# Patient Record
Sex: FEMALE | Race: WHITE | NOT HISPANIC OR LATINO | Employment: OTHER | URBAN - NONMETROPOLITAN AREA
[De-identification: names, ages, dates, MRNs, and addresses within clinical notes are randomized per-mention and may not be internally consistent; named-entity substitution may affect disease eponyms.]

---

## 2017-01-13 ENCOUNTER — HOSPITAL ENCOUNTER (EMERGENCY)
Facility: HOSPITAL | Age: 67
Discharge: HOME | End: 2017-01-13
Attending: EMERGENCY MEDICINE | Admitting: EMERGENCY MEDICINE
Payer: MEDICARE

## 2017-01-13 ENCOUNTER — APPOINTMENT (EMERGENCY)
Dept: CT IMAGING | Facility: HOSPITAL | Age: 67
End: 2017-01-13
Payer: MEDICARE

## 2017-01-13 VITALS
OXYGEN SATURATION: 93 % | RESPIRATION RATE: 18 BRPM | HEIGHT: 65 IN | DIASTOLIC BLOOD PRESSURE: 69 MMHG | WEIGHT: 268 LBS | TEMPERATURE: 98 F | HEART RATE: 112 BPM | BODY MASS INDEX: 44.65 KG/M2 | SYSTOLIC BLOOD PRESSURE: 154 MMHG

## 2017-01-13 DIAGNOSIS — R51.9 HEADACHE, ACUTE: Primary | ICD-10-CM

## 2017-01-13 DIAGNOSIS — H53.8 BLURRED VISION, BILATERAL: ICD-10-CM

## 2017-01-13 LAB
ACETONE SERPL-MCNC: NEGATIVE MG/DL
ALBUMIN SERPL BCP-MCNC: 3.3 G/DL (ref 3.5–5)
ALP SERPL-CCNC: 72 U/L (ref 46–116)
ALT SERPL W P-5'-P-CCNC: 25 U/L (ref 12–78)
ANION GAP SERPL CALCULATED.3IONS-SCNC: 8 MMOL/L (ref 4–13)
AST SERPL W P-5'-P-CCNC: 12 U/L (ref 5–45)
BILIRUB SERPL-MCNC: 0.3 MG/DL (ref 0.2–1)
BUN SERPL-MCNC: 14 MG/DL (ref 5–25)
CALCIUM SERPL-MCNC: 9.4 MG/DL (ref 8.3–10.1)
CHLORIDE SERPL-SCNC: 99 MMOL/L (ref 100–108)
CO2 SERPL-SCNC: 33 MMOL/L (ref 21–32)
CREAT SERPL-MCNC: 0.72 MG/DL (ref 0.6–1.3)
ERYTHROCYTE [DISTWIDTH] IN BLOOD BY AUTOMATED COUNT: 15.5 % (ref 11.6–15.1)
GFR SERPL CREATININE-BSD FRML MDRD: >60 ML/MIN/1.73SQ M
GLUCOSE SERPL-MCNC: 211 MG/DL (ref 65–140)
HCT VFR BLD AUTO: 42.7 % (ref 34.8–46.1)
HGB BLD-MCNC: 13.3 G/DL (ref 11.5–15.4)
MCH RBC QN AUTO: 29.6 PG (ref 26.8–34.3)
MCHC RBC AUTO-ENTMCNC: 31.1 G/DL (ref 31.4–37.4)
MCV RBC AUTO: 95 FL (ref 82–98)
PLATELET # BLD AUTO: 330 THOUSANDS/UL (ref 149–390)
PMV BLD AUTO: 10.5 FL (ref 8.9–12.7)
POTASSIUM SERPL-SCNC: 3.8 MMOL/L (ref 3.5–5.3)
PROT SERPL-MCNC: 7.3 G/DL (ref 6.4–8.2)
RBC # BLD AUTO: 4.49 MILLION/UL (ref 3.81–5.12)
SODIUM SERPL-SCNC: 140 MMOL/L (ref 136–145)
WBC # BLD AUTO: 8.45 THOUSAND/UL (ref 4.31–10.16)

## 2017-01-13 PROCEDURE — 85027 COMPLETE CBC AUTOMATED: CPT | Performed by: EMERGENCY MEDICINE

## 2017-01-13 PROCEDURE — 80053 COMPREHEN METABOLIC PANEL: CPT | Performed by: EMERGENCY MEDICINE

## 2017-01-13 PROCEDURE — 36415 COLL VENOUS BLD VENIPUNCTURE: CPT | Performed by: EMERGENCY MEDICINE

## 2017-01-13 PROCEDURE — 70450 CT HEAD/BRAIN W/O DYE: CPT

## 2017-01-13 PROCEDURE — 99284 EMERGENCY DEPT VISIT MOD MDM: CPT

## 2017-01-13 PROCEDURE — 82009 KETONE BODYS QUAL: CPT | Performed by: EMERGENCY MEDICINE

## 2017-01-13 RX ORDER — FOLIC ACID 1 MG/1
1 TABLET ORAL DAILY
COMMUNITY
End: 2018-07-05 | Stop reason: SDUPTHER

## 2017-01-14 ENCOUNTER — HOSPITAL ENCOUNTER (EMERGENCY)
Facility: HOSPITAL | Age: 67
Discharge: HOME | End: 2017-01-14
Attending: EMERGENCY MEDICINE | Admitting: EMERGENCY MEDICINE
Payer: MEDICARE

## 2017-01-14 ENCOUNTER — APPOINTMENT (EMERGENCY)
Dept: CT IMAGING | Facility: HOSPITAL | Age: 67
End: 2017-01-14
Payer: MEDICARE

## 2017-01-14 VITALS
TEMPERATURE: 96.8 F | OXYGEN SATURATION: 94 % | DIASTOLIC BLOOD PRESSURE: 74 MMHG | RESPIRATION RATE: 17 BRPM | SYSTOLIC BLOOD PRESSURE: 177 MMHG | BODY MASS INDEX: 44.81 KG/M2 | WEIGHT: 268.96 LBS | HEIGHT: 65 IN | HEART RATE: 74 BPM

## 2017-01-14 DIAGNOSIS — G43.909 MIGRAINE HEADACHE: Primary | ICD-10-CM

## 2017-01-14 PROCEDURE — 70496 CT ANGIOGRAPHY HEAD: CPT

## 2017-01-14 PROCEDURE — 70498 CT ANGIOGRAPHY NECK: CPT

## 2017-01-14 PROCEDURE — 99284 EMERGENCY DEPT VISIT MOD MDM: CPT

## 2017-01-14 PROCEDURE — 96361 HYDRATE IV INFUSION ADD-ON: CPT

## 2017-01-14 PROCEDURE — 96365 THER/PROPH/DIAG IV INF INIT: CPT

## 2017-01-14 PROCEDURE — 96375 TX/PRO/DX INJ NEW DRUG ADDON: CPT

## 2017-01-14 RX ORDER — VALPROATE SODIUM 100 MG/ML
INJECTION, SOLUTION INTRAVENOUS
Status: DISCONTINUED
Start: 2017-01-14 | End: 2017-01-14 | Stop reason: HOSPADM

## 2017-01-14 RX ORDER — TRAMADOL HYDROCHLORIDE 50 MG/1
50 TABLET ORAL EVERY 6 HOURS PRN
Qty: 30 TABLET | Refills: 0 | Status: SHIPPED | OUTPATIENT
Start: 2017-01-14 | End: 2017-01-24

## 2017-01-14 RX ORDER — DIPHENHYDRAMINE HYDROCHLORIDE 50 MG/ML
25 INJECTION INTRAMUSCULAR; INTRAVENOUS ONCE
Status: COMPLETED | OUTPATIENT
Start: 2017-01-14 | End: 2017-01-14

## 2017-01-14 RX ORDER — KETOROLAC TROMETHAMINE 30 MG/ML
15 INJECTION, SOLUTION INTRAMUSCULAR; INTRAVENOUS ONCE
Status: COMPLETED | OUTPATIENT
Start: 2017-01-14 | End: 2017-01-14

## 2017-01-14 RX ORDER — METOCLOPRAMIDE HYDROCHLORIDE 5 MG/ML
10 INJECTION INTRAMUSCULAR; INTRAVENOUS ONCE
Status: COMPLETED | OUTPATIENT
Start: 2017-01-14 | End: 2017-01-14

## 2017-01-14 RX ADMIN — DIPHENHYDRAMINE HYDROCHLORIDE 25 MG: 50 INJECTION, SOLUTION INTRAMUSCULAR; INTRAVENOUS at 01:41

## 2017-01-14 RX ADMIN — METOCLOPRAMIDE 10 MG: 5 INJECTION, SOLUTION INTRAMUSCULAR; INTRAVENOUS at 01:35

## 2017-01-14 RX ADMIN — IOHEXOL 100 ML: 350 INJECTION, SOLUTION INTRAVENOUS at 02:23

## 2017-01-14 RX ADMIN — KETOROLAC TROMETHAMINE 15 MG: 30 INJECTION, SOLUTION INTRAMUSCULAR at 01:39

## 2017-01-14 RX ADMIN — VALPROATE SODIUM 500 MG: 100 INJECTION, SOLUTION INTRAVENOUS at 03:22

## 2017-01-14 RX ADMIN — SODIUM CHLORIDE 500 ML: 0.9 INJECTION, SOLUTION INTRAVENOUS at 01:42

## 2017-03-01 ENCOUNTER — HOSPITAL ENCOUNTER (EMERGENCY)
Facility: HOSPITAL | Age: 67
Discharge: HOME | End: 2017-03-01
Attending: EMERGENCY MEDICINE | Admitting: EMERGENCY MEDICINE
Payer: MEDICARE

## 2017-03-01 VITALS
BODY MASS INDEX: 43.32 KG/M2 | SYSTOLIC BLOOD PRESSURE: 138 MMHG | OXYGEN SATURATION: 91 % | RESPIRATION RATE: 20 BRPM | HEIGHT: 65 IN | TEMPERATURE: 98.7 F | WEIGHT: 260 LBS | DIASTOLIC BLOOD PRESSURE: 69 MMHG | HEART RATE: 96 BPM

## 2017-03-01 DIAGNOSIS — M06.9 RHEUMATOID ARTHRITIS FLARE (HCC): Primary | ICD-10-CM

## 2017-03-01 DIAGNOSIS — B37.2 CANDIDIASIS, INTERTRIGO: ICD-10-CM

## 2017-03-01 DIAGNOSIS — J44.9 COPD (CHRONIC OBSTRUCTIVE PULMONARY DISEASE) (HCC): ICD-10-CM

## 2017-03-01 DIAGNOSIS — E66.01 MORBID (SEVERE) OBESITY DUE TO EXCESS CALORIES (HCC): ICD-10-CM

## 2017-03-01 LAB
ALBUMIN SERPL BCP-MCNC: 3.1 G/DL (ref 3.5–5)
ALP SERPL-CCNC: 75 U/L (ref 46–116)
ALT SERPL W P-5'-P-CCNC: 22 U/L (ref 12–78)
ANION GAP SERPL CALCULATED.3IONS-SCNC: 9 MMOL/L (ref 4–13)
APTT PPP: 26 SECONDS (ref 24–36)
AST SERPL W P-5'-P-CCNC: 13 U/L (ref 5–45)
BASOPHILS # BLD AUTO: 0.03 THOUSANDS/ΜL (ref 0–0.1)
BASOPHILS NFR BLD AUTO: 0 % (ref 0–1)
BILIRUB SERPL-MCNC: 0.4 MG/DL (ref 0.2–1)
BUN SERPL-MCNC: 17 MG/DL (ref 5–25)
CALCIUM SERPL-MCNC: 9.6 MG/DL (ref 8.3–10.1)
CHLORIDE SERPL-SCNC: 101 MMOL/L (ref 100–108)
CO2 SERPL-SCNC: 29 MMOL/L (ref 21–32)
CREAT SERPL-MCNC: 0.58 MG/DL (ref 0.6–1.3)
CRP SERPL HS-MCNC: 68.9 MG/L
EOSINOPHIL # BLD AUTO: 0.28 THOUSAND/ΜL (ref 0–0.61)
EOSINOPHIL NFR BLD AUTO: 3 % (ref 0–6)
ERYTHROCYTE [DISTWIDTH] IN BLOOD BY AUTOMATED COUNT: 15.7 % (ref 11.6–15.1)
ERYTHROCYTE [SEDIMENTATION RATE] IN BLOOD: 52 MM/HOUR (ref 0–20)
GFR SERPL CREATININE-BSD FRML MDRD: >60 ML/MIN/1.73SQ M
GLUCOSE SERPL-MCNC: 187 MG/DL (ref 65–140)
HCT VFR BLD AUTO: 40.1 % (ref 34.8–46.1)
HGB BLD-MCNC: 12.5 G/DL (ref 11.5–15.4)
INR PPP: 1.05 (ref 0.86–1.16)
LYMPHOCYTES # BLD AUTO: 2.16 THOUSANDS/ΜL (ref 0.6–4.47)
LYMPHOCYTES NFR BLD AUTO: 26 % (ref 14–44)
MCH RBC QN AUTO: 28.7 PG (ref 26.8–34.3)
MCHC RBC AUTO-ENTMCNC: 31.2 G/DL (ref 31.4–37.4)
MCV RBC AUTO: 92 FL (ref 82–98)
MONOCYTES # BLD AUTO: 0.89 THOUSAND/ΜL (ref 0.17–1.22)
MONOCYTES NFR BLD AUTO: 11 % (ref 4–12)
NEUTROPHILS # BLD AUTO: 5.02 THOUSANDS/ΜL (ref 1.85–7.62)
NEUTS SEG NFR BLD AUTO: 60 % (ref 43–75)
PLATELET # BLD AUTO: 328 THOUSANDS/UL (ref 149–390)
PMV BLD AUTO: 10.8 FL (ref 8.9–12.7)
POTASSIUM SERPL-SCNC: 3.8 MMOL/L (ref 3.5–5.3)
PROT SERPL-MCNC: 7.4 G/DL (ref 6.4–8.2)
PROTHROMBIN TIME: 13.4 SECONDS (ref 12–14.3)
RBC # BLD AUTO: 4.35 MILLION/UL (ref 3.81–5.12)
SODIUM SERPL-SCNC: 139 MMOL/L (ref 136–145)
WBC # BLD AUTO: 8.38 THOUSAND/UL (ref 4.31–10.16)

## 2017-03-01 PROCEDURE — 96374 THER/PROPH/DIAG INJ IV PUSH: CPT

## 2017-03-01 PROCEDURE — 99284 EMERGENCY DEPT VISIT MOD MDM: CPT

## 2017-03-01 PROCEDURE — 96361 HYDRATE IV INFUSION ADD-ON: CPT

## 2017-03-01 PROCEDURE — 80053 COMPREHEN METABOLIC PANEL: CPT | Performed by: EMERGENCY MEDICINE

## 2017-03-01 PROCEDURE — 85610 PROTHROMBIN TIME: CPT | Performed by: EMERGENCY MEDICINE

## 2017-03-01 PROCEDURE — 36415 COLL VENOUS BLD VENIPUNCTURE: CPT | Performed by: EMERGENCY MEDICINE

## 2017-03-01 PROCEDURE — 85652 RBC SED RATE AUTOMATED: CPT | Performed by: EMERGENCY MEDICINE

## 2017-03-01 PROCEDURE — 85730 THROMBOPLASTIN TIME PARTIAL: CPT | Performed by: EMERGENCY MEDICINE

## 2017-03-01 PROCEDURE — 86141 C-REACTIVE PROTEIN HS: CPT | Performed by: EMERGENCY MEDICINE

## 2017-03-01 PROCEDURE — 87040 BLOOD CULTURE FOR BACTERIA: CPT | Performed by: EMERGENCY MEDICINE

## 2017-03-01 PROCEDURE — 85025 COMPLETE CBC W/AUTO DIFF WBC: CPT | Performed by: EMERGENCY MEDICINE

## 2017-03-01 PROCEDURE — 96375 TX/PRO/DX INJ NEW DRUG ADDON: CPT

## 2017-03-01 RX ORDER — PREDNISONE 10 MG/1
10 TABLET ORAL ONCE
Status: COMPLETED | OUTPATIENT
Start: 2017-03-01 | End: 2017-03-01

## 2017-03-01 RX ORDER — NYSTATIN 100000 [USP'U]/G
POWDER TOPICAL 3 TIMES DAILY
Qty: 60 G | Refills: 1 | Status: SHIPPED | OUTPATIENT
Start: 2017-03-01 | End: 2017-11-24 | Stop reason: HOSPADM

## 2017-03-01 RX ORDER — KETOROLAC TROMETHAMINE 30 MG/ML
30 INJECTION, SOLUTION INTRAMUSCULAR; INTRAVENOUS ONCE
Status: COMPLETED | OUTPATIENT
Start: 2017-03-01 | End: 2017-03-01

## 2017-03-01 RX ORDER — SODIUM CHLORIDE 9 MG/ML
125 INJECTION, SOLUTION INTRAVENOUS CONTINUOUS
Status: DISCONTINUED | OUTPATIENT
Start: 2017-03-01 | End: 2017-03-01 | Stop reason: HOSPADM

## 2017-03-01 RX ADMIN — KETOROLAC TROMETHAMINE 30 MG: 30 INJECTION, SOLUTION INTRAMUSCULAR at 14:49

## 2017-03-01 RX ADMIN — PREDNISONE 10 MG: 10 TABLET ORAL at 17:10

## 2017-03-01 RX ADMIN — DEXAMETHASONE SODIUM PHOSPHATE 10 MG: 10 INJECTION INTRAMUSCULAR; INTRAVENOUS at 14:50

## 2017-03-01 RX ADMIN — SODIUM CHLORIDE 125 ML/HR: 0.9 INJECTION, SOLUTION INTRAVENOUS at 14:46

## 2017-03-06 LAB
BACTERIA BLD CULT: NORMAL
BACTERIA BLD CULT: NORMAL

## 2017-04-23 ENCOUNTER — APPOINTMENT (EMERGENCY)
Dept: RADIOLOGY | Facility: HOSPITAL | Age: 67
End: 2017-04-23
Payer: MEDICARE

## 2017-04-23 ENCOUNTER — HOSPITAL ENCOUNTER (EMERGENCY)
Facility: HOSPITAL | Age: 67
Discharge: HOME | End: 2017-04-23
Admitting: EMERGENCY MEDICINE
Payer: MEDICARE

## 2017-04-23 VITALS
DIASTOLIC BLOOD PRESSURE: 70 MMHG | OXYGEN SATURATION: 100 % | RESPIRATION RATE: 20 BRPM | HEIGHT: 64 IN | HEART RATE: 90 BPM | BODY MASS INDEX: 46.82 KG/M2 | SYSTOLIC BLOOD PRESSURE: 150 MMHG | WEIGHT: 274.25 LBS | TEMPERATURE: 98 F

## 2017-04-23 DIAGNOSIS — M79.641 HAND PAIN, RIGHT: Primary | ICD-10-CM

## 2017-04-23 DIAGNOSIS — M25.571 RIGHT ANKLE PAIN: ICD-10-CM

## 2017-04-23 LAB — GLUCOSE SERPL-MCNC: 217 MG/DL (ref 65–140)

## 2017-04-23 PROCEDURE — 99284 EMERGENCY DEPT VISIT MOD MDM: CPT

## 2017-04-23 PROCEDURE — 82948 REAGENT STRIP/BLOOD GLUCOSE: CPT

## 2017-04-23 PROCEDURE — 96372 THER/PROPH/DIAG INJ SC/IM: CPT

## 2017-04-23 PROCEDURE — 73630 X-RAY EXAM OF FOOT: CPT

## 2017-04-23 PROCEDURE — 73610 X-RAY EXAM OF ANKLE: CPT

## 2017-04-23 RX ORDER — KETOROLAC TROMETHAMINE 30 MG/ML
15 INJECTION, SOLUTION INTRAMUSCULAR; INTRAVENOUS ONCE
Status: COMPLETED | OUTPATIENT
Start: 2017-04-23 | End: 2017-04-23

## 2017-04-23 RX ORDER — NAPROXEN 500 MG/1
500 TABLET ORAL 2 TIMES DAILY WITH MEALS
COMMUNITY
End: 2017-11-20

## 2017-04-23 RX ORDER — TRAMADOL HYDROCHLORIDE 50 MG/1
50 TABLET ORAL EVERY 6 HOURS PRN
Qty: 20 TABLET | Refills: 0 | Status: SHIPPED | OUTPATIENT
Start: 2017-04-23 | End: 2017-05-03

## 2017-04-23 RX ORDER — LEVOTHYROXINE SODIUM 0.12 MG/1
125 TABLET ORAL DAILY
COMMUNITY
End: 2017-11-20

## 2017-04-23 RX ADMIN — KETOROLAC TROMETHAMINE 15 MG: 30 INJECTION, SOLUTION INTRAMUSCULAR at 12:18

## 2017-05-16 ENCOUNTER — GENERIC CONVERSION - ENCOUNTER (OUTPATIENT)
Dept: OTHER | Facility: OTHER | Age: 67
End: 2017-05-16

## 2017-05-23 ENCOUNTER — TRANSCRIBE ORDERS (OUTPATIENT)
Dept: ADMINISTRATIVE | Facility: HOSPITAL | Age: 67
End: 2017-05-23

## 2017-05-23 ENCOUNTER — GENERIC CONVERSION - ENCOUNTER (OUTPATIENT)
Dept: OTHER | Facility: OTHER | Age: 67
End: 2017-05-23

## 2017-05-23 ENCOUNTER — APPOINTMENT (OUTPATIENT)
Dept: LAB | Facility: HOSPITAL | Age: 67
End: 2017-05-23
Payer: MEDICARE

## 2017-05-23 DIAGNOSIS — E55.9 VITAMIN D DEFICIENCY: ICD-10-CM

## 2017-05-23 DIAGNOSIS — M06.9 RHEUMATOID ARTHRITIS (HCC): ICD-10-CM

## 2017-05-23 DIAGNOSIS — I10 ESSENTIAL (PRIMARY) HYPERTENSION: ICD-10-CM

## 2017-05-23 DIAGNOSIS — E78.00 PURE HYPERCHOLESTEROLEMIA: ICD-10-CM

## 2017-05-23 DIAGNOSIS — E11.9 TYPE 2 DIABETES MELLITUS WITHOUT COMPLICATIONS (HCC): ICD-10-CM

## 2017-05-23 DIAGNOSIS — E03.9 HYPOTHYROIDISM: ICD-10-CM

## 2017-05-23 LAB
25(OH)D3 SERPL-MCNC: 17.6 NG/ML (ref 30–100)
ALBUMIN SERPL BCP-MCNC: 3.3 G/DL (ref 3.5–5)
ALP SERPL-CCNC: 86 U/L (ref 46–116)
ALT SERPL W P-5'-P-CCNC: 21 U/L (ref 12–78)
ANION GAP SERPL CALCULATED.3IONS-SCNC: 8 MMOL/L (ref 4–13)
AST SERPL W P-5'-P-CCNC: 11 U/L (ref 5–45)
BASOPHILS # BLD AUTO: 0.03 THOUSANDS/ΜL (ref 0–0.1)
BASOPHILS NFR BLD AUTO: 0 % (ref 0–1)
BILIRUB SERPL-MCNC: 0.2 MG/DL (ref 0.2–1)
BUN SERPL-MCNC: 18 MG/DL (ref 5–25)
CALCIUM SERPL-MCNC: 9.7 MG/DL (ref 8.3–10.1)
CHLORIDE SERPL-SCNC: 101 MMOL/L (ref 100–108)
CHOLEST SERPL-MCNC: 191 MG/DL (ref 50–200)
CO2 SERPL-SCNC: 30 MMOL/L (ref 21–32)
CREAT SERPL-MCNC: 0.62 MG/DL (ref 0.6–1.3)
CREAT UR-MCNC: 84.5 MG/DL
EOSINOPHIL # BLD AUTO: 0.21 THOUSAND/ΜL (ref 0–0.61)
EOSINOPHIL NFR BLD AUTO: 2 % (ref 0–6)
ERYTHROCYTE [DISTWIDTH] IN BLOOD BY AUTOMATED COUNT: 16.7 % (ref 11.6–15.1)
EST. AVERAGE GLUCOSE BLD GHB EST-MCNC: 200 MG/DL
GFR SERPL CREATININE-BSD FRML MDRD: >60 ML/MIN/1.73SQ M
GLUCOSE P FAST SERPL-MCNC: 177 MG/DL (ref 65–99)
HBA1C MFR BLD: 8.6 % (ref 4.2–6.3)
HCT VFR BLD AUTO: 41.1 % (ref 34.8–46.1)
HDLC SERPL-MCNC: 65 MG/DL (ref 40–60)
HGB BLD-MCNC: 12.7 G/DL (ref 11.5–15.4)
LDLC SERPL CALC-MCNC: 106 MG/DL (ref 0–100)
LYMPHOCYTES # BLD AUTO: 3.17 THOUSANDS/ΜL (ref 0.6–4.47)
LYMPHOCYTES NFR BLD AUTO: 36 % (ref 14–44)
MCH RBC QN AUTO: 28.2 PG (ref 26.8–34.3)
MCHC RBC AUTO-ENTMCNC: 30.9 G/DL (ref 31.4–37.4)
MCV RBC AUTO: 91 FL (ref 82–98)
MICROALBUMIN UR-MCNC: 74.3 MG/L (ref 0–20)
MICROALBUMIN/CREAT 24H UR: 88 MG/G CREATININE (ref 0–30)
MONOCYTES # BLD AUTO: 0.87 THOUSAND/ΜL (ref 0.17–1.22)
MONOCYTES NFR BLD AUTO: 10 % (ref 4–12)
NEUTROPHILS # BLD AUTO: 4.65 THOUSANDS/ΜL (ref 1.85–7.62)
NEUTS SEG NFR BLD AUTO: 52 % (ref 43–75)
PLATELET # BLD AUTO: 359 THOUSANDS/UL (ref 149–390)
PMV BLD AUTO: 11.3 FL (ref 8.9–12.7)
POTASSIUM SERPL-SCNC: 4.5 MMOL/L (ref 3.5–5.3)
PROT SERPL-MCNC: 7.2 G/DL (ref 6.4–8.2)
RBC # BLD AUTO: 4.51 MILLION/UL (ref 3.81–5.12)
SODIUM SERPL-SCNC: 139 MMOL/L (ref 136–145)
TRIGL SERPL-MCNC: 99 MG/DL
TSH SERPL DL<=0.05 MIU/L-ACNC: 0.7 UIU/ML (ref 0.36–3.74)
WBC # BLD AUTO: 8.93 THOUSAND/UL (ref 4.31–10.16)

## 2017-05-23 PROCEDURE — 82306 VITAMIN D 25 HYDROXY: CPT

## 2017-05-23 PROCEDURE — 84443 ASSAY THYROID STIM HORMONE: CPT

## 2017-05-23 PROCEDURE — 85025 COMPLETE CBC W/AUTO DIFF WBC: CPT

## 2017-05-23 PROCEDURE — 82570 ASSAY OF URINE CREATININE: CPT

## 2017-05-23 PROCEDURE — 80061 LIPID PANEL: CPT

## 2017-05-23 PROCEDURE — 80053 COMPREHEN METABOLIC PANEL: CPT

## 2017-05-23 PROCEDURE — 82043 UR ALBUMIN QUANTITATIVE: CPT

## 2017-05-23 PROCEDURE — 83036 HEMOGLOBIN GLYCOSYLATED A1C: CPT

## 2017-05-23 PROCEDURE — 36415 COLL VENOUS BLD VENIPUNCTURE: CPT

## 2017-05-26 ENCOUNTER — GENERIC CONVERSION - ENCOUNTER (OUTPATIENT)
Dept: OTHER | Facility: OTHER | Age: 67
End: 2017-05-26

## 2017-06-07 ENCOUNTER — ALLSCRIPTS OFFICE VISIT (OUTPATIENT)
Dept: OTHER | Facility: OTHER | Age: 67
End: 2017-06-07

## 2017-06-07 DIAGNOSIS — E11.9 TYPE 2 DIABETES MELLITUS WITHOUT COMPLICATIONS (HCC): ICD-10-CM

## 2017-06-07 DIAGNOSIS — E53.8 DEFICIENCY OF OTHER SPECIFIED B GROUP VITAMINS: ICD-10-CM

## 2017-06-07 DIAGNOSIS — E03.9 HYPOTHYROIDISM: ICD-10-CM

## 2017-06-07 DIAGNOSIS — E55.9 VITAMIN D DEFICIENCY: ICD-10-CM

## 2017-06-07 DIAGNOSIS — J44.9 CHRONIC OBSTRUCTIVE PULMONARY DISEASE (HCC): ICD-10-CM

## 2017-06-07 DIAGNOSIS — E78.00 PURE HYPERCHOLESTEROLEMIA: ICD-10-CM

## 2017-06-07 DIAGNOSIS — M06.9 RHEUMATOID ARTHRITIS(714.0): ICD-10-CM

## 2017-06-07 DIAGNOSIS — B37.2 CANDIDIASIS OF SKIN AND NAIL: ICD-10-CM

## 2017-06-07 DIAGNOSIS — I10 ESSENTIAL (PRIMARY) HYPERTENSION: ICD-10-CM

## 2017-06-16 ENCOUNTER — GENERIC CONVERSION - ENCOUNTER (OUTPATIENT)
Dept: OTHER | Facility: OTHER | Age: 67
End: 2017-06-16

## 2017-06-21 ENCOUNTER — GENERIC CONVERSION - ENCOUNTER (OUTPATIENT)
Dept: OTHER | Facility: OTHER | Age: 67
End: 2017-06-21

## 2017-06-26 ENCOUNTER — ALLSCRIPTS OFFICE VISIT (OUTPATIENT)
Dept: OTHER | Facility: OTHER | Age: 67
End: 2017-06-26

## 2017-08-21 ENCOUNTER — GENERIC CONVERSION - ENCOUNTER (OUTPATIENT)
Dept: OTHER | Facility: OTHER | Age: 67
End: 2017-08-21

## 2017-09-05 DIAGNOSIS — E53.8 DEFICIENCY OF OTHER SPECIFIED B GROUP VITAMINS: ICD-10-CM

## 2017-09-05 DIAGNOSIS — J44.9 CHRONIC OBSTRUCTIVE PULMONARY DISEASE (HCC): ICD-10-CM

## 2017-09-05 DIAGNOSIS — Z12.31 ENCOUNTER FOR SCREENING MAMMOGRAM FOR MALIGNANT NEOPLASM OF BREAST: ICD-10-CM

## 2017-09-05 DIAGNOSIS — M06.9 RHEUMATOID ARTHRITIS(714.0): ICD-10-CM

## 2017-09-05 DIAGNOSIS — I10 ESSENTIAL (PRIMARY) HYPERTENSION: ICD-10-CM

## 2017-09-05 DIAGNOSIS — E03.9 HYPOTHYROIDISM: ICD-10-CM

## 2017-09-05 DIAGNOSIS — E11.9 TYPE 2 DIABETES MELLITUS WITHOUT COMPLICATIONS (HCC): ICD-10-CM

## 2017-09-05 DIAGNOSIS — R07.81 PLEURODYNIA: ICD-10-CM

## 2017-09-05 DIAGNOSIS — E78.00 PURE HYPERCHOLESTEROLEMIA: ICD-10-CM

## 2017-09-05 DIAGNOSIS — E55.9 VITAMIN D DEFICIENCY: ICD-10-CM

## 2017-09-09 ENCOUNTER — TRANSCRIBE ORDERS (OUTPATIENT)
Dept: ADMINISTRATIVE | Facility: HOSPITAL | Age: 67
End: 2017-09-09

## 2017-09-09 ENCOUNTER — APPOINTMENT (OUTPATIENT)
Dept: LAB | Facility: HOSPITAL | Age: 67
End: 2017-09-09
Payer: MEDICARE

## 2017-09-09 DIAGNOSIS — I10 ESSENTIAL (PRIMARY) HYPERTENSION: ICD-10-CM

## 2017-09-09 DIAGNOSIS — M06.9 RHEUMATOID ARTHRITIS, INVOLVING UNSPECIFIED SITE, UNSPECIFIED RHEUMATOID FACTOR PRESENCE: Primary | ICD-10-CM

## 2017-09-09 DIAGNOSIS — E78.00 PURE HYPERCHOLESTEROLEMIA: ICD-10-CM

## 2017-09-09 DIAGNOSIS — E55.9 VITAMIN D DEFICIENCY: ICD-10-CM

## 2017-09-09 DIAGNOSIS — E11.9 TYPE 2 DIABETES MELLITUS WITHOUT COMPLICATIONS (HCC): ICD-10-CM

## 2017-09-09 DIAGNOSIS — E03.9 HYPOTHYROIDISM: ICD-10-CM

## 2017-09-09 DIAGNOSIS — E53.8 DEFICIENCY OF OTHER SPECIFIED B GROUP VITAMINS: ICD-10-CM

## 2017-09-09 DIAGNOSIS — M06.9 RHEUMATOID ARTHRITIS(714.0): ICD-10-CM

## 2017-09-09 DIAGNOSIS — M06.9 RHEUMATOID ARTHRITIS, INVOLVING UNSPECIFIED SITE, UNSPECIFIED RHEUMATOID FACTOR PRESENCE: ICD-10-CM

## 2017-09-09 DIAGNOSIS — B37.2 CANDIDIASIS OF SKIN AND NAIL: ICD-10-CM

## 2017-09-09 DIAGNOSIS — J44.9 CHRONIC OBSTRUCTIVE PULMONARY DISEASE (HCC): ICD-10-CM

## 2017-09-09 LAB
25(OH)D3 SERPL-MCNC: 31.5 NG/ML (ref 30–100)
ALBUMIN SERPL BCP-MCNC: 3.7 G/DL (ref 3.5–5)
ALP SERPL-CCNC: 85 U/L (ref 46–116)
ALT SERPL W P-5'-P-CCNC: 46 U/L (ref 12–78)
ANION GAP SERPL CALCULATED.3IONS-SCNC: 8 MMOL/L (ref 4–13)
AST SERPL W P-5'-P-CCNC: 18 U/L (ref 5–45)
BASOPHILS # BLD AUTO: 0.03 THOUSANDS/ΜL (ref 0–0.1)
BASOPHILS NFR BLD AUTO: 0 % (ref 0–1)
BILIRUB SERPL-MCNC: 0.5 MG/DL (ref 0.2–1)
BUN SERPL-MCNC: 19 MG/DL (ref 5–25)
CALCIUM SERPL-MCNC: 9.6 MG/DL (ref 8.3–10.1)
CHLORIDE SERPL-SCNC: 102 MMOL/L (ref 100–108)
CHOLEST SERPL-MCNC: 225 MG/DL (ref 50–200)
CO2 SERPL-SCNC: 30 MMOL/L (ref 21–32)
CREAT SERPL-MCNC: 0.66 MG/DL (ref 0.6–1.3)
CRP SERPL QL: 9.7 MG/L
EOSINOPHIL # BLD AUTO: 0.25 THOUSAND/ΜL (ref 0–0.61)
EOSINOPHIL NFR BLD AUTO: 4 % (ref 0–6)
ERYTHROCYTE [DISTWIDTH] IN BLOOD BY AUTOMATED COUNT: 16.7 % (ref 11.6–15.1)
ERYTHROCYTE [SEDIMENTATION RATE] IN BLOOD: 4 MM/HOUR (ref 0–20)
EST. AVERAGE GLUCOSE BLD GHB EST-MCNC: 189 MG/DL
GFR SERPL CREATININE-BSD FRML MDRD: 90 ML/MIN/1.73SQ M
GLUCOSE P FAST SERPL-MCNC: 158 MG/DL (ref 65–99)
HBA1C MFR BLD: 8.2 % (ref 4.2–6.3)
HCT VFR BLD AUTO: 45.4 % (ref 34.8–46.1)
HDLC SERPL-MCNC: 65 MG/DL (ref 40–60)
HGB BLD-MCNC: 14.4 G/DL (ref 11.5–15.4)
LDLC SERPL CALC-MCNC: 114 MG/DL (ref 0–100)
LYMPHOCYTES # BLD AUTO: 2.16 THOUSANDS/ΜL (ref 0.6–4.47)
LYMPHOCYTES NFR BLD AUTO: 31 % (ref 14–44)
MCH RBC QN AUTO: 29.6 PG (ref 26.8–34.3)
MCHC RBC AUTO-ENTMCNC: 31.7 G/DL (ref 31.4–37.4)
MCV RBC AUTO: 93 FL (ref 82–98)
MONOCYTES # BLD AUTO: 0.89 THOUSAND/ΜL (ref 0.17–1.22)
MONOCYTES NFR BLD AUTO: 13 % (ref 4–12)
NEUTROPHILS # BLD AUTO: 3.57 THOUSANDS/ΜL (ref 1.85–7.62)
NEUTS SEG NFR BLD AUTO: 52 % (ref 43–75)
PLATELET # BLD AUTO: 295 THOUSANDS/UL (ref 149–390)
PMV BLD AUTO: 10.5 FL (ref 8.9–12.7)
POTASSIUM SERPL-SCNC: 4.1 MMOL/L (ref 3.5–5.3)
PROT SERPL-MCNC: 7.4 G/DL (ref 6.4–8.2)
RBC # BLD AUTO: 4.86 MILLION/UL (ref 3.81–5.12)
SODIUM SERPL-SCNC: 140 MMOL/L (ref 136–145)
TRIGL SERPL-MCNC: 229 MG/DL
TSH SERPL DL<=0.05 MIU/L-ACNC: 0.03 UIU/ML (ref 0.36–3.74)
VIT B12 SERPL-MCNC: 271 PG/ML (ref 100–900)
WBC # BLD AUTO: 6.9 THOUSAND/UL (ref 4.31–10.16)

## 2017-09-09 PROCEDURE — 82306 VITAMIN D 25 HYDROXY: CPT

## 2017-09-09 PROCEDURE — 85025 COMPLETE CBC W/AUTO DIFF WBC: CPT

## 2017-09-09 PROCEDURE — 80061 LIPID PANEL: CPT

## 2017-09-09 PROCEDURE — 80053 COMPREHEN METABOLIC PANEL: CPT

## 2017-09-09 PROCEDURE — 86140 C-REACTIVE PROTEIN: CPT

## 2017-09-09 PROCEDURE — 84443 ASSAY THYROID STIM HORMONE: CPT

## 2017-09-09 PROCEDURE — 85652 RBC SED RATE AUTOMATED: CPT

## 2017-09-09 PROCEDURE — 82607 VITAMIN B-12: CPT

## 2017-09-09 PROCEDURE — 36415 COLL VENOUS BLD VENIPUNCTURE: CPT

## 2017-09-09 PROCEDURE — 83036 HEMOGLOBIN GLYCOSYLATED A1C: CPT

## 2017-09-10 ENCOUNTER — GENERIC CONVERSION - ENCOUNTER (OUTPATIENT)
Dept: OTHER | Facility: OTHER | Age: 67
End: 2017-09-10

## 2017-09-11 ENCOUNTER — ALLSCRIPTS OFFICE VISIT (OUTPATIENT)
Dept: OTHER | Facility: OTHER | Age: 67
End: 2017-09-11

## 2017-09-11 ENCOUNTER — GENERIC CONVERSION - ENCOUNTER (OUTPATIENT)
Dept: OTHER | Facility: OTHER | Age: 67
End: 2017-09-11

## 2017-09-15 ENCOUNTER — OFFICE VISIT (OUTPATIENT)
Dept: URGENT CARE | Facility: CLINIC | Age: 67
End: 2017-09-15
Payer: MEDICARE

## 2017-09-15 ENCOUNTER — APPOINTMENT (OUTPATIENT)
Dept: RADIOLOGY | Facility: CLINIC | Age: 67
End: 2017-09-15
Payer: MEDICARE

## 2017-09-15 ENCOUNTER — GENERIC CONVERSION - ENCOUNTER (OUTPATIENT)
Dept: OTHER | Facility: OTHER | Age: 67
End: 2017-09-15

## 2017-09-15 DIAGNOSIS — R07.81 PLEURODYNIA: ICD-10-CM

## 2017-09-15 PROCEDURE — G0463 HOSPITAL OUTPT CLINIC VISIT: HCPCS

## 2017-09-15 PROCEDURE — 71101 X-RAY EXAM UNILAT RIBS/CHEST: CPT

## 2017-09-15 PROCEDURE — 99213 OFFICE O/P EST LOW 20 MIN: CPT

## 2017-11-20 ENCOUNTER — APPOINTMENT (EMERGENCY)
Dept: CT IMAGING | Facility: HOSPITAL | Age: 67
DRG: 690 | End: 2017-11-20
Payer: MEDICARE

## 2017-11-20 ENCOUNTER — HOSPITAL ENCOUNTER (INPATIENT)
Facility: HOSPITAL | Age: 67
LOS: 3 days | Discharge: HOME HEALTH | DRG: 690 | End: 2017-11-24
Attending: EMERGENCY MEDICINE | Admitting: HOSPITALIST
Payer: MEDICARE

## 2017-11-20 DIAGNOSIS — N10 PYELONEPHRITIS, ACUTE: Primary | ICD-10-CM

## 2017-11-20 DIAGNOSIS — M54.50 LOW BACK PAIN: ICD-10-CM

## 2017-11-20 LAB
ALBUMIN SERPL BCP-MCNC: 3.8 G/DL (ref 3.5–5)
ALP SERPL-CCNC: 78 U/L (ref 46–116)
ALT SERPL W P-5'-P-CCNC: 46 U/L (ref 12–78)
ANION GAP SERPL CALCULATED.3IONS-SCNC: 5 MMOL/L (ref 4–13)
AST SERPL W P-5'-P-CCNC: 20 U/L (ref 5–45)
BACTERIA UR QL AUTO: ABNORMAL /HPF
BASOPHILS # BLD AUTO: 0.03 THOUSANDS/ΜL (ref 0–0.1)
BASOPHILS NFR BLD AUTO: 0 % (ref 0–1)
BILIRUB SERPL-MCNC: 0.3 MG/DL (ref 0.2–1)
BILIRUB UR QL STRIP: NEGATIVE
BUN SERPL-MCNC: 14 MG/DL (ref 5–25)
CALCIUM SERPL-MCNC: 9.5 MG/DL (ref 8.3–10.1)
CHLORIDE SERPL-SCNC: 102 MMOL/L (ref 100–108)
CLARITY UR: ABNORMAL
CO2 SERPL-SCNC: 30 MMOL/L (ref 21–32)
COLOR UR: YELLOW
CREAT SERPL-MCNC: 0.73 MG/DL (ref 0.6–1.3)
EOSINOPHIL # BLD AUTO: 0.08 THOUSAND/ΜL (ref 0–0.61)
EOSINOPHIL NFR BLD AUTO: 1 % (ref 0–6)
ERYTHROCYTE [DISTWIDTH] IN BLOOD BY AUTOMATED COUNT: 16.1 % (ref 11.6–15.1)
GFR SERPL CREATININE-BSD FRML MDRD: 84 ML/MIN/1.73SQ M
GLUCOSE SERPL-MCNC: 140 MG/DL (ref 65–140)
GLUCOSE UR STRIP-MCNC: ABNORMAL MG/DL
HCT VFR BLD AUTO: 44.7 % (ref 34.8–46.1)
HGB BLD-MCNC: 14.2 G/DL (ref 11.5–15.4)
HGB UR QL STRIP.AUTO: NEGATIVE
KETONES UR STRIP-MCNC: NEGATIVE MG/DL
LEUKOCYTE ESTERASE UR QL STRIP: ABNORMAL
LIPASE SERPL-CCNC: 160 U/L (ref 73–393)
LYMPHOCYTES # BLD AUTO: 3.08 THOUSANDS/ΜL (ref 0.6–4.47)
LYMPHOCYTES NFR BLD AUTO: 37 % (ref 14–44)
MCH RBC QN AUTO: 30.1 PG (ref 26.8–34.3)
MCHC RBC AUTO-ENTMCNC: 31.8 G/DL (ref 31.4–37.4)
MCV RBC AUTO: 95 FL (ref 82–98)
MONOCYTES # BLD AUTO: 1.02 THOUSAND/ΜL (ref 0.17–1.22)
MONOCYTES NFR BLD AUTO: 12 % (ref 4–12)
NEUTROPHILS # BLD AUTO: 4.22 THOUSANDS/ΜL (ref 1.85–7.62)
NEUTS SEG NFR BLD AUTO: 50 % (ref 43–75)
NITRITE UR QL STRIP: POSITIVE
NON-SQ EPI CELLS URNS QL MICRO: ABNORMAL /HPF
PH UR STRIP.AUTO: 5 [PH] (ref 4.5–8)
PLATELET # BLD AUTO: 293 THOUSANDS/UL (ref 149–390)
PMV BLD AUTO: 11.5 FL (ref 8.9–12.7)
POTASSIUM SERPL-SCNC: 4 MMOL/L (ref 3.5–5.3)
PROT SERPL-MCNC: 7.8 G/DL (ref 6.4–8.2)
PROT UR STRIP-MCNC: NEGATIVE MG/DL
RBC # BLD AUTO: 4.72 MILLION/UL (ref 3.81–5.12)
RBC #/AREA URNS AUTO: ABNORMAL /HPF
SODIUM SERPL-SCNC: 137 MMOL/L (ref 136–145)
SP GR UR STRIP.AUTO: 1.01 (ref 1–1.03)
UROBILINOGEN UR QL STRIP.AUTO: 0.2 E.U./DL
WBC # BLD AUTO: 8.43 THOUSAND/UL (ref 4.31–10.16)
WBC #/AREA URNS AUTO: ABNORMAL /HPF

## 2017-11-20 PROCEDURE — 80053 COMPREHEN METABOLIC PANEL: CPT | Performed by: EMERGENCY MEDICINE

## 2017-11-20 PROCEDURE — 96375 TX/PRO/DX INJ NEW DRUG ADDON: CPT

## 2017-11-20 PROCEDURE — 87186 SC STD MICRODIL/AGAR DIL: CPT | Performed by: PHYSICIAN ASSISTANT

## 2017-11-20 PROCEDURE — 85025 COMPLETE CBC W/AUTO DIFF WBC: CPT | Performed by: EMERGENCY MEDICINE

## 2017-11-20 PROCEDURE — 36415 COLL VENOUS BLD VENIPUNCTURE: CPT | Performed by: EMERGENCY MEDICINE

## 2017-11-20 PROCEDURE — 81001 URINALYSIS AUTO W/SCOPE: CPT | Performed by: EMERGENCY MEDICINE

## 2017-11-20 PROCEDURE — 74176 CT ABD & PELVIS W/O CONTRAST: CPT

## 2017-11-20 PROCEDURE — 87077 CULTURE AEROBIC IDENTIFY: CPT | Performed by: PHYSICIAN ASSISTANT

## 2017-11-20 PROCEDURE — 87086 URINE CULTURE/COLONY COUNT: CPT | Performed by: PHYSICIAN ASSISTANT

## 2017-11-20 PROCEDURE — 96374 THER/PROPH/DIAG INJ IV PUSH: CPT

## 2017-11-20 PROCEDURE — 83690 ASSAY OF LIPASE: CPT | Performed by: EMERGENCY MEDICINE

## 2017-11-20 RX ORDER — NYSTATIN 100000 U/G
CREAM TOPICAL AS NEEDED
COMMUNITY
End: 2018-09-24 | Stop reason: ALTCHOICE

## 2017-11-20 RX ORDER — KETOROLAC TROMETHAMINE 30 MG/ML
15 INJECTION, SOLUTION INTRAMUSCULAR; INTRAVENOUS ONCE
Status: COMPLETED | OUTPATIENT
Start: 2017-11-20 | End: 2017-11-20

## 2017-11-20 RX ORDER — LORAZEPAM 2 MG/ML
1 INJECTION INTRAMUSCULAR ONCE
Status: COMPLETED | OUTPATIENT
Start: 2017-11-20 | End: 2017-11-20

## 2017-11-20 RX ADMIN — LORAZEPAM 1 MG: 2 INJECTION INTRAMUSCULAR; INTRAVENOUS at 23:41

## 2017-11-20 RX ADMIN — KETOROLAC TROMETHAMINE 15 MG: 30 INJECTION, SOLUTION INTRAMUSCULAR at 23:39

## 2017-11-21 ENCOUNTER — APPOINTMENT (INPATIENT)
Dept: MRI IMAGING | Facility: HOSPITAL | Age: 67
DRG: 690 | End: 2017-11-21
Payer: MEDICARE

## 2017-11-21 ENCOUNTER — APPOINTMENT (INPATIENT)
Dept: RADIOLOGY | Facility: HOSPITAL | Age: 67
DRG: 690 | End: 2017-11-21
Payer: MEDICARE

## 2017-11-21 PROBLEM — R10.9 FLANK PAIN: Status: ACTIVE | Noted: 2017-11-21

## 2017-11-21 LAB
GLUCOSE SERPL-MCNC: 106 MG/DL (ref 65–140)
GLUCOSE SERPL-MCNC: 153 MG/DL (ref 65–140)
GLUCOSE SERPL-MCNC: 201 MG/DL (ref 65–140)
HOLD SPECIMEN: NORMAL
HOLD SPECIMEN: NORMAL
PH BLDV: 7.37 [PH] (ref 7.3–7.4)

## 2017-11-21 PROCEDURE — 71010 HB CHEST X-RAY 1 VIEW FRONTAL (PORTABLE): CPT

## 2017-11-21 PROCEDURE — 87040 BLOOD CULTURE FOR BACTERIA: CPT | Performed by: EMERGENCY MEDICINE

## 2017-11-21 PROCEDURE — 82800 BLOOD PH: CPT | Performed by: EMERGENCY MEDICINE

## 2017-11-21 PROCEDURE — 36415 COLL VENOUS BLD VENIPUNCTURE: CPT | Performed by: EMERGENCY MEDICINE

## 2017-11-21 PROCEDURE — 93005 ELECTROCARDIOGRAM TRACING: CPT | Performed by: HOSPITALIST

## 2017-11-21 PROCEDURE — 82948 REAGENT STRIP/BLOOD GLUCOSE: CPT

## 2017-11-21 PROCEDURE — 99285 EMERGENCY DEPT VISIT HI MDM: CPT

## 2017-11-21 RX ORDER — ACETAMINOPHEN 325 MG/1
650 TABLET ORAL ONCE
Status: COMPLETED | OUTPATIENT
Start: 2017-11-21 | End: 2017-11-21

## 2017-11-21 RX ORDER — FOLIC ACID 1 MG/1
1 TABLET ORAL DAILY
Status: DISCONTINUED | OUTPATIENT
Start: 2017-11-21 | End: 2017-11-24 | Stop reason: HOSPADM

## 2017-11-21 RX ORDER — FUROSEMIDE 40 MG/1
40 TABLET ORAL DAILY
Status: DISCONTINUED | OUTPATIENT
Start: 2017-11-21 | End: 2017-11-24 | Stop reason: HOSPADM

## 2017-11-21 RX ORDER — ONDANSETRON 2 MG/ML
4 INJECTION INTRAMUSCULAR; INTRAVENOUS EVERY 6 HOURS PRN
Status: DISCONTINUED | OUTPATIENT
Start: 2017-11-21 | End: 2017-11-24 | Stop reason: HOSPADM

## 2017-11-21 RX ORDER — CEFTRIAXONE SODIUM 1 G/50ML
1000 INJECTION, SOLUTION INTRAVENOUS EVERY 24 HOURS
Status: DISCONTINUED | OUTPATIENT
Start: 2017-11-21 | End: 2017-11-22

## 2017-11-21 RX ORDER — DOCUSATE SODIUM 100 MG/1
100 CAPSULE, LIQUID FILLED ORAL 2 TIMES DAILY
Status: DISCONTINUED | OUTPATIENT
Start: 2017-11-21 | End: 2017-11-24 | Stop reason: HOSPADM

## 2017-11-21 RX ORDER — PREDNISONE 1 MG/1
5 TABLET ORAL DAILY
Status: DISCONTINUED | OUTPATIENT
Start: 2017-11-21 | End: 2017-11-22

## 2017-11-21 RX ORDER — LEVOTHYROXINE SODIUM 0.15 MG/1
150 TABLET ORAL
Status: DISCONTINUED | OUTPATIENT
Start: 2017-11-21 | End: 2017-11-24 | Stop reason: HOSPADM

## 2017-11-21 RX ORDER — POLYETHYLENE GLYCOL 3350 17 G/17G
17 POWDER, FOR SOLUTION ORAL DAILY PRN
Status: DISCONTINUED | OUTPATIENT
Start: 2017-11-21 | End: 2017-11-24 | Stop reason: HOSPADM

## 2017-11-21 RX ORDER — ALBUTEROL SULFATE 90 UG/1
2 AEROSOL, METERED RESPIRATORY (INHALATION) EVERY 6 HOURS PRN
Status: DISCONTINUED | OUTPATIENT
Start: 2017-11-21 | End: 2017-11-24 | Stop reason: HOSPADM

## 2017-11-21 RX ORDER — DULOXETIN HYDROCHLORIDE 20 MG/1
20 CAPSULE, DELAYED RELEASE ORAL DAILY
Status: DISCONTINUED | OUTPATIENT
Start: 2017-11-21 | End: 2017-11-24 | Stop reason: HOSPADM

## 2017-11-21 RX ORDER — OXYCODONE HYDROCHLORIDE 5 MG/1
5 TABLET ORAL EVERY 4 HOURS PRN
Status: DISCONTINUED | OUTPATIENT
Start: 2017-11-21 | End: 2017-11-24 | Stop reason: HOSPADM

## 2017-11-21 RX ORDER — LEVOFLOXACIN 5 MG/ML
750 INJECTION, SOLUTION INTRAVENOUS ONCE
Status: COMPLETED | OUTPATIENT
Start: 2017-11-21 | End: 2017-11-21

## 2017-11-21 RX ORDER — CALCIUM CARBONATE 200(500)MG
1000 TABLET,CHEWABLE ORAL DAILY PRN
Status: DISCONTINUED | OUTPATIENT
Start: 2017-11-21 | End: 2017-11-24 | Stop reason: HOSPADM

## 2017-11-21 RX ORDER — ACETAMINOPHEN 325 MG/1
650 TABLET ORAL EVERY 6 HOURS PRN
Status: DISCONTINUED | OUTPATIENT
Start: 2017-11-21 | End: 2017-11-24 | Stop reason: HOSPADM

## 2017-11-21 RX ORDER — LORAZEPAM 2 MG/ML
1 INJECTION INTRAMUSCULAR ONCE
Status: COMPLETED | OUTPATIENT
Start: 2017-11-21 | End: 2017-11-21

## 2017-11-21 RX ORDER — LISINOPRIL 20 MG/1
40 TABLET ORAL DAILY
Status: DISCONTINUED | OUTPATIENT
Start: 2017-11-21 | End: 2017-11-24 | Stop reason: HOSPADM

## 2017-11-21 RX ORDER — NYSTATIN 100000 U/G
1 CREAM TOPICAL AS NEEDED
Status: DISCONTINUED | OUTPATIENT
Start: 2017-11-21 | End: 2017-11-23

## 2017-11-21 RX ORDER — PANTOPRAZOLE SODIUM 40 MG/1
40 TABLET, DELAYED RELEASE ORAL DAILY
Status: DISCONTINUED | OUTPATIENT
Start: 2017-11-21 | End: 2017-11-24 | Stop reason: HOSPADM

## 2017-11-21 RX ORDER — ACETAMINOPHEN 325 MG/1
650 TABLET ORAL EVERY 6 HOURS PRN
Status: DISCONTINUED | OUTPATIENT
Start: 2017-11-21 | End: 2017-11-21

## 2017-11-21 RX ADMIN — PANTOPRAZOLE SODIUM 40 MG: 40 TABLET, DELAYED RELEASE ORAL at 09:31

## 2017-11-21 RX ADMIN — FOLIC ACID 1 MG: 1 TABLET ORAL at 09:30

## 2017-11-21 RX ADMIN — PREDNISONE 5 MG: 5 TABLET ORAL at 09:31

## 2017-11-21 RX ADMIN — LORAZEPAM 1 MG: 2 INJECTION INTRAMUSCULAR at 14:02

## 2017-11-21 RX ADMIN — LEVOFLOXACIN 750 MG: 5 INJECTION, SOLUTION INTRAVENOUS at 02:29

## 2017-11-21 RX ADMIN — DULOXETINE 20 MG: 20 CAPSULE, DELAYED RELEASE ORAL at 09:29

## 2017-11-21 RX ADMIN — ACETAMINOPHEN 650 MG: 325 TABLET, FILM COATED ORAL at 20:29

## 2017-11-21 RX ADMIN — LISINOPRIL 40 MG: 20 TABLET ORAL at 09:31

## 2017-11-21 RX ADMIN — INSULIN LISPRO 4 UNITS: 100 INJECTION, SOLUTION INTRAVENOUS; SUBCUTANEOUS at 16:24

## 2017-11-21 RX ADMIN — FLUTICASONE PROPIONATE AND SALMETEROL 1 PUFF: 50; 250 POWDER RESPIRATORY (INHALATION) at 09:28

## 2017-11-21 RX ADMIN — OXYCODONE HYDROCHLORIDE 5 MG: 5 TABLET ORAL at 23:26

## 2017-11-21 RX ADMIN — ENOXAPARIN SODIUM 40 MG: 40 INJECTION SUBCUTANEOUS at 09:32

## 2017-11-21 RX ADMIN — ACETAMINOPHEN 650 MG: 325 TABLET, FILM COATED ORAL at 09:37

## 2017-11-21 RX ADMIN — SITAGLIPTIN 100 MG: 100 TABLET, FILM COATED ORAL at 09:32

## 2017-11-21 RX ADMIN — LEVOTHYROXINE SODIUM 150 MCG: 150 TABLET ORAL at 09:30

## 2017-11-21 RX ADMIN — CEFTRIAXONE 1000 MG: 1 INJECTION, SOLUTION INTRAVENOUS at 09:04

## 2017-11-21 RX ADMIN — DOCUSATE SODIUM 100 MG: 100 CAPSULE, LIQUID FILLED ORAL at 17:48

## 2017-11-21 RX ADMIN — FUROSEMIDE 40 MG: 40 TABLET ORAL at 09:30

## 2017-11-21 RX ADMIN — ACETAMINOPHEN 650 MG: 325 TABLET, FILM COATED ORAL at 03:15

## 2017-11-21 RX ADMIN — TIOTROPIUM BROMIDE 18 MCG: 18 CAPSULE ORAL; RESPIRATORY (INHALATION) at 09:49

## 2017-11-21 RX ADMIN — INSULIN LISPRO 2 UNITS: 100 INJECTION, SOLUTION INTRAVENOUS; SUBCUTANEOUS at 23:22

## 2017-11-21 RX ADMIN — FLUTICASONE PROPIONATE AND SALMETEROL 1 PUFF: 50; 250 POWDER RESPIRATORY (INHALATION) at 23:22

## 2017-11-21 NOTE — CASE MANAGEMENT
Initial Clinical Review    Admission: Date/Time/Statement: 11/21/17 @ 0159     Orders Placed This Encounter   Procedures    Inpatient Admission (expected length of stay for this patient is greater than two midnights)     Standing Status:   Standing     Number of Occurrences:   1     Order Specific Question:   Admitting Physician     Answer:   Murel Fothergill [[id number]]     Order Specific Question:   Level of Care     Answer:   Med Surg [[id number]]     Order Specific Question:   Estimated length of stay     Answer:   More than 2 Midnights     Order Specific Question:   Certification     Answer:   I certify that inpatient services are medically necessary for this patient for a duration of greater than two midnights  See H&P and MD Progress Notes for additional information about the patient's course of treatment  ED: Date/Time/Mode of Arrival:   ED Arrival Information     Expected Arrival Acuity Means of Arrival Escorted By Service Admission Type    - 11/20/2017 21:12 Urgent Walk-In Self General Medicine Urgent    Arrival Complaint    Right Flank Pain          Chief Complaint:   Chief Complaint   Patient presents with    Flank Pain     Pt reports right sided flank pain  Pt reports increased with movement  Denies any N/V/D       History of Illness: 69year old female who comes to the ED with back pain  She states that it started 2-3 days ago and she thought that her RA was acting up  She says that she couldn't move but the pain increased to the point that it was taking her breath away  She said it is more on her right side than her left side but she can feel it pull across her back  She denies fevers, chills or night sweats  She takes water pills and didn't notice any increase in frequency  She denies dysuria  She has been having increased urgency over a long period of time  She has a little bit of urge incontinence now  She has been having trouble with constipation   She feels like she needs to have a BM and then she would get a small amount out and then couldn't go anymore  These bouts seem to correspond to the pain  She says that her RA never acted up like this  She has had rib pain from the RA before  She says it is under control with her Jenny Dey  The pain that she is getting is in waves and is a grabbing pain  It doesn't radiate out of her back  She says that her appetite is good and food doesn't affect the pain  She did try to eat spinach last night to loosen her bowels and normally it would have worked by now but this time it hasn't done anything to help loosen her bowels  She has been gassy and her hemorrhoids are acting up  Review of Systems   Constitutional: Positive for diaphoresis and fatigue  Sleeping more lately   HENT: Positive for dental problem and tinnitus  Bottom teeth need pulled, upper dentures   Eyes:        Dry eyes, glasses   Respiratory: Positive for shortness of breath and wheezing  Cardiovascular: Positive for leg swelling  Gastrointestinal: Positive for constipation  Endocrine: Positive for polydipsia and polyuria  Musculoskeletal: Positive for arthralgias, back pain and myalgias  Hematological: Bruises/bleeds easily  All other systems reviewed and are negative    ED Vital Signs:   ED Triage Vitals [11/20/17 2139]   Temperature Pulse Respirations Blood Pressure SpO2   98 4 °F (36 9 °C) 87 20 (!) 182/84 95 %      Temp Source Heart Rate Source Patient Position - Orthostatic VS BP Location FiO2 (%)   Temporal Monitor Sitting Left arm --      Pain Score       Worst Possible Pain        Wt Readings from Last 1 Encounters:   11/21/17 120 kg (264 lb 8 8 oz)       Vital Signs (abnormal): 98 9    Abnormal Labs/Diagnostic Test Results: urine nitrite +   Glucose >1000  Chest- small left pleural effusion    Ct of abd pelvis--Partially visualized left pleural effusion  No radiopaque urinary tract calculi or obstructive uropathy      Small probable simple cysts left kidney   This could be followed up with nonemergent renal sonogram     No acute intra-abdominal or intrapelvic process insofar as can be detected on a noncontrast CT  Normal appendix  Cholecystectomy  Hepatic steatosis  Findings suggestive of mild chronic constipation/bowel dysmotility    ED Treatment:   Medication Administration from 11/20/2017 2112 to 11/21/2017 1245       Date/Time Order Dose Route Action Action by Comments     11/20/2017 2341 LORazepam (ATIVAN) 2 mg/mL injection 1 mg 1 mg Intravenous Given [facility] RN      11/20/2017 2339 ketorolac (TORADOL) 30 mg/mL injection 15 mg 15 mg Intravenous Given Princella Pronto, RN      11/21/2017 0427 levofloxacin (LEVAQUIN) IVPB (premix) 750 mg 0 mg Intravenous Stopped [facility] RN      11/21/2017 0229 levofloxacin (LEVAQUIN) IVPB (premix) 750 mg 750 mg Intravenous New Bag Princella Pronto, RN      11/21/2017 0315 acetaminophen (TYLENOL) tablet 650 mg 650 mg Oral Given [facility] RN      11/21/2017 4617 DULoxetine (CYMBALTA) delayed release capsule 20 mg 20 mg Oral Given [facility] RN      11/21/2017 2098 fluticasone-salmeterol (ADVAIR) 250-50 mcg/dose inhaler 1 puff 1 puff Inhalation Given [facility] RN      43/42/5084 0272 folic acid (FOLVITE) tablet 1 mg 1 mg Oral Given Trice Bledsoe, RN      11/21/2017 0930 furosemide (LASIX) tablet 40 mg 40 mg Oral Given [facility] RN      11/21/2017 0930 levothyroxine tablet 150 mcg 150 mcg Oral Given Kell Bledsoe, RN      11/21/2017 0931 lisinopril (ZESTRIL) tablet 40 mg 40 mg Oral Given Kell Bledsoe, RN      11/21/2017 0931 pantoprazole (PROTONIX) EC tablet 40 mg 40 mg Oral Given [facility] RN      11/21/2017 0204 predniSONE tablet 5 mg 5 mg Oral Given [facility] RN      11/21/2017 0932 sitaGLIPtin (JANUVIA) tablet 100 mg 100 mg Oral Given [facility] RN      11/21/2017 0949 tiotropium (SPIRIVA) capsule for inhaler 18 mcg 18 mcg Inhalation Given Lynda Zabala RN      11/21/2017 0932 enoxaparin (LOVENOX) subcutaneous injection 40 mg 40 mg Subcutaneous Given Lynda Zabala RN      11/21/2017 9714 acetaminophen (TYLENOL) tablet 650 mg 650 mg Oral Given Lynda Zabala RN      11/21/2017 0933 cefTRIAXone (ROCEPHIN) IVPB (premix) 1,000 mg 0 mg Intravenous Stopped Lynda Zabala RN      11/21/2017 5116 cefTRIAXone (ROCEPHIN) IVPB (premix) 1,000 mg 1,000 mg Intravenous New Bag Lynda Zabala RN      11/21/2017 1103 insulin lispro (HumaLOG) 100 units/mL subcutaneous injection 2-12 Units 2 Units Subcutaneous Not Given Lynda Zabala RN      11/21/2017 0830 insulin lispro (HumaLOG) 100 units/mL subcutaneous injection 2-12 Units 2 Units Subcutaneous Not Given Lynda Zabala RN Waiting for meal tray from dietary  Past Medical/Surgical History:    Active Ambulatory Problems     Diagnosis Date Noted    Sepsis ([place] [address] ) 11/03/2016    Lactic acidosis 11/03/2016    COPD with acute exacerbation ([place] [address] ) 11/03/2016    Community acquired pneumonia 11/03/2016    Type 2 diabetes mellitus with hyperglycemia ([place] [address] ) 11/03/2016    Hypothyroidism 11/03/2016    Rheumatoid arthritis ([place] [address] ) 11/03/2016    Hypomagnesemia 11/03/2016    Essential hypertension 11/03/2016    Hyperlipidemia 11/03/2016    GERD (gastroesophageal reflux disease) 11/03/2016    Hypokalemia 11/03/2016    Vitamin D insufficiency 11/03/2016    Hypoalbuminemia 11/03/2016    Hepatic steatosis 11/03/2016    Pleural effusion, bilateral 11/03/2016    Elevated d-dimer 11/03/2016    Chest pain 11/03/2016    Atelectasis 11/03/2016    Morbid (severe) obesity due to excess calories ([place] [address] ) 11/04/2016    BMI 40 0-44 9, adult ([place] [address] ) 11/05/2016     Resolved Ambulatory Problems     Diagnosis Date Noted    No Resolved Ambulatory Problems     Past Medical History:   Diagnosis Date    Arthritis     Arthritis     Candidiasis of skin     COPD (chronic obstructive pulmonary disease) (HCC)     Current chronic use of systemic steroids     [facility] [address] [facility]     Disease of thyroid gland     Fibromyalgia     GERD (gastroesophageal reflux disease)     Hyperlipidemia     Hypertension     Migraine     Obesity     Obesity     Osteoarthritis     Osteopenia     Psychiatric disorder     Rheumatoid arthritis (HCC)     Rheumatoid arthritis (HCC)     Vitamin B12 deficiency     Vitamin D deficiency        Admitting Diagnosis: Pyelonephritis, acute [N10]  Flank pain [R10 9]    Age/Sex: 69 y o  female    Assessment/Plan: Assessment:  1  Back pain  2  Immunocompromised host  3  Possible UTI with bacteria and nitrites (only 2-4 leukocytes)  4  Rheumatoid arthritis  5  Fibromyalgia  6  Multiple other medical problems, see PMHx  7  Pleural effusion on CT scan     Plan:  1  Admit to hospital  2  F/U cultures (blood and urine)  3  Continue antibiotics until cultures confirmed negative  4  MRI thoracic and lumbar spine  5  Chest x-ray to further assess pleural effusions  6  Hold Methotrexate and Xeljanz  7  Hold Invokana  Will use SSI      Admission Orders:  Scheduled Meds:   cefTRIAXone 1,000 mg Intravenous Q24H   DULoxetine 20 mg Oral Daily   enoxaparin 40 mg Subcutaneous Daily   fluticasone-salmeterol 1 puff Inhalation A19L [facility]   folic acid 1 mg Oral Daily   furosemide 40 mg Oral Daily   insulin lispro 2-12 Units Subcutaneous TID AC   insulin lispro 2-12 Units Subcutaneous HS   levothyroxine 150 mcg Oral Early Morning   lisinopril 40 mg Oral Daily   pantoprazole 40 mg Oral Daily   predniSONE 5 mg Oral Daily   sitaGLIPtin 100 mg Oral Daily   tiotropium 18 mcg Inhalation Daily     Continuous Infusions:    PRN Meds:   acetaminophenx1    albuterol    calcium carbonate    nystatin    ondansetron    fs 4x daily  telm  venodynes  Mri lumbar spine  Mri thoracic spine  Mars cho  cpap at bedtime

## 2017-11-21 NOTE — PROGRESS NOTES
Sharee victor is a 70 yo female admitted for back pain and was noted to have a possible UTI on UA  MRI of thoracic and lumbar spine ordered however the patient was not able to have MRI due to body habitus  Will consult Orthopedic surgery  Continue IV Rocephin for UTI  Follow urine cultures  Colace ordered for mild chronic constipation seen on CT abdomen and pelvis

## 2017-11-21 NOTE — H&P
H&P Exam - Farzaneh Wilder 69 y o  female MRN: [id number]    Unit/Bed#: TG01 Encounter: [id number]    Assessment:  1  Back pain  2  Immunocompromised host  3  Possible UTI with bacteria and nitrites (only 2-4 leukocytes)  4  Rheumatoid arthritis  5  Fibromyalgia  6  Multiple other medical problems, see PMHx  7  Pleural effusion on CT scan    Plan:  1  Admit to hospital  2  F/U cultures (blood and urine)  3  Continue antibiotics until cultures confirmed negative  4  MRI thoracic and lumbar spine  5  Chest x-ray to further assess pleural effusions  6  Hold Methotrexate and Xeljanz  7  Hold Invokana  Will use SSI  History of Present Illness   Ms Dali Manning is a pleasant 69year old female who comes to the ED with back pain  She states that it started 2-3 days ago and she thought that her RA was acting up  She says that she couldn't move but the pain increased to the point that it was taking her breath away  She said it is more on her right side than her left side but she can feel it pull across her back  She denies fevers, chills or night sweats  She takes water pills and didn't notice any increase in frequency  She denies dysuria  She has been having increased urgency over a long period of time  She has a little bit of urge incontinence now  She has been having trouble with constipation  She feels like she needs to have a BM and then she would get a small amount out and then couldn't go anymore  These bouts seem to correspond to the pain  She says that her RA never acted up like this  She has had rib pain from the RA before  She says it is under control with her Obinna Jaime  The pain that she is getting is in waves and is a grabbing pain  It doesn't radiate out of her back  She says that her appetite is good and food doesn't affect the pain  She did try to eat spinach last night to loosen her bowels and normally it would have worked by now but this time it hasn't done anything to help loosen her bowels   She has been gassy and her hemorrhoids are acting up  Review of Systems   Constitutional: Positive for diaphoresis and fatigue  Sleeping more lately   HENT: Positive for dental problem and tinnitus  Bottom teeth need pulled, upper dentures   Eyes:        Dry eyes, glasses   Respiratory: Positive for shortness of breath and wheezing  Cardiovascular: Positive for leg swelling  Gastrointestinal: Positive for constipation  Endocrine: Positive for polydipsia and polyuria  Musculoskeletal: Positive for arthralgias, back pain and myalgias  Hematological: Bruises/bleeds easily  All other systems reviewed and are negative  Historical Information   Past Medical History:   Diagnosis Date    Arthritis     Arthritis     Candidiasis of skin     [facility]     Current chronic use of systemic steroids     Diabetes mellitus ([facility] )     Disease of thyroid gland     hypo    Fibromyalgia     GERD (gastroesophageal reflux disease)     Hyperlipidemia     Hypertension     Migraine     states she has a hx of HA that she calls [employer] but never was dx by neurologist    Obesity     Obesity     Osteoarthritis     Osteopenia     Psychiatric disorder     anxiety    Rhe[facility]oid arthritis [facility] )     Rheumatoid arthritis ([facility] )     Vitamin B12 deficiency     Vitamin D deficiency      Past Surgical History:   Procedure Laterality Date    CATARACT EXTRACTION Bilateral     CHOLECYSTECTOMY      HYSTERECTOMY      JOINT REPLACEMENT      left knee    KNEE ARTHROSCOPY      NEUROPLASTY / TRANSPOSITION MEDIAN NERVE AT CARPAL TUNNEL      ROTATOR CUFF REPAIR Left     TUBAL LIGATION       Social History   History   Alcohol Use No     History   Drug Use No     History   Smoking Status    Former Smoker    Packs/day: 1 00    Years: 48 00    Quit date: 11/21/2012   Smokeless Tobacco    Never Used     Family History: History reviewed  No pertinent family history      Meds/Allergies PTA meds:   Prior to Admission Medications   Prescriptions Last Dose Informant Patient Reported? Taking? DULoxetine (CYMBALTA) 20 mg capsule 11/20/2017 at Unknown time  Yes Yes   Sig: Take 20 mg by mouth daily  Ergocalciferol (VITAMIN D2 PO) 11/15/2017  Yes Yes   Sig: Take 1 25 mg by mouth once a week Every Wednesday   Tiotropium Bromide Monohydrate (SPIRIVA RESPIMAT) 2 5 MCG/ACT AERS Past Month at Unknown time  Yes Yes   Sig: Inhale daily at bedtime   Tofacitinib Citrate (XELJANZ XR) 11 MG TB24 11/20/2017 at Unknown time  Yes Yes   Sig: Take 1 tablet by mouth daily   albuterol (PROVENTIL HFA,VENTOLIN HFA) 90 mcg/act inhaler Past Week at Unknown time  Yes Yes   Sig: Inhale 2 puffs every 6 (six) hours as needed for wheezing   canagliflozin (INVOKANA) 100 mg 11/20/2017 at Unknown time  Yes Yes   Sig: Take 100 mg by mouth daily before breakfast   clotrimazole (LOTRIMIN) 1 % cream More than a month at Unknown time  Yes No   Sig: Apply topically daily as needed  diclofenac sodium (VOLTAREN) 1 % Past Week at Unknown time  Yes Yes   Sig: Apply 2 g topically as needed   fluticasone-salmeterol (ADVAIR) 250-50 mcg/dose 11/20/2017 at Unknown time  Yes Yes   Sig: Inhale 1 puff every 12 (twelve) hours  folic acid (FOLVITE) 1 mg tablet 11/20/2017 at Unknown time  Yes Yes   Sig: Take 1 mg by mouth daily   furosemide (LASIX) 40 mg tablet 11/20/2017 at Unknown time  Yes Yes   Sig: Take 40 mg by mouth daily  levothyroxine 150 mcg tablet 11/20/2017 at Unknown time  No Yes   Sig: Take 1 tablet by mouth daily in the early morning for 30 days   lisinopril (ZESTRIL) 40 mg tablet 11/20/2017 at Unknown time  Yes Yes   Sig: Take 40 mg by mouth daily     methotrexate 2 5 mg tablet 11/15/2017  Yes Yes   Sig: Take by mouth once a week 8 tablets weekly on Wednesday    nystatin (MYCOSTATIN) cream 11/19/2017 at Unknown time  Yes Yes   Sig: Apply topically as needed   nystatin (MYCOSTATIN) powder   No No   Sig: Apply topically 3 (three) times a day for 30 days   ofloxacin (FLOXIN) 0 3 % otic solution More than a month at Unknown time  Yes No   Sig: Administer 10 drops into both ears as needed     pantoprazole (PROTONIX) 40 mg tablet 11/20/2017 at Unknown time  Yes Yes   Sig: Take 40 mg by mouth daily  predniSONE 5 mg tablet 11/20/2017 at Unknown time  Yes Yes   Sig: Take 5 mg by mouth daily     sitaGLIPtin (JANUVIA) 100 mg tablet 11/20/2017 at Unknown time  Yes Yes   Sig: Take 100 mg by mouth daily      Facility-Administered Medications: None     No Known Allergies    Objective   First Vitals:   Blood Pressure: (!) 182/84 (11/20/17 2139)  Pulse: 87 (11/20/17 2139)  Temperature: 98 4 °F (36 9 °C) (11/20/17 2139)  Temp Source: Temporal (11/20/17 2139)  Respirations: 20 (11/20/17 2139)  Height: 5' 5" (165 1 cm) (11/20/17 2139)  Weight - Scale: 118 kg (260 lb 12 8 oz) (11/20/17 2139)  SpO2: 95 % (11/20/17 2139)    Current Vitals:   Blood Pressure: 152/72 (11/21/17 0300)  Pulse: 74 (11/21/17 0300)  Temperature: 98 6 °F (37 °C) (11/21/17 0300)  Temp Source: Temporal (11/21/17 0300)  Respirations: 22 (11/21/17 0300)  Height: 5' 5" (165 1 cm) (11/20/17 2139)  Weight - Scale: 118 kg (260 lb 12 8 oz) (11/20/17 2139)  SpO2: 93 % (11/21/17 0300)      Intake/Output Summary (Last 24 hours) at 11/21/17 0803  Last data filed at 11/21/17 0431   Gross per 24 hour   Intake              150 ml   Output              800 ml   Net             -650 ml       Invasive Devices     Peripheral Intravenous Line            Peripheral IV 11/20/17 Right Antecubital less than 1 day                Physical Exam   Constitutional: She is oriented to person, place, and time  Obese female lying in bed  Visibly uncomfortable appearing when she moves in bed at all  HENT:   Head: Normocephalic and atraumatic  Right Ear: External ear normal    Left Ear: External ear normal    Nose: Nose normal    Mouth/Throat: Oropharynx is clear and moist  No oropharyngeal exudate     Eyes: Conjunctivae and EOM are normal  Pupils are equal, round, and reactive to light  Right eye exhibits no discharge  Left eye exhibits no discharge  No scleral icterus  Neck: Normal range of motion  Neck supple  No JVD present  No tracheal deviation present  No thyromegaly present  Cardiovascular: Normal rate, regular rhythm, normal heart sounds and intact distal pulses  Exam reveals no gallop and no friction rub  No murmur heard  Pulmonary/Chest: Breath sounds normal  No stridor  No respiratory distress  She has no wheezes  She has no rales  She exhibits no tenderness  Abdominal: Soft  Bowel sounds are normal  She exhibits no distension and no mass  There is no tenderness  There is no rebound and no guarding  No cva tenderness   Musculoskeletal: Normal range of motion  She exhibits no edema, tenderness or deformity  Lymphadenopathy:     She has no cervical adenopathy  Neurological: She is alert and oriented to person, place, and time  She has normal reflexes  She displays normal reflexes  No cranial nerve deficit  She exhibits normal muscle tone  Coordination normal    Skin: Skin is warm and dry  Psychiatric: She has a normal mood and affect  Her behavior is normal  Judgment and thought content normal        Lab Results: Results for Mayra Li (MRN [id number]) as of 11/21/2017 08:09   Ref   Range 11/20/2017 23:14 11/20/2017 23:21 11/21/2017 02:07 11/21/2017 02:07   pH, Dewey Latest Ref Range: 7 300 - 7 400    7 368    eGFR Latest Units: ml/min/1 73sq m 84      Sodium Latest Ref Range: 136 - 145 mmol/L 137      Potassium Latest Ref Range: 3 5 - 5 3 mmol/L 4 0      Chloride Latest Ref Range: 100 - 108 mmol/L 102      CO2 Latest Ref Range: 21 - 32 mmol/L 30      Anion Gap Latest Ref Range: 4 - 13 mmol/L 5      BUN Latest Ref Range: 5 - 25 mg/dL 14      Creatinine Latest Ref Range: 0 60 - 1 30 mg/dL 0 73      Glucose Latest Ref Range: 65 - 140 mg/dL 140      Calcium Latest Ref Range: 8 3 - 10 1 mg/dL 9 5 AST Latest Ref Range: 5 - 45 U/L 20      ALT Latest Ref Range: 12 - 78 U/L 46      Alkaline Phosphatase Latest Ref Range: 46 - 116 U/L 78      Total Protein Latest Ref Range: 6 4 - 8 2 g/dL 7 8      Albumin Latest Ref Range: 3 5 - 5 0 g/dL 3 8      Total Bilirubin Latest Ref Range: 0 20 - 1 00 mg/dL 0 30      Lipase Latest Ref Range: 73 - 393 u/L 160      WBC Latest Ref Range: 4 31 - 10 16 Thousand/uL 8 43      RBC Latest Ref Range: 3 81 - 5 12 Million/uL 4 72      Hemoglobin Latest Ref Range: 11 5 - 15 4 g/dL 14 2      Hematocrit Latest Ref Range: 34 8 - 46 1 % 44 7      MCV Latest Ref Range: 82 - 98 fL 95      MCH Latest Ref Range: 26 8 - 34 3 pg 30 1      MCHC Latest Ref Range: 31 4 - 37 4 g/dL 31 8      RDW Latest Ref Range: 11 6 - 15 1 % 16 1 (H)      Platelets Latest Ref Range: 149 - 390 Thousands/uL 293      MPV Latest Ref Range: 8 9 - 12 7 fL 11 5      Neutrophils Relative Latest Ref Range: 43 - 75 % 50      Lymphocytes Relative Latest Ref Range: 14 - 44 % 37      Monocytes Relative Latest Ref Range: 4 - 12 % 12      Eosinophils Relative Latest Ref Range: 0 - 6 % 1      Basophils Relative Latest Ref Range: 0 - 1 % 0      Neutrophils Absolute Latest Ref Range: 1 85 - 7 62 Thousands/µL 4 22      Lymphocytes Absolute Latest Ref Range: 0 60 - 4 47 Thousands/µL 3 08      Monocytes Absolute Latest Ref Range: 0 17 - 1 22 Thousand/µL 1 02      Eosinophils Absolute Latest Ref Range: 0 00 - 0 61 Thousand/µL 0 08      Basophils Absolute Latest Ref Range: 0 00 - 0 10 Thousands/µL 0 03      Epithelial Cells Latest Ref Range: None Seen, Occasional /hpf  None Seen     Color, UA Unknown  Yellow     Clarity, UA Unknown  Slightly Cloudy     Specific Gravity, UA Latest Ref Range: 1 003 - 1 030   1 015     Glucose, UA Latest Ref Range: Negative mg/dl  >=1000 (1%) (A)     Ketones, UA Latest Ref Range: Negative mg/dl  Negative     Blood, UA Latest Ref Range: Negative, Trace-Intact   Negative     Nitrite, UA Latest Ref Range: Negative   Positive (A)     Leukocytes, UA Latest Ref Range: Negative   Elevated glucose     (A)     pH, UA Latest Ref Range: 4 5 - 8 0   5 0     Protein, UA Latest Ref Range: Negative mg/dl  Negative     Bilirubin, UA Latest Ref Range: Negative   Negative     Urobilinogen, UA Latest Ref Range: 0 2, 1 0 E U /dl E U /dl  0 2     RBC, UA Latest Ref Range: None Seen, 0-5 /hpf  None Seen     WBC, UA Latest Ref Range: None Seen, 0-5, 5-55, 5-65 /hpf  2-4 (A)     Bacteria, UA Latest Ref Range: None Seen, Occasional /hpf  Innumerable (A)     BLOOD CULTURE Unknown   Rpt ((NONE)) Rpt ((NONE))     Imaging: CT scan renal stone protocol: IMPRESSION:     Partially visualized left pleural effusion      No radiopaque urinary tract calculi or obstructive uropathy      Small probable simple cysts left kidney  This could be followed up with nonemergent renal sonogram      No acute intra-abdominal or intrapelvic process insofar as can be detected on a noncontrast CT      Normal appendix      Cholecystectomy      Hepatic steatosis       Findings suggestive of mild chronic constipation/bowel dysmotility      Findings are consistent with the preliminary report from [facility] which was provided shortly after completion of the exam                   EKG, Pathology, and Other Studies:     Code Status: Prior FULL  Advance Directive and Living Will:      Power of :    POLST:      Counseling / Coordination of Care: Total floor / unit time spent today 66 minutes

## 2017-11-21 NOTE — ED NOTES
Attempting to call kitchen for diet order, no answer, voicemail left        Elham Castellanos, DINORAH  11/21/17 6346

## 2017-11-21 NOTE — RESPIRATORY THERAPY NOTE
RT Protocol Note  Rylie Sanchez 69 y o  female MRN: [id number]  Unit/Bed#: 420-01 Encounter: [id number]    Assessment    Principal Problem:    Flank pain  Active Problems:    Type 2 diabetes mellitus with hyperglycemia (HCC)    Rheumatoid arthritis (HCC)    Morbid (severe) obesity due to excess calories (HCC)      Home Pulmonary Medications:  She uses home mdi therapy as needed for sob    Past Medical History:   Diagnosis Date    Arthritis     Arthritis     Candidiasis of skin     [facility]     Current chronic use of systemic steroids     Diabetes mellitus ([place] [address] )     Disease of thyroid gland     hypo    Fibromyalgia     GERD (gastroesophageal reflux disease)     Hyperlipidemia     Hypertension     Migraine     states she has a hx of HA that she calls [facility] [employer] but never was dx by neurologist    Obesity     Obesity     Osteoarthritis     Osteopenia     Psychiatric disorder     anxiety    Rheumatoid arthritis ([place] [address] )     Rheumatoid arthritis ([place] [address] )     Vitamin B12 deficiency     Vitamin D deficiency      Social History     Social History    Marital status: /Civil Union     Spouse name: N/A    Number of children: N/A    Years of education: N/A     Social History Main Topics    Smoking status: Former Smoker     Packs/day: 1 00     Years: 48 00     Quit date: 11/21/2012    Smokeless tobacco: Never Used    Alcohol use No    Drug use: No    Sexual activity: No     Other Topics Concern    None     Social History Narrative    None       Subjective    Subjective Data: "my breathing is okay, its the back pain the gets me"    Objective    Physical Exam:   Assessment Type: Assess only  General Appearance: Alert, Awake, Eyes open/responds to voice  Respiratory Pattern: Normal  Chest Assessment: Chest expansion symmetrical  Bilateral Breath Sounds: Clear  Cough: Dry, Non-productive    Vitals:  Blood pressure 127/64, pulse 73, temperature 98 9 °F (37 2 °C), temperature source Temporal, resp  rate 18, height 5' 5" (1 651 m), weight 120 kg (264 lb 8 8 oz), SpO2 90 %  Imaging and other studies: I have personally reviewed pertinent reports              Plan    Respiratory Plan: Home Bronchodilator Patient pathway, No distress/Pulmonary history        Resp Comments: patient uses mdi at home only when she needs for sob, also she has home cpap unit that she doesnt use all the time, not at all lately because of back pain

## 2017-11-21 NOTE — ED NOTES
I spoke with Mane Aguilera from [facility], she states the test will not be done until this afternoon            Shirley Shelton, DINORAH  11/21/17 [address], RN  11/21/17 [id number]

## 2017-11-21 NOTE — ED PROVIDER NOTES
History  Chief Complaint   Patient presents with    Flank Pain     Pt reports right sided flank pain  Pt reports increased with movement  Denies any N/V/D     Patient: Manish Song  34 y o /female  YOB: 1950  MRN: [id number]  PCP: Rolly Alfaro MD  Date of evaluation: 11/21/17    (N NETO Álvarez may have been used in the preparation of this document )    2 days of increasing right flank pain, worse with mvt, not like pt's chronic pain  Associated with urinary urgency  No frequency, dysuria, fever  History provided by:  Patient      Prior to Admission Medications   Prescriptions Last Dose Informant Patient Reported? Taking? DULoxetine (CYMBALTA) 20 mg capsule 11/20/2017 at Unknown time  Yes Yes   Sig: Take 20 mg by mouth daily  Ergocalciferol (VITAMIN D2 PO) Past Week at Unknown time  Yes Yes   Sig: Take 1 25 mg by mouth once a week Every Wednesday   Tiotropium Bromide Monohydrate (SPIRIVA RESPIMAT) 2 5 MCG/ACT AERS Past Month at Unknown time  Yes Yes   Sig: Inhale daily at bedtime   Tofacitinib Citrate (XELJANZ XR) 11 MG TB24 11/21/2017 at Unknown time  Yes Yes   Sig: Take 1 tablet by mouth daily   albuterol (PROVENTIL HFA,VENTOLIN HFA) 90 mcg/act inhaler Past Week at Unknown time  Yes Yes   Sig: Inhale 2 puffs every 6 (six) hours as needed for wheezing   canagliflozin (INVOKANA) 100 mg 11/21/2017 at Unknown time  Yes Yes   Sig: Take 100 mg by mouth daily before breakfast   clotrimazole (LOTRIMIN) 1 % cream More than a month at Unknown time  Yes No   Sig: Apply topically daily as needed  diclofenac sodium (VOLTAREN) 1 % Past Week at Unknown time  Yes Yes   Sig: Apply 2 g topically as needed   fluticasone-salmeterol (ADVAIR) 250-50 mcg/dose 11/20/2017 at Unknown time  Yes Yes   Sig: Inhale 1 puff every 12 (twelve) hours     folic acid (FOLVITE) 1 mg tablet 11/21/2017 at Unknown time  Yes Yes   Sig: Take 1 mg by mouth daily   furosemide (LASIX) 40 mg tablet 11/21/2017 at Unknown time  Yes Yes   Sig: Take 40 mg by mouth daily  levothyroxine 150 mcg tablet 11/20/2017 at Unknown time  No Yes   Sig: Take 1 tablet by mouth daily in the early morning for 30 days   lisinopril (ZESTRIL) 40 mg tablet 11/21/2017 at Unknown time  Yes Yes   Sig: Take 40 mg by mouth daily  methotrexate 2 5 mg tablet Past Week at Unknown time  Yes Yes   Sig: Take by mouth once a week 8 tablets weekly on Wednesday    nystatin (MYCOSTATIN) cream 11/19/2017 at Unknown time  Yes Yes   Sig: Apply topically as needed   nystatin (MYCOSTATIN) powder   No No   Sig: Apply topically 3 (three) times a day for 30 days   ofloxacin (FLOXIN) 0 3 % otic solution More than a month at Unknown time  Yes No   Sig: Administer 10 drops into both ears as needed     pantoprazole (PROTONIX) 40 mg tablet 11/21/2017 at Unknown time  Yes Yes   Sig: Take 40 mg by mouth daily     predniSONE 5 mg tablet 11/21/2017 at Unknown time  Yes Yes   Sig: Take 5 mg by mouth daily     sitaGLIPtin (JANUVIA) 100 mg tablet 11/21/2017 at Unknown time  Yes Yes   Sig: Take 100 mg by mouth daily      Facility-Administered Medications: None       Past Medical History:   Diagnosis Date    Arthritis     Arthritis     Candidiasis of skin     [facility]     Current chronic use of systemic steroids     [facility] mellitus [facility]     Disease of thyroid gland     hypo    Fibromyalgia     GERD (gastroesophageal reflux disease)     Hyperlipidemia     Hypertension     Migraine     states she has a hx of HA that she calls Therapeutic Proteins but never was dx by neurologist    Obesity     Obesity     Osteoarthritis     Osteopenia     Psychiatric disorder     anxiety    [facility]     Rheumatoid arthritis [facility]     Vitamin B12 deficiency     Vitamin D deficiency        Past Surgical History:   Procedure Laterality Date    CATARACT EXTRACTION Bilateral     CHOLECYSTECTOMY      EYE SURGERY  HYSTERECTOMY      JOINT REPLACEMENT      left knee    KNEE ARTHROSCOPY      NEUROPLASTY / TRANSPOSITION MEDIAN NERVE AT CARPAL TUNNEL      ROTATOR CUFF REPAIR Left     TUBAL LIGATION         History reviewed  No pertinent family history  I have reviewed and agree with the history as documented  Social History   Substance Use Topics    Smoking status: Former Smoker     Packs/day: 1 00     Years: 48 00     Quit date: 11/21/2012    Smokeless tobacco: Never Used    Alcohol use No        Review of Systems   Constitutional: Negative for chills and fever  HENT: Negative for hearing loss, trouble swallowing and voice change  Eyes: Negative for pain, redness and visual disturbance  Respiratory: Negative for cough and shortness of breath  Cardiovascular: Negative for chest pain and palpitations  Gastrointestinal: Negative for abdominal pain, constipation, diarrhea, nausea and vomiting  Genitourinary: Positive for flank pain and urgency  Negative for decreased urine volume, difficulty urinating, dysuria, frequency, hematuria, vaginal bleeding and vaginal discharge  Musculoskeletal: Negative for back pain, gait problem and neck pain  Skin: Negative for color change and rash  Neurological: Negative for weakness and light-headedness  Psychiatric/Behavioral: Negative for confusion and decreased concentration  The patient is not nervous/anxious  All other systems reviewed and are negative        Physical Exam  ED Triage Vitals [11/20/17 2139]   Temperature Pulse Respirations Blood Pressure SpO2   98 4 °F (36 9 °C) 87 20 (!) 182/84 95 %      Temp Source Heart Rate Source Patient Position - Orthostatic VS BP Location FiO2 (%)   Temporal Monitor Sitting Left arm --      Pain Score       Worst Possible Pain           Orthostatic Vital Signs  Vitals:    11/22/17 0729 11/22/17 1611 11/22/17 2320 11/23/17 0730   BP: (!) 183/72 146/64 155/80 157/90   Pulse: 88 (!) 112 97 81   Patient Position - Orthostatic VS: Lying Sitting Sitting Lying       Physical Exam   Constitutional: She is oriented to person, place, and time  She appears well-developed and well-nourished  HENT:   Mouth/Throat: Oropharynx is clear and moist and mucous membranes are normal    Voice normal   Eyes: EOM are normal  Pupils are equal, round, and reactive to light  Cardiovascular: Normal rate and regular rhythm  Pulmonary/Chest: Effort normal    Abdominal: Soft  Bowel sounds are normal  There is CVA tenderness (right)  Neurological: She is alert and oriented to person, place, and time  GCS eye subscore is 4  GCS verbal subscore is 5  GCS motor subscore is 6  Skin: Skin is warm and dry  Psychiatric: She has a normal mood and affect  Her speech is normal and behavior is normal    Nursing note and vitals reviewed        ED Medications  Medications   albuterol (PROVENTIL HFA,VENTOLIN HFA) inhaler 2 puff (not administered)   DULoxetine (CYMBALTA) delayed release capsule 20 mg (20 mg Oral Given 11/23/17 0823)   fluticasone-salmeterol (ADVAIR) 250-50 mcg/dose inhaler 1 puff (1 puff Inhalation Given 04/63/09 5800)   folic acid (FOLVITE) tablet 1 mg (1 mg Oral Given 11/23/17 0811)   furosemide (LASIX) tablet 40 mg (40 mg Oral Given 11/23/17 0811)   levothyroxine tablet 150 mcg (150 mcg Oral Given 11/23/17 0525)   lisinopril (ZESTRIL) tablet 40 mg (40 mg Oral Given 11/23/17 0811)   pantoprazole (PROTONIX) EC tablet 40 mg (40 mg Oral Given 11/23/17 0811)   sitaGLIPtin (JANUVIA) tablet 100 mg (100 mg Oral Given 11/23/17 0811)   tiotropium (SPIRIVA) capsule for inhaler 18 mcg (18 mcg Inhalation Given 11/23/17 0810)   ondansetron (ZOFRAN) injection 4 mg (not administered)   calcium carbonate (TUMS) chewable tablet 1,000 mg (not administered)   enoxaparin (LOVENOX) subcutaneous injection 40 mg (40 mg Subcutaneous Given 11/23/17 0810)   insulin lispro (HumaLOG) 100 units/mL subcutaneous injection 2-12 Units (4 Units Subcutaneous Given 11/23/17 1335)   insulin lispro (HumaLOG) 100 units/mL subcutaneous injection 2-12 Units (6 Units Subcutaneous Given 11/22/17 2213)   docusate sodium (COLACE) capsule 100 mg (100 mg Oral Given 11/23/17 0811)   polyethylene glycol (MIRALAX) packet 17 g (17 g Oral Given 11/22/17 1849)   oxyCODONE (ROXICODONE) IR tablet 5 mg (5 mg Oral Given 11/23/17 1218)   acetaminophen (TYLENOL) tablet 650 mg (not administered)   methocarbamol (ROBAXIN) tablet 500 mg (not administered)   lidocaine (LIDODERM) 5 % patch 2 patch (2 patches Transdermal Medication Applied 11/23/17 0813)   cefTRIAXone (ROCEPHIN) IVPB (premix) 1,000 mg (not administered)   methylPREDNISolone sodium succinate (Solu-MEDROL) injection 40 mg (not administered)   nystatin (MYCOSTATIN) cream 1 application (not administered)   LORazepam (ATIVAN) 2 mg/mL injection 1 mg (1 mg Intravenous Given 11/20/17 2341)   ketorolac (TORADOL) 30 mg/mL injection 15 mg (15 mg Intravenous Given 11/20/17 2339)   levofloxacin (LEVAQUIN) IVPB (premix) 750 mg (0 mg Intravenous Stopped 11/21/17 0427)   acetaminophen (TYLENOL) tablet 650 mg (650 mg Oral Given 11/21/17 0315)   LORazepam (ATIVAN) 2 mg/mL injection 1 mg (1 mg Intravenous Given 11/21/17 1402)   cefTRIAXone (ROCEPHIN) 1,000 mg in dextrose 5 % 50 mL IVPB (0 mg Intravenous Stopped 11/23/17 1207)       Diagnostic Studies  Results Reviewed     Procedure Component Value Units Date/Time    Urine culture [[id number]]  (Abnormal)  (Susceptibility) Collected:  11/20/17 2321    Lab Status:  Final result Specimen:  Urine from Urine, Clean Catch Updated:  11/23/17 0926     Urine Culture >100,000 cfu/ml Escherichia coli (A)    Susceptibility      Escherichia coli     JOSE ANTONIO    Ampicillin ($$) >16 00 ug/ml Resistant    Ampicillin + Sulbactam ($) 16/8 ug/ml Intermediate    Aztreonam ($$$)  <=8 ug/ml Susceptible    Cefazolin ($) <=8 00 ug/ml Susceptible    Ciprofloxacin ($)  <=1 00 ug/ml Susceptible    Gentamicin ($$) <=4 ug/ml Susceptible Levofloxacin ($) <=2 00 ug/ml Susceptible    Nitrofurantoin <=32 ug/ml Susceptible    Piperacillin + Tazobactam ($$$) <=16 ug/ml Susceptible    Tetracycline <=4 ug/ml Susceptible    Tobramycin ($) <=4 ug/ml Susceptible    Trimethoprim + Sulfamethoxazole ($$$) <=2/38 ug/ml Susceptible                   Blood culture #1 [[id number]] Collected:  11/21/17 0207    Lab Status:  Preliminary result Specimen:  Blood from Arm, Left Updated:  11/23/17 0901     Blood Culture No Growth at 48 hrs  Blood culture #2 [[id number]] Collected:  11/21/17 0207    Lab Status:  Preliminary result Specimen:  Blood from Arm, Left Updated:  11/23/17 0901     Blood Culture No Growth at 48 hrs  Comprehensive metabolic panel [[id number]]  (Abnormal) Collected:  11/22/17 0454    Lab Status:  Final result Specimen:  Blood from Arm, Left Updated:  11/22/17 0601     Sodium 139 mmol/L      Potassium 3 8 mmol/L      Chloride 103 mmol/L      CO2 30 mmol/L      Anion Gap 6 mmol/L      BUN 17 mg/dL      Creatinine 0 62 mg/dL      Glucose 124 mg/dL      Calcium 9 2 mg/dL      AST 20 U/L      ALT 42 U/L      Alkaline Phosphatase 70 U/L      Total Protein 6 7 g/dL      Albumin 3 1 (L) g/dL      Total Bilirubin 0 40 mg/dL      eGFR 92 ml/min/1 73sq m     Narrative:         National Kidney Disease Education Program recommendations are as follows:  GFR calculation is accurate only with a steady state creatinine  Chronic Kidney disease less than 60 ml/min/1 73 sq  meters  Kidney failure less than 15 ml/min/1 73 sq  meters      Magnesium [[id number]]  (Normal) Collected:  11/22/17 0454    Lab Status:  Final result Specimen:  Blood from Arm, Left Updated:  11/22/17 0601     Magnesium 2 1 mg/dL     Phosphorus [[id number]]  (Normal) Collected:  11/22/17 0454    Lab Status:  Final result Specimen:  Blood from Arm, Left Updated:  11/22/17 0601     Phosphorus 3 9 mg/dL     CBC (With Platelets) [[id number]]  (Abnormal) Collected:  11/22/17 0454    Lab Status:  Final result Specimen:  Blood from Arm, Left Updated:  11/22/17 0548     WBC 5 26 Thousand/uL      RBC 4 63 Million/uL      Hemoglobin 14 0 g/dL      Hematocrit 43 4 %      MCV 94 fL      MCH 30 2 pg      MCHC 32 3 g/dL      RDW 16 0 (H) %      Platelets 955 Thousands/uL      MPV 11 1 fL     Fingerstick Glucose (POCT) [[id number]]  (Normal) Collected:  11/21/17 1041    Lab Status:  Final result Updated:  11/21/17 1042     POC Glucose 106 mg/dl     pH, venous [[id number]]  (Normal) Collected:  11/21/17 0207    Lab Status:  Final result Specimen:  Blood from Arm, Left Updated:  11/21/17 0219     pH, Dewey 7 368    Comprehensive metabolic panel [[id number]] Collected:  11/20/17 2314    Lab Status:  Final result Specimen:  Blood from Arm, Right Updated:  11/20/17 2346     Sodium 137 mmol/L      Potassium 4 0 mmol/L      Chloride 102 mmol/L      CO2 30 mmol/L      Anion Gap 5 mmol/L      BUN 14 mg/dL      Creatinine 0 73 mg/dL      Glucose 140 mg/dL      Calcium 9 5 mg/dL      AST 20 U/L      ALT 46 U/L      Alkaline Phosphatase 78 U/L      Total Protein 7 8 g/dL      Albumin 3 8 g/dL      Total Bilirubin 0 30 mg/dL      eGFR 84 ml/min/1 73sq m     Narrative:         National Kidney Disease Education Program recommendations are as follows:  GFR calculation is accurate only with a steady state creatinine  Chronic Kidney disease less than 60 ml/min/1 73 sq  meters  Kidney failure less than 15 ml/min/1 73 sq  meters      Lipase [[id number]]  (Normal) Collected:  11/20/17 2314    Lab Status:  Final result Specimen:  Blood from Arm, Right Updated:  11/20/17 2346     Lipase 160 u/L     Urine Microscopic [[id number]]  (Abnormal) Collected:  11/20/17 2321    Lab Status:  Final result Specimen:  Urine from Urine, Clean Catch Updated:  11/20/17 2340     RBC, UA None Seen /hpf      WBC, UA 2-4 (A) /hpf      Epithelial Cells None Seen /hpf      Bacteria, UA Innumerable (A) /hpf     UA w Reflex to Microscopic w Reflex to Culture [[id number]]  (Abnormal) Collected:  17    Lab Status:  Final result Specimen:  Urine from Urine, Clean Catch Updated:  17     Color, UA Yellow     Clarity, UA Slightly Cloudy     Specific Gravity, UA 1 015     pH, UA 5 0     Leukocytes, UA Elevated glucose may cause decreased leukocyte values  See urine microscopic for [facility] result/ (A)     Nitrite, UA Positive (A)     Protein, UA Negative mg/dl      Glucose, UA >=1000 (1%) (A) mg/dl      Ketones, UA Negative mg/dl      Urobilinogen, UA 0 2 E U /dl      Bilirubin, UA Negative     Blood, UA Negative    CBC and differential [[id number]]  (Abnormal) Collected:  17    Lab Status:  Final result Specimen:  Blood from Arm, Right Updated:  17     WBC 8 43 Thousand/uL      RBC 4 72 Million/uL      Hemoglobin 14 2 g/dL      Hematocrit 44 7 %      MCV 95 fL      MCH 30 1 pg      MCHC 31 8 g/dL      RDW 16 1 (H) %      MPV 11 5 fL      Platelets 641 Thousands/uL      Neutrophils Relative 50 %      Lymphocytes Relative 37 %      Monocytes Relative 12 %      Eosinophils Relative 1 %      Basophils Relative 0 %      Neutrophils Absolute 4 22 Thousands/µL      Lymphocytes Absolute 3 08 Thousands/µL      Monocytes Absolute 1 02 Thousand/µL      Eosinophils Absolute 0 08 Thousand/µL      Basophils Absolute 0 03 Thousands/µL              [id number] [address]  Preliminary Radiology Report  Call: [phone]  assistance Online chat: [email or web address]  Patient Name: Jose Mills MRN: [id number]   (Age): 1950 69 Gender: F  Date of Exam: 2017 Accession: [id number]  Referring Physician: Yonis Vargas # of Images: 499  Ordered As: CT ABDOMEN/PELVIS WO  Page 1 of 2  EXAM:  CT Abdomen and Pelvis Without Intravenous Contrast  CLINICAL HISTORY:  69years old, female; Pain;  Other: Flank; Prior surgery; Surgery type: Cholecystectomy and  hysterectomy  TECHNIQUE:  Axial computed tomography images of the abdomen and pelvis without intravenous contrast   Coronal and sagittal reformatted images were created and reviewed  COMPARISON:  CR - XR HIP PELV 2-3 VWS RIGHT IF PERFORMED 2016-05-14 18:05  FINDINGS:  Limitations: Limited without intravenous or oral contrast  Superior aspect of the liver and spleen  excluded from the study, limiting evaluation  Lower thorax: Partially visualized left pleural effusion  Mild dependent atelectasis  ABDOMEN:  Liver: Hepatic steatosis  Gallbladder and bile ducts: Status post cholecystectomy  No ductal dilation  Pancreas: Grossly unremarkable on this unenhanced study  Spleen: Otherwise grossly unremarkable  Adrenals: Grossly unremarkable on this unenhanced study  Kidneys and ureters: Left renal lesions, incompletely evaluated without intravenous contrast   Further evaluation with a contrast enhanced study or renal ultrasound may be obtained on a nonemergent  basis  No hydronephrosis or obstructing stones  Stomach and bowel: Colonic diverticulosis without radiographic evidence of acute diverticulitis  No  obstruction  Appendix: Visualized without findings to suggest acute appendicitis  PELVIS:  Bladder: Grossly unremarkable on this unenhanced study  Reproductive: Status post hysterectomy  ABDOMEN and PELVISAviva Pond Accession: [id number] MRN: [id number]  Preliminary Radiology Report   (QA) DISCREPANCY? If there is a discrepancy between the preliminary and final interpretation, please notify [facility] via [email or web address]  If you do not have access to our QA portal, call our QA team at [phone]  This report is intended only for the use of the referring physician, and only in accordance with law, If you received this in error, call [phone]  Page 2 of 2  Intraperitoneal space: No free air  No significant fluid collection  Bones/joints: Degenerative changes  No acute fracture  No dislocation    Soft tissues: Grossly unremarkable on this unenhanced study  Vasculature: Atherosclerotic calcifications  No abdominal aortic aneurysm  Lymph nodes: Grossly unremarkable on this unenhanced study  IMPRESSION:  1  No hydronephrosis or obstructing stones  2  Hepatic steatosis  3  Colonic diverticulosis without radiographic evidence of acute diverticulitis  4  Partially visualized left pleural effusion  Thank you for allowing us to participate in the care of your patient  Dictated and Authenticated by: Shelby Morales MD  11/21/2017 1:06 AM Eastern Time (Christy Pamela Caciola 1159)    XR spine lumbar minimum 4 views non injury   Final Result by COURTNEY Guillen MD (11/23 3556)      No acute bony abnormality, lumbar spine  Mild degenerative changes, lower lumbar spine  Workstation performed: [id number]         XR chest portable   Final Result by COURTNEY Guillen MD (11/21 1425)      Small left pleural effusion            Workstation performed: [id number]         CT renal stone study abdomen pelvis wo contrast   Final Result by Shasha Young MD (11/21 7752)      Partially visualized left pleural effusion  No radiopaque urinary tract calculi or obstructive uropathy  Small probable simple cysts left kidney  This could be followed up with nonemergent renal sonogram       No acute intra-abdominal or intrapelvic process insofar as can be detected on a noncontrast CT  Normal appendix  Cholecystectomy  Hepatic steatosis  Findings suggestive of mild chronic constipation/bowel dysmotility        Findings are consistent with the preliminary report from [facility] which was provided shortly after completion of the exam                       Workstation performed: [id number]                    Procedures  Procedures       Phone Contacts  ED Phone Contact    ED Course  ED Course                                MDM  Number of Diagnoses or Management Options  Pyelonephritis, acute:      Amount and/or Complexity of Data Reviewed  Tests in the radiology section of CPT®: ordered and reviewed  Tests in the medicine section of CPT®: ordered and reviewed    Patient Progress  Patient progress: improved    CritCare Time    Disposition  Final diagnoses:   Pyelonephritis, acute     Time reflects when diagnosis was documented in both MDM as applicable and the Disposition within this note     Time User Action Codes Description Comment    11/21/2017  3:17 AM Bianca العلي Add [N10] Pyelonephritis, acute     11/21/2017  3:23 PM Angie Sarbjit Add [M54 5] Low back pain       ED Disposition     ED Disposition Condition Comment    Admit  Case was discussed with Dr Kelsea Pretty [DO] for [facility]   and the patient's admission status was agreed to be Admission Status: observation status to the service of Dr Marylou Diop   Follow-up Information    None       Current Discharge Medication List      CONTINUE these medications which have NOT CHANGED    Details   albuterol (PROVENTIL HFA,VENTOLIN HFA) 90 mcg/act inhaler Inhale 2 puffs every 6 (six) hours as needed for wheezing      canagliflozin (INVOKANA) 100 mg Take 100 mg by mouth daily before breakfast      diclofenac sodium (VOLTAREN) 1 % Apply 2 g topically as needed      DULoxetine (CYMBALTA) 20 mg capsule Take 20 mg by mouth daily  Ergocalciferol (VITAMIN D2 PO) Take 1 25 mg by mouth once a week Every Wednesday      fluticasone-salmeterol (ADVAIR) 250-50 mcg/dose Inhale 1 puff every 12 (twelve) hours  folic acid (FOLVITE) 1 mg tablet Take 1 mg by mouth daily      furosemide (LASIX) 40 mg tablet Take 40 mg by mouth daily  levothyroxine 150 mcg tablet Take 1 tablet by mouth daily in the early morning for 30 days  Qty: 30 tablet, Refills: 0      lisinopril (ZESTRIL) 40 mg tablet Take 40 mg by mouth daily        methotrexate 2 5 mg tablet Take by mouth once a week 8 tablets weekly on Wednesday       nystatin (MYCOSTATIN) cream Apply topically as needed      pantoprazole (PROTONIX) 40 mg tablet Take 40 mg by mouth daily  predniSONE 5 mg tablet Take 5 mg by mouth daily        sitaGLIPtin (JANUVIA) 100 mg tablet Take 100 mg by mouth daily      Tiotropium Bromide Monohydrate (SPIRIVA RESPIMAT) 2 5 MCG/ACT AERS Inhale daily at bedtime      Tofacitinib Citrate (XELJANZ XR) 11 MG TB24 Take 1 tablet by mouth daily      clotrimazole (LOTRIMIN) 1 % cream Apply topically daily as needed  nystatin (MYCOSTATIN) powder Apply topically 3 (three) times a day for 30 days  Qty: 60 g, Refills: 1      ofloxacin (FLOXIN) 0 3 % otic solution Administer 10 drops into both ears as needed             No discharge procedures on file      ED Provider  Electronically Signed by           Arcelia Isaac MD  11/23/17 4251

## 2017-11-21 NOTE — ED NOTES
Attempting to call kitchen for diet order again, no answer, voicemail left        Zeina Sanabria RN  11/21/17 4397

## 2017-11-21 NOTE — ED NOTES
Patient provided boxed lunch at this time   No insulin needed per sliding scale     Lora Ferguson RN  11/21/17 6343

## 2017-11-21 NOTE — ED NOTES
SCD's applied to B/L lower extremities and functioning properly     Marylou Montesinos, DINORAH  11/21/17 7928

## 2017-11-21 NOTE — ED NOTES
Assisted OOB to [facility]  - voided lg amt  Hazy yellow urine - specimen sent to lab - Pt c/o severe right flank pain that "takes my breath away"     Janet Lim RN  11/20/17 8321

## 2017-11-21 NOTE — PLAN OF CARE
Problem: Potential for Falls  Goal: Patient will remain free of falls  INTERVENTIONS:  - Assess patient frequently for physical needs  -  Identify cognitive and physical deficits and behaviors that affect risk of falls    -  [place] fall precautions as indicated by assessment   - Educate patient/family on patient safety including physical limitations  - Instruct patient to call for assistance with activity based on assessment  - Modify environment to reduce risk of injury  - Consider OT/PT consult to assist with strengthening/mobility   Outcome: Progressing      Problem: Prexisting or High Potential for Compromised Skin Integrity  Goal: Skin integrity is maintained or improved  INTERVENTIONS:  - Identify patients at risk for skin breakdown  - Assess and monitor skin integrity  - Assess and monitor nutrition and hydration status  - Monitor labs (i e  albumin)  - Assess for incontinence   - Turn and reposition patient  - Assist with mobility/ambulation  - Relieve pressure over bony prominences  - Avoid friction and shearing  - Provide appropriate hygiene as needed including keeping skin clean and dry  - Evaluate need for skin moisturizer/barrier cream  - Collaborate with interdisciplinary team (i e  Nutrition, Rehabilitation, etc )   - Patient/family teaching   Outcome: Progressing      Problem: PAIN - ADULT  Goal: Verbalizes/displays adequate comfort level or baseline comfort level  Interventions:  - Encourage patient to monitor pain and request assistance  - Assess pain using appropriate pain scale  - Administer analgesics based on type and severity of pain and evaluate response  - Implement non-pharmacological measures as appropriate and evaluate response  - Consider cultural and social influences on pain and pain management  - Notify physician/advanced practitioner if interventions unsuccessful or patient reports new pain   Outcome: Progressing      Problem: INFECTION - ADULT  Goal: Absence or prevention of progression during hospitalization  INTERVENTIONS:  - Assess and monitor for signs and symptoms of infection  - Monitor lab/diagnostic results  - Monitor all insertion sites, i e  indwelling lines, tubes, and drains  - Monitor endotracheal (as able) and nasal secretions for changes in amount and color  - [place] appropriate cooling/warming therapies per order  - Administer medications as ordered  - Instruct and encourage patient and family to use good hand hygiene technique  - Identify and instruct in appropriate isolation precautions for identified infection/condition   Outcome: Progressing      Problem: SAFETY ADULT  Goal: Maintain or return to baseline ADL function  INTERVENTIONS:  -  Assess patient's ability to carry out ADLs; assess patient's baseline for ADL function and identify physical deficits which impact ability to perform ADLs (bathing, care of mouth/teeth, toileting, grooming, dressing, etc )  - Assess/evaluate cause of self-care deficits   - Assess range of motion  - Assess patient's mobility; develop plan if impaired  - Assess patient's need for assistive devices and provide as appropriate  - Encourage maximum independence but intervene and supervise when necessary  ¯ Involve family in performance of ADLs  ¯ Assess for home care needs following discharge   ¯ Request OT consult to assist with ADL evaluation and planning for discharge  ¯ Provide patient education as appropriate   Outcome: Progressing    Goal: Maintain or return mobility status to optimal level  INTERVENTIONS:  - Assess patient's baseline mobility status (ambulation, transfers, stairs, etc )    - Identify cognitive and physical deficits and behaviors that affect mobility  - Identify mobility aids required to assist with transfers and/or ambulation (gait belt, sit-to-stand, lift, walker, cane, etc )  - [place] fall precautions as indicated by assessment  - Record patient progress and toleration of activity level on Mobility SBAR; progress patient to next Phase/Stage  - Instruct patient to call for assistance with activity based on assessment  - Request Rehabilitation consult to assist with strengthening/weightbearing, etc    Outcome: Progressing      Problem: DISCHARGE PLANNING  Goal: Discharge to home or other facility with appropriate resources  INTERVENTIONS:  - Identify barriers to discharge w/patient and caregiver  - Arrange for needed discharge resources and transportation as appropriate  - Identify discharge learning needs (meds, wound care, etc )  - Arrange for interpretive services to assist at discharge as needed  - Refer to Case Management Department for coordinating discharge planning if the patient needs post-hospital services based on physician/advanced practitioner order or complex needs related to functional status, cognitive ability, or social support system   Outcome: Progressing      Problem: Knowledge Deficit  Goal: Patient/family/caregiver demonstrates understanding of disease process, treatment plan, medications, and discharge instructions  Complete learning assessment and assess knowledge base    Interventions:  - Provide teaching at level of understanding  - Provide teaching via preferred learning methods   Outcome: Progressing

## 2017-11-22 ENCOUNTER — APPOINTMENT (INPATIENT)
Dept: RADIOLOGY | Facility: HOSPITAL | Age: 67
DRG: 690 | End: 2017-11-22
Payer: MEDICARE

## 2017-11-22 LAB
ALBUMIN SERPL BCP-MCNC: 3.1 G/DL (ref 3.5–5)
ALP SERPL-CCNC: 70 U/L (ref 46–116)
ALT SERPL W P-5'-P-CCNC: 42 U/L (ref 12–78)
ANION GAP SERPL CALCULATED.3IONS-SCNC: 6 MMOL/L (ref 4–13)
AST SERPL W P-5'-P-CCNC: 20 U/L (ref 5–45)
ATRIAL RATE: 69 BPM
BILIRUB SERPL-MCNC: 0.4 MG/DL (ref 0.2–1)
BUN SERPL-MCNC: 17 MG/DL (ref 5–25)
CALCIUM SERPL-MCNC: 9.2 MG/DL (ref 8.3–10.1)
CHLORIDE SERPL-SCNC: 103 MMOL/L (ref 100–108)
CO2 SERPL-SCNC: 30 MMOL/L (ref 21–32)
CREAT SERPL-MCNC: 0.62 MG/DL (ref 0.6–1.3)
ERYTHROCYTE [DISTWIDTH] IN BLOOD BY AUTOMATED COUNT: 16 % (ref 11.6–15.1)
GFR SERPL CREATININE-BSD FRML MDRD: 92 ML/MIN/1.73SQ M
GLUCOSE SERPL-MCNC: 120 MG/DL (ref 65–140)
GLUCOSE SERPL-MCNC: 124 MG/DL (ref 65–140)
GLUCOSE SERPL-MCNC: 153 MG/DL (ref 65–140)
GLUCOSE SERPL-MCNC: 264 MG/DL (ref 65–140)
GLUCOSE SERPL-MCNC: 278 MG/DL (ref 65–140)
HCT VFR BLD AUTO: 43.4 % (ref 34.8–46.1)
HGB BLD-MCNC: 14 G/DL (ref 11.5–15.4)
MAGNESIUM SERPL-MCNC: 2.1 MG/DL (ref 1.6–2.6)
MCH RBC QN AUTO: 30.2 PG (ref 26.8–34.3)
MCHC RBC AUTO-ENTMCNC: 32.3 G/DL (ref 31.4–37.4)
MCV RBC AUTO: 94 FL (ref 82–98)
P AXIS: 67 DEGREES
PHOSPHATE SERPL-MCNC: 3.9 MG/DL (ref 2.3–4.1)
PLATELET # BLD AUTO: 252 THOUSANDS/UL (ref 149–390)
PMV BLD AUTO: 11.1 FL (ref 8.9–12.7)
POTASSIUM SERPL-SCNC: 3.8 MMOL/L (ref 3.5–5.3)
PR INTERVAL: 164 MS
PROT SERPL-MCNC: 6.7 G/DL (ref 6.4–8.2)
QRS AXIS: 47 DEGREES
QRSD INTERVAL: 98 MS
QT INTERVAL: 412 MS
QTC INTERVAL: 441 MS
RBC # BLD AUTO: 4.63 MILLION/UL (ref 3.81–5.12)
SODIUM SERPL-SCNC: 139 MMOL/L (ref 136–145)
T WAVE AXIS: 57 DEGREES
VENTRICULAR RATE: 69 BPM
WBC # BLD AUTO: 5.26 THOUSAND/UL (ref 4.31–10.16)

## 2017-11-22 PROCEDURE — 85027 COMPLETE CBC AUTOMATED: CPT | Performed by: HOSPITALIST

## 2017-11-22 PROCEDURE — G8988 SELF CARE GOAL STATUS: HCPCS

## 2017-11-22 PROCEDURE — 97163 PT EVAL HIGH COMPLEX 45 MIN: CPT | Performed by: PHYSICAL THERAPIST

## 2017-11-22 PROCEDURE — 97167 OT EVAL HIGH COMPLEX 60 MIN: CPT

## 2017-11-22 PROCEDURE — 97116 GAIT TRAINING THERAPY: CPT | Performed by: PHYSICAL THERAPIST

## 2017-11-22 PROCEDURE — 97530 THERAPEUTIC ACTIVITIES: CPT

## 2017-11-22 PROCEDURE — 72110 X-RAY EXAM L-2 SPINE 4/>VWS: CPT

## 2017-11-22 PROCEDURE — G8979 MOBILITY GOAL STATUS: HCPCS | Performed by: PHYSICAL THERAPIST

## 2017-11-22 PROCEDURE — 84100 ASSAY OF PHOSPHORUS: CPT | Performed by: HOSPITALIST

## 2017-11-22 PROCEDURE — 82948 REAGENT STRIP/BLOOD GLUCOSE: CPT

## 2017-11-22 PROCEDURE — G8978 MOBILITY CURRENT STATUS: HCPCS | Performed by: PHYSICAL THERAPIST

## 2017-11-22 PROCEDURE — G8987 SELF CARE CURRENT STATUS: HCPCS

## 2017-11-22 PROCEDURE — 83735 ASSAY OF MAGNESIUM: CPT | Performed by: HOSPITALIST

## 2017-11-22 PROCEDURE — 80053 COMPREHEN METABOLIC PANEL: CPT | Performed by: HOSPITALIST

## 2017-11-22 RX ORDER — LIDOCAINE 50 MG/G
2 PATCH TOPICAL DAILY
Status: DISCONTINUED | OUTPATIENT
Start: 2017-11-22 | End: 2017-11-24 | Stop reason: HOSPADM

## 2017-11-22 RX ORDER — METHOCARBAMOL 500 MG/1
500 TABLET, FILM COATED ORAL EVERY 6 HOURS PRN
Status: DISCONTINUED | OUTPATIENT
Start: 2017-11-22 | End: 2017-11-24 | Stop reason: HOSPADM

## 2017-11-22 RX ORDER — CEFTRIAXONE SODIUM 1 G/50ML
1000 INJECTION, SOLUTION INTRAVENOUS EVERY 24 HOURS
Status: DISCONTINUED | OUTPATIENT
Start: 2017-11-24 | End: 2017-11-24 | Stop reason: HOSPADM

## 2017-11-22 RX ORDER — METHYLPREDNISOLONE SODIUM SUCCINATE 40 MG/ML
40 INJECTION, POWDER, LYOPHILIZED, FOR SOLUTION INTRAMUSCULAR; INTRAVENOUS EVERY 12 HOURS SCHEDULED
Status: DISCONTINUED | OUTPATIENT
Start: 2017-11-22 | End: 2017-11-23

## 2017-11-22 RX ADMIN — OXYCODONE HYDROCHLORIDE 5 MG: 5 TABLET ORAL at 04:02

## 2017-11-22 RX ADMIN — INSULIN LISPRO 6 UNITS: 100 INJECTION, SOLUTION INTRAVENOUS; SUBCUTANEOUS at 16:32

## 2017-11-22 RX ADMIN — INSULIN LISPRO 2 UNITS: 100 INJECTION, SOLUTION INTRAVENOUS; SUBCUTANEOUS at 12:46

## 2017-11-22 RX ADMIN — SITAGLIPTIN 100 MG: 100 TABLET, FILM COATED ORAL at 08:11

## 2017-11-22 RX ADMIN — FLUTICASONE PROPIONATE AND SALMETEROL 1 PUFF: 50; 250 POWDER RESPIRATORY (INHALATION) at 08:12

## 2017-11-22 RX ADMIN — METHYLPREDNISOLONE SODIUM SUCCINATE 40 MG: 40 INJECTION, POWDER, FOR SOLUTION INTRAMUSCULAR; INTRAVENOUS at 10:37

## 2017-11-22 RX ADMIN — ENOXAPARIN SODIUM 40 MG: 40 INJECTION SUBCUTANEOUS at 08:11

## 2017-11-22 RX ADMIN — DOCUSATE SODIUM 100 MG: 100 CAPSULE, LIQUID FILLED ORAL at 08:11

## 2017-11-22 RX ADMIN — INSULIN LISPRO 6 UNITS: 100 INJECTION, SOLUTION INTRAVENOUS; SUBCUTANEOUS at 22:13

## 2017-11-22 RX ADMIN — CEFTRIAXONE 1000 MG: 1 INJECTION, SOLUTION INTRAVENOUS at 08:10

## 2017-11-22 RX ADMIN — LIDOCAINE 2 PATCH: 50 PATCH CUTANEOUS at 10:36

## 2017-11-22 RX ADMIN — FLUTICASONE PROPIONATE AND SALMETEROL 1 PUFF: 50; 250 POWDER RESPIRATORY (INHALATION) at 22:13

## 2017-11-22 RX ADMIN — PANTOPRAZOLE SODIUM 40 MG: 40 TABLET, DELAYED RELEASE ORAL at 08:11

## 2017-11-22 RX ADMIN — LEVOTHYROXINE SODIUM 150 MCG: 150 TABLET ORAL at 05:49

## 2017-11-22 RX ADMIN — TIOTROPIUM BROMIDE 18 MCG: 18 CAPSULE ORAL; RESPIRATORY (INHALATION) at 08:12

## 2017-11-22 RX ADMIN — DOCUSATE SODIUM 100 MG: 100 CAPSULE, LIQUID FILLED ORAL at 18:46

## 2017-11-22 RX ADMIN — METHYLPREDNISOLONE SODIUM SUCCINATE 40 MG: 40 INJECTION, POWDER, FOR SOLUTION INTRAMUSCULAR; INTRAVENOUS at 22:13

## 2017-11-22 RX ADMIN — FOLIC ACID 1 MG: 1 TABLET ORAL at 08:11

## 2017-11-22 RX ADMIN — OXYCODONE HYDROCHLORIDE 5 MG: 5 TABLET ORAL at 09:17

## 2017-11-22 RX ADMIN — OXYCODONE HYDROCHLORIDE 5 MG: 5 TABLET ORAL at 18:52

## 2017-11-22 RX ADMIN — FUROSEMIDE 40 MG: 40 TABLET ORAL at 08:11

## 2017-11-22 RX ADMIN — PREDNISONE 5 MG: 5 TABLET ORAL at 08:11

## 2017-11-22 RX ADMIN — POLYETHYLENE GLYCOL 3350 17 G: 17 POWDER, FOR SOLUTION ORAL at 18:49

## 2017-11-22 RX ADMIN — DULOXETINE 20 MG: 20 CAPSULE, DELAYED RELEASE ORAL at 12:46

## 2017-11-22 RX ADMIN — LISINOPRIL 40 MG: 20 TABLET ORAL at 08:10

## 2017-11-22 NOTE — PHYSICAL THERAPY NOTE
PT Evaluation     11/22/17 3177   Note Type   Note type Eval/Treat   Pain Assessment   Pain Score 7   Pain Location Back   Pain Orientation Bilateral  (R worst than L)   Home Living   Type of Home Apartment  (2nd floor apartment)   Home Layout Stairs to enter with rails; Able to live on main level with bedroom/bathroom; Performs ADLs on one level  (15 steps to 2nd floor apartment with 2 HR's)   Bathroom Shower/Tub Tub/shower unit   Bathroom Toilet Standard   Bathroom Equipment Shower chair;Commode   Bathroom Accessibility Accessible   Home Equipment Walker;Cane   Prior Function   Level of [place] Independent with ADLs and functional mobility  ((I) ambulation no A  D )   Lives With Alone   Receives Help From Family   ADL Assistance Independent   IADLs Independent   Comments (I) with driving   Restrictions/Precautions   Other Precautions Chair Alarm; Fall Risk;Pain   General   Family/Caregiver Present Yes   Cognition   Arousal/Participation Alert   Orientation Level Oriented X4   Following Commands Follows all commands and directions without difficulty   RLE Assessment   RLE Assessment WFL  (hip flex 4-/5, knee and ankle 4+/5 )   LLE Assessment   LLE Assessment WFL  (knee flex 4/5, knee and ankle 4+/5)   Coordination   Sensation WFL   Light Touch   RLE Light Touch Grossly intact   LLE Light Touch Grossly intact   Bed Mobility   Supine to Sit 4  Minimal assistance   Additional items Assist x 1;Bedrails   Sit to Supine (seated in chair at bedside alarm on bell in reach)   Additional Comments pt used rolling technique for bed mobility to minimize pain  Pt with (-) leg length discrepancy and (-) slump test L LE, pulling and pain R low back with R slump test    Transfers   Sit to Stand (CGA no A D )   Stand to Sit (CGA )   Stand pivot (CGA)   Additional Comments pt able to ambulate with upright posture no A  D  however notes increased low back discomfort with ambulation  No LOB during ambulation     Ambulation/Elevation Gait pattern Antalgic   Gait Assistance 5  Supervision   Assistive Device None   Distance 250ft no A D  Supervision no LOB however limited ambulation tolerance due to pain  Balance   Static Sitting Good   Dynamic Sitting Good   Static Standing Fair +   Dynamic Standing Fair   Ambulatory Fair   Endurance Deficit   Endurance Deficit Yes   Endurance Deficit Description activity limited by back pain   Activity Tolerance   Activity Tolerance Patient limited by fatigue;Patient limited by pain   Assessment   Prognosis Good   Problem List Decreased strength;Decreased endurance;Decreased mobility; Impaired balance;Pain   Assessment Pt is a 69year old female presenting with bilateral LE back pain R worse than L increased with bed mobility and prolonged ambulation  Pt only requiring CGA to supervision for transfers and ambulation  Min(A) for bed mobility using rolling technique maintaining spine neutral position  Pt would benefit from continued activity in PT to improve strength balance endurance pain and all functional mobility with return to (I) LOF  Pt would benefit from home health care services on discharge  Goals   Patient Goals To feel better and return home   LTG Expiration Date 12/06/17   Long Term Goal #1 (I) with all bed mobility and transfers no A D  Long Term Goal #2 Pt will ambulate 300ft no A D  (I) no LOB and will ascend/descend 11 steps with HR (S)  Plan   Treatment/Interventions Functional transfer training;LE strengthening/ROM; Therapeutic exercise;Elevations; Endurance training;Patient/family training;Bed mobility;Gait training   PT Frequency (3-5x/wk)   Recommendation   Recommendation Home PT;Outpatient PT   PT - OK to Discharge Yes  (when medically stable with home health services)   no SCD's  Pt seated in chair at bedside with call bell in reach and  Chair alarm on

## 2017-11-22 NOTE — SOCIAL WORK
Cm met with the patient and she is at home and she is in an apt and she is on the 2nd floor with 15 steps to enter the apt and a hand rail  She is then all on one floor  The patient has a cane and a walker and she has a cpap at home and she does use the cpap regularly and she does not know what dme provides the cpap  She does drive  pcp Dr REGINALDO Garcia and eriberto damon  Independent with adls

## 2017-11-22 NOTE — PHYSICIAN ADVISOR
Current patient class: Inpatient  The patient is currently on Hospital Day: 3      The patient was admitted to the hospital at [address] [place] [address] on 11/21/17 for the following diagnosis:  Pyelonephritis, acute [N10]  Flank pain [R10 9]       There is documentation in the medical record of an expected length of stay of at least 2 midnights  The patient is therefore expected to satisfy the 2 midnight benchmark and given the 2 midnight presumption is appropriate for INPATIENT ADMISSION  Given this expectation of a satisfying stay, CMS instructs us that the patient is most often appropriate for inpatient admission under part A provided medical necessity is documented in the chart  After review of the relevant documentation, labs, vital signs and test results, the patient is appropriate for INPATIENT ADMISSION  Admission to the hospital as an inpatient is a complex decision making process which requires the practitioner to consider the patients presenting complaint, history and physical examination and all relevant testing  With this in mind, in this case, the patient was deemed appropriate for INPATIENT ADMISSION  After review of the documentation and testing available at the time of the admission I concur with this clinical determination of medical necessity  Rationale is as follows: The patient is a 69 yrs old Female who presented to the ED at 11/20/2017  9:54 PM with a chief complaint of Flank Pain (Pt reports right sided flank pain  Pt reports increased with movement   Denies any N/V/D)    The patients vitals on arrival were ED Triage Vitals [11/20/17 2139]   Temperature Pulse Respirations Blood Pressure SpO2   98 4 °F (36 9 °C) 87 20 (!) 182/84 95 %      Temp Source Heart Rate Source Patient Position - Orthostatic VS BP Location FiO2 (%)   Temporal Monitor Sitting Left arm --      Pain Score       Worst Possible Pain           Past Medical History:   Diagnosis Date    Arthritis     Arthritis     Candidiasis of skin     COPD (chronic obstructive pulmonary disease) (HCC)     Current chronic use of systemic steroids     Diabetes mellitus [facility]     Disease of thyroid gland     hypo    Fibromyalgia     GERD (gastroesophageal reflux disease)     Hyperlipidemia     Hypertension     Migraine     states she has a hx of HA that she calls [facility] but never was dx by neurologist    Obesity     Obesity     Osteoarthritis     Osteopenia     Psychiatric disorder     anxiety    Rheumatoid arthritis [facility]     Rhe[facility] arthritis [facility]     Vitamin B12 deficiency     Vitamin D deficiency      Past Surgical History:   Procedure Laterality Date    CATARACT EXTRACTION Bilateral     CHOLECYSTECTOMY      EYE SURGERY      HYSTERECTOMY      JOINT REPLACEMENT      left knee    KNEE ARTHROSCOPY      NEUROPLASTY / TRANSPOSITION MEDIAN NERVE AT CARPAL TUNNEL      ROTATOR CUFF REPAIR Left     TUBAL LIGATION             Consults have been placed to:   IP CONSULT TO CASE MANAGEMENT  IP CONSULT TO ORTHOPEDIC SURGERY    Vitals:    11/21/17 1537 11/21/17 2314 11/22/17 0457 11/22/17 0552   BP: 165/70 139/66     Pulse: 71 76     Resp: 18 18     Temp: 98 °F (36 7 °C) 97 9 °F (36 6 °C)     TempSrc: Temporal Temporal     SpO2: 91% 93%     Weight:   116 kg (256 lb 13 4 oz) 116 kg (255 lb 11 7 oz)   Height:           Most recent labs:    Recent Labs      11/20/17   2314 11/22/17   0454   WBC  8 43  5 26   HGB  14 2  14 0   HCT  44 7  43 4   PLT  293  252   K  4 0  3 8   NA  137  139   CALCIUM  9 5  9 2   BUN  14  17   CREATININE  0 73  0 62   LIPASE  160   --    AST  20  20   ALT  46  42   ALKPHOS  78  70   BILITOT  0 30  0 40       Scheduled Meds:  cefTRIAXone 1,000 mg Intravenous Q24H   docusate sodium 100 mg Oral BID   DULoxetine 20 mg Oral Daily   enoxaparin 40 mg Subcutaneous Daily   fluticasone-salmeterol 1 puff Inhalation T42E [address]   folic acid 1 mg Oral Daily   furosemide 40 mg Oral Daily   insulin lispro 2-12 Units Subcutaneous TID AC   insulin lispro 2-12 Units Subcutaneous HS   levothyroxine 150 mcg Oral Early Morning   lisinopril 40 mg Oral Daily   pantoprazole 40 mg Oral Daily   predniSONE 5 mg Oral Daily   sitaGLIPtin 100 mg Oral Daily   tiotropium 18 mcg Inhalation Daily     Continuous Infusions:   PRN Meds:   acetaminophen    albuterol    calcium carbonate    nystatin    ondansetron    oxyCODONE    polyethylene glycol

## 2017-11-22 NOTE — CONSULTS
Consultation- Angeles Candelariaont 69 y o  [unfilled] MRN: [id number]  Unit/Bed#: 420-01      Chief Complaint: Back Pain    HPI:  Low back pain 4 days  Patient woke up with low back pain mainly located in the lumbosacral spine 4 days ago  No radiation of pain  No history of trauma  No sphincter disturbances  Pain is bad enough for her to seek medical help in the emergency room    Patient has been admitted for care and evaluation of back pain    Review Of Systems:      Gen: Normal   Skin: Normal   Musculoskeletal: See HPI   All Other Systems Negative      Past Medical History:   Past Medical History:   Diagnosis Date    Arthritis     Arthritis     Candidiasis of skin     [facility] obstructive [facility] disease) [facility]     Current chronic use of systemic steroids     Diabetes mellitus ([facility]     Disease of thyroid gland     hypo    Fibromyalgia     GERD (gastroesophageal reflux disease)     Hyperlipidemia     Hypertension     Migraine     states she has a hx of HA that she calls [facility] but never was dx by neurologist    Obesity     Obesity     Osteoarthritis     Osteopenia     Psychiatric disorder     anxiety    Rheumatoid arthritis ([facility]     Rheumatoid arthritis ([facility]     Vitamin B12 deficiency     Vitamin D deficiency        Past Surgical History:   Past Surgical History:   Procedure Laterality Date    CATARACT EXTRACTION Bilateral     CHOLECYSTECTOMY      EYE SURGERY      HYSTERECTOMY      JOINT REPLACEMENT      left knee    KNEE ARTHROSCOPY      NEUROPLASTY / TRANSPOSITION MEDIAN NERVE AT CARPAL TUNNEL      ROTATOR CUFF REPAIR Left     TUBAL LIGATION         Family [email or web address]    Social History:  Social History     Social History    Marital status: /Civil Union     Spouse name: N/A    Number of children: N/A    Years of education: N/A     Social History Main Topics    Smoking status: Former Smoker     Packs/day: 1 00     Years: 48 00     Quit date: 11/21/2012  Smokeless tobacco: Never Used    Alcohol use No    Drug use: No    Sexual activity: No     Other Topics Concern    None     Social History Narrative    None       Allergies: No Known Allergies    Medications:   Current Facility-Administered Medications:     acetaminophen (TYLENOL) tablet 650 mg, 650 mg, Oral, Q6H PRN, Acosta Giordano DO    albuterol (PROVENTIL HFA,VENTOLIN HFA) inhaler 2 puff, 2 puff, Inhalation, Q6H PRN, [facility]    calcium carbonate (TUMS) chewable tablet 1,000 mg, 1,000 mg, Oral, Daily PRN, [facility]    [START ON 11/23/2017] cefTRIAXone (ROCEPHIN) 1,000 mg in dextrose 5 % 50 mL IVPB, 1,000 mg, Intravenous, Once, [facility]    [START ON 11/24/2017] cefTRIAXone (ROCEPHIN) IVPB (premix) 1,000 mg, 1,000 mg, Intravenous, Q24H, Lauryn Sanchez DO    docusate sodium (COLACE) capsule 100 mg, 100 mg, Oral, BID, Tennis He, PA-C, 100 mg at 11/22/17 8124    DULoxetine (CYMBALTA) delayed release capsule 20 mg, 20 mg, Oral, Daily, [facility], 20 mg at 11/21/17 4145    enoxaparin (LOVENOX) subcutaneous injection 40 mg, 40 mg, Subcutaneous, Daily, [facility], 40 mg at 11/22/17 1700    fluticasone-salmeterol (ADVAIR) 250-50 mcg/dose inhaler 1 puff, 1 puff, Inhalation, Q12H [address], [facility], 1 puff at 38/02/82 2823    folic acid (FOLVITE) tablet 1 mg, 1 mg, Oral, Daily, [facility], 1 mg at 11/22/17 0204    furosemide (LASIX) tablet 40 mg, 40 mg, Oral, Daily, [facility], 40 mg at 11/22/17 0811    insulin lispro (HumaLOG) 100 units/mL subcutaneous injection 2-12 Units, 2-12 Units, Subcutaneous, TID AC, 4 Units at 11/21/17 1624 **AND** Fingerstick Glucose (POCT), , , TID AC, Spring Robbie,     insulin lispro (HumaLOG) 100 units/mL subcutaneous injection 2-12 Units, 2-12 Units, Subcutaneous, HS, Cole Camp DO Robbie, 2 Units at 11/21/17 0354    levothyroxine tablet 150 mcg, 150 mcg, Oral, Early Morning, Duncan Sanchez, , 150 mcg at 11/22/17 0549    lidocaine (LIDODERM) 5 % patch 2 patch, 2 patch, Transdermal, Daily, Latoya Acevedo PA-C, 2 patch at 11/22/17 1036    lisinopril (ZESTRIL) tablet 40 mg, 40 mg, Oral, Daily, [place] Puff, DO, 40 mg at 11/22/17 0810    methocarbamol (ROBAXIN) tablet 500 mg, 500 mg, Oral, Q6H PRN, Rekha Zimmerman PA-C    methylPREDNISolone sodium succinate (Solu-MEDROL) injection 40 mg, 40 mg, Intravenous, Q12H [facility], Latoya Acevedo PA-C, 40 mg at 11/22/17 1037    nystatin (MYCOSTATIN) cream 1 application, 1 application, Topical, PRN, Denver Puff, DO    ondansetron [facility]) injection 4 mg, 4 mg, Intravenous, Q6H PRN, Denver Puff, DO    oxyCODONE (ROXICODONE) IR tablet 5 mg, 5 mg, Oral, Q4H PRN, Thyra Lowers, DO, 5 mg at 11/22/17 0917    pantoprazole (PROTONIX) EC tablet 40 mg, 40 mg, Oral, Daily, Denver Puff, DO, 40 mg at 11/22/17 6660    polyethylene glycol (MIRALAX) packet 17 g, 17 g, Oral, Daily PRN, Poli Watkins PA-C    sitaGLIPtin (JANUVIA) tablet 100 mg, 100 mg, Oral, Daily, Moses Andradeks, DO, 100 mg at 11/22/17 0811    tiotropium (SPIRIVA) capsule for inhaler 18 mcg, 18 mcg, Inhalation, Daily, Denver Puff, DO, 18 mcg at 11/22/17 5648    Physical Exam:     Vitals: BP (!) 183/72   Pulse 88   Temp 97 9 °F (36 6 °C) (Temporal)   Resp 18   Ht 5' 5" (1 651 m)   Wt 116 kg (255 lb 11 7 oz)   SpO2 96%   BMI 42 56 kg/m²     Psych: n  Eyes: EOM intact, Eyes clear, PERRLA   ENT: Hearing intact, Mucous Membranes moist  Lungs: Clear, No Audible wheeze  Abdomen: Non-tender, Non-distended, Soft  Lymph: No Lymphadenopathy  Cardiovascular: No Discernable Arrhythmia  Musculoskeletal: Lumbar Spine   Skin intact  Paraspinal spasm LS spine  No deficits Both Le extremities    Radiology: I personally reviewed the films    X-rays Lumbar Spine:     Labs:    0  Lab Value Date/Time   HCT 43 4 11/22/2017 0454   HCT 41 8 12/17/2015 0828   HGB 14 0 11/22/2017 0454   HGB 13 6 12/17/2015 0828   INR 1 05 03/01/2017 1445   INR 0 92 05/18/2015 1554   WBC 5 26 11/22/2017 0454   WBC 9 07 12/17/2015 0828   ESR 4 09/09/2017 0925   ESR 15 12/17/2015 0827   CRP 9 7 (H) 09/09/2017 0925   CRP 21 7 (H) 12/17/2015 0827        Assessment: Low back pain with no neurological deficits     Plan:   Low back pain  Pain management  Consult PT and OT  Can f/u with pain and spine as outpatient

## 2017-11-22 NOTE — PROGRESS NOTES
116 (256 84)    11/21/17 1300  120 (264 55)    11/20/17 2139  118 (260 8)              Intake/Output Summary (Last 24 hours) at 11/22/17 1258  Last data filed at 11/22/17 1242   Gross per 24 hour   Intake             1140 ml   Output              975 ml   Net              165 ml       Physical Exam: General:  NAD, awake, alert, oriented x 3  HEENT:  NC/AT, mucous membranes moist  Neck:  Supple, No JVP elevation  CV:  + S1, +S2, RRR  Pulm:  Lung fields are CTA bilaterally  Abd:  Soft, Non-tender, Non-distended  Ext:  No clubbing/cyanosis/edema    Scheduled Meds:  [START ON 11/23/2017] cefTRIAXone 1,000 mg Intravenous Once   [START ON 11/24/2017] cefTRIAXone 1,000 mg Intravenous Q24H   docusate sodium 100 mg Oral BID   DULoxetine 20 mg Oral Daily   enoxaparin 40 mg Subcutaneous Daily   fluticasone-salmeterol 1 puff Inhalation V[address]   folic acid 1 mg Oral Daily   furosemide 40 mg Oral Daily   insulin lispro 2-12 Units Subcutaneous TID AC   insulin lispro 2-12 Units Subcutaneous HS   levothyroxine 150 mcg Oral Early Morning   lidocaine 2 patch Transdermal Daily   lisinopril 40 mg Oral Daily   methylPREDNISolone sodium succinate 40 mg Intravenous Q12H [address]   pantoprazole 40 mg Oral Daily   sitaGLIPtin 100 mg Oral Daily   tiotropium 18 mcg Inhalation Daily     Continuous Infusions:   PRN Meds:   acetaminophen    albuterol    calcium carbonate    methocarbamol    nystatin    ondansetron    oxyCODONE    polyethylene glycol      Invasive Devices     Peripheral Intravenous Line            Peripheral IV 11/22/17 Left Forearm less than 1 day                  Results from last 7 days  Lab Units 11/22/17  0454 11/20/17  2314   WBC Thousand/uL 5 26 8 43   HEMOGLOBIN g/dL 14 0 14 2   HEMATOCRIT % 43 4 44 7   PLATELETS Thousands/uL 252 293   NEUTROS PCT %  --  50   MONOS PCT %  --  12         Results from last 7 days  Lab Units 11/22/17  0454 11/20/17  2314   SODIUM mmol/L 139 137   POTASSIUM mmol/L 3 8 4 0   CHLORIDE mmol/L 103 102   CO2 mmol/L 30 30   BUN mg/dL 17 14   CREATININE mg/dL 0 62 0 73   CALCIUM mg/dL 9 2 9 5   TOTAL PROTEIN g/dL 6 7 7 8   BILIRUBIN TOTAL mg/dL 0 40 0 30   ALK PHOS U/L 70 78   ALT U/L 42 46   AST U/L 20 20   GLUCOSE RANDOM mg/dL 124 140       Lab Results   Component Value Date    CKTOTAL 50 11/04/2016    CKMBINDEX <1 06/05/2014    TROPONINI 0 02 11/04/2016       Lab Results   Component Value Date    INR 1 05 03/01/2017    INR 1 01 11/03/2016    INR 0 92 05/18/2015    PROTIME 13 4 03/01/2017    PROTIME 13 0 11/03/2016    PROTIME 12 1 05/18/2015                 Lab, Imaging and other studies: I have personally reviewed pertinent reports      VTE Pharmacologic Prophylaxis: Enoxaparin (Lovenox)  VTE Mechanical Prophylaxis: sequential compression device

## 2017-11-22 NOTE — OCCUPATIONAL THERAPY NOTE
Occupational Therapy Evaluation     Patient Name: Yonis GOMEZ Date: 11/22/2017  Problem List  Patient Active Problem List   Diagnosis    [facility]epsis [facility] )    Lactic acidosis    COPD with acute exacerbation ([facility] )    Community acquired pneumonia    Type 2 diabetes mellitus with hyperglycemia ([facility] )    Hypothyroidism    Rheumatoid arthritis ([facility] )    Hypomagnesemia    Essential hypertension    Hyperlipidemia    GERD (gastroesophageal reflux disease)    Hypokalemia    Vitamin D insufficiency    Hypoalbuminemia    Hepatic steatosis    Pleural effusion, bilateral    Elevated d-dimer    Chest pain    Atelectasis    Morbid (severe) obesity due to excess calories (HCC)    BMI 40 0-44 9, adult (HCC)    Flank pain     Past Medical History  Past Medical History:   Diagnosis Date    Arthritis     Arthritis     Candidiasis of skin     [facility]     Current chronic use of systemic steroids     Diabetes mellitus ([facility] )     Disease of thyroid gland     hypo    Fibromyalgia     GERD (gastroesophageal reflux disease)     Hyperlipidemia     Hypertension     Migraine     states she has a hx of HA that she calls [place] but never was dx by neurologist    Obesity     Obesity     Osteoarthritis     Osteopenia     Psychiatric disorder     anxiety    Rheumatoid arthritis ([place] [address] )     Rheumatoid arthritis ([facility] )     Vitamin B12 deficiency     Vitamin D deficiency      Past Surgical History  Past Surgical History:   Procedure Laterality Date    CATARACT EXTRACTION Bilateral     CHOLECYSTECTOMY      EYE SURGERY      HYSTERECTOMY      JOINT REPLACEMENT      left knee    KNEE ARTHROSCOPY      NEUROPLASTY / TRANSPOSITION MEDIAN NERVE AT CARPAL TUNNEL      ROTATOR CUFF REPAIR Left     TUBAL LIGATION             11/22/17 1453   Note Type   Note type Eval/Treat   Restrictions/Precautions   Weight Bearing Precautions Per Order No   Other Precautions Chair Alarm; Bed Alarm;Multiple lines;Telemetry; Fall Risk;Pain   Pain Assessment   Pain Assessment 0-10   Pain Score 7   Pain Location Back   Home Living   Type of [address] One level;Performs ADLs on one level; Able to live on main level with bedroom/bathroom;Stairs to enter with rails  (13 steps c HR to 2nd floor apartment)   Bathroom Shower/Tub Tub/shower unit   Bathroom Toilet Standard   Bathroom Equipment Shower chair;Commode   [address] Walker;Cane;Grab bars   Prior Function   Level of [place] Independent with ADLs and functional mobility   Lives With Alone   ADL Assistance Independent   IADLs Independent   Comments pt is (I) c driving    Psychosocial   Psychosocial (WDL) WDL   ADL   Where Assessed Supine, bed   LB Dressing Assistance 1  Total Assistance   LB Dressing Deficit Don/doff R sock; Don/doff L sock   Bed Mobility   Supine to Sit 4  Minimal assistance   Additional items Assist x 1;Bedrails;Verbal cues   Sit to Supine (seated in chair at end of session)   Transfers   Sit to Stand 5  Supervision   Stand to Sit 5  Supervision   Functional Mobility   Functional Mobility 5  Supervision   Additional Comments pt performed FM in hallway with (S) level (A); no device; minimal pain noted   Balance   Static Sitting Good   Dynamic Sitting Good   Static Standing Fair +   Dynamic Standing Fair   Ambulatory Fair   Activity Tolerance   Activity Tolerance Patient limited by fatigue;Patient limited by pain   RUE Assessment   RUE Assessment WFL   LUE Assessment   LUE Assessment WFL   Hand Function   Gross Motor Coordination Functional   Fine Motor Coordination Functional   Sensation   Light Touch No apparent deficits   Sharp/Dull No apparent deficits   Cognition   Overall Cognitive Status WFL   Arousal/Participation Alert   Attention Within functional limits   Orientation Level Oriented X4   Memory Within functional limits   Following Commands Follows all commands and directions without difficulty   Assessment   Limitation Decreased ADL status; Decreased UE strength;Decreased endurance;Decreased self-care trans;Decreased high-level ADLs   Assessment recommend Outpatient therapy and LB dressing education   Goals   Short Term Goal  pt will be educated in LB dressing equipment   Long Term Goal #1 pt will perform UB bathing/grooming tasks at (S) setup level   Long Term Goal #2 pt will perform FM c no device at (S) level    Long Term Goal pt wll perform all functional transfers at (I) level   Plan   Treatment Interventions ADL retraining;Functional transfer training;UE strengthening/ROM; Endurance training;Patient/family training; Activityengagement;Equipment evaluation/education   Goal Expiration Date 12/06/17   OT Frequency 3-5x/wk   Recommendation   OT Discharge Recommendation (outpatient therapy)   OT - OK to Discharge No   Barthel Index   Feeding 10   Bathing 0   Grooming Score 5   Dressing Score 5   Bladder Score 10   Bowels Score 10   Toilet Use Score 5   Transfers (Bed/Chair) Score 10   Mobility (Level Surface) Score 10   Stairs Score 0   Barthel Index Score 65     Pt will benefit from continued OT services in order to maximize (I) c ADL performance, FM c no device, and improve overall endurance/strength required to complete functional tasks in preparation for d/c  Pt left seated in chair at end of session; all needs within reach; all lines intact; scds connected and turned on

## 2017-11-22 NOTE — PLAN OF CARE
Problem: Potential for Falls  Goal: Patient will remain free of falls  INTERVENTIONS:  - Assess patient frequently for physical needs  -  Identify cognitive and physical deficits and behaviors that affect risk of falls    -  [place] fall precautions as indicated by assessment   - Educate patient/family on patient safety including physical limitations  - Instruct patient to call for assistance with activity based on assessment  - Modify environment to reduce risk of injury  - Consider OT/PT consult to assist with strengthening/mobility   Outcome: Progressing      Problem: Prexisting or High Potential for Compromised Skin Integrity  Goal: Skin integrity is maintained or improved  INTERVENTIONS:  - Identify patients at risk for skin breakdown  - Assess and monitor skin integrity  - Assess and monitor nutrition and hydration status  - Monitor labs (i e  albumin)  - Assess for incontinence   - Turn and reposition patient  - Assist with mobility/ambulation  - Relieve pressure over bony prominences  - Avoid friction and shearing  - Provide appropriate hygiene as needed including keeping skin clean and dry  - Evaluate need for skin moisturizer/barrier cream  - Collaborate with interdisciplinary team (i e  Nutrition, Rehabilitation, etc )   - Patient/family teaching   Outcome: Progressing      Problem: PAIN - ADULT  Goal: Verbalizes/displays adequate comfort level or baseline comfort level  Interventions:  - Encourage patient to monitor pain and request assistance  - Assess pain using appropriate pain scale  - Administer analgesics based on type and severity of pain and evaluate response  - Implement non-pharmacological measures as appropriate and evaluate response  - Consider cultural and social influences on pain and pain management  - Notify physician/advanced practitioner if interventions unsuccessful or patient reports new pain   Outcome: Progressing      Problem: INFECTION - ADULT  Goal: Absence or prevention of progression during hospitalization  INTERVENTIONS:  - Assess and monitor for signs and symptoms of infection  - Monitor lab/diagnostic results  - Monitor all insertion sites, i e  indwelling lines, tubes, and drains  - Monitor endotracheal (as able) and nasal secretions for changes in amount and color  - [place] appropriate cooling/warming therapies per order  - Administer medications as ordered  - Instruct and encourage patient and family to use good hand hygiene technique  - Identify and instruct in appropriate isolation precautions for identified infection/condition   Outcome: Progressing      Problem: SAFETY ADULT  Goal: Maintain or return to baseline ADL function  INTERVENTIONS:  -  Assess patient's ability to carry out ADLs; assess patient's baseline for ADL function and identify physical deficits which impact ability to perform ADLs (bathing, care of mouth/teeth, toileting, grooming, dressing, etc )  - Assess/evaluate cause of self-care deficits   - Assess range of motion  - Assess patient's mobility; develop plan if impaired  - Assess patient's need for assistive devices and provide as appropriate  - Encourage maximum independence but intervene and supervise when necessary  ¯ Involve family in performance of ADLs  ¯ Assess for home care needs following discharge   ¯ Request OT consult to assist with ADL evaluation and planning for discharge  ¯ Provide patient education as appropriate   Outcome: Progressing    Goal: Maintain or return mobility status to optimal level  INTERVENTIONS:  - Assess patient's baseline mobility status (ambulation, transfers, stairs, etc )    - Identify cognitive and physical deficits and behaviors that affect mobility  - Identify mobility aids required to assist with transfers and/or ambulation (gait belt, sit-to-stand, lift, walker, cane, etc )  - [place] fall precautions as indicated by assessment  - Record patient progress and toleration of activity level on Mobility SBAR; progress patient to next Phase/Stage  - Instruct patient to call for assistance with activity based on assessment  - Request Rehabilitation consult to assist with strengthening/weightbearing, etc    Outcome: Progressing      Problem: DISCHARGE PLANNING  Goal: Discharge to home or other facility with appropriate resources  INTERVENTIONS:  - Identify barriers to discharge w/patient and caregiver  - Arrange for needed discharge resources and transportation as appropriate  - Identify discharge learning needs (meds, wound care, etc )  - Arrange for interpretive services to assist at discharge as needed  - Refer to Case Management Department for coordinating discharge planning if the patient needs post-hospital services based on physician/advanced practitioner order or complex needs related to functional status, cognitive ability, or social support system   Outcome: Progressing      Problem: Knowledge Deficit  Goal: Patient/family/caregiver demonstrates understanding of disease process, treatment plan, medications, and discharge instructions  Complete learning assessment and assess knowledge base    Interventions:  - Provide teaching at level of understanding  - Provide teaching via preferred learning methods   Outcome: Progressing

## 2017-11-23 PROBLEM — R10.9 FLANK PAIN: Status: RESOLVED | Noted: 2017-11-21 | Resolved: 2017-11-23

## 2017-11-23 LAB
BACTERIA UR CULT: ABNORMAL
GLUCOSE SERPL-MCNC: 212 MG/DL (ref 65–140)
GLUCOSE SERPL-MCNC: 216 MG/DL (ref 65–140)
GLUCOSE SERPL-MCNC: 237 MG/DL (ref 65–140)
GLUCOSE SERPL-MCNC: 292 MG/DL (ref 65–140)

## 2017-11-23 PROCEDURE — 94760 N-INVAS EAR/PLS OXIMETRY 1: CPT

## 2017-11-23 PROCEDURE — 82948 REAGENT STRIP/BLOOD GLUCOSE: CPT

## 2017-11-23 PROCEDURE — 94660 CPAP INITIATION&MGMT: CPT

## 2017-11-23 RX ORDER — METHYLPREDNISOLONE SODIUM SUCCINATE 40 MG/ML
40 INJECTION, POWDER, LYOPHILIZED, FOR SOLUTION INTRAMUSCULAR; INTRAVENOUS DAILY
Status: DISCONTINUED | OUTPATIENT
Start: 2017-11-24 | End: 2017-11-24 | Stop reason: HOSPADM

## 2017-11-23 RX ORDER — NYSTATIN 100000 U/G
1 CREAM TOPICAL 2 TIMES DAILY PRN
Status: DISCONTINUED | OUTPATIENT
Start: 2017-11-23 | End: 2017-11-24 | Stop reason: HOSPADM

## 2017-11-23 RX ADMIN — DOCUSATE SODIUM 100 MG: 100 CAPSULE, LIQUID FILLED ORAL at 17:29

## 2017-11-23 RX ADMIN — OXYCODONE HYDROCHLORIDE 5 MG: 5 TABLET ORAL at 17:29

## 2017-11-23 RX ADMIN — PANTOPRAZOLE SODIUM 40 MG: 40 TABLET, DELAYED RELEASE ORAL at 08:11

## 2017-11-23 RX ADMIN — DULOXETINE 20 MG: 20 CAPSULE, DELAYED RELEASE ORAL at 08:23

## 2017-11-23 RX ADMIN — ENOXAPARIN SODIUM 40 MG: 40 INJECTION SUBCUTANEOUS at 08:10

## 2017-11-23 RX ADMIN — FLUTICASONE PROPIONATE AND SALMETEROL 1 PUFF: 50; 250 POWDER RESPIRATORY (INHALATION) at 08:10

## 2017-11-23 RX ADMIN — LISINOPRIL 40 MG: 20 TABLET ORAL at 08:11

## 2017-11-23 RX ADMIN — METHYLPREDNISOLONE SODIUM SUCCINATE 40 MG: 40 INJECTION, POWDER, FOR SOLUTION INTRAMUSCULAR; INTRAVENOUS at 08:11

## 2017-11-23 RX ADMIN — METHOCARBAMOL 500 MG: 500 TABLET ORAL at 21:47

## 2017-11-23 RX ADMIN — DOCUSATE SODIUM 100 MG: 100 CAPSULE, LIQUID FILLED ORAL at 08:11

## 2017-11-23 RX ADMIN — INSULIN LISPRO 6 UNITS: 100 INJECTION, SOLUTION INTRAVENOUS; SUBCUTANEOUS at 15:27

## 2017-11-23 RX ADMIN — SITAGLIPTIN 100 MG: 100 TABLET, FILM COATED ORAL at 08:11

## 2017-11-23 RX ADMIN — CEFTRIAXONE 1000 MG: 1 INJECTION, POWDER, FOR SOLUTION INTRAMUSCULAR; INTRAVENOUS at 08:23

## 2017-11-23 RX ADMIN — LIDOCAINE 2 PATCH: 50 PATCH CUTANEOUS at 08:13

## 2017-11-23 RX ADMIN — FLUTICASONE PROPIONATE AND SALMETEROL 1 PUFF: 50; 250 POWDER RESPIRATORY (INHALATION) at 21:08

## 2017-11-23 RX ADMIN — TIOTROPIUM BROMIDE 18 MCG: 18 CAPSULE ORAL; RESPIRATORY (INHALATION) at 08:10

## 2017-11-23 RX ADMIN — INSULIN LISPRO 4 UNITS: 100 INJECTION, SOLUTION INTRAVENOUS; SUBCUTANEOUS at 13:35

## 2017-11-23 RX ADMIN — LEVOTHYROXINE SODIUM 150 MCG: 150 TABLET ORAL at 05:25

## 2017-11-23 RX ADMIN — FOLIC ACID 1 MG: 1 TABLET ORAL at 08:11

## 2017-11-23 RX ADMIN — OXYCODONE HYDROCHLORIDE 5 MG: 5 TABLET ORAL at 07:50

## 2017-11-23 RX ADMIN — FUROSEMIDE 40 MG: 40 TABLET ORAL at 08:11

## 2017-11-23 RX ADMIN — METHOCARBAMOL 500 MG: 500 TABLET ORAL at 15:24

## 2017-11-23 RX ADMIN — OXYCODONE HYDROCHLORIDE 5 MG: 5 TABLET ORAL at 12:18

## 2017-11-23 RX ADMIN — INSULIN LISPRO 4 UNITS: 100 INJECTION, SOLUTION INTRAVENOUS; SUBCUTANEOUS at 07:53

## 2017-11-23 RX ADMIN — INSULIN LISPRO 4 UNITS: 100 INJECTION, SOLUTION INTRAVENOUS; SUBCUTANEOUS at 21:08

## 2017-11-23 NOTE — PROGRESS NOTES
Progress Note - Manish Song 69 y o  female MRN: [id number]    Unit/Bed#: 420-01 Encounter: [id number]      Assessment:  Patient Active Problem List   Diagnosis    [facility]epsis ([facility] )    COPD with acute exacerbation ([place] [address] )    Type 2 diabetes mellitus with hyperglycemia ([place] [address] )    Hypothyroidism    Rheumatoid arthritis ([place] [address] )    Essential hypertension    Hyperlipidemia    GERD (gastroesophageal reflux disease)    Hypoalbuminemia    Hepatic steatosis    Morbid (severe) obesity due to excess calories ([facility])    BMI 40 0-44 9, adult ([facility])         Plan:  1) Intractable low back pain - Xray L-spine shows mild degenerative changes  Will continue lidoderm, oxycodone, IV steroids, robaxin  Warm compress as needed  Continue PT/OT  2) COPD - remains stable, continue respiratory protocol  3) Hypertension - not ideal control but improved  Pain and steroids may be the culprit  Needs outpatient follow up  4) Rheumatoid arthritis - stop PO prednisone due to IV steroids for back pain  5) Morbid obesity    Subjective:   Patient seen and examined  She is feeling much better today  Back pain improved with pain medication regimen  She denies chest pain or SOB  Objective:   Vitals: Blood pressure 157/90, pulse 81, temperature 97 7 °F (36 5 °C), temperature source Temporal, resp  rate 18, height 5' 5" (1 651 m), weight 117 kg (257 lb 11 5 oz), SpO2 95 %  ,Body mass index is 42 89 kg/m²    Weight (last 2 days)     Date/Time   Weight    11/23/17 0536  117 (257 72)    11/22/17 0552  116 (255 73)    11/22/17 0457  116 (256 84)    11/21/17 1300  120 (264 55)              Intake/Output Summary (Last 24 hours) at 11/23/17 1143  Last data filed at 11/23/17 1113   Gross per 24 hour   Intake             1080 ml   Output             3050 ml   Net            -1970 ml       Physical Exam:   General:  NAD, awake, alert, oriented x 3  HEENT:  NC/AT, MMM  Neck:  Supple, No JVD  CV:  + S1, +S2, RRR  Pulm:  Lung fields are CTA bilaterally, good effort  Abd: obese  Soft, nontender  Ext:  No edema of the BLE    MSK: tenderness to palpation of paravertebral musculature of the left lumbar spine       Scheduled Meds:  [START ON 11/24/2017] cefTRIAXone 1,000 mg Intravenous Q24H   docusate sodium 100 mg Oral BID   DULoxetine 20 mg Oral Daily   enoxaparin 40 mg Subcutaneous Daily   fluticasone-salmeterol 1 puff Inhalation C55R [facility]   folic acid 1 mg Oral Daily   furosemide 40 mg Oral Daily   insulin lispro 2-12 Units Subcutaneous TID AC   insulin lispro 2-12 Units Subcutaneous HS   levothyroxine 150 mcg Oral Early Morning   lidocaine 2 patch Transdermal Daily   lisinopril 40 mg Oral Daily   methylPREDNISolone sodium succinate 40 mg Intravenous Q12H [facility]   pantoprazole 40 mg Oral Daily   sitaGLIPtin 100 mg Oral Daily   tiotropium 18 mcg Inhalation Daily     Continuous Infusions:   PRN Meds:   acetaminophen    albuterol    calcium carbonate    methocarbamol    nystatin    ondansetron    oxyCODONE    polyethylene glycol      Invasive Devices     Peripheral Intravenous Line            Peripheral IV 11/22/17 Left Forearm 1 day                  Results from last 7 days  Lab Units 11/22/17  0454 11/20/17  2314   WBC Thousand/uL 5 26 8 43   HEMOGLOBIN g/dL 14 0 14 2   HEMATOCRIT % 43 4 44 7   PLATELETS Thousands/uL 252 293   NEUTROS PCT %  --  50   MONOS PCT %  --  12         Results from last 7 days  Lab Units 11/22/17  0454 11/20/17  2314   SODIUM mmol/L 139 137   POTASSIUM mmol/L 3 8 4 0   CHLORIDE mmol/L 103 102   CO2 mmol/L 30 30   BUN mg/dL 17 14   CREATININE mg/dL 0 62 0 73   CALCIUM mg/dL 9 2 9 5   TOTAL PROTEIN g/dL 6 7 7 8   BILIRUBIN TOTAL mg/dL 0 40 0 30   ALK PHOS U/L 70 78   ALT U/L 42 46   AST U/L 20 20   GLUCOSE RANDOM mg/dL 124 140       Lab Results   Component Value Date    CKTOTAL 50 11/04/2016    CKMBINDEX <1 06/05/2014    TROPONINI 0 02 11/04/2016       Lab Results   Component Value Date    INR 1 05 03/01/2017    INR 1 01 11/03/2016    INR 0 92 05/18/2015    PROTIME 13 4 03/01/2017    PROTIME 13 0 11/03/2016    PROTIME 12 1 05/18/2015               Lab, Imaging and other studies: I have personally reviewed pertinent reports      VTE Pharmacologic Prophylaxis: Enoxaparin (Lovenox)  VTE Mechanical Prophylaxis: sequential compression device

## 2017-11-24 VITALS
BODY MASS INDEX: 43.09 KG/M2 | OXYGEN SATURATION: 95 % | RESPIRATION RATE: 18 BRPM | DIASTOLIC BLOOD PRESSURE: 65 MMHG | HEIGHT: 65 IN | HEART RATE: 72 BPM | TEMPERATURE: 97.5 F | WEIGHT: 258.6 LBS | SYSTOLIC BLOOD PRESSURE: 147 MMHG

## 2017-11-24 PROBLEM — N30.00 ACUTE CYSTITIS: Status: ACTIVE | Noted: 2017-11-24

## 2017-11-24 PROBLEM — M54.59 INTRACTABLE LOW BACK PAIN: Status: ACTIVE | Noted: 2017-11-24

## 2017-11-24 LAB
ANION GAP SERPL CALCULATED.3IONS-SCNC: 5 MMOL/L (ref 4–13)
BASOPHILS # BLD AUTO: 0.03 THOUSANDS/ΜL (ref 0–0.1)
BASOPHILS NFR BLD AUTO: 0 % (ref 0–1)
BUN SERPL-MCNC: 26 MG/DL (ref 5–25)
CALCIUM SERPL-MCNC: 9.6 MG/DL (ref 8.3–10.1)
CHLORIDE SERPL-SCNC: 102 MMOL/L (ref 100–108)
CO2 SERPL-SCNC: 31 MMOL/L (ref 21–32)
CREAT SERPL-MCNC: 0.76 MG/DL (ref 0.6–1.3)
EOSINOPHIL # BLD AUTO: 0.09 THOUSAND/ΜL (ref 0–0.61)
EOSINOPHIL NFR BLD AUTO: 1 % (ref 0–6)
ERYTHROCYTE [DISTWIDTH] IN BLOOD BY AUTOMATED COUNT: 16.2 % (ref 11.6–15.1)
GFR SERPL CREATININE-BSD FRML MDRD: 80 ML/MIN/1.73SQ M
GLUCOSE SERPL-MCNC: 141 MG/DL (ref 65–140)
GLUCOSE SERPL-MCNC: 183 MG/DL (ref 65–140)
GLUCOSE SERPL-MCNC: 246 MG/DL (ref 65–140)
HCT VFR BLD AUTO: 41.4 % (ref 34.8–46.1)
HGB BLD-MCNC: 13.2 G/DL (ref 11.5–15.4)
LYMPHOCYTES # BLD AUTO: 2.74 THOUSANDS/ΜL (ref 0.6–4.47)
LYMPHOCYTES NFR BLD AUTO: 29 % (ref 14–44)
MCH RBC QN AUTO: 30.1 PG (ref 26.8–34.3)
MCHC RBC AUTO-ENTMCNC: 31.9 G/DL (ref 31.4–37.4)
MCV RBC AUTO: 94 FL (ref 82–98)
MONOCYTES # BLD AUTO: 1.36 THOUSAND/ΜL (ref 0.17–1.22)
MONOCYTES NFR BLD AUTO: 14 % (ref 4–12)
NEUTROPHILS # BLD AUTO: 5.37 THOUSANDS/ΜL (ref 1.85–7.62)
NEUTS SEG NFR BLD AUTO: 56 % (ref 43–75)
PLATELET # BLD AUTO: 285 THOUSANDS/UL (ref 149–390)
PMV BLD AUTO: 11 FL (ref 8.9–12.7)
POTASSIUM SERPL-SCNC: 3.8 MMOL/L (ref 3.5–5.3)
RBC # BLD AUTO: 4.39 MILLION/UL (ref 3.81–5.12)
SODIUM SERPL-SCNC: 138 MMOL/L (ref 136–145)
WBC # BLD AUTO: 9.59 THOUSAND/UL (ref 4.31–10.16)

## 2017-11-24 PROCEDURE — 82948 REAGENT STRIP/BLOOD GLUCOSE: CPT

## 2017-11-24 PROCEDURE — 94760 N-INVAS EAR/PLS OXIMETRY 1: CPT

## 2017-11-24 PROCEDURE — 85025 COMPLETE CBC W/AUTO DIFF WBC: CPT | Performed by: PHYSICIAN ASSISTANT

## 2017-11-24 PROCEDURE — 80048 BASIC METABOLIC PNL TOTAL CA: CPT | Performed by: PHYSICIAN ASSISTANT

## 2017-11-24 PROCEDURE — 97535 SELF CARE MNGMENT TRAINING: CPT

## 2017-11-24 RX ORDER — CEPHALEXIN 500 MG/1
500 CAPSULE ORAL EVERY 12 HOURS SCHEDULED
Qty: 6 CAPSULE | Refills: 0 | Status: SHIPPED | OUTPATIENT
Start: 2017-11-24 | End: 2017-11-27

## 2017-11-24 RX ORDER — OXYCODONE HYDROCHLORIDE 5 MG/1
5 TABLET ORAL EVERY 4 HOURS PRN
Qty: 20 TABLET | Refills: 0 | Status: SHIPPED | OUTPATIENT
Start: 2017-11-24 | End: 2017-12-04

## 2017-11-24 RX ORDER — DOCUSATE SODIUM 100 MG/1
100 CAPSULE, LIQUID FILLED ORAL 2 TIMES DAILY
Qty: 10 CAPSULE | Refills: 0 | Status: SHIPPED | OUTPATIENT
Start: 2017-11-24 | End: 2018-09-24 | Stop reason: ALTCHOICE

## 2017-11-24 RX ORDER — OXYCODONE HYDROCHLORIDE 5 MG/1
5 TABLET ORAL ONCE
Status: COMPLETED | OUTPATIENT
Start: 2017-11-24 | End: 2017-11-24

## 2017-11-24 RX ORDER — LIDOCAINE 50 MG/G
2 PATCH TOPICAL DAILY
Qty: 30 PATCH | Refills: 0 | Status: SHIPPED | OUTPATIENT
Start: 2017-11-25 | End: 2018-05-21

## 2017-11-24 RX ORDER — METHOCARBAMOL 500 MG/1
500 TABLET, FILM COATED ORAL EVERY 6 HOURS PRN
Qty: 10 TABLET | Refills: 0 | Status: SHIPPED | OUTPATIENT
Start: 2017-11-24 | End: 2018-09-24 | Stop reason: ALTCHOICE

## 2017-11-24 RX ADMIN — OXYCODONE HYDROCHLORIDE 5 MG: 5 TABLET ORAL at 00:24

## 2017-11-24 RX ADMIN — OXYCODONE HYDROCHLORIDE 5 MG: 5 TABLET ORAL at 05:20

## 2017-11-24 RX ADMIN — SITAGLIPTIN 100 MG: 100 TABLET, FILM COATED ORAL at 08:37

## 2017-11-24 RX ADMIN — DULOXETINE 20 MG: 20 CAPSULE, DELAYED RELEASE ORAL at 09:05

## 2017-11-24 RX ADMIN — LEVOTHYROXINE SODIUM 150 MCG: 150 TABLET ORAL at 05:20

## 2017-11-24 RX ADMIN — POLYETHYLENE GLYCOL 3350 17 G: 17 POWDER, FOR SOLUTION ORAL at 08:53

## 2017-11-24 RX ADMIN — FUROSEMIDE 40 MG: 40 TABLET ORAL at 08:37

## 2017-11-24 RX ADMIN — FLUTICASONE PROPIONATE AND SALMETEROL 1 PUFF: 50; 250 POWDER RESPIRATORY (INHALATION) at 08:46

## 2017-11-24 RX ADMIN — METHOCARBAMOL 500 MG: 500 TABLET ORAL at 08:52

## 2017-11-24 RX ADMIN — OXYCODONE HYDROCHLORIDE 5 MG: 5 TABLET ORAL at 11:48

## 2017-11-24 RX ADMIN — TIOTROPIUM BROMIDE 18 MCG: 18 CAPSULE ORAL; RESPIRATORY (INHALATION) at 08:47

## 2017-11-24 RX ADMIN — OXYCODONE HYDROCHLORIDE 5 MG: 5 TABLET ORAL at 14:08

## 2017-11-24 RX ADMIN — PANTOPRAZOLE SODIUM 40 MG: 40 TABLET, DELAYED RELEASE ORAL at 08:37

## 2017-11-24 RX ADMIN — METHYLPREDNISOLONE SODIUM SUCCINATE 40 MG: 40 INJECTION, POWDER, FOR SOLUTION INTRAMUSCULAR; INTRAVENOUS at 08:37

## 2017-11-24 RX ADMIN — INSULIN LISPRO 4 UNITS: 100 INJECTION, SOLUTION INTRAVENOUS; SUBCUTANEOUS at 11:48

## 2017-11-24 RX ADMIN — LIDOCAINE 2 PATCH: 50 PATCH CUTANEOUS at 08:36

## 2017-11-24 RX ADMIN — POLYETHYLENE GLYCOL 3350 17 G: 17 POWDER, FOR SOLUTION ORAL at 00:30

## 2017-11-24 RX ADMIN — OXYCODONE HYDROCHLORIDE 5 MG: 5 TABLET ORAL at 09:52

## 2017-11-24 RX ADMIN — DOCUSATE SODIUM 100 MG: 100 CAPSULE, LIQUID FILLED ORAL at 08:37

## 2017-11-24 RX ADMIN — ENOXAPARIN SODIUM 40 MG: 40 INJECTION SUBCUTANEOUS at 08:37

## 2017-11-24 RX ADMIN — CEFTRIAXONE 1000 MG: 1 INJECTION, SOLUTION INTRAVENOUS at 08:36

## 2017-11-24 RX ADMIN — LISINOPRIL 40 MG: 20 TABLET ORAL at 08:37

## 2017-11-24 RX ADMIN — FOLIC ACID 1 MG: 1 TABLET ORAL at 08:37

## 2017-11-24 NOTE — PROGRESS NOTES
Patient was experiencing pain due to muscle spams from lower right back  Patient was turned on her left side, warm compress was applied  Patient stated that interventions helped relieve some pain  Will continue to monitor

## 2017-11-24 NOTE — PROGRESS NOTES
S:  Pt continues with some LBP but is being discharged  O:  Pain Left para spinal musculature  A: LLBP  P: Pt ready for   Continue meds as outpatient and follow up with spine and pain center ASAP    Outpatient PT

## 2017-11-24 NOTE — DISCHARGE SUMMARY
Discharge Summary - [address] [facility]    Patient Information: Yonis Daniel 69 y o  female MRN: [id number]  Unit/Bed#: 420-01 Encounter: [id number]    Discharging Physician / Practitioner: Lotus uQintana PA-C  PCP: Regine Sotomayor MD  Admission Date: 11/20/2017  Discharge Date: 11/24/17    Reason for Admission: intractable low back pain    Discharge Diagnoses:     Principal Problem:    Intractable low back pain  Active Problems:    Type 2 diabetes mellitus with hyperglycemia (HCC)    Rheumatoid arthritis ([facility] )    Essential hypertension    Morbid (severe) obesity due to excess calories ([facility] )    Acute cystitis  Resolved Problems:    Flank pain      Consultations During Hospital Stay:  · Orthopedic surgery    Procedures Performed:     · CT abdomen pelvis: Partially visualized left pleural effusion  No radiopaque urinary tract calculi or obstructive uropathy  Small probable simple cysts left kidney   This could be followed up with nonemergent renal sonogram   No acute intra-abdominal or intrapelvic process insofar as can be detected on a noncontrast CT  Normal appendix  Cholecystectomy  Hepatic steatosis  Findings suggestive of mild chronic constipation/bowel dysmotility  · Chest x-ray: Small left pleural effusion  · X-ray lumbar spine: No acute bony abnormality, lumbar spine  Mild degenerative changes, lower lumbar spine  Significant Findings / Test Results:     · UA: positive for nitrites, leukocytes, and bacteria    Incidental Findings:   · none     Test Results Pending at Discharge (will require follow up):   · none     Outpatient Tests Requested:  · MRI thoracic and lumbar spine: the patient will need to have this ordered and followed up by her PCP    Complications:  none    Hospital Course:     Yonis Daniel is a 69 y o  female patient who originally presented to the hospital on 11/20/2017 due to back pain  She states that it started 2-3 days ago and she thought that her RA was acting up  She says that she couldn't move but the pain increased to the point that it was taking her breath away  She said it is more on her right side than her left side but she can feel it pull across her back  She denies fevers, chills or night sweats  She takes water pills and didn't notice any increase in frequency  She denies dysuria  She has been having increased urgency over a long period of time  She has a little bit of urge incontinence now  She has been having trouble with constipation  She feels like she needs to have a BM and then she would get a small amount out and then couldn't go anymore  These bouts seem to correspond to the pain  She says that her RA never acted up like this  She has had rib pain from the RA before  She says it is under control with her [place] Roof  The pain that she is getting is in waves and is a grabbing pain  It doesn't radiate out of her back  She says that her appetite is good and food doesn't affect the pain  She did try to eat spinach last night to loosen her bowels and normally it would have worked by now but this time it hasn't done anything to help loosen her bowels  She has been gassy and her hemorrhoids are acting up  On admission an MRI of the thoracic and lumbar spine was ordered however this was unable to be completed due to the patient's body habitus  Orthopedic surgery was consulted felt no surgical intervention was needed and recommended pain control, PT/OT, and follow up with pain management  X-ray lumbar spine showed no acute bony abnormality, lumbar spine  Mild degenerative changes, lower lumbar spine  The patient's pain slowly improved and physical therapy felt she was safe to be discharged home with home PT  The patient was instructed to follow up with her PCP regarding having outpatient open MRI ordered and management regarding findings  She will also need to follow up with Pain management      She was noted to have a urinary tract infection on admission and started on IV rocephin  Based on urine culture she was discharged home on Keflex for an addition 3 days to complete a 7 day course of antibiotics  Condition at Discharge: good     Discharge Day Visit / Exam:     * Please refer to separate progress note for these details *        Discharge instructions/Information to patient and family:   See after visit summary for information provided to patient and family  Provisions for Follow-Up Care:  See after visit summary for information related to follow-up care and any pertinent home health orders  Disposition:     Home with [facility] Services (Reminder: Complete face to face encounter)      Planned Readmission: no    Discharge Statement:  I spent 35 minutes discharging the patient  This time was spent on the day of discharge  I had direct contact with the patient on the day of discharge  Greater than 50% of the total time was spent examining patient, answering all patient questions, arranging and discussing plan of care with patient as well as directly providing post-discharge instructions  Additional time then spent on discharge activities  Discharge Medications:  See after visit summary for reconciled discharge medications provided to patient and family  ** Please Note: This note has been constructed using a voice recognition system   **

## 2017-11-24 NOTE — PLAN OF CARE
Problem: Potential for Falls  Goal: Patient will remain free of falls  INTERVENTIONS:  - Assess patient frequently for physical needs  -  Identify cognitive and physical deficits and behaviors that affect risk of falls    -  G[place]ard fall precautions as indicated by assessment   - Educate patient/family on patient safety including physical limitations  - Instruct patient to call for assistance with activity based on assessment  - Modify environment to reduce risk of injury  - Consider OT/PT consult to assist with strengthening/mobility   Outcome: Adequate for Discharge      Problem: Prexisting or High Potential for Compromised Skin Integrity  Goal: Skin integrity is maintained or improved  INTERVENTIONS:  - Identify patients at risk for skin breakdown  - Assess and monitor skin integrity  - Assess and monitor nutrition and hydration status  - Monitor labs (i e  albumin)  - Assess for incontinence   - Turn and reposition patient  - Assist with mobility/ambulation  - Relieve pressure over bony prominences  - Avoid friction and shearing  - Provide appropriate hygiene as needed including keeping skin clean and dry  - Evaluate need for skin moisturizer/barrier cream  - Collaborate with interdisciplinary team (i e  Nutrition, Rehabilitation, etc )   - Patient/family teaching   Outcome: Adequate for Discharge      Problem: PAIN - ADULT  Goal: Verbalizes/displays adequate comfort level or baseline comfort level  Interventions:  - Encourage patient to monitor pain and request assistance  - Assess pain using appropriate pain scale  - Administer analgesics based on type and severity of pain and evaluate response  - Implement non-pharmacological measures as appropriate and evaluate response  - Consider cultural and social influences on pain and pain management  - Notify physician/advanced practitioner if interventions unsuccessful or patient reports new pain   Outcome: Adequate for Discharge      Problem: INFECTION - ADULT  Goal: Absence or prevention of progression during hospitalization  INTERVENTIONS:  - Assess and monitor for signs and symptoms of infection  - Monitor lab/diagnostic results  - Monitor all insertion sites, i e  indwelling lines, tubes, and drains  - Monitor endotracheal (as able) and nasal secretions for changes in amount and color  - [place] appropriate cooling/warming therapies per order  - Administer medications as ordered  - Instruct and encourage patient and family to use good hand hygiene technique  - Identify and instruct in appropriate isolation precautions for identified infection/condition   Outcome: Adequate for Discharge      Problem: SAFETY ADULT  Goal: Maintain or return to baseline ADL function  INTERVENTIONS:  -  Assess patient's ability to carry out ADLs; assess patient's baseline for ADL function and identify physical deficits which impact ability to perform ADLs (bathing, care of mouth/teeth, toileting, grooming, dressing, etc )  - Assess/evaluate cause of self-care deficits   - Assess range of motion  - Assess patient's mobility; develop plan if impaired  - Assess patient's need for assistive devices and provide as appropriate  - Encourage maximum independence but intervene and supervise when necessary  ¯ Involve family in performance of ADLs  ¯ Assess for home care needs following discharge   ¯ Request OT consult to assist with ADL evaluation and planning for discharge  ¯ Provide patient education as appropriate   Outcome: Adequate for Discharge    Goal: Maintain or return mobility status to optimal level  INTERVENTIONS:  - Assess patient's baseline mobility status (ambulation, transfers, stairs, etc )    - Identify cognitive and physical deficits and behaviors that affect mobility  - Identify mobility aids required to assist with transfers and/or ambulation (gait belt, sit-to-stand, lift, walker, cane, etc )  - [place] fall precautions as indicated by assessment  - Record patient progress and toleration of activity level on Mobility SBAR; progress patient to next Phase/Stage  - Instruct patient to call for assistance with activity based on assessment  - Request Rehabilitation consult to assist with strengthening/weightbearing, etc    Outcome: Adequate for Discharge      Problem: DISCHARGE PLANNING  Goal: Discharge to home or other facility with appropriate resources  INTERVENTIONS:  - Identify barriers to discharge w/patient and caregiver  - Arrange for needed discharge resources and transportation as appropriate  - Identify discharge learning needs (meds, wound care, etc )  - Arrange for interpretive services to assist at discharge as needed  - Refer to Case Management Department for coordinating discharge planning if the patient needs post-hospital services based on physician/advanced practitioner order or complex needs related to functional status, cognitive ability, or social support system   Outcome: Adequate for Discharge      Problem: Knowledge Deficit  Goal: Patient/family/caregiver demonstrates understanding of disease process, treatment plan, medications, and discharge instructions  Complete learning assessment and assess knowledge base    Interventions:  - Provide teaching at level of understanding  - Provide teaching via preferred learning methods   Outcome: Adequate for Discharge      Problem: DISCHARGE PLANNING - CARE MANAGEMENT  Goal: Discharge to post-acute care or home with appropriate resources  INTERVENTIONS:  - Conduct assessment to determine patient/family and health care team treatment goals, and need for post-acute services based on payer coverage, community resources, and patient preferences, and barriers to discharge  - Address psychosocial, clinical, and financial barriers to discharge as identified in assessment in conjunction with the patient/family and health care team  - Arrange appropriate level of post-acute services according to patients   needs and preference and payer coverage in collaboration with the physician and health care team  - Communicate with and update the patient/family, physician, and health care team regarding progress on the discharge plan  - Arrange appropriate transportation to post-acute venues   Outcome: Adequate for Discharge      Problem: Nutrition/Hydration-ADULT  Goal: Nutrient/Hydration intake appropriate for improving, restoring or maintaining nutritional needs  Monitor and assess patient's nutrition/hydration status for malnutrition (ex- brittle hair, bruises, dry skin, pale skin and conjunctiva, muscle wasting, smooth red tongue, and disorientation)  Collaborate with interdisciplinary team and initiate plan and interventions as ordered  Monitor patient's weight and dietary intake as ordered or per policy  Utilize nutrition screening tool and intervene per policy  Determine patient's food preferences and provide high-protein, high-caloric foods as appropriate       INTERVENTIONS:  - Monitor oral intake, urinary output, labs, and treatment plans  - Assess nutrition and hydration status and recommend course of action  - Evaluate amount of meals eaten  - Assist patient with eating if necessary   - Allow adequate time for meals  - Recommend/ encourage appropriate diets, oral nutritional supplements, and vitamin/mineral supplements  - Order, calculate, and assess calorie counts as needed  - Recommend, monitor, and adjust tube feedings and TPN/PPN based on assessed needs  - Assess need for intravenous fluids  - Provide specific nutrition/hydration education as appropriate  - Include patient/family/caregiver in decisions related to nutrition   Outcome: Adequate for Discharge

## 2017-11-24 NOTE — OCCUPATIONAL THERAPY NOTE
OT Note     11/24/17 1006   Restrictions/Precautions   Other Precautions Fall Risk;Pain; Chair Alarm   ADL   Where Assessed Edge of bed   Grooming Assistance (Reports completion prior to OT)   UB Bathing Assistance 5  Supervision/Setup   UB Bathing Comments A with back   LB Bathing Assistance 5  Supervision/Setup   LB Bathing Deficit Right lower leg including foot; Left lower leg including foot   LB Bathing Comments S santhosh in stance, A with buttocks   UB Dressing Assistance 5  Supervision/Setup   UB Dressing Comments A with fasteners   LB Dressing Assistance (Declines non skids)   Bed Mobility   Supine to Sit 4  Minimal assistance   Additional items Increased time required;LE management;Verbal cues; Assist x 1   Transfers   Sit to Stand 4  Minimal assistance   Additional items Verbal cues; Increased time required   Stand to Sit 4  Minimal assistance   Additional items Armrests; Verbal cues   Functional Mobility   Functional Mobility 5  Supervision   Additional Comments Completes approx  180' func mobility, no LOB; moves slowly secondary pain   Additional items Rolling walker   Activity Tolerance   Activity Tolerance Patient limited by pain   Assessment   Assessment Presents supine  Agreeable to participate  Completes func mobility and a m  self care as above  OOB to recliner at bedside following tx session  SCDs applied and turned on, chair alarm activated  Call bell and phone in reach     Recommendation   Recommendation (Continue OT services )

## 2017-11-26 LAB
BACTERIA BLD CULT: NORMAL
BACTERIA BLD CULT: NORMAL

## 2017-11-29 ENCOUNTER — ALLSCRIPTS OFFICE VISIT (OUTPATIENT)
Dept: OTHER | Facility: OTHER | Age: 67
End: 2017-11-29

## 2017-11-29 ENCOUNTER — GENERIC CONVERSION - ENCOUNTER (OUTPATIENT)
Dept: OTHER | Facility: OTHER | Age: 67
End: 2017-11-29

## 2017-11-29 DIAGNOSIS — E78.00 PURE HYPERCHOLESTEROLEMIA: ICD-10-CM

## 2017-11-29 DIAGNOSIS — I10 ESSENTIAL (PRIMARY) HYPERTENSION: ICD-10-CM

## 2017-11-29 DIAGNOSIS — M06.9 RHEUMATOID ARTHRITIS (HCC): ICD-10-CM

## 2017-11-29 DIAGNOSIS — E53.8 DEFICIENCY OF OTHER SPECIFIED B GROUP VITAMINS (CODE): ICD-10-CM

## 2017-11-29 DIAGNOSIS — E55.9 VITAMIN D DEFICIENCY: ICD-10-CM

## 2017-11-29 DIAGNOSIS — E11.9 TYPE 2 DIABETES MELLITUS WITHOUT COMPLICATIONS (HCC): ICD-10-CM

## 2017-11-29 DIAGNOSIS — R22.31 LOCALIZED SWELLING, MASS AND LUMP, RIGHT UPPER LIMB: ICD-10-CM

## 2017-11-29 DIAGNOSIS — E03.9 HYPOTHYROIDISM: ICD-10-CM

## 2017-11-29 DIAGNOSIS — N28.1 ACQUIRED CYST OF KIDNEY: ICD-10-CM

## 2017-12-04 ENCOUNTER — HOSPITAL ENCOUNTER (OUTPATIENT)
Dept: ULTRASOUND IMAGING | Facility: HOSPITAL | Age: 67
Discharge: HOME | End: 2017-12-04
Payer: MEDICARE

## 2017-12-04 DIAGNOSIS — N28.1 ACQUIRED CYST OF KIDNEY: ICD-10-CM

## 2017-12-04 PROCEDURE — 76770 US EXAM ABDO BACK WALL COMP: CPT

## 2017-12-06 ENCOUNTER — GENERIC CONVERSION - ENCOUNTER (OUTPATIENT)
Dept: OTHER | Facility: OTHER | Age: 67
End: 2017-12-06

## 2017-12-08 ENCOUNTER — LAB REQUISITION (OUTPATIENT)
Dept: LAB | Facility: HOSPITAL | Age: 67
End: 2017-12-08
Payer: MEDICARE

## 2017-12-08 DIAGNOSIS — N10 ACUTE TUBULO-INTERSTITIAL NEPHRITIS: ICD-10-CM

## 2017-12-08 LAB
BACTERIA UR QL AUTO: ABNORMAL /HPF
BILIRUB UR QL STRIP: NEGATIVE
CLARITY UR: CLEAR
COLOR UR: YELLOW
GLUCOSE UR STRIP-MCNC: ABNORMAL MG/DL
HGB UR QL STRIP.AUTO: NEGATIVE
KETONES UR STRIP-MCNC: NEGATIVE MG/DL
LEUKOCYTE ESTERASE UR QL STRIP: ABNORMAL
NITRITE UR QL STRIP: NEGATIVE
NON-SQ EPI CELLS URNS QL MICRO: ABNORMAL /HPF
PH UR STRIP.AUTO: 5.5 [PH] (ref 4.5–8)
PROT UR STRIP-MCNC: NEGATIVE MG/DL
RBC #/AREA URNS AUTO: ABNORMAL /HPF
SP GR UR STRIP.AUTO: 1.01 (ref 1–1.03)
UROBILINOGEN UR QL STRIP.AUTO: 0.2 E.U./DL
WBC #/AREA URNS AUTO: ABNORMAL /HPF

## 2017-12-08 PROCEDURE — 87086 URINE CULTURE/COLONY COUNT: CPT | Performed by: FAMILY MEDICINE

## 2017-12-08 PROCEDURE — 81001 URINALYSIS AUTO W/SCOPE: CPT | Performed by: FAMILY MEDICINE

## 2017-12-09 LAB — BACTERIA UR CULT: NORMAL

## 2017-12-10 ENCOUNTER — GENERIC CONVERSION - ENCOUNTER (OUTPATIENT)
Dept: OTHER | Facility: OTHER | Age: 67
End: 2017-12-10

## 2017-12-13 ENCOUNTER — GENERIC CONVERSION - ENCOUNTER (OUTPATIENT)
Dept: OTHER | Facility: OTHER | Age: 67
End: 2017-12-13

## 2017-12-13 ENCOUNTER — HOSPITAL ENCOUNTER (OUTPATIENT)
Dept: ULTRASOUND IMAGING | Facility: HOSPITAL | Age: 67
Discharge: HOME | End: 2017-12-13
Payer: MEDICARE

## 2017-12-13 DIAGNOSIS — R22.31 LOCALIZED SWELLING, MASS AND LUMP, RIGHT UPPER LIMB: ICD-10-CM

## 2017-12-13 PROCEDURE — 93971 EXTREMITY STUDY: CPT

## 2017-12-18 ENCOUNTER — GENERIC CONVERSION - ENCOUNTER (OUTPATIENT)
Dept: OTHER | Facility: OTHER | Age: 67
End: 2017-12-18

## 2017-12-27 ENCOUNTER — LAB REQUISITION (OUTPATIENT)
Dept: LAB | Facility: HOSPITAL | Age: 67
End: 2017-12-27
Payer: MEDICARE

## 2017-12-27 DIAGNOSIS — J44.1 CHRONIC OBSTRUCTIVE PULMONARY DISEASE WITH ACUTE EXACERBATION (HCC): ICD-10-CM

## 2017-12-27 LAB
ALBUMIN SERPL BCP-MCNC: 3.3 G/DL (ref 3.5–5)
ALP SERPL-CCNC: 90 U/L (ref 46–116)
ALT SERPL W P-5'-P-CCNC: 44 U/L (ref 12–78)
ANION GAP SERPL CALCULATED.3IONS-SCNC: 8 MMOL/L (ref 4–13)
AST SERPL W P-5'-P-CCNC: 22 U/L (ref 5–45)
BILIRUB SERPL-MCNC: 0.4 MG/DL (ref 0.2–1)
BUN SERPL-MCNC: 15 MG/DL (ref 5–25)
CALCIUM SERPL-MCNC: 9.1 MG/DL (ref 8.3–10.1)
CHLORIDE SERPL-SCNC: 103 MMOL/L (ref 100–108)
CHOLEST SERPL-MCNC: 194 MG/DL (ref 50–200)
CO2 SERPL-SCNC: 29 MMOL/L (ref 21–32)
CREAT SERPL-MCNC: 0.62 MG/DL (ref 0.6–1.3)
GFR SERPL CREATININE-BSD FRML MDRD: 92 ML/MIN/1.73SQ M
GLUCOSE P FAST SERPL-MCNC: 103 MG/DL (ref 65–99)
HDLC SERPL-MCNC: 65 MG/DL (ref 40–60)
LDLC SERPL CALC-MCNC: 105 MG/DL (ref 0–100)
POTASSIUM SERPL-SCNC: 4.2 MMOL/L (ref 3.5–5.3)
PROT SERPL-MCNC: 6.6 G/DL (ref 6.4–8.2)
SODIUM SERPL-SCNC: 140 MMOL/L (ref 136–145)
TRIGL SERPL-MCNC: 121 MG/DL
TSH SERPL DL<=0.05 MIU/L-ACNC: 0.02 UIU/ML (ref 0.36–3.74)

## 2017-12-27 PROCEDURE — 84443 ASSAY THYROID STIM HORMONE: CPT | Performed by: FAMILY MEDICINE

## 2017-12-27 PROCEDURE — 80053 COMPREHEN METABOLIC PANEL: CPT | Performed by: FAMILY MEDICINE

## 2017-12-27 PROCEDURE — 80061 LIPID PANEL: CPT | Performed by: FAMILY MEDICINE

## 2018-01-02 ENCOUNTER — LAB REQUISITION (OUTPATIENT)
Dept: LAB | Facility: HOSPITAL | Age: 68
End: 2018-01-02
Payer: MEDICARE

## 2018-01-02 DIAGNOSIS — E11.9 TYPE 2 DIABETES MELLITUS WITHOUT COMPLICATIONS (HCC): ICD-10-CM

## 2018-01-02 LAB
BASOPHILS # BLD AUTO: 0.04 THOUSANDS/ΜL (ref 0–0.1)
BASOPHILS NFR BLD AUTO: 1 % (ref 0–1)
EOSINOPHIL # BLD AUTO: 0.16 THOUSAND/ΜL (ref 0–0.61)
EOSINOPHIL NFR BLD AUTO: 3 % (ref 0–6)
ERYTHROCYTE [DISTWIDTH] IN BLOOD BY AUTOMATED COUNT: 15.9 % (ref 11.6–15.1)
HCT VFR BLD AUTO: 43.6 % (ref 34.8–46.1)
HGB BLD-MCNC: 14.2 G/DL (ref 11.5–15.4)
LYMPHOCYTES # BLD AUTO: 2.91 THOUSANDS/ΜL (ref 0.6–4.47)
LYMPHOCYTES NFR BLD AUTO: 46 % (ref 14–44)
MCH RBC QN AUTO: 30.5 PG (ref 26.8–34.3)
MCHC RBC AUTO-ENTMCNC: 32.6 G/DL (ref 31.4–37.4)
MCV RBC AUTO: 94 FL (ref 82–98)
MONOCYTES # BLD AUTO: 0.81 THOUSAND/ΜL (ref 0.17–1.22)
MONOCYTES NFR BLD AUTO: 13 % (ref 4–12)
NEUTROPHILS # BLD AUTO: 2.26 THOUSANDS/ΜL (ref 1.85–7.62)
NEUTS SEG NFR BLD AUTO: 37 % (ref 43–75)
PLATELET # BLD AUTO: 284 THOUSANDS/UL (ref 149–390)
PMV BLD AUTO: 11.6 FL (ref 8.9–12.7)
RBC # BLD AUTO: 4.65 MILLION/UL (ref 3.81–5.12)
WBC # BLD AUTO: 6.18 THOUSAND/UL (ref 4.31–10.16)

## 2018-01-02 PROCEDURE — 85025 COMPLETE CBC W/AUTO DIFF WBC: CPT | Performed by: FAMILY MEDICINE

## 2018-01-11 NOTE — RESULT NOTES
Verified Results  * DXA BONE DENSITY SPINE HIP AND PELVIS 10Aug2016 09:59AM Aida Kerns Order Number: [id number]    - Patient Instructions: To schedule this appointment, please contact Central Scheduling at [phone]   Order Number: [id number]    - Patient Instructions: To schedule this appointment, please contact Central Scheduling at [phone]  Test Name Result Flag Reference   DXA BONE DENSITY SPINE HIP AND PELVIS (Report)     CENTRAL DXA SCAN     CLINICAL HISTORY:  74year old post-menopausal  female risk factors include family history of hip fracture  Smoking  Steroid use  COPD and rheumatoid arthritis  Hyperthyroidism  Cervical carcinoma  TECHNIQUE: Bone densitometry was performed using a Holo[employer] Discovery A bone densitometer  Regions of interest appear properly placed  There are no obvious fractures or other confounding variables which could limit the study  Degenerative changes of    the lumbar spine and hip  This will falsely elevate the bone mineral densities in these regions  COMPARISON: April 16, 2014     RESULTS:    LUMBAR SPINE: L1-L4:   BMD 0 684 gm/cm2   T-score -3 3   Z-score -1 3     LEFT TOTAL HIP:   BMD 0 645 gm/cm2   T-score -2 4   Z-score -1 0     LEFT FEMORAL NECK:   BMD 0 504 gm/cm2   T-score -3 1   Z-score -1 4             IMPRESSION:   1  Based on the [facility] classification, the T-score of -3 3 in the lumbar spine is consistent with osteoporosis  2  When compared to the prior examination, there has been a 2 % DECREASE in the total bone mineral density of the lumbar spine and a 9 % DECREASE in the total bone mineral density of the left hip  3  According to the [address] [place] [address], prescription therapy is recommended with a T-score of -2 5 or less in the spine or hip after appropriate evaluation to exclude secondary causes     4  A daily intake of at least 1200 mg Calcium and 800 to 1000 IU of Vitamin D, as well as weight bearing and muscle strengthening exercise, fall prevention and avoidance of tobacco and excessive alcohol intake as basic preventive measures are    suggested  5  Repeat DXA scan in 18-24 months as clinically indicated  WHO CLASSIFICATION:   Normal (a T-score of -1 0 or higher)   Low bone mineral density (a T-score of less than -1 0 but higher than -2 5)   Osteoporosis (a T-score of -2 5 or less)   Severe osteoporosis (a T-score of -2 5 or less with a fragility fracture)             Workstation performed: [id number]     Signed by:   Archana Thornton DO   8/10/16     * MAMMO SCREENING BILATERAL W CAD [id number] 09:58AM David Gaviria Order Number: [id number]   TW Order Number: [id number]   TW Order Number: [id number]    - Patient Instructions: To schedule this appointment, please contact Central Scheduling at [phone]  Do not wear any perfume, powder, lotion or deodorant on breast or underarm area  Please bring your doctors order, referral (if needed) and insurance information with you on the day of the test  Failure to bring this information may result in this test being rescheduled  Arrive 15 minutes prior to your appointment time to register  On the day of your test, please bring any prior mammogram or breast studies with you that were not performed at a [facility]  Failure to bring prior exams may result in your test needing to be rescheduled  Test Name Result Flag Reference   MAMMO SCREENING BILATERAL W CAD (Report)     Patient History:   Patient has history of cervical cancer at age 26  Family history of cancer in daughter  Reason for exam: screening (asymptomatic)  Screening     Mammo Screening Bilateral W CAD: August 10, 2016 - Check In #:    [de-identified]   Bilateral MLO and CC view(s) were taken       Technologist: FELICITAS Martinez (FELICITAS)(M)   Prior study comparison: April 16, 2014, bilateral digital    screening mammogram performed at [place] [address] [facility]  October 19, 2012, bilateral digital screening mammogram    performed at [address] [place] [address] [address]  December 29, 2004, bilateral mammogram, performed at [facility]      The breast tissue is almost entirely fat  There is a    circumscribed faint 1 cm nodular density in the outer inferior    right breast at approximately the 8 o'clock position, 4 cm from    the nipple  Further evaluation with ultrasound is recommended to   ensure this is nothing more then a simple cyst      The parenchymal pattern is otherwise stable  No dominant soft    tissue mass, architectural distortion or suspicious    calcifications are noted in either breast  The skin and nipple    contours are within normal limits  1  Outer inferior right breast 1 cm nodule  Further evaluation    with ultrasound is recommended  2  Stable left breast mammogram      ASSESSMENT: BiRad:0 - Incomplete: needs additional imaging    evaluation - Right     Recommendation:   Further imaging of the right breast    A breast health care nurse from our facility will be contacting    the patient regarding the need for additional imaging  Analyzed by CAD     8-10% of cancers will be missed on mammography  Management of a    palpable abnormality must be based on clinical grounds  Patients   will be notified of their results via letter from our facility  Accredited by [employer] of [employer] and FDA       Transcription Location: [facility] 98: [id number]     Risk Value(s):   Tyrer-Cuzick 10 Year: 2 143%, Tyrer-Cuzick Lifetime: 3 897%,    Myriad Table: 1 5%, SALVATORE 5 Year: 1 4%, NCI Lifetime: 4 6%   Signed by:   Masha Resendiz MD   8/10/16

## 2018-01-11 NOTE — PROGRESS NOTES
History of Present Illness  Care Coordination Encounter Information:   Type of Encounter: Telephonic   Contact: Follow-Up        Care Coordination  Nurse Riddhi Shirley:   The reason for call is to discuss outreach for follow up/needed services  Patient called to assess progress and address any questions or concerns  Message left on voice mail  Active Problems    1  Anxiety (300 00) (F41 9)   2  Candidal skin infection (112 3) (B37 2)   3  Chronic obstructive pulmonary disease (496) (J44 9)   4  Community acquired pneumonia (5) (J18 9)   5  COPD exacerbation (491 21) (J44 1)   6  Diabetes mellitus, type 2 (250 00) (E11 9)   7  Esophageal reflux (530 81) (K21 9)   8  Fibromyalgia (729 1) (M79 7)   9  Hypercholesterolemia (272 0) (E78 00)   10  Hypertension (401 9) (I10)   11  Hypokalemia (276 8) (E87 6)   12  Hypothyroidism (244 9) (E03 9)   13  Initial Medicare annual wellness visit (V70 0) (Z00 00)   14  Obesity (278 00) (E66 9)   15  Osteoarthritis (715 90) (M19 90)   16  Osteopenia (733 90) (M85 80)   17  Pneumonia (486) (J18 9)   18  Rheumatoid arthritis (714 0) (M06 9)   19  Screening for colon cancer (V76 51) (Z12 11)   20  Sepsis (038 9,995 91) (A41 9)   21  Steroid long-term use (V58 65)   22  Vitamin B12 deficiency (266 2) (E53 8)   23  Vitamin D deficiency (268 9) (E55 9)    Past Medical History    1  History of Acute medial meniscus tear of left knee (836 0) (S83 242A)   2  Acute pharyngitis (462) (J02 9)   3  History of Cellulitis of left leg (682 6) (L03 116)   4  History of Degenerative arthritis of left knee (715 96) (M17 9)   5  History of Fistula of knee (719 86) (M25 169)   6  History of constipation (V12 79) (Z87 19)   7  History of senile atrophic vaginitis (V13 29) (Z87 42)   8  History of tinea cruris (V12 09) (Z86 19)   9  History of vaginitis (V13 29) (Z87 42)   10  History of Neck Strain (847 0)   11  History of Post-op bleeding (998 11)   12   History of Tick bite (919 4,E906 4) (W57 XXXA)   13  History of Vulvitis (616 10) (N76 2)    Surgical History    1  History of Cataract Surgery   2  History of Cholecystectomy Laparoscopic   3  History of Hysterectomy   4  History of Knee Arthroplasty   5  History of Neuroplasty Median Nerve At Carpal Tunnel   6  History of Tubal Ligation    Family History  Mother    1  Family history of Stroke (434 91) (I63 9)  Family History    2  Family history of Asthma (V17 5)   3  Family history of Cancer   4  Family history of Diabetes Mellitus (V18 0)   5  Family history of Hypertension (V17 49)    Social History    · History of Current Every Day Smoker (305 1)   · Former smoker (V15 82) (M07 598)   · No living will   · Recently Stopping Smoking (V15 82)   · Stopped Drinking Alcohol    Current Meds    1  DULoxetine HCl - 20 MG Oral Capsule Delayed Release Particles (Cymbalta); TAKE   ONE CAPSULE BY MOUTH ONCE DAILY; Therapy: 74Qcn6162 to (Evaluate:99Vzx2203)  Requested for: 21Nov2016; Last   Rx:21Nov2016 Ordered    2  Clotrimazole-Betamethasone 1-0 05 % External Cream; APPLY TO AFFECTED AREA(s)   TWICE DAILY  **FOR EXTERNAL USE ONLY**  Requested for:   21Nov2016; Last Rx:21Nov2016 Ordered   3  Nystatin 504183 UNIT/GM External Cream; APPLY  AND RUB  IN A THIN FILM TO   AFFECTED AREAS TWICE DAILY  (AM AND PM) for 2 weeks; Therapy: 93HMV7822 to (Last Rx:18Nov2016)  Requested for: 72VTN1564 Ordered    4  Advair Diskus 250-50 MCG/DOSE Inhalation Aerosol Powder Breath Activated; INHALE 1   PUFF TWICE DAILY; Therapy: 00IIY6722 to (Evaluate:16Nov2017)  Requested for: 21Nov2016; Last   Rx:21Nov2016 Ordered   5  Albuterol Sulfate (2 5 MG/3ML) 0 083% Inhalation Nebulization Solution; USE 1 UNIT   DOSE VIA NEBULIZER  4 TIMES A DAY AS NEEDED  Requested for:   21Nov2016; Last Rx:21Nov2016 Ordered   6  Ipratropium-Albuterol 0 5-2 5 (3) MG/3ML Inhalation Solution; nebulize one now; To Be   Done: 76TKZ0695; Status: HOLD FOR - Administration Ordered   7   Levalbuterol HCl - 1 25 MG/3ML Inhalation Nebulization Solution (Xopenex); INHALE 1    VIAL Inhalation; To Be Done: 13AEC1563; Status: HOLD FOR - Administration Ordered   8  ProAir  (90 Base) MCG/ACT Inhalation Aerosol Solution; INHALE 2 PUFFS   EVERY 4-6 HOURS, SPACED 60 SECONDS APART; Therapy: 94RXQ2534 to (Evaluate:13Nov2017)  Requested for: 74WCQ1202; Last   Rx:18Nov2016 Ordered   9  Spiriva HandiHaler 18 MCG Inhalation Capsule; INHALE CONTENTS OF 1 CAPSULE   ONCE DAILY; Therapy: 13FVT1330 to (Evaluate:17Jan2017)  Requested for: 37AOE5168; Last   Rx:18Nov2016 Ordered    10  Mucinex 600 MG Oral Tablet Extended Release 12 Hour; TAKE 1 OR 2 TABLETS TWICE    DAILY AS NEEDED FOR CONGESTION; Therapy: 26EQZ3639 to (Evaluate:67Vdn9016); Last Rx:21Nov2016 Ordered    11  Benzonatate 200 MG Oral Capsule; TAKE 1 CAPSULE 3 TIMES DAILY AS NEEDED; Therapy: 89ELM3144 to (Evaluate:27Zil8472)  Requested for: 47Kyh9857; Last    Rx:41Suw8093 Ordered    12  PredniSONE 10 MG Oral Tablet; 5 a day for 1 day, 4 a day for 1 day, 3 a day    for 1 day, 2 a day for 1 day, 1 a day for 1 day; Therapy: 87IGO6213 to (Last Rx:21Nov2016) Ordered    13  Januvia 100 MG Oral Tablet; 1 TAB PO QD  Requested for: 21Nov2016; Last    Rx:21Nov2016 Ordered   14  OneTouch Delica Lancets Fine Miscellaneous; TEST TWICE A DAY; Therapy: 45RNF4154 to (Last Maye Chambers)  Requested for: 26Oct2016 Ordered   15  OneTouch Verio In [employer]; TEST TWICE DAILY; Therapy: 66WUS5337 to (Evaluate:88Enf5572)  Requested for: 24Oct2016; Last    Rx:24Oct2016 Ordered    16  Pantoprazole Sodium 40 MG Oral Tablet Delayed Release; TAKE ONE TABLET BY    MOUTH ONCE DAILY; Therapy: 88NAA6147 to (Evaluate:13Nov2017)  Requested for: 14RUJ3895; Last    Rx:18Nov2016 Ordered    17  Furosemide 40 MG Oral Tablet; Take 1 tablet daily; Therapy: 38VTC3949 to (Evaluate:16Nov2017)  Requested for: 21Nov2016; Last    Rx:21Nov2016 Ordered   18  Lisinopril 40 MG Oral Tablet;  Take 1 tablet daily; Therapy: 63OPZ7089 to (Renew:13Nov2017)  Requested for: 59HZN1711; Last    Rx:18Nov2016 Ordered    19  Levothyroxine Sodium 150 MCG Oral Tablet; TAKE 1 TABLET DAILY AS DIRECTED     Requested for: 13XNH7620; Last Rx:18Nov2016 Ordered    20  Premarin 0 625 MG/GM Vaginal Cream; INSERT 1 GRAM APPLICATORFUL 2X WEEKLY; Therapy: 46EWD1738 to (Last Rx:21Nov2016)  Requested for: 21Nov2016 Ordered    21  Clobetasol Propionate E 0 05 % External Cream; APPLY TO AFFECTED AREA 3 TO 4    TIMES DAILY; Therapy: 60NRT7502 to (Last Rx:21Nov2016)  Requested for: 21Nov2016 Ordered    22  Guaifenesin-Codeine 100-10 MG/5ML Oral Solution; TAKE 1-2 TEASPOONFUL DAILY AT    BEDTIME; Therapy: 16HPG1779 to (Last Rx:16Nov2016) Ordered   23  Moxifloxacin HCl - 400 MG Oral Tablet; TAKE 1 TABLET DAILY UNTIL FINISHED WITH    FOOD; Therapy: 68BCE7901 to (Evaluate:26Nov2016)  Requested for: 67QKV0382; Last    Rx:16Nov2016 Ordered    24  Diclofenac Sodium 1 % Transdermal Gel (Voltaren); APPLY SMALL AMOUNT ONCE    DAILY AS DIRECTED; Therapy: 08BVP4197 to (Last Rx:18Nov2016)  Requested for: 52KPA5002 Ordered   25  Folic Acid 1 MG Oral Tablet; TAKE ONE TABLET BY MOUTH ONCE DAILY; Therapy: 92POK6177 to (Evaluate:13Nov2017)  Requested for: 72SBF6840; Last    Rx:18Nov2016 Ordered   26  Hydrocodone-Acetaminophen 5-325 MG Oral Tablet; TAKE 1 TABLET EVERY 6 HOURS    AS NEEDED FOR PAIN;    Therapy: 36TBP0751 to (Evaluate:30Nov2016)  Requested for: 54KDQ5336; Last    Rx:10Nov2016 Ordered   27  Methotrexate 2 5 MG Oral Tablet; TAKE 8 TABLET Weekly  Requested for: 21Nov2016;    Last Rx:21Nov2016 Ordered   28  PredniSONE 5 MG Oral Tablet; TAKE 1 TABLET DAILY AS DIRECTED  Requested for:    36DGV9992; Last Rx:18Nov2016 Ordered    29  Vitamin B-12 1000 MCG Oral Tablet; TAKE 1 TABLET DAILY AS DIRECTED; Therapy: 86SCW6152 to (Evaluate:58Oud3626)  Requested for: 74OVB3819; Last    Rx:37Ube6767 Ordered    30   Vitamin D3 2000 UNIT Oral Tablet; Take 1 tablet daily; Therapy: 61QXX9953 to (Last Rx:18Nov2016)  Requested for: 83URR8891 Ordered    31  Cimzia 2 X 200 MG Subcutaneous Kit; One injection every two weeks, then monthly after    2 doses; Therapy: (Recorded:39Whs0594) to Recorded   32  Lidoderm 5 % External Patch (Lidocaine); Therapy: (Recorded:08Jun2015) to Recorded    Allergies    1  No Known Drug Allergies    End of Encounter Meds    1  DULoxetine HCl - 20 MG Oral Capsule Delayed Release Particles (Cymbalta); TAKE   ONE CAPSULE BY MOUTH ONCE DAILY; Therapy: 90Nhb0766 to (Evaluate:10Akn3548)  Requested for: 21Nov2016; Last   Rx:21Nov2016 Ordered    2  Clotrimazole-Betamethasone 1-0 05 % External Cream; APPLY TO AFFECTED AREA(s)   TWICE DAILY  **FOR EXTERNAL USE ONLY**  Requested for:   21Nov2016; Last Rx:21Nov2016 Ordered   3  Nystatin 434490 UNIT/GM External Cream; APPLY  AND RUB  IN A THIN FILM TO   AFFECTED AREAS TWICE DAILY  (AM AND PM) for 2 weeks; Therapy: 64NIR7413 to (Last Rx:18Nov2016)  Requested for: 88BXJ7187 Ordered    4  Advair Diskus 250-50 MCG/DOSE Inhalation Aerosol Powder Breath Activated; INHALE 1   PUFF TWICE DAILY; Therapy: 89TNK4522 to (Evaluate:16Nov2017)  Requested for: 21Nov2016; Last   Rx:21Nov2016 Ordered   5  Albuterol Sulfate (2 5 MG/3ML) 0 083% Inhalation Nebulization Solution; USE 1 UNIT   DOSE VIA NEBULIZER  4 TIMES A DAY AS NEEDED  Requested for:   21Nov2016; Last Rx:21Nov2016 Ordered   6  Ipratropium-Albuterol 0 5-2 5 (3) MG/3ML Inhalation Solution; nebulize one now; To Be   Done: 91BMG1615; Status: HOLD FOR - Administration Ordered   7  Levalbuterol HCl - 1 25 MG/3ML Inhalation Nebulization Solution (Xopenex); INHALE 1    VIAL Inhalation; To Be Done: 57LXH7572; Status: HOLD FOR - Administration Ordered   8  ProAir  (90 Base) MCG/ACT Inhalation Aerosol Solution; INHALE 2 PUFFS   EVERY 4-6 HOURS, SPACED 60 SECONDS APART;    Therapy: 68DEW1828 to (Evaluate:81Jch2268)  Requested for: 97USR1224; Last   Rx:18Nov2016 Ordered   9  Spiriva HandiHaler 18 MCG Inhalation Capsule; INHALE CONTENTS OF 1 CAPSULE   ONCE DAILY; Therapy: 70WOE8712 to (Evaluate:17Jan2017)  Requested for: 12LXY6154; Last   Rx:18Nov2016 Ordered    10  Mucinex 600 MG Oral Tablet Extended Release 12 Hour; TAKE 1 OR 2 TABLETS TWICE    DAILY AS NEEDED FOR CONGESTION; Therapy: 59DAQ5510 to (Evaluate:16Xhx4677); Last Rx:21Nov2016 Ordered    11  Benzonatate 200 MG Oral Capsule; TAKE 1 CAPSULE 3 TIMES DAILY AS NEEDED; Therapy: 66BQZ4174 to (Evaluate:39Yyv7341)  Requested for: 72Jge5645; Last    Rx:55Sbr0741 Ordered    12  PredniSONE 10 MG Oral Tablet; 5 a day for 1 day, 4 a day for 1 day, 3 a day    for 1 day, 2 a day for 1 day, 1 a day for 1 day; Therapy: 67QMB0905 to (Last Rx:21Nov2016) Ordered    13  Januvia 100 MG Oral Tablet; 1 TAB PO QD  Requested for: 21Nov2016; Last    Rx:21Nov2016 Ordered   14  OneTouch Delica Lancets Fine Miscellaneous; TEST TWICE A DAY; Therapy: 32BVB0236 to (Last Nonda Moons)  Requested for: 26Oct2016 Ordered   15  OneTouch Verio In [employer]; TEST TWICE DAILY; Therapy: 98QPQ5126 to (Evaluate:89Mxu0799)  Requested for: 24Oct2016; Last    Rx:24Oct2016 Ordered    16  Pantoprazole Sodium 40 MG Oral Tablet Delayed Release; TAKE ONE TABLET BY    MOUTH ONCE DAILY; Therapy: 43QVR1152 to (Evaluate:13Nov2017)  Requested for: 82RJO7802; Last    Rx:18Nov2016 Ordered    17  Furosemide 40 MG Oral Tablet; Take 1 tablet daily; Therapy: 60SIT6595 to (Evaluate:16Nov2017)  Requested for: 21Nov2016; Last    Rx:21Nov2016 Ordered   18  Lisinopril 40 MG Oral Tablet; Take 1 tablet daily; Therapy: 05TTA4728 to (Renew:13Nov2017)  Requested for: 29XIP8973; Last    Rx:18Nov2016 Ordered    19  Levothyroxine Sodium 150 MCG Oral Tablet; TAKE 1 TABLET DAILY AS DIRECTED     Requested for: 18NCU5736; Last Rx:18Nov2016 Ordered    20   Premarin 0 625 MG/GM Vaginal Cream; INSERT 1 GRAM APPLICATORFUL 2X WEEKLY; Therapy: 77JSI8259 to (Last Rx:21Nov2016)  Requested for: 21Nov2016 Ordered    21  Clobetasol Propionate E 0 05 % External Cream; APPLY TO AFFECTED AREA 3 TO 4    TIMES DAILY; Therapy: 56SHW0365 to (Last Rx:21Nov2016)  Requested for: 21Nov2016 Ordered    22  Guaifenesin-Codeine 100-10 MG/5ML Oral Solution; TAKE 1-2 TEASPOONFUL DAILY AT    BEDTIME; Therapy: 77GSR6079 to (Last Rx:16Nov2016) Ordered   23  Moxifloxacin HCl - 400 MG Oral Tablet; TAKE 1 TABLET DAILY UNTIL FINISHED WITH    FOOD; Therapy: 91VER5727 to (Evaluate:26Nov2016)  Requested for: 52JHP3548; Last    Rx:16Nov2016 Ordered    24  Diclofenac Sodium 1 % Transdermal Gel (Voltaren); APPLY SMALL AMOUNT ONCE    DAILY AS DIRECTED; Therapy: 14ZRI2949 to (Last Rx:18Nov2016)  Requested for: 51FGF1896 Ordered   25  Folic Acid 1 MG Oral Tablet; TAKE ONE TABLET BY MOUTH ONCE DAILY; Therapy: 82UPW0129 to (Evaluate:13Nov2017)  Requested for: 27CUR1210; Last    Rx:18Nov2016 Ordered   26  Hydrocodone-Acetaminophen 5-325 MG Oral Tablet; TAKE 1 TABLET EVERY 6 HOURS    AS NEEDED FOR PAIN;    Therapy: 84NJF4038 to (Evaluate:30Nov2016)  Requested for: 92GGF3852; Last    Rx:10Nov2016 Ordered   27  Methotrexate 2 5 MG Oral Tablet; TAKE 8 TABLET Weekly  Requested for: 21Nov2016;    Last Rx:21Nov2016 Ordered   28  PredniSONE 5 MG Oral Tablet; TAKE 1 TABLET DAILY AS DIRECTED  Requested for:    41EBW2761; Last Rx:18Nov2016 Ordered    29  Vitamin B-12 1000 MCG Oral Tablet; TAKE 1 TABLET DAILY AS DIRECTED; Therapy: 49GWA8291 to (Evaluate:71Fnj8434)  Requested for: 04JZZ4677; Last    Rx:18Nov2016 Ordered    30  Vitamin D3 2000 UNIT Oral Tablet; Take 1 tablet daily; Therapy: 92NPN0747 to (Last Rx:18Nov2016)  Requested for: 55HKI3042 Ordered    31  Cimzia 2 X 200 MG Subcutaneous Kit; One injection every two weeks, then monthly after    2 doses; Therapy: (Recorded:86Myr9336) to Recorded   32  Lidoderm 5 % External Patch (Lidocaine);     Therapy: (Recorded:18Nml6513) to Recorded    Future Appointments    Date/Time Provider Specialty Site   01/03/2017 11:00 AM GRAHAM Mckinnon  [address] [facility]     Patient Care Team    Care Team Member Role Specialty Office Number   Marcelo Tam JONES   Specialist Orthopedic Surgery [phone]   [facility] Keri VALDEZ Specialist Rheumatology [phone]   Sarahy Knox MD Specialist Ophthalmology [phone]   Radha Garcia MD Specialist Obstetrics/Gynecology [phone]     Signatures   Electronically signed by : Patricia Nuno, ; Nov 28 2016  2:13PM EST                       (Author)

## 2018-01-11 NOTE — RESULT NOTES
Verified Results  *US BREAST RIGHT LIMITED (DIAGNOSTIC) 32Edn2724 10:44AM Don Au Order Number: [id number]    - Patient Instructions: To schedule this appointment, please contact Central Scheduling at [phone]   Order Number: [id number]    - Patient Instructions: To schedule this appointment, please contact Central Scheduling at [phone]  Test Name Result Flag Reference   US BREAST RIGHT LIMITED (Report)     Patient History:   Patient has history of other cancer at age 26  Family history of unknown cancer in daughter  Patient's BMI is 44 8  Reason for exam: follow-up at short interval from prior study  US Breast Right Limited: August 15, 2016 - Check In #: [de-identified]   Technologist: Reyna Erazo RDMS   Finding: There is an anechoic mass in the right breast with a    discrete backwall and through transmission consistent with a    simple cyst      ASSESSMENT: BiRad:2 - Benign     Recommendation:   Return to routine screening mammogram schedule       Transcription Location: [facility] 98: [id number]     Risk Value(s):   Tyrer-Cuzick 10 Year: 2 527%, Tyrer-Cuzick Lifetime: 4 588%,    Myriad Table: 1 5%, SALVATORE 5 Year: 1 4%, NCI Lifetime: 4 6%

## 2018-01-12 NOTE — PROGRESS NOTES
History of Present Illness  Care Coordination Encounter Information:   Type of Encounter: Telephonic   Contact: Initial Contact   Last Office Visit: 11/10/2016   Spoke to Patient  Care Coordination SL Nurse [facility]:   The reason for call is to discuss outreach for follow up/needed services  Called patient to assess progress post hospitalization and to address any questions or concerns  Patient was hospitalized 11/3 to 11/6 for COPD exacerbation  Patient stated she started with a dry cough last evening and today has a more productive cough present  She is expectorating thick white sputum  She does not complain of shortness of breath but does state she gets tired very easily with exertion  Yuly Perera is using her nebulizer three times a day  She is taking fluids without difficulty but states her appetite has been decreased the last few days  Her blood sugar this morning was 139, she states she had coffee with creamer prior to checking her blood sugar  She is taking her Januvia and states she feels so much better since she was changed from metformin  Yuly Perera did state concern about her symptoms and after hearing her cough on the phone,I connected her to Dr Gail Duggan office to see if she could get an appointment  I instructed her if she was unable to get an appointment soon to go to a urgent care center for evaluation  I left her my number to call with any questions or concerns  She is agreeable to a follow up phone call  Active Problems    1  Abnormal mammogram of right breast (793 80) (R92 8)   2  Anxiety (300 00) (F41 9)   3  Burning with urination (788 1) (R30 0)   4  Candidal skin infection (112 3) (B37 2)   5  Cellulitis of left leg (682 6) (L03 116)   6  Chronic obstructive pulmonary disease (496) (J44 9)   7  Community acquired pneumonia (5) (J18 9)   8  COPD exacerbation (491 21) (J44 1)   9  Costochondritis (733 6) (M94 0)   10  Depression screening (V79 0) (Z13 89)   11   Diabetes mellitus, type 2 (250 00) (E11 9)   12  Encounter for screening for other nervous system disorders (V80 09) (Z13 858)   13  Encounter for screening mammogram for breast cancer (V76 12) (Z12 31)   14  Encounter for special screening examination for genitourinary disorder (V81 6) (Z13 89)   15  Esophageal reflux (530 81) (K21 9)   16  Fibromyalgia (729 1) (M79 7)   17  Hypercholesterolemia (272 0) (E78 00)   18  Hypertension (401 9) (I10)   19  Hypokalemia (276 8) (E87 6)   20  Hypothyroidism (244 9) (E03 9)   21  Initial Medicare annual wellness visit (V70 0) (Z00 00)   22  Obesity (278 00) (E66 9)   23  Osteoarthritis (715 90) (M19 90)   24  Osteopenia (733 90) (M85 80)   25  Rheumatoid arthritis (714 0) (M06 9)   26  Screening for colon cancer (V76 51) (Z12 11)   27  Sepsis (038 9,995 91) (A41 9)   28  Steroid long-term use (V58 65)   29  Vitamin B12 deficiency (266 2) (E53 8)   30  Vitamin D deficiency (268 9) (E55 9)    Past Medical History    1  History of Acute medial meniscus tear of left knee (836 0) (S83 242A)   2  Acute pharyngitis (462) (J02 9)   3  History of Degenerative arthritis of left knee (715 96) (M17 9)   4  History of Fistula of knee (719 86) (M25 169)   5  History of constipation (V12 79) (Z87 19)   6  History of senile atrophic vaginitis (V13 29) (Z87 42)   7  History of tinea cruris (V12 09) (Z86 19)   8  History of vaginitis (V13 29) (Z87 42)   9  History of Neck Strain (847 0)   10  History of Post-op bleeding (998 11)   11  History of Tick bite (919 4,E906 4) (W57 XXXA)   12  History of Vulvitis (616 10) (N76 2)    Surgical History    1  History of Cataract Surgery   2  History of Cholecystectomy Laparoscopic   3  History of Hysterectomy   4  History of Knee Arthroplasty   5  History of Neuroplasty Median Nerve At Carpal Tunnel   6  History of Tubal Ligation    Family History  Mother    1  Family history of Stroke (434 91) (I63 9)  Family History    2  Family history of Asthma (V17 5)   3   Family history of Cancer   4  Family history of Diabetes Mellitus (V18 0)   5  Family history of Hypertension (V17 49)    Social History    · History of Current Every Day Smoker (305 1)   · Recently Stopping Smoking (V15 82)   · Stopped Drinking Alcohol    Current Meds    1  DULoxetine HCl - 20 MG Oral Capsule Delayed Release Particles (Cymbalta); TAKE   ONE CAPSULE BY MOUTH ONCE DAILY; Therapy: 37Jwn9675 to (Evaluate:27Nov2016)  Requested for: 83RYQ8500; Last   Rx:31May2016 Ordered    2  Nystatin 927229 UNIT/GM External Cream; APPLY  AND RUB  IN A THIN FILM TO   AFFECTED AREAS TWICE DAILY  (AM AND PM) for 2 weeks; Therapy: 08Wmu3346 to (Last Rx:19Sep2016)  Requested for: 67Krh9074 Ordered    3  Advair Diskus 250-50 MCG/DOSE Inhalation Aerosol Powder Breath Activated; INHALE 1   PUFF TWICE DAILY; Therapy: 45YWA6027 to (Evaluate:13Jun2015)  Requested for: 69ALT0767; Last   Rx:18Jun2014 Ordered   4  Albuterol Sulfate (2 5 MG/3ML) 0 083% Inhalation Nebulization Solution; USE 1 UNIT   DOSE VIA NEBULIZER  4 TIMES A DAY AS NEEDED  Requested for:   46XNX6510; Last Rx:27May2014 Ordered   5  ProAir  (90 Base) MCG/ACT Inhalation Aerosol Solution; INHALE 2 PUFFS   EVERY 4-6 HOURS, SPACED 60 SECONDS APART; Therapy: 14RYZ9592 to (Joe Delgado)  Requested for: 03Apr2014; Last   Rx:03Apr2014 Ordered   6  Spiriva HandiHaler 18 MCG Inhalation Capsule; INHALE CONTENTS OF 1 CAPSULE   ONCE DAILY; Therapy: 11EDW5532 to (Evaluate:99Uqq9727)  Requested for: 58Gze2748; Last   Rx:08Mco7548 Ordered    7  Benzonatate 200 MG Oral Capsule; TAKE 1 CAPSULE 3 TIMES DAILY AS NEEDED; Therapy: 27TOF5842 to (Evaluate:64Wnc7734)  Requested for: 17Imb9710; Last   Rx:20Gft3275 Ordered    8  Guaifenesin-Codeine 100-10 MG/5ML SYRP; Take 1 teaspoonful every 6 hours as   needed; Therapy: 41IDB1457 to (Last IY:21JGA1786) Ordered    9  MetFORMIN HCl - 500 MG Oral Tablet; take 2 tablet twice daily;    Therapy: 81Vck6167 to (Evaluate:21Dhg2868)  Requested for: 20Sez3826; Last   Rx:78Gyt0324 Ordered   10  OneTouch Delica Lancets Fine Miscellaneous; TEST TWICE A DAY; Therapy: 83VVU7701 to (Last Rx:26Oct2016)  Requested for: 26Oct2016 Ordered   11  OneTouch Verio In [employer]; TEST TWICE DAILY; Therapy: 68DHI0974 to (Evaluate:09Kyu1820)  Requested for: 24Oct2016; Last    Rx:24Oct2016 Ordered    12  Pantoprazole Sodium 40 MG Oral Tablet Delayed Release; TAKE ONE TABLET BY    MOUTH ONCE DAILY; Therapy: 82XUT3526 to (Evaluate:52Tde5982)  Requested for: 21Jul2016; Last    Rx:32Uph3043 Ordered    13  Furosemide 40 MG Oral Tablet; Take 1 tablet daily; Therapy: 51VAX1010 to (Evaluate:67Khx7413)  Requested for: 10Aug2016; Last    Rx:10Aug2016 Ordered   14  Lisinopril 40 MG Oral Tablet; Take 1 tablet daily; Therapy: 41TGW4797 to (Renew:16Jul2017)  Requested for: 21Jul2016; Last    Rx:70Ktz3169 Ordered    15  Premarin 0 625 MG/GM Vaginal Cream; INSERT 1 GRAM APPLICATORFUL 2X WEEKLY; Therapy: 51UDF8851 to (Last Rx:22Jan2015) Ordered    16  Clobetasol Propionate E 0 05 % External Cream; APPLY TO AFFECTED AREA 3 TO 4    TIMES DAILY; Therapy: 46RTE9416 to (Last Rx:21Vmm3433)  Requested for: 16Fsb4291 Ordered    17  Diclofenac Sodium 1 % Transdermal Gel (Voltaren); APPLY SMALL AMOUNT ONCE    DAILY AS DIRECTED; Therapy: 15FTF3546 to (Last Rx:50Prx9147)  Requested for: 61Gnw5048 Ordered   18  Folic Acid 1 MG Oral Tablet; TAKE ONE TABLET BY MOUTH ONCE DAILY; Therapy: 55MBQ1418 to (Evaluate:16Jul2017)  Requested for: 21Jul2016; Last    Rx:89Zhw4497 Ordered   19  Hydrocodone-Acetaminophen 5-325 MG Oral Tablet; TAKE 1 TABLET EVERY 6 HOURS    AS NEEDED FOR PAIN;    Therapy: 86DUT1671 to (Evaluate:30Nov2016)  Requested for: 43LRO5503; Last    Rx:10Nov2016 Ordered    20  Vitamin B-12 1000 MCG Oral Tablet; TAKE 1 TABLET DAILY AS DIRECTED; Therapy: 64UVI9091 to (Evaluate:55Unf3669); Last Rx:49Nhp1816 Ordered    21  Vitamin D3 2000 UNIT Oral Tablet;  Take 1 tablet daily; Therapy: 30ORK8284 to (Last Rx:51Oby2310) Ordered    22  Cefuroxime Axetil 500 MG Oral Tablet; Therapy: (Recorded:10Nov2016) to Recorded   23  Cimzia 2 X 200 MG Subcutaneous Kit; One injection every two weeks, then monthly after    2 doses; Therapy: (Recorded:93Fqb7408) to Recorded   24  Clotrimazole-Betamethasone 1-0 05 % External Cream Recorded   25  Januvia 100 MG Oral Tablet; Therapy: (Recorded:10Nov2016) to Recorded   26  Levothyroxine Sodium 150 MCG Oral Tablet; TAKE 1 TABLET DAILY AS DIRECTED; Therapy: (Recorded:10Nov2016) to Recorded   27  Lidoderm 5 % External Patch (Lidocaine); Therapy: (Recorded:08Jun2015) to Recorded   28  Methotrexate Sodium 2 5 MG TABS; TAKE 8 TABLET Weekly; Therapy: (Recorded:42Dcb3866) to Recorded   29  PredniSONE 20 MG Oral Tablet; Therapy: (Recorded:10Nov2016) to Recorded   30  PredniSONE 5 MG Oral Tablet; TAKE 1 TABLET DAILY AS DIRECTED; Therapy: (Recorded:04May2016) to Recorded   31  Zithromax Z-Lev 250 MG Oral Tablet (Azithromycin); Therapy: (Recorded:10Nov2016) to Recorded    Allergies    1  No Known Drug Allergies    End of Encounter Meds    1  DULoxetine HCl - 20 MG Oral Capsule Delayed Release Particles (Cymbalta); TAKE   ONE CAPSULE BY MOUTH ONCE DAILY; Therapy: 28Snz9562 to (Evaluate:27Nov2016)  Requested for: 12YCZ6953; Last   Rx:46Wxw6300 Ordered    2  Nystatin 456183 UNIT/GM External Cream; APPLY  AND RUB  IN A THIN FILM TO   AFFECTED AREAS TWICE DAILY  (AM AND PM) for 2 weeks; Therapy: 49Zas7440 to (Last Rx:59Mwh2308)  Requested for: 11Won5998 Ordered    3  Advair Diskus 250-50 MCG/DOSE Inhalation Aerosol Powder Breath Activated; INHALE 1   PUFF TWICE DAILY; Therapy: 59ENU3933 to (Evaluate:13Jun2015)  Requested for: 32DHS3021; Last   Rx:18Jun2014 Ordered   4  Albuterol Sulfate (2 5 MG/3ML) 0 083% Inhalation Nebulization Solution; USE 1 UNIT   DOSE VIA NEBULIZER  4 TIMES A DAY AS NEEDED  Requested for:   72AOG4840;  Last OT:75MCD3194 Ordered   5  ProAir  (90 Base) MCG/ACT Inhalation Aerosol Solution; INHALE 2 PUFFS   EVERY 4-6 HOURS, SPACED 60 SECONDS APART; Therapy: 66NDT3135 to (Sury Samples)  Requested for: 03Apr2014; Last   Rx:03Apr2014 Ordered   6  Spiriva HandiHaler 18 MCG Inhalation Capsule; INHALE CONTENTS OF 1 CAPSULE   ONCE DAILY; Therapy: 88NLP8838 to (Evaluate:84Emr8927)  Requested for: 10Gem9237; Last   Rx:82Ati7585 Ordered    7  Benzonatate 200 MG Oral Capsule; TAKE 1 CAPSULE 3 TIMES DAILY AS NEEDED; Therapy: 29QJZ6558 to (Evaluate:07Cfw8273)  Requested for: 21Jul2016; Last   Rx:77Ayy8455 Ordered    8  Guaifenesin-Codeine 100-10 MG/5ML SYRP; Take 1 teaspoonful every 6 hours as   needed; Therapy: 51EHD4985 to (Last NO:90HNV2211) Ordered    9  MetFORMIN HCl - 500 MG Oral Tablet; take 2 tablet twice daily; Therapy: 52Qga3338 to (Evaluate:17Jan2017)  Requested for: 14Sff2374; Last   Rx:05Pwr7863 Ordered   10  OneTouch Delica Lancets Fine Miscellaneous; TEST TWICE A DAY; Therapy: 83GFJ0826 to (Last Rx:26Oct2016)  Requested for: 26Oct2016 Ordered   11  OneTouch Verio In [employer]; TEST TWICE DAILY; Therapy: 37ZHL0240 to (Evaluate:09Kic7874)  Requested for: 24Oct2016; Last    Rx:24Oct2016 Ordered    12  Pantoprazole Sodium 40 MG Oral Tablet Delayed Release; TAKE ONE TABLET BY    MOUTH ONCE DAILY; Therapy: 40IDN3013 to (Evaluate:58Krz8334)  Requested for: 27Irr5358; Last    Rx:26Cpu4709 Ordered    13  Furosemide 40 MG Oral Tablet; Take 1 tablet daily; Therapy: 36MKF6026 to (Evaluate:35Rsb4364)  Requested for: 10Aug2016; Last    Rx:10Aug2016 Ordered   14  Lisinopril 40 MG Oral Tablet; Take 1 tablet daily; Therapy: 21AUZ8742 to (Renew:25Alu7524)  Requested for: 80Gph4074; Last    Rx:21Jul2016 Ordered    15  Premarin 0 625 MG/GM Vaginal Cream; INSERT 1 GRAM APPLICATORFUL 2X WEEKLY; Therapy: 76CJW2492 to (Last Rx:22Jan2015) Ordered    16   Clobetasol Propionate E 0 05 % External Cream; APPLY TO AFFECTED AREA 3 TO 4    TIMES DAILY; Therapy: 68ZBL4281 to (Last Rx:25Lou4584)  Requested for: 43Bpb5678 Ordered    17  Diclofenac Sodium 1 % Transdermal Gel (Voltaren); APPLY SMALL AMOUNT ONCE    DAILY AS DIRECTED; Therapy: 82PDZ6177 to (Last Rx:25Ddz0121)  Requested for: 21Jul2016 Ordered   18  Folic Acid 1 MG Oral Tablet; TAKE ONE TABLET BY MOUTH ONCE DAILY; Therapy: 45HQL9387 to (Evaluate:58Mqm1073)  Requested for: 83Uyc8561; Last    Rx:21Jul2016 Ordered   19  Hydrocodone-Acetaminophen 5-325 MG Oral Tablet; TAKE 1 TABLET EVERY 6 HOURS    AS NEEDED FOR PAIN;    Therapy: 60FJT8192 to (Evaluate:30Nov2016)  Requested for: 97XVK7262; Last    Rx:10Nov2016 Ordered    20  Vitamin B-12 1000 MCG Oral Tablet; TAKE 1 TABLET DAILY AS DIRECTED; Therapy: 00QRH6212 to (Evaluate:09May2017); Last Rx:10Nov2016 Ordered    21  Vitamin D3 2000 UNIT Oral Tablet; Take 1 tablet daily; Therapy: 12IJE3172 to (Last Rx:10Feb2016) Ordered    22  Cefuroxime Axetil 500 MG Oral Tablet; Therapy: (Recorded:10Nov2016) to Recorded   23  Cimzia 2 X 200 MG Subcutaneous Kit; One injection every two weeks, then monthly after    2 doses; Therapy: (Recorded:10Feb2016) to Recorded   24  Clotrimazole-Betamethasone 1-0 05 % External Cream Recorded   25  Januvia 100 MG Oral Tablet; Therapy: (Recorded:10Nov2016) to Recorded   26  Levothyroxine Sodium 150 MCG Oral Tablet; TAKE 1 TABLET DAILY AS DIRECTED; Therapy: (Recorded:10Nov2016) to Recorded   27  Lidoderm 5 % External Patch (Lidocaine); Therapy: (Recorded:08Jun2015) to Recorded   28  Methotrexate Sodium 2 5 MG TABS; TAKE 8 TABLET Weekly; Therapy: (Recorded:21Jul2016) to Recorded   29  PredniSONE 20 MG Oral Tablet; Therapy: (Recorded:10Nov2016) to Recorded   30  PredniSONE 5 MG Oral Tablet; TAKE 1 TABLET DAILY AS DIRECTED; Therapy: (Recorded:04May2016) to Recorded   31  Zithromax Z-Lev 250 MG Oral Tablet (Azithromycin);     Therapy: (Recorded:10Nov2016) to Recorded    Future Appointments    Date/Time Provider Specialty Site   01/03/2017 11:00 AM GRAHAM Shea  [address] [facility]     Patient Care Team    Care Team Member Role Specialty Office Number   Laith JONES   Specialist Orthopedic Surgery [phone]   Sandra Parra MD Specialist Rheumatology [phone]   Lois Cooper MD Specialist Ophthalmology [phone]   Yoon Meeks MD Specialist Obstetrics/Gynecology [phone]     Signatures   Electronically signed by : Ya Eid, ; Nov 15 2016  2:35PM EST                       (Author)

## 2018-01-12 NOTE — RESULT NOTES
Verified Results  (1) CBC/PLT/DIFF [id number] 08:15AM [facility] Order Number: [id number]     Test Name Result Flag Reference   WBC COUNT 8 93 Thousand/uL  4 31-10 16   RBC COUNT 4 51 Million/uL  3 81-5 12   HEMOGLOBIN 12 7 g/dL  11 5-15 4   HEMATOCRIT 41 1 %  34 8-46  1   MCV 91 fL  82-98   MCH 28 2 pg  26 8-34 3   MCHC 30 9 g/dL L 31 4-37 4   RDW 16 7 % H 11 6-15 1   MPV 11 3 fL  8 9-12 7   PLATELET COUNT 849 Thousands/uL  149-390   NEUTROPHILS RELATIVE PERCENT 52 %  43-75   LYMPHOCYTES RELATIVE PERCENT 36 %  14-44   MONOCYTES RELATIVE PERCENT 10 %  4-12   EOSINOPHILS RELATIVE PERCENT 2 %  0-6   BASOPHILS RELATIVE PERCENT 0 %  0-1   NEUTROPHILS ABSOLUTE COUNT 4 65 Thousands/? ??L  1 85-7 62   LYMPHOCYTES ABSOLUTE COUNT 3 17 Thousands/? ??L  0 60-4 47   MONOCYTES ABSOLUTE COUNT 0 87 Thousand/? ??L  0 17-1 22   EOSINOPHILS ABSOLUTE COUNT 0 21 Thousand/? ??L  0 00-0 61   BASOPHILS ABSOLUTE COUNT 0 03 Thousands/? ??L  0 00-0 10   - Patient Instructions: This bloodwork is non-fasting  Please drink two glasses of water morning of bloodwork       (1) COMPREHENSIVE METABOLIC PANEL 88OKU6278 58:68PA [facility] Order Number: [id number]     Test Name Result Flag Reference   SODIUM 139 mmol/L  136-145   POTASSIUM 4 5 mmol/L  3 5-5 3   CHLORIDE 101 mmol/L  100-108   CARBON DIOXIDE 30 mmol/L  21-32   ANION GAP (CALC) 8 mmol/L  4-13   BLOOD UREA NITROGEN 18 mg/dL  5-25   CREATININE 0 62 mg/dL  0 60-1 30   Standardized to IDMS reference method   CALCIUM 9 7 mg/dL  8 3-10 1   BILI, TOTAL 0 20 mg/dL  0 20-1 00   ALK PHOSPHATAS 86 U/L     ALT (SGPT) 21 U/L  12-78   AST(SGOT) 11 U/L  5-45   ALBUMIN 3 3 g/dL L 3 5-5 0   TOTAL PROTEIN 7 2 g/dL  6 4-8 2   eGFR Non-African American      >60 0 ml/min/1 73sq [facility] Disease Education Program recommendations are as follows:  GFR calculation is accurate only with a steady state creatinine  Chronic Kidney disease less than 60 ml/min/1 73 sq  meters  Kidney failure less than 15 ml/min/1 73 sq  meters  GLUCOSE FASTING 177 mg/dL H 65-99     (1) HEMOGLOBIN A1C 27GAH7786 08:15AM [facility] Order Number: [id number]     Test Name Result Flag Reference   HEMOGLOBIN A1C 8 6 % H 4 2-6 3   EST  AVG  GLUCOSE 200 mg/dl       (1) LIPID PANEL, FASTING 14FEP9044 08:15AM [facility] Order Number: [id number]     Test Name Result Flag Reference   CHOLESTEROL 191 mg/dL     HDL,DIRECT 65 mg/dL H 40-60   Specimen collection should occur prior to Metamizole administration due to the potential for falsely depressed results  LDL CHOLESTEROL CALCULATED 106 mg/dL H 0-100   - Patient Instructions: This is a fasting blood test  Water,black tea or black  coffee only after 9:00pm the night before test   Drink 2 glasses of water the morning of test       Triglyceride:         Normal              <150 mg/dl       Borderline High    150-199 mg/dl       High               200-499 mg/dl       Very High          >499 mg/dl  Cholesterol:         Desirable        <200 mg/dl      Borderline High  200-239 mg/dl      High             >239 mg/dl  HDL Cholesterol:        High    >59 mg/dL      Low     <41 mg/dL  LDL CALCULATED:    This screening LDL is a calculated result  It does not have the accuracy of the Direct Measured LDL in the monitoring of patients with hyperlipidemia and/or statin therapy  Direct Measure LDL (AOZ855) must be ordered separately in these patients  TRIGLYCERIDES 99 mg/dL  <=150   Specimen collection should occur prior to N-Acetylcysteine or Metamizole administration due to the potential for falsely depressed results       (1) MICROALBUMIN CREATININE RATIO, RANDOM URINE [id number] 08:15AM [facility] Order Number: [id number]     Test Name Result Flag Reference   MICROALBUMIN/ CREAT R 88 mg/g creatinine H 0-30   MICROALBUMIN,URINE 74 3 mg/L H 0 0-20 0   CREATININE URINE 84 5 mg/dL       (1) TSH [id number] 08:15AM [facility] Order Number: [id number]     Test Name Result Flag Reference   TSH 0 698 uIU/mL  0 358-3 740   - Patient Instructions: This bloodwork is non-fasting  Please drink two glasses of water morning of bloodwork  Patients undergoing fluorescein dye angiography may retain small amounts of fluorescein in the body for 48-72 hours post procedure  Samples containing fluorescein can produce falsely depressed TSH values  If the patient had this procedure,a specimen should be resubmitted post fluorescein clearance  The recommended reference ranges for TSH during pregnancy are as follows:  First trimester 0 1 to 2 5 uIU/mL  Second trimester  0 2 to 3 0 uIU/mL  Third trimester 0 3 to 3 0 uIU/m     (1) VITAMIN D 25-HYDROXY 02LUF7037 08:15AM [facility]irstin Otto Order Number: [id number]     Test Name Result Flag Reference   VIT D 25-HYDROX 17 6 ng/mL L 30 0-100 0   This assay is a certified procedure of the CDC Vitamin D Standardization Certification Program (VDSCP)     Deficiency <20ng/ml   Insufficiency 20-30ng/ml   Sufficient  ng/ml     *Patients undergoing fluorescein dye angiography may retain small amounts of fluorescein in the body for 48-72 hours post procedure  Samples containing fluorescein can produce falsely elevated Vitamin D values  If the patient had this procedure, a specimen should be resubmitted post fluorescein clearance

## 2018-01-13 VITALS
SYSTOLIC BLOOD PRESSURE: 144 MMHG | WEIGHT: 272 LBS | OXYGEN SATURATION: 95 % | HEIGHT: 64 IN | DIASTOLIC BLOOD PRESSURE: 60 MMHG | HEART RATE: 101 BPM | BODY MASS INDEX: 46.44 KG/M2 | RESPIRATION RATE: 18 BRPM | TEMPERATURE: 98 F

## 2018-01-13 VITALS
HEART RATE: 76 BPM | BODY MASS INDEX: 45.07 KG/M2 | SYSTOLIC BLOOD PRESSURE: 142 MMHG | OXYGEN SATURATION: 92 % | TEMPERATURE: 98.6 F | WEIGHT: 264 LBS | RESPIRATION RATE: 18 BRPM | DIASTOLIC BLOOD PRESSURE: 78 MMHG | HEIGHT: 64 IN

## 2018-01-14 NOTE — RESULT NOTES
Verified Results  (1) CBC/PLT/DIFF 01Fxc2499 09:19AM Starla Drummond Order Number: [id number]     Test Name Result Flag Reference   WBC COUNT 6 90 Thousand/uL  4 31-10 16   RBC COUNT 4 86 Million/uL  3 81-5 12   HEMOGLOBIN 14 4 g/dL  11 5-15 4   HEMATOCRIT 45 4 %  34 8-46  1   MCV 93 fL  82-98   MCH 29 6 pg  26 8-34 3   MCHC 31 7 g/dL  31 4-37 4   RDW 16 7 % H 11 6-15 1   MPV 10 5 fL  8 9-12 7   PLATELET COUNT 378 Thousands/uL  149-390   NEUTROPHILS RELATIVE PERCENT 52 %  43-75   LYMPHOCYTES RELATIVE PERCENT 31 %  14-44   MONOCYTES RELATIVE PERCENT 13 % H 4-12   EOSINOPHILS RELATIVE PERCENT 4 %  0-6   BASOPHILS RELATIVE PERCENT 0 %  0-1   NEUTROPHILS ABSOLUTE COUNT 3 57 Thousands/? ??L  1 85-7 62   LYMPHOCYTES ABSOLUTE COUNT 2 16 Thousands/? ??L  0 60-4 47   MONOCYTES ABSOLUTE COUNT 0 89 Thousand/? ??L  0 17-1 22   EOSINOPHILS ABSOLUTE COUNT 0 25 Thousand/? ??L  0 00-0 61   BASOPHILS ABSOLUTE COUNT 0 03 Thousands/? ??L  0 00-0 10     (1) COMPREHENSIVE METABOLIC PANEL 15WZA6803 22:96FE Starla Drummond Order Number: [id number]     Test Name Result Flag Reference   SODIUM 140 mmol/L  136-145   POTASSIUM 4 1 mmol/L  3 5-5 3   CHLORIDE 102 mmol/L  100-108   CARBON DIOXIDE 30 mmol/L  21-32   ANION GAP (CALC) 8 mmol/L  4-13   BLOOD UREA NITROGEN 19 mg/dL  5-25   CREATININE 0 66 mg/dL  0 60-1 30   Standardized to IDMS reference method   CALCIUM 9 6 mg/dL  8 3-10 1   BILI, TOTAL 0 50 mg/dL  0 20-1 00   ALK PHOSPHATAS 85 U/L     ALT (SGPT) 46 U/L  12-78   Specimen collection should occur prior to Sulfasalazine administration due to the potential for falsely depressed results  AST(SGOT) 18 U/L  5-45   Specimen collection should occur prior to Sulfasalazine administration due to the potential for falsely depressed results     ALBUMIN 3 7 g/dL  3 5-5 0   TOTAL PROTEIN 7 4 g/dL  6 4-8 2   eGFR 90 ml/min/1 73sq m     [employer] Disease Education Program recommendations are as follows:  GFR calculation is accurate only with a steady state creatinine  Chronic Kidney disease less than 60 ml/min/1 73 sq  meters  Kidney failure less than 15 ml/min/1 73 sq  meters  GLUCOSE FASTING 158 mg/dL H 65-99   Specimen collection should occur prior to Sulfasalazine administration due to the potential for falsely depressed results  Specimen collection should occur prior to Sulfapyridine administration due to the potential for falsely elevated results  (1) HEMOGLOBIN A1C 09Sep2017 09:19AM [facility] Order Number: [id number]     Test Name Result Flag Reference   HEMOGLOBIN A1C 8 2 % H 4 2-6 3   EST  AVG  GLUCOSE 189 mg/dl       (1) LIPID PANEL, FASTING 49Chd1671 09:19AM [facility] Order Number: [id number]     Test Name Result Flag Reference   CHOLESTEROL 225 mg/dL H    HDL,DIRECT 65 mg/dL H 40-60   Specimen collection should occur prior to Metamizole administration due to the potential for falsley depressed results  LDL CHOLESTEROL CALCULATED 114 mg/dL H 0-100   Triglyceride:        Normal ??? ??? ??? ??? ??? ??? ??? <150 mg/dl   ??? ??? ???Borderline High ??? ??? 150-199 mg/dl   ??? ??? ? ?? High ??? ??? ??? ??? ??? ??? ??? 200-499 mg/dl   ??? ??? ? ??Very High ??? ??? ??? ??? ??? >499 mg/dl      Cholesterol:       Desirable ??? ??? ??? ??? <200 mg/dl   ??? ??? Borderline High ??? 200-239 mg/dl   ??? ??? High ??? ??? ??? ??? ??? ??? >239 mg/dl      HDL Cholesterol:       High ??? ???>59 mg/dL   ??? ??? Low ??? ??? <41 mg/dL      This screening LDL is a calculated result  It does not have the accuracy of the Direct Measured LDL in the monitoring of patients with hyperlipidemia and/or statin therapy  Direct Measure LDL (DXF851) must be ordered separately in these patients  TRIGLYCERIDES 229 mg/dL H <=150   Specimen collection should occur prior to N-Acetylcysteine or Metamizole administration due to the potential for falsely depressed results  (1) TSH 83Aic3267 09:19AM [facility] Order Number: [id number]     Test Name Result Flag Reference   TSH 0 033 uIU/mL L 0 358-3 740   Patients undergoing fluorescein dye angiography may retain small amounts of fluorescein in the body for 48-72 hours post procedure  Samples containing fluorescein can produce falsely depressed TSH values  If the patient had this procedure,a specimen should be resubmitted post fluorescein clearance  The recommended reference ranges for TSH during pregnancy are as follows:  First trimester 0 1 to 2 5 uIU/mL  Second trimester  0 2 to 3 0 uIU/mL  Third trimester 0 3 to 3 0 uIU/m     (1) VITAMIN B12 09Qxd7191 09:19AM [facility] Order Number: [id number]     Test Name Result Flag Reference   VITAMIN B12 271 pg/mL  100-900     (1) VITAMIN D 25-HYDROXY 67Zig0741 09:19AM [facility] Order Number: [id number]     Test Name Result Flag Reference   VIT D 25-HYDROX 31 5 ng/mL  30 0-100 0   This assay is a certified procedure of the CDC Vitamin D Standardization Certification Program (VDSCP)     Deficiency <20ng/ml   Insufficiency 20-30ng/ml   Sufficient  ng/ml     *Patients undergoing fluorescein dye angiography may retain small amounts of fluorescein in the body for 48-72 hours post procedure  Samples containing fluorescein can produce falsely elevated Vitamin D values  If the patient had this procedure, a specimen should be resubmitted post fluorescein clearance

## 2018-01-15 NOTE — MISCELLANEOUS
Assessment    1  COPD exacerbation (491 21) (J44 1)   2  Sepsis (038 9,995 91) (A41 9)   3  Community acquired pneumonia (5) (J18 9)   4  Vitamin B12 deficiency (266 2) (E53 8)   5  Hypokalemia (276 8) (E87 6)    Plan  Community acquired pneumonia, Hypokalemia    · (1) COMPREHENSIVE METABOLIC PANEL; Status:Active; Requested for:10Nov2016;    Perform:[facility]; EUE:[id number];SYUOKOO; For:Community acquired pneumonia, Hypokalemia; Ordered By:Adia Heck;  [employer] acquired pneumonia, Sepsis    · * XR CHEST PA & LATERAL; Status:Active; Requested for:10Nov2016;    Perform:[facility]; BQR:21NIL5282;PLLGPVW; For:Community acquired pneumonia, Sepsis; Ordered By:Adia Heck; Rheumatoid arthritis    · Hydrocodone-Acetaminophen 5-325 MG Oral Tablet; TAKE 1 TABLET EVERY 6  HOURS AS NEEDED FOR PAIN   Rx By: Ginette Gomez; Dispense: 10 Days ; #:40 Tablet; Refill: 0; For: Rheumatoid arthritis; NIKKI = N; Print Rx  Vitamin B12 deficiency    · Vitamin B-12 1000 MCG Oral Tablet; TAKE 1 TABLET DAILY AS DIRECTED   Rx By: Ginette Gomez; Dispense: 30 Days ; #:30 Tablet; Refill: 5; For: Vitamin B12 deficiency; NIKKI = N; Record   · (1) VITAMIN B12; Status:Active; Requested for:76Zxv0740;    Perform:[facility]; [facility]; For:Vitamin B12 deficiency; Ordered By:Adia Heck;    Discussion/Summary  Discussion Summary:   Reviewed hospital records with her  Advised her to contact rheumatology regarding when she should restart the methotrexate and the Cimzia  Continue nebulizer treatments at least twice a day and can increase if needed  Continue with Januvia and place of the metformin  Reviewed laboratory testing from the hospital  Her potassium was somewhat low in the hospital  formerly [facility] recheck a CMP over the next few weeks  Discussed with her that her B-12 level was slightly low  Advised her to take over-the-counter B-12 1000 mg on a daily basis  Fluids   Rest  Complete continued antibiotics  Will recheck a chest x-ray in about 4-6 weeks  Advised her to call or follow-up if her symptoms worsen or persist  Otherwise we'll have her follow-up as previously scheduled  Chief Complaint  Chief Complaint Free Text Note Form: PT here for [facility] from [facility]  Pt states she is feeling well today, no complaints  History of Present Illness  TCM Communication [facility]: The patient is being contacted for follow-up after hospitalization  Hospital records were reviewed  She was hospitalized at [facility]  The date of admission: 11/03/2016, date of discharge: 11/06/2016  Diagnosis: COPD EXAC  She was discharged to home  Medications reviewed and updated today  She scheduled a follow up appointment  Symptoms: weakness and cough  Counseling was provided to the patient  Communication performed and completed by Lisa Man   HPI: She presents for follow-up after recent hospitalization for community-acquired pneumonia, COPD exacerbation, and sepsis  She states that she has generally been feeling well until about 3-4 days prior to admission  She had basically recovered from her previous illness without difficulty  About 3-4 days prior to admission she began to have increased shortness of breath and cough  She did have some chest pain at that point that she felt it was because of the cough  Continue to use her nebulizer at that point that her symptoms progressively worsened  Began to feel overall fatigue and weakness  She presented to the emergency room and was found to have pneumonia as well as an elevated lactic acid level  She had the criteria for sepsis  She was hydrated  Started on IV antibiotics and steroids  Was given frequent nebulizer treatments as well  She was able to be weaned off of the oxygen and her symptoms improved and she was able to be discharged home   Because of the elevated lactic acid level which could've been from sepsis or possibly they felt could be because of the metformin, they discontinued her metformin and started her Januvia  She he continues to take the cefuroxime and the Zithromax to complete 5 additional days after discharge  Has generally been feeling significantly better  Still somewhat more tired  Otherwise feels that her breathing is almost back to her baseline  Appetite is improving  Tolerating the Januvia without difficulty  Denies any nausea or vomiting  Denies any changes in bowel movements  Denies any chest pain or palpitations  Still with the overall joint complaints but even these are generally stable at present      Review of Systems  Complete-Female:   Constitutional: as noted in HPI    ENT: as noted in HPI  Cardiovascular: as noted in HPI  Respiratory: as noted in HPI  Gastrointestinal: as noted in HPI  Musculoskeletal: as noted in HPI  Neurological: as noted in HPI  Endocrine: as noted in HPI  ROS Reviewed:   ROS reviewed  Active Problems    1  Abnormal mammogram of right breast (793 80) (R92 8)   2  Anxiety (300 00) (F41 9)   3  Burning with urination (788 1) (R30 0)   4  Candidal skin infection (112 3) (B37 2)   5  Cellulitis of left leg (682 6) (L03 116)   6  Chronic obstructive pulmonary disease (496) (J44 9)   7  COPD exacerbation (491 21) (J44 1)   8  Costochondritis (733 6) (M94 0)   9  Depression screening (V79 0) (Z13 89)   10  Diabetes mellitus, type 2 (250 00) (E11 9)   11  Encounter for screening for other nervous system disorders (V80 09) (Z13 858)   12  Encounter for screening mammogram for breast cancer (V76 12) (Z12 31)   13  Encounter for special screening examination for genitourinary disorder (V81 6) (Z13 89)   14  Esophageal reflux (530 81) (K21 9)   15  Fibromyalgia (729 1) (M79 7)   16  Hypercholesterolemia (272 0) (E78 00)   17  Hypertension (401 9) (I10)   18  Hypothyroidism (244 9) (E03 9)   19  Initial Medicare annual wellness visit (V70 0) (Z00 00)   20  Obesity (278 00) (E66 9)   21   Osteoarthritis (715 90) (M19 90)   22  Osteopenia (733 90) (M85 80)   23  Rheumatoid arthritis (714 0) (M06 9)   24  Screening for colon cancer (V76 51) (Z12 11)   25  Steroid long-term use (V58 65)   26  Vitamin D deficiency (268 9) (E55 9)    Past Medical History    1  History of Acute medial meniscus tear of left knee (836 0) (S83 242A)   2  History of Degenerative arthritis of left knee (715 96) (M17 9)   3  History of Fistula of knee (719 86) (M25 169)   4  History of constipation (V12 79) (Z87 19)   5  History of senile atrophic vaginitis (V13 29) (Z87 42)   6  History of tinea cruris (V12 09) (Z86 19)   7  History of vaginitis (V13 29) (Z87 42)   8  History of Neck Strain (847 0)   9  History of Post-op bleeding (998 11)   10  History of Tick bite (919 4,E906 4) (W57 XXXA)   11  History of Vulvitis (616 10) (N76 2)    Surgical History    1  History of Cataract Surgery   2  History of Cholecystectomy Laparoscopic   3  History of Hysterectomy   4  History of Knee Arthroplasty   5  History of Neuroplasty Median Nerve At Carpal Tunnel   6  History of Tubal Ligation  Surgical History Reviewed: The surgical history was reviewed and updated today  Family History  Mother    1  Family history of Stroke (434 91) (I63 9)  Family History    2  Family history of Asthma (V17 5)   3  Family history of Cancer   4  Family history of Diabetes Mellitus (V18 0)   5  Family history of Hypertension (V17 49)  Family History Reviewed: The family history was reviewed and updated today  Social History    · History of Current Every Day Smoker (305 1)   · Recently Stopping Smoking (V15 82)   · Stopped Drinking Alcohol  Social History Reviewed: The social history was reviewed and updated today  Current Meds   1  Advair Diskus 250-50 MCG/DOSE Inhalation Aerosol Powder Breath Activated; INHALE 1   PUFF TWICE DAILY; Therapy: 75CXY2441 to (Evaluate:13Jun2015)  Requested for: 87BQH8436; Last   Rx:18Jun2014 Ordered   2   Albuterol Sulfate (2 5 MG/3ML) 0 083% Inhalation Nebulization Solution; USE 1 UNIT   DOSE VIA NEBULIZER  4 TIMES A DAY AS NEEDED  Requested for:   06VCT2827; Last Rx:99Eeh9353 Ordered   3  Benzonatate 200 MG Oral Capsule; TAKE 1 CAPSULE 3 TIMES DAILY AS NEEDED; Therapy: 17AKC4662 to (Evaluate:73Hqd5347)  Requested for: 84Kbc4276; Last   Rx:13Lkp4759 Ordered   4  Cefuroxime Axetil 500 MG Oral Tablet; Therapy: (Recorded:10Nov2016) to Recorded   5  Cimzia 2 X 200 MG Subcutaneous Kit; One injection every two weeks, then monthly after   2 doses; Therapy: (Recorded:28Ngj4205) to Recorded   6  Clobetasol Propionate E 0 05 % External Cream; APPLY TO AFFECTED AREA 3 TO 4   TIMES DAILY; Therapy: 38BWY4320 to (Last Rx:99Afm0569)  Requested for: 11Yfd3026 Ordered   7  Clotrimazole-Betamethasone 1-0 05 % External Cream Recorded   8  Diclofenac Sodium 1 % Transdermal Gel; APPLY SMALL AMOUNT ONCE DAILY AS   DIRECTED; Therapy: 23CZB4200 to (Last Rx:26Pws4725)  Requested for: 84Qsf7409 Ordered   9  DULoxetine HCl - 20 MG Oral Capsule Delayed Release Particles; TAKE ONE CAPSULE   BY MOUTH ONCE DAILY; Therapy: 31Pgm9520 to (Evaluate:27Nov2016)  Requested for: 42JHM2238; Last   Rx:88Vzm6551 Ordered   10  Folic Acid 1 MG Oral Tablet; TAKE ONE TABLET BY MOUTH ONCE DAILY; Therapy: 88RNQ2768 to (Evaluate:97Jpk8372)  Requested for: 29Iwl7095; Last    Rx:84Ppq8868 Ordered   11  Furosemide 40 MG Oral Tablet; Take 1 tablet daily; Therapy: 83EWY4047 to (Evaluate:26Rum0381)  Requested for: 10Aug2016; Last    Rx:10Aug2016 Ordered   12  Guaifenesin-Codeine 100-10 MG/5ML SYRP; Take 1 teaspoonful every 6 hours as    needed; Therapy: 24BGG4212 to (Last QO:53LGS8673) Ordered   13  Hydrocodone-Acetaminophen 5-325 MG Oral Tablet; TAKE 1 TABLET EVERY 6 HOURS    AS NEEDED FOR PAIN;    Therapy: 36OCO2032 to (Evaluate:28Jun2015)  Requested for: 28HKB8382; Last    Rx:08Jun2015 Ordered   14  Januvia 100 MG Oral Tablet;     Therapy: (Recorded:10Nov2016) to Recorded   15  Levothyroxine Sodium 150 MCG Oral Tablet; TAKE 1 TABLET DAILY AS DIRECTED; Therapy: (Recorded:10Nov2016) to Recorded   16  Lidoderm 5 % External Patch; Therapy: (Recorded:08Jun2015) to Recorded   17  Lisinopril 40 MG Oral Tablet; Take 1 tablet daily; Therapy: 52DQC4880 to (Renew:78Gpg9343)  Requested for: 90Htk5310; Last    Rx:60Ujh6270 Ordered   18  MetFORMIN HCl - 500 MG Oral Tablet; take 2 tablet twice daily; Therapy: 26Cmb5561 to (Evaluate:17Jan2017)  Requested for: 37Bfx7610; Last    Rx:83Nbj1235 Ordered   19  Methotrexate Sodium 2 5 MG TABS; TAKE 8 TABLET Weekly; Therapy: (Recorded:21Jul2016) to Recorded   20  Nystatin 589957 UNIT/GM External Cream; APPLY  AND RUB  IN A THIN FILM TO    AFFECTED AREAS TWICE DAILY  (AM AND PM) for 2 weeks; Therapy: 47Pej7821 to (Last Rx:40Ykw6102)  Requested for: 06Hkb6652 Ordered   21  OneTouch Delica Lancets Fine Miscellaneous; TEST TWICE A DAY; Therapy: 34KCR2330 to (Last Cristian Hoover)  Requested for: 26Oct2016 Ordered   22  OneTouch Verio In [employer]; TEST TWICE DAILY; Therapy: 81AIU2923 to (Evaluate:35Cjb4907)  Requested for: 24Oct2016; Last    Rx:24Oct2016 Ordered   23  Pantoprazole Sodium 40 MG Oral Tablet Delayed Release; TAKE ONE TABLET BY    MOUTH ONCE DAILY; Therapy: 21HYR5651 to (Evaluate:11Lfe9216)  Requested for: 63Wju2013; Last    Rx:51Oqu9074 Ordered   24  PredniSONE 20 MG Oral Tablet; Therapy: (Recorded:10Nov2016) to Recorded   25  PredniSONE 5 MG Oral Tablet; TAKE 1 TABLET DAILY AS DIRECTED; Therapy: (Recorded:39Ahg6508) to Recorded   26  Premarin 0 625 MG/GM Vaginal Cream; INSERT 1 GRAM APPLICATORFUL 2X WEEKLY; Therapy: 20VMA3345 to (Last Rx:22Jan2015) Ordered   27  ProAir  (90 Base) MCG/ACT Inhalation Aerosol Solution; INHALE 2 PUFFS    EVERY 4-6 HOURS, SPACED 60 SECONDS APART; Therapy: 78GIV2516 to (Eleanor Gallardo)  Requested for: 03Apr2014; Last    Rx:03Apr2014 Ordered   28   Spiriva HandiHaler 18 MCG Inhalation Capsule; INHALE CONTENTS OF 1 CAPSULE    ONCE DAILY; Therapy: 07IDZ5000 to (Evaluate:94Dsk1931)  Requested for: 36Nbj3034; Last    Rx:28Swg8148 Ordered   29  Vitamin D3 2000 UNIT Oral Tablet; Take 1 tablet daily; Therapy: 63QRI9280 to (Last Rx:29Vce4349) Ordered   30  Zithromax Z-Lev 250 MG Oral Tablet; Therapy: (Recorded:10Nov2016) to Recorded  Medication List Reviewed: The medication list was reviewed and updated today  Allergies    1  No Known Drug Allergies    Vitals  Signs   Recorded: 61KKC6756 70:81DH   Systolic: 918  Diastolic: 76  Heart Rate: 87  Respiration: 18  Pain Scale: 0  O2 Saturation: 94  Height: 5 ft 4 in  Weight: 262 lb 5 oz  BMI Calculated: 45 03  BSA Calculated: 2 19    Physical Exam    Constitutional   General appearance: No acute distress, well appearing and well nourished  Eyes   Conjunctiva and lids: No swelling, erythema or discharge  Ears, Nose, Mouth, and Throat   External inspection of ears and nose: Normal     Otoscopic examination: Tympanic membranes translucent with normal light reflex  Canals patent without erythema  Oropharynx: Normal with no erythema, edema, exudate or lesions  Pulmonary   Auscultation of lungs: Abnormal   Auscultation of the lungs revealed decreased breath sounds diffusely  Cardiovascular   Auscultation of heart: Normal rate and rhythm, normal S1 and S2, without murmurs  Lymphatic   Palpation of lymph nodes in neck: No lymphadenopathy  Musculoskeletal   Gait and station: Normal          Future Appointments    Date/Time Provider Specialty Site   01/03/2017 11:00 AM GRAHAM Kang   [address] [place] [address]     Signatures   Electronically signed by : GRAHAM Bernal ; Nov 18 2016  4:50AM EST                       (Author)

## 2018-01-15 NOTE — MISCELLANEOUS
To Whom It May Concern:       Due to her chronic obstructive pulmonary disease and her chronic shortness of breath, she requires the head of her bed to be elevated to about 30 degrees  She also needs frequent  repositioning due to her rheumatoid arthritis and chronic pain  Thank you  Please contact our office if further questions  Sincerely,                 GRAHAM Arenas  Electronically signed Isabel JONES    Aug 22 2017 12:02AM EST Author

## 2018-01-16 NOTE — PROGRESS NOTES
Assessment    1  Diabetes mellitus, type 2 (250 00) (E11 9)   2  Chronic obstructive pulmonary disease (496) (J44 9)   3  Hypothyroidism (244 9) (E03 9)   4  Hypertension (401 9) (I10)   5  Hypercholesterolemia (272 0) (E78 00)   6  Rheumatoid arthritis (714 0) (M06 9)   7  Vitamin B12 deficiency (266 2) (E53 8)   8  Vitamin D deficiency (268 9) (E55 9)   9   Medicare annual wellness visit, subsequent (V70 0) (Z00 00)    Plan   Chronic obstructive pulmonary disease    · Azithromycin 250 MG Oral Tablet; TAKE 2 TABLETS ON DAY 1 THEN TAKE 1  TABLET A DAY FOR 4 DAYS   · 3 times a day practice pursed lip and abdominal breathing ; Status:Complete;   Done:  65TRL7254   · Avoid  exposure to cigarette smoke ; Status:Complete;   Done: 12SDG4691   · Avoid exposure to household dust, animal dander, and molds ; Status:Complete;   Done:  15LFY1663   · Avoid exposure to infections ; Status:Complete;   Done: 99LJR5838   · Avoid exposure to things that make your problem worse ; Status:Complete;   Done:  87XAF0111   · Decreasing the stress in your life may help your condition improve ; Status:Complete;    Done: 49WXT1588   · Eat small frequent meals ; Status:Complete;   Done: 44EIY8886   · Call [phone] if: You have difficulty breathing while lying down and you are  comfortable only when sitting up ; Status:Complete;   Done: 27ZQG3199   · Call [phone] if: Your temperature is higher than 102F ; Status:Complete;   Done:  26QRZ4238   · Call 911 if: You are too short of breath to talk, you can speak only one or two words  between breaths, or your lips or nails look blue ; Status:Complete;   Done: 06IKB9850   · Seek Immediate Medical Attention if: You are having trouble staying awake ;  Status:Complete;   Done: 26NMW7023   · Seek Immediate Medical Attention if: You feel short of breath even while resting ;  Status:Complete;   Done: 47ZZK1225   · Seek Immediate Medical Attention if: You get a headache that does not go away with  your usual treatment ; Status:Complete;   Done: 12RQU0128   · Seek Immediate Medical Attention if: You have pain in the chest that gets worse with  deep breathing or coughing ; Status:Complete;   Done: 22IKZ8285   · Seek Immediate Medical Attention if: You or your family members notice any confusion  or difficulty with memory ; Status:Complete;   Done: 69RVO0876   · Seek Immediate Medical Attention if: Your shortness of breath is getting worse ;  Status:Complete;   Done: 70FXZ1003  Chronic obstructive pulmonary disease, Diabetes mellitus, type 2,  Hypercholesterolemia, Hypertension, Hypothyroidism, Rheumatoid arthritis, Vitamin B12  deficiency, Vitamin D deficiency    · (1) CBC/PLT/DIFF; Status:Active; Requested for:15Fmo1539;    · (1) COMPREHENSIVE METABOLIC PANEL; Status:Active; Requested for:51Eld9179;    · (1) HEMOGLOBIN A1C; Status:Active; Requested for:32Ksj8051;    · (1) LIPID PANEL, FASTING; Status:Active; Requested for:17Gpo2375;    · (1) TSH; Status:Active; Requested for:32Fhk2557;    · (1) VITAMIN B12; Status:Active; Requested for:76Jtq5163;    · (1) VITAMIN D 25-HYDROXY; Status:Active; Requested for:89Ali7407;   Diabetes mellitus, type 2    · Januvia 100 MG Oral Tablet; TAKE 1 TABLET DAILY   · Call [phone] if: You are having trouble following our instructions or treatment for  any reason ; Status:Complete;   Done: 25VRR9151   · Call [phone] if: You start vomiting ; Status:Complete;   Done: 12XZF8560   · Call [phone] if: Your blood sugar is higher than 250 ; Status:Complete;   Done:  24TBS7402   · Call [phone] if: Your blood sugar is steadily becoming higher ; Status:Complete;    Done: 79UVC2306   · Call 911 if: There are symptoms of ketoacidosis  ; Status:Complete;   Done: 50KEM8272   · Call 911 if: You have a seizure ; Status:Complete;   Done: 74JWU6637   · Call 911 if: You have any symptoms of a stroke ; Status:Complete;   Done: 25DPL9820   · Call 911 if: You have fainted or passed out ; Status:Complete;   Done: 12OXY9127   · Seek Immediate Medical Attention if: There are signs that the blood sugar is too high  (hyperglycemia) ; Status:Complete;   Done: 85HMC0275   · Seek Immediate Medical Attention if: There are signs that the blood sugar is too low  (hypoglycemia) ; Status:Complete;   Done: 15NII1252   · Seek Immediate Medical Attention if: You become dehydrated ; Status:Complete;   Done:  10NTG9722   · Seek Immediate Medical Attention if: You experience a new kind of chest pain (angina)  or pressure ; Status:Complete;   Done: 51QGY1428   · Seek Immediate Medical Attention if: You notice that breathing is rapid, more than 40  times a minute ; Status:Complete;   Done: 36XQP6181   · Seek Immediate Medical Attention if: Your blood sugar is higher than 400 ;  Status:Complete;   Done: 11XMY4613   · Seek Immediate Medical Attention if: Your eyesight becomes blurry or you have difficulty  seeing ; Status:Complete;   Done: 54FUA8325   · Begin or continue regular aerobic exercise   Gradually work up to at least 3 sessions of 30  minutes of exercise a week ; Status:Complete;   Done: 09WQV8412   · Cut your nails straight across ; Status:Complete;   Done: 96UAA4435   · Have your eyes examined by an eye doctor every year ; Status:Complete;   Done:  78KPV3888   · If you have symptoms of being hypoglycemic or your blood sugar is less than 60, you  need to eat or drink a source of sugar ; Status:Complete;   Done: 53UXN2420   · Inspect your feet and legs daily if you have vascular disease ; Status:Complete;   Done:  73UXJ2631   · Inspect your feet daily ; Status:Complete;   Done: 92YZT6497   · It is important to take good care of your feet if you have diabetes ; Status:Complete;    Done: 99RYZ6603   · It is important to take good care of your feet ; Status:Complete;   Done: 92CDD3862   · Restrict the salt in your diet by avoiding highly salted foods ; Status:Complete;   Done:  05GBB8664   · Start eating more fiber ; Status:Complete;   Done: 15QGP8464   · Wear a medical alert bracelet or tag ; Status:Complete;   Done: 52HPZ1539   · Wear shoes that give your toes plenty of room ; Status:Complete;   Done: 72RVH9611  Hypercholesterolemia    · Eat no more than 30 grams of fat per day ; Status:Complete;   Done: 14IAH0468   · There are many exercise options for seniors ; Status:Complete;   Done: 59LDH9729   · Call [phone] if: You have muscle cramps ; Status:Complete;   Done: 35LMJ3430   · Call [phone] if: You have pain in the stomach area ; Status:Complete;   Done:  34BGZ0871  Hypertension    · A diet low in sodium and high in potassium, magnesium, and calcium can help your  blood pressure ; Status:Complete;   Done: 41ONO2064   · Begin a limited exercise program ; Status:Complete;   Done: 28RWI0481   · Eat a low fat and low cholesterol diet ; Status:Complete;   Done: 51LFW0020   · We encourage you to begin to make lifestyle changes to help control your blood  pressure    These may include losing weight, increasing your activity level, limiting salt in  your diet, decreasing alcohol intake, and eating a diet low in fat and rich in fruits  and vegetables ; Status:Complete;   Done: 01TCO7191   · Call [phone] if: You become dizzy or lightheaded, especially when you stand up  after sitting for a while ; Status:Complete;   Done: 49ASR7015   · Call [phone] if: You develop double vision (see two of everything) ;  Status:Complete;   Done: 33NLA4750   · Seek Immediate Medical Attention if: You have a severe headache that will not go away ;  Status:Complete;   Done: 88LQB3728   · Seek Immediate Medical Attention if: Your blood pressure is greater than 250/120 for 2  consecutive readings ; Status:Complete;   Done: 59XWI0583  Hypothyroidism    · Levothyroxine Sodium 150 MCG Oral Tablet   · Levothyroxine Sodium 137 MCG Oral Tablet; TAKE 1 TABLET DAILY IN THE  MORNING    Cyanocobalamin 1000 MCG/ML Injection Solution; inject 1  ml intramuscular; Done: 50TDZ1304 01:12PM; Status: COMPLETE - Retrospective By Protocol Authorization Ordered  Rx By: Bhumi Guzman; For: Vitamin B12 deficiency; Dose of 1 ML; Intramuscular; NIKKI = N; Administered by: Mahsa Vazquez: 9/11/2017 1:12:00 PM; Last Updated By: Evan Casanova; 9/11/2017 3:47:15 PM  Fluzone High-Dose 0 5 ML Intramuscular Suspension Prefilled Syringe; INJECT 0 5  ML Intramuscular; Dose: 0 5; Route: Intramuscular; Site: Left Deltoid; Done: 61LLR0476 01:10PM; Status: Complete - Retrospective Authorization;  Ordered  For: Influenza vaccine needed; Ordered By:Ioana Heck; Effective Date:11Sep2017; Administered by: Evan Casanova: 9/11/2017 1:10:00 PM; Last Updated By: Evan Casanova; 9/11/2017 3:47:15 PM     Discussion/Summary  Impression: Subsequent Annual Wellness Visit, with preventive exam as well as age and risk appropriate counseling completed  Cardiovascular screening and counseling: screening is current, counseling was given on maintaining a healthy diet, counseling was given on maintaining a healthy weight, counseling was given on ways to improve cholesterol and counseling was given on ways to improve exercise tolerance  Diabetes screening and counseling: screening is current, counseling was given on maintaining a healthy diet, counseling was given on maintaining a healthy weight and counseling was given on ways to improve physical activity  Breast cancer screening and counseling: due for a screening mammogram    Cervical cancer screening and counseling: the patient declines screening  Osteoporosis screening and counseling: screening is current  Abdominal aortic aneurysm screening and counseling: screening not indicated  Glaucoma screening and counseling: screening is current  HIV screening and counseling: screening not indicated   Immunizations: influenza vaccine is due today, influenza vaccination is recommended annually and the lifetime pneumococcal vaccine has been completed  Advance Directive Planning: paperwork and instructions were given to the patient, 5 wishes paperwork given  Possible side effects of new medications were reviewed with the patient/guardian today  The treatment plan was reviewed with the patient/guardian  The patient/guardian understands and agrees with the treatment plan      History of Present Illness  Welcome to Medicare and Wellness Visits: The patient is being seen for the subsequent annual wellness visit  Medicare Screening and Risk Factors   Hospitalizations: she has been previously hospitalizied and she has been hospitalized 3 times  Once per lifetime medicare screening tests: ECG (Prior to her surgery about one year ago)  Medicare Screening Tests Risk Questions   Abdominal aortic aneurysm risk assessment: none indicated  Osteoporosis risk assessment: , female gender and over 48years of age  HIV risk assessment: none indicated  Drug and Alcohol Use: The patient is a former cigarette smoker  The patient reports never drinking alcohol  She has never used illicit drugs  Diet and Physical Activity: Current diet includes well balanced meals  She exercises infrequently  Mood Disorder and Cognitive Impairment Screening: PHQ-9 Depression Scale   Depression screening  negative for symptoms  She denies feeling down, depressed, or hopeless over the past two weeks  She denies feeling little interest or pleasure in doing things over the past two weeks  Cognitive impairment screening: denies difficulty learning/retaining new information, denies difficulty handling complex tasks, denies difficulty with reasoning, denies difficulty with spatial ability and orientation, denies difficulty with language and denies difficulty with behavior  Functional Ability/Level of Safety: Hearing is normal bilaterally  She does not use a hearing aid   The patient is currently able to do activities of daily living without limitations, able to do instrumental activities of daily living without limitations, able to participate in social activities without limitations and able to drive without limitations  Activities of daily living details: does not need help using the phone, no transportation help needed, does not need help shopping, no meal preparation help needed, does not need help doing housework, does not need help doing laundry, does not need help managing medications and does not need help managing money  Injury History: polypharmacy, no alcohol use, mobility impairment, antidepressant use, deconditioning, no postural hypotension, no sedative use, no visual impairment, no urinary incontinence, no antihypertensive use, no cognitive impairment, up and go test was unsteady or greater than thirty seconds and no previous fall  Home safety risk factors:  no unfamiliar surroundings, no loose rugs, no poor household lighting, no uneven floors, no household clutter, grab bars in the bathroom and handrails on the stairs  Advance Directives: Advance directives: no living will  Co-Managers and Medical Equipment/Suppliers: See Patient Care Team      Patient Care Team    Care Team Member Role Specialty Office Number   Yoandy JONES  Specialist Orthopedic Surgery [phone]   Esmer Smith MD Specialist Rheumatology [phone]   Radhika Byers MD Specialist Ophthalmology [phone]   Preeti Cortez MD Specialist Obstetrics/Gynecology [phone]     Active Problems    1  Anxiety (300 00) (F41 9)   2  Candidiasis, cutaneous (112 3) (B37 2)   3  Chronic obstructive pulmonary disease (496) (J44 9)   4  Diabetes mellitus, type 2 (250 00) (E11 9)   5  Encounter for screening mammogram for breast cancer (V76 12) (Z12 31)   6  Esophageal reflux (530 81) (K21 9)   7  Fibromyalgia (729 1) (M79 7)   8  Hypercholesterolemia (272 0) (E78 00)   9  Hypertension (401 9) (I10)   10   Hypokalemia (276  8) (E87 6)   11  Hypothyroidism (244 9) (E03 9)   12  Obesity (278 00) (E66 9)   13  Osteoarthritis (715 90) (M19 90)   14  Osteopenia (733 90) (M85 80)   15  Pneumonia (486) (J18 9)   16  Rheumatoid arthritis (714 0) (M06 9)   17  Steroid long-term use (V58 65)   18  Visit for suture removal (V58 32) (Z48 02)   19  Vitamin B12 deficiency (266 2) (E53 8)   20  Vitamin D deficiency (268 9) (E55 9)    Past Medical History    · History of Acute medial meniscus tear of left knee (836 0) (X63 030L)   · Acute pharyngitis (462) (J02 9)   · History of Cellulitis of left leg (682 6) (L03 116)   · History of COPD exacerbation (491 21) (J44 1)   · History of Degenerative arthritis of left knee (715 96) (M17 12)   · History of Fistula of knee (719 86) (M25 169)   · History of constipation (V12 79) (Z87 19)   · History of senile atrophic vaginitis (V13 29) (Z87 42)   · History of sepsis (V12 09) (Z86 19)   · History of tinea cruris (V12 09) (Z86 19)   · History of vaginitis (V13 29) (Z87 42)   · History of Neck Strain (847 0)   · History of Post-op bleeding (998 11)   · History of Screening for colon cancer (V76 51) (Z12 11)   · History of Tick bite (919 4,E906 4) (W57 XXXA)   · History of Vulvitis (616 10) (N76 2)    Surgical History    · History of Cataract Surgery   · History of Cholecystectomy Laparoscopic   · History of Hysterectomy   · History of Knee Arthroplasty   · History of Neuroplasty Median Nerve At Carpal Tunnel   · History of Tubal Ligation    Family History  Mother    · Family history of Stroke (434 91) (I63 9)  Family History    · Family history of Asthma (V17 5)   · Family history of Cancer   · Family history of Diabetes Mellitus (V18 0)   · Family history of Hypertension (V17 49)    Social History    · History of Current Every Day Smoker (305 1)   · Former smoker (V15 82) (R94 136)   · No living will   · Recently Stopping Smoking (V15 82)   · Stopped Drinking Alcohol    Current Meds   1   Advair Diskus 250-50 MCG/DOSE Inhalation Aerosol Powder Breath Activated; INHALE   1 PUFF TWICE DAILY; Therapy: 17EBC5391 to (Evaluate:16Nov2017)  Requested for: 21Nov2016; Last   Rx:21Nov2016 Ordered   2  Albuterol Sulfate (2 5 MG/3ML) 0 083% Inhalation Nebulization Solution; USE 1 UNIT   DOSE VIA NEBULIZER  4 TIMES A DAY AS NEEDED  Requested for:   21Nov2016; Last Rx:21Nov2016 Ordered   3  Benzonatate 200 MG Oral Capsule; TAKE 1 CAPSULE 3 TIMES DAILY AS NEEDED; Therapy: 98ABU9708 to (Evaluate:20Aug2016)  Requested for: 21Jul2016; Last   Rx:21Jul2016 Ordered   4  Clobetasol Propionate E 0 05 % External Cream; APPLY TO AFFECTED AREA 3 TO 4   TIMES DAILY; Therapy: 08PTN4066 to (Last Rx:21Nov2016)  Requested for: 21Nov2016 Ordered   5  Clotrimazole-Betamethasone 1-0 05 % External Cream; APPLY TO AFFECTED AREA(s)   TWICE DAILY  **FOR EXTERNAL USE ONLY**  Requested for:   21Nov2016; Last Rx:21Nov2016 Ordered   6  DULoxetine HCl - 20 MG Oral Capsule Delayed Release Particles; TAKE ONE CAPSULE   BY MOUTH ONCE DAILY; Therapy: 86Cmx9425 to (Last Rx:57Vbm3816)  Requested for: 01Aug2017 Ordered   7  Folic Acid 1 MG Oral Tablet; TAKE ONE TABLET BY MOUTH ONCE DAILY; Therapy: 16QWQ0151 to (Evaluate:13Nov2017)  Requested for: 31GEK4181; Last   Rx:18Nov2016 Ordered   8  Furosemide 40 MG Oral Tablet; Take 1 tablet daily; Therapy: 11TDR8542 to (Evaluate:16Nov2017)  Requested for: 21Nov2016; Last   Rx:21Nov2016 Ordered   9  Invokana 100 MG Oral Tablet; take one tablet by mouth every day; Therapy: 27BAS6644 to (Last Rx:51Tgm5521)  Requested for: 07Aug2017 Ordered   10  Ipratropium-Albuterol 0 5-2 5 (3) MG/3ML Inhalation Solution; nebulize one now; To Be    Done: 73EMB7759; Status: HOLD FOR - Administration Ordered   11  Levalbuterol HCl - 1 25 MG/3ML Inhalation Nebulization Solution; INHALE 1  VIAL    Inhalation; To Be Done: 76NNA9064; Status: HOLD FOR - Administration Ordered   12   Levothyroxine Sodium 150 MCG Oral Tablet; TAKE 1 TABLET BY MOUTH DAILY AS    DIRECTED; Therapy: 13Gqp2988 to (Last Rx:34Vgo4205)  Requested for: 15Jkr3443 Ordered   13  Lisinopril 40 MG Oral Tablet; Take 1 tablet daily; Therapy: 27PUK4555 to (Renew:13Nov2017)  Requested for: 37ZXA8574; Last    Rx:18Nov2016 Ordered   14  Methotrexate 2 5 MG Oral Tablet; TAKE 8 TABLETS BY MOUTH WEEKLY; Therapy: 46VWL7693 to (Last Rx:23Ppr1579)  Requested for: 47WMG4690 Ordered   15  Mucinex 600 MG Oral Tablet Extended Release 12 Hour; TAKE 1 OR 2 TABLETS TWICE    DAILY AS NEEDED FOR CONGESTION; Therapy: 39OZP1092 to (Evaluate:38Hba6479); Last Rx:21Nov2016 Ordered   16  Naproxen 500 MG Oral Tablet; Therapy: 90Ndc3996 to Recorded   17  Nystatin 616658 UNIT/GM External Cream; APPLY  AND RUB  IN A THIN FILM TO    AFFECTED AREAS TWICE DAILY  (AM AND PM) for 2 weeks; Therapy: 74VOP5998 to (Last Rx:07Jun2017)  Requested for: 07Jun2017 Ordered   18  Nystatin 744438 UNIT/GM External Cream; APPLY AND RUB IN A THIN FILM TO    AFFECTED AREAS TWICE DAILY  (AM AND AFTER M) FOR 2 WEEKS; Therapy: 39UHT6194 to (Last Maeve Navarrete)  Requested for: 30Jun2017 Ordered   19  Nystatin 201654 UNIT/GM External Powder; APPLY 2-3 TIMES DAILY TO AFFECTED    AREA(S) as needed; Therapy: 19ZBE8715 to (Last Rx:07Jun2017)  Requested for: 07Jun2017 Ordered   20  OneTouch Delica Lancets Fine Miscellaneous; TEST TWICE A DAY; Therapy: 00CAD7392 to (Last Cassi Batres)  Requested for: 26Oct2016 Ordered   21  OneTouch Verio In [employer]; TEST TWICE DAILY; Therapy: 43ZGR1193 to (Evaluate:36Snq8432)  Requested for: 24Oct2016; Last    Rx:24Oct2016 Ordered   22  Pantoprazole Sodium 40 MG Oral Tablet Delayed Release; TAKE ONE TABLET BY    MOUTH ONCE DAILY; Therapy: 64DBZ4948 to (Evaluate:13Nov2017)  Requested for: 93GBD8304; Last    Rx:18Nov2016 Ordered   23  PredniSONE 5 MG Oral Tablet; TAKE 1 TABLET DAILY AS DIRECTED  Requested for:    72DFC8150; Last Rx:18Nov2016 Ordered   24   [facility] 108 (90 Base) MCG/ACT Inhalation Aerosol Solution; INHALE 2 PUFFS    EVERY 4-6 HOURS, SPACED 60 SECONDS APART; Therapy: 90LVQ2273 to (Evaluate:13Nov2017)  Requested for: 60YHA3429; Last    Rx:18Nov2016 Ordered   25  Spiriva HandiHaler 18 MCG Inhalation Capsule; INHALE CONTENTS OF 1 CAPSULE    ONCE DAILY; Therapy: 90IDU5118 to (Evaluate:17Jan2017)  Requested for: 71JPI2398; Last    Rx:18Nov2016 Ordered   26  Vitamin B-12 1000 MCG Oral Tablet; TAKE 1 TABLET DAILY AS DIRECTED; Therapy: 19JEE8512 to (Evaluate:14Mty0873)  Requested for: 36ASX7307; Last    Rx:18Nov2016 Ordered   27  Vitamin D (Ergocalciferol) 86928 UNIT Oral Capsule; TAKE ONE CAPSULE BY MOUTH    WEEKLY; Therapy: 54RGW7305 to (Last Rx:57Fwk4887)  Requested for: 02Lcr6983 Ordered   28  Xeljanz XR 11 MG Oral Tablet Extended Release 24 Hour; Therapy: (Yana Castro) to Recorded    The medication list was reviewed and updated today  Allergies    1   No Known Drug Allergies    Immunizations   1 2 3 4    Influenza  01-Oct-2013 15-Sep-2014 13-Oct-2015 27-Oct-2016    PCV  10-Feb-2016       PPSV  18-Nov-2008 02-Dec-2013      Tdap  30-Oct-2015        Vitals  Vitals    Systolic: 218, LUE, Sitting  Diastolic: 72, LUE, Sitting  Temperature: 98 F, Temporal  Heart Rate: 86  Respiration: 18  Height: 5 ft 4 in  Weight: 259 lb   BMI Calculated: 44 46  BSA Calculated: 2 18  O2 Saturation: 96   Systolic: 407, LUE, Sitting  Diastolic: 78, LUE, Sitting  Temperature: 98 6 F, Temporal  Heart Rate: 76  Respiration: 18  Height: 5 ft 4 in  Weight: 264 lb   BMI Calculated: 45 32  BSA Calculated: 2 2  O2 Saturation: 92   Systolic: 960, RUE, Sitting  Diastolic: 60, RUE, Sitting  Temperature: 98 F, Temporal  Heart Rate: 101  Respiration: 18  Height: 5 ft 4 in  Weight: 272 lb   BMI Calculated: 46 69  BSA Calculated: 2 23  O2 Saturation: 95   Systolic: 643, LUE, Sitting  Diastolic: 64, LUE, Sitting  Temperature: 98 F  Heart Rate: 111  Height: 5 ft 4 in  Weight: 256 lb BMI Calculated: 43 94  BSA Calculated: 2 18  O2 Saturation: 94   Systolic: 526, LUE, Sitting  Diastolic: 60, LUE, Sitting  Temperature: 98 4 F  Heart Rate: 97  O2 Saturation: 95   Systolic: 973  Diastolic: 76  Heart Rate: 87  Respiration: 18  Height: 5 ft 4 in  Weight: 262 lb 5 oz  BMI Calculated: 45 03  BSA Calculated: 2 19  Pain Scale: 0  O2 Saturation: 94   Systolic: 262  Diastolic: 84  Temperature: 98 1 F  Heart Rate: 112  Respiration: 20  Height: 5 ft 4 in  Weight: 263 lb   BMI Calculated: 45 14  BSA Calculated: 2 2  Pain Scale: 0   Temperature: 97 6 F  Heart Rate: 94  Respiration: 20  Height: 5 ft 4 in  Weight: 256 lb   BMI Calculated: 43 94  BSA Calculated: 2 17  O2 Saturation: 95   Systolic: 470  Diastolic: 72  Heart Rate: 84  Respiration: 18  Height: 5 ft 5 in  Weight: 269 lb   BMI Calculated: 44 76  BSA Calculated: 2 24  Pain Scale: 0   Systolic: 295  Diastolic: 72  Temperature: 100 6 F  Heart Rate: 103  Respiration: 16  Height: 5 ft 5 in  Weight: 267 lb   BMI Calculated: 44 43  BSA Calculated: 2 24  Pain Scale: 0   Systolic: 365  Diastolic: 72  Heart Rate: 86  Respiration: 18  Height: 5 ft 5 in  Weight: 273 lb 5 oz  BMI Calculated: 45 48  BSA Calculated: 2 26  Pain Scale: 0   Systolic: 926  Diastolic: 70  Temperature: 96 8 F  Heart Rate: 88  Respiration: 18  Height: 5 ft 5 in  Weight: 275 lb   BMI Calculated: 45 76  BSA Calculated: 2 27  Pain Scale: 0  O2 Saturation: 95   Systolic: 326  Diastolic: 82  Heart Rate: 115  Respiration: 18  Height: 5 ft 5 in  Weight: 275 lb   BMI Calculated: 45 76  BSA Calculated: 2 26  Pain Scale: 0   Systolic: 952  Diastolic: 64  Heart Rate: 90  Respiration: 20  Height: 5 ft 5 in  Weight: 267 lb 5 oz  BMI Calculated: 44 48  BSA Calculated: 2 24  Pain Scale: 0   Systolic: 077  Diastolic: 70  Heart Rate: 107  Respiration: 22  Height: 5 ft 5 in  Weight: 276 lb 5 oz  BMI Calculated: 45 98  BSA Calculated: 2 27  Pain Scale: 0  O2 Saturation: 97   Systolic: 650  Diastolic: 84  Height: 5 ft 5 in  Weight: 268 lb 4 96 oz  BMI Calculated: 44 65  BSA Calculated: 0 45   Systolic: 689  Diastolic: 86  Heart Rate: 108  Respiration: 20  Height: 5 ft 5 in  Weight: 268 lb 5 oz  BMI Calculated: 44 65  BSA Calculated: 2 24  Pain Scale: 0  O2 Saturation: 95   Systolic: 303, RUE, Sitting  Diastolic: 83, RUE, Sitting  Heart Rate: 106  Height: 5 ft 5 in  Weight: 263 lb 14 22 oz  BMI Calculated: 43 91  BSA Calculated: 9 85   Systolic: 881  Diastolic: 68  Heart Rate: 86  Respiration: 20  Height: 5 ft 5 in  Weight: 265 lb   BMI Calculated: 44 1  BSA Calculated: 2 23  Pain Scale: 7   Systolic: 562  Diastolic: 82  Heart Rate: 98  Respiration: 20  Height: 5 ft 5 in  Weight: 259 lb   BMI Calculated: 43 1  BSA Calculated: 2 21  Pain Scale: 3   Systolic: 700  Diastolic: 70  Heart Rate: 101  Height: 5 ft 5 in  Weight: 260 lb   BMI Calculated: 43 27  BSA Calculated: 9 94   Systolic: 954  Diastolic: 70  Heart Rate: 74  Respiration: 20  Height: 5 ft 5 in  Weight: 264 lb   BMI Calculated: 43 93  BSA Calculated: 2 23  Pain Scale: 0   Systolic: 647  Diastolic: 73  Heart Rate: 106  Height: 5 ft 5 in  Weight: 268 lb   BMI Calculated: 44 6  BSA Calculated: 9 74   Systolic: 573, LUE, Sitting  Diastolic: 72, LUE, Sitting  Heart Rate: 88  Height: 5 ft 5 in  Weight: 263 lb 0 11 oz  BMI Calculated: 43 77  BSA Calculated: 9 74   Systolic: 250  Diastolic: 74  Heart Rate: 97  Height: 5 ft 5 in  Weight: 262 lb 0 96 oz  BMI Calculated: 43 61  BSA Calculated: 0 69   Systolic: 453  Diastolic: 76  Temperature: 99 6 F  Heart Rate: 94  Respiration: 22  Height: 5 ft 5 in  Weight: 265 lb 0 96 oz  BMI Calculated: 44 11  BSA Calculated: 1 05   Systolic: 515  Diastolic: 81  Heart Rate: 85  Height: 5 ft 5 in  Weight: 265 lb 0 96 oz  BMI Calculated: 44 11  BSA Calculated: 2 23   Height: 5 ft 5 in  Systolic: 422, LUE, Sitting  Diastolic: 76, LUE, Sitting  Heart Rate: 85  Weight: 255 lb 9 06 oz   Systolic: 240  Diastolic: 86   Temperature: 97 4 F  Heart Rate: 91  Respiration: 23  Height: 5 ft 5 in  Weight: 259 lb   BMI Calculated: 43 1  BSA Calculated: 2 21  Pain Scale: 10   Systolic: 319, LUE, Sitting  Diastolic: 78, LUE, Sitting  Heart Rate: 89  Height: 5 ft 5 in  Weight: 261 lb 0 37 oz  BMI Calculated: 43 44  BSA Calculated: 3 45   Systolic: 850  Diastolic: 79  Heart Rate: 88  Height: 5 ft 5 in  Weight: 265 lb 0 96 oz  BMI Calculated: 44 11  BSA Calculated: 5 13   Systolic: 854, LUE, Sitting  Diastolic: 78, LUE, Sitting  Heart Rate: 112  Height: 5 ft 5 in  Weight: 266 lb 1 49 oz  BMI Calculated: 44 28  BSA Calculated: 8 92   Systolic: 612  Diastolic: 86  Temperature: 98 7 F  Heart Rate: 103  Respiration: 24  Height: 5 ft 5 in  Weight: 266 lb   BMI Calculated: 44 26  BSA Calculated: 2 24  Pain Scale: 10  O2 Saturation: 96   Systolic: 956  Diastolic: 76  Height: 5 ft 5 in  Weight: 266 lb   BMI Calculated: 44 26  BSA Calculated: 2 63   Systolic: 966  Diastolic: 68  Temperature: 98 7 F  Heart Rate: 82  Respiration: 24  Height: 5 ft 5 in  Weight: 269 lb   BMI Calculated: 44 76  BSA Calculated: 2 24  Pain Scale: 0  O2 Saturation: 94   Heart Rate: 106  O2 Saturation: 89  FiO2: 0L/min   Systolic: 995  Diastolic: 80  Temperature: 99 3 F  Heart Rate: 120  Respiration: 25  Height: 5 ft 5 in  Weight: 264 lb   BMI Calculated: 43 93  BSA Calculated: 2 23  Pain Scale: 0  O2 Saturation: 92   Systolic: 243  Diastolic: 70  Temperature: 99 1 F  Heart Rate: 105  Respiration: 16  Height: 5 ft 5 in  Weight: 258 lb   BMI Calculated: 42 93  BSA Calculated: 2 2  Pain Scale: 0  O2 Saturation: 96   Systolic: 141  Diastolic: 72  Temperature: 98 2 F  Heart Rate: 94  Respiration: 20  Height: 5 ft 5 in  Weight: 266 lb   BMI Calculated: 44 26  BSA Calculated: 2 24  Pain Scale: 0   Temperature: 98 1 F  Heart Rate: 88  Respiration: 20  Height: 5 ft 5 in  Weight: 262 lb   BMI Calculated: 43 59  BSA Calculated: 9 76   Systolic: 872, LUE, Sitting  Diastolic: 74, LUE, Sitting   Temperature: 98 3 F  Heart Rate: 91  Respiration: 24  Height: 5 ft 5 in  Weight: 263 lb   BMI Calculated: 43 76  BSA Calculated: 1 39   Systolic: 442, LUE, Sitting  Diastolic: 72, LUE, Sitting   Temperature: 98 3 F  Heart Rate: 98  Respiration: 24  Height: 5 ft 5 in  Weight: 260 lb   BMI Calculated: 43 26  BSA Calculated: 2 22   Systolic: 209, LUE, Sitting  Diastolic: 78, LUE, Sitting   Temperature: 98 5 F  Heart Rate: 97  Respiration: 21  Height: 5 ft 5 in  Weight: 261 lb   BMI Calculated: 43 49  BSA Calculated: 7 39   Systolic: 669, LUE, Sitting  Diastolic: 78, LUE, Sitting   Temperature: 98 7 F  Heart Rate: 84  Respiration: 18  Height: 5 ft 5 in  Weight: 264 lb   BMI Calculated: 43 99  BSA Calculated: 3 17   Systolic: 408, LUE, Sitting  Diastolic: 80, LUE, Sitting   Temperature: 98 3 F  Heart Rate: 96  Respiration: 20  Height: 5 ft 5 in  Weight: 259 lb   BMI Calculated: 43 1  O2 Saturation: 95   Temperature: 98 1 F  Heart Rate: 89  Respiration: 19  Height: 5 ft 5 in  Weight: 259 lb   BMI Calculated: 43 15  BSA Calculated: 6 71   Systolic: 083, LUE, Sitting  Diastolic: 76, LUE, Sitting   Temperature: 98 7 F  Heart Rate: 88  Respiration: 20  Height: 5 ft 5 in  Weight: 263 lb   BMI Calculated: 43 77  O2 Saturation: 92   Systolic: 539, LUE, Sitting  Diastolic: 82, LUE, Sitting   Temperature: 99 4 F  Heart Rate: 105  Respiration: 23  Height: 5 ft 5 in  Weight: 257 lb   BMI Calculated: 42 82  BSA Calculated: 2 2   Systolic: 458, LUE, Sitting  Diastolic: 78, LUE, Sitting   Temperature: 98 4 F  Heart Rate: 88  Respiration: 18  Height: 5 ft 5 in  Weight: 255 lb   BMI Calculated: 27 12   Systolic: 888, LUE  Diastolic: 78, LUE   Temperature: 98 7 F  Heart Rate: 96  Respiration: 16  Height: 5 ft 5 in  Weight: 253 lb   BMI Calculated: 75 8   Systolic: 266  Diastolic: 76   Temperature: 99 3 F  Heart Rate: 84  Respiration: 16  Height: 5 ft 5 in  Weight: 261 lb   BMI Calculated: 43 43    Results/Data  (1) SED RATE [id number] 09:25AM EPIC, Provider   Test ordered by: [facility]     Test Name Result Flag Reference   SED RATE 4 mm/hour  0-20     (1) C-REACTIVE PROTEIN [id number] 09:25AM [facility], Provider   Test ordered by: [facility]     Test Name Result Flag Reference   C-REACT PROTEIN 9 7 mg/L H <3 0     (1) CBC/PLT/DIFF [id number] 09:19AM Jenny "[employer]daniella Order Number: [id number]     Test Name Result Flag Reference   WBC COUNT 6 90 Thousand/uL  4 31-10 16   RBC COUNT 4 86 Million/uL  3 81-5 12   HEMOGLOBIN 14 4 g/dL  11 5-15 4   HEMATOCRIT 45 4 %  34 8-46  1   MCV 93 fL  82-98   MCH 29 6 pg  26 8-34 3   MCHC 31 7 g/dL  31 4-37 4   RDW 16 7 % H 11 6-15 1   MPV 10 5 fL  8 9-12 7   PLATELET COUNT 008 Thousands/uL  149-390   NEUTROPHILS RELATIVE PERCENT 52 %  43-75   LYMPHOCYTES RELATIVE PERCENT 31 %  14-44   MONOCYTES RELATIVE PERCENT 13 % H 4-12   EOSINOPHILS RELATIVE PERCENT 4 %  0-6   BASOPHILS RELATIVE PERCENT 0 %  0-1   NEUTROPHILS ABSOLUTE COUNT 3 57 Thousands/? ??L  1 85-7 62   LYMPHOCYTES ABSOLUTE COUNT 2 16 Thousands/? ??L  0 60-4 47   MONOCYTES ABSOLUTE COUNT 0 89 Thousand/? ??L  0 17-1 22   EOSINOPHILS ABSOLUTE COUNT 0 25 Thousand/? ??L  0 00-0 61   BASOPHILS ABSOLUTE COUNT 0 03 Thousands/? ??L  0 00-0 10     (1) COMPREHENSIVE METABOLIC PANEL [id number] 23:41JN Jenny "[employer] Order Number: [id number]     Test Name Result Flag Reference   SODIUM 140 mmol/L  136-145   POTASSIUM 4 1 mmol/L  3 5-5 3   CHLORIDE 102 mmol/L  100-108   CARBON DIOXIDE 30 mmol/L  21-32   ANION GAP (CALC) 8 mmol/L  4-13   BLOOD UREA NITROGEN 19 mg/dL  5-25   CREATININE 0 66 mg/dL  0 60-1 30   Standardized to IDMS reference method   CALCIUM 9 6 mg/dL  8 3-10 1   BILI, TOTAL 0 50 mg/dL  0 20-1 00   ALK PHOSPHATAS 85 U/L     ALT (SGPT) 46 U/L  12-78   Specimen collection should occur prior to Sulfasalazine administration due to the potential for falsely depressed results     AST(SGOT) 18 U/L  5-45   Specimen collection should occur prior to Sulfasalazine administration due to the potential for falsely depressed results  ALBUMIN 3 7 g/dL  3 5-5 0   TOTAL PROTEIN 7 4 g/dL  6 4-8 2   eGFR 90 ml/min/1 73sq m     National Kidney Disease Education Program recommendations are as follows:  GFR calculation is accurate only with a steady state creatinine  Chronic Kidney disease less than 60 ml/min/1 73 sq  meters  Kidney failure less than 15 ml/min/1 73 sq  meters  GLUCOSE FASTING 158 mg/dL H 65-99   Specimen collection should occur prior to Sulfasalazine administration due to the potential for falsely depressed results  Specimen collection should occur prior to Sulfapyridine administration due to the potential for falsely elevated results  (1) HEMOGLOBIN A1C 09Sep2017 09:19AM Zhane Bunch Order Number: [id number]     Test Name Result Flag Reference   HEMOGLOBIN A1C 8 2 % H 4 2-6 3   EST  AVG  GLUCOSE 189 mg/dl       (1) LIPID PANEL, FASTING 09Sep2017 09:19AM Zhane Bunch Order Number: [id number]     Test Name Result Flag Reference   CHOLESTEROL 225 mg/dL H    HDL,DIRECT 65 mg/dL H 40-60   Specimen collection should occur prior to Metamizole administration due to the potential for falsley depressed results  LDL CHOLESTEROL CALCULATED 114 mg/dL H 0-100   Triglyceride:        Normal ??? ??? ??? ??? ??? ??? ??? <150 mg/dl   ??? ??? ???Borderline High ??? ??? 150-199 mg/dl   ??? ??? ? ?? High ??? ??? ??? ??? ??? ??? ??? 200-499 mg/dl   ??? ??? ? ??Very High ??? ??? ??? ??? ??? >499 mg/dl      Cholesterol:       Desirable ??? ??? ??? ??? <200 mg/dl   ??? ??? Borderline High ??? 200-239 mg/dl   ??? ??? High ??? ??? ??? ??? ??? ??? >239 mg/dl      HDL Cholesterol:       High ??? ???>59 mg/dL   ??? ??? Low ??? ??? <41 mg/dL      This screening LDL is a calculated result  It does not have the accuracy of the Direct Measured LDL in the monitoring of patients with hyperlipidemia and/or statin therapy  Direct Measure LDL (AUQ738) must be ordered separately in these patients  TRIGLYCERIDES 229 mg/dL H <=150   Specimen collection should occur prior to N-Acetylcysteine or Metamizole administration due to the potential for falsely depressed results  (1) TSH 09Sep2017 09:19AM Samaria Denisesabino Order Number: [id number]     Test Name Result Flag Reference   TSH 0 033 uIU/mL L 0 358-3 740   Patients undergoing fluorescein dye angiography may retain small amounts of fluorescein in the body for 48-72 hours post procedure  Samples containing fluorescein can produce falsely depressed TSH values  If the patient had this procedure,a specimen should be resubmitted post fluorescein clearance  The recommended reference ranges for TSH during pregnancy are as follows:  First trimester 0 1 to 2 5 uIU/mL  Second trimester  0 2 to 3 0 uIU/mL  Third trimester 0 3 to 3 0 uIU/m     (1) VITAMIN B12 09Sep2017 09:19AM Samaria Sherry Order Number: [id number]     Test Name Result Flag Reference   VITAMIN B12 271 pg/mL  100-900     (1) VITAMIN D 25-HYDROXY 09Sep2017 09:19AM Samaria Denisesabino Order Number: [id number]     Test Name Result Flag Reference   VIT D 25-HYDROX 31 5 ng/mL  30 0-100 0   This assay is a certified procedure of the CDC Vitamin D Standardization Certification Program (VDSCP)     Deficiency <20ng/ml   Insufficiency 20-30ng/ml   Sufficient  ng/ml     *Patients undergoing fluorescein dye angiography may retain small amounts of fluorescein in the body for 48-72 hours post procedure  Samples containing fluorescein can produce falsely elevated Vitamin D values  If the patient had this procedure, a specimen should be resubmitted post fluorescein clearance  * BREAST RIGHT LIMITED (DIAGNOSTIC) [id number] 10:44AM aSmaria Powell Order Number: [id number]    - Patient Instructions:  To schedule this appointment, please contact Central Scheduling at [phone]   Order Number: [id number]    - Patient Instructions: To schedule this appointment, please contact Central Scheduling at [phone]  Test Name Result Flag Reference   US BREAST RIGHT LIMITED (Report)     Patient History:   Patient has history of other cancer at age 26  Family history of unknown cancer in daughter  Patient's BMI is 44 8  Reason for exam: follow-up at short interval from prior study  US Breast Right Limited: August 15, 2016 - Check In #: [de-identified]   Technologist: Yolanda Ornelas RDMS   Finding: There is an anechoic mass in the right breast with a    discrete backwall and through transmission consistent with a    simple cyst      ASSESSMENT: BiRad:2 - Benign     Recommendation:   Return to routine screening mammogram schedule  Transcription Location: [facility] 98: [id number]     Risk Value(s):   Tyrer-Cuzick 10 Year: 2 527%, Tyrer-Cuzick Lifetime: 4 588%,    Myriad Table: 1 5%, SALVATORE 5 Year: 1 4%, NCI Lifetime: 4 6%     * DXA BONE DENSITY SPINE HIP AND PELVIS 10Aug2016 09:59AM Gabriella Morning Order Number: [id number]    - Patient Instructions: To schedule this appointment, please contact Central Scheduling at [phone]   Order Number: [id number]    - Patient Instructions: To schedule this appointment, please contact Central Scheduling at [phone]  Test Name Result Flag Reference   DXA BONE DENSITY SPINE HIP AND PELVIS (Report)     CENTRAL DXA SCAN     CLINICAL HISTORY:  74year old post-menopausal  female risk factors include family history of hip fracture  Smoking  Steroid use  COPD and rheumatoid arthritis  Hyperthyroidism  Cervical carcinoma  TECHNIQUE: Bone densitometry was performed using a HoloRev[employer] Discovery A bone densitometer  Regions of interest appear properly placed  There are no obvious fractures or other confounding variables which could limit the study   Degenerative changes of    the lumbar spine and hip  This will falsely elevate the bone mineral densities in these regions  COMPARISON: April 16, 2014     RESULTS:    LUMBAR SPINE: L1-L4:   BMD 0 684 gm/cm2   T-score -3 3   Z-score -1 3     LEFT TOTAL HIP:   BMD 0 645 gm/cm2   T-score -2 4   Z-score -1 0     LEFT FEMORAL NECK:   BMD 0 504 gm/cm2   T-score -3 1   Z-score -1 4             IMPRESSION:   1  Based on the [facility] classification, the T-score of -3 3 in the lumbar spine is consistent with osteoporosis  2  When compared to the prior examination, there has been a 2 % DECREASE in the total bone mineral density of the lumbar spine and a 9 % DECREASE in the total bone mineral density of the left hip  3  According to the [address] [place] [address], prescription therapy is recommended with a T-score of -2 5 or less in the spine or hip after appropriate evaluation to exclude secondary causes  4  A daily intake of at least 1200 mg Calcium and 800 to 1000 IU of Vitamin D, as well as weight bearing and muscle strengthening exercise, fall prevention and avoidance of tobacco and excessive alcohol intake as basic preventive measures are    suggested  5  Repeat DXA scan in 18-24 months as clinically indicated  WHO CLASSIFICATION:   Normal (a T-score of -1 0 or higher)   Low bone mineral density (a T-score of less than -1 0 but higher than -2 5)   Osteoporosis (a T-score of -2 5 or less)   Severe osteoporosis (a T-score of -2 5 or less with a fragility fracture)             Workstation performed: [id number]     Signed by:   Russell Soares DO   8/10/16     * MAMMO SCREENING BILATERAL W CAD [id number] 09:58AM Adrian Million Order Number: [id number]   TW Order Number: [id number]   TW Order Number: [id number]    - Patient Instructions: To schedule this appointment, please contact Central Scheduling at [phone]  Do not wear any perfume, powder, lotion or deodorant on breast or underarm area         Please bring your doctors order, referral (if needed) and insurance information with you on the day of the test  Failure to bring this information may result in this test being rescheduled  Arrive 15 minutes prior to your appointment time to register  On the day of your test, please bring any prior mammogram or breast studies with you that were not performed at a [facility]  Failure to bring prior exams may result in your test needing to be rescheduled  Test Name Result Flag Reference   MAMMO SCREENING BILATERAL W CAD (Report)     Patient History:   Patient has history of cervical cancer at age 26  Family history of cancer in daughter  Reason for exam: screening (asymptomatic)  Screening     Mammo Screening Bilateral W CAD: August 10, 2016 - Check In #:    [de-identified]   Bilateral MLO and CC view(s) were taken  Technologist: FELICITAS Stern (FELICITAS)(M)   Prior study comparison: April 16, 2014, bilateral digital    screening mammogram performed at [address] [place] [address]  October 19, 2012, bilateral digital screening mammogram    performed at [address] [place] [address]  December 29, 2004, bilateral mammogram, performed at [facility]      The breast tissue is almost entirely fat  There is a    circumscribed faint 1 cm nodular density in the outer inferior    right breast at approximately the 8 o'clock position, 4 cm from    the nipple  Further evaluation with ultrasound is recommended to   ensure this is nothing more then a simple cyst      The parenchymal pattern is otherwise stable  No dominant soft    tissue mass, architectural distortion or suspicious    calcifications are noted in either breast  The skin and nipple    contours are within normal limits  1  Outer inferior right breast 1 cm nodule  Further evaluation    with ultrasound is recommended     2  Stable left breast mammogram      ASSESSMENT: BiRad:0 - Incomplete: needs additional imaging    evaluation - Right Recommendation:   Further imaging of the right breast    A breast health care nurse from our facility will be contacting    the patient regarding the need for additional imaging  Analyzed by CAD     8-10% of cancers will be missed on mammography  Management of a    palpable abnormality must be based on clinical grounds  Patients   will be notified of their results via letter from our facility  Accredited by [employer] of Radiology and FDA  Transcription Location: FELICITAS Jamil 98: [id number]     Risk Value(s):   Tyrer-Cuzick 10 Year: 2 143%, Tyrer-Cuzick Lifetime: 3 897%,    Myriad Table: 1 5%, SALVATORE 5 Year: 1 4%, NCI Lifetime: 4 6%   Signed by:   Stacey Cooper MD   8/10/16     Future Appointments    Date/Time Provider Specialty Site   12/18/2017 02:00 PM GRAHAM Shabazz   [address] [place] [address]     Signatures   Electronically signed by : GRAHAM Goodrich ; Sep 11 2017 11:01PM EST                       (Author)

## 2018-01-18 NOTE — MISCELLANEOUS
Provider Comments  Provider Comments: You missed your appointment with Dr Bradly Tucker on 10/26/2016  Please contact our office to reschedule      Thank [facility]      Signatures   Electronically signed by : Lulu Sheppard DO; Oct 31 2016 11:44AM EST                       (Author)

## 2018-01-18 NOTE — RESULT NOTES
Verified Results  * XR RIBS RIGHT W PA CHEST MIN 3 VIEWS [id number] 03:43PM Joel Dumont Order Number: [id number]     Test Name Result Flag Reference   XR RIBS RIGHT W PA CHEST MIN 3 VIEWS (Report)     RIGHT RIBS AND CHEST     INDICATION: Right rib pain  COMPARISON: November 16, 2016     VIEWS: Frontal chest and 5 views right hemithorax     VIEWS: 5     FINDINGS:     The cardiomediastinal silhouette is unremarkable  Atherosclerotic aorta  Small left pleural effusion similar prior exam      There is no pneumothorax  No displaced right-sided rib fractures are seen  IMPRESSION:   1  Stable small left effusion  2  No displaced right-sided rib fractures are seen  No pneumothorax  Workstation performed: [id number]     Signed by:    Alida Giles DO   9/15/17

## 2018-01-22 VITALS
WEIGHT: 254 LBS | SYSTOLIC BLOOD PRESSURE: 124 MMHG | OXYGEN SATURATION: 95 % | DIASTOLIC BLOOD PRESSURE: 60 MMHG | TEMPERATURE: 98.4 F | HEIGHT: 64 IN | RESPIRATION RATE: 18 BRPM | HEART RATE: 87 BPM | BODY MASS INDEX: 43.36 KG/M2

## 2018-01-22 VITALS
OXYGEN SATURATION: 96 % | RESPIRATION RATE: 18 BRPM | DIASTOLIC BLOOD PRESSURE: 72 MMHG | BODY MASS INDEX: 44.22 KG/M2 | HEIGHT: 64 IN | WEIGHT: 259 LBS | HEART RATE: 86 BPM | SYSTOLIC BLOOD PRESSURE: 130 MMHG | TEMPERATURE: 98 F

## 2018-01-23 NOTE — MISCELLANEOUS
Assessment    1  Intractable low back pain (724 2) (M54 5)   2  Constipation (564 00) (K59 00)   3  Rheumatoid arthritis (714 0) (M06 9)   4  Diabetes mellitus, type 2 (250 00) (E11 9)   5  Chronic obstructive pulmonary disease (496) (J44 9)    Plan  Chronic obstructive pulmonary disease    · Call [phone] if: You have difficulty breathing while lying down and you are  comfortable only when sitting up ; Status:Complete;   Done: 50QUL4331   Ordered; For:Chronic obstructive pulmonary disease; Ordered By:Ioana Heck;   · Call [phone] if: Your temperature is higher than 102F ; Status:Complete;   Done:  74XXD8079   Ordered; For:Chronic obstructive pulmonary disease; Ordered By:Ioana Heck;   · Call 911 if: You are too short of breath to talk, you can speak only one or two words  between breaths, or your lips or nails look blue ; Status:Complete;   Done: 91HBT8345   Ordered; For:Chronic obstructive pulmonary disease; Ordered By:Lisa Heck Mediate;   · Seek Immediate Medical Attention if: You are having trouble staying awake ;  Status:Complete;   Done: 03LRI6827   Ordered; For:Chronic obstructive pulmonary disease; Ordered By:Ioana Heck;   · Seek Immediate Medical Attention if: You feel short of breath even while resting ;  Status:Complete;   Done: 61AWB6872   Ordered; For:Chronic obstructive pulmonary disease; Ordered By:Lisa Heck Mediate;   · Seek Immediate Medical Attention if: You get a headache that does not go away with your  usual treatment ; Status:Complete;   Done: 37ZQH1577   Ordered; For:Chronic obstructive pulmonary disease; Ordered By:Ioana Heck;   · Seek Immediate Medical Attention if: You have pain in the chest that gets worse with  deep breathing or coughing ; Status:Complete;   Done: 40MSG0591   Ordered;   For:Chronic obstructive pulmonary disease; Ordered By:Lisa Heck Mediate;   · Seek Immediate Medical Attention if: You or your family members notice any confusion or  difficulty with memory ; Status:Complete;   Done: 79UCQ9202   Ordered; For:Chronic obstructive pulmonary disease; Ordered By:Ainsley Heck;   · Seek Immediate Medical Attention if: Your shortness of breath is getting worse ;  Status:Complete;   Done: 57KPD8049   Ordered; For:Chronic obstructive pulmonary disease; Ordered By:Ioana Heck;   · 3 times a day practice pursed lip and abdominal breathing ; Status:Complete;   Done:  15XOG2322   Ordered; For:Chronic obstructive pulmonary disease; Ordered By:Ioana Heck;   · Avoid  exposure to cigarette smoke ; Status:Complete;   Done: 27NQX1903   Ordered; For:Chronic obstructive pulmonary disease; Ordered By:Ioana Heck;   · Avoid exposure to household dust, animal dander, and molds ; Status:Complete;   Done:  81SQM4859   Ordered; For:Chronic obstructive pulmonary disease; Ordered By:Ioana Heck;   · Avoid exposure to infections ; Status:Complete;   Done: 74BLY0334   Ordered; For:Chronic obstructive pulmonary disease; Ordered By:Ioana Heck;   · Avoid exposure to things that make your problem worse ; Status:Complete;   Done:  57XCO6411   Ordered; For:Chronic obstructive pulmonary disease; Ordered By:Ioana Heck;   · Decreasing the stress in your life may help your condition improve ; Status:Complete;    Done: 37EIP6172   Ordered; For:Chronic obstructive pulmonary disease; Ordered By:Ioana Heck;   · Eat small frequent meals ; Status:Complete;   Done: 70SXW5369   Ordered; For:Chronic obstructive pulmonary disease; Ordered By:Ainsley Heck; Constipation    · Polyethylene Glycol 3350 Oral Powder; MIX 17 GRAMS (ONE CAPFUL) IN 8  OUNCES OF WATER OR JUICE UNTIL DISSOLVED AND TAKE BY MOUTH DAILY as  needed for constipation(CONSTIPATION)   Rx By: Bridget Escamilla; Dispense: 0 Days ; #:3 X 527 GM Bottle;  Refill: 3; For: Constipation; NIKKI = N; Verified Transmission to [facility]; Last Updated By: System, [employer]; 11/29/2017 11:09:01 AM   · Call [phone] if: You have bowel movement accidents ; Status:Complete;   Done:  18WNP4397   Ordered; For:Constipation; Ordered By:Alicja Heck;   · Call [phone] if: You have not had a normal bowel movement in 2 days ;  Status:Complete;   Done: 84HYJ1554   Ordered; For:Constipation; Ordered By:Alicja Heck;   · Call [phone] if: You have pain around the rectum ; Status:Complete;   Done:  79PAJ0092   Ordered; For:Constipation; Ordered By:Alicja Heck;   · Call [phone] if: You see any blood in the stool ; Status:Complete;   Done:  46YJS0233   Ordered; For:Constipation; Ordered By:Alicja Heck;   · Call [phone] if: Your constipation is getting worse ; Status:Complete;   Done:  91CSF3895   Ordered; For:Constipation; Ordered By:Alicja Heck;   · Seek Immediate Medical Attention if: You have pain in your abdomen ; Status:Complete;    Done: 45GKS3075   Ordered; For:Constipation; Ordered By:Alicja Heck;   · Seek Immediate Medical Attention if: You start vomiting ; Status:Complete;   Done:  80PYY4044   Ordered; For:Constipation; Ordered By:Alicja Heck;   · Seek Immediate Medical Attention if: Your abdomen is getting large and round without an  increase in the amount of food you are eating ; Status:Complete;   Done: 03HGO6780   Ordered; For:Constipation; Ordered By:Ioana Heck;   · Drink at least 6 glasses of water or juice a day ; Status:Complete;   Done: 86VQF6946   Ordered; For:Constipation; Ordered By:Alicja Heck;   · Start eating more fiber ; Status:Complete;   Done: 80DIV0917   Ordered; For:Constipation; Ordered By:Ioana Heck;  Diabetes mellitus, type 2    · Begin or continue regular aerobic exercise  Gradually work up to at least 3 sessions of  30 minutes of exercise a week ; Status:Complete;   Done: 22FAJ4009   Ordered;   For:Diabetes mellitus, type 2; Ordered By:Yane Heck;   · Cut your nails straight across ; Status:Complete;   Done: 26HMJ5880   Ordered; For:Diabetes mellitus, type 2; Ordered By:Yane Heck;   · Have your eyes examined by an eye doctor every year ; Status:Complete;   Done:  54CBY9960   Ordered; For:Diabetes mellitus, type 2; Ordered By:Ioana Heck;   · If you have symptoms of being hypoglycemic or your blood sugar is less than 60, you  need to eat or drink a source of sugar ; Status:Complete;   Done: 89EGZ5829   Ordered; For:Diabetes mellitus, type 2; Ordered By:Ioana Heck;   · Inspect your feet and legs daily if you have vascular disease ; Status:Complete;   Done:  27HBJ9210   Ordered; For:Diabetes mellitus, type 2; Ordered By:Yane Heck;   · Inspect your feet daily ; Status:Complete;   Done: 20SDE5041   Ordered; For:Diabetes mellitus, type 2; Ordered By:Ioana Heck;   · It is important to take good care of your feet if you have diabetes ; Status:Complete;    Done: 76NCE6321   Ordered; For:Diabetes mellitus, type 2; Ordered By:Ioana Heck;   · It is important to take good care of your feet ; Status:Complete;   Done: 73WXM3479   Ordered; For:Diabetes mellitus, type 2; Ordered By:Ioana Heck;   · Restrict the salt in your diet by avoiding highly salted foods ; Status:Complete;   Done:  86BQA5932   Ordered; For:Diabetes mellitus, type 2; Ordered By:Yane Heck;   · Wear a medical alert bracelet or tag ; Status:Complete;   Done: 83CVW5685   Ordered; For:Diabetes mellitus, type 2; Ordered By:Yane Heck;   · Wear shoes that give your toes plenty of room ; Status:Complete;   Done: 83DHY8259   Ordered; For:Diabetes mellitus, type 2; Ordered By:Ioana Heck;   · Call [phone] if: You are having trouble following our instructions or treatment for  any reason ; Status:Complete;   Done: 91BFG0891   Ordered;   For:Diabetes mellitus, type 2; Ordered By:Maria R Marquis Barnes;   · Call [phone] if: Your blood sugar is higher than 250 ; Status:Complete;   Done:  28TAC9653   Ordered; For:Diabetes mellitus, type 2; Ordered By:Ioana Heck;   · Call [phone] if: Your blood sugar is steadily becoming higher ; Status:Complete;    Done: 75ECD2429   Ordered; For:Diabetes mellitus, type 2; Ordered By:Ioana Heck;   · Call 911 if: There are symptoms of ketoacidosis  ; Status:Complete;   Done: 84LAC0008   Ordered; For:Diabetes mellitus, type 2; Ordered By:Ioana Heck;   · Call 911 if: You have a seizure ; Status:Complete;   Done: 28ARW1959   Ordered; For:Diabetes mellitus, type 2; Ordered By:Ioana Heck;   · Call 911 if: You have any symptoms of a stroke ; Status:Complete;   Done: 10VKV2253   Ordered; For:Diabetes mellitus, type 2; Ordered By:Ioana Heck;   · Call 911 if: You have fainted or passed out ; Status:Complete;   Done: 55WVK2820   Ordered; For:Diabetes mellitus, type 2; Ordered By:Marquis Cameron Heck;   · Seek Immediate Medical Attention if: There are signs that the blood sugar is too high  (hyperglycemia) ; Status:Complete;   Done: 01QIJ8301   Ordered; For:Diabetes mellitus, type 2; Ordered By:Marquis Cameron Heck;   · Seek Immediate Medical Attention if: There are signs that the blood sugar is too low  (hypoglycemia) ; Status:Complete;   Done: 21VLW3578   Ordered; For:Diabetes mellitus, type 2; Ordered By:Marquis Cameron Heck;   · Seek Immediate Medical Attention if: You become dehydrated ; Status:Complete;   Done:  61WUM1548   Ordered; For:Diabetes mellitus, type 2; Ordered By:Marquis Cameron Heck;   · Seek Immediate Medical Attention if: You experience a new kind of chest pain (angina) or  pressure ; Status:Complete;   Done: 03KKP9634   Ordered; For:Diabetes mellitus, type 2; Ordered By:Marquis Cameron Heck;   · Seek Immediate Medical Attention if: You notice that breathing is rapid, more than 40  times a minute  ; Status:Complete;   Done: 55KVV1523   Ordered; For:Diabetes mellitus, type 2; Ordered By:Karol Heck;   · Seek Immediate Medical Attention if: Your blood sugar is higher than 400 ;  Status:Complete;   Done: 13DKI7658   Ordered; For:Diabetes mellitus, type 2; Ordered By:Karol Heck;   · Seek Immediate Medical Attention if: Your eyesight becomes blurry or you have difficulty  seeing ; Status:Complete;   Done: 33ODB5210   Ordered; For:Diabetes mellitus, type 2; Ordered By:Ioana Heck;  Hypothyroidism    · Levothyroxine Sodium 137 MCG Oral Tablet   Rx By: Adam Leos; Dispense: 90 Days ; #:90 Tablet; Refill: 1; For: Hypothyroidism; NIKKI = N; Verified Transmission to [address] [place] [address]; Last Updated By: Rasheeda Heredia; 11/29/2017 11:16:18 AM  Intractable low back pain    · * MRI LUMBAR SPINE WO CONTRAST; Status:Need Information - Financial  Authorization; Requested for:29Nov2017;    Perform:[facility]; Order Comments:OPEN; ZMB:67QNP3380; Ordered; For:Intractable low back pain; Ordered By:Karol Heck;    Discussion/Summary  Discussion Summary:   Reviewed hospital records with her  Some of her symptoms are likely related to degenerative changes and possibly some disc abnormalities  Some of her symptoms definitely seem to be related to muscle spasms and tightness especially on exam  Methocarbamol as noted above  Heat to the area  Percocet as needed for breakthrough pain  Advised her to watch taking this especially with the significant constipation in the past  Especially also because of the risk of addiction  Discussed using MiraLax on a daily basis for the constipation  Will order an open MRI for further investigation of the back  Depending on the results and her symptoms, she will likely need a referral to see pain management  Discussed with her watching for any worsening of her COPD especially since she has not been taking very deep breaths because of the pain   Watch for increased shortness of breath  Her COPD has been relatively stable recently  Watch blood sugars at home  Pain may be causing some variability  Continue the Invokana and Januvia as previously  Stay well hydrated  She will need a repeat urine for test of cure 1-2 weeks after completing the antibiotic  Slip given for an ultrasound of the kidneys especially since the cyst was seen on the CT scan of the abdomen and pelvis  She will get the MRI of her back but at this point will likely be unable to do so because of her pain  Will have her follow up in a few weeks as previously scheduled for her routine visit or sooner if needed  She is up-to-date on her pneumonia vaccines  She is up-to-date on her flu shot  Medication SE Review and Pt Understands Tx: Possible side effects of new medications were reviewed with the patient/guardian today  The treatment plan was reviewed with the patient/guardian  The patient/guardian understands and agrees with the treatment plan      History of Present Illness  TCM Communication [facility]: The patient is being contacted for follow-up after hospitalization  She was hospitalized at [facility]  The dates of hospitalization: 11/20/17-11/24/17, date of admission: 11/20/17, date of discharge: 11/24/17  Diagnosis: intractable low back pain  She was discharged to home, with home health services, [facility]  Medications reviewed and updated today  She scheduled a follow up appointment  Symptoms: constipation  The patient is currently experiencing symptoms  low back pain on right side that sometimes take her breath away  Counseling was provided to the patient  Communication performed and completed by Jersey De Luna   HPI: She presents for follow-up after recent hospitalization for intractable low back pain  Symptoms started about 2-3 days prior to admission  Initially she felt that her rheumatoid arthritis was acting up   Symptoms continued to worsen to the point that she could not move without severe pain  The pain especially on the right side began to take her breath away  Did not have any fevers or chills  No significant cough  Had noticed some urinary urgency but did not have any dysuria  Urinates frequently but she felt that was because of her Lasix  That was unchanged  Was having some issues with constipation around the same time  Had been having some lower abdominal pressure and gas  MRI could not be completed because of her body habitus  X-ray showed degenerative changes but no acute abnormalities  Physical therapy and Orthopedics was consulted  They felt she was stable for discharge with pain control  Blood sugars have been variable during her hospitalization  She also was found to have a urinary tract infection  They had treated her initially on IV Rocephin and when culture came back she was transition to oral Keflex to complete a 7 day course  She was found to have cyst on her kidney and they recommended outpatient evaluation for this  Pain persists since discharge  Has improved somewhat  Describes it as a constant pulling sensation in the low back right greater than left  Pain will worsen with any movement  Worsens with deep breaths or cough  At times severe enough to take her breath away  Difficulty getting around because of the pain  Denies any dysuria  Denies any abdominal pain  Rheumatoid arthritis overall is well controlled with her now being on the [place]et  Blood sugars have been up in down since returning home  When she checked yesterday it was around 177  Denies any hypoglycemic episodes  Review of Systems  Complete-Female:   Constitutional: feeling tired, but no fever and no chills  Eyes: dryness of the eyes  ENT: no sore throat and no hoarseness  Cardiovascular: no chest pain and no palpitations  Gastrointestinal: constipation  Genitourinary: no dysuria  Musculoskeletal: as noted in HPI  Integumentary: no rashes  Neurological: difficulty walking  Hematologic/Lymphatic: no tendency for easy bleeding and no tendency for easy bruising  ROS Reviewed:   ROS reviewed  Active Problems    1  Anxiety (300 00) (F41 9)   2  Candidiasis, cutaneous (112 3) (B37 2)   3  Chest wall pain (786 52) (R07 89)   4  Chronic obstructive pulmonary disease (496) (J44 9)   5  Encounter for screening mammogram for breast cancer (V76 12) (Z12 31)   6  Esophageal reflux (530 81) (K21 9)   7  Fibromyalgia (729 1) (M79 7)   8  Hypokalemia (276 8) (E87 6)   9  Influenza vaccine needed (V04 81) (Z23)   10  Obesity (278 00) (E66 9)   11  Osteoarthritis (715 90) (M19 90)   12  Osteopenia (733 90) (M85 80)   13  Rib pain on right side (786 50) (R07 81)   14  Screening for depression (V79 0) (Z13 89)   15  Screening for genitourinary condition (V81 6) (Z13 89)   16  Screening for neurological condition (V80 09) (Z13 89)   17  Steroid long-term use (V58 65)   18  Visit for suture removal (V58 32) (Z48 02)   19  Vitamin B12 deficiency (266 2) (E53 8)   20  Vitamin D deficiency (268 9) (E55 9)    Past Medical History    1  History of Acute medial meniscus tear of left knee (836 0) (S83 242A)   2  Acute pharyngitis (462) (J02 9)   3  History of Cellulitis of left leg (682 6) (L03 116)   4  History of COPD exacerbation (491 21) (J44 1)   5  History of Degenerative arthritis of left knee (715 96) (M17 12)   6  History of Fistula of knee (719 86) (M25 169)   7  History of constipation (V12 79) (Z87 19)   8  History of pneumonia (V12 61) (Z87 01)   9  History of senile atrophic vaginitis (V13 29) (Z87 42)   10  History of sepsis (V12 09) (Z86 19)   11  History of tinea cruris (V12 09) (Z86 19)   12  History of vaginitis (V13 29) (Z87 42)   13  History of Neck Strain (847 0)   14  History of Post-op bleeding (998 11)   15  History of Screening for colon cancer (V76 51) (Z12 11)   16  History of Tick bite (919 4,E906 4) (W57 XXXA)   17   History of Vulvitis (616 10) (N76 2)    Surgical History 1  History of Cataract Surgery   2  History of Cholecystectomy Laparoscopic   3  History of Hysterectomy   4  History of Knee Arthroplasty   5  History of Neuroplasty Median Nerve At Carpal Tunnel   6  History of Tubal Ligation  Surgical History Reviewed: The surgical history was reviewed and updated today  Family History  Mother    1  Family history of Stroke (434 91) (I63 9)  Family History    2  Family history of Asthma (V17 5)   3  Family history of Cancer   4  Family history of Diabetes Mellitus (V18 0)   5  Family history of Hypertension (V17 49)  Family History Reviewed: The family history was reviewed and updated today  Social History    · History of Current Every Day Smoker (305 1)   · Former smoker (V15 82) (B36 191)   · No living will   · Recently Stopping Smoking (V15 82)   · Stopped Drinking Alcohol  Social History Reviewed: The social history was reviewed and updated today  Current Meds   1  Advair Diskus 250-50 MCG/DOSE Inhalation Aerosol Powder Breath Activated; INHALE 1   PUFF TWICE DAILY; Therapy: 57FRJ6016 to (Evaluate:16Nov2017)  Requested for: 21Nov2016; Last   Rx:21Nov2016 Ordered   2  Albuterol Sulfate (2 5 MG/3ML) 0 083% Inhalation Nebulization Solution; USE 1 UNIT   DOSE VIA NEBULIZER  4 TIMES A DAY AS NEEDED  Requested for:   21Nov2016; Last Rx:21Nov2016 Ordered   3  Benzonatate 200 MG Oral Capsule; TAKE 1 CAPSULE 3 TIMES DAILY AS NEEDED; Therapy: 90MIX7732 to (Evaluate:30Nhw7419)  Requested for: 21Jul2016; Last   Rx:21Jul2016 Ordered   4  Clobetasol Propionate E 0 05 % External Cream; APPLY TO AFFECTED AREA 3 TO 4   TIMES DAILY; Therapy: 14HOP3555 to (Last Rx:21Nov2016)  Requested for: 21Nov2016 Ordered   5  Clotrimazole-Betamethasone 1-0 05 % External Cream; APPLY TO AFFECTED AREA(s)   TWICE DAILY  **FOR EXTERNAL USE ONLY**  Requested for:   21Nov2016; Last Rx:21Nov2016 Ordered   6   DULoxetine HCl - 20 MG Oral Capsule Delayed Release Particles; TAKE ONE CAPSULE   BY MOUTH ONCE DAILY; Therapy: 09Whm7567 to (Last Rx:56Zey9783)  Requested for: 32THN6347 Ordered   7  Folic Acid 1 MG Oral Tablet; TAKE ONE TABLET BY MOUTH ONCE DAILY; Therapy: 50IYO7937 to (Last Georgiakatherine Lori)  Requested for: 76KKP2384 Ordered   8  Furosemide 40 MG Oral Tablet; take one tablet by mouth every day; Therapy: 97OJS2729 to (Last Rx:09Nov2017)  Requested for: 41YXI2946 Ordered   9  Invokana 100 MG Oral Tablet; take one tablet by mouth every day; Therapy: 09CYN3871 to (Last Rx:69Usz5270)  Requested for: 48Kcu9619 Ordered   10  Ipratropium-Albuterol 0 5-2 5 (3) MG/3ML Inhalation Solution; nebulize one now; To Be    Done: 39DUM3889; Status: HOLD FOR - Administration Ordered   11  Januvia 100 MG Oral Tablet; TAKE 1 TABLET DAILY; Therapy: 61Zsh4826 to (Evaluate:06Sep2018)  Requested for: 81Pzk4774; Last    Rx:58Xlr6457 Ordered   12  Levalbuterol HCl - 1 25 MG/3ML Inhalation Nebulization Solution; INHALE 1  VIAL    Inhalation; To Be Done: 67CFQ1727; Status: HOLD FOR - Administration Ordered   13  Levothyroxine Sodium 137 MCG Oral Tablet; TAKE 1 TABLET DAILY IN THE MORNING; Therapy: 87Can0075 to (Evaluate:10Mar2018)  Requested for: 76Hmh0979; Last    Rx:85Inz8836 Ordered   14  Levothyroxine Sodium 150 MCG CAPS; TAKE 1 CAPSULE BY MOUTH EVERY MORNING    BEFORE BREAKFAST ON EMPTY STOMACH; Therapy: (Recorded:29Nov2017) to Recorded   15  Lisinopril 40 MG Oral Tablet; Take 1 tablet daily; Therapy: 78ZMZ6357 to (Renew:13Nov2017)  Requested for: 17LYV6581; Last    Rx:18Nov2016 Ordered   16  Methocarbamol 750 MG Oral Tablet; TAKE 1 TABLET 3 TIMES DAILY; Therapy: 26Eec2751 to (Evaluate:74Pdf0308)  Requested for: 81Aim4577; Last    Rx:26Wsx1670 Ordered   17  Methotrexate 2 5 MG Oral Tablet; TAKE 8 TABLETS BY MOUTH WEEKLY; Therapy: 58VPO1589 to (Last Rx:09Nov2017)  Requested for: 96CTK6121 Ordered   18   Mucinex 600 MG Oral Tablet Extended Release 12 Hour; TAKE 1 OR 2 TABLETS TWICE DAILY AS NEEDED FOR CONGESTION; Therapy: 27AWM0516 to (Evaluate:68Vpw7077); Last Rx:21Nov2016 Ordered   19  Naproxen 500 MG Oral Tablet; Therapy: 60Qjt8836 to Recorded   20  Nystatin 460976 UNIT/GM External Cream; APPLY  AND RUB  IN A THIN FILM TO    AFFECTED AREAS TWICE DAILY  (AM AND PM) for 2 weeks; Therapy: 71NDM7637 to (Last Rx:07Jun2017)  Requested for: 07Jun2017 Ordered   21  Nystatin 641697 UNIT/GM External Cream; APPLY AND RUB IN A THIN FILM TO    AFFECTED AREAS TWICE DAILY  (AM AND AFTER M) FOR 2 WEEKS; Therapy: 42OXG4492 to (Last Rose Ivans)  Requested for: 30Jun2017 Ordered   22  Nystatin 260164 UNIT/GM External Powder; APPLY 2-3 TIMES DAILY TO AFFECTED    AREA(S) as needed; Therapy: 16TCE1314 to (Last Rx:07Jun2017)  Requested for: 07Jun2017 Ordered   23  OneTouch Delica Lancets Fine Miscellaneous; TEST TWICE A DAY; Therapy: 02YGU2717 to (Last Alex Bake)  Requested for: 26Oct2016 Ordered   24  OneTouch Verio In [employer]; TEST TWICE DAILY; Therapy: 01ZWX3911 to (Evaluate:41Ztf8527)  Requested for: 24Oct2016; Last    Rx:24Oct2016 Ordered   25  Pantoprazole Sodium 40 MG Oral Tablet Delayed Release; TAKE ONE TABLET BY    MOUTH ONCE DAILY; Therapy: 01BFA6035 to (Last Rx:44Pfl5502)  Requested for: 92CYA4004 Ordered   26  PredniSONE 5 MG Oral Tablet; take one tablet by mouth daily; Therapy: 61SZS5189 to (Last Rx:30Oct2017)  Requested for: 39ROO1312 Ordered   27  ProAir  (90 Base) MCG/ACT Inhalation Aerosol Solution; INHALE 2 PUFFS    EVERY 4-6 HOURS, SPACED 60 SECONDS APART; Therapy: 07UEE9895 to (Evaluate:13Nov2017)  Requested for: 11YHM2690; Last    Rx:18Nov2016 Ordered   28  Spiriva HandiHaler 18 MCG Inhalation Capsule; INHALE CONTENTS OF 1 CAPSULE    ONCE DAILY; Therapy: 54FSA3513 to (Evaluate:17Jan2017)  Requested for: 51XHD0927; Last    Rx:18Nov2016 Ordered   29  Vitamin B-12 1000 MCG Oral Tablet; TAKE 1 TABLET DAILY AS DIRECTED;     Therapy: 49IKT1806 to (Evaluate:67Nbn6095)  Requested for: 80YIQ3348; Last    Rx:18Nov2016 Ordered   30  Vitamin D (Ergocalciferol) 27806 UNIT Oral Capsule; TAKE ONE CAPSULE BY MOUTH    WEEKLY; Therapy: 41FFR1424 to (Last Rx:74Ugr3112)  Requested for: 05Sep2017 Ordered   31  Xeljanz XR 11 MG Oral Tablet Extended Release 24 Hour; Therapy: (Jen Dixon) to Recorded  Medication List Reviewed: The medication list was reviewed and updated today  Allergies    1  No Known Drug Allergies    Vitals  Signs   Recorded: 68ZNQ7998 10:25AM   Temperature: 98 4 F, Temporal  Heart Rate: 87  Respiration: 18  Systolic: 001, LUE, Sitting  Diastolic: 60, LUE, Sitting  Height: 5 ft 4 in  Weight: 254 lb   BMI Calculated: 43 6  BSA Calculated: 2 17  O2 Saturation: 95    Physical Exam    Constitutional   General appearance: Abnormal   uncomfortable and appears tired  Eyes   Conjunctiva and lids: No swelling, erythema or discharge  Ears, Nose, Mouth, and Throat   External inspection of ears and nose: Normal     Otoscopic examination: Tympanic membranes translucent with normal light reflex  Canals patent without erythema  Oropharynx: Normal with no erythema, edema, exudate or lesions  Pulmonary   Auscultation of lungs: Abnormal   Auscultation of the lungs revealed decreased breath sounds diffusely  Cardiovascular   Auscultation of heart: Normal rate and rhythm, normal S1 and S2, without murmurs  Examination of extremities for edema and/or varicosities: Normal     Abdomen   Abdomen: Non-tender, no masses  No suprapubic tenderness; no CVA tenderness  Lymphatic   Palpation of lymph nodes in neck: No lymphadenopathy  Musculoskeletal   Gait and station: Abnormal   Slow antalgic gait  Inspection/palpation of joints, bones, and muscles: Abnormal   Diffuse tenderness and muscle tightness in the low back area right greater than left  Psychiatric   Mood and affect: Abnormal   Mood and Affect: concerned and frustrated  Message   Recorded as Task   Date: 11/24/2017 03:59 PM, Created By: System   Task Name: Marbin Raymundo   Assigned To: Elena Lombardo   Regarding Patient: Elia Walton, Status: Active   Comment:    System - 24 Nov 2017 3:59 PM     Patient discharged from hospital   Patient Name: Marlene Nguyen  Patient YOB: 1950  Discharge Date: 11/24/2017  Facility: [facility] Appointments    Date/Time Provider Specialty Site   04/02/2018 01:20 PM GRAHAM Henderson   [address] [facility] PRIMARY CARE [facility] 22     Signatures   Electronically signed by : GRAHAM Connors ; Jan 6 2018 12:40PM EST                       (Author)

## 2018-01-23 NOTE — RESULT NOTES
Verified Results  [address] [facility] [place] [id number] 09:55AM Royce Chauhan Order Number: [id number]    - Patient Instructions: To schedule this appointment, please contact Central Scheduling at [phone]  Test Name Result Flag Reference   US KIDNEY AND BLADDER (Report)     RENAL ULTRASOUND     INDICATION: 61-year-old female with unexplained back pain for about 2 weeks  COMPARISON: [facility] dating to November 21, 2012  TECHNIQUE:  Ultrasound of the retroperitoneum was performed with a curvilinear transducer utilizing volumetric sweeps and still imaging techniques  FINDINGS: Examination is limited by the patient's body habitus  KIDNEYS:   Symmetric and normal size  Right kidney: 12 05 x 5 96 cm with renal cortex of 1 46 cm  Normal echogenicity and contour  No suspicious masses detected  No hydronephrosis  No shadowing calculi  No perinephric fluid collections  Left kidney: 11 66 x 6 32 cm with renal cortex of 1 75 cm  Normal echogenicity and contour  Within the interpolar region is a 1 19 x 1 36 x 1 32 cm hypoechoic exophytic structure potentially representative of a cyst but incompletely characterized  No hydronephrosis  No shadowing calculi  No perinephric fluid collections  URETERS:   Nonvisualized  BLADDER:    Poorly distended  Further evaluation is impossible  Liver demonstrates increased echogenicity compared to the right kidney, indicating hepatic steatosis       IMPRESSION:     Examination is limited due to patient body habitus and the renal lesions identified on CT are not convincingly characterized on this exam  Patient may require cross-sectional multiphase imaging such as CT scan or MRI  Incidental note is made of hepatic steatosis          Workstation performed: [id number]     Signed by:   Patricia Calix MD   12/4/17

## 2018-01-24 VITALS
HEART RATE: 94 BPM | RESPIRATION RATE: 18 BRPM | DIASTOLIC BLOOD PRESSURE: 70 MMHG | OXYGEN SATURATION: 95 % | HEIGHT: 64 IN | TEMPERATURE: 97.8 F | WEIGHT: 253 LBS | SYSTOLIC BLOOD PRESSURE: 138 MMHG | BODY MASS INDEX: 43.19 KG/M2

## 2018-02-27 DIAGNOSIS — M06.9 RHEUMATOID ARTHRITIS, INVOLVING UNSPECIFIED SITE, UNSPECIFIED RHEUMATOID FACTOR PRESENCE: Primary | ICD-10-CM

## 2018-03-24 DIAGNOSIS — M06.9 RHEUMATOID ARTHRITIS, INVOLVING UNSPECIFIED SITE, UNSPECIFIED RHEUMATOID FACTOR PRESENCE: ICD-10-CM

## 2018-03-26 RX ORDER — METHOCARBAMOL 500 MG/1
500 TABLET, FILM COATED ORAL EVERY 6 HOURS PRN
Qty: 10 TABLET | Refills: 0 | Status: CANCELLED | OUTPATIENT
Start: 2018-03-26

## 2018-03-26 RX ORDER — FUROSEMIDE 40 MG/1
1 TABLET ORAL DAILY
COMMUNITY
Start: 2013-06-26 | End: 2018-09-24 | Stop reason: ALTCHOICE

## 2018-03-28 ENCOUNTER — TELEPHONE (OUTPATIENT)
Dept: INTERNAL MEDICINE CLINIC | Facility: CLINIC | Age: 68
End: 2018-03-28

## 2018-03-28 DIAGNOSIS — M06.9 RHEUMATOID ARTHRITIS INVOLVING MULTIPLE SITES, UNSPECIFIED RHEUMATOID FACTOR PRESENCE: Primary | ICD-10-CM

## 2018-03-28 DIAGNOSIS — M06.9 RHEUMATOID ARTHRITIS, INVOLVING UNSPECIFIED SITE, UNSPECIFIED RHEUMATOID FACTOR PRESENCE: ICD-10-CM

## 2018-05-21 ENCOUNTER — OFFICE VISIT (OUTPATIENT)
Dept: INTERNAL MEDICINE CLINIC | Facility: CLINIC | Age: 68
End: 2018-05-21
Payer: MEDICARE

## 2018-05-21 VITALS
BODY MASS INDEX: 40.98 KG/M2 | TEMPERATURE: 97.7 F | WEIGHT: 246 LBS | SYSTOLIC BLOOD PRESSURE: 118 MMHG | DIASTOLIC BLOOD PRESSURE: 62 MMHG | OXYGEN SATURATION: 96 % | HEIGHT: 65 IN | HEART RATE: 113 BPM

## 2018-05-21 DIAGNOSIS — I10 ESSENTIAL HYPERTENSION: ICD-10-CM

## 2018-05-21 DIAGNOSIS — M06.9 RHEUMATOID ARTHRITIS, INVOLVING UNSPECIFIED SITE, UNSPECIFIED RHEUMATOID FACTOR PRESENCE: ICD-10-CM

## 2018-05-21 DIAGNOSIS — E53.8 VITAMIN B12 DEFICIENCY: ICD-10-CM

## 2018-05-21 DIAGNOSIS — Z12.31 VISIT FOR SCREENING MAMMOGRAM: ICD-10-CM

## 2018-05-21 DIAGNOSIS — E55.9 VITAMIN D DEFICIENCY: ICD-10-CM

## 2018-05-21 DIAGNOSIS — J44.9 CHRONIC OBSTRUCTIVE PULMONARY DISEASE, UNSPECIFIED COPD TYPE (HCC): ICD-10-CM

## 2018-05-21 DIAGNOSIS — E03.9 HYPOTHYROIDISM, UNSPECIFIED TYPE: ICD-10-CM

## 2018-05-21 DIAGNOSIS — E11.65 TYPE 2 DIABETES MELLITUS WITH HYPERGLYCEMIA, WITHOUT LONG-TERM CURRENT USE OF INSULIN (HCC): Primary | ICD-10-CM

## 2018-05-21 PROBLEM — R22.31: Status: ACTIVE | Noted: 2017-12-11

## 2018-05-21 PROBLEM — N28.1 RENAL CYST: Status: ACTIVE | Noted: 2017-11-29

## 2018-05-21 PROBLEM — B37.2 CANDIDIASIS, CUTANEOUS: Status: ACTIVE | Noted: 2017-06-30

## 2018-05-21 PROBLEM — K59.00 CONSTIPATION: Status: ACTIVE | Noted: 2017-11-29

## 2018-05-21 PROCEDURE — 99214 OFFICE O/P EST MOD 30 MIN: CPT | Performed by: FAMILY MEDICINE

## 2018-05-21 RX ORDER — NAPROXEN 500 MG/1
TABLET ORAL
COMMUNITY
Start: 2016-12-26 | End: 2018-09-24 | Stop reason: ALTCHOICE

## 2018-05-21 RX ORDER — CLOBETASOL PROPIONATE 0.5 MG/G
CREAM TOPICAL
COMMUNITY
Start: 2015-01-22 | End: 2018-09-24 | Stop reason: ALTCHOICE

## 2018-05-21 RX ORDER — LANOLIN ALCOHOL/MO/W.PET/CERES
1 CREAM (GRAM) TOPICAL DAILY
Status: ON HOLD | COMMUNITY
Start: 2016-11-10 | End: 2020-01-16 | Stop reason: SDUPTHER

## 2018-05-21 RX ORDER — NYSTATIN 100000 [USP'U]/G
POWDER TOPICAL
COMMUNITY
Start: 2017-06-07 | End: 2019-02-21

## 2018-05-21 RX ORDER — GUAIFENESIN 600 MG
2 TABLET, EXTENDED RELEASE 12 HR ORAL 2 TIMES DAILY PRN
COMMUNITY
Start: 2016-11-21 | End: 2018-09-24 | Stop reason: ALTCHOICE

## 2018-05-21 RX ORDER — ALBUTEROL SULFATE 2.5 MG/3ML
1 SOLUTION RESPIRATORY (INHALATION) 4 TIMES DAILY PRN
COMMUNITY
End: 2018-09-24 | Stop reason: ALTCHOICE

## 2018-05-21 RX ORDER — ALENDRONATE SODIUM 70 MG/1
70 TABLET ORAL
COMMUNITY

## 2018-05-21 RX ORDER — CLOTRIMAZOLE AND BETAMETHASONE DIPROPIONATE 10; .64 MG/G; MG/G
CREAM TOPICAL
COMMUNITY
End: 2018-09-24 | Stop reason: ALTCHOICE

## 2018-05-21 NOTE — PROGRESS NOTES
Assessment/Plan:    No problem-specific Assessment & Plan notes found for this encounter  Diagnoses and all orders for this visit:    Type 2 diabetes mellitus with hyperglycemia, without long-term current use of insulin (HCC)  -     Comprehensive metabolic panel; Future    Rheumatoid arthritis, involving unspecified site, unspecified rheumatoid factor presence (HCC)  -     methotrexate 2 5 mg tablet; Take 1 tablet (2 5 mg total) by mouth once a week  -     CBC and differential; Future    Hypothyroidism, unspecified type  -     Lipid panel; Future  -     TSH, 3rd generation; Future    Chronic obstructive pulmonary disease, unspecified COPD type ([facility] )    Essential hypertension  -     Comprehensive metabolic panel; Future    Vitamin D deficiency  -     Vitamin D 25 hydroxy; Future    Vitamin B12 deficiency  -     Vitamin B12; Future    Visit for screening mammogram  -     Mammo screening bilateral w cad; Future    Other orders  -     nystatin (MYCOSTATIN) powder; Apply topically  -     albuterol (2 5 mg/3 mL) 0 083 % nebulizer solution; Inhale 1 each 4 (four) times a day as needed  -     cyanocobalamin (VITAMIN B-12) 1,000 mcg tablet; Take 1 tablet by mouth daily  -     Polyethylene Glycol 3350-GRX POWD; Take by mouth  -     glucose blood test strip; by In Vitro route 2 (two) times a day  -     ONETOUCH DELICA LANCETS FINE MISC; by Does not apply route 2 (two) times a day  -     naproxen (NAPROSYN) 500 mg tablet; Take by mouth  -     guaiFENesin (MUCINEX) 600 mg 12 hr tablet; Take 2 tablets by mouth 2 (two) times a day as needed  -     clotrimazole-betamethasone (LOTRISONE) 1-0 05 % cream; Apply topically  -     Clobetasol Prop Emollient Base (CLOBETASOL PROPIONATE E) 0 05 % emollient cream; Apply topically  -     alendronate (FOSAMAX) 70 mg tablet; Take 70 mg by mouth every 7 days        Discussed with her the importance of getting laboratory testing regularly    She plans on getting this done over the next few days   Discussed with her also the importance of getting her mammogram done  She will be due for her bone density in August   Continue with the Fosamax  Continue with the Januvia and the Invokana for the diabetes  Follow blood sugars at home  Watch carbohydrate intake  She has lost some significant weight over the past year so  Her last TSH was significantly low and her levothyroxine was adjusted  She, however, did not get the follow-up TSH done  Will recheck a TSH and adjust dosage of the levothyroxine if needed  Continue to follow up with Rheumatology  Continue with the methotrexate and the Naoma Donovan as per their direction  Continue with the Spiriva daily and the nebulizer if needed  Watch for any exacerbations  Will have her follow up in about 3-4 months or sooner if needed  Subjective:      Patient ID: Laith Nair is a 77 y o  female  She presents for routine follow-up  She did not get her laboratory testing done prior to this visit  Has generally been doing relatively well  She has noticed significant improvement in her joint symptoms since being on the Naoma Donovan  Dosage of the methotrexate is gradually being decreased by Rheumatology  She is now off of the prednisone  On 6 of the methotrexate once a week  Has not gotten recent laboratory testing done for her rheumatologist either  Has been tolerating her Januvia and Invokana without difficulty  Denies any hypoglycemic episodes  Does not always follow her blood sugars regularly  They have been generally good per her report  Denies any significant polyuria, polydipsia, or polyphagia  Breathing has been relatively stable  Denies any recent exacerbations  Denies any recent wheezing  Only uses the nebulizer if needed  Continues to use the Spiriva daily  Back pain has improved significantly  She has minimal pain at present  Knees and hands have improved significantly on the Naoma Donovan  Tolerating the lisinopril without difficulty  Denies any headaches or localized weakness  Denies any chest pain or palpitations  Appetite has been generally stable  Denies any nausea, vomiting, or diarrhea  Denies any changes in bowel movements  She missed her mammograms but does plan to go for in the near future  She had her granddaughter and new great grandson living with her for a period of time but they have now moved out  She had enjoyed this significantly  The following portions of the patient's history were reviewed and updated as appropriate:   She  has a past medical history of Arthritis; Arthritis; Candidiasis of skin; COPD (chronic obstructive pulmonary disease) ([facility] [address] ); Current chronic use of systemic steroids; Degenerative arthritis of left knee; Diabetes mellitus ([facility] [address] ); Disease of thyroid gland; Fibromyalgia; Fistula of knee; GERD (gastroesophageal reflux disease); Hyperlipidemia; Hypertension; Medial meniscus tear; Migraine; Obesity; Obesity; Osteoarthritis; Osteopenia; Pneumonia; Psychiatric disorder; Rheumatoid arthritis ([facility] [address] ); Rheumatoid arthritis ([facility] [address] ); Sepsis ([facility] ); Tick bite; Vitamin B12 deficiency; and Vitamin D deficiency    She   Patient Active Problem List    Diagnosis Date Noted    Localized swelling, mass and lump, upper limb, right 12/11/2017    Constipation 11/29/2017    Renal cyst 11/29/2017    Acute cystitis 11/24/2017    Intractable low back pain 11/24/2017    Candidiasis, cutaneous 06/30/2017    Vitamin B12 deficiency 11/10/2016    BMI 40 0-44 9, adult ([facility] ) 11/05/2016    Morbid (severe) obesity due to excess calories [facility] ) 11/04/2016    Sepsis ([facility] ) 11/03/2016    COPD with acute exacerbation ([facility] ) 11/03/2016    Type 2 diabetes mellitus with hyperglycemia ([facility] ) 11/03/2016    Hypothyroidism 11/03/2016    Rheumatoid arthritis ([facility] ) 11/03/2016    Essential hypertension 11/03/2016    Hyperlipidemia 11/03/2016    GERD (gastroesophageal reflux disease) 11/03/2016    Hypoalbuminemia 11/03/2016    Hepatic steatosis 11/03/2016    Diabetes mellitus, type 2 ([facility] ) 10/13/2015    Anxiety 06/02/2014    Fibromyalgia 01/28/2014    Osteopenia 09/17/2012    Chronic obstructive pulmonary disease ([facility] ) 05/29/2012    Osteoarthritis 05/29/2012    Vitamin D deficiency 05/29/2012     She  has a past surgical history that includes Hysterectomy; Rotator cuff repair (Left); Knee arthroscopy; Cataract extraction (Bilateral); Cholecystectomy; Joint replacement; Tubal ligation; Neuroplasty / transposition median nerve at carpal tunnel; and Eye surgery  Her family history includes Asthma in her family; Cancer in her family; Diabetes in her family; Hypertension in her family; Stroke in her mother  She  reports that she quit smoking about 5 years ago  She has a 48 00 pack-year smoking history  She has never used smokeless tobacco  She reports that she does not drink alcohol or use drugs  Current Outpatient Prescriptions   Medication Sig Dispense Refill    albuterol (2 5 mg/3 mL) 0 083 % nebulizer solution Inhale 1 each 4 (four) times a day as needed      albuterol (PROVENTIL HFA,VENTOLIN HFA) 90 mcg/act inhaler Inhale 2 puffs every 6 (six) hours as needed for wheezing      alendronate (FOSAMAX) 70 mg tablet Take 70 mg by mouth every 7 days      canagliflozin (INVOKANA) 100 mg Take 100 mg by mouth daily before breakfast      Clobetasol Prop Emollient Base (CLOBETASOL PROPIONATE E) 0 05 % emollient cream Apply topically      clotrimazole (LOTRIMIN) 1 % cream Apply topically daily as needed   clotrimazole-betamethasone (LOTRISONE) 1-0 05 % cream Apply topically      cyanocobalamin (VITAMIN B-12) 1,000 mcg tablet Take 1 tablet by mouth daily      diclofenac sodium (VOLTAREN) 1 % Apply 2 g topically as needed      docusate sodium (COLACE) 100 mg capsule Take 1 capsule by mouth 2 (two) times a day 10 capsule 0    DULoxetine (CYMBALTA) 20 mg capsule Take 20 mg by mouth daily        Ergocalciferol (VITAMIN D2 PO) Take 1 25 mg by mouth once a week Every Wednesday      fluticasone-salmeterol (ADVAIR) 250-50 mcg/dose Inhale 1 puff every 12 (twelve) hours   folic acid (FOLVITE) 1 mg tablet Take 1 mg by mouth daily      furosemide (LASIX) 40 mg tablet Take 40 mg by mouth daily   furosemide (LASIX) 40 mg tablet Take 1 tablet by mouth daily      glucose blood test strip by In Vitro route 2 (two) times a day      guaiFENesin (MUCINEX) 600 mg 12 hr tablet Take 2 tablets by mouth 2 (two) times a day as needed      lisinopril (ZESTRIL) 40 mg tablet Take 40 mg by mouth daily   methocarbamol (ROBAXIN) 500 mg tablet Take 1 tablet by mouth every 6 (six) hours as needed for muscle spasms 10 tablet 0    methotrexate 2 5 mg tablet Take 1 tablet (2 5 mg total) by mouth once a week 24 tablet 3    naproxen (NAPROSYN) 500 mg tablet Take by mouth      nystatin (MYCOSTATIN) cream Apply topically as needed      nystatin (MYCOSTATIN) powder Apply topically      ofloxacin (FLOXIN) 0 3 % otic solution Administer 10 drops into both ears as needed        ONETOUCH DELICA LANCETS FINE MISC by Does not apply route 2 (two) times a day      pantoprazole (PROTONIX) 40 mg tablet Take 40 mg by mouth daily   Polyethylene Glycol 3350-GRX POWD Take by mouth      sitaGLIPtin (JANUVIA) 100 mg tablet Take 100 mg by mouth daily      Tiotropium Bromide Monohydrate (SPIRIVA RESPIMAT) 2 5 MCG/ACT AERS Inhale daily at bedtime      Tofacitinib Citrate (XELJANZ XR) 11 MG TB24 Take 1 tablet by mouth daily      levothyroxine 150 mcg tablet Take 1 tablet by mouth daily in the early morning for 30 days 30 tablet 0     No current facility-administered medications for this visit        Current Outpatient Prescriptions on File Prior to Visit   Medication Sig    albuterol (PROVENTIL HFA,VENTOLIN HFA) 90 mcg/act inhaler Inhale 2 puffs every 6 (six) hours as needed for wheezing    canagliflozin (INVOKANA) 100 mg Take 100 mg by mouth daily before breakfast    clotrimazole (LOTRIMIN) 1 % cream Apply topically daily as needed   diclofenac sodium (VOLTAREN) 1 % Apply 2 g topically as needed    docusate sodium (COLACE) 100 mg capsule Take 1 capsule by mouth 2 (two) times a day    DULoxetine (CYMBALTA) 20 mg capsule Take 20 mg by mouth daily   Ergocalciferol (VITAMIN D2 PO) Take 1 25 mg by mouth once a week Every Wednesday    fluticasone-salmeterol (ADVAIR) 250-50 mcg/dose Inhale 1 puff every 12 (twelve) hours   folic acid (FOLVITE) 1 mg tablet Take 1 mg by mouth daily    furosemide (LASIX) 40 mg tablet Take 40 mg by mouth daily   furosemide (LASIX) 40 mg tablet Take 1 tablet by mouth daily    lisinopril (ZESTRIL) 40 mg tablet Take 40 mg by mouth daily   methocarbamol (ROBAXIN) 500 mg tablet Take 1 tablet by mouth every 6 (six) hours as needed for muscle spasms    nystatin (MYCOSTATIN) cream Apply topically as needed    ofloxacin (FLOXIN) 0 3 % otic solution Administer 10 drops into both ears as needed      pantoprazole (PROTONIX) 40 mg tablet Take 40 mg by mouth daily   sitaGLIPtin (JANUVIA) 100 mg tablet Take 100 mg by mouth daily    Tiotropium Bromide Monohydrate (SPIRIVA RESPIMAT) 2 5 MCG/ACT AERS Inhale daily at bedtime    Tofacitinib Citrate (XELJANZ XR) 11 MG TB24 Take 1 tablet by mouth daily    levothyroxine 150 mcg tablet Take 1 tablet by mouth daily in the early morning for 30 days     No current facility-administered medications on file prior to visit  She has No Known Allergies       Review of Systems   Constitutional: Negative for activity change, appetite change, chills, fatigue and fever  HENT: Negative for congestion, postnasal drip and rhinorrhea  Eyes: Negative for visual disturbance  Respiratory: Negative for cough, chest tightness, shortness of breath and wheezing  Cardiovascular: Negative for chest pain and palpitations     Gastrointestinal: Negative for abdominal pain, blood in stool, diarrhea, nausea and vomiting  Endocrine: Negative for polydipsia, polyphagia and polyuria  Genitourinary: Negative for dysuria, frequency and urgency  Musculoskeletal: Positive for arthralgias  Negative for gait problem  Skin: Negative for color change  Neurological: Negative for dizziness, light-headedness and headaches  Hematological: Does not bruise/bleed easily  Psychiatric/Behavioral: Negative for confusion and sleep disturbance  The patient is not nervous/anxious  Objective:      /62 (BP Location: Left arm, Patient Position: Sitting, Cuff Size: Large)   Pulse (!) 113   Temp 97 7 °F (36 5 °C) (Temporal)   Ht 5' 5" (1 651 m)   Wt 112 kg (246 lb)   SpO2 96%   BMI 40 94 kg/m²          Physical Exam   Constitutional: She is oriented to person, place, and time  She is cooperative  No distress  HENT:   Head: Normocephalic and atraumatic  Mouth/Throat: Oropharynx is clear and moist    Slight cerumen   Eyes: Conjunctivae are normal  Pupils are equal, round, and reactive to light  Right eye exhibits no discharge  Left eye exhibits no discharge  Neck: No spinous process tenderness present  Cardiovascular: Normal rate, regular rhythm and normal heart sounds  Exam reveals no gallop and no friction rub  No murmur heard  Pulmonary/Chest: No respiratory distress  She has decreased breath sounds  She has no wheezes  She has no rales  Abdominal: Bowel sounds are normal  She exhibits no distension  There is no tenderness  Musculoskeletal: She exhibits no edema  Degenerative changes bilateral hands   Lymphadenopathy:     She has no cervical adenopathy  Neurological: She is alert and oriented to person, place, and time  Skin: Skin is warm and dry  Psychiatric: She has a normal mood and affect  Her behavior is normal    Nursing note and vitals reviewed

## 2018-05-22 ENCOUNTER — TRANSCRIBE ORDERS (OUTPATIENT)
Dept: ADMISSIONS | Facility: HOSPITAL | Age: 68
End: 2018-05-22

## 2018-05-22 ENCOUNTER — APPOINTMENT (OUTPATIENT)
Dept: LAB | Facility: HOSPITAL | Age: 68
End: 2018-05-22
Payer: MEDICARE

## 2018-05-22 DIAGNOSIS — M06.9 RHEUMATOID ARTHRITIS, INVOLVING UNSPECIFIED SITE, UNSPECIFIED RHEUMATOID FACTOR PRESENCE: ICD-10-CM

## 2018-05-22 DIAGNOSIS — E11.65 TYPE 2 DIABETES MELLITUS WITH HYPERGLYCEMIA, WITHOUT LONG-TERM CURRENT USE OF INSULIN (HCC): ICD-10-CM

## 2018-05-22 DIAGNOSIS — E55.9 VITAMIN D DEFICIENCY: ICD-10-CM

## 2018-05-22 DIAGNOSIS — E03.9 HYPOTHYROIDISM, UNSPECIFIED TYPE: ICD-10-CM

## 2018-05-22 DIAGNOSIS — E53.8 VITAMIN B12 DEFICIENCY: ICD-10-CM

## 2018-05-22 DIAGNOSIS — I10 ESSENTIAL HYPERTENSION: ICD-10-CM

## 2018-05-22 DIAGNOSIS — M06.9 RHEUMATOID ARTHRITIS, INVOLVING UNSPECIFIED SITE, UNSPECIFIED RHEUMATOID FACTOR PRESENCE: Primary | ICD-10-CM

## 2018-05-22 LAB
25(OH)D3 SERPL-MCNC: 32.8 NG/ML (ref 30–100)
ALBUMIN SERPL BCP-MCNC: 3.7 G/DL (ref 3.5–5)
ALP SERPL-CCNC: 79 U/L (ref 46–116)
ALT SERPL W P-5'-P-CCNC: 50 U/L (ref 12–78)
ANION GAP SERPL CALCULATED.3IONS-SCNC: 8 MMOL/L (ref 4–13)
AST SERPL W P-5'-P-CCNC: 20 U/L (ref 5–45)
BACTERIA UR QL AUTO: ABNORMAL /HPF
BASOPHILS # BLD AUTO: 0.02 THOUSANDS/ΜL (ref 0–0.1)
BASOPHILS NFR BLD AUTO: 0 % (ref 0–1)
BILIRUB SERPL-MCNC: 0.6 MG/DL (ref 0.2–1)
BILIRUB UR QL STRIP: NEGATIVE
BUN SERPL-MCNC: 24 MG/DL (ref 5–25)
CALCIUM SERPL-MCNC: 9.4 MG/DL (ref 8.3–10.1)
CHLORIDE SERPL-SCNC: 98 MMOL/L (ref 100–108)
CHOLEST SERPL-MCNC: 209 MG/DL (ref 50–200)
CLARITY UR: ABNORMAL
CO2 SERPL-SCNC: 31 MMOL/L (ref 21–32)
COLOR UR: YELLOW
CREAT SERPL-MCNC: 0.72 MG/DL (ref 0.6–1.3)
CRP SERPL QL: 6.6 MG/L
EOSINOPHIL # BLD AUTO: 0.09 THOUSAND/ΜL (ref 0–0.61)
EOSINOPHIL NFR BLD AUTO: 2 % (ref 0–6)
ERYTHROCYTE [DISTWIDTH] IN BLOOD BY AUTOMATED COUNT: 15.6 % (ref 11.6–15.1)
ERYTHROCYTE [SEDIMENTATION RATE] IN BLOOD: 1 MM/HOUR (ref 0–20)
GFR SERPL CREATININE-BSD FRML MDRD: 85 ML/MIN/1.73SQ M
GLUCOSE P FAST SERPL-MCNC: 141 MG/DL (ref 65–99)
GLUCOSE UR STRIP-MCNC: ABNORMAL MG/DL
HCT VFR BLD AUTO: 44 % (ref 34.8–46.1)
HDLC SERPL-MCNC: 61 MG/DL (ref 40–60)
HGB BLD-MCNC: 14.5 G/DL (ref 11.5–15.4)
HGB UR QL STRIP.AUTO: NEGATIVE
KETONES UR STRIP-MCNC: ABNORMAL MG/DL
LDLC SERPL CALC-MCNC: 122 MG/DL (ref 0–100)
LEUKOCYTE ESTERASE UR QL STRIP: ABNORMAL
LYMPHOCYTES # BLD AUTO: 2.43 THOUSANDS/ΜL (ref 0.6–4.47)
LYMPHOCYTES NFR BLD AUTO: 41 % (ref 14–44)
MCH RBC QN AUTO: 31.3 PG (ref 26.8–34.3)
MCHC RBC AUTO-ENTMCNC: 33 G/DL (ref 31.4–37.4)
MCV RBC AUTO: 95 FL (ref 82–98)
MONOCYTES # BLD AUTO: 0.7 THOUSAND/ΜL (ref 0.17–1.22)
MONOCYTES NFR BLD AUTO: 12 % (ref 4–12)
NEUTROPHILS # BLD AUTO: 2.74 THOUSANDS/ΜL (ref 1.85–7.62)
NEUTS SEG NFR BLD AUTO: 46 % (ref 43–75)
NITRITE UR QL STRIP: NEGATIVE
NON-SQ EPI CELLS URNS QL MICRO: ABNORMAL /HPF
NONHDLC SERPL-MCNC: 148 MG/DL
PH UR STRIP.AUTO: 5.5 [PH] (ref 4.5–8)
PLATELET # BLD AUTO: 288 THOUSANDS/UL (ref 149–390)
PMV BLD AUTO: 10.7 FL (ref 8.9–12.7)
POTASSIUM SERPL-SCNC: 3.8 MMOL/L (ref 3.5–5.3)
PROT SERPL-MCNC: 6.9 G/DL (ref 6.4–8.2)
PROT UR STRIP-MCNC: NEGATIVE MG/DL
RBC # BLD AUTO: 4.63 MILLION/UL (ref 3.81–5.12)
RBC #/AREA URNS AUTO: ABNORMAL /HPF
SODIUM SERPL-SCNC: 137 MMOL/L (ref 136–145)
SP GR UR STRIP.AUTO: 1.01 (ref 1–1.03)
TRIGL SERPL-MCNC: 128 MG/DL
TSH SERPL DL<=0.05 MIU/L-ACNC: 0.07 UIU/ML (ref 0.36–3.74)
UROBILINOGEN UR QL STRIP.AUTO: 0.2 E.U./DL
VIT B12 SERPL-MCNC: 271 PG/ML (ref 100–900)
WBC # BLD AUTO: 5.98 THOUSAND/UL (ref 4.31–10.16)
WBC #/AREA URNS AUTO: ABNORMAL /HPF

## 2018-05-22 PROCEDURE — 85652 RBC SED RATE AUTOMATED: CPT

## 2018-05-22 PROCEDURE — 82607 VITAMIN B-12: CPT

## 2018-05-22 PROCEDURE — 85025 COMPLETE CBC W/AUTO DIFF WBC: CPT

## 2018-05-22 PROCEDURE — 81001 URINALYSIS AUTO W/SCOPE: CPT | Performed by: PHYSICIAN ASSISTANT

## 2018-05-22 PROCEDURE — 80061 LIPID PANEL: CPT

## 2018-05-22 PROCEDURE — 84443 ASSAY THYROID STIM HORMONE: CPT

## 2018-05-22 PROCEDURE — 80053 COMPREHEN METABOLIC PANEL: CPT

## 2018-05-22 PROCEDURE — 82306 VITAMIN D 25 HYDROXY: CPT

## 2018-05-22 PROCEDURE — 36415 COLL VENOUS BLD VENIPUNCTURE: CPT

## 2018-05-22 PROCEDURE — 86140 C-REACTIVE PROTEIN: CPT

## 2018-05-24 ENCOUNTER — TELEPHONE (OUTPATIENT)
Dept: INTERNAL MEDICINE CLINIC | Facility: CLINIC | Age: 68
End: 2018-05-24

## 2018-05-24 DIAGNOSIS — E11.9 TYPE 2 DIABETES MELLITUS WITHOUT COMPLICATION, WITHOUT LONG-TERM CURRENT USE OF INSULIN (HCC): Primary | ICD-10-CM

## 2018-05-24 RX ORDER — BLOOD-GLUCOSE METER
EACH MISCELLANEOUS 2 TIMES DAILY
Qty: 100 EACH | Refills: 3 | Status: SHIPPED | OUTPATIENT
Start: 2018-05-24 | End: 2018-06-06 | Stop reason: SDUPTHER

## 2018-05-24 RX ORDER — BLOOD-GLUCOSE METER
EACH MISCELLANEOUS 2 TIMES DAILY
Qty: 100 EACH | Refills: 3 | Status: SHIPPED | OUTPATIENT
Start: 2018-05-24 | End: 2018-06-06

## 2018-05-24 NOTE — TELEPHONE ENCOUNTER
Received a call from [facility] [place] from the Lab  They stated they need a new script for an A1c  Please advise

## 2018-06-01 DIAGNOSIS — E78.2 MIXED HYPERLIPIDEMIA: ICD-10-CM

## 2018-06-01 DIAGNOSIS — E03.9 HYPOTHYROIDISM, UNSPECIFIED TYPE: Primary | ICD-10-CM

## 2018-06-01 RX ORDER — ATORVASTATIN CALCIUM 20 MG/1
20 TABLET, FILM COATED ORAL DAILY
Qty: 90 TABLET | Refills: 1 | Status: SHIPPED | OUTPATIENT
Start: 2018-06-01 | End: 2018-08-30 | Stop reason: SDUPTHER

## 2018-06-01 RX ORDER — LEVOTHYROXINE SODIUM 0.12 MG/1
125 TABLET ORAL
Qty: 90 TABLET | Refills: 1 | Status: SHIPPED | OUTPATIENT
Start: 2018-06-01 | End: 2018-06-01 | Stop reason: SDUPTHER

## 2018-06-01 RX ORDER — LEVOTHYROXINE SODIUM 0.12 MG/1
125 TABLET ORAL
Qty: 90 TABLET | Refills: 0 | Status: SHIPPED | OUTPATIENT
Start: 2018-06-01 | End: 2018-08-30 | Stop reason: SDUPTHER

## 2018-06-06 DIAGNOSIS — E11.9 TYPE 2 DIABETES MELLITUS WITHOUT COMPLICATION, WITHOUT LONG-TERM CURRENT USE OF INSULIN (HCC): ICD-10-CM

## 2018-06-06 RX ORDER — BLOOD-GLUCOSE METER
EACH MISCELLANEOUS 2 TIMES DAILY
Qty: 1 EACH | Refills: 0 | Status: SHIPPED | OUTPATIENT
Start: 2018-06-06

## 2018-06-06 NOTE — TELEPHONE ENCOUNTER
Received a call from [facility] Pickens stating that the blood monitoring kit, test strips, and lancets need to be sent to [address]  States that the [address] doesn't fill those  Also she did have a question about the vitamin b12  She started taking 1000 units, but wanted to know if she can take 3000 units  Verified kit to be sent to [facility] in [facility]  Please advise

## 2018-06-07 DIAGNOSIS — E11.9 TYPE 2 DIABETES MELLITUS WITHOUT COMPLICATION, WITHOUT LONG-TERM CURRENT USE OF INSULIN (HCC): ICD-10-CM

## 2018-06-07 NOTE — TELEPHONE ENCOUNTER
Tee from [address] called, the test strip script must have how many times a day she tests  I corrected the script

## 2018-06-18 DIAGNOSIS — E11.9 TYPE 2 DIABETES MELLITUS WITHOUT COMPLICATION, WITHOUT LONG-TERM CURRENT USE OF INSULIN (HCC): ICD-10-CM

## 2018-07-02 ENCOUNTER — APPOINTMENT (OUTPATIENT)
Dept: LAB | Facility: HOSPITAL | Age: 68
End: 2018-07-02
Payer: MEDICARE

## 2018-07-02 DIAGNOSIS — E03.9 HYPOTHYROIDISM, UNSPECIFIED TYPE: ICD-10-CM

## 2018-07-02 DIAGNOSIS — E11.9 TYPE 2 DIABETES MELLITUS WITHOUT COMPLICATION, WITHOUT LONG-TERM CURRENT USE OF INSULIN (HCC): ICD-10-CM

## 2018-07-02 DIAGNOSIS — E78.2 MIXED HYPERLIPIDEMIA: ICD-10-CM

## 2018-07-02 LAB
ALBUMIN SERPL BCP-MCNC: 3.5 G/DL (ref 3.5–5)
ALP SERPL-CCNC: 92 U/L (ref 46–116)
ALT SERPL W P-5'-P-CCNC: 44 U/L (ref 12–78)
ANION GAP SERPL CALCULATED.3IONS-SCNC: 8 MMOL/L (ref 4–13)
AST SERPL W P-5'-P-CCNC: 24 U/L (ref 5–45)
BILIRUB SERPL-MCNC: 0.4 MG/DL (ref 0.2–1)
BUN SERPL-MCNC: 14 MG/DL (ref 5–25)
CALCIUM SERPL-MCNC: 9.8 MG/DL (ref 8.3–10.1)
CHLORIDE SERPL-SCNC: 104 MMOL/L (ref 100–108)
CO2 SERPL-SCNC: 30 MMOL/L (ref 21–32)
CREAT SERPL-MCNC: 0.57 MG/DL (ref 0.6–1.3)
EST. AVERAGE GLUCOSE BLD GHB EST-MCNC: 163 MG/DL
GFR SERPL CREATININE-BSD FRML MDRD: 94 ML/MIN/1.73SQ M
GLUCOSE SERPL-MCNC: 155 MG/DL (ref 65–140)
HBA1C MFR BLD: 7.3 % (ref 4.2–6.3)
POTASSIUM SERPL-SCNC: 4.3 MMOL/L (ref 3.5–5.3)
PROT SERPL-MCNC: 7.1 G/DL (ref 6.4–8.2)
SODIUM SERPL-SCNC: 142 MMOL/L (ref 136–145)
TSH SERPL DL<=0.05 MIU/L-ACNC: 0.09 UIU/ML (ref 0.36–3.74)

## 2018-07-02 PROCEDURE — 36415 COLL VENOUS BLD VENIPUNCTURE: CPT

## 2018-07-02 PROCEDURE — 84443 ASSAY THYROID STIM HORMONE: CPT

## 2018-07-02 PROCEDURE — 83036 HEMOGLOBIN GLYCOSYLATED A1C: CPT

## 2018-07-02 PROCEDURE — 80053 COMPREHEN METABOLIC PANEL: CPT

## 2018-07-05 DIAGNOSIS — M06.9 RHEUMATOID ARTHRITIS INVOLVING MULTIPLE SITES, UNSPECIFIED RHEUMATOID FACTOR PRESENCE: Primary | ICD-10-CM

## 2018-07-06 ENCOUNTER — APPOINTMENT (EMERGENCY)
Dept: RADIOLOGY | Facility: HOSPITAL | Age: 68
End: 2018-07-06
Payer: MEDICARE

## 2018-07-06 ENCOUNTER — HOSPITAL ENCOUNTER (EMERGENCY)
Facility: HOSPITAL | Age: 68
Discharge: HOME | End: 2018-07-06
Attending: EMERGENCY MEDICINE | Admitting: EMERGENCY MEDICINE
Payer: MEDICARE

## 2018-07-06 VITALS
RESPIRATION RATE: 20 BRPM | HEART RATE: 94 BPM | OXYGEN SATURATION: 94 % | SYSTOLIC BLOOD PRESSURE: 115 MMHG | TEMPERATURE: 98.5 F | DIASTOLIC BLOOD PRESSURE: 71 MMHG

## 2018-07-06 DIAGNOSIS — M25.512 LEFT SHOULDER PAIN: Primary | ICD-10-CM

## 2018-07-06 PROCEDURE — 99283 EMERGENCY DEPT VISIT LOW MDM: CPT

## 2018-07-06 PROCEDURE — 73030 X-RAY EXAM OF SHOULDER: CPT

## 2018-07-06 RX ORDER — ACETAMINOPHEN 325 MG/1
650 TABLET ORAL ONCE
Status: COMPLETED | OUTPATIENT
Start: 2018-07-06 | End: 2018-07-06

## 2018-07-06 RX ORDER — FOLIC ACID 1 MG/1
TABLET ORAL
Qty: 90 TABLET | Refills: 1 | Status: SHIPPED | OUTPATIENT
Start: 2018-07-06 | End: 2018-11-21 | Stop reason: SDUPTHER

## 2018-07-06 RX ORDER — IBUPROFEN 600 MG/1
600 TABLET ORAL ONCE
Status: COMPLETED | OUTPATIENT
Start: 2018-07-06 | End: 2018-07-06

## 2018-07-06 RX ORDER — TRAMADOL HYDROCHLORIDE 50 MG/1
50 TABLET ORAL EVERY 6 HOURS PRN
Qty: 30 TABLET | Refills: 0 | Status: SHIPPED | OUTPATIENT
Start: 2018-07-06 | End: 2018-07-16

## 2018-07-06 RX ORDER — LIDOCAINE 50 MG/G
1 PATCH TOPICAL ONCE
Status: DISCONTINUED | OUTPATIENT
Start: 2018-07-06 | End: 2018-07-06 | Stop reason: HOSPADM

## 2018-07-06 RX ADMIN — LIDOCAINE 1 PATCH: 50 PATCH CUTANEOUS at 04:51

## 2018-07-06 RX ADMIN — ACETAMINOPHEN 650 MG: 325 TABLET, FILM COATED ORAL at 04:49

## 2018-07-06 RX ADMIN — IBUPROFEN 600 MG: 600 TABLET ORAL at 04:51

## 2018-07-06 NOTE — DISCHARGE INSTRUCTIONS
Arthralgia   WHAT YOU NEED TO KNOW:   Arthralgia is pain in one or more joints, with no inflammation  It may be short-term and get better within 6 to 8 weeks  Arthralgia can be an early sign of arthritis  Arthralgia may be caused by a medical condition, such as a hormone disorder or a tumor  It may also be caused by an infection or injury  DISCHARGE INSTRUCTIONS:   Medicines: The following medicines may  be ordered for you:  · Acetaminophen  decreases pain  Ask how much to take and how often to take it  Follow directions  Acetaminophen can cause liver damage if not taken correctly  · NSAIDs  decrease pain and prevent swelling  Ask your healthcare provider which medicine is right for you  Ask how much to take and when to take it  Take as directed  NSAIDs can cause stomach bleeding and kidney problems if not taken correctly  · Pain relief cream  decreases pain  Use this cream as directed  · Take your medicine as directed  Contact your healthcare provider if you think your medicine is not helping or if you have side effects  Tell him of her if you are allergic to any medicine  Keep a list of the medicines, vitamins, and herbs you take  Include the amounts, and when and why you take them  Bring the list or the pill bottles to follow-up visits  Carry your medicine list with you in case of an emergency  Follow up with your healthcare provider or specialist as directed:  Write down your questions so you remember to ask them during your visits  Self-care:   · Apply heat  to help decrease pain  Use a heating pad or heat wrap  Apply heat for 20 to 30 minutes every 2 hours for as many days as directed  · Rest  as much as possible  Avoid activities that cause joint pain  · Apply ice  to help decrease swelling and pain  Ice may also help prevent tissue damage  Use an ice pack, or put crushed ice in a plastic bag   Cover it with a towel and place it on your painful joint for 15 to 20 minutes every hour or as directed  · Support  the joint with a brace or elastic wrap as directed  · Elevate  your joint above the level of your heart as often as you can to help decrease swelling and pain  Prop your painful joint on pillows or blankets to keep it elevated comfortably  · Lose weight  if you are overweight  Extra weight can put pressure on your joints and cause more pain  Ask your healthcare provider how much you should weigh  Ask him to help you create a weight loss plan  · Exercise  regularly to help improve joint movement and to decrease pain  Ask about the best exercise plan for you  Low-impact exercises can help take the pressure off your joints  Examples are walking, swimming, and water aerobics  Physical therapy:  A physical therapist teaches you exercises to help improve movement and strength, and to decrease pain  Ask your healthcare provider if physical therapy is right for you  Contact your healthcare provider or specialist if:   · You have a fever  · You continue to have joint pain that cannot be relieved with heat, ice, or medicine  · You have pain and inflammation around your joint  · You have questions or concerns about your condition or care  Return to the emergency department if:   · You have sudden, severe pain when you move your joint  · You have a fever and shaking chills  · You cannot move your joint  · You lose feeling on the side of your body where you have the painful joint  © 2017 [address]  Information is for End User's use only and may not be sold, redistributed or otherwise used for commercial purposes  All illustrations and images included in CareNotes® are the copyrighted property of A D [employer] M [employer]  or [employer]  The above information is an  only  It is not intended as medical advice for individual conditions or treatments   Talk to your doctor, nurse or pharmacist before following any medical regimen to see if it is safe and effective for you  Shoulder Pain, Ambulatory Care   GENERAL INFORMATION:   Shoulder pain  is a common problem and can affect your daily activities  Pain can be caused by a problem within your shoulder  Shoulder pain may also be caused by pain that spreads to your shoulder from another part of your body  Seek immediate care for the following symptoms:   · Severe pain    · Inability to move your arm or shoulder    · Numbness or tingling in your shoulder or arm  Treatment for shoulder pain  may include any of the following:  · Acetaminophen  decreases pain and fever  It is available without a doctor's order  Ask how much to take and how often to take it  Follow directions  Acetaminophen can cause liver damage if not taken correctly  · NSAIDs  help decrease swelling and pain or fever  This medicine is available with or without a doctor's order  NSAIDs can cause stomach bleeding or kidney problems in certain people  If you take blood thinner medicine, always ask your healthcare provider if NSAIDs are safe for you  Always read the medicine label and follow directions  · A steroid injection  may help decrease pain and swelling  · Surgery  may be needed for long-term pain and loss of function  Manage your symptoms:   · Apply ice  on your shoulder for 20 to 30 minutes every 2 hours or as directed  Use an ice pack, or put crushed ice in a plastic bag  Cover it with a towel  Ice helps prevent tissue damage and decreases swelling and pain  · Apply heat if ice does not help your symptoms  Apply heat on your shoulder for 20 to 30 minutes every 2 hours for as many days as directed  Heat helps decrease pain and muscle spasms  · Go to physical or occupational therapy as directed  A physical therapist teaches you exercises to help improve movement and strength, and to decrease pain  An occupational therapist teaches you skills to help with your daily activities    Prevent shoulder pain: · Stretch and strengthen your shoulder  Use proper technique during exercises and sports  · Limit activities as directed  Try to avoid repeated overhead movements  Follow up with your healthcare provider or orthopedist as directed:  Write down your questions so you remember to ask them during your visits  CARE AGREEMENT:   You have the right to help plan your care  Learn about your health condition and how it may be treated  Discuss treatment options with your caregivers to decide what care you want to receive  You always have the right to refuse treatment  The above information is an  only  It is not intended as medical advice for individual conditions or treatments  Talk to your doctor, nurse or pharmacist before following any medical regimen to see if it is safe and effective for you  © 2014 [address] is for End User's use only and may not be sold, redistributed or otherwise used for commercial purposes  All illustrations and images included in CareNotes® are the copyrighted property of [employer]  or Braden Lee

## 2018-07-06 NOTE — ED PROVIDER NOTES
Pt Name: Drew Scheuermann  MRN: [id number]  [place] 1950  Age/Sex: 77 y o  female  Date of evaluation: 7/6/2018  PCP: Oscar Moreno MD    [address] [place] [address]    Chief Complaint   Patient presents with    Arm Pain     Patient has left arm pain for the past several days  Patient stated pain is worst in her shoulder and she cant lift her arm with out it hurting too bad  Patient denies any injury or trauma  Patient denies any chest pain or sob  HPI    Amadeo Rojas presents to the Emergency Department complaining of left shoulder pain  She has known OA and RA and has recently come off her steroids  She now has pain in shoulder with only minimal ROM  She has had no known trauma          HPI      Past Medical and Surgical History    Past Medical History:   Diagnosis Date    Arthritis     Arthritis     Candidiasis of skin     [facility] obstructive [facility] disease) [facility]     last assessed 11/21/2016    Current chronic use of systemic steroids     Degenerative arthritis of left knee     last assessed 1/20/2015    Diabetes mellitus ([facility]     Disease of thyroid gland     hypo    Fibromyalgia     Fistula of knee     last assessed 9/12/2014    GERD (gastroesophageal reflux disease)     Hyperlipidemia     Hypertension     Medial meniscus tear     of left knee, last assessed 9/12/2014    Migraine     states she has a hx of HA that she calls [employer]concert but never was dx by neurologist    Obesity     Obesity     Osteoarthritis     Osteopenia     Pneumonia     last assessed 11/16/2016    Psychiatric disorder     anxiety    Rheumatoid arthritis ([facility]     Rheumatoid arthritis ([facility]     Sepsis ([facility]     last assessed 11/10/2016    Tick bite     last assessed 10/30/2015    Vitamin B12 deficiency     Vitamin D deficiency        Past Surgical History:   Procedure Laterality Date    CATARACT EXTRACTION Bilateral     CHOLECYSTECTOMY      EYE SURGERY      HYSTERECTOMY      JOINT REPLACEMENT      left knee    KNEE ARTHROSCOPY      NEUROPLASTY / TRANSPOSITION MEDIAN NERVE AT CARPAL TUNNEL      ROTATOR CUFF REPAIR Left     TUBAL LIGATION         Family History   Problem Relation Age of Onset    Stroke Mother     Asthma Family     Cancer Family     Diabetes Family     Hypertension Family        Social History   Substance Use Topics    Smoking status: Former Smoker     Packs/day: 1 00     Years: 48 00     Quit date: 11/21/2012    Smokeless tobacco: Never Used      Comment: per allscripts - "current every day smoker, former smoker"    Alcohol use No      Comment: stopped drinking alcohol           Allergies    No Known Allergies    Home Medications    Prior to Admission medications    Medication Sig Start Date End Date Taking? Authorizing Provider   albuterol (PROVENTIL HFA,VENTOLIN HFA) 90 mcg/act inhaler Inhale 2 puffs every 6 (six) hours as needed for wheezing   Yes Historical Provider, MD   atorvastatin (LIPITOR) 20 mg tablet Take 1 tablet (20 mg total) by mouth daily for 90 days 6/1/18 8/30/18 Yes Rama Spurling, MD   canagliflozin [facility]) 100 mg Take 100 mg by mouth daily before breakfast   Yes Historical Provider, MD   DULoxetine (CYMBALTA) 20 mg capsule Take 20 mg by mouth daily  Yes Historical Provider, MD   albuterol (2 5 mg/3 mL) 0 083 % nebulizer solution Inhale 1 each 4 (four) times a day as needed    Historical Provider, MD   alendronate (FOSAMAX) 70 mg tablet Take 70 mg by mouth every 7 days    Historical Provider, MD   Blood Glucose Monitoring Suppl (ONE TOUCH ULTRA 2) w/Device KIT by Does not apply route 2 (two) times a day 6/6/18   Rama Spurling, MD   Clobetasol Prop Emollient Base (CLOBETASOL PROPIONATE E) 0 05 % emollient cream Apply topically 1/22/15   Historical Provider, MD   clotrimazole (LOTRIMIN) 1 % cream Apply topically daily as needed      Historical Provider, MD acevedomazole-betamethasone (LOTRISONE) 1-0 05 % cream Apply topically    Historical Provider, MD cyanocobalamin (VITAMIN B-12) 1,000 mcg tablet Take 1 tablet by mouth daily 11/10/16   Historical Provider, MD   diclofenac sodium (VOLTAREN) 1 % Apply 2 g topically as needed    Historical Provider, MD   docusate sodium (COLACE) 100 mg capsule Take 1 capsule by mouth 2 (two) times a day 11/24/17   Eugene Salgado,    Ergocalciferol (VITAMIN D2 PO) Take 1 25 mg by mouth once a week Every Wednesday    Historical Provider, MD   fluticasone-salmeterol (ADVAIR) 250-50 mcg/dose Inhale 1 puff every 12 (twelve) hours  Historical Provider, MD   folic acid (FOLVITE) 1 mg tablet Take 1 mg by mouth daily    Historical Provider, MD   furosemide (LASIX) 40 mg tablet Take 40 mg by mouth daily  Historical Provider, MD   furosemide (LASIX) 40 mg tablet Take 1 tablet by mouth daily 6/26/13   Historical Provider, MD   guaiFENesin (MUCINEX) 600 mg 12 hr tablet Take 2 tablets by mouth 2 (two) times a day as needed 11/21/16   Historical Provider, MD   levothyroxine 125 mcg tablet Take 1 tablet (125 mcg total) by mouth daily in the early morning for 90 days 6/1/18 8/30/18  Alana Haider MD   lisinopril (ZESTRIL) 40 mg tablet Take 40 mg by mouth daily      Historical Provider, MD   methocarbamol (ROBAXIN) 500 mg tablet Take 1 tablet by mouth every 6 (six) hours as needed for muscle spasms 11/24/17   Eugene Salgado DO   methotrexate 2 5 mg tablet Take 1 tablet (2 5 mg total) by mouth once a week 5/21/18   Alana Haider MD   naproxen (NAPROSYN) 500 mg tablet Take by mouth 12/26/16   Historical Provider, MD   nystatin (MYCOSTATIN) cream Apply topically as needed    Historical Provider, MD   nystatin (MYCOSTATIN) powder Apply topically 6/7/17   Historical Provider, MD   ofloxacin (FLOXIN) 0 3 % otic solution Administer 10 drops into both ears as needed      Historical Provider, MD   ONE TOUCH ULTRA TEST test strip Tests twice a day 6/7/18   MD Kishor Fields LANCETS FINE MISC by Does not apply route 2 (two) times a day 6/18/18   Checo Vincent MD   pantoprazole (PROTONIX) 40 mg tablet Take 40 mg by mouth daily  Historical Provider, MD   Polyethylene Glycol 3350-GRX POWD Take by mouth 11/29/17   Historical Provider, MD   sitaGLIPtin (JANUVIA) 100 mg tablet Take 100 mg by mouth daily    Historical Provider, MD   Tiotropium Bromide Monohydrate (SPIRIVA RESPIMAT) 2 5 MCG/ACT AERS Inhale daily at bedtime    Historical Provider, MD   Tofacitinib Citrate (XELJANZ XR) 11 MG TB24 Take 1 tablet by mouth daily    Historical Provider, MD           Review of Systems    Review of Systems   Constitutional: Negative for activity change, appetite change, chills, diaphoresis, fatigue and fever  HENT: Negative for congestion, postnasal drip, rhinorrhea, sinus pressure, sneezing and sore throat  Eyes: Negative for pain and visual disturbance  Respiratory: Negative for cough, chest tightness and shortness of breath  Cardiovascular: Negative for chest pain, palpitations and leg swelling  Gastrointestinal: Negative for abdominal distention, abdominal pain, constipation, diarrhea, nausea and vomiting  Endocrine: Negative for polydipsia, polyphagia and polyuria  Genitourinary: Negative for decreased urine volume, difficulty urinating, dysuria, flank pain, frequency and hematuria  Musculoskeletal: Positive for arthralgias  Negative for gait problem, joint swelling and neck pain  Skin: Negative for pallor and rash  Allergic/Immunologic: Negative for immunocompromised state  Neurological: Negative for syncope, speech difficulty, weakness, light-headedness, numbness and headaches  All other systems reviewed and are negative        Physical Exam      ED Triage Vitals [07/06/18 0418]   Temperature Pulse Respirations Blood Pressure SpO2   98 5 °F (36 9 °C) 94 20 115/71 94 %      Temp Source Heart Rate Source Patient Position - Orthostatic VS BP Location FiO2 (%)   Temporal Monitor Sitting Right arm --      Pain Score       Worst Possible Pain               Physical Exam   Constitutional: She is oriented to person, place, and time  She appears well-developed and well-nourished  No distress  HENT:   Head: Normocephalic and atraumatic  Nose: Nose normal    Mouth/Throat: Oropharynx is clear and moist    Eyes: Conjunctivae, EOM and lids are normal  Pupils are equal, round, and reactive to light  Neck: Normal range of motion  Neck supple  Cardiovascular: Normal rate, regular rhythm and normal heart sounds  Exam reveals no gallop and no friction rub  No murmur heard  Pulmonary/Chest: Effort normal and breath sounds normal  No accessory muscle usage  No respiratory distress  She has no wheezes  She has no rales  Abdominal: Soft  She exhibits no distension  There is no tenderness  There is no rebound and no guarding  Musculoskeletal:        Left shoulder: She exhibits decreased range of motion, tenderness, pain and decreased strength  She exhibits no swelling, no effusion and no deformity  Neurological: She is alert and oriented to person, place, and time  No cranial nerve deficit or sensory deficit  Skin: Skin is warm and dry  No rash noted  She is not diaphoretic  No erythema  Psychiatric: She has a normal mood and affect  Her speech is normal and behavior is normal  Judgment and thought content normal    Nursing note and vitals reviewed  Assessment and Plan    Kraig Alvarado is a 77 y o  female who presents with left shoulder pain  Physical examination remarkable for limited ROM and tenderness to palpation  Differential diagnosis (not completely inclusive) includes tendonitis/ arthritis/ bursitis  Plan will be to perform diagnostic testing and treat symptomatically  MDM    Diagnostic Results      Labs:   All labs reviewed and utilized in the medical decision making process    Radiology:    XR shoulder 2+ views LEFT    (Results Pending)     NAD    All radiology studies independently viewed by me and interpreted by the radiologist     Procedure    Procedures    CritCare Time      ED Course of Care and Re-Assessments        Medications   lidocaine (LIDODERM) 5 % patch 1 patch (1 patch Transdermal Medication Applied 7/6/18 0451)   acetaminophen (TYLENOL) tablet 650 mg (650 mg Oral Given 7/6/18 0449)   ibuprofen (MOTRIN) tablet 600 mg (600 mg Oral Given 7/6/18 0451)           FINAL IMPRESSION    Final diagnoses:   Left shoulder pain         DISPOSITION/PLAN    Time reflects when diagnosis was documented in both MDM as applicable and the Disposition within this note     Time User Action Codes Description Comment    7/6/2018  4:59 AM Aidenliliana Emma Add [M25 [id number]] Left shoulder pain       ED Disposition     ED Disposition Condition Comment    Discharge  Farzaneh Wilder discharge to home/self care      Condition at discharge: Good        Follow-up Information     Follow up With Specialties Details Why Contact Info Additional 482 Yoandy Kirkland MD [facility] Medicine Schedule an appointment as soon as possible for a visit  [address]       Singh Burger MD Orthopedic Surgery Schedule an appointment as soon as possible for a visit  2018 [address]        [facility] Emergency Department Emergency Medicine Go to As needed, If symptoms worsen [place] 1949  [phone] [place] ED, [address], [address] [facility] TO:    Laura Hurley, [address]  [phone]    Schedule an appointment as soon as possible for a visit      Singh Burger MD  [address],  [address] [phone]    Schedule an appointment as soon as possible for a visit      [facility] Emergency Department  [place] 1949  [phone]  Go to  As needed, If symptoms worsen      DISCHARGE MEDICATIONS:    Patient's Medications   Discharge Prescriptions    TRAMADOL (ULTRAM) 50 MG TABLET    Take 1 tablet (50 mg total) by mouth every 6 (six) hours as needed for moderate pain for up to 10 days       Start Date: 7/6/2018  End Date: 7/16/2018       Order Dose: 50 mg       Quantity: 30 tablet    Refills: 0       No discharge procedures on file           Crescencio Talley, [address] [place] [address],   07/06/18 4026

## 2018-07-08 ENCOUNTER — HOSPITAL ENCOUNTER (EMERGENCY)
Facility: HOSPITAL | Age: 68
Discharge: HOME | End: 2018-07-08
Attending: EMERGENCY MEDICINE
Payer: MEDICARE

## 2018-07-08 VITALS
BODY MASS INDEX: 41.14 KG/M2 | HEIGHT: 65 IN | HEART RATE: 98 BPM | TEMPERATURE: 99.3 F | RESPIRATION RATE: 18 BRPM | OXYGEN SATURATION: 93 % | WEIGHT: 246.91 LBS | DIASTOLIC BLOOD PRESSURE: 68 MMHG | SYSTOLIC BLOOD PRESSURE: 130 MMHG

## 2018-07-08 DIAGNOSIS — M06.9 RHEUMATOID ARTHRITIS FLARE (HCC): Primary | ICD-10-CM

## 2018-07-08 PROCEDURE — 93005 ELECTROCARDIOGRAM TRACING: CPT

## 2018-07-08 PROCEDURE — 99283 EMERGENCY DEPT VISIT LOW MDM: CPT

## 2018-07-08 RX ORDER — PREDNISONE 20 MG/1
60 TABLET ORAL ONCE
Status: DISCONTINUED | OUTPATIENT
Start: 2018-07-08 | End: 2018-07-09 | Stop reason: HOSPADM

## 2018-07-08 RX ORDER — ACETAMINOPHEN 325 MG/1
975 TABLET ORAL ONCE
Status: COMPLETED | OUTPATIENT
Start: 2018-07-08 | End: 2018-07-08

## 2018-07-08 RX ORDER — PREDNISONE 20 MG/1
60 TABLET ORAL DAILY
Status: DISCONTINUED | OUTPATIENT
Start: 2018-07-09 | End: 2018-07-08

## 2018-07-08 RX ORDER — PREDNISONE 20 MG/1
40 TABLET ORAL DAILY
Qty: 6 TABLET | Refills: 0 | Status: SHIPPED | OUTPATIENT
Start: 2018-07-08 | End: 2018-07-15

## 2018-07-08 RX ORDER — PREDNISONE 20 MG/1
60 TABLET ORAL ONCE
Status: COMPLETED | OUTPATIENT
Start: 2018-07-08 | End: 2018-07-08

## 2018-07-08 RX ADMIN — PREDNISONE 60 MG: 20 TABLET ORAL at 22:05

## 2018-07-08 RX ADMIN — ACETAMINOPHEN 975 MG: 325 TABLET ORAL at 22:06

## 2018-07-09 LAB
ATRIAL RATE: 91 BPM
P AXIS: 68 DEGREES
PR INTERVAL: 158 MS
QRS AXIS: 49 DEGREES
QRSD INTERVAL: 96 MS
QT INTERVAL: 362 MS
QTC INTERVAL: 445 MS
T WAVE AXIS: 49 DEGREES
VENTRICULAR RATE: 91 BPM

## 2018-07-09 PROCEDURE — 93010 ELECTROCARDIOGRAM REPORT: CPT | Performed by: INTERNAL MEDICINE

## 2018-07-09 NOTE — ED PROVIDER NOTES
History  Chief Complaint   Patient presents with    Generalized Body Aches     patient states that she has had generalized body aches for the past 4 days  patient states she has been taking tramadol with no relief     HPI     Patient is a pleasant 77year old femael with a history of RA and current achiness throughout her body for the past 4 days  Seen previsouly with shoulder pain  No chest pain or sob  She notes the pain in the shoulder is also presents in the knees and hands  No f/c/s/n/v/d  No cp or sob  No abdominal pain  No dysuria, hematuria  MDM well appearing 77 yof, joint pain, she suspect this is her RA flare  Will check ekg given shoulder pain but no cardiac symptoms  No vomiting, sweats, chills, chest pain, sob or nausea  The patient (and any family present) verbalized understanding of the discharge instructions and warnings that would necessitate return to the Emergency Department  Gave verbal in addition to written discharge instructions  Specifically highlighted areas of special concern regarding the written and verbal discharge instructions and return precautions  All questions were answered prior to discharge       ekg rate 91, sinus, no stemi, narrow qrs    Prior to Admission Medications   Prescriptions Last Dose Informant Patient Reported? Taking? Blood Glucose Monitoring Suppl (ONE TOUCH ULTRA 2) w/Device KIT   No Yes   Sig: by Does not apply route 2 (two) times a day   Clobetasol Prop Emollient Base (CLOBETASOL PROPIONATE E) 0 05 % emollient cream   Yes Yes   Sig: Apply topically   DULoxetine (CYMBALTA) 20 mg capsule   Yes Yes   Sig: Take 20 mg by mouth daily     Ergocalciferol (VITAMIN D2 PO)   Yes Yes   Sig: Take 1 25 mg by mouth once a week Every Wednesday   ONE TOUCH ULTRA TEST test strip   No Yes   Sig: Tests twice a day   ONETOUCH DELICA LANCETS FINE MISC   No Yes   Sig: by Does not apply route 2 (two) times a day   Polyethylene Glycol 3350-GRX POWD   Yes Yes   Sig: Take by mouth   Tiotropium Bromide Monohydrate (SPIRIVA RESPIMAT) 2 5 MCG/ACT AERS   Yes Yes   Sig: Inhale daily at bedtime   Tofacitinib Citrate (XELJANZ XR) 11 MG TB24   Yes Yes   Sig: Take 1 tablet by mouth daily   albuterol (2 5 mg/3 mL) 0 083 % nebulizer solution   Yes Yes   Sig: Inhale 1 each 4 (four) times a day as needed   albuterol (PROVENTIL HFA,VENTOLIN HFA) 90 mcg/act inhaler   Yes Yes   Sig: Inhale 2 puffs every 6 (six) hours as needed for wheezing   alendronate (FOSAMAX) 70 mg tablet  Self Yes Yes   Sig: Take 70 mg by mouth every 7 days   atorvastatin (LIPITOR) 20 mg tablet   No Yes   Sig: Take 1 tablet (20 mg total) by mouth daily for 90 days   canagliflozin (INVOKANA) 100 mg   Yes Yes   Sig: Take 100 mg by mouth daily before breakfast   clotrimazole (LOTRIMIN) 1 % cream   Yes Yes   Sig: Apply topically daily as needed  clotrimazole-betamethasone (LOTRISONE) 1-0 05 % cream   Yes Yes   Sig: Apply topically   cyanocobalamin (VITAMIN B-12) 1,000 mcg tablet   Yes Yes   Sig: Take 1 tablet by mouth daily   diclofenac sodium (VOLTAREN) 1 %   Yes Yes   Sig: Apply 2 g topically as needed   docusate sodium (COLACE) 100 mg capsule   No Yes   Sig: Take 1 capsule by mouth 2 (two) times a day   fluticasone-salmeterol (ADVAIR) 250-50 mcg/dose   Yes Yes   Sig: Inhale 1 puff every 12 (twelve) hours  folic acid (FOLVITE) 1 mg tablet   No Yes   Sig: TAKE ONE TABLET BY MOUTH ONCE DAILY   furosemide (LASIX) 40 mg tablet   Yes Yes   Sig: Take 40 mg by mouth daily  furosemide (LASIX) 40 mg tablet   Yes Yes   Sig: Take 1 tablet by mouth daily   guaiFENesin (MUCINEX) 600 mg 12 hr tablet   Yes Yes   Sig: Take 2 tablets by mouth 2 (two) times a day as needed   levothyroxine 125 mcg tablet   No Yes   Sig: Take 1 tablet (125 mcg total) by mouth daily in the early morning for 90 days   lisinopril (ZESTRIL) 40 mg tablet   Yes Yes   Sig: Take 40 mg by mouth daily     methocarbamol (ROBAXIN) 500 mg tablet   No Yes   Sig: Take 1 tablet by mouth every 6 (six) hours as needed for muscle spasms   methotrexate 2 5 mg tablet   No Yes   Sig: Take 1 tablet (2 5 mg total) by mouth once a week   naproxen (NAPROSYN) 500 mg tablet   Yes Yes   Sig: Take by mouth   nystatin (MYCOSTATIN) cream   Yes Yes   Sig: Apply topically as needed   nystatin (MYCOSTATIN) powder   Yes Yes   Sig: Apply topically   ofloxacin (FLOXIN) 0 3 % otic solution   Yes Yes   Sig: Administer 10 drops into both ears as needed     pantoprazole (PROTONIX) 40 mg tablet   Yes Yes   Sig: Take 40 mg by mouth daily     sitaGLIPtin (JANUVIA) 100 mg tablet   Yes Yes   Sig: Take 100 mg by mouth daily   traMADol (ULTRAM) 50 mg tablet   No Yes   Sig: Take 1 tablet (50 mg total) by mouth every 6 (six) hours as needed for moderate pain for up to 10 days      Facility-Administered Medications: None       Past Medical History:   Diagnosis Date    Arthritis     Arthritis     Candidiasis of skin     COPD [facility]chronic obstructive pulmonary disease) ([facility] )     last assessed 11/21/2016    Current chronic use of systemic steroids     Degenerative arthritis of left knee     last assessed 1/20/2015    Diabetes mellitus ([facility] )     Disease of thyroid gland     hypo    Fibromyalgia     Fistula of knee     last assessed 9/12/2014    GERD (gastroesophageal reflux disease)     Hyperlipidemia     Hypertension     Medial meniscus tear     of left knee, last assessed 9/12/2014    Migraine     states she has a hx of HA that she calls [facility] but never was dx by neurologist    Obesity     Obesity     Osteoarthritis     Osteopenia     Pneumonia     last assessed 11/16/2016    Psychiatric disorder     anxiety    Rheumatoid arthritis ([facility] )     Rheumatoid arthritis ([facility] )     Sepsis ([facility] )     last assessed 11/10/2016    Tick bite     last assessed 10/30/2015    Vitamin B12 deficiency     Vitamin D deficiency        Past Surgical History:   Procedure Laterality Date    CATARACT EXTRACTION Bilateral     CHOLECYSTECTOMY      EYE SURGERY      HYSTERECTOMY      JOINT REPLACEMENT      left knee    KNEE ARTHROSCOPY      NEUROPLASTY / TRANSPOSITION MEDIAN NERVE AT CARPAL TUNNEL      ROTATOR CUFF REPAIR Left     TUBAL LIGATION         Family History   Problem Relation Age of Onset    Stroke Mother     Asthma Family     Cancer Family     Diabetes Family     Hypertension Family      I have reviewed and agree with the history as documented  Social History   Substance Use Topics    Smoking status: Former Smoker     Packs/day: 1 00     Years: 48 00     Quit date: 11/21/2012    Smokeless tobacco: Never Used      Comment: per allscripts - "current every day smoker, former smoker"    Alcohol use No      Comment: stopped drinking alcohol        Review of Systems   Constitutional: Negative for chills and fever  HENT: Negative for ear discharge and facial swelling  Respiratory: Negative for chest tightness, shortness of breath and wheezing  Cardiovascular: Negative for chest pain and palpitations  Gastrointestinal: Negative for abdominal pain, diarrhea and vomiting  Genitourinary: Negative for dysuria and hematuria  Musculoskeletal: Positive for arthralgias and back pain  Negative for neck stiffness  Skin: Negative for color change and rash  Allergic/Immunologic: Negative  Neurological: Negative for tremors and syncope  Psychiatric/Behavioral: Negative for hallucinations and suicidal ideas  All other systems reviewed and are negative  Physical Exam  Physical Exam   Constitutional: She is oriented to person, place, and time  She appears well-developed and well-nourished  HENT:   Head: Normocephalic and atraumatic  Right Ear: External ear normal    Left Ear: External ear normal    Eyes: Conjunctivae and EOM are normal    Neck: Normal range of motion  Neck supple  No JVD present  No tracheal deviation present     Cardiovascular: Normal rate, regular rhythm and normal heart sounds  Pulmonary/Chest: Effort normal  No respiratory distress  She has no wheezes  She has no rales  Abdominal: Soft  Bowel sounds are normal  There is no tenderness  There is no rebound and no guarding  Musculoskeletal: She exhibits edema and tenderness  Neurological: She is alert and oriented to person, place, and time  Skin: Skin is warm and dry  No rash noted  No erythema  Psychiatric: She has a normal mood and affect  Thought content normal    Nursing note and vitals reviewed  Vital Signs  ED Triage Vitals [07/08/18 2156]   Temperature Pulse Respirations Blood Pressure SpO2   99 3 °F (37 4 °C) 98 18 130/68 93 %      Temp src Heart Rate Source Patient Position - Orthostatic VS BP Location FiO2 (%)   -- Monitor Lying Left arm --      Pain Score       Worst Possible Pain           Vitals:    07/08/18 2156   BP: 130/68   Pulse: 98   Patient Position - Orthostatic VS: Lying       Visual Acuity      ED Medications  Medications   predniSONE tablet 60 mg (not administered)   acetaminophen (TYLENOL) tablet 975 mg (975 mg Oral Given 7/8/18 2206)   predniSONE tablet 60 mg (60 mg Oral Given 7/8/18 2205)       Diagnostic Studies  Results Reviewed     None                 No orders to display              Procedures  Procedures       Phone Contacts  ED Phone Contact    ED Course                               MDM  CritCare Time    Disposition  Final diagnoses:   Rheumatoid arthritis flare ([address] )     Time reflects when diagnosis was documented in both MDM as applicable and the Disposition within this note     Time User Action Codes Description Comment    7/8/2018 10:18 PM Nayeli Sewell, [address] [M06 9] Rheumatoid arthritis flare [facility])       ED Disposition     ED Disposition Condition Comment    Discharge  Jaden Noah discharge to home/self care      Condition at discharge: Good        Follow-up Information     Follow up With Specialties Details Why Roxy Chaidez MD Family Medicine In 1 day  [facility]  [phone]            Patient's Medications   Discharge Prescriptions    No medications on file     No discharge procedures on file      ED Provider  Electronically Signed by           Destiny Arias MD  07/08/18 2215       Destiny Arias MD  07/08/18 8844

## 2018-08-27 DIAGNOSIS — M06.9 RHEUMATOID ARTHRITIS INVOLVING MULTIPLE SITES, UNSPECIFIED RHEUMATOID FACTOR PRESENCE: ICD-10-CM

## 2018-08-27 DIAGNOSIS — K21.9 GASTROESOPHAGEAL REFLUX DISEASE, ESOPHAGITIS PRESENCE NOT SPECIFIED: ICD-10-CM

## 2018-08-27 DIAGNOSIS — I10 ESSENTIAL HYPERTENSION: Primary | ICD-10-CM

## 2018-08-27 DIAGNOSIS — E11.9 TYPE 2 DIABETES MELLITUS WITHOUT COMPLICATION, WITHOUT LONG-TERM CURRENT USE OF INSULIN (HCC): ICD-10-CM

## 2018-08-27 DIAGNOSIS — E03.9 HYPOTHYROIDISM, UNSPECIFIED TYPE: ICD-10-CM

## 2018-08-28 RX ORDER — LEVOTHYROXINE SODIUM 0.15 MG/1
TABLET ORAL
Qty: 90 TABLET | Refills: 0 | Status: SHIPPED | OUTPATIENT
Start: 2018-08-28 | End: 2018-08-30

## 2018-08-28 RX ORDER — PANTOPRAZOLE SODIUM 40 MG/1
TABLET, DELAYED RELEASE ORAL
Qty: 90 TABLET | Refills: 0 | Status: SHIPPED | OUTPATIENT
Start: 2018-08-28 | End: 2018-08-30 | Stop reason: SDUPTHER

## 2018-08-28 RX ORDER — CANAGLIFLOZIN 100 MG/1
TABLET, FILM COATED ORAL
Qty: 90 TABLET | Refills: 0 | Status: SHIPPED | OUTPATIENT
Start: 2018-08-28 | End: 2018-08-30 | Stop reason: SDUPTHER

## 2018-08-28 RX ORDER — LISINOPRIL 40 MG/1
TABLET ORAL
Qty: 90 TABLET | Refills: 0 | Status: SHIPPED | OUTPATIENT
Start: 2018-08-28 | End: 2018-08-30 | Stop reason: SDUPTHER

## 2018-08-30 DIAGNOSIS — M06.9 RHEUMATOID ARTHRITIS, INVOLVING UNSPECIFIED SITE, UNSPECIFIED RHEUMATOID FACTOR PRESENCE: Primary | ICD-10-CM

## 2018-08-30 DIAGNOSIS — K21.9 GASTROESOPHAGEAL REFLUX DISEASE, ESOPHAGITIS PRESENCE NOT SPECIFIED: ICD-10-CM

## 2018-08-30 DIAGNOSIS — E55.9 VITAMIN D DEFICIENCY: ICD-10-CM

## 2018-08-30 DIAGNOSIS — I10 ESSENTIAL HYPERTENSION: ICD-10-CM

## 2018-08-30 DIAGNOSIS — E03.9 HYPOTHYROIDISM, UNSPECIFIED TYPE: ICD-10-CM

## 2018-08-30 DIAGNOSIS — E78.2 MIXED HYPERLIPIDEMIA: ICD-10-CM

## 2018-08-30 DIAGNOSIS — E11.9 TYPE 2 DIABETES MELLITUS WITHOUT COMPLICATION, WITHOUT LONG-TERM CURRENT USE OF INSULIN (HCC): ICD-10-CM

## 2018-08-30 RX ORDER — DULOXETIN HYDROCHLORIDE 20 MG/1
CAPSULE, DELAYED RELEASE ORAL
Qty: 90 CAPSULE | Refills: 0 | Status: SHIPPED | OUTPATIENT
Start: 2018-08-30 | End: 2018-11-21 | Stop reason: SDUPTHER

## 2018-08-30 RX ORDER — CANAGLIFLOZIN 100 MG/1
TABLET, FILM COATED ORAL
Qty: 90 TABLET | Refills: 0 | Status: SHIPPED | OUTPATIENT
Start: 2018-08-30 | End: 2018-11-21 | Stop reason: SDUPTHER

## 2018-08-30 RX ORDER — LISINOPRIL 40 MG/1
TABLET ORAL
Qty: 90 TABLET | Refills: 0 | Status: SHIPPED | OUTPATIENT
Start: 2018-08-30 | End: 2018-11-21 | Stop reason: SDUPTHER

## 2018-08-30 RX ORDER — PANTOPRAZOLE SODIUM 40 MG/1
TABLET, DELAYED RELEASE ORAL
Qty: 90 TABLET | Refills: 0 | Status: SHIPPED | OUTPATIENT
Start: 2018-08-30 | End: 2018-11-21 | Stop reason: SDUPTHER

## 2018-08-30 RX ORDER — LEVOTHYROXINE SODIUM 0.12 MG/1
125 TABLET ORAL
Qty: 90 TABLET | Refills: 0 | Status: SHIPPED | OUTPATIENT
Start: 2018-08-30 | End: 2018-08-31 | Stop reason: SDUPTHER

## 2018-08-30 RX ORDER — ATORVASTATIN CALCIUM 20 MG/1
TABLET, FILM COATED ORAL
Qty: 90 TABLET | Refills: 0 | Status: SHIPPED | OUTPATIENT
Start: 2018-08-30 | End: 2018-11-21 | Stop reason: SDUPTHER

## 2018-08-30 RX ORDER — ERGOCALCIFEROL 1.25 MG/1
CAPSULE ORAL
Qty: 12 CAPSULE | Refills: 0 | Status: SHIPPED | OUTPATIENT
Start: 2018-08-30 | End: 2018-11-21 | Stop reason: SDUPTHER

## 2018-08-30 RX ORDER — SITAGLIPTIN 100 MG/1
TABLET, FILM COATED ORAL
Qty: 90 TABLET | Refills: 0 | Status: SHIPPED | OUTPATIENT
Start: 2018-08-30 | End: 2018-11-21 | Stop reason: SDUPTHER

## 2018-08-30 RX ORDER — LEVOTHYROXINE SODIUM 0.15 MG/1
TABLET ORAL
Qty: 90 TABLET | Refills: 0 | OUTPATIENT
Start: 2018-08-30

## 2018-08-30 NOTE — TELEPHONE ENCOUNTER
I called and left message for the patient to call the office back regarding what dose of levothyroxine she is taking  We had gotten a refill from pharmacy for 150mcg  Dr Michelle Dykes will be sending in 125mcg

## 2018-08-31 DIAGNOSIS — E03.9 HYPOTHYROIDISM, UNSPECIFIED TYPE: ICD-10-CM

## 2018-08-31 RX ORDER — LEVOTHYROXINE SODIUM 0.12 MG/1
125 TABLET ORAL
Qty: 90 TABLET | Refills: 0 | OUTPATIENT
Start: 2018-08-31 | End: 2018-09-19 | Stop reason: SDUPTHER

## 2018-09-04 DIAGNOSIS — J44.1 COPD EXACERBATION (HCC): Primary | ICD-10-CM

## 2018-09-04 RX ORDER — AZITHROMYCIN 250 MG/1
TABLET, FILM COATED ORAL
Qty: 5 TABLET | Refills: 0 | OUTPATIENT
Start: 2018-09-04 | End: 2018-09-08

## 2018-09-05 ENCOUNTER — TELEPHONE (OUTPATIENT)
Dept: INTERNAL MEDICINE CLINIC | Facility: CLINIC | Age: 68
End: 2018-09-05

## 2018-09-05 NOTE — TELEPHONE ENCOUNTER
Patient had called the office and stated that she has started with a cold this morning  She has headache, nose is full but it is running, top of mouth is sore, ache all over but feels is rheumatoid arthritis  Patient is looking for an antibiotic of zpak  Informed her that if she doesn't feel any better need to call the office or you need to go to the ER  I called in zpak 250mg take 2 tabs the first day and then 1 remainder of the pack  #5 zero refills

## 2018-09-11 ENCOUNTER — APPOINTMENT (EMERGENCY)
Dept: RADIOLOGY | Facility: HOSPITAL | Age: 68
End: 2018-09-11
Payer: MEDICARE

## 2018-09-11 ENCOUNTER — HOSPITAL ENCOUNTER (EMERGENCY)
Facility: HOSPITAL | Age: 68
Discharge: HOME | End: 2018-09-11
Attending: EMERGENCY MEDICINE
Payer: MEDICARE

## 2018-09-11 VITALS
TEMPERATURE: 98.8 F | HEART RATE: 98 BPM | WEIGHT: 252.21 LBS | DIASTOLIC BLOOD PRESSURE: 68 MMHG | OXYGEN SATURATION: 91 % | SYSTOLIC BLOOD PRESSURE: 125 MMHG | BODY MASS INDEX: 42.02 KG/M2 | HEIGHT: 65 IN | RESPIRATION RATE: 21 BRPM

## 2018-09-11 DIAGNOSIS — J44.9 CHRONIC OBSTRUCTIVE PULMONARY DISEASE, UNSPECIFIED COPD TYPE (HCC): ICD-10-CM

## 2018-09-11 DIAGNOSIS — J18.9 CAP (COMMUNITY ACQUIRED PNEUMONIA): Primary | ICD-10-CM

## 2018-09-11 DIAGNOSIS — R60.9 PERIPHERAL EDEMA: ICD-10-CM

## 2018-09-11 LAB
ALBUMIN SERPL BCP-MCNC: 3.5 G/DL (ref 3.5–5)
ALP SERPL-CCNC: 79 U/L (ref 46–116)
ALT SERPL W P-5'-P-CCNC: 54 U/L (ref 12–78)
ANION GAP SERPL CALCULATED.3IONS-SCNC: 5 MMOL/L (ref 4–13)
APTT PPP: 24 SECONDS (ref 24–36)
AST SERPL W P-5'-P-CCNC: 27 U/L (ref 5–45)
BASE EX.OXY STD BLDV CALC-SCNC: 72.4 % (ref 60–80)
BASE EXCESS BLDV CALC-SCNC: 2.5 MMOL/L
BASOPHILS # BLD AUTO: 0.02 THOUSANDS/ΜL (ref 0–0.1)
BASOPHILS NFR BLD AUTO: 0 % (ref 0–1)
BILIRUB SERPL-MCNC: 0.4 MG/DL (ref 0.2–1)
BUN SERPL-MCNC: 12 MG/DL (ref 5–25)
CALCIUM SERPL-MCNC: 9.6 MG/DL (ref 8.3–10.1)
CHLORIDE SERPL-SCNC: 103 MMOL/L (ref 100–108)
CO2 SERPL-SCNC: 30 MMOL/L (ref 21–32)
CREAT SERPL-MCNC: 0.69 MG/DL (ref 0.6–1.3)
EOSINOPHIL # BLD AUTO: 0.18 THOUSAND/ΜL (ref 0–0.61)
EOSINOPHIL NFR BLD AUTO: 3 % (ref 0–6)
ERYTHROCYTE [DISTWIDTH] IN BLOOD BY AUTOMATED COUNT: 15.1 % (ref 11.6–15.1)
GFR SERPL CREATININE-BSD FRML MDRD: 88 ML/MIN/1.73SQ M
GLUCOSE SERPL-MCNC: 160 MG/DL (ref 65–140)
HCO3 BLDV-SCNC: 29.5 MMOL/L (ref 24–30)
HCT VFR BLD AUTO: 46.2 % (ref 34.8–46.1)
HGB BLD-MCNC: 14.9 G/DL (ref 11.5–15.4)
IMM GRANULOCYTES # BLD AUTO: 0.03 THOUSAND/UL (ref 0–0.2)
IMM GRANULOCYTES NFR BLD AUTO: 0 % (ref 0–2)
INR PPP: 0.97 (ref 0.86–1.17)
LACTATE SERPL-SCNC: 1.2 MMOL/L (ref 0.5–2)
LYMPHOCYTES # BLD AUTO: 2.11 THOUSANDS/ΜL (ref 0.6–4.47)
LYMPHOCYTES NFR BLD AUTO: 31 % (ref 14–44)
MAGNESIUM SERPL-MCNC: 1.8 MG/DL (ref 1.6–2.6)
MCH RBC QN AUTO: 30.1 PG (ref 26.8–34.3)
MCHC RBC AUTO-ENTMCNC: 32.3 G/DL (ref 31.4–37.4)
MCV RBC AUTO: 93 FL (ref 82–98)
MONOCYTES # BLD AUTO: 0.59 THOUSAND/ΜL (ref 0.17–1.22)
MONOCYTES NFR BLD AUTO: 9 % (ref 4–12)
NEUTROPHILS # BLD AUTO: 3.88 THOUSANDS/ΜL (ref 1.85–7.62)
NEUTS SEG NFR BLD AUTO: 57 % (ref 43–75)
NRBC BLD AUTO-RTO: 0 /100 WBCS
O2 CT BLDV-SCNC: 15.6 ML/DL
PCO2 BLDV: 54.5 MM HG (ref 42–50)
PH BLDV: 7.35 [PH] (ref 7.3–7.4)
PLATELET # BLD AUTO: 284 THOUSANDS/UL (ref 149–390)
PMV BLD AUTO: 10.5 FL (ref 8.9–12.7)
PO2 BLDV: 40.9 MM HG (ref 35–45)
POTASSIUM SERPL-SCNC: 4 MMOL/L (ref 3.5–5.3)
PROCALCITONIN SERPL-MCNC: <0.05 NG/ML
PROT SERPL-MCNC: 7.6 G/DL (ref 6.4–8.2)
PROTHROMBIN TIME: 12.4 SECONDS (ref 11.8–14.2)
RBC # BLD AUTO: 4.95 MILLION/UL (ref 3.81–5.12)
SODIUM SERPL-SCNC: 138 MMOL/L (ref 136–145)
TROPONIN I SERPL-MCNC: <0.02 NG/ML
WBC # BLD AUTO: 6.81 THOUSAND/UL (ref 4.31–10.16)

## 2018-09-11 PROCEDURE — 82805 BLOOD GASES W/O2 SATURATION: CPT | Performed by: EMERGENCY MEDICINE

## 2018-09-11 PROCEDURE — 94640 AIRWAY INHALATION TREATMENT: CPT

## 2018-09-11 PROCEDURE — 83605 ASSAY OF LACTIC ACID: CPT | Performed by: EMERGENCY MEDICINE

## 2018-09-11 PROCEDURE — 87040 BLOOD CULTURE FOR BACTERIA: CPT | Performed by: EMERGENCY MEDICINE

## 2018-09-11 PROCEDURE — 99283 EMERGENCY DEPT VISIT LOW MDM: CPT

## 2018-09-11 PROCEDURE — 85610 PROTHROMBIN TIME: CPT | Performed by: EMERGENCY MEDICINE

## 2018-09-11 PROCEDURE — 96365 THER/PROPH/DIAG IV INF INIT: CPT

## 2018-09-11 PROCEDURE — 84484 ASSAY OF TROPONIN QUANT: CPT | Performed by: EMERGENCY MEDICINE

## 2018-09-11 PROCEDURE — 84145 PROCALCITONIN (PCT): CPT | Performed by: EMERGENCY MEDICINE

## 2018-09-11 PROCEDURE — 85025 COMPLETE CBC W/AUTO DIFF WBC: CPT | Performed by: EMERGENCY MEDICINE

## 2018-09-11 PROCEDURE — 80053 COMPREHEN METABOLIC PANEL: CPT | Performed by: EMERGENCY MEDICINE

## 2018-09-11 PROCEDURE — 83735 ASSAY OF MAGNESIUM: CPT | Performed by: EMERGENCY MEDICINE

## 2018-09-11 PROCEDURE — 36415 COLL VENOUS BLD VENIPUNCTURE: CPT | Performed by: EMERGENCY MEDICINE

## 2018-09-11 PROCEDURE — 71046 X-RAY EXAM CHEST 2 VIEWS: CPT

## 2018-09-11 PROCEDURE — 85730 THROMBOPLASTIN TIME PARTIAL: CPT | Performed by: EMERGENCY MEDICINE

## 2018-09-11 RX ORDER — ALBUTEROL SULFATE 90 UG/1
2 AEROSOL, METERED RESPIRATORY (INHALATION) EVERY 6 HOURS PRN
Qty: 1 INHALER | Refills: 0 | Status: ON HOLD | OUTPATIENT
Start: 2018-09-11 | End: 2021-12-13 | Stop reason: SDUPTHER

## 2018-09-11 RX ORDER — IPRATROPIUM BROMIDE AND ALBUTEROL SULFATE 2.5; .5 MG/3ML; MG/3ML
3 SOLUTION RESPIRATORY (INHALATION) EVERY 4 HOURS PRN
Qty: 144 ML | Refills: 0 | Status: SHIPPED | OUTPATIENT
Start: 2018-09-11 | End: 2018-09-24 | Stop reason: ALTCHOICE

## 2018-09-11 RX ORDER — LEVOFLOXACIN 750 MG/1
750 TABLET ORAL DAILY
Qty: 5 TABLET | Refills: 0 | Status: SHIPPED | OUTPATIENT
Start: 2018-09-11 | End: 2018-09-16

## 2018-09-11 RX ORDER — AZITHROMYCIN 250 MG/1
500 TABLET, FILM COATED ORAL ONCE
Status: COMPLETED | OUTPATIENT
Start: 2018-09-11 | End: 2018-09-11

## 2018-09-11 RX ORDER — IPRATROPIUM BROMIDE AND ALBUTEROL SULFATE 2.5; .5 MG/3ML; MG/3ML
3 SOLUTION RESPIRATORY (INHALATION) ONCE
Status: COMPLETED | OUTPATIENT
Start: 2018-09-11 | End: 2018-09-11

## 2018-09-11 RX ORDER — GUAIFENESIN 1200 MG/1
1 TABLET, EXTENDED RELEASE ORAL 2 TIMES DAILY
Qty: 28 EACH | Refills: 0 | Status: SHIPPED | OUTPATIENT
Start: 2018-09-11 | End: 2018-09-24 | Stop reason: ALTCHOICE

## 2018-09-11 RX ORDER — FUROSEMIDE 40 MG/1
40 TABLET ORAL DAILY
Qty: 14 TABLET | Refills: 0 | Status: SHIPPED | OUTPATIENT
Start: 2018-09-11 | End: 2019-01-25 | Stop reason: SDUPTHER

## 2018-09-11 RX ADMIN — CEFTRIAXONE 1000 MG: 1 INJECTION, SOLUTION INTRAVENOUS at 16:01

## 2018-09-11 RX ADMIN — AZITHROMYCIN 500 MG: 250 TABLET, FILM COATED ORAL at 16:01

## 2018-09-11 RX ADMIN — IPRATROPIUM BROMIDE AND ALBUTEROL SULFATE 3 ML: .5; 3 SOLUTION RESPIRATORY (INHALATION) at 16:01

## 2018-09-11 NOTE — ED PROVIDER NOTES
History  Chief Complaint   Patient presents with    URI     Patient states being recently sick and was prescribe Z-Pack and finished course on Sunday 09/09/2018  Coughing a thin yellow sputum currently  Patient: Naa Lira  19 y o /female  YOB: 1950  MRN: [id number]  PCP: David Taylor MD  Date of evaluation: 9/11/2018    (N B   Voice-recognition software may have been used in the preparation of this document  Occasional wrong word or "sound-alike" substitutions may have occurred due to the inherent limitations of voice recognition software  Interpretation should be guided by context )    Chief complaint:  Michel Verdugo had a cold in my head and now it went my chest     Last week she developed nasal congestion  Her PCP prescribed her a Z-Lev  She finished this on Sunday  Yesterday she noted cough and chest congestion today the cough and congestion are worse and she is feeling fatigued  She wanted to treat this early because in the past she had to be admitted for sepsis  History provided by:  Patient and relative  Cough   Cough characteristics:  Productive  Sputum characteristics:  Nondescript  Severity:  Moderate  Onset quality:  Gradual  Timing:  Constant  Progression:  Worsening  Chronicity:  Recurrent  Smoker: yes    Context: upper respiratory infection    Worsened by:  Lying down  Ineffective treatments: Ran out of neb meds  Associated symptoms: rhinorrhea    Associated symptoms: no chest pain, no chills, no fever, no rash and no shortness of breath        Prior to Admission Medications   Prescriptions Last Dose Informant Patient Reported? Taking?    Blood Glucose Monitoring Suppl (ONE TOUCH ULTRA 2) w/Device KIT   No No   Sig: by Does not apply route 2 (two) times a day   Clobetasol Prop Emollient Base (CLOBETASOL PROPIONATE E) 0 05 % emollient cream   Yes No   Sig: Apply topically   DULoxetine (CYMBALTA) 20 mg capsule   No No   Sig: TAKE ONE CAPSULE BY MOUTH ONCE DAILY Ergocalciferol (VITAMIN D2 PO)   Yes No   Sig: Take 1 25 mg by mouth once a week Every Wednesday   INVOKANA 100 MG   No No   Sig: TAKE ONE TABLET BY MOUTH EVERY DAY   JANUVIA 100 MG tablet   No No   Sig: TAKE 1 TABLET BY MOUTH DAILY  ONE TOUCH ULTRA TEST test strip   No No   Sig: Tests twice a day   ONETOUCH DELICA LANCETS FINE MISC   No No   Sig: by Does not apply route 2 (two) times a day   Polyethylene Glycol 3350-GRX POWD   Yes No   Sig: Take by mouth   Tiotropium Bromide Monohydrate (SPIRIVA RESPIMAT) 2 5 MCG/ACT AERS   Yes No   Sig: Inhale daily at bedtime   Tofacitinib Citrate (XELJANZ XR) 11 MG TB24   Yes No   Sig: Take 1 tablet by mouth daily   albuterol (2 5 mg/3 mL) 0 083 % nebulizer solution   Yes No   Sig: Inhale 1 each 4 (four) times a day as needed   albuterol (PROVENTIL HFA,VENTOLIN HFA) 90 mcg/act inhaler   Yes No   Sig: Inhale 2 puffs every 6 (six) hours as needed for wheezing   alendronate (FOSAMAX) 70 mg tablet  Self Yes No   Sig: Take 70 mg by mouth every 7 days   atorvastatin (LIPITOR) 20 mg tablet   No No   Sig: TAKE ONE TABLET BY MOUTH EVERY DAY   clotrimazole (LOTRIMIN) 1 % cream   Yes No   Sig: Apply topically daily as needed  clotrimazole-betamethasone (LOTRISONE) 1-0 05 % cream   Yes No   Sig: Apply topically   cyanocobalamin (VITAMIN B-12) 1,000 mcg tablet   Yes No   Sig: Take 1 tablet by mouth daily   diclofenac sodium (VOLTAREN) 1 %   Yes No   Sig: Apply 2 g topically as needed   docusate sodium (COLACE) 100 mg capsule   No No   Sig: Take 1 capsule by mouth 2 (two) times a day   ergocalciferol (VITAMIN D2) 50,000 units   No No   Sig: TAKE ONE CAPSULE BY MOUTH WEEKLY   fluticasone-salmeterol (ADVAIR) 250-50 mcg/dose   Yes No   Sig: Inhale 1 puff every 12 (twelve) hours  folic acid (FOLVITE) 1 mg tablet   No No   Sig: TAKE ONE TABLET BY MOUTH ONCE DAILY   furosemide (LASIX) 40 mg tablet   Yes No   Sig: Take 40 mg by mouth daily     furosemide (LASIX) 40 mg tablet   Yes No   Sig: Take 1 tablet by mouth daily   guaiFENesin (MUCINEX) 600 mg 12 hr tablet   Yes No   Sig: Take 2 tablets by mouth 2 (two) times a day as needed   levothyroxine 125 mcg tablet   No No   Sig: Take 1 tablet (125 mcg total) by mouth daily in the early morning for 90 days   lisinopril (ZESTRIL) 40 mg tablet   No No   Sig: TAKE ONE TABLET BY MOUTH EVERY DAY   methocarbamol (ROBAXIN) 500 mg tablet   No No   Sig: Take 1 tablet by mouth every 6 (six) hours as needed for muscle spasms   methotrexate 2 5 mg tablet   No No   Sig: Take 1 tablet (2 5 mg total) by mouth once a week   methotrexate 2 5 mg tablet   No No   Sig: TAKE 8 TABLETS BY MOUTH WEEKLY   methotrexate 2 5 mg tablet   No No   Sig: TAKE 8 TABLETS BY MOUTH WEEKLY   naproxen (NAPROSYN) 500 mg tablet   Yes No   Sig: Take by mouth   nystatin (MYCOSTATIN) cream   Yes No   Sig: Apply topically as needed   nystatin (MYCOSTATIN) powder   Yes No   Sig: Apply topically   ofloxacin (FLOXIN) 0 3 % otic solution   Yes No   Sig: Administer 10 drops into both ears as needed     pantoprazole (PROTONIX) 40 mg tablet   No No   Sig: TAKE ONE TABLET BY MOUTH ONCE DAILY      Facility-Administered Medications: None       Past Medical History:   Diagnosis Date    Arthritis     Arthritis     Candidiasis of skin     [facility]ch[facility] obstructive pulmonary disease[facility])     last assessed 11/21/2016    Current chronic use of systemic steroids     Degenerative arthritis of left knee     last assessed 1/20/2015    Diabetes mellitus ([facility]     Disease of thyroid gland     hypo pill given to decrease thyroid       Fibromyalgia     Fistula of knee     last assessed 9/12/2014    GERD (gastroesophageal reflux disease)     Hyperlipidemia     Hypertension     Medial meniscus tear     of left knee, last assessed 9/12/2014    Migraine     states she has a hx of HA that she calls [employer] but never was dx by neurologist    Obesity     Obesity     Osteoarthritis     Osteopenia     Pneumonia     last assessed 11/16/2016    Psychiatric disorder     anxiety    Rheumat[facility] arthritis [facility]     [facility] ([facility]     Sepsis ([facility] )     last assessed 11/10/2016    Tick bite     last assessed 10/30/2015    Vitamin B12 deficiency     Vitamin D deficiency        Past Surgical History:   Procedure Laterality Date    CATARACT EXTRACTION Bilateral     CHOLECYSTECTOMY      EYE SURGERY      Bilateral Cataracts    HYSTERECTOMY      JOINT REPLACEMENT      left knee    KNEE ARTHROSCOPY      NEUROPLASTY / TRANSPOSITION MEDIAN NERVE AT CARPAL TUNNEL      TUBAL LIGATION         Family History   Problem Relation Age of Onset    Stroke Mother     Asthma Family     Cancer Family     Diabetes Family     Hypertension Family      I have reviewed and agree with the history as documented  Social History   Substance Use Topics    Smoking status: Former Smoker     Packs/day: 1 00     Years: 48 00     Types: E-Cigarettes     Quit date: 11/21/2012    Smokeless tobacco: Never Used      Comment: per allscripts - "current every day smoker, former smoker"    Alcohol use No      Comment: stopped drinking alcohol        Review of Systems   Constitutional: Negative for chills and fever  HENT: Positive for rhinorrhea and sinus pressure  Negative for hearing loss, trouble swallowing and voice change  Eyes: Negative for pain, redness and visual disturbance  Respiratory: Positive for cough  Negative for chest tightness and shortness of breath  Cardiovascular: Negative for chest pain, palpitations and leg swelling  Gastrointestinal: Negative for abdominal pain, constipation, diarrhea, nausea and vomiting  Genitourinary: Negative for dysuria, hematuria, vaginal bleeding and vaginal discharge  Musculoskeletal: Negative for back pain, gait problem and neck pain  Skin: Negative for color change and rash  Neurological: Negative for weakness and light-headedness     Psychiatric/Behavioral: Negative for confusion and decreased concentration  The patient is not nervous/anxious  All other systems reviewed and are negative  Physical Exam  Physical Exam    Vital Signs  ED Triage Vitals [09/11/18 1423]   Temperature Pulse Respirations Blood Pressure SpO2   98 8 °F (37 1 °C) 102 20 123/83 91 %      Temp Source Heart Rate Source Patient Position - Orthostatic VS BP Location FiO2 (%)   Temporal Monitor Lying Right arm --      Pain Score       6           Vitals:    09/11/18 1423 09/11/18 1647   BP: 123/83 125/68   Pulse: 102 98   Patient Position - Orthostatic VS: Lying Sitting       Visual Acuity      ED Medications  Medications   cefTRIAXone (ROCEPHIN) IVPB (premix) 1,000 mg (0 mg Intravenous Stopped 9/11/18 1647)   azithromycin (ZITHROMAX) tablet 500 mg (500 mg Oral Given 9/11/18 1601)   ipratropium-albuterol (DUO-NEB) 0 5-2 5 mg/3 mL inhalation solution 3 mL (3 mL Nebulization Given 9/11/18 1601)       Diagnostic Studies  Results Reviewed     Procedure Component Value Units Date/Time    Blood culture #1 [[id number]] Collected:  09/11/18 1550    Lab Status:  Preliminary result Specimen:  Blood from Arm, Left Updated:  09/13/18 0001     Blood Culture No Growth at 24 hrs  Blood culture #2 [[id number]] Collected:  09/11/18 1448    Lab Status:  Preliminary result Specimen:  Blood from Arm, Right Updated:  09/12/18 2201     Blood Culture No Growth at 24 hrs      Procalcitonin [[id number]]  (Normal) Collected:  09/11/18 1448    Lab Status:  Final result Specimen:  Blood from Arm, Right Updated:  09/11/18 2026     Procalcitonin <0 05 ng/ml     Comprehensive metabolic panel [[id number]]  (Abnormal) Collected:  09/11/18 1448    Lab Status:  Final result Specimen:  Blood from Arm, Right Updated:  09/11/18 1526     Sodium 138 mmol/L      Potassium 4 0 mmol/L      Chloride 103 mmol/L      CO2 30 mmol/L      ANION GAP 5 mmol/L      BUN 12 mg/dL      Creatinine 0 69 mg/dL      Glucose 160 (H) mg/dL      Calcium 9 6 mg/dL      AST 27 U/L      ALT 54 U/L      Alkaline Phosphatase 79 U/L      Total Protein 7 6 g/dL      Albumin 3 5 g/dL      Total Bilirubin 0 40 mg/dL      eGFR 88 ml/min/1 73sq m     Narrative:         National Kidney Disease Education Program recommendations are as follows:  GFR calculation is accurate only with a steady state creatinine  Chronic Kidney disease less than 60 ml/min/1 73 sq  meters  Kidney failure less than 15 ml/min/1 73 sq  meters  Magnesium [[id number]]  (Normal) Collected:  09/11/18 1448    Lab Status:  Final result Specimen:  Blood from Arm, Right Updated:  09/11/18 1526     Magnesium 1 8 mg/dL     Troponin I [[id number]]  (Normal) Collected:  09/11/18 1448    Lab Status:  Final result Specimen:  Blood from Arm, Right Updated:  09/11/18 1526     Troponin I <0 02 ng/mL     Lactic acid x2 [[id number]]  (Normal) Collected:  09/11/18 1448    Lab Status:  Final result Specimen:  Blood from Arm, Right Updated:  09/11/18 1520     LACTIC ACID 1 2 mmol/L     Narrative:         Result may be elevated if tourniquet was used during collection  Protime-INR [[id number]]  (Normal) Collected:  09/11/18 1448    Lab Status:  Final result Specimen:  Blood from Arm, Right Updated:  09/11/18 1505     Protime 12 4 seconds      INR 0 97    APTT [[id number]]  (Normal) Collected:  09/11/18 1448    Lab Status:  Final result Specimen:  Blood from Arm, Right Updated:  09/11/18 1505     PTT 24 seconds     Blood gas, Venous [[id number]]  (Abnormal) Collected:  09/11/18 1448    Lab Status:  Final result Specimen:  Blood from Arm, Right Updated:  09/11/18 1457     pH, [place] 7 351     pCO2, [place] 54 5 (H) mm Hg      pO2, [place] 40 9 mm Hg      HCO3, [place] 29 5 mmol/L      Base Excess, [place] 2 5 mmol/L      O2 Content, [place] 15 6 ml/dL      O2 HGB, VENOUS 72 4 %     Narrative:        Therapeutic levels (1 mg/mL and 2 mg/mL) of hydroxocobalamin may interfere with the fCOHb and fMetHb where it may cause lower than expected values    CBC and differential [[id number]]  (Abnormal) Collected:  09/11/18 1448    Lab Status:  Final result Specimen:  Blood from Arm, Right Updated:  09/11/18 1456     WBC 6 81 Thousand/uL      RBC 4 95 Million/uL      Hemoglobin 14 9 g/dL      Hematocrit 46 2 (H) %      MCV 93 fL      MCH 30 1 pg      MCHC 32 3 g/dL      RDW 15 1 %      MPV 10 5 fL      Platelets 932 Thousands/uL      nRBC 0 /100 WBCs      Neutrophils Relative 57 %      Immat GRANS % 0 %      Lymphocytes Relative 31 %      Monocytes Relative 9 %      Eosinophils Relative 3 %      Basophils Relative 0 %      Neutrophils Absolute 3 88 Thousands/µL      Immature Grans Absolute 0 03 Thousand/uL      Lymphocytes Absolute 2 11 Thousands/µL      Monocytes Absolute 0 59 Thousand/µL      Eosinophils Absolute 0 18 Thousand/µL      Basophils Absolute 0 02 Thousands/µL                  XR chest 2 views   Final Result by COURTNEY Langston MD (09/11 1520)      Left lower lobe consolidation, suspicious for pneumonia  Workstation performed: [id number]                    Procedures  Procedures       Phone Contacts  ED Phone Contact    ED Course                               MDM  CritCare Time    Disposition  Final diagnoses:   CAP (community acquired pneumonia)   Peripheral edema   Chronic obstructive pulmonary disease, unspecified COPD type ([facility] )     Time reflects when diagnosis was documented in both MDM as applicable and the Disposition within this note     Time User Action Codes Description Comment    9/11/2018  4:20 PM Lashawn STEVENSON Add [J18 9] CAP (community acquired pneumonia)     9/11/2018  4:26 PM Azra العلي Add [R60 9] Peripheral edema     9/12/2018  5:26 PM Lidia JONES Add [J44 9] Chronic obstructive pulmonary disease, unspecified COPD type [facility])       ED Disposition     ED Disposition Condition Comment    Discharge  [place] discharge to home/self care      Condition at discharge: Good        Follow-up Information     Follow up With Specialties Details Why Contact Info    Diana Sweeney MD Family Medicine Call in 1 day Tell about this ER visit  [address] [place] [address]  [phone]            Discharge Medication List as of 9/11/2018  4:42 PM      START taking these medications    Details   !! Guaifenesin 1200 MG TB12 Take 1 tablet (1,200 mg total) by mouth 2 (two) times a day to make mucus thinner, Starting Tue 9/11/2018, Print      ipratropium-albuterol (DUO-NEB) 0 5-2 5 mg/3 mL nebulizer solution Take 1 vial (3 mL total) by nebulization every 4 (four) hours as needed for wheezing (sob, cough), Starting Tue 9/11/2018, Print      levofloxacin (LEVAQUIN) 750 mg tablet Take 1 tablet (750 mg total) by mouth daily for 5 days (antibiotic), Starting Tue 9/11/2018, Until Sun 9/16/2018, Print       !! - Potential duplicate medications found  Please discuss with provider  CONTINUE these medications which have CHANGED    Details   albuterol (PROVENTIL HFA,VENTOLIN HFA) 90 mcg/act inhaler Inhale 2 puffs every 6 (six) hours as needed for wheezing, Starting Tue 9/11/2018, Print      !! furosemide (LASIX) 40 mg tablet Take 1 tablet (40 mg total) by mouth daily, Starting Tue 9/11/2018, Print       !! - Potential duplicate medications found  Please discuss with provider        CONTINUE these medications which have NOT CHANGED    Details   albuterol (2 5 mg/3 mL) 0 083 % nebulizer solution Inhale 1 each 4 (four) times a day as needed, Historical Med      alendronate (FOSAMAX) 70 mg tablet Take 70 mg by mouth every 7 days, Historical Med      atorvastatin (LIPITOR) 20 mg tablet TAKE ONE TABLET BY MOUTH EVERY DAY, Normal      Blood Glucose Monitoring Suppl (ONE TOUCH ULTRA 2) w/Device KIT by Does not apply route 2 (two) times a day, Starting Wed 6/6/2018, Normal      Clobetasol Prop Emollient Base (CLOBETASOL PROPIONATE E) 0 05 % emollient cream Apply topically, Starting Thu 1/22/2015, Historical Med      clotrimazole (LOTRIMIN) 1 % cream Apply topically daily as needed  , Until Discontinued, Historical Med      clotrimazole-betamethasone (LOTRISONE) 1-0 05 % cream Apply topically, Historical Med      cyanocobalamin (VITAMIN B-12) 1,000 mcg tablet Take 1 tablet by mouth daily, Starting Thu 11/10/2016, Historical Med      diclofenac sodium (VOLTAREN) 1 % Apply 2 g topically as needed, Until Discontinued, Historical Med      docusate sodium (COLACE) 100 mg capsule Take 1 capsule by mouth 2 (two) times a day, Starting Fri 11/24/2017, Normal      DULoxetine (CYMBALTA) 20 mg capsule TAKE ONE CAPSULE BY MOUTH ONCE DAILY, Normal      Ergocalciferol (VITAMIN D2 PO) Take 1 25 mg by mouth once a week Every Wednesday, Historical Med      ergocalciferol (VITAMIN D2) 50,000 units TAKE ONE CAPSULE BY MOUTH WEEKLY, Normal      fluticasone-salmeterol (ADVAIR) 250-50 mcg/dose Inhale 1 puff every 12 (twelve) hours  , Until Discontinued, Historical Med      folic acid (FOLVITE) 1 mg tablet TAKE ONE TABLET BY MOUTH ONCE DAILY, Normal      !! furosemide (LASIX) 40 mg tablet Take 1 tablet by mouth daily, Starting Wed 6/26/2013, Historical Med      !! guaiFENesin (MUCINEX) 600 mg 12 hr tablet Take 2 tablets by mouth 2 (two) times a day as needed, Starting Mon 11/21/2016, Historical Med      INVOKANA 100 MG TAKE ONE TABLET BY MOUTH EVERY DAY, Normal      JANUVIA 100 MG tablet TAKE 1 TABLET BY MOUTH DAILY , Normal      levothyroxine 125 mcg tablet Take 1 tablet (125 mcg total) by mouth daily in the early morning for 90 days, Starting Fri 8/31/2018, Until Thu 11/29/2018, Phone In      lisinopril (ZESTRIL) 40 mg tablet TAKE ONE TABLET BY MOUTH EVERY DAY, Normal      methocarbamol (ROBAXIN) 500 mg tablet Take 1 tablet by mouth every 6 (six) hours as needed for muscle spasms, Starting Fri 11/24/2017, Normal      !! methotrexate 2 5 mg tablet Take 1 tablet (2 5 mg total) by mouth once a week, Starting Mon 5/21/2018, Normal      !! methotrexate 2 5 mg tablet TAKE 8 TABLETS BY MOUTH WEEKLY, Normal      !! methotrexate 2 5 mg tablet TAKE 8 TABLETS BY MOUTH WEEKLY, Normal      naproxen (NAPROSYN) 500 mg tablet Take by mouth, Starting Mon 12/26/2016, Historical Med      nystatin (MYCOSTATIN) cream Apply topically as needed, Historical Med      nystatin (MYCOSTATIN) powder Apply topically, Starting Wed 6/7/2017, Historical Med      ofloxacin (FLOXIN) 0 3 % otic solution Administer 10 drops into both ears as needed  , Historical Med      ONE TOUCH ULTRA TEST test strip Tests twice a day, Normal      ONETOUCH DELICA LANCETS FINE MISC by Does not apply route 2 (two) times a day, Starting Mon 6/18/2018, Normal      pantoprazole (PROTONIX) 40 mg tablet TAKE ONE TABLET BY MOUTH ONCE DAILY, Normal      Polyethylene Glycol 3350-GRX POWD Take by mouth, Starting Wed 11/29/2017, Historical Med      Tiotropium Bromide Monohydrate (SPIRIVA RESPIMAT) 2 5 MCG/ACT AERS Inhale daily at bedtime, Historical Med      Tofacitinib Citrate (XELJANZ XR) 11 MG TB24 Take 1 tablet by mouth daily, Historical Med       !! - Potential duplicate medications found  Please discuss with provider  No discharge procedures on file      ED Provider  Electronically Signed by           Les Lesch, MD  09/13/18 0278

## 2018-09-11 NOTE — DISCHARGE INSTRUCTIONS
Community Acquired Pneumonia   WHAT YOU NEED TO KNOW:   Community-acquired pneumonia (CAP) is a lung infection that you get outside of a hospital or nursing home setting  Your lungs become inflamed and cannot work well  CAP may be caused by bacteria, viruses, or fungi  DISCHARGE INSTRUCTIONS:   Return to the emergency department if:   · You are confused and cannot think clearly  · You have increased trouble breathing  · Your lips or fingernails turn gray or blue  Contact your healthcare provider if:   · Your symptoms do not get better, or they get worse  · You are urinating less, or not at all  · You have questions or concerns about your condition or care  Medicines:   · Medicines  may be given to treat a bacterial, viral, or fungal infection  You may also be given medicines to dilate your bronchial tubes to help you breathe more easily  · Take your medicine as directed  Contact your healthcare provider if you think your medicine is not helping or if you have side effects  Tell him or her if you are allergic to any medicine  Keep a list of the medicines, vitamins, and herbs you take  Include the amounts, and when and why you take them  Bring the list or the pill bottles to follow-up visits  Carry your medicine list with you in case of an emergency  Follow up with your healthcare provider within 3 days or as directed: You may need another x-ray  Write down your questions so you remember to ask them during your visits  Deep breathing and coughing:  Deep breathing helps open the air passages in your lungs  Coughing helps bring up mucus from your lungs  Take a deep breath and hold the breath as long as you can  Then push the air out of your lungs with a deep, strong cough  Spit out any mucus you have coughed up  Take 10 deep breaths in a row every hour that you are awake  Remember to follow each deep breath with a cough     Do not smoke or allow others to smoke around you:  Nicotine and other chemicals in cigarettes and cigars can cause lung damage  Ask your healthcare provider for information if you currently smoke and need help to quit  E-cigarettes or smokeless tobacco still contain nicotine  Talk to your healthcare provider before you use these products  Manage CAP at home:   · Breathe warm, moist air  This helps loosen mucus  Loosely place a warm, wet washcloth over your nose and mouth  A room humidifier may also help make the air moist     · Drink liquids as directed  Ask your healthcare provider how much liquid to drink each day and which liquids to drink  Liquids help make mucus thin and easier to get out of your body  · Gently tap your chest   This helps loosen mucus so it is easier to cough  Lie with your head lower than your chest several times a day and tap your chest      · Get plenty of rest   Rest helps your body heal   Prevent CAP:   · Wash your hands often with soap and water  Carry germ-killing hand gel with you  You can use the gel to clean your hands when soap and water are not available  Do not touch your eyes, nose, or mouth unless you have washed your hands first      · Clean surfaces often  Clean doorknobs, countertops, cell phones, and other surfaces that are touched often  · Always cover your mouth when you cough  Cough into a tissue or your shirtsleeve so you do not spread germs from your hands  · Try to avoid people who have a cold or the flu  If you are sick, stay away from others as much as possible  · Ask about vaccines  You may need a vaccine to help prevent pneumonia  Get an influenza (flu) vaccine every year as soon as it becomes available  © 2017 [employer] Information is for End User's use only and may not be sold, redistributed or otherwise used for commercial purposes  All illustrations and images included in CareNotes® are the copyrighted property of [employer]  or [employer]    The above information is an  only  It is not intended as medical advice for individual conditions or treatments  Talk to your doctor, nurse or pharmacist before following any medical regimen to see if it is safe and effective for you

## 2018-09-12 RX ORDER — IPRATROPIUM BROMIDE AND ALBUTEROL SULFATE 2.5; .5 MG/3ML; MG/3ML
3 SOLUTION RESPIRATORY (INHALATION) EVERY 6 HOURS PRN
Qty: 144 ML | Refills: 0 | Status: SHIPPED | OUTPATIENT
Start: 2018-09-12 | End: 2019-02-05 | Stop reason: SDUPTHER

## 2018-09-16 LAB — BACTERIA BLD CULT: NORMAL

## 2018-09-17 LAB — BACTERIA BLD CULT: NORMAL

## 2018-09-19 DIAGNOSIS — M06.9 RHEUMATOID ARTHRITIS INVOLVING MULTIPLE SITES, UNSPECIFIED RHEUMATOID FACTOR PRESENCE: Primary | ICD-10-CM

## 2018-09-19 DIAGNOSIS — E03.9 HYPOTHYROIDISM, UNSPECIFIED TYPE: ICD-10-CM

## 2018-09-19 RX ORDER — LEVOTHYROXINE SODIUM 0.12 MG/1
TABLET ORAL
Qty: 90 TABLET | Refills: 0 | Status: SHIPPED | OUTPATIENT
Start: 2018-09-19 | End: 2018-11-21 | Stop reason: SDUPTHER

## 2018-09-21 ENCOUNTER — TRANSCRIBE ORDERS (OUTPATIENT)
Dept: ADMINISTRATIVE | Facility: HOSPITAL | Age: 68
End: 2018-09-21

## 2018-09-21 ENCOUNTER — APPOINTMENT (OUTPATIENT)
Dept: LAB | Facility: HOSPITAL | Age: 68
End: 2018-09-21
Payer: MEDICARE

## 2018-09-21 DIAGNOSIS — E11.9 TYPE 2 DIABETES MELLITUS WITHOUT COMPLICATIONS (HCC): ICD-10-CM

## 2018-09-21 DIAGNOSIS — M06.9 RHEUMATOID ARTHRITIS, INVOLVING UNSPECIFIED SITE, UNSPECIFIED RHEUMATOID FACTOR PRESENCE: Primary | ICD-10-CM

## 2018-09-21 DIAGNOSIS — I10 ESSENTIAL (PRIMARY) HYPERTENSION: ICD-10-CM

## 2018-09-21 DIAGNOSIS — E53.8 DEFICIENCY OF OTHER SPECIFIED B GROUP VITAMINS (CODE): ICD-10-CM

## 2018-09-21 DIAGNOSIS — M06.9 RHEUMATOID ARTHRITIS (HCC): ICD-10-CM

## 2018-09-21 DIAGNOSIS — E03.9 HYPOTHYROIDISM: ICD-10-CM

## 2018-09-21 DIAGNOSIS — E78.00 PURE HYPERCHOLESTEROLEMIA: ICD-10-CM

## 2018-09-21 DIAGNOSIS — E55.9 VITAMIN D DEFICIENCY: ICD-10-CM

## 2018-09-21 DIAGNOSIS — M06.9 RHEUMATOID ARTHRITIS, INVOLVING UNSPECIFIED SITE, UNSPECIFIED RHEUMATOID FACTOR PRESENCE: ICD-10-CM

## 2018-09-21 LAB
ALBUMIN SERPL BCP-MCNC: 3.5 G/DL (ref 3.5–5)
ALP SERPL-CCNC: 79 U/L (ref 46–116)
ALT SERPL W P-5'-P-CCNC: 39 U/L (ref 12–78)
ANION GAP SERPL CALCULATED.3IONS-SCNC: 5 MMOL/L (ref 4–13)
AST SERPL W P-5'-P-CCNC: 21 U/L (ref 5–45)
BASOPHILS # BLD AUTO: 0.04 THOUSANDS/ΜL (ref 0–0.1)
BASOPHILS NFR BLD AUTO: 1 % (ref 0–1)
BILIRUB SERPL-MCNC: 0.5 MG/DL (ref 0.2–1)
BUN SERPL-MCNC: 17 MG/DL (ref 5–25)
CALCIUM SERPL-MCNC: 9.6 MG/DL (ref 8.3–10.1)
CHLORIDE SERPL-SCNC: 104 MMOL/L (ref 100–108)
CHOLEST SERPL-MCNC: 143 MG/DL (ref 50–200)
CO2 SERPL-SCNC: 31 MMOL/L (ref 21–32)
CREAT SERPL-MCNC: 0.63 MG/DL (ref 0.6–1.3)
CRP SERPL QL: 4.2 MG/L
EOSINOPHIL # BLD AUTO: 0.37 THOUSAND/ΜL (ref 0–0.61)
EOSINOPHIL NFR BLD AUTO: 5 % (ref 0–6)
ERYTHROCYTE [DISTWIDTH] IN BLOOD BY AUTOMATED COUNT: 15.6 % (ref 11.6–15.1)
ERYTHROCYTE [SEDIMENTATION RATE] IN BLOOD: 4 MM/HOUR (ref 0–20)
GFR SERPL CREATININE-BSD FRML MDRD: 91 ML/MIN/1.73SQ M
GLUCOSE P FAST SERPL-MCNC: 115 MG/DL (ref 65–99)
HCT VFR BLD AUTO: 43.9 % (ref 34.8–46.1)
HDLC SERPL-MCNC: 63 MG/DL (ref 40–60)
HGB BLD-MCNC: 14 G/DL (ref 11.5–15.4)
IMM GRANULOCYTES # BLD AUTO: 0.05 THOUSAND/UL (ref 0–0.2)
IMM GRANULOCYTES NFR BLD AUTO: 1 % (ref 0–2)
LDLC SERPL CALC-MCNC: 63 MG/DL (ref 0–100)
LYMPHOCYTES # BLD AUTO: 3.13 THOUSANDS/ΜL (ref 0.6–4.47)
LYMPHOCYTES NFR BLD AUTO: 42 % (ref 14–44)
MCH RBC QN AUTO: 30 PG (ref 26.8–34.3)
MCHC RBC AUTO-ENTMCNC: 31.9 G/DL (ref 31.4–37.4)
MCV RBC AUTO: 94 FL (ref 82–98)
MONOCYTES # BLD AUTO: 0.64 THOUSAND/ΜL (ref 0.17–1.22)
MONOCYTES NFR BLD AUTO: 9 % (ref 4–12)
NEUTROPHILS # BLD AUTO: 3.08 THOUSANDS/ΜL (ref 1.85–7.62)
NEUTS SEG NFR BLD AUTO: 42 % (ref 43–75)
NONHDLC SERPL-MCNC: 80 MG/DL
NRBC BLD AUTO-RTO: 0 /100 WBCS
PLATELET # BLD AUTO: 303 THOUSANDS/UL (ref 149–390)
PMV BLD AUTO: 10.9 FL (ref 8.9–12.7)
POTASSIUM SERPL-SCNC: 4.2 MMOL/L (ref 3.5–5.3)
PROT SERPL-MCNC: 7.1 G/DL (ref 6.4–8.2)
RBC # BLD AUTO: 4.66 MILLION/UL (ref 3.81–5.12)
SODIUM SERPL-SCNC: 140 MMOL/L (ref 136–145)
TRIGL SERPL-MCNC: 87 MG/DL
TSH SERPL DL<=0.05 MIU/L-ACNC: 0.45 UIU/ML (ref 0.36–3.74)
WBC # BLD AUTO: 7.31 THOUSAND/UL (ref 4.31–10.16)

## 2018-09-21 PROCEDURE — 85025 COMPLETE CBC W/AUTO DIFF WBC: CPT

## 2018-09-21 PROCEDURE — 85652 RBC SED RATE AUTOMATED: CPT

## 2018-09-21 PROCEDURE — 80061 LIPID PANEL: CPT

## 2018-09-21 PROCEDURE — 83036 HEMOGLOBIN GLYCOSYLATED A1C: CPT

## 2018-09-21 PROCEDURE — 80053 COMPREHEN METABOLIC PANEL: CPT

## 2018-09-21 PROCEDURE — 36415 COLL VENOUS BLD VENIPUNCTURE: CPT

## 2018-09-21 PROCEDURE — 84443 ASSAY THYROID STIM HORMONE: CPT

## 2018-09-21 PROCEDURE — 86140 C-REACTIVE PROTEIN: CPT

## 2018-09-22 LAB
EST. AVERAGE GLUCOSE BLD GHB EST-MCNC: 174 MG/DL
HBA1C MFR BLD: 7.7 % (ref 4.2–6.3)

## 2018-09-24 ENCOUNTER — OFFICE VISIT (OUTPATIENT)
Dept: INTERNAL MEDICINE CLINIC | Facility: CLINIC | Age: 68
End: 2018-09-24
Payer: MEDICARE

## 2018-09-24 ENCOUNTER — TELEPHONE (OUTPATIENT)
Dept: INTERNAL MEDICINE CLINIC | Facility: CLINIC | Age: 68
End: 2018-09-24

## 2018-09-24 VITALS
WEIGHT: 248.9 LBS | HEIGHT: 65 IN | DIASTOLIC BLOOD PRESSURE: 50 MMHG | BODY MASS INDEX: 41.47 KG/M2 | SYSTOLIC BLOOD PRESSURE: 124 MMHG | OXYGEN SATURATION: 92 % | TEMPERATURE: 97.5 F | HEART RATE: 101 BPM

## 2018-09-24 DIAGNOSIS — G47.00 INSOMNIA, UNSPECIFIED TYPE: ICD-10-CM

## 2018-09-24 DIAGNOSIS — E78.2 MIXED HYPERLIPIDEMIA: ICD-10-CM

## 2018-09-24 DIAGNOSIS — E03.9 HYPOTHYROIDISM, UNSPECIFIED TYPE: ICD-10-CM

## 2018-09-24 DIAGNOSIS — E55.9 VITAMIN D DEFICIENCY: ICD-10-CM

## 2018-09-24 DIAGNOSIS — E11.65 TYPE 2 DIABETES MELLITUS WITH HYPERGLYCEMIA, WITHOUT LONG-TERM CURRENT USE OF INSULIN (HCC): ICD-10-CM

## 2018-09-24 DIAGNOSIS — Z23 NEED FOR INFLUENZA VACCINATION: Primary | ICD-10-CM

## 2018-09-24 DIAGNOSIS — J44.9 CHRONIC OBSTRUCTIVE PULMONARY DISEASE, UNSPECIFIED COPD TYPE (HCC): ICD-10-CM

## 2018-09-24 DIAGNOSIS — L30.9 ECZEMA, UNSPECIFIED TYPE: ICD-10-CM

## 2018-09-24 DIAGNOSIS — I10 ESSENTIAL HYPERTENSION: ICD-10-CM

## 2018-09-24 DIAGNOSIS — E53.8 VITAMIN B12 DEFICIENCY: ICD-10-CM

## 2018-09-24 DIAGNOSIS — M06.9 RHEUMATOID ARTHRITIS INVOLVING MULTIPLE SITES, UNSPECIFIED RHEUMATOID FACTOR PRESENCE: ICD-10-CM

## 2018-09-24 PROCEDURE — 99214 OFFICE O/P EST MOD 30 MIN: CPT | Performed by: FAMILY MEDICINE

## 2018-09-24 PROCEDURE — G0008 ADMIN INFLUENZA VIRUS VAC: HCPCS | Performed by: FAMILY MEDICINE

## 2018-09-24 PROCEDURE — 90662 IIV NO PRSV INCREASED AG IM: CPT | Performed by: FAMILY MEDICINE

## 2018-09-24 RX ORDER — MOMETASONE FUROATE 1 MG/G
OINTMENT TOPICAL 2 TIMES DAILY PRN
Qty: 45 G | Refills: 1 | Status: SHIPPED | OUTPATIENT
Start: 2018-09-24 | End: 2019-04-25 | Stop reason: SDUPTHER

## 2018-09-24 RX ORDER — MIRTAZAPINE 15 MG/1
15 TABLET, FILM COATED ORAL
Qty: 90 TABLET | Refills: 1 | Status: SHIPPED | OUTPATIENT
Start: 2018-09-24 | End: 2018-11-29 | Stop reason: SDUPTHER

## 2018-09-24 RX ORDER — BENZONATATE 200 MG/1
CAPSULE ORAL
COMMUNITY
End: 2018-09-24 | Stop reason: SDUPTHER

## 2018-09-24 RX ORDER — BENZONATATE 200 MG/1
200 CAPSULE ORAL 3 TIMES DAILY PRN
Qty: 60 CAPSULE | Refills: 3 | Status: SHIPPED | OUTPATIENT
Start: 2018-09-24 | End: 2019-06-19 | Stop reason: ALTCHOICE

## 2018-09-24 RX ORDER — PREDNISONE 1 MG/1
TABLET ORAL
COMMUNITY
End: 2018-12-26 | Stop reason: HOSPADM

## 2018-09-24 NOTE — PATIENT INSTRUCTIONS
Chronic Bronchitis   AMBULATORY CARE:   Chronic bronchitis  is a long-term swelling and irritation in the air passages in your lungs  The irritation may damage your lungs  The lung damage often gets worse over time, and it is usually permanent  Chronic bronchitis is part of a group of lung diseases called chronic obstructive pulmonary disease (COPD)  Signs and symptoms include the following:   · Early signs and symptoms  may include shortness of breath, a runny or stuffy nose, or a headache  You may have a morning cough that brings up mucus from the lungs  As time passes, the amount of mucus coughed up begins to increase  The cough also starts to last longer during the day  You may have a bad taste in your mouth or bad breath  · Later signs and symptoms  may include your skin, nail beds, or lips turning dusky or blue  You may become more short of breath and get tired more easily  You may not be able to walk as far as you used to  You may wheeze  You may notice your breathing is faster than it used to be  Call 911 for any of the following:   · You have new chest pain or tightness  · You become confused, dizzy, or feel like you may faint  · You have a new or increased gray or blue tint to your nail beds, fingers or lips  Seek care immediately if:   · You become tired easily from trying to get enough breath  · The amount or color of your sputum changes, or your sputum becomes too hard to cough up  Contact your healthcare provider if:   · You have a fever  · You use your inhalers more often than usual      · You have new or increased swelling in your legs, ankles, or abdomen  · You run out of breath easily when you talk or do your usual exercise or activities  · You have questions or concerns about your condition or care  Treatment for chronic bronchitis  may include medicine to treat infection or help you breathe better  You may need to use oxygen in your home   Ask your healthcare provider if you have any questions on how to take medications or use oxygen  Self-care:   · Sleep with your upper body raised  This will help you breathe easier  You can use foam wedges or elevate the head of your bed  Many devices are available to help raise your upper body while in bed  Use a device that will tilt your whole body, or bend your body at the waist  The device should not bend your body at the upper back or neck  · Prevent the spread of germs:      [facility] your hands often with soap and water  Carry germ-killing gel with you  You can use the gel to clean your hands when soap and water are not available  ¨ Do not touch your eyes, nose, or mouth unless you have washed your hands first      ¨ Always cover your mouth when you cough  Cough into a tissue or your shirtsleeve so you do not spread germs from your hands  ¨ Try to avoid people who have a cold or the flu  If you are sick, stay away from others as much as possible  · Do not smoke  Nicotine and other substances can cause lung damage  Do not use e-cigarettes or smokeless tobacco without first talking to your healthcare provider  They still contain nicotine  Ask your healthcare provider for information if you currently smoke and need help to quit  · Avoid lung irritants  Stay out of high altitudes and places with high humidity  Stay inside, or cover your mouth and nose with a scarf when you are outside during cold weather  Stay inside on days when air pollution or pollen counts are high  Do not use aerosol sprays such as deodorant, bug spray, and hair spray  · Drink more liquids  This will help to keep your air passages moist and help you cough up mucus  Ask how much liquid to drink each day and which liquids are best for you  · Ask your healthcare provider about the flu and pneumonia vaccines  All adults should get the flu (influenza) vaccine every year as soon as it becomes available   The pneumonia vaccine is given to adults aged 72 or older to prevent pneumococcal disease, such as pneumonia  Adults aged 23 to 59 years who are at high risk for pneumococcal disease also should get the pneumococcal vaccine  It may need to be repeated 1 or 5 years later  Ways to help you breathe better:   · Take deep breaths and cough  10 times each hour  This will decrease your risk for a lung infection  Take a deep breath and hold it for as long as you can  Let the air out and then cough strongly  Deep breaths help open your airway  You may be given an incentive spirometer to help you take deep breaths  Put the plastic piece in your mouth and take a slow, deep breath  Then let the air out and cough  Repeat these steps 10 times every hour  · Pursed-lip breathing  can be used any time you feel short of breath  Pursed-lip breathing can be especially helpful before you start an activity:     ¨ Breathe in through your nose  Use the muscles in your abdomen to help fill your lungs with air  ¨ Slowly breathe out through your mouth with your lips slightly puckered  You should make a quiet hissing sound as you breathe out  ¨ Try to take twice as long to breathe out as it did to breathe in  This helps you get rid of as much air from your lungs as possible  ¨ Repeat this exercise several times  Once you are used to doing pursed-lip breathing, you can do it any time you need more air  · Diaphragmatic breathing  can help strengthen some of the muscles you use to breathe:     ¨ Place one hand on your stomach just below your ribs  Place your other hand in the middle of your chest over your breastbone  ¨ Breathe in slowly through your nose, as deeply as you can  ¨ Breathe out slowly through pursed lips  As you breathe out, tighten the muscles in your stomach  Use your hand to gently push in and up while tightening the muscles  ¨ Diaphragmatic breathing takes practice  You may need to practice this many times a day   Slowly increase the amount of time you spend during each practice session  Follow up with your healthcare provider as directed:  Write down your questions so you remember to ask them during your visits  © 2017 [address] [employer] Information is for End User's use only and may not be sold, redistributed or otherwise used for commercial purposes  All illustrations and images included in CareNotes® are the copyrighted property of [employer]  or [employer]  The above information is an  only  It is not intended as medical advice for individual conditions or treatments  Talk to your doctor, nurse or pharmacist before following any medical regimen to see if it is safe and effective for you

## 2018-09-24 NOTE — PROGRESS NOTES
Assessment/Plan:    Type 2 diabetes mellitus with hyperglycemia (HCC)  Lab Results   Component Value Date    HGBA1C 7 7 (H) 09/21/2018       No results for input(s): POCGLU in the last 72 hours  Blood Sugar Average: Last 72 hrs:     Hemoglobin A1c still above goal and has increased slightly to 7 7  She is hesitant to change medications since she has been tolerating them well  Continue the Invokana and the Duaneuvia  Need to watch diet more closely  Hopefully the rheumatologist tapering off prednisone will help with blood sugars  Discussed with her goal of hemoglobin A1c less than 7  Try to increase activity level  Follow-up with Ophthalmology regularly  Right eye is somewhat reddened today which may be related to some recent irritation  Advised her to watch this  Check feet daily  Recommend regular follow-up with Podiatry  Hypothyroidism  TSH has improved on the levothyroxine 125 mcg daily  Continue his current dose  Will continue to follow this with routine laboratory testing prior to her visits or sooner if needed based on testing results  Signs and symptoms of hypo and hyperthyroidism discussed  Chronic obstructive pulmonary disease ([facility] )  Still continues with slight cough  No definite signs of infection or exacerbation at present  Advised her to start Mucinex on a regular basis to loosen the mucus  Continue with the Tessalon as needed for cough  Continue to use nebulizer if needed  Continue inhalers  Call or follow-up if she notices any exacerbations  Essential hypertension  Blood pressure well controlled  Continue lisinopril  Watch salt intake  Rheumatoid arthritis ([facility] )  Continue with the [place]  Continue to follow up with Rheumatology  Continue to taper off of the prednisone as per their recommendation  Continue with their recommendations regarding methotrexate  Continue with the folic acid with the methotrexate  Watch for worsening      Hyperlipidemia  Cholesterol relatively well controlled  Continue with the atorvastatin  Watch diet  Increase fiber in diet  Increase activity level  Vitamin B12 deficiency  Continue with over-the-counter supplementation  Will continue to follow this about 2 to 3 times a year  Vitamin D deficiency  Continue with vitamin D as prescribed  Will continue to follow this about 2 to 3 times a year  Diagnoses and all orders for this visit:    Need for influenza vaccination  -     influenza vaccine, 1113-3929, high-dose, PF 0 5 mL, for patients 65 yr+ (FLUZONE HIGH-DOSE)    Type 2 diabetes mellitus with hyperglycemia, without long-term current use of insulin ([facility] )  -     Comprehensive metabolic panel; Future  -     HEMOGLOBIN A1C W/ EAG ESTIMATION; Future    Hypothyroidism, unspecified type  -     Comprehensive metabolic panel; Future    Chronic obstructive pulmonary disease, unspecified COPD type ([facility] [address] )  -     Comprehensive metabolic panel; Future  -     benzonatate (TESSALON) 200 MG capsule; Take 1 capsule (200 mg total) by mouth 3 (three) times a day as needed for cough    Essential hypertension  -     CBC and differential; Future  -     Comprehensive metabolic panel; Future    Rheumatoid arthritis involving multiple sites, unspecified rheumatoid factor presence (HCC)  -     methotrexate 2 5 mg tablet; Take 1 tablet (2 5 mg total) by mouth once a week Takes 6 tablets per week  -     CBC and differential; Future  -     Comprehensive metabolic panel; Future    Mixed hyperlipidemia  -     Comprehensive metabolic panel; Future  -     Lipid panel; Future  -     TSH, 3rd generation; Future    Vitamin B12 deficiency  -     CBC and differential; Future  -     Comprehensive metabolic panel; Future  -     Vitamin B12; Future    Vitamin D deficiency  -     Comprehensive metabolic panel; Future  -     Vitamin D 25 hydroxy; Future    Insomnia, unspecified type  -     Comprehensive metabolic panel;  Future  -     mirtazapine (REMERON) 15 mg tablet; Take 1 tablet (15 mg total) by mouth daily at bedtime    Eczema, unspecified type  -     mometasone (ELOCON) 0 1 % ointment; Apply topically 2 (two) times a day as needed (itching)    Other orders  -     conjugated estrogens (PREMARIN) vaginal cream; Insert 0 625 Tubes into the vagina as needed  -     Discontinue: benzonatate (TESSALON) 200 MG capsule; benzonatate 200 mg capsule  -     predniSONE 5 mg tablet; prednisone 5 mg tablet        Orders recommendations as noted above  Flu shot given today  She is up-to-date on her pneumonia vaccines  Slip given for her mammogram since she is well overdue  She declines the bone density at present  Discussed with her the importance of this especially at her relatively young age and since she is on the Fosamax  This is especially important since she has been on long-term prednisone  She will consider this in the future  Be careful to avoid falls  Will have her follow up in about 3-4 months or sooner if needed  Subjective:      Patient ID: Bonny Hartley is a 77 y o  female  She presents for routine follow-up  Has generally been doing relatively well  Has had some issues with some dry and itchy skin on her back  Has been trying to keep the area moisturized but this does not seem to help significantly  Has the slight cough and postnasal drip  No significant shortness of breath or wheezing  She knows the warning signs and symptoms to watch for since she has had frequent COPD exacerbations  Would like a refill on her Tessalon for the cough since this works very well  Continues with her Advair and Spiriva  Uses her nebulizer if needed  She has noticed that her right eye is somewhat reddened over the last day or so  She is unsure whether she might have irritated it  Denies any vision changes  Blood sugars have been relatively stable  Had been up in down at times  Usually no higher than 150-160  Denies any hypoglycemic episodes    Has been tolerating the Invokana and Januvia without difficulty  Follows up with Dr Ramsey Smith for her eyes and Dr Ariella Soto for her feet  Denies any open areas on her feet  Has some chronic pain related to the rheumatoid arthritis  Continues to follow-up with Rheumatology  Continues on the Tae mawr and the methotrexate and folic acid  They are gradually tapering her off of the prednisone  She is hoping to be off of this over the next month or so  Hands are persistently painful and stiff but the other joints have been generally well controlled  Tolerating her atorvastatin without difficulty  Denies any significant muscle aches or weakness  Denies any chest pain or palpitations  Tolerating her lisinopril well  Denies any headaches or localized weakness  Appetite has been generally good  Denies any nausea, vomiting, or diarrhea  Has been trying to watch her diet and maintain the weight loss  Tolerating the lower dose of lisinopril without difficulty  Has been having increasing issues with sleep  Has difficulty relaxing at night  Had been feeling somewhat more down a few months ago after her granddaughter moved out with her great grandson  The following portions of the patient's history were reviewed and updated as appropriate:   She  has a past medical history of Arthritis; Arthritis; Candidiasis of skin; COPD (chronic obstructive pulmonary disease) ([address] ); Current chronic use of systemic steroids; Degenerative arthritis of left knee; Diabetes mellitus ([address] ); Disease of thyroid gland; Fibromyalgia; Fistula of knee; GERD (gastroesophageal reflux disease); Hyperlipidemia; Hypertension; Medial meniscus tear; Migraine; Obesity; Obesity; Osteoarthritis; Osteopenia; Pneumonia; Psychiatric disorder; Rheumatoid arthritis ([address] ); Rheumatoid arthritis ([address] ); Sepsis ([address] ); Tick bite; Vitamin B12 deficiency; and Vitamin D deficiency    She   Patient Active Problem List    Diagnosis Date Noted    Localized swelling, mass and lump, upper limb, right 12/11/2017    Constipation 11/29/2017    Renal cyst 11/29/2017    Acute cystitis 11/24/2017    Candidiasis, cutaneous 06/30/2017    Vitamin B12 deficiency 11/10/2016    BMI 40 0-44 9, adult ([place] [address] ) 11/05/2016    Morbid (severe) obesity due to excess calories ([place] [address] ) 11/04/2016    COPD with acute exacerbation ([place] [address] ) 11/03/2016    Type 2 diabetes mellitus with hyperglycemia ([place] [address] ) 11/03/2016    Hypothyroidism 11/03/2016    Rheumatoid arthritis ([place] [address] ) 11/03/2016    Essential hypertension 11/03/2016    Hyperlipidemia 11/03/2016    GERD (gastroesophageal reflux disease) 11/03/2016    Hypoalbuminemia 11/03/2016    Hepatic steatosis 11/03/2016    Diabetes mellitus, type 2 ([place] [address] ) 10/13/2015    Anxiety 06/02/2014    Fibromyalgia 01/28/2014    Osteopenia 09/17/2012    Chronic obstructive pulmonary disease ([place] [address] ) 05/29/2012    Osteoarthritis 05/29/2012    Vitamin D deficiency 05/29/2012     She  has a past surgical history that includes Hysterectomy; Knee arthroscopy; Cataract extraction (Bilateral); Cholecystectomy; Joint replacement; Tubal ligation; Neuroplasty / transposition median nerve at carpal tunnel; and Eye surgery  Her family history includes Asthma in her family; Cancer in her family; Diabetes in her family; Hypertension in her family; Stroke in her mother  She  reports that she quit smoking about 5 years ago  Her smoking use included E-Cigarettes  She has a 48 00 pack-year smoking history  She has never used smokeless tobacco  She reports that she does not drink alcohol or use drugs    Current Outpatient Prescriptions   Medication Sig Dispense Refill    albuterol (PROVENTIL HFA,VENTOLIN HFA) 90 mcg/act inhaler Inhale 2 puffs every 6 (six) hours as needed for wheezing 1 Inhaler 0    alendronate (FOSAMAX) 70 mg tablet Take 70 mg by mouth every 7 days      atorvastatin (LIPITOR) 20 mg tablet TAKE ONE TABLET BY MOUTH EVERY DAY 90 tablet 0    benzonatate (TESSALON) 200 MG capsule Take 1 capsule (200 mg total) by mouth 3 (three) times a day as needed for cough 60 capsule 3    Blood Glucose Monitoring Suppl (ONE TOUCH ULTRA 2) w/Device KIT by Does not apply route 2 (two) times a day 1 each 0    clotrimazole (LOTRIMIN) 1 % cream Apply topically daily as needed   conjugated estrogens (PREMARIN) vaginal cream Insert 0 625 Tubes into the vagina as needed      cyanocobalamin (VITAMIN B-12) 1,000 mcg tablet Take 1 tablet by mouth daily      diclofenac sodium (VOLTAREN) 1 % Apply 2 g topically as needed      DULoxetine (CYMBALTA) 20 mg capsule TAKE ONE CAPSULE BY MOUTH ONCE DAILY 90 capsule 0    ergocalciferol (VITAMIN D2) 50,000 units TAKE ONE CAPSULE BY MOUTH WEEKLY 12 capsule 0    fluticasone-salmeterol (ADVAIR) 250-50 mcg/dose Inhale 1 puff every 12 (twelve) hours   folic acid (FOLVITE) 1 mg tablet TAKE ONE TABLET BY MOUTH ONCE DAILY 90 tablet 1    furosemide (LASIX) 40 mg tablet Take 1 tablet (40 mg total) by mouth daily 14 tablet 0    INVOKANA 100 MG TAKE ONE TABLET BY MOUTH EVERY DAY 90 tablet 0    ipratropium-albuterol (DUO-NEB) 0 5-2 5 mg/3 mL nebulizer solution Take 1 vial (3 mL total) by nebulization every 6 (six) hours as needed for wheezing or shortness of breath 144 mL 0    JANUVIA 100 MG tablet TAKE 1 TABLET BY MOUTH DAILY   90 tablet 0    levothyroxine 125 mcg tablet TAKE ONE TABLET BY MOUTH EVERY DAY IN THE AM 90 tablet 0    lisinopril (ZESTRIL) 40 mg tablet TAKE ONE TABLET BY MOUTH EVERY DAY 90 tablet 0    methotrexate 2 5 mg tablet Take 1 tablet (2 5 mg total) by mouth once a week Takes 6 tablets per week 12 tablet 0    mirtazapine (REMERON) 15 mg tablet Take 1 tablet (15 mg total) by mouth daily at bedtime 90 tablet 1    mometasone (ELOCON) 0 1 % ointment Apply topically 2 (two) times a day as needed (itching) 45 g 1    nystatin (MYCOSTATIN) powder Apply topically      ofloxacin (FLOXIN) 0 3 % otic solution Administer 10 drops into both ears as needed  ONE TOUCH ULTRA TEST test strip Tests twice a day 100 each 1    ONETOUCH DELICA LANCETS FINE MISC by Does not apply route 2 (two) times a day 100 each 5    pantoprazole (PROTONIX) 40 mg tablet TAKE ONE TABLET BY MOUTH ONCE DAILY 90 tablet 0    Polyethylene Glycol 3350-GRX POWD Take by mouth      predniSONE 5 mg tablet prednisone 5 mg tablet      Tiotropium Bromide Monohydrate (SPIRIVA RESPIMAT) 2 5 MCG/ACT AERS Inhale daily at bedtime      Tofacitinib Citrate (XELJANZ XR) 11 MG TB24 Take 1 tablet by mouth daily       No current facility-administered medications for this visit  Current Outpatient Prescriptions on File Prior to Visit   Medication Sig    albuterol (PROVENTIL HFA,VENTOLIN HFA) 90 mcg/act inhaler Inhale 2 puffs every 6 (six) hours as needed for wheezing    alendronate (FOSAMAX) 70 mg tablet Take 70 mg by mouth every 7 days    atorvastatin (LIPITOR) 20 mg tablet TAKE ONE TABLET BY MOUTH EVERY DAY    Blood Glucose Monitoring Suppl (ONE TOUCH ULTRA 2) w/Device KIT by Does not apply route 2 (two) times a day    clotrimazole (LOTRIMIN) 1 % cream Apply topically daily as needed   cyanocobalamin (VITAMIN B-12) 1,000 mcg tablet Take 1 tablet by mouth daily    diclofenac sodium (VOLTAREN) 1 % Apply 2 g topically as needed    DULoxetine (CYMBALTA) 20 mg capsule TAKE ONE CAPSULE BY MOUTH ONCE DAILY    ergocalciferol (VITAMIN D2) 50,000 units TAKE ONE CAPSULE BY MOUTH WEEKLY    fluticasone-salmeterol (ADVAIR) 250-50 mcg/dose Inhale 1 puff every 12 (twelve) hours      folic acid (FOLVITE) 1 mg tablet TAKE ONE TABLET BY MOUTH ONCE DAILY    furosemide (LASIX) 40 mg tablet Take 1 tablet (40 mg total) by mouth daily    INVOKANA 100 MG TAKE ONE TABLET BY MOUTH EVERY DAY    ipratropium-albuterol (DUO-NEB) 0 5-2 5 mg/3 mL nebulizer solution Take 1 vial (3 mL total) by nebulization every 6 (six) hours as needed for wheezing or shortness of breath    JANUVIA 100 MG tablet TAKE 1 TABLET BY MOUTH DAILY     levothyroxine 125 mcg tablet TAKE ONE TABLET BY MOUTH EVERY DAY IN THE AM    lisinopril (ZESTRIL) 40 mg tablet TAKE ONE TABLET BY MOUTH EVERY DAY    nystatin (MYCOSTATIN) powder Apply topically    ofloxacin (FLOXIN) 0 3 % otic solution Administer 10 drops into both ears as needed      ONE TOUCH ULTRA TEST test strip Tests twice a day    ONETOUCH DELICA LANCETS FINE MISC by Does not apply route 2 (two) times a day    pantoprazole (PROTONIX) 40 mg tablet TAKE ONE TABLET BY MOUTH ONCE DAILY    Polyethylene Glycol 3350-GRX POWD Take by mouth    Tiotropium Bromide Monohydrate (SPIRIVA RESPIMAT) 2 5 MCG/ACT AERS Inhale daily at bedtime    Tofacitinib Citrate (XELJANZ XR) 11 MG TB24 Take 1 tablet by mouth daily     No current facility-administered medications on file prior to visit  She is allergic to no active allergies       Review of Systems   Constitutional: Negative for activity change, appetite change, chills, fatigue and fever  HENT: Positive for postnasal drip  Negative for congestion and rhinorrhea  Eyes: Negative for visual disturbance  Respiratory: Positive for cough  Negative for chest tightness, shortness of breath and wheezing  Cardiovascular: Negative for chest pain, palpitations and leg swelling  Gastrointestinal: Negative for abdominal pain, blood in stool, diarrhea, nausea and vomiting  Endocrine: Negative for polydipsia, polyphagia and polyuria  Genitourinary: Negative for dysuria, frequency and urgency  Musculoskeletal: Positive for arthralgias and joint swelling  Negative for gait problem  Skin: Negative for color change  As per HPI   Neurological: Negative for dizziness, light-headedness and headaches  Hematological: Does not bruise/bleed easily  Psychiatric/Behavioral: Negative for confusion and sleep disturbance  The patient is not nervous/anxious            Objective:      /50 (BP Location: Right arm, Patient Position: Sitting, Cuff Size: Large)   Pulse 101   Temp 97 5 °F (36 4 °C) (Temporal)   Ht 5' 5" (1 651 m)   Wt 113 kg (248 lb 14 4 oz)   SpO2 92%   BMI 41 42 kg/m²          Physical Exam   Constitutional: She is oriented to person, place, and time  She is cooperative  No distress  HENT:   Head: Normocephalic and atraumatic  Mouth/Throat: Oropharynx is clear and moist    Slight cerumen; boggy nasal mucosa with clear   Eyes: Conjunctivae are normal  Pupils are equal, round, and reactive to light  Right eye exhibits no discharge  Left eye exhibits no discharge  Neck: No JVD present  No spinous process tenderness present  Carotid bruit is not present  Cardiovascular: Normal rate, regular rhythm and normal heart sounds  Exam reveals no gallop and no friction rub  No murmur heard  Pulmonary/Chest: No respiratory distress  She has decreased breath sounds  She has no wheezes  She has no rhonchi  She has no rales  Abdominal: Bowel sounds are normal  She exhibits no distension  There is no tenderness  Musculoskeletal: She exhibits no edema  Degenerative changes bilateral hands   Lymphadenopathy:     She has no cervical adenopathy  Neurological: She is alert and oriented to person, place, and time  Skin: Skin is warm and dry  Some areas of seborrheic keratoses on back; very dry and excoriated skin   Psychiatric: She has a normal mood and affect  Her behavior is normal    Nursing note and vitals reviewed  Below is the patient's most recent value for Albumin, ALT, AST, BUN, Calcium, Chloride, Cholesterol, CO2, Creatinine, GFR, Glucose, HDL, Hematocrit, Hemoglobin, Hemoglobin A1C, LDL, Magnesium, Phosphorus, Platelets, Potassium, PSA, Sodium, Triglycerides, and WBC     Lab Results   Component Value Date    ALT 39 09/21/2018    AST 21 09/21/2018    BUN 17 09/21/2018    CALCIUM 9 6 09/21/2018     09/21/2018    CHOL 194 12/17/2015    CO2 31 09/21/2018    CREATININE 0 63 09/21/2018    HDL 63 (H) 09/21/2018    HCT 43 9 09/21/2018    HGB 14 0 09/21/2018    HGBA1C 7 7 (H) 09/21/2018    MG 1 8 09/11/2018    PHOS 3 9 11/22/2017     09/21/2018    K 4 2 09/21/2018     09/21/2018    TRIG 87 09/21/2018    WBC 7 31 09/21/2018     Note: for a comprehensive list of the patient's lab results, access the Results Review activity

## 2018-09-24 NOTE — TELEPHONE ENCOUNTER
Received a call from [facility] in [place] [id number] to verify the methotrexate prescription  The script stated take 1 tablet per week take 6 tablets per week, was sent in with quantity of 12, and 0 refills  Spoke with Dr Reza Sood and she stated Jose Wyatt is supposed to take 6 per week, #24 refill 4  They verbalized understanding and placed it in the computer to save

## 2018-10-19 PROBLEM — M54.59 INTRACTABLE LOW BACK PAIN: Status: RESOLVED | Noted: 2017-11-24 | Resolved: 2018-10-19

## 2018-10-19 NOTE — ASSESSMENT & PLAN NOTE
Continue with over-the-counter supplementation  Will continue to follow this about 2 to 3 times a year

## 2018-10-19 NOTE — ASSESSMENT & PLAN NOTE
Continue with the Gennaro Tavera  Continue to follow up with Rheumatology  Continue to taper off of the prednisone as per their recommendation  Continue with their recommendations regarding methotrexate  Continue with the folic acid with the methotrexate  Watch for worsening

## 2018-10-19 NOTE — ASSESSMENT & PLAN NOTE
Lab Results   Component Value Date    HGBA1C 7 7 (H) 09/21/2018       No results for input(s): POCGLU in the last 72 hours  Blood Sugar Average: Last 72 hrs:     Hemoglobin A1c still above goal and has increased slightly to 7 7  She is hesitant to change medications since she has been tolerating them well  Continue the Invokana and the Januvia  Need to watch diet more closely  Hopefully the rheumatologist tapering off prednisone will help with blood sugars  Discussed with her goal of hemoglobin A1c less than 7  Try to increase activity level  Follow-up with Ophthalmology regularly  Right eye is somewhat reddened today which may be related to some recent irritation  Advised her to watch this  Check feet daily  Recommend regular follow-up with Podiatry

## 2018-10-19 NOTE — ASSESSMENT & PLAN NOTE
Cholesterol relatively well controlled  Continue with the atorvastatin  Watch diet  Increase fiber in diet  Increase activity level

## 2018-10-19 NOTE — ASSESSMENT & PLAN NOTE
Still continues with slight cough  No definite signs of infection or exacerbation at present  Advised her to start Mucinex on a regular basis to loosen the mucus  Continue with the Tessalon as needed for cough  Continue to use nebulizer if needed  Continue inhalers  Call or follow-up if she notices any exacerbations

## 2018-10-19 NOTE — ASSESSMENT & PLAN NOTE
TSH has improved on the levothyroxine 125 mcg daily  Continue his current dose  Will continue to follow this with routine laboratory testing prior to her visits or sooner if needed based on testing results  Signs and symptoms of hypo and hyperthyroidism discussed

## 2018-10-20 ENCOUNTER — APPOINTMENT (EMERGENCY)
Dept: RADIOLOGY | Facility: HOSPITAL | Age: 68
End: 2018-10-20
Payer: MEDICARE

## 2018-10-20 ENCOUNTER — HOSPITAL ENCOUNTER (EMERGENCY)
Facility: HOSPITAL | Age: 68
Discharge: HOME | End: 2018-10-20
Attending: EMERGENCY MEDICINE | Admitting: EMERGENCY MEDICINE
Payer: MEDICARE

## 2018-10-20 VITALS
DIASTOLIC BLOOD PRESSURE: 75 MMHG | OXYGEN SATURATION: 94 % | WEIGHT: 255.29 LBS | HEART RATE: 86 BPM | RESPIRATION RATE: 20 BRPM | HEIGHT: 65 IN | BODY MASS INDEX: 42.53 KG/M2 | TEMPERATURE: 98.6 F | SYSTOLIC BLOOD PRESSURE: 166 MMHG

## 2018-10-20 DIAGNOSIS — S40.012A CONTUSION OF LEFT SHOULDER, INITIAL ENCOUNTER: ICD-10-CM
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
## 2018-10-20 DIAGNOSIS — S39.012A STRAIN OF FASCIA OF LOWER BACK: ICD-10-CM

## 2018-10-20 DIAGNOSIS — S43.401A SPRAIN OF RIGHT SHOULDER, INITIAL ENCOUNTER: ICD-10-CM

## 2018-10-20 DIAGNOSIS — S80.12XA CONTUSION OF LEFT LEG, INITIAL ENCOUNTER: ICD-10-CM

## 2018-10-20 DIAGNOSIS — S63.501A SPRAIN OF RIGHT WRIST, INITIAL ENCOUNTER: ICD-10-CM

## 2018-10-20 PROCEDURE — 72100 X-RAY EXAM L-S SPINE 2/3 VWS: CPT

## 2018-10-20 PROCEDURE — 73030 X-RAY EXAM OF SHOULDER: CPT

## 2018-10-20 PROCEDURE — 73564 X-RAY EXAM KNEE 4 OR MORE: CPT

## 2018-10-20 PROCEDURE — 99283 EMERGENCY DEPT VISIT LOW MDM: CPT

## 2018-10-20 PROCEDURE — 96372 THER/PROPH/DIAG INJ SC/IM: CPT

## 2018-10-20 PROCEDURE — 73590 X-RAY EXAM OF LOWER LEG: CPT

## 2018-10-20 PROCEDURE — 73110 X-RAY EXAM OF WRIST: CPT

## 2018-10-20 RX ORDER — KETOROLAC TROMETHAMINE 30 MG/ML
30 INJECTION, SOLUTION INTRAMUSCULAR; INTRAVENOUS ONCE
Status: COMPLETED | OUTPATIENT
Start: 2018-10-20 | End: 2018-10-20

## 2018-10-20 RX ORDER — PREDNISONE 10 MG/1
40 TABLET ORAL DAILY
Qty: 20 TABLET | Refills: 0 | Status: SHIPPED | OUTPATIENT
Start: 2018-10-20 | End: 2018-10-25

## 2018-10-20 RX ADMIN — KETOROLAC TROMETHAMINE 30 MG: 30 INJECTION, SOLUTION INTRAMUSCULAR at 12:07

## 2018-10-20 NOTE — ED PROVIDER NOTES
History  Chief Complaint   Patient presents with   Karol Stephenson in shower on thursday  Landed on left knee and arm  Used right arm to get up  C/o pain right shoulder  rleft shoulder and left knee, lower back  Patient complains of diffuse body aches and pains after falling 2 days ago while in the tub  She says her right foot slipped and she landed on her back, left shoulder and left knee  She denies head injury, LOC or preceding syncope  She was home alone, has RA and is morbidly obese  Therefore, it was quite a struggle for her to get herself out of the tub  She was pulling with her right arm to do so and now feels strain in her right wrist and shoulder  That night she felt okay  The next day she was "achy"and took naproxen  Today she states she could not move  She did take anything today for the pain  She arrives via POV with family, ambulatory  GCS 15  Denies visual change, LH/dizziness, HA, neck pain, CP, SOB, abdominal pain, n/v  12 system ROS o/w negative  History provided by:  Patient and medical records  Fall   Mechanism of injury: fall    Injury location:  Shoulder/arm, torso and leg  Shoulder/arm injury location:  L shoulder, R shoulder and R wrist  Torso injury location:  Back  Leg injury location:  L knee and R knee  Incident location:  Home  Time since incident:  2 days  Arrived directly from scene: no    Fall:     Fall occurred: In the bathroom    Impact surface: bathtub      Point of impact:  Back, buttocks and outstretched arms    Entrapped after fall: yes (briefly)    Protective equipment: none    Suspicion of alcohol use: no    Suspicion of drug use: no    Prior to arrival data:     Bystander interventions:  None    Blood loss:  None    Responsiveness at scene:  Alert    Orientation at scene:  Person, place, situation and time    Loss of consciousness: no      Amnesic to event: no      Immobilization:  None    Airway condition since incident:  Stable Breathing condition since incident:  Stable    Circulation condition since incident:  Stable    Mental status condition since incident:  Stable    Disability condition since incident:  Stable  Associated symptoms: back pain (lumbar)    Associated symptoms: no abdominal pain, no chest pain, no difficulty breathing, no headaches, no loss of consciousness, no nausea, no neck pain and no vomiting    Risk factors: no anticoagulation therapy and no hemophilia        Prior to Admission Medications   Prescriptions Last Dose Informant Patient Reported? Taking? Blood Glucose Monitoring Suppl (ONE TOUCH ULTRA 2) w/Device KIT   No No   Sig: by Does not apply route 2 (two) times a day   DULoxetine (CYMBALTA) 20 mg capsule   No No   Sig: TAKE ONE CAPSULE BY MOUTH ONCE DAILY   INVOKANA 100 MG   No No   Sig: TAKE ONE TABLET BY MOUTH EVERY DAY   JANUVIA 100 MG tablet   No No   Sig: TAKE 1 TABLET BY MOUTH DAILY  ONE TOUCH ULTRA TEST test strip   No No   Sig: Tests twice a day   ONETOUCH DELICA LANCETS FINE MISC   No No   Sig: by Does not apply route 2 (two) times a day   Polyethylene Glycol 3350-GRX POWD   Yes No   Sig: Take by mouth   Tiotropium Bromide Monohydrate (SPIRIVA RESPIMAT) 2 5 MCG/ACT AERS   Yes No   Sig: Inhale daily at bedtime   Tofacitinib Citrate (XELJANZ XR) 11 MG TB24   Yes No   Sig: Take 1 tablet by mouth daily   albuterol (PROVENTIL HFA,VENTOLIN HFA) 90 mcg/act inhaler   No No   Sig: Inhale 2 puffs every 6 (six) hours as needed for wheezing   alendronate (FOSAMAX) 70 mg tablet  Self Yes No   Sig: Take 70 mg by mouth every 7 days   atorvastatin (LIPITOR) 20 mg tablet   No No   Sig: TAKE ONE TABLET BY MOUTH EVERY DAY   benzonatate (TESSALON) 200 MG capsule   No No   Sig: Take 1 capsule (200 mg total) by mouth 3 (three) times a day as needed for cough   clotrimazole (LOTRIMIN) 1 % cream   Yes No   Sig: Apply topically daily as needed     conjugated estrogens (PREMARIN) vaginal cream   Yes No   Sig: Insert 0 625 Tubes into the vagina as needed   cyanocobalamin (VITAMIN B-12) 1,000 mcg tablet   Yes No   Sig: Take 1 tablet by mouth daily   diclofenac sodium (VOLTAREN) 1 %   Yes No   Sig: Apply 2 g topically as needed   ergocalciferol (VITAMIN D2) 50,000 units   No No   Sig: TAKE ONE CAPSULE BY MOUTH WEEKLY   fluticasone-salmeterol (ADVAIR) 250-50 mcg/dose   Yes No   Sig: Inhale 1 puff every 12 (twelve) hours     folic acid (FOLVITE) 1 mg tablet   No No   Sig: TAKE ONE TABLET BY MOUTH ONCE DAILY   furosemide (LASIX) 40 mg tablet   No No   Sig: Take 1 tablet (40 mg total) by mouth daily   ipratropium-albuterol (DUO-NEB) 0 5-2 5 mg/3 mL nebulizer solution   No No   Sig: Take 1 vial (3 mL total) by nebulization every 6 (six) hours as needed for wheezing or shortness of breath   levothyroxine 125 mcg tablet   No No   Sig: TAKE ONE TABLET BY MOUTH EVERY DAY IN THE AM   lisinopril (ZESTRIL) 40 mg tablet   No No   Sig: TAKE ONE TABLET BY MOUTH EVERY DAY   methotrexate 2 5 mg tablet   No No   Sig: Take 1 tablet (2 5 mg total) by mouth once a week Takes 6 tablets per week   mirtazapine (REMERON) 15 mg tablet   No No   Sig: Take 1 tablet (15 mg total) by mouth daily at bedtime   mometasone (ELOCON) 0 1 % ointment   No No   Sig: Apply topically 2 (two) times a day as needed (itching)   nystatin (MYCOSTATIN) powder   Yes No   Sig: Apply topically   ofloxacin (FLOXIN) 0 3 % otic solution   Yes No   Sig: Administer 10 drops into both ears as needed     pantoprazole (PROTONIX) 40 mg tablet   No No   Sig: TAKE ONE TABLET BY MOUTH ONCE DAILY   predniSONE 5 mg tablet   Yes No   Sig: prednisone 5 mg tablet      Facility-Administered Medications: None       Past Medical History:   Diagnosis Date    Arthritis     Arthritis     Candidiasis of skin     COPD (chronic obstructive pulmonary disease) (HCC)     last assessed 11/21/2016    Current chronic use of systemic steroids     Degenerative arthritis of left knee     last assessed 1/20/2015    Diabetes mellitus ([facility] )     Disease of thyroid gland     hypo pill given to decrease thyroid   Fibromyalgia     Fistula of knee     last assessed 9/12/2014    GERD (gastroesophageal reflux disease)     Hyperlipidemia     Hypertension     Medial meniscus tear     of left knee, last assessed 9/12/2014    Migraine     states she has a hx of HA that she calls Bazinga but never was dx by neurologist    Obesity     Obesity     Osteoarthritis     Osteopenia     Pneumonia     last assessed 11/16/2016    Psychiatric disorder     anxiety    Rheumatoid arthritis ([facility] )     Rheumatoid arthritis ([facility] )     Sepsis ([facility] )     last assessed 11/10/2016    Tick bite     last assessed 10/30/2015    Vitamin B12 deficiency     Vitamin D deficiency        Past Surgical History:   Procedure Laterality Date    CATARACT EXTRACTION Bilateral     CHOLECYSTECTOMY      EYE SURGERY      Bilateral Cataracts    HYSTERECTOMY      JOINT REPLACEMENT      left knee    KNEE ARTHROSCOPY      NEUROPLASTY / TRANSPOSITION MEDIAN NERVE AT CARPAL TUNNEL      TUBAL LIGATION         Family History   Problem Relation Age of Onset    Stroke Mother     Asthma Family     Cancer Family     Diabetes Family     Hypertension Family      I have reviewed and agree with the history as documented  Social History   Substance Use Topics    Smoking status: Former Smoker     Packs/day: 1 00     Years: 48 00     Types: E-Cigarettes     Quit date: 11/21/2012    Smokeless tobacco: Never Used      Comment: per allscripts - "current every day smoker, former smoker"    Alcohol use No      Comment: stopped drinking alcohol        Review of Systems   Constitutional: Negative for chills, diaphoresis and fever  HENT: Negative for rhinorrhea, sore throat and trouble swallowing  Respiratory: Negative for cough, chest tightness, shortness of breath and wheezing  Cardiovascular: Negative for chest pain, palpitations and leg swelling  Gastrointestinal: Negative for abdominal distention, abdominal pain, constipation, diarrhea, nausea and vomiting  Genitourinary: Negative for difficulty urinating, dysuria, flank pain, frequency, hematuria and urgency  Musculoskeletal: Positive for arthralgias (Bilateral shoulders, upper extremities and knees), back pain (lumbar) and myalgias (Bilateral shoulders, upper extremities and knees)  Negative for joint swelling, neck pain and neck stiffness  Skin: Negative for color change, pallor, rash and wound  Neurological: Negative for dizziness, loss of consciousness, weakness, light-headedness and headaches  Hematological: Negative for adenopathy  Psychiatric/Behavioral: Negative for confusion  The patient is not nervous/anxious  All other systems reviewed and are negative  Physical Exam  Physical Exam   Constitutional: She is oriented to person, place, and time  She appears well-developed and well-nourished  No distress  HENT:   Head: Normocephalic and atraumatic  Right Ear: External ear normal    Left Ear: External ear normal    Mouth/Throat: Oropharynx is clear and moist    No dental trauma, no hemotympanum, no epistaxis or septal hematoma  Eyes: Pupils are equal, round, and reactive to light  Conjunctivae and EOM are normal    Neck: Normal range of motion  Neck supple  No midline cervical TTP, stepoff or deformity   Cardiovascular: Normal rate and regular rhythm  No murmur heard  Pulmonary/Chest: Effort normal and breath sounds normal  No respiratory distress  She exhibits no tenderness  Abdominal: Soft  Bowel sounds are normal  There is no tenderness  Morbid obesity   Musculoskeletal: Normal range of motion  She exhibits tenderness (Bilateral shoulders laterally, right wrist diffusely, right knee, left anterior proximal shin, midline low lumbar back)  She exhibits no edema  No midline vert TTP, no crepitus or step-offs     Neurological: She is alert and oriented to person, place, and time  She has normal reflexes  No cranial nerve deficit or sensory deficit  She exhibits normal muscle tone  Skin: Skin is warm and dry  She is not diaphoretic  No erythema  Bluish ecchymosis over the left anterior shin   Psychiatric: She has a normal mood and affect  Her behavior is normal  Thought content normal    Vitals reviewed  Vital Signs  ED Triage Vitals [10/20/18 1200]   Temperature Pulse Respirations Blood Pressure SpO2   98 6 °F (37 °C) 86 20 166/75 94 %      Temp Source Heart Rate Source Patient Position - Orthostatic VS BP Location FiO2 (%)   Temporal Monitor Lying Left arm --      Pain Score       Worst Possible Pain           Vitals:    10/20/18 1200   BP: 166/75   Pulse: 86   Patient Position - Orthostatic VS: Lying       Visual Acuity      ED Medications  Medications   ketorolac (TORADOL) injection 30 mg (30 mg Intramuscular Given 10/20/18 1207)       Diagnostic Studies  Results Reviewed     None                 XR knee 4+ views Right injury   ED Interpretation by Brett Galeana DO (10/20 1332)   No acute osseous injury      XR knee 4+ views left injury   ED Interpretation by Brett Galeana DO (10/20 1332)   No acute osseous injury      XR lumbar spine 2 or 3 views   ED Interpretation by Brett Galeana DO (10/20 1332)   No acute osseous injury      XR wrist 3+ views RIGHT   ED Interpretation by Brett Galeana DO (10/20 1332)      XR shoulder 2+ views RIGHT   ED Interpretation by Brett Galeana DO (10/20 1332)   No acute osseous injury      XR shoulder 2+ views LEFT   ED Interpretation by Brett Galeana DO (10/20 1332)   No acute osseous injury      XR tibia fibula 2 views LEFT   ED Interpretation by Brett Galeana DO (10/20 1332)   No acute osseous injury                 Procedures  Procedures       Phone Contacts  ED Phone Contact    ED Course  ED Course as of Oct 20 1341   Sat Oct 20, 2018   1332   Results reviewed with patient  Feels better   All questions answered to the patient's satisfaction  Recommend continued supportive treatment at home and follow up with PCP  MDM  Number of Diagnoses or Management Options  Diagnosis management comments: DDx: Fall with bilateral shoulder, bilateral knee, right wrist and low back pain - musculoskeletal sprain/strain, RA flare, doubt fractures or dislocations  A/P: Will check x-rays, treat symptoms, reevaluate for further work up and disposition  Amount and/or Complexity of Data Reviewed  Tests in the radiology section of CPT®: ordered and reviewed  Review and summarize past medical records: yes      CritCare Time    Disposition  Final diagnoses:   Fall in bathtub, initial encounter   Sprain of right shoulder, initial encounter   Sprain of right wrist, initial encounter   Contusion of left shoulder, initial encounter   Contusion of left leg, initial encounter   Strain of fascia of lower back     Time reflects when diagnosis was documented in both MDM as applicable and the Disposition within this note     Time User Action Codes Description Comment    10/20/2018  1:37 PM [address],[place]  [address], [address] [W18  2XXA] Fall in bathtub, initial encounter     10/20/2018  1:38 PM [address],[place]  [address], [address] Sprain of right shoulder, initial encounter     10/20/2018  1:38 PM [address],[place]  [address], [address] Sprain of right wrist, initial encounter     10/20/2018  1:38 PM Lesli Arevalo Add [[id number]] Contusion of left shoulder, initial encounter     10/20/2018  1:39 PM Lesli Arevalo Add [[id number]] Contusion of left leg, initial encounter     10/20/2018  1:39 PM [address],[place]  [address], [address]  Strain of fascia of lower back       ED Disposition     ED Disposition Condition Comment    Discharge  Toni Castellano discharge to home/self care      Condition at discharge: Stable        Follow-up Information     Follow up With Specialties Details Why Annabel Garcia MD [facility] Medicine Schedule an appointment as soon as possible for a visit If symptoms worsen [facility]  [phone]            Patient's Medications   Discharge Prescriptions    PREDNISONE 10 MG TABLET    Take 4 tablets (40 mg total) by mouth daily for 5 days Resume baseline maintenance dose after these 5 days  Start Date: 10/20/2018End Date: 10/25/2018       Order Dose: 40 mg       Quantity: 20 tablet    Refills: 0     No discharge procedures on file      ED Provider  Electronically Signed by           Shelly Colón DO  10/20/18 4771

## 2018-10-20 NOTE — DISCHARGE INSTRUCTIONS
Contusion in Adults   WHAT YOU NEED TO KNOW:   A contusion is a bruise that appears on your skin after an injury  A bruise happens when small blood vessels tear but skin does not  When blood vessels tear, blood leaks into nearby tissue, such as soft tissue or muscle  DISCHARGE INSTRUCTIONS:   Return to the emergency department if:   · You have new trouble moving the injured area  · You have tingling or numbness in or near the injured area  · Your hand or foot below the bruise gets cold or turns pale  Contact your healthcare provider if:   · You find a new lump in the injured area  · Your symptoms do not improve with treatment after 4 to 5 days  · You have questions or concerns about your condition or care  Medicines: You may need any of the following:  · NSAIDs  help decrease swelling and pain or fever  This medicine is available with or without a doctor's order  NSAIDs can cause stomach bleeding or kidney problems in certain people  If you take blood thinner medicine, always ask your healthcare provider if NSAIDs are safe for you  Always read the medicine label and follow directions  · Prescription pain medicine  may be given  Do not wait until the pain is severe before you take your medicine  · Take your medicine as directed  Contact your healthcare provider if you think your medicine is not helping or if you have side effects  Tell him of her if you are allergic to any medicine  Keep a list of the medicines, vitamins, and herbs you take  Include the amounts, and when and why you take them  Bring the list or the pill bottles to follow-up visits  Carry your medicine list with you in case of an emergency  Follow up with your healthcare provider as directed: You may need to return within a week to check your injury again  Write down your questions so you remember to ask them during your visits    Help a contusion heal:   · Rest the injured area  or use it less than usual  If you bruised your leg or foot, you may need crutches or a cane to help you walk  This will help you keep weight off your injured body part  · Apply ice  to decrease swelling and pain  Ice may also help prevent tissue damage  Use an ice pack, or put crushed ice in a plastic bag  Cover it with a towel and place it on your bruise for 15 to 20 minutes every hour or as directed  · Use compression  to support the area and decrease swelling  Wrap an elastic bandage around the area over the bruised muscle  Make sure the bandage is not too tight  You should be able to fit 1 finger between the bandage and your skin  · Elevate (raise) your injured body part  above the level of your heart to help decrease pain and swelling  Use pillows, blankets, or rolled towels to elevate the area as often as you can  · Do not drink alcohol  as directed  Alcohol may slow healing  · Do not stretch injured muscles  right after your injury  Ask your healthcare provider when and how you may safely stretch after your injury  Gentle stretches can help increase your flexibility  · Do not massage the area or put heating pads  on the bruise right after your injury  Heat and massage may slow healing  Your healthcare provider may tell you to apply heat after several days  At that time, heat will start to help the injury heal   Prevent another contusion:   · Stretch and warm up before you play sports or exercise  · Wear protective gear when you play sports  Examples are shin guards and padding  · If you begin a new physical activity, start slowly to give your body a chance to adjust   © 2017 [address] Information is for End User's use only and may not be sold, redistributed or otherwise used for commercial purposes  All illustrations and images included in CareNotes® are the copyrighted property of [employer]  or Braden Lee  The above information is an  only   It is not intended as medical advice for individual conditions or treatments  Talk to your doctor, nurse or pharmacist before following any medical regimen to see if it is safe and effective for you  Low Back Strain, Ambulatory Care   GENERAL INFORMATION:   Low back strain  is an injury to your lower back muscles or tendons  Tendons are strong tissues that connect muscles to bones  The lower back supports most of your body weight and helps you move, twist, and bend  Low back strain is usually caused by activities that increase stress on the lower back, such as exercise or injury  Common symptoms include the following:   · Low back pain or muscle spasms    · Stiffness or limited movement    · Pain that goes down to the buttocks, groin, or legs    · Pain that is worse with activity  Seek immediate care for the following symptoms:   · A pop in your lower back    · Increased swelling or pain in your lower back    · Trouble moving your legs    · Numbness in your legs  Treatment for low back strain:   · NSAIDs  help decrease swelling and pain or fever  This medicine is available with or without a doctor's order  NSAIDs can cause stomach bleeding or kidney problems in certain people  If you take blood thinner medicine, always ask your healthcare provider if NSAIDs are safe for you  Always read the medicine label and follow directions  · Muscle relaxers  help decrease muscle spasms pain  · Prescription pain medicine  may be given  Ask how to take this medicine safely  Manage your symptoms:   · Rest  in bed after your injury  Slowly start to increase your activity as the pain decreases, or as directed  · Apply ice  on your lower back for 15 to 20 minutes every hour or as directed  Use an ice pack, or put crushed ice in a plastic bag  Cover it with a towel  Ice helps prevent tissue damage and decreases swelling and pain  You can alternate ice and heat      · Apply heat  on your lower back for 20 to 30 minutes every 2 hours for as many days as directed  Heat helps decrease pain and muscle spasms  Prevent another low back strain:   · Use proper body mechanics  ¨ Bend at the hips and knees when you  objects  Do not bend from the waist  Use your leg muscles as you lift the load  Do not use your back  Keep the object close to your chest as you lift it  Try not to twist or lift anything above your waist     ¨ Change your position often when you stand for long periods of time  Rest one foot on a small box or footrest, and then switch to the other foot often  ¨ Try not to sit for long periods of time  When you do, sit in a straight-backed chair with your feet flat on the floor  Never reach, pull, or push while you are sitting  · Exercise regularly  Warm up before you exercise  Do exercises that strengthen your back muscles  Ask about the best exercise plan for you  · Maintain a healthy weight  Ask your healthcare provider how much you should weigh  Ask him to help you create a weight loss plan if you are overweight  Follow up with your healthcare provider as directed:  Write down your questions so you remember to ask them during your visits  CARE AGREEMENT:   You have the right to help plan your care  Learn about your health condition and how it may be treated  Discuss treatment options with your caregivers to decide what care you want to receive  You always have the right to refuse treatment  The above information is an  only  It is not intended as medical advice for individual conditions or treatments  Talk to your doctor, nurse or pharmacist before following any medical regimen to see if it is safe and effective for you  © 2014 [address] is for End User's use only and may not be sold, redistributed or otherwise used for commercial purposes  All illustrations and images included in CareNotes® are the copyrighted property of A D A M , Inc  or Braden Rosa    Musculoskeletal Pain WHAT YOU NEED TO KNOW:   Muscle pain can be dull, achy, or sharp  You may have pain and tenderness to the touch as well  It can occur anywhere on your body and is often brought on by exercise  Muscle pain may occur from an injury, such as a sprain, tendonitis, or bone fracture  Muscle pain can also be the result of medical conditions, such as polymyositis, fibromyalgia, and connective tissue disorders  DISCHARGE INSTRUCTIONS:   Self care:   · Rest  as directed and avoid activity that causes pain  You may be able to return to normal activity when you can move without pain  Follow directions for rest and activity  You are at risk for injury for 3 weeks after your symptoms go away  · Ice  your painful muscle area to decrease pain and swelling  Use an ice pack, or put ice in a plastic bag and cover it with a towel  Always  put a cloth between the ice and your skin  Apply the ice as often as directed for the first 24 to 48 hours  · Compression  with a splint, brace, or elastic bandage helps decrease pain and swelling  This may be needed for muscle pain in arms or legs  A splint, brace, or bandage will also help protect the painful area when you move around  · Elevate  a painful arm or leg to reduce swelling and pain  Elevate your limb while you are sitting or lying down  Prop a painful leg on pillows to keep it above the level of your heart  Medicines:   · NSAIDs  help decrease swelling and pain or fever  This medicine is available with or without a doctor's order  NSAIDs can cause stomach bleeding or kidney problems in certain people  If you take blood thinner medicine, always ask your healthcare provider if NSAIDs are safe for you  Always read the medicine label and follow directions  · Acetaminophen  is used to decrease pain  It is available without a doctor's order  Ask your healthcare provider how much to take and when to take it  Follow directions   Acetaminophen can cause liver damage if not taken correctly  Do not take more than one medicine that contains acetaminophen unless directed  · Muscle relaxers  help relax your muscles to decrease pain and muscle spasms  · Steroids  may be given to decrease redness, pain, and swelling  · Take your medicine as directed  Contact your healthcare provider if you think your medicine is not helping or if you have side effects  Tell him if you are allergic to any medicine  Keep a list of the medicines, vitamins, and herbs you take  Include the amounts, and when and why you take them  Bring the list or the pill bottles to follow-up visits  Carry your medicine list with you in case of an emergency  Follow up with your healthcare provider as directed: You may need more tests to help healthcare providers find the cause of your muscle pain  You may need physical therapy to learn muscle strengthening exercises  Write down your questions so you remember to ask them during your visits  Contact your healthcare provider if:   · You have a fever  · You have trouble sleeping because of your pain  · Your painful area becomes more tender, red, and warm to the touch  · You have decreased movement of the painful area  · You have questions or concerns about your condition or care  Return to the emergency department if:   · You have increased severe pain when you move the painful muscle area  · You lose feeling in your painful muscle area  · You have new or worse swelling in the painful area  Your skin may feel tight  · You have increased muscle pain or pain that does not improve with treatment  © 2017 [address] Information is for End User's use only and may not be sold, redistributed or otherwise used for commercial purposes  All illustrations and images included in CareNotes® are the copyrighted property of [employer]  or Braden Lee  The above information is an  only   It is not intended as medical advice for individual conditions or treatments  Talk to your doctor, nurse or pharmacist before following any medical regimen to see if it is safe and effective for you

## 2018-11-21 DIAGNOSIS — I10 ESSENTIAL HYPERTENSION: ICD-10-CM

## 2018-11-21 DIAGNOSIS — K21.9 GASTROESOPHAGEAL REFLUX DISEASE, ESOPHAGITIS PRESENCE NOT SPECIFIED: ICD-10-CM

## 2018-11-21 DIAGNOSIS — E55.9 VITAMIN D DEFICIENCY: ICD-10-CM

## 2018-11-21 DIAGNOSIS — M06.9 RHEUMATOID ARTHRITIS, INVOLVING UNSPECIFIED SITE, UNSPECIFIED RHEUMATOID FACTOR PRESENCE: ICD-10-CM

## 2018-11-21 DIAGNOSIS — E03.9 HYPOTHYROIDISM, UNSPECIFIED TYPE: ICD-10-CM

## 2018-11-21 DIAGNOSIS — E78.2 MIXED HYPERLIPIDEMIA: ICD-10-CM

## 2018-11-21 DIAGNOSIS — E11.9 TYPE 2 DIABETES MELLITUS WITHOUT COMPLICATION, WITHOUT LONG-TERM CURRENT USE OF INSULIN (HCC): ICD-10-CM

## 2018-11-21 DIAGNOSIS — M06.9 RHEUMATOID ARTHRITIS INVOLVING MULTIPLE SITES, UNSPECIFIED RHEUMATOID FACTOR PRESENCE: ICD-10-CM

## 2018-11-21 RX ORDER — ERGOCALCIFEROL 1.25 MG/1
CAPSULE ORAL
Qty: 12 CAPSULE | Refills: 0 | Status: SHIPPED | OUTPATIENT
Start: 2018-11-21 | End: 2019-02-18

## 2018-11-21 RX ORDER — SITAGLIPTIN 100 MG/1
TABLET, FILM COATED ORAL
Qty: 90 TABLET | Refills: 0 | Status: SHIPPED | OUTPATIENT
Start: 2018-11-21 | End: 2019-02-18 | Stop reason: SDUPTHER

## 2018-11-21 RX ORDER — CANAGLIFLOZIN 100 MG/1
TABLET, FILM COATED ORAL
Qty: 90 TABLET | Refills: 0 | Status: SHIPPED | OUTPATIENT
Start: 2018-11-21 | End: 2018-12-28 | Stop reason: DRUGHIGH

## 2018-11-21 RX ORDER — ATORVASTATIN CALCIUM 20 MG/1
TABLET, FILM COATED ORAL
Qty: 90 TABLET | Refills: 0 | Status: SHIPPED | OUTPATIENT
Start: 2018-11-21 | End: 2019-02-18 | Stop reason: SDUPTHER

## 2018-11-21 RX ORDER — LISINOPRIL 40 MG/1
TABLET ORAL
Qty: 90 TABLET | Refills: 0 | Status: SHIPPED | OUTPATIENT
Start: 2018-11-21 | End: 2019-02-18

## 2018-11-21 RX ORDER — LEVOTHYROXINE SODIUM 0.12 MG/1
TABLET ORAL
Qty: 90 TABLET | Refills: 0 | Status: SHIPPED | OUTPATIENT
Start: 2018-11-21 | End: 2019-02-18 | Stop reason: SDUPTHER

## 2018-11-21 RX ORDER — FOLIC ACID 1 MG/1
TABLET ORAL
Qty: 90 TABLET | Refills: 0 | Status: SHIPPED | OUTPATIENT
Start: 2018-11-21 | End: 2019-02-18 | Stop reason: SDUPTHER

## 2018-11-21 RX ORDER — DULOXETIN HYDROCHLORIDE 20 MG/1
CAPSULE, DELAYED RELEASE ORAL
Qty: 90 CAPSULE | Refills: 0 | Status: SHIPPED | OUTPATIENT
Start: 2018-11-21 | End: 2019-02-18 | Stop reason: SDUPTHER

## 2018-11-21 RX ORDER — PANTOPRAZOLE SODIUM 40 MG/1
TABLET, DELAYED RELEASE ORAL
Qty: 90 TABLET | Refills: 0 | Status: SHIPPED | OUTPATIENT
Start: 2018-11-21 | End: 2019-02-18 | Stop reason: SDUPTHER

## 2018-11-29 DIAGNOSIS — G47.00 INSOMNIA, UNSPECIFIED TYPE: ICD-10-CM

## 2018-11-29 RX ORDER — MIRTAZAPINE 15 MG/1
15 TABLET, FILM COATED ORAL
Qty: 90 TABLET | Refills: 0 | Status: SHIPPED | OUTPATIENT
Start: 2018-11-29 | End: 2019-10-01

## 2018-12-21 ENCOUNTER — APPOINTMENT (EMERGENCY)
Dept: CT IMAGING | Facility: HOSPITAL | Age: 68
DRG: 193 | End: 2018-12-21
Payer: MEDICARE

## 2018-12-21 ENCOUNTER — APPOINTMENT (EMERGENCY)
Dept: RADIOLOGY | Facility: HOSPITAL | Age: 68
DRG: 193 | End: 2018-12-21
Payer: MEDICARE

## 2018-12-21 ENCOUNTER — HOSPITAL ENCOUNTER (INPATIENT)
Facility: HOSPITAL | Age: 68
LOS: 5 days | Discharge: HOME HEALTH | DRG: 193 | End: 2018-12-26
Attending: EMERGENCY MEDICINE | Admitting: FAMILY MEDICINE
Payer: MEDICARE

## 2018-12-21 DIAGNOSIS — J90 PLEURAL EFFUSION, LEFT: ICD-10-CM

## 2018-12-21 DIAGNOSIS — J20.9 ACUTE EXACERBATION OF CHRONIC BRONCHITIS (HCC): Primary | ICD-10-CM

## 2018-12-21 DIAGNOSIS — J91.8 PARAPNEUMONIC EFFUSION: ICD-10-CM

## 2018-12-21 DIAGNOSIS — J42 ACUTE EXACERBATION OF CHRONIC BRONCHITIS (HCC): Primary | ICD-10-CM

## 2018-12-21 DIAGNOSIS — R09.02 HYPOXIA: ICD-10-CM

## 2018-12-21 DIAGNOSIS — J44.1 COPD WITH ACUTE EXACERBATION (HCC): ICD-10-CM

## 2018-12-21 DIAGNOSIS — J18.9 PARAPNEUMONIC EFFUSION: ICD-10-CM

## 2018-12-21 PROBLEM — J40 BRONCHITIS: Status: ACTIVE | Noted: 2018-12-21

## 2018-12-21 LAB
ALBUMIN SERPL BCP-MCNC: 3.3 G/DL (ref 3.5–5)
ALP SERPL-CCNC: 80 U/L (ref 46–116)
ALT SERPL W P-5'-P-CCNC: 33 U/L (ref 12–78)
ANION GAP SERPL CALCULATED.3IONS-SCNC: 5 MMOL/L (ref 4–13)
APTT PPP: 24 SECONDS (ref 26–38)
AST SERPL W P-5'-P-CCNC: 16 U/L (ref 5–45)
BASOPHILS # BLD AUTO: 0.03 THOUSANDS/ΜL (ref 0–0.1)
BASOPHILS NFR BLD AUTO: 0 % (ref 0–1)
BILIRUB SERPL-MCNC: 0.5 MG/DL (ref 0.2–1)
BUN SERPL-MCNC: 11 MG/DL (ref 5–25)
CALCIUM SERPL-MCNC: 8.7 MG/DL (ref 8.3–10.1)
CHLORIDE SERPL-SCNC: 102 MMOL/L (ref 100–108)
CO2 SERPL-SCNC: 31 MMOL/L (ref 21–32)
CREAT SERPL-MCNC: 0.7 MG/DL (ref 0.6–1.3)
EOSINOPHIL # BLD AUTO: 0.19 THOUSAND/ΜL (ref 0–0.61)
EOSINOPHIL NFR BLD AUTO: 3 % (ref 0–6)
ERYTHROCYTE [DISTWIDTH] IN BLOOD BY AUTOMATED COUNT: 15.8 % (ref 11.6–15.1)
GFR SERPL CREATININE-BSD FRML MDRD: 88 ML/MIN/1.73SQ M
GLUCOSE SERPL-MCNC: 115 MG/DL (ref 65–140)
HCT VFR BLD AUTO: 41.2 % (ref 34.8–46.1)
HGB BLD-MCNC: 13 G/DL (ref 11.5–15.4)
IMM GRANULOCYTES # BLD AUTO: 0.02 THOUSAND/UL (ref 0–0.2)
IMM GRANULOCYTES NFR BLD AUTO: 0 % (ref 0–2)
INR PPP: 1.05 (ref 0.86–1.17)
LYMPHOCYTES # BLD AUTO: 1.68 THOUSANDS/ΜL (ref 0.6–4.47)
LYMPHOCYTES NFR BLD AUTO: 22 % (ref 14–44)
MCH RBC QN AUTO: 31.3 PG (ref 26.8–34.3)
MCHC RBC AUTO-ENTMCNC: 31.6 G/DL (ref 31.4–37.4)
MCV RBC AUTO: 99 FL (ref 82–98)
MONOCYTES # BLD AUTO: 1.38 THOUSAND/ΜL (ref 0.17–1.22)
MONOCYTES NFR BLD AUTO: 18 % (ref 4–12)
NEUTROPHILS # BLD AUTO: 4.39 THOUSANDS/ΜL (ref 1.85–7.62)
NEUTS SEG NFR BLD AUTO: 57 % (ref 43–75)
NRBC BLD AUTO-RTO: 0 /100 WBCS
PLATELET # BLD AUTO: 261 THOUSANDS/UL (ref 149–390)
PMV BLD AUTO: 10.4 FL (ref 8.9–12.7)
POTASSIUM SERPL-SCNC: 4 MMOL/L (ref 3.5–5.3)
PROT SERPL-MCNC: 7.2 G/DL (ref 6.4–8.2)
PROTHROMBIN TIME: 13.2 SECONDS (ref 11.8–14.2)
RBC # BLD AUTO: 4.15 MILLION/UL (ref 3.81–5.12)
SODIUM SERPL-SCNC: 138 MMOL/L (ref 136–145)
TROPONIN I SERPL-MCNC: <0.02 NG/ML
WBC # BLD AUTO: 7.69 THOUSAND/UL (ref 4.31–10.16)

## 2018-12-21 PROCEDURE — 87040 BLOOD CULTURE FOR BACTERIA: CPT | Performed by: PHYSICIAN ASSISTANT

## 2018-12-21 PROCEDURE — 84145 PROCALCITONIN (PCT): CPT | Performed by: PHYSICIAN ASSISTANT

## 2018-12-21 PROCEDURE — 85025 COMPLETE CBC W/AUTO DIFF WBC: CPT | Performed by: PHYSICIAN ASSISTANT

## 2018-12-21 PROCEDURE — 96374 THER/PROPH/DIAG INJ IV PUSH: CPT

## 2018-12-21 PROCEDURE — 87631 RESP VIRUS 3-5 TARGETS: CPT | Performed by: PHYSICIAN ASSISTANT

## 2018-12-21 PROCEDURE — 85610 PROTHROMBIN TIME: CPT | Performed by: PHYSICIAN ASSISTANT

## 2018-12-21 PROCEDURE — 94640 AIRWAY INHALATION TREATMENT: CPT

## 2018-12-21 PROCEDURE — 99222 1ST HOSP IP/OBS MODERATE 55: CPT | Performed by: PHYSICIAN ASSISTANT

## 2018-12-21 PROCEDURE — 99285 EMERGENCY DEPT VISIT HI MDM: CPT

## 2018-12-21 PROCEDURE — 36415 COLL VENOUS BLD VENIPUNCTURE: CPT | Performed by: PHYSICIAN ASSISTANT

## 2018-12-21 PROCEDURE — 71275 CT ANGIOGRAPHY CHEST: CPT

## 2018-12-21 PROCEDURE — 85730 THROMBOPLASTIN TIME PARTIAL: CPT | Performed by: PHYSICIAN ASSISTANT

## 2018-12-21 PROCEDURE — 80053 COMPREHEN METABOLIC PANEL: CPT | Performed by: PHYSICIAN ASSISTANT

## 2018-12-21 PROCEDURE — 93005 ELECTROCARDIOGRAM TRACING: CPT

## 2018-12-21 PROCEDURE — 84484 ASSAY OF TROPONIN QUANT: CPT | Performed by: PHYSICIAN ASSISTANT

## 2018-12-21 PROCEDURE — 71046 X-RAY EXAM CHEST 2 VIEWS: CPT

## 2018-12-21 RX ORDER — METHYLPREDNISOLONE SODIUM SUCCINATE 125 MG/2ML
60 INJECTION, POWDER, LYOPHILIZED, FOR SOLUTION INTRAMUSCULAR; INTRAVENOUS EVERY 8 HOURS SCHEDULED
Status: DISCONTINUED | OUTPATIENT
Start: 2018-12-22 | End: 2018-12-22

## 2018-12-21 RX ORDER — LEVOTHYROXINE SODIUM 0.12 MG/1
125 TABLET ORAL
Status: DISCONTINUED | OUTPATIENT
Start: 2018-12-22 | End: 2018-12-26 | Stop reason: HOSPADM

## 2018-12-21 RX ORDER — FUROSEMIDE 40 MG/1
40 TABLET ORAL DAILY
Status: DISCONTINUED | OUTPATIENT
Start: 2018-12-22 | End: 2018-12-26 | Stop reason: HOSPADM

## 2018-12-21 RX ORDER — LEVALBUTEROL 1.25 MG/.5ML
1.25 SOLUTION, CONCENTRATE RESPIRATORY (INHALATION)
Status: DISCONTINUED | OUTPATIENT
Start: 2018-12-22 | End: 2018-12-24

## 2018-12-21 RX ORDER — MIRTAZAPINE 15 MG/1
15 TABLET, FILM COATED ORAL
Status: DISCONTINUED | OUTPATIENT
Start: 2018-12-21 | End: 2018-12-26 | Stop reason: HOSPADM

## 2018-12-21 RX ORDER — LISINOPRIL 20 MG/1
40 TABLET ORAL DAILY
Status: DISCONTINUED | OUTPATIENT
Start: 2018-12-22 | End: 2018-12-26 | Stop reason: HOSPADM

## 2018-12-21 RX ORDER — FOLIC ACID 1 MG/1
1000 TABLET ORAL DAILY
Status: DISCONTINUED | OUTPATIENT
Start: 2018-12-22 | End: 2018-12-26 | Stop reason: HOSPADM

## 2018-12-21 RX ORDER — METHYLPREDNISOLONE SODIUM SUCCINATE 125 MG/2ML
125 INJECTION, POWDER, LYOPHILIZED, FOR SOLUTION INTRAMUSCULAR; INTRAVENOUS ONCE
Status: COMPLETED | OUTPATIENT
Start: 2018-12-21 | End: 2018-12-21

## 2018-12-21 RX ORDER — BUDESONIDE 0.5 MG/2ML
0.5 INHALANT ORAL
Status: DISCONTINUED | OUTPATIENT
Start: 2018-12-21 | End: 2018-12-26 | Stop reason: HOSPADM

## 2018-12-21 RX ORDER — LEVOFLOXACIN 5 MG/ML
750 INJECTION, SOLUTION INTRAVENOUS EVERY 24 HOURS
Status: DISCONTINUED | OUTPATIENT
Start: 2018-12-22 | End: 2018-12-24

## 2018-12-21 RX ORDER — LEVOFLOXACIN 5 MG/ML
750 INJECTION, SOLUTION INTRAVENOUS ONCE
Status: COMPLETED | OUTPATIENT
Start: 2018-12-21 | End: 2018-12-22

## 2018-12-21 RX ORDER — LEVALBUTEROL 1.25 MG/.5ML
1.25 SOLUTION, CONCENTRATE RESPIRATORY (INHALATION) EVERY 4 HOURS PRN
Status: DISCONTINUED | OUTPATIENT
Start: 2018-12-21 | End: 2018-12-21

## 2018-12-21 RX ORDER — ACETAMINOPHEN 325 MG/1
650 TABLET ORAL EVERY 6 HOURS PRN
Status: DISCONTINUED | OUTPATIENT
Start: 2018-12-21 | End: 2018-12-26 | Stop reason: HOSPADM

## 2018-12-21 RX ORDER — PANTOPRAZOLE SODIUM 40 MG/1
40 TABLET, DELAYED RELEASE ORAL DAILY
Status: DISCONTINUED | OUTPATIENT
Start: 2018-12-22 | End: 2018-12-26 | Stop reason: HOSPADM

## 2018-12-21 RX ORDER — DULOXETIN HYDROCHLORIDE 20 MG/1
20 CAPSULE, DELAYED RELEASE ORAL DAILY
Status: DISCONTINUED | OUTPATIENT
Start: 2018-12-22 | End: 2018-12-26 | Stop reason: HOSPADM

## 2018-12-21 RX ORDER — IPRATROPIUM BROMIDE AND ALBUTEROL SULFATE 2.5; .5 MG/3ML; MG/3ML
3 SOLUTION RESPIRATORY (INHALATION) ONCE
Status: COMPLETED | OUTPATIENT
Start: 2018-12-21 | End: 2018-12-21

## 2018-12-21 RX ORDER — IPRATROPIUM BROMIDE AND ALBUTEROL SULFATE 2.5; .5 MG/3ML; MG/3ML
3 SOLUTION RESPIRATORY (INHALATION)
Status: DISCONTINUED | OUTPATIENT
Start: 2018-12-21 | End: 2018-12-21

## 2018-12-21 RX ORDER — ALBUTEROL SULFATE 2.5 MG/3ML
2.5 SOLUTION RESPIRATORY (INHALATION) EVERY 4 HOURS PRN
Status: DISCONTINUED | OUTPATIENT
Start: 2018-12-21 | End: 2018-12-26 | Stop reason: HOSPADM

## 2018-12-21 RX ORDER — HEPARIN SODIUM 5000 [USP'U]/ML
5000 INJECTION, SOLUTION INTRAVENOUS; SUBCUTANEOUS EVERY 8 HOURS SCHEDULED
Status: DISCONTINUED | OUTPATIENT
Start: 2018-12-21 | End: 2018-12-22

## 2018-12-21 RX ADMIN — HEPARIN SODIUM 5000 UNITS: 5000 INJECTION, SOLUTION INTRAVENOUS; SUBCUTANEOUS at 22:48

## 2018-12-21 RX ADMIN — IPRATROPIUM BROMIDE AND ALBUTEROL SULFATE 3 ML: 2.5; .5 SOLUTION RESPIRATORY (INHALATION) at 18:34

## 2018-12-21 RX ADMIN — LEVOFLOXACIN 750 MG: 5 INJECTION, SOLUTION INTRAVENOUS at 23:16

## 2018-12-21 RX ADMIN — IOHEXOL 85 ML: 350 INJECTION, SOLUTION INTRAVENOUS at 19:25

## 2018-12-21 RX ADMIN — MIRTAZAPINE 15 MG: 15 TABLET, FILM COATED ORAL at 22:47

## 2018-12-21 RX ADMIN — METHYLPREDNISOLONE SODIUM SUCCINATE 125 MG: 125 INJECTION, POWDER, FOR SOLUTION INTRAMUSCULAR; INTRAVENOUS at 18:34

## 2018-12-22 PROBLEM — J18.9 COMMUNITY ACQUIRED PNEUMONIA OF LEFT LOWER LOBE OF LUNG: Status: ACTIVE | Noted: 2018-12-21

## 2018-12-22 PROBLEM — J96.01 ACUTE RESPIRATORY FAILURE WITH HYPOXIA (HCC): Status: ACTIVE | Noted: 2018-12-22

## 2018-12-22 LAB
ALBUMIN SERPL BCP-MCNC: 3 G/DL (ref 3.5–5)
ALP SERPL-CCNC: 79 U/L (ref 46–116)
ALT SERPL W P-5'-P-CCNC: 32 U/L (ref 12–78)
ANION GAP SERPL CALCULATED.3IONS-SCNC: 9 MMOL/L (ref 4–13)
APTT PPP: 25 SECONDS (ref 26–38)
AST SERPL W P-5'-P-CCNC: 12 U/L (ref 5–45)
BASOPHILS # BLD AUTO: 0.02 THOUSANDS/ΜL (ref 0–0.1)
BASOPHILS NFR BLD AUTO: 0 % (ref 0–1)
BILIRUB SERPL-MCNC: 0.4 MG/DL (ref 0.2–1)
BUN SERPL-MCNC: 11 MG/DL (ref 5–25)
CALCIUM SERPL-MCNC: 9.2 MG/DL (ref 8.3–10.1)
CHLORIDE SERPL-SCNC: 101 MMOL/L (ref 100–108)
CO2 SERPL-SCNC: 28 MMOL/L (ref 21–32)
CREAT SERPL-MCNC: 0.66 MG/DL (ref 0.6–1.3)
EOSINOPHIL # BLD AUTO: 0.01 THOUSAND/ΜL (ref 0–0.61)
EOSINOPHIL NFR BLD AUTO: 0 % (ref 0–6)
ERYTHROCYTE [DISTWIDTH] IN BLOOD BY AUTOMATED COUNT: 15.8 % (ref 11.6–15.1)
FLUAV AG SPEC QL: NORMAL
FLUBV AG SPEC QL: NORMAL
GFR SERPL CREATININE-BSD FRML MDRD: 90 ML/MIN/1.73SQ M
GLUCOSE SERPL-MCNC: 197 MG/DL (ref 65–140)
GLUCOSE SERPL-MCNC: 213 MG/DL (ref 65–140)
GLUCOSE SERPL-MCNC: 342 MG/DL (ref 65–140)
GLUCOSE SERPL-MCNC: 490 MG/DL (ref 65–140)
HCT VFR BLD AUTO: 42.1 % (ref 34.8–46.1)
HGB BLD-MCNC: 13.4 G/DL (ref 11.5–15.4)
IMM GRANULOCYTES # BLD AUTO: 0.05 THOUSAND/UL (ref 0–0.2)
IMM GRANULOCYTES NFR BLD AUTO: 1 % (ref 0–2)
INR PPP: 1.08 (ref 0.86–1.17)
LYMPHOCYTES # BLD AUTO: 0.98 THOUSANDS/ΜL (ref 0.6–4.47)
LYMPHOCYTES NFR BLD AUTO: 13 % (ref 14–44)
MAGNESIUM SERPL-MCNC: 2.1 MG/DL (ref 1.6–2.6)
MCH RBC QN AUTO: 31.7 PG (ref 26.8–34.3)
MCHC RBC AUTO-ENTMCNC: 31.8 G/DL (ref 31.4–37.4)
MCV RBC AUTO: 100 FL (ref 82–98)
MONOCYTES # BLD AUTO: 0.24 THOUSAND/ΜL (ref 0.17–1.22)
MONOCYTES NFR BLD AUTO: 3 % (ref 4–12)
NEUTROPHILS # BLD AUTO: 6.12 THOUSANDS/ΜL (ref 1.85–7.62)
NEUTS SEG NFR BLD AUTO: 83 % (ref 43–75)
NRBC BLD AUTO-RTO: 0 /100 WBCS
PHOSPHATE SERPL-MCNC: 3.8 MG/DL (ref 2.3–4.1)
PLATELET # BLD AUTO: 257 THOUSANDS/UL (ref 149–390)
PMV BLD AUTO: 10.9 FL (ref 8.9–12.7)
POTASSIUM SERPL-SCNC: 4.3 MMOL/L (ref 3.5–5.3)
PROCALCITONIN SERPL-MCNC: <0.05 NG/ML
PROT SERPL-MCNC: 7.2 G/DL (ref 6.4–8.2)
PROTHROMBIN TIME: 13.5 SECONDS (ref 11.8–14.2)
RBC # BLD AUTO: 4.23 MILLION/UL (ref 3.81–5.12)
RSV B RNA SPEC QL NAA+PROBE: NORMAL
SODIUM SERPL-SCNC: 138 MMOL/L (ref 136–145)
WBC # BLD AUTO: 7.42 THOUSAND/UL (ref 4.31–10.16)

## 2018-12-22 PROCEDURE — 85025 COMPLETE CBC W/AUTO DIFF WBC: CPT | Performed by: PHYSICIAN ASSISTANT

## 2018-12-22 PROCEDURE — 84100 ASSAY OF PHOSPHORUS: CPT | Performed by: PHYSICIAN ASSISTANT

## 2018-12-22 PROCEDURE — 85610 PROTHROMBIN TIME: CPT | Performed by: PHYSICIAN ASSISTANT

## 2018-12-22 PROCEDURE — 94640 AIRWAY INHALATION TREATMENT: CPT

## 2018-12-22 PROCEDURE — 82948 REAGENT STRIP/BLOOD GLUCOSE: CPT

## 2018-12-22 PROCEDURE — 83735 ASSAY OF MAGNESIUM: CPT | Performed by: PHYSICIAN ASSISTANT

## 2018-12-22 PROCEDURE — 85730 THROMBOPLASTIN TIME PARTIAL: CPT | Performed by: PHYSICIAN ASSISTANT

## 2018-12-22 PROCEDURE — 87449 NOS EACH ORGANISM AG IA: CPT | Performed by: FAMILY MEDICINE

## 2018-12-22 PROCEDURE — 80053 COMPREHEN METABOLIC PANEL: CPT | Performed by: PHYSICIAN ASSISTANT

## 2018-12-22 PROCEDURE — 94668 MNPJ CHEST WALL SBSQ: CPT

## 2018-12-22 PROCEDURE — 94664 DEMO&/EVAL PT USE INHALER: CPT

## 2018-12-22 PROCEDURE — 94760 N-INVAS EAR/PLS OXIMETRY 1: CPT

## 2018-12-22 PROCEDURE — 99232 SBSQ HOSP IP/OBS MODERATE 35: CPT | Performed by: FAMILY MEDICINE

## 2018-12-22 RX ORDER — METHYLPREDNISOLONE SODIUM SUCCINATE 40 MG/ML
40 INJECTION, POWDER, LYOPHILIZED, FOR SOLUTION INTRAMUSCULAR; INTRAVENOUS EVERY 12 HOURS SCHEDULED
Status: DISCONTINUED | OUTPATIENT
Start: 2018-12-22 | End: 2018-12-23

## 2018-12-22 RX ADMIN — BUDESONIDE 0.5 MG: 0.5 INHALANT RESPIRATORY (INHALATION) at 19:47

## 2018-12-22 RX ADMIN — LEVALBUTEROL 1.25 MG: 1.25 SOLUTION, CONCENTRATE RESPIRATORY (INHALATION) at 13:17

## 2018-12-22 RX ADMIN — IPRATROPIUM BROMIDE 0.5 MG: 0.5 SOLUTION RESPIRATORY (INHALATION) at 09:01

## 2018-12-22 RX ADMIN — PANTOPRAZOLE SODIUM 40 MG: 40 TABLET, DELAYED RELEASE ORAL at 09:10

## 2018-12-22 RX ADMIN — BUDESONIDE 0.5 MG: 0.5 INHALANT RESPIRATORY (INHALATION) at 09:01

## 2018-12-22 RX ADMIN — FUROSEMIDE 40 MG: 40 TABLET ORAL at 09:10

## 2018-12-22 RX ADMIN — INSULIN LISPRO 12 UNITS: 100 INJECTION, SOLUTION INTRAVENOUS; SUBCUTANEOUS at 15:18

## 2018-12-22 RX ADMIN — LEVALBUTEROL 1.25 MG: 1.25 SOLUTION, CONCENTRATE RESPIRATORY (INHALATION) at 09:01

## 2018-12-22 RX ADMIN — MIRTAZAPINE 15 MG: 15 TABLET, FILM COATED ORAL at 21:04

## 2018-12-22 RX ADMIN — INSULIN LISPRO 8 UNITS: 100 INJECTION, SOLUTION INTRAVENOUS; SUBCUTANEOUS at 11:40

## 2018-12-22 RX ADMIN — LISINOPRIL 40 MG: 20 TABLET ORAL at 09:13

## 2018-12-22 RX ADMIN — METHYLPREDNISOLONE SODIUM SUCCINATE 60 MG: 125 INJECTION, POWDER, FOR SOLUTION INTRAMUSCULAR; INTRAVENOUS at 05:01

## 2018-12-22 RX ADMIN — ENOXAPARIN SODIUM 40 MG: 40 INJECTION SUBCUTANEOUS at 14:14

## 2018-12-22 RX ADMIN — METHYLPREDNISOLONE SODIUM SUCCINATE 40 MG: 40 INJECTION, POWDER, FOR SOLUTION INTRAMUSCULAR; INTRAVENOUS at 21:05

## 2018-12-22 RX ADMIN — LEVOFLOXACIN 750 MG: 5 INJECTION, SOLUTION INTRAVENOUS at 21:07

## 2018-12-22 RX ADMIN — LEVOTHYROXINE SODIUM 125 MCG: 125 TABLET ORAL at 05:01

## 2018-12-22 RX ADMIN — DULOXETINE HYDROCHLORIDE 20 MG: 20 CAPSULE, DELAYED RELEASE ORAL at 09:10

## 2018-12-22 RX ADMIN — IPRATROPIUM BROMIDE 0.5 MG: 0.5 SOLUTION RESPIRATORY (INHALATION) at 13:17

## 2018-12-22 RX ADMIN — SITAGLIPTIN 100 MG: 100 TABLET, FILM COATED ORAL at 09:10

## 2018-12-22 RX ADMIN — HEPARIN SODIUM 5000 UNITS: 5000 INJECTION, SOLUTION INTRAVENOUS; SUBCUTANEOUS at 05:01

## 2018-12-22 RX ADMIN — INSULIN LISPRO 4 UNITS: 100 INJECTION, SOLUTION INTRAVENOUS; SUBCUTANEOUS at 09:13

## 2018-12-22 RX ADMIN — IPRATROPIUM BROMIDE 0.5 MG: 0.5 SOLUTION RESPIRATORY (INHALATION) at 19:47

## 2018-12-22 RX ADMIN — FOLIC ACID 1000 MCG: 1 TABLET ORAL at 09:10

## 2018-12-22 RX ADMIN — LEVALBUTEROL 1.25 MG: 1.25 SOLUTION, CONCENTRATE RESPIRATORY (INHALATION) at 19:47

## 2018-12-22 NOTE — ED NOTES
Pt completed ambulation   Noted at rest pulse ox is 89-92 percent       Fiordaliza Valverde, RN  12/21/18 2100

## 2018-12-22 NOTE — PROGRESS NOTES
Progress Note - Andie Pretty 1950, 77 y o  female MRN: [id number]    Unit/Bed#: 409-01 Encounter: [id number]    Primary Care Provider: Wyvonnia Dancer, MD   Date and time admitted to hospital: 12/21/2018  4:51 PM        Acute respiratory failure with hypoxia [facility])   Assessment & Plan    secondary to community-acquired pneumonia and COPD with mild exacerbation  Ambulate Q shift  Maintain oxygen saturation 88-92%     * Community acquired pneumonia of left lower lobe of lung ([facility] )   Assessment & Plan    With parapneumonic effusion  Continue Levaquin 750 mg IV daily  Check urine Legionella and strep pneumo antigen  Check sputum culture  Trend daily procalcitonin  Leukocytosis improving     Morbid (severe) obesity due to excess calories (HCC)   Assessment & Plan    TLC       Essential hypertension   Assessment & Plan    Blood pressure controlled on current regimen     Rheumatoid arthritis ([facility] )   Assessment & Plan    Natalee Median in the setting of acute infection  May resume once patient is afebrile for 48 hours and improved     Type 2 diabetes mellitus with hyperglycemia [facility])   Assessment & Plan    Lab Results   Component Value Date    HGBA1C 7 7 (H) 09/21/2018       Recent Labs      12/22/18   0743   POCGLU  213*       Blood Sugar Average: Last 72 hrs:    - resume home Hypoglycemic medications  - Insulin subcutaneous order set placed  - maintain -180 mg/dl  (P) 213     COPD with acute exacerbation (HCC)   Assessment & Plan    Decrease Solu-Medrol to b i d   Dosing  Continue frequent nebulizer treatments via respiratory protocol  Initiate airway clearance protocol  Incentive spirometry         Progress Note - Andie Pretty 77 y o  female MRN: [id number]    Unit/Bed#: 409-01 Encounter: [id number]        Subjective:   Patient seen and examined  Feels better    Objective:     Vitals:   Vitals:    12/21/18 2354   BP: 126/60   Pulse: 92   Resp: 18   Temp: (!) 97 3 °F (36 3 °C)   SpO2: 92%     Body mass index is 41 75 kg/m²      Intake/Output Summary (Last 24 hours) at 12/22/18 0808  Last data filed at 12/22/18 0528   Gross per 24 hour   Intake              480 ml   Output              750 ml   Net             -270 ml       Physical Exam:   /60 (BP Location: Left arm)   Pulse 92   Temp (!) 97 3 °F (36 3 °C) (Temporal)   Resp 18   Ht 5' 5" (1 651 m)   Wt 114 kg (250 lb 14 1 oz)   SpO2 92%   BMI 41 75 kg/m²   General appearance: alert and oriented, in no acute distress  Head: Normocephalic, without obvious abnormality, atraumatic  Lungs: clear to auscultation bilaterally  Heart: regular rate and rhythm, S1, S2 normal, no murmur, click, rub or gallop  Abdomen: soft, non-tender; bowel sounds normal; no masses,  no organomegaly  Extremities: extremities normal, warm and well-perfused; no cyanosis, clubbing, or edema  Pulses: 2+ and symmetric  Neurologic: Grossly normal     Invasive Devices     Peripheral Intravenous Line            Peripheral IV 12/21/18 Right Antecubital less than 1 day                  Results from last 7 days  Lab Units 12/22/18  0453 12/21/18  1839   WBC Thousand/uL 7 42 7 69   HEMOGLOBIN g/dL 13 4 13 0   HEMATOCRIT % 42 1 41 2   PLATELETS Thousands/uL 257 261         Results from last 7 days  Lab Units 12/22/18  0453 12/21/18  1839   POTASSIUM mmol/L 4 3 4 0   CHLORIDE mmol/L 101 102   CO2 mmol/L 28 31   BUN mg/dL 11 11   CREATININE mg/dL 0 66 0 70   CALCIUM mg/dL 9 2 8 7   ALK PHOS U/L 79 80   ALT U/L 32 33   AST U/L 12 16       Medication Administration - last 24 hours from 12/21/2018 0808 to 12/22/2018 0207       Date/Time Order Dose Route Action Action by     12/21/2018 1834 ipratropium-albuterol (DUO-NEB) 0 5-2 5 mg/3 mL inhalation solution 3 mL 3 mL Nebulization Given Bruce Mir RN     12/21/2018 1834 methylPREDNISolone sodium succinate (Solu-MEDROL) injection 125 mg 125 mg Intravenous Given Bruce Mir RN     12/21/2018 1925 iohexol (OMNIPAQUE) 350 MG/ML injection (MULTI-DOSE) 85 mL 85 mL Intravenous Given Lizett Trujillo Ignaciofagilbert     12/21/2018 2316 levofloxacin (LEVAQUIN) IVPB (premix) 750 mg 750 mg Intravenous Lo[place] 37 Maurice Meneses RN     12/22/2018 0501 methylPREDNISolone sodium succinate (Solu-MEDROL) injection 60 mg 60 mg Intravenous Given Shena Bui RN     12/21/2018 2343 ipratropium-albuterol (DUO-NEB) 0 5-2 5 mg/3 mL inhalation solution 3 mL 3 mL Nebulization Not Given RT Alexis     12/21/2018 2343 budesonide (PULMICORT) inhalation solution 0 5 mg 0 5 mg Nebulization Not Given RT Alexis     12/22/2018 0501 levothyroxine tablet 125 mcg 125 mcg Oral Given Shena Bui RN     12/21/2018 2247 mirtazapine (REMERON) tablet 15 mg 15 mg Oral Given Maurice Meneses RN     12/22/2018 0501 heparin (porcine) subcutaneous injection 5,000 Units 5,000 Units Subcutaneous Given Shena Bui RN     12/21/2018 2248 heparin (porcine) subcutaneous injection 5,000 Units 5,000 Units Subcutaneous Given Maurice Meneses RN            Lab, Imaging and other studies: I have personally reviewed pertinent reports      VTE Pharmacologic Prophylaxis: Enoxaparin (Lovenox)  VTE Mechanical Prophylaxis: sequential compression device     Anoop Varghese MD  12/22/2018,8:08 AM

## 2018-12-22 NOTE — ASSESSMENT & PLAN NOTE
With parapneumonic effusion  Continue Levaquin 750 mg IV daily  Check urine Legionella and strep pneumo antigen  Check sputum culture  Trend daily procalcitonin  Leukocytosis improving

## 2018-12-22 NOTE — H&P
H&P- Braxton Lund 1950, 77 y o  female MRN: [id number]    Unit/Bed#: DEANGELO Encounter: [id number]    Primary Care Provider: Alan De La O MD   Date and time admitted to hospital: 12/21/2018  4:51 PM        * Bronchitis   Assessment & Plan    - 2-3 day history of dry turning into productive cough  - started on Levaquin 750 mg IV daily     Essential hypertension   Assessment & Plan    - restart home antihypertensive  - maintain systolic blood pressure <171 mmHg     COPD with acute exacerbation ([facility] )   Assessment & Plan    - received DuoNeb treatment in the emergency department  - received Solu-Medrol 125 mg IV in the emergency department  - ordered Solu-Medrol 60 mg IV q 8 hours  - scheduled and p r n  bronchodilators ordered  - Pulmicort ordered q 12 hours     Rheumatoid arthritis ([facility] [address] )   Assessment & Plan    - patient does take methotrexate Q weekly, will need to establish when next dose is due     Hypothyroidism   Assessment & Plan    - continue home dose levothyroxine     Type 2 diabetes mellitus with hyperglycemia ([facility] )   Assessment & Plan    Lab Results   Component Value Date    HGBA1C 7 7 (H) 09/21/2018       No results for input(s): POCGLU in the last 72 hours      Blood Sugar Average: Last 72 hrs:    - resume home Hypoglycemic medications  - Insulin subcutaneous order set placed  - maintain -180 mg/dl         History and Physical - [facility] Internal Medicine    Patient Information: Braxton Lund 77 y o  female MRN: [id number]  Unit/Bed#: DEANGELO Encounter: [id number]  Admitting Physician: Antony Guardado PA-C  PCP: Alan De La O MD  Date of Admission:  12/21/18    Assessment/Plan:    Hospital Problem List:     Principal Problem:    Bronchitis  Active Problems:    COPD with acute exacerbation ([facility] )    Essential hypertension    Type 2 diabetes mellitus with hyperglycemia [facility]    Hypothyroidism    Rheumatoid arthritis ([facility] )          VTE Prophylaxis: Heparin  / sequential compression device   Code Status:  Full  POLST: There is no POLST form on file for this patient (pre-hospital)    Anticipated Length of Stay:  Patient will be admitted on an Inpatient basis with an anticipated length of stay of  > 2 midnights  Justification for Hospital Stay:  Bronchitis/acute exacerbation of chronic obstructive pulmonary disease    Total Time for Visit, including Counseling / Coordination of Care: 30 minutes  Greater than 50% of this total time spent on direct patient counseling and coordination of care  Chief Complaint:   Bronchitis/acute exacerbation of chronic obstructive pulmonary disease    History of Present Illness:    Stan Boyer is a 77 y o  female with a past medical history of chronic obstructive pulmonary disease, hypothyroid, type 2 diabetes, hypertension, and others as listed below who presented to the emergency department with 2-3 day history of dry cough which progressed into productive cough, patient coughing green purulent sputum  Upon arrival in emergency department laboratory and radiologic studies were obtained with results listed below  Also while in the emergency department, she received DuoNeb treatment, and Solu-Medrol 125 mg IV  She is admitted as an inpatient by emergency department provider  Will continue bronchodilators as scheduled and p r n , Solu-Medrol 60 mg IV t i d , Levaquin 750 mg daily  Review of Systems:    Review of Systems   Respiratory: Positive for cough, shortness of breath and wheezing  All other systems reviewed and are negative  Past Medical and Surgical History:     Past Medical History:   Diagnosis Date    Arthritis     Arthritis     Candidiasis of skin     COPD (chronic obstructive pulmonary disease) ([facility] )     last assessed 11/21/2016    Current chronic use of systemic steroids     Degenerative arthritis of left knee     last assessed 1/20/2015    Diabetes mellitus ([facility] )     Disease of thyroid gland     hypo pill given to decrease thyroid       Fibromyalgia     Fistula of knee     last assessed 9/12/2014    GERD (gastroesophageal reflux disease)     Hyperlipidemia     Hypertension     Medial meniscus tear     of left knee, last assessed 9/12/2014    Migraine     states she has a hx of HA that she calls Ksplice but never was dx by neurologist    Obesity     Obesity     Osteoarthritis     Osteopenia     Pneumonia     last assessed 11/16/2016    Psychiatric disorder     anxiety    Rheumatoid arthritis ([facility]     Rheumatoid arthritis ([facility] )     Sepsis ([facility] )     last assessed 11/10/2016    Tick bite     last assessed 10/30/2015    Vitamin B12 deficiency     Vitamin D deficiency        Past Surgical History:   Procedure Laterality Date    CATARACT EXTRACTION Bilateral     CHOLECYSTECTOMY      EYE SURGERY      Bilateral Cataracts    HYSTERECTOMY      JOINT REPLACEMENT      left knee    KNEE ARTHROSCOPY      NEUROPLASTY / TRANSPOSITION MEDIAN NERVE AT CARPAL TUNNEL      TUBAL LIGATION         Meds/Allergies:    Prior to Admission medications    Medication Sig Start Date End Date Taking? Authorizing Provider   albuterol (PROVENTIL HFA,VENTOLIN HFA) 90 mcg/act inhaler Inhale 2 puffs every 6 (six) hours as needed for wheezing 9/11/18   Farideh Blue MD   alendronate (FOSAMAX) 70 mg tablet Take 70 mg by mouth every 7 days    Historical Provider, MD   atorvastatin (LIPITOR) 20 mg tablet TAKE ONE TABLET BY MOUTH EVERY DAY 11/21/18   Wicho Cole MD   benzonatate (TESSALON) 200 MG capsule Take 1 capsule (200 mg total) by mouth 3 (three) times a day as needed for cough 9/24/18   Wicho Cole MD   Blood Glucose Monitoring Suppl (ONE TOUCH ULTRA 2) w/Device KIT by Does not apply route 2 (two) times a day 6/6/18   Wicho Cole MD   clotrimazole (LOTRIMIN) 1 % cream Apply topically daily as needed      Historical Provider, MD   conjugated estrogens (PREMARIN) vaginal cream Insert 0 625 Tubes into the vagina as needed    Historical Provider, MD   cyanocobalamin (VITAMIN B-12) 1,000 mcg tablet Take 1 tablet by mouth daily 11/10/16   Historical Provider, MD   diclofenac sodium (VOLTAREN) 1 % Apply 2 g topically as needed    Historical Provider, MD   DULoxetine (CYMBALTA) 20 mg capsule TAKE ONE CAPSULE BY MOUTH ONCE DAILY 11/21/18   Ministerio Underwood MD   ergocalciferol (VITAMIN D2) 50,000 units TAKE ONE CAPSULE BY MOUTH WEEKLY 11/21/18   Ministerio Underwood MD   fluticasone-salmeterol (ADVAIR) 250-50 mcg/dose Inhale 1 puff every 12 (twelve) hours  Historical Provider, MD   folic acid (FOLVITE) 1 mg tablet TAKE ONE TABLET BY MOUTH ONCE DAILY 11/21/18   Ministerio Underwood MD   furosemide (LASIX) 40 mg tablet Take 1 tablet (40 mg total) by mouth daily 9/11/18   Bhavesh Castro MD   [facility] 100 MG TAKE ONE TABLET BY MOUTH EVERY DAY 11/21/18   Ministerio Underwood MD   ipratropium-albuterol (DUO-NEB) 0 5-2 5 mg/3 mL nebulizer solution Take 1 vial (3 mL total) by nebulization every 6 (six) hours as needed for wheezing or shortness of breath 9/12/18   Yoli Jones PA-C   JANUVIA 100 MG tablet TAKE 1 TABLET BY MOUTH DAILY   11/21/18   Ministerio Underwood MD   levothyroxine 125 mcg tablet TAKE ONE TABLET BY MOUTH EVERY DAY IN THE AM 11/21/18   Ministerio Underwood MD   lisinopril (ZESTRIL) 40 mg tablet TAKE ONE TABLET BY MOUTH EVERY DAY 11/21/18   Ministerio Underwood MD   methotrexate 2 5 mg tablet Take 1 tablet (2 5 mg total) by mouth once a week Takes 6 tablets per week 9/24/18   Ministerio Underwood MD   mirtazapine (REMERON) 15 mg tablet Take 1 tablet (15 mg total) by mouth daily at bedtime 11/29/18   Ministerio Underwood MD   mometasone (ELOCON) 0 1 % ointment Apply topically 2 (two) times a day as needed (itching) 9/24/18   Ministerio Underwood MD   nystatin (MYCOSTATIN) powder Apply topically 6/7/17   Historical Provider, MD   ofloxacin (FLOXIN) 0 3 % otic solution Administer 10 drops into both ears as needed      Historical Provider, MD   [facility] ULTRA TEST test strip Tests twice a day 6/7/18   Alan De La O MD   [facility] by Does not apply route 2 (two) times a day 6/18/18   Alan De La O MD   pantoprazole (PROTONIX) 40 mg tablet TAKE ONE TABLET BY MOUTH ONCE DAILY 11/21/18   Alan De La O MD   Polyethylene Glycol 3350-GRX POWD Take by mouth 11/29/17   Historical Provider, MD   predniSONE 5 mg tablet prednisone 5 mg tablet    Historical Provider, MD   Tiotropium Bromide Monohydrate (SPIRIVA RESPIMAT) 2 5 MCG/ACT AERS Inhale daily at bedtime    Historical Provider, MD   Tofacitinib Citrate (XELJANZ XR) 11 MG TB24 Take 1 tablet by mouth daily    Historical Provider, MD     I have reviewed home medications with patient personally  Allergies: Allergies   Allergen Reactions    No Active Allergies        Social History:     Marital Status: /Civil Union   Occupation:  Retired  Patient Pre-hospital Living Situation:  Home  Patient Pre-hospital Level of Mobility:  Full  Patient Pre-hospital Diet Restrictions:  Diabetic  Substance Use History:   History   Alcohol Use No     Comment: stopped drinking alcohol     History   Smoking Status    Former Smoker    Packs/day: 1 00    Years: 48 00    Types: E-Cigarettes    Quit date: 11/21/2012   Smokeless Tobacco    Never Used     Comment: per allscripts - "current every day smoker, former smoker"     History   Drug Use No       Family History:    non-contributory    Physical Exam:     Vitals:   Blood Pressure: 151/59 (12/21/18 2133)  Pulse: 92 (12/21/18 2133)  Temperature: 99 °F (37 2 °C) (12/21/18 1617)  Temp Source: Temporal (12/21/18 1617)  Respirations: 16 (12/21/18 2133)  Height: 5' 5" (165 1 cm) (12/21/18 1617)  Weight - Scale: 114 kg (251 lb 12 3 oz) (12/21/18 1617)  SpO2: 93 % (12/21/18 2133)    Physical Exam   Constitutional: She is oriented to person, place, and time  She appears well-developed and well-nourished  HENT:   Head: Normocephalic and atraumatic  Mouth/Throat: Oropharyngeal exudate present  Eyes: Pupils are equal, round, and reactive to light  Conjunctivae and EOM are normal  No scleral icterus  Neck: Normal range of motion  Neck supple  No JVD present  No tracheal deviation present  No thyromegaly present  Cardiovascular: Normal rate, regular rhythm and normal heart sounds  Pulmonary/Chest: No stridor  She has wheezes  Abdominal: Soft  Bowel sounds are normal  She exhibits no distension  There is no tenderness  Musculoskeletal: Normal range of motion  She exhibits no edema, tenderness or deformity  Neurological: She is alert and oriented to person, place, and time  No cranial nerve deficit  Skin: Skin is warm and dry  Psychiatric: She has a normal mood and affect  Her behavior is normal  Judgment and thought content normal            Additional Data:     Lab Results: I have personally reviewed pertinent reports  Results from last 7 days  Lab Units 12/21/18  1839   WBC Thousand/uL 7 69   HEMOGLOBIN g/dL 13 0   HEMATOCRIT % 41 2   PLATELETS Thousands/uL 261   NEUTROS PCT % 57   LYMPHS PCT % 22   MONOS PCT % 18*   EOS PCT % 3       Results from last 7 days  Lab Units 12/21/18  1839   POTASSIUM mmol/L 4 0   CHLORIDE mmol/L 102   CO2 mmol/L 31   BUN mg/dL 11   CREATININE mg/dL 0 70   CALCIUM mg/dL 8 7   ALK PHOS U/L 80   ALT U/L 33   AST U/L 16       Results from last 7 days  Lab Units 12/21/18  1839   INR  1 05       Imaging: I have personally reviewed pertinent reports  Xr Chest 2 Views    Result Date: 12/21/2018  Narrative: CHEST INDICATION:   cough  COMPARISON:  9/11/2018 EXAM PERFORMED/VIEWS:  XR CHEST PA & LATERAL FINDINGS: Cardiomediastinal silhouette appears unremarkable  Left lower lobe consolidation and effusion suspicious for pneumonia  Findings similar to previous exam   No pneumothorax or pleural effusion  Osseous structures appear within normal limits for patient age       Impression: Left lower lobe consolidation and effusion suspicious for pneumonia  Findings similar to previous exam  Workstation performed: [id number]     Cta Ed Chest Pe Study    Result Date: 12/21/2018  Narrative: CTA - CHEST WITH IV CONTRAST - PULMONARY ANGIOGRAM INDICATION:   LLL consolidation unchanged vs recurrent from 9/11/18 to today  cough  hypoxia  Luis Alberto Manifold "LLL consolidation unchanged vs recurrent from 9/11/18 to today  cough  hypoxia  ""pt states dry cough past few days, now productive cough" COMPARISON: CTA chest 12/4/2017  Two-view chest 12/21/2018  TECHNIQUE: CTA examination of the chest was performed using angiographic technique according to a protocol specifically tailored to evaluate for pulmonary embolism  Axial, sagittal, and coronal 2D reformatted images were created from the source data and  submitted for interpretation  In addition, coronal 3D MIP postprocessing was performed on the acquisition scanner  Radiation dose length product (DLP) for this visit:  1200 06 mGy-cm   This examination, like all CT scans performed in the [place], was performed utilizing techniques to minimize radiation dose exposure, including the use of iterative reconstruction and automated exposure control  IV Contrast:  85 mL of iohexol (OMNIPAQUE)  FINDINGS: PULMONARY ARTERIAL TREE:  No pulmonary embolus is seen  LUNGS:  No tracheal or endobronchial lesion  Left pleural effusion causing compressive atelectasis of a portion of the left lower lobe  Stable right upper lobe anterior bulla  Stable mild stable bibasilar scarring  PLEURA:  Increased, now moderate size left basilar pleural effusion elevating the may be loculated  HEART/GREAT VESSELS:  Unremarkable for patient's age  MEDIASTINUM AND MATTHEW:  Unremarkable  CHEST WALL AND LOWER NECK:   Unremarkable  VISUALIZED STRUCTURES IN THE UPPER ABDOMEN:  Unremarkable  OSSEOUS STRUCTURES:  No acute fracture or destructive osseous lesion       Impression: Moderate left basilar pleural effusion causing compressive atelectasis of a portion of the left lower lobe  No pulmonary arterial embolism  The study was marked in EPIC for significant notification  Workstation performed: [id number]       EKG, Pathology, and Other Studies Reviewed on Admission:   · EKG: SR    Allscripts / Epic Records Reviewed: Yes     ** Please Note: This note has been constructed using a voice recognition system   **

## 2018-12-22 NOTE — ASSESSMENT & PLAN NOTE
- received DuoNeb treatment in the emergency department  - received Solu-Medrol 125 mg IV in the emergency department  - ordered Solu-Medrol 60 mg IV q 8 hours  - scheduled and p r n  bronchodilators ordered  - Pulmicort ordered q 12 hours

## 2018-12-22 NOTE — ASSESSMENT & PLAN NOTE
secondary to community-acquired pneumonia and COPD with mild exacerbation  Ambulate Q shift  Maintain oxygen saturation 88-92%

## 2018-12-22 NOTE — PLAN OF CARE
Problem: DISCHARGE PLANNING - CARE MANAGEMENT  Goal: Discharge to post-acute care or home with appropriate resources  INTERVENTIONS:  - Conduct assessment to determine patient/family and health care team treatment goals, and need for post-acute services based on payer coverage, community resources, and patient preferences, and barriers to discharge  - Address psychosocial, clinical, and financial barriers to discharge as identified in assessment in conjunction with the patient/family and health care team  - Arrange appropriate level of post-acute services according to patient's   needs and preference and payer coverage in collaboration with the physician and health care team  - Communicate with and update the patient/family, physician, and health care team regarding progress on the discharge plan  - Arrange appropriate transportation to post-acute venues  Pt's lace score is 79  Pt is a HRR    Outcome: Progressing

## 2018-12-22 NOTE — PLAN OF CARE
Problem: PAIN - ADULT  Goal: Verbalizes/displays adequate comfort level or baseline comfort level  Interventions:  - Encourage patient to monitor pain and request assistance  - Assess pain using appropriate pain scale (0-10 pain scale)  - Administer analgesics based on type and severity of pain and evaluate response  - Implement non-pharmacological measures as appropriate and evaluate response  - Notify physician/advanced practitioner if interventions unsuccessful or patient reports new pain  Outcome: Progressing      Problem: INFECTION - ADULT  Goal: Absence or prevention of progression during hospitalization  INTERVENTIONS:  - Assess and monitor for signs and symptoms of infection  - Monitor lab/diagnostic results  - Monitor all insertion sites, i e  indwelling lines  - [place] appropriate cooling/warming therapies per order  - Administer medications as ordered  - Instruct and encourage patient and family to use good hand hygiene technique  - Identify and instruct in appropriate isolation precautions for identified infection/condition   Droplet r/o flu   Outcome: Progressing      Problem: SAFETY ADULT  Goal: Maintain or return to baseline ADL function  INTERVENTIONS:  -  Assess patient's ability to carry out ADLs; assess patient's baseline (independent)  - Assess/evaluate cause of self-care deficits   - Assess range of motion  - Assess patient's mobility; develop plan if impaired (uses walker with arthritis exacerbations)  - Assess patient's need for assistive devices and provide as appropriate (uses walker with arthritis exacerbations)  - Encourage maximum independence but intervene and supervise when necessary  ¯ Involve family in performance of ADLs  ¯ Assess for home care needs following discharge   ¯ Request OT consult to assist with ADL evaluation and planning for discharge  ¯ Provide patient education as appropriate  Outcome: Progressing    Goal: Maintain or return mobility status to optimal level  INTERVENTIONS:  - Assess patient's baseline mobility status (ambulation, transfers, stairs, etc )    - Identify cognitive and physical deficits and behaviors that affect mobility  - Identify mobility aids required to assist with transfers and/or ambulation (walker)  - [place] fall precautions as indicated by assessment  High fall risk    - Record patient progress and toleration of activity level on Mobility SBAR; progress patient to next Phase/Stage  - Instruct patient to call for assistance with activity based on assessment  - Request Rehabilitation consult to assist with strengthening/weightbearing, etc   Outcome: Progressing      Problem: DISCHARGE PLANNING  Goal: Discharge to home or other facility with appropriate resources  INTERVENTIONS:  - Identify barriers to discharge w/patient and caregiver  - Arrange for needed discharge resources and transportation as appropriate  - Identify discharge learning needs (meds, wound care, etc   - Refer to Case Management Department for coordinating discharge planning if the patient needs post-hospital services based on physician/advanced practitioner order or complex needs related to functional status, cognitive ability, or social support system  Outcome: Progressing      Problem: Knowledge Deficit  Goal: Patient/family/caregiver demonstrates understanding of disease process, treatment plan, medications, and discharge instructions  Complete learning assessment and assess knowledge base    Interventions:  - Provide teaching at level of understanding  - Provide teaching via preferred learning methods (verbal, written, demonstration)  Outcome: Progressing      Problem: RESPIRATORY - ADULT  Goal: Achieves optimal ventilation and oxygenation  INTERVENTIONS:  - Assess for changes in respiratory status  - Assess for changes in mentation and behavior  - Position to facilitate oxygenation and minimize respiratory effort  - Oxygen administration by appropriate delivery method based on oxygen saturation (per order) or ABGs  - Initiate smoking cessation education as indicated  - Encourage broncho-pulmonary hygiene including cough, deep breathe, Incentive Spirometry  - Assess the need for suctioning and aspirate as needed  - Assess and instruct to report SOB or any respiratory difficulty  - Respiratory Therapy support as indicated  Outcome: Progressing

## 2018-12-22 NOTE — ASSESSMENT & PLAN NOTE
Jocelyn Gonzalez in the setting of acute infection  May resume once patient is afebrile for 48 hours and improved

## 2018-12-22 NOTE — ASSESSMENT & PLAN NOTE
Lab Results   Component Value Date    HGBA1C 7 7 (H) 09/21/2018       No results for input(s): POCGLU in the last 72 hours      Blood Sugar Average: Last 72 hrs:    - resume home Hypoglycemic medications  - Insulin subcutaneous order set placed  - maintain -180 mg/dl

## 2018-12-22 NOTE — SOCIAL WORK
Met with pt to discuss role as  in helping pt to develop discharge plan and to help pt carry out their plan  Pt lives in an apartment alone  Pt has 15 ANNIKA with a railing  Everything in her apartment is on one level  Pt has a shower chair, cane , walker and CPap at home  Pt uses no assistive device to ambulate  Pt is independent with ADL's  Pt does her own cooking  Pt does drive  Pt has never had home care or any services in the home  Pt's PCP is Dr Paula Ward  Pt uses the [facility] in [place]   Pt's LACE score is 79  Pt is a HRR  I will continue to follow for any case management needs

## 2018-12-22 NOTE — ASSESSMENT & PLAN NOTE
Decrease Solu-Medrol to b i d   Dosing  Continue frequent nebulizer treatments via respiratory protocol  Initiate airway clearance protocol  Incentive spirometry

## 2018-12-22 NOTE — ED PROVIDER NOTES
History  Chief Complaint   Patient presents with    Cough     pt c/o cough for past 2-3 days  started as dry cough and now productive with thick green mucous and sob  pain in chest only when she coughs  denies n/v/d       History provided by:  Patient  Cough   Cough characteristics:  Productive  Sputum characteristics:  Nondescript  Severity:  Severe  Onset quality:  Gradual  Duration:  3 days  Timing:  Constant  Progression:  Worsening  Chronicity:  Recurrent  Smoker: yes    Context: sick contacts and upper respiratory infection    Relieved by:  Nothing  Worsened by:  Nothing  Ineffective treatments:  Beta-agonist inhaler, home nebulizer and rest  Associated symptoms: shortness of breath, sore throat and wheezing    Associated symptoms: no chest pain, no chills, no diaphoresis, no ear fullness, no ear pain, no eye discharge, no fever, no headaches, no myalgias, no rash, no rhinorrhea, no sinus congestion and no weight loss    Shortness of breath:     Severity:  Moderate    Onset quality:  Gradual    Duration:  1 day    Timing:  Intermittent    Progression:  Unchanged  Sore throat:     Severity:  Mild    Duration:  2 days    Timing:  Intermittent (with cough)    Progression:  Unchanged  Wheezing:     Severity:  Moderate    Timing:  Constant    Progression:  Worsening    Chronicity:  Chronic  Risk factors comment:  Chronic bronchitis, copd  last tx for pneumonia 3 months ago  last hospitalization last winter  got flu shot last month  [facility] was ill with URI 2 weeks ago pt got same sx but got better after 3 days 2 good weeks before this illness started      Prior to Admission Medications   Prescriptions Last Dose Informant Patient Reported? Taking?    Blood Glucose Monitoring Suppl (ONE TOUCH ULTRA 2) w/Device KIT   No No   Sig: by Does not apply route 2 (two) times a day   DULoxetine (CYMBALTA) 20 mg capsule   No No   Sig: TAKE ONE CAPSULE BY MOUTH ONCE DAILY   INVOKANA 100 MG   No No   Sig: TAKE ONE TABLET BY MOUTH EVERY DAY   JANUVIA 100 MG tablet   No No   Sig: TAKE 1 TABLET BY MOUTH DAILY  ONE TOUCH ULTRA TEST test strip   No No   Sig: Tests twice a day   ONETOUCH DELICA LANCETS FINE MISC   No No   Sig: by Does not apply route 2 (two) times a day   Polyethylene Glycol 3350-GRX POWD   Yes No   Sig: Take by mouth   Tiotropium Bromide Monohydrate (SPIRIVA RESPIMAT) 2 5 MCG/ACT AERS   Yes No   Sig: Inhale daily at bedtime   Tofacitinib Citrate (XELJANZ XR) 11 MG TB24   Yes No   Sig: Take 1 tablet by mouth daily   albuterol (PROVENTIL HFA,VENTOLIN HFA) 90 mcg/act inhaler   No No   Sig: Inhale 2 puffs every 6 (six) hours as needed for wheezing   alendronate (FOSAMAX) 70 mg tablet  Self Yes No   Sig: Take 70 mg by mouth every 7 days   atorvastatin (LIPITOR) 20 mg tablet   No No   Sig: TAKE ONE TABLET BY MOUTH EVERY DAY   benzonatate (TESSALON) 200 MG capsule   No No   Sig: Take 1 capsule (200 mg total) by mouth 3 (three) times a day as needed for cough   clotrimazole (LOTRIMIN) 1 % cream   Yes No   Sig: Apply topically daily as needed  conjugated estrogens (PREMARIN) vaginal cream   Yes No   Sig: Insert 0 625 Tubes into the vagina as needed   cyanocobalamin (VITAMIN B-12) 1,000 mcg tablet   Yes No   Sig: Take 1 tablet by mouth daily   diclofenac sodium (VOLTAREN) 1 %   Yes No   Sig: Apply 2 g topically as needed   ergocalciferol (VITAMIN D2) 50,000 units   No No   Sig: TAKE ONE CAPSULE BY MOUTH WEEKLY   fluticasone-salmeterol (ADVAIR) 250-50 mcg/dose   Yes No   Sig: Inhale 1 puff every 12 (twelve) hours     folic acid (FOLVITE) 1 mg tablet   No No   Sig: TAKE ONE TABLET BY MOUTH ONCE DAILY   furosemide (LASIX) 40 mg tablet   No No   Sig: Take 1 tablet (40 mg total) by mouth daily   ipratropium-albuterol (DUO-NEB) 0 5-2 5 mg/3 mL nebulizer solution   No No   Sig: Take 1 vial (3 mL total) by nebulization every 6 (six) hours as needed for wheezing or shortness of breath   levothyroxine 125 mcg tablet   No No   Sig: TAKE ONE TABLET BY MOUTH EVERY DAY IN THE AM   lisinopril (ZESTRIL) 40 mg tablet   No No   Sig: TAKE ONE TABLET BY MOUTH EVERY DAY   methotrexate 2 5 mg tablet   No No   Sig: Take 1 tablet (2 5 mg total) by mouth once a week Takes 6 tablets per week   mirtazapine (REMERON) 15 mg tablet   No No   Sig: Take 1 tablet (15 mg total) by mouth daily at bedtime   mometasone (ELOCON) 0 1 % ointment   No No   Sig: Apply topically 2 (two) times a day as needed (itching)   nystatin (MYCOSTATIN) powder   Yes No   Sig: Apply topically   ofloxacin (FLOXIN) 0 3 % otic solution   Yes No   Sig: Administer 10 drops into both ears as needed     pantoprazole (PROTONIX) 40 mg tablet   No No   Sig: TAKE ONE TABLET BY MOUTH ONCE DAILY   predniSONE 5 mg tablet   Yes No   Sig: prednisone 5 mg tablet      Facility-Administered Medications: None       Past Medical History:   Diagnosis Date    Arthritis     Arthritis     Candidiasis of skin     [facility]ch[facility] pulmonary [facility])     last assessed 11/21/2016    Current chronic use of systemic steroids     Degenerative arthritis of left knee     last assessed 1/20/2015    Diabetes mellitus ([facility] [address] [facility]     Disease of thyroid gland     hypo pill given to decrease thyroid       Fibromyalgia     Fistula of knee     last assessed 9/12/2014    GERD (gastroesophageal reflux disease)     Hyperlipidemia     Hypertension     Medial meniscus tear     of left knee, last assessed 9/12/2014    Migraine     states she has a hx of HA that she calls [employer] but never was dx by neurologist    Obesity     Obesity     Osteoarthritis     Osteopenia     Pneumonia     last assessed 11/16/2016    Psychiatric disorder     anxiety    Rheumatoid arthritis ([facility] [address] [facility]     Rheumatoid arthritis ([facility] [address]     Sepsis ([facility] [address] [facility]     last assessed 11/10/2016    Tick bite     last assessed 10/30/2015    Vitamin B12 deficiency     Vitamin D deficiency        Past Surgical History:   Procedure Laterality Date    CATARACT EXTRACTION Bilateral     CHOLECYSTECTOMY      EYE SURGERY      Bilateral Cataracts    HYSTERECTOMY      JOINT REPLACEMENT      left knee    KNEE ARTHROSCOPY      NEUROPLASTY / TRANSPOSITION MEDIAN NERVE AT CARPAL TUNNEL      TUBAL LIGATION         Family History   Problem Relation Age of Onset    Stroke Mother     Asthma Family     Cancer Family     Diabetes Family     Hypertension Family      I have reviewed and agree with the history as documented  Social History   Substance Use Topics    Smoking status: Former Smoker     Packs/day: 1 00     Years: 48 00     Types: E-Cigarettes     Quit date: 11/21/2012    Smokeless tobacco: Never Used      Comment: per allscripts - "current every day smoker, former smoker"    Alcohol use No      Comment: stopped drinking alcohol        Review of Systems   Constitutional: Negative for activity change, appetite change, chills, diaphoresis, fatigue, fever, unexpected weight change and weight loss  HENT: Positive for sore throat  Negative for congestion, ear pain and rhinorrhea  Eyes: Negative for pain, discharge and visual disturbance  Respiratory: Positive for cough, chest tightness, shortness of breath and wheezing  Cardiovascular: Negative for chest pain and leg swelling  Gastrointestinal: Negative for abdominal pain, constipation, diarrhea and vomiting  Genitourinary: Negative for decreased urine volume, difficulty urinating, flank pain and frequency  Musculoskeletal: Negative for back pain, gait problem and myalgias  Skin: Negative for rash and wound  Allergic/Immunologic: Negative for immunocompromised state  Neurological: Negative for dizziness and headaches  Hematological: Does not bruise/bleed easily  Physical Exam  Physical Exam   Constitutional: She is oriented to person, place, and time  She appears well-developed and well-nourished  No distress  obese   HENT:   Head: Normocephalic and atraumatic     Right Ear: Hearing, tympanic membrane and external ear normal    Left Ear: Hearing, tympanic membrane and external ear normal    Nose: Mucosal edema and rhinorrhea present  Mouth/Throat: Uvula is midline, oropharynx is clear and moist and mucous membranes are normal  No oral lesions  No trismus in the jaw  No uvula swelling  No posterior oropharyngeal erythema  Tonsils are 1+ on the right  Tonsils are 1+ on the left  No tonsillar exudate  Eyes: Pupils are equal, round, and reactive to light  Conjunctivae and EOM are normal  Right eye exhibits no discharge  Left eye exhibits no discharge  Neck: Normal range of motion  Neck supple  Cardiovascular: Normal heart sounds and intact distal pulses  No murmur heard  Mild tachycardia, regular   Pulmonary/Chest: Effort normal  No respiratory distress  She has wheezes (wheezes, rhonchi throughout)  She has no rales  She exhibits no tenderness  Bronchospasm, cough with each deep inhalation   Abdominal: Soft  Bowel sounds are normal    Musculoskeletal: She exhibits no edema or tenderness  Lymphadenopathy:     She has no cervical adenopathy  Neurological: She is alert and oriented to person, place, and time  Skin: Skin is warm and dry  Capillary refill takes less than 2 seconds  No rash noted  She is not diaphoretic  No erythema  No pallor  Psychiatric: She has a normal mood and affect  Nursing note and vitals reviewed        Vital Signs  ED Triage Vitals [12/21/18 1617]   Temperature Pulse Respirations Blood Pressure SpO2   99 °F (37 2 °C) 105 20 139/65 97 %      Temp Source Heart Rate Source Patient Position - Orthostatic VS BP Location FiO2 (%)   Temporal Monitor Sitting Right arm --      Pain Score       No Pain           Vitals:    12/21/18 2030 12/21/18 2045 12/21/18 2057 12/21/18 2133   BP:    151/59   Pulse: 88 88 101 92   Patient Position - Orthostatic VS:           Visual Acuity      ED Medications  Medications   levofloxacin (LEVAQUIN) IVPB (premix) 750 mg (not administered)   levofloxacin (LEVAQUIN) IVPB (premix) 750 mg (not administered)   methylPREDNISolone sodium succinate (Solu-MEDROL) injection 60 mg (not administered)   ipratropium-albuterol (DUO-NEB) 0 5-2 5 mg/3 mL inhalation solution 3 mL (not administered)   levalbuterol (XOPENEX) inhalation solution 1 25 mg (not administered)   budesonide (PULMICORT) inhalation solution 0 5 mg (not administered)   DULoxetine (CYMBALTA) delayed release capsule 20 mg (not administered)   folic acid (FOLVITE) tablet 1,000 mcg (not administered)   furosemide (LASIX) tablet 40 mg (not administered)   canagliflozin (INVOKANA) tablet 100 mg (not administered)   sitaGLIPtin (JANUVIA) tablet 100 mg (not administered)   levothyroxine tablet 125 mcg (not administered)   lisinopril (ZESTRIL) tablet 40 mg (not administered)   mirtazapine (REMERON) tablet 15 mg (not administered)   pantoprazole (PROTONIX) EC tablet 40 mg (not administered)   Tofacitinib Citrate TB24 11 mg (not administered)   heparin (porcine) subcutaneous injection 5,000 Units (not administered)   acetaminophen (TYLENOL) tablet 650 mg (not administered)   insulin lispro (HumaLOG) 100 units/mL subcutaneous injection 2-12 Units (not administered)   ipratropium-albuterol (DUO-NEB) 0 5-2 5 mg/3 mL inhalation solution 3 mL (3 mL Nebulization Given 12/21/18 1834)   methylPREDNISolone sodium succinate (Solu-MEDROL) injection 125 mg (125 mg Intravenous Given 12/21/18 1834)   iohexol (OMNIPAQUE) 350 MG/ML injection (MULTI-DOSE) 85 mL (85 mL Intravenous Given 12/21/18 1925)       Diagnostic Studies  Results Reviewed     Procedure Component Value Units Date/Time    Blood culture [[id number]] Collected:  12/21/18 2118    Lab Status: In process Specimen:  Blood from Arm, Left Updated:  12/21/18 2127    Blood culture [[id number]] Collected:  12/21/18 2116    Lab Status:   In process Specimen:  Blood from Arm, Right Updated:  12/21/18 2127    Procalcitonin [[id number]] Collected: 12/21/18 2116    Lab Status: In process Specimen:  Blood from Arm, Right Updated:  12/21/18 2127    Influenza A/B and RSV by PCR [[id number]] Collected:  12/21/18 2114    Lab Status: In process Specimen:  Nasopharyngeal from Nasopharyngeal Swab Updated:  12/21/18 2127    Platelet count [[id number]]     Lab Status:  No result Specimen:  Blood     Troponin I [[id number]]  (Normal) Collected:  12/21/18 1839    Lab Status:  Final result Specimen:  Blood from Arm, Right Updated:  12/21/18 1911     Troponin I <0 02 ng/mL     Comprehensive metabolic panel [[id number]]  (Abnormal) Collected:  12/21/18 1839    Lab Status:  Final result Specimen:  Blood from Arm, Right Updated:  12/21/18 1905     Sodium 138 mmol/L      Potassium 4 0 mmol/L      Chloride 102 mmol/L      CO2 31 mmol/L      ANION GAP 5 mmol/L      BUN 11 mg/dL      Creatinine 0 70 mg/dL      Glucose 115 mg/dL      Calcium 8 7 mg/dL      AST 16 U/L      ALT 33 U/L      Alkaline Phosphatase 80 U/L      Total Protein 7 2 g/dL      Albumin 3 3 (L) g/dL      Total Bilirubin 0 50 mg/dL      eGFR 88 ml/min/1 73sq m     Narrative:         National Kidney Disease Education Program recommendations are as follows:  GFR calculation is accurate only with a steady state creatinine  Chronic Kidney disease less than 60 ml/min/1 73 sq  meters  Kidney failure less than 15 ml/min/1 73 sq  meters      Protime-INR [[id number]]  (Normal) Collected:  12/21/18 1839    Lab Status:  Final result Specimen:  Blood from Arm, Right Updated:  12/21/18 1901     Protime 13 2 seconds      INR 1 05    APTT [[id number]]  (Abnormal) Collected:  12/21/18 1839    Lab Status:  Final result Specimen:  Blood from Arm, Right Updated:  12/21/18 1901     PTT 24 (L) seconds     CBC and differential [[id number]]  (Abnormal) Collected:  12/21/18 1839    Lab Status:  Final result Specimen:  Blood from Arm, Right Updated:  12/21/18 1851     WBC 7 69 Thousand/uL      RBC 4 15 Million/uL      Hemoglobin 13 0 g/dL Hematocrit 41 2 %      MCV 99 (H) fL      MCH 31 3 pg      MCHC 31 6 g/dL      RDW 15 8 (H) %      MPV 10 4 fL      Platelets 151 Thousands/uL      nRBC 0 /100 WBCs      Neutrophils Relative 57 %      Immat GRANS % 0 %      Lymphocytes Relative 22 %      Monocytes Relative 18 (H) %      Eosinophils Relative 3 %      Basophils Relative 0 %      Neutrophils Absolute 4 39 Thousands/µL      Immature Grans Absolute 0 02 Thousand/uL      Lymphocytes Absolute 1 68 Thousands/µL      Monocytes Absolute 1 38 (H) Thousand/µL      Eosinophils Absolute 0 19 Thousand/µL      Basophils Absolute 0 03 Thousands/µL                  CTA ED chest PE study   Final Result by Rachel Ventura MD (12/21 1945)      Moderate left basilar pleural effusion causing compressive atelectasis of a portion of the left lower lobe  No pulmonary arterial embolism  The study was marked in EPIC for significant notification  Workstation performed: [id number]         XR chest 2 views   Final Result by James Verdugo MD (12/21 1814)      Left lower lobe consolidation and effusion suspicious for pneumonia    Findings similar to previous exam              Workstation performed: [id number]                    Procedures  ECG 12 Lead Documentation  Date/Time: 12/21/2018 6:25 PM  Performed by: Devika Morrison  Authorized by: Devika Morrison     Indications / Diagnosis:  Sob  ECG reviewed by me, the ED Provider: yes    Patient location:  ED  Previous ECG:     Comparison to cardiac monitor: Yes    Interpretation:     Interpretation: normal    Rate:     ECG rate:  89  Rhythm:     Rhythm: sinus rhythm    Ectopy:     Ectopy: none    QRS:     QRS axis:  Normal  Conduction:     Conduction: normal    ST segments:     ST segments:  Normal  T waves:     T waves: normal             Phone Contacts  ED Phone Contact    ED Course  ED Course as of Dec 21 2139   Fri Dec 21, 2018   1900 WBC: 7 69   1900 Hemoglobin: 13 0   1900 HCT: 41 2   1900 Platelet Count: 242   1908 Protime: 13 2   1908 INR: 1 05   1908 PTT: (!) 24   1908 Sodium: 138   1908 Potassium: 4 0   1908 Chloride: 102   1908 CO2: 31   1908 Anion Gap: 5   1908 BUN: 11   1908 Creatinine: 0 70   1908 Glucose, Random: 115   1908 eGFR: 88   1935 Troponin I: <0 02   2105 Pt persistently hypoxic, not previously o2 dependent  Will admit to medicine  D/w SLIM estela wise accepts to full tele  Requests cultures/procal/flu and levaquin  2113 Reviewed images including prior and current CXR and CT scan showing effusion, blood test results including ambulatory hypoxia and necessary inpatient treatment pt agrees to same  Pt does not previously follow with pulmonary medicine she will need to establish upon discharge  May/may not treat chronic pleural effusion during this hospitalization vs outpatient followup pt understands same  Acute illness is bronchitis/LRTI with copd flare                                  MDM  Number of Diagnoses or Management Options  Acute exacerbation of chronic bronchitis ([facility] [address] ): new and requires workup  Hypoxia: new and requires workup  Pleural effusion, left: new and requires workup     Amount and/or Complexity of Data Reviewed  Clinical lab tests: ordered and reviewed  Tests in the radiology section of CPT®: ordered and reviewed  Review and summarize past medical records: yes  Discuss the patient with other providers: yes  Independent visualization of images, tracings, or specimens: yes    Risk of Complications, Morbidity, and/or Mortality  Presenting problems: high  Diagnostic procedures: high  Management options: high    Patient Progress  Patient progress: stable    CritCare Time    Disposition  Final diagnoses:   Acute exacerbation of chronic bronchitis ([facility] [address] )   Hypoxia   Pleural effusion, left     Time reflects when diagnosis was documented in both MDM as applicable and the Disposition within this note     Time User Action Codes Description Comment    12/21/2018  9:15 PM Ron Fletcher [J20 9,  J42] Acute exacerbation of chronic bronchitis ([facility] [address] )     12/21/2018  9:15 PM Ron Fletcher [R09 02] Hypoxia     12/21/2018  9:15 PM Ron Fletcher [J90] Pleural effusion, left       ED Disposition     ED Disposition Condition Comment    Admit  Case was discussed with SIOBHAN and the patient's admission status was agreed to be Admission Status: inpatient status to the service of Dr Bonny Spivey   Follow-up Information    None         Patient's Medications   Discharge Prescriptions    No medications on file     No discharge procedures on file      ED Provider  Electronically Signed by           Jackquelyn Siemens, PA-C  12/21/18 2090

## 2018-12-22 NOTE — UTILIZATION REVIEW
Initial Clinical Review    Admission: Date/Time/Statement: 12/21/18 @ 2105     Orders Placed This Encounter   Procedures    Inpatient Admission (expected length of stay for this patient is greater than two midnights)     Standing Status:   Standing     Number of Occurrences:   1     Order Specific Question:   Admitting Physician     Answer:   Karime Smith     Order Specific Question:   Level of Care     Answer:   Med Surg [16]     Order Specific Question:   Bed request comments     Answer:   tele     Order Specific Question:   Estimated length of stay     Answer:   More than 2 Midnights     Order Specific Question:   Certification     Answer:   I certify that inpatient services are medically necessary for this patient for a duration of greater than two midnights  See H&P and MD Progress Notes for additional information about the patient's course of treatment  ED: Date/Time/Mode of Arrival:   ED Arrival Information     Expected Arrival Acuity Means of Arrival Escorted By Service Admission Type    - 12/21/2018 16:10 Urgent Walk-In Family Member General Medicine Urgent    Arrival Complaint    cough        Chief Complaint:   Chief Complaint   Patient presents with    Cough     pt c/o cough for past 2-3 days  started as dry cough and now productive with thick green mucous and sob  pain in chest only when she coughs  denies n/v/d     History of Illness: Andie Pretty is a 77 y o  female with a past medical history of chronic obstructive pulmonary disease, hypothyroid, type 2 diabetes, hypertension, and others as listed below who presented to the emergency department with 2-3 day history of dry cough which progressed into productive cough, patient coughing green purulent sputum  Upon arrival in emergency department laboratory and radiologic studies were obtained with results listed below  Also while in the emergency department, she received DuoNeb treatment, and Solu-Medrol 125 mg IV    She is admitted as an inpatient by emergency department provider  Will continue bronchodilators as scheduled and p r n , Solu-Medrol 60 mg IV t i d , Levaquin 750 mg daily  ED Vital Signs:   ED Triage Vitals [12/21/18 1617]   Temperature Pulse Respirations Blood Pressure SpO2   99 °F (37 2 °C) 105 20 139/65 97 %      Temp Source Heart Rate Source Patient Position - Orthostatic VS BP Location FiO2 (%)   Temporal Monitor Sitting Right arm --      Pain Score       No Pain        Wt Readings from Last 1 Encounters:   12/22/18 114 kg (250 lb 14 1 oz)     Vital Signs (abnormal):     12/21/18 2354   97 3 °F (36 3 °C)  92  18  126/60  87  92 %  Nasal cannula    12/21/18 2057  --  101  18  --  --   88 %  --    12/21/18 2045  --  88  18  --  --   86 %  --    12/21/18 2030  --  88  18  --  --   85 %  --      Abnormal Labs:    Glucose 197  POC glucose 213  Alb 3 3  Trop WNL     Diagnostic Test Results:     12/21 CXR - Left lower lobe consolidation and effusion suspicious for pneumonia  Findings similar to previous exam       12/21 CT ED chest PE study - Moderate left basilar pleural effusion causing compressive atelectasis of a portion of the left lower lobe  No pulmonary arterial embolism       ED Treatment:   Medication Administration from 12/21/2018 1610 to 12/21/2018 2148    Date/Time Order Dose Route Action   12/21/2018 1834 ipratropium-albuterol (DUO-NEB) 0 5-2 5 mg/3 mL inhalation solution 3 mL 3 mL Nebulization Given   12/21/2018 1834 methylPREDNISolone sodium succinate (Solu-MEDROL) injection 125 mg 125 mg Intravenous Given   12/21/2018 1925 iohexol (OMNIPAQUE) 350 MG/ML injection (MULTI-DOSE) 85 mL 85 mL Intravenous Given        Past Medical/Surgical History:    Active Ambulatory Problems     Diagnosis Date Noted    COPD with acute exacerbation ([place] [address] ) 11/03/2016    Type 2 diabetes mellitus with hyperglycemia ([place] [address] ) 11/03/2016    Hypothyroidism 11/03/2016    Rheumatoid arthritis ([place] [address] ) 11/03/2016    Essential hypertension 11/03/2016  Hyperlipidemia 11/03/2016    GERD (gastroesophageal reflux disease) 11/03/2016    Hypoalbuminemia 11/03/2016    Hepatic steatosis 11/03/2016    Morbid (severe) obesity due to excess calories ([place] [address] ) 11/04/2016    BMI 40 0-44 9, adult ([place] [address] ) 11/05/2016    Acute cystitis 11/24/2017    Anxiety 06/02/2014    Candidiasis, cutaneous 06/30/2017    Chronic obstructive pulmonary disease ([place] [address] ) 05/29/2012    Constipation 11/29/2017    Diabetes mellitus, type 2 ([place] [address] ) 10/13/2015    Fibromyalgia 01/28/2014    Localized swelling, mass and lump, upper limb, right 12/11/2017    Osteoarthritis 05/29/2012    Osteopenia 09/17/2012    Renal cyst 11/29/2017    Vitamin B12 deficiency 11/10/2016    Vitamin D deficiency 05/29/2012     Resolved Ambulatory Problems     Diagnosis Date Noted    Sepsis ([place] [address] ) 11/03/2016    Lactic acidosis 11/03/2016    Community acquired pneumonia 11/03/2016    Hypomagnesemia 11/03/2016    Hypokalemia 11/03/2016    Vitamin D insufficiency 11/03/2016    Pleural effusion, bilateral 11/03/2016    Elevated d-dimer 11/03/2016    Chest pain 11/03/2016    Atelectasis 11/03/2016    Flank pain 11/21/2017    Intractable low back pain 11/24/2017     Past Medical History:   Diagnosis Date    Arthritis     Arthritis     Candidiasis of skin     COPD (chronic obstructive pulmonary disease) (HCC)     Current chronic use of systemic steroids     Degenerative arthritis of left knee     Diabetes mellitus ([place] [address] )     Disease of thyroid gland     Fibromyalgia     Fistula of knee     GERD (gastroesophageal reflux disease)     Hyperlipidemia     Hypertension     Medial meniscus tear     Migraine     Obesity     Obesity     Osteoarthritis     Osteopenia     Pneumonia     Psychiatric disorder     Rheumatoid arthritis ([place] [address] )     Rheumatoid arthritis ([place] [address] )     Sepsis ([place] [address] )     Tick bite     Vitamin B12 deficiency     Vitamin D deficiency      Admitting Diagnosis: Cough [R05]  Hypoxia [R09 02]  Acute exacerbation of chronic bronchitis (HCC) [J20 9, J42]  Pleural effusion, left [J90]    Age/Sex: 77 y o  female    Assessment/Plan:   * Bronchitis   Assessment & Plan     - 2-3 day history of dry turning into productive cough  - started on Levaquin 750 mg IV daily    Essential hypertension   Assessment & Plan     - restart home antihypertensive  - maintain systolic blood pressure <032 mmHg    COPD with acute exacerbation (HCC)   Assessment & Plan     - received DuoNeb treatment in the emergency department  - received Solu-Medrol 125 mg IV in the emergency department  - ordered Solu-Medrol 60 mg IV q 8 hours  - scheduled and p r n  bronchodilators ordered  - Pulmicort ordered q 12 hours    Rheumatoid arthritis ([facility] [address] )   Assessment & Plan     - patient does take methotrexate Q weekly, will need to establish when next dose is due    Hypothyroidism   Assessment & Plan     - continue home dose levothyroxine    Type 2 diabetes mellitus with hyperglycemia (HCC)   Assessment & Plan             Lab Results   Component Value Date     HGBA1C 7 7 (H) 09/21/2018       No results for input(s): POCGLU in the last 72 hours      Blood Sugar Average: Last 72 hrs:   - resume home Hypoglycemic medications  - Insulin subcutaneous order set placed  - maintain -180 mg/dl     Admission Orders:  Scheduled Meds:   Current Facility-Administered Medications:  acetaminophen 650 mg Oral Q6H PRN   albuterol 2 5 mg Nebulization Q4H PRN   budesonide 0 5 mg Nebulization Q12H   canagliflozin 100 mg Oral Daily   DULoxetine 20 mg Oral Daily   enoxaparin 40 mg Subcutaneous G32D [facility]   folic acid 9,734 mcg Oral Daily   furosemide 40 mg Oral Daily   insulin lispro 2-12 Units Subcutaneous TID AC   ipratropium 0 5 mg Nebulization TID   levalbuterol 1 25 mg Nebulization TID   levofloxacin 750 mg Intravenous Q24H   levothyroxine 125 mcg Oral Early Morning   lisinopril 40 mg Oral Daily   methylPREDNISolone sodium succinate 40 mg Intravenous Q12H [address]   mirtazapine 15 mg Oral HS   pantoprazole 40 mg Oral Daily   sitaGLIPtin 100 mg Oral Daily     PRN Meds:   acetaminophen    albuterol    SCDs  OOB as radha   Incentive spirometry hourly WA   POC glucose ac/hs   Daily wt   Ambulatory pulse ox   Sputum C&S  Blood cultures x 2  Influenza swab   ADA diet   Oscillatory Positive Expiratory Pressure

## 2018-12-22 NOTE — ASSESSMENT & PLAN NOTE
Lab Results   Component Value Date    HGBA1C 7 7 (H) 09/21/2018       Recent Labs      12/22/18   0743   POCGLU  213*       Blood Sugar Average: Last 72 hrs:    - resume home Hypoglycemic medications  - Insulin subcutaneous order set placed  - maintain -180 mg/dl  (P) 213

## 2018-12-22 NOTE — RESPIRATORY THERAPY NOTE
RT Protocol Note  Elvia Albertor 77 y o  female MRN: [id number]  Unit/Bed#: 937-21 Encounter: [id number]    Assessment    Principal Problem:    Bronchitis  Active Problems:    COPD with acute exacerbation ([place] [address] )    Type 2 diabetes mellitus with hyperglycemia ([place] [address] )    Hypothyroidism    Rheumatoid arthritis ([place] [address] )    Essential hypertension      Home Pulmonary Medications:  Patient uses a Spriva  She has a Ventolin inhaler and a nebulizer that she will use with Duoneb as needed  She denies home oxygen, but does use PAP therapy nightly  Correction, now patient is stating she doesn't use her PAP therapy at all, she was supposed to go back to visit Dr Medina Alexander but didn't  She does not want anything to do with PAP therapy now, she is doing well without it, according to her  Past Medical History:   Diagnosis Date    Arthritis     Arthritis     Candidiasis of skin     COPD (chronic obstructive pulmonary disease) ([place] [address] )     last assessed 11/21/2016    Current chronic use of systemic steroids     Degenerative arthritis of left knee     last assessed 1/20/2015    Diabetes mellitus ([place] [address] )     Disease of thyroid gland     hypo pill given to decrease thyroid       Fibromyalgia     Fistula of knee     last assessed 9/12/2014    GERD (gastroesophageal reflux disease)     Hyperlipidemia     Hypertension     Medial meniscus tear     of left knee, last assessed 9/12/2014    Migraine     states she has a hx of HA that she calls [employer] but never was dx by neurologist    Obesity     Obesity     Osteoarthritis     Osteopenia     Pneumonia     last assessed 11/16/2016    Psychiatric disorder     anxiety    Rheumatoid arthritis ([place] [address] )     Rheumatoid arthritis ([place] [address] )     Sepsis ([place] [address] )     last assessed 11/10/2016    Tick bite     last assessed 10/30/2015    Vitamin B12 deficiency     Vitamin D deficiency      Social History     Social History    Marital status: /Civil Union     Spouse name: N/A    Number of children: N/A    Years of education: N/A     Social History Main Topics    Smoking status: Former Smoker     Packs/day: 1 00     Years: 48 00     Types: E-Cigarettes     Quit date: 11/21/2012    Smokeless tobacco: Never Used      Comment: per allscripts - "current every day smoker, former smoker"    Alcohol use No      Comment: stopped drinking alcohol    Drug use: No    Sexual activity: No     Other Topics Concern    None     Social History Narrative    No living will           Subjective    Subjective Data: She stated her breathing is better than when she first came in, but is coughing a lot    Objective  PAP therapy HS  Bronchodialator therapy TID and prn, flutter valve to help improve cough and bronchial hygiene  Correction, patient does not want to use PAP therapy  Physical Exam:   Assessment Type: Assess only  General Appearance: Alert, Awake  Respiratory Pattern: Dyspnea with exertion, Dyspnea at rest  Chest Assessment: Chest expansion symmetrical  Bilateral Breath Sounds: Coarse, Rhonchi, Expiratory wheezes  Cough: Strong, Non-productive, Congested  O2 Device: She is presently on 2 lpm oxygen via nasal cannula    Vitals:  Blood pressure 134/67, pulse 70, temperature 98 1 °F (36 7 °C), temperature source Temporal, resp  rate 20, height 5' 5" (1 651 m), weight 114 kg (251 lb 5 2 oz), SpO2 91 %  Imaging and other studies: I have personally reviewed pertinent reports  O2 Device: She is presently on 2 lpm oxygen via nasal cannula     Plan    Respiratory Plan: Moderate/Severe Distress pathway, Home Bronchodilator Patient pathway, Vent/NIV/HFNC  Airway Clearance Plan: Flutter     Resp Comments: Received patient on oxygen, she just got placed in her room, she is sitting on side bed  BS: bilateral coarse rhonci/wheeze, npc coarse cough  She does have home pulmonary medicines she uses only as needed   She wears a PAP unit nightly

## 2018-12-23 LAB
ANION GAP SERPL CALCULATED.3IONS-SCNC: 7 MMOL/L (ref 4–13)
ATRIAL RATE: 89 BPM
BASOPHILS # BLD AUTO: 0.01 THOUSANDS/ΜL (ref 0–0.1)
BASOPHILS NFR BLD AUTO: 0 % (ref 0–1)
BUN SERPL-MCNC: 19 MG/DL (ref 5–25)
CALCIUM SERPL-MCNC: 9.4 MG/DL (ref 8.3–10.1)
CHLORIDE SERPL-SCNC: 102 MMOL/L (ref 100–108)
CO2 SERPL-SCNC: 29 MMOL/L (ref 21–32)
CREAT SERPL-MCNC: 0.76 MG/DL (ref 0.6–1.3)
EOSINOPHIL # BLD AUTO: 0 THOUSAND/ΜL (ref 0–0.61)
EOSINOPHIL NFR BLD AUTO: 0 % (ref 0–6)
ERYTHROCYTE [DISTWIDTH] IN BLOOD BY AUTOMATED COUNT: 16.1 % (ref 11.6–15.1)
EST. AVERAGE GLUCOSE BLD GHB EST-MCNC: 194 MG/DL
GFR SERPL CREATININE-BSD FRML MDRD: 80 ML/MIN/1.73SQ M
GLUCOSE SERPL-MCNC: 243 MG/DL (ref 65–140)
GLUCOSE SERPL-MCNC: 276 MG/DL (ref 65–140)
GLUCOSE SERPL-MCNC: 321 MG/DL (ref 65–140)
GLUCOSE SERPL-MCNC: 370 MG/DL (ref 65–140)
GLUCOSE SERPL-MCNC: 387 MG/DL (ref 65–140)
HBA1C MFR BLD: 8.4 % (ref 4.2–6.3)
HCT VFR BLD AUTO: 39.1 % (ref 34.8–46.1)
HGB BLD-MCNC: 12.4 G/DL (ref 11.5–15.4)
IMM GRANULOCYTES # BLD AUTO: 0.06 THOUSAND/UL (ref 0–0.2)
IMM GRANULOCYTES NFR BLD AUTO: 1 % (ref 0–2)
INR PPP: 1.07 (ref 0.86–1.17)
L PNEUMO1 AG UR QL IA.RAPID: NEGATIVE
LYMPHOCYTES # BLD AUTO: 1.1 THOUSANDS/ΜL (ref 0.6–4.47)
LYMPHOCYTES NFR BLD AUTO: 12 % (ref 14–44)
MAGNESIUM SERPL-MCNC: 2.1 MG/DL (ref 1.6–2.6)
MCH RBC QN AUTO: 31.5 PG (ref 26.8–34.3)
MCHC RBC AUTO-ENTMCNC: 31.7 G/DL (ref 31.4–37.4)
MCV RBC AUTO: 99 FL (ref 82–98)
MONOCYTES # BLD AUTO: 0.89 THOUSAND/ΜL (ref 0.17–1.22)
MONOCYTES NFR BLD AUTO: 10 % (ref 4–12)
NEUTROPHILS # BLD AUTO: 6.99 THOUSANDS/ΜL (ref 1.85–7.62)
NEUTS SEG NFR BLD AUTO: 77 % (ref 43–75)
NRBC BLD AUTO-RTO: 0 /100 WBCS
P AXIS: 74 DEGREES
PLATELET # BLD AUTO: 287 THOUSANDS/UL (ref 149–390)
PMV BLD AUTO: 10.9 FL (ref 8.9–12.7)
POTASSIUM SERPL-SCNC: 5 MMOL/L (ref 3.5–5.3)
PR INTERVAL: 164 MS
PROCALCITONIN SERPL-MCNC: 0.05 NG/ML
PROTHROMBIN TIME: 13.3 SECONDS (ref 11.8–14.2)
QRS AXIS: 59 DEGREES
QRSD INTERVAL: 96 MS
QT INTERVAL: 356 MS
QTC INTERVAL: 433 MS
RBC # BLD AUTO: 3.94 MILLION/UL (ref 3.81–5.12)
S PNEUM AG UR QL: NEGATIVE
SODIUM SERPL-SCNC: 138 MMOL/L (ref 136–145)
T WAVE AXIS: 63 DEGREES
VENTRICULAR RATE: 89 BPM
WBC # BLD AUTO: 9.05 THOUSAND/UL (ref 4.31–10.16)

## 2018-12-23 PROCEDURE — 85610 PROTHROMBIN TIME: CPT | Performed by: PHYSICIAN ASSISTANT

## 2018-12-23 PROCEDURE — 80048 BASIC METABOLIC PNL TOTAL CA: CPT | Performed by: FAMILY MEDICINE

## 2018-12-23 PROCEDURE — 99232 SBSQ HOSP IP/OBS MODERATE 35: CPT | Performed by: FAMILY MEDICINE

## 2018-12-23 PROCEDURE — 85025 COMPLETE CBC W/AUTO DIFF WBC: CPT | Performed by: FAMILY MEDICINE

## 2018-12-23 PROCEDURE — 87205 SMEAR GRAM STAIN: CPT | Performed by: FAMILY MEDICINE

## 2018-12-23 PROCEDURE — 94760 N-INVAS EAR/PLS OXIMETRY 1: CPT

## 2018-12-23 PROCEDURE — 87070 CULTURE OTHR SPECIMN AEROBIC: CPT | Performed by: FAMILY MEDICINE

## 2018-12-23 PROCEDURE — 83735 ASSAY OF MAGNESIUM: CPT | Performed by: PHYSICIAN ASSISTANT

## 2018-12-23 PROCEDURE — 82948 REAGENT STRIP/BLOOD GLUCOSE: CPT

## 2018-12-23 PROCEDURE — 83036 HEMOGLOBIN GLYCOSYLATED A1C: CPT | Performed by: FAMILY MEDICINE

## 2018-12-23 PROCEDURE — 93010 ELECTROCARDIOGRAM REPORT: CPT | Performed by: INTERNAL MEDICINE

## 2018-12-23 PROCEDURE — 84145 PROCALCITONIN (PCT): CPT | Performed by: FAMILY MEDICINE

## 2018-12-23 PROCEDURE — 94640 AIRWAY INHALATION TREATMENT: CPT

## 2018-12-23 RX ORDER — PREDNISONE 20 MG/1
40 TABLET ORAL DAILY
Status: DISCONTINUED | OUTPATIENT
Start: 2018-12-23 | End: 2018-12-26 | Stop reason: HOSPADM

## 2018-12-23 RX ADMIN — LEVOTHYROXINE SODIUM 125 MCG: 125 TABLET ORAL at 06:07

## 2018-12-23 RX ADMIN — FUROSEMIDE 40 MG: 40 TABLET ORAL at 08:47

## 2018-12-23 RX ADMIN — LEVALBUTEROL 1.25 MG: 1.25 SOLUTION, CONCENTRATE RESPIRATORY (INHALATION) at 14:05

## 2018-12-23 RX ADMIN — LEVALBUTEROL 1.25 MG: 1.25 SOLUTION, CONCENTRATE RESPIRATORY (INHALATION) at 20:02

## 2018-12-23 RX ADMIN — ENOXAPARIN SODIUM 40 MG: 40 INJECTION SUBCUTANEOUS at 08:51

## 2018-12-23 RX ADMIN — IPRATROPIUM BROMIDE 0.5 MG: 0.5 SOLUTION RESPIRATORY (INHALATION) at 14:05

## 2018-12-23 RX ADMIN — DULOXETINE HYDROCHLORIDE 20 MG: 20 CAPSULE, DELAYED RELEASE ORAL at 08:47

## 2018-12-23 RX ADMIN — IPRATROPIUM BROMIDE 0.5 MG: 0.5 SOLUTION RESPIRATORY (INHALATION) at 09:30

## 2018-12-23 RX ADMIN — INSULIN LISPRO 4 UNITS: 100 INJECTION, SOLUTION INTRAVENOUS; SUBCUTANEOUS at 08:47

## 2018-12-23 RX ADMIN — INSULIN LISPRO 8 UNITS: 100 INJECTION, SOLUTION INTRAVENOUS; SUBCUTANEOUS at 11:33

## 2018-12-23 RX ADMIN — MIRTAZAPINE 15 MG: 15 TABLET, FILM COATED ORAL at 21:14

## 2018-12-23 RX ADMIN — IPRATROPIUM BROMIDE 0.5 MG: 0.5 SOLUTION RESPIRATORY (INHALATION) at 20:03

## 2018-12-23 RX ADMIN — LEVALBUTEROL 1.25 MG: 1.25 SOLUTION, CONCENTRATE RESPIRATORY (INHALATION) at 09:30

## 2018-12-23 RX ADMIN — LISINOPRIL 40 MG: 20 TABLET ORAL at 08:46

## 2018-12-23 RX ADMIN — PREDNISONE 40 MG: 20 TABLET ORAL at 08:47

## 2018-12-23 RX ADMIN — FOLIC ACID 1000 MCG: 1 TABLET ORAL at 08:47

## 2018-12-23 RX ADMIN — BUDESONIDE 0.5 MG: 0.5 INHALANT RESPIRATORY (INHALATION) at 20:03

## 2018-12-23 RX ADMIN — PANTOPRAZOLE SODIUM 40 MG: 40 TABLET, DELAYED RELEASE ORAL at 08:47

## 2018-12-23 RX ADMIN — SITAGLIPTIN 100 MG: 100 TABLET, FILM COATED ORAL at 08:47

## 2018-12-23 RX ADMIN — INSULIN LISPRO 10 UNITS: 100 INJECTION, SOLUTION INTRAVENOUS; SUBCUTANEOUS at 15:27

## 2018-12-23 RX ADMIN — LEVOFLOXACIN 750 MG: 5 INJECTION, SOLUTION INTRAVENOUS at 21:15

## 2018-12-23 RX ADMIN — BUDESONIDE 0.5 MG: 0.5 INHALANT RESPIRATORY (INHALATION) at 09:30

## 2018-12-23 NOTE — ASSESSMENT & PLAN NOTE
With parapneumonic effusion  Continue Levaquin 750 mg IV daily  Check urine Legionella and strep pneumo antigen  Check sputum culture  Negative procalcitonin  Leukocytosis resolved

## 2018-12-23 NOTE — PROGRESS NOTES
Progress Note - Vaishnavi Rick 1950, 77 y o  female MRN: [id number]    Unit/Bed#: 409-01 Encounter: [id number]    Primary Care Provider: Carlyle Allan MD   Date and time admitted to hospital: 12/21/2018  4:51 PM        Acute respiratory failure with hypoxia [facility])   Assessment & Plan    secondary to community-acquired pneumonia and COPD with mild exacerbation  Ambulate Q shift  Maintain oxygen saturation 88-92%     * Community acquired pneumonia of left lower lobe of lung ([address] )   Assessment & Plan    With parapneumonic effusion  Continue Levaquin 750 mg IV daily  Check urine Legionella and strep pneumo antigen  Check sputum culture  Negative procalcitonin  Leukocytosis resolved     Type 2 diabetes mellitus with hyperglycemia [facility])   Assessment & Plan    Lab Results   Component Value Date    HGBA1C 7 7 (H) 09/21/2018       Recent Labs      12/22/18   0743  12/22/18   1054  12/22/18   1515  12/23/18   0741   POCGLU  213*  342*  490*  243*       Blood Sugar Average: Last 72 hrs:    - resume home Hypoglycemic medications  - Insulin subcutaneous order set placed  - maintain -180 mg/dl  (P) 322     COPD with acute exacerbation (HCC)   Assessment & Plan    Discontinue IV Solu-Medrol and transitioned to p o  Prednisone 12/23/2018  Continue frequent nebulizer treatments via respiratory protocol  Initiate airway clearance protocol  Incentive spirometry         Progress Note - Vaishnavi Rick 77 y o  female MRN: [id number]    Unit/Bed#: 409-01 Encounter: [id number]        Subjective:   Patient seen and examined at bedside   she feels better today with less SOB    Objective:     Vitals:   Vitals:    12/22/18 2300   BP: 132/59   Pulse: 84   Resp: 20   Temp: 98 4 °F (36 9 °C)   SpO2: 96%     Body mass index is 42 41 kg/m²      Intake/Output Summary (Last 24 hours) at 12/23/18 0807  Last data filed at 12/23/18 0600   Gross per 24 hour   Intake             1440 ml   Output             2100 ml   Net             -660 ml Physical Exam:   /59 (BP Location: Left arm)   Pulse 84   Temp 98 4 °F (36 9 °C) (Temporal)   Resp 20   Ht 5' 5" (1 651 m)   Wt 116 kg (254 lb 13 6 oz)   SpO2 96%   BMI 42 41 kg/m²   General appearance: alert and oriented, in no acute distress  Head: Normocephalic, without obvious abnormality, atraumatic  Lungs:   Inspiratory and expiratory wheezing without rales or rhonchi  Heart: regular rate and rhythm, S1, S2 normal, no murmur, click, rub or gallop  Abdomen: soft, non-tender; bowel sounds normal; no masses,  no organomegaly  Extremities: extremities normal, warm and well-perfused; no cyanosis, clubbing, or edema  Pulses: 2+ and symmetric  Neurologic: Grossly normal     Invasive Devices     Peripheral Intravenous Line            Peripheral IV 12/21/18 Right Antecubital 1 day                  Results from last 7 days  Lab Units 12/23/18  0609 12/22/18  0453 12/21/18  1839   WBC Thousand/uL 9 05 7 42 7 69   HEMOGLOBIN g/dL 12 4 13 4 13 0   HEMATOCRIT % 39 1 42 1 41 2   PLATELETS Thousands/uL 287 257 261         Results from last 7 days  Lab Units 12/23/18  0609 12/22/18  0453 12/21/18  1839   POTASSIUM mmol/L 5 0 4 3 4 0   CHLORIDE mmol/L 102 101 102   CO2 mmol/L 29 28 31   BUN mg/dL 19 11 11   CREATININE mg/dL 0 76 0 66 0 70   CALCIUM mg/dL 9 4 9 2 8 7   ALK PHOS U/L  --  79 80   ALT U/L  --  32 33   AST U/L  --  12 16       Medication Administration - last 24 hours from 12/22/2018 0807 to 12/23/2018 0251       Date/Time Order Dose Route Action Action by     12/22/2018 2107 levofloxacin (LEVAQUIN) IVPB (premix) 750 mg 750 mg Intravenous [address] Ival Kelton, RN     12/22/2018 1947 budesonide (PULMICORT) inhalation solution 0 5 mg 0 5 mg Nebulization Given Meka Nolasco, RT     12/22/2018 0901 budesonide (PULMICORT) inhalation solution 0 5 mg 0 5 mg Nebulization Given Enrico Monreal, RT     12/22/2018 0910 DULoxetine (CYMBALTA) delayed release capsule 20 mg 20 mg Oral Given Tristen Renee, RN 74/38/9546 7988 folic acid (FOLVITE) tablet 1,000 mcg 1,000 mcg Oral Given Vandana Martel, RN     12/22/2018 2897 furosemide (LASIX) tablet 40 mg 40 mg Oral Given Vandana Yumi, RN     12/22/2018 1149 canagliflozin (INVOKANA) tablet 100 mg 100 mg Oral Not Given Vandana Martel, RN     12/22/2018 0910 sitaGLIPtin (JANUVIA) tablet 100 mg 100 mg Oral Given Vandana Martel, RN     12/23/2018 4968 levothyroxine tablet 125 mcg 125 mcg Oral Given Jacinto Nj, RN     12/22/2018 0913 lisinopril (ZESTRIL) tablet 40 mg 40 mg Oral Given Vandana Martel, RN     12/22/2018 2104 mirtazapine (REMERON) tablet 15 mg 15 mg Oral Given Edinson Vasquez, RN     12/22/2018 0910 pantoprazole (PROTONIX) EC tablet 40 mg 40 mg Oral Given Vandana Delonte, RN     12/22/2018 1518 insulin lispro (HumaLOG) 100 units/mL subcutaneous injection 2-12 Units 12 Units Subcutaneous Given Edinson Vasquez, RN     12/22/2018 1140 insulin lispro (HumaLOG) 100 units/mL subcutaneous injection 2-12 Units 8 Units Subcutaneous Given Vandana Martel, RN     12/22/2018 0913 insulin lispro (HumaLOG) 100 units/mL subcutaneous injection 2-12 Units 4 Units Subcutaneous Given Vandana Martel, RN     12/22/2018 1947 levalbuterol (XOPENEX) inhalation solution 1 25 mg 1 25 mg Nebulization Given Meka Nolasco, RT     12/22/2018 1317 levalbuterol (Cephus Searing) inhalation solution 1 25 mg 1 25 mg Nebulization Given Enrico Monreal, RT     12/22/2018 0901 levalbuterol (XOPENEX) inhalation solution 1 25 mg 1 25 mg Nebulization Given Enrico Monreal, RT     12/22/2018 1947 ipratropium (ATROVENT) 0 02 % inhalation solution 0 5 mg 0 5 mg Nebulization Given Meka Nolasco, RT     12/22/2018 1317 ipratropium (ATROVENT) 0 02 % inhalation solution 0 5 mg 0 5 mg Nebulization Given Enrico Monreal, RT     12/22/2018 0901 ipratropium (ATROVENT) 0 02 % inhalation solution 0 5 mg 0 5 mg Nebulization Given Enrico Monreal, RT     12/22/2018 2105 methylPREDNISolone sodium succinate (Solu-MEDROL) injection 40 mg 40 mg Intravenous Given Kerwin Castillo RN     12/22/2018 1414 enoxaparin (LOVENOX) subcutaneous injection 40 mg 40 mg Subcutaneous Given Treasure West RN     12/23/2018 8121 canagliflozin (INVOKANA) tablet 100 mg 100 mg Oral Given Brandie Cruz RN     12/22/2018 1415 Tofacitinib Citrate TB24 11 mg 11 mg Oral Given Treasure West RN            Lab, Imaging and other studies: I have personally reviewed pertinent reports      VTE Pharmacologic Prophylaxis: Enoxaparin (Lovenox)  VTE Mechanical Prophylaxis: sequential compression device     Elyn Goodpasture, MD  12/23/2018,8:07 AM

## 2018-12-23 NOTE — ASSESSMENT & PLAN NOTE
Lab Results   Component Value Date    HGBA1C 7 7 (H) 09/21/2018       Recent Labs      12/22/18   0743  12/22/18   1054  12/22/18   1515  12/23/18   0741   POCGLU  213*  342*  490*  243*       Blood Sugar Average: Last 72 hrs:    - resume home Hypoglycemic medications  - Insulin subcutaneous order set placed  - maintain -180 mg/dl  (P) 322

## 2018-12-23 NOTE — ASSESSMENT & PLAN NOTE
Discontinue IV Solu-Medrol and transitioned to p o   Prednisone 12/23/2018  Continue frequent nebulizer treatments via respiratory protocol  Initiate airway clearance protocol  Incentive spirometry

## 2018-12-24 LAB
ANION GAP SERPL CALCULATED.3IONS-SCNC: 6 MMOL/L (ref 4–13)
BASOPHILS # BLD AUTO: 0.01 THOUSANDS/ΜL (ref 0–0.1)
BASOPHILS NFR BLD AUTO: 0 % (ref 0–1)
BUN SERPL-MCNC: 25 MG/DL (ref 5–25)
CALCIUM SERPL-MCNC: 9.6 MG/DL (ref 8.3–10.1)
CHLORIDE SERPL-SCNC: 104 MMOL/L (ref 100–108)
CO2 SERPL-SCNC: 30 MMOL/L (ref 21–32)
CREAT SERPL-MCNC: 0.78 MG/DL (ref 0.6–1.3)
EOSINOPHIL # BLD AUTO: 0.03 THOUSAND/ΜL (ref 0–0.61)
EOSINOPHIL NFR BLD AUTO: 0 % (ref 0–6)
ERYTHROCYTE [DISTWIDTH] IN BLOOD BY AUTOMATED COUNT: 16.2 % (ref 11.6–15.1)
GFR SERPL CREATININE-BSD FRML MDRD: 77 ML/MIN/1.73SQ M
GLUCOSE SERPL-MCNC: 145 MG/DL (ref 65–140)
GLUCOSE SERPL-MCNC: 184 MG/DL (ref 65–140)
GLUCOSE SERPL-MCNC: 227 MG/DL (ref 65–140)
GLUCOSE SERPL-MCNC: 324 MG/DL (ref 65–140)
GLUCOSE SERPL-MCNC: 351 MG/DL (ref 65–140)
HCT VFR BLD AUTO: 41.5 % (ref 34.8–46.1)
HGB BLD-MCNC: 12.9 G/DL (ref 11.5–15.4)
IMM GRANULOCYTES # BLD AUTO: 0.05 THOUSAND/UL (ref 0–0.2)
IMM GRANULOCYTES NFR BLD AUTO: 1 % (ref 0–2)
LYMPHOCYTES # BLD AUTO: 1.86 THOUSANDS/ΜL (ref 0.6–4.47)
LYMPHOCYTES NFR BLD AUTO: 20 % (ref 14–44)
MAGNESIUM SERPL-MCNC: 2.3 MG/DL (ref 1.6–2.6)
MCH RBC QN AUTO: 31 PG (ref 26.8–34.3)
MCHC RBC AUTO-ENTMCNC: 31.1 G/DL (ref 31.4–37.4)
MCV RBC AUTO: 100 FL (ref 82–98)
MONOCYTES # BLD AUTO: 1.06 THOUSAND/ΜL (ref 0.17–1.22)
MONOCYTES NFR BLD AUTO: 12 % (ref 4–12)
NEUTROPHILS # BLD AUTO: 6.1 THOUSANDS/ΜL (ref 1.85–7.62)
NEUTS SEG NFR BLD AUTO: 67 % (ref 43–75)
NRBC BLD AUTO-RTO: 0 /100 WBCS
PLATELET # BLD AUTO: 303 THOUSANDS/UL (ref 149–390)
PMV BLD AUTO: 10.6 FL (ref 8.9–12.7)
POTASSIUM SERPL-SCNC: 3.9 MMOL/L (ref 3.5–5.3)
RBC # BLD AUTO: 4.16 MILLION/UL (ref 3.81–5.12)
SODIUM SERPL-SCNC: 140 MMOL/L (ref 136–145)
WBC # BLD AUTO: 9.11 THOUSAND/UL (ref 4.31–10.16)

## 2018-12-24 PROCEDURE — 94760 N-INVAS EAR/PLS OXIMETRY 1: CPT

## 2018-12-24 PROCEDURE — 94640 AIRWAY INHALATION TREATMENT: CPT

## 2018-12-24 PROCEDURE — 83735 ASSAY OF MAGNESIUM: CPT | Performed by: PHYSICIAN ASSISTANT

## 2018-12-24 PROCEDURE — 80048 BASIC METABOLIC PNL TOTAL CA: CPT | Performed by: FAMILY MEDICINE

## 2018-12-24 PROCEDURE — 94664 DEMO&/EVAL PT USE INHALER: CPT

## 2018-12-24 PROCEDURE — 85025 COMPLETE CBC W/AUTO DIFF WBC: CPT | Performed by: FAMILY MEDICINE

## 2018-12-24 PROCEDURE — 82948 REAGENT STRIP/BLOOD GLUCOSE: CPT

## 2018-12-24 PROCEDURE — 99232 SBSQ HOSP IP/OBS MODERATE 35: CPT | Performed by: FAMILY MEDICINE

## 2018-12-24 RX ORDER — GUAIFENESIN/DEXTROMETHORPHAN 100-10MG/5
10 SYRUP ORAL ONCE
Status: COMPLETED | OUTPATIENT
Start: 2018-12-24 | End: 2018-12-24

## 2018-12-24 RX ORDER — GUAIFENESIN 100 MG/5ML
200 SOLUTION ORAL EVERY 4 HOURS PRN
Status: DISCONTINUED | OUTPATIENT
Start: 2018-12-24 | End: 2018-12-26 | Stop reason: HOSPADM

## 2018-12-24 RX ORDER — LEVALBUTEROL 1.25 MG/.5ML
1.25 SOLUTION, CONCENTRATE RESPIRATORY (INHALATION)
Status: DISCONTINUED | OUTPATIENT
Start: 2018-12-25 | End: 2018-12-26 | Stop reason: HOSPADM

## 2018-12-24 RX ORDER — LEVOFLOXACIN 750 MG/1
750 TABLET ORAL EVERY 24 HOURS
Status: DISCONTINUED | OUTPATIENT
Start: 2018-12-24 | End: 2018-12-26

## 2018-12-24 RX ADMIN — LEVALBUTEROL 1.25 MG: 1.25 SOLUTION, CONCENTRATE RESPIRATORY (INHALATION) at 19:25

## 2018-12-24 RX ADMIN — FOLIC ACID 1000 MCG: 1 TABLET ORAL at 10:44

## 2018-12-24 RX ADMIN — MIRTAZAPINE 15 MG: 15 TABLET, FILM COATED ORAL at 21:03

## 2018-12-24 RX ADMIN — ENOXAPARIN SODIUM 40 MG: 40 INJECTION SUBCUTANEOUS at 10:45

## 2018-12-24 RX ADMIN — LISINOPRIL 40 MG: 20 TABLET ORAL at 10:47

## 2018-12-24 RX ADMIN — SITAGLIPTIN 100 MG: 100 TABLET, FILM COATED ORAL at 10:43

## 2018-12-24 RX ADMIN — IPRATROPIUM BROMIDE 0.5 MG: 0.5 SOLUTION RESPIRATORY (INHALATION) at 19:25

## 2018-12-24 RX ADMIN — PANTOPRAZOLE SODIUM 40 MG: 40 TABLET, DELAYED RELEASE ORAL at 10:44

## 2018-12-24 RX ADMIN — BUDESONIDE 0.5 MG: 0.5 INHALANT RESPIRATORY (INHALATION) at 07:27

## 2018-12-24 RX ADMIN — DULOXETINE HYDROCHLORIDE 20 MG: 20 CAPSULE, DELAYED RELEASE ORAL at 10:43

## 2018-12-24 RX ADMIN — LEVALBUTEROL 1.25 MG: 1.25 SOLUTION, CONCENTRATE RESPIRATORY (INHALATION) at 13:02

## 2018-12-24 RX ADMIN — LEVOTHYROXINE SODIUM 125 MCG: 125 TABLET ORAL at 06:09

## 2018-12-24 RX ADMIN — IPRATROPIUM BROMIDE 0.5 MG: 0.5 SOLUTION RESPIRATORY (INHALATION) at 07:27

## 2018-12-24 RX ADMIN — IPRATROPIUM BROMIDE 0.5 MG: 0.5 SOLUTION RESPIRATORY (INHALATION) at 13:02

## 2018-12-24 RX ADMIN — LEVALBUTEROL 1.25 MG: 1.25 SOLUTION, CONCENTRATE RESPIRATORY (INHALATION) at 07:27

## 2018-12-24 RX ADMIN — GUAIFENESIN 200 MG: 100 SOLUTION ORAL at 21:02

## 2018-12-24 RX ADMIN — BUDESONIDE 0.5 MG: 0.5 INHALANT RESPIRATORY (INHALATION) at 19:25

## 2018-12-24 RX ADMIN — LEVOFLOXACIN 750 MG: 750 TABLET, FILM COATED ORAL at 21:02

## 2018-12-24 RX ADMIN — FUROSEMIDE 40 MG: 40 TABLET ORAL at 10:44

## 2018-12-24 RX ADMIN — INSULIN LISPRO 10 UNITS: 100 INJECTION, SOLUTION INTRAVENOUS; SUBCUTANEOUS at 16:38

## 2018-12-24 RX ADMIN — PREDNISONE 40 MG: 20 TABLET ORAL at 10:45

## 2018-12-24 RX ADMIN — INSULIN LISPRO 4 UNITS: 100 INJECTION, SOLUTION INTRAVENOUS; SUBCUTANEOUS at 11:43

## 2018-12-24 RX ADMIN — GUAIFENESIN AND DEXTROMETHORPHAN 10 ML: 100; 10 SYRUP ORAL at 00:16

## 2018-12-24 NOTE — ASSESSMENT & PLAN NOTE
Lab Results   Component Value Date    HGBA1C 8 4 (H) 12/23/2018       Recent Labs      12/23/18   1118  12/23/18   1525  12/23/18   1606  12/24/18   0721   POCGLU  321*  387*  370*  145*       Blood Sugar Average: Last 72 hrs:    - resume home Hypoglycemic medications  - Insulin subcutaneous order set placed

## 2018-12-24 NOTE — ASSESSMENT & PLAN NOTE
secondary to community-acquired pneumonia and COPD with mild exacerbation  Ambulate Q shift  Maintain oxygen saturation 88-92%  Resolved

## 2018-12-24 NOTE — PLAN OF CARE
Problem: Potential for Falls  Goal: Patient will remain free of falls  INTERVENTIONS:  - Assess patient frequently for physical needs  -  Identify cognitive and physical deficits and behaviors that affect risk of falls  -  [place] fall precautions as indicated by assessment   - Educate patient/family on patient safety including physical limitations  - Instruct patient to call for assistance with activity based on assessment  - Modify environment to reduce risk of injury  - Consider OT/PT consult to assist with strengthening/mobility   Outcome: Progressing      Problem: PAIN - ADULT  Goal: Verbalizes/displays adequate comfort level or baseline comfort level  Interventions:  - Encourage patient to monitor pain and request assistance  - Assess pain using appropriate pain scale (0-10 pain scale)  - Administer analgesics based on type and severity of pain and evaluate response  - Implement non-pharmacological measures as appropriate and evaluate response  - Notify physician/advanced practitioner if interventions unsuccessful or patient reports new pain   Outcome: Progressing      Problem: INFECTION - ADULT  Goal: Absence or prevention of progression during hospitalization  INTERVENTIONS:  - Assess and monitor for signs and symptoms of infection  - Monitor lab/diagnostic results  - Monitor all insertion sites, i e  indwelling lines  - [place] appropriate cooling/warming therapies per order  - Administer medications as ordered  - Instruct and encourage patient and family to use good hand hygiene technique  - Identify and instruct in appropriate isolation precautions for identified infection/condition   Droplet r/o flu    Outcome: Progressing      Problem: SAFETY ADULT  Goal: Maintain or return to baseline ADL function  INTERVENTIONS:  -  Assess patient's ability to carry out ADLs; assess patient's baseline (independent)  - Assess/evaluate cause of self-care deficits   - Assess range of motion  - Assess patient's mobility; develop plan if impaired (uses walker with arthritis exacerbations)  - Assess patient's need for assistive devices and provide as appropriate (uses walker with arthritis exacerbations)  - Encourage maximum independence but intervene and supervise when necessary  ¯ Involve family in performance of ADLs  ¯ Assess for home care needs following discharge   ¯ Request OT consult to assist with ADL evaluation and planning for discharge  ¯ Provide patient education as appropriate   Outcome: Progressing    Goal: Maintain or return mobility status to optimal level  INTERVENTIONS:  - Assess patient's baseline mobility status (ambulation, transfers, stairs, etc )    - Identify cognitive and physical deficits and behaviors that affect mobility  - Identify mobility aids required to assist with transfers and/or ambulation (walker)  - [place] fall precautions as indicated by assessment   High fall risk    - Record patient progress and toleration of activity level on Mobility SBAR; progress patient to next Phase/Stage  - Instruct patient to call for assistance with activity based on assessment  - Request Rehabilitation consult to assist with strengthening/weightbearing, etc    Outcome: Progressing      Problem: DISCHARGE PLANNING  Goal: Discharge to home or other facility with appropriate resources  INTERVENTIONS:  - Identify barriers to discharge w/patient and caregiver  - Arrange for needed discharge resources and transportation as appropriate  - Identify discharge learning needs (meds, wound care, etc   - Refer to Case Management Department for coordinating discharge planning if the patient needs post-hospital services based on physician/advanced practitioner order or complex needs related to functional status, cognitive ability, or social support system   Outcome: Progressing      Problem: Knowledge Deficit  Goal: Patient/family/caregiver demonstrates understanding of disease process, treatment plan, medications, and discharge instructions  Complete learning assessment and assess knowledge base    Interventions:  - Provide teaching at level of understanding  - Provide teaching via preferred learning methods (verbal, written, demonstration)   Outcome: Progressing      Problem: RESPIRATORY - ADULT  Goal: Achieves optimal ventilation and oxygenation  INTERVENTIONS:  - Assess for changes in respiratory status  - Assess for changes in mentation and behavior  - Position to facilitate oxygenation and minimize respiratory effort  - Oxygen administration by appropriate delivery method based on oxygen saturation (per order) or ABGs  - Initiate smoking cessation education as indicated  - Encourage broncho-pulmonary hygiene including cough, deep breathe, Incentive Spirometry  - Assess the need for suctioning and aspirate as needed  - Assess and instruct to report SOB or any respiratory difficulty  - Respiratory Therapy support as indicated   Outcome: Progressing      Problem: Prexisting or High Potential for Compromised Skin Integrity  Goal: Skin integrity is maintained or improved  INTERVENTIONS:  - Identify patients at risk for skin breakdown  - Assess and monitor skin integrity  - Assess and monitor nutrition and hydration status  - Monitor labs (i e  albumin)  - Assess for incontinence   - Turn and reposition patient  - Assist with mobility/ambulation  - Relieve pressure over bony prominences  - Avoid friction and shearing  - Provide appropriate hygiene as needed including keeping skin clean and dry  - Evaluate need for skin moisturizer/barrier cream  - Collaborate with interdisciplinary team (i e  Nutrition, Rehabilitation, etc )   - Patient/family teaching   Outcome: Progressing      Problem: DISCHARGE PLANNING - CARE MANAGEMENT  Goal: Discharge to post-acute care or home with appropriate resources  INTERVENTIONS:  - Conduct assessment to determine patient/family and health care team treatment goals, and need for post-acute services based on payer coverage, community resources, and patient preferences, and barriers to discharge  - Address psychosocial, clinical, and financial barriers to discharge as identified in assessment in conjunction with the patient/family and health care team  - Arrange appropriate level of post-acute services according to patient's   needs and preference and payer coverage in collaboration with the physician and health care team  - Communicate with and update the patient/family, physician, and health care team regarding progress on the discharge plan  - Arrange appropriate transportation to post-acute venues  Pt's lace score is 79  Pt is a HRR     Outcome: Progressing

## 2018-12-24 NOTE — PROGRESS NOTES
Progress Note - Vaishnavi Rick 1950, 77 y o  female MRN: [id number]    Unit/Bed#: 409-01 Encounter: [id number]    Primary Care Provider: Carlyle Allan MD   Date and time admitted to hospital: 12/21/2018  4:51 PM        Acute respiratory failure with hypoxia [facility])   Assessment & Plan    secondary to community-acquired pneumonia and COPD with mild exacerbation  Ambulate Q shift  Maintain oxygen saturation 88-92%  Resolved      * Community acquired pneumonia of left lower lobe of lung ([facility] [address] )   Assessment & Plan    With parapneumonic effusion  Continue Levaquin 750 mg IV daily  Check sputum culture  Negative procalcitonin x 2   Leukocytosis resolved     Rheumatoid arthritis ([facility] [address] )   Assessment & Plan    Kam Gayer in the setting of acute infection  May resume once patient is afebrile for 48 hours and improved  Resume xeljanz on discharge      Type 2 diabetes mellitus with hyperglycemia [facility])   Assessment & Plan    Lab Results   Component Value Date    HGBA1C 8 4 (H) 12/23/2018       Recent Labs      12/23/18   1118  12/23/18   1525  12/23/18   1606  12/24/18   0721   POCGLU  321*  387*  370*  145*       Blood Sugar Average: Last 72 hrs:    - resume home Hypoglycemic medications  - Insulin subcutaneous order set placed       COPD with acute exacerbation (HCC)   Assessment & Plan    Discontinue IV Solu-Medrol and transitioned to p o  Prednisone 12/23/2018  Continue frequent nebulizer treatments via respiratory protocol  Initiate airway clearance protocol  Incentive spirometry         Progress Note - Vaishnavi Rick 77 y o  female MRN: [id number]    Unit/Bed#: 409-01 Encounter: [id number]        Subjective:   Patient seen and examined at bedside  Feels "washed out"    Objective:     Vitals:   Vitals:    12/24/18 0746   BP: 132/62   Pulse: 84   Resp: 20   Temp: 97 7 °F (36 5 °C)   SpO2: 97%     Body mass index is 41 31 kg/m²      Intake/Output Summary (Last 24 hours) at 12/24/18 0801  Last data filed at 12/24/18 Jose Humbles   Gross per 24 hour   Intake             1200 ml   Output             1600 ml   Net             -400 ml       Physical Exam:   /62 (BP Location: Right arm)   Pulse 84   Temp 97 7 °F (36 5 °C) (Temporal)   Resp 20   Ht 5' 5" (1 651 m)   Wt 113 kg (248 lb 3 8 oz)   SpO2 97%   BMI 41 31 kg/m²   General appearance: alert and oriented, in no acute distress  Head: Normocephalic, without obvious abnormality, atraumatic  Lungs: clear to auscultation bilaterally  Heart: regular rate and rhythm, S1, S2 normal, no murmur, click, rub or gallop  Abdomen: soft, non-tender; bowel sounds normal; no masses,  no organomegaly  Extremities: extremities normal, warm and well-perfused; no cyanosis, clubbing, or edema  Pulses: 2+ and symmetric  Neurologic: Grossly normal     Invasive Devices     Peripheral Intravenous Line            Peripheral IV 12/23/18 Left Hand less than 1 day                  Results from last 7 days  Lab Units 12/24/18  0454 12/23/18  0609 12/22/18  0453   WBC Thousand/uL 9 11 9 05 7 42   HEMOGLOBIN g/dL 12 9 12 4 13 4   HEMATOCRIT % 41 5 39 1 42 1   PLATELETS Thousands/uL 303 287 257         Results from last 7 days  Lab Units 12/24/18  0454 12/23/18  0609 12/22/18  0453 12/21/18  1839   POTASSIUM mmol/L 3 9 5 0 4 3 4 0   CHLORIDE mmol/L 104 102 101 102   CO2 mmol/L 30 29 28 31   BUN mg/dL 25 19 11 11   CREATININE mg/dL 0 78 0 76 0 66 0 70   CALCIUM mg/dL 9 6 9 4 9 2 8 7   ALK PHOS U/L  --   --  79 80   ALT U/L  --   --  32 33   AST U/L  --   --  12 16       Medication Administration - last 24 hours from 12/23/2018 0801 to 12/24/2018 0801       Date/Time Order Dose Route Action Action by     12/23/2018 2115 levofloxacin (LEVAQUIN) IVPB (premix) 750 mg 750 mg Intravenous Edenilson 37 Sherri Rodarte RN     12/24/2018 0727 budesonide (PULMICORT) inhalation solution 0 5 mg 0 5 mg Nebulization Given RT Julio Cesar     12/23/2018 2003 budesonide (PULMICORT) inhalation solution 0 5 mg 0 5 mg Nebulization Given Brendan Nolasco, RT     12/23/2018 0930 budesonide (PULMICORT) inhalation solution 0 5 mg 0 5 mg Nebulization Given Enrico Monreal, RT     12/23/2018 0847 DULoxetine (CYMBALTA) delayed release capsule 20 mg 20 mg Oral Given Vergia Milder, RN     22/30/6121 2363 folic acid (FOLVITE) tablet 1,000 mcg 1,000 mcg Oral Given Vergia Milder, RN     12/23/2018 0847 furosemide (LASIX) tablet 40 mg 40 mg Oral Given Vergia Milder, RN     12/23/2018 0847 sitaGLIPtin (JANUVIA) tablet 100 mg 100 mg Oral Given Vergia Milder, RN     12/24/2018 6592 levothyroxine tablet 125 mcg 125 mcg Oral Given Velvet Gaucher, RN     12/23/2018 0846 lisinopril (ZESTRIL) tablet 40 mg 40 mg Oral Given Vergia Milder, RN     12/23/2018 8744 mirtazapine (REMERON) tablet 15 mg 15 mg Oral Given Minal Turciso, RN     12/23/2018 0847 pantoprazole (PROTONIX) EC tablet 40 mg 40 mg Oral Given Vergia Milder, RN     12/24/2018 0723 insulin lispro (HumaLOG) 100 units/mL subcutaneous injection 2-12 Units 2 Units Subcutaneous Not Given Duane Santiago, RN     12/23/2018 1527 insulin lispro (HumaLOG) 100 units/mL subcutaneous injection 2-12 Units 10 Units Subcutaneous Given Minal Turcios, RN     12/23/2018 1133 insulin lispro (HumaLOG) 100 units/mL subcutaneous injection 2-12 Units 8 Units Subcutaneous Given Vergia Milder, RN     12/23/2018 0847 insulin lispro (HumaLOG) 100 units/mL subcutaneous injection 2-12 Units 4 Units Subcutaneous Given Vergia Milder, RN     12/24/2018 0689 levalbuterol (XOPENEX) inhalation solution 1 25 mg 1 25 mg Nebulization Given Partha Carrasco, RT     12/23/2018 2002 levalbuterol ([place]) inhalation solution 1 25 mg 1 25 mg Nebulization Given Meka Nolasco, RT     12/23/2018 1405 levalbuterol (XOPENEX) inhalation solution 1 25 mg 1 25 mg Nebulization Given Meka Nolasco, RT     12/23/2018 0930 levalbuterol (XOPENEX) inhalation solution 1 25 mg 1 25 mg Nebulization Given Adam 19, RT     12/24/2018 2570 ipratropium (ATROVENT) 0 02 % inhalation solution 0 5 mg 0 5 mg Nebulization Given Ron Blackmon, RT     12/23/2018 2003 ipratropium (ATROVENT) 0 02 % inhalation solution 0 5 mg 0 5 mg Nebulization Given Meka Nolasco, RT     12/23/2018 1405 ipratropium (ATROVENT) 0 02 % inhalation solution 0 5 mg 0 5 mg Nebulization Given Meka Nolasco, RT     12/23/2018 0930 ipratropium (ATROVENT) 0 02 % inhalation solution 0 5 mg 0 5 mg Nebulization Given Enrico Monreal, RT     12/23/2018 0851 enoxaparin (LOVENOX) subcutaneous injection 40 mg 40 mg Subcutaneous Given Wilner Ontiveros RN     12/24/2018 0610 canagliflozin (INVOKANA) tablet 100 mg 100 mg Oral Given Tiago Xiao RN     12/23/2018 0851 Tofacitinib Citrate TB24 11 mg 11 mg Oral Given Wilner Ontiveros RN     12/23/2018 0847 predniSONE tablet 40 mg 40 mg Oral Given Wilner Ontiveros RN     12/24/2018 0016 dextromethorphan-guaiFENesin (ROBITUSSIN DM)  mg/5 mL oral syrup 10 mL 10 mL Oral Given Tiago Xiao RN            Lab, Imaging and other studies: I have personally reviewed pertinent reports      VTE Pharmacologic Prophylaxis: Enoxaparin (Lovenox)  VTE Mechanical Prophylaxis: sequential compression device     Roseline Wise MD  12/24/2018,8:01 AM

## 2018-12-24 NOTE — ASSESSMENT & PLAN NOTE
Hilda Nickerson in the setting of acute infection  May resume once patient is afebrile for 48 hours and improved  Resume xeljanz on discharge

## 2018-12-24 NOTE — MALNUTRITION/BMI
This medical record reflects one or more clinical indicators suggestive of malnutrition and/or morbid obesity  Malnutrition Findings:              BMI Findings:  BMI Classifications: Morbid Obesity 40-44 9     Body mass index is 41 31 kg/m²  See Nutrition note dated 12/24/2018 for additional details  Completed nutrition assessment is viewable in the nutrition documentation

## 2018-12-24 NOTE — ASSESSMENT & PLAN NOTE
With parapneumonic effusion  Continue Levaquin 750 mg IV daily  Check sputum culture  Negative procalcitonin x 2   Leukocytosis resolved

## 2018-12-25 PROBLEM — J91.8 PARAPNEUMONIC EFFUSION: Status: ACTIVE | Noted: 2018-12-25

## 2018-12-25 PROBLEM — J18.9 PARAPNEUMONIC EFFUSION: Status: ACTIVE | Noted: 2018-12-25

## 2018-12-25 LAB
GLUCOSE SERPL-MCNC: 159 MG/DL (ref 65–140)
GLUCOSE SERPL-MCNC: 176 MG/DL (ref 65–140)
GLUCOSE SERPL-MCNC: 316 MG/DL (ref 65–140)

## 2018-12-25 PROCEDURE — 99232 SBSQ HOSP IP/OBS MODERATE 35: CPT | Performed by: FAMILY MEDICINE

## 2018-12-25 PROCEDURE — 94640 AIRWAY INHALATION TREATMENT: CPT

## 2018-12-25 PROCEDURE — 94760 N-INVAS EAR/PLS OXIMETRY 1: CPT

## 2018-12-25 PROCEDURE — 82948 REAGENT STRIP/BLOOD GLUCOSE: CPT

## 2018-12-25 RX ADMIN — PREDNISONE 40 MG: 20 TABLET ORAL at 09:41

## 2018-12-25 RX ADMIN — LEVOFLOXACIN 750 MG: 750 TABLET, FILM COATED ORAL at 21:21

## 2018-12-25 RX ADMIN — BUDESONIDE 0.5 MG: 0.5 INHALANT RESPIRATORY (INHALATION) at 08:32

## 2018-12-25 RX ADMIN — BUDESONIDE 0.5 MG: 0.5 INHALANT RESPIRATORY (INHALATION) at 20:05

## 2018-12-25 RX ADMIN — MIRTAZAPINE 15 MG: 15 TABLET, FILM COATED ORAL at 21:21

## 2018-12-25 RX ADMIN — LEVALBUTEROL 1.25 MG: 1.25 SOLUTION, CONCENTRATE RESPIRATORY (INHALATION) at 08:32

## 2018-12-25 RX ADMIN — SITAGLIPTIN 100 MG: 100 TABLET, FILM COATED ORAL at 09:41

## 2018-12-25 RX ADMIN — FOLIC ACID 1000 MCG: 1 TABLET ORAL at 09:41

## 2018-12-25 RX ADMIN — IPRATROPIUM BROMIDE 0.5 MG: 0.5 SOLUTION RESPIRATORY (INHALATION) at 20:05

## 2018-12-25 RX ADMIN — PANTOPRAZOLE SODIUM 40 MG: 40 TABLET, DELAYED RELEASE ORAL at 09:41

## 2018-12-25 RX ADMIN — LEVALBUTEROL 1.25 MG: 1.25 SOLUTION, CONCENTRATE RESPIRATORY (INHALATION) at 20:05

## 2018-12-25 RX ADMIN — LISINOPRIL 40 MG: 20 TABLET ORAL at 09:41

## 2018-12-25 RX ADMIN — INSULIN LISPRO 8 UNITS: 100 INJECTION, SOLUTION INTRAVENOUS; SUBCUTANEOUS at 16:45

## 2018-12-25 RX ADMIN — IPRATROPIUM BROMIDE 0.5 MG: 0.5 SOLUTION RESPIRATORY (INHALATION) at 08:32

## 2018-12-25 RX ADMIN — LEVOTHYROXINE SODIUM 125 MCG: 125 TABLET ORAL at 05:36

## 2018-12-25 RX ADMIN — GUAIFENESIN 200 MG: 100 SOLUTION ORAL at 09:40

## 2018-12-25 RX ADMIN — FUROSEMIDE 40 MG: 40 TABLET ORAL at 09:41

## 2018-12-25 RX ADMIN — GUAIFENESIN 200 MG: 100 SOLUTION ORAL at 05:38

## 2018-12-25 RX ADMIN — INSULIN LISPRO 2 UNITS: 100 INJECTION, SOLUTION INTRAVENOUS; SUBCUTANEOUS at 11:36

## 2018-12-25 RX ADMIN — INSULIN LISPRO 8 UNITS: 100 INJECTION, SOLUTION INTRAVENOUS; SUBCUTANEOUS at 09:43

## 2018-12-25 RX ADMIN — DULOXETINE HYDROCHLORIDE 20 MG: 20 CAPSULE, DELAYED RELEASE ORAL at 09:41

## 2018-12-25 RX ADMIN — ENOXAPARIN SODIUM 40 MG: 40 INJECTION SUBCUTANEOUS at 09:42

## 2018-12-25 NOTE — ASSESSMENT & PLAN NOTE
With parapneumonic effusion  Discontinue Levaquin  Check sputum culture  Discontinue antibiotics the setting of negative procalcitonin x2  Leukocytosis resolved

## 2018-12-25 NOTE — PROGRESS NOTES
Progress Note - Bonny Diss 1950, 77 y o  female MRN: [id number]    Unit/Bed#: 409-01 Encounter: [id number]    Primary Care Provider: Yifan Leos MD   Date and time admitted to hospital: 12/21/2018  4:51 PM        Parapneumonic effusion   Assessment & Plan    This is been present since 2016  Case discussed with Dr Tucker Soulier from Pulmonary Medicine  Patient will require close outpatient follow-up with Pulmonary Medicine to undergo diagnostic thoracentesis     Acute respiratory failure with hypoxia [facility])   Assessment & Plan    secondary to community-acquired pneumonia and COPD with mild exacerbation  Ambulate Q shift  Maintain oxygen saturation 88-92%  Patient requiring 2 L nasal cannula  Obtain ambulatory pulse oximetry tomorrow prior discharge     * Community acquired pneumonia of left lower lobe of lung ([address] )   Assessment & Plan    With parapneumonic effusion  Discontinue Levaquin  Check sputum culture  Discontinue antibiotics the setting of negative procalcitonin x2  Leukocytosis resolved     COPD with acute exacerbation (HCC)   Assessment & Plan    Discontinue IV Solu-Medrol and transitioned to p o  Prednisone 12/23/2018  Continue frequent nebulizer treatments via respiratory protocol  Initiate airway clearance protocol  Incentive spirometry         Progress Note - Meredith Diss 77 y o  female MRN: [id number]    Unit/Bed#: 409-01 Encounter: [id number]        Subjective:   Patient seen and examined at bedside  Still SOB and cough    Objective:     Vitals:   Vitals:    12/25/18 0802   BP: 118/54   Pulse: 80   Resp: 20   Temp: (!) 97 °F (36 1 °C)   SpO2: 94%     Body mass index is 41 46 kg/m²      Intake/Output Summary (Last 24 hours) at 12/25/18 0808  Last data filed at 12/25/18 0536   Gross per 24 hour   Intake              720 ml   Output              700 ml   Net               20 ml       Physical Exam:   /54 (BP Location: Left arm)   Pulse 80   Temp (!) 97 °F (36 1 °C) (Temporal)   Resp 20 Ht 5' 5" (1 651 m)   Wt 113 kg (249 lb 1 9 oz)   SpO2 94%   BMI 41 46 kg/m²   General appearance: alert and oriented, in no acute distress  Head: Normocephalic, without obvious abnormality, atraumatic  Lungs:  Scattered bilateral wheezing, no rales or rhonchi  Heart: regular rate and rhythm, S1, S2 normal, no murmur, click, rub or gallop  Abdomen: soft, non-tender; bowel sounds normal; no masses,  no organomegaly  Extremities: extremities normal, warm and well-perfused; no cyanosis, clubbing, or edema  Pulses: 2+ and symmetric  Neurologic: Grossly normal     Invasive Devices     Peripheral Intravenous Line            Peripheral IV 12/23/18 Left Hand 1 day                  Results from last 7 days  Lab Units 12/24/18  0454 12/23/18  0609 12/22/18  0453   WBC Thousand/uL 9 11 9 05 7 42   HEMOGLOBIN g/dL 12 9 12 4 13 4   HEMATOCRIT % 41 5 39 1 42 1   PLATELETS Thousands/uL 303 287 257         Results from last 7 days  Lab Units 12/24/18  0454 12/23/18  0609 12/22/18  0453 12/21/18  1839   POTASSIUM mmol/L 3 9 5 0 4 3 4 0   CHLORIDE mmol/L 104 102 101 102   CO2 mmol/L 30 29 28 31   BUN mg/dL 25 19 11 11   CREATININE mg/dL 0 78 0 76 0 66 0 70   CALCIUM mg/dL 9 6 9 4 9 2 8 7   ALK PHOS U/L  --   --  79 80   ALT U/L  --   --  32 33   AST U/L  --   --  12 16       Medication Administration - last 24 hours from 12/24/2018 0808 to 12/25/2018 9229       Date/Time Order Dose Route Action Action by     12/24/2018 1925 budesonide (PULMICORT) inhalation solution 0 5 mg 0 5 mg Nebulization Given Arthur Service, RT     12/24/2018 1043 DULoxetine (CYMBALTA) delayed release capsule 20 mg 20 mg Oral Given Analia Milligan RN     28/97/2932 7772 folic acid (FOLVITE) tablet 1,000 mcg 1,000 mcg Oral Given Analia Milligan RN     12/24/2018 1044 furosemide (LASIX) tablet 40 mg 40 mg Oral Given Analia Milligan RN     12/24/2018 1043 sitaGLIPtin (JANUVIA) tablet 100 mg 100 mg Oral Given [facility], RN     12/25/2018 0561 levothyroxine tablet 125 mcg 125 mcg Oral Given Deanne Dumont, RN     12/24/2018 1047 lisinopril (ZESTRIL) tablet 40 mg 40 mg Oral Given Luh Gauri, RN     12/24/2018 2103 mirtazapine (REMERON) tablet 15 mg 15 mg Oral Given Escobar Certain, RN     12/24/2018 1044 pantoprazole (PROTONIX) EC tablet 40 mg 40 mg Oral Given Luh Gauri, RN     12/24/2018 1638 insulin lispro (HumaLOG) 100 units/mL subcutaneous injection 2-12 Units 10 Units Subcutaneous Given Luh Gauri, RN     12/24/2018 1143 insulin lispro (HumaLOG) 100 units/mL subcutaneous injection 2-12 Units 4 Units Subcutaneous Given Luh Gauri, RN     12/24/2018 1925 levalbuterol (Naida Brass) inhalation solution 1 25 mg 1 25 mg Nebulization Given Carter Benavides, RT     12/24/2018 1302 levalbuterol (XOPENEX) inhalation solution 1 25 mg 1 25 mg Nebulization Given Oneida Otero, RT     12/24/2018 1925 ipratropium (ATROVENT) 0 02 % inhalation solution 0 5 mg 0 5 mg Nebulization Given Carter Benavides, RT     12/24/2018 1302 ipratropium (ATROVENT) 0 02 % inhalation solution 0 5 mg 0 5 mg Nebulization Given Neha Durant, RT     12/24/2018 1045 enoxaparin (LOVENOX) subcutaneous injection 40 mg 40 mg Subcutaneous Given Luh Gauri, RN     12/25/2018 0741 canagliflozin (INVOKANA) tablet 100 mg 100 mg Oral Given Nelia Vargas, RN     12/24/2018 1045 Tofacitinib Citrate TB24 11 mg 11 mg Oral Given Luh Gauri, RN     12/24/2018 1045 predniSONE tablet 40 mg 40 mg Oral Given Luh Gauri, RN     12/24/2018 2102 levofloxacin (LEVAQUIN) tablet 750 mg 750 mg Oral Given Shawn Augustin, RN     12/25/2018 0538 guaiFENesin (ROBITUSSIN) oral solution 200 mg 200 mg Oral Given Deanne Dumont, RN     12/24/2018 2102 guaiFENesin (ROBITUSSIN) oral solution 200 mg 200 mg Oral Given Shawn Augustin, RN            Lab, Imaging and other studies: I have personally reviewed pertinent reports      VTE Pharmacologic Prophylaxis: Enoxaparin (Lovenox)  VTE Mechanical Prophylaxis: sequential compression device     Akira Pichardo MD  12/25/2018,8:08 AM

## 2018-12-25 NOTE — ASSESSMENT & PLAN NOTE
This is been present since 2016  Case discussed with Dr Violetta Cadet from Pulmonary Medicine  Patient will require close outpatient follow-up with Pulmonary Medicine to undergo diagnostic thoracentesis

## 2018-12-25 NOTE — RESPIRATORY THERAPY NOTE
RT Protocol Note  Jas Barrera 77 y o  female MRN: [id number]  Unit/Bed#: 683-43 Encounter: [id number]    Assessment    Principal Problem:    Community acquired pneumonia of left lower lobe of lung ([place] [address] )  Active Problems:    COPD with acute exacerbation ([place] [address] )    Type 2 diabetes mellitus with hyperglycemia ([place] [address] )    Hypothyroidism    Rheumatoid arthritis ([place] [address] )    Essential hypertension    Morbid (severe) obesity due to excess calories ([place] [address] )    Acute respiratory failure with hypoxia (HCC)      Home Pulmonary Medications:  NO HOME O2  Prn nebs    Past Medical History:   Diagnosis Date    Arthritis     Arthritis     Candidiasis of skin     COPD (chronic obstructive pulmonary disease) ([place] [address] )     last assessed 11/21/2016    Current chronic use of systemic steroids     Degenerative arthritis of left knee     last assessed 1/20/2015    Diabetes mellitus ([place] [address] )     Disease of thyroid gland     hypo pill given to decrease thyroid       Fibromyalgia     Fistula of knee     last assessed 9/12/2014    GERD (gastroesophageal reflux disease)     Hyperlipidemia     Hypertension     Medial meniscus tear     of left knee, last assessed 9/12/2014    Migraine     states she has a hx of HA that she calls SpazioDati but never was dx by neurologist    Obesity     Obesity     Osteoarthritis     Osteopenia     Pneumonia     last assessed 11/16/2016    Psychiatric disorder     anxiety    Rheumatoid arthritis ([place] [address] )     Rheumatoid arthritis ([place] [address] )     Sepsis ([place] [address] )     last assessed 11/10/2016    Tick bite     last assessed 10/30/2015    Vitamin B12 deficiency     Vitamin D deficiency      Social History     Social History    Marital status: /Civil Union     Spouse name: N/A    Number of children: N/A    Years of education: N/A     Social History Main Topics    Smoking status: Former Smoker     Packs/day: 1 00     Years: 48 00     Types: E-Cigarettes     Quit date: 11/21/2012    Smokeless tobacco: Never Used Comment: per allscripts - "current every day smoker, former smoker"    Alcohol use No      Comment: stopped drinking alcohol    Drug use: No    Sexual activity: No     Other Topics Concern    None     Social History Narrative    No living will           Subjective    Subjective Data: She stated her breathing is better than when she first came in, but is coughing a lot    Objective    Physical Exam:        Vitals:  Blood pressure 120/53, pulse 99, temperature 98 2 °F (36 8 °C), temperature source Temporal, resp  rate 18, height 5' 5" (1 651 m), weight 113 kg (248 lb 3 8 oz), SpO2 93 %  Imaging and other studies:    12/21/2018  CHEST      INDICATION:   cough      COMPARISON:  9/11/2018     EXAM PERFORMED/VIEWS:  XR CHEST PA & LATERAL        FINDINGS:     Cardiomediastinal silhouette appears unremarkable      Left lower lobe consolidation and effusion suspicious for pneumonia  Findings similar to previous exam   No pneumothorax or pleural effusion      Osseous structures appear within normal limits for patient age      IMPRESSION:     Left lower lobe consolidation and effusion suspicious for pneumonia  Findings similar to previous exam        Plan  NO resp distress/ @ baseline=[id number] to BID & weaned O2 to 1 lpm n/c  Pt RN notified to modifications

## 2018-12-25 NOTE — ASSESSMENT & PLAN NOTE
secondary to community-acquired pneumonia and COPD with mild exacerbation  Ambulate Q shift  Maintain oxygen saturation 88-92%  Patient requiring 2 L nasal cannula  Obtain ambulatory pulse oximetry tomorrow prior discharge

## 2018-12-26 ENCOUNTER — TRANSITIONAL CARE MANAGEMENT (OUTPATIENT)
Dept: INTERNAL MEDICINE CLINIC | Facility: CLINIC | Age: 68
End: 2018-12-26

## 2018-12-26 VITALS
SYSTOLIC BLOOD PRESSURE: 128 MMHG | BODY MASS INDEX: 41.87 KG/M2 | RESPIRATION RATE: 19 BRPM | HEIGHT: 65 IN | TEMPERATURE: 97.8 F | OXYGEN SATURATION: 92 % | DIASTOLIC BLOOD PRESSURE: 62 MMHG | HEART RATE: 73 BPM | WEIGHT: 251.32 LBS

## 2018-12-26 DIAGNOSIS — Z71.89 COMPLEX CARE COORDINATION: Primary | ICD-10-CM

## 2018-12-26 PROBLEM — J18.9 COMMUNITY ACQUIRED PNEUMONIA OF LEFT LOWER LOBE OF LUNG: Status: RESOLVED | Noted: 2018-12-21 | Resolved: 2018-12-26

## 2018-12-26 LAB
BACTERIA SPT RESP CULT: NORMAL
GLUCOSE SERPL-MCNC: 148 MG/DL (ref 65–140)
GLUCOSE SERPL-MCNC: 242 MG/DL (ref 65–140)
GRAM STN SPEC: NORMAL
GRAM STN SPEC: NORMAL

## 2018-12-26 PROCEDURE — 94640 AIRWAY INHALATION TREATMENT: CPT

## 2018-12-26 PROCEDURE — 94761 N-INVAS EAR/PLS OXIMETRY MLT: CPT

## 2018-12-26 PROCEDURE — 94760 N-INVAS EAR/PLS OXIMETRY 1: CPT

## 2018-12-26 PROCEDURE — 82948 REAGENT STRIP/BLOOD GLUCOSE: CPT

## 2018-12-26 PROCEDURE — 99239 HOSP IP/OBS DSCHRG MGMT >30: CPT | Performed by: INTERNAL MEDICINE

## 2018-12-26 RX ORDER — GUAIFENESIN 100 MG/5ML
200 SOLUTION ORAL EVERY 4 HOURS PRN
Qty: 236 ML | Refills: 0 | Status: SHIPPED | OUTPATIENT
Start: 2018-12-26 | End: 2019-02-05 | Stop reason: ALTCHOICE

## 2018-12-26 RX ORDER — PREDNISONE 10 MG/1
40 TABLET ORAL DAILY
Qty: 30 TABLET | Refills: 0 | Status: SHIPPED | OUTPATIENT
Start: 2018-12-27 | End: 2019-02-05 | Stop reason: ALTCHOICE

## 2018-12-26 RX ADMIN — FUROSEMIDE 40 MG: 40 TABLET ORAL at 08:20

## 2018-12-26 RX ADMIN — SITAGLIPTIN 100 MG: 100 TABLET, FILM COATED ORAL at 08:20

## 2018-12-26 RX ADMIN — PREDNISONE 40 MG: 20 TABLET ORAL at 08:19

## 2018-12-26 RX ADMIN — DULOXETINE HYDROCHLORIDE 20 MG: 20 CAPSULE, DELAYED RELEASE ORAL at 08:19

## 2018-12-26 RX ADMIN — LISINOPRIL 40 MG: 20 TABLET ORAL at 08:19

## 2018-12-26 RX ADMIN — ENOXAPARIN SODIUM 40 MG: 40 INJECTION SUBCUTANEOUS at 08:23

## 2018-12-26 RX ADMIN — FOLIC ACID 1000 MCG: 1 TABLET ORAL at 08:20

## 2018-12-26 RX ADMIN — PANTOPRAZOLE SODIUM 40 MG: 40 TABLET, DELAYED RELEASE ORAL at 08:19

## 2018-12-26 RX ADMIN — IPRATROPIUM BROMIDE 0.5 MG: 0.5 SOLUTION RESPIRATORY (INHALATION) at 07:20

## 2018-12-26 RX ADMIN — INSULIN LISPRO 4 UNITS: 100 INJECTION, SOLUTION INTRAVENOUS; SUBCUTANEOUS at 11:55

## 2018-12-26 RX ADMIN — BUDESONIDE 0.5 MG: 0.5 INHALANT RESPIRATORY (INHALATION) at 07:20

## 2018-12-26 RX ADMIN — LEVOTHYROXINE SODIUM 125 MCG: 125 TABLET ORAL at 05:22

## 2018-12-26 RX ADMIN — LEVALBUTEROL 1.25 MG: 1.25 SOLUTION, CONCENTRATE RESPIRATORY (INHALATION) at 07:20

## 2018-12-26 NOTE — RESPIRATORY THERAPY NOTE
Home Oxygen Qualifying Test       Patient name: Jessica Baugh        : 1950   Date of Test:  2018  Diagnosis: Acute Respiratory Failure     Home Oxygen Test:    **Medicare Guidelines require item(s) 1-5 on all ambulatory patients or 1 and 2 on non-ambulatory patients  1   Baseline SPO2 on Room Air at rest 91 %  2   SPO2 during exercise on Room Air 86 %  During exercise monitor SpO2  If SPO2 increases >=89% with ambulation do not add supplemental             oxygen  If <= 88% on room air add O2 via NC and titrate patient  Patient must be ambulated with O2 and titrated to > 88% with exertion  3   SPO2 on Oxygen at rest 97 % 2 lpm     4   SPO2 during exercise on Oxygen  92% a liter flow of 2 lpm     5   Exercise performed:          walking, duration 10 (min), distance 300 (feet)          [x]  Supplemental Home Oxygen is indicated  []  Client does not qualify for home oxygen  Respiratory Additional Notes-  Pt   Dropped to 92% after 100 feet of walk on 2 l/m    Oneida Otero RT

## 2018-12-26 NOTE — ASSESSMENT & PLAN NOTE
Secondary to COPD with acute exacerbation, possible viral pneumonia, procalcitonin level negative    Ambulate Q shift  Maintain oxygen saturation 88-92%  Patient requiring 2 L nasal cannula

## 2018-12-26 NOTE — SOCIAL WORK
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

## 2018-12-26 NOTE — SOCIAL WORK
[facility] unable to accept pt  Referral to [facility]  Waiting to see if [facility] can accept pt

## 2018-12-26 NOTE — DISCHARGE SUMMARY
Discharge- Tanya Hodge 1950, 77 y o  female MRN: [id number]    Unit/Bed#: 409-01 Encounter: [id number]    Primary Care Provider: Ministerio Underwood MD   Date and time admitted to hospital: 12/21/2018  4:51 PM        * Acute respiratory failure with hypoxia [facility])   Assessment & Plan    Secondary to COPD with acute exacerbation, possible viral pneumonia, procalcitonin level negative  Ambulate Q shift  Maintain oxygen saturation 88-92%  Patient requiring 2 L nasal cannula       COPD with acute exacerbation (HCC)   Assessment & Plan    Discontinue IV Solu-Medrol and transitioned to p o   Prednisone 12/23/2018  Continue frequent nebulizer treatments via respiratory protocol  Initiate airway clearance protocol  Incentive spirometry     Parapneumonic effusion   Assessment & Plan    This is been present since 2016  Case discussed with Dr Diony Morin from Pulmonary Medicine  Patient will require close outpatient follow-up with Pulmonary Medicine to undergo diagnostic thoracentesis     Morbid (severe) obesity due to excess calories ([address] )   Assessment & Plan    Weight loss advised       Essential hypertension   Assessment & Plan    Blood pressure controlled on current regimen     Rheumatoid arthritis ([address] )   Assessment & Plan      Resume [place] on discharge      Hypothyroidism   Assessment & Plan    - continue home dose levothyroxine     Type 2 diabetes mellitus with hyperglycemia [facility])   Assessment & Plan    Lab Results   Component Value Date    HGBA1C 8 4 (H) 12/23/2018       Recent Labs      12/25/18   0743  12/25/18   1123  12/25/18   1502  12/26/18   0749   POCGLU  159*  176*  316*  148*       Blood Sugar Average: Last 72 hrs:    - resume home Hypoglycemic medications  Likely component of steroid induced hyperglycemia, resume previous home regimen and follow up with PCP         Discharging Physician / Practitioner: Lakeshia Montesinos DO  PCP: Ministerio Underwood MD  Admission Date:   Admission Orders     Ordered 12/21/18 2105  Inpatient Admission (expected length of stay for this patient is greater than two midnights)  Once             Discharge Date: 12/26/18    Resolved Problems  Date Reviewed: 12/26/2018          Resolved    Community acquired pneumonia of left lower lobe of lung ([address] ) 12/26/2018     Resolved by  Debria Hatchet, DO        Reason for Admission:     Chief Complaint:   Bronchitis/acute exacerbation of chronic obstructive pulmonary disease     History of Present Illness:     Stan Boyer is a 77 y o  female with a past medical history of chronic obstructive pulmonary disease, hypothyroid, type 2 diabetes, hypertension, and others as listed below who presented to the emergency department with 2-3 day history of dry cough which progressed into productive cough, patient coughing green purulent sputum  Upon arrival in emergency department laboratory and radiologic studies were obtained with results listed below  Also while in the emergency department, she received DuoNeb treatment, and Solu-Medrol 125 mg IV  She is admitted as an inpatient by emergency department provider  Will continue bronchodilators as scheduled and p r n , Solu-Medrol 60 mg IV t i d , Levaquin 750 mg daily  Hospital Course:     Stan Boyer is a 77 y o  female patient who originally presented to the hospital on 12/21/2018 due to COPD exacerbation, likely viral pneumonia  Please see above list of diagnoses and related plan for additional information  Condition at Discharge: good     Discharge Day Visit / Exam:     Subjective:  Patient still with ongoing cough, ongoing fatigue    Vitals: Blood Pressure: 128/62 (12/26/18 0730)  Pulse: 73 (12/26/18 0730)  Temperature: 97 8 °F (36 6 °C) (12/26/18 0730)  Temp Source: Temporal (12/26/18 0730)  Respirations: 19 (12/26/18 0730)  Height: 5' 5" (165 1 cm) (12/21/18 2157)  Weight - Scale: 114 kg (251 lb 5 2 oz) (12/26/18 0600)  SpO2: 93 % (12/26/18 0730)  Exam:   Physical Exam Constitutional: She is oriented to person, place, and time  She appears well-developed and well-nourished  No distress  Pulmonary/Chest: Effort normal  She has wheezes  Patient diffuse rhonchi, overall good air exchange   Abdominal: Soft  Bowel sounds are normal  She exhibits no distension  There is no tenderness  Neurological: She is alert and oriented to person, place, and time  No cranial nerve deficit  Coordination normal    Skin: Skin is warm and dry  No rash noted  She is not diaphoretic  No erythema  Psychiatric: She has a normal mood and affect  Her behavior is normal  Judgment and thought content normal    Nursing note and vitals reviewed  Discharge instructions/Information to patient and family:   See after visit summary for information provided to patient and family  Provisions for Follow-Up Care:  See after visit summary for information related to follow-up care and any pertinent home health orders  Disposition:     Home with [facility] (Reminder: Complete face to face encounter)    Planned Readmission: no     Discharge Statement:  I spent 35 minutes discharging the patient  This time was spent on the day of discharge  I had direct contact with the patient on the day of discharge  Greater than 50% of the total time was spent examining patient, answering all patient questions, arranging and discussing plan of care with patient as well as directly providing post-discharge instructions  Additional time then spent on discharge activities  Discharge Medications:  See after visit summary for reconciled discharge medications provided to patient and family        ** Please Note: This note has been constructed using a voice recognition system **

## 2018-12-26 NOTE — ASSESSMENT & PLAN NOTE
Lab Results   Component Value Date    HGBA1C 8 4 (H) 12/23/2018       Recent Labs      12/25/18   0743  12/25/18   1123  12/25/18   1502  12/26/18   0749   POCGLU  159*  176*  316*  148*       Blood Sugar Average: Last 72 hrs:    - resume home Hypoglycemic medications  Likely component of steroid induced hyperglycemia, resume previous home regimen and follow up with PCP

## 2018-12-27 LAB
BACTERIA BLD CULT: NORMAL
BACTERIA BLD CULT: NORMAL

## 2018-12-28 ENCOUNTER — OFFICE VISIT (OUTPATIENT)
Dept: INTERNAL MEDICINE CLINIC | Facility: CLINIC | Age: 68
End: 2018-12-28
Payer: MEDICARE

## 2018-12-28 ENCOUNTER — PATIENT OUTREACH (OUTPATIENT)
Dept: CASE MANAGEMENT | Facility: OTHER | Age: 68
End: 2018-12-28

## 2018-12-28 VITALS
OXYGEN SATURATION: 97 % | SYSTOLIC BLOOD PRESSURE: 134 MMHG | HEART RATE: 86 BPM | DIASTOLIC BLOOD PRESSURE: 80 MMHG | TEMPERATURE: 97.2 F | WEIGHT: 246.7 LBS | HEIGHT: 65 IN | BODY MASS INDEX: 41.1 KG/M2

## 2018-12-28 DIAGNOSIS — I10 ESSENTIAL HYPERTENSION: ICD-10-CM

## 2018-12-28 DIAGNOSIS — J96.01 ACUTE RESPIRATORY FAILURE WITH HYPOXIA (HCC): Primary | ICD-10-CM

## 2018-12-28 DIAGNOSIS — B37.2 CANDIDAL DERMATITIS: ICD-10-CM

## 2018-12-28 DIAGNOSIS — J18.9 PARAPNEUMONIC EFFUSION: ICD-10-CM

## 2018-12-28 DIAGNOSIS — J44.9 CHRONIC OBSTRUCTIVE PULMONARY DISEASE, UNSPECIFIED COPD TYPE (HCC): Primary | ICD-10-CM

## 2018-12-28 DIAGNOSIS — E11.65 TYPE 2 DIABETES MELLITUS WITH HYPERGLYCEMIA, WITHOUT LONG-TERM CURRENT USE OF INSULIN (HCC): ICD-10-CM

## 2018-12-28 DIAGNOSIS — J91.8 PARAPNEUMONIC EFFUSION: ICD-10-CM

## 2018-12-28 PROCEDURE — 99496 TRANSJ CARE MGMT HIGH F2F 7D: CPT | Performed by: NURSE PRACTITIONER

## 2018-12-28 RX ORDER — CLOBETASOL PROPIONATE 0.5 MG/G
CREAM TOPICAL
COMMUNITY
End: 2020-01-16 | Stop reason: HOSPADM

## 2018-12-28 RX ORDER — NYSTATIN 100000 [USP'U]/G
POWDER TOPICAL 3 TIMES DAILY
Qty: 60 G | Refills: 3 | Status: ON HOLD | OUTPATIENT
Start: 2018-12-28 | End: 2020-01-16 | Stop reason: SDUPTHER

## 2018-12-28 NOTE — TELEPHONE ENCOUNTER
Received a call from Chery Carbone from [address] [place] [address] health  Wanted to let Dr Aleta Rivera know that she has been admitted to their services as of 12/27/18, they did notice she does need a Spiriva refill and other than that her d/c med list was good      They did note that she is not really using vaginal premarin cream and hasn't used it in years, and would like it D/c from the list  (did this already )

## 2018-12-28 NOTE — PROGRESS NOTES
TCM Call (since 11/27/2018)     Date and time call was made  12/26/2018  1:40 PM    Hospital care reviewed  Records not available    Patient was hospitialized at  [address]    Date of Admission  12/21/18    Date of discharge  12/26/18    Diagnosis  Acute respiratory failure    Disposition  Home    Were the patients medications reviewed and updated  Yes    Current Symptoms  Fatigue      TCM Call (since 11/27/2018)     Post hospital issues  Reduced activity    Should patient be enrolled in anticoag monitoring? No    Scheduled for follow up? Yes    Did you obtain your prescribed medications  Yes    Do you need help managing your prescriptions or medications  No    Is transportation to your appointment needed  No    I have advised the patient to call PCP with any new or worsening symptoms  [address]  Family    The type of support provided  Emotional; Financial    Do you have social support  Yes, some    Are you recieving any outpatient services  No    Are you recieving home care services  Yes    Types of home care services  Nurse visit; Other (comment)    [facility]    Are you using any community resources  No    Current waiver services  No    Have you fallen in the last 12 months  No    Interperter language line needed  No    Counseling  Patient    Counseling topics  Activities of daily living    Comments  CAQll from Mary Kate Baxter @ [facility]  Questions could not be answered at time of call

## 2018-12-28 NOTE — PROGRESS NOTES
Assessment/Plan: Will make an appointment for Pulmonary for patient regarding her COPD  She is wearing her oxygen at 2 L and is tolerating this well  Pulse ox 97%  A1C was up at 8 4 will increase her Invokana to 300 mg daily and she is taking Januvia  She was advised to check her sugars daily and if any high reading to notify the office  She is taking a Prednisone taper at this time  She is following back up with Dr Amanda Patiño on 1/30  She was advised if any issues or concerns please call the office  No problem-specific Assessment & Plan notes found for this encounter  Problem List Items Addressed This Visit     Type 2 diabetes mellitus with hyperglycemia ([facility] )    Relevant Medications    Canagliflozin 300 MG TABS    Essential hypertension    Acute respiratory failure with hypoxia ([facility] ) - Primary    Relevant Orders    Ambulatory referral to Pulmonology    Parapneumonic effusion    Relevant Orders    Ambulatory referral to Pulmonology    Candidal dermatitis    Relevant Medications    nystatin (MYCOSTATIN) powder           Subjective:     Patient ID: Oanh Chicas is a 77 y o  female  Gavin Reasons is here today for a CELESTE visit  She was admitted to [address] [place] [address] from 12/21 to 12/26  She has a past medical history of chronic obstructive pulmonary disease, hypothyroid, type 2 diabetes, hypertension, and others as listed below who presented to the emergency department with 2-3 day history of dry cough which progressed into productive cough, patient coughing green purulent sputum   Upon arrival in emergency department laboratory and radiologic studies were obtained with results listed below   Also while in the emergency department, she received DuoNeb treatment, and Solu-Medrol 125 mg IV   She is admitted as an inpatient by emergency department provider  Carri Cortes states she is tolerating her oxygen well and denies any chest pain, palpitations, or SOB  She states she is very tired and at times feel weak    She is having [facility] come out every other day for 2 weeks  She states she has not yet followed up with Pulmonary and did not make an appointment yet  She is checking her sugars and states they are running good in the 130s  She offers no other issues             Review of Systems   All other systems reviewed and are negative  Below is the patient's most recent value for Albumin, ALT, AST, BUN, Calcium, Chloride, Cholesterol, CO2, Creatinine, GFR, Glucose, HDL, Hematocrit, Hemoglobin, Hemoglobin A1C, LDL, Magnesium, Phosphorus, Platelets, Potassium, PSA, Sodium, Triglycerides, and WBC  Lab Results   Component Value Date    ALT 32 12/22/2018    AST 12 12/22/2018    BUN 25 12/24/2018    CALCIUM 9 6 12/24/2018     12/24/2018    CHOL 194 12/17/2015    CO2 30 12/24/2018    CREATININE 0 78 12/24/2018    HDL 63 (H) 09/21/2018    HCT 41 5 12/24/2018    HGB 12 9 12/24/2018    HGBA1C 8 4 (H) 12/23/2018    MG 2 3 12/24/2018    PHOS 3 8 12/22/2018     12/24/2018    K 3 9 12/24/2018     12/17/2015    TRIG 87 09/21/2018    WBC 9 11 12/24/2018     Note: for a comprehensive list of the patient's lab results, access the Results Review activity      Current Outpatient Prescriptions:     albuterol (PROVENTIL HFA,VENTOLIN HFA) 90 mcg/act inhaler, Inhale 2 puffs every 6 (six) hours as needed for wheezing, Disp: 1 Inhaler, Rfl: 0    alendronate (FOSAMAX) 70 mg tablet, Take 70 mg by mouth every 7 days, Disp: , Rfl:     atorvastatin (LIPITOR) 20 mg tablet, TAKE ONE TABLET BY MOUTH EVERY DAY, Disp: 90 tablet, Rfl: 0    benzonatate (TESSALON) 200 MG capsule, Take 1 capsule (200 mg total) by mouth 3 (three) times a day as needed for cough, Disp: 60 capsule, Rfl: 3    Blood Glucose Monitoring Suppl (ONE TOUCH ULTRA 2) w/Device KIT, by Does not apply route 2 (two) times a day, Disp: 1 each, Rfl: 0    Clobetasol Prop Emollient Base 0 05 % emollient cream, clobetasol-emollient 0 05 % topical cream, Disp: , Rfl:     clotrimazole (LOTRIMIN) 1 % cream, Apply topically daily as needed  , Disp: , Rfl:     conjugated estrogens (PREMARIN) vaginal cream, Insert 0 625 Tubes into the vagina as needed, Disp: , Rfl:     cyanocobalamin (VITAMIN B-12) 1,000 mcg tablet, Take 1 tablet by mouth daily, Disp: , Rfl:     diclofenac sodium (VOLTAREN) 1 %, Apply 2 g topically as needed, Disp: , Rfl:     DULoxetine (CYMBALTA) 20 mg capsule, TAKE ONE CAPSULE BY MOUTH ONCE DAILY, Disp: 90 capsule, Rfl: 0    ergocalciferol (VITAMIN D2) 50,000 units, TAKE ONE CAPSULE BY MOUTH WEEKLY, Disp: 12 capsule, Rfl: 0    fluticasone-salmeterol (ADVAIR) 250-50 mcg/dose, Inhale 1 puff every 12 (twelve) hours  , Disp: , Rfl:     folic acid (FOLVITE) 1 mg tablet, TAKE ONE TABLET BY MOUTH ONCE DAILY, Disp: 90 tablet, Rfl: 0    furosemide (LASIX) 40 mg tablet, Take 1 tablet (40 mg total) by mouth daily, Disp: 14 tablet, Rfl: 0    guaiFENesin (ROBITUSSIN) 100 mg/5 mL oral solution, Take 10 mL (200 mg total) by mouth every 4 (four) hours as needed (cough), Disp: 236 mL, Rfl: 0    ipratropium-albuterol (DUO-NEB) 0 5-2 5 mg/3 mL nebulizer solution, Take 1 vial (3 mL total) by nebulization every 6 (six) hours as needed for wheezing or shortness of breath, Disp: 144 mL, Rfl: 0    JANUVIA 100 MG tablet, TAKE 1 TABLET BY MOUTH DAILY  , Disp: 90 tablet, Rfl: 0    levothyroxine 125 mcg tablet, TAKE ONE TABLET BY MOUTH EVERY DAY IN THE AM, Disp: 90 tablet, Rfl: 0    lisinopril (ZESTRIL) 40 mg tablet, TAKE ONE TABLET BY MOUTH EVERY DAY, Disp: 90 tablet, Rfl: 0    methotrexate 2 5 mg tablet, Take 1 tablet (2 5 mg total) by mouth once a week Takes 6 tablets per week, Disp: 12 tablet, Rfl: 0    mirtazapine (REMERON) 15 mg tablet, Take 1 tablet (15 mg total) by mouth daily at bedtime, Disp: 90 tablet, Rfl: 0    mometasone (ELOCON) 0 1 % ointment, Apply topically 2 (two) times a day as needed (itching), Disp: 45 g, Rfl: 1    nystatin (MYCOSTATIN) powder, Apply topically, Disp: , Rfl:    ofloxacin (FLOXIN) 0 3 % otic solution, Administer 10 drops into both ears as needed  , Disp: , Rfl:     ONE TOUCH ULTRA TEST test strip, Tests twice a day, Disp: 100 each, Rfl: 1    ONETOUCH DELICA LANCETS FINE MISC, by Does not apply route 2 (two) times a day, Disp: 100 each, Rfl: 5    pantoprazole (PROTONIX) 40 mg tablet, TAKE ONE TABLET BY MOUTH ONCE DAILY, Disp: 90 tablet, Rfl: 0    Polyethylene Glycol 3350-GRX POWD, Take by mouth, Disp: , Rfl:     predniSONE 10 mg tablet, Take 4 tablets (40 mg total) by mouth daily For 3 days, 30 mg for 3 days, 20 mg 3 days, 10 mg 3 days, Disp: 30 tablet, Rfl: 0    Tiotropium Bromide Monohydrate (SPIRIVA RESPIMAT) 2 5 MCG/ACT AERS, Inhale daily at bedtime, Disp: , Rfl:     Tofacitinib Citrate (XELJANZ XR) 11 MG TB24, Take 1 tablet by mouth daily, Disp: , Rfl:     Canagliflozin 300 MG TABS, Take 1 tablet (300 mg total) by mouth daily, Disp: 30 tablet, Rfl: 3    nystatin (MYCOSTATIN) powder, Apply topically 3 (three) times a day, Disp: 60 g, Rfl: 3    Objective:     Physical Exam   Constitutional: She is oriented to person, place, and time  She appears well-developed and well-nourished  HENT:   Head: Normocephalic and atraumatic  Right Ear: External ear normal    Left Ear: External ear normal    Nose: Nose normal    Mouth/Throat: Oropharynx is clear and moist    Eyes: Pupils are equal, round, and reactive to light  Conjunctivae and EOM are normal    Neck: Normal range of motion  Neck supple  Cardiovascular: Normal rate, regular rhythm, normal heart sounds and intact distal pulses  Pulmonary/Chest: Effort normal and breath sounds normal    Abdominal: Soft  Bowel sounds are normal    Musculoskeletal: Normal range of motion  Neurological: She is alert and oriented to person, place, and time  She has normal reflexes  Skin: Skin is warm and dry  No erythema  Psychiatric: She has a normal mood and affect   Her behavior is normal  Judgment and thought content normal    Vitals reviewed  Vitals:    12/28/18 1140   BP: 134/80   BP Location: Left arm   Patient Position: Sitting   Cuff Size: Large   Pulse: 86   Temp: (!) 97 2 °F (36 2 °C)   TempSrc: Temporal   SpO2: 97%   Weight: 112 kg (246 lb 11 2 oz)   Height: 5' 5" (1 651 m)       Transitional Care Management Review:  Margarita aWng is a 77 y o  female here for TCM follow up  During the TCM phone call patient stated:    TCM Call (since 11/27/2018)     Date and time call was made  12/26/2018  1:40 PM    Hospital care reviewed  Records not available    Patient was hospitialized at  [address]    Date of Admission  12/21/18    Date of discharge  12/26/18    Diagnosis  Acute respiratory failure    Disposition  Home    Were the patients medications reviewed and updated  Yes    Current Symptoms  Fatigue      TCM Call (since 11/27/2018)     Post hospital issues  Reduced activity    Should patient be enrolled in anticoag monitoring? No    Scheduled for follow up? Yes    Did you obtain your prescribed medications  Yes    Do you need help managing your prescriptions or medications  No    Is transportation to your appointment needed  No    I have advised the patient to call PCP with any new or worsening symptoms  [address]  Family    The type of support provided  Emotional; Financial    Do you have social support  Yes, some    Are you recieving any outpatient services  No    Are you recieving home care services  Yes    Types of home care services  Nurse visit; Other (comment)    [facility]    Are you using any community resources  No    Current waiver services  No    Have you fallen in the last 12 months  No    Interperter language line needed  No    Counseling  Patient    Counseling topics  Activities of daily living    Comments  CAQll from Muna Walden @ [facility]  Questions could not be answered at time of call            Michelle Taylor

## 2018-12-31 DIAGNOSIS — E11.9 TYPE 2 DIABETES MELLITUS WITHOUT COMPLICATION, WITHOUT LONG-TERM CURRENT USE OF INSULIN (HCC): ICD-10-CM

## 2018-12-31 NOTE — TELEPHONE ENCOUNTER
Received a call from Betzaida from [facility] stating that Mackenzie Bruce needs Test strips, and lancets for the One touch ultra 2  She would like it sent to [facility]  Please advise

## 2019-01-07 ENCOUNTER — TELEPHONE (OUTPATIENT)
Dept: INTERNAL MEDICINE CLINIC | Facility: CLINIC | Age: 69
End: 2019-01-07

## 2019-01-07 DIAGNOSIS — M06.9 RHEUMATOID ARTHRITIS INVOLVING MULTIPLE SITES, UNSPECIFIED RHEUMATOID FACTOR PRESENCE: ICD-10-CM

## 2019-01-07 NOTE — TELEPHONE ENCOUNTER
Received a call from [address] [place] [address] to verify a sig for a medication  Verified with Dr Xiomara Erickson that Methotrexate is supposed to be 2 5tabs take 8 tabs every Sunday  Contacted pharmacy and reviewed

## 2019-01-08 ENCOUNTER — PATIENT OUTREACH (OUTPATIENT)
Dept: FAMILY MEDICINE CLINIC | Facility: CLINIC | Age: 69
End: 2019-01-08

## 2019-01-08 NOTE — PROGRESS NOTES
CM called  to introduce myself and the  Care Management Program   Spoke with patient who reports she is doing well  adult comprehensive assessment completed 
DISPLAY PLAN FREE TEXT

## 2019-01-10 ENCOUNTER — LAB REQUISITION (OUTPATIENT)
Dept: LAB | Facility: HOSPITAL | Age: 69
End: 2019-01-10
Payer: MEDICARE

## 2019-01-10 DIAGNOSIS — N39.0 URINARY TRACT INFECTION: ICD-10-CM

## 2019-01-10 LAB
BACTERIA UR QL AUTO: ABNORMAL /HPF
BILIRUB UR QL STRIP: NEGATIVE
CLARITY UR: CLEAR
COLOR UR: YELLOW
GLUCOSE UR STRIP-MCNC: ABNORMAL MG/DL
HGB UR QL STRIP.AUTO: NEGATIVE
KETONES UR STRIP-MCNC: NEGATIVE MG/DL
LEUKOCYTE ESTERASE UR QL STRIP: ABNORMAL
NITRITE UR QL STRIP: NEGATIVE
NON-SQ EPI CELLS URNS QL MICRO: ABNORMAL /HPF
OTHER STN SPEC: ABNORMAL
PH UR STRIP.AUTO: 5 [PH] (ref 4.5–8)
PROT UR STRIP-MCNC: NEGATIVE MG/DL
RBC #/AREA URNS AUTO: ABNORMAL /HPF
SP GR UR STRIP.AUTO: 1.02 (ref 1–1.03)
UROBILINOGEN UR QL STRIP.AUTO: 0.2 E.U./DL
WBC #/AREA URNS AUTO: ABNORMAL /HPF

## 2019-01-10 PROCEDURE — 87106 FUNGI IDENTIFICATION YEAST: CPT | Performed by: FAMILY MEDICINE

## 2019-01-10 PROCEDURE — 81001 URINALYSIS AUTO W/SCOPE: CPT | Performed by: FAMILY MEDICINE

## 2019-01-10 PROCEDURE — 87086 URINE CULTURE/COLONY COUNT: CPT | Performed by: FAMILY MEDICINE

## 2019-01-11 DIAGNOSIS — B37.49 CANDIDAL URINARY TRACT INFECTION: Primary | ICD-10-CM

## 2019-01-11 LAB — BACTERIA UR CULT: ABNORMAL

## 2019-01-11 RX ORDER — FLUCONAZOLE 150 MG/1
150 TABLET ORAL DAILY
Qty: 3 TABLET | Refills: 0 | Status: SHIPPED | OUTPATIENT
Start: 2019-01-11 | End: 2019-01-14

## 2019-01-14 ENCOUNTER — TELEPHONE (OUTPATIENT)
Dept: INTERNAL MEDICINE CLINIC | Facility: CLINIC | Age: 69
End: 2019-01-14

## 2019-01-25 DIAGNOSIS — R60.9 PERIPHERAL EDEMA: ICD-10-CM

## 2019-01-25 RX ORDER — FUROSEMIDE 40 MG/1
40 TABLET ORAL DAILY
Qty: 30 TABLET | Refills: 1 | Status: SHIPPED | OUTPATIENT
Start: 2019-01-25 | End: 2019-05-10 | Stop reason: SDUPTHER

## 2019-02-05 ENCOUNTER — OFFICE VISIT (OUTPATIENT)
Dept: INTERNAL MEDICINE CLINIC | Facility: CLINIC | Age: 69
End: 2019-02-05
Payer: MEDICARE

## 2019-02-05 VITALS
BODY MASS INDEX: 41.65 KG/M2 | HEIGHT: 65 IN | TEMPERATURE: 97.3 F | DIASTOLIC BLOOD PRESSURE: 60 MMHG | HEART RATE: 93 BPM | WEIGHT: 250 LBS | SYSTOLIC BLOOD PRESSURE: 118 MMHG | OXYGEN SATURATION: 94 %

## 2019-02-05 DIAGNOSIS — E03.9 HYPOTHYROIDISM, UNSPECIFIED TYPE: ICD-10-CM

## 2019-02-05 DIAGNOSIS — Z78.0 POSTMENOPAUSAL: ICD-10-CM

## 2019-02-05 DIAGNOSIS — J44.9 CHRONIC OBSTRUCTIVE PULMONARY DISEASE, UNSPECIFIED COPD TYPE (HCC): ICD-10-CM

## 2019-02-05 DIAGNOSIS — Z12.31 VISIT FOR SCREENING MAMMOGRAM: ICD-10-CM

## 2019-02-05 DIAGNOSIS — M06.9 RHEUMATOID ARTHRITIS INVOLVING MULTIPLE SITES, UNSPECIFIED RHEUMATOID FACTOR PRESENCE: ICD-10-CM

## 2019-02-05 DIAGNOSIS — I10 ESSENTIAL HYPERTENSION: ICD-10-CM

## 2019-02-05 DIAGNOSIS — J44.1 COPD WITH ACUTE EXACERBATION (HCC): Primary | ICD-10-CM

## 2019-02-05 DIAGNOSIS — E11.65 TYPE 2 DIABETES MELLITUS WITH HYPERGLYCEMIA, WITHOUT LONG-TERM CURRENT USE OF INSULIN (HCC): ICD-10-CM

## 2019-02-05 PROCEDURE — 99214 OFFICE O/P EST MOD 30 MIN: CPT | Performed by: FAMILY MEDICINE

## 2019-02-05 RX ORDER — IPRATROPIUM BROMIDE AND ALBUTEROL SULFATE 2.5; .5 MG/3ML; MG/3ML
3 SOLUTION RESPIRATORY (INHALATION) EVERY 6 HOURS PRN
Qty: 120 VIAL | Refills: 3 | Status: ON HOLD | OUTPATIENT
Start: 2019-02-05 | End: 2020-01-16 | Stop reason: SDUPTHER

## 2019-02-05 RX ORDER — MOXIFLOXACIN HYDROCHLORIDE 400 MG/1
400 TABLET ORAL DAILY
Qty: 10 TABLET | Refills: 0 | Status: SHIPPED | OUTPATIENT
Start: 2019-02-05 | End: 2019-02-15

## 2019-02-05 NOTE — PROGRESS NOTES
Assessment/Plan:    Hypothyroidism  Has not had recent TSH done  Continue current dose of levothyroxine  Slip given for laboratory testing  Advised her to wait about a month prior to getting this done so she recovers from the recent illnesses prior to the laboratory testing  Type 2 diabetes mellitus with hyperglycemia (HCC)  Lab Results   Component Value Date    HGBA1C 8 4 (H) 12/23/2018       No results for input(s): POCGLU in the last 72 hours  Blood Sugar Average: Last 72 hrs:     Last hemoglobin A1c while hospitalized was significantly elevated  Watch diet  Continue with the Januvia  Discussed with her watching her diet more closely  Recent stressors may be affecting her blood sugar  Try to gradually increase activity level once respiratory symptoms have improved  Continue follow-up with Ophthalmology regularly  Continue follow-up with Podiatry regularly  Chronic obstructive pulmonary disease (HCC)  Currently with exacerbation  Continue her maintenance medications  Continue with the Advair  Continue with the Spiriva  COPD with acute exacerbation ([facility] )  Use nebulizer treatment 3 to 4 times a day  Avelox as prescribed  Slip given for chest x-ray  Call or follow-up if symptoms worsen or persist   Will have our office staff call her in about 2-3 days to check on her current status  Continue with the oxygen  Follow-up with Pulmonary recommended  Essential hypertension  Blood pressure well controlled  Continue lisinopril  Watch salt intake  Rheumatoid arthritis ([facility] )  Continue with the [place]  Continue follow-up with Rheumatology on a regular basis  Continue with the methotrexate and folic acid as per Rheumatology is direction  Continue be as active as possible  Diagnoses and all orders for this visit:    COPD with acute exacerbation ([facility] )  -     moxifloxacin (AVELOX) 400 MG tablet;  Take 1 tablet (400 mg total) by mouth daily for 10 days  -     XR chest pa & lateral; Future    Essential hypertension    Type 2 diabetes mellitus with hyperglycemia, without long-term current use of insulin (HCC)    Rheumatoid arthritis involving multiple sites, unspecified rheumatoid factor presence (HCC)    Chronic obstructive pulmonary disease, unspecified COPD type ([facility] )  -     ipratropium-albuterol (DUO-NEB) 0 5-2 5 mg/3 mL nebulizer solution; Take 1 vial (3 mL total) by nebulization every 6 (six) hours as needed for wheezing or shortness of breath    Visit for screening mammogram  -     Mammo screening bilateral w 3d & cad; Future    Postmenopausal  -     DXA bone density spine hip and pelvis; Future    Hypothyroidism, unspecified type        Orders recommendations as noted above  Up-to-date on immunizations  She wishes to hold off on colorectal cancer screening at this point  She is due for her mammogram and bone density  Prescriptions given  Will have her follow up in about 3-4 weeks for routine visit and Medicare wellness or sooner if needed  Subjective:      Patient ID: Evan Packer is a 68 y o  female  She presents for routine follow-up but has acute issues  She did not have her routine laboratory testing done prior to this visit since she had some laboratory testing done during her hospitalization at the end of the year  Has had increasing cough and chest congestion over the last few days  Cough has been productive of yellowish sputum  Has noticed that she has been increasingly short of breath over the last 24 hours  Wheezing more  Has not used her nebulizer since yesterday afternoon  Has been feeling more tired  Denies any fevers or chills  Denies any nausea or vomiting  Blood sugar this morning was 145  Has been trying to watch her diet but has had some additional stressors with her grandchildren staying with her intermittently  Has been spending a lot time with her  This has been good but also stressful  Has not been eating well    Tolerating blood pressure medications without difficulty  Denies any headaches or localized weakness  Sleeping relatively well except for the last few days when she has been coughing more  Denies any chest pain or palpitations  Joint symptoms have worsened recently  Cindy Lulu has helped significantly up until the recent illness  Appetite has been generally good  Denies any nausea, vomiting, or diarrhea  Denies any changes in bowel movements  Usually sleeps relatively well with the Remeron  The following portions of the patient's history were reviewed and updated as appropriate:   She  has a past medical history of Arthritis; Arthritis; Candidiasis of skin; COPD (chronic obstructive pulmonary disease) ([facility] ); Current chronic use of systemic steroids; Degenerative arthritis of left knee; Diabetes mellitus ([facility] ); Disease of thyroid gland; Fibromyalgia; Fistula of knee; GERD (gastroesophageal reflux disease); Hyperlipidemia; Hypertension; Medial meniscus tear; Migraine; Obesity; Obesity; Osteoarthritis; Osteopenia; Pneumonia; Psychiatric disorder; Rheumatoid arthritis ([facility] ); Rheumatoid arthritis ([facility] ); Sepsis ([place] [address] ); Tick bite; Vitamin B12 deficiency; and Vitamin D deficiency    She   Patient Active Problem List    Diagnosis Date Noted    Candidal dermatitis 12/28/2018    Parapneumonic effusion 12/25/2018    Localized swelling, mass and lump, upper limb, right 12/11/2017    Constipation 11/29/2017    Renal cyst 11/29/2017    Candidiasis, cutaneous 06/30/2017    Vitamin B12 deficiency 11/10/2016    BMI 40 0-44 9, adult ([facility] ) 11/05/2016    Morbid obesity with BMI of 40 0-44 9, adult ([place] [address] ) 11/04/2016    COPD with acute exacerbation ([facility] ) 11/03/2016    Type 2 diabetes mellitus with hyperglycemia ([facility] ) 11/03/2016    Hypothyroidism 11/03/2016    Rheumatoid arthritis ([facility] ) 11/03/2016    Essential hypertension 11/03/2016    Hyperlipidemia 11/03/2016    GERD (gastroesophageal reflux disease) 11/03/2016    Hypoalbuminemia 11/03/2016    Hepatic steatosis 11/03/2016    Diabetes mellitus, type 2 ([place] [address] ) 10/13/2015    Anxiety 06/02/2014    Fibromyalgia 01/28/2014    Osteopenia 09/17/2012    Chronic obstructive pulmonary disease ([place] [address] ) 05/29/2012    Osteoarthritis 05/29/2012    Vitamin D deficiency 05/29/2012     She  has a past surgical history that includes Hysterectomy; Knee arthroscopy; Cataract extraction (Bilateral); Cholecystectomy; Joint replacement; Tubal ligation; Neuroplasty / transposition median nerve at carpal tunnel; and Eye surgery  Her family history includes Asthma in her family; Cancer in her family; Diabetes in her family; Hypertension in her family; Stroke in her mother  She  reports that she quit smoking about 6 years ago  Her smoking use included E-Cigarettes  She has a 48 00 pack-year smoking history  She has never used smokeless tobacco  She reports that she does not drink alcohol or use drugs  Current Outpatient Prescriptions   Medication Sig Dispense Refill    albuterol (PROVENTIL HFA,VENTOLIN HFA) 90 mcg/act inhaler Inhale 2 puffs every 6 (six) hours as needed for wheezing 1 Inhaler 0    alendronate (FOSAMAX) 70 mg tablet Take 70 mg by mouth every 7 days      atorvastatin (LIPITOR) 20 mg tablet TAKE ONE TABLET BY MOUTH EVERY DAY 90 tablet 0    benzonatate (TESSALON) 200 MG capsule Take 1 capsule (200 mg total) by mouth 3 (three) times a day as needed for cough 60 capsule 3    Blood Glucose Monitoring Suppl (ONE TOUCH ULTRA 2) w/Device KIT by Does not apply route 2 (two) times a day 1 each 0    Canagliflozin 300 MG TABS Take 1 tablet (300 mg total) by mouth daily 30 tablet 3    Clobetasol Prop Emollient Base 0 05 % emollient cream clobetasol-emollient 0 05 % topical cream      clotrimazole (LOTRIMIN) 1 % cream Apply topically daily as needed        cyanocobalamin (VITAMIN B-12) 1,000 mcg tablet Take 1 tablet by mouth daily      diclofenac sodium (VOLTAREN) 1 % Apply 2 g topically as needed      DULoxetine (CYMBALTA) 20 mg capsule TAKE ONE CAPSULE BY MOUTH ONCE DAILY 90 capsule 0    ergocalciferol (VITAMIN D2) 50,000 units TAKE ONE CAPSULE BY MOUTH WEEKLY 12 capsule 0    fluticasone-salmeterol (ADVAIR) 250-50 mcg/dose Inhale 1 puff every 12 (twelve) hours   folic acid (FOLVITE) 1 mg tablet TAKE ONE TABLET BY MOUTH ONCE DAILY 90 tablet 0    furosemide (LASIX) 40 mg tablet Take 1 tablet (40 mg total) by mouth daily 30 tablet 1    ipratropium-albuterol (DUO-NEB) 0 5-2 5 mg/3 mL nebulizer solution Take 1 vial (3 mL total) by nebulization every 6 (six) hours as needed for wheezing or shortness of breath 120 vial 3    JANUVIA 100 MG tablet TAKE 1 TABLET BY MOUTH DAILY   90 tablet 0    levothyroxine 125 mcg tablet TAKE ONE TABLET BY MOUTH EVERY DAY IN THE AM 90 tablet 0    lisinopril (ZESTRIL) 40 mg tablet TAKE ONE TABLET BY MOUTH EVERY DAY 90 tablet 0    methotrexate 2 5 mg tablet Take 1 tablet (2 5 mg total) by mouth once a week Takes 8 tablets every sunday 96 tablet 0    mirtazapine (REMERON) 15 mg tablet Take 1 tablet (15 mg total) by mouth daily at bedtime 90 tablet 0    mometasone (ELOCON) 0 1 % ointment Apply topically 2 (two) times a day as needed (itching) 45 g 1    nystatin (MYCOSTATIN) powder Apply topically      nystatin (MYCOSTATIN) powder Apply topically 3 (three) times a day 60 g 3    ofloxacin (FLOXIN) 0 3 % otic solution Administer 10 drops into both ears as needed        ONE TOUCH ULTRA TEST test strip Tests twice a day 100 each 5    ONETOUCH DELICA LANCETS FINE MISC by Does not apply route 2 (two) times a day 100 each 5    pantoprazole (PROTONIX) 40 mg tablet TAKE ONE TABLET BY MOUTH ONCE DAILY 90 tablet 0    Polyethylene Glycol 3350-GRX POWD Take by mouth      tiotropium (SPIRIVA RESPIMAT) 2 5 MCG/ACT AERS inhaler Inhale 1 puff daily at bedtime 3 Inhaler 3    Tofacitinib Citrate (XELJANZ XR) 11 MG TB24 Take 1 tablet by mouth daily      moxifloxacin (AVELOX) 400 MG tablet Take 1 tablet (400 mg total) by mouth daily for 10 days 10 tablet 0     No current facility-administered medications for this visit  Current Outpatient Prescriptions on File Prior to Visit   Medication Sig    albuterol (PROVENTIL HFA,VENTOLIN HFA) 90 mcg/act inhaler Inhale 2 puffs every 6 (six) hours as needed for wheezing    alendronate (FOSAMAX) 70 mg tablet Take 70 mg by mouth every 7 days    atorvastatin (LIPITOR) 20 mg tablet TAKE ONE TABLET BY MOUTH EVERY DAY    benzonatate (TESSALON) 200 MG capsule Take 1 capsule (200 mg total) by mouth 3 (three) times a day as needed for cough    Blood Glucose Monitoring Suppl (ONE TOUCH ULTRA 2) w/Device KIT by Does not apply route 2 (two) times a day    Canagliflozin 300 MG TABS Take 1 tablet (300 mg total) by mouth daily    Clobetasol Prop Emollient Base 0 05 % emollient cream clobetasol-emollient 0 05 % topical cream    clotrimazole (LOTRIMIN) 1 % cream Apply topically daily as needed   cyanocobalamin (VITAMIN B-12) 1,000 mcg tablet Take 1 tablet by mouth daily    diclofenac sodium (VOLTAREN) 1 % Apply 2 g topically as needed    DULoxetine (CYMBALTA) 20 mg capsule TAKE ONE CAPSULE BY MOUTH ONCE DAILY    ergocalciferol (VITAMIN D2) 50,000 units TAKE ONE CAPSULE BY MOUTH WEEKLY    fluticasone-salmeterol (ADVAIR) 250-50 mcg/dose Inhale 1 puff every 12 (twelve) hours   folic acid (FOLVITE) 1 mg tablet TAKE ONE TABLET BY MOUTH ONCE DAILY    furosemide (LASIX) 40 mg tablet Take 1 tablet (40 mg total) by mouth daily    JANUVIA 100 MG tablet TAKE 1 TABLET BY MOUTH DAILY      levothyroxine 125 mcg tablet TAKE ONE TABLET BY MOUTH EVERY DAY IN THE AM    lisinopril (ZESTRIL) 40 mg tablet TAKE ONE TABLET BY MOUTH EVERY DAY    methotrexate 2 5 mg tablet Take 1 tablet (2 5 mg total) by mouth once a week Takes 8 tablets every sunday    mirtazapine (REMERON) 15 mg tablet Take 1 tablet (15 mg total) by mouth daily at bedtime    mometasone (ELOCON) 0 1 % ointment Apply topically 2 (two) times a day as needed (itching)    nystatin (MYCOSTATIN) powder Apply topically    nystatin (MYCOSTATIN) powder Apply topically 3 (three) times a day    ofloxacin (FLOXIN) 0 3 % otic solution Administer 10 drops into both ears as needed      ONE TOUCH ULTRA TEST test strip Tests twice a day    ONETOUCH DELICA LANCETS FINE MISC by Does not apply route 2 (two) times a day    pantoprazole (PROTONIX) 40 mg tablet TAKE ONE TABLET BY MOUTH ONCE DAILY    Polyethylene Glycol 3350-GRX POWD Take by mouth    tiotropium (SPIRIVA RESPIMAT) 2 5 MCG/ACT AERS inhaler Inhale 1 puff daily at bedtime    Tofacitinib Citrate (XELJANZ XR) 11 MG TB24 Take 1 tablet by mouth daily     No current facility-administered medications on file prior to visit  She is allergic to no active allergies       Review of Systems   Constitutional: Negative for activity change, appetite change, chills, fatigue and fever  HENT: Positive for congestion and postnasal drip  Negative for rhinorrhea  Eyes: Negative for visual disturbance  Respiratory: Positive for cough and wheezing  Negative for chest tightness and shortness of breath  Cardiovascular: Negative for chest pain, palpitations and leg swelling  Gastrointestinal: Negative for abdominal pain, blood in stool, diarrhea, nausea and vomiting  Endocrine: Negative for polydipsia, polyphagia and polyuria  Genitourinary: Negative for dysuria, frequency and urgency  Musculoskeletal: Positive for arthralgias and joint swelling  Negative for gait problem  Skin: Negative for color change  Neurological: Negative for dizziness, light-headedness and headaches  Hematological: Does not bruise/bleed easily  Psychiatric/Behavioral: Negative for confusion and sleep disturbance  The patient is not nervous/anxious            Objective:      /60 (BP Location: Left arm, Patient Position: Sitting, Cuff Size: Large)   Pulse 93   Temp (!) 97 3 °F (36 3 °C) (Temporal)   Ht 5' 5" (1 651 m)   Wt 113 kg (250 lb)   SpO2 94%   BMI 41 60 kg/m²          Physical Exam   Constitutional: She is oriented to person, place, and time  She is cooperative  No distress  HENT:   Head: Normocephalic and atraumatic  Mouth/Throat: Oropharynx is clear and moist    Slight cerumen   Eyes: Pupils are equal, round, and reactive to light  Conjunctivae are normal  Right eye exhibits no discharge  Left eye exhibits no discharge  Neck: No JVD present  No spinous process tenderness present  Carotid bruit is not present  Cardiovascular: Normal rate, regular rhythm and normal heart sounds  Exam reveals no gallop and no friction rub  No murmur heard  Pulmonary/Chest: No respiratory distress  She has decreased breath sounds  She has wheezes  She has rhonchi  She has no rales  Scattered rhonchi with slight expiratory wheeze   Abdominal: Bowel sounds are normal  She exhibits no distension  There is no tenderness  Musculoskeletal: She exhibits no edema  Degenerative changes bilateral hands   Lymphadenopathy:     She has no cervical adenopathy  Neurological: She is alert and oriented to person, place, and time  Skin: Skin is warm and dry  Psychiatric: She has a normal mood and affect  Her behavior is normal  Judgment normal  Cognition and memory are normal    Nursing note and vitals reviewed  Below is the patient's most recent value for Albumin, ALT, AST, BUN, Calcium, Chloride, Cholesterol, CO2, Creatinine, GFR, Glucose, HDL, Hematocrit, Hemoglobin, Hemoglobin A1C, LDL, Magnesium, Phosphorus, Platelets, Potassium, PSA, Sodium, Triglycerides, and WBC     Lab Results   Component Value Date    ALT 32 12/22/2018    AST 12 12/22/2018    BUN 25 12/24/2018    CALCIUM 9 6 12/24/2018     12/24/2018    CHOL 194 12/17/2015    CO2 30 12/24/2018    CREATININE 0 78 12/24/2018    HDL 63 (H) 09/21/2018    HCT 41 5 12/24/2018    HGB 12 9 12/24/2018    HGBA1C 8 4 (H) 12/23/2018    MG 2 3 12/24/2018    PHOS 3 8 12/22/2018     12/24/2018    K 3 9 12/24/2018     12/17/2015    TRIG 87 09/21/2018    WBC 9 11 12/24/2018     Note: for a comprehensive list of the patient's lab results, access the Results Review activity

## 2019-02-07 ENCOUNTER — TELEPHONE (OUTPATIENT)
Dept: INTERNAL MEDICINE CLINIC | Facility: CLINIC | Age: 69
End: 2019-02-07

## 2019-02-07 PROBLEM — J96.01 ACUTE RESPIRATORY FAILURE WITH HYPOXIA (HCC): Status: RESOLVED | Noted: 2018-12-22 | Resolved: 2019-02-07

## 2019-02-07 NOTE — TELEPHONE ENCOUNTER
I called and spoke with the patient and asked her how she was feeling  She stated that she was feeling better  Her voice seems very deep but not coughing like she was when she was in the office

## 2019-02-08 ENCOUNTER — HOSPITAL ENCOUNTER (OUTPATIENT)
Dept: RADIOLOGY | Facility: HOSPITAL | Age: 69
Discharge: HOME | End: 2019-02-08
Payer: MEDICARE

## 2019-02-08 DIAGNOSIS — J44.1 COPD WITH ACUTE EXACERBATION (HCC): ICD-10-CM

## 2019-02-08 PROCEDURE — 71046 X-RAY EXAM CHEST 2 VIEWS: CPT

## 2019-02-08 NOTE — ASSESSMENT & PLAN NOTE
Lab Results   Component Value Date    HGBA1C 8 4 (H) 12/23/2018       No results for input(s): POCGLU in the last 72 hours  Blood Sugar Average: Last 72 hrs:     Last hemoglobin A1c while hospitalized was significantly elevated  Watch diet  Continue with the Januvia and [address] [place] [address]  Discussed with her watching her diet more closely  Recent stressors may be affecting her blood sugar  Try to gradually increase activity level once respiratory symptoms have improved  Continue follow-up with Ophthalmology regularly  Continue follow-up with Podiatry regularly

## 2019-02-08 NOTE — ASSESSMENT & PLAN NOTE
Continue with the [place]  Continue follow-up with Rheumatology on a regular basis  Continue with the methotrexate and folic acid as per Rheumatology is direction  Continue be as active as possible

## 2019-02-08 NOTE — ASSESSMENT & PLAN NOTE
Has not had recent TSH done  Continue current dose of levothyroxine  Slip given for laboratory testing  Advised her to wait about a month prior to getting this done so she recovers from the recent illnesses prior to the laboratory testing

## 2019-02-08 NOTE — ASSESSMENT & PLAN NOTE
Use nebulizer treatment 3 to 4 times a day  Avelox as prescribed  Slip given for chest x-ray  Call or follow-up if symptoms worsen or persist   Will have our office staff call her in about 2-3 days to check on her current status  Continue with the oxygen  Follow-up with Pulmonary recommended

## 2019-02-08 NOTE — ASSESSMENT & PLAN NOTE
Currently with exacerbation  Continue her maintenance medications  Continue with the Advair  Continue with the Spiriva

## 2019-02-12 ENCOUNTER — PATIENT OUTREACH (OUTPATIENT)
Dept: INTERNAL MEDICINE CLINIC | Facility: CLINIC | Age: 69
End: 2019-02-12

## 2019-02-12 NOTE — PROGRESS NOTES
Handoff of care from MARK ANTHONY Good RN  Chart review done  Attempt #1 by this CM to contact patient to introduce self, follow up on health, and outreach on possible needed services  No answer, voicemail left with contact information  Awaiting return phone call  Will attempt to call patient again in a few days if no return call  Pt is a Complex Care Management patient  Last CM was calling every few weeks and pt was doing well  Will continue same unless patient presents to ER or is admitted

## 2019-02-18 DIAGNOSIS — E03.9 HYPOTHYROIDISM, UNSPECIFIED TYPE: ICD-10-CM

## 2019-02-18 DIAGNOSIS — E11.9 TYPE 2 DIABETES MELLITUS WITHOUT COMPLICATION, WITHOUT LONG-TERM CURRENT USE OF INSULIN (HCC): ICD-10-CM

## 2019-02-18 DIAGNOSIS — E78.2 MIXED HYPERLIPIDEMIA: ICD-10-CM

## 2019-02-18 DIAGNOSIS — E55.9 VITAMIN D DEFICIENCY: ICD-10-CM

## 2019-02-18 DIAGNOSIS — M06.9 RHEUMATOID ARTHRITIS INVOLVING MULTIPLE SITES, UNSPECIFIED RHEUMATOID FACTOR PRESENCE: ICD-10-CM

## 2019-02-18 DIAGNOSIS — K21.9 GASTROESOPHAGEAL REFLUX DISEASE, ESOPHAGITIS PRESENCE NOT SPECIFIED: ICD-10-CM

## 2019-02-18 DIAGNOSIS — M06.9 RHEUMATOID ARTHRITIS, INVOLVING UNSPECIFIED SITE, UNSPECIFIED RHEUMATOID FACTOR PRESENCE: ICD-10-CM

## 2019-02-18 DIAGNOSIS — I10 ESSENTIAL HYPERTENSION: Primary | ICD-10-CM

## 2019-02-18 RX ORDER — SITAGLIPTIN 100 MG/1
TABLET, FILM COATED ORAL
Qty: 90 TABLET | Refills: 0 | Status: SHIPPED | OUTPATIENT
Start: 2019-02-18 | End: 2019-05-31 | Stop reason: SDUPTHER

## 2019-02-18 RX ORDER — LISINOPRIL 40 MG/1
TABLET ORAL
Qty: 90 TABLET | Refills: 0 | Status: SHIPPED | OUTPATIENT
Start: 2019-02-18 | End: 2019-05-31

## 2019-02-18 RX ORDER — DULOXETIN HYDROCHLORIDE 20 MG/1
CAPSULE, DELAYED RELEASE ORAL
Qty: 90 CAPSULE | Refills: 0 | Status: SHIPPED | OUTPATIENT
Start: 2019-02-18 | End: 2019-05-31 | Stop reason: SDUPTHER

## 2019-02-18 RX ORDER — ATORVASTATIN CALCIUM 20 MG/1
TABLET, FILM COATED ORAL
Qty: 90 TABLET | Refills: 0 | Status: SHIPPED | OUTPATIENT
Start: 2019-02-18 | End: 2019-05-31 | Stop reason: SDUPTHER

## 2019-02-18 RX ORDER — LEVOTHYROXINE SODIUM 0.12 MG/1
TABLET ORAL
Qty: 90 TABLET | Refills: 0 | Status: SHIPPED | OUTPATIENT
Start: 2019-02-18 | End: 2019-05-31 | Stop reason: SDUPTHER

## 2019-02-18 RX ORDER — CANAGLIFLOZIN 100 MG/1
TABLET, FILM COATED ORAL
Qty: 90 TABLET | Refills: 0 | Status: SHIPPED | OUTPATIENT
Start: 2019-02-18 | End: 2019-02-21

## 2019-02-18 RX ORDER — ERGOCALCIFEROL 1.25 MG/1
CAPSULE ORAL
Qty: 12 CAPSULE | Refills: 0 | Status: SHIPPED | OUTPATIENT
Start: 2019-02-18 | End: 2019-05-31

## 2019-02-18 RX ORDER — FOLIC ACID 1 MG/1
TABLET ORAL
Qty: 90 TABLET | Refills: 0 | Status: SHIPPED | OUTPATIENT
Start: 2019-02-18 | End: 2019-05-16 | Stop reason: SDUPTHER

## 2019-02-18 RX ORDER — PANTOPRAZOLE SODIUM 40 MG/1
TABLET, DELAYED RELEASE ORAL
Qty: 90 TABLET | Refills: 0 | Status: SHIPPED | OUTPATIENT
Start: 2019-02-18 | End: 2019-05-31 | Stop reason: SDUPTHER

## 2019-02-19 ENCOUNTER — PATIENT OUTREACH (OUTPATIENT)
Dept: CASE MANAGEMENT | Facility: OTHER | Age: 69
End: 2019-02-19

## 2019-02-19 NOTE — PROGRESS NOTES
Handoff of care from MARK ANTHONY Draper Officer, RN  Attempt #2 to contact patient to introduce self, follow up on health, and outreach on possible needed services  No answer, voicemail left with contact information  Awaiting return phone call  Will attempt to call patient again in a few days if no return call

## 2019-02-21 ENCOUNTER — OFFICE VISIT (OUTPATIENT)
Dept: INTERNAL MEDICINE CLINIC | Facility: CLINIC | Age: 69
End: 2019-02-21
Payer: MEDICARE

## 2019-02-21 VITALS
DIASTOLIC BLOOD PRESSURE: 60 MMHG | SYSTOLIC BLOOD PRESSURE: 100 MMHG | TEMPERATURE: 98.2 F | HEART RATE: 93 BPM | BODY MASS INDEX: 41.48 KG/M2 | WEIGHT: 249 LBS | HEIGHT: 65 IN | OXYGEN SATURATION: 96 %

## 2019-02-21 DIAGNOSIS — J91.8 PARAPNEUMONIC EFFUSION: ICD-10-CM

## 2019-02-21 DIAGNOSIS — J18.9 PARAPNEUMONIC EFFUSION: ICD-10-CM

## 2019-02-21 DIAGNOSIS — E11.9 TYPE 2 DIABETES MELLITUS WITHOUT COMPLICATION, WITHOUT LONG-TERM CURRENT USE OF INSULIN (HCC): ICD-10-CM

## 2019-02-21 DIAGNOSIS — J44.9 CHRONIC OBSTRUCTIVE PULMONARY DISEASE, UNSPECIFIED COPD TYPE (HCC): Primary | ICD-10-CM

## 2019-02-21 PROCEDURE — 99213 OFFICE O/P EST LOW 20 MIN: CPT | Performed by: FAMILY MEDICINE

## 2019-02-21 RX ORDER — CANAGLIFLOZIN 300 MG/1
300 TABLET, FILM COATED ORAL DAILY
COMMUNITY
End: 2019-05-31

## 2019-02-21 NOTE — PROGRESS NOTES
Assessment/Plan:    No problem-specific Assessment & Plan notes found for this encounter  Diagnoses and all orders for this visit:    Chronic obstructive pulmonary disease, unspecified COPD type ([facility] [address] )  -     XR chest pa & lateral; Future    Parapneumonic effusion  -     XR chest pa & lateral; Future    Type 2 diabetes mellitus without complication, without long-term current use of insulin (HCC)    Other orders  -     Canagliflozin (INVOKANA) 300 MG TABS; Take 300 mg by mouth daily        Orders recommendations as noted above  She wishes to try to avoid extensive evaluation at present  Recommended that she get a follow-up chest x-ray in about 10-14 days  Advised her to watch for any worsening of her respiratory symptoms  Discussed with her rescheduling her appointment with Pulmonary since this was rescheduled because of weather previously  Watch sugars  Continue with the oxygen for now  May attempt to wean this in the future but for now and needs to continue it  Gradually increase activity level  Will have her follow up in about 2 months with laboratory testing prior to that visit for her routine labs  Follow up sooner if needed  Subjective:      Patient ID: Elvia French is a 68 y o  female  She presents for follow-up after recent illness  She states that it took her a long time to heal from this respiratory illness  The additional antibiotics helped significantly  She has begun to cough up large amounts of sputum and her breathing seems to have improved  Taking better deep breaths  Does not feel as tired  Not nearly as short of breath  Continues with home health and they feel that she is improving as well  Denies any chest pain or palpitations  Denies any fevers or chills  Continues to use the nebulizer 3 to 4 times a day  She did have an appointment with Pulmonary but this needed to be rescheduled because of the weather  Appetite is slowly improving    Does have increasing issues with her joints especially the right knee  Will be following up with the specialists for this in the near future  She plans on rescheduling her appointment with Pulmonary wants to hold off for a few weeks if possible  Denies any nausea, vomiting, or diarrhea  Blood sugars have been variable  Blood sugar this morning was somewhat elevated  Denies any hypoglycemic episodes  The following portions of the patient's history were reviewed and updated as appropriate:   She  has a past medical history of Arthritis, Arthritis, Candidiasis of skin, COPD (chronic obstructive pulmonary disease) ([place] [address] ), Current chronic use of systemic steroids, Degenerative arthritis of left knee, Diabetes mellitus ([place] [address] ), Disease of thyroid gland, Fibromyalgia, Fistula of knee, GERD (gastroesophageal reflux disease), Hyperlipidemia, Hypertension, Medial meniscus tear, Migraine, Obesity, Obesity, Osteoarthritis, Osteopenia, Pneumonia, Psychiatric disorder, Rheumatoid arthritis ([place] [address] ), Rheumatoid arthritis ([place] [address] ), Sepsis ([place] [address] ), Tick bite, Vitamin B12 deficiency, and Vitamin D deficiency    She   Patient Active Problem List    Diagnosis Date Noted    Candidal dermatitis 12/28/2018    Parapneumonic effusion 12/25/2018    Localized swelling, mass and lump, upper limb, right 12/11/2017    Constipation 11/29/2017    Renal cyst 11/29/2017    Candidiasis, cutaneous 06/30/2017    Vitamin B12 deficiency 11/10/2016    BMI 40 0-44 9, adult ([place] [address] ) 11/05/2016    Morbid obesity with BMI of 40 0-44 9, adult ([place] [address] ) 11/04/2016    COPD with acute exacerbation ([place] [address] ) 11/03/2016    Type 2 diabetes mellitus with hyperglycemia ([place] [address] ) 11/03/2016    Hypothyroidism 11/03/2016    Rheumatoid arthritis ([place] [address] ) 11/03/2016    Essential hypertension 11/03/2016    Hyperlipidemia 11/03/2016    GERD (gastroesophageal reflux disease) 11/03/2016    Hypoalbuminemia 11/03/2016    Hepatic steatosis 11/03/2016    Diabetes mellitus, type 2 ([place] [address] ) 10/13/2015    Anxiety 06/02/2014    Fibromyalgia 01/28/2014    Osteopenia 09/17/2012    Chronic obstructive pulmonary disease ([facility] [address] ) 05/29/2012    Osteoarthritis 05/29/2012    Vitamin D deficiency 05/29/2012     She  has a past surgical history that includes Hysterectomy; Knee arthroscopy; Cataract extraction (Bilateral); Cholecystectomy; Joint replacement; Tubal ligation; Neuroplasty / transposition median nerve at carpal tunnel; and Eye surgery  Her family history includes Asthma in her family; Cancer in her family; Diabetes in her family; Hypertension in her family; Stroke in her mother  She  reports that she quit smoking about 6 years ago  Her smoking use included e-cigarettes  She has a 48 00 pack-year smoking history  She has never used smokeless tobacco  She reports that she does not drink alcohol or use drugs  Current Outpatient Medications   Medication Sig Dispense Refill    albuterol (PROVENTIL HFA,VENTOLIN HFA) 90 mcg/act inhaler Inhale 2 puffs every 6 (six) hours as needed for wheezing 1 Inhaler 0    alendronate (FOSAMAX) 70 mg tablet Take 70 mg by mouth every 7 days      atorvastatin (LIPITOR) 20 mg tablet TAKE ONE TABLET BY MOUTH EVERY DAY 90 tablet 0    benzonatate (TESSALON) 200 MG capsule Take 1 capsule (200 mg total) by mouth 3 (three) times a day as needed for cough 60 capsule 3    Blood Glucose Monitoring Suppl (ONE TOUCH ULTRA 2) w/Device KIT by Does not apply route 2 (two) times a day 1 each 0    Canagliflozin (INVOKANA) 300 MG TABS Take 300 mg by mouth daily      Clobetasol Prop Emollient Base 0 05 % emollient cream clobetasol-emollient 0 05 % topical cream      clotrimazole (LOTRIMIN) 1 % cream Apply topically daily as needed        cyanocobalamin (VITAMIN B-12) 1,000 mcg tablet Take 1 tablet by mouth daily      diclofenac sodium (VOLTAREN) 1 % Apply 2 g topically as needed      DULoxetine (CYMBALTA) 20 mg capsule TAKE ONE CAPSULE BY MOUTH ONCE DAILY 90 capsule 0    ergocalciferol (VITAMIN D2) 50,000 units TAKE ONE CAPSULE BY MOUTH WEEKLY 12 capsule 0    fluticasone-salmeterol (ADVAIR) 250-50 mcg/dose Inhale 1 puff every 12 (twelve) hours   folic acid (FOLVITE) 1 mg tablet TAKE ONE TABLET BY MOUTH ONCE DAILY 90 tablet 0    furosemide (LASIX) 40 mg tablet Take 1 tablet (40 mg total) by mouth daily 30 tablet 1    ipratropium-albuterol (DUO-NEB) 0 5-2 5 mg/3 mL nebulizer solution Take 1 vial (3 mL total) by nebulization every 6 (six) hours as needed for wheezing or shortness of breath 120 vial 3    JANUVIA 100 MG tablet TAKE 1 TABLET BY MOUTH DAILY  90 tablet 0    levothyroxine 125 mcg tablet TAKE ONE TABLET BY MOUTH EVERY DAY IN THE AM 90 tablet 0    lisinopril (ZESTRIL) 40 mg tablet TAKE ONE TABLET BY MOUTH EVERY DAY 90 tablet 0    methotrexate 2 5 mg tablet Take 1 tablet (2 5 mg total) by mouth once a week Takes 8 tablets every sunday 96 tablet 0    mirtazapine (REMERON) 15 mg tablet Take 1 tablet (15 mg total) by mouth daily at bedtime 90 tablet 0    mometasone (ELOCON) 0 1 % ointment Apply topically 2 (two) times a day as needed (itching) 45 g 1    nystatin (MYCOSTATIN) powder Apply topically 3 (three) times a day 60 g 3    ofloxacin (FLOXIN) 0 3 % otic solution Administer 10 drops into both ears as needed        ONE TOUCH ULTRA TEST test strip Tests twice a day 100 each 5    ONETOUCH DELICA LANCETS FINE MISC by Does not apply route 2 (two) times a day 100 each 5    pantoprazole (PROTONIX) 40 mg tablet TAKE ONE TABLET BY MOUTH ONCE DAILY 90 tablet 0    Polyethylene Glycol 3350-GRX POWD Take by mouth      tiotropium (SPIRIVA RESPIMAT) 2 5 MCG/ACT AERS inhaler Inhale 1 puff daily at bedtime 3 Inhaler 3    Tofacitinib Citrate (XELJANZ XR) 11 MG TB24 Take 1 tablet by mouth daily       No current facility-administered medications for this visit        Current Outpatient Medications on File Prior to Visit   Medication Sig    albuterol (PROVENTIL HFA,VENTOLIN HFA) 90 mcg/act inhaler Inhale 2 puffs every 6 (six) hours as needed for wheezing    alendronate (FOSAMAX) 70 mg tablet Take 70 mg by mouth every 7 days    atorvastatin (LIPITOR) 20 mg tablet TAKE ONE TABLET BY MOUTH EVERY DAY    benzonatate (TESSALON) 200 MG capsule Take 1 capsule (200 mg total) by mouth 3 (three) times a day as needed for cough    Blood Glucose Monitoring Suppl (ONE TOUCH ULTRA 2) w/Device KIT by Does not apply route 2 (two) times a day    Canagliflozin (INVOKANA) 300 MG TABS Take 300 mg by mouth daily    Clobetasol Prop Emollient Base 0 05 % emollient cream clobetasol-emollient 0 05 % topical cream    clotrimazole (LOTRIMIN) 1 % cream Apply topically daily as needed   cyanocobalamin (VITAMIN B-12) 1,000 mcg tablet Take 1 tablet by mouth daily    diclofenac sodium (VOLTAREN) 1 % Apply 2 g topically as needed    DULoxetine (CYMBALTA) 20 mg capsule TAKE ONE CAPSULE BY MOUTH ONCE DAILY    ergocalciferol (VITAMIN D2) 50,000 units TAKE ONE CAPSULE BY MOUTH WEEKLY    fluticasone-salmeterol (ADVAIR) 250-50 mcg/dose Inhale 1 puff every 12 (twelve) hours   folic acid (FOLVITE) 1 mg tablet TAKE ONE TABLET BY MOUTH ONCE DAILY    furosemide (LASIX) 40 mg tablet Take 1 tablet (40 mg total) by mouth daily    ipratropium-albuterol (DUO-NEB) 0 5-2 5 mg/3 mL nebulizer solution Take 1 vial (3 mL total) by nebulization every 6 (six) hours as needed for wheezing or shortness of breath    JANUVIA 100 MG tablet TAKE 1 TABLET BY MOUTH DAILY      levothyroxine 125 mcg tablet TAKE ONE TABLET BY MOUTH EVERY DAY IN THE AM    lisinopril (ZESTRIL) 40 mg tablet TAKE ONE TABLET BY MOUTH EVERY DAY    methotrexate 2 5 mg tablet Take 1 tablet (2 5 mg total) by mouth once a week Takes 8 tablets every sunday    mirtazapine (REMERON) 15 mg tablet Take 1 tablet (15 mg total) by mouth daily at bedtime    mometasone (ELOCON) 0 1 % ointment Apply topically 2 (two) times a day as needed (itching)    nystatin (MYCOSTATIN) powder Apply topically 3 (three) times a day    ofloxacin (FLOXIN) 0 3 % otic solution Administer 10 drops into both ears as needed      ONE TOUCH ULTRA TEST test strip Tests twice a day    ONETOUCH DELICA LANCETS FINE MISC by Does not apply route 2 (two) times a day    pantoprazole (PROTONIX) 40 mg tablet TAKE ONE TABLET BY MOUTH ONCE DAILY    Polyethylene Glycol 3350-GRX POWD Take by mouth    tiotropium (SPIRIVA RESPIMAT) 2 5 MCG/ACT AERS inhaler Inhale 1 puff daily at bedtime    Tofacitinib Citrate (XELJANZ XR) 11 MG TB24 Take 1 tablet by mouth daily    [DISCONTINUED] INVOKANA 100 MG TAKE ONE TABLET BY MOUTH EVERY DAY    [DISCONTINUED] nystatin (MYCOSTATIN) powder Apply topically     No current facility-administered medications on file prior to visit  She is allergic to no active allergies       Review of Systems   Constitutional: Positive for activity change  Negative for appetite change, chills and fever  HENT: Negative for congestion and rhinorrhea  Eyes: Negative for visual disturbance  Respiratory: Positive for cough  Negative for chest tightness, shortness of breath and wheezing  Cardiovascular: Negative for chest pain, palpitations and leg swelling  Gastrointestinal: Negative for abdominal pain, blood in stool, diarrhea, nausea and vomiting  Endocrine: Negative for polydipsia, polyphagia and polyuria  Genitourinary: Negative for dysuria, frequency and urgency  Musculoskeletal: Positive for arthralgias and gait problem  Skin: Negative for color change  Neurological: Negative for dizziness and headaches  Hematological: Does not bruise/bleed easily  Psychiatric/Behavioral: Negative for confusion, dysphoric mood and sleep disturbance  The patient is not nervous/anxious            Objective:      /60 (BP Location: Left arm, Patient Position: Sitting, Cuff Size: Large)   Pulse 93   Temp 98 2 °F (36 8 °C) (Temporal)   Ht 5' 5" (1 651 m)   Wt 113 kg (249 lb)   SpO2 96%   BMI 41 44 kg/m²          Physical Exam   Constitutional: She is oriented to person, place, and time  No distress  HENT:   Head: Normocephalic and atraumatic  Mouth/Throat: Oropharynx is clear and moist    Eyes: Pupils are equal, round, and reactive to light  Conjunctivae are normal  Right eye exhibits no discharge  Left eye exhibits no discharge  Cardiovascular: Normal rate, regular rhythm and normal heart sounds  Exam reveals no gallop and no friction rub  No murmur heard  Pulmonary/Chest: No respiratory distress  She has decreased breath sounds  She has no wheezes  She has no rales  Decreased breath sounds but no rhonchi   Abdominal: Bowel sounds are normal  She exhibits no distension  There is no tenderness  Musculoskeletal: She exhibits no edema  Degenerative changes bilateral hands; tenderness along the joint line right knee   Lymphadenopathy:     She has no cervical adenopathy  Neurological: She is oriented to person, place, and time  Skin: Skin is warm and dry  Psychiatric: She has a normal mood and affect  Her behavior is normal    Nursing note and vitals reviewed

## 2019-02-21 NOTE — PATIENT INSTRUCTIONS
COPD: Breathing Exercises   AMBULATORY CARE:   Breathing exercises  may help manage your symptoms of COPD, such as shortness of breath and difficulty breathing  The exercises may strengthen the muscles you use to breathe  They may also help you get air easier when you are having trouble breathing  Your healthcare provider will tell you how often to do these exercises  Deep breathing and coughing:  Take a deep breath and hold it for as long as you can  Let the air out and then cough strongly  Deep breaths help open your airway  You may be given an incentive spirometer to help you take deep breaths  Put the plastic piece in your mouth and take a slow, deep breath  Then let the air out and cough  Repeat these steps 10 times every hour  This will decrease your risk for a lung infection  Pursed-lip breathing:  Pursed-lip breathing can be especially helpful before you start an activity  It can also be used any time you feel short of breath  You can do this exercise by following these steps:  · Breathe in through your nose  Use the muscles in your abdomen to help fill your lungs with air  · Slowly breathe out through your mouth with your lips slightly puckered  You should make a quiet hissing sound as you breathe out  · Try to take twice as long to breathe out as it did to breathe in  This helps you get rid of as much air from your lungs as possible  · Repeat this exercise several times  Once you are used to doing pursed-lip breathing, you can do it any time you need more air  Diaphragmatic breathing: This exercise will help strengthen the muscles that you use to breathe  You can do this exercise by following these steps:  · Place one hand just below your ribs  Place your other hand in the middle of your chest over your breastbone  · Breathe in slowly through your nose, as deeply as you can  · Breathe out slowly through pursed lips   As you breathe out, tighten the muscles just below your ribs  Use your hand to gently push in and up while you tighten the muscles  · Diaphragmatic breathing takes practice  You may need to practice this many times a day  Slowly increase the amount of time you spend during each practice session  Follow up with your healthcare provider as directed:  Write down your questions so you remember to ask them during your visits  © 2017 [address] [employer] Information is for End User's use only and may not be sold, redistributed or otherwise used for commercial purposes  All illustrations and images included in CareNotes® are the copyrighted property of [employer] , [employer]  or [employer] Lee  The above information is an  only  It is not intended as medical advice for individual conditions or treatments  Talk to your doctor, nurse or pharmacist before following any medical regimen to see if it is safe and effective for you

## 2019-02-26 ENCOUNTER — PATIENT OUTREACH (OUTPATIENT)
Dept: INTERNAL MEDICINE CLINIC | Facility: CLINIC | Age: 69
End: 2019-02-26

## 2019-02-26 NOTE — PROGRESS NOTES
Handoff of care from MARK ANTHONY Hicks RN  Attempt #3 to contact patient to introduce self, follow up on health, and outreach for any possible needed services  No answer, voicemail left with contact information  Will close episode per Care Management protocol and re-open if pt returns my call

## 2019-03-13 ENCOUNTER — HOSPITAL ENCOUNTER (OUTPATIENT)
Dept: RADIOLOGY | Facility: HOSPITAL | Age: 69
Discharge: HOME | End: 2019-03-13
Payer: MEDICARE

## 2019-03-13 DIAGNOSIS — J91.8 PARAPNEUMONIC EFFUSION: ICD-10-CM

## 2019-03-13 DIAGNOSIS — J18.9 PARAPNEUMONIC EFFUSION: ICD-10-CM

## 2019-03-13 DIAGNOSIS — J44.9 CHRONIC OBSTRUCTIVE PULMONARY DISEASE, UNSPECIFIED COPD TYPE (HCC): ICD-10-CM

## 2019-03-13 PROCEDURE — 71046 X-RAY EXAM CHEST 2 VIEWS: CPT

## 2019-03-21 ENCOUNTER — APPOINTMENT (EMERGENCY)
Dept: CT IMAGING | Facility: HOSPITAL | Age: 69
End: 2019-03-21
Payer: MEDICARE

## 2019-03-21 ENCOUNTER — APPOINTMENT (EMERGENCY)
Dept: ULTRASOUND IMAGING | Facility: HOSPITAL | Age: 69
End: 2019-03-21
Payer: MEDICARE

## 2019-03-21 ENCOUNTER — HOSPITAL ENCOUNTER (EMERGENCY)
Facility: HOSPITAL | Age: 69
Discharge: HOME | End: 2019-03-21
Attending: EMERGENCY MEDICINE
Payer: MEDICARE

## 2019-03-21 DIAGNOSIS — J40 BRONCHITIS: Primary | ICD-10-CM

## 2019-03-21 LAB
ALBUMIN SERPL BCP-MCNC: 3.7 G/DL (ref 3.5–5)
ALP SERPL-CCNC: 65 U/L (ref 46–116)
ALT SERPL W P-5'-P-CCNC: 49 U/L (ref 12–78)
ANION GAP SERPL CALCULATED.3IONS-SCNC: 7 MMOL/L (ref 4–13)
AST SERPL W P-5'-P-CCNC: 27 U/L (ref 5–45)
BASOPHILS # BLD AUTO: 0.05 THOUSANDS/ΜL (ref 0–0.1)
BASOPHILS NFR BLD AUTO: 1 % (ref 0–1)
BILIRUB SERPL-MCNC: 0.4 MG/DL (ref 0.2–1)
BUN SERPL-MCNC: 23 MG/DL (ref 5–25)
CALCIUM SERPL-MCNC: 9.9 MG/DL (ref 8.3–10.1)
CHLORIDE SERPL-SCNC: 101 MMOL/L (ref 100–108)
CO2 SERPL-SCNC: 32 MMOL/L (ref 21–32)
CREAT SERPL-MCNC: 0.84 MG/DL (ref 0.6–1.3)
DEPRECATED D DIMER PPP: 1088 NG/ML (FEU)
EOSINOPHIL # BLD AUTO: 0.34 THOUSAND/ΜL (ref 0–0.61)
EOSINOPHIL NFR BLD AUTO: 5 % (ref 0–6)
ERYTHROCYTE [DISTWIDTH] IN BLOOD BY AUTOMATED COUNT: 16 % (ref 11.6–15.1)
GFR SERPL CREATININE-BSD FRML MDRD: 70 ML/MIN/1.73SQ M
GLUCOSE SERPL-MCNC: 184 MG/DL (ref 65–140)
HCT VFR BLD AUTO: 39.6 % (ref 34.8–46.1)
HGB BLD-MCNC: 12.5 G/DL (ref 11.5–15.4)
HOLD SPECIMEN: NORMAL
HOLD SPECIMEN: NORMAL
IMM GRANULOCYTES # BLD AUTO: 0.02 THOUSAND/UL (ref 0–0.2)
IMM GRANULOCYTES NFR BLD AUTO: 0 % (ref 0–2)
LYMPHOCYTES # BLD AUTO: 2.59 THOUSANDS/ΜL (ref 0.6–4.47)
LYMPHOCYTES NFR BLD AUTO: 40 % (ref 14–44)
MCH RBC QN AUTO: 31.8 PG (ref 26.8–34.3)
MCHC RBC AUTO-ENTMCNC: 31.6 G/DL (ref 31.4–37.4)
MCV RBC AUTO: 101 FL (ref 82–98)
MONOCYTES # BLD AUTO: 0.7 THOUSAND/ΜL (ref 0.17–1.22)
MONOCYTES NFR BLD AUTO: 11 % (ref 4–12)
NEUTROPHILS # BLD AUTO: 2.84 THOUSANDS/ΜL (ref 1.85–7.62)
NEUTS SEG NFR BLD AUTO: 43 % (ref 43–75)
NRBC BLD AUTO-RTO: 0 /100 WBCS
NT-PROBNP SERPL-MCNC: 9 PG/ML
PLATELET # BLD AUTO: 286 THOUSANDS/UL (ref 149–390)
PMV BLD AUTO: 10.8 FL (ref 8.9–12.7)
POTASSIUM SERPL-SCNC: 4 MMOL/L (ref 3.5–5.3)
PROT SERPL-MCNC: 7.5 G/DL (ref 6.4–8.2)
RBC # BLD AUTO: 3.93 MILLION/UL (ref 3.81–5.12)
SODIUM SERPL-SCNC: 140 MMOL/L (ref 136–145)
TROPONIN I SERPL-MCNC: <0.02 NG/ML
WBC # BLD AUTO: 6.54 THOUSAND/UL (ref 4.31–10.16)

## 2019-03-21 PROCEDURE — 87631 RESP VIRUS 3-5 TARGETS: CPT | Performed by: PHYSICIAN ASSISTANT

## 2019-03-21 PROCEDURE — 85379 FIBRIN DEGRADATION QUANT: CPT | Performed by: PHYSICIAN ASSISTANT

## 2019-03-21 PROCEDURE — 71275 CT ANGIOGRAPHY CHEST: CPT

## 2019-03-21 PROCEDURE — 85025 COMPLETE CBC W/AUTO DIFF WBC: CPT | Performed by: EMERGENCY MEDICINE

## 2019-03-21 PROCEDURE — 80053 COMPREHEN METABOLIC PANEL: CPT | Performed by: EMERGENCY MEDICINE

## 2019-03-21 PROCEDURE — 84484 ASSAY OF TROPONIN QUANT: CPT | Performed by: EMERGENCY MEDICINE

## 2019-03-21 PROCEDURE — 93005 ELECTROCARDIOGRAM TRACING: CPT

## 2019-03-21 PROCEDURE — 93970 EXTREMITY STUDY: CPT

## 2019-03-21 PROCEDURE — 83880 ASSAY OF NATRIURETIC PEPTIDE: CPT | Performed by: PHYSICIAN ASSISTANT

## 2019-03-21 PROCEDURE — 99285 EMERGENCY DEPT VISIT HI MDM: CPT

## 2019-03-21 PROCEDURE — 36415 COLL VENOUS BLD VENIPUNCTURE: CPT | Performed by: PHYSICIAN ASSISTANT

## 2019-03-21 RX ADMIN — IOHEXOL 85 ML: 350 INJECTION, SOLUTION INTRAVENOUS at 19:38

## 2019-03-21 NOTE — ED PROCEDURE NOTE
Procedure  ECG 12 Lead Documentation  Date/Time: 3/21/2019 4:50 PM  Performed by: Mariana Walden PA-C  Authorized by: Mariana Walden PA-C     Indications / Diagnosis:  Shortness of breath  ECG reviewed by me, the ED Provider: yes    Patient location:  ED  Previous ECG:     Comparison to cardiac monitor: Yes    Interpretation:     Interpretation: normal    Rate:     ECG rate:  91    ECG rate assessment: normal    Rhythm:     Rhythm: sinus rhythm    Ectopy:     Ectopy: none    QRS:     QRS axis:  Normal    QRS intervals:  Normal  Conduction:     Conduction: normal    ST segments:     ST segments:  Normal  T waves:     T waves: normal                       Mariana Walden PA-C  03/21/19 [id number]

## 2019-03-21 NOTE — ED PROVIDER NOTES
History  Chief Complaint   Patient presents with    Shortness of Breath     Pt reports SOB with exertion that started yesterday  Pt reports she has a cold for several months and even was hospitalized for it  Patient presents to the emergency department today offering a chief complaint of shortness of breath  She states she has had some nasal congestion over the last 24 hours  She had noted some shortness of breath with exertion over the last 24 hours as well  Denies chest pain  No fevers chills  She admits to some right-sided leg swelling which she states is stable improving  States she does utilize 2 L of oxygen via nasal cannula is a shin at home since her last discharge which was approximately 3-4 months ago  She states she has a history fluid in the right lung  Prior to Admission Medications   Prescriptions Last Dose Informant Patient Reported? Taking? Blood Glucose Monitoring Suppl (ONE TOUCH ULTRA 2) w/Device KIT   No Yes   Sig: by Does not apply route 2 (two) times a day   Canagliflozin (INVOKANA) 300 MG TABS   Yes Yes   Sig: Take 300 mg by mouth daily   Clobetasol Prop Emollient Base 0 05 % emollient cream   Yes Yes   Sig: clobetasol-emollient 0 05 % topical cream   DULoxetine (CYMBALTA) 20 mg capsule   No Yes   Sig: TAKE ONE CAPSULE BY MOUTH ONCE DAILY   JANUVIA 100 MG tablet   No Yes   Sig: TAKE 1 TABLET BY MOUTH DAILY     ONE TOUCH ULTRA TEST test strip   No Yes   Sig: Tests twice a day   ONETOUCH DELICA LANCETS FINE MISC   No Yes   Sig: by Does not apply route 2 (two) times a day   Polyethylene Glycol 3350-GRX POWD   Yes Yes   Sig: Take by mouth   Tofacitinib Citrate (XELJANZ XR) 11 MG TB24   Yes Yes   Sig: Take 1 tablet by mouth daily   albuterol (PROVENTIL HFA,VENTOLIN HFA) 90 mcg/act inhaler   No Yes   Sig: Inhale 2 puffs every 6 (six) hours as needed for wheezing   alendronate (FOSAMAX) 70 mg tablet  Self Yes Yes   Sig: Take 70 mg by mouth every 7 days   atorvastatin (LIPITOR) 20 mg tablet   No Yes   Sig: TAKE ONE TABLET BY MOUTH EVERY DAY   benzonatate (TESSALON) 200 MG capsule   No Yes   Sig: Take 1 capsule (200 mg total) by mouth 3 (three) times a day as needed for cough   clotrimazole (LOTRIMIN) 1 % cream   Yes Yes   Sig: Apply topically daily as needed  cyanocobalamin (VITAMIN B-12) 1,000 mcg tablet   Yes Yes   Sig: Take 1 tablet by mouth daily   diclofenac sodium (VOLTAREN) 1 %   Yes Yes   Sig: Apply 2 g topically as needed   ergocalciferol (VITAMIN D2) 50,000 units   No Yes   Sig: TAKE ONE CAPSULE BY MOUTH WEEKLY   fluticasone-salmeterol (ADVAIR) 250-50 mcg/dose   Yes Yes   Sig: Inhale 1 puff every 12 (twelve) hours     folic acid (FOLVITE) 1 mg tablet   No Yes   Sig: TAKE ONE TABLET BY MOUTH ONCE DAILY   furosemide (LASIX) 40 mg tablet   No Yes   Sig: Take 1 tablet (40 mg total) by mouth daily   ipratropium-albuterol (DUO-NEB) 0 5-2 5 mg/3 mL nebulizer solution   No Yes   Sig: Take 1 vial (3 mL total) by nebulization every 6 (six) hours as needed for wheezing or shortness of breath   levothyroxine 125 mcg tablet   No Yes   Sig: TAKE ONE TABLET BY MOUTH EVERY DAY IN THE AM   lisinopril (ZESTRIL) 40 mg tablet   No Yes   Sig: TAKE ONE TABLET BY MOUTH EVERY DAY   methotrexate 2 5 mg tablet   No Yes   Sig: Take 1 tablet (2 5 mg total) by mouth once a week Takes 8 tablets every sunday   mirtazapine (REMERON) 15 mg tablet   No Yes   Sig: Take 1 tablet (15 mg total) by mouth daily at bedtime   mometasone (ELOCON) 0 1 % ointment   No Yes   Sig: Apply topically 2 (two) times a day as needed (itching)   nystatin (MYCOSTATIN) powder   No Yes   Sig: Apply topically 3 (three) times a day   ofloxacin (FLOXIN) 0 3 % otic solution   Yes Yes   Sig: Administer 10 drops into both ears as needed     pantoprazole (PROTONIX) 40 mg tablet   No Yes   Sig: TAKE ONE TABLET BY MOUTH ONCE DAILY   tiotropium (SPIRIVA RESPIMAT) 2 5 MCG/ACT AERS inhaler   No Yes   Sig: Inhale 1 puff daily at bedtime Facility-Administered Medications: None       Past Medical History:   Diagnosis Date    Arthritis     Arthritis     Candidiasis of skin     [facility] disease) ([facility] )     last assessed 2016    Current chronic use of systemic steroids     Degenerative arthritis of left knee     last assessed 2015    Diabetes mellitus ([facility] )     Disease of thyroid gland     hypo pill given to decrease thyroid   Fibromyalgia     Fistula of knee     last assessed 2014    GERD (gastroesophageal reflux disease)     Hyperlipidemia     Hypertension     Medial meniscus tear     of left knee, last assessed 2014    Migraine     states she has a hx of HA that she calls [employer] but never was dx by neurologist    Obesity     Obesity     Osteoarthritis     Osteopenia     Pneumonia     last assessed 2016    Psychiatric disorder     anxiety    Rheumatoid arthritis ([facility] )     Rheumatoid arthritis ([facility]     Sepsis ([facility] )     last assessed 11/10/2016    Tick bite     last assessed 10/30/2015    Vitamin B12 deficiency     Vitamin D deficiency        Past Surgical History:   Procedure Laterality Date    CATARACT EXTRACTION Bilateral     CHOLECYSTECTOMY      EYE SURGERY      Bilateral Cataracts    HYSTERECTOMY      JOINT REPLACEMENT      left knee    KNEE ARTHROSCOPY      NEUROPLASTY / TRANSPOSITION MEDIAN NERVE AT CARPAL TUNNEL      TUBAL LIGATION         Family History   Problem Relation Age of Onset    Stroke Mother     Asthma Family     Cancer Family     Diabetes Family     Hypertension Family      I have reviewed and agree with the history as documented      Social History     Tobacco Use    Smoking status: Former Smoker     Packs/day: 1 00     Years: 48 00     Pack years: 48 00     Types: E-Cigarettes     Last attempt to quit: 2012     Years since quittin 3    Smokeless tobacco: Never Used    Tobacco comment: per ivanna - "current every day smoker, former smoker"   Substance Use Topics    Alcohol use: No     Comment: stopped drinking alcohol    Drug use: No        Review of Systems   Constitutional: Negative  HENT: Positive for congestion  Negative for dental problem, drooling, ear discharge, ear pain, facial swelling, hearing loss, mouth sores, nosebleeds, postnasal drip, rhinorrhea, sinus pressure, sinus pain, sneezing, sore throat, tinnitus, trouble swallowing and voice change  Eyes: Negative  Respiratory: Positive for cough and shortness of breath  Negative for choking, chest tightness, wheezing and stridor  Cardiovascular: Positive for leg swelling  Negative for chest pain and palpitations  Gastrointestinal: Negative  Endocrine: Negative  Genitourinary: Negative  Musculoskeletal: Negative  Skin: Negative  Allergic/Immunologic: Negative  Neurological: Negative  Hematological: Negative  Psychiatric/Behavioral: Negative  All other systems reviewed and are negative  Physical Exam  Physical Exam   Constitutional: She is oriented to person, place, and time  She appears well-developed  Non-toxic appearance  She does not appear ill  HENT:   Head: Atraumatic  Mouth/Throat: Oropharynx is clear and moist    Eyes: Pupils are equal, round, and reactive to light  EOM are normal    Neck: Normal range of motion  Cardiovascular: Normal rate  Pulmonary/Chest: Effort normal and breath sounds normal    Abdominal: Soft  There is no tenderness  Musculoskeletal: Normal range of motion  Right lower leg: Normal         Left lower leg: Normal    Neurological: She is alert and oriented to person, place, and time  Skin: Skin is warm  Capillary refill takes less than 2 seconds  Psychiatric: She has a normal mood and affect  Vitals reviewed        Vital Signs  ED Triage Vitals [03/21/19 1619]   Temperature Pulse Respirations Blood Pressure SpO2   98 1 °F (36 7 °C) 102 20 (!) 135/111 94 %      Temp Source Heart Rate Source Patient Position - Orthostatic VS BP Location FiO2 (%)   Temporal Monitor Sitting Left arm --      Pain Score       No Pain           Vitals:    03/21/19 1619 03/21/19 1800 03/21/19 1830 03/21/19 2000   BP: (!) 135/111 139/62 158/67 157/67   Pulse: 102 88 85 81   Patient Position - Orthostatic VS: Sitting Lying Lying          Visual Acuity      ED Medications  Medications   iohexol (OMNIPAQUE) 350 MG/ML injection (SINGLE-DOSE) 85 mL (85 mL Intravenous Given 3/21/19 1938)       Diagnostic Studies  Results Reviewed     Procedure Component Value Units Date/Time    Influenza A/B and RSV by PCR [[id number]] Collected:  03/21/19 1820    Lab Status:   In process Specimen:  Nasopharyngeal Swab Updated:  03/21/19 1827    D-Dimer [[id number]]  (Abnormal) Collected:  03/21/19 1708    Lab Status:  Final result Specimen:  Blood Updated:  03/21/19 1739     D-Dimer, Quant 1,088 ng/ml (FEU)     B-type natriuretic peptide [[id number]]  (Normal) Collected:  03/21/19 1708    Lab Status:  Final result Specimen:  Blood Updated:  03/21/19 1727     NT-proBNP 9 pg/mL     Troponin I [[id number]]  (Normal) Collected:  03/21/19 1636    Lab Status:  Final result Specimen:  Blood from Arm, Right Updated:  03/21/19 1701     Troponin I <0 02 ng/mL     Comprehensive metabolic panel [[id number]]  (Abnormal) Collected:  03/21/19 1636    Lab Status:  Final result Specimen:  Blood from Arm, Right Updated:  03/21/19 1700     Sodium 140 mmol/L      Potassium 4 0 mmol/L      Chloride 101 mmol/L      CO2 32 mmol/L      ANION GAP 7 mmol/L      BUN 23 mg/dL      Creatinine 0 84 mg/dL      Glucose 184 mg/dL      Calcium 9 9 mg/dL      AST 27 U/L      ALT 49 U/L      Alkaline Phosphatase 65 U/L      Total Protein 7 5 g/dL      Albumin 3 7 g/dL      Total Bilirubin 0 40 mg/dL      eGFR 70 ml/min/1 73sq m     Narrative:       National Kidney Disease Education Program recommendations are as follows:  GFR calculation is accurate only with a steady state creatinine  Chronic Kidney disease less than 60 ml/min/1 73 sq  meters  Kidney failure less than 15 ml/min/1 73 sq  meters  CBC and differential [[id number]]  (Abnormal) Collected:  03/21/19 1636    Lab Status:  Final result Specimen:  Blood from Arm, Right Updated:  03/21/19 1645     WBC 6 54 Thousand/uL      RBC 3 93 Million/uL      Hemoglobin 12 5 g/dL      Hematocrit 39 6 %       fL      MCH 31 8 pg      MCHC 31 6 g/dL      RDW 16 0 %      MPV 10 8 fL      Platelets 255 Thousands/uL      nRBC 0 /100 WBCs      Neutrophils Relative 43 %      Immat GRANS % 0 %      Lymphocytes Relative 40 %      Monocytes Relative 11 %      Eosinophils Relative 5 %      Basophils Relative 1 %      Neutrophils Absolute 2 84 Thousands/µL      Immature Grans Absolute 0 02 Thousand/uL      Lymphocytes Absolute 2 59 Thousands/µL      Monocytes Absolute 0 70 Thousand/µL      Eosinophils Absolute 0 34 Thousand/µL      Basophils Absolute 0 05 Thousands/µL                  CTA ED chest PE study   Final Result by Lary Chen MD (03/21 1954)      No acute pathology  No pulmonary embolism  Persistent moderate loculated left basilar pleural effusion with associated compressive atelectasis  COPD        Workstation performed: [id number]         VAS lower limb venous duplex study, complete bilateral    (Results Pending)              Procedures  Procedures       Phone Contacts  ED Phone Contact    ED Course  ED Course as of Mar 21 2030   Thu Mar 21, 2019   1638 Blood Pressure(!): 135/111   1638 Temperature: 98 1 °F (36 7 °C)   1638 Pulse: 102   1638 Respirations: 20   1638 SpO2: 94 %   1721 Troponin I: <0 02   1721 WBC: 6 54   1721 Hemoglobin: 12 5   1721 Platelet Count: 226   1721 Sodium: 140   1721 Chloride: 101   1721 CO2: 32   1721 AST: 27   1721 Alkaline Phosphatase: 65   1721 ALT: 49   1744 Per ultrasound technologist no evidence of DVT bilaterally      1840 Awaiting cta      2009 No acute pathology   No pulmonary embolism  Persistent moderate loculated left basilar pleural effusion with associated compressive atelectasis  COPD       2029 Patient was re-evaluated again at 2020 hours  She states she feels improved  Stable CT scan  Negative troponin and EKG  Will recommend Mucinex at home discharge follow up with primary care            HEART Risk Score      Most Recent Value   History  0 Filed at: 03/21/2019 1651   ECG  0 Filed at: 03/21/2019 1651   Age  2 Filed at: 03/21/2019 1651   Risk Factors  1 Filed at: 03/21/2019 1651   Troponin  0 Filed at: 03/21/2019 1651   Heart Score Risk Calculator   History  0 Filed at: 03/21/2019 1651   ECG  0 Filed at: 03/21/2019 1651   Age  2 Filed at: 03/21/2019 1651   Risk Factors  1 Filed at: 03/21/2019 1651   Troponin  0 Filed at: 03/21/2019 1651   HEART Score  3 Filed at: 03/21/2019 1651   HEART Score  3 Filed at: 03/21/2019 1651                            MDM    Disposition  Final diagnoses:   Bronchitis     Time reflects when diagnosis was documented in both MDM as applicable and the Disposition within this note     Time User Action Codes Description Comment    3/21/2019  8:30 PM Kalyn Rodríguez Add [J40] Bronchitis       ED Disposition     ED Disposition Condition Date/Time Comment    Discharge Stable Thu Mar 21, 2019  8:29 PM Evan Packer discharge to home/self care  Follow-up Information     Follow up With Specialties Details Why Contact Info    Kathie Fletcher MD [facility] Medicine Schedule an appointment as soon as possible for a visit   [facility] [address]  [phone]            Patient's Medications   Discharge Prescriptions    No medications on file     No discharge procedures on file      ED Provider  Electronically Signed by           Rosa Ayala PA-C  03/21/19 2030

## 2019-03-22 LAB
ATRIAL RATE: 91 BPM
FLUAV AG SPEC QL: NOT DETECTED
FLUBV AG SPEC QL: NOT DETECTED
P AXIS: 58 DEGREES
PR INTERVAL: 172 MS
QRS AXIS: 55 DEGREES
QRSD INTERVAL: 92 MS
QT INTERVAL: 360 MS
QTC INTERVAL: 442 MS
RSV B RNA SPEC QL NAA+PROBE: NOT DETECTED
T WAVE AXIS: 52 DEGREES
VENTRICULAR RATE: 91 BPM

## 2019-03-22 PROCEDURE — 93010 ELECTROCARDIOGRAM REPORT: CPT | Performed by: INTERNAL MEDICINE

## 2019-03-22 PROCEDURE — 93970 EXTREMITY STUDY: CPT | Performed by: SURGERY

## 2019-03-25 VITALS
WEIGHT: 246.91 LBS | TEMPERATURE: 98.1 F | BODY MASS INDEX: 42.15 KG/M2 | HEART RATE: 81 BPM | RESPIRATION RATE: 18 BRPM | HEIGHT: 64 IN | OXYGEN SATURATION: 98 % | SYSTOLIC BLOOD PRESSURE: 157 MMHG | DIASTOLIC BLOOD PRESSURE: 67 MMHG

## 2019-04-05 DIAGNOSIS — R35.0 FREQUENT URINATION: Primary | ICD-10-CM

## 2019-04-07 RX ORDER — CIPROFLOXACIN 500 MG/1
500 TABLET, FILM COATED ORAL EVERY 12 HOURS SCHEDULED
Qty: 10 TABLET | Refills: 0 | OUTPATIENT
Start: 2019-04-07 | End: 2019-04-12

## 2019-04-08 ENCOUNTER — APPOINTMENT (OUTPATIENT)
Dept: LAB | Facility: HOSPITAL | Age: 69
End: 2019-04-08
Payer: MEDICARE

## 2019-04-08 LAB
BACTERIA UR QL AUTO: ABNORMAL /HPF
BILIRUB UR QL STRIP: NEGATIVE
CLARITY UR: ABNORMAL
COLOR UR: YELLOW
GLUCOSE UR STRIP-MCNC: ABNORMAL MG/DL
HGB UR QL STRIP.AUTO: ABNORMAL
KETONES UR STRIP-MCNC: NEGATIVE MG/DL
LEUKOCYTE ESTERASE UR QL STRIP: ABNORMAL
NITRITE UR QL STRIP: NEGATIVE
NON-SQ EPI CELLS URNS QL MICRO: ABNORMAL /HPF
PH UR STRIP.AUTO: 6 [PH]
PROT UR STRIP-MCNC: ABNORMAL MG/DL
RBC #/AREA URNS AUTO: ABNORMAL /HPF
SP GR UR STRIP.AUTO: 1.02 (ref 1–1.03)
UROBILINOGEN UR QL STRIP.AUTO: 0.2 E.U./DL
WBC #/AREA URNS AUTO: ABNORMAL /HPF

## 2019-04-08 PROCEDURE — 81001 URINALYSIS AUTO W/SCOPE: CPT

## 2019-04-19 ENCOUNTER — HOSPITAL ENCOUNTER (INPATIENT)
Facility: HOSPITAL | Age: 69
LOS: 2 days | Discharge: HOME HEALTH | DRG: 191 | End: 2019-04-22
Attending: EMERGENCY MEDICINE | Admitting: INTERNAL MEDICINE
Payer: MEDICARE

## 2019-04-19 ENCOUNTER — APPOINTMENT (EMERGENCY)
Dept: RADIOLOGY | Facility: HOSPITAL | Age: 69
DRG: 191 | End: 2019-04-19
Payer: MEDICARE

## 2019-04-19 DIAGNOSIS — E66.01 MORBID OBESITY WITH BMI OF 40.0-44.9, ADULT (HCC): ICD-10-CM

## 2019-04-19 DIAGNOSIS — E11.9 DIABETES (HCC): ICD-10-CM

## 2019-04-19 DIAGNOSIS — J40 BRONCHITIS: ICD-10-CM

## 2019-04-19 DIAGNOSIS — R06.00 DYSPNEA: Primary | ICD-10-CM

## 2019-04-19 DIAGNOSIS — R35.0 FREQUENCY OF URINATION: Primary | ICD-10-CM

## 2019-04-19 DIAGNOSIS — N39.0 UTI (URINARY TRACT INFECTION): ICD-10-CM

## 2019-04-19 DIAGNOSIS — J44.1 COPD WITH ACUTE EXACERBATION (HCC): ICD-10-CM

## 2019-04-19 DIAGNOSIS — E11.65 TYPE 2 DIABETES MELLITUS WITH HYPERGLYCEMIA, WITHOUT LONG-TERM CURRENT USE OF INSULIN (HCC): ICD-10-CM

## 2019-04-19 DIAGNOSIS — J44.1 CHRONIC OBSTRUCTIVE PULMONARY DISEASE WITH ACUTE EXACERBATION (HCC): ICD-10-CM

## 2019-04-19 PROBLEM — N30.00 ACUTE CYSTITIS WITHOUT HEMATURIA: Status: ACTIVE | Noted: 2019-04-19

## 2019-04-19 LAB
ALBUMIN SERPL BCP-MCNC: 3.7 G/DL (ref 3.5–5)
ALP SERPL-CCNC: 87 U/L (ref 46–116)
ALT SERPL W P-5'-P-CCNC: 34 U/L (ref 12–78)
ANION GAP SERPL CALCULATED.3IONS-SCNC: 10 MMOL/L (ref 4–13)
APTT PPP: 23 SECONDS (ref 26–38)
AST SERPL W P-5'-P-CCNC: 33 U/L (ref 5–45)
BACTERIA UR QL AUTO: ABNORMAL /HPF
BASOPHILS # BLD AUTO: 0.02 THOUSANDS/ΜL (ref 0–0.1)
BASOPHILS NFR BLD AUTO: 0 % (ref 0–1)
BILIRUB SERPL-MCNC: 0.6 MG/DL (ref 0.2–1)
BILIRUB UR QL STRIP: NEGATIVE
BUN SERPL-MCNC: 8 MG/DL (ref 5–25)
CALCIUM SERPL-MCNC: 9.6 MG/DL (ref 8.3–10.1)
CHLORIDE SERPL-SCNC: 97 MMOL/L (ref 100–108)
CLARITY UR: ABNORMAL
CO2 SERPL-SCNC: 28 MMOL/L (ref 21–32)
COLOR UR: YELLOW
CREAT SERPL-MCNC: 0.83 MG/DL (ref 0.6–1.3)
EOSINOPHIL # BLD AUTO: 0.08 THOUSAND/ΜL (ref 0–0.61)
EOSINOPHIL NFR BLD AUTO: 1 % (ref 0–6)
ERYTHROCYTE [DISTWIDTH] IN BLOOD BY AUTOMATED COUNT: 16.5 % (ref 11.6–15.1)
GFR SERPL CREATININE-BSD FRML MDRD: 71 ML/MIN/1.73SQ M
GLUCOSE SERPL-MCNC: 251 MG/DL (ref 65–140)
GLUCOSE SERPL-MCNC: 272 MG/DL (ref 65–140)
GLUCOSE UR STRIP-MCNC: ABNORMAL MG/DL
HCT VFR BLD AUTO: 38.3 % (ref 34.8–46.1)
HGB BLD-MCNC: 12 G/DL (ref 11.5–15.4)
HGB UR QL STRIP.AUTO: ABNORMAL
HOLD SPECIMEN: NORMAL
IMM GRANULOCYTES # BLD AUTO: 0.04 THOUSAND/UL (ref 0–0.2)
IMM GRANULOCYTES NFR BLD AUTO: 1 % (ref 0–2)
INR PPP: 0.98 (ref 0.86–1.17)
KETONES UR STRIP-MCNC: ABNORMAL MG/DL
LACTATE SERPL-SCNC: 1.7 MMOL/L (ref 0.5–2)
LEUKOCYTE ESTERASE UR QL STRIP: ABNORMAL
LYMPHOCYTES # BLD AUTO: 1.32 THOUSANDS/ΜL (ref 0.6–4.47)
LYMPHOCYTES NFR BLD AUTO: 22 % (ref 14–44)
MAGNESIUM SERPL-MCNC: 1.8 MG/DL (ref 1.6–2.6)
MCH RBC QN AUTO: 32.3 PG (ref 26.8–34.3)
MCHC RBC AUTO-ENTMCNC: 31.3 G/DL (ref 31.4–37.4)
MCV RBC AUTO: 103 FL (ref 82–98)
MONOCYTES # BLD AUTO: 0.71 THOUSAND/ΜL (ref 0.17–1.22)
MONOCYTES NFR BLD AUTO: 12 % (ref 4–12)
NEUTROPHILS # BLD AUTO: 3.76 THOUSANDS/ΜL (ref 1.85–7.62)
NEUTS SEG NFR BLD AUTO: 64 % (ref 43–75)
NITRITE UR QL STRIP: NEGATIVE
NON-SQ EPI CELLS URNS QL MICRO: ABNORMAL /HPF
NRBC BLD AUTO-RTO: 0 /100 WBCS
NT-PROBNP SERPL-MCNC: 13 PG/ML
PH UR STRIP.AUTO: 6 [PH]
PLATELET # BLD AUTO: 260 THOUSANDS/UL (ref 149–390)
PLATELET # BLD AUTO: 278 THOUSANDS/UL (ref 149–390)
PMV BLD AUTO: 10.2 FL (ref 8.9–12.7)
PMV BLD AUTO: 9.8 FL (ref 8.9–12.7)
POTASSIUM SERPL-SCNC: 4.1 MMOL/L (ref 3.5–5.3)
PROT SERPL-MCNC: 8 G/DL (ref 6.4–8.2)
PROT UR STRIP-MCNC: ABNORMAL MG/DL
PROTHROMBIN TIME: 12.5 SECONDS (ref 11.8–14.2)
RBC # BLD AUTO: 3.71 MILLION/UL (ref 3.81–5.12)
RBC #/AREA URNS AUTO: ABNORMAL /HPF
SODIUM SERPL-SCNC: 135 MMOL/L (ref 136–145)
SP GR UR STRIP.AUTO: 1.01 (ref 1–1.03)
UROBILINOGEN UR QL STRIP.AUTO: 0.2 E.U./DL
WBC # BLD AUTO: 5.93 THOUSAND/UL (ref 4.31–10.16)
WBC #/AREA URNS AUTO: ABNORMAL /HPF

## 2019-04-19 PROCEDURE — 83880 ASSAY OF NATRIURETIC PEPTIDE: CPT | Performed by: EMERGENCY MEDICINE

## 2019-04-19 PROCEDURE — 93005 ELECTROCARDIOGRAM TRACING: CPT

## 2019-04-19 PROCEDURE — 94664 DEMO&/EVAL PT USE INHALER: CPT

## 2019-04-19 PROCEDURE — 85025 COMPLETE CBC W/AUTO DIFF WBC: CPT | Performed by: EMERGENCY MEDICINE

## 2019-04-19 PROCEDURE — 83605 ASSAY OF LACTIC ACID: CPT | Performed by: EMERGENCY MEDICINE

## 2019-04-19 PROCEDURE — 36415 COLL VENOUS BLD VENIPUNCTURE: CPT | Performed by: EMERGENCY MEDICINE

## 2019-04-19 PROCEDURE — 99285 EMERGENCY DEPT VISIT HI MDM: CPT

## 2019-04-19 PROCEDURE — 83735 ASSAY OF MAGNESIUM: CPT | Performed by: EMERGENCY MEDICINE

## 2019-04-19 PROCEDURE — 85610 PROTHROMBIN TIME: CPT | Performed by: EMERGENCY MEDICINE

## 2019-04-19 PROCEDURE — 96365 THER/PROPH/DIAG IV INF INIT: CPT

## 2019-04-19 PROCEDURE — 99285 EMERGENCY DEPT VISIT HI MDM: CPT | Performed by: EMERGENCY MEDICINE

## 2019-04-19 PROCEDURE — 71045 X-RAY EXAM CHEST 1 VIEW: CPT

## 2019-04-19 PROCEDURE — 81001 URINALYSIS AUTO W/SCOPE: CPT | Performed by: EMERGENCY MEDICINE

## 2019-04-19 PROCEDURE — 87086 URINE CULTURE/COLONY COUNT: CPT | Performed by: EMERGENCY MEDICINE

## 2019-04-19 PROCEDURE — 84145 PROCALCITONIN (PCT): CPT | Performed by: PHYSICIAN ASSISTANT

## 2019-04-19 PROCEDURE — 87040 BLOOD CULTURE FOR BACTERIA: CPT | Performed by: EMERGENCY MEDICINE

## 2019-04-19 PROCEDURE — 99219 PR INITIAL OBSERVATION CARE/DAY 50 MINUTES: CPT | Performed by: PHYSICIAN ASSISTANT

## 2019-04-19 PROCEDURE — 85049 AUTOMATED PLATELET COUNT: CPT | Performed by: PHYSICIAN ASSISTANT

## 2019-04-19 PROCEDURE — 96361 HYDRATE IV INFUSION ADD-ON: CPT

## 2019-04-19 PROCEDURE — 80053 COMPREHEN METABOLIC PANEL: CPT | Performed by: EMERGENCY MEDICINE

## 2019-04-19 PROCEDURE — 82948 REAGENT STRIP/BLOOD GLUCOSE: CPT

## 2019-04-19 PROCEDURE — 85730 THROMBOPLASTIN TIME PARTIAL: CPT | Performed by: EMERGENCY MEDICINE

## 2019-04-19 PROCEDURE — 96375 TX/PRO/DX INJ NEW DRUG ADDON: CPT

## 2019-04-19 PROCEDURE — 94760 N-INVAS EAR/PLS OXIMETRY 1: CPT

## 2019-04-19 PROCEDURE — 94640 AIRWAY INHALATION TREATMENT: CPT

## 2019-04-19 RX ORDER — LANOLIN ALCOHOL/MO/W.PET/CERES
1000 CREAM (GRAM) TOPICAL DAILY
Status: DISCONTINUED | OUTPATIENT
Start: 2019-04-20 | End: 2019-04-22 | Stop reason: HOSPADM

## 2019-04-19 RX ORDER — DULOXETIN HYDROCHLORIDE 20 MG/1
20 CAPSULE, DELAYED RELEASE ORAL DAILY
Status: DISCONTINUED | OUTPATIENT
Start: 2019-04-20 | End: 2019-04-22 | Stop reason: HOSPADM

## 2019-04-19 RX ORDER — IPRATROPIUM BROMIDE AND ALBUTEROL SULFATE 2.5; .5 MG/3ML; MG/3ML
3 SOLUTION RESPIRATORY (INHALATION) EVERY 6 HOURS PRN
Status: DISCONTINUED | OUTPATIENT
Start: 2019-04-19 | End: 2019-04-22 | Stop reason: HOSPADM

## 2019-04-19 RX ORDER — SODIUM CHLORIDE 9 MG/ML
75 INJECTION, SOLUTION INTRAVENOUS CONTINUOUS
Status: DISCONTINUED | OUTPATIENT
Start: 2019-04-19 | End: 2019-04-21

## 2019-04-19 RX ORDER — FUROSEMIDE 40 MG/1
40 TABLET ORAL DAILY
Status: DISCONTINUED | OUTPATIENT
Start: 2019-04-20 | End: 2019-04-22 | Stop reason: HOSPADM

## 2019-04-19 RX ORDER — FOLIC ACID 1 MG/1
1000 TABLET ORAL DAILY
Status: DISCONTINUED | OUTPATIENT
Start: 2019-04-20 | End: 2019-04-22 | Stop reason: HOSPADM

## 2019-04-19 RX ORDER — FLUTICASONE FUROATE AND VILANTEROL 100; 25 UG/1; UG/1
1 POWDER RESPIRATORY (INHALATION)
Status: DISCONTINUED | OUTPATIENT
Start: 2019-04-20 | End: 2019-04-22 | Stop reason: HOSPADM

## 2019-04-19 RX ORDER — NYSTATIN 100000 [USP'U]/G
POWDER TOPICAL 3 TIMES DAILY
Status: DISCONTINUED | OUTPATIENT
Start: 2019-04-19 | End: 2019-04-22 | Stop reason: HOSPADM

## 2019-04-19 RX ORDER — METHYLPREDNISOLONE SODIUM SUCCINATE 125 MG/2ML
60 INJECTION, POWDER, LYOPHILIZED, FOR SOLUTION INTRAMUSCULAR; INTRAVENOUS ONCE
Status: COMPLETED | OUTPATIENT
Start: 2019-04-19 | End: 2019-04-19

## 2019-04-19 RX ORDER — BENZONATATE 100 MG/1
200 CAPSULE ORAL 3 TIMES DAILY PRN
Status: DISCONTINUED | OUTPATIENT
Start: 2019-04-19 | End: 2019-04-22 | Stop reason: HOSPADM

## 2019-04-19 RX ORDER — PANTOPRAZOLE SODIUM 40 MG/1
40 TABLET, DELAYED RELEASE ORAL DAILY
Status: DISCONTINUED | OUTPATIENT
Start: 2019-04-20 | End: 2019-04-22 | Stop reason: HOSPADM

## 2019-04-19 RX ORDER — MIRTAZAPINE 15 MG/1
15 TABLET, FILM COATED ORAL
Status: DISCONTINUED | OUTPATIENT
Start: 2019-04-19 | End: 2019-04-22 | Stop reason: HOSPADM

## 2019-04-19 RX ORDER — LISINOPRIL 20 MG/1
40 TABLET ORAL DAILY
Status: DISCONTINUED | OUTPATIENT
Start: 2019-04-20 | End: 2019-04-22 | Stop reason: HOSPADM

## 2019-04-19 RX ORDER — LEVOTHYROXINE SODIUM 0.12 MG/1
125 TABLET ORAL
Status: DISCONTINUED | OUTPATIENT
Start: 2019-04-20 | End: 2019-04-22 | Stop reason: HOSPADM

## 2019-04-19 RX ORDER — CEFTRIAXONE 1 G/50ML
1000 INJECTION, SOLUTION INTRAVENOUS ONCE
Status: COMPLETED | OUTPATIENT
Start: 2019-04-19 | End: 2019-04-19

## 2019-04-19 RX ORDER — ONDANSETRON 2 MG/ML
4 INJECTION INTRAMUSCULAR; INTRAVENOUS EVERY 6 HOURS PRN
Status: DISCONTINUED | OUTPATIENT
Start: 2019-04-19 | End: 2019-04-22 | Stop reason: HOSPADM

## 2019-04-19 RX ORDER — ACETAMINOPHEN 325 MG/1
650 TABLET ORAL EVERY 6 HOURS PRN
Status: DISCONTINUED | OUTPATIENT
Start: 2019-04-19 | End: 2019-04-22 | Stop reason: HOSPADM

## 2019-04-19 RX ORDER — ATORVASTATIN CALCIUM 10 MG/1
20 TABLET, FILM COATED ORAL DAILY
Status: DISCONTINUED | OUTPATIENT
Start: 2019-04-20 | End: 2019-04-22 | Stop reason: HOSPADM

## 2019-04-19 RX ORDER — ALBUTEROL SULFATE 90 UG/1
2 AEROSOL, METERED RESPIRATORY (INHALATION) EVERY 6 HOURS PRN
Status: DISCONTINUED | OUTPATIENT
Start: 2019-04-19 | End: 2019-04-22 | Stop reason: HOSPADM

## 2019-04-19 RX ORDER — IPRATROPIUM BROMIDE AND ALBUTEROL SULFATE 2.5; .5 MG/3ML; MG/3ML
3 SOLUTION RESPIRATORY (INHALATION) ONCE
Status: COMPLETED | OUTPATIENT
Start: 2019-04-19 | End: 2019-04-19

## 2019-04-19 RX ADMIN — INSULIN LISPRO 4 UNITS: 100 INJECTION, SOLUTION INTRAVENOUS; SUBCUTANEOUS at 22:32

## 2019-04-19 RX ADMIN — IPRATROPIUM BROMIDE AND ALBUTEROL SULFATE 3 ML: 2.5; .5 SOLUTION RESPIRATORY (INHALATION) at 22:42

## 2019-04-19 RX ADMIN — IPRATROPIUM BROMIDE AND ALBUTEROL SULFATE 3 ML: 2.5; .5 SOLUTION RESPIRATORY (INHALATION) at 17:43

## 2019-04-19 RX ADMIN — SODIUM CHLORIDE 125 ML/HR: 0.9 INJECTION, SOLUTION INTRAVENOUS at 17:47

## 2019-04-19 RX ADMIN — CEFTRIAXONE 1000 MG: 1 INJECTION, SOLUTION INTRAVENOUS at 18:23

## 2019-04-19 RX ADMIN — METHYLPREDNISOLONE SODIUM SUCCINATE 60 MG: 125 INJECTION, POWDER, FOR SOLUTION INTRAMUSCULAR; INTRAVENOUS at 17:42

## 2019-04-19 RX ADMIN — BENZONATATE 200 MG: 100 CAPSULE ORAL at 22:31

## 2019-04-19 RX ADMIN — MIRTAZAPINE 15 MG: 15 TABLET, FILM COATED ORAL at 22:31

## 2019-04-20 PROBLEM — J90 PLEURAL EFFUSION, LEFT: Status: ACTIVE | Noted: 2018-12-25

## 2019-04-20 PROBLEM — N30.01 ACUTE CYSTITIS WITH HEMATURIA: Status: ACTIVE | Noted: 2019-04-19

## 2019-04-20 PROBLEM — J96.12 CHRONIC RESPIRATORY FAILURE WITH HYPOXIA AND HYPERCAPNIA (HCC): Chronic | Status: ACTIVE | Noted: 2019-04-20

## 2019-04-20 PROBLEM — E87.1 DEHYDRATION WITH HYPONATREMIA: Status: ACTIVE | Noted: 2019-04-20

## 2019-04-20 PROBLEM — E86.0 DEHYDRATION WITH HYPONATREMIA: Status: ACTIVE | Noted: 2019-04-20

## 2019-04-20 PROBLEM — J96.11 CHRONIC RESPIRATORY FAILURE WITH HYPOXIA AND HYPERCAPNIA (HCC): Chronic | Status: ACTIVE | Noted: 2019-04-20

## 2019-04-20 LAB
ALBUMIN SERPL BCP-MCNC: 3.3 G/DL (ref 3.5–5)
ALP SERPL-CCNC: 80 U/L (ref 46–116)
ALT SERPL W P-5'-P-CCNC: 34 U/L (ref 12–78)
ANION GAP SERPL CALCULATED.3IONS-SCNC: 7 MMOL/L (ref 4–13)
AST SERPL W P-5'-P-CCNC: 22 U/L (ref 5–45)
BACTERIA UR CULT: NORMAL
BASOPHILS # BLD AUTO: 0.02 THOUSANDS/ΜL (ref 0–0.1)
BASOPHILS NFR BLD AUTO: 0 % (ref 0–1)
BILIRUB SERPL-MCNC: 0.4 MG/DL (ref 0.2–1)
BUN SERPL-MCNC: 11 MG/DL (ref 5–25)
CALCIUM SERPL-MCNC: 9 MG/DL (ref 8.3–10.1)
CHLORIDE SERPL-SCNC: 104 MMOL/L (ref 100–108)
CO2 SERPL-SCNC: 31 MMOL/L (ref 21–32)
CREAT SERPL-MCNC: 0.71 MG/DL (ref 0.6–1.3)
EOSINOPHIL # BLD AUTO: 0 THOUSAND/ΜL (ref 0–0.61)
EOSINOPHIL NFR BLD AUTO: 0 % (ref 0–6)
ERYTHROCYTE [DISTWIDTH] IN BLOOD BY AUTOMATED COUNT: 16.5 % (ref 11.6–15.1)
EST. AVERAGE GLUCOSE BLD GHB EST-MCNC: 174 MG/DL
GFR SERPL CREATININE-BSD FRML MDRD: 86 ML/MIN/1.73SQ M
GLUCOSE P FAST SERPL-MCNC: 172 MG/DL (ref 65–99)
GLUCOSE SERPL-MCNC: 172 MG/DL (ref 65–140)
GLUCOSE SERPL-MCNC: 176 MG/DL (ref 65–140)
GLUCOSE SERPL-MCNC: 217 MG/DL (ref 65–140)
GLUCOSE SERPL-MCNC: 258 MG/DL (ref 65–140)
GLUCOSE SERPL-MCNC: 288 MG/DL (ref 65–140)
HBA1C MFR BLD: 7.7 % (ref 4.2–6.3)
HCT VFR BLD AUTO: 40.2 % (ref 34.8–46.1)
HGB BLD-MCNC: 12.4 G/DL (ref 11.5–15.4)
IMM GRANULOCYTES # BLD AUTO: 0.05 THOUSAND/UL (ref 0–0.2)
IMM GRANULOCYTES NFR BLD AUTO: 1 % (ref 0–2)
LYMPHOCYTES # BLD AUTO: 1.1 THOUSANDS/ΜL (ref 0.6–4.47)
LYMPHOCYTES NFR BLD AUTO: 20 % (ref 14–44)
MAGNESIUM SERPL-MCNC: 2.1 MG/DL (ref 1.6–2.6)
MCH RBC QN AUTO: 32 PG (ref 26.8–34.3)
MCHC RBC AUTO-ENTMCNC: 30.8 G/DL (ref 31.4–37.4)
MCV RBC AUTO: 104 FL (ref 82–98)
MONOCYTES # BLD AUTO: 0.3 THOUSAND/ΜL (ref 0.17–1.22)
MONOCYTES NFR BLD AUTO: 5 % (ref 4–12)
NEUTROPHILS # BLD AUTO: 4.04 THOUSANDS/ΜL (ref 1.85–7.62)
NEUTS SEG NFR BLD AUTO: 74 % (ref 43–75)
NRBC BLD AUTO-RTO: 0 /100 WBCS
PHOSPHATE SERPL-MCNC: 3.6 MG/DL (ref 2.3–4.1)
PLATELET # BLD AUTO: 288 THOUSANDS/UL (ref 149–390)
PMV BLD AUTO: 10.2 FL (ref 8.9–12.7)
POTASSIUM SERPL-SCNC: 4 MMOL/L (ref 3.5–5.3)
PROCALCITONIN SERPL-MCNC: <0.05 NG/ML
PROT SERPL-MCNC: 7.4 G/DL (ref 6.4–8.2)
RBC # BLD AUTO: 3.87 MILLION/UL (ref 3.81–5.12)
SODIUM SERPL-SCNC: 142 MMOL/L (ref 136–145)
WBC # BLD AUTO: 5.51 THOUSAND/UL (ref 4.31–10.16)

## 2019-04-20 PROCEDURE — 87631 RESP VIRUS 3-5 TARGETS: CPT | Performed by: INTERNAL MEDICINE

## 2019-04-20 PROCEDURE — 99233 SBSQ HOSP IP/OBS HIGH 50: CPT | Performed by: INTERNAL MEDICINE

## 2019-04-20 PROCEDURE — 80053 COMPREHEN METABOLIC PANEL: CPT | Performed by: PHYSICIAN ASSISTANT

## 2019-04-20 PROCEDURE — 83036 HEMOGLOBIN GLYCOSYLATED A1C: CPT | Performed by: PHYSICIAN ASSISTANT

## 2019-04-20 PROCEDURE — 83735 ASSAY OF MAGNESIUM: CPT | Performed by: PHYSICIAN ASSISTANT

## 2019-04-20 PROCEDURE — 82948 REAGENT STRIP/BLOOD GLUCOSE: CPT

## 2019-04-20 PROCEDURE — 84100 ASSAY OF PHOSPHORUS: CPT | Performed by: PHYSICIAN ASSISTANT

## 2019-04-20 PROCEDURE — 85025 COMPLETE CBC W/AUTO DIFF WBC: CPT | Performed by: PHYSICIAN ASSISTANT

## 2019-04-20 RX ORDER — CEFTRIAXONE 1 G/50ML
1000 INJECTION, SOLUTION INTRAVENOUS EVERY 24 HOURS
Status: DISCONTINUED | OUTPATIENT
Start: 2019-04-20 | End: 2019-04-22 | Stop reason: HOSPADM

## 2019-04-20 RX ORDER — METHYLPREDNISOLONE SODIUM SUCCINATE 40 MG/ML
40 INJECTION, POWDER, LYOPHILIZED, FOR SOLUTION INTRAMUSCULAR; INTRAVENOUS EVERY 12 HOURS SCHEDULED
Status: DISCONTINUED | OUTPATIENT
Start: 2019-04-20 | End: 2019-04-20

## 2019-04-20 RX ORDER — METHYLPREDNISOLONE SODIUM SUCCINATE 40 MG/ML
40 INJECTION, POWDER, LYOPHILIZED, FOR SOLUTION INTRAMUSCULAR; INTRAVENOUS EVERY 12 HOURS SCHEDULED
Status: DISCONTINUED | OUTPATIENT
Start: 2019-04-20 | End: 2019-04-21

## 2019-04-20 RX ORDER — GUAIFENESIN 600 MG
1200 TABLET, EXTENDED RELEASE 12 HR ORAL EVERY 12 HOURS SCHEDULED
Status: DISCONTINUED | OUTPATIENT
Start: 2019-04-20 | End: 2019-04-22 | Stop reason: HOSPADM

## 2019-04-20 RX ORDER — INSULIN GLARGINE 100 [IU]/ML
10 INJECTION, SOLUTION SUBCUTANEOUS EVERY 12 HOURS SCHEDULED
Status: DISCONTINUED | OUTPATIENT
Start: 2019-04-20 | End: 2019-04-20

## 2019-04-20 RX ORDER — METHYLPREDNISOLONE SODIUM SUCCINATE 40 MG/ML
40 INJECTION, POWDER, LYOPHILIZED, FOR SOLUTION INTRAMUSCULAR; INTRAVENOUS EVERY 8 HOURS SCHEDULED
Status: DISCONTINUED | OUTPATIENT
Start: 2019-04-20 | End: 2019-04-20

## 2019-04-20 RX ORDER — INSULIN GLARGINE 100 [IU]/ML
20 INJECTION, SOLUTION SUBCUTANEOUS EVERY 12 HOURS SCHEDULED
Status: DISCONTINUED | OUTPATIENT
Start: 2019-04-20 | End: 2019-04-21

## 2019-04-20 RX ORDER — METHYLPREDNISOLONE SODIUM SUCCINATE 40 MG/ML
40 INJECTION, POWDER, LYOPHILIZED, FOR SOLUTION INTRAMUSCULAR; INTRAVENOUS DAILY
Status: DISCONTINUED | OUTPATIENT
Start: 2019-04-20 | End: 2019-04-20

## 2019-04-20 RX ADMIN — CYANOCOBALAMIN TAB 1000 MCG 1000 MCG: 1000 TAB at 08:13

## 2019-04-20 RX ADMIN — GUAIFENESIN 1200 MG: 600 TABLET, EXTENDED RELEASE ORAL at 11:56

## 2019-04-20 RX ADMIN — LISINOPRIL 40 MG: 20 TABLET ORAL at 08:13

## 2019-04-20 RX ADMIN — INSULIN LISPRO 2 UNITS: 100 INJECTION, SOLUTION INTRAVENOUS; SUBCUTANEOUS at 16:56

## 2019-04-20 RX ADMIN — LEVOTHYROXINE SODIUM 125 MCG: 125 TABLET ORAL at 05:32

## 2019-04-20 RX ADMIN — INSULIN LISPRO 3 UNITS: 100 INJECTION, SOLUTION INTRAVENOUS; SUBCUTANEOUS at 11:59

## 2019-04-20 RX ADMIN — INSULIN GLARGINE 20 UNITS: 100 INJECTION, SOLUTION SUBCUTANEOUS at 21:22

## 2019-04-20 RX ADMIN — AZITHROMYCIN MONOHYDRATE 500 MG: 500 INJECTION, POWDER, LYOPHILIZED, FOR SOLUTION INTRAVENOUS at 12:58

## 2019-04-20 RX ADMIN — MIRTAZAPINE 15 MG: 15 TABLET, FILM COATED ORAL at 21:22

## 2019-04-20 RX ADMIN — TIOTROPIUM BROMIDE 18 MCG: 18 CAPSULE ORAL; RESPIRATORY (INHALATION) at 08:14

## 2019-04-20 RX ADMIN — NYSTATIN: 100000 POWDER TOPICAL at 16:57

## 2019-04-20 RX ADMIN — INSULIN LISPRO 4 UNITS: 100 INJECTION, SOLUTION INTRAVENOUS; SUBCUTANEOUS at 21:22

## 2019-04-20 RX ADMIN — CEFTRIAXONE 1000 MG: 1 INJECTION, SOLUTION INTRAVENOUS at 17:42

## 2019-04-20 RX ADMIN — METHYLPREDNISOLONE SODIUM SUCCINATE 40 MG: 40 INJECTION, POWDER, FOR SOLUTION INTRAMUSCULAR; INTRAVENOUS at 21:22

## 2019-04-20 RX ADMIN — DULOXETINE HYDROCHLORIDE 20 MG: 20 CAPSULE, DELAYED RELEASE ORAL at 08:15

## 2019-04-20 RX ADMIN — NYSTATIN: 100000 POWDER TOPICAL at 21:21

## 2019-04-20 RX ADMIN — FLUTICASONE FUROATE AND VILANTEROL TRIFENATATE 1 PUFF: 100; 25 POWDER RESPIRATORY (INHALATION) at 08:15

## 2019-04-20 RX ADMIN — METHYLPREDNISOLONE SODIUM SUCCINATE 40 MG: 40 INJECTION, POWDER, FOR SOLUTION INTRAMUSCULAR; INTRAVENOUS at 08:14

## 2019-04-20 RX ADMIN — NYSTATIN: 100000 POWDER TOPICAL at 08:15

## 2019-04-20 RX ADMIN — ACETAMINOPHEN 650 MG: 325 TABLET, FILM COATED ORAL at 08:26

## 2019-04-20 RX ADMIN — ENOXAPARIN SODIUM 40 MG: 40 INJECTION SUBCUTANEOUS at 08:14

## 2019-04-20 RX ADMIN — METHYLPREDNISOLONE SODIUM SUCCINATE 40 MG: 40 INJECTION, POWDER, FOR SOLUTION INTRAMUSCULAR; INTRAVENOUS at 15:16

## 2019-04-20 RX ADMIN — GUAIFENESIN 1200 MG: 600 TABLET, EXTENDED RELEASE ORAL at 21:21

## 2019-04-20 RX ADMIN — INSULIN GLARGINE 10 UNITS: 100 INJECTION, SOLUTION SUBCUTANEOUS at 11:58

## 2019-04-20 RX ADMIN — FUROSEMIDE 40 MG: 40 TABLET ORAL at 08:13

## 2019-04-20 RX ADMIN — SODIUM CHLORIDE 125 ML/HR: 0.9 INJECTION, SOLUTION INTRAVENOUS at 01:54

## 2019-04-20 RX ADMIN — ATORVASTATIN CALCIUM 20 MG: 10 TABLET, FILM COATED ORAL at 08:13

## 2019-04-20 RX ADMIN — PANTOPRAZOLE SODIUM 40 MG: 40 TABLET, DELAYED RELEASE ORAL at 06:01

## 2019-04-20 RX ADMIN — INSULIN LISPRO 1 UNITS: 100 INJECTION, SOLUTION INTRAVENOUS; SUBCUTANEOUS at 07:58

## 2019-04-20 RX ADMIN — SODIUM CHLORIDE 75 ML/HR: 0.9 INJECTION, SOLUTION INTRAVENOUS at 10:02

## 2019-04-20 RX ADMIN — FOLIC ACID 1000 MCG: 1 TABLET ORAL at 08:13

## 2019-04-21 LAB
ANION GAP SERPL CALCULATED.3IONS-SCNC: 6 MMOL/L (ref 4–13)
ARTERIAL PATENCY WRIST A: YES
BASE EXCESS BLDA CALC-SCNC: 1 MMOL/L
BUN SERPL-MCNC: 16 MG/DL (ref 5–25)
CALCIUM SERPL-MCNC: 8.6 MG/DL (ref 8.3–10.1)
CHLORIDE SERPL-SCNC: 106 MMOL/L (ref 100–108)
CO2 SERPL-SCNC: 31 MMOL/L (ref 21–32)
CREAT SERPL-MCNC: 0.68 MG/DL (ref 0.6–1.3)
ERYTHROCYTE [DISTWIDTH] IN BLOOD BY AUTOMATED COUNT: 16.4 % (ref 11.6–15.1)
FLUAV AG SPEC QL: NOT DETECTED
FLUBV AG SPEC QL: NOT DETECTED
GFR SERPL CREATININE-BSD FRML MDRD: 88 ML/MIN/1.73SQ M
GLUCOSE SERPL-MCNC: 183 MG/DL (ref 65–140)
GLUCOSE SERPL-MCNC: 196 MG/DL (ref 65–140)
GLUCOSE SERPL-MCNC: 223 MG/DL (ref 65–140)
GLUCOSE SERPL-MCNC: 228 MG/DL (ref 65–140)
GLUCOSE SERPL-MCNC: 299 MG/DL (ref 65–140)
HCO3 BLDA-SCNC: 27.9 MMOL/L (ref 22–28)
HCT VFR BLD AUTO: 36 % (ref 34.8–46.1)
HGB BLD-MCNC: 11.2 G/DL (ref 11.5–15.4)
MAGNESIUM SERPL-MCNC: 2.1 MG/DL (ref 1.6–2.6)
MCH RBC QN AUTO: 32.3 PG (ref 26.8–34.3)
MCHC RBC AUTO-ENTMCNC: 31.1 G/DL (ref 31.4–37.4)
MCV RBC AUTO: 104 FL (ref 82–98)
NASAL CANNULA: ABNORMAL
O2 CT BLDA-SCNC: 16.5 ML/DL (ref 16–23)
OXYHGB MFR BLDA: 94.1 % (ref 94–97)
PCO2 BLDA: 54.9 MM HG (ref 36–44)
PH BLDA: 7.32 [PH] (ref 7.35–7.45)
PHOSPHATE SERPL-MCNC: 3 MG/DL (ref 2.3–4.1)
PLATELET # BLD AUTO: 268 THOUSANDS/UL (ref 149–390)
PMV BLD AUTO: 10.4 FL (ref 8.9–12.7)
PO2 BLDA: 84.1 MM HG (ref 75–129)
POTASSIUM SERPL-SCNC: 4 MMOL/L (ref 3.5–5.3)
PROCALCITONIN SERPL-MCNC: <0.05 NG/ML
RBC # BLD AUTO: 3.47 MILLION/UL (ref 3.81–5.12)
RSV B RNA SPEC QL NAA+PROBE: NOT DETECTED
SODIUM SERPL-SCNC: 143 MMOL/L (ref 136–145)
SPECIMEN SOURCE: ABNORMAL
WBC # BLD AUTO: 6.63 THOUSAND/UL (ref 4.31–10.16)

## 2019-04-21 PROCEDURE — 85027 COMPLETE CBC AUTOMATED: CPT | Performed by: INTERNAL MEDICINE

## 2019-04-21 PROCEDURE — 36600 WITHDRAWAL OF ARTERIAL BLOOD: CPT

## 2019-04-21 PROCEDURE — 94762 N-INVAS EAR/PLS OXIMTRY CONT: CPT

## 2019-04-21 PROCEDURE — 84100 ASSAY OF PHOSPHORUS: CPT | Performed by: INTERNAL MEDICINE

## 2019-04-21 PROCEDURE — 99232 SBSQ HOSP IP/OBS MODERATE 35: CPT | Performed by: INTERNAL MEDICINE

## 2019-04-21 PROCEDURE — 84145 PROCALCITONIN (PCT): CPT | Performed by: INTERNAL MEDICINE

## 2019-04-21 PROCEDURE — 82805 BLOOD GASES W/O2 SATURATION: CPT | Performed by: INTERNAL MEDICINE

## 2019-04-21 PROCEDURE — 94660 CPAP INITIATION&MGMT: CPT

## 2019-04-21 PROCEDURE — 83735 ASSAY OF MAGNESIUM: CPT | Performed by: INTERNAL MEDICINE

## 2019-04-21 PROCEDURE — 94760 N-INVAS EAR/PLS OXIMETRY 1: CPT

## 2019-04-21 PROCEDURE — 80048 BASIC METABOLIC PNL TOTAL CA: CPT | Performed by: INTERNAL MEDICINE

## 2019-04-21 PROCEDURE — 82948 REAGENT STRIP/BLOOD GLUCOSE: CPT

## 2019-04-21 RX ORDER — METHYLPREDNISOLONE SODIUM SUCCINATE 40 MG/ML
20 INJECTION, POWDER, LYOPHILIZED, FOR SOLUTION INTRAMUSCULAR; INTRAVENOUS EVERY 12 HOURS SCHEDULED
Status: DISCONTINUED | OUTPATIENT
Start: 2019-04-21 | End: 2019-04-21

## 2019-04-21 RX ORDER — INSULIN GLARGINE 100 [IU]/ML
30 INJECTION, SOLUTION SUBCUTANEOUS EVERY 12 HOURS SCHEDULED
Status: DISCONTINUED | OUTPATIENT
Start: 2019-04-21 | End: 2019-04-22 | Stop reason: HOSPADM

## 2019-04-21 RX ORDER — METHYLPREDNISOLONE SODIUM SUCCINATE 40 MG/ML
40 INJECTION, POWDER, LYOPHILIZED, FOR SOLUTION INTRAMUSCULAR; INTRAVENOUS EVERY 12 HOURS SCHEDULED
Status: DISCONTINUED | OUTPATIENT
Start: 2019-04-21 | End: 2019-04-22

## 2019-04-21 RX ORDER — IPRATROPIUM BROMIDE AND ALBUTEROL SULFATE 2.5; .5 MG/3ML; MG/3ML
3 SOLUTION RESPIRATORY (INHALATION) ONCE
Status: DISCONTINUED | OUTPATIENT
Start: 2019-04-21 | End: 2019-04-21

## 2019-04-21 RX ADMIN — NYSTATIN 1 APPLICATION: 100000 POWDER TOPICAL at 08:19

## 2019-04-21 RX ADMIN — INSULIN GLARGINE 20 UNITS: 100 INJECTION, SOLUTION SUBCUTANEOUS at 08:09

## 2019-04-21 RX ADMIN — NYSTATIN 1 APPLICATION: 100000 POWDER TOPICAL at 16:26

## 2019-04-21 RX ADMIN — METHYLPREDNISOLONE SODIUM SUCCINATE 40 MG: 40 INJECTION, POWDER, FOR SOLUTION INTRAMUSCULAR; INTRAVENOUS at 21:37

## 2019-04-21 RX ADMIN — INSULIN GLARGINE 30 UNITS: 100 INJECTION, SOLUTION SUBCUTANEOUS at 21:38

## 2019-04-21 RX ADMIN — FOLIC ACID 1000 MCG: 1 TABLET ORAL at 08:09

## 2019-04-21 RX ADMIN — INSULIN LISPRO 2 UNITS: 100 INJECTION, SOLUTION INTRAVENOUS; SUBCUTANEOUS at 07:56

## 2019-04-21 RX ADMIN — INSULIN LISPRO 2 UNITS: 100 INJECTION, SOLUTION INTRAVENOUS; SUBCUTANEOUS at 21:39

## 2019-04-21 RX ADMIN — BENZONATATE 200 MG: 100 CAPSULE ORAL at 21:34

## 2019-04-21 RX ADMIN — TIOTROPIUM BROMIDE 18 MCG: 18 CAPSULE ORAL; RESPIRATORY (INHALATION) at 08:18

## 2019-04-21 RX ADMIN — CEFTRIAXONE 1000 MG: 1 INJECTION, SOLUTION INTRAVENOUS at 18:17

## 2019-04-21 RX ADMIN — NYSTATIN: 100000 POWDER TOPICAL at 21:41

## 2019-04-21 RX ADMIN — LEVOTHYROXINE SODIUM 125 MCG: 125 TABLET ORAL at 06:16

## 2019-04-21 RX ADMIN — INSULIN LISPRO 4 UNITS: 100 INJECTION, SOLUTION INTRAVENOUS; SUBCUTANEOUS at 16:58

## 2019-04-21 RX ADMIN — PANTOPRAZOLE SODIUM 40 MG: 40 TABLET, DELAYED RELEASE ORAL at 06:16

## 2019-04-21 RX ADMIN — FUROSEMIDE 40 MG: 40 TABLET ORAL at 08:09

## 2019-04-21 RX ADMIN — METHYLPREDNISOLONE SODIUM SUCCINATE 40 MG: 40 INJECTION, POWDER, FOR SOLUTION INTRAMUSCULAR; INTRAVENOUS at 08:10

## 2019-04-21 RX ADMIN — ENOXAPARIN SODIUM 40 MG: 40 INJECTION SUBCUTANEOUS at 08:09

## 2019-04-21 RX ADMIN — CYANOCOBALAMIN TAB 1000 MCG 1000 MCG: 1000 TAB at 08:09

## 2019-04-21 RX ADMIN — INSULIN LISPRO 2 UNITS: 100 INJECTION, SOLUTION INTRAVENOUS; SUBCUTANEOUS at 12:12

## 2019-04-21 RX ADMIN — LISINOPRIL 40 MG: 20 TABLET ORAL at 08:10

## 2019-04-21 RX ADMIN — SODIUM CHLORIDE 75 ML/HR: 0.9 INJECTION, SOLUTION INTRAVENOUS at 00:43

## 2019-04-21 RX ADMIN — GUAIFENESIN 1200 MG: 600 TABLET, EXTENDED RELEASE ORAL at 08:09

## 2019-04-21 RX ADMIN — SODIUM CHLORIDE 75 ML/HR: 0.9 INJECTION, SOLUTION INTRAVENOUS at 12:19

## 2019-04-21 RX ADMIN — MIRTAZAPINE 15 MG: 15 TABLET, FILM COATED ORAL at 21:34

## 2019-04-21 RX ADMIN — DULOXETINE HYDROCHLORIDE 20 MG: 20 CAPSULE, DELAYED RELEASE ORAL at 08:09

## 2019-04-21 RX ADMIN — AZITHROMYCIN MONOHYDRATE 500 MG: 500 INJECTION, POWDER, LYOPHILIZED, FOR SOLUTION INTRAVENOUS at 12:15

## 2019-04-21 RX ADMIN — FLUTICASONE FUROATE AND VILANTEROL TRIFENATATE 1 PUFF: 100; 25 POWDER RESPIRATORY (INHALATION) at 08:19

## 2019-04-21 RX ADMIN — GUAIFENESIN 1200 MG: 600 TABLET, EXTENDED RELEASE ORAL at 21:34

## 2019-04-21 RX ADMIN — ATORVASTATIN CALCIUM 20 MG: 10 TABLET, FILM COATED ORAL at 08:08

## 2019-04-22 ENCOUNTER — TRANSITIONAL CARE MANAGEMENT (OUTPATIENT)
Dept: INTERNAL MEDICINE CLINIC | Facility: CLINIC | Age: 69
End: 2019-04-22

## 2019-04-22 ENCOUNTER — TELEPHONE (OUTPATIENT)
Dept: INTERNAL MEDICINE CLINIC | Facility: CLINIC | Age: 69
End: 2019-04-22

## 2019-04-22 VITALS
WEIGHT: 253.75 LBS | BODY MASS INDEX: 42.28 KG/M2 | TEMPERATURE: 97 F | SYSTOLIC BLOOD PRESSURE: 147 MMHG | HEART RATE: 66 BPM | DIASTOLIC BLOOD PRESSURE: 107 MMHG | HEIGHT: 65 IN | OXYGEN SATURATION: 98 % | RESPIRATION RATE: 18 BRPM

## 2019-04-22 PROBLEM — E86.0 DEHYDRATION WITH HYPONATREMIA: Status: RESOLVED | Noted: 2019-04-20 | Resolved: 2019-04-22

## 2019-04-22 PROBLEM — E87.1 DEHYDRATION WITH HYPONATREMIA: Status: RESOLVED | Noted: 2019-04-20 | Resolved: 2019-04-22

## 2019-04-22 LAB
ATRIAL RATE: 110 BPM
GLUCOSE SERPL-MCNC: 198 MG/DL (ref 65–140)
GLUCOSE SERPL-MCNC: 271 MG/DL (ref 65–140)
P AXIS: 61 DEGREES
PR INTERVAL: 178 MS
QRS AXIS: 74 DEGREES
QRSD INTERVAL: 100 MS
QT INTERVAL: 328 MS
QTC INTERVAL: 443 MS
T WAVE AXIS: 38 DEGREES
VENTRICULAR RATE: 110 BPM

## 2019-04-22 PROCEDURE — 94660 CPAP INITIATION&MGMT: CPT

## 2019-04-22 PROCEDURE — 94762 N-INVAS EAR/PLS OXIMTRY CONT: CPT

## 2019-04-22 PROCEDURE — 82948 REAGENT STRIP/BLOOD GLUCOSE: CPT

## 2019-04-22 PROCEDURE — G8979 MOBILITY GOAL STATUS: HCPCS | Performed by: PHYSICAL THERAPIST

## 2019-04-22 PROCEDURE — 99222 1ST HOSP IP/OBS MODERATE 55: CPT | Performed by: INTERNAL MEDICINE

## 2019-04-22 PROCEDURE — 94760 N-INVAS EAR/PLS OXIMETRY 1: CPT

## 2019-04-22 PROCEDURE — 93010 ELECTROCARDIOGRAM REPORT: CPT | Performed by: INTERNAL MEDICINE

## 2019-04-22 PROCEDURE — 97116 GAIT TRAINING THERAPY: CPT | Performed by: PHYSICAL THERAPIST

## 2019-04-22 PROCEDURE — 99239 HOSP IP/OBS DSCHRG MGMT >30: CPT | Performed by: INTERNAL MEDICINE

## 2019-04-22 PROCEDURE — G8978 MOBILITY CURRENT STATUS: HCPCS | Performed by: PHYSICAL THERAPIST

## 2019-04-22 PROCEDURE — 97163 PT EVAL HIGH COMPLEX 45 MIN: CPT | Performed by: PHYSICAL THERAPIST

## 2019-04-22 RX ORDER — GUAIFENESIN 1200 MG/1
1200 TABLET, EXTENDED RELEASE ORAL EVERY 12 HOURS SCHEDULED
Qty: 60 TABLET | Refills: 0 | Status: SHIPPED | OUTPATIENT
Start: 2019-04-22 | End: 2019-04-25

## 2019-04-22 RX ORDER — METHYLPREDNISOLONE 4 MG/1
TABLET ORAL
Qty: 1 EACH | Refills: 0 | Status: SHIPPED | OUTPATIENT
Start: 2019-04-22 | End: 2019-05-20 | Stop reason: ALTCHOICE

## 2019-04-22 RX ORDER — CEFDINIR 300 MG/1
300 CAPSULE ORAL EVERY 12 HOURS SCHEDULED
Qty: 10 CAPSULE | Refills: 0 | Status: SHIPPED | OUTPATIENT
Start: 2019-04-22 | End: 2019-04-27

## 2019-04-22 RX ORDER — METHYLPREDNISOLONE SODIUM SUCCINATE 40 MG/ML
20 INJECTION, POWDER, LYOPHILIZED, FOR SOLUTION INTRAMUSCULAR; INTRAVENOUS EVERY 12 HOURS SCHEDULED
Status: DISCONTINUED | OUTPATIENT
Start: 2019-04-22 | End: 2019-04-22 | Stop reason: HOSPADM

## 2019-04-22 RX ADMIN — FLUTICASONE FUROATE AND VILANTEROL TRIFENATATE 1 PUFF: 100; 25 POWDER RESPIRATORY (INHALATION) at 08:58

## 2019-04-22 RX ADMIN — LISINOPRIL 40 MG: 20 TABLET ORAL at 08:57

## 2019-04-22 RX ADMIN — NYSTATIN: 100000 POWDER TOPICAL at 09:00

## 2019-04-22 RX ADMIN — INSULIN LISPRO 2 UNITS: 100 INJECTION, SOLUTION INTRAVENOUS; SUBCUTANEOUS at 11:46

## 2019-04-22 RX ADMIN — FOLIC ACID 1000 MCG: 1 TABLET ORAL at 08:57

## 2019-04-22 RX ADMIN — INSULIN LISPRO 12 UNITS: 100 INJECTION, SOLUTION INTRAVENOUS; SUBCUTANEOUS at 09:16

## 2019-04-22 RX ADMIN — LEVOTHYROXINE SODIUM 125 MCG: 125 TABLET ORAL at 06:12

## 2019-04-22 RX ADMIN — CYANOCOBALAMIN TAB 1000 MCG 1000 MCG: 1000 TAB at 08:57

## 2019-04-22 RX ADMIN — PANTOPRAZOLE SODIUM 40 MG: 40 TABLET, DELAYED RELEASE ORAL at 06:12

## 2019-04-22 RX ADMIN — TIOTROPIUM BROMIDE 18 MCG: 18 CAPSULE ORAL; RESPIRATORY (INHALATION) at 08:58

## 2019-04-22 RX ADMIN — GUAIFENESIN 1200 MG: 600 TABLET, EXTENDED RELEASE ORAL at 08:56

## 2019-04-22 RX ADMIN — ENOXAPARIN SODIUM 40 MG: 40 INJECTION SUBCUTANEOUS at 09:07

## 2019-04-22 RX ADMIN — ATORVASTATIN CALCIUM 20 MG: 10 TABLET, FILM COATED ORAL at 08:56

## 2019-04-22 RX ADMIN — AZITHROMYCIN MONOHYDRATE 500 MG: 500 INJECTION, POWDER, LYOPHILIZED, FOR SOLUTION INTRAVENOUS at 11:50

## 2019-04-22 RX ADMIN — DULOXETINE HYDROCHLORIDE 20 MG: 20 CAPSULE, DELAYED RELEASE ORAL at 08:57

## 2019-04-22 RX ADMIN — INSULIN LISPRO 12 UNITS: 100 INJECTION, SOLUTION INTRAVENOUS; SUBCUTANEOUS at 11:47

## 2019-04-22 RX ADMIN — FUROSEMIDE 40 MG: 40 TABLET ORAL at 08:57

## 2019-04-22 RX ADMIN — INSULIN LISPRO 4 UNITS: 100 INJECTION, SOLUTION INTRAVENOUS; SUBCUTANEOUS at 07:34

## 2019-04-22 RX ADMIN — METHYLPREDNISOLONE SODIUM SUCCINATE 20 MG: 40 INJECTION, POWDER, FOR SOLUTION INTRAMUSCULAR; INTRAVENOUS at 09:13

## 2019-04-22 RX ADMIN — INSULIN GLARGINE 30 UNITS: 100 INJECTION, SOLUTION SUBCUTANEOUS at 08:57

## 2019-04-24 LAB
BACTERIA BLD CULT: NORMAL
BACTERIA BLD CULT: NORMAL

## 2019-04-25 ENCOUNTER — OFFICE VISIT (OUTPATIENT)
Dept: INTERNAL MEDICINE CLINIC | Facility: CLINIC | Age: 69
End: 2019-04-25
Payer: MEDICARE

## 2019-04-25 VITALS
TEMPERATURE: 97.7 F | DIASTOLIC BLOOD PRESSURE: 70 MMHG | HEART RATE: 65 BPM | BODY MASS INDEX: 41.82 KG/M2 | SYSTOLIC BLOOD PRESSURE: 130 MMHG | HEIGHT: 65 IN | OXYGEN SATURATION: 96 % | WEIGHT: 251 LBS

## 2019-04-25 DIAGNOSIS — E55.9 VITAMIN D DEFICIENCY: ICD-10-CM

## 2019-04-25 DIAGNOSIS — I10 ESSENTIAL HYPERTENSION: ICD-10-CM

## 2019-04-25 DIAGNOSIS — J44.1 CHRONIC OBSTRUCTIVE PULMONARY DISEASE WITH ACUTE EXACERBATION (HCC): Primary | ICD-10-CM

## 2019-04-25 DIAGNOSIS — L30.9 ECZEMA, UNSPECIFIED TYPE: ICD-10-CM

## 2019-04-25 DIAGNOSIS — E53.8 VITAMIN B12 DEFICIENCY: ICD-10-CM

## 2019-04-25 DIAGNOSIS — E11.65 TYPE 2 DIABETES MELLITUS WITH HYPERGLYCEMIA, WITH LONG-TERM CURRENT USE OF INSULIN (HCC): ICD-10-CM

## 2019-04-25 DIAGNOSIS — E03.9 HYPOTHYROIDISM, UNSPECIFIED TYPE: ICD-10-CM

## 2019-04-25 DIAGNOSIS — Z79.4 TYPE 2 DIABETES MELLITUS WITH HYPERGLYCEMIA, WITH LONG-TERM CURRENT USE OF INSULIN (HCC): ICD-10-CM

## 2019-04-25 DIAGNOSIS — N30.01 ACUTE CYSTITIS WITH HEMATURIA: ICD-10-CM

## 2019-04-25 DIAGNOSIS — B37.2 CANDIDIASIS, CUTANEOUS: ICD-10-CM

## 2019-04-25 PROCEDURE — 99495 TRANSJ CARE MGMT MOD F2F 14D: CPT | Performed by: FAMILY MEDICINE

## 2019-04-25 RX ORDER — MOMETASONE FUROATE 1 MG/G
OINTMENT TOPICAL 2 TIMES DAILY PRN
Qty: 45 G | Refills: 1 | Status: SHIPPED | OUTPATIENT
Start: 2019-04-25

## 2019-04-25 RX ORDER — CLOTRIMAZOLE 1 %
CREAM (GRAM) TOPICAL DAILY
Qty: 30 G | Refills: 5 | Status: SHIPPED | OUTPATIENT
Start: 2019-04-25 | End: 2020-01-16 | Stop reason: HOSPADM

## 2019-04-29 DIAGNOSIS — E11.65 TYPE 2 DIABETES MELLITUS WITH HYPERGLYCEMIA, WITHOUT LONG-TERM CURRENT USE OF INSULIN (HCC): Primary | ICD-10-CM

## 2019-05-01 ENCOUNTER — PATIENT OUTREACH (OUTPATIENT)
Dept: INTERNAL MEDICINE CLINIC | Facility: CLINIC | Age: 69
End: 2019-05-01

## 2019-05-01 DIAGNOSIS — Z71.89 COMPLEX CARE COORDINATION: Primary | ICD-10-CM

## 2019-05-03 ENCOUNTER — TELEPHONE (OUTPATIENT)
Dept: INTERNAL MEDICINE CLINIC | Facility: CLINIC | Age: 69
End: 2019-05-03

## 2019-05-03 ENCOUNTER — LAB REQUISITION (OUTPATIENT)
Dept: LAB | Facility: HOSPITAL | Age: 69
End: 2019-05-03
Payer: MEDICARE

## 2019-05-03 ENCOUNTER — PATIENT OUTREACH (OUTPATIENT)
Dept: CASE MANAGEMENT | Facility: OTHER | Age: 69
End: 2019-05-03

## 2019-05-03 DIAGNOSIS — N39.0 URINARY TRACT INFECTION: ICD-10-CM

## 2019-05-03 LAB
BACTERIA UR QL AUTO: ABNORMAL /HPF
BILIRUB UR QL STRIP: NEGATIVE
CLARITY UR: ABNORMAL
COLOR UR: YELLOW
GLUCOSE UR STRIP-MCNC: ABNORMAL MG/DL
HGB UR QL STRIP.AUTO: ABNORMAL
KETONES UR STRIP-MCNC: NEGATIVE MG/DL
LEUKOCYTE ESTERASE UR QL STRIP: ABNORMAL
NITRITE UR QL STRIP: NEGATIVE
NON-SQ EPI CELLS URNS QL MICRO: ABNORMAL /HPF
OTHER STN SPEC: ABNORMAL
PH UR STRIP.AUTO: 5.5 [PH]
PROT UR STRIP-MCNC: ABNORMAL MG/DL
RBC #/AREA URNS AUTO: ABNORMAL /HPF
SP GR UR STRIP.AUTO: 1.02 (ref 1–1.03)
UROBILINOGEN UR QL STRIP.AUTO: 0.2 E.U./DL
WBC #/AREA URNS AUTO: ABNORMAL /HPF

## 2019-05-03 PROCEDURE — 81001 URINALYSIS AUTO W/SCOPE: CPT | Performed by: FAMILY MEDICINE

## 2019-05-06 ENCOUNTER — TELEPHONE (OUTPATIENT)
Dept: INTERNAL MEDICINE CLINIC | Facility: CLINIC | Age: 69
End: 2019-05-06

## 2019-05-07 ENCOUNTER — LAB REQUISITION (OUTPATIENT)
Dept: LAB | Facility: HOSPITAL | Age: 69
End: 2019-05-07
Payer: MEDICARE

## 2019-05-07 DIAGNOSIS — N39.0 URINARY TRACT INFECTION: ICD-10-CM

## 2019-05-07 PROCEDURE — 87106 FUNGI IDENTIFICATION YEAST: CPT | Performed by: FAMILY MEDICINE

## 2019-05-07 PROCEDURE — 81001 URINALYSIS AUTO W/SCOPE: CPT | Performed by: FAMILY MEDICINE

## 2019-05-07 PROCEDURE — 87086 URINE CULTURE/COLONY COUNT: CPT | Performed by: FAMILY MEDICINE

## 2019-05-08 LAB — BACTERIA UR CULT: ABNORMAL

## 2019-05-09 ENCOUNTER — TELEPHONE (OUTPATIENT)
Dept: INTERNAL MEDICINE CLINIC | Facility: CLINIC | Age: 69
End: 2019-05-09

## 2019-05-10 DIAGNOSIS — B37.49 CANDIDAL UTI (URINARY TRACT INFECTION): Primary | ICD-10-CM

## 2019-05-10 DIAGNOSIS — R60.9 PERIPHERAL EDEMA: ICD-10-CM

## 2019-05-10 RX ORDER — FLUCONAZOLE 150 MG/1
150 TABLET ORAL DAILY
Qty: 7 TABLET | Refills: 0 | OUTPATIENT
Start: 2019-05-10 | End: 2019-05-17

## 2019-05-10 RX ORDER — FUROSEMIDE 40 MG/1
40 TABLET ORAL DAILY
Qty: 30 TABLET | Refills: 1 | OUTPATIENT
Start: 2019-05-10 | End: 2019-08-19 | Stop reason: SDUPTHER

## 2019-05-15 ENCOUNTER — TELEPHONE (OUTPATIENT)
Dept: INTERNAL MEDICINE CLINIC | Facility: CLINIC | Age: 69
End: 2019-05-15

## 2019-05-16 DIAGNOSIS — M06.9 RHEUMATOID ARTHRITIS INVOLVING MULTIPLE SITES, UNSPECIFIED RHEUMATOID FACTOR PRESENCE: ICD-10-CM

## 2019-05-16 RX ORDER — FOLIC ACID 1 MG/1
TABLET ORAL
Qty: 90 TABLET | Refills: 0 | Status: SHIPPED | OUTPATIENT
Start: 2019-05-16 | End: 2019-05-31 | Stop reason: SDUPTHER

## 2019-05-16 RX ORDER — OXYCODONE HYDROCHLORIDE 5 MG/1
TABLET ORAL
COMMUNITY
End: 2019-05-20 | Stop reason: ALTCHOICE

## 2019-05-17 ENCOUNTER — PATIENT OUTREACH (OUTPATIENT)
Dept: INTERNAL MEDICINE CLINIC | Facility: CLINIC | Age: 69
End: 2019-05-17

## 2019-05-17 DIAGNOSIS — Z79.4 TYPE 2 DIABETES MELLITUS WITH HYPERGLYCEMIA, WITH LONG-TERM CURRENT USE OF INSULIN (HCC): ICD-10-CM

## 2019-05-17 DIAGNOSIS — E11.65 TYPE 2 DIABETES MELLITUS WITH HYPERGLYCEMIA, WITH LONG-TERM CURRENT USE OF INSULIN (HCC): ICD-10-CM

## 2019-05-17 DIAGNOSIS — E11.65 TYPE 2 DIABETES MELLITUS WITH HYPERGLYCEMIA, WITHOUT LONG-TERM CURRENT USE OF INSULIN (HCC): ICD-10-CM

## 2019-05-20 ENCOUNTER — OFFICE VISIT (OUTPATIENT)
Dept: PULMONOLOGY | Facility: HOSPITAL | Age: 69
End: 2019-05-20
Payer: MEDICARE

## 2019-05-20 VITALS
SYSTOLIC BLOOD PRESSURE: 124 MMHG | WEIGHT: 246 LBS | HEART RATE: 113 BPM | DIASTOLIC BLOOD PRESSURE: 72 MMHG | OXYGEN SATURATION: 93 % | HEIGHT: 65 IN | BODY MASS INDEX: 40.98 KG/M2

## 2019-05-20 DIAGNOSIS — J90 PLEURAL EFFUSION, LEFT: Primary | ICD-10-CM

## 2019-05-20 DIAGNOSIS — J86.9: ICD-10-CM

## 2019-05-20 DIAGNOSIS — Z77.090 HISTORY OF EXPOSURE TO ASBESTOS: ICD-10-CM

## 2019-05-20 DIAGNOSIS — J43.2 CENTRILOBULAR EMPHYSEMA (HCC): ICD-10-CM

## 2019-05-20 DIAGNOSIS — J96.01 ACUTE RESPIRATORY FAILURE WITH HYPOXIA (HCC): ICD-10-CM

## 2019-05-20 DIAGNOSIS — J18.9 PARAPNEUMONIC EFFUSION: ICD-10-CM

## 2019-05-20 DIAGNOSIS — J91.8 PARAPNEUMONIC EFFUSION: ICD-10-CM

## 2019-05-20 PROBLEM — J96.12 CHRONIC RESPIRATORY FAILURE WITH HYPOXIA AND HYPERCAPNIA (HCC): Chronic | Status: RESOLVED | Noted: 2019-04-20 | Resolved: 2019-05-20

## 2019-05-20 PROBLEM — J96.11 CHRONIC RESPIRATORY FAILURE WITH HYPOXIA AND HYPERCAPNIA (HCC): Chronic | Status: RESOLVED | Noted: 2019-04-20 | Resolved: 2019-05-20

## 2019-05-20 PROCEDURE — 99215 OFFICE O/P EST HI 40 MIN: CPT | Performed by: INTERNAL MEDICINE

## 2019-05-21 DIAGNOSIS — E11.65 TYPE 2 DIABETES MELLITUS WITH HYPERGLYCEMIA, WITH LONG-TERM CURRENT USE OF INSULIN (HCC): ICD-10-CM

## 2019-05-21 DIAGNOSIS — Z79.4 TYPE 2 DIABETES MELLITUS WITH HYPERGLYCEMIA, WITH LONG-TERM CURRENT USE OF INSULIN (HCC): ICD-10-CM

## 2019-05-21 DIAGNOSIS — E11.65 TYPE 2 DIABETES MELLITUS WITH HYPERGLYCEMIA, WITHOUT LONG-TERM CURRENT USE OF INSULIN (HCC): ICD-10-CM

## 2019-05-22 ENCOUNTER — TELEPHONE (OUTPATIENT)
Dept: INTERVENTIONAL RADIOLOGY/VASCULAR | Facility: HOSPITAL | Age: 69
End: 2019-05-22

## 2019-05-28 ENCOUNTER — TELEPHONE (OUTPATIENT)
Dept: INTERNAL MEDICINE CLINIC | Facility: CLINIC | Age: 69
End: 2019-05-28

## 2019-05-29 ENCOUNTER — PATIENT OUTREACH (OUTPATIENT)
Dept: INTERNAL MEDICINE CLINIC | Facility: CLINIC | Age: 69
End: 2019-05-29

## 2019-05-29 ENCOUNTER — TELEPHONE (OUTPATIENT)
Dept: INTERVENTIONAL RADIOLOGY/VASCULAR | Facility: HOSPITAL | Age: 69
End: 2019-05-29

## 2019-05-29 ENCOUNTER — TELEPHONE (OUTPATIENT)
Dept: INTERNAL MEDICINE CLINIC | Facility: CLINIC | Age: 69
End: 2019-05-29

## 2019-05-29 DIAGNOSIS — E11.65 TYPE 2 DIABETES MELLITUS WITH HYPERGLYCEMIA, WITH LONG-TERM CURRENT USE OF INSULIN (HCC): Primary | ICD-10-CM

## 2019-05-29 DIAGNOSIS — Z79.4 TYPE 2 DIABETES MELLITUS WITH HYPERGLYCEMIA, WITH LONG-TERM CURRENT USE OF INSULIN (HCC): Primary | ICD-10-CM

## 2019-05-30 DIAGNOSIS — Z79.4 TYPE 2 DIABETES MELLITUS WITH HYPERGLYCEMIA, WITH LONG-TERM CURRENT USE OF INSULIN (HCC): Primary | ICD-10-CM

## 2019-05-30 DIAGNOSIS — E11.65 TYPE 2 DIABETES MELLITUS WITH HYPERGLYCEMIA, WITH LONG-TERM CURRENT USE OF INSULIN (HCC): Primary | ICD-10-CM

## 2019-05-31 ENCOUNTER — HOSPITAL ENCOUNTER (OUTPATIENT)
Dept: INTERVENTIONAL RADIOLOGY/VASCULAR | Facility: HOSPITAL | Age: 69
Discharge: HOME | End: 2019-05-31
Attending: INTERNAL MEDICINE | Admitting: RADIOLOGY
Payer: MEDICARE

## 2019-05-31 ENCOUNTER — TELEPHONE (OUTPATIENT)
Dept: INTERVENTIONAL RADIOLOGY/VASCULAR | Facility: HOSPITAL | Age: 69
End: 2019-05-31

## 2019-05-31 VITALS
RESPIRATION RATE: 20 BRPM | DIASTOLIC BLOOD PRESSURE: 72 MMHG | HEART RATE: 98 BPM | SYSTOLIC BLOOD PRESSURE: 130 MMHG | OXYGEN SATURATION: 98 %

## 2019-05-31 DIAGNOSIS — E55.9 VITAMIN D DEFICIENCY: ICD-10-CM

## 2019-05-31 DIAGNOSIS — J90 PLEURAL EFFUSION, LEFT: ICD-10-CM

## 2019-05-31 DIAGNOSIS — K21.9 GASTROESOPHAGEAL REFLUX DISEASE, ESOPHAGITIS PRESENCE NOT SPECIFIED: ICD-10-CM

## 2019-05-31 DIAGNOSIS — M06.9 RHEUMATOID ARTHRITIS, INVOLVING UNSPECIFIED SITE, UNSPECIFIED RHEUMATOID FACTOR PRESENCE: ICD-10-CM

## 2019-05-31 DIAGNOSIS — I10 ESSENTIAL HYPERTENSION: Primary | ICD-10-CM

## 2019-05-31 DIAGNOSIS — Z77.090 HISTORY OF EXPOSURE TO ASBESTOS: ICD-10-CM

## 2019-05-31 DIAGNOSIS — M06.9 RHEUMATOID ARTHRITIS INVOLVING MULTIPLE SITES, UNSPECIFIED RHEUMATOID FACTOR PRESENCE: ICD-10-CM

## 2019-05-31 DIAGNOSIS — E03.9 HYPOTHYROIDISM, UNSPECIFIED TYPE: ICD-10-CM

## 2019-05-31 DIAGNOSIS — E11.9 TYPE 2 DIABETES MELLITUS WITHOUT COMPLICATION, WITHOUT LONG-TERM CURRENT USE OF INSULIN (HCC): ICD-10-CM

## 2019-05-31 DIAGNOSIS — E78.2 MIXED HYPERLIPIDEMIA: ICD-10-CM

## 2019-05-31 LAB
APPEARANCE FLD: NORMAL
COLOR FLD: NORMAL
GLUCOSE FLD-MCNC: 45 MG/DL
LYMPHOCYTES NFR BLD AUTO: 2 %
MONOCYTES NFR BLD AUTO: 16 %
NEUTS SEG NFR BLD AUTO: 82 %
SITE: NORMAL
TOTAL CELLS COUNTED SPEC: 100
WBC # FLD MANUAL: 610 /UL

## 2019-05-31 PROCEDURE — 32555 ASPIRATE PLEURA W/ IMAGING: CPT

## 2019-05-31 PROCEDURE — 32555 ASPIRATE PLEURA W/ IMAGING: CPT | Performed by: RADIOLOGY

## 2019-05-31 PROCEDURE — 89051 BODY FLUID CELL COUNT: CPT

## 2019-05-31 PROCEDURE — 82945 GLUCOSE OTHER FLUID: CPT

## 2019-05-31 RX ORDER — CANAGLIFLOZIN 100 MG/1
TABLET, FILM COATED ORAL
Qty: 90 TABLET | Refills: 0 | Status: SHIPPED | OUTPATIENT
Start: 2019-05-31 | End: 2019-06-27 | Stop reason: DRUGHIGH

## 2019-05-31 RX ORDER — ATORVASTATIN CALCIUM 20 MG/1
TABLET, FILM COATED ORAL
Qty: 90 TABLET | Refills: 0 | Status: SHIPPED | OUTPATIENT
Start: 2019-05-31 | End: 2019-08-15 | Stop reason: SDUPTHER

## 2019-05-31 RX ORDER — DULOXETIN HYDROCHLORIDE 20 MG/1
CAPSULE, DELAYED RELEASE ORAL
Qty: 90 CAPSULE | Refills: 0 | Status: SHIPPED | OUTPATIENT
Start: 2019-05-31 | End: 2019-08-15 | Stop reason: SDUPTHER

## 2019-05-31 RX ORDER — PANTOPRAZOLE SODIUM 40 MG/1
TABLET, DELAYED RELEASE ORAL
Qty: 90 TABLET | Refills: 0 | Status: SHIPPED | OUTPATIENT
Start: 2019-05-31 | End: 2019-08-15 | Stop reason: SDUPTHER

## 2019-05-31 RX ORDER — LEVOTHYROXINE SODIUM 0.12 MG/1
TABLET ORAL
Qty: 90 TABLET | Refills: 0 | Status: SHIPPED | OUTPATIENT
Start: 2019-05-31 | End: 2019-08-15 | Stop reason: SDUPTHER

## 2019-05-31 RX ORDER — LIDOCAINE HYDROCHLORIDE 10 MG/ML
INJECTION, SOLUTION INFILTRATION; PERINEURAL CODE/TRAUMA/SEDATION MEDICATION
Status: COMPLETED | OUTPATIENT
Start: 2019-05-31 | End: 2019-05-31

## 2019-05-31 RX ORDER — SITAGLIPTIN 100 MG/1
TABLET, FILM COATED ORAL
Qty: 90 TABLET | Refills: 0 | Status: SHIPPED | OUTPATIENT
Start: 2019-05-31 | End: 2019-08-15 | Stop reason: SDUPTHER

## 2019-05-31 RX ORDER — LISINOPRIL 40 MG/1
TABLET ORAL
Qty: 90 TABLET | Refills: 0 | Status: SHIPPED | OUTPATIENT
Start: 2019-05-31 | End: 2020-09-25

## 2019-05-31 RX ORDER — FOLIC ACID 1 MG/1
TABLET ORAL
Qty: 90 TABLET | Refills: 0 | Status: SHIPPED | OUTPATIENT
Start: 2019-05-31 | End: 2019-08-15 | Stop reason: SDUPTHER

## 2019-05-31 RX ORDER — ERGOCALCIFEROL 1.25 MG/1
CAPSULE ORAL
Qty: 12 CAPSULE | Refills: 0 | Status: SHIPPED | OUTPATIENT
Start: 2019-05-31 | End: 2020-01-16 | Stop reason: HOSPADM

## 2019-05-31 RX ADMIN — LIDOCAINE HYDROCHLORIDE 10 ML: 10 INJECTION, SOLUTION INFILTRATION; PERINEURAL at 15:42

## 2019-06-13 DIAGNOSIS — R35.0 URINE FREQUENCY: Primary | ICD-10-CM

## 2019-06-13 DIAGNOSIS — R30.0 BURNING WITH URINATION: ICD-10-CM

## 2019-06-14 ENCOUNTER — APPOINTMENT (OUTPATIENT)
Dept: LAB | Facility: HOSPITAL | Age: 69
End: 2019-06-14
Payer: MEDICARE

## 2019-06-14 DIAGNOSIS — R30.0 BURNING WITH URINATION: ICD-10-CM

## 2019-06-14 DIAGNOSIS — R35.0 URINE FREQUENCY: ICD-10-CM

## 2019-06-14 PROCEDURE — 87077 CULTURE AEROBIC IDENTIFY: CPT

## 2019-06-14 PROCEDURE — 81001 URINALYSIS AUTO W/SCOPE: CPT

## 2019-06-14 PROCEDURE — 87086 URINE CULTURE/COLONY COUNT: CPT

## 2019-06-16 LAB — BACTERIA UR CULT: ABNORMAL

## 2019-06-19 ENCOUNTER — OFFICE VISIT (OUTPATIENT)
Dept: PULMONOLOGY | Facility: HOSPITAL | Age: 69
End: 2019-06-19
Payer: MEDICARE

## 2019-06-19 VITALS
BODY MASS INDEX: 41.15 KG/M2 | HEART RATE: 92 BPM | HEIGHT: 65 IN | DIASTOLIC BLOOD PRESSURE: 64 MMHG | SYSTOLIC BLOOD PRESSURE: 122 MMHG | OXYGEN SATURATION: 96 % | WEIGHT: 247 LBS

## 2019-06-19 DIAGNOSIS — Z77.090 HISTORY OF EXPOSURE TO ASBESTOS: ICD-10-CM

## 2019-06-19 DIAGNOSIS — Z99.81 HYPOXEMIA REQUIRING SUPPLEMENTAL OXYGEN: ICD-10-CM

## 2019-06-19 DIAGNOSIS — R09.02 HYPOXEMIA REQUIRING SUPPLEMENTAL OXYGEN: ICD-10-CM

## 2019-06-19 DIAGNOSIS — B37.49 CANDIDAL UTI (URINARY TRACT INFECTION): Primary | ICD-10-CM

## 2019-06-19 DIAGNOSIS — K59.00 CONSTIPATION, UNSPECIFIED CONSTIPATION TYPE: Primary | ICD-10-CM

## 2019-06-19 DIAGNOSIS — J90 PLEURAL EFFUSION, LEFT: Primary | ICD-10-CM

## 2019-06-19 PROCEDURE — 94618 PULMONARY STRESS TESTING: CPT | Performed by: INTERNAL MEDICINE

## 2019-06-19 PROCEDURE — 99214 OFFICE O/P EST MOD 30 MIN: CPT | Performed by: INTERNAL MEDICINE

## 2019-06-19 RX ORDER — FLUCONAZOLE 150 MG/1
150 TABLET ORAL DAILY
Qty: 7 TABLET | Refills: 0 | Status: SHIPPED | OUTPATIENT
Start: 2019-06-19 | End: 2019-06-26

## 2019-06-27 ENCOUNTER — OFFICE VISIT (OUTPATIENT)
Dept: INTERNAL MEDICINE CLINIC | Facility: CLINIC | Age: 69
End: 2019-06-27
Payer: MEDICARE

## 2019-06-27 VITALS
OXYGEN SATURATION: 97 % | TEMPERATURE: 96.5 F | BODY MASS INDEX: 40.82 KG/M2 | SYSTOLIC BLOOD PRESSURE: 102 MMHG | DIASTOLIC BLOOD PRESSURE: 60 MMHG | HEART RATE: 70 BPM | WEIGHT: 245 LBS | HEIGHT: 65 IN

## 2019-06-27 DIAGNOSIS — E03.9 HYPOTHYROIDISM, UNSPECIFIED TYPE: ICD-10-CM

## 2019-06-27 DIAGNOSIS — E55.9 VITAMIN D DEFICIENCY: ICD-10-CM

## 2019-06-27 DIAGNOSIS — E11.65 TYPE 2 DIABETES MELLITUS WITH HYPERGLYCEMIA, WITH LONG-TERM CURRENT USE OF INSULIN (HCC): Primary | ICD-10-CM

## 2019-06-27 DIAGNOSIS — I10 ESSENTIAL HYPERTENSION: ICD-10-CM

## 2019-06-27 DIAGNOSIS — Z00.00 MEDICARE ANNUAL WELLNESS VISIT, SUBSEQUENT: ICD-10-CM

## 2019-06-27 DIAGNOSIS — E66.01 MORBID OBESITY WITH BMI OF 40.0-44.9, ADULT (HCC): ICD-10-CM

## 2019-06-27 DIAGNOSIS — E78.2 MIXED HYPERLIPIDEMIA: ICD-10-CM

## 2019-06-27 DIAGNOSIS — M06.9 RHEUMATOID ARTHRITIS INVOLVING MULTIPLE SITES, UNSPECIFIED RHEUMATOID FACTOR PRESENCE: ICD-10-CM

## 2019-06-27 DIAGNOSIS — B37.2 CANDIDIASIS, CUTANEOUS: ICD-10-CM

## 2019-06-27 DIAGNOSIS — E53.8 VITAMIN B12 DEFICIENCY: ICD-10-CM

## 2019-06-27 DIAGNOSIS — J90 PLEURAL EFFUSION, LEFT: ICD-10-CM

## 2019-06-27 DIAGNOSIS — Z79.4 TYPE 2 DIABETES MELLITUS WITH HYPERGLYCEMIA, WITH LONG-TERM CURRENT USE OF INSULIN (HCC): Primary | ICD-10-CM

## 2019-06-27 PROCEDURE — 1124F ACP DISCUSS-NO DSCNMKR DOCD: CPT | Performed by: FAMILY MEDICINE

## 2019-06-27 PROCEDURE — G0439 PPPS, SUBSEQ VISIT: HCPCS | Performed by: FAMILY MEDICINE

## 2019-06-27 PROCEDURE — 99214 OFFICE O/P EST MOD 30 MIN: CPT | Performed by: FAMILY MEDICINE

## 2019-06-28 ENCOUNTER — APPOINTMENT (OUTPATIENT)
Dept: LAB | Facility: HOSPITAL | Age: 69
End: 2019-06-28
Payer: MEDICARE

## 2019-06-28 DIAGNOSIS — E55.9 VITAMIN D DEFICIENCY: ICD-10-CM

## 2019-06-28 DIAGNOSIS — I10 ESSENTIAL HYPERTENSION: ICD-10-CM

## 2019-06-28 DIAGNOSIS — Z79.4 TYPE 2 DIABETES MELLITUS WITH HYPERGLYCEMIA, WITH LONG-TERM CURRENT USE OF INSULIN (HCC): ICD-10-CM

## 2019-06-28 DIAGNOSIS — J44.1 CHRONIC OBSTRUCTIVE PULMONARY DISEASE WITH ACUTE EXACERBATION (HCC): ICD-10-CM

## 2019-06-28 DIAGNOSIS — E03.9 HYPOTHYROIDISM, UNSPECIFIED TYPE: ICD-10-CM

## 2019-06-28 DIAGNOSIS — J90 PLEURAL EFFUSION, LEFT: Primary | ICD-10-CM

## 2019-06-28 DIAGNOSIS — E11.65 TYPE 2 DIABETES MELLITUS WITH HYPERGLYCEMIA, WITH LONG-TERM CURRENT USE OF INSULIN (HCC): ICD-10-CM

## 2019-06-28 DIAGNOSIS — E53.8 VITAMIN B12 DEFICIENCY: ICD-10-CM

## 2019-06-28 LAB
25(OH)D3 SERPL-MCNC: 45.6 NG/ML (ref 30–100)
ALBUMIN SERPL BCP-MCNC: 3.6 G/DL (ref 3.5–5)
ALP SERPL-CCNC: 69 U/L (ref 46–116)
ALT SERPL W P-5'-P-CCNC: 33 U/L (ref 12–78)
ANION GAP SERPL CALCULATED.3IONS-SCNC: 5 MMOL/L (ref 4–13)
AST SERPL W P-5'-P-CCNC: 18 U/L (ref 5–45)
BASOPHILS # BLD AUTO: 0.04 THOUSANDS/ΜL (ref 0–0.1)
BASOPHILS NFR BLD AUTO: 1 % (ref 0–1)
BILIRUB SERPL-MCNC: 0.4 MG/DL (ref 0.2–1)
BUN SERPL-MCNC: 15 MG/DL (ref 5–25)
CALCIUM SERPL-MCNC: 9.5 MG/DL (ref 8.3–10.1)
CHLORIDE SERPL-SCNC: 104 MMOL/L (ref 100–108)
CHOLEST SERPL-MCNC: 160 MG/DL (ref 50–200)
CO2 SERPL-SCNC: 31 MMOL/L (ref 21–32)
CREAT SERPL-MCNC: 0.72 MG/DL (ref 0.6–1.3)
EOSINOPHIL # BLD AUTO: 0.2 THOUSAND/ΜL (ref 0–0.61)
EOSINOPHIL NFR BLD AUTO: 3 % (ref 0–6)
ERYTHROCYTE [DISTWIDTH] IN BLOOD BY AUTOMATED COUNT: 14.7 % (ref 11.6–15.1)
EST. AVERAGE GLUCOSE BLD GHB EST-MCNC: 163 MG/DL
GFR SERPL CREATININE-BSD FRML MDRD: 85 ML/MIN/1.73SQ M
GLUCOSE P FAST SERPL-MCNC: 128 MG/DL (ref 65–99)
HBA1C MFR BLD: 7.3 % (ref 4.2–6.3)
HCT VFR BLD AUTO: 41.4 % (ref 34.8–46.1)
HDLC SERPL-MCNC: 54 MG/DL (ref 40–60)
HGB BLD-MCNC: 12.7 G/DL (ref 11.5–15.4)
IMM GRANULOCYTES # BLD AUTO: 0.02 THOUSAND/UL (ref 0–0.2)
IMM GRANULOCYTES NFR BLD AUTO: 0 % (ref 0–2)
LDLC SERPL CALC-MCNC: 82 MG/DL (ref 0–100)
LYMPHOCYTES # BLD AUTO: 1.76 THOUSANDS/ΜL (ref 0.6–4.47)
LYMPHOCYTES NFR BLD AUTO: 27 % (ref 14–44)
MCH RBC QN AUTO: 31.8 PG (ref 26.8–34.3)
MCHC RBC AUTO-ENTMCNC: 30.7 G/DL (ref 31.4–37.4)
MCV RBC AUTO: 104 FL (ref 82–98)
MONOCYTES # BLD AUTO: 0.87 THOUSAND/ΜL (ref 0.17–1.22)
MONOCYTES NFR BLD AUTO: 13 % (ref 4–12)
NEUTROPHILS # BLD AUTO: 3.63 THOUSANDS/ΜL (ref 1.85–7.62)
NEUTS SEG NFR BLD AUTO: 56 % (ref 43–75)
NONHDLC SERPL-MCNC: 106 MG/DL
NRBC BLD AUTO-RTO: 0 /100 WBCS
PLATELET # BLD AUTO: 287 THOUSANDS/UL (ref 149–390)
PMV BLD AUTO: 10.7 FL (ref 8.9–12.7)
POTASSIUM SERPL-SCNC: 3.7 MMOL/L (ref 3.5–5.3)
PROT SERPL-MCNC: 7.3 G/DL (ref 6.4–8.2)
RBC # BLD AUTO: 4 MILLION/UL (ref 3.81–5.12)
SODIUM SERPL-SCNC: 140 MMOL/L (ref 136–145)
TRIGL SERPL-MCNC: 120 MG/DL
TSH SERPL DL<=0.05 MIU/L-ACNC: 1.67 UIU/ML (ref 0.36–3.74)
VIT B12 SERPL-MCNC: 392 PG/ML (ref 100–900)
WBC # BLD AUTO: 6.52 THOUSAND/UL (ref 4.31–10.16)

## 2019-06-28 PROCEDURE — 84443 ASSAY THYROID STIM HORMONE: CPT

## 2019-06-28 PROCEDURE — 80061 LIPID PANEL: CPT

## 2019-06-28 PROCEDURE — 36415 COLL VENOUS BLD VENIPUNCTURE: CPT

## 2019-06-28 PROCEDURE — 85025 COMPLETE CBC W/AUTO DIFF WBC: CPT

## 2019-06-28 PROCEDURE — 82607 VITAMIN B-12: CPT

## 2019-06-28 PROCEDURE — 83036 HEMOGLOBIN GLYCOSYLATED A1C: CPT

## 2019-06-28 PROCEDURE — 82306 VITAMIN D 25 HYDROXY: CPT

## 2019-06-28 PROCEDURE — 80053 COMPREHEN METABOLIC PANEL: CPT

## 2019-06-28 RX ORDER — NYSTATIN 100000 U/G
CREAM TOPICAL 2 TIMES DAILY
Qty: 60 G | Refills: 3 | Status: SHIPPED | OUTPATIENT
Start: 2019-06-28 | End: 2020-01-16 | Stop reason: HOSPADM

## 2019-07-30 ENCOUNTER — HOSPITAL ENCOUNTER (OUTPATIENT)
Dept: CT IMAGING | Facility: HOSPITAL | Age: 69
Discharge: HOME | End: 2019-07-30
Attending: THORACIC SURGERY (CARDIOTHORACIC VASCULAR SURGERY)
Payer: MEDICARE

## 2019-07-30 DIAGNOSIS — J90 PLEURAL EFFUSION, LEFT: ICD-10-CM

## 2019-07-30 PROCEDURE — 71250 CT THORAX DX C-: CPT

## 2019-08-08 ENCOUNTER — APPOINTMENT (EMERGENCY)
Dept: RADIOLOGY | Facility: HOSPITAL | Age: 69
End: 2019-08-08
Payer: MEDICARE

## 2019-08-08 ENCOUNTER — HOSPITAL ENCOUNTER (EMERGENCY)
Facility: HOSPITAL | Age: 69
Discharge: HOME | End: 2019-08-08
Attending: EMERGENCY MEDICINE | Admitting: EMERGENCY MEDICINE
Payer: MEDICARE

## 2019-08-08 ENCOUNTER — TELEPHONE (OUTPATIENT)
Dept: CARDIAC SURGERY | Facility: CLINIC | Age: 69
End: 2019-08-08

## 2019-08-08 VITALS
RESPIRATION RATE: 19 BRPM | HEIGHT: 65 IN | TEMPERATURE: 97.6 F | OXYGEN SATURATION: 97 % | BODY MASS INDEX: 43.89 KG/M2 | HEART RATE: 96 BPM | DIASTOLIC BLOOD PRESSURE: 53 MMHG | SYSTOLIC BLOOD PRESSURE: 114 MMHG | WEIGHT: 263.45 LBS

## 2019-08-08 DIAGNOSIS — N39.0 UTI (URINARY TRACT INFECTION): ICD-10-CM

## 2019-08-08 DIAGNOSIS — J20.9 COPD WITH ACUTE BRONCHITIS (HCC): Primary | ICD-10-CM

## 2019-08-08 DIAGNOSIS — J44.0 COPD WITH ACUTE BRONCHITIS (HCC): Primary | ICD-10-CM

## 2019-08-08 LAB
ALBUMIN SERPL BCP-MCNC: 3.6 G/DL (ref 3.5–5)
ALP SERPL-CCNC: 92 U/L (ref 46–116)
ALT SERPL W P-5'-P-CCNC: 27 U/L (ref 12–78)
ANION GAP SERPL CALCULATED.3IONS-SCNC: 5 MMOL/L (ref 4–13)
AST SERPL W P-5'-P-CCNC: 13 U/L (ref 5–45)
BACTERIA UR QL AUTO: ABNORMAL /HPF
BASOPHILS # BLD AUTO: 0.04 THOUSANDS/ΜL (ref 0–0.1)
BASOPHILS NFR BLD AUTO: 1 % (ref 0–1)
BILIRUB SERPL-MCNC: 0.4 MG/DL (ref 0.2–1)
BILIRUB UR QL STRIP: NEGATIVE
BUN SERPL-MCNC: 19 MG/DL (ref 5–25)
CALCIUM SERPL-MCNC: 9.7 MG/DL (ref 8.3–10.1)
CHLORIDE SERPL-SCNC: 104 MMOL/L (ref 100–108)
CLARITY UR: ABNORMAL
CO2 SERPL-SCNC: 33 MMOL/L (ref 21–32)
COLOR UR: YELLOW
CREAT SERPL-MCNC: 0.88 MG/DL (ref 0.6–1.3)
EOSINOPHIL # BLD AUTO: 0.34 THOUSAND/ΜL (ref 0–0.61)
EOSINOPHIL NFR BLD AUTO: 4 % (ref 0–6)
ERYTHROCYTE [DISTWIDTH] IN BLOOD BY AUTOMATED COUNT: 14.1 % (ref 11.6–15.1)
GFR SERPL CREATININE-BSD FRML MDRD: 66 ML/MIN/1.73SQ M
GLUCOSE SERPL-MCNC: 188 MG/DL (ref 65–140)
GLUCOSE UR STRIP-MCNC: ABNORMAL MG/DL
HCT VFR BLD AUTO: 40.7 % (ref 34.8–46.1)
HGB BLD-MCNC: 12.5 G/DL (ref 11.5–15.4)
HGB UR QL STRIP.AUTO: ABNORMAL
IMM GRANULOCYTES # BLD AUTO: 0.02 THOUSAND/UL (ref 0–0.2)
IMM GRANULOCYTES NFR BLD AUTO: 0 % (ref 0–2)
KETONES UR STRIP-MCNC: NEGATIVE MG/DL
LEUKOCYTE ESTERASE UR QL STRIP: ABNORMAL
LYMPHOCYTES # BLD AUTO: 2.77 THOUSANDS/ΜL (ref 0.6–4.47)
LYMPHOCYTES NFR BLD AUTO: 32 % (ref 14–44)
MCH RBC QN AUTO: 31.3 PG (ref 26.8–34.3)
MCHC RBC AUTO-ENTMCNC: 30.7 G/DL (ref 31.4–37.4)
MCV RBC AUTO: 102 FL (ref 82–98)
MONOCYTES # BLD AUTO: 1.1 THOUSAND/ΜL (ref 0.17–1.22)
MONOCYTES NFR BLD AUTO: 13 % (ref 4–12)
NEUTROPHILS # BLD AUTO: 4.48 THOUSANDS/ΜL (ref 1.85–7.62)
NEUTS SEG NFR BLD AUTO: 50 % (ref 43–75)
NITRITE UR QL STRIP: NEGATIVE
NON-SQ EPI CELLS URNS QL MICRO: ABNORMAL /HPF
NRBC BLD AUTO-RTO: 0 /100 WBCS
NT-PROBNP SERPL-MCNC: 17 PG/ML
OTHER STN SPEC: ABNORMAL
PH UR STRIP.AUTO: 5.5 [PH]
PLATELET # BLD AUTO: 253 THOUSANDS/UL (ref 149–390)
PMV BLD AUTO: 11 FL (ref 8.9–12.7)
POTASSIUM SERPL-SCNC: 4 MMOL/L (ref 3.5–5.3)
PROT SERPL-MCNC: 7.4 G/DL (ref 6.4–8.2)
PROT UR STRIP-MCNC: ABNORMAL MG/DL
RBC # BLD AUTO: 3.99 MILLION/UL (ref 3.81–5.12)
RBC #/AREA URNS AUTO: ABNORMAL /HPF
SODIUM SERPL-SCNC: 142 MMOL/L (ref 136–145)
SP GR UR STRIP.AUTO: 1.02 (ref 1–1.03)
TROPONIN I SERPL-MCNC: <0.02 NG/ML
UROBILINOGEN UR QL STRIP.AUTO: 0.2 E.U./DL
WBC # BLD AUTO: 8.75 THOUSAND/UL (ref 4.31–10.16)
WBC #/AREA URNS AUTO: ABNORMAL /HPF

## 2019-08-08 PROCEDURE — 94640 AIRWAY INHALATION TREATMENT: CPT

## 2019-08-08 PROCEDURE — 87106 FUNGI IDENTIFICATION YEAST: CPT | Performed by: EMERGENCY MEDICINE

## 2019-08-08 PROCEDURE — 87086 URINE CULTURE/COLONY COUNT: CPT | Performed by: EMERGENCY MEDICINE

## 2019-08-08 PROCEDURE — 73030 X-RAY EXAM OF SHOULDER: CPT

## 2019-08-08 PROCEDURE — 99285 EMERGENCY DEPT VISIT HI MDM: CPT

## 2019-08-08 PROCEDURE — 84484 ASSAY OF TROPONIN QUANT: CPT | Performed by: EMERGENCY MEDICINE

## 2019-08-08 PROCEDURE — 85025 COMPLETE CBC W/AUTO DIFF WBC: CPT | Performed by: EMERGENCY MEDICINE

## 2019-08-08 PROCEDURE — 36415 COLL VENOUS BLD VENIPUNCTURE: CPT | Performed by: EMERGENCY MEDICINE

## 2019-08-08 PROCEDURE — 96365 THER/PROPH/DIAG IV INF INIT: CPT

## 2019-08-08 PROCEDURE — 80053 COMPREHEN METABOLIC PANEL: CPT | Performed by: EMERGENCY MEDICINE

## 2019-08-08 PROCEDURE — 71046 X-RAY EXAM CHEST 2 VIEWS: CPT

## 2019-08-08 PROCEDURE — 99284 EMERGENCY DEPT VISIT MOD MDM: CPT | Performed by: EMERGENCY MEDICINE

## 2019-08-08 PROCEDURE — 96375 TX/PRO/DX INJ NEW DRUG ADDON: CPT

## 2019-08-08 PROCEDURE — 81001 URINALYSIS AUTO W/SCOPE: CPT | Performed by: EMERGENCY MEDICINE

## 2019-08-08 PROCEDURE — 93005 ELECTROCARDIOGRAM TRACING: CPT

## 2019-08-08 PROCEDURE — 83880 ASSAY OF NATRIURETIC PEPTIDE: CPT | Performed by: EMERGENCY MEDICINE

## 2019-08-08 PROCEDURE — 87040 BLOOD CULTURE FOR BACTERIA: CPT | Performed by: EMERGENCY MEDICINE

## 2019-08-08 RX ORDER — IPRATROPIUM BROMIDE AND ALBUTEROL SULFATE 2.5; .5 MG/3ML; MG/3ML
3 SOLUTION RESPIRATORY (INHALATION)
Status: DISCONTINUED | OUTPATIENT
Start: 2019-08-08 | End: 2019-08-09 | Stop reason: HOSPADM

## 2019-08-08 RX ORDER — CEFPODOXIME PROXETIL 200 MG/1
200 TABLET, FILM COATED ORAL 2 TIMES DAILY
Qty: 10 TABLET | Refills: 0 | Status: SHIPPED | OUTPATIENT
Start: 2019-08-08 | End: 2019-08-13

## 2019-08-08 RX ORDER — CEFTRIAXONE 1 G/50ML
1000 INJECTION, SOLUTION INTRAVENOUS ONCE
Status: COMPLETED | OUTPATIENT
Start: 2019-08-08 | End: 2019-08-08

## 2019-08-08 RX ORDER — AZITHROMYCIN 250 MG/1
250 TABLET, FILM COATED ORAL DAILY
Qty: 4 TABLET | Refills: 0 | Status: SHIPPED | OUTPATIENT
Start: 2019-08-08 | End: 2019-08-12

## 2019-08-08 RX ORDER — AZITHROMYCIN 250 MG/1
500 TABLET, FILM COATED ORAL ONCE
Status: COMPLETED | OUTPATIENT
Start: 2019-08-08 | End: 2019-08-08

## 2019-08-08 RX ORDER — PREDNISONE 20 MG/1
TABLET ORAL
Qty: 10 TABLET | Refills: 0 | Status: SHIPPED | OUTPATIENT
Start: 2019-08-08 | End: 2020-01-16 | Stop reason: HOSPADM

## 2019-08-08 RX ORDER — FLUCONAZOLE 100 MG/1
200 TABLET ORAL ONCE
Status: COMPLETED | OUTPATIENT
Start: 2019-08-08 | End: 2019-08-08

## 2019-08-08 RX ORDER — IPRATROPIUM BROMIDE AND ALBUTEROL SULFATE 2.5; .5 MG/3ML; MG/3ML
3 SOLUTION RESPIRATORY (INHALATION) EVERY 6 HOURS PRN
Qty: 360 ML | Refills: 0 | Status: SHIPPED | OUTPATIENT
Start: 2019-08-08 | End: 2020-01-16 | Stop reason: HOSPADM

## 2019-08-08 RX ORDER — METHYLPREDNISOLONE SODIUM SUCCINATE 125 MG/2ML
80 INJECTION, POWDER, LYOPHILIZED, FOR SOLUTION INTRAMUSCULAR; INTRAVENOUS ONCE
Status: COMPLETED | OUTPATIENT
Start: 2019-08-08 | End: 2019-08-08

## 2019-08-08 RX ADMIN — FLUCONAZOLE 200 MG: 100 TABLET ORAL at 22:29

## 2019-08-08 RX ADMIN — IPRATROPIUM BROMIDE AND ALBUTEROL SULFATE 3 ML: 2.5; .5 SOLUTION RESPIRATORY (INHALATION) at 20:48

## 2019-08-08 RX ADMIN — CEFTRIAXONE 1000 MG: 1 INJECTION, SOLUTION INTRAVENOUS at 22:38

## 2019-08-08 RX ADMIN — METHYLPREDNISOLONE SODIUM SUCCINATE 80 MG: 125 INJECTION, POWDER, FOR SOLUTION INTRAMUSCULAR; INTRAVENOUS at 22:29

## 2019-08-08 RX ADMIN — AZITHROMYCIN 500 MG: 250 TABLET, FILM COATED ORAL at 22:29

## 2019-08-08 NOTE — ED PROVIDER NOTES
History  Chief Complaint   Patient presents with    Shortness of Breath     c/o SOB x2 days  denies chest pain  states SOB increases with activity  productive cough of thick yellow sputum      77-year-old female presents with 2 day history of increasing shortness of breath, dyspnea on exertion, and cough productive of yellowish phlegm  Does have a history of COPD is maintained on 2-3 L of oxygen (3L at the house secondary to hose) she denies any lower extremity edema  She has had no fever chills  No chest pain  No pleuritic pain  She had a recent exposure of a grand child with upper respiratory symptoms and she feels she caught it from them there has been no history of travel  Inhalers/nebs  help but only briefly  Last time she required prednisone was this spring over Easter  She denies any nausea vomiting diarrhea no abdominal pain no dysuria increased urinary frequency no prior history of PE or DVT  She is followed by Pulmonary doctor Ayo Prince she has a specialist appoint with Dr Rebeca Jaramillo for a chronic left pleural effusion  This is been loculated and was not amenable to a trial of IR drainage  She most recently underwent a CT of the chest without contrast on 7/30/2019 which demonstrated stable pleural effusion unchanged from PE study in March  Patient did sustain a fall 5 days ago she tripped over an object landing on her knees and forearms  She did not hit her head she is not otherwise anticoagulated she complains of left shoulder pain  She has no neck pain upper back pain low back pain or hip pain  Prior to Admission Medications   Prescriptions Last Dose Informant Patient Reported? Taking?    Blood Glucose Monitoring Suppl (ONE TOUCH ULTRA 2) w/Device KIT  Self No No   Sig: by Does not apply route 2 (two) times a day   Canagliflozin (INVOKANA) 300 MG TABS   No Yes   Sig: Take 1 tablet (300 mg total) by mouth daily   Clobetasol Prop Emollient Base 0 05 % emollient cream  Self Yes Yes Sig: clobetasol-emollient 0 05 % topical cream   DULoxetine (CYMBALTA) 20 mg capsule  Self No Yes   Sig: TAKE ONE CAPSULE BY MOUTH ONCE DAILY   Insulin Pen Needle 33G X 4 MM MISC  Self No No   Sig: by Does not apply route daily   JANUVIA 100 MG tablet  Self No Yes   Sig: TAKE 1 TABLET BY MOUTH DAILY     ONE TOUCH ULTRA TEST test strip  Self No No   Sig: Tests twice a day   ONETOUCH DELICA LANCETS FINE MISC  Self No No   Sig: by Does not apply route 2 (two) times a day   Polyethylene Glycol 3350-GRX POWD  Self No Yes   Sig: Take by mouth daily at bedtime   Tofacitinib Citrate (XELJANZ XR) 11 MG TB24  Self Yes Yes   Sig: Take 1 tablet by mouth daily   albuterol (PROVENTIL HFA,VENTOLIN HFA) 90 mcg/act inhaler  Self No Yes   Sig: Inhale 2 puffs every 6 (six) hours as needed for wheezing   alendronate (FOSAMAX) 70 mg tablet  Self Yes Yes   Sig: Take 70 mg by mouth every 7 days   atorvastatin (LIPITOR) 20 mg tablet  Self No Yes   Sig: TAKE ONE TABLET BY MOUTH EVERY DAY   clotrimazole (LOTRIMIN) 1 % cream  Self No Yes   Sig: Apply topically daily   cyanocobalamin (VITAMIN B-12) 1,000 mcg tablet  Self Yes Yes   Sig: Take 1 tablet by mouth daily   ergocalciferol (VITAMIN D2) 50,000 units  Self No Yes   Sig: TAKE ONE CAPSULE BY MOUTH WEEKLY   Patient taking differently: Every sunday   fluticasone-salmeterol (ADVAIR) 250-50 mcg/dose  Self Yes Yes   Sig: Inhale 1 puff every 12 (twelve) hours    folic acid (FOLVITE) 1 mg tablet  Self No Yes   Sig: TAKE ONE TABLET BY MOUTH ONCE DAILY   furosemide (LASIX) 40 mg tablet  Self No Yes   Sig: Take 1 tablet (40 mg total) by mouth daily   insulin glargine (LANTUS SOLOSTAR) 100 units/mL injection pen   No Yes   Sig: Inject 20 Units under the skin daily   ipratropium-albuterol (DUO-NEB) 0 5-2 5 mg/3 mL nebulizer solution  Self No Yes   Sig: Take 1 vial (3 mL total) by nebulization every 6 (six) hours as needed for wheezing or shortness of breath   Patient taking differently: Take 3 mL by nebulization every 6 (six) hours as needed for wheezing or shortness of breath Pt is out of medication   levothyroxine 125 mcg tablet  Self No Yes   Sig: TAKE ONE TABLET BY MOUTH EVERY DAY IN THE AM   lisinopril (ZESTRIL) 40 mg tablet  Self No Yes   Sig: TAKE ONE TABLET BY MOUTH EVERY DAY   methotrexate 2 5 mg tablet  Self No Yes   Sig: Take 1 tablet (2 5 mg total) by mouth once a week Takes 8 tablets every sunday   mirtazapine (REMERON) 15 mg tablet  Self No Yes   Sig: Take 1 tablet (15 mg total) by mouth daily at bedtime   mometasone (ELOCON) 0 1 % ointment  Self No No   Sig: Apply topically 2 (two) times a day as needed (itching)   nystatin (MYCOSTATIN) cream   No Yes   Sig: Apply topically 2 (two) times a day   nystatin (MYCOSTATIN) powder  Self No Yes   Sig: Apply topically 3 (three) times a day   Patient taking differently: Apply topically 3 (three) times a day as needed    ofloxacin (FLOXIN) 0 3 % otic solution  Self Yes Yes   Sig: Administer 10 drops into both ears as needed     pantoprazole (PROTONIX) 40 mg tablet  Self No Yes   Sig: TAKE ONE TABLET BY MOUTH ONCE DAILY   tiotropium (SPIRIVA RESPIMAT) 2 5 MCG/ACT AERS inhaler  Self No Yes   Sig: Inhale 1 puff daily at bedtime      Facility-Administered Medications: None       Past Medical History:   Diagnosis Date    Arthritis     Arthritis     Cancer (HCC)     Candidiasis of skin     Cervical cancer (HCC)     Chronic respiratory failure with hypoxia and hypercapnia ([facility] 4/20/2019    COPD (chronic obstructive pulmonary disease) (HCC)     last assessed 11/21/2016    Current chronic use of systemic steroids     Degenerative arthritis of left knee     last assessed 1/20/2015    Diabetes mellitus ([facility] [address] )     Disease of thyroid gland     hypo pill given to decrease thyroid       Fibromyalgia     Fistula of knee     last assessed 9/12/2014    GERD (gastroesophageal reflux disease)     Hyperlipidemia     Hypertension     Medial meniscus tear     of left knee, last assessed 2014    Migraine     states she has a hx of HA that she calls [employer] but never was dx by neurologist    Obesity     Obesity     Osteoarthritis     Osteopenia     Pneumonia     last assessed 2016    Psychiatric disorder     anxiety    Rheumatoid arthritis ([facility] [address] [facility]     [facility] ([facility]     Sepsis ([facility] )     last assessed 11/10/2016    Tick bite     last assessed 10/30/2015    Vitamin B12 deficiency     Vitamin D deficiency        Past Surgical History:   Procedure Laterality Date    CATARACT EXTRACTION Bilateral     CHOLECYSTECTOMY      EYE SURGERY      Bilateral Cataracts    HYSTERECTOMY      IR THORACENTESIS  2019    JOINT REPLACEMENT      left knee    KNEE ARTHROSCOPY      NEUROPLASTY / TRANSPOSITION MEDIAN NERVE AT CARPAL TUNNEL      TUBAL LIGATION         Family History   Problem Relation Age of Onset    Stroke Mother     Asthma Family     Cancer Family     Diabetes Family     Hypertension Family      I have reviewed and agree with the history as documented  Social History     Tobacco Use    Smoking status: Former Smoker     Packs/day: 1 00     Years: 48 00     Pack years: 48 00     Types: E-Cigarettes     Last attempt to quit: 2012     Years since quittin 7    Smokeless tobacco: Never Used    Tobacco comment: per allscripts - "current every day smoker, former smoker"   Substance Use Topics    Alcohol use: Never     Alcohol/week: 0 0 standard drinks     Frequency: Never     Binge frequency: Never     Comment: stopped drinking alcohol    Drug use: Never        Review of Systems   Constitutional: Positive for activity change  Negative for appetite change, chills, diaphoresis and fever  HENT: Negative for congestion, ear pain, facial swelling, hearing loss, rhinorrhea, sinus pain, sore throat, trouble swallowing and voice change  Eyes: Negative for discharge     Respiratory: Positive for cough, shortness of breath and wheezing  Negative for choking and chest tightness  Cardiovascular: Negative for chest pain and leg swelling  Gastrointestinal: Negative for abdominal distention, abdominal pain, anal bleeding, diarrhea, nausea and vomiting  Genitourinary: Negative for difficulty urinating and dysuria  Musculoskeletal: Positive for arthralgias (left shoulder pain)  Negative for back pain, gait problem, neck pain and neck stiffness  Skin: Negative for rash and wound  Neurological: Negative for dizziness, weakness, light-headedness and headaches  Psychiatric/Behavioral: Negative for confusion  All other systems reviewed and are negative  Physical Exam  Physical Exam   Constitutional: She is oriented to person, place, and time  She appears well-developed  No distress  HENT:   Head: Normocephalic and atraumatic  Right Ear: External ear normal    Left Ear: External ear normal    Nose: Nose normal    Mouth/Throat: Oropharynx is clear and moist  No oropharyngeal exudate  Eyes: Pupils are equal, round, and reactive to light  Conjunctivae and EOM are normal  Right eye exhibits no discharge  Left eye exhibits no discharge  No scleral icterus  Neck: Normal range of motion  Neck supple  No midline or paraspinous tenderness   Cardiovascular: Regular rhythm  Tachy 110   Pulmonary/Chest: Effort normal  No respiratory distress (at rest in cot; per triage RN GEORGE was noted)  Distant Exp wheezes on right diminshed BS left   Abdominal: Soft  Bowel sounds are normal  She exhibits no distension and no mass  There is no tenderness  There is no rebound and no guarding  Back: no midline or paraspinous tenderess;    Musculoskeletal: Normal range of motion  She exhibits tenderness  She exhibits no edema or deformity  Left shoulder: She exhibits bony tenderness   She exhibits normal range of motion, no tenderness, no swelling, no effusion, no crepitus, no deformity, no laceration, no pain, no spasm, normal pulse and normal strength  Left elbow: Normal         Left wrist: Normal         Left upper arm: She exhibits no tenderness, no bony tenderness, no swelling, no edema and no deformity  Left forearm: Normal         Arms:  2+ radial pulse; 2+ AT pulse; no lower extremity edema; ROM shoulder at baseline per patient;  tender to humeral head no tenderness to scapula, clavicle a/c jt or humerus;    Lymphadenopathy:     She has no cervical adenopathy  Neurological: She is alert and oriented to person, place, and time  No cranial nerve deficit or sensory deficit  She exhibits normal muscle tone  Coordination normal    Radial unlar and median nerves intact to myotome and dermatome bilaterally; gait steady   Skin: Skin is warm and dry  Capillary refill takes less than 2 seconds  No rash noted  Psychiatric: She has a normal mood and affect  Vitals reviewed        Vital Signs  ED Triage Vitals [08/08/19 2001]   Temperature Pulse Respirations Blood Pressure SpO2   97 6 °F (36 4 °C) (!) 111 22 147/60 95 %      Temp Source Heart Rate Source Patient Position - Orthostatic VS BP Location FiO2 (%)   Temporal Monitor Sitting Left arm --      Pain Score       No Pain           Vitals:    08/08/19 2130 08/08/19 2200 08/08/19 2230 08/08/19 2300   BP: 127/59 122/69 98/52 114/53   Pulse: (!) 110 104 (!) 109 96   Patient Position - Orthostatic VS: Lying Lying Sitting          Visual Acuity      ED Medications  Medications   methylPREDNISolone sodium succinate (Solu-MEDROL) injection 80 mg (80 mg Intravenous Given 8/8/19 2229)   cefTRIAXone (ROCEPHIN) IVPB (premix) 1,000 mg (0 mg Intravenous Stopped 8/8/19 2310)   fluconazole (DIFLUCAN) tablet 200 mg (200 mg Oral Given 8/8/19 2229)   azithromycin (ZITHROMAX) tablet 500 mg (500 mg Oral Given 8/8/19 2229)       Diagnostic Studies  Results Reviewed     Procedure Component Value Units Date/Time    [place] draw [[id number]] Collected:  08/08/19 2010    Lab Status:  Final result Specimen: Blood from Arm, Right Updated:  08/08/19 2201    Narrative: The following orders were created for panel order [place] draw  Procedure                               Abnormality         Status                     ---------                               -----------         ------                     Susie Cardona Top on BW[[id number]]                           Final result                 Please view results for these tests on the individual orders  Urine Microscopic [[id number]]  (Abnormal) Collected:  08/08/19 2059    Lab Status:  Final result Specimen:  Urine, Clean Catch Updated:  08/08/19 2112     RBC, UA 4-10 /hpf      WBC, UA Innumerable /hpf      Epithelial Cells Occasional /hpf      Bacteria, UA Innumerable /hpf      OTHER OBSERVATIONS Yeast Cells Present    Urine culture [[id number]] Collected:  08/08/19 2059    Lab Status:   In process Specimen:  Urine, Clean Catch Updated:  08/08/19 2112    UA w Reflex to Microscopic w Reflex to Culture [[id number]]  (Abnormal) Collected:  08/08/19 2059    Lab Status:  Final result Specimen:  Urine, Clean Catch Updated:  08/08/19 2105     Color, UA Yellow     Clarity, UA Slightly Cloudy     Specific [place], UA 1 020     pH, UA 5 5     Leukocytes, UA Small     Nitrite, UA Negative     Protein, UA 30 (1+) mg/dl      Glucose, UA >=1000 (1%) mg/dl      Ketones, UA Negative mg/dl      Urobilinogen, UA 0 2 E U /dl      Bilirubin, UA Negative     Blood, UA Moderate    B-type natriuretic peptide [[id number]]  (Normal) Collected:  08/08/19 2010    Lab Status:  Final result Specimen:  Blood from Arm, Right Updated:  08/08/19 2054     NT-proBNP 17 pg/mL     Comprehensive metabolic panel [[id number]]  (Abnormal) Collected:  08/08/19 2010    Lab Status:  Final result Specimen:  Blood from Arm, Right Updated:  08/08/19 2050     Sodium 142 mmol/L      Potassium 4 0 mmol/L      Chloride 104 mmol/L      CO2 33 mmol/L      ANION GAP 5 mmol/L      BUN 19 mg/dL      Creatinine 0 88 mg/dL Glucose 188 mg/dL      Calcium 9 7 mg/dL      AST 13 U/L      ALT 27 U/L      Alkaline Phosphatase 92 U/L      Total Protein 7 4 g/dL      Albumin 3 6 g/dL      Total Bilirubin 0 40 mg/dL      eGFR 66 ml/min/1 73sq m     Narrative:       [place] guidelines for Chronic Kidney Disease (CKD):     Stage 1 with normal or high GFR (GFR > 90 mL/min/1 73 square meters)    Stage 2 Mild CKD (GFR = 60-89 mL/min/1 73 square meters)    Stage 3A Moderate CKD (GFR = 45-59 mL/min/1 73 square meters)    Stage 3B Moderate CKD (GFR = 30-44 mL/min/1 73 square meters)    Stage 4 Severe CKD (GFR = 15-29 mL/min/1 73 square meters)    Stage 5 End Stage CKD (GFR <15 mL/min/1 73 square meters)  Note: GFR calculation is accurate only with a steady state creatinine    Troponin I [[id number]]  (Normal) Collected:  08/08/19 2010    Lab Status:  Final result Specimen:  Blood from Arm, Right Updated:  08/08/19 2049     Troponin I <0 02 ng/mL     Blood culture #2 [[id number]] Collected:  08/08/19 2034    Lab Status:   In process Specimen:  Blood from Hand, Right Updated:  08/08/19 2037    CBC and differential [[id number]]  (Abnormal) Collected:  08/08/19 2010    Lab Status:  Final result Specimen:  Blood from Arm, Right Updated:  08/08/19 2033     WBC 8 75 Thousand/uL      RBC 3 99 Million/uL      Hemoglobin 12 5 g/dL      Hematocrit 40 7 %       fL      MCH 31 3 pg      MCHC 30 7 g/dL      RDW 14 1 %      MPV 11 0 fL      Platelets 021 Thousands/uL      nRBC 0 /100 WBCs      Neutrophils Relative 50 %      Immat GRANS % 0 %      Lymphocytes Relative 32 %      Monocytes Relative 13 %      Eosinophils Relative 4 %      Basophils Relative 1 %      Neutrophils Absolute 4 48 Thousands/µL      Immature Grans Absolute 0 02 Thousand/uL      Lymphocytes Absolute 2 77 Thousands/µL      Monocytes Absolute 1 10 Thousand/µL      Eosinophils Absolute 0 34 Thousand/µL      Basophils Absolute 0 04 Thousands/µL     Blood culture #1 [[id number]] Collected:  08/08/19 2010    Lab Status: In process Specimen:  Blood from Arm, Right Updated:  08/08/19 2030                 XR shoulder 2+ views LEFT   ED Interpretation by Mayda Degroot MD (08/08 2128)   Read by me; Radiologist to provide formal interpretation  No acute fracture or d/l      XR chest 2 views   ED Interpretation by Mayda Degroot MD (08/08 2128)   Read by me; Radiologist to provide formal interpretation  Large left pleural effusion similar to previous 3/13/19                 Procedures  ECG 12 Lead Documentation Only  Date/Time: 8/8/2019 8:08 PM  Performed by: Mayda Degroot MD  Authorized by: Mayda Degroot MD     Indications / Diagnosis:  Sob  ECG reviewed by me, the ED Provider: yes    Patient location:  ED  Previous ECG:     Previous ECG:  Compared to current    Comparison ECG info:  4/19/19    Similarity:  No change  Rate:     ECG rate:  108    ECG rate assessment: tachycardic    Rhythm:     Rhythm: sinus tachycardia    QRS:     QRS axis:  Normal  Comments:      No acute ischemic changes           ED Course  ED Course as of Aug 09 0148   u Aug 08, 2019   2311 Discussed inpatient vs  Outpt management patient preferred to go home   Will rx zithromax/cefpodoxime amb sat 93 percent on baseline O2; coughing improved                                  MDM  Number of Diagnoses or Management Options  COPD with acute bronchitis ([facility] [address] ):   UTI (urinary tract infection):   Diagnosis management comments: Mdm: copd exacerbation,pneuonia,  CHF, acs, PE less likely (previous CTA study 3/2019 negative)     UTI will be covered by meds for COPD/bronchitis - rocephin/cefpodoxime      Disposition  Final diagnoses:   COPD with acute bronchitis ([facility] [address] )   UTI (urinary tract infection)     Time reflects when diagnosis was documented in both MDM as applicable and the Disposition within this note     Time User Action Codes Description Comment    8/8/2019 10:21 PM Kimani Monroe Add [J44 1] COPD with acute exacerbation ([address] )     8/8/2019 10:27 PM Romanenko, Merlin Console Add [J44 0,  J20 9] COPD with acute bronchitis ([address] )     8/8/2019 10:27 PM Melody Rosenberg Modify [Y01 2,  J20 9] COPD with acute bronchitis ([address] )     8/8/2019 10:27 PM Melody Rosenberg Remove [J44 1] COPD with acute exacerbation ([address] )     8/8/2019 10:27 PM Romanenko, Merlin Console Remove [J44 0,  J20 9] COPD with acute bronchitis ([address] )     8/8/2019 10:28 PM Romanenko, Merlin Console Add [J44 0,  J20 9] COPD with acute bronchitis ([address] )     8/8/2019 10:28 PM Melody Jose Add [N39 0] UTI (urinary tract infection)       ED Disposition     ED Disposition Condition Date/Time Comment    Discharge Stable Thu Aug 8, 2019 10:21 PM Ally Garibay discharge to home/self care  Follow-up Information     Follow up With Specialties Details Why Contact Info    Davia Saint, MD Family Medicine In 3 days if not improved [address] [place] [address]  [phone]            Discharge Medication List as of 8/8/2019 10:31 PM      START taking these medications    Details   azithromycin (ZITHROMAX Z-ISAIAH) 250 mg tablet Take 1 tablet (250 mg total) by mouth daily for 4 days, Starting Thu 8/8/2019, Until Mon 8/12/2019, Print      cefpodoxime (VANTIN) 200 mg tablet Take 1 tablet (200 mg total) by mouth 2 (two) times a day for 5 days, Starting Thu 8/8/2019, Until Tue 8/13/2019, Print      !! ipratropium-albuterol (DUO-NEB) 0 5-2 5 mg/3 mL nebulizer solution Take 1 vial (3 mL total) by nebulization every 6 (six) hours as needed for wheezing or shortness of breath, Starting Thu 8/8/2019, Print       !! - Potential duplicate medications found  Please discuss with provider        CONTINUE these medications which have NOT CHANGED    Details   albuterol (PROVENTIL HFA,VENTOLIN HFA) 90 mcg/act inhaler Inhale 2 puffs every 6 (six) hours as needed for wheezing, Starting Tue 9/11/2018, Print      alendronate (FOSAMAX) 70 mg tablet Take 70 mg by mouth every 7 days, Historical Med      atorvastatin (LIPITOR) 20 mg tablet TAKE ONE TABLET BY MOUTH EVERY DAY, Normal      Canagliflozin (INVOKANA) 300 MG TABS Take 1 tablet (300 mg total) by mouth daily, Starting Thu 6/27/2019, No Print      Clobetasol Prop Emollient Base 0 05 % emollient cream clobetasol-emollient 0 05 % topical cream, Historical Med      clotrimazole (LOTRIMIN) 1 % cream Apply topically daily, Starting Thu 4/25/2019, Normal      cyanocobalamin (VITAMIN B-12) 1,000 mcg tablet Take 1 tablet by mouth daily, Starting Thu 11/10/2016, Historical Med      DULoxetine (CYMBALTA) 20 mg capsule TAKE ONE CAPSULE BY MOUTH ONCE DAILY, Normal      ergocalciferol (VITAMIN D2) 50,000 units TAKE ONE CAPSULE BY MOUTH WEEKLY, Normal      fluticasone-salmeterol (ADVAIR) 250-50 mcg/dose Inhale 1 puff every 12 (twelve) hours , Historical Med      folic acid (FOLVITE) 1 mg tablet TAKE ONE TABLET BY MOUTH ONCE DAILY, Normal      furosemide (LASIX) 40 mg tablet Take 1 tablet (40 mg total) by mouth daily, Starting Fri 5/10/2019, Phone In      insulin glargine (LANTUS SOLOSTAR) 100 units/mL injection pen Inject 20 Units under the skin daily, Starting Thu 6/27/2019, No Print      !! ipratropium-albuterol (DUO-NEB) 0 5-2 5 mg/3 mL nebulizer solution Take 1 vial (3 mL total) by nebulization every 6 (six) hours as needed for wheezing or shortness of breath, Starting Tue 2/5/2019, Normal      JANUVIA 100 MG tablet TAKE 1 TABLET BY MOUTH DAILY , Normal      levothyroxine 125 mcg tablet TAKE ONE TABLET BY MOUTH EVERY DAY IN THE AM, Normal      lisinopril (ZESTRIL) 40 mg tablet TAKE ONE TABLET BY MOUTH EVERY DAY, Normal      methotrexate 2 5 mg tablet Take 1 tablet (2 5 mg total) by mouth once a week Takes 8 tablets every sunday, Starting Fri 5/31/2019, Normal      mirtazapine (REMERON) 15 mg tablet Take 1 tablet (15 mg total) by mouth daily at bedtime, Starting Thu 11/29/2018, Normal      nystatin (MYCOSTATIN) cream Apply topically 2 (two) times a day, Starting Fri 6/28/2019, Normal      nystatin (MYCOSTATIN) powder Apply topically 3 (three) times a day, Starting Fri 12/28/2018, Normal      ofloxacin (FLOXIN) 0 3 % otic solution Administer 10 drops into both ears as needed  , Historical Med      pantoprazole (PROTONIX) 40 mg tablet TAKE ONE TABLET BY MOUTH ONCE DAILY, Normal      Polyethylene Glycol 3350-GRX POWD Take by mouth daily at bedtime, Starting Wed 6/19/2019, Normal      tiotropium (SPIRIVA RESPIMAT) 2 5 MCG/ACT AERS inhaler Inhale 1 puff daily at bedtime, Starting Fri 12/28/2018, Normal      Tofacitinib Citrate (XELJANZ XR) 11 MG TB24 Take 1 tablet by mouth daily, Historical Med      Blood Glucose Monitoring Suppl (ONE TOUCH ULTRA 2) w/Device KIT by Does not apply route 2 (two) times a day, Starting Wed 6/6/2018, Normal      Insulin Pen Needle 33G X 4 MM MISC by Does not apply route daily, Starting Tue 5/21/2019, Normal      mometasone (ELOCON) 0 1 % ointment Apply topically 2 (two) times a day as needed (itching), Starting Thu 4/25/2019, Normal      ONE TOUCH ULTRA TEST test strip Tests twice a day, Normal      ONETOUCH DELICA LANCETS FINE MISC by Does not apply route 2 (two) times a day, Starting Mon 12/31/2018, Normal       !! - Potential duplicate medications found  Please discuss with provider  No discharge procedures on file      ED Provider  Electronically Signed by           Raj Bethea MD  08/09/19 8146

## 2019-08-09 LAB
ATRIAL RATE: 108 BPM
P AXIS: 67 DEGREES
PR INTERVAL: 158 MS
QRS AXIS: 54 DEGREES
QRSD INTERVAL: 92 MS
QT INTERVAL: 316 MS
QTC INTERVAL: 423 MS
T WAVE AXIS: 45 DEGREES
VENTRICULAR RATE: 108 BPM

## 2019-08-09 PROCEDURE — 93010 ELECTROCARDIOGRAM REPORT: CPT | Performed by: INTERNAL MEDICINE

## 2019-08-09 NOTE — DISCHARGE INSTRUCTIONS
Start prednisone 40mg daily with food for 5 days    Zithromax for additional 4 day  Cefpodoxime 200mg twice daily for 5 dyas  Duoneb 4 times a day for wheezing

## 2019-08-09 NOTE — ED NOTES
Pt out to the bedside commode to void  Pt has call bell within reach        Ty Garcia RN  08/08/19 8523

## 2019-08-11 LAB
BACTERIA UR CULT: ABNORMAL
BACTERIA UR CULT: ABNORMAL

## 2019-08-14 LAB
BACTERIA BLD CULT: NORMAL
BACTERIA BLD CULT: NORMAL

## 2019-08-15 DIAGNOSIS — E55.9 VITAMIN D DEFICIENCY: Primary | ICD-10-CM

## 2019-08-15 DIAGNOSIS — M06.9 RHEUMATOID ARTHRITIS, INVOLVING UNSPECIFIED SITE, UNSPECIFIED RHEUMATOID FACTOR PRESENCE: ICD-10-CM

## 2019-08-15 DIAGNOSIS — I10 ESSENTIAL HYPERTENSION: ICD-10-CM

## 2019-08-15 DIAGNOSIS — M06.9 RHEUMATOID ARTHRITIS INVOLVING MULTIPLE SITES, UNSPECIFIED RHEUMATOID FACTOR PRESENCE: ICD-10-CM

## 2019-08-15 DIAGNOSIS — K21.9 GASTROESOPHAGEAL REFLUX DISEASE, ESOPHAGITIS PRESENCE NOT SPECIFIED: ICD-10-CM

## 2019-08-15 DIAGNOSIS — E78.2 MIXED HYPERLIPIDEMIA: ICD-10-CM

## 2019-08-15 DIAGNOSIS — E11.9 TYPE 2 DIABETES MELLITUS WITHOUT COMPLICATION, WITHOUT LONG-TERM CURRENT USE OF INSULIN (HCC): ICD-10-CM

## 2019-08-15 DIAGNOSIS — E03.9 HYPOTHYROIDISM, UNSPECIFIED TYPE: ICD-10-CM

## 2019-08-15 RX ORDER — CANAGLIFLOZIN 100 MG/1
TABLET, FILM COATED ORAL
Qty: 90 TABLET | Refills: 0 | Status: SHIPPED | OUTPATIENT
Start: 2019-08-15 | End: 2019-08-20

## 2019-08-15 RX ORDER — SITAGLIPTIN 100 MG/1
TABLET, FILM COATED ORAL
Qty: 90 TABLET | Refills: 0 | Status: SHIPPED | OUTPATIENT
Start: 2019-08-15 | End: 2019-11-25 | Stop reason: SDUPTHER

## 2019-08-15 RX ORDER — DULOXETIN HYDROCHLORIDE 20 MG/1
CAPSULE, DELAYED RELEASE ORAL
Qty: 90 CAPSULE | Refills: 0 | Status: SHIPPED | OUTPATIENT
Start: 2019-08-15 | End: 2019-11-25 | Stop reason: SDUPTHER

## 2019-08-15 RX ORDER — FOLIC ACID 1 MG/1
TABLET ORAL
Qty: 90 TABLET | Refills: 0 | Status: SHIPPED | OUTPATIENT
Start: 2019-08-15 | End: 2020-06-02

## 2019-08-15 RX ORDER — ATORVASTATIN CALCIUM 20 MG/1
TABLET, FILM COATED ORAL
Qty: 90 TABLET | Refills: 0 | Status: SHIPPED | OUTPATIENT
Start: 2019-08-15 | End: 2019-11-25 | Stop reason: SDUPTHER

## 2019-08-15 RX ORDER — LISINOPRIL 40 MG/1
TABLET ORAL
Qty: 90 TABLET | Refills: 0 | Status: SHIPPED | OUTPATIENT
Start: 2019-08-15 | End: 2020-01-16 | Stop reason: HOSPADM

## 2019-08-15 RX ORDER — ERGOCALCIFEROL 1.25 MG/1
CAPSULE ORAL
Qty: 12 CAPSULE | Refills: 0 | Status: SHIPPED | OUTPATIENT
Start: 2019-08-15 | End: 2020-01-16 | Stop reason: HOSPADM

## 2019-08-15 RX ORDER — LEVOTHYROXINE SODIUM 0.12 MG/1
TABLET ORAL
Qty: 90 TABLET | Refills: 0 | Status: SHIPPED | OUTPATIENT
Start: 2019-08-15 | End: 2019-11-25 | Stop reason: SDUPTHER

## 2019-08-15 RX ORDER — PANTOPRAZOLE SODIUM 40 MG/1
TABLET, DELAYED RELEASE ORAL
Qty: 90 TABLET | Refills: 0 | Status: SHIPPED | OUTPATIENT
Start: 2019-08-15 | End: 2019-11-25 | Stop reason: SDUPTHER

## 2019-08-19 DIAGNOSIS — E11.65 TYPE 2 DIABETES MELLITUS WITH HYPERGLYCEMIA, WITH LONG-TERM CURRENT USE OF INSULIN (HCC): ICD-10-CM

## 2019-08-19 DIAGNOSIS — R60.9 PERIPHERAL EDEMA: ICD-10-CM

## 2019-08-19 DIAGNOSIS — Z79.4 TYPE 2 DIABETES MELLITUS WITH HYPERGLYCEMIA, WITH LONG-TERM CURRENT USE OF INSULIN (HCC): ICD-10-CM

## 2019-08-19 DIAGNOSIS — E11.9 TYPE 2 DIABETES MELLITUS WITHOUT COMPLICATION, WITHOUT LONG-TERM CURRENT USE OF INSULIN (HCC): ICD-10-CM

## 2019-08-19 NOTE — TELEPHONE ENCOUNTER
Received a call from Lupis Shrestha  for [place] requesting a refill on invokana 300mg qd, and furosemide 40mg  Verified sig and pharmacy  Aware of 48 hr fill policy  Please advise

## 2019-08-20 RX ORDER — FUROSEMIDE 40 MG/1
TABLET ORAL
Qty: 90 TABLET | Refills: 0 | Status: SHIPPED | OUTPATIENT
Start: 2019-08-20 | End: 2020-01-16 | Stop reason: HOSPADM

## 2019-08-28 ENCOUNTER — TELEPHONE (OUTPATIENT)
Dept: CARDIAC SURGERY | Facility: CLINIC | Age: 69
End: 2019-08-28

## 2019-09-30 ENCOUNTER — OFFICE VISIT (OUTPATIENT)
Dept: INTERNAL MEDICINE CLINIC | Facility: CLINIC | Age: 69
End: 2019-09-30
Payer: MEDICARE

## 2019-09-30 VITALS
BODY MASS INDEX: 40.32 KG/M2 | OXYGEN SATURATION: 92 % | TEMPERATURE: 97.4 F | DIASTOLIC BLOOD PRESSURE: 60 MMHG | HEIGHT: 65 IN | HEART RATE: 77 BPM | WEIGHT: 242 LBS | SYSTOLIC BLOOD PRESSURE: 100 MMHG

## 2019-09-30 DIAGNOSIS — F51.01 PRIMARY INSOMNIA: ICD-10-CM

## 2019-09-30 DIAGNOSIS — Z79.4 TYPE 2 DIABETES MELLITUS WITH HYPERGLYCEMIA, WITH LONG-TERM CURRENT USE OF INSULIN (HCC): Primary | ICD-10-CM

## 2019-09-30 DIAGNOSIS — M06.9 RHEUMATOID ARTHRITIS INVOLVING MULTIPLE SITES, UNSPECIFIED RHEUMATOID FACTOR PRESENCE: ICD-10-CM

## 2019-09-30 DIAGNOSIS — E03.9 HYPOTHYROIDISM, UNSPECIFIED TYPE: ICD-10-CM

## 2019-09-30 DIAGNOSIS — Z23 NEED FOR INFLUENZA VACCINATION: ICD-10-CM

## 2019-09-30 DIAGNOSIS — E78.2 MIXED HYPERLIPIDEMIA: ICD-10-CM

## 2019-09-30 DIAGNOSIS — Z12.11 SCREENING FOR COLON CANCER: ICD-10-CM

## 2019-09-30 DIAGNOSIS — I10 ESSENTIAL HYPERTENSION: ICD-10-CM

## 2019-09-30 DIAGNOSIS — B37.3 RECURRENT CANDIDIASIS OF VAGINA: ICD-10-CM

## 2019-09-30 DIAGNOSIS — J43.2 CENTRILOBULAR EMPHYSEMA (HCC): ICD-10-CM

## 2019-09-30 DIAGNOSIS — E55.9 VITAMIN D DEFICIENCY: ICD-10-CM

## 2019-09-30 DIAGNOSIS — B37.49 CANDIDAL UTI (URINARY TRACT INFECTION): ICD-10-CM

## 2019-09-30 DIAGNOSIS — E53.8 VITAMIN B12 DEFICIENCY: ICD-10-CM

## 2019-09-30 DIAGNOSIS — E11.65 TYPE 2 DIABETES MELLITUS WITH HYPERGLYCEMIA, WITH LONG-TERM CURRENT USE OF INSULIN (HCC): Primary | ICD-10-CM

## 2019-09-30 PROBLEM — B37.31 RECURRENT CANDIDIASIS OF VAGINA: Status: ACTIVE | Noted: 2019-09-30

## 2019-09-30 PROCEDURE — G0008 ADMIN INFLUENZA VIRUS VAC: HCPCS | Performed by: FAMILY MEDICINE

## 2019-09-30 PROCEDURE — 99214 OFFICE O/P EST MOD 30 MIN: CPT | Performed by: FAMILY MEDICINE

## 2019-09-30 PROCEDURE — 90662 IIV NO PRSV INCREASED AG IM: CPT | Performed by: FAMILY MEDICINE

## 2019-09-30 NOTE — PATIENT INSTRUCTIONS
Vulvovaginal Candidiasis   WHAT YOU NEED TO KNOW:   What is vulvovaginal candidiasis? Vulvovaginal candidiasis, or yeast infection, is a common vaginal infection  Vulvovaginal candidiasis is caused by a fungus, or yeast-like germ  Fungi are normally found in your vagina  When there are too many fungi, it can cause an infection  What increases my risk for vulvovaginal candidiasis? · Pregnancy    · Medical conditions that suppress your immune system, such as diabetes or HIV and AIDS    · Medicines, such as antibiotics, birth control pills, or steroid medicine    · Contraceptive devices, such as diaphragms, sponges, and intrauterine devices  What are the signs and symptoms of vulvovaginal candidiasis? · Thick, white, cheese-like discharge from your vagina    · Itching, swelling, or redness in your vagina    · Burning when you urinate  How is vulvovaginal candidiasis diagnosed? Your healthcare provider will ask about your medical history and examine you  A sample of your vaginal discharge may show what germ is causing your infection  How is vulvovaginal candidiasis treated? Medicines help treat the fungal infection and decrease inflammation  The medicine may be a pill, topical cream, or vaginal suppository  With treatment, the infection is usually gone within a week: How can I manage my symptoms? · Wear cotton underwear  · Keep the vaginal area clean and dry  · Wipe from front to back after you urinate or have a bowel movement  · Do not have sex until your symptoms are gone  · Do not douche  · Do not use strong perfumes or soaps  · Do not use feminine hygiene sprays, powders, or bubble bath  How can I prevent another infection? · Take showers instead of baths  · Eat yogurt  · Limit the amount of alcohol you drink  · Limit your time in hot tubs  · Control your blood sugar if you are diabetic  When should I seek immediate care? · You have fever and chills      · You are bleeding from your vagina and it is not your monthly period  · You develop abdominal or pelvic pain  When should I contact my healthcare provider? · Your signs and symptoms get worse, even after treatment  · You have questions or concerns about your condition or care  CARE AGREEMENT:   You have the right to help plan your care  Learn about your health condition and how it may be treated  Discuss treatment options with your caregivers to decide what care you want to receive  You always have the right to refuse treatment  The above information is an  only  It is not intended as medical advice for individual conditions or treatments  Talk to your doctor, nurse or pharmacist before following any medical regimen to see if it is safe and effective for you  © 2017 [address] [employer] Information is for End User's use only and may not be sold, redistributed or otherwise used for commercial purposes  All illustrations and images included in CareNotes® are the copyrighted property of [employer]  or [employer]

## 2019-10-01 ENCOUNTER — TELEPHONE (OUTPATIENT)
Dept: INTERNAL MEDICINE CLINIC | Facility: CLINIC | Age: 69
End: 2019-10-01

## 2019-10-01 RX ORDER — TRAZODONE HYDROCHLORIDE 50 MG/1
50 TABLET ORAL
Qty: 90 TABLET | Refills: 3 | Status: SHIPPED | OUTPATIENT
Start: 2019-10-01 | End: 2020-01-16 | Stop reason: HOSPADM

## 2019-10-01 NOTE — ASSESSMENT & PLAN NOTE
Blood pressure well controlled  Continue with the lisinopril  Watch salt intake  Remain as active as possible

## 2019-10-01 NOTE — ASSESSMENT & PLAN NOTE
Continue the atorvastatin  Slip given for repeat laboratory testing  Watch diet  Increase fiber in diet

## 2019-10-01 NOTE — ASSESSMENT & PLAN NOTE
Lab Results   Component Value Date    HGBA1C 7 3 (H) 06/28/2019     Hemoglobin A1c has been relatively well controlled with her current regimen  She has, however, had recurrent candidal UTIs and vaginal symptoms likely related to Candida as well  Discussed with her that this is a common side effect of the Invokana  This has been working well for her but the side effect now has become chronic  Will discontinue the Invokana  Will gradually increase the dose of her Lantus  Discussed with her and her granddaughter increasing the dose of Lantus to 25 units for 1 week and then 30 units thereafter  We will likely need to go higher on this in the future but will base this on her blood sugars to avoid any hypoglycemia  Continue follow-up with Ophthalmology on a regular basis  Check feet daily  Recommend regular follow-up with Podiatry  Try to watch diet  Increase activity level of tolerates

## 2019-10-01 NOTE — ASSESSMENT & PLAN NOTE
Continue vitamin-D supplementation  Will continue follow vitamin-D levels about 2 to 3 times a year

## 2019-10-01 NOTE — ASSESSMENT & PLAN NOTE
She has had recurrent issues with urinary in vaginal symptoms  Has had recurrent urinary tract infections showing Candida  This is most likely related to the [address]  Although this is controlling her blood sugars relatively well, the recurrent vaginal in urinary symptoms cannot continue  Changes as noted above  Continue to follow up with gyn  Watch for any worsening  May consider referral to Urology if the symptoms persist or worsen

## 2019-10-01 NOTE — PROGRESS NOTES
Assessment/Plan:    Hypothyroidism  Slip given for repeat laboratory testing  Continue with current dose of the levothyroxine  Will adjust if needed based on the upcoming laboratory testing  Type 2 diabetes mellitus with hyperglycemia (HCC)    Lab Results   Component Value Date    HGBA1C 7 3 (H) 06/28/2019     Hemoglobin A1c has been relatively well controlled with her current regimen  She has, however, had recurrent candidal UTIs and vaginal symptoms likely related to Candida as well  Discussed with her that this is a common side effect of the Invokana  This has been working well for her but the side effect now has become chronic  Will discontinue the Invokana  Will gradually increase the dose of her Lantus  Discussed with her and her granddaughter increasing the dose of Lantus to 25 units for 1 week and then 30 units thereafter  We will likely need to go higher on this in the future but will base this on her blood sugars to avoid any hypoglycemia  Continue follow-up with Ophthalmology on a regular basis  Check feet daily  Recommend regular follow-up with Podiatry  Try to watch diet  Increase activity level of tolerates  Centrilobular emphysema ([address] )  Continue with inhalers and nebulizer treatments as per Pulmonary  Follow-up with Pulmonary regarding the persistent pleural effusion  She is hopeful in the future that she can get off of the oxygen but understands that Pulmonary will need to direct this  Watch for any worsening of respiratory symptoms especially with winter weather coming  Essential hypertension  Blood pressure well controlled  Continue with the lisinopril  Watch salt intake  Remain as active as possible  Rheumatoid arthritis ([address] )  Continue follow-up with Rheumatology  Continue with the methotrexate in the folic acid  Try to remain as active as possible  Candidal UTI (urinary tract infection)  She has had recurrent issues with urinary in vaginal symptoms    Has had recurrent urinary tract infections showing Candida  This is most likely related to the [address]  Although this is controlling her blood sugars relatively well, the recurrent vaginal in urinary symptoms cannot continue  Changes as noted above  Continue to follow up with gyn  Watch for any worsening  May consider referral to Urology if the symptoms persist or worsen  Hyperlipidemia  Continue the atorvastatin  Slip given for repeat laboratory testing  Watch diet  Increase fiber in diet  Vitamin B12 deficiency  Continue with vitamin B12 supplementation  Will continue follow vitamin B12 levels about 2 to 3 times a year  Vitamin D deficiency  Continue vitamin-D supplementation  Will continue follow vitamin-D levels about 2 to 3 times a year  Diagnoses and all orders for this visit:    Type 2 diabetes mellitus with hyperglycemia, with long-term current use of insulin ([address] )  -     Comprehensive metabolic panel; Future  -     Hemoglobin A1C; Future  -     Microalbumin / creatinine urine ratio  -     insulin glargine (LANTUS SOLOSTAR) 100 units/mL injection pen; Inject 30 Units under the skin daily    Hypothyroidism, unspecified type  -     TSH, 3rd generation; Future    Rheumatoid arthritis involving multiple sites, unspecified rheumatoid factor presence (HCC)  -     CBC and differential; Future    Candidal UTI (urinary tract infection)    Recurrent candidiasis of vagina  -     CBC and differential; Future    Mixed hyperlipidemia  -     Comprehensive metabolic panel; Future  -     Lipid panel; Future  -     TSH, 3rd generation; Future    Vitamin B12 deficiency  -     Vitamin B12; Future    Vitamin D deficiency  -     Vitamin D 25 hydroxy; Future    Screening for colon cancer  -     Cologuard;  Future    Need for influenza vaccination  -     influenza vaccine, [id number], high-dose, PF 0 5 mL (FLUZONE HIGH-DOSE)    Centrilobular emphysema ([address] )    Essential hypertension    Primary insomnia  - traZODone (DESYREL) 50 mg tablet; Take 1 tablet (50 mg total) by mouth daily at bedtime    Other orders  -     Cancel: Mammo screening bilateral w 3d & cad; Future  -     Cancel: Ambulatory referral to Gastroenterology; Future  -     Cancel: Ambulatory referral to Ophthalmology; Future        Orders recommendations as noted above  Continue follow-up with gyn  Changes to medication regimen as noted above  Mickey is no longer working  Will have her stop this medication and have her start on trazodone  Call or follow-up if this is ineffective  Flu shot given today  She is up-to-date on pneumonia vaccines  She is willing to do the colo guard  She has the scripts for the mammogram and the bone density and will try to do these in the future once her other appointments have calm down a bit  Will have her follow up in about 3-4 months or sooner if needed  Subjective:      Patient ID: Margret Pepper is a 68 y o  female  She presents for routine follow-up  She did not have laboratory testing done prior to this visit  She continues with irritation into the vaginal and perineal area  Continues with some chronic urinary symptoms  She did follow-up with gyn and they had given her a cream to apply and had recommended some dietary changes to help with the irritation  She will be following up with them in a month  She continues with burning and picking sensation into the vaginal area  Some vaginal discharge at times  Some burning with urination  Continues with the Lantus, Januvia, and Invokana  Symptoms of the urinary and vaginal area seem to start to occur her after starting the [address]  Not immediately but within about 3-5 months  Continues to have chronic issues with sleep  The Remeron is not improving despite taking 2 of the 15 mg tablets  Has difficulty both falling asleep and staying asleep  Does have some anxiety symptoms at times  Blood sugars have been relatively stable  Rarely above 150-180  Usually much less than that  Denies any hypoglycemic episodes  Continues to follow-up with Ophthalmology on a regular basis  Checks her feet daily  She has followed up with Podiatry but needs to set up a follow-up appointment  Tolerating blood pressure medications without difficulty  Denies any headaches or localized weakness  Tolerating statin medication without difficulty  Denies muscle aches or weakness  Denies abdominal pain  Denies chest pain or palpitations  Continues to follow-up with Rheumatology for the rheumatoid arthritis  Continues with the methotrexate in the folic acid  Symptoms have been relatively stable but seem to worsen with weather changes  Appetite has been stable  Denies any nausea, vomiting, or diarrhea  Denies any changes in bowel movements  Energy level has been stable  Continues on the same dose of levothyroxine without difficulty  Breathing has been an intermittent issue  Continues on the continuous oxygen  Will be following up with Pulmonary  The following portions of the patient's history were reviewed and updated as appropriate:   She  has a past medical history of Arthritis, Arthritis, Cancer ([address] ), Candidiasis of skin, Cervical cancer ([address] ), Chronic respiratory failure with hypoxia and hypercapnia ([address] ) (4/20/2019), COPD (chronic obstructive pulmonary disease) ([address] ), Current chronic use of systemic steroids, Degenerative arthritis of left knee, Diabetes mellitus ([address] ), Disease of thyroid gland, Fibromyalgia, Fistula of knee, GERD (gastroesophageal reflux disease), Hyperlipidemia, Hypertension, Medial meniscus tear, Migraine, Obesity, Obesity, Osteoarthritis, Osteopenia, Pneumonia, Psychiatric disorder, Rheumatoid arthritis ([address] ), Rheumatoid arthritis ([address] ), Sepsis ([address] ), Tick bite, Vitamin B12 deficiency, and Vitamin D deficiency    She   Patient Active Problem List    Diagnosis Date Noted    Candidal UTI (urinary tract infection) 09/30/2019    Recurrent candidiasis of vagina 09/30/2019    Hypoxemia requiring supplemental oxygen 06/19/2019    History of exposure to asbestos 05/20/2019    Acute cystitis with hematuria 04/19/2019    Candidal dermatitis 12/28/2018    Pleural effusion, left 12/25/2018    Localized swelling, mass and lump, upper limb, right 12/11/2017    Constipation 11/29/2017    Renal cyst 11/29/2017    Candidiasis, cutaneous 06/30/2017    Vitamin B12 deficiency 11/10/2016    BMI 40 0-44 9, adult ([place] [id number] ) 11/05/2016    Morbid obesity with BMI of 40 0-44 9, adult ([place] [id number] ) 11/04/2016    Centrilobular emphysema ([place] [id number] ) 11/03/2016    Type 2 diabetes mellitus with hyperglycemia ([place] [address] ) 11/03/2016    Hypothyroidism 11/03/2016    Rheumatoid arthritis ([place] [address] ) 11/03/2016    Essential hypertension 11/03/2016    Hyperlipidemia 11/03/2016    GERD (gastroesophageal reflux disease) 11/03/2016    Hypoalbuminemia 11/03/2016    Hepatic steatosis 11/03/2016    Diabetes mellitus, type 2 ([place] [address] ) 10/13/2015    Anxiety 06/02/2014    Fibromyalgia 01/28/2014    Osteopenia 09/17/2012    Osteoarthritis 05/29/2012    Vitamin D deficiency 05/29/2012     She  has a past surgical history that includes Hysterectomy; Knee arthroscopy; Cataract extraction (Bilateral); Cholecystectomy; Joint replacement; Tubal ligation; Neuroplasty / transposition median nerve at carpal tunnel; Eye surgery; and IR thoracentesis (5/31/2019)  Her family history includes Asthma in her family; Cancer in her family; Diabetes in her family; Hypertension in her family; Stroke in her mother  She  reports that she quit smoking about 6 years ago  Her smoking use included e-cigarettes  She has a 48 00 pack-year smoking history  She has never used smokeless tobacco  She reports that she does not drink alcohol or use drugs    Current Outpatient Medications   Medication Sig Dispense Refill    albuterol (PROVENTIL HFA,VENTOLIN HFA) 90 mcg/act inhaler Inhale 2 puffs every 6 (six) hours as needed for wheezing 1 Inhaler 0    alendronate (FOSAMAX) 70 mg tablet Take 70 mg by mouth every 7 days      atorvastatin (LIPITOR) 20 mg tablet TAKE ONE TABLET BY MOUTH EVERY DAY 90 tablet 0    Blood Glucose Monitoring Suppl (ONE TOUCH ULTRA 2) w/Device KIT by Does not apply route 2 (two) times a day 1 each 0    Clobetasol Prop Emollient Base 0 05 % emollient cream clobetasol-emollient 0 05 % topical cream      clotrimazole (LOTRIMIN) 1 % cream Apply topically daily 30 g 5    cyanocobalamin (VITAMIN B-12) 1,000 mcg tablet Take 1 tablet by mouth daily      DULoxetine (CYMBALTA) 20 mg capsule TAKE ONE CAPSULE BY MOUTH ONCE DAILY 90 capsule 0    ergocalciferol (VITAMIN D2) 50,000 units TAKE ONE CAPSULE BY MOUTH WEEKLY (Patient taking differently: Every sunday) 12 capsule 0    ergocalciferol (VITAMIN D2) 50,000 units TAKE ONE CAPSULE BY MOUTH WEEKLY 12 capsule 0    fluticasone-salmeterol (ADVAIR) 250-50 mcg/dose Inhale 1 puff every 12 (twelve) hours       folic acid (FOLVITE) 1 mg tablet TAKE ONE TABLET BY MOUTH ONCE DAILY 90 tablet 0    furosemide (LASIX) 40 mg tablet TAKE ONE TABLET BY MOUTH EVERY DAY 90 tablet 0    insulin glargine (LANTUS SOLOSTAR) 100 units/mL injection pen Inject 30 Units under the skin daily 10 pen 1    Insulin Pen Needle 33G X 4 MM MISC by Does not apply route daily 100 each 5    ipratropium-albuterol (DUO-NEB) 0 5-2 5 mg/3 mL nebulizer solution Take 1 vial (3 mL total) by nebulization every 6 (six) hours as needed for wheezing or shortness of breath (Patient taking differently: Take 3 mL by nebulization every 6 (six) hours as needed for wheezing or shortness of breath Pt is out of medication) 120 vial 3    ipratropium-albuterol (DUO-NEB) 0 5-2 5 mg/3 mL nebulizer solution Take 1 vial (3 mL total) by nebulization every 6 (six) hours as needed for wheezing or shortness of breath 360 mL 0    JANUVIA 100 MG tablet TAKE 1 TABLET BY MOUTH DAILY   90 tablet 0    levothyroxine 125 mcg tablet TAKE ONE TABLET BY MOUTH EVERY DAY IN THE AM 90 tablet 0    lisinopril (ZESTRIL) 40 mg tablet TAKE ONE TABLET BY MOUTH EVERY DAY 90 tablet 0    lisinopril (ZESTRIL) 40 mg tablet TAKE ONE TABLET BY MOUTH EVERY DAY 90 tablet 0    methotrexate 2 5 mg tablet Take 1 tablet (2 5 mg total) by mouth once a week Takes 8 tablets every sunday 96 tablet 0    mometasone (ELOCON) 0 1 % ointment Apply topically 2 (two) times a day as needed (itching) 45 g 1    nystatin (MYCOSTATIN) cream Apply topically 2 (two) times a day 60 g 3    nystatin (MYCOSTATIN) powder Apply topically 3 (three) times a day (Patient taking differently: Apply topically 3 (three) times a day as needed ) 60 g 3    ofloxacin (FLOXIN) 0 3 % otic solution Administer 10 drops into both ears as needed        ONE TOUCH ULTRA TEST test strip Tests twice a day 100 each 5    ONETOUCH DELICA LANCETS FINE MISC by Does not apply route 2 (two) times a day 100 each 5    pantoprazole (PROTONIX) 40 mg tablet TAKE ONE TABLET BY MOUTH ONCE DAILY 90 tablet 0    Polyethylene Glycol 3350-GRX POWD Take by mouth daily at bedtime 850 g 5    predniSONE 20 mg tablet 2 tabs (40mg)  by mouth daily with food for 5 days 10 tablet 0    tiotropium (SPIRIVA RESPIMAT) 2 5 MCG/ACT AERS inhaler Inhale 1 puff daily at bedtime 3 Inhaler 3    Tofacitinib Citrate (XELJANZ XR) 11 MG TB24 Take 1 tablet by mouth daily      traZODone (DESYREL) 50 mg tablet Take 1 tablet (50 mg total) by mouth daily at bedtime 90 tablet 3     No current facility-administered medications for this visit        Current Outpatient Medications on File Prior to Visit   Medication Sig    albuterol (PROVENTIL HFA,VENTOLIN HFA) 90 mcg/act inhaler Inhale 2 puffs every 6 (six) hours as needed for wheezing    alendronate (FOSAMAX) 70 mg tablet Take 70 mg by mouth every 7 days    atorvastatin (LIPITOR) 20 mg tablet TAKE ONE TABLET BY MOUTH EVERY DAY    Blood Glucose Monitoring Suppl (ONE TOUCH ULTRA 2) w/Device KIT by Does not apply route 2 (two) times a day    Clobetasol Prop Emollient Base 0 05 % emollient cream clobetasol-emollient 0 05 % topical cream    clotrimazole (LOTRIMIN) 1 % cream Apply topically daily    cyanocobalamin (VITAMIN B-12) 1,000 mcg tablet Take 1 tablet by mouth daily    DULoxetine (CYMBALTA) 20 mg capsule TAKE ONE CAPSULE BY MOUTH ONCE DAILY    ergocalciferol (VITAMIN D2) 50,000 units TAKE ONE CAPSULE BY MOUTH WEEKLY (Patient taking differently: Every sunday)    ergocalciferol (VITAMIN D2) 50,000 units TAKE ONE CAPSULE BY MOUTH WEEKLY    fluticasone-salmeterol (ADVAIR) 250-50 mcg/dose Inhale 1 puff every 12 (twelve) hours     folic acid (FOLVITE) 1 mg tablet TAKE ONE TABLET BY MOUTH ONCE DAILY    furosemide (LASIX) 40 mg tablet TAKE ONE TABLET BY MOUTH EVERY DAY    Insulin Pen Needle 33G X 4 MM MISC by Does not apply route daily    ipratropium-albuterol (DUO-NEB) 0 5-2 5 mg/3 mL nebulizer solution Take 1 vial (3 mL total) by nebulization every 6 (six) hours as needed for wheezing or shortness of breath (Patient taking differently: Take 3 mL by nebulization every 6 (six) hours as needed for wheezing or shortness of breath Pt is out of medication)    ipratropium-albuterol (DUO-NEB) 0 5-2 5 mg/3 mL nebulizer solution Take 1 vial (3 mL total) by nebulization every 6 (six) hours as needed for wheezing or shortness of breath    JANUVIA 100 MG tablet TAKE 1 TABLET BY MOUTH DAILY      levothyroxine 125 mcg tablet TAKE ONE TABLET BY MOUTH EVERY DAY IN THE AM    lisinopril (ZESTRIL) 40 mg tablet TAKE ONE TABLET BY MOUTH EVERY DAY    lisinopril (ZESTRIL) 40 mg tablet TAKE ONE TABLET BY MOUTH EVERY DAY    methotrexate 2 5 mg tablet Take 1 tablet (2 5 mg total) by mouth once a week Takes 8 tablets every sunday    mometasone (ELOCON) 0 1 % ointment Apply topically 2 (two) times a day as needed (itching)    nystatin (MYCOSTATIN) cream Apply topically 2 (two) times a day    nystatin (MYCOSTATIN) powder Apply topically 3 (three) times a day (Patient taking differently: Apply topically 3 (three) times a day as needed )    ofloxacin (FLOXIN) 0 3 % otic solution Administer 10 drops into both ears as needed      ONE TOUCH ULTRA TEST test strip Tests twice a day    ONETOUCH DELICA LANCETS FINE MISC by Does not apply route 2 (two) times a day    pantoprazole (PROTONIX) 40 mg tablet TAKE ONE TABLET BY MOUTH ONCE DAILY    Polyethylene Glycol 3350-GRX POWD Take by mouth daily at bedtime    predniSONE 20 mg tablet 2 tabs (40mg)  by mouth daily with food for 5 days    tiotropium (SPIRIVA RESPIMAT) 2 5 MCG/ACT AERS inhaler Inhale 1 puff daily at bedtime    Tofacitinib Citrate (XELJANZ XR) 11 MG TB24 Take 1 tablet by mouth daily    [DISCONTINUED] mirtazapine (REMERON) 15 mg tablet Take 1 tablet (15 mg total) by mouth daily at bedtime     No current facility-administered medications on file prior to visit  She is allergic to no active allergies       Review of Systems   Constitutional: Positive for fatigue  Negative for activity change, appetite change, chills and fever  HENT: Negative for congestion and rhinorrhea  Eyes: Negative for visual disturbance  Respiratory: Positive for shortness of breath  Negative for cough and chest tightness  Cardiovascular: Negative for chest pain and palpitations  Gastrointestinal: Negative for abdominal pain, blood in stool, diarrhea, nausea and vomiting  Endocrine: Negative for polydipsia, polyphagia and polyuria  As per HPI   Genitourinary: Positive for dysuria, vaginal discharge and vaginal pain  Negative for frequency, hematuria and urgency  Musculoskeletal: Positive for arthralgias, back pain and neck stiffness  Negative for gait problem  Skin: Negative for color change  Neurological: Negative for dizziness and headaches  Hematological: Does not bruise/bleed easily  Psychiatric/Behavioral: Positive for sleep disturbance  Negative for confusion  The patient is not nervous/anxious  Objective:      /60 (BP Location: Left arm, Patient Position: Sitting, Cuff Size: Standard)   Pulse 77   Temp (!) 97 4 °F (36 3 °C) (Temporal)   Ht 5' 5" (1 651 m)   Wt 110 kg (242 lb)   SpO2 92%   BMI 40 27 kg/m²          Physical Exam   Constitutional: She is oriented to person, place, and time  She is cooperative  No distress  HENT:   Head: Normocephalic and atraumatic  Mouth/Throat: Oropharynx is clear and moist    Moist mucous membranes; slight cerumen   Eyes: Pupils are equal, round, and reactive to light  Conjunctivae are normal  Right eye exhibits no discharge  Left eye exhibits no discharge  Neck: No JVD present  Carotid bruit is not present  Cardiovascular: Normal rate, regular rhythm and normal heart sounds  Exam reveals no gallop and no friction rub  No murmur heard  Pulmonary/Chest: No respiratory distress  She has decreased breath sounds  She has no wheezes  She has no rales  She had kept her oxygen off for the entire visit and pulse ox at the end of the visit was 91%   Abdominal: Bowel sounds are normal  She exhibits no distension  There is no tenderness  Musculoskeletal: She exhibits no edema  Degenerative changes and postsurgical changes bilateral knees; degenerative changes bilateral hands   Lymphadenopathy:     She has no cervical adenopathy  Neurological: She is alert and oriented to person, place, and time  Skin: Skin is warm and dry  Psychiatric: She has a normal mood and affect  Her speech is normal and behavior is normal  Cognition and memory are normal    Nursing note and vitals reviewed

## 2019-10-01 NOTE — ASSESSMENT & PLAN NOTE
Continue follow-up with Rheumatology  Continue with the methotrexate in the folic acid  Try to remain as active as possible

## 2019-10-01 NOTE — ASSESSMENT & PLAN NOTE
Continue with inhalers and nebulizer treatments as per Pulmonary  Follow-up with Pulmonary regarding the persistent pleural effusion  She is hopeful in the future that she can get off of the oxygen but understands that Pulmonary will need to direct this  Watch for any worsening of respiratory symptoms especially with winter weather coming

## 2019-10-01 NOTE — ASSESSMENT & PLAN NOTE
Continue with vitamin B12 supplementation  Will continue follow vitamin B12 levels about 2 to 3 times a year

## 2019-10-01 NOTE — ASSESSMENT & PLAN NOTE
Slip given for repeat laboratory testing  Continue with current dose of the levothyroxine  Will adjust if needed based on the upcoming laboratory testing

## 2019-10-09 ENCOUNTER — TELEPHONE (OUTPATIENT)
Dept: OBGYN CLINIC | Facility: MEDICAL CENTER | Age: 69
End: 2019-10-09

## 2019-10-28 DIAGNOSIS — Z79.4 TYPE 2 DIABETES MELLITUS WITH HYPERGLYCEMIA, WITH LONG-TERM CURRENT USE OF INSULIN (HCC): ICD-10-CM

## 2019-10-28 DIAGNOSIS — E11.65 TYPE 2 DIABETES MELLITUS WITH HYPERGLYCEMIA, WITH LONG-TERM CURRENT USE OF INSULIN (HCC): ICD-10-CM

## 2019-11-04 DIAGNOSIS — E11.65 TYPE 2 DIABETES MELLITUS WITH HYPERGLYCEMIA, WITH LONG-TERM CURRENT USE OF INSULIN (HCC): ICD-10-CM

## 2019-11-04 DIAGNOSIS — Z79.4 TYPE 2 DIABETES MELLITUS WITH HYPERGLYCEMIA, WITH LONG-TERM CURRENT USE OF INSULIN (HCC): ICD-10-CM

## 2019-11-04 RX ORDER — INSULIN GLARGINE 100 [IU]/ML
INJECTION, SOLUTION SUBCUTANEOUS
Qty: 15 ML | Refills: 3 | Status: ON HOLD | OUTPATIENT
Start: 2019-11-04 | End: 2020-01-16 | Stop reason: SDUPTHER

## 2019-11-25 DIAGNOSIS — E03.9 HYPOTHYROIDISM, UNSPECIFIED TYPE: ICD-10-CM

## 2019-11-25 DIAGNOSIS — E11.9 TYPE 2 DIABETES MELLITUS WITHOUT COMPLICATION, WITHOUT LONG-TERM CURRENT USE OF INSULIN (HCC): ICD-10-CM

## 2019-11-25 DIAGNOSIS — I10 ESSENTIAL HYPERTENSION: Primary | ICD-10-CM

## 2019-11-25 DIAGNOSIS — E55.9 VITAMIN D DEFICIENCY: ICD-10-CM

## 2019-11-25 DIAGNOSIS — K21.9 GASTROESOPHAGEAL REFLUX DISEASE, ESOPHAGITIS PRESENCE NOT SPECIFIED: ICD-10-CM

## 2019-11-25 DIAGNOSIS — M06.9 RHEUMATOID ARTHRITIS, INVOLVING UNSPECIFIED SITE, UNSPECIFIED RHEUMATOID FACTOR PRESENCE: ICD-10-CM

## 2019-11-25 DIAGNOSIS — E78.2 MIXED HYPERLIPIDEMIA: ICD-10-CM

## 2019-11-25 RX ORDER — ERGOCALCIFEROL 1.25 MG/1
CAPSULE ORAL
Qty: 12 CAPSULE | Refills: 0 | Status: SHIPPED | OUTPATIENT
Start: 2019-11-25 | End: 2020-01-16 | Stop reason: HOSPADM

## 2019-11-25 RX ORDER — LISINOPRIL 40 MG/1
TABLET ORAL
Qty: 90 TABLET | Refills: 0 | Status: SHIPPED | OUTPATIENT
Start: 2019-11-25 | End: 2020-01-16 | Stop reason: HOSPADM

## 2019-11-25 RX ORDER — LEVOTHYROXINE SODIUM 0.12 MG/1
TABLET ORAL
Qty: 90 TABLET | Refills: 0 | Status: SHIPPED | OUTPATIENT
Start: 2019-11-25 | End: 2020-09-25

## 2019-11-25 RX ORDER — ATORVASTATIN CALCIUM 20 MG/1
TABLET, FILM COATED ORAL
Qty: 90 TABLET | Refills: 0 | Status: SHIPPED | OUTPATIENT
Start: 2019-11-25 | End: 2020-12-22

## 2019-11-25 RX ORDER — DULOXETIN HYDROCHLORIDE 20 MG/1
CAPSULE, DELAYED RELEASE ORAL
Qty: 90 CAPSULE | Refills: 0 | Status: SHIPPED | OUTPATIENT
Start: 2019-11-25 | End: 2020-09-25

## 2019-11-25 RX ORDER — PANTOPRAZOLE SODIUM 40 MG/1
TABLET, DELAYED RELEASE ORAL
Qty: 90 TABLET | Refills: 0 | Status: SHIPPED | OUTPATIENT
Start: 2019-11-25 | End: 2020-09-25

## 2019-11-25 RX ORDER — SITAGLIPTIN 100 MG/1
TABLET, FILM COATED ORAL
Qty: 90 TABLET | Refills: 0 | Status: SHIPPED | OUTPATIENT
Start: 2019-11-25 | End: 2022-02-01 | Stop reason: SDUPTHER

## 2019-11-25 RX ORDER — CANAGLIFLOZIN 100 MG/1
TABLET, FILM COATED ORAL
Qty: 90 TABLET | Refills: 0 | Status: SHIPPED | OUTPATIENT
Start: 2019-11-25 | End: 2020-01-16 | Stop reason: HOSPADM

## 2020-01-12 ENCOUNTER — HOSPITAL ENCOUNTER (INPATIENT)
Facility: HOSPITAL | Age: 70
LOS: 4 days | Discharge: HOME HEALTH | DRG: 871 | End: 2020-01-16
Attending: EMERGENCY MEDICINE | Admitting: FAMILY MEDICINE
Payer: COMMERCIAL

## 2020-01-12 ENCOUNTER — APPOINTMENT (EMERGENCY)
Dept: RADIOLOGY | Facility: HOSPITAL | Age: 70
DRG: 871 | End: 2020-01-12
Payer: COMMERCIAL

## 2020-01-12 DIAGNOSIS — B37.2 CANDIDAL DERMATITIS: ICD-10-CM

## 2020-01-12 DIAGNOSIS — E55.9 VITAMIN D DEFICIENCY: ICD-10-CM

## 2020-01-12 DIAGNOSIS — D53.9 MACROCYTIC ANEMIA: ICD-10-CM

## 2020-01-12 DIAGNOSIS — K59.00 CONSTIPATION, UNSPECIFIED CONSTIPATION TYPE: ICD-10-CM

## 2020-01-12 DIAGNOSIS — J90 PLEURAL EFFUSION ON LEFT: ICD-10-CM

## 2020-01-12 DIAGNOSIS — J90 LOCULATED PLEURAL EFFUSION: ICD-10-CM

## 2020-01-12 DIAGNOSIS — J96.21 ACUTE ON CHRONIC RESPIRATORY FAILURE WITH HYPOXIA (HCC): ICD-10-CM

## 2020-01-12 DIAGNOSIS — J44.9 CHRONIC OBSTRUCTIVE PULMONARY DISEASE, UNSPECIFIED COPD TYPE (HCC): ICD-10-CM

## 2020-01-12 DIAGNOSIS — R68.89 FLU-LIKE SYMPTOMS: Primary | ICD-10-CM

## 2020-01-12 DIAGNOSIS — Z79.4 TYPE 2 DIABETES MELLITUS WITH HYPERGLYCEMIA, WITH LONG-TERM CURRENT USE OF INSULIN (HCC): ICD-10-CM

## 2020-01-12 DIAGNOSIS — E11.65 TYPE 2 DIABETES MELLITUS WITH HYPERGLYCEMIA, WITH LONG-TERM CURRENT USE OF INSULIN (HCC): ICD-10-CM

## 2020-01-12 DIAGNOSIS — J44.1 CHRONIC OBSTRUCTIVE PULMONARY DISEASE WITH ACUTE EXACERBATION (HCC): ICD-10-CM

## 2020-01-12 LAB
ANION GAP SERPL CALCULATED.3IONS-SCNC: 3 MMOL/L (ref 4–13)
BASOPHILS # BLD AUTO: 0.03 THOUSANDS/ΜL (ref 0–0.1)
BASOPHILS NFR BLD AUTO: 0 % (ref 0–1)
BUN SERPL-MCNC: 18 MG/DL (ref 5–25)
CALCIUM SERPL-MCNC: 9.3 MG/DL (ref 8.3–10.1)
CHLORIDE SERPL-SCNC: 100 MMOL/L (ref 100–108)
CO2 SERPL-SCNC: 34 MMOL/L (ref 21–32)
CREAT SERPL-MCNC: 0.74 MG/DL (ref 0.6–1.3)
EOSINOPHIL # BLD AUTO: 0.1 THOUSAND/ΜL (ref 0–0.61)
EOSINOPHIL NFR BLD AUTO: 1 % (ref 0–6)
ERYTHROCYTE [DISTWIDTH] IN BLOOD BY AUTOMATED COUNT: 14.9 % (ref 11.6–15.1)
FLUAV RNA NPH QL NAA+PROBE: NORMAL
FLUBV RNA NPH QL NAA+PROBE: NORMAL
GFR SERPL CREATININE-BSD FRML MDRD: 81 ML/MIN/1.73SQ M
GLUCOSE SERPL-MCNC: 151 MG/DL (ref 65–140)
GLUCOSE SERPL-MCNC: 158 MG/DL (ref 65–140)
HCT VFR BLD AUTO: 37.3 % (ref 34.8–46.1)
HGB BLD-MCNC: 11.9 G/DL (ref 11.5–15.4)
IMM GRANULOCYTES # BLD AUTO: 0.03 THOUSAND/UL (ref 0–0.2)
IMM GRANULOCYTES NFR BLD AUTO: 0 % (ref 0–2)
LYMPHOCYTES # BLD AUTO: 1.04 THOUSANDS/ΜL (ref 0.6–4.47)
LYMPHOCYTES NFR BLD AUTO: 13 % (ref 14–44)
MAGNESIUM SERPL-MCNC: 1.8 MG/DL (ref 1.6–2.6)
MCH RBC QN AUTO: 31.9 PG (ref 26.8–34.3)
MCHC RBC AUTO-ENTMCNC: 31.9 G/DL (ref 31.4–37.4)
MCV RBC AUTO: 100 FL (ref 82–98)
MONOCYTES # BLD AUTO: 1 THOUSAND/ΜL (ref 0.17–1.22)
MONOCYTES NFR BLD AUTO: 13 % (ref 4–12)
NEUTROPHILS # BLD AUTO: 5.76 THOUSANDS/ΜL (ref 1.85–7.62)
NEUTS SEG NFR BLD AUTO: 73 % (ref 43–75)
NRBC BLD AUTO-RTO: 0 /100 WBCS
NT-PROBNP SERPL-MCNC: 26 PG/ML
PLATELET # BLD AUTO: 227 THOUSANDS/UL (ref 149–390)
PLATELET # BLD AUTO: 276 THOUSANDS/UL (ref 149–390)
PMV BLD AUTO: 10.4 FL (ref 8.9–12.7)
PMV BLD AUTO: 10.6 FL (ref 8.9–12.7)
POTASSIUM SERPL-SCNC: 4.1 MMOL/L (ref 3.5–5.3)
RBC # BLD AUTO: 3.73 MILLION/UL (ref 3.81–5.12)
RSV RNA NPH QL NAA+PROBE: NORMAL
SODIUM SERPL-SCNC: 137 MMOL/L (ref 136–145)
TROPONIN I SERPL-MCNC: <0.02 NG/ML
WBC # BLD AUTO: 7.96 THOUSAND/UL (ref 4.31–10.16)

## 2020-01-12 PROCEDURE — 96374 THER/PROPH/DIAG INJ IV PUSH: CPT

## 2020-01-12 PROCEDURE — 83735 ASSAY OF MAGNESIUM: CPT | Performed by: PHYSICIAN ASSISTANT

## 2020-01-12 PROCEDURE — 85025 COMPLETE CBC W/AUTO DIFF WBC: CPT | Performed by: PHYSICIAN ASSISTANT

## 2020-01-12 PROCEDURE — 93005 ELECTROCARDIOGRAM TRACING: CPT

## 2020-01-12 PROCEDURE — 82948 REAGENT STRIP/BLOOD GLUCOSE: CPT

## 2020-01-12 PROCEDURE — 80048 BASIC METABOLIC PNL TOTAL CA: CPT | Performed by: PHYSICIAN ASSISTANT

## 2020-01-12 PROCEDURE — 99285 EMERGENCY DEPT VISIT HI MDM: CPT

## 2020-01-12 PROCEDURE — 84484 ASSAY OF TROPONIN QUANT: CPT | Performed by: PHYSICIAN ASSISTANT

## 2020-01-12 PROCEDURE — 85049 AUTOMATED PLATELET COUNT: CPT | Performed by: FAMILY MEDICINE

## 2020-01-12 PROCEDURE — 83036 HEMOGLOBIN GLYCOSYLATED A1C: CPT | Performed by: FAMILY MEDICINE

## 2020-01-12 PROCEDURE — 94640 AIRWAY INHALATION TREATMENT: CPT

## 2020-01-12 PROCEDURE — 36415 COLL VENOUS BLD VENIPUNCTURE: CPT | Performed by: PHYSICIAN ASSISTANT

## 2020-01-12 PROCEDURE — 99285 EMERGENCY DEPT VISIT HI MDM: CPT | Performed by: PHYSICIAN ASSISTANT

## 2020-01-12 PROCEDURE — 87040 BLOOD CULTURE FOR BACTERIA: CPT | Performed by: FAMILY MEDICINE

## 2020-01-12 PROCEDURE — 84145 PROCALCITONIN (PCT): CPT | Performed by: FAMILY MEDICINE

## 2020-01-12 PROCEDURE — 87631 RESP VIRUS 3-5 TARGETS: CPT | Performed by: PHYSICIAN ASSISTANT

## 2020-01-12 PROCEDURE — 99222 1ST HOSP IP/OBS MODERATE 55: CPT | Performed by: FAMILY MEDICINE

## 2020-01-12 PROCEDURE — 94760 N-INVAS EAR/PLS OXIMETRY 1: CPT

## 2020-01-12 PROCEDURE — 94664 DEMO&/EVAL PT USE INHALER: CPT

## 2020-01-12 PROCEDURE — 1124F ACP DISCUSS-NO DSCNMKR DOCD: CPT | Performed by: PHYSICIAN ASSISTANT

## 2020-01-12 PROCEDURE — 83880 ASSAY OF NATRIURETIC PEPTIDE: CPT | Performed by: PHYSICIAN ASSISTANT

## 2020-01-12 PROCEDURE — 71046 X-RAY EXAM CHEST 2 VIEWS: CPT

## 2020-01-12 RX ORDER — TRAZODONE HYDROCHLORIDE 50 MG/1
50 TABLET ORAL
Status: DISCONTINUED | OUTPATIENT
Start: 2020-01-12 | End: 2020-01-16 | Stop reason: HOSPADM

## 2020-01-12 RX ORDER — LISINOPRIL 20 MG/1
40 TABLET ORAL DAILY
Status: DISCONTINUED | OUTPATIENT
Start: 2020-01-13 | End: 2020-01-16 | Stop reason: HOSPADM

## 2020-01-12 RX ORDER — INSULIN GLARGINE 100 [IU]/ML
30 INJECTION, SOLUTION SUBCUTANEOUS
Status: DISCONTINUED | OUTPATIENT
Start: 2020-01-12 | End: 2020-01-14

## 2020-01-12 RX ORDER — LEVOTHYROXINE SODIUM 0.12 MG/1
125 TABLET ORAL
Status: DISCONTINUED | OUTPATIENT
Start: 2020-01-13 | End: 2020-01-16 | Stop reason: HOSPADM

## 2020-01-12 RX ORDER — DOCUSATE SODIUM 100 MG/1
100 CAPSULE, LIQUID FILLED ORAL 2 TIMES DAILY PRN
Status: DISCONTINUED | OUTPATIENT
Start: 2020-01-12 | End: 2020-01-16 | Stop reason: HOSPADM

## 2020-01-12 RX ORDER — METHYLPREDNISOLONE SODIUM SUCCINATE 40 MG/ML
40 INJECTION, POWDER, LYOPHILIZED, FOR SOLUTION INTRAMUSCULAR; INTRAVENOUS EVERY 8 HOURS
Status: DISCONTINUED | OUTPATIENT
Start: 2020-01-13 | End: 2020-01-13

## 2020-01-12 RX ORDER — PANTOPRAZOLE SODIUM 40 MG/1
40 TABLET, DELAYED RELEASE ORAL
Status: DISCONTINUED | OUTPATIENT
Start: 2020-01-13 | End: 2020-01-16 | Stop reason: HOSPADM

## 2020-01-12 RX ORDER — FUROSEMIDE 40 MG/1
40 TABLET ORAL DAILY
Status: DISCONTINUED | OUTPATIENT
Start: 2020-01-13 | End: 2020-01-16 | Stop reason: HOSPADM

## 2020-01-12 RX ORDER — IPRATROPIUM BROMIDE AND ALBUTEROL SULFATE 2.5; .5 MG/3ML; MG/3ML
3 SOLUTION RESPIRATORY (INHALATION)
Status: DISCONTINUED | OUTPATIENT
Start: 2020-01-12 | End: 2020-01-12

## 2020-01-12 RX ORDER — ERGOCALCIFEROL 1.25 MG/1
50000 CAPSULE ORAL WEEKLY
Status: DISCONTINUED | OUTPATIENT
Start: 2020-01-13 | End: 2020-01-15

## 2020-01-12 RX ORDER — SODIUM CHLORIDE 9 MG/ML
3 INJECTION INTRAVENOUS AS NEEDED
Status: DISCONTINUED | OUTPATIENT
Start: 2020-01-12 | End: 2020-01-16 | Stop reason: HOSPADM

## 2020-01-12 RX ORDER — MAGNESIUM HYDROXIDE/ALUMINUM HYDROXICE/SIMETHICONE 120; 1200; 1200 MG/30ML; MG/30ML; MG/30ML
30 SUSPENSION ORAL EVERY 4 HOURS PRN
Status: DISCONTINUED | OUTPATIENT
Start: 2020-01-12 | End: 2020-01-16 | Stop reason: HOSPADM

## 2020-01-12 RX ORDER — FOLIC ACID 1 MG/1
1000 TABLET ORAL DAILY
Status: DISCONTINUED | OUTPATIENT
Start: 2020-01-13 | End: 2020-01-16 | Stop reason: HOSPADM

## 2020-01-12 RX ORDER — ALBUTEROL SULFATE 2.5 MG/3ML
2.5 SOLUTION RESPIRATORY (INHALATION) EVERY 4 HOURS PRN
Status: DISCONTINUED | OUTPATIENT
Start: 2020-01-13 | End: 2020-01-16 | Stop reason: HOSPADM

## 2020-01-12 RX ORDER — ONDANSETRON 2 MG/ML
4 INJECTION INTRAMUSCULAR; INTRAVENOUS EVERY 4 HOURS PRN
Status: DISCONTINUED | OUTPATIENT
Start: 2020-01-12 | End: 2020-01-16 | Stop reason: HOSPADM

## 2020-01-12 RX ORDER — FLUTICASONE FUROATE AND VILANTEROL 200; 25 UG/1; UG/1
1 POWDER RESPIRATORY (INHALATION)
Status: DISCONTINUED | OUTPATIENT
Start: 2020-01-13 | End: 2020-01-16 | Stop reason: HOSPADM

## 2020-01-12 RX ORDER — LEVALBUTEROL 1.25 MG/.5ML
1.25 SOLUTION, CONCENTRATE RESPIRATORY (INHALATION)
Status: DISCONTINUED | OUTPATIENT
Start: 2020-01-13 | End: 2020-01-16 | Stop reason: HOSPADM

## 2020-01-12 RX ORDER — DULOXETIN HYDROCHLORIDE 20 MG/1
20 CAPSULE, DELAYED RELEASE ORAL DAILY
Status: DISCONTINUED | OUTPATIENT
Start: 2020-01-13 | End: 2020-01-16 | Stop reason: HOSPADM

## 2020-01-12 RX ORDER — METHYLPREDNISOLONE SODIUM SUCCINATE 125 MG/2ML
60 INJECTION, POWDER, LYOPHILIZED, FOR SOLUTION INTRAMUSCULAR; INTRAVENOUS ONCE
Status: COMPLETED | OUTPATIENT
Start: 2020-01-12 | End: 2020-01-12

## 2020-01-12 RX ORDER — POLYETHYLENE GLYCOL 3350 17 G/17G
17 POWDER, FOR SOLUTION ORAL
Status: DISCONTINUED | OUTPATIENT
Start: 2020-01-12 | End: 2020-01-16 | Stop reason: HOSPADM

## 2020-01-12 RX ORDER — AZITHROMYCIN 250 MG/1
500 TABLET, FILM COATED ORAL EVERY 24 HOURS
Status: DISCONTINUED | OUTPATIENT
Start: 2020-01-12 | End: 2020-01-15

## 2020-01-12 RX ORDER — ATORVASTATIN CALCIUM 10 MG/1
20 TABLET, FILM COATED ORAL DAILY
Status: DISCONTINUED | OUTPATIENT
Start: 2020-01-12 | End: 2020-01-16 | Stop reason: HOSPADM

## 2020-01-12 RX ORDER — TRIAMCINOLONE ACETONIDE 1 MG/G
CREAM TOPICAL 2 TIMES DAILY PRN
Status: DISCONTINUED | OUTPATIENT
Start: 2020-01-12 | End: 2020-01-16 | Stop reason: HOSPADM

## 2020-01-12 RX ADMIN — METHYLPREDNISOLONE SODIUM SUCCINATE 60 MG: 125 INJECTION, POWDER, FOR SOLUTION INTRAMUSCULAR; INTRAVENOUS at 18:20

## 2020-01-12 RX ADMIN — TRAZODONE HYDROCHLORIDE 50 MG: 50 TABLET ORAL at 23:44

## 2020-01-12 RX ADMIN — IPRATROPIUM BROMIDE AND ALBUTEROL SULFATE 3 ML: 2.5; .5 SOLUTION RESPIRATORY (INHALATION) at 18:20

## 2020-01-12 RX ADMIN — ATORVASTATIN CALCIUM 20 MG: 10 TABLET, FILM COATED ORAL at 23:44

## 2020-01-12 RX ADMIN — INSULIN LISPRO 1 UNITS: 100 INJECTION, SOLUTION INTRAVENOUS; SUBCUTANEOUS at 23:45

## 2020-01-12 RX ADMIN — POLYETHYLENE GLYCOL 3350 17 G: 17 POWDER, FOR SOLUTION ORAL at 23:38

## 2020-01-12 RX ADMIN — AZITHROMYCIN 500 MG: 250 TABLET, FILM COATED ORAL at 23:44

## 2020-01-12 NOTE — ED PROVIDER NOTES
History  Chief Complaint   Patient presents with    Flu Symptoms     Pt presents to ER from home for reports of weakness, body aches and cough/congestion x24 hours  Patient reports exposure to ill family member diagnosed with flu yesterday  64-year-old female history of COPD and chronic left-sided pleural effusion presents complaining shortness of breath since yesterday  She states that yesterday she had a relatively abrupt onset of subjective fever and chills, body aches and feeling like she can't catch her breath  She also admits nonradiating chest pain that she states is mostly associated with her coughing  She states that the cough is productive with yellow phlegm  She also states that when she coughs she feels a sharp pain in her left side near her abdomen  She denies being diaphoretic, headache, hemoptysis, lower leg pain or swelling, nausea vomiting, hematochezia, melena  She reports that she routinely follows up pulmonology in the hip told her multiple times that she needs to schedule as an outpatient to see cardiothoracic surgery 2 scheduled to have her left-sided pleural effusion drained  She reports that in the past they have attempted thoracocentesis during previous hospital stays without success  Prior to Admission Medications   Prescriptions Last Dose Informant Patient Reported? Taking? Blood Glucose Monitoring Suppl (ONE TOUCH ULTRA 2) w/Device KIT  Self No No   Sig: by Does not apply route 2 (two) times a day   Clobetasol Prop Emollient Base 0 05 % emollient cream  Self Yes No   Sig: clobetasol-emollient 0 05 % topical cream   DULoxetine (CYMBALTA) 20 mg capsule   No No   Sig: TAKE ONE CAPSULE BY MOUTH ONCE DAILY   INVOKANA 100 MG   No No   Sig: TAKE ONE TABLET BY MOUTH EVERY DAY   Insulin Pen Needle 33G X 4 MM MISC  Self No No   Sig: by Does not apply route daily   JANUVIA 100 MG tablet   No No   Sig: TAKE 1 TABLET BY MOUTH DAILY     LANTUS SOLOSTAR 100 units/mL injection pen No No   Sig: INJECT 30 UNITS UNDER THE SKIN DAILY     ONE TOUCH ULTRA TEST test strip  Self No No   Sig: Tests twice a day   ONETOUCH DELICA LANCETS FINE MISC  Self No No   Sig: by Does not apply route 2 (two) times a day   Polyethylene Glycol 3350-GRX POWD  Self No No   Sig: Take by mouth daily at bedtime   Tofacitinib Citrate (XELJANZ XR) 11 MG TB24  Self Yes No   Sig: Take 1 tablet by mouth daily   albuterol (PROVENTIL HFA,VENTOLIN HFA) 90 mcg/act inhaler  Self No No   Sig: Inhale 2 puffs every 6 (six) hours as needed for wheezing   alendronate (FOSAMAX) 70 mg tablet  Self Yes No   Sig: Take 70 mg by mouth every 7 days   atorvastatin (LIPITOR) 20 mg tablet   No No   Sig: TAKE ONE TABLET BY MOUTH EVERY DAY   clotrimazole (LOTRIMIN) 1 % cream  Self No No   Sig: Apply topically daily   cyanocobalamin (VITAMIN B-12) 1,000 mcg tablet  Self Yes No   Sig: Take 1 tablet by mouth daily   ergocalciferol (VITAMIN D2) 50,000 units  Self No No   Sig: TAKE ONE CAPSULE BY MOUTH WEEKLY   Patient taking differently: Every sunday   ergocalciferol (VITAMIN D2) 50,000 units  Self No No   Sig: TAKE ONE CAPSULE BY MOUTH WEEKLY   ergocalciferol (VITAMIN D2) 50,000 units   No No   Sig: TAKE ONE CAPSULE BY MOUTH WEEKLY   fluticasone-salmeterol (ADVAIR) 250-50 mcg/dose  Self Yes No   Sig: Inhale 1 puff every 12 (twelve) hours    folic acid (FOLVITE) 1 mg tablet  Self No No   Sig: TAKE ONE TABLET BY MOUTH ONCE DAILY   furosemide (LASIX) 40 mg tablet  Self No No   Sig: TAKE ONE TABLET BY MOUTH EVERY DAY   ipratropium-albuterol (DUO-NEB) 0 5-2 5 mg/3 mL nebulizer solution  Self No No   Sig: Take 1 vial (3 mL total) by nebulization every 6 (six) hours as needed for wheezing or shortness of breath   Patient taking differently: Take 3 mL by nebulization every 6 (six) hours as needed for wheezing or shortness of breath Pt is out of medication   ipratropium-albuterol (DUO-NEB) 0 5-2 5 mg/3 mL nebulizer solution  Self No No   Sig: Take 1 vial (3 mL total) by nebulization every 6 (six) hours as needed for wheezing or shortness of breath   levothyroxine 125 mcg tablet   No No   Sig: TAKE ONE TABLET BY MOUTH EVERY DAY IN THE AM   lisinopril (ZESTRIL) 40 mg tablet  Self No No   Sig: TAKE ONE TABLET BY MOUTH EVERY DAY   lisinopril (ZESTRIL) 40 mg tablet  Self No No   Sig: TAKE ONE TABLET BY MOUTH EVERY DAY   lisinopril (ZESTRIL) 40 mg tablet   No No   Sig: TAKE ONE TABLET BY MOUTH EVERY DAY   methotrexate 2 5 mg tablet  Self No No   Sig: Take 1 tablet (2 5 mg total) by mouth once a week Takes 8 tablets every    mometasone (ELOCON) 0 1 % ointment  Self No No   Sig: Apply topically 2 (two) times a day as needed (itching)   nystatin (MYCOSTATIN) cream  Self No No   Sig: Apply topically 2 (two) times a day   nystatin (MYCOSTATIN) powder  Self No No   Sig: Apply topically 3 (three) times a day   Patient taking differently: Apply topically 3 (three) times a day as needed    ofloxacin (FLOXIN) 0 3 % otic solution  Self Yes No   Sig: Administer 10 drops into both ears as needed     pantoprazole (PROTONIX) 40 mg tablet   No No   Sig: TAKE ONE TABLET BY MOUTH ONCE DAILY   predniSONE 20 mg tablet  Self No No   Si tabs (40mg)  by mouth daily with food for 5 days   tiotropium (SPIRIVA RESPIMAT) 2 5 MCG/ACT AERS inhaler  Self No No   Sig: Inhale 1 puff daily at bedtime   traZODone (DESYREL) 50 mg tablet   No No   Sig: Take 1 tablet (50 mg total) by mouth daily at bedtime      Facility-Administered Medications: None       Past Medical History:   Diagnosis Date    Arthritis     Arthritis     Cancer (HCC)     Candidiasis of skin     Cervical cancer (HCC)     Chronic respiratory failure with hypoxia and hypercapnia ([facility]) 2019    COPD (chronic obstructive pulmonary disease) (HCC)     last assessed 2016    Current chronic use of systemic steroids     Degenerative arthritis of left knee     last assessed 2015    Diabetes mellitus [facility] [address] [facility]     Disease of thyroid gland     hypo pill given to decrease thyroid   Fibromyalgia     Fistula of knee     last assessed 2014    GERD (gastroesophageal reflux disease)     Hyperlipidemia     Hypertension     Medial meniscus tear     of left knee, last assessed 2014    Migraine     states she has a hx of HA that she calls [facility] but never was dx by neurologist    Obesity     Obesity     Osteoarthritis     Osteopenia     Pneumonia     last assessed 2016    Psychiatric disorder     anxiety    [facility] arthritis [facility] [address] [facility]     Rheumatoid arthritis [facility] [address] [facility]     Sepsis ([facility] [address] [facility]     last assessed 11/10/2016    Tick bite     last assessed 10/30/2015    Vitamin B12 deficiency     Vitamin D deficiency        Past Surgical History:   Procedure Laterality Date    CATARACT EXTRACTION Bilateral     CHOLECYSTECTOMY      EYE SURGERY      Bilateral Cataracts    HYSTERECTOMY      IR THORACENTESIS  2019    JOINT REPLACEMENT      left knee    KNEE ARTHROSCOPY      NEUROPLASTY / TRANSPOSITION MEDIAN NERVE AT CARPAL TUNNEL      TUBAL LIGATION         Family History   Problem Relation Age of Onset    Stroke Mother     Asthma Family     Cancer Family     Diabetes Family     Hypertension Family      I have reviewed and agree with the history as documented  Social History     Tobacco Use    Smoking status: Former Smoker     Packs/day: 1 00     Years: 48 00     Pack years: 48 00     Types: E-Cigarettes     Last attempt to quit: 2012     Years since quittin 1    Smokeless tobacco: Never Used    Tobacco comment: per allscripts - "current every day smoker, former smoker"   Substance Use Topics    Alcohol use: Never     Alcohol/week: 0 0 standard drinks     Frequency: Never     Binge frequency: Never     Comment: stopped drinking alcohol    Drug use: Never        Review of Systems   Constitutional: Positive for chills, fatigue and fever  Negative for diaphoresis     HENT: Negative for congestion, postnasal drip, sinus pressure and sore throat  Eyes: Negative for pain  Respiratory: Positive for cough, shortness of breath and wheezing  Negative for stridor  Productive cough  Yellow phlegm  Cardiovascular: Negative for chest pain, palpitations and leg swelling  Gastrointestinal: Negative for abdominal pain, diarrhea, nausea and vomiting  Genitourinary: Negative for dysuria  Musculoskeletal: Positive for back pain and myalgias  Negative for neck pain and neck stiffness  Neurological: Positive for headaches  Negative for dizziness, syncope and light-headedness  All other systems reviewed and are negative  Physical Exam  Physical Exam   Constitutional: She is oriented to person, place, and time  She appears well-developed and well-nourished  HENT:   Head: Normocephalic and atraumatic  Mouth/Throat: No oropharyngeal exudate  Eyes: EOM are normal    Neck: Normal range of motion  Cardiovascular: Normal rate, regular rhythm and normal heart sounds  Pulmonary/Chest: Effort normal  No stridor  She has wheezes in the right upper field, the right middle field, the right lower field, the left upper field, the left middle field and the left lower field  She has no rales  Abdominal: Soft  Bowel sounds are normal  There is no tenderness  Musculoskeletal: Normal range of motion  Neurological: She is alert and oriented to person, place, and time  Skin: Skin is warm  Capillary refill takes less than 2 seconds  Psychiatric: She has a normal mood and affect         Vital Signs  ED Triage Vitals   Temperature Pulse Respirations Blood Pressure SpO2   01/12/20 1748 01/12/20 1746 01/12/20 1746 01/12/20 1746 01/12/20 1746   100 5 °F (38 1 °C) 104 20 146/65 100 %      Temp Source Heart Rate Source Patient Position - Orthostatic VS BP Location FiO2 (%)   01/12/20 1748 01/12/20 1746 01/12/20 1746 01/12/20 1746 --   Temporal Monitor Lying Left arm       Pain Score 01/12/20 1746       9           Vitals:    01/12/20 1800 01/12/20 1830 01/12/20 1900 01/12/20 1930   BP: 156/83 124/61 110/53 121/69   Pulse: (!) 107 (!) 111 (!) 119 (!) 118   Patient Position - Orthostatic VS:             Visual Acuity      ED Medications  Medications   sodium chloride (PF) 0 9 % injection 3 mL (has no administration in time range)   ipratropium-albuterol (DUO-NEB) 0 5-2 5 mg/3 mL inhalation solution 3 mL (3 mL Nebulization Given 1/12/20 1820)   methylPREDNISolone sodium succinate (Solu-MEDROL) injection 60 mg (60 mg Intravenous Given 1/12/20 1820)       Diagnostic Studies  Results Reviewed     Procedure Component Value Units Date/Time    Basic metabolic panel [[id number]]  (Abnormal) Collected:  01/12/20 1820    Lab Status:  Final result Specimen:  Blood from Arm, Left Updated:  01/12/20 1856     Sodium 137 mmol/L      Potassium 4 1 mmol/L      Chloride 100 mmol/L      CO2 34 mmol/L      ANION GAP 3 mmol/L      BUN 18 mg/dL      Creatinine 0 74 mg/dL      Glucose 151 mg/dL      Calcium 9 3 mg/dL      eGFR 81 ml/min/1 73sq m     Narrative:       [place] guidelines for Chronic Kidney Disease (CKD):     Stage 1 with normal or high GFR (GFR > 90 mL/min/1 73 square meters)    Stage 2 Mild CKD (GFR = 60-89 mL/min/1 73 square meters)    Stage 3A Moderate CKD (GFR = 45-59 mL/min/1 73 square meters)    Stage 3B Moderate CKD (GFR = 30-44 mL/min/1 73 square meters)    Stage 4 Severe CKD (GFR = 15-29 mL/min/1 73 square meters)    Stage 5 End Stage CKD (GFR <15 mL/min/1 73 square meters)  Note: GFR calculation is accurate only with a steady state creatinine    Magnesium [[id number]]  (Normal) Collected:  01/12/20 1820    Lab Status:  Final result Specimen:  Blood from Arm, Left Updated:  01/12/20 1856     Magnesium 1 8 mg/dL     NT-BNP PRO [[id number]]  (Normal) Collected:  01/12/20 1820    Lab Status:  Final result Specimen:  Blood from Arm, Left Updated:  01/12/20 1851 NT-proBNP 26 pg/mL     Troponin I [[id number]]  (Normal) Collected:  01/12/20 1820    Lab Status:  Final result Specimen:  Blood from Arm, Left Updated:  01/12/20 1851     Troponin I <0 02 ng/mL     Influenza A/B and RSV PCR [[id number]]  (Normal) Collected:  01/12/20 1749    Lab Status:  Final result Specimen:  Nasopharyngeal Swab Updated:  01/12/20 1851     INFLUENZA A PCR None Detected     INFLUENZA B PCR None Detected     RSV PCR None Detected    CBC and differential [[id number]]  (Abnormal) Collected:  01/12/20 1820    Lab Status:  Final result Specimen:  Blood from Arm, Left Updated:  01/12/20 1832     WBC 7 96 Thousand/uL      RBC 3 73 Million/uL      Hemoglobin 11 9 g/dL      Hematocrit 37 3 %       fL      MCH 31 9 pg      MCHC 31 9 g/dL      RDW 14 9 %      MPV 10 4 fL      Platelets 050 Thousands/uL      nRBC 0 /100 WBCs      Neutrophils Relative 73 %      Immat GRANS % 0 %      Lymphocytes Relative 13 %      Monocytes Relative 13 %      Eosinophils Relative 1 %      Basophils Relative 0 %      Neutrophils Absolute 5 76 Thousands/µL      Immature Grans Absolute 0 03 Thousand/uL      Lymphocytes Absolute 1 04 Thousands/µL      Monocytes Absolute 1 00 Thousand/µL      Eosinophils Absolute 0 10 Thousand/µL      Basophils Absolute 0 03 Thousands/µL                  X-ray chest 2 views    (Results Pending)              Procedures  ECG 12 Lead Documentation Only  Date/Time: 1/12/2020 6:43 PM  Performed by: Fernando Gilbert PA-C  Authorized by:  Fernando Gilbert PA-C     ECG reviewed by me, the ED Provider: yes    Patient location:  ED  Previous ECG:     Previous ECG:  Compared to current    Similarity:  No change    Comparison to cardiac monitor: Yes    Interpretation:     Interpretation: normal    Rate:     ECG rate:  108    ECG rate assessment: tachycardic    Rhythm:     Rhythm: sinus tachycardia    Ectopy:     Ectopy: none    QRS:     QRS axis:  Normal  Conduction:     Conduction: normal    ST segments:     ST segments:  Normal  T waves:     T waves: normal               ED Course  ED Course as of Jan 12 2054   Sun Jan 12, 2020   1836 WBC: 7 96   1836 Hemoglobin: 11 9   1836 HCT: 37 3   1836 Platelet Count: 470   1857 NT-proBNP: 26   1857 Magnesium: 1 8   1857 Troponin I: <0 02   1858 Sodium: 137   1858 Potassium: 4 1   1858 Chloride: 100   1858 CO2(!): 34   1858 Anion Gap(!): 3   1941 INFLU A PCR: None Detected   1941 INFLU B PCR: None Detected   1941 RSV PCR: None Detected   2041 Upon my re-examination patient's stoma healing well  Scattered wheezes and rhonchi on exam   Ms  Breath sounds in left lower lobe  MDM  Number of Diagnoses or Management Options  Flu-like symptoms: minor  Pleural effusion on left:   Diagnosis management comments: Patient presented with flu-like symptoms over 1 day  She had fever, chills and body aches  She has a history of COPD and felt more short of breath than usual   Her chest x-ray revealed a significant left-sided pleural effusion that has remained relatively unchanged since her last chest x-ray  Patient is still tachycardic, febrile and not breathing well on exam and therefore will be admitted to [address] [place] [address] inpatient general medical service  Follow up with cardiothoracic surgery is to be determined  Patient is agreeable with plan          Disposition  Final diagnoses:   Flu-like symptoms   Pleural effusion on left     Time reflects when diagnosis was documented in both MDM as applicable and the Disposition within this note     Time User Action Codes Description Comment    1/12/2020  8:42 PM Sadaf Fletcher [[id number]] Flu-like symptoms     1/12/2020  8:43 PM Sadaf Fletcher [[id number]] Pleural effusion on left       ED Disposition     ED Disposition Condition Date/Time Comment    Admit [place] Jan 12, 2020  8:44 PM Case was discussed with Dr Mitchel Mosher and the patient's admission status was agreed to be Admission Status: inpatient status to the service of Dr Eduardo Chase  Follow-up Information    None         Patient's Medications   Discharge Prescriptions    No medications on file     No discharge procedures on file      ED Provider  Electronically Signed by           Kati Gates PA-C  01/12/20 2054

## 2020-01-13 PROBLEM — J96.22 ACUTE ON CHRONIC RESPIRATORY FAILURE WITH HYPOXIA AND HYPERCAPNIA (HCC): Status: ACTIVE | Noted: 2019-04-20

## 2020-01-13 PROBLEM — J96.21 ACUTE ON CHRONIC RESPIRATORY FAILURE WITH HYPOXIA AND HYPERCAPNIA (HCC): Status: ACTIVE | Noted: 2019-04-20

## 2020-01-13 LAB
ALBUMIN SERPL BCP-MCNC: 3.2 G/DL (ref 3.5–5)
ALP SERPL-CCNC: 69 U/L (ref 46–116)
ALT SERPL W P-5'-P-CCNC: 23 U/L (ref 12–78)
ANION GAP SERPL CALCULATED.3IONS-SCNC: 6 MMOL/L (ref 4–13)
AST SERPL W P-5'-P-CCNC: 12 U/L (ref 5–45)
ATRIAL RATE: 108 BPM
BASOPHILS # BLD AUTO: 0.04 THOUSANDS/ΜL (ref 0–0.1)
BASOPHILS NFR BLD AUTO: 1 % (ref 0–1)
BILIRUB SERPL-MCNC: 0.4 MG/DL (ref 0.2–1)
BUN SERPL-MCNC: 15 MG/DL (ref 5–25)
CALCIUM SERPL-MCNC: 9 MG/DL (ref 8.3–10.1)
CHLORIDE SERPL-SCNC: 99 MMOL/L (ref 100–108)
CO2 SERPL-SCNC: 30 MMOL/L (ref 21–32)
CREAT SERPL-MCNC: 0.68 MG/DL (ref 0.6–1.3)
EOSINOPHIL # BLD AUTO: 0.05 THOUSAND/ΜL (ref 0–0.61)
EOSINOPHIL NFR BLD AUTO: 1 % (ref 0–6)
ERYTHROCYTE [DISTWIDTH] IN BLOOD BY AUTOMATED COUNT: 15.1 % (ref 11.6–15.1)
EST. AVERAGE GLUCOSE BLD GHB EST-MCNC: 146 MG/DL
GFR SERPL CREATININE-BSD FRML MDRD: 88 ML/MIN/1.73SQ M
GLUCOSE SERPL-MCNC: 151 MG/DL (ref 65–140)
GLUCOSE SERPL-MCNC: 153 MG/DL (ref 65–140)
GLUCOSE SERPL-MCNC: 313 MG/DL (ref 65–140)
GLUCOSE SERPL-MCNC: 331 MG/DL (ref 65–140)
GLUCOSE SERPL-MCNC: 395 MG/DL (ref 65–140)
GLUCOSE SERPL-MCNC: 411 MG/DL (ref 65–140)
HBA1C MFR BLD: 6.7 % (ref 4.2–6.3)
HCT VFR BLD AUTO: 35 % (ref 34.8–46.1)
HGB BLD-MCNC: 11.2 G/DL (ref 11.5–15.4)
IMM GRANULOCYTES # BLD AUTO: 0.03 THOUSAND/UL (ref 0–0.2)
IMM GRANULOCYTES NFR BLD AUTO: 0 % (ref 0–2)
INR PPP: 1.02 (ref 0.84–1.19)
LYMPHOCYTES # BLD AUTO: 1.12 THOUSANDS/ΜL (ref 0.6–4.47)
LYMPHOCYTES NFR BLD AUTO: 16 % (ref 14–44)
MAGNESIUM SERPL-MCNC: 1.9 MG/DL (ref 1.6–2.6)
MCH RBC QN AUTO: 32 PG (ref 26.8–34.3)
MCHC RBC AUTO-ENTMCNC: 32 G/DL (ref 31.4–37.4)
MCV RBC AUTO: 100 FL (ref 82–98)
MONOCYTES # BLD AUTO: 1.08 THOUSAND/ΜL (ref 0.17–1.22)
MONOCYTES NFR BLD AUTO: 16 % (ref 4–12)
NEUTROPHILS # BLD AUTO: 4.63 THOUSANDS/ΜL (ref 1.85–7.62)
NEUTS SEG NFR BLD AUTO: 66 % (ref 43–75)
NRBC BLD AUTO-RTO: 0 /100 WBCS
P AXIS: 50 DEGREES
PHOSPHATE SERPL-MCNC: 2.6 MG/DL (ref 2.3–4.1)
PLATELET # BLD AUTO: 259 THOUSANDS/UL (ref 149–390)
PMV BLD AUTO: 10.8 FL (ref 8.9–12.7)
POTASSIUM SERPL-SCNC: 3.6 MMOL/L (ref 3.5–5.3)
PR INTERVAL: 160 MS
PROCALCITONIN SERPL-MCNC: <0.05 NG/ML
PROCALCITONIN SERPL-MCNC: <0.05 NG/ML
PROT SERPL-MCNC: 7.2 G/DL (ref 6.4–8.2)
PROTHROMBIN TIME: 13.4 SECONDS (ref 11.6–14.5)
QRS AXIS: 53 DEGREES
QRSD INTERVAL: 94 MS
QT INTERVAL: 318 MS
QTC INTERVAL: 426 MS
RBC # BLD AUTO: 3.5 MILLION/UL (ref 3.81–5.12)
SODIUM SERPL-SCNC: 135 MMOL/L (ref 136–145)
T WAVE AXIS: 41 DEGREES
VENTRICULAR RATE: 108 BPM
WBC # BLD AUTO: 6.95 THOUSAND/UL (ref 4.31–10.16)

## 2020-01-13 PROCEDURE — 94760 N-INVAS EAR/PLS OXIMETRY 1: CPT

## 2020-01-13 PROCEDURE — 97167 OT EVAL HIGH COMPLEX 60 MIN: CPT

## 2020-01-13 PROCEDURE — 82948 REAGENT STRIP/BLOOD GLUCOSE: CPT

## 2020-01-13 PROCEDURE — 94640 AIRWAY INHALATION TREATMENT: CPT

## 2020-01-13 PROCEDURE — 87205 SMEAR GRAM STAIN: CPT | Performed by: FAMILY MEDICINE

## 2020-01-13 PROCEDURE — 93010 ELECTROCARDIOGRAM REPORT: CPT | Performed by: INTERNAL MEDICINE

## 2020-01-13 PROCEDURE — 85610 PROTHROMBIN TIME: CPT | Performed by: FAMILY MEDICINE

## 2020-01-13 PROCEDURE — 85025 COMPLETE CBC W/AUTO DIFF WBC: CPT | Performed by: FAMILY MEDICINE

## 2020-01-13 PROCEDURE — 97163 PT EVAL HIGH COMPLEX 45 MIN: CPT

## 2020-01-13 PROCEDURE — 99233 SBSQ HOSP IP/OBS HIGH 50: CPT | Performed by: INTERNAL MEDICINE

## 2020-01-13 PROCEDURE — 83735 ASSAY OF MAGNESIUM: CPT | Performed by: FAMILY MEDICINE

## 2020-01-13 PROCEDURE — 84100 ASSAY OF PHOSPHORUS: CPT | Performed by: FAMILY MEDICINE

## 2020-01-13 PROCEDURE — 80053 COMPREHEN METABOLIC PANEL: CPT | Performed by: FAMILY MEDICINE

## 2020-01-13 PROCEDURE — 84145 PROCALCITONIN (PCT): CPT | Performed by: FAMILY MEDICINE

## 2020-01-13 PROCEDURE — 87070 CULTURE OTHR SPECIMN AEROBIC: CPT | Performed by: FAMILY MEDICINE

## 2020-01-13 RX ORDER — LIDOCAINE 50 MG/G
1 PATCH TOPICAL DAILY
Status: DISCONTINUED | OUTPATIENT
Start: 2020-01-13 | End: 2020-01-16 | Stop reason: HOSPADM

## 2020-01-13 RX ORDER — METHYLPREDNISOLONE SODIUM SUCCINATE 40 MG/ML
40 INJECTION, POWDER, LYOPHILIZED, FOR SOLUTION INTRAMUSCULAR; INTRAVENOUS EVERY 6 HOURS SCHEDULED
Status: DISCONTINUED | OUTPATIENT
Start: 2020-01-13 | End: 2020-01-14

## 2020-01-13 RX ORDER — POTASSIUM CHLORIDE 20 MEQ/1
40 TABLET, EXTENDED RELEASE ORAL ONCE
Status: COMPLETED | OUTPATIENT
Start: 2020-01-13 | End: 2020-01-13

## 2020-01-13 RX ORDER — SODIUM CHLORIDE 9 MG/ML
100 INJECTION, SOLUTION INTRAVENOUS CONTINUOUS
Status: DISCONTINUED | OUTPATIENT
Start: 2020-01-13 | End: 2020-01-13

## 2020-01-13 RX ORDER — ACETAMINOPHEN 325 MG/1
650 TABLET ORAL EVERY 6 HOURS PRN
Status: DISCONTINUED | OUTPATIENT
Start: 2020-01-13 | End: 2020-01-14

## 2020-01-13 RX ADMIN — METHYLPREDNISOLONE SODIUM SUCCINATE 40 MG: 40 INJECTION, POWDER, FOR SOLUTION INTRAMUSCULAR; INTRAVENOUS at 06:28

## 2020-01-13 RX ADMIN — IPRATROPIUM BROMIDE 0.5 MG: 0.5 SOLUTION RESPIRATORY (INHALATION) at 08:27

## 2020-01-13 RX ADMIN — Medication 400 MG: at 10:46

## 2020-01-13 RX ADMIN — LISINOPRIL 40 MG: 20 TABLET ORAL at 08:08

## 2020-01-13 RX ADMIN — METHOTREXATE SODIUM 20 MG: 2.5 TABLET ORAL at 09:48

## 2020-01-13 RX ADMIN — INSULIN LISPRO 5 UNITS: 100 INJECTION, SOLUTION INTRAVENOUS; SUBCUTANEOUS at 21:30

## 2020-01-13 RX ADMIN — SODIUM CHLORIDE 100 ML/HR: 0.9 INJECTION, SOLUTION INTRAVENOUS at 10:46

## 2020-01-13 RX ADMIN — CYANOCOBALAMIN TAB 1000 MCG 1000 MCG: 1000 TAB at 08:08

## 2020-01-13 RX ADMIN — POTASSIUM CHLORIDE 40 MEQ: 1500 TABLET, EXTENDED RELEASE ORAL at 10:45

## 2020-01-13 RX ADMIN — METHYLPREDNISOLONE SODIUM SUCCINATE 40 MG: 40 INJECTION, POWDER, FOR SOLUTION INTRAMUSCULAR; INTRAVENOUS at 11:40

## 2020-01-13 RX ADMIN — INSULIN LISPRO 1 UNITS: 100 INJECTION, SOLUTION INTRAVENOUS; SUBCUTANEOUS at 08:08

## 2020-01-13 RX ADMIN — FUROSEMIDE 40 MG: 40 TABLET ORAL at 08:08

## 2020-01-13 RX ADMIN — POLYETHYLENE GLYCOL 3350 17 G: 17 POWDER, FOR SOLUTION ORAL at 21:30

## 2020-01-13 RX ADMIN — ATORVASTATIN CALCIUM 20 MG: 10 TABLET, FILM COATED ORAL at 18:03

## 2020-01-13 RX ADMIN — FOLIC ACID 1000 MCG: 1 TABLET ORAL at 08:08

## 2020-01-13 RX ADMIN — LEVALBUTEROL HYDROCHLORIDE 1.25 MG: 1.25 SOLUTION, CONCENTRATE RESPIRATORY (INHALATION) at 08:27

## 2020-01-13 RX ADMIN — SITAGLIPTIN 100 MG: 100 TABLET, FILM COATED ORAL at 08:08

## 2020-01-13 RX ADMIN — LEVALBUTEROL HYDROCHLORIDE 1.25 MG: 1.25 SOLUTION, CONCENTRATE RESPIRATORY (INHALATION) at 19:43

## 2020-01-13 RX ADMIN — METHYLPREDNISOLONE SODIUM SUCCINATE 40 MG: 40 INJECTION, POWDER, FOR SOLUTION INTRAMUSCULAR; INTRAVENOUS at 18:03

## 2020-01-13 RX ADMIN — ERGOCALCIFEROL 50000 UNITS: 1.25 CAPSULE ORAL at 08:08

## 2020-01-13 RX ADMIN — INSULIN LISPRO 5 UNITS: 100 INJECTION, SOLUTION INTRAVENOUS; SUBCUTANEOUS at 18:04

## 2020-01-13 RX ADMIN — LEVALBUTEROL HYDROCHLORIDE 1.25 MG: 1.25 SOLUTION, CONCENTRATE RESPIRATORY (INHALATION) at 14:24

## 2020-01-13 RX ADMIN — AZITHROMYCIN 500 MG: 250 TABLET, FILM COATED ORAL at 21:45

## 2020-01-13 RX ADMIN — INSULIN GLARGINE 30 UNITS: 100 INJECTION, SOLUTION SUBCUTANEOUS at 08:14

## 2020-01-13 RX ADMIN — ENOXAPARIN SODIUM 40 MG: 40 INJECTION SUBCUTANEOUS at 18:03

## 2020-01-13 RX ADMIN — LIDOCAINE 1 PATCH: 50 PATCH TOPICAL at 10:47

## 2020-01-13 RX ADMIN — ENOXAPARIN SODIUM 40 MG: 40 INJECTION SUBCUTANEOUS at 08:07

## 2020-01-13 RX ADMIN — DULOXETINE HYDROCHLORIDE 20 MG: 20 CAPSULE, DELAYED RELEASE ORAL at 08:08

## 2020-01-13 RX ADMIN — PANTOPRAZOLE SODIUM 40 MG: 40 TABLET, DELAYED RELEASE ORAL at 06:28

## 2020-01-13 RX ADMIN — IPRATROPIUM BROMIDE 0.5 MG: 0.5 SOLUTION RESPIRATORY (INHALATION) at 19:43

## 2020-01-13 RX ADMIN — FLUTICASONE FUROATE AND VILANTEROL TRIFENATATE 1 PUFF: 200; 25 POWDER RESPIRATORY (INHALATION) at 10:50

## 2020-01-13 RX ADMIN — LEVOTHYROXINE SODIUM 125 MCG: 125 TABLET ORAL at 06:28

## 2020-01-13 RX ADMIN — TIOTROPIUM BROMIDE 18 MCG: 18 CAPSULE ORAL; RESPIRATORY (INHALATION) at 21:33

## 2020-01-13 RX ADMIN — METHYLPREDNISOLONE SODIUM SUCCINATE 40 MG: 40 INJECTION, POWDER, FOR SOLUTION INTRAMUSCULAR; INTRAVENOUS at 23:30

## 2020-01-13 RX ADMIN — TRAZODONE HYDROCHLORIDE 50 MG: 50 TABLET ORAL at 22:37

## 2020-01-13 RX ADMIN — IPRATROPIUM BROMIDE 0.5 MG: 0.5 SOLUTION RESPIRATORY (INHALATION) at 14:24

## 2020-01-13 RX ADMIN — INSULIN LISPRO 5 UNITS: 100 INJECTION, SOLUTION INTRAVENOUS; SUBCUTANEOUS at 11:40

## 2020-01-13 NOTE — H&P
H&P Exam - Nenita Sierra 65 y o  female MRN: [id number]    Unit/Bed#: 591-15 Encounter: [id number]      Assessment/Plan       * Acute on chronic respiratory failure with hypoxia and hypercapnia [facility])  Assessment & Plan  Patient initially presenting to ER for acute on chronic respiratory failure manifesting as shortness of breath, minimally productive cough, malaise, and body aches since yesterday  No recent leg swelling or weight gain  Patient normally uses supplemental oxygen 3 L via nasal cannula continuously  3 of 4 SIRS criteria positive (fever, tachycardia, and tachypnea)  Patient treated in ER with nebulizers and able to maintain oxygen saturation on patient's baseline supplemental oxygen, but still wheezing and short of breath  Admit for acute on chronic respiratory failure with hypoxia and hypercapnia secondary to acute exacerbation of severe COPD  Respiratory protocol initiated with orders for scheduled pulmonary treatments and IV steroids  Procalcitonin, sputum culture, and influenza PCR ordered  Empiric azithromycin ordered pending lab results  Chronic obstructive pulmonary disease with acute exacerbation [facility])  Assessment & Plan  Patient initially presenting to ER for acute on chronic respiratory failure manifesting as shortness of breath, minimally productive cough, malaise, and body aches since yesterday  No recent leg swelling or weight gain  Patient normally uses supplemental oxygen 3 L via nasal cannula continuously  3 of 4 SIRS criteria positive (fever, tachycardia, and tachypnea)  Patient treated in ER with nebulizers and able to maintain oxygen saturation on patient's baseline supplemental oxygen, but still wheezing and short of breath  Admit for acute on chronic respiratory failure with hypoxia and hypercapnia secondary to acute exacerbation of severe COPD  Respiratory protocol initiated with orders for scheduled pulmonary treatments and IV steroids    Procalcitonin, sputum culture, and influenza PCR ordered  Empiric azithromycin ordered pending lab results  Sepsis, unspecified organism [facility])  Assessment & Plan  Patient initially presenting to ER for acute on chronic respiratory failure manifesting as shortness of breath, minimally productive cough, malaise, and body aches since yesterday  No recent leg swelling or weight gain  Patient normally uses supplemental oxygen 3 L via nasal cannula continuously  3 of 4 SIRS criteria positive (fever, tachycardia, and tachypnea)  Patient treated in ER with nebulizers and able to maintain oxygen saturation on patient's baseline supplemental oxygen, but still wheezing and short of breath  Admit for acute on chronic respiratory failure with hypoxia and hypercapnia secondary to acute exacerbation of severe COPD  Respiratory protocol initiated with orders for scheduled pulmonary treatments and IV steroids  Procalcitonin, sputum culture, and influenza PCR ordered  Empiric azithromycin ordered pending lab results  Pleural effusion, left  Assessment & Plan  Chronic stable condition  No evidence of acute change in effusion on imaging  Continue plan for outpatient appointment with cardiothoracic surgery  Morbid obesity with BMI of 40 0-44 9, adult [facility])  Assessment & Plan  BMI 40 5  Consult dietitian  Encourage lifestyle modifications  Rheumatoid arthritis ([facility] )  Assessment & Plan  Chronic stable condition  No evidence of acute exacerbation  Continue outpatient medication regimen  Type 2 diabetes mellitus with hyperglycemia [facility])  Assessment & Plan  Lab Results   Component Value Date    HGBA1C 7 3 (H) 06/28/2019       Recent Labs     01/12/20  2343   POCGLU 158*       Blood Sugar Average: Last 72 hrs:  (P) 158     A1c slightly above goal (less than 7); see above  Continue outpatient oral medication regimen  Initiate insulin protocol with blood glucose AC and HS during hospitalization    Resume outpatient medication regimen and routine follow up with PCM following discharge  History of Present Illness   HPI:  Coni Heck is a 65 y o  female who presents for acute on chronic respiratory failure manifesting as shortness of breath, minimally productive cough, malaise, and body aches since yesterday  No recent leg swelling or weight gain  Patient normally uses supplemental oxygen 3 L via nasal cannula continuously  PCP: Jose Batres MD    Review of Systems   Constitutional: Positive for chills, diaphoresis, fatigue and fever  HENT: Positive for congestion and postnasal drip  Respiratory: Positive for cough, shortness of breath and wheezing  Cardiovascular: Negative for chest pain, palpitations and leg swelling  Musculoskeletal: Positive for myalgias  Neurological: Positive for weakness  All other systems reviewed and are negative  Historical Information   Past Medical History:   Diagnosis Date    Arthritis     Arthritis     Cancer ([facility] )     Candidiasis of skin     Cervical cancer ([facility] )     Chronic respiratory failure with hypoxia and hypercapnia ([facility] ) 4/20/2019    COPD (chronic obstructive pulmonary disease) ([facility] )     last assessed 11/21/2016    Current chronic use of systemic steroids     Degenerative arthritis of left knee     last assessed 1/20/2015    [facility]     Disease of thyroid gland     hypo pill given to decrease thyroid       Fibromyalgia     Fistula of knee     last assessed 9/12/2014    GERD (gastroesophageal reflux disease)     Hyperlipidemia     Hypertension     Medial meniscus tear     of left knee, last assessed 9/12/2014    Migraine     states she has a hx of HA that she calls Second Decimal but never was dx by neurologist    Obesity     Obesity     Osteoarthritis     Osteopenia     Pneumonia     last assessed 11/16/2016    Psychiatric disorder     anxiety    Rhe[facility] ([facility]     Rheumatoid arthritis ([facility]     Sepsis ([facility] )     last assessed 11/10/2016    Tick bite     last assessed 10/30/2015    Vitamin B12 deficiency     Vitamin D deficiency      Past Surgical History:   Procedure Laterality Date    CATARACT EXTRACTION Bilateral     CHOLECYSTECTOMY      EYE SURGERY      Bilateral Cataracts    HYSTERECTOMY      IR THORACENTESIS  2019    JOINT REPLACEMENT      left knee    KNEE ARTHROSCOPY      NEUROPLASTY / TRANSPOSITION MEDIAN NERVE AT CARPAL TUNNEL      TUBAL LIGATION       Social History   Social History     Substance and Sexual Activity   Alcohol Use Never    Alcohol/week: 0 0 standard drinks    Frequency: Never    Binge frequency: Never    Comment: stopped drinking alcohol     Social History     Substance and Sexual Activity   Drug Use Never     Social History     Tobacco Use   Smoking Status Former Smoker    Packs/day: 1 00    Years: 48 00    Pack years: 48 00    Types: E-Cigarettes    Last attempt to quit: 2012    Years since quittin 1   Smokeless Tobacco Never Used   Tobacco Comment    per allscripts - "current every day smoker, former smoker"     Family History: non-contributory    Meds/Allergies   all medications and allergies reviewed  Allergies   Allergen Reactions    No Active Allergies        Objective   Vitals: Blood pressure 116/79, pulse (!) 122, temperature 99 2 °F (37 3 °C), resp  rate 18, height 5' 5" (1 651 m), weight 111 kg (245 lb 9 5 oz), SpO2 92 %  No intake or output data in the 24 hours ending 20 2215    Invasive Devices     Peripheral Intravenous Line            Peripheral IV 20 Left Antecubital less than 1 day                Physical Exam   Constitutional: She is oriented to person, place, and time  No distress  Morbidly obese elderly female lying in hospital bed  No acute distress  HENT:   Head: Normocephalic and atraumatic     Right Ear: External ear normal    Left Ear: External ear normal    Nose: Nose normal    Eyes: Pupils are equal, round, and reactive to light  Conjunctivae and EOM are normal  Right eye exhibits no discharge  Left eye exhibits no discharge  No scleral icterus  Neck: No JVD present  No tracheal deviation present  No thyromegaly present  Cardiovascular: Normal rate, regular rhythm, normal heart sounds and intact distal pulses  Exam reveals no gallop and no friction rub  No murmur heard  Pulmonary/Chest: Effort normal  No stridor  No respiratory distress  She has decreased breath sounds in the left lower field  She has wheezes  She has no rhonchi  She has no rales  She exhibits no tenderness  Patient conversing while wearing nasal cannula at 3 LPM   Diffuse mild wheezing  Decreased breath sounds at left base  Abdominal: Soft  Bowel sounds are normal  She exhibits no distension and no mass  There is no tenderness  There is no rebound and no guarding  Musculoskeletal: She exhibits no edema, tenderness or deformity  Lymphadenopathy:     She has no cervical adenopathy  Neurological: She is alert and oriented to person, place, and time  Skin: Skin is warm and dry  Capillary refill takes less than 2 seconds  No rash noted  She is not diaphoretic  No erythema  No pallor  Psychiatric: She has a normal mood and affect  Her behavior is normal  Judgment and thought content normal    Nursing note and vitals reviewed  Lab Results: I have personally reviewed pertinent reports  Imaging: I have personally reviewed pertinent reports  and I have personally reviewed pertinent films in PACS  EKG, Pathology, and Other Studies: I have personally reviewed pertinent reports  Code Status: Level 1 - Full Code  Advance Directive and Living Will:      Power of :    POLST:      Counseling / Coordination of Care  Total floor / unit time spent today 40 minutes  Greater than 50% of total time was spent with the patient and / or family counseling and / or coordination of care    A description of the counseling / coordination of care: Greater than 20 minutes spent with this patient discussing diagnosis, prognosis, and plan of care

## 2020-01-13 NOTE — ASSESSMENT & PLAN NOTE
Likely on basis of current illness  Appears improved  Continue steroids, nebs, and antibiotics with Azithromycin day #2

## 2020-01-13 NOTE — PLAN OF CARE
Problem: Potential for Falls  Goal: Patient will remain free of falls  Description  INTERVENTIONS:  - Assess patient frequently for physical needs  -  Identify cognitive and physical deficits and behaviors that affect risk of falls    -  [place] fall precautions as indicated by assessment   - Educate patient/family on patient safety including physical limitations  - Instruct patient to call for assistance with activity based on assessment  - Modify environment to reduce risk of injury  - Consider OT/PT consult to assist with strengthening/mobility  Outcome: Progressing     Problem: PAIN - ADULT  Goal: Verbalizes/displays adequate comfort level or baseline comfort level  Description  Interventions:  - Encourage patient to monitor pain and request assistance  - Assess pain using appropriate pain scale  - Administer analgesics based on type and severity of pain and evaluate response  - Implement non-pharmacological measures as appropriate and evaluate response  - Consider cultural and social influences on pain and pain management  - Notify physician/advanced practitioner if interventions unsuccessful or patient reports new pain  Outcome: Progressing     Problem: INFECTION - ADULT  Goal: Absence or prevention of progression during hospitalization  Description  INTERVENTIONS:  - Assess and monitor for signs and symptoms of infection  - Monitor lab/diagnostic results  - Monitor all insertion sites, i e  indwelling lines, tubes, and drains  - Monitor endotracheal if appropriate and nasal secretions for changes in amount and color  - [place] appropriate cooling/warming therapies per order  - Administer medications as ordered  - Instruct and encourage patient and family to use good hand hygiene technique  - Identify and instruct in appropriate isolation precautions for identified infection/condition  Outcome: Progressing     Problem: SAFETY ADULT  Goal: Maintain or return to baseline ADL function  Description  INTERVENTIONS:  -  Assess patient's ability to carry out ADLs; assess patient's baseline for ADL function and identify physical deficits which impact ability to perform ADLs (bathing, care of mouth/teeth, toileting, grooming, dressing, etc )  - Assess/evaluate cause of self-care deficits   - Assess range of motion  - Assess patient's mobility; develop plan if impaired  - Assess patient's need for assistive devices and provide as appropriate  - Encourage maximum independence but intervene and supervise when necessary  - Involve family in performance of ADLs  - Assess for home care needs following discharge   - Consider OT consult to assist with ADL evaluation and planning for discharge  - Provide patient education as appropriate  Outcome: Progressing  Goal: Maintain or return mobility status to optimal level  Description  INTERVENTIONS:  - Assess patient's baseline mobility status (ambulation, transfers, stairs, etc )    - Identify cognitive and physical deficits and behaviors that affect mobility  - Identify mobility aids required to assist with transfers and/or ambulation (gait belt, sit-to-stand, lift, walker, cane, etc )  - [place] fall precautions as indicated by assessment  - Record patient progress and toleration of activity level on Mobility SBAR; progress patient to next Phase/Stage  - Instruct patient to call for assistance with activity based on assessment  - Consider rehabilitation consult to assist with strengthening/weightbearing, etc   Outcome: Progressing     Problem: DISCHARGE PLANNING  Goal: Discharge to home or other facility with appropriate resources  Description  INTERVENTIONS:  - Identify barriers to discharge w/patient and caregiver  - Arrange for needed discharge resources and transportation as appropriate  - Identify discharge learning needs (meds, wound care, etc )  - Arrange for interpretive services to assist at discharge as needed  - Refer to Case Management Department for coordinating discharge planning if the patient needs post-hospital services based on physician/advanced practitioner order or complex needs related to functional status, cognitive ability, or social support system  Outcome: Progressing     Problem: Knowledge Deficit  Goal: Patient/family/caregiver demonstrates understanding of disease process, treatment plan, medications, and discharge instructions  Description  Complete learning assessment and assess knowledge base    Interventions:  - Provide teaching at level of understanding  - Provide teaching via preferred learning methods  Outcome: Progressing     Problem: RESPIRATORY - ADULT  Goal: Achieves optimal ventilation and oxygenation  Description  INTERVENTIONS:  - Assess for changes in respiratory status  - Assess for changes in mentation and behavior  - Position to facilitate oxygenation and minimize respiratory effort  - Oxygen administered by appropriate delivery if ordered  - Initiate smoking cessation education as indicated  - Encourage broncho-pulmonary hygiene including cough, deep breathe, Incentive Spirometry  - Assess the need for suctioning and aspirate as needed  - Assess and instruct to report SOB or any respiratory difficulty  - Respiratory Therapy support as indicated  Outcome: Progressing

## 2020-01-13 NOTE — UTILIZATION REVIEW
Initial Clinical Review    Admission: Date/Time/Statement: Inpatient Admission Orders (From admission, onward)     Ordered        01/12/20 2046  Inpatient Admission  Once                   Orders Placed This Encounter   Procedures    Inpatient Admission     Standing Status:   Standing     Number of Occurrences:   1     Order Specific Question:   Admitting Physician     Answer:   Vicky Hoff [[id number]]     Order Specific Question:   Level of Care     Answer:   Med Surg [[id number]]     Order Specific Question:   Estimated length of stay     Answer:   More than 2 Midnights     Order Specific Question:   Certification     Answer:   I certify that inpatient services are medically necessary for this patient for a duration of greater than two midnights  See H&P and MD Progress Notes for additional information about the patient's course of treatment  ED Arrival Information     Expected Arrival Acuity Means of Arrival Escorted By Service Admission Type    - 1/12/2020 17:13 Urgent Wheelchair Family Member General Medicine Urgent    Arrival Complaint    flu like symptoms        Chief Complaint   Patient presents with    Flu Symptoms     Pt presents to ER from home for reports of weakness, body aches and cough/congestion x24 hours  Patient reports exposure to ill family member diagnosed with flu yesterday  Assessment/Plan:   66 yo female to ER from home c/o sob with sudden onset fever, chills & body aches with non-radiating chest pain associated with productive cough  Hx O2 dependent COPD and chronic left-sided pleural effusion & thoracentesis, dm, hypothyroid, gerd, hld, htn, migraine, oa, ra, vit b12 & d deficiencies  Presents febrile & tachycardic with chills, sob & wheezing, L sided abd pain, increased with coughing  Admission work-up showing L basilar effusion  Admitted to inpatient status for acute on chronic respiratory failure with hypoxia & hypercapnia, started on iv steroids, nebs atc & O2 @ 3ltr nc    ED Triage Vitals   Temperature Pulse Respirations Blood Pressure SpO2   01/12/20 1748 01/12/20 1746 01/12/20 1746 01/12/20 1746 01/12/20 1746   100 5 °F (38 1 °C) 104 20 146/65 100 %      Temp Source Heart Rate Source Patient Position - Orthostatic VS BP Location FiO2 (%)   01/12/20 1748 01/12/20 1746 01/12/20 1746 01/12/20 1746 --   Temporal Monitor Lying Left arm       Pain Score       01/12/20 1746       9        Wt Readings from Last 1 Encounters:   01/13/20 110 kg (243 lb 6 2 oz)     Additional Vital Signs:   01/13/20 06:32:30  99 6 °F (37 6 °C)  97  17  127/59  82  94 %  Nasal cannula  Lying   01/13/20 0054  --  --  --  --  --  --  Nasal cannula  --   01/12/20 2214  --  --  --  --  --  --  Nasal cannula  --   01/12/20 21:14:38  99 2 °F (37 3 °C)  122Abnormal   18  116/79  91  92 %  None (Room air)  --   01/12/20 1930  --  118Abnormal   21  121/69  --  97 %  --  --   01/12/20 1900  --  119Abnormal   22  110/53  --  94 %  --  --   Pertinent Labs/Diagnostic Test Results:   Results from last 7 days   Lab Units 01/13/20  0425 01/12/20  2338 01/12/20  1820   WBC Thousand/uL 6 95  --  7 96   HEMOGLOBIN g/dL 11 2*  --  11 9   HEMATOCRIT % 35 0  --  37 3   PLATELETS Thousands/uL 259 227 276   NEUTROS ABS Thousands/µL 4 63  --  5 76     Results from last 7 days   Lab Units 01/13/20  0425 01/12/20  1820   SODIUM mmol/L 135* 137   POTASSIUM mmol/L 3 6 4 1   CHLORIDE mmol/L 99* 100   CO2 mmol/L 30 34*   ANION GAP mmol/L 6 3*   BUN mg/dL 15 18   CREATININE mg/dL 0 68 0 74   EGFR ml/min/1 73sq m 88 81   CALCIUM mg/dL 9 0 9 3   MAGNESIUM mg/dL 1 9 1 8   PHOSPHORUS mg/dL 2 6  --      Results from last 7 days   Lab Units 01/13/20  0425   AST U/L 12   ALT U/L 23   ALK PHOS U/L 69   TOTAL PROTEIN g/dL 7 2   ALBUMIN g/dL 3 2*   TOTAL BILIRUBIN mg/dL 0 40     Results from last 7 days   Lab Units 01/13/20  0703 01/12/20  2343   POC GLUCOSE mg/dl 151* 158*     Results from last 7 days   Lab Units 01/13/20  0425 01/12/20  1820   GLUCOSE RANDOM mg/dL 153* 151*     Results from last 7 days   Lab Units 01/12/20  1820   HEMOGLOBIN A1C % 6 7*   EAG mg/dl 146     Results from last 7 days   Lab Units 01/12/20  1820   TROPONIN I ng/mL <0 02     Results from last 7 days   Lab Units 01/13/20  0425   PROTIME seconds 13 4   INR  1 02     Results from last 7 days   Lab Units 01/12/20  2323   PROCALCITONIN ng/ml <0 05     Results from last 7 days   Lab Units 01/12/20  1820   NT-PRO BNP pg/mL 26     Results from last 7 days   Lab Units 01/12/20  1749   INFLUENZA A PCR  None Detected   RSV PCR  None Detected     Results from last 7 days   Lab Units 01/12/20  2335 01/12/20  2325   BLOOD CULTURE  Received in Microbiology Lab  Culture in Progress  Received in Microbiology Lab  Culture in Progress  1/12  cxr=No acute cardiopulmonary disease  Unchanged moderate-sized loculated left basilar effusion  ekg =Rhythm: sinus tachycardia    Ectopy:     Ectopy: none    QRS:     QRS axis:  Normal  Conduction:     Conduction: normal    ST segments:     ST segments:  Normal  T waves:     T waves: normal      ED Treatment:   Medication Administration from 01/12/2020 1713 to 01/12/2020 2110       Date/Time Order Dose Route     01/12/2020 1820 ipratropium-albuterol (DUO-NEB) 0 5-2 5 mg/3 mL inhalation solution 3 mL 3 mL Nebulization     01/12/2020 1820 methylPREDNISolone sodium succinate (Solu-MEDROL) injection 60 mg 60 mg Intravenous        Past Medical History:   Diagnosis Date    Arthritis     Arthritis     [facility]     Candidiasis of skin     Cervical cancer [facility]     Chronic respiratory failure with hypoxia and hypercapnia ([facility] ) 4/20/2019    COPD (chronic obstructive pulmonary disease) ([facility] )     last assessed 11/21/2016    Current chronic use of systemic steroids     Degenerative arthritis of left knee     last assessed 1/20/2015    Diabetes mellitus ([facility]     Disease of thyroid gland     hypo pill given to decrease thyroid       Fibromyalgia     Fistula of knee     last assessed 9/12/2014    GERD (gastroesophageal reflux disease)     Hyperlipidemia     Hypertension     Medial meniscus tear     of left knee, last assessed 9/12/2014    Migraine     states she has a hx of HA that she calls [employer] but never was dx by neurologist    Obesity     Obesity     Osteoarthritis     Osteopenia     Pneumonia     last assessed 11/16/2016    Psychiatric disorder     anxiety    Rheumatoid arthritis [facility]     Rheumatoid arthritis ([facility]     Sepsis ([facility] )     last assessed 11/10/2016    Tick bite     last assessed 10/30/2015    Vitamin B12 deficiency     Vitamin D deficiency      Present on Admission:   Acute on chronic respiratory failure with hypoxia and hypercapnia (HCC)   Type 2 diabetes mellitus with hyperglycemia (HCC)   Rheumatoid arthritis (HCC)   Pleural effusion, left   Chronic obstructive pulmonary disease with acute exacerbation (HCC)   Sepsis, unspecified organism ([facility] )  Admitting Diagnosis: Pleural effusion on left [J90]  Flu-like symptoms [R68 89]  Age/Sex: 65 y o  female  Admission Orders:  Med surg  accuchecks with coverage scale  Pt/ot eval & tx  o2 to keep sats>92%  venodynes  Scheduled Medications:  atorvastatin 20 mg Oral Daily   azithromycin 500 mg Oral Q24H   vitamin B-12 1,000 mcg Oral Daily   DULoxetine 20 mg Oral Daily   enoxaparin 40 mg Subcutaneous BID   ergocalciferol 50,000 Units Oral Weekly   fluticasone-vilanterol 1 puff Inhalation Daily   folic acid 8,515 mcg Oral Daily   furosemide 40 mg Oral Daily   insulin glargine 30 Units Subcutaneous HS   insulin lispro 1-6 Units Subcutaneous TID AC   insulin lispro 1-6 Units Subcutaneous HS   ipratropium 0 5 mg Nebulization TID   levalbuterol 1 25 mg Nebulization TID   levothyroxine 125 mcg Oral Early Morning   lidocaine 1 patch Topical Daily   lisinopril 40 mg Oral Daily   magnesium oxide 400 mg Oral Once   methotrexate 20 mg Oral Weekly   methylPREDNISolone sodium succinate 40 mg Intravenous Q6H [address]   pantoprazole 40 mg Oral Early Morning   polyethylene glycol 17 g Oral HS   potassium chloride 40 mEq Oral Once   sitaGLIPtin 100 mg Oral Daily   tiotropium 18 mcg Inhalation HS   Tofacitinib Citrate 1 tablet Oral Daily   traZODone 50 mg Oral HS     Continuous IV Infusions:  sodium chloride 100 mL/hr Intravenous Continuous     PRN Meds:  acetaminophen 650 mg Oral Q6H PRN   albuterol 2 5 mg Nebulization Q4H PRN   aluminum-magnesium hydroxide-simethicone 30 mL Oral Q4H PRN   docusate sodium 100 mg Oral BID PRN   ondansetron 4 mg Intravenous Q4H PRN   sodium chloride (PF) 3 mL Intravenous PRN   triamcinolone  Topical BID PRN     Network Utilization Review Department  [email or web address]  ATTENTION: Please call with any questions or concerns to [phone] and carefully listen to the prompts so that you are directed to the right person  All voicemails are confidential   Amy Tidwell all requests for admission clinical reviews, approved or denied determinations and any other requests to dedicated fax number below belonging to the campus where the patient is receiving treatment   List of dedicated fax numbers for the Facilities:  [address] DENIALS (Administrative/Medical Necessity) [phone]   [address] (Maternity/NICU/Pediatrics) [phone] 5400 [facility] [phone]   [facility] [phone]   Harman Grit [phone]   [address]  [phone]   1205 [facility] 1525 [facility] [phone]   [facility]  [phone]   2205 [facility]  [address] [facility]ly named [facility] 1000 [address] [facility] [phone]

## 2020-01-13 NOTE — OCCUPATIONAL THERAPY NOTE
[address]  Evaluation     Patient Name: Margret BACA Date: 1/13/2020  Problem List  Principal Problem:    Acute on chronic respiratory failure with hypoxia and hypercapnia ([facility] )  Active Problems:    Sepsis, unspecified organism ([facility] )    Type 2 diabetes mellitus with hyperglycemia (HCC)    Rheumatoid arthritis (HCC)    Chronic obstructive pulmonary disease with acute exacerbation (HCC)    Pleural effusion, left    Past Medical History  Past Medical History:   Diagnosis Date    Arthritis     Arthritis     Cancer ([facility] )     Candidiasis of skin     Cervical cancer ([facility] )     Chronic respiratory failure with hypoxia and hypercapnia ([facility] ) 4/20/2019    COPD (chronic obstructive pulmonary disease) ([facility] )     last assessed 11/21/2016    Current chronic use of systemic steroids     Degenerative arthritis of left knee     last assessed 1/20/2015    Diabetes mellitus ([facility] )     Disease of thyroid gland     hypo pill given to decrease thyroid       Fibromyalgia     Fistula of knee     last assessed 9/12/2014    GERD (gastroesophageal reflux disease)     Hyperlipidemia     Hypertension     Medial meniscus tear     of left knee, last assessed 9/12/2014    Migraine     states she has a hx of HA that she calls [employer] but never was dx by neurologist    Obesity     Obesity     Osteoarthritis     Osteopenia     Pneumonia     last assessed 11/16/2016    Psychiatric disorder     anxiety    Rheumatoid arthritis ([facility]     Rheumatoid arthritis ([facility] )     [facility]eps[facility] )     last assessed 11/10/2016    Tick bite     last assessed 10/30/2015    Vitamin B12 deficiency     Vitamin D deficiency      Past Surgical History  Past Surgical History:   Procedure Laterality Date    CATARACT EXTRACTION Bilateral     CHOLECYSTECTOMY      EYE SURGERY      Bilateral Cataracts    HYSTERECTOMY      IR THORACENTESIS  5/31/2019    JOINT REPLACEMENT      left knee    KNEE ARTHROSCOPY      NEUROPLASTY / TRANSPOSITION MEDIAN NERVE AT CARPAL TUNNEL      TUBAL LIGATION             01/13/20 0850   Note Type   Note type Eval/Treat   Restrictions/Precautions   Weight Bearing Precautions Per Order No   Other Precautions O2;Fall Risk;Pain; Chair Alarm   Pain Assessment   Pain Assessment 0-10   Pain Score Worst Possible Pain   Pain Type Chronic pain   Pain Location Back   Pain Orientation Lower   Home Living   Type of [address] One level;Stairs to enter with rails  (2nd floor apt; [address] )   Bathroom Shower/Tub Tub/shower unit   Bathroom Toilet Standard   Bathroom Equipment Shower chair   [facility]; Hospital bed  (Home O2, CPAP)   Prior Function   Level of [place] Independent with ADLs and functional mobility   Lives With Alone   Receives Help From Family   ADL Assistance Independent   IADLs Needs assistance  (Pt states "I'm not getting to that right now")   Falls in the last 6 months 0   Vocational Retired   Comments PTA pt states I with ADLs, shares that she has not been completing IADLs and that family may rarely stop in and bring meals, can drive, but hasn't driven in 2 mo  , -falls    Lifestyle   Autonomy PTA pt states I with ADLs, shares that she has not been completing IADLs and that family may rarely stop in and bring meals, can drive, but hasn't driven in 2 mo  , -falls    Reciprocal Relationships family   Intrinsic Gratification word games   Psychosocial   Psychosocial (WDL) WDL   Subjective   Subjective "I need my hairbrush"   ADL   Where Assessed Edge of bed   Eating Assistance 5  Supervision/Setup   Grooming Assistance 5  Supervision/Setup   Grooming Deficit Setup;Brushing hair   UB Bathing Assistance 5  Supervision/Setup   LB Bathing Assistance 4  Minimal Assistance   UB Dressing Assistance 5  Supervision/Setup   LB Dressing Assistance 4  Minimal Assistance   LB Dressing Deficit Don/doff R sock; Don/doff L sock; Increased time to complete;Supervision/safety;Steadying;Setup;Verbal cueing    during functional tasks pt's O2 levels ranging 89-92% on 3L O2   Toileting Assistance  4  Minimal Assistance   Bed Mobility   Additional Comments sitting EOB upon arrival, seated in bedside chair at the end of the session, all needs met, call bell within reach   Transfers   Sit to Stand 4  Minimal assistance   Additional items Assist x 1; Increased time required;Verbal cues   Stand to Sit 4  Minimal assistance   Additional items Assist x 1; Increased time required;Verbal cues   Functional Mobility   Functional Mobility 5  Supervision   Additional Comments ~10ft    Additional items Rolling walker   Balance   Static Sitting Good   Dynamic Sitting Fair +   Static Standing Fair +   Dynamic Standing Fair   Ambulatory Fair   Activity Tolerance   Activity Tolerance Patient limited by fatigue   RUE Assessment   RUE Assessment WFL  (grossly 4/5 strength )   LUE Assessment   LUE Assessment WFL  (grossly 4/5 strength )   Hand Function   Gross Motor Coordination Functional   Fine Motor Coordination Functional  (arthritis in fingers, limits ability to knit)   Cognition   Overall Cognitive Status [facility]   Arousal/Participation Alert; Cooperative   Attention Within functional limits   Orientation Level Oriented X4   Memory Within functional limits   Following Commands Follows all commands and directions without difficulty   Assessment   Limitation Decreased ADL status; Decreased UE strength;Decreased Safe judgement during ADL;Decreased endurance;Decreased self-care trans;Decreased high-level ADLs   Prognosis Good   Assessment Patient is a 65 y o  y/o female admitted to [facility] on 1/12/2020 due to Acute on chronic respiratory failure with hypoxia and hypercapnia ([address] )  Comorbidities affecting pt's physical performance at time of assessment include COPD, DM, arthritis, obesity and sepsis  Patient has active OT orders and activity orders for Up with assistance   Prior to admission pt was living alone in a 2nd floor apartment with [address]  PTA pt states I with ADLs, shares that she has not been completing IADLs and that family may rarely stop in and bring meals, can drive, but hasn't driven in 2 mo  , -falls, uses RW for mobility  The following deficits affected patient's occupational performance weakness, decreased functional strength, decreased functional balance, decreased activity tolerance, decreased safety awareness and increased pain  Patient would benefit from skilled OT services while in the hospital to address above deficits  Occupational performance areas to be addressed include ADL retraining, bed mobility, functional transfer training, endurance training, patient/family training, equipment evaluation/education, compensatory technique education, energy conservation, activity engagement and activity tolerance in order to maximize patient's level of function  From OT standpoint recommend Short term rehab vs Home OT/PT upon D/C pending progess in therapy  OT continue to follow pt 3-5x/week to address the following goals  Goals   Short Term Goal #1 Patient will tolerate therapeutic activities for greater than 15 min, in order to increase tolerance for functional activities  Short Term Goal #2 Patient will increase BUE strength by 1MM grade via AROM/AAROM/PROM exercises to increase independence in ADLs and transfers   Long Term Goal #1 Patient will complete LB ADLs w/ mod I using AE and AD as needed   Long Term Goal #2 Patient will complete toileting w/ mod I w/ G hygiene/thoroughness   Long Term Goal Patient will complete functional mobility during ADL/IADL/leisure tasks with Mod I   Plan   Treatment Interventions ADL retraining;Functional transfer training;UE strengthening/ROM; Patient/family training;Equipment evaluation/education; Compensatory technique education; Energy conservation; Activityengagement   Goal Expiration Date 01/23/20   OT Treatment Day 0   OT Frequency 3-5x/wk Recommendation   OT Discharge Recommendation Short Term Rehab  (vs Home Health pending progress)   OT - OK to Discharge   (when medically cleared)   Barthel Index   Feeding 10   Bathing 0   Grooming Score 5   Dressing Score 5   Bladder Score 10   Bowels Score 10   Toilet Use Score 5   Transfers (Bed/Chair) Score 10   Mobility (Level Surface) Score 0   Stairs Score 0   Barthel Index Score 55      Merle Mo, OTR/L

## 2020-01-13 NOTE — RESPIRATORY THERAPY NOTE
RT Protocol Note  Jaymie Quintero 65 y o  female MRN: [id number]  Unit/Bed#: 969-94 Encounter: [id number]    Assessment    Active Problems:    * No active hospital problems  *      Home Pulmonary Medications:    Home Devices/Therapy: Other (Comment)  Duoneb Q6 PRN    Past Medical History:   Diagnosis Date    Arthritis     Arthritis     [facility]     Candidiasis of skin     Cervical cancer ([place] [address] )     Chronic respiratory failure with hypoxia and hypercapnia (HCC) 4/20/2019    COPD (chronic obstructive pulmonary disease) ([facility] )     last assessed 11/21/2016    Current chronic use of systemic steroids     Degenerative arthritis of left knee     last assessed 1/20/2015    Diabetes mellitus ([place] [address] )     Disease of thyroid gland     hypo pill given to decrease thyroid   Fibromyalgia     Fistula of knee     last assessed 9/12/2014    GERD (gastroesophageal reflux disease)     Hyperlipidemia     Hypertension     Medial meniscus tear     of left knee, last assessed 9/12/2014    Migraine     states she has a hx of HA that she calls [employer] but never was dx by neurologist    Obesity     Obesity     Osteoarthritis     Osteopenia     Pneumonia     last assessed 11/16/2016    Psychiatric disorder     anxiety    Rheumatoid arthritis ([place] [address] )     Rheumatoid arthritis ([place] [address] )     Sepsis ([place] [address] )     last assessed 11/10/2016    Tick bite     last assessed 10/30/2015    Vitamin B12 deficiency     Vitamin D deficiency      Social History     Socioeconomic History    Marital status:       Spouse name: None    Number of children: None    Years of education: None    Highest education level: None   Occupational History    None   Social Needs    Financial resource strain: None    Food insecurity:     Worry: None     Inability: None    Transportation needs:     Medical: None     Non-medical: None   Tobacco Use    Smoking status: Former Smoker     Packs/day: 1 00     Years: 48 00     Pack years: 48 00 Types: E-Cigarettes     Last attempt to quit: 2012     Years since quittin 1    Smokeless tobacco: Never Used    Tobacco comment: per allscripts - "current every day smoker, former smoker"   Substance and Sexual Activity    Alcohol use: Never     Alcohol/week: 0 0 standard drinks     Frequency: Never     Binge frequency: Never     Comment: stopped drinking alcohol    Drug use: Never    Sexual activity: Never   Lifestyle    Physical activity:     Days per week: None     Minutes per session: None    Stress: None   Relationships    Social connections:     Talks on phone: None     Gets together: None     Attends [employer] service: None     Active member of club or organization: None     Attends meetings of clubs or organizations: None     Relationship status: None    Intimate partner violence:     Fear of current or ex partner: None     Emotionally abused: None     Physically abused: None     Forced sexual activity: None   Other Topics Concern    None   Social History Narrative    No living will       Subjective         Objective    Physical Exam:   Assessment Type: Assess only  General Appearance: Alert, Awake  Respiratory Pattern: Normal  Chest Assessment: Chest expansion symmetrical  Bilateral Breath Sounds: Diminished, Expiratory wheezes  Cough: Non-productive    Vitals:  Blood pressure 116/79, pulse (!) 122, temperature 99 2 °F (37 3 °C), resp  rate 18, height 5' 5" (1 651 m), weight 111 kg (245 lb 9 5 oz), SpO2 92 %  Imaging and other studies: I have personally reviewed pertinent reports  Plan    Respiratory Plan: Home Bronchodilator Patient pathway, Mild Distress pathway      Will make pts   Treatments 1 25 mg Xopenex/0 5mg Atrovent TID and Albuterol 2 5mg Q4 PRN

## 2020-01-13 NOTE — ED NOTES
ER provider waiting to hear back from [facility] to make admission/transfer decision r/t chronic pleural effusion       Prisca Gotti RN  01/12/20 2022

## 2020-01-13 NOTE — PROGRESS NOTES
Patient's daughter brought her Patito Deepak XR 11 mg due to it being non-formulary  Medication identified and placed in bag to be sent to pharmacy

## 2020-01-13 NOTE — PLAN OF CARE
Problem: OCCUPATIONAL THERAPY ADULT  Goal: Performs self-care activities at highest level of function for planned discharge setting  See evaluation for individualized goals  Description  Treatment Interventions: ADL retraining, Functional transfer training, UE strengthening/ROM, Patient/family training, Equipment evaluation/education, Compensatory technique education, Energy conservation, Activityengagement          See flowsheet documentation for full assessment, interventions and recommendations  Note:   Limitation: Decreased ADL status, Decreased UE strength, Decreased Safe judgement during ADL, Decreased endurance, Decreased self-care trans, Decreased high-level ADLs  Prognosis: Good  Assessment: Patient is a 65 y o  y/o female admitted to [facility] on 1/12/2020 due to Acute on chronic respiratory failure with hypoxia and hypercapnia ([address] )  Comorbidities affecting pt's physical performance at time of assessment include COPD, DM, arthritis, obesity and sepsis  Patient has active OT orders and activity orders for Up with assistance  Prior to admission pt was living alone in a 2nd floor apartment with 15 ANNIKA  PTA pt states I with ADLs, shares that she has not been completing IADLs and that family may rarely stop in and bring meals, can drive, but hasn't driven in 2 mo  , -falls, uses RW for mobility  The following deficits affected patient's occupational performance weakness, decreased functional strength, decreased functional balance, decreased activity tolerance, decreased safety awareness and increased pain  Patient would benefit from skilled OT services while in the hospital to address above deficits   Occupational performance areas to be addressed include ADL retraining, bed mobility, functional transfer training, endurance training, patient/family training, equipment evaluation/education, compensatory technique education, energy conservation, activity engagement and activity tolerance in order to maximize patient's level of function  From OT standpoint recommend Short term rehab vs Home OT/PT upon D/C pending progess in therapy  OT continue to follow pt 3-5x/week to address the following goals       OT Discharge Recommendation: [facility] pending progress)  OT - OK to Discharge: (when medically cleared)

## 2020-01-13 NOTE — ASSESSMENT & PLAN NOTE
Initial presentation with Fever, Tachycardia, and Tachypnea, with suspected pulmonary source  - CXR unchanged  - Influenza, RSV checked and negative  - Blood and Sputum cultures ordered and pending   - Procalcitonin also checked and negative  Lactic Acid not initially obtained  Blood pressure, HR, and RR all improved  Patient reports symptomatic improvement as well  Potential for non influenza viral infection - plan on continuation of empiric Abx therapy initiated by admitting provider for COPD exacerbation, with close monitoring of blood and sputum cultures, vitals, and symptoms over the next 24 hours  Mrs Charlotte Arciniega does have a history of RA and is on therapy with Methotrexate and Tofacitinib - consider broadening coverage if clinically worsens

## 2020-01-13 NOTE — PHYSICAL THERAPY NOTE
Physical Therapy Evaluation    Patient Name: Nena Winslow    OVVPC'B Date: 1/13/2020     Problem List  Principal Problem:    Acute on chronic respiratory failure with hypoxia and hypercapnia ([facility] )  Active Problems:    Sepsis, unspecified organism ([facility] )    Type 2 diabetes mellitus with hyperglycemia (HCC)    Rheumatoid arthritis (HCC)    Chronic obstructive pulmonary disease with acute exacerbation (HCC)    Pleural effusion, left       Past Medical History  Past Medical History:   Diagnosis Date    Arthritis     Arthritis     Cancer ([facility] )     Candidiasis of skin     Cervical cancer ([facility] )     Chronic respiratory failure with hypoxia and hypercapnia ([facility] ) 4/20/2019    COPD (chronic obstructive pulmonary disease) ([facility] )     last assessed 11/21/2016    Current chronic use of systemic steroids     Degenerative arthritis of left knee     last assessed 1/20/2015    Diabetes mellitus ([facility] )     Disease of thyroid gland     hypo pill given to decrease thyroid       Fibromyalgia     Fistula of knee     last assessed 9/12/2014    GERD (gastroesophageal reflux disease)     Hyperlipidemia     Hypertension     Medial meniscus tear     of left knee, last assessed 9/12/2014    Migraine     states she has a hx of HA that she calls [employer] but never was dx by neurologist    Obesity     Obesity     Osteoarthritis     Osteopenia     Pneumonia     last assessed 11/16/2016    Psychiatric disorder     anxiety    Rheumatoid arthritis ([facility] )     Rheumatoid arthritis ([facility] )     Sepsis ([facility] )     last assessed 11/10/2016    Tick bite     last assessed 10/30/2015    Vitamin B12 deficiency     Vitamin D deficiency         Past Surgical History  Past Surgical History:   Procedure Laterality Date    CATARACT EXTRACTION Bilateral     CHOLECYSTECTOMY      EYE SURGERY      Bilateral Cataracts    HYSTERECTOMY      IR THORACENTESIS  5/31/2019    JOINT REPLACEMENT left knee    KNEE ARTHROSCOPY      NEUROPLASTY / TRANSPOSITION MEDIAN NERVE AT CARPAL TUNNEL      TUBAL LIGATION             01/13/20 0851   Note Type   Note type Eval/Treat   Pain Assessment   Pain Assessment 0-10   Pain Score Worst Possible Pain   Pain Type Chronic pain   Pain Location Back   Pain Orientation Lower   Home Living   Type of Home Apartment   Home Layout One level;Stairs to enter with rails; Able to live on main level with bedroom/bathroom; Performs ADLs on one level   Bathroom Shower/Tub Tub/shower unit   Bathroom Toilet Standard   Bathroom Equipment Shower chair   [facility]; Hospital bed; Other (Comment)  (home O2, CPAP, nebulizer)   Prior Function   Level of [place] Independent with ADLs and functional mobility; Needs assistance with IADLs   Lives With Alone   Receives Help From Family   ADL Assistance Independent   IADLs Needs assistance   Falls in the last 6 months 0   Vocational Retired   Comments pt states she can drive, but hasn't driven in the past two months; family assists with IADLs but pt states they do not check on her often   Restrictions/Precautions   Weight Bearing Precautions Per Order No   Other Precautions O2;Fall Risk;Pain; Chair Alarm   General   Family/Caregiver Present No   Cognition   Overall Cognitive Status WFL   Attention Within functional limits   Orientation Level Oriented X4   Following Commands Follows all commands and directions without difficulty   RLE Assessment   RLE Assessment X  (unable to tolerate MMT; at least 4-/5 functionally)   LLE Assessment   LLE Assessment X  (unable to tolerate MMT; at least 4-/5 functionally)   Bed Mobility   Additional Comments pt seated EOB at start of session   Transfers   Sit to Stand 4  Minimal assistance   Additional items Assist x 1; Increased time required;Verbal cues   Stand to Sit 4  Minimal assistance   Additional items Assist x 1; Increased time required;Verbal cues Ambulation/Elevation   Gait pattern Short stride; Excessively slow; Foward flexed;Decreased foot clearance; Antalgic   Gait Assistance 5  Supervision   Additional items Verbal cues   Assistive Device Rolling walker   Distance 10'   Balance   Static Sitting Good   Dynamic Sitting Fair +   Static Standing Fair +   Dynamic Standing Fair   Ambulatory Fair  (with RW)   Endurance Deficit   Endurance Deficit Yes   Endurance Deficit Description pt unable to ambulate more than 10' d/t deconditioning and pain   Activity Tolerance   Activity Tolerance Patient limited by fatigue;Patient limited by pain   Assessment   Prognosis Good   Problem List Decreased strength;Decreased endurance; Impaired balance;Decreased mobility;Pain   Assessment Patient is a 65 y o  female evaluated by Physical Therapy s/p admit to [address] [place] [address],[address] on 1/12/2020 with admitting diagnosis of: Pleural effusion on left, Flu-like symptoms, and principal problem of: Acute on chronic respiratory failure with hypoxia and hypercapnia  PT was consulted to assess patient's functional mobility and discharge needs  Ordered are PT Evaluation and treatment with activity level of: up with assistance  Comorbidities affecting patient's physical performance at time of assessment include: respiratory failure, sepsis, DM, RA, COPD, L PE, hypothyroidism, HTN, GERD, obesity  Personal factors affecting the patient at time of IE include: ambulating with assistive device, step(s) to enter home, inability/difficulty performing IADLs and inability/difficulty performing ADLs  Please locate objective findings from PT assessment regarding body systems outlined above  Upon evaluation, pt able to perform all functional mobility at SUP-Min(A) level with RW and verbal cuing  Pt reporting 10/10 chronic low back pain during session and fatigue, but was in agreement with participation and sitting OOB in a recliner   Pt reports being unable to ambulate more than 10' this session d/t fatigue but states she would like to walk longer distances in the future  SpO2 decreasing to 89% with exertion and HR maintained in  range  Pt will benefit from continued PT intervention during LOS to address current deficits, increase LOF, and facilitate safe d/c to next level of care when medically appropriate  D/c recommendation at this time is short term skilled PT vs home PT pending stair management  Goals   Patient Goals to go home   Short Term Goal #1 Pt will participate in B LE strengthening exercises to facilitate improved functional mobility   Short Term Goal #2 Pt will perform all bed mobility and functional transfers mod(I) with good safety awareness  LTG Expiration Date 01/27/20   Long Term Goal #1 Pt will ambulate 100' with RW and SUP while maintaining WFL SpO2 and HR  Long Term Goal #2 Pt will ascend/descend 15 stairs with B HR and SUP while maintaining WFL dynamic balance  Plan   Treatment/Interventions Functional transfer training;LE strengthening/ROM; Elevations; Therapeutic exercise; Endurance training;Gait training;Bed mobility   PT Frequency Other (Comment)  (3-5x/week)   Recommendation   Recommendation Other (Comment)  (short term skilled PT vs home PT)     Pt seated in recliner at end of session with all needs in reach

## 2020-01-13 NOTE — ASSESSMENT & PLAN NOTE
Appears to be a chronic finding - CXR from 2017 with small left pleural effusion, then with a LLL Consolidation suspicious for PNA by imaging in 2018  CT in March of 2019 with moderate to large loculated left basilar pleural effusion, unchanged by repeat CT in July  Current admission CXR appears unchanged  - Needs outpatient follow-up evaluation for further evaluation and treatment options - has apparently been advised to see CT Surgery, but has yet to make a follow-up appointment which she will reportedly do following this hospital stay    - If continued fevers or worsening clinical picture consider inpatient work-up to rule out any associated infection/empyema etc

## 2020-01-13 NOTE — UTILIZATION REVIEW
Notification of Inpatient Admission/Inpatient Authorization Request   This is a Notification of Inpatient Admission for [address]  Be advised that this patient was admitted to our facility under Inpatient Status  Contact Janie Gonzalez at [phone] for additional admission information  [facility] DEPT  DEDICATED [phone]  Patient Name:   Colletta Neer   YOB: 1950       [address]   [address]:   [address]  Tax ID: [id number]  NPI: [id number] Attending Provider/NPI: Jessica Ardon [[id number]]   Place of Service Code: 24     Place of Service Name:  [address] [place] [address]   Start Date: 1/12/20 2046     Discharge Date & Time: No discharge date for patient encounter  Type of Admission: Inpatient Status Discharge Disposition   (if discharged): Home/Self Care   Patient Diagnoses: Pleural effusion on left [J90]  Flu-like symptoms [R68 89]     Orders: Admission Orders (From admission, onward)     Ordered        01/12/20 2046  Inpatient Admission  Once                    Assigned Utilization Review Contact: Janie Gonzalez  Utilization   Network Utilization Review Department  Phone: [phone]; Fax [phone]  Email: [email or web address]   ATTENTION PAYERS: Please call the assigned Utilization  directly with any questions or concerns ALL voicemails in the department are confidential  Send all requests for admission clinical reviews, approved or denied determinations and any other requests to dedicated fax number belonging to the campus where the patient is receiving treatment

## 2020-01-13 NOTE — ASSESSMENT & PLAN NOTE
Lab Results   Component Value Date    HGBA1C 7 3 (H) 06/28/2019       Recent Labs     01/12/20  2343   POCGLU 158*       Blood Sugar Average: Last 72 hrs:  (P) 158     HgA1C checked - appears controlled with a value of 6 7%  Continue basal insulin and sliding scale coverage, ADA diet

## 2020-01-13 NOTE — SOCIAL WORK
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, pt has home oxygen 3 liters at [facility] , pt has an inogen and it is here in her hospital room,  Cpap, nebulizer, walker, cane, shower chair, commode, bp cuff, glucometer, pt has declined rehab, she has pets and she wants to go home, pt has had slvna in the past, A post acute care recommendation was made by your care team for [facility]  Discussed [facility] of Choice with patient  Choice is to make a new referral  Pt requested slvna she had them in the past, referral was made and the pt was accepted, pt's family will transport the pt home when stable for d/c no d/c for today as discussed at care coordination rounds cultures are pending, cm esmer continue to follow and assess for narinder ddtional d/c needs, Patient/caregiver received discharge checklist   Content reviewed  Patient/caregiver encouraged to participate in discharge plan of care prior to discharge home  CM reviewed d/c planning process including the following: identifying help at home, patient preference for d/c planning needs, availability of treatment team to discuss questions or concerns patient and/or family may have regarding understanding medications and recognizing signs and symptoms once discharged  CM also encouraged patient to follow up with all recommended appointments after discharge  Patient advised of importance for patient and family to participate in managing patients medical well being

## 2020-01-13 NOTE — PROGRESS NOTES
Progress Note - Lima Mckenna 1950, 65 y o  female MRN: [id number]    Unit/Bed#: 043-46 Encounter: [id number]    Primary Care Provider: Roderick Saravia MD   Date and time admitted to hospital: 1/12/2020  5:37 PM        Sepsis, unspecified organism [facility])  Assessment & Plan  Initial presentation with Fever, Tachycardia, and Tachypnea, with suspected pulmonary source  - CXR unchanged  - Influenza, RSV checked and negative  - Blood and Sputum cultures ordered and pending   - Procalcitonin also checked and negative  Lactic Acid not initially obtained  Blood pressure, HR, and RR all improved  Patient reports symptomatic improvement as well  Potential for non influenza viral infection - plan on continuation of empiric Abx therapy initiated by admitting provider for COPD exacerbation, with close monitoring of blood and sputum cultures, vitals, and symptoms over the next 24 hours  Mrs Sunitha Simms does have a history of RA and is on therapy with Methotrexate and Tofacitinib - consider broadening coverage if clinically worsens  * Acute on chronic respiratory failure with hypoxia and hypercapnia (HCC)  Assessment & Plan  Likely on basis of current illness  Appears improved  Continue steroids, nebs, and antibiotics with Azithromycin day #2  Chronic obstructive pulmonary disease with acute exacerbation [facility])  Assessment & Plan  Treatment as above  Appears oxygen dependent and on home O2  Pleural effusion, left  Assessment & Plan  Appears to be a chronic finding - CXR from 2017 with small left pleural effusion, then with a LLL Consolidation suspicious for PNA by imaging in 2018  CT in March of 2019 with moderate to large loculated left basilar pleural effusion, unchanged by repeat CT in July  Current admission CXR appears unchanged      - Needs outpatient follow-up evaluation for further evaluation and treatment options - has apparently been advised to see CT Surgery, but has yet to make a follow-up appointment which she will reportedly do following this hospital stay  - If continued fevers or worsening clinical picture consider inpatient work-up to rule out any associated infection/empyema etc       Rheumatoid arthritis ([address] )  Assessment & Plan  Currently on Methotrexate, Xeljans  Type 2 diabetes mellitus with hyperglycemia [facility])  Assessment & Plan  Lab Results   Component Value Date    HGBA1C 7 3 (H) 2019       Recent Labs     20  2343   POCGLU 158*       Blood Sugar Average: Last 72 hrs:  (P) 158     HgA1C checked - appears controlled with a value of 6 7%  Continue basal insulin and sliding scale coverage, ADA diet  Morbid obesity with BMI of 40 0-44 9, adult [facility])  Assessment & Plan  BMI 40 5  Consult dietitian  Encourage lifestyle modifications  VTE Pharmacologic Prophylaxis:   Pharmacologic: Enoxaparin (Lovenox)  Mechanical VTE Prophylaxis in Place: Yes    Patient Centered Rounds: I have performed bedside rounds with nursing staff today  Discussions with Specialists or Other Care Team Provider: None    Education and Discussions with Family / Patient: Yes    Time Spent for Care: 30 minutes  More than 50% of total time spent on counseling and coordination of care as described above  Current Length of Stay: 1 day(s)    Current Patient Status: Inpatient   Certification Statement: The patient will continue to require additional inpatient hospital stay due to Need for IV Steroid therapy  Discharge Plan: TBD    Code Status: Level 1 - Full Code      Subjective:   Mrs Zoya Bernal reports improvement in her symptoms overall, specifically with no further subjective fevers and improved respiratory status  No overnight events reported  Remains afebrile      Objective:     Vitals:   Temp (24hrs), Av 8 °F (37 7 °C), Min:99 2 °F (37 3 °C), Max:100 5 °F (38 1 °C)    Temp:  [99 2 °F (37 3 °C)-100 5 °F (38 1 °C)] 99 6 °F (37 6 °C)  HR:  [] 97  Resp:  [17-27] 17  BP: (110-156)/(53-83) 127/59  SpO2:  [92 %-100 %] 94 %  Body mass index is 40 5 kg/m²  Input and Output Summary (last 24 hours): Intake/Output Summary (Last 24 hours) at 1/13/2020 1027  Last data filed at 1/13/2020 0337  Gross per 24 hour   Intake 240 ml   Output 500 ml   Net -260 ml       Physical Exam:     Physical Exam   Constitutional: She appears well-developed and well-nourished  No distress  HENT:   Head: Normocephalic  Neck: Neck supple  Cardiovascular: Normal rate and regular rhythm  Pulmonary/Chest: No respiratory distress  She has wheezes  Diffusely rhonchorous breath sounds  Decreased sounds in the left base  Abdominal: Soft  Bowel sounds are normal  She exhibits no distension  There is no tenderness  Obese in contour  Musculoskeletal: She exhibits edema  B/l nonpitting LE edema   Skin: Skin is warm and dry  She is not diaphoretic  Psychiatric: She has a normal mood and affect  Her behavior is normal          Additional Data:     Labs:    Results from last 7 days   Lab Units 01/13/20  0425   WBC Thousand/uL 6 95   HEMOGLOBIN g/dL 11 2*   HEMATOCRIT % 35 0   PLATELETS Thousands/uL 259   NEUTROS PCT % 66   LYMPHS PCT % 16   MONOS PCT % 16*   EOS PCT % 1     Results from last 7 days   Lab Units 01/13/20  0425   SODIUM mmol/L 135*   POTASSIUM mmol/L 3 6   CHLORIDE mmol/L 99*   CO2 mmol/L 30   BUN mg/dL 15   CREATININE mg/dL 0 68   ANION GAP mmol/L 6   CALCIUM mg/dL 9 0   ALBUMIN g/dL 3 2*   TOTAL BILIRUBIN mg/dL 0 40   ALK PHOS U/L 69   ALT U/L 23   AST U/L 12   GLUCOSE RANDOM mg/dL 153*     Results from last 7 days   Lab Units 01/13/20  0425   INR  1 02     Results from last 7 days   Lab Units 01/13/20  0703 01/12/20  2343   POC GLUCOSE mg/dl 151* 158*     Results from last 7 days   Lab Units 01/12/20  1820   HEMOGLOBIN A1C % 6 7*     Results from last 7 days   Lab Units 01/12/20  2323   PROCALCITONIN ng/ml <0 05           * I Have Reviewed All Lab Data Listed Above    * Additional Pertinent Lab Tests Reviewed: New 66 Admission Reviewed    Imaging:    Imaging Reports Reviewed Today Include: CXR, CT Chest from 3/2019, 7/2019  Xrays from 2017 and 2018  Imaging Personally Reviewed by Myself Includes: Admission CXR  Recent Cultures (last 7 days):     Results from last 7 days   Lab Units 01/12/20  2335 01/12/20  2325   BLOOD CULTURE  Received in Microbiology Lab  Culture in Progress  Received in Microbiology Lab  Culture in Progress         Last 24 Hours Medication List:     Current Facility-Administered Medications:  acetaminophen 650 mg Oral Q6H PRN Ingrid Isaacs MD   albuterol 2 5 mg Nebulization Q4H PRN Angle Lee MD   aluminum-magnesium hydroxide-simethicone 30 mL Oral Q4H PRN Angle Lee MD   atorvastatin 20 mg Oral Daily Angle Lee MD   azithromycin 500 mg Oral Q24H Angle Lee MD   vitamin B-12 1,000 mcg Oral Daily Angle Lee MD   docusate sodium 100 mg Oral BID PRN Angle Lee MD   DULoxetine 20 mg Oral Daily Angle Lee MD   enoxaparin 40 mg Subcutaneous BID Angle Lee MD   ergocalciferol 50,000 Units Oral Weekly Angle Lee MD   fluticasone-vilanterol 1 puff Inhalation Daily Angle Lee MD   folic acid 4,910 mcg Oral Daily Angle Lee MD   furosemide 40 mg Oral Daily Angle Lee MD   insulin glargine 30 Units Subcutaneous HS Angle Lee MD   insulin lispro 1-6 Units Subcutaneous TID AC Angle Lee MD   insulin lispro 1-6 Units Subcutaneous HS Angle Lee MD   ipratropium 0 5 mg Nebulization TID Angle Lee MD   levalbuterol 1 25 mg Nebulization TID Angle Lee MD   levothyroxine 125 mcg Oral Early Morning Angle Lee MD   lidocaine 1 patch Topical Daily Ingrid Isaacs MD   lisinopril 40 mg Oral Daily Angle Lee MD   magnesium oxide 400 mg Oral Once Ingrid Isaacs MD   methotrexate 20 mg Oral Weekly Macario Mac MD   methylPREDNISolone sodium succinate 40 mg Intravenous Q6H Ruben Victor MD   ondansetron 4 mg Intravenous Q4H PRN Macario Mac MD   pantoprazole 40 mg Oral Early Morning Macario Mac MD   polyethylene glycol 17 g Oral HS Macario Mac MD   potassium chloride 40 mEq Oral Once Irene Rosas MD   sitaGLIPtin 100 mg Oral Daily Macario Mac MD   sodium chloride (PF) 3 mL Intravenous PRN Macario Mac MD   tiotropium 18 mcg Inhalation HS Macario Mac MD   Tofacitinib Citrate 1 tablet Oral Daily Macario Mac MD   traZODone 50 mg Oral HS Macario Mac MD   triamcinolone  Topical BID PRN Macario Mac MD        Today, Patient Was Seen By: Irene Rosas MD    ** Please Note: Dictation voice to text software may have been used in the creation of this document   **

## 2020-01-13 NOTE — PLAN OF CARE
Problem: DISCHARGE PLANNING - CARE MANAGEMENT  Goal: Discharge to post-acute care or home with appropriate resources  Description  INTERVENTIONS:  - Conduct assessment to determine patient/family and health care team treatment goals, and need for post-acute services based on payer coverage, community resources, and patient preferences, and barriers to discharge  - Address psychosocial, clinical, and financial barriers to discharge as identified in assessment in conjunction with the patient/family and health care team  - Arrange appropriate level of post-acute services according to patient's   needs and preference and payer coverage in collaboration with the physician and health care team  - Communicate with and update the patient/family, physician, and health care team regarding progress on the discharge plan  - Arrange appropriate transportation to post-acute venues    Pt's goal is to return home with [facility],    Outcome: Progressing

## 2020-01-14 ENCOUNTER — APPOINTMENT (INPATIENT)
Dept: CT IMAGING | Facility: HOSPITAL | Age: 70
DRG: 871 | End: 2020-01-14
Payer: COMMERCIAL

## 2020-01-14 ENCOUNTER — APPOINTMENT (INPATIENT)
Dept: NON INVASIVE DIAGNOSTICS | Facility: HOSPITAL | Age: 70
DRG: 871 | End: 2020-01-14
Payer: COMMERCIAL

## 2020-01-14 PROBLEM — J20.9 ACUTE TRACHEOBRONCHITIS: Status: ACTIVE | Noted: 2020-01-14

## 2020-01-14 PROBLEM — D53.9 MACROCYTIC ANEMIA: Status: ACTIVE | Noted: 2020-01-14

## 2020-01-14 LAB
ANION GAP SERPL CALCULATED.3IONS-SCNC: 5 MMOL/L (ref 4–13)
BACTERIA UR QL AUTO: ABNORMAL /HPF
BASOPHILS # BLD AUTO: 0.01 THOUSANDS/ΜL (ref 0–0.1)
BASOPHILS NFR BLD AUTO: 0 % (ref 0–1)
BILIRUB UR QL STRIP: NEGATIVE
BUN SERPL-MCNC: 15 MG/DL (ref 5–25)
CALCIUM SERPL-MCNC: 9 MG/DL (ref 8.3–10.1)
CHLORIDE SERPL-SCNC: 102 MMOL/L (ref 100–108)
CLARITY UR: ABNORMAL
CO2 SERPL-SCNC: 27 MMOL/L (ref 21–32)
COLOR UR: YELLOW
CREAT SERPL-MCNC: 0.78 MG/DL (ref 0.6–1.3)
EOSINOPHIL # BLD AUTO: 0 THOUSAND/ΜL (ref 0–0.61)
EOSINOPHIL NFR BLD AUTO: 0 % (ref 0–6)
ERYTHROCYTE [DISTWIDTH] IN BLOOD BY AUTOMATED COUNT: 14.7 % (ref 11.6–15.1)
GFR SERPL CREATININE-BSD FRML MDRD: 76 ML/MIN/1.73SQ M
GLUCOSE SERPL-MCNC: 247 MG/DL (ref 65–140)
GLUCOSE SERPL-MCNC: 265 MG/DL (ref 65–140)
GLUCOSE SERPL-MCNC: 322 MG/DL (ref 65–140)
GLUCOSE SERPL-MCNC: 334 MG/DL (ref 65–140)
GLUCOSE SERPL-MCNC: 431 MG/DL (ref 65–140)
GLUCOSE UR STRIP-MCNC: ABNORMAL MG/DL
HCT VFR BLD AUTO: 34.5 % (ref 34.8–46.1)
HGB BLD-MCNC: 11 G/DL (ref 11.5–15.4)
HGB UR QL STRIP.AUTO: ABNORMAL
IMM GRANULOCYTES # BLD AUTO: 0.06 THOUSAND/UL (ref 0–0.2)
IMM GRANULOCYTES NFR BLD AUTO: 1 % (ref 0–2)
KETONES UR STRIP-MCNC: NEGATIVE MG/DL
LEUKOCYTE ESTERASE UR QL STRIP: ABNORMAL
LYMPHOCYTES # BLD AUTO: 0.75 THOUSANDS/ΜL (ref 0.6–4.47)
LYMPHOCYTES NFR BLD AUTO: 13 % (ref 14–44)
MAGNESIUM SERPL-MCNC: 1.8 MG/DL (ref 1.6–2.6)
MCH RBC QN AUTO: 31.6 PG (ref 26.8–34.3)
MCHC RBC AUTO-ENTMCNC: 31.9 G/DL (ref 31.4–37.4)
MCV RBC AUTO: 99 FL (ref 82–98)
MONOCYTES # BLD AUTO: 0.32 THOUSAND/ΜL (ref 0.17–1.22)
MONOCYTES NFR BLD AUTO: 6 % (ref 4–12)
NEUTROPHILS # BLD AUTO: 4.56 THOUSANDS/ΜL (ref 1.85–7.62)
NEUTS SEG NFR BLD AUTO: 80 % (ref 43–75)
NITRITE UR QL STRIP: NEGATIVE
NON-SQ EPI CELLS URNS QL MICRO: ABNORMAL /HPF
NRBC BLD AUTO-RTO: 0 /100 WBCS
OTHER STN SPEC: ABNORMAL
PH UR STRIP.AUTO: 6 [PH]
PLATELET # BLD AUTO: 255 THOUSANDS/UL (ref 149–390)
PMV BLD AUTO: 10.1 FL (ref 8.9–12.7)
POTASSIUM SERPL-SCNC: 4.1 MMOL/L (ref 3.5–5.3)
PROT UR STRIP-MCNC: NEGATIVE MG/DL
RBC # BLD AUTO: 3.48 MILLION/UL (ref 3.81–5.12)
RBC #/AREA URNS AUTO: ABNORMAL /HPF
SODIUM SERPL-SCNC: 134 MMOL/L (ref 136–145)
SP GR UR STRIP.AUTO: 1.01 (ref 1–1.03)
UROBILINOGEN UR QL STRIP.AUTO: 0.2 E.U./DL
WBC # BLD AUTO: 5.7 THOUSAND/UL (ref 4.31–10.16)
WBC #/AREA URNS AUTO: ABNORMAL /HPF

## 2020-01-14 PROCEDURE — 82948 REAGENT STRIP/BLOOD GLUCOSE: CPT

## 2020-01-14 PROCEDURE — 87106 FUNGI IDENTIFICATION YEAST: CPT | Performed by: INTERNAL MEDICINE

## 2020-01-14 PROCEDURE — 83735 ASSAY OF MAGNESIUM: CPT | Performed by: INTERNAL MEDICINE

## 2020-01-14 PROCEDURE — 93306 TTE W/DOPPLER COMPLETE: CPT | Performed by: INTERNAL MEDICINE

## 2020-01-14 PROCEDURE — 80048 BASIC METABOLIC PNL TOTAL CA: CPT | Performed by: INTERNAL MEDICINE

## 2020-01-14 PROCEDURE — 99223 1ST HOSP IP/OBS HIGH 75: CPT | Performed by: PHYSICIAN ASSISTANT

## 2020-01-14 PROCEDURE — 71275 CT ANGIOGRAPHY CHEST: CPT

## 2020-01-14 PROCEDURE — 94640 AIRWAY INHALATION TREATMENT: CPT

## 2020-01-14 PROCEDURE — 97116 GAIT TRAINING THERAPY: CPT

## 2020-01-14 PROCEDURE — 94760 N-INVAS EAR/PLS OXIMETRY 1: CPT

## 2020-01-14 PROCEDURE — 87449 NOS EACH ORGANISM AG IA: CPT | Performed by: INTERNAL MEDICINE

## 2020-01-14 PROCEDURE — 97110 THERAPEUTIC EXERCISES: CPT

## 2020-01-14 PROCEDURE — 97535 SELF CARE MNGMENT TRAINING: CPT

## 2020-01-14 PROCEDURE — 93306 TTE W/DOPPLER COMPLETE: CPT

## 2020-01-14 PROCEDURE — 87086 URINE CULTURE/COLONY COUNT: CPT | Performed by: INTERNAL MEDICINE

## 2020-01-14 PROCEDURE — 81001 URINALYSIS AUTO W/SCOPE: CPT | Performed by: INTERNAL MEDICINE

## 2020-01-14 PROCEDURE — 85025 COMPLETE CBC W/AUTO DIFF WBC: CPT | Performed by: INTERNAL MEDICINE

## 2020-01-14 PROCEDURE — 99232 SBSQ HOSP IP/OBS MODERATE 35: CPT | Performed by: INTERNAL MEDICINE

## 2020-01-14 RX ORDER — ACETAMINOPHEN 325 MG/1
650 TABLET ORAL EVERY 6 HOURS PRN
Status: DISCONTINUED | OUTPATIENT
Start: 2020-01-14 | End: 2020-01-16 | Stop reason: HOSPADM

## 2020-01-14 RX ORDER — CEFTRIAXONE 1 G/50ML
1000 INJECTION, SOLUTION INTRAVENOUS EVERY 24 HOURS
Status: DISCONTINUED | OUTPATIENT
Start: 2020-01-14 | End: 2020-01-15

## 2020-01-14 RX ORDER — DEXTROSE MONOHYDRATE 25 G/50ML
25 INJECTION, SOLUTION INTRAVENOUS AS NEEDED
Status: DISCONTINUED | OUTPATIENT
Start: 2020-01-14 | End: 2020-01-16 | Stop reason: HOSPADM

## 2020-01-14 RX ORDER — INSULIN GLARGINE 100 [IU]/ML
35 INJECTION, SOLUTION SUBCUTANEOUS
Status: DISCONTINUED | OUTPATIENT
Start: 2020-01-14 | End: 2020-01-15

## 2020-01-14 RX ORDER — METHYLPREDNISOLONE SODIUM SUCCINATE 40 MG/ML
40 INJECTION, POWDER, LYOPHILIZED, FOR SOLUTION INTRAMUSCULAR; INTRAVENOUS EVERY 12 HOURS SCHEDULED
Status: DISCONTINUED | OUTPATIENT
Start: 2020-01-14 | End: 2020-01-15

## 2020-01-14 RX ORDER — METHYLPREDNISOLONE SODIUM SUCCINATE 40 MG/ML
40 INJECTION, POWDER, LYOPHILIZED, FOR SOLUTION INTRAMUSCULAR; INTRAVENOUS EVERY 8 HOURS SCHEDULED
Status: DISCONTINUED | OUTPATIENT
Start: 2020-01-14 | End: 2020-01-14

## 2020-01-14 RX ADMIN — ENOXAPARIN SODIUM 40 MG: 40 INJECTION SUBCUTANEOUS at 21:30

## 2020-01-14 RX ADMIN — IOHEXOL 100 ML: 350 INJECTION, SOLUTION INTRAVENOUS at 09:38

## 2020-01-14 RX ADMIN — FOLIC ACID 1000 MCG: 1 TABLET ORAL at 08:51

## 2020-01-14 RX ADMIN — LEVOTHYROXINE SODIUM 125 MCG: 125 TABLET ORAL at 05:14

## 2020-01-14 RX ADMIN — ENOXAPARIN SODIUM 40 MG: 40 INJECTION SUBCUTANEOUS at 08:48

## 2020-01-14 RX ADMIN — INSULIN LISPRO 5 UNITS: 100 INJECTION, SOLUTION INTRAVENOUS; SUBCUTANEOUS at 16:31

## 2020-01-14 RX ADMIN — METHYLPREDNISOLONE SODIUM SUCCINATE 40 MG: 40 INJECTION, POWDER, FOR SOLUTION INTRAMUSCULAR; INTRAVENOUS at 21:30

## 2020-01-14 RX ADMIN — INSULIN GLARGINE 35 UNITS: 100 INJECTION, SOLUTION SUBCUTANEOUS at 21:30

## 2020-01-14 RX ADMIN — CEFTRIAXONE 1000 MG: 1 INJECTION, SOLUTION INTRAVENOUS at 10:29

## 2020-01-14 RX ADMIN — INSULIN LISPRO 6 UNITS: 100 INJECTION, SOLUTION INTRAVENOUS; SUBCUTANEOUS at 11:15

## 2020-01-14 RX ADMIN — LISINOPRIL 40 MG: 20 TABLET ORAL at 08:51

## 2020-01-14 RX ADMIN — DULOXETINE HYDROCHLORIDE 20 MG: 20 CAPSULE, DELAYED RELEASE ORAL at 08:51

## 2020-01-14 RX ADMIN — INSULIN GLARGINE 30 UNITS: 100 INJECTION, SOLUTION SUBCUTANEOUS at 08:49

## 2020-01-14 RX ADMIN — SITAGLIPTIN 100 MG: 100 TABLET, FILM COATED ORAL at 08:52

## 2020-01-14 RX ADMIN — INSULIN LISPRO 5 UNITS: 100 INJECTION, SOLUTION INTRAVENOUS; SUBCUTANEOUS at 21:33

## 2020-01-14 RX ADMIN — LEVALBUTEROL HYDROCHLORIDE 1.25 MG: 1.25 SOLUTION, CONCENTRATE RESPIRATORY (INHALATION) at 14:02

## 2020-01-14 RX ADMIN — IPRATROPIUM BROMIDE 0.5 MG: 0.5 SOLUTION RESPIRATORY (INHALATION) at 20:26

## 2020-01-14 RX ADMIN — PANTOPRAZOLE SODIUM 40 MG: 40 TABLET, DELAYED RELEASE ORAL at 05:14

## 2020-01-14 RX ADMIN — TRAZODONE HYDROCHLORIDE 50 MG: 50 TABLET ORAL at 21:30

## 2020-01-14 RX ADMIN — POLYETHYLENE GLYCOL 3350 17 G: 17 POWDER, FOR SOLUTION ORAL at 21:30

## 2020-01-14 RX ADMIN — INSULIN LISPRO 3 UNITS: 100 INJECTION, SOLUTION INTRAVENOUS; SUBCUTANEOUS at 16:31

## 2020-01-14 RX ADMIN — INSULIN LISPRO 3 UNITS: 100 INJECTION, SOLUTION INTRAVENOUS; SUBCUTANEOUS at 11:16

## 2020-01-14 RX ADMIN — IPRATROPIUM BROMIDE 0.5 MG: 0.5 SOLUTION RESPIRATORY (INHALATION) at 14:02

## 2020-01-14 RX ADMIN — LEVALBUTEROL HYDROCHLORIDE 1.25 MG: 1.25 SOLUTION, CONCENTRATE RESPIRATORY (INHALATION) at 20:26

## 2020-01-14 RX ADMIN — AZITHROMYCIN 500 MG: 250 TABLET, FILM COATED ORAL at 22:52

## 2020-01-14 RX ADMIN — LIDOCAINE 1 PATCH: 50 PATCH TOPICAL at 08:52

## 2020-01-14 RX ADMIN — FLUTICASONE FUROATE AND VILANTEROL TRIFENATATE 1 PUFF: 200; 25 POWDER RESPIRATORY (INHALATION) at 08:53

## 2020-01-14 RX ADMIN — METHYLPREDNISOLONE SODIUM SUCCINATE 40 MG: 40 INJECTION, POWDER, FOR SOLUTION INTRAMUSCULAR; INTRAVENOUS at 05:14

## 2020-01-14 RX ADMIN — INSULIN LISPRO 3 UNITS: 100 INJECTION, SOLUTION INTRAVENOUS; SUBCUTANEOUS at 08:53

## 2020-01-14 RX ADMIN — METHYLPREDNISOLONE SODIUM SUCCINATE 40 MG: 40 INJECTION, POWDER, FOR SOLUTION INTRAMUSCULAR; INTRAVENOUS at 13:45

## 2020-01-14 RX ADMIN — CYANOCOBALAMIN TAB 1000 MCG 1000 MCG: 1000 TAB at 08:51

## 2020-01-14 RX ADMIN — ATORVASTATIN CALCIUM 20 MG: 10 TABLET, FILM COATED ORAL at 16:31

## 2020-01-14 RX ADMIN — LEVALBUTEROL HYDROCHLORIDE 1.25 MG: 1.25 SOLUTION, CONCENTRATE RESPIRATORY (INHALATION) at 08:15

## 2020-01-14 RX ADMIN — FUROSEMIDE 40 MG: 40 TABLET ORAL at 08:52

## 2020-01-14 RX ADMIN — IPRATROPIUM BROMIDE 0.5 MG: 0.5 SOLUTION RESPIRATORY (INHALATION) at 08:15

## 2020-01-14 NOTE — PLAN OF CARE
Problem: OCCUPATIONAL THERAPY ADULT  Goal: Performs self-care activities at highest level of function for planned discharge setting  See evaluation for individualized goals  Description  Treatment Interventions: ADL retraining, Functional transfer training, UE strengthening/ROM, Patient/family training, Equipment evaluation/education, Compensatory technique education, Energy conservation, Activityengagement          See flowsheet documentation for full assessment, interventions and recommendations  Outcome: Progressing  Note:   Limitation: Decreased ADL status, Decreased UE strength, Decreased Safe judgement during ADL, Decreased endurance, Decreased self-care trans, Decreased high-level ADLs  Prognosis: Good  Assessment: Pt with deficits affecting overall functional performance as per initial eval   Ageeable to participate a m  self care  Receptive to education AE to facilitate increased I thru LB dress  Requires cues to rest thru activity as needed  Spoke with OTR who is in agreement pt  will benefit from continued active OT services in attempt to insure further progress and allow safe return to prior living status       OT Discharge Recommendation: Short Term Rehab  OT - OK to Discharge: (when medically cleared)

## 2020-01-14 NOTE — RESPIRATORY THERAPY NOTE
Patient is declining to use cpap at this time  I did educate her and encuraged her   She said she was fine, she will call if she changes her mind

## 2020-01-14 NOTE — CONSULTS
Consultation - Pulmonary Medicine   Shasha Nguyen 65 y o  female MRN: [id number]  Unit/Bed#: 414-01 Encounter: [id number]      Assessment/Plan:    1  Chronic hypoxic respiratory failure   1  Currently requiring 2 lpm   2  Baseline oxygen requirements 2-3 lpm NC   3  Titrate oxygen to maintain SpO2 greater than or equal to 88%  4  Pulmonary toilet: increase activity as tolerated, out of bed as tolerated, cough and deep breathing exercises encouraged, incentive spirometry q1hrs  2  COPD of unknown severity with acute exacerbation  1  No PFTs on file but CT evidence of emphysema with bleb in anterior RUL   2  Decrease Solu-Medrol to 40 mg IV q 12 hours  3  Continue Atrovent/Xopenex nebs t i d   4  Continue Breo 200/25 1 puff daily  5  At discharge, pulmonary regimen should include Advair 250/50 1 puff twice daily, Spiriva 2 5 1 puff daily, the albuterol HFA q 6 hours p r n , DuoNeb q 6 hours p r n  6  Will need pulmonary follow-up with PFTs as outpatient   3  Abnormal chest CT secondary to large loculated left pleural effusion, compressive atelectasis, and emphysema  1  No acute infiltrates, no leukocytosis, procalcitonin negative x2, blood cultures negative x2, afebrile- recommend discontinuing ceftriaxone once strep pneumonia, Legionella antigen, and sputum culture result  2  Large loculated effusion present since at least November 2016  3  IR thoracentesis 5/3119 yielded loculated effusion with multiple septations, able to aspirate 50 cc dark red bloody fluid  4  Patient will need thoracic surgery consult as outpatient- patient verbalized understanding the necessity of the consult and reports she will remain compliant  4  MORENO   1  Noncompliance on home CPAP  2   Start CPAP 10 cmH2O while inpatient     History of Present Illness   Physician Requesting Consult: Sukhwinder French DO  Reason for Consult / Principal Problem: left pleural effusion  Hx and PE limited by: n/a  Chief Complaint: shortness of breath   HPI: Ranjana Cardozo is a 65 y o   female who presented to [address] [facility] [facility] with complaints of increasing shortness of breath, wheeze, cough productive of yellow phlegm, fevers, body aches for several days  Patient has past medical history positive for COPD, MORENO, chronic hypoxic respiratory failure, hypertension, hyperlipidemia, GERD, diabetes, rheumatoid arthritis, obesity, former tobacco abuse, and hypothyroidism  Patient states that she was in close contact with her 3yo grandson who recently was diagnosed with the flu  She also notes other grandchildren with coughs over the weekend  She developed worsening shortness of breath not relieved by her resue inhaler  Other pertinent symptoms include:  Wheeze, dyspnea on exertion, cough productive of yellow sputum, subjective fevers and chills, malaise, body aches, fatigue  Denies:  Headache, dizziness nausea, vomiting, abdominal pain diarrhea, leg pain, leg swelling  In the ED she was noted to be tachycardic and tachypneic with a max temperature of a 100 5°  Chest x-ray obtained showed moderate to large loculated effusion without evidence of other acute infiltrate  Normal WBC, influenza A/B and RSV PCR negative, procalcitonin negative x2, and blood cultures negative x2  Patient was admitted to med surg for COPD exacerbation secondary to CAP and started on IV steroids, nebulizers, and antibiotics  Pulmonary was consulted to help manage this and assess loculated pleural effusion  From a pulmonary perspective, patient follows Dr Darshana John  She has been told in the past that she had bronchial COPD    She states that she has recurrent several times a year her entire life  She also admits to being a former smoker with quit date 6 years ago  Prior to that she smoked 1 pack per day for 40 years    Denies known occupational exposures to asbestos, silica, or coal however she has worked in [employer] 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 [employer]  She recalls exposures to her 's clothes who was frequently exposed to asbestos and has lived in home with asbestos in the walls  She denies bird exposures  She denies recent travel  Loculated pleural effusion in left lower lobe with associated compressive atelectasis has been present since at least November 2016 however has been slowly increasing in size  Dr Gene Almodovar ordered IR thoracentesis that was attempt 5/31/19  Per chest review, effusion on CT does not appear to be free fluid and under ultrasound was found to have multiple septations  Dr Roxy Rawls was apply to aspirate 50 cc dark red bloody fluid that was predominantly neutrophilic  She was then referred to thoracic surgery with plans to meet Dr Pat Figueredo in August of 2019  She unfortunately cancelled this appointment stating that she could not find transportation  Patient denies abnormal weight loss, night sweats, or decreased appetite  She believes she was hospitalized in 2016 for pneumonia but nobody mentioned an effusion to her until April of 2019 when she was seen in consultation by Dr Tobin Raza  She has never had PFTs however there is emphysema noted on CT scans of the chest  She is maintained on Advair 250/50, Spiriva 2 5, as needed albuterol HFA and nebs q 6 hours p r n  [facility] Patient tells that she has not been seen for last several because she believes it not clean  She also tells she MORENO Pap but again has not been wearing it for the last several months  She reports that it gives her a headache and is too loud  She will use needs to be serviced  Inpatient consult to Pulmonology  Consult performed by: Kyle Singleton PA-C  Consult ordered by: Aleyda Boothe DO          Review of Systems   Constitutional: Positive for chills, fatigue and fever  HENT: Negative  Eyes: Negative  Respiratory: Positive for cough, shortness of breath and wheezing  Cardiovascular: Negative  Gastrointestinal: Negative  Endocrine: Negative  Genitourinary: Negative  Musculoskeletal: Positive for myalgias  Skin: Negative  Allergic/Immunologic: Negative  Neurological: Negative  Hematological: Negative  Psychiatric/Behavioral: Negative  All other systems reviewed and are negative  Historical Information   Past Medical History:   Diagnosis Date    Arthritis     Arthritis     Cancer ([facility] )     Candidiasis of skin     Cervical cancer ([facility] )     Chronic respiratory failure with hypoxia and hypercapnia ([facility] ) 4/20/2019    COPD (chronic obstructive pulmonary disease) ([facility] )     last assessed 11/21/2016    Current chronic use of systemic steroids     Degenerative arthritis of left knee     last assessed 1/20/2015    Diabetes mellitus ([facility] )     Disease of thyroid gland     hypo pill given to decrease thyroid       Fibromyalgia     Fistula of knee     last assessed 9/12/2014    GERD (gastroesophageal reflux disease)     Hyperlipidemia     Hypertension     Medial meniscus tear     of left knee, last assessed 9/12/2014    Migraine     states she has a hx of HA that she calls Sproxil but never was dx by neurologist    Obesity     Obesity     Osteoarthritis     Osteopenia     Pneumonia     last assessed 11/16/2016    Psychiatric disorder     anxiety    Rheumatoid arthritis ([facility] )     Rheumatoid arthritis ([facility] )     Sepsis ([facility] )     last assessed 11/10/2016    Tick bite     last assessed 10/30/2015    Vitamin B12 deficiency     Vitamin D deficiency      Past Surgical History:   Procedure Laterality Date    CATARACT EXTRACTION Bilateral     CHOLECYSTECTOMY      EYE SURGERY      Bilateral Cataracts    HYSTERECTOMY      IR THORACENTESIS  5/31/2019    JOINT REPLACEMENT      left knee    KNEE ARTHROSCOPY      NEUROPLASTY / TRANSPOSITION MEDIAN NERVE AT CARPAL TUNNEL      TUBAL LIGATION       Social History   Social History     Substance and Sexual Activity   Alcohol Use Never    Alcohol/week: 0 0 standard drinks    Frequency: Never    Binge frequency: Never    Comment: stopped drinking alcohol     Social History     Substance and Sexual Activity   Drug Use Never     Social History     Tobacco Use   Smoking Status Former Smoker    Packs/day: 1 00    Years: 48 00    Pack years: 48 00    Types: E-Cigarettes    Last attempt to quit: 2012    Years since quittin 1   Smokeless Tobacco Never Used   Tobacco Comment    per allscripts - "current every day smoker, former smoker"     Occupational History: retired  Former nutrition team member at [employer]        Family History:   Family History   Problem Relation Age of Onset    Stroke Mother     Asthma Family     Cancer Family     Diabetes Family     Hypertension Family        Meds/Allergies   all current active meds have been reviewed, pertinent pulmonary meds have been reviewed and current meds:   Current Facility-Administered Medications   Medication Dose Route Frequency    acetaminophen (TYLENOL) tablet 650 mg  650 mg Oral Q6H PRN    albuterol inhalation solution 2 5 mg  2 5 mg Nebulization Q4H PRN    aluminum-magnesium hydroxide-simethicone (MYLANTA) 200-200-20 mg/5 mL oral suspension 30 mL  30 mL Oral Q4H PRN    atorvastatin (LIPITOR) tablet 20 mg  20 mg Oral Daily    azithromycin (ZITHROMAX) tablet 500 mg  500 mg Oral Q24H    cefTRIAXone (ROCEPHIN) IVPB (premix) 1,000 mg  1,000 mg Intravenous Q24H    cyanocobalamin (VITAMIN B-12) tablet 1,000 mcg  1,000 mcg Oral Daily    dextrose 50 % IV solution 25 mL  25 mL Intravenous PRN    docusate sodium (COLACE) capsule 100 mg  100 mg Oral BID PRN    DULoxetine (CYMBALTA) delayed release capsule 20 mg  20 mg Oral Daily    enoxaparin (LOVENOX) subcutaneous injection 40 mg  40 mg Subcutaneous Q12H [address]    ergocalciferol (VITAMIN D2) capsule 50,000 Units  50,000 Units Oral Weekly    fluticasone-vilanterol (BREO ELLIPTA) 200-25 MCG/INH inhaler 1 puff  1 puff Inhalation Daily    folic acid (FOLVITE) tablet 1,000 mcg  1,000 mcg Oral Daily    furosemide (LASIX) tablet 40 mg  40 mg Oral Daily    insulin glargine (LANTUS) subcutaneous injection 35 Units 0 35 mL  35 Units Subcutaneous HS    insulin lispro (HumaLOG) 100 units/mL subcutaneous injection 1-6 Units  1-6 Units Subcutaneous TID AC    insulin lispro (HumaLOG) 100 units/mL subcutaneous injection 1-6 Units  1-6 Units Subcutaneous HS    insulin lispro (HumaLOG) 100 units/mL subcutaneous injection 3 Units  3 Units Subcutaneous TID With Meals    ipratropium (ATROVENT) 0 02 % inhalation solution 0 5 mg  0 5 mg Nebulization TID    levalbuterol (XOPENEX) inhalation solution 1 25 mg  1 25 mg Nebulization TID    levothyroxine tablet 125 mcg  125 mcg Oral Early Morning    lidocaine (LIDODERM) 5 % patch 1 patch  1 patch Topical Daily    lisinopril (ZESTRIL) tablet 40 mg  40 mg Oral Daily    methotrexate tablet 20 mg  20 mg Oral Weekly    methylPREDNISolone sodium succinate (Solu-MEDROL) injection 40 mg  40 mg Intravenous Q8H [facility]    ondansetron (ZOFRAN) injection 4 mg  4 mg Intravenous Q4H PRN    pantoprazole (PROTONIX) EC tablet 40 mg  40 mg Oral Early Morning    polyethylene glycol (MIRALAX) packet 17 g  17 g Oral HS    sitaGLIPtin (JANUVIA) tablet 100 mg  100 mg Oral Daily    sodium chloride (PF) 0 9 % injection 3 mL  3 mL Intravenous PRN    Tofacitinib Citrate TB24 11 mg  1 tablet Oral Daily    traZODone (DESYREL) tablet 50 mg  50 mg Oral HS    triamcinolone (KENALOG) 0 1 % cream   Topical BID PRN       Allergies   Allergen Reactions    No Active Allergies        Objective   Vitals: Blood pressure 133/84, pulse 55, temperature 97 9 °F (36 6 °C), temperature source Oral, resp  rate 20, height 5' 5" (1 651 m), weight 112 kg (247 lb 5 7 oz), SpO2 96 %  2L NC,Body mass index is 41 16 kg/m²      Intake/Output Summary (Last 24 hours) at 1/14/2020 1035  Last data filed at 1/14/2020 0832  Gross per 24 hour Intake 480 ml   Output 400 ml   Net 80 ml     Invasive Devices     Peripheral Intravenous Line            Peripheral IV 01/12/20 Left Antecubital 1 day                Physical Exam   Constitutional: She is oriented to person, place, and time  She appears well-developed and well-nourished  No distress  HENT:   Head: Normocephalic and atraumatic  Right Ear: External ear normal    Left Ear: External ear normal    Nose: Nose normal    Mouth/Throat: Oropharynx is clear and moist    Poor dentition   Eyes: Pupils are equal, round, and reactive to light  EOM are normal  Right eye exhibits no discharge  Left eye exhibits no discharge  Neck: Normal range of motion  Neck supple  No tracheal deviation present  Cardiovascular: Normal rate, regular rhythm, normal heart sounds and intact distal pulses  No murmur heard  Pulmonary/Chest: Effort normal  No respiratory distress  She has wheezes (scattered wheeze predominantly in upper lung fields)  Abdominal: Soft  There is no tenderness  There is no guarding  Musculoskeletal: Normal range of motion  She exhibits no edema, tenderness or deformity  Neurological: She is alert and oriented to person, place, and time  No cranial nerve deficit  Skin: Skin is warm and dry  She is not diaphoretic  No erythema  No pallor  Psychiatric: She has a normal mood and affect  Her behavior is normal  Judgment and thought content normal    Vitals reviewed  Lab Results:   I have personally reviewed pertinent lab results  , ABG: No results found for: PHART, SIX3ZPC, PO2ART, KPV7OQK, Q4JAPMYU, BEART, SOURCE, BNP: No results found for: BNP, CBC:   Lab Results   Component Value Date    WBC 5 70 01/14/2020    HGB 11 0 (L) 01/14/2020    HCT 34 5 (L) 01/14/2020    MCV 99 (H) 01/14/2020     01/14/2020    MCH 31 6 01/14/2020    MCHC 31 9 01/14/2020    RDW 14 7 01/14/2020    MPV 10 1 01/14/2020    NRBC 0 01/14/2020   , CMP:   Lab Results   Component Value Date    SODIUM 134 (L) 01/14/2020    K 4 1 01/14/2020     01/14/2020    CO2 27 01/14/2020    BUN 15 01/14/2020    CREATININE 0 78 01/14/2020    CALCIUM 9 0 01/14/2020    EGFR 76 01/14/2020   , PT/INR: No results found for: PT, INR, Troponin: No results found for: TROPONINI    Imaging Studies: I have personally reviewed pertinent reports  and I have personally reviewed pertinent films in PACS     CTA chest PE study 01/14/2020  No acute pulmonary embolism identified  Emphysematous changes noted with bleb in anterior right upper lobe  Large loculated left subpleural pleural effusion with associated compressive atelectasis of right lower lobe  Stable small pleural based 7 x 6 mm nodule in superior segment of right lower lobe  EKG, Pathology, and Other Studies: I have personally reviewed pertinent reports  Echo pending    EKG 1/14/20- sinus tachycardia    Pulmonary Results (PFTs, PSG): none to review     VTE Prophylaxis: Enoxaparin (Lovenox)    Code Status: Level 1 - Full Code      Portions of the record may have been created with voice recognition software  Occasional wrong word or "sound a like" substitutions may have occurred due to the inherent limitations of voice recognition software  Read the chart carefully and recognize, using context, where substitutions have occurred

## 2020-01-14 NOTE — ASSESSMENT & PLAN NOTE
· Continue methylprednisolone 40 mg IV every 8 hours  · Nebulizer treatments via the respiratory protocol

## 2020-01-14 NOTE — ASSESSMENT & PLAN NOTE
· Currently requiring 2 5 lpm of oxygen via the nasal cannula  · At home, she is on 2-3 lpm of oxygen via the nasal cannula  · Check a CTA of the chest/PE protocol  · Nebulizer treatments via the respiratory protocol

## 2020-01-14 NOTE — ASSESSMENT & PLAN NOTE
· Sepsis was present on admission and secondary to acute tracheobronchitis  · Check blood cultures x 2 sets  · Follow the procalcitonin level  · Check a Streptococcus pneumoniae urinary antigen  · Check a Legionella urinary antigen  · Follow the sputum culture results  · Initiate ceftriaxone 1000 mg IV every 24 hours  · Continue azithromycin 500 mg PO every 24 hours    Influenza A/B and RSV PCR [[id number]] (Normal) Collected: 01/12/20 6300   Lab Status: Final result Specimen: Nasopharyngeal Swab Updated: 01/12/20 8835    INFLUENZA A PCR None Detected    INFLUENZA B PCR None Detected    RSV PCR None Detected

## 2020-01-14 NOTE — OCCUPATIONAL THERAPY NOTE
OT Note     01/14/20 0931   Restrictions/Precautions   Other Precautions Fall Risk;O2   ADL   Where Assessed Edge of bed   Grooming Assistance   (Declines until later)   UB Bathing Assistance 5  Supervision/Setup   UB Bathing Comments A with back   LB Bathing Assistance 5  Supervision/Setup   LB Bathing Comments LEs in seated, santhosh/buttocks in stance   UB Dressing Assistance 5  Supervision/Setup   UB Dressing Comments A with fasteners   LB Dressing Assistance 5  Supervision/Setup   LB Dressing Comments Educated use [place] [address] and sock aide to facilitate increased I thru LB dress, receptive to all info and completes non skids utilizing sock aide with 1 vc  Transfers   Sit to Stand 5  Supervision   Additional items Bedrails   Stand to Sit 5  Supervision   Additional items Bedrails   Activity Tolerance   Activity Tolerance   (Tolerates tx session)   Assessment   Assessment Pt with deficits affecting overall functional performance as per initial eval   Ageeable to participate a m  self care  Receptive to education AE to facilitate increased I thru LB dress  Requires cues to rest thru activity as needed  Spoke with OTR who is in agreement pt  will benefit from continued active OT services in attempt to insure further progress and allow safe return to prior living status  Plan   Goal Expiration Date 01/23/20   OT Treatment Day 1   Recommendation   OT Discharge Recommendation Short Term Rehab  (vs [facility] depending on progress)   Remains seated EOB awaiting transport to diagnostic testing  O2 @ 2 5 liters

## 2020-01-14 NOTE — PLAN OF CARE
Problem: Potential for Falls  Goal: Patient will remain free of falls  Description  INTERVENTIONS:  - Assess patient frequently for physical needs  -  Identify cognitive and physical deficits and behaviors that affect risk of falls    -  [place] fall precautions as indicated by assessment   - Educate patient/family on patient safety including physical limitations  - Instruct patient to call for assistance with activity based on assessment  - Modify environment to reduce risk of injury  - Consider OT/PT consult to assist with strengthening/mobility  Outcome: Progressing     Problem: PAIN - ADULT  Goal: Verbalizes/displays adequate comfort level or baseline comfort level  Description  Interventions:  - Encourage patient to monitor pain and request assistance  - Assess pain using appropriate pain scale  - Administer analgesics based on type and severity of pain and evaluate response  - Implement non-pharmacological measures as appropriate and evaluate response  - Consider cultural and social influences on pain and pain management  - Notify physician/advanced practitioner if interventions unsuccessful or patient reports new pain  Outcome: Progressing     Problem: INFECTION - ADULT  Goal: Absence or prevention of progression during hospitalization  Description  INTERVENTIONS:  - Assess and monitor for signs and symptoms of infection  - Monitor lab/diagnostic results  - Monitor all insertion sites, i e  indwelling lines, tubes, and drains  - Administer medications as ordered  - Instruct and encourage patient and family to use good hand hygiene technique  - Identify and instruct in appropriate isolation precautions for identified infection/condition  Outcome: Progressing     Problem: SAFETY ADULT  Goal: Maintain or return to baseline ADL function  Description  INTERVENTIONS:  -  Assess patient's ability to carry out ADLs; assess patient's baseline for ADL function and identify physical deficits which impact ability to perform ADLs (bathing, care of mouth/teeth, toileting, grooming, dressing, etc )  - Assess/evaluate cause of self-care deficits   - Assess range of motion  - Assess patient's mobility; develop plan if impaired  - Assess patient's need for assistive devices and provide as appropriate  - Encourage maximum independence but intervene and supervise when necessary  - Involve family in performance of ADLs  - Assess for home care needs following discharge   - Consider OT consult to assist with ADL evaluation and planning for discharge  - Provide patient education as appropriate  Outcome: Progressing  Goal: Maintain or return mobility status to optimal level  Description  INTERVENTIONS:  - Assess patient's baseline mobility status (ambulation, transfers, stairs, etc )    - Identify cognitive and physical deficits and behaviors that affect mobility  - Identify mobility aids required to assist with transfers and/or ambulation (gait belt, sit-to-stand, lift, walker, cane, etc )  - [place] fall precautions as indicated by assessment  - Record patient progress and toleration of activity level on Mobility SBAR; progress patient to next Phase/Stage  - Instruct patient to call for assistance with activity based on assessment  - Consider rehabilitation consult to assist with strengthening/weightbearing, etc   Outcome: Progressing     Problem: DISCHARGE PLANNING  Goal: Discharge to home or other facility with appropriate resources  Description  INTERVENTIONS:  - Identify barriers to discharge w/patient and caregiver  - Arrange for needed discharge resources and transportation as appropriate  - Identify discharge learning needs (meds, wound care, etc )  - Arrange for interpretive services to assist at discharge as needed  - Refer to Case Management Department for coordinating discharge planning if the patient needs post-hospital services based on physician/advanced practitioner order or complex needs related to functional status, cognitive ability, or social support system  Outcome: Progressing     Problem: Knowledge Deficit  Goal: Patient/family/caregiver demonstrates understanding of disease process, treatment plan, medications, and discharge instructions  Description  Complete learning assessment and assess knowledge base    Interventions:  - Provide teaching at level of understanding  - Provide teaching via preferred learning methods  Outcome: Progressing     Problem: RESPIRATORY - ADULT  Goal: Achieves optimal ventilation and oxygenation  Description  INTERVENTIONS:  - Assess for changes in respiratory status  - Assess for changes in mentation and behavior  - Position to facilitate oxygenation and minimize respiratory effort  - Oxygen administered by appropriate delivery if ordered  - Initiate smoking cessation education as indicated  - Encourage broncho-pulmonary hygiene including cough, deep breathe, Incentive Spirometry  - Assess the need for suctioning and aspirate as needed  - Assess and instruct to report SOB or any respiratory difficulty  - Respiratory Therapy support as indicated  Outcome: Progressing     Problem: Prexisting or High Potential for Compromised Skin Integrity  Goal: Skin integrity is maintained or improved  Description  INTERVENTIONS:  - Identify patients at risk for skin breakdown  - Assess and monitor skin integrity  - Assess and monitor nutrition and hydration status  - Monitor labs   - Assess for incontinence   - Turn and reposition patient  - Assist with mobility/ambulation  - Relieve pressure over bony prominences  - Avoid friction and shearing  - Provide appropriate hygiene as needed including keeping skin clean and dry  - Evaluate need for skin moisturizer/barrier cream  - Collaborate with interdisciplinary team   - Patient/family teaching  - Consider wound care consult   Outcome: Progressing     Problem: DISCHARGE PLANNING - CARE MANAGEMENT  Goal: Discharge to post-acute care or home with appropriate resources  Description  INTERVENTIONS:  - Conduct assessment to determine patient/family and health care team treatment goals, and need for post-acute services based on payer coverage, community resources, and patient preferences, and barriers to discharge  - Address psychosocial, clinical, and financial barriers to discharge as identified in assessment in conjunction with the patient/family and health care team  - Arrange appropriate level of post-acute services according to patient's   needs and preference and payer coverage in collaboration with the physician and health care team  - Communicate with and update the patient/family, physician, and health care team regarding progress on the discharge plan  - Arrange appropriate transportation to post-acute venues    Pt's goal is to return home with [facility],    Outcome: Progressing     Problem: Nutrition/Hydration-ADULT  Goal: Nutrient/Hydration intake appropriate for improving, restoring or maintaining nutritional needs  Description  Monitor and assess patient's nutrition/hydration status for malnutrition  Collaborate with interdisciplinary team and initiate plan and interventions as ordered  Monitor patient's weight and dietary intake as ordered or per policy  Utilize nutrition screening tool and intervene as necessary  Determine patient's food preferences and provide high-protein, high-caloric foods as appropriate       INTERVENTIONS:  - Monitor oral intake, urinary output, labs, and treatment plans  - Assess nutrition and hydration status and recommend course of action  - Evaluate amount of meals eaten  - Assist patient with eating if necessary   - Allow adequate time for meals  - Recommend/ encourage appropriate diets, oral nutritional supplements, and vitamin/mineral supplements  - Assess need for intravenous fluids  - Provide specific nutrition/hydration education as appropriate  - Include patient/family/caregiver in decisions related to nutrition  Outcome: Progressing

## 2020-01-14 NOTE — PROGRESS NOTES
Progress Note - Edna Xiao 1950, 65 y o  female MRN: [id number]    Unit/Bed#: 414-01 Encounter: [id number]    Primary Care Provider: Ravinder Ferrer MD   Date and time admitted to hospital: 1/12/2020  5:37 PM        * Acute on chronic respiratory failure with hypoxia ([facility] [address] )  Assessment & Plan  · Currently requiring 2 5 lpm of oxygen via the nasal cannula  · At home, she is on 2-3 lpm of oxygen via the nasal cannula  · Check a CTA of the chest/PE protocol  · Nebulizer treatments via the respiratory protocol    Sepsis, unspecified organism [facility])  Assessment & Plan  · Sepsis was present on admission and secondary to acute tracheobronchitis  · Check blood cultures x 2 sets  · Follow the procalcitonin level  · Check a Streptococcus pneumoniae urinary antigen  · Check a Legionella urinary antigen  · Follow the sputum culture results  · Initiate ceftriaxone 1000 mg IV every 24 hours  · Continue azithromycin 500 mg PO every 24 hours    Influenza A/B and RSV PCR [[id number]] (Normal) Collected: 01/12/20 1749   Lab Status: Final result Specimen: Nasopharyngeal Swab Updated: 01/12/20 1851    INFLUENZA A PCR None Detected    INFLUENZA B PCR None Detected    RSV PCR None Detected           Acute tracheobronchitis  Assessment & Plan  · Sepsis was present on admission and secondary to acute tracheobronchitis  · Check blood cultures x 2 sets  · Follow the procalcitonin level  · Check a Streptococcus pneumoniae urinary antigen  · Check a Legionella urinary antigen  · Follow the sputum culture results  · Initiate ceftriaxone 1000 mg IV every 24 hours  · Continue azithromycin 500 mg PO every 24 hours    Influenza A/B and RSV PCR [[id number]] (Normal) Collected: 01/12/20 1749   Lab Status: Final result Specimen: Nasopharyngeal Swab Updated: 01/12/20 1851    INFLUENZA A PCR None Detected    INFLUENZA B PCR None Detected    RSV PCR None Detected       Loculated pleural effusion  Assessment & Plan  · Chronic in nature  · Check a CTA of the chest/PE protocol  · Consult Pulmonology      Macrocytic anemia  Assessment & Plan  · Check a vitamin B12 level and folate level  · Continue PO cyanocobalamin supplementation    Hyponatremia  Assessment & Plan  · Likely pseudohyponatremia in the setting of hyperglycemia  · Follow the sodium level    Chronic obstructive pulmonary disease with acute exacerbation (HCC)  Assessment & Plan  · Continue methylprednisolone 40 mg IV every 8 hours  · Nebulizer treatments via the respiratory protocol        Morbid obesity with BMI of 40 0-44 9, adult (HCC)  Assessment & Plan  · Lifestyle modifications are recommended including weight loss, improving her diet, and increasing her amount of activity  Rheumatoid arthritis ([facility] [address] )  Assessment & Plan  · On methotrexate and xeljanz  · Outpatient follow-up with Rheumatology      Type 2 diabetes mellitus with hyperglycemia, with long-term current use of insulin [facility])  Assessment & Plan  Lab Results   Component Value Date    HGBA1C 6 7 (H) 01/12/2020       Recent Labs     01/13/20  1613 01/13/20  1802 01/13/20  2125 01/14/20  0726   POCGLU 411* 395* 313* 247*       Blood Sugar Average: Last 72 hrs:  (P) 286 [id number]     · Increase lantus to 35 Units SQ QHS  · Add humalog 3 Units TID with meals  · Continue lisinopril for renal protection in the setting of type 2 diabetes mellitus  · Hypoglycemia protocol  · Follow the blood glucose trend        VTE Pharmacologic Prophylaxis:   Pharmacologic: Enoxaparin (Lovenox) 40 mg SQ every 12 hours (Dose based her on BMI)  Mechanical VTE Prophylaxis in Place: Yes    Patient Centered Rounds: I have performed bedside rounds with nursing staff today  Time Spent for Care: 30 minutes  More than 50% of total time spent on counseling and coordination of care as described above      Current Length of Stay: 2 day(s)    Current Patient Status: Inpatient   Certification Statement: The patient will continue to require additional inpatient hospital stay due to the need for IV antibiotic treatment and IV methylprednisolone treatment  Code Status: Level 1 - Full Code      Subjective: The patient was seen and examined  The patient continues to experience shortness of breath, worsened with any type physical activity  The patient also complains of chest pain with deep inspiration  Objective:     Vitals:   Temp (24hrs), Av 8 °F (36 6 °C), Min:97 6 °F (36 4 °C), Max:98 °F (36 7 °C)    Temp:  [97 6 °F (36 4 °C)-98 °F (36 7 °C)] 97 9 °F (36 6 °C)  HR:  [55-87] 55  Resp:  [16-21] 20  BP: (121-133)/(54-84) 133/84  SpO2:  [94 %-97 %] 96 %  Body mass index is 41 16 kg/m²  Input and Output Summary (last 24 hours):        Intake/Output Summary (Last 24 hours) at 2020 0909  Last data filed at 2020 3013  Gross per 24 hour   Intake 480 ml   Output 400 ml   Net 80 ml       Physical Exam:     Physical Exam  General:  NAD, awake, alert, follows commands  HEENT:  NC/AT, mucous membranes moist  Neck:  Supple, No JVP elevation  CV:  + S1, + S2, Bradycardia, Regular rhythm  Pulm:  Crackles at the left base, Expiratory wheezing bilaterally  Abd:  Soft, Non-tender, Non-distended  Ext:  No clubbing/cyanosis/edema  Skin:  No rashes      Additional Data:    Labs:    Results from last 7 days   Lab Units 20  0034   WBC Thousand/uL 5 70   HEMOGLOBIN g/dL 11 0*   HEMATOCRIT % 34 5*   PLATELETS Thousands/uL 255   NEUTROS PCT % 80*   LYMPHS PCT % 13*   MONOS PCT % 6   EOS PCT % 0     Results from last 7 days   Lab Units 20  0034 20  0425   SODIUM mmol/L 134* 135*   POTASSIUM mmol/L 4 1 3 6   CHLORIDE mmol/L 102 99*   CO2 mmol/L 27 30   BUN mg/dL 15 15   CREATININE mg/dL 0 78 0 68   ANION GAP mmol/L 5 6   CALCIUM mg/dL 9 0 9 0   ALBUMIN g/dL  --  3 2*   TOTAL BILIRUBIN mg/dL  --  0 40   ALK PHOS U/L  --  69   ALT U/L  --  23   AST U/L  --  12   GLUCOSE RANDOM mg/dL 265* 153*     Results from last 7 days   Lab Units 20  6998 INR  1 02     Results from last 7 days   Lab Units 01/14/20  0726 01/13/20  2125 01/13/20  1802 01/13/20  1613 01/13/20  1126 01/13/20  0703 01/12/20  2343   POC GLUCOSE mg/dl 247* 313* 395* 411* 331* 151* 158*     Results from last 7 days   Lab Units 01/12/20  1820   HEMOGLOBIN A1C % 6 7*     Results from last 7 days   Lab Units 01/13/20  0425 01/12/20  2323   PROCALCITONIN ng/ml <0 05 <0 05           * I Have Reviewed All Lab Data Listed Above  * Additional Pertinent Lab Tests Reviewed: [facility]      Recent Cultures (last 7 days):     Results from last 7 days   Lab Units 01/13/20  0309 01/12/20  2335 01/12/20  2325   BLOOD CULTURE   --  No Growth at 24 hrs  No Growth at 24 hrs     GRAM STAIN RESULT  No polys seen*  1+ Gram positive cocci in pairs and chains*  Rare Gram negative rods*  --   --        Last 24 Hours Medication List:     Current Facility-Administered Medications:  acetaminophen 650 mg Oral Q6H PRN [employer], DO   albuterol 2 5 mg Nebulization Q4H PRN Sina Tellez MD   aluminum-magnesium hydroxide-simethicone 30 mL Oral Q4H PRN Sina Tellez MD   atorvastatin 20 mg Oral Daily Sina Tellez MD   azithromycin 500 mg Oral Q24H Sina Tellez MD   cefTRIAXone 1,000 mg Intravenous Q24H [employer], DO   vitamin B-12 1,000 mcg Oral Daily Sina Tellez MD   dextrose 25 mL Intravenous PRN [employer], DO   docusate sodium 100 mg Oral BID PRN Sina Tellez MD   DULoxetine 20 mg Oral Daily Sina Tellez MD   enoxaparin 40 mg Subcutaneous Q12H 6225 Oliver Anguiano DO   ergocalciferol 50,000 Units Oral Weekly Sina Tellez MD   fluticasone-vilanterol 1 puff Inhalation Daily Sina Tellez MD   folic acid 8,807 mcg Oral Daily Sina Tellez MD   furosemide 40 mg Oral Daily Sina Tellez MD   insulin glargine 35 Units Subcutaneous HS Carlos Reddy DO   insulin lispro 1-6 Units Subcutaneous TID AC Angle Lee MD   insulin lispro 1-6 Units Subcutaneous HS Angle Lee MD   insulin lispro 3 Units Subcutaneous TID With Meals Sukhwinder French DO   ipratropium 0 5 mg Nebulization TID Angle Lee MD   levalbuterol 1 25 mg Nebulization TID Angle Lee MD   levothyroxine 125 mcg Oral Early Morning Angle Lee MD   lidocaine 1 patch Topical Daily Ingrid Isaacs MD   lisinopril 40 mg Oral Daily Angle Lee MD   methotrexate 20 mg Oral Weekly Angle Lee MD   methylPREDNISolone sodium succinate 40 mg Intravenous [facility] Sukhwinder French DO   ondansetron 4 mg Intravenous Q4H PRN Angle Lee MD   pantoprazole 40 mg Oral Early Morning Angle Lee MD   polyethylene glycol 17 g Oral HS Sukhwinder French DO   sitaGLIPtin 100 mg Oral Daily Angle Lee MD   sodium chloride (PF) 3 mL Intravenous PRN Angle Lee MD   tiotropium 18 mcg Inhalation HS Angle Lee MD   Tofacitinib Citrate 1 tablet Oral Daily Angle Lee MD   traZODone 50 mg Oral HS Angle Lee MD   triamcinolone  Topical BID PRN Angle Lee MD        Today, Patient Was Seen By: Sukhwinder French DO    ** Please Note: Dictation voice to text software may have been used in the creation of this document   **

## 2020-01-14 NOTE — ASSESSMENT & PLAN NOTE
· Sepsis was present on admission and secondary to acute tracheobronchitis  · Check blood cultures x 2 sets  · Follow the procalcitonin level  · Check a Streptococcus pneumoniae urinary antigen  · Check a Legionella urinary antigen  · Follow the sputum culture results  · Initiate ceftriaxone 1000 mg IV every 24 hours  · Continue azithromycin 500 mg PO every 24 hours    Influenza A/B and RSV PCR [[id number]] (Normal) Collected: 01/12/20 7045   Lab Status: Final result Specimen: Nasopharyngeal Swab Updated: 01/12/20 3466    INFLUENZA A PCR None Detected    INFLUENZA B PCR None Detected    RSV PCR None Detected

## 2020-01-14 NOTE — PROGRESS NOTES
Pt resting comfortably in bed, denies pain at this time, worrying about her pets at home though  2L NC 97%  Offered pt to get oob to eat but refusing at this time  Uses walker at times when she feels the need  Food/snacks sitting at bedside from home that family brought in  Pt educated on the importance of proper snacks and meals for diabetic patients  Hands out to be given to patient and family

## 2020-01-15 LAB
25(OH)D3 SERPL-MCNC: 50.4 NG/ML (ref 30–100)
ALBUMIN SERPL BCP-MCNC: 3 G/DL (ref 3.5–5)
ALP SERPL-CCNC: 64 U/L (ref 46–116)
ALT SERPL W P-5'-P-CCNC: 19 U/L (ref 12–78)
ANION GAP SERPL CALCULATED.3IONS-SCNC: 3 MMOL/L (ref 4–13)
AST SERPL W P-5'-P-CCNC: 13 U/L (ref 5–45)
BACTERIA SPT RESP CULT: ABNORMAL
BACTERIA SPT RESP CULT: ABNORMAL
BACTERIA UR CULT: ABNORMAL
BACTERIA UR CULT: ABNORMAL
BASOPHILS # BLD AUTO: 0.01 THOUSANDS/ΜL (ref 0–0.1)
BASOPHILS NFR BLD AUTO: 0 % (ref 0–1)
BILIRUB SERPL-MCNC: 0.2 MG/DL (ref 0.2–1)
BUN SERPL-MCNC: 25 MG/DL (ref 5–25)
CALCIUM SERPL-MCNC: 9.8 MG/DL (ref 8.3–10.1)
CHLORIDE SERPL-SCNC: 101 MMOL/L (ref 100–108)
CK SERPL-CCNC: 77 U/L (ref 26–192)
CO2 SERPL-SCNC: 31 MMOL/L (ref 21–32)
CREAT SERPL-MCNC: 0.71 MG/DL (ref 0.6–1.3)
EOSINOPHIL # BLD AUTO: 0 THOUSAND/ΜL (ref 0–0.61)
EOSINOPHIL NFR BLD AUTO: 0 % (ref 0–6)
ERYTHROCYTE [DISTWIDTH] IN BLOOD BY AUTOMATED COUNT: 14.6 % (ref 11.6–15.1)
EST. AVERAGE GLUCOSE BLD GHB EST-MCNC: 143 MG/DL
FOLATE SERPL-MCNC: 18.3 NG/ML (ref 3.1–17.5)
GFR SERPL CREATININE-BSD FRML MDRD: 85 ML/MIN/1.73SQ M
GLUCOSE SERPL-MCNC: 203 MG/DL (ref 65–140)
GLUCOSE SERPL-MCNC: 209 MG/DL (ref 65–140)
GLUCOSE SERPL-MCNC: 255 MG/DL (ref 65–140)
GLUCOSE SERPL-MCNC: 321 MG/DL (ref 65–140)
GRAM STN SPEC: ABNORMAL
HBA1C MFR BLD: 6.6 % (ref 4.2–6.3)
HCT VFR BLD AUTO: 36.4 % (ref 34.8–46.1)
HGB BLD-MCNC: 11.5 G/DL (ref 11.5–15.4)
IMM GRANULOCYTES # BLD AUTO: 0.04 THOUSAND/UL (ref 0–0.2)
IMM GRANULOCYTES NFR BLD AUTO: 0 % (ref 0–2)
L PNEUMO1 AG UR QL IA.RAPID: NEGATIVE
LACTATE SERPL-SCNC: 2.1 MMOL/L (ref 0.5–2)
LYMPHOCYTES # BLD AUTO: 1.09 THOUSANDS/ΜL (ref 0.6–4.47)
LYMPHOCYTES NFR BLD AUTO: 11 % (ref 14–44)
MAGNESIUM SERPL-MCNC: 1.9 MG/DL (ref 1.6–2.6)
MCH RBC QN AUTO: 31.3 PG (ref 26.8–34.3)
MCHC RBC AUTO-ENTMCNC: 31.6 G/DL (ref 31.4–37.4)
MCV RBC AUTO: 99 FL (ref 82–98)
MONOCYTES # BLD AUTO: 0.52 THOUSAND/ΜL (ref 0.17–1.22)
MONOCYTES NFR BLD AUTO: 5 % (ref 4–12)
NEUTROPHILS # BLD AUTO: 7.92 THOUSANDS/ΜL (ref 1.85–7.62)
NEUTS SEG NFR BLD AUTO: 84 % (ref 43–75)
NRBC BLD AUTO-RTO: 0 /100 WBCS
PHOSPHATE SERPL-MCNC: 2.9 MG/DL (ref 2.3–4.1)
PLATELET # BLD AUTO: 285 THOUSANDS/UL (ref 149–390)
PMV BLD AUTO: 10.8 FL (ref 8.9–12.7)
POTASSIUM SERPL-SCNC: 4.8 MMOL/L (ref 3.5–5.3)
PROCALCITONIN SERPL-MCNC: <0.05 NG/ML
PROT SERPL-MCNC: 7.1 G/DL (ref 6.4–8.2)
RBC # BLD AUTO: 3.67 MILLION/UL (ref 3.81–5.12)
S PNEUM AG UR QL: NEGATIVE
SODIUM SERPL-SCNC: 135 MMOL/L (ref 136–145)
TROPONIN I SERPL-MCNC: <0.02 NG/ML
TSH SERPL DL<=0.05 MIU/L-ACNC: 0.94 UIU/ML (ref 0.36–3.74)
VIT B12 SERPL-MCNC: 621 PG/ML (ref 100–900)
WBC # BLD AUTO: 9.58 THOUSAND/UL (ref 4.31–10.16)

## 2020-01-15 PROCEDURE — 99232 SBSQ HOSP IP/OBS MODERATE 35: CPT | Performed by: INTERNAL MEDICINE

## 2020-01-15 PROCEDURE — 82746 ASSAY OF FOLIC ACID SERUM: CPT | Performed by: INTERNAL MEDICINE

## 2020-01-15 PROCEDURE — 82948 REAGENT STRIP/BLOOD GLUCOSE: CPT

## 2020-01-15 PROCEDURE — 84100 ASSAY OF PHOSPHORUS: CPT | Performed by: INTERNAL MEDICINE

## 2020-01-15 PROCEDURE — 83036 HEMOGLOBIN GLYCOSYLATED A1C: CPT | Performed by: INTERNAL MEDICINE

## 2020-01-15 PROCEDURE — 83735 ASSAY OF MAGNESIUM: CPT | Performed by: INTERNAL MEDICINE

## 2020-01-15 PROCEDURE — 84443 ASSAY THYROID STIM HORMONE: CPT | Performed by: INTERNAL MEDICINE

## 2020-01-15 PROCEDURE — 94760 N-INVAS EAR/PLS OXIMETRY 1: CPT

## 2020-01-15 PROCEDURE — 84484 ASSAY OF TROPONIN QUANT: CPT | Performed by: INTERNAL MEDICINE

## 2020-01-15 PROCEDURE — 82306 VITAMIN D 25 HYDROXY: CPT | Performed by: INTERNAL MEDICINE

## 2020-01-15 PROCEDURE — 97110 THERAPEUTIC EXERCISES: CPT

## 2020-01-15 PROCEDURE — 82550 ASSAY OF CK (CPK): CPT | Performed by: INTERNAL MEDICINE

## 2020-01-15 PROCEDURE — 82607 VITAMIN B-12: CPT | Performed by: INTERNAL MEDICINE

## 2020-01-15 PROCEDURE — 97535 SELF CARE MNGMENT TRAINING: CPT

## 2020-01-15 PROCEDURE — 80053 COMPREHEN METABOLIC PANEL: CPT | Performed by: INTERNAL MEDICINE

## 2020-01-15 PROCEDURE — 94640 AIRWAY INHALATION TREATMENT: CPT

## 2020-01-15 PROCEDURE — 99233 SBSQ HOSP IP/OBS HIGH 50: CPT | Performed by: INTERNAL MEDICINE

## 2020-01-15 PROCEDURE — 84145 PROCALCITONIN (PCT): CPT | Performed by: INTERNAL MEDICINE

## 2020-01-15 PROCEDURE — 97116 GAIT TRAINING THERAPY: CPT

## 2020-01-15 PROCEDURE — 83605 ASSAY OF LACTIC ACID: CPT | Performed by: INTERNAL MEDICINE

## 2020-01-15 PROCEDURE — 85025 COMPLETE CBC W/AUTO DIFF WBC: CPT | Performed by: INTERNAL MEDICINE

## 2020-01-15 PROCEDURE — 3044F HG A1C LEVEL LT 7.0%: CPT | Performed by: FAMILY MEDICINE

## 2020-01-15 RX ORDER — PREDNISONE 20 MG/1
40 TABLET ORAL DAILY
Status: DISCONTINUED | OUTPATIENT
Start: 2020-01-15 | End: 2020-01-16 | Stop reason: HOSPADM

## 2020-01-15 RX ORDER — INSULIN GLARGINE 100 [IU]/ML
40 INJECTION, SOLUTION SUBCUTANEOUS
Status: DISCONTINUED | OUTPATIENT
Start: 2020-01-15 | End: 2020-01-16 | Stop reason: HOSPADM

## 2020-01-15 RX ADMIN — ATORVASTATIN CALCIUM 20 MG: 10 TABLET, FILM COATED ORAL at 16:42

## 2020-01-15 RX ADMIN — LIDOCAINE 1 PATCH: 50 PATCH TOPICAL at 10:09

## 2020-01-15 RX ADMIN — POLYETHYLENE GLYCOL 3350 17 G: 17 POWDER, FOR SOLUTION ORAL at 22:15

## 2020-01-15 RX ADMIN — FLUTICASONE FUROATE AND VILANTEROL TRIFENATATE 1 PUFF: 200; 25 POWDER RESPIRATORY (INHALATION) at 10:11

## 2020-01-15 RX ADMIN — INSULIN LISPRO 4 UNITS: 100 INJECTION, SOLUTION INTRAVENOUS; SUBCUTANEOUS at 16:38

## 2020-01-15 RX ADMIN — SITAGLIPTIN 100 MG: 100 TABLET, FILM COATED ORAL at 10:10

## 2020-01-15 RX ADMIN — LEVOTHYROXINE SODIUM 125 MCG: 125 TABLET ORAL at 05:24

## 2020-01-15 RX ADMIN — INSULIN LISPRO 3 UNITS: 100 INJECTION, SOLUTION INTRAVENOUS; SUBCUTANEOUS at 11:48

## 2020-01-15 RX ADMIN — INSULIN GLARGINE 40 UNITS: 100 INJECTION, SOLUTION SUBCUTANEOUS at 22:15

## 2020-01-15 RX ADMIN — LEVALBUTEROL HYDROCHLORIDE 1.25 MG: 1.25 SOLUTION, CONCENTRATE RESPIRATORY (INHALATION) at 20:26

## 2020-01-15 RX ADMIN — IPRATROPIUM BROMIDE 0.5 MG: 0.5 SOLUTION RESPIRATORY (INHALATION) at 20:26

## 2020-01-15 RX ADMIN — DULOXETINE HYDROCHLORIDE 20 MG: 20 CAPSULE, DELAYED RELEASE ORAL at 10:10

## 2020-01-15 RX ADMIN — METHYLPREDNISOLONE SODIUM SUCCINATE 40 MG: 40 INJECTION, POWDER, FOR SOLUTION INTRAMUSCULAR; INTRAVENOUS at 10:10

## 2020-01-15 RX ADMIN — PANTOPRAZOLE SODIUM 40 MG: 40 TABLET, DELAYED RELEASE ORAL at 05:24

## 2020-01-15 RX ADMIN — PREDNISONE 40 MG: 20 TABLET ORAL at 16:42

## 2020-01-15 RX ADMIN — CYANOCOBALAMIN TAB 1000 MCG 1000 MCG: 1000 TAB at 10:10

## 2020-01-15 RX ADMIN — IPRATROPIUM BROMIDE 0.5 MG: 0.5 SOLUTION RESPIRATORY (INHALATION) at 13:44

## 2020-01-15 RX ADMIN — ENOXAPARIN SODIUM 40 MG: 40 INJECTION SUBCUTANEOUS at 10:08

## 2020-01-15 RX ADMIN — INSULIN LISPRO 2 UNITS: 100 INJECTION, SOLUTION INTRAVENOUS; SUBCUTANEOUS at 07:52

## 2020-01-15 RX ADMIN — IPRATROPIUM BROMIDE 0.5 MG: 0.5 SOLUTION RESPIRATORY (INHALATION) at 07:56

## 2020-01-15 RX ADMIN — FUROSEMIDE 40 MG: 40 TABLET ORAL at 10:10

## 2020-01-15 RX ADMIN — LISINOPRIL 40 MG: 20 TABLET ORAL at 10:09

## 2020-01-15 RX ADMIN — INSULIN LISPRO 3 UNITS: 100 INJECTION, SOLUTION INTRAVENOUS; SUBCUTANEOUS at 07:52

## 2020-01-15 RX ADMIN — INSULIN LISPRO 2 UNITS: 100 INJECTION, SOLUTION INTRAVENOUS; SUBCUTANEOUS at 11:48

## 2020-01-15 RX ADMIN — CEFTRIAXONE 1000 MG: 1 INJECTION, SOLUTION INTRAVENOUS at 10:08

## 2020-01-15 RX ADMIN — LEVALBUTEROL HYDROCHLORIDE 1.25 MG: 1.25 SOLUTION, CONCENTRATE RESPIRATORY (INHALATION) at 13:44

## 2020-01-15 RX ADMIN — TRAZODONE HYDROCHLORIDE 50 MG: 50 TABLET ORAL at 22:15

## 2020-01-15 RX ADMIN — ENOXAPARIN SODIUM 40 MG: 40 INJECTION SUBCUTANEOUS at 22:16

## 2020-01-15 RX ADMIN — LEVALBUTEROL HYDROCHLORIDE 1.25 MG: 1.25 SOLUTION, CONCENTRATE RESPIRATORY (INHALATION) at 07:56

## 2020-01-15 RX ADMIN — THIAMINE HYDROCHLORIDE 200 MG: 100 INJECTION, SOLUTION INTRAMUSCULAR; INTRAVENOUS at 13:42

## 2020-01-15 RX ADMIN — FOLIC ACID 1000 MCG: 1 TABLET ORAL at 10:10

## 2020-01-15 RX ADMIN — INSULIN LISPRO 5 UNITS: 100 INJECTION, SOLUTION INTRAVENOUS; SUBCUTANEOUS at 22:14

## 2020-01-15 NOTE — ASSESSMENT & PLAN NOTE
· Change IV methylprednisolone to PO prednisone per Pulmonology  · Continue the breo inhaler daily  · Nebulizer treatments via the respiratory protocol

## 2020-01-15 NOTE — ASSESSMENT & PLAN NOTE
· Initially with acute respiratory failure represented by tachypnea with a respiratory rate of 27 bpm during the first 24 hours of the hospitalization  · Currently requiring 2 5 lpm of oxygen via the nasal cannula  · At home, she is on 2-3 lpm of oxygen via the nasal cannula  · Nebulizer treatments via the respiratory protocol

## 2020-01-15 NOTE — PLAN OF CARE
Problem: OCCUPATIONAL THERAPY ADULT  Goal: Performs self-care activities at highest level of function for planned discharge setting  See evaluation for individualized goals  Description  Treatment Interventions: ADL retraining, Functional transfer training, UE strengthening/ROM, Patient/family training, Equipment evaluation/education, Compensatory technique education, Energy conservation, Activityengagement          See flowsheet documentation for full assessment, interventions and recommendations  Outcome: Progressing  Note:   Limitation: Decreased ADL status, Decreased UE strength, Decreased Safe judgement during ADL, Decreased endurance, Decreased self-care trans, Decreased high-level ADLs  Prognosis: Good  Assessment: Pt with deficits affecting overall functional performance as per initial eval   Demonstrates progress toward OT goals  Spoke with OTR who is in agreement pt will benefit from continued active OT services in attempt to insure return to prior living status       OT Discharge Recommendation: Short Term Rehab  OT - OK to Discharge: (when medically cleared)

## 2020-01-15 NOTE — ASSESSMENT & PLAN NOTE
· Give thiamine 200 mg IV x 1 dose  · Follow the lactic acid level until the lactic acidosis resolves

## 2020-01-15 NOTE — ASSESSMENT & PLAN NOTE
· Sepsis was present on admission and secondary to acute tracheobronchitis  · Follow culture results  · Follow the procalcitonin level  · Continue ceftriaxone 1000 mg IV every 24 hours  · Continue azithromycin 500 mg PO every 24 hours    Influenza A/B and RSV PCR [[id number]] (Normal) Collected: 01/12/20 1749   Lab Status: Final result Specimen: Nasopharyngeal Swab Updated: 01/12/20 1851    INFLUENZA A PCR None Detected    INFLUENZA B PCR None Detected    RSV PCR None Detected     Strep Pneumoniae, Urine [[id number]] (Normal) Collected: 01/14/20 1115   Lab Status: Final result Specimen: Urine, Clean Catch Updated: 01/15/20 0039    Strep pneumoniae antigen, urine Negative   Legionella antigen, urine [[id number]] (Normal) Collected: 01/14/20 1115   Lab Status: Final result Specimen: Urine, Clean Catch Updated: 01/15/20 0039    Legionella Urinary Antigen Negative

## 2020-01-15 NOTE — PROGRESS NOTES
Progress Note - Nena Winslow 1950, 65 y o  female MRN: [id number]    Unit/Bed#: 414-01 Encounter: [id number]    Primary Care Provider: Therese Carrera MD   Date and time admitted to hospital: 1/12/2020  5:37 PM        * Acute on chronic respiratory failure with hypoxia [facility])  Assessment & Plan  · Initially with acute respiratory failure represented by tachypnea with a respiratory rate of 27 bpm during the first 24 hours of the hospitalization  · Currently requiring 2 5 lpm of oxygen via the nasal cannula  · At home, she is on 2-3 lpm of oxygen via the nasal cannula  · Nebulizer treatments via the respiratory protocol    Sepsis, unspecified organism [facility])  Assessment & Plan  · Sepsis was present on admission and secondary to acute tracheobronchitis  · Follow culture results  · Follow the procalcitonin level  · Continue ceftriaxone 1000 mg IV every 24 hours  · Continue azithromycin 500 mg PO every 24 hours    Influenza A/B and RSV PCR [[id number]] (Normal) Collected: 01/12/20 1749   Lab Status: Final result Specimen: Nasopharyngeal Swab Updated: 01/12/20 1851    INFLUENZA A PCR None Detected    INFLUENZA B PCR None Detected    RSV PCR None Detected     Strep Pneumoniae, Urine [[id number]] (Normal) Collected: 01/14/20 1115   Lab Status: Final result Specimen: Urine, Clean Catch Updated: 01/15/20 0039    Strep pneumoniae antigen, urine Negative   Legionella antigen, urine [[id number]] (Normal) Collected: 01/14/20 1115   Lab Status: Final result Specimen: Urine, Clean Catch Updated: 01/15/20 0039    Legionella Urinary Antigen Negative           Acute tracheobronchitis  Assessment & Plan  · Sepsis was present on admission and secondary to acute tracheobronchitis  · Follow culture results  · Follow the procalcitonin level  · Continue ceftriaxone 1000 mg IV every 24 hours  · Continue azithromycin 500 mg PO every 24 hours    Influenza A/B and RSV PCR [[id number]] (Normal) Collected: 01/12/20 1749   Lab Status: Final result Specimen: Nasopharyngeal Swab Updated: 01/12/20 1851    INFLUENZA A PCR None Detected    INFLUENZA B PCR None Detected    RSV PCR None Detected     Strep Pneumoniae, Urine [[id number]] (Normal) Collected: 01/14/20 1115   Lab Status: Final result Specimen: Urine, Clean Catch Updated: 01/15/20 0039    Strep pneumoniae antigen, urine Negative   Legionella antigen, urine [[id number]] (Normal) Collected: 01/14/20 1115   Lab Status: Final result Specimen: Urine, Clean Catch Updated: 01/15/20 0039    Legionella Urinary Antigen Negative           Loculated pleural effusion  Assessment & Plan  · Chronic in nature  · The patient was seen in consultation by Pulmonology  · She will need an outpatient evaluation by Cardiothoracic Surgery      Macrocytic anemia  Assessment & Plan  · Check a vitamin B12 level and folate level  · Continue PO cyanocobalamin supplementation    Hyponatremia  Assessment & Plan  · Likely pseudohyponatremia in the setting of hyperglycemia  · Follow the sodium level    Chronic obstructive pulmonary disease with acute exacerbation (HCC)  Assessment & Plan  · Change IV methylprednisolone to PO prednisone per Pulmonology  · Continue the breo inhaler daily  · Nebulizer treatments via the respiratory protocol        Morbid obesity with BMI of 40 0-44 9, adult (HCC)  Assessment & Plan  · Lifestyle modifications are recommended including weight loss, improving her diet, and increasing her amount of activity          Atelectasis  Assessment & Plan  · Incentive spirometry 10 times per hour while awake    Rheumatoid arthritis ([facility] [address] )  Assessment & Plan  · On methotrexate and xeljanz  · Outpatient follow-up with Rheumatology      Type 2 diabetes mellitus with hyperglycemia, with long-term current use of insulin [facility])  Assessment & Plan  Lab Results   Component Value Date    HGBA1C 6 6 (H) 01/15/2020       Recent Labs     01/14/20  1603 01/14/20  2053 01/15/20  0751 01/15/20  1142   POCGLU 322* 334* 203* 209* Blood Sugar Average: Last 72 hrs:  (P) 500 [id number]     · Increase lantus to 40 Units SQ QHS  · Increase humalog to 5 Units TID with meals  · Continue lisinopril for renal protection in the setting of type 2 diabetes mellitus  · Hypoglycemia protocol  · Follow the blood glucose trend      Lactic acidosis  Assessment & Plan  · Give thiamine 200 mg IV x 1 dose  · Follow the lactic acid level until the lactic acidosis resolves      VTE Pharmacologic Prophylaxis:   Pharmacologic: Enoxaparin (Lovenox) 40 mg SQ every 12 hours (dose based on her BMI)  Mechanical VTE Prophylaxis in Place: Yes    Patient Centered Rounds: I have performed bedside rounds with nursing staff today  Time Spent for Care: 30 minutes  More than 50% of total time spent on counseling and coordination of care as described above  Current Length of Stay: 3 day(s)    Current Patient Status: Inpatient   Certification Statement: The patient will continue to require additional inpatient hospital stay due to the need for IV antibiotic treatment  Code Status: Level 1 - Full Code      Subjective: The patient was seen and examined  The patient is doing better  The shortness of breath is improving  Objective:     Vitals:   Temp (24hrs), Av 1 °F (36 7 °C), Min:97 5 °F (36 4 °C), Max:98 6 °F (37 °C)    Temp:  [97 5 °F (36 4 °C)-98 6 °F (37 °C)] 98 6 °F (37 °C)  HR:  [67-78] 78  Resp:  [18] 18  BP: (117-130)/(53-85) 129/57  SpO2:  [94 %-96 %] 96 %  Body mass index is 41 16 kg/m²  Input and Output Summary (last 24 hours):        Intake/Output Summary (Last 24 hours) at 1/15/2020 1334  Last data filed at 1/15/2020 1300  Gross per 24 hour   Intake 720 ml   Output 2700 ml   Net -1980 ml       Physical Exam:     Physical Exam  General:  NAD, awake, alert, follows commands  HEENT:  NC/AT, mucous membranes moist  Neck:  Supple, No JVP elevation  CV:  + S1, + S2, RRR  Pulm:  Scattered rhonchi bilaterally  Abd:  Soft, Non-tender, Non-distended  Ext:  No clubbing/cyanosis/edema  Skin:  No rashes      Additional Data:    Labs:    Results from last 7 days   Lab Units 01/15/20  0518   WBC Thousand/uL 9 58   HEMOGLOBIN g/dL 11 5   HEMATOCRIT % 36 4   PLATELETS Thousands/uL 285   NEUTROS PCT % 84*   LYMPHS PCT % 11*   MONOS PCT % 5   EOS PCT % 0     Results from last 7 days   Lab Units 01/15/20  0518   SODIUM mmol/L 135*   POTASSIUM mmol/L 4 8   CHLORIDE mmol/L 101   CO2 mmol/L 31   BUN mg/dL 25   CREATININE mg/dL 0 71   ANION GAP mmol/L 3*   CALCIUM mg/dL 9 8   ALBUMIN g/dL 3 0*   TOTAL BILIRUBIN mg/dL 0 20   ALK PHOS U/L 64   ALT U/L 19   AST U/L 13   GLUCOSE RANDOM mg/dL 255*     Results from last 7 days   Lab Units 01/13/20  0425   INR  1 02     Results from last 7 days   Lab Units 01/15/20  1142 01/15/20  0751 01/14/20  2053 01/14/20  1603 01/14/20  1102 01/14/20  0726 01/13/20  2125 01/13/20  1802 01/13/20  1613 01/13/20  1126 01/13/20  0703 01/12/20  2343   POC GLUCOSE mg/dl 209* 203* 334* 322* 431* 247* 313* 395* 411* 331* 151* 158*     Results from last 7 days   Lab Units 01/15/20  0518 01/12/20  1820   HEMOGLOBIN A1C % 6 6* 6 7*     Results from last 7 days   Lab Units 01/15/20  0518 01/13/20  0425 01/12/20  2323   LACTIC ACID mmol/L 2 1*  --   --    PROCALCITONIN ng/ml  --  <0 05 <0 05           * I Have Reviewed All Lab Data Listed Above  * Additional Pertinent Lab Tests Reviewed: [facility]      Recent Cultures (last 7 days):     Results from last 7 days   Lab Units 01/14/20  1115 01/13/20  0309 01/12/20  2335 01/12/20  2325   BLOOD CULTURE   --   --  No Growth at 48 hrs  No Growth at 48 hrs  SPUTUM CULTURE   --  4+ Growth of   Commensal respiratory gucci only; No significant growth of Staph aureus/MRSA or Pseudomonas aeruginosa    --   --    GRAM STAIN RESULT   --  No polys seen*  1+ Gram positive cocci in pairs and chains*  Rare Gram negative rods*  --   --    LEGIONELLA URINARY ANTIGEN Negative  --   --   --        Last 24 Hours Medication List:     Current Facility-Administered Medications:  acetaminophen 650 mg Oral Q6H PRN Luca Garza DO   albuterol 2 5 mg Nebulization Q4H PRN Xin Troncoso MD   aluminum-magnesium hydroxide-simethicone 30 mL Oral Q4H PRN Xin Troncoso MD   atorvastatin 20 mg Oral Daily Xin Troncoso MD   vitamin B-12 1,000 mcg Oral Daily Xin Troncoso MD   dextrose 25 mL Intravenous PRN Luca Garza DO   docusate sodium 100 mg Oral BID PRN Xin Troncoso MD   DULoxetine 20 mg Oral Daily Xin Troncoso MD   enoxaparin 40 mg Subcutaneous Q12H 6225 Oliver Anguiano DO   fluticasone-vilanterol 1 puff Inhalation Daily Xin Troncoso MD   folic acid 4,722 mcg Oral Daily Xin Troncoso MD   furosemide 40 mg Oral Daily Xin Troncoso MD   insulin glargine 40 Units Subcutaneous HS Carlos Reddy DO   insulin lispro 1-6 Units Subcutaneous TID AC Xin Troncoso MD   insulin lispro 1-6 Units Subcutaneous HS Xin Troncoso MD   insulin lispro 5 Units Subcutaneous TID With Meals Luca Garza DO   ipratropium 0 5 mg Nebulization TID Xin Troncoso MD   levalbuterol 1 25 mg Nebulization TID Xin Troncoso MD   levothyroxine 125 mcg Oral Early Morning Xin Troncoso MD   lidocaine 1 patch Topical Daily Shahrzad Florence MD   lisinopril 40 mg Oral Daily Xin Troncoso MD   methotrexate 20 mg Oral Weekly Xin Troncoso MD   ondansetron 4 mg Intravenous Q4H PRN Xin Troncoso MD   pantoprazole 40 mg Oral Early Morning Xin Troncoso MD   polyethylene glycol 17 g Oral HS Luca Garza DO   predniSONE 40 mg Oral Daily Bryan Stringer PA-C   sitaGLIPtin 100 mg Oral Daily Xin Troncoso MD   sodium chloride (PF) 3 mL Intravenous PRN Xin Troncoso MD   thiamine 200 mg Intravenous Once Luca Garza DO   Tofacitinib Citrate 1 tablet Oral Daily Ana Khan MD   traZODone 50 mg Oral HS Ana Khan MD   triamcinolone  Topical BID PRN Ana Khan MD        Today, Patient Was Seen By: Bonny Marie DO    ** Please Note: Dictation voice to text software may have been used in the creation of this document   **

## 2020-01-15 NOTE — ASSESSMENT & PLAN NOTE
· Chronic in nature  · The patient was seen in consultation by Pulmonology  · She will need an outpatient evaluation by Cardiothoracic Surgery

## 2020-01-15 NOTE — OCCUPATIONAL THERAPY NOTE
OT Note     01/15/20 0920   Restrictions/Precautions   Other Precautions Fall Risk;O2;Chair Alarm   ADL   Where Assessed Edge of bed   Grooming Comments Completed prior to OT   UB Bathing Assistance 5  Supervision/Setup   UB Bathing Comments A with back   LB Bathing Assistance 5  Supervision/Setup   LB Bathing Comments LEs in seated, except feet, declines same, santhosh/buttocks in stance   UB Dressing Assistance 5  Supervision/Setup   UB Dressing Comments A with back   LB Dressing Comments Not assessed this date   Transfers   Sit to Stand 5  Supervision   Stand to Sit 5  Supervision   Additional items Armrests   Functional Mobility   Functional Mobility 6  Modified independent   Additional Comments Presents in stance upon OT arrival completing txfr to opposite side of bed, Completes txfr EOB to recliner following self care   Additional items   (No A device)   Activity Tolerance   Activity Tolerance Patient tolerated treatment well   Assessment   Assessment Pt with deficits affecting overall functional performance as per initial eval   Demonstrates progress toward OT goals  Spoke with OTR who is in agreement pt will benefit from continued active OT services in attempt to insure return to prior living status  Plan   Goal Expiration Date 01/23/20   OT Treatment Day 2   Recommendation   OT Discharge Recommendation Short Term Rehab   OOB in recliner following self care  All needs in reach  Chair alarm activated

## 2020-01-15 NOTE — PROGRESS NOTES
Progress Note - Pulmonary   Emilie Asp 65 y o  female MRN: [id number]  Unit/Bed#: 414-01 Encounter: [id number]    Assessment/Plan:    1  Chronic hypoxic respiratory failure  1  Currently requiring 2 L nasal cannula  2  Baseline oxygen requirements 2-3 L nasal cannula  3  Titrate oxygen maintain SpO2 greater than or equal to 88%  4  Pulmonary toilet:  Increase activity as tolerated, out of bed as tolerated, cough and deep breathing exercise encouraged, incentive spirometry q 1 hour  2  COPD of unknown severity with acute exacerbation  1  No PFTs on file was CT evidence of emphysema with blebs in anterior right upper lobe  2  Discontinue Solu-Medrol today  Start prednisone 40 mg p o  This evening  Taper by 10 mg every 3 days until completion  3  Continue Atrovent/Xopenex nebs t i d   4  Continue Breo 200/25 1 puff daily  5  At discharge, pulmonary regimen should include Advair 250/50 1 puff twice daily, Spiriva 2 5 1 puff daily, albuterol HFA q 6 hours p r n , DuoNeb q 6 hours p r n   6  Will need pulmonary follow-up with PFTs as outpatient  3  Abnormal CT chest secondary to large loculated left pleural effusion, compressive atelectasis, emphysema  1  No acute infiltrate, no leukocytosis, procalcitonin negative x2, blood cultures negative x2, strep pneumonia a negative, Legionella antigen negative, sputum culture mixed respiratory gucci, afebrile- discontinue antibiotics   2  Large loculated effusion present since at least November 2016  3  Centesis 05/31/2019 yielded loculated effusion with multiple septations, able to aspirate 50 cc dark red bloody fluid-no cytology on record  4  Patient will need a thoracic surgery consult as outpatient-patient verbalized understanding the necessity consult reports she remain compliant  5  Ambulatory referral to thoracic surgery placed  4  MORENO  1  Noncompliant on home CPAP  2  Continue CPAP acute patient  3  Discussed the importance of Pap therapy for obstructive sleep apnea  Patient is aware of the adverse health effects associated with untreated MORENO    Chief Complaint:    "I feel much better "    Subjective:    Patient was seen and examined today  I complaints  States that her breathing is much improved almost back to baseline  Denies chest pain, headache, dizziness, nausea, vomiting, abdominal pain, leg pain leg swelling  Objective:    Vitals: Blood pressure 129/57, pulse 78, temperature 98 6 °F (37 °C), temperature source Oral, resp  rate 18, height 5' 5" (1 651 m), weight 112 kg (247 lb 5 7 oz), SpO2 96 %  2L NC,Body mass index is 41 16 kg/m²  Intake/Output Summary (Last 24 hours) at 1/15/2020 1041  Last data filed at 1/15/2020 0747  Gross per 24 hour   Intake 360 ml   Output 2700 ml   Net -2340 ml       Invasive Devices     Peripheral Intravenous Line            Peripheral IV 01/12/20 Left Antecubital 2 days                Physical Exam:     Physical Exam   Constitutional: She is oriented to person, place, and time  She appears well-developed and well-nourished  No distress  Poor dentition   HENT:   Head: Normocephalic and atraumatic  Right Ear: External ear normal    Left Ear: External ear normal    Mouth/Throat: Oropharynx is clear and moist  No oropharyngeal exudate  Eyes: Pupils are equal, round, and reactive to light  EOM are normal  Right eye exhibits no discharge  Left eye exhibits no discharge  Neck: Normal range of motion  Neck supple  No tracheal deviation present  Cardiovascular: Normal rate, regular rhythm, normal heart sounds and intact distal pulses  No murmur heard  Pulmonary/Chest: Effort normal and breath sounds normal  No respiratory distress  She has no wheezes  She has no rales  Abdominal: Soft  She exhibits no distension  There is no tenderness  Musculoskeletal: Normal range of motion  She exhibits no edema, tenderness or deformity  Neurological: She is alert and oriented to person, place, and time  No cranial nerve deficit  Skin: Skin is warm and dry  She is not diaphoretic  No erythema  No pallor  Psychiatric: She has a normal mood and affect  Her behavior is normal  Judgment and thought content normal    Vitals reviewed  Labs: I have personally reviewed pertinent lab results  , ABG: No results found for: PHART, KGN3CHR, PO2ART, FIK7MKZ, D9WBVCSA, BEART, SOURCE, BNP: No results found for: BNP, CBC:   Lab Results   Component Value Date    WBC 9 58 01/15/2020    HGB 11 5 01/15/2020    HCT 36 4 01/15/2020    MCV 99 (H) 01/15/2020     01/15/2020    MCH 31 3 01/15/2020    MCHC 31 6 01/15/2020    RDW 14 6 01/15/2020    MPV 10 8 01/15/2020    NRBC 0 01/15/2020   , CMP:   Lab Results   Component Value Date    SODIUM 135 (L) 01/15/2020    K 4 8 01/15/2020     01/15/2020    CO2 31 01/15/2020    BUN 25 01/15/2020    CREATININE 0 71 01/15/2020    CALCIUM 9 8 01/15/2020    AST 13 01/15/2020    ALT 19 01/15/2020    ALKPHOS 64 01/15/2020    EGFR 85 01/15/2020   , PT/INR: No results found for: PT, INR, Troponin:   Lab Results   Component Value Date    TROPONINI <0 02 01/15/2020         Imaging and other studies: none to review today

## 2020-01-15 NOTE — PHYSICAL THERAPY NOTE
PHYSICAL THERAPY NOTE          Patient Name: Tenisha Abrams  NVVEU'T Date: 1/15/2020     01/15/20 1057   Restrictions/Precautions   Other Precautions   (O2;Fall Risk;Pain; Chair Alarm)   General   Family/Caregiver Present No   Subjective   Subjective I'll try the steps  Reports it takes her a long time to go up her stairs to enter her home  Transfers   Sit to Stand 5  Supervision   Additional items Assist x 1; Armrests; Verbal cues   Stand to Sit 5  Supervision   Additional items Assist x 1; Armrests; Verbal cues   Stand pivot 5  Supervision   Additional items Assist x 1   Ambulation/Elevation   Gait pattern   (Short stride; Excessively slow; Foward flexed;Decreased foot c)   Gait Assistance 5  Supervision   Additional items Assist x 1   Assistive Device Rolling walker   Distance 120'  (assist for IV and O2)   Stair Management Assistance   (CGA)   Additional items Assist x 1;Verbal cues   Stair Management Technique Two rails; Step to pattern; Foreward;Nonreciprocal   Number of Stairs 2   Balance   Ambulatory Fair   Endurance Deficit   Endurance Deficit Yes   Activity Tolerance   Activity Tolerance Patient limited by fatigue   Exercises   Hip Flexion Standing;20 reps   Hip Abduction Sitting;20 reps   Hip Adduction Sitting;20 reps   Knee AROM Long Arc Quad Sitting;20 reps   Ankle Pumps Sitting;20 reps   Assessment   Prognosis Good   Problem List Decreased strength;Decreased endurance; Impaired balance;Decreased mobility   Assessment Pt  seen for PT treatment session this date with interventions consisting of gait training w/ emphasis on improving pt's ability to ambulate  Pt  Currently performing  tx and ambulation at (SUP-CGA ) x 1 level of function  Utilizing RW with fair balance and stability  Transfer training to increase functional task performance and  to promote safe sit/stand and stand/sit mobility   Pt  education  for hand postion and safety and technique with transfer training  In comparison to previous session, Pt  With improvements in activity tolerance  Trialed stairs  Pt  Was only able to complete 2 steps  Limited by SOB  Pt is in need of continued activity in PT to improve strength balance endurance mobility transfers and ambulation with return to maximize LOF  From PT/mobility standpoint, recommendation at time of d/c would be STR if pt  is unable to complete 15 steps in order to enter her home  Goals   LTG Expiration Date 01/27/20   Plan   Treatment/Interventions Functional transfer training;LE strengthening/ROM; Elevations; Therapeutic exercise;Cognitive reorientation; Bed mobility;Gait training   Progress Progressing toward goals   Recommendation   Recommendation Short-term skilled PT   Pt  OOB in chair  with call bell within reach and alarm on at end of PT session  Discussed with Dasia Nguyen, PT today's treatment and patient's current level of function for care coordination

## 2020-01-15 NOTE — ASSESSMENT & PLAN NOTE
Lab Results   Component Value Date    HGBA1C 6 6 (H) 01/15/2020       Recent Labs     01/14/20  1603 01/14/20  2053 01/15/20  0751 01/15/20  1142   POCGLU 322* 334* 203* 209*       Blood Sugar Average: Last 72 hrs:  (P) 150 [id number]     · Increase lantus to 40 Units SQ QHS  · Increase humalog to 5 Units TID with meals  · Continue lisinopril for renal protection in the setting of type 2 diabetes mellitus  · Hypoglycemia protocol  · Follow the blood glucose trend

## 2020-01-15 NOTE — PLAN OF CARE
Problem: Potential for Falls  Goal: Patient will remain free of falls  Description  INTERVENTIONS:  - Assess patient frequently for physical needs  -  Identify cognitive and physical deficits and behaviors that affect risk of falls    -  [place] fall precautions as indicated by assessment   - Educate patient/family on patient safety including physical limitations  - Instruct patient to call for assistance with activity based on assessment  - Modify environment to reduce risk of injury  - Consider OT/PT consult to assist with strengthening/mobility  Outcome: Progressing     Problem: PAIN - ADULT  Goal: Verbalizes/displays adequate comfort level or baseline comfort level  Description  Interventions:  - Encourage patient to monitor pain and request assistance  - Assess pain using appropriate pain scale  - Administer analgesics based on type and severity of pain and evaluate response  - Implement non-pharmacological measures as appropriate and evaluate response  - Consider cultural and social influences on pain and pain management  - Notify physician/advanced practitioner if interventions unsuccessful or patient reports new pain  Outcome: Progressing     Problem: INFECTION - ADULT  Goal: Absence or prevention of progression during hospitalization  Description  INTERVENTIONS:  - Assess and monitor for signs and symptoms of infection  - Monitor lab/diagnostic results  - Monitor all insertion sites, i e  indwelling lines, tubes, and drains  - Monitor endotracheal if appropriate and nasal secretions for changes in amount and color  - [place] appropriate cooling/warming therapies per order  - Administer medications as ordered  - Instruct and encourage patient and family to use good hand hygiene technique  - Identify and instruct in appropriate isolation precautions for identified infection/condition  Outcome: Progressing     Problem: SAFETY ADULT  Goal: Maintain or return to baseline ADL function  Description  INTERVENTIONS:  -  Assess patient's ability to carry out ADLs; assess patient's baseline for ADL function and identify physical deficits which impact ability to perform ADLs (bathing, care of mouth/teeth, toileting, grooming, dressing, etc )  - Assess/evaluate cause of self-care deficits   - Assess range of motion  - Assess patient's mobility; develop plan if impaired  - Assess patient's need for assistive devices and provide as appropriate  - Encourage maximum independence but intervene and supervise when necessary  - Involve family in performance of ADLs  - Assess for home care needs following discharge   - Consider OT consult to assist with ADL evaluation and planning for discharge  - Provide patient education as appropriate  Outcome: Progressing  Goal: Maintain or return mobility status to optimal level  Description  INTERVENTIONS:  - Assess patient's baseline mobility status (ambulation, transfers, stairs, etc )    - Identify cognitive and physical deficits and behaviors that affect mobility  - Identify mobility aids required to assist with transfers and/or ambulation (gait belt, sit-to-stand, lift, walker, cane, etc )  - [place] fall precautions as indicated by assessment  - Record patient progress and toleration of activity level on Mobility SBAR; progress patient to next Phase/Stage  - Instruct patient to call for assistance with activity based on assessment  - Consider rehabilitation consult to assist with strengthening/weightbearing, etc   Outcome: Progressing     Problem: DISCHARGE PLANNING  Goal: Discharge to home or other facility with appropriate resources  Description  INTERVENTIONS:  - Identify barriers to discharge w/patient and caregiver  - Arrange for needed discharge resources and transportation as appropriate  - Identify discharge learning needs (meds, wound care, etc )  - Arrange for interpretive services to assist at discharge as needed  - Refer to Case Management Department for coordinating discharge planning if the patient needs post-hospital services based on physician/advanced practitioner order or complex needs related to functional status, cognitive ability, or social support system  Outcome: Progressing     Problem: Knowledge Deficit  Goal: Patient/family/caregiver demonstrates understanding of disease process, treatment plan, medications, and discharge instructions  Description  Complete learning assessment and assess knowledge base    Interventions:  - Provide teaching at level of understanding  - Provide teaching via preferred learning methods  Outcome: Progressing     Problem: RESPIRATORY - ADULT  Goal: Achieves optimal ventilation and oxygenation  Description  INTERVENTIONS:  - Assess for changes in respiratory status  - Assess for changes in mentation and behavior  - Position to facilitate oxygenation and minimize respiratory effort  - Oxygen administered by appropriate delivery if ordered  - Initiate smoking cessation education as indicated  - Encourage broncho-pulmonary hygiene including cough, deep breathe, Incentive Spirometry  - Assess the need for suctioning and aspirate as needed  - Assess and instruct to report SOB or any respiratory difficulty  - Respiratory Therapy support as indicated  Outcome: Progressing     Problem: Prexisting or High Potential for Compromised Skin Integrity  Goal: Skin integrity is maintained or improved  Description  INTERVENTIONS:  - Identify patients at risk for skin breakdown  - Assess and monitor skin integrity  - Assess and monitor nutrition and hydration status  - Monitor labs   - Assess for incontinence   - Turn and reposition patient  - Assist with mobility/ambulation  - Relieve pressure over bony prominences  - Avoid friction and shearing  - Provide appropriate hygiene as needed including keeping skin clean and dry  - Evaluate need for skin moisturizer/barrier cream  - Collaborate with interdisciplinary team   - Patient/family teaching  - Consider wound care consult   Outcome: Progressing     Problem: DISCHARGE PLANNING - CARE MANAGEMENT  Goal: Discharge to post-acute care or home with appropriate resources  Description  INTERVENTIONS:  - Conduct assessment to determine patient/family and health care team treatment goals, and need for post-acute services based on payer coverage, community resources, and patient preferences, and barriers to discharge  - Address psychosocial, clinical, and financial barriers to discharge as identified in assessment in conjunction with the patient/family and health care team  - Arrange appropriate level of post-acute services according to patient's   needs and preference and payer coverage in collaboration with the physician and health care team  - Communicate with and update the patient/family, physician, and health care team regarding progress on the discharge plan  - Arrange appropriate transportation to post-acute venues    Pt's goal is to return home with [facility],    Outcome: Progressing     Problem: Nutrition/Hydration-ADULT  Goal: Nutrient/Hydration intake appropriate for improving, restoring or maintaining nutritional needs  Description  Monitor and assess patient's nutrition/hydration status for malnutrition  Collaborate with interdisciplinary team and initiate plan and interventions as ordered  Monitor patient's weight and dietary intake as ordered or per policy  Utilize nutrition screening tool and intervene as necessary  Determine patient's food preferences and provide high-protein, high-caloric foods as appropriate       INTERVENTIONS:  - Monitor oral intake, urinary output, labs, and treatment plans  - Assess nutrition and hydration status and recommend course of action  - Evaluate amount of meals eaten  - Assist patient with eating if necessary   - Allow adequate time for meals  - Recommend/ encourage appropriate diets, oral nutritional supplements, and vitamin/mineral supplements  - Order, calculate, and assess calorie counts as needed  - Recommend, monitor, and adjust tube feedings and TPN/PPN based on assessed needs  - Assess need for intravenous fluids  - Provide specific nutrition/hydration education as appropriate  - Include patient/family/caregiver in decisions related to nutrition  Outcome: Progressing

## 2020-01-16 VITALS
WEIGHT: 248.68 LBS | RESPIRATION RATE: 18 BRPM | TEMPERATURE: 98.2 F | BODY MASS INDEX: 41.43 KG/M2 | DIASTOLIC BLOOD PRESSURE: 82 MMHG | SYSTOLIC BLOOD PRESSURE: 151 MMHG | HEART RATE: 75 BPM | HEIGHT: 65 IN | OXYGEN SATURATION: 95 %

## 2020-01-16 PROBLEM — I51.89 DIASTOLIC DYSFUNCTION: Status: ACTIVE | Noted: 2020-01-16

## 2020-01-16 PROBLEM — E78.00 HYPERCHOLESTEROLEMIA: Status: ACTIVE | Noted: 2020-01-16

## 2020-01-16 LAB
ALBUMIN SERPL BCP-MCNC: 2.9 G/DL (ref 3.5–5)
ALP SERPL-CCNC: 61 U/L (ref 46–116)
ALT SERPL W P-5'-P-CCNC: 22 U/L (ref 12–78)
ANION GAP SERPL CALCULATED.3IONS-SCNC: 7 MMOL/L (ref 4–13)
AST SERPL W P-5'-P-CCNC: 10 U/L (ref 5–45)
BASOPHILS # BLD AUTO: 0.01 THOUSANDS/ΜL (ref 0–0.1)
BASOPHILS NFR BLD AUTO: 0 % (ref 0–1)
BILIRUB SERPL-MCNC: 0.2 MG/DL (ref 0.2–1)
BUN SERPL-MCNC: 23 MG/DL (ref 5–25)
CALCIUM SERPL-MCNC: 9.7 MG/DL (ref 8.3–10.1)
CHLORIDE SERPL-SCNC: 97 MMOL/L (ref 100–108)
CO2 SERPL-SCNC: 31 MMOL/L (ref 21–32)
CREAT SERPL-MCNC: 0.61 MG/DL (ref 0.6–1.3)
EOSINOPHIL # BLD AUTO: 0 THOUSAND/ΜL (ref 0–0.61)
EOSINOPHIL NFR BLD AUTO: 0 % (ref 0–6)
ERYTHROCYTE [DISTWIDTH] IN BLOOD BY AUTOMATED COUNT: 14.6 % (ref 11.6–15.1)
GFR SERPL CREATININE-BSD FRML MDRD: 91 ML/MIN/1.73SQ M
GLUCOSE SERPL-MCNC: 218 MG/DL (ref 65–140)
GLUCOSE SERPL-MCNC: 237 MG/DL (ref 65–140)
GLUCOSE SERPL-MCNC: 286 MG/DL (ref 65–140)
HCT VFR BLD AUTO: 35.7 % (ref 34.8–46.1)
HGB BLD-MCNC: 11.5 G/DL (ref 11.5–15.4)
IMM GRANULOCYTES # BLD AUTO: 0.05 THOUSAND/UL (ref 0–0.2)
IMM GRANULOCYTES NFR BLD AUTO: 1 % (ref 0–2)
LACTATE SERPL-SCNC: 1.4 MMOL/L (ref 0.5–2)
LYMPHOCYTES # BLD AUTO: 1.03 THOUSANDS/ΜL (ref 0.6–4.47)
LYMPHOCYTES NFR BLD AUTO: 11 % (ref 14–44)
MAGNESIUM SERPL-MCNC: 2.1 MG/DL (ref 1.6–2.6)
MCH RBC QN AUTO: 31.7 PG (ref 26.8–34.3)
MCHC RBC AUTO-ENTMCNC: 32.2 G/DL (ref 31.4–37.4)
MCV RBC AUTO: 98 FL (ref 82–98)
MONOCYTES # BLD AUTO: 0.58 THOUSAND/ΜL (ref 0.17–1.22)
MONOCYTES NFR BLD AUTO: 6 % (ref 4–12)
NEUTROPHILS # BLD AUTO: 7.89 THOUSANDS/ΜL (ref 1.85–7.62)
NEUTS SEG NFR BLD AUTO: 82 % (ref 43–75)
NRBC BLD AUTO-RTO: 0 /100 WBCS
PHOSPHATE SERPL-MCNC: 3.3 MG/DL (ref 2.3–4.1)
PLATELET # BLD AUTO: 293 THOUSANDS/UL (ref 149–390)
PMV BLD AUTO: 11 FL (ref 8.9–12.7)
POTASSIUM SERPL-SCNC: 4.4 MMOL/L (ref 3.5–5.3)
PROCALCITONIN SERPL-MCNC: <0.05 NG/ML
PROT SERPL-MCNC: 7.1 G/DL (ref 6.4–8.2)
RBC # BLD AUTO: 3.63 MILLION/UL (ref 3.81–5.12)
SODIUM SERPL-SCNC: 135 MMOL/L (ref 136–145)
WBC # BLD AUTO: 9.56 THOUSAND/UL (ref 4.31–10.16)

## 2020-01-16 PROCEDURE — 83735 ASSAY OF MAGNESIUM: CPT | Performed by: INTERNAL MEDICINE

## 2020-01-16 PROCEDURE — 99232 SBSQ HOSP IP/OBS MODERATE 35: CPT | Performed by: PHYSICIAN ASSISTANT

## 2020-01-16 PROCEDURE — 84100 ASSAY OF PHOSPHORUS: CPT | Performed by: INTERNAL MEDICINE

## 2020-01-16 PROCEDURE — 84145 PROCALCITONIN (PCT): CPT | Performed by: INTERNAL MEDICINE

## 2020-01-16 PROCEDURE — 94760 N-INVAS EAR/PLS OXIMETRY 1: CPT

## 2020-01-16 PROCEDURE — 94640 AIRWAY INHALATION TREATMENT: CPT

## 2020-01-16 PROCEDURE — 82948 REAGENT STRIP/BLOOD GLUCOSE: CPT

## 2020-01-16 PROCEDURE — 80053 COMPREHEN METABOLIC PANEL: CPT | Performed by: INTERNAL MEDICINE

## 2020-01-16 PROCEDURE — 97116 GAIT TRAINING THERAPY: CPT

## 2020-01-16 PROCEDURE — 85025 COMPLETE CBC W/AUTO DIFF WBC: CPT | Performed by: INTERNAL MEDICINE

## 2020-01-16 PROCEDURE — 99239 HOSP IP/OBS DSCHRG MGMT >30: CPT | Performed by: INTERNAL MEDICINE

## 2020-01-16 PROCEDURE — 83605 ASSAY OF LACTIC ACID: CPT | Performed by: INTERNAL MEDICINE

## 2020-01-16 RX ORDER — ACETAMINOPHEN 325 MG/1
650 TABLET ORAL EVERY 6 HOURS PRN
Refills: 0 | Status: ON HOLD
Start: 2020-01-16 | End: 2021-12-13 | Stop reason: SDUPTHER

## 2020-01-16 RX ORDER — IPRATROPIUM BROMIDE AND ALBUTEROL SULFATE 2.5; .5 MG/3ML; MG/3ML
3 SOLUTION RESPIRATORY (INHALATION) EVERY 6 HOURS PRN
Qty: 120 VIAL | Refills: 0 | Status: SHIPPED | OUTPATIENT
Start: 2020-01-16 | End: 2021-12-13 | Stop reason: HOSPADM

## 2020-01-16 RX ORDER — NYSTATIN 100000 [USP'U]/G
POWDER TOPICAL 3 TIMES DAILY PRN
Start: 2020-01-16 | End: 2021-12-13 | Stop reason: HOSPADM

## 2020-01-16 RX ORDER — PREDNISONE 10 MG/1
TABLET ORAL
Qty: 19 TABLET | Refills: 0 | Status: SHIPPED | OUTPATIENT
Start: 2020-01-16 | End: 2020-02-19

## 2020-01-16 RX ORDER — MELATONIN
1000 DAILY
Qty: 30 TABLET | Refills: 0 | Status: SHIPPED | OUTPATIENT
Start: 2020-01-16 | End: 2021-01-12

## 2020-01-16 RX ADMIN — FOLIC ACID 1000 MCG: 1 TABLET ORAL at 08:46

## 2020-01-16 RX ADMIN — IPRATROPIUM BROMIDE 0.5 MG: 0.5 SOLUTION RESPIRATORY (INHALATION) at 07:49

## 2020-01-16 RX ADMIN — LEVOTHYROXINE SODIUM 125 MCG: 125 TABLET ORAL at 06:03

## 2020-01-16 RX ADMIN — CYANOCOBALAMIN TAB 1000 MCG 1000 MCG: 1000 TAB at 08:46

## 2020-01-16 RX ADMIN — LEVALBUTEROL HYDROCHLORIDE 1.25 MG: 1.25 SOLUTION, CONCENTRATE RESPIRATORY (INHALATION) at 07:49

## 2020-01-16 RX ADMIN — INSULIN LISPRO 2 UNITS: 100 INJECTION, SOLUTION INTRAVENOUS; SUBCUTANEOUS at 08:44

## 2020-01-16 RX ADMIN — FUROSEMIDE 40 MG: 40 TABLET ORAL at 08:46

## 2020-01-16 RX ADMIN — LIDOCAINE 1 PATCH: 50 PATCH TOPICAL at 08:45

## 2020-01-16 RX ADMIN — SITAGLIPTIN 100 MG: 100 TABLET, FILM COATED ORAL at 08:46

## 2020-01-16 RX ADMIN — ENOXAPARIN SODIUM 40 MG: 40 INJECTION SUBCUTANEOUS at 08:45

## 2020-01-16 RX ADMIN — PANTOPRAZOLE SODIUM 40 MG: 40 TABLET, DELAYED RELEASE ORAL at 06:03

## 2020-01-16 RX ADMIN — DULOXETINE HYDROCHLORIDE 20 MG: 20 CAPSULE, DELAYED RELEASE ORAL at 08:46

## 2020-01-16 RX ADMIN — PREDNISONE 40 MG: 20 TABLET ORAL at 08:45

## 2020-01-16 RX ADMIN — LISINOPRIL 40 MG: 20 TABLET ORAL at 08:46

## 2020-01-16 RX ADMIN — FLUTICASONE FUROATE AND VILANTEROL TRIFENATATE 1 PUFF: 200; 25 POWDER RESPIRATORY (INHALATION) at 08:44

## 2020-01-16 NOTE — PROGRESS NOTES
Progress Note - Pulmonary   Zacarias Acevedo 65 y o  female MRN: [id number]  Unit/Bed#: 414-01 Encounter: [id number]    Assessment/Plan:    1  Chronic hypoxic respiratory failure  1  At around 2 L nasal cannula  2  Baseline oxygen requirements 2-3 L nasal cannula  3  Titrate oxygen maintain SpO2 greater than or equal to 88%  4  Pulmonary toilet:  Increase activity as tolerated, out of bed as tolerated, cough and deep breathing exercise encouraged, incentive spirometry q 1 hour  2  COPD of unknown severity with acute exacerbation  1  No PFTs on file was CT evidence of emphysema with blebs in anterior right upper lobe  2  Continue prednisone 40 mg p o  Daily  Taper by 10 mg every 3 days until completion  Day #2/3 of 40 mg dose  3  Continue Atrovent/Xopenex nebs t i d   4  Continue Breo 200/25 1 puff daily  5  At discharge, pulmonary regimen should include Advair 250/50 1 puff twice daily, Spiriva 2 5 1 puff daily, albuterol HFA q 6 hours p r n , DuoNeb q 6 hours p r n   6  Will need pulmonary follow-up with PFTs as outpatient  3  Abnormal CT chest secondary to large loculated left pleural effusion, compressive atelectasis, emphysema  1  No acute infiltrate, no leukocytosis, procalcitonin negative x2, blood cultures negative x2, strep pneumonia a negative, Legionella antigen negative, sputum culture mixed respiratory gucci, afebrile- continue to monitor off antibiotics  2  Large loculated effusion present since at least November 2016  3  Thoracentesis 05/31/2019 yielded loculated effusion with multiple septations, able to aspirate 50 cc dark red bloody fluid-no cytology on record  4  Patient will need a thoracic surgery consult as outpatient-patient verbalized understanding the necessity consult reports she remain compliant  5  Ambulatory referral to Dr Jean Carlos Philippe in [facility] thoracic surgery office  4  MORENO  1  Noncompliant on home CPAP  2  Continue CPAP qhs with inpatient  3   Discussed the importance of Pap therapy for obstructive sleep apnea  Patient is aware of the adverse health effects associated with untreated MORENO    Outpatient Pulmonary follow-up per discharge instructions  Patient is stable from a pulmonary standpoint pending follow-up as above  Will sign off  Call with questions  Chief Complaint:    "I'm going home "    Subjective:    Patient was seen and examined today  Offers no new complaints  States that she is almost 100% back to baseline but does admit to GEORGE still  Cough and wheeze are almost nonexistent  Denies fevers, chills, headache, dizziness, chest pain, palpitations, nausea, vomiting, abdominal pain, leg pain, leg swelling  Objective:    Vitals: Blood pressure 151/82, pulse 75, temperature 98 2 °F (36 8 °C), temperature source Oral, resp  rate 18, height 5' 5" (1 651 m), weight 113 kg (248 lb 10 9 oz), SpO2 95 %  2L NC,Body mass index is 41 38 kg/m²  Intake/Output Summary (Last 24 hours) at 1/16/2020 1054  Last data filed at 1/16/2020 0908  Gross per 24 hour   Intake 840 ml   Output --   Net 840 ml       Invasive Devices     Peripheral Intravenous Line            Peripheral IV 01/14/20 Right Hand 1 day                Physical Exam:     Physical Exam   Constitutional: She is oriented to person, place, and time  She appears well-developed and well-nourished  No distress  HENT:   Head: Normocephalic and atraumatic  Right Ear: External ear normal    Left Ear: External ear normal    Nose: Nose normal    Mouth/Throat: Oropharynx is clear and moist  No oropharyngeal exudate  Eyes: Pupils are equal, round, and reactive to light  EOM are normal  Right eye exhibits no discharge  Left eye exhibits no discharge  Neck: Normal range of motion  Neck supple  No tracheal deviation present  Cardiovascular: Normal rate, regular rhythm, normal heart sounds and intact distal pulses  No murmur heard  Pulmonary/Chest: Effort normal and breath sounds normal  No respiratory distress  She has no wheezes  She has no rales  CTA bilaterally without rhonchi, rales, or wheeze   Abdominal: Soft  Bowel sounds are normal  There is no tenderness  Musculoskeletal: Normal range of motion  She exhibits no edema or deformity  Neurological: She is alert and oriented to person, place, and time  No cranial nerve deficit  Skin: Skin is warm and dry  She is not diaphoretic  No erythema  No pallor  Psychiatric: She has a normal mood and affect  Her behavior is normal  Judgment and thought content normal    Vitals reviewed  Labs: I have personally reviewed pertinent lab results  , ABG: No results found for: PHART, HAL0MWH, PO2ART, ZEJ5LVB, E9GIGOEQ, BEART, SOURCE, BNP: No results found for: BNP, CBC:   Lab Results   Component Value Date    WBC 9 56 01/16/2020    HGB 11 5 01/16/2020    HCT 35 7 01/16/2020    MCV 98 01/16/2020     01/16/2020    MCH 31 7 01/16/2020    MCHC 32 2 01/16/2020    RDW 14 6 01/16/2020    MPV 11 0 01/16/2020    NRBC 0 01/16/2020   , CMP:   Lab Results   Component Value Date    SODIUM 135 (L) 01/16/2020    K 4 4 01/16/2020    CL 97 (L) 01/16/2020    CO2 31 01/16/2020    BUN 23 01/16/2020    CREATININE 0 61 01/16/2020    CALCIUM 9 7 01/16/2020    AST 10 01/16/2020    ALT 22 01/16/2020    ALKPHOS 61 01/16/2020    EGFR 91 01/16/2020   , PT/INR: No results found for: PT, INR, Troponin: No results found for: TROPONINI      Imaging and other studies: none to review today

## 2020-01-16 NOTE — DISCHARGE INSTRUCTIONS
COPD (Chronic Obstructive Pulmonary Disease)   WHAT YOU NEED TO KNOW:   Chronic obstructive pulmonary disease (COPD) is a lung disease that makes it hard for you to breathe  It is usually a result of lung damage caused by years of irritation and inflammation in your lungs  DISCHARGE INSTRUCTIONS:   Call 911 if:   · You feel lightheaded, short of breath, and have chest pain  Seek care immediately if:   · You are confused, dizzy, or feel faint  · Your arm or leg feels warm, tender, and painful  It may look swollen and red  · You cough up blood  Contact your healthcare provider if:   · You have more shortness of breath than usual      · You need more medicine than usual to control your symptoms  · You are coughing or wheezing more than usual      · You are coughing up more mucus, or it is a different color or has a different odor  · You gain more than 3 pounds in a week  · You have a fever, a runny or stuffy nose, and a sore throat, or other cold or flu symptoms  · Your skin, lips, or nails start to turn blue  · You have swelling in your legs or ankles  · You are very tired or weak for more than a day  · You notice changes in your mood, or changes in your ability to think or concentrate  · You have questions or concerns about your condition or care  Medicines:   · Medicines  may be used to open your airways, decrease swelling and inflammation in your lungs, or treat an infection  You may need 2 or more medicines  A short-acting medicine relieves symptoms quickly  Long-acting medicines will control or prevent symptoms  Ask for more information about the medicines you are given and how to use them safely  · Take your medicine as directed  Contact your healthcare provider if you think your medicine is not helping or if you have side effects  Tell him or her if you are allergic to any medicine  Keep a list of the medicines, vitamins, and herbs you take  Include the amounts, and when and why you take them  Bring the list or the pill bottles to follow-up visits  Carry your medicine list with you in case of an emergency  Help make breathing easier:   · Use pursed-lip breathing any time you feel short of breath  Take a deep breath in through your nose  Slowly breathe out through your mouth with your lips pursed for twice as long as you inhaled  You can also practice this breathing pattern while you bend, lift, climb stairs, or exercise  It slows down your breathing and helps move more air in and out of your lungs  · Do not smoke, and avoid others who smoke  Nicotine and other substances can cause lung irritation or damage and make it harder for you to breathe  Do not use e-cigarettes or smokeless tobacco  They still contain nicotine  Ask your healthcare provider for information if you currently smoke and need help to quit  For support and more information:  ¨ [employer]  Phone: 8- 818 - 032-1877  Web Address: [employer]      · Be aware of and avoid anything that makes your symptoms worse  Stay out of high altitudes and places with high humidity  Stay inside, or cover your mouth and nose with a scarf when you are outside during cold weather  Stay inside on days when air pollution or pollen counts are high  Do not use aerosol sprays such as deodorant, bug spray, and hair spray  Manage COPD and help prevent exacerbations:  COPD is a serious condition that gets worse over time  A COPD exacerbation means your symptoms suddenly get worse  It is important to prevent exacerbations  An exacerbation can cause more lung damage  COPD cannot be cured, but you can take action to feel better and prevent COPD exacerbations:  · Protect yourself from germs  Germs can get into your lungs and cause an infection  An infection in your lungs can create more mucus and make it harder to breathe   An infection can also create swelling in your airways and prevent air from getting in  You can decrease your risk for infection by doing the following:     [facility] your hands often with soap and water  Carry germ-killing gel with you  You can use the gel to clean your hands when soap and water are not available  ¨ Do not touch your eyes, nose, or mouth unless you have washed your hands first      ¨ Always cover your mouth when you cough  Cough into a tissue or your shirtsleeve so you do not spread germs from your hands  ¨ Try to avoid people who have a cold or the flu  If you are sick, stay away from others as much as possible  · Drink more liquids  This will help to keep your air passages moist and help you cough up mucus  Ask how much liquid to drink each day and which liquids are best for you  · Exercise daily  Exercise for at least 20 minutes each day to help increase your energy and decrease shortness of breath  Walking or riding a bike are good ways to exercise  Talk to your healthcare provider about the best exercise plan for you  · Ask about vaccines  Your healthcare provider may recommend that you get regular flu and pneumonia vaccines  Pneumonia can become life-threatening for a person who has COPD  Ask about other vaccines you may need  Ask your healthcare provider about the flu and pneumonia vaccines  All adults should get the flu (influenza) vaccine every year as soon as it becomes available  The pneumonia vaccine is given to adults aged 72 or older to prevent pneumococcal disease, such as pneumonia  Adults aged 23 to 59 years who are at high risk for pneumococcal disease also should get the pneumococcal vaccine  It may need to be repeated 1 or 5 years later  Pulmonary rehabilitation:  Your healthcare provider may recommend a program to help you manage your symptoms and improve your quality of life  It may include nutritional counseling and exercise to strengthen your lungs     Make decisions about your choices for future treatment:  Ask for information about advanced medical directives and living reyes  These documents help you decide and write down your choices for treatment and end-of-life care  It is best to complete them when you feel well and can think clearly about your wishes  The information can then be kept for future use if you are in the hospital or become very ill  Follow up with your healthcare provider as directed: You may need more tests  Your healthcare provider may refer you to a pulmonary (lung) specialist  Write down your questions so you remember to ask them during your visits  © 2017 [facility] Information is for End User's use only and may not be sold, redistributed or otherwise used for commercial purposes  All illustrations and images included in CareNotes® are the copyrighted property of [employer]  or Braden Lee  The above information is an  only  It is not intended as medical advice for individual conditions or treatments  Talk to your doctor, nurse or pharmacist before following any medical regimen to see if it is safe and effective for you  COPD (Chronic Obstructive Pulmonary Disease)   WHAT YOU NEED TO KNOW:   What is chronic obstructive pulmonary disease (COPD)? COPD is a lung disease that makes it hard for you to breathe  It is usually a result of lung damage caused by years of irritation and inflammation in your lungs  This limits air flow in your lungs  Smoking, pollution, genetics, or a history of lung infections can increase your risk for COPD  What are the signs and symptoms of COPD? · Shortness of breath     · A dry cough     · Coughing fits that bring up mucus from your lungs     · Wheezing and chest tightness  How is COPD diagnosed? Your healthcare provider will ask about your symptoms and examine you  He or she will ask if a family member has COPD or breathing problems  He or she will ask if you are a current or former smoker   Tell your provider if you have other medical conditions, such as heart disease or asthma  Tell him or her how long you have had symptoms, what makes them worse, and how they affect your life  You may need the following:  · Lung function tests  measure the airflow in your lungs and show how well you can breathe  · Blood tests  check for infection and measure oxygen levels in your blood  · A chest x-ray  is done to check for other lung problems  · CT scan pictures  may be taken of your lungs  You may be given contrast liquid to help your lungs show up better in the pictures  Tell the healthcare provider if you have ever had an allergic reaction to contrast liquid  How is COPD treated? · Medicines  may be used to open your airways, decrease swelling and inflammation in your lungs, or treat an infection  You may need 2 or more medicines  A short-acting medicine relieves symptoms quickly  Long-acting medicines will control or prevent symptoms  Ask your healthcare provider for more information about the medicines you are given and how to use them safely  · Pulmonary rehabilitation  is a program to help you manage your symptoms and improve your quality of life  It may include nutritional counseling and exercise to strengthen your lungs  · Oxygen  may help you breathe easier and feel more alert if you have severe COPD  · Surgery  is sometimes done if all other treatments have failed  A lung reduction is surgery to remove part of your damaged lung  A lung transplant is the replacement of your lung with a donor lung  Ask your healthcare provider for more information about surgery for COPD  What are the risks of COPD? COPD raises your risk for diabetes, high blood pressure, and heart disease  Without treatment, COPD can become life-threatening  What can I do to help make breathing easier? · Use pursed-lip breathing any time you feel short of breath  Take a deep breath in through your nose   Slowly breathe out through your mouth with your lips pursed for twice as long as you inhaled  You can also practice this breathing pattern while you bend, lift, climb stairs, or exercise  It slows down your breathing and helps move more air in and out of your lungs  · Do not smoke, and avoid others who smoke  Nicotine and other substances can cause lung irritation or damage and make it harder for you to breathe  Do not use e-cigarettes or smokeless tobacco  They still contain nicotine  Ask your healthcare provider for information if you currently smoke and need help to quit  For support and more information:  ¨ [employer]  Phone: 4- 567 - 240-1139  Web Address: [employer]      · Be aware of and avoid anything that makes your symptoms worse  Stay out of high altitudes and places with high humidity  Stay inside, or cover your mouth and nose with a scarf when you are outside during cold weather  Stay inside on days when air pollution or pollen counts are high  Do not use aerosol sprays such as deodorant, bug spray, and hair spray  How can I manage COPD and help prevent exacerbations? COPD is a serious condition that gets worse over time  A COPD exacerbation means your symptoms suddenly get worse  It is important to prevent exacerbations  An exacerbation can cause more lung damage  COPD cannot be cured, but you can take action to feel better and prevent COPD exacerbations:  · Protect yourself from germs  Germs can get into your lungs and cause an infection  An infection in your lungs can create more mucus and make it harder to breathe  An infection can also create swelling in your airways and prevent air from getting in  You can decrease your risk for infection by doing the following:     [facility] your hands often with soap and water  Carry germ-killing gel with you  You can use the gel to clean your hands when soap and water are not available             ¨ Do not touch your eyes, nose, or mouth unless you have washed your hands first      ¨ Always cover your mouth when you cough  Cough into a tissue or your shirtsleeve so you do not spread germs from your hands  ¨ Try to avoid people who have a cold or the flu  If you are sick, stay away from others as much as possible  · Drink more liquids  This will help to keep your air passages moist and help you cough up mucus  Ask how much liquid to drink each day and which liquids are best for you  · Exercise daily  Exercise for at least 20 minutes each day to help increase your energy and decrease shortness of breath  Talk to your healthcare provider about the best exercise plan for you  · Ask about vaccines  Your healthcare provider may recommend that you get regular flu and pneumonia vaccines  Pneumonia can become life-threatening for a person who has COPD  Ask about other vaccines you may need  Ask your healthcare provider about the flu and pneumonia vaccines  All adults should get the flu (influenza) vaccine every year as soon as it becomes available  The pneumonia vaccine is given to adults aged 72 or older to prevent pneumococcal disease, such as pneumonia  Adults aged 23 to 59 years who are at high risk for pneumococcal disease also should get the pneumococcal vaccine  It may need to be repeated 1 or 5 years later  Call 911 if:   · You feel lightheaded, short of breath, and have chest pain  When should I seek immediate care? · You are confused, dizzy, or feel faint  · Your arm or leg feels warm, tender, and painful  It may look swollen and red  · You cough up blood  When should I contact my healthcare provider? · You have more shortness of breath than usual      · You need more medicine than usual to control your symptoms  · You are coughing or wheezing more than usual      · You are coughing up more mucus, or it is a different color or has a different odor  · You gain more than 3 pounds in a week       · You have a fever, a runny or stuffy nose, and a sore throat, or other cold or flu symptoms  · Your skin, lips, or nails start to turn blue  · You have swelling in your legs or ankles  · You are very tired or weak for more than a day  · You notice changes in your mood, or changes in your ability to think or concentrate  · You have questions or concerns about your condition or care  CARE AGREEMENT:   You have the right to help plan your care  Learn about your health condition and how it may be treated  Discuss treatment options with your caregivers to decide what care you want to receive  You always have the right to refuse treatment  The above information is an  only  It is not intended as medical advice for individual conditions or treatments  Talk to your doctor, nurse or pharmacist before following any medical regimen to see if it is safe and effective for you  © 2017 [address] [employer] Information is for End User's use only and may not be sold, redistributed or otherwise used for commercial purposes  All illustrations and images included in CareNotes® are the copyrighted property of A [employer] A M [employer]  or Braden Lee

## 2020-01-16 NOTE — ASSESSMENT & PLAN NOTE
· Incentive spirometry 10 times per hour while awake was utilized during the hospitalization and can be continued upon discharge

## 2020-01-16 NOTE — ASSESSMENT & PLAN NOTE
Lab Results   Component Value Date    HGBA1C 6 6 (H) 01/15/2020       Recent Labs     01/15/20  1142 01/15/20  1606 01/15/20  2128 01/16/20  0724   POCGLU 209* 286* 321* 218*       Blood Sugar Average: Last 72 hrs:  (P) 298     · Upon discharge, increase lantus to 35 Units SQ QHS  · Continue januvia 100 mg PO Qdaily  · Continue lisinopril for renal protection in the setting of type 2 diabetes mellitus  · Outpatient Endocrinology evaluation  Outpatient follow-up with PCP in regards to this matter

## 2020-01-16 NOTE — ASSESSMENT & PLAN NOTE
· The patient was evaluated by Pulmonology during the hospitalization  · Treated with IV methylprednisolone during the hospitalization  · The patient will be discharged home on a PO prednisone tapered course  · Continue advair and spiriva upon discharge  · PRN brandy  · Outpatient follow-up with Pulmonology

## 2020-01-16 NOTE — ASSESSMENT & PLAN NOTE
Results for Bharath Lange (MRN [id number]) as of 1/16/2020 09:56   Ref  Range 1/15/2020 05:18   Folate Latest Ref Range: 3 1 - 17 5 ng/mL 18 3 (H)      Results for Bharath Lange (MRN [id number]) as of 1/16/2020 09:56   Ref   Range 1/15/2020 05:18   Vitamin B-12 Latest Ref Range: 100 - 900 pg/mL 621     · Continue PO cyanocobalamin supplementation at 500 micrograms PO Qdaily  Outpatient follow-up with PCP in regards to this matter

## 2020-01-16 NOTE — ASSESSMENT & PLAN NOTE
· Sepsis was present on admission and secondary to acute tracheobronchitis  · The patient completed a course of ceftriaxone and azithromycin during the hospitalization    Influenza A/B and RSV PCR [[id number]] (Normal) Collected: 01/12/20 1749   Lab Status: Final result Specimen: Nasopharyngeal Swab Updated: 01/12/20 1851    INFLUENZA A PCR None Detected    INFLUENZA B PCR None Detected    RSV PCR None Detected     Strep Pneumoniae, Urine [[id number]] (Normal) Collected: 01/14/20 1115   Lab Status: Final result Specimen: Urine, Clean Catch Updated: 01/15/20 0039    Strep pneumoniae antigen, urine Negative   Legionella antigen, urine [[id number]] (Normal) Collected: 01/14/20 1115   Lab Status: Final result Specimen: Urine, Clean Catch Updated: 01/15/20 0039    Legionella Urinary Antigen Negative

## 2020-01-16 NOTE — ASSESSMENT & PLAN NOTE
· Sepsis was present on admission and secondary to acute tracheobronchitis  · Treated with ceftriaxone and azithromycin during hospitalization  · No additional antibiotic therapy is indicated per Pulmonology    Influenza A/B and RSV PCR [[id number]] (Normal) Collected: 01/12/20 1749   Lab Status: Final result Specimen: Nasopharyngeal Swab Updated: 01/12/20 1851    INFLUENZA A PCR None Detected    INFLUENZA B PCR None Detected    RSV PCR None Detected     Strep Pneumoniae, Urine [[id number]] (Normal) Collected: 01/14/20 1115   Lab Status: Final result Specimen: Urine, Clean Catch Updated: 01/15/20 0039    Strep pneumoniae antigen, urine Negative   Legionella antigen, urine [[id number]] (Normal) Collected: 01/14/20 1115   Lab Status: Final result Specimen: Urine, Clean Catch Updated: 01/15/20 0039    Legionella Urinary Antigen Negative

## 2020-01-16 NOTE — ASSESSMENT & PLAN NOTE
· Initially with acute respiratory failure represented by tachypnea with a respiratory rate of 27 bpm during the first 24 hours of the hospitalization  · Currently requiring 2  lpm of oxygen via the nasal cannula  · At home, she is on 2-3 lpm of oxygen via the nasal cannula  · Nebulizer treatments were utilized during the hospitalization via the respiratory protocol  · Outpatient follow-up with Pulmonology

## 2020-01-16 NOTE — ASSESSMENT & PLAN NOTE
· Likely pseudohyponatremia in the setting of hyperglycemia  · Follow the sodium level after discharge with her PCP

## 2020-01-16 NOTE — SOCIAL WORK
Spoke with physical therapist today and she said patient did much better today  She said patient should be safe to go home  Pt is being discharged today  Pt's daughter will give the patient a ride home  I notified [facility] that patient is being discharged  Follow up appointments reviewed with a good understanding  Case Management reviewed discharge planning process including the following: identifying help that is needed at home, pt's preference for discharge needs and Meds at [facility]  Reviewed with Pt that any member of the healthcare team can answer questions regarding : medications, jmportance of recognizing  Signs and symptoms of any  medical problems  Case Management also encouraged pt to follow up with all recommended appointments after discharge

## 2020-01-16 NOTE — DISCHARGE SUMMARY
Discharge- Josseline Bell 1950, 65 y o  female MRN: [id number]    Unit/Bed#: 414-01 Encounter: [id number]    Primary Care Provider: Pelon Kwon MD   Date and time admitted to hospital: 1/12/2020  5:37 PM        * Acute on chronic respiratory failure with hypoxia [facility])  Assessment & Plan  · Initially with acute respiratory failure represented by tachypnea with a respiratory rate of 27 bpm during the first 24 hours of the hospitalization  · Currently requiring 2  lpm of oxygen via the nasal cannula  · At home, she is on 2-3 lpm of oxygen via the nasal cannula  · Nebulizer treatments were utilized during the hospitalization via the respiratory protocol  · Outpatient follow-up with Pulmonology    Chronic obstructive pulmonary disease with acute exacerbation ([facility] )  Assessment & Plan  · The patient was evaluated by Pulmonology during the hospitalization  · Treated with IV methylprednisolone during the hospitalization  · The patient will be discharged home on a PO prednisone tapered course  · Continue advair and spiriva upon discharge  · PRN duonebs  · Outpatient follow-up with Pulmonology      Sepsis, unspecified organism [facility])  Assessment & Plan  · Sepsis was present on admission and secondary to acute tracheobronchitis  · The patient completed a course of ceftriaxone and azithromycin during the hospitalization    Influenza A/B and RSV PCR [[id number]] (Normal) Collected: 01/12/20 1749   Lab Status: Final result Specimen: Nasopharyngeal Swab Updated: 01/12/20 1851    INFLUENZA A PCR None Detected    INFLUENZA B PCR None Detected    RSV PCR None Detected     Strep Pneumoniae, Urine [[id number]] (Normal) Collected: 01/14/20 1115   Lab Status: Final result Specimen: Urine, Clean Catch Updated: 01/15/20 0039    Strep pneumoniae antigen, urine Negative   Legionella antigen, urine [[id number]] (Normal) Collected: 01/14/20 1115   Lab Status: Final result Specimen: Urine, Clean Catch Updated: 01/15/20 0039    Legionella Urinary Antigen Negative           Acute tracheobronchitis  Assessment & Plan  · Sepsis was present on admission and secondary to acute tracheobronchitis  · Treated with ceftriaxone and azithromycin during hospitalization  · No additional antibiotic therapy is indicated per Pulmonology    Influenza A/B and RSV PCR [[id number]] (Normal) Collected: 01/12/20 1749   Lab Status: Final result Specimen: Nasopharyngeal Swab Updated: 01/12/20 1851    INFLUENZA A PCR None Detected    INFLUENZA B PCR None Detected    RSV PCR None Detected     Strep Pneumoniae, Urine [[id number]] (Normal) Collected: 01/14/20 1115   Lab Status: Final result Specimen: Urine, Clean Catch Updated: 01/15/20 0039    Strep pneumoniae antigen, urine Negative   Legionella antigen, urine [[id number]] (Normal) Collected: 01/14/20 1115   Lab Status: Final result Specimen: Urine, Clean Catch Updated: 01/15/20 0039    Legionella Urinary Antigen Negative           Loculated pleural effusion  Assessment & Plan  · Chronic in nature  · The patient was seen in consultation by Pulmonology  · She will need an outpatient evaluation by Cardiothoracic Surgery      Diastolic dysfunction  Assessment & Plan  · Monitor the patient's volume status after discharge with her PCP    Hypercholesterolemia  Assessment & Plan  · Continue statin therapy    Macrocytic anemia  Assessment & Plan  Results for Suni Bermudez (MRN [id number]) as of 1/16/2020 09:56   Ref  Range 1/15/2020 05:18   Folate Latest Ref Range: 3 1 - 17 5 ng/mL 18 3 (H)      Results for Suni Bermudez (MRN [id number]) as of 1/16/2020 09:56   Ref   Range 1/15/2020 05:18   Vitamin B-12 Latest Ref Range: 100 - 900 pg/mL 621     · Continue PO cyanocobalamin supplementation at 500 micrograms PO Qdaily  Outpatient follow-up with PCP in regards to this matter      Hyponatremia  Assessment & Plan  · Likely pseudohyponatremia in the setting of hyperglycemia  · Follow the sodium level after discharge with her PCP    Morbid obesity with BMI of 40 0-44 9, adult [facility])  Assessment & Plan  · Lifestyle modifications are recommended including weight loss, improving her diet, and increasing her amount of activity  Atelectasis  Assessment & Plan  · Incentive spirometry 10 times per hour while awake was utilized during the hospitalization and can be continued upon discharge    Rheumatoid arthritis ([address] )  Assessment & Plan  · On methotrexate and xeljanz  · Outpatient follow-up with Rheumatology      Type 2 diabetes mellitus with hyperglycemia, with long-term current use of insulin [facility])  Assessment & Plan  Lab Results   Component Value Date    HGBA1C 6 6 (H) 01/15/2020       Recent Labs     01/15/20  1142 01/15/20  1606 01/15/20  2128 01/16/20  0724   POCGLU 209* 286* 321* 218*       Blood Sugar Average: Last 72 hrs:  (P) 298     · Upon discharge, increase lantus to 35 Units SQ QHS  · Continue januvia 100 mg PO Qdaily  · Continue lisinopril for renal protection in the setting of type 2 diabetes mellitus  · Outpatient Endocrinology evaluation  Outpatient follow-up with PCP in regards to this matter      Lactic acidosis  Assessment & Plan  · Resolved with thiamine 200 mg IV x 1 dose on 01/15/2020    Discharging Physician / Practitioner: Maya Burks DO  PCP: Adebayo Gramajo MD  Admission Date:   Admission Orders (From admission, onward)     Ordered        01/12/20 2046  Inpatient Admission  Once                   Discharge Date: 01/16/20    Consultations During Hospital Stay:  · Pulmonology    Procedures Performed:   · None    Significant Findings / Test Results:   X-ray Chest 2 Views    Result Date: 1/13/2020  Impression: No acute cardiopulmonary disease  Unchanged moderate-sized loculated left basilar effusion  Workstation performed: [id number]     Cta Chest Pe Study    Result Date: 1/14/2020  Impression: No evidence for acute pulmonary embolus   Stable large loculated left subpulmonic pleural fluid collection without enhancement or solid component  Associated compressive atelectasis of the left lower lobe appearing stable  Small loculated air collection, pleural in location right upper anterior thorax, gradually decreasing in size   Workstation performed: [id number]     ECG 12 lead   Order: [id number]   Status:  Final result   Visible to patient:  No (Inaccessible in MyChart)   Next appt:  2020 at 12:00 PM in Internal Medicine Adebayo Gramajo MD)    Ref Range & Units 20 1824   Ventricular Rate     Atrial Rate     VA Interval ms 160    QRSD Interval ms 94    QT Interval ms 318    QTC Interval ms 426    P Axis degrees 50    QRS Axis degrees 53    T Wave Axis degrees 41       Narrative & Impression     Sinus tachycardia  Otherwise normal ECG  When compared with ECG of 08-AUG-2019 20:02,  No significant change was found  Confirmed by Hugo Wood ([id number]) on 2020 1:30:46 PM      Specimen Collected: 20 18:24   Last Resulted: 20 13:30           Echo complete with contrast if indicated   Status: Final result   PACS Images      Show images for Echo complete with contrast if indicated   Study Result     [address] [facility] [address] [address]  [phone]     Transthoracic Echocardiogram  2D, M-mode, Doppler, and Color Doppler     Study date:  2020     Patient: Elia Naidu  MR number: [id number]  Account number: [de-identified]  : 1950  Age: 65 years  Gender: Female  Status: Inpatient  Location: Bedside  Height: 65 in  Weight: 247 1 lb  BP: 133/ 84 mmHg     Indications: Dyspnea Shortness of breath      Diagnoses: 786 09 - RESPIRATORY ABNORM NEC     Sonographer:  LETTY Sotelo  Primary Physician:  Adebayo Gramajo MD  Referring Physician:  Marva Greenfield MD  Group:  [facility]  Interpreting Physician:  Isa Alexandra DO     SUMMARY     LEFT VENTRICLE:  Systolic function was normal  Ejection fraction was estimated to be 60 %  There were no regional wall motion abnormalities  Wall thickness was mildly increased  Doppler parameters were consistent with abnormal left ventricular relaxation (grade 1 diastolic dysfunction)      TRICUSPID VALVE:  There was trace regurgitation      HISTORY: PRIOR HISTORY: obesity, diabetes     PROCEDURE: The procedure was performed at the bedside  This was a routine study  The transthoracic approach was used  The study included complete 2D imaging, M-mode, complete spectral Doppler, and color Doppler  The heart rate was 80 bpm,  at the start of the study  This was a technically difficult study      LEFT VENTRICLE: Size was normal  Systolic function was normal  Ejection fraction was estimated to be 60 %  There were no regional wall motion abnormalities  Wall thickness was mildly increased  DOPPLER: Doppler parameters were consistent  with abnormal left ventricular relaxation (grade 1 diastolic dysfunction)      RIGHT VENTRICLE: The size was normal  Systolic function was normal  Wall thickness was normal      LEFT ATRIUM: Size was normal      RIGHT ATRIUM: Size was normal      MITRAL VALVE: Valve structure was normal  There was normal leaflet separation  DOPPLER: The transmitral velocity was within the normal range  There was no evidence for stenosis  There was no regurgitation      AORTIC VALVE: The valve was probably trileaflet  Leaflets exhibited mildly increased thickness, normal cuspal separation, and sclerosis  DOPPLER: Transaortic velocity was within the normal range  There was no evidence for stenosis  There  was no regurgitation      TRICUSPID VALVE: The valve structure was normal  There was normal leaflet separation  DOPPLER: The transtricuspid velocity was within the normal range  There was no evidence for stenosis  There was trace regurgitation      PULMONIC VALVE: Not well visualized      PERICARDIUM: There was no pericardial effusion   The pericardium was normal in appearance      AORTA: The root exhibited normal size      SYSTEMIC VEINS: IVC: The inferior vena cava was normal in size and course   Respirophasic changes were normal      SYSTEM MEASUREMENT TABLES     2D  %FS: 32 09 %  Ao Diam: 3 04 cm  EDV(Teich): 92 89 ml  EF(Teich): 60 38 %  ESV(Teich): 36 8 ml  IVSd: 1 25 cm  LA Area: 13 1 cm2  LA Diam: 3 84 cm  LVEDV MOD A4C: 69 12 ml  LVEF MOD A4C: 64 52 %  LVESV MOD A4C: 24 52 ml  LVIDd: 4 51 cm  LVIDs: 3 06 cm  LVLd A4C: 7 03 cm  LVLs A4C: 5 97 cm  LVPWd: 0 99 cm  RA Area: 14 45 cm2  RVIDd: 2 73 cm  RWT: 0 44  SV MOD A4C: 44 6 ml  SV(Teich): 56 09 ml     CW  TR Vmax: 2 49 m/s  TR maxP 76 mmHg     MM  TAPSE: 3 33 cm     PW  E' Sept: 0 08 m/s  E/E' Sept: 4 93  MV A Nikolas: 0 99 m/s  MV Dec [place]: 6 53 m/s2  MV DecT: 159 93 ms  MV E Nikolas: 0 41 m/s  MV E/A Ratio: 0 43     [facility] Accredited Echocardiography Laboratory     Prepared and electronically signed by  Oskar Wade DO  Signed 2020 16:36:14        Microbiology Results (last 21 days)     Procedure Component Value - Date/Time   Urine culture [[id number]] (Abnormal)  Collected: 20 1123   Lab Status: Final result Specimen: Urine, Clean Catch Updated: 01/15/20 2208    Urine Culture 60,000-69,000 cfu/ml Candida kruseiAbnormal      50,000-59,000 cfu/ml     Comment: Mixed Contaminants X2      Susceptibility     Candida krusei (1)     Antibiotic Interpretation Microscan Method Status   ZID Performed  Yes JOSE ANTONIO Final         Strep Pneumoniae, Urine [[id number]] (Normal) Collected: 20   Lab Status: Final result Specimen: Urine, Clean Catch Updated: 01/15/20 0039    Strep pneumoniae antigen, urine Negative   Legionella antigen, urine [[id number]] (Normal) Collected: 20   Lab Status: Final result Specimen: Urine, Clean Catch Updated: 01/15/20 0039    Legionella Urinary Antigen Negative   Sputum culture and Gram stain [[id number]] (Abnormal) Collected: 20 0307   Lab Status: Final result Specimen: Expectorated Sputum Updated: 01/15/20 0901    Sputum Culture 4+ Growth of     Comment: Mixed Respiratory gucci        Commensal respiratory gucci only; No significant growth of Staph aureus/MRSA or Pseudomonas aeruginosa  Gram Stain Result No polys seenAbnormal      1+ Gram positive cocci in pairs and chainsAbnormal      Rare Gram negative rodsAbnormal    Blood culture [[id number]] Collected: 01/12/20 2335   Lab Status: Preliminary result Specimen: Blood from Arm, Right Updated: 01/16/20 0902    Blood Culture No Growth at 72 hrs  Blood culture [[id number]] Collected: 01/12/20 2325   Lab Status: Preliminary result Specimen: Blood from Arm, Left Updated: 01/16/20 0902    Blood Culture No Growth at 72 hrs  Influenza A/B and RSV PCR [[id number]] (Normal) Collected: 01/12/20 4492   Lab Status: Final result Specimen: Nasopharyngeal Swab Updated: 01/12/20 1851    INFLUENZA A PCR None Detected    INFLUENZA B PCR None Detected    RSV PCR None Detected       Incidental Findings:   · None     Test Results Pending at Discharge (will require follow up): · None     Outpatient Tests Requested:  · CBC with diff , CMP, Mag, Phos, Procalcitonin level in 1 week with her PCP    Complications:  None    Reason for Admission:  Sepsis    Hospital Course:     Lima Mckenna is a 65 y o  female patient who originally presented to the hospital on 1/12/2020 due to shortness of breath, malaise, and a productive cough  Please see above list of diagnoses and related plan for additional information  Condition at Discharge: stable     Discharge Day Visit / Exam:     Subjective: The patient was seen and examined  The patient is doing better  No chest pain  No shortness of breath  No abdominal pain  No nausea or vomiting      Vitals: Blood Pressure: 151/82 (01/16/20 0629)  Pulse: 75 (01/16/20 0629)  Temperature: 98 2 °F (36 8 °C) (01/16/20 0629)  Temp Source: Oral (01/16/20 0629)  Respirations: 18 (01/16/20 0629)  Height: 5' 5" (165 1 cm) (01/13/20 1516)  Weight - Scale: 113 kg (248 lb 10 9 oz) (01/16/20 0600)  SpO2: 95 % (01/16/20 0752)  Exam:   Physical Exam  General:  NAD, follows commands  HEENT:  NC/AT, mucous membranes moist  Neck:  Supple, No JVP elevation  CV:  + S1, + S2, RRR  Pulm:  Lung fields are CTA bilaterally  Abd:  Soft, Non-tender, Non-distended  Ext:  No clubbing/cyanosis/edema  Skin:  No rashes  Neuro:  Awake, alert, oriented    Discharge instructions/Information to patient and family:   See after visit summary for information provided to patient and family  Provisions for Follow-Up Care:  See after visit summary for information related to follow-up care and any pertinent home health orders  Disposition:     Home with VNA Services (Reminder: Complete face to face encounter)    Planned Readmission:  No     Discharge Statement:  I spent 40 minutes discharging the patient  This time was spent on the day of discharge  I had direct contact with the patient on the day of discharge  Greater than 50% of the total time was spent examining patient, answering all patient questions, arranging and discussing plan of care with patient as well as directly providing post-discharge instructions  Additional time then spent on discharge activities  Discharge Medications:  See after visit summary for reconciled discharge medications provided to patient and family        ** Please Note: This note has been constructed using a voice recognition system **

## 2020-01-16 NOTE — PLAN OF CARE
Problem: Potential for Falls  Goal: Patient will remain free of falls  Description  INTERVENTIONS:  - Assess patient frequently for physical needs  -  Identify cognitive and physical deficits and behaviors that affect risk of falls    -  [place] fall precautions as indicated by assessment   - Educate patient/family on patient safety including physical limitations  - Instruct patient to call for assistance with activity based on assessment  - Modify environment to reduce risk of injury  - Consider OT/PT consult to assist with strengthening/mobility  Outcome: Progressing     Problem: PAIN - ADULT  Goal: Verbalizes/displays adequate comfort level or baseline comfort level  Description  Interventions:  - Encourage patient to monitor pain and request assistance  - Assess pain using appropriate pain scale  - Administer analgesics based on type and severity of pain and evaluate response  - Implement non-pharmacological measures as appropriate and evaluate response  - Consider cultural and social influences on pain and pain management  - Notify physician/advanced practitioner if interventions unsuccessful or patient reports new pain  Outcome: Progressing     Problem: INFECTION - ADULT  Goal: Absence or prevention of progression during hospitalization  Description  INTERVENTIONS:  - Assess and monitor for signs and symptoms of infection  - Monitor lab/diagnostic results  - Monitor all insertion sites, i e  indwelling lines, tubes, and drains  - Monitor endotracheal if appropriate and nasal secretions for changes in amount and color  - [place] appropriate cooling/warming therapies per order  - Administer medications as ordered  - Instruct and encourage patient and family to use good hand hygiene technique  - Identify and instruct in appropriate isolation precautions for identified infection/condition  Outcome: Progressing     Problem: SAFETY ADULT  Goal: Maintain or return to baseline ADL function  Description  INTERVENTIONS:  -  Assess patient's ability to carry out ADLs; assess patient's baseline for ADL function and identify physical deficits which impact ability to perform ADLs (bathing, care of mouth/teeth, toileting, grooming, dressing, etc )  - Assess/evaluate cause of self-care deficits   - Assess range of motion  - Assess patient's mobility; develop plan if impaired  - Assess patient's need for assistive devices and provide as appropriate  - Encourage maximum independence but intervene and supervise when necessary  - Involve family in performance of ADLs  - Assess for home care needs following discharge   - Consider OT consult to assist with ADL evaluation and planning for discharge  - Provide patient education as appropriate  Outcome: Progressing  Goal: Maintain or return mobility status to optimal level  Description  INTERVENTIONS:  - Assess patient's baseline mobility status (ambulation, transfers, stairs, etc )    - Identify cognitive and physical deficits and behaviors that affect mobility  - Identify mobility aids required to assist with transfers and/or ambulation (gait belt, sit-to-stand, lift, walker, cane, etc )  - [place] fall precautions as indicated by assessment  - Record patient progress and toleration of activity level on Mobility SBAR; progress patient to next Phase/Stage  - Instruct patient to call for assistance with activity based on assessment  - Consider rehabilitation consult to assist with strengthening/weightbearing, etc   Outcome: Progressing     Problem: DISCHARGE PLANNING  Goal: Discharge to home or other facility with appropriate resources  Description  INTERVENTIONS:  - Identify barriers to discharge w/patient and caregiver  - Arrange for needed discharge resources and transportation as appropriate  - Identify discharge learning needs (meds, wound care, etc )  - Arrange for interpretive services to assist at discharge as needed  - Refer to Case Management Department for coordinating discharge planning if the patient needs post-hospital services based on physician/advanced practitioner order or complex needs related to functional status, cognitive ability, or social support system  Outcome: Progressing     Problem: Knowledge Deficit  Goal: Patient/family/caregiver demonstrates understanding of disease process, treatment plan, medications, and discharge instructions  Description  Complete learning assessment and assess knowledge base    Interventions:  - Provide teaching at level of understanding  - Provide teaching via preferred learning methods  Outcome: Progressing     Problem: RESPIRATORY - ADULT  Goal: Achieves optimal ventilation and oxygenation  Description  INTERVENTIONS:  - Assess for changes in respiratory status  - Assess for changes in mentation and behavior  - Position to facilitate oxygenation and minimize respiratory effort  - Oxygen administered by appropriate delivery if ordered  - Initiate smoking cessation education as indicated  - Encourage broncho-pulmonary hygiene including cough, deep breathe, Incentive Spirometry  - Assess the need for suctioning and aspirate as needed  - Assess and instruct to report SOB or any respiratory difficulty  - Respiratory Therapy support as indicated  Outcome: Progressing     Problem: Prexisting or High Potential for Compromised Skin Integrity  Goal: Skin integrity is maintained or improved  Description  INTERVENTIONS:  - Identify patients at risk for skin breakdown  - Assess and monitor skin integrity  - Assess and monitor nutrition and hydration status  - Monitor labs   - Assess for incontinence   - Turn and reposition patient  - Assist with mobility/ambulation  - Relieve pressure over bony prominences  - Avoid friction and shearing  - Provide appropriate hygiene as needed including keeping skin clean and dry  - Evaluate need for skin moisturizer/barrier cream  - Collaborate with interdisciplinary team   - Patient/family teaching  - Consider wound care consult   Outcome: Progressing     Problem: DISCHARGE PLANNING - CARE MANAGEMENT  Goal: Discharge to post-acute care or home with appropriate resources  Description  INTERVENTIONS:  - Conduct assessment to determine patient/family and health care team treatment goals, and need for post-acute services based on payer coverage, community resources, and patient preferences, and barriers to discharge  - Address psychosocial, clinical, and financial barriers to discharge as identified in assessment in conjunction with the patient/family and health care team  - Arrange appropriate level of post-acute services according to patient's   needs and preference and payer coverage in collaboration with the physician and health care team  - Communicate with and update the patient/family, physician, and health care team regarding progress on the discharge plan  - Arrange appropriate transportation to post-acute venues    Pt's goal is to return [facility] with [facility],    Outcome: Progressing     Problem: Nutrition/Hydration-ADULT  Goal: Nutrient/Hydration intake appropriate for improving, restoring or maintaining nutritional needs  Description  Monitor and assess patient's nutrition/hydration status for malnutrition  Collaborate with interdisciplinary team and initiate plan and interventions as ordered  Monitor patient's weight and dietary intake as ordered or per policy  Utilize nutrition screening tool and intervene as necessary  Determine patient's food preferences and provide high-protein, high-caloric foods as appropriate       INTERVENTIONS:  - Monitor oral intake, urinary output, labs, and treatment plans  - Assess nutrition and hydration status and recommend course of action  - Evaluate amount of meals eaten  - Assist patient with eating if necessary   - Allow adequate time for meals  - Recommend/ encourage appropriate diets, oral nutritional supplements, and vitamin/mineral supplements  - Order, calculate, and assess calorie counts as needed  - Recommend, monitor, and adjust tube feedings and TPN/PPN based on assessed needs  - Assess need for intravenous fluids  - Provide specific nutrition/hydration education as appropriate  - Include patient/family/caregiver in decisions related to nutrition  Outcome: Progressing

## 2020-01-17 DIAGNOSIS — Z71.89 COMPLEX CARE COORDINATION: Primary | ICD-10-CM

## 2020-01-17 NOTE — UTILIZATION REVIEW
Notification of Discharge  This is a Notification of Discharge from our facility [address]  Please be advised that this patient has been discharge from our facility  Below you will find the admission and discharge date and time including the patients disposition  PRESENTATION DATE: 1/12/2020  5:37 PM  OBS ADMISSION DATE:   IP ADMISSION DATE: 1/12/20 2046   DISCHARGE DATE: 1/16/2020 11:25 AM  DISPOSITION: Home with [facility] with [address] [facility]   Admission Orders listed below:  Admission Orders (From admission, onward)     Ordered        01/12/20 2046  Inpatient Admission  Once                   Please contact the UR Department if additional information is required to close this patient's authorization/case  [address] [facility] Utilization Review Department  Main: [phone] x carefully listen to the prompts  All voicemails are confidential   [email or web address]  Send all requests for admission clinical reviews, approved or denied determinations and any other requests to dedicated fax number below belonging to the campus where the patient is receiving treatment   List of dedicated fax numbers:  [address] DENIALS (Administrative/Medical Necessity) [phone]   [address] (Maternity/NICU/Pediatrics) [phone]   Deneise Notice [phone]   Johnathon Dozier [phone]   Oj Noble [phone]   Catrachita Santa [address] [phone]   [facility]  [phone]   [address] [facility]  [address] [place] [address] [address] [facility] [phone]

## 2020-01-18 LAB
BACTERIA BLD CULT: NORMAL
BACTERIA BLD CULT: NORMAL

## 2020-01-20 ENCOUNTER — TRANSITIONAL CARE MANAGEMENT (OUTPATIENT)
Dept: INTERNAL MEDICINE CLINIC | Facility: CLINIC | Age: 70
End: 2020-01-20

## 2020-01-21 ENCOUNTER — PATIENT OUTREACH (OUTPATIENT)
Dept: INTERNAL MEDICINE CLINIC | Facility: CLINIC | Age: 70
End: 2020-01-21

## 2020-01-21 NOTE — PROGRESS NOTES
Outpatient Care Management Note:    Complex care case, HRR  Chart review done  In the past, pt had stopped answering and returning calls so case was closed  Pt was recently hospitalized and was referred to [facility] again due to HRR  Contacted pt, re-introduced self and OPCM  Tiara Cevallos reported that she was doing well at this time  Still has occasional cough but it is not worsening  She denied fevers, edema, CP, and SOB above baseline  [facility] was to the home on Saturday to do initial assessment but hasn't contacted her yet again  She is planning on contacting them today  They told her they have a  that will likely come to the home to assess her social needs as well  She reported that she is going to ask PT through [facility] if they think she needs a w/c for days that she has a lot of pain and can not get around well  She reported that she is independent with all ADLs, has all meds and understands how to take them, and drives  She tells me she feels "well-educated" on her chronic medical conditions and that she doesn't need any further education on them or her meds  She politely declined future calls from [facility] because she feels that she does not need them  Did provide my contact info for her future reference if any questions or needs arise  Encouraged her to contact me at any time  She was appreciative and agreeable

## 2020-01-23 ENCOUNTER — OFFICE VISIT (OUTPATIENT)
Dept: INTERNAL MEDICINE CLINIC | Facility: CLINIC | Age: 70
End: 2020-01-23
Payer: COMMERCIAL

## 2020-01-23 VITALS
SYSTOLIC BLOOD PRESSURE: 114 MMHG | HEART RATE: 94 BPM | BODY MASS INDEX: 40.73 KG/M2 | OXYGEN SATURATION: 96 % | HEIGHT: 65 IN | TEMPERATURE: 97.5 F | DIASTOLIC BLOOD PRESSURE: 62 MMHG | WEIGHT: 244.5 LBS

## 2020-01-23 DIAGNOSIS — J44.1 CHRONIC OBSTRUCTIVE PULMONARY DISEASE WITH ACUTE EXACERBATION (HCC): ICD-10-CM

## 2020-01-23 DIAGNOSIS — A41.9 SEPSIS, DUE TO UNSPECIFIED ORGANISM, UNSPECIFIED WHETHER ACUTE ORGAN DYSFUNCTION PRESENT (HCC): ICD-10-CM

## 2020-01-23 DIAGNOSIS — J96.21 ACUTE ON CHRONIC RESPIRATORY FAILURE WITH HYPOXIA (HCC): Primary | ICD-10-CM

## 2020-01-23 DIAGNOSIS — B37.0 THRUSH, ORAL: ICD-10-CM

## 2020-01-23 PROCEDURE — 99496 TRANSJ CARE MGMT HIGH F2F 7D: CPT | Performed by: FAMILY MEDICINE

## 2020-01-23 PROCEDURE — 1111F DSCHRG MED/CURRENT MED MERGE: CPT | Performed by: FAMILY MEDICINE

## 2020-01-23 PROCEDURE — 1160F RVW MEDS BY RX/DR IN RCRD: CPT | Performed by: FAMILY MEDICINE

## 2020-01-23 NOTE — PROGRESS NOTES
Assessment/Plan:     No problem-specific Assessment & Plan notes found for this encounter  Diagnoses and all orders for this visit:    Acute on chronic respiratory failure with hypoxia (HCC)    Chronic obstructive pulmonary disease with acute exacerbation (HCC)    Sepsis, due to unspecified organism, unspecified whether acute organ dysfunction present ([facility] )    Thrush, oral  -     nystatin (MYCOSTATIN) 500,000 units/5 mL suspension; Apply 5 mL (500,000 Units total) to the mouth or throat 4 (four) times a day for 7 days        Orders recommendations as noted above  Recent hospital records reviewed with her  Continue with the oxygen  Watch for any worsening  She does have thrush at present  Treatment as noted above  Watch for any worsening  Watch blood sugars especially while she is on the steroids  Continue with Lantus  Continue with 30 units daily since her blood sugars have been relatively well controlled  Continue with nebulizer treatments on an as-needed basis  Continue follow-up with Pulmonary  Discussed with her using the Lasix only if she has an increased weight or increasing peripheral edema  This had been stopped during the hospitalization  Continue other medications as previously  Can restart the trazodone for sleep since this has worked relatively well for her  Advised her to hold off getting her regular blood work for a few weeks until she is off of steroids  Continue follow-up with Rheumatology on a regular basis  Will have her follow up as previously scheduled or sooner if needed  Subjective:     Patient ID: Sahil Ron is a 65 y o  female  She presents for follow-up after recent hospitalization  She had relatively sudden onset of shortness of breath  Symptoms worsen significantly and she presented to the emergency room  She was diagnosed with COPD exacerbation likely related to an acute tracheobronchitis  She did have qualifications for sepsis    Flu testing was negative  She improved with antibiotics, steroids, and aggressive respiratory treatments  Blood sugars were more elevated during the hospitalization because of the steroids  Since returning home she has felt relatively well  Has had a slight sore throat and burning with swallowing at times  Denies any fevers or chills since returning home  Breathing has returned to baseline  Has been under more stress with some family issues  They had stopped her trazodone and Lasix during the hospitalization  She is not sleeping well without the trazodone  This has worked well for her in the past   Denies any difficulty off of Lasix  Denies any chest pain or palpitations  Has a chronic cough but she feels this is close to her baseline  Appetite has been good  Blood sugars were initially significantly elevated when she returned home but have been improving and have been running around the mid 100s  She is still on steroids in a tapering dose after the hospitalization  They had discharged her on an increased dose of the Lantus up to 35 units  She had not gone up, however, when she returned home  Denies any significant polyuria, polydipsia, or polyphagia  Denies any nausea, vomiting, or diarrhea  Review of Systems   Constitutional: Positive for fatigue  Negative for activity change, appetite change, chills and fever  HENT: Negative for congestion and rhinorrhea  Eyes: Negative for visual disturbance  Respiratory: Positive for cough and shortness of breath  Negative for chest tightness  Cardiovascular: Negative for chest pain and palpitations  Gastrointestinal: Negative for abdominal pain, blood in stool, diarrhea, nausea and vomiting  Endocrine: Negative for polydipsia, polyphagia and polyuria  Genitourinary: Negative for dysuria, frequency and urgency  Musculoskeletal: Negative for gait problem  Skin: Negative for color change  Neurological: Negative for dizziness and headaches  Hematological: Does not bruise/bleed easily  Psychiatric/Behavioral: Positive for sleep disturbance  Negative for confusion  The patient is not nervous/anxious  The following portions of the patient's history were reviewed and updated as appropriate:   She  has a past medical history of Arthritis, Arthritis, Cancer ([facility] ), Candidiasis of skin, Cervical cancer ([facility] ), Chronic respiratory failure with hypoxia and hypercapnia ([facility] ) (4/20/2019), COPD (chronic obstructive pulmonary disease) ([facility] ), Current chronic use of systemic steroids, Degenerative arthritis of left knee, Diabetes mellitus ([facility] ), Disease of thyroid gland, Fibromyalgia, Fistula of knee, GERD (gastroesophageal reflux disease), Hyperlipidemia, Hypertension, Medial meniscus tear, Migraine, Obesity, Obesity, Osteoarthritis, Osteopenia, Pneumonia, Psychiatric disorder, Rheumatoid arthritis ([facility] ), Rheumatoid arthritis ([facility] ), Sepsis ([facility] ), Tick bite, Vitamin B12 deficiency, and Vitamin D deficiency    She   Patient Active Problem List    Diagnosis Date Noted   Clorinda Any, oral 01/23/2020    Hypercholesterolemia 34/05/0031    Diastolic dysfunction 45/46/5653    Macrocytic anemia 01/14/2020    Acute tracheobronchitis 01/14/2020    Candidal UTI (urinary tract infection) 09/30/2019    Recurrent candidiasis of vagina 09/30/2019    Hypoxemia requiring supplemental oxygen 06/19/2019    History of exposure to asbestos 05/20/2019    Acute on chronic respiratory failure with hypoxia (HCC) 04/20/2019    Hyponatremia 04/20/2019    Acute cystitis with hematuria 04/19/2019    Candidal dermatitis 12/28/2018    Loculated pleural effusion 12/25/2018    Localized swelling, mass and lump, upper limb, right 12/11/2017    Constipation 11/29/2017    Renal cyst 11/29/2017    Candidiasis, cutaneous 06/30/2017    Vitamin B12 deficiency 11/10/2016    BMI 40 0-44 9, adult ([facility] ) 11/05/2016    Morbid obesity with BMI of 40 0-44 9, adult ([facility] ) 11/04/2016    Sepsis, unspecified organism ([place] [address] ) 11/03/2016    Lactic acidosis 11/03/2016    Centrilobular emphysema ([place] [address] ) 11/03/2016    Type 2 diabetes mellitus with hyperglycemia, with long-term current use of insulin ([place] [address] ) 11/03/2016    Hypothyroidism 11/03/2016    Rheumatoid arthritis ([place] [address] ) 11/03/2016    Essential hypertension 11/03/2016    Hyperlipidemia 11/03/2016    GERD (gastroesophageal reflux disease) 11/03/2016    Hypoalbuminemia 11/03/2016    Hepatic steatosis 11/03/2016    Atelectasis 11/03/2016    Diabetes mellitus, type 2 ([place] [address] ) 10/13/2015    Anxiety 06/02/2014    Fibromyalgia 01/28/2014    Osteopenia 09/17/2012    Chronic obstructive pulmonary disease with acute exacerbation ([place] [address] ) 05/29/2012    Osteoarthritis 05/29/2012    Vitamin D deficiency 05/29/2012     She  has a past surgical history that includes Hysterectomy; Knee arthroscopy; Cataract extraction (Bilateral); Cholecystectomy; Joint replacement; Tubal ligation; Neuroplasty / transposition median nerve at carpal tunnel; Eye surgery; and IR thoracentesis (5/31/2019)  Her family history includes Asthma in her family; Cancer in her family; Diabetes in her family; Hypertension in her family; Stroke in her mother  She  reports that she quit smoking about 7 years ago  Her smoking use included e-cigarettes  She has a 48 00 pack-year smoking history  She has never used smokeless tobacco  She reports that she does not drink alcohol or use drugs    Current Outpatient Medications   Medication Sig Dispense Refill    acetaminophen (TYLENOL) 325 mg tablet Take 2 tablets (650 mg total) by mouth every 6 (six) hours as needed for mild pain, headaches or fever  0    albuterol (PROVENTIL HFA,VENTOLIN HFA) 90 mcg/act inhaler Inhale 2 puffs every 6 (six) hours as needed for wheezing 1 Inhaler 0    alendronate (FOSAMAX) 70 mg tablet Take 70 mg by mouth every 7 days      atorvastatin (LIPITOR) 20 mg tablet TAKE ONE TABLET BY MOUTH EVERY DAY 90 tablet 0    Blood Glucose Monitoring Suppl (ONE TOUCH ULTRA 2) w/Device KIT by Does not apply route 2 (two) times a day 1 each 0    cholecalciferol (VITAMIN D3) 1,000 units tablet Take 1 tablet (1,000 Units total) by mouth daily 30 tablet 0    DULoxetine (CYMBALTA) 20 mg capsule TAKE ONE CAPSULE BY MOUTH ONCE DAILY 90 capsule 0    fluticasone-salmeterol (ADVAIR) 250-50 mcg/dose Inhale 1 puff every 12 (twelve) hours       folic acid (FOLVITE) 1 mg tablet TAKE ONE TABLET BY MOUTH ONCE DAILY 90 tablet 0    insulin glargine (LANTUS SOLOSTAR) 100 units/mL injection pen Inject 35 Units under the skin daily at bedtime 15 mL 0    Insulin Pen Needle 33G X 4 MM MISC by Does not apply route daily 100 each 5    ipratropium-albuterol (DUO-NEB) 0 5-2 5 mg/3 mL nebulizer solution Take 1 vial (3 mL total) by nebulization every 6 (six) hours as needed for wheezing or shortness of breath 120 vial 0    JANUVIA 100 MG tablet TAKE 1 TABLET BY MOUTH DAILY   90 tablet 0    levothyroxine 125 mcg tablet TAKE ONE TABLET BY MOUTH EVERY DAY IN THE AM 90 tablet 0    lisinopril (ZESTRIL) 40 mg tablet TAKE ONE TABLET BY MOUTH EVERY DAY 90 tablet 0    methotrexate 2 5 mg tablet Take 1 tablet (2 5 mg total) by mouth once a week Takes 8 tablets every sunday 96 tablet 0    mometasone (ELOCON) 0 1 % ointment Apply topically 2 (two) times a day as needed (itching) 45 g 1    nystatin (MYCOSTATIN) powder Apply topically 3 (three) times a day as needed (fungal skin rash)      ONE TOUCH ULTRA TEST test strip Tests twice a day 100 each 5    ONETOUCH DELICA LANCETS FINE MISC by Does not apply route 2 (two) times a day 100 each 5    pantoprazole (PROTONIX) 40 mg tablet TAKE ONE TABLET BY MOUTH ONCE DAILY 90 tablet 0    Polyethylene Glycol 3350-GRX POWD Take by mouth daily at bedtime as needed (constipation) 850 g 0    predniSONE 10 mg tablet 30 mg daily for 3 days then 20 mg daily for 3 days then 10 mg daily for 3 days then 5 mg daily for 2 days then STOP 19 tablet 0    tiotropium (SPIRIVA RESPIMAT) 2 5 MCG/ACT AERS inhaler Inhale 1 puff daily at bedtime 3 Inhaler 3    Tofacitinib Citrate (XELJANZ XR) 11 MG TB24 Take 1 tablet by mouth daily      vitamin B-12 (VITAMIN B-12) 500 MCG tablet Take 1 tablet (500 mcg total) by mouth daily      nystatin (MYCOSTATIN) 500,000 units/5 mL suspension Apply 5 mL (500,000 Units total) to the mouth or throat 4 (four) times a day for 7 days 140 mL 1     No current facility-administered medications for this visit  Current Outpatient Medications on File Prior to Visit   Medication Sig    acetaminophen (TYLENOL) 325 mg tablet Take 2 tablets (650 mg total) by mouth every 6 (six) hours as needed for mild pain, headaches or fever    albuterol (PROVENTIL HFA,VENTOLIN HFA) 90 mcg/act inhaler Inhale 2 puffs every 6 (six) hours as needed for wheezing    alendronate (FOSAMAX) 70 mg tablet Take 70 mg by mouth every 7 days    atorvastatin (LIPITOR) 20 mg tablet TAKE ONE TABLET BY MOUTH EVERY DAY    Blood Glucose Monitoring Suppl (ONE TOUCH ULTRA 2) w/Device KIT by Does not apply route 2 (two) times a day    cholecalciferol (VITAMIN D3) 1,000 units tablet Take 1 tablet (1,000 Units total) by mouth daily    DULoxetine (CYMBALTA) 20 mg capsule TAKE ONE CAPSULE BY MOUTH ONCE DAILY    fluticasone-salmeterol (ADVAIR) 250-50 mcg/dose Inhale 1 puff every 12 (twelve) hours     folic acid (FOLVITE) 1 mg tablet TAKE ONE TABLET BY MOUTH ONCE DAILY    insulin glargine (LANTUS SOLOSTAR) 100 units/mL injection pen Inject 35 Units under the skin daily at bedtime    Insulin Pen Needle 33G X 4 MM MISC by Does not apply route daily    ipratropium-albuterol (DUO-NEB) 0 5-2 5 mg/3 mL nebulizer solution Take 1 vial (3 mL total) by nebulization every 6 (six) hours as needed for wheezing or shortness of breath    JANUVIA 100 MG tablet TAKE 1 TABLET BY MOUTH DAILY      levothyroxine 125 mcg tablet TAKE ONE TABLET BY MOUTH EVERY DAY IN THE AM  lisinopril (ZESTRIL) 40 mg tablet TAKE ONE TABLET BY MOUTH EVERY DAY    methotrexate 2 5 mg tablet Take 1 tablet (2 5 mg total) by mouth once a week Takes 8 tablets every sunday    mometasone (ELOCON) 0 1 % ointment Apply topically 2 (two) times a day as needed (itching)    nystatin (MYCOSTATIN) powder Apply topically 3 (three) times a day as needed (fungal skin rash)    ONE TOUCH ULTRA TEST test strip Tests twice a day    ONETOUCH DELICA LANCETS FINE MISC by Does not apply route 2 (two) times a day    pantoprazole (PROTONIX) 40 mg tablet TAKE ONE TABLET BY MOUTH ONCE DAILY    Polyethylene Glycol 3350-GRX POWD Take by mouth daily at bedtime as needed (constipation)    predniSONE 10 mg tablet 30 mg daily for 3 days then 20 mg daily for 3 days then 10 mg daily for 3 days then 5 mg daily for 2 days then STOP    tiotropium (SPIRIVA RESPIMAT) 2 5 MCG/ACT AERS inhaler Inhale 1 puff daily at bedtime    Tofacitinib Citrate (XELJANZ XR) 11 MG TB24 Take 1 tablet by mouth daily    vitamin B-12 (VITAMIN B-12) 500 MCG tablet Take 1 tablet (500 mcg total) by mouth daily     No current facility-administered medications on file prior to visit  She is allergic to no active allergies       Objective:     Physical Exam   Constitutional: She is oriented to person, place, and time  She is cooperative  No distress  HENT:   Head: Normocephalic and atraumatic  Mouth/Throat: Oropharynx is clear and moist    Eyes: Pupils are equal, round, and reactive to light  Conjunctivae are normal  Right eye exhibits no discharge  Left eye exhibits no discharge  Cardiovascular: Normal rate, regular rhythm and normal heart sounds  Exam reveals no gallop and no friction rub  No murmur heard  Pulmonary/Chest: No respiratory distress  She has decreased breath sounds  She has no wheezes  She has no rhonchi  She has no rales  Abdominal: Bowel sounds are normal  She exhibits no distension  There is no tenderness     Musculoskeletal: She exhibits no edema  Lymphadenopathy:     She has no cervical adenopathy  Neurological: She is alert and oriented to person, place, and time  Skin: Skin is warm and dry  Psychiatric: She has a normal mood and affect  Her behavior is normal    Nursing note and vitals reviewed  Vitals:    01/23/20 1146   BP: 114/62   BP Location: Left arm   Patient Position: Sitting   Cuff Size: Large   Pulse: 94   Temp: 97 5 °F (36 4 °C)   TempSrc: Temporal   SpO2: 96%   Weight: 111 kg (244 lb 8 oz)   Height: 5' 5" (1 651 m)       X-ray Chest 2 Views    Result Date: 1/13/2020  Narrative: CHEST INDICATION:   chest pain  COMPARISON:  8/8/2019  EXAM PERFORMED/VIEWS:  XR CHEST PA & LATERAL FINDINGS: Cardiomediastinal silhouette appears unremarkable  Unchanged moderate-sized loculated left basilar effusion  The lungs are otherwise clear  No pneumothorax or pleural effusion  Osseous structures appear within normal limits for patient age  Impression: No acute cardiopulmonary disease  Unchanged moderate-sized loculated left basilar effusion  Workstation performed: [id number]     Cta Chest Pe Study    Result Date: 1/14/2020  Narrative: CTA - CHEST WITH IV CONTRAST - PULMONARY ANGIOGRAM INDICATION:   Shortness of breath please also evaluate pleural effusion  COMPARISON: July 30, 2019, March 21, 2019 TECHNIQUE: CTA examination of the chest was performed using angiographic technique according to a protocol specifically tailored to evaluate for pulmonary embolism  Axial, sagittal, and coronal 2D reformatted images were created from the source data and  submitted for interpretation  In addition, coronal 3D MIP postprocessing was performed on the acquisition scanner  Radiation dose length product (DLP) for this visit:  528 83 mGy-cm     This examination, like all CT scans performed in the [facility], was performed utilizing techniques to minimize radiation dose exposure, including the use of iterative reconstruction and automated exposure control  IV Contrast:  100 mL of iohexol (OMNIPAQUE)  FINDINGS: PULMONARY ARTERIAL TREE:  No pulmonary embolus is seen  LUNGS:  Persistent compressive atelectasis of the left lower lobe without significant change, due to large pleural collection  Remainder of the lungs otherwise shows no suspicious pulmonary infiltrates  PLEURA:  Persistent stable loculated septated pleural air collection in the upper anterior thorax measuring 5 4 x 1 6 cm, other large bleb or more likely loculated pneumothorax  This appears slightly smaller when compared to the 2 prior studies  Stable small pleural-based 7 x 6 mm nodule image 3/16 at the margin of the superior segment of the right lower lobe  There remains a large loculated left subpulmonic pleural fluid collection without wall thickening or enhancement  No solid component evident  Its maximum dimensions are currently 13 2 x 16 3 x 10 7 cm  No significant interval change compared to the prior  HEART/GREAT VESSELS:  Unremarkable for patient's age  MEDIASTINUM AND MATTHEW:  Stable borderline and subcentimeter mediastinal lymph nodes appearing stable  CHEST WALL AND LOWER NECK:   Unremarkable  VISUALIZED STRUCTURES IN THE UPPER ABDOMEN:  Mild hepatomegaly, partially imaged, grossly stable  Prior cholecystectomy  OSSEOUS STRUCTURES:  No acute fracture or destructive osseous lesion  Stable mild superior endplate compression deformity at T12  Impression: No evidence for acute pulmonary embolus  Stable large loculated left subpulmonic pleural fluid collection without enhancement or solid component  Associated compressive atelectasis of the left lower lobe appearing stable  Small loculated air collection, pleural in location right upper anterior thorax, gradually decreasing in size  Workstation performed: [id number]       No results displayed because visit has over 200 results            Transitional Care Management Review:  Zacarias Acevedo is a 65 y o  female here for TCM follow up  During the TCM phone call patient stated:    TCM Call (since 12/24/2019)     Date and time call was made  1/20/2020  2:39 PM    Hospital care reviewed  Records reviewed    Patient was hospitialized at  [address] [place] [address]    Date of Admission  01/12/20    Date of discharge  01/16/20    Diagnosis  Acute on chronic respiratory failure with hypoxia    Disposition  Home    Were the patients medications reviewed and updated  Yes    Current Symptoms  Cough    Cough Severity  Mild      TCM Call (since 12/24/2019)     Post hospital issues  Reduced activity    Should patient be enrolled in anticoag monitoring? No    Scheduled for follow up? Yes    Patients specialists  Pulmonlolgist    Pulmonologist name  Dr Filiberto Rojas    Did you obtain your prescribed medications  Yes    Do you need help managing your prescriptions or medications  No    Is transportation to your appointment needed  No    I have advised the patient to call PCP with any new or worsening symptoms  [address]  Family    The type of support provided  Physical; Emotional    Do you have social support  Yes, some    Are you recieving any outpatient services  No    Are you recieving home care services  Yes    Types of home care services  Nurse visit; Other (comment)    Comment  [facility]    Are you using any community resources  No    Current waiver services  No    Have you fallen in the last 12 months  No    Interperter language line needed  No    Counseling  Patient    Counseling topics  Activities of daily living    Comments  Pt stated she feels good                Bhavik Zamora MD

## 2020-01-27 ENCOUNTER — TELEPHONE (OUTPATIENT)
Dept: PULMONOLOGY | Facility: CLINIC | Age: 70
End: 2020-01-27

## 2020-01-30 ENCOUNTER — TELEPHONE (OUTPATIENT)
Dept: INTERNAL MEDICINE CLINIC | Facility: CLINIC | Age: 70
End: 2020-01-30

## 2020-01-30 DIAGNOSIS — B37.49 CANDIDAL UTI (URINARY TRACT INFECTION): Primary | ICD-10-CM

## 2020-01-30 DIAGNOSIS — N30.01 ACUTE CYSTITIS WITH HEMATURIA: ICD-10-CM

## 2020-01-30 RX ORDER — NITROFURANTOIN 25; 75 MG/1; MG/1
100 CAPSULE ORAL 2 TIMES DAILY
Qty: 14 CAPSULE | Refills: 0 | Status: SHIPPED | OUTPATIENT
Start: 2020-01-30 | End: 2020-02-06

## 2020-01-30 RX ORDER — FLUCONAZOLE 150 MG/1
150 TABLET ORAL ONCE
Qty: 1 TABLET | Refills: 0 | Status: SHIPPED | OUTPATIENT
Start: 2020-01-30 | End: 2020-01-30

## 2020-01-30 NOTE — TELEPHONE ENCOUNTER
[facility] from [facility] called [phone]  She was out to see [place] today  She does think [place] may be starting with an UTI as she is c/o Right Flank Pain and frequency of urination  She also stated it looks like the only BW she hasn't done recently are lipids  Can this wait until her next draw?

## 2020-01-31 NOTE — TELEPHONE ENCOUNTER
Patient notified  Will do UC today, prior to starting antibiotic  Home health is in on Saturday and will take urine with them

## 2020-02-01 ENCOUNTER — HOSPITAL ENCOUNTER (EMERGENCY)
Facility: HOSPITAL | Age: 70
Discharge: HOME | End: 2020-02-01
Attending: EMERGENCY MEDICINE | Admitting: EMERGENCY MEDICINE
Payer: COMMERCIAL

## 2020-02-01 ENCOUNTER — APPOINTMENT (EMERGENCY)
Dept: CT IMAGING | Facility: HOSPITAL | Age: 70
End: 2020-02-01
Payer: COMMERCIAL

## 2020-02-01 VITALS
SYSTOLIC BLOOD PRESSURE: 146 MMHG | OXYGEN SATURATION: 97 % | RESPIRATION RATE: 16 BRPM | HEART RATE: 97 BPM | BODY MASS INDEX: 41.42 KG/M2 | WEIGHT: 248.9 LBS | TEMPERATURE: 98.9 F | DIASTOLIC BLOOD PRESSURE: 70 MMHG

## 2020-02-01 DIAGNOSIS — M54.16 ACUTE RIGHT LUMBAR RADICULOPATHY: ICD-10-CM

## 2020-02-01 DIAGNOSIS — R10.9 RIGHT FLANK PAIN: Primary | ICD-10-CM

## 2020-02-01 DIAGNOSIS — B37.3 VAGINAL YEAST INFECTION: ICD-10-CM

## 2020-02-01 LAB
ALBUMIN SERPL BCP-MCNC: 3.3 G/DL (ref 3.5–5)
ALP SERPL-CCNC: 67 U/L (ref 46–116)
ALT SERPL W P-5'-P-CCNC: 36 U/L (ref 12–78)
ANION GAP SERPL CALCULATED.3IONS-SCNC: 3 MMOL/L (ref 4–13)
AST SERPL W P-5'-P-CCNC: 19 U/L (ref 5–45)
BACTERIA UR QL AUTO: ABNORMAL /HPF
BASOPHILS # BLD AUTO: 0.04 THOUSANDS/ΜL (ref 0–0.1)
BASOPHILS NFR BLD AUTO: 1 % (ref 0–1)
BILIRUB SERPL-MCNC: 0.4 MG/DL (ref 0.2–1)
BILIRUB UR QL STRIP: NEGATIVE
BUN SERPL-MCNC: 17 MG/DL (ref 5–25)
CALCIUM SERPL-MCNC: 9.3 MG/DL (ref 8.3–10.1)
CHLORIDE SERPL-SCNC: 104 MMOL/L (ref 100–108)
CLARITY UR: ABNORMAL
CO2 SERPL-SCNC: 33 MMOL/L (ref 21–32)
COLOR UR: YELLOW
CREAT SERPL-MCNC: 0.58 MG/DL (ref 0.6–1.3)
EOSINOPHIL # BLD AUTO: 0.13 THOUSAND/ΜL (ref 0–0.61)
EOSINOPHIL NFR BLD AUTO: 2 % (ref 0–6)
ERYTHROCYTE [DISTWIDTH] IN BLOOD BY AUTOMATED COUNT: 15.2 % (ref 11.6–15.1)
GFR SERPL CREATININE-BSD FRML MDRD: 92 ML/MIN/1.73SQ M
GLUCOSE SERPL-MCNC: 133 MG/DL (ref 65–140)
GLUCOSE UR STRIP-MCNC: NEGATIVE MG/DL
HCT VFR BLD AUTO: 38.9 % (ref 34.8–46.1)
HGB BLD-MCNC: 12.3 G/DL (ref 11.5–15.4)
HGB UR QL STRIP.AUTO: ABNORMAL
IMM GRANULOCYTES # BLD AUTO: 0.03 THOUSAND/UL (ref 0–0.2)
IMM GRANULOCYTES NFR BLD AUTO: 1 % (ref 0–2)
KETONES UR STRIP-MCNC: NEGATIVE MG/DL
LEUKOCYTE ESTERASE UR QL STRIP: ABNORMAL
LYMPHOCYTES # BLD AUTO: 1.42 THOUSANDS/ΜL (ref 0.6–4.47)
LYMPHOCYTES NFR BLD AUTO: 23 % (ref 14–44)
MCH RBC QN AUTO: 31.5 PG (ref 26.8–34.3)
MCHC RBC AUTO-ENTMCNC: 31.6 G/DL (ref 31.4–37.4)
MCV RBC AUTO: 100 FL (ref 82–98)
MONOCYTES # BLD AUTO: 0.67 THOUSAND/ΜL (ref 0.17–1.22)
MONOCYTES NFR BLD AUTO: 11 % (ref 4–12)
NEUTROPHILS # BLD AUTO: 3.8 THOUSANDS/ΜL (ref 1.85–7.62)
NEUTS SEG NFR BLD AUTO: 62 % (ref 43–75)
NITRITE UR QL STRIP: NEGATIVE
NON-SQ EPI CELLS URNS QL MICRO: ABNORMAL /HPF
NRBC BLD AUTO-RTO: 0 /100 WBCS
PH UR STRIP.AUTO: 6 [PH]
PLATELET # BLD AUTO: 252 THOUSANDS/UL (ref 149–390)
PMV BLD AUTO: 9.9 FL (ref 8.9–12.7)
POTASSIUM SERPL-SCNC: 4.2 MMOL/L (ref 3.5–5.3)
PROT SERPL-MCNC: 7.2 G/DL (ref 6.4–8.2)
PROT UR STRIP-MCNC: NEGATIVE MG/DL
RBC # BLD AUTO: 3.91 MILLION/UL (ref 3.81–5.12)
RBC #/AREA URNS AUTO: ABNORMAL /HPF
SODIUM SERPL-SCNC: 140 MMOL/L (ref 136–145)
SP GR UR STRIP.AUTO: 1.01 (ref 1–1.03)
URINE COMMENT: ABNORMAL
UROBILINOGEN UR QL STRIP.AUTO: 0.2 E.U./DL
WBC # BLD AUTO: 6.09 THOUSAND/UL (ref 4.31–10.16)
WBC #/AREA URNS AUTO: ABNORMAL /HPF

## 2020-02-01 PROCEDURE — 81001 URINALYSIS AUTO W/SCOPE: CPT | Performed by: EMERGENCY MEDICINE

## 2020-02-01 PROCEDURE — 99284 EMERGENCY DEPT VISIT MOD MDM: CPT | Performed by: EMERGENCY MEDICINE

## 2020-02-01 PROCEDURE — 87086 URINE CULTURE/COLONY COUNT: CPT | Performed by: EMERGENCY MEDICINE

## 2020-02-01 PROCEDURE — 96374 THER/PROPH/DIAG INJ IV PUSH: CPT

## 2020-02-01 PROCEDURE — 36415 COLL VENOUS BLD VENIPUNCTURE: CPT | Performed by: EMERGENCY MEDICINE

## 2020-02-01 PROCEDURE — 74176 CT ABD & PELVIS W/O CONTRAST: CPT

## 2020-02-01 PROCEDURE — 85025 COMPLETE CBC W/AUTO DIFF WBC: CPT | Performed by: EMERGENCY MEDICINE

## 2020-02-01 PROCEDURE — 80053 COMPREHEN METABOLIC PANEL: CPT | Performed by: EMERGENCY MEDICINE

## 2020-02-01 PROCEDURE — 99284 EMERGENCY DEPT VISIT MOD MDM: CPT

## 2020-02-01 RX ORDER — KETOROLAC TROMETHAMINE 30 MG/ML
15 INJECTION, SOLUTION INTRAMUSCULAR; INTRAVENOUS ONCE
Status: COMPLETED | OUTPATIENT
Start: 2020-02-01 | End: 2020-02-01

## 2020-02-01 RX ORDER — LIDOCAINE 50 MG/G
1 PATCH TOPICAL DAILY
Qty: 10 PATCH | Refills: 0 | Status: SHIPPED | OUTPATIENT
Start: 2020-02-01 | End: 2020-02-19

## 2020-02-01 RX ORDER — FLUCONAZOLE 100 MG/1
200 TABLET ORAL ONCE
Status: COMPLETED | OUTPATIENT
Start: 2020-02-01 | End: 2020-02-01

## 2020-02-01 RX ADMIN — FLUCONAZOLE 200 MG: 100 TABLET ORAL at 14:23

## 2020-02-01 RX ADMIN — KETOROLAC TROMETHAMINE 15 MG: 30 INJECTION, SOLUTION INTRAMUSCULAR at 12:45

## 2020-02-01 NOTE — ED PROVIDER NOTES
History  Chief Complaint   Patient presents with    Flank Pain     pain in right flank started wednesday  home health told to come to ED for possible kidney stone or infection or UTI  Patient complains of sudden onset of right flank pain for the past 3 days  She was making something to eat when symptoms began  Started as a dull ache in the posterior right flank and is associated with intermittent episodes of pain radiating to her right lateral flank or right lower quadrant abdomen  Today, she felt dysuria and frequency with a sense of incomplete emptying  She states she has chronic constipation  She feels bloated and uncomfortable but no discrete abdominal pain, nausea or vomiting  She thought she felt a lump on her labia majora when she was showering but is unable to see the area to determine what that might be  She has visiting nurses who saw her 2 days ago and today and recommended she come here for further evaluation  A sterile urine specimen was obtained at home and was brought with her  The visiting nurse also spoke with her PCP who prescribed her Diflucan and some form of an antibiotic which the patient has not started yet  She reports frequent yeast infections but infrequent UTI  She thinks she had a ureteral stone approximately 15 years ago for which she was seen at [facility]   She did not follow-up with urology after passing that stone  She has not had any fever or prodromal illness but states that she never does; she always goes "from well to gravely ill"  No change in symptoms w/PO intake or BM  Denies f/c, CP, SOB, abdominal pain, v/d, vaginal discharge or hematuria  12 system ROS o/w negative                    History provided by:  Patient and medical records  Flank Pain   Pain location:  R flank  Pain quality: aching (Constantly), cramping, sharp (Intermittently) and shooting (Intermittently)    Pain severity:  Moderate  Onset quality:  Sudden  Duration:  3 days  Timing: Constant  Progression:  Waxing and waning  Chronicity:  New  Context: not awakening from sleep, not eating, not recent illness, not recent travel, not retching, not sick contacts, not suspicious food intake and not trauma    Relieved by:  None tried  Worsened by:  Urination  Ineffective treatments:  None tried  Associated symptoms: constipation (Chronic, unchanged) and dysuria    Associated symptoms: no anorexia, no chest pain, no chills, no cough, no diarrhea, no fever, no hematochezia, no hematuria, no melena, no nausea, no shortness of breath, no vaginal bleeding, no vaginal discharge and no vomiting    Risk factors: being elderly and obesity    Risk factors: no alcohol abuse, no NSAID use, not pregnant and no recent hospitalization        Prior to Admission Medications   Prescriptions Last Dose Informant Patient Reported? Taking? Blood Glucose Monitoring Suppl (ONE TOUCH ULTRA 2) w/Device KIT  Self No No   Sig: by Does not apply route 2 (two) times a day   DULoxetine (CYMBALTA) 20 mg capsule   No No   Sig: TAKE ONE CAPSULE BY MOUTH ONCE DAILY   Insulin Pen Needle 33G X 4 MM MISC  Self No No   Sig: by Does not apply route daily   JANUVIA 100 MG tablet   No No   Sig: TAKE 1 TABLET BY MOUTH DAILY     ONE TOUCH ULTRA TEST test strip  Self No No   Sig: Tests twice a day   ONETOUCH DELICA LANCETS FINE MISC  Self No No   Sig: by Does not apply route 2 (two) times a day   Polyethylene Glycol 3350-GRX POWD   No No   Sig: Take by mouth daily at bedtime as needed (constipation)   Tofacitinib Citrate (XELJANZ XR) 11 MG TB24  Self Yes No   Sig: Take 1 tablet by mouth daily   acetaminophen (TYLENOL) 325 mg tablet   No No   Sig: Take 2 tablets (650 mg total) by mouth every 6 (six) hours as needed for mild pain, headaches or fever   albuterol (PROVENTIL HFA,VENTOLIN HFA) 90 mcg/act inhaler  Self No No   Sig: Inhale 2 puffs every 6 (six) hours as needed for wheezing   alendronate (FOSAMAX) 70 mg tablet  Self Yes No   Sig: Take 70 mg by mouth every 7 days   atorvastatin (LIPITOR) 20 mg tablet   No No   Sig: TAKE ONE TABLET BY MOUTH EVERY DAY   cholecalciferol (VITAMIN D3) 1,000 units tablet   No No   Sig: Take 1 tablet (1,000 Units total) by mouth daily   fluticasone-salmeterol (ADVAIR) 250-50 mcg/dose  Self Yes No   Sig: Inhale 1 puff every 12 (twelve) hours    folic acid (FOLVITE) 1 mg tablet  Self No No   Sig: TAKE ONE TABLET BY MOUTH ONCE DAILY   insulin glargine (LANTUS SOLOSTAR) 100 units/mL injection pen   No No   Sig: Inject 35 Units under the skin daily at bedtime   ipratropium-albuterol (DUO-NEB) 0 5-2 5 mg/3 mL nebulizer solution   No No   Sig: Take 1 vial (3 mL total) by nebulization every 6 (six) hours as needed for wheezing or shortness of breath   levothyroxine 125 mcg tablet   No No   Sig: TAKE ONE TABLET BY MOUTH EVERY DAY IN THE AM   lisinopril (ZESTRIL) 40 mg tablet  Self No No   Sig: TAKE ONE TABLET BY MOUTH EVERY DAY   methotrexate 2 5 mg tablet  Self No No   Sig: Take 1 tablet (2 5 mg total) by mouth once a week Takes 8 tablets every    mometasone (ELOCON) 0 1 % ointment  Self No No   Sig: Apply topically 2 (two) times a day as needed (itching)   nitrofurantoin (MACROBID) 100 mg capsule   No No   Sig: Take 1 capsule (100 mg total) by mouth 2 (two) times a day for 7 days   nystatin (MYCOSTATIN) powder   No No   Sig: Apply topically 3 (three) times a day as needed (fungal skin rash)   pantoprazole (PROTONIX) 40 mg tablet   No No   Sig: TAKE ONE TABLET BY MOUTH ONCE DAILY   predniSONE 10 mg tablet   No No   Si mg daily for 3 days then 20 mg daily for 3 days then 10 mg daily for 3 days then 5 mg daily for 2 days then STOP   tiotropium (SPIRIVA RESPIMAT) 2 5 MCG/ACT AERS inhaler  Self No No   Sig: Inhale 1 puff daily at bedtime   vitamin B-12 (VITAMIN B-12) 500 MCG tablet   No No   Sig: Take 1 tablet (500 mcg total) by mouth daily      Facility-Administered Medications: None       Past Medical History: Diagnosis Date    Arthritis     Arthritis     Cancer ([facility] )     Candidiasis of skin     Cervical cancer ([facility] )     Chronic respiratory failure with hypoxia and hypercapnia ([facility] ) 2019    COPD (chronic obstructive pulmonary disease) ([facility] )     last assessed 2016    Current chronic use of systemic steroids     Degenerative arthritis of left knee     last assessed 2015    Diabetes mellitus ([facility] )     Disease of thyroid gland     hypo pill given to decrease thyroid   Fibromyalgia     Fistula of knee     last assessed 2014    GERD (gastroesophageal reflux disease)     Hyperlipidemia     Hypertension     Medial meniscus tear     of left knee, last assessed 2014    Migraine     states she has a hx of HA that she calls [employer] but never was dx by neurologist    Obesity     Obesity     Osteoarthritis     Osteopenia     Pneumonia     last assessed 2016    Psychiatric disorder     anxiety    Rheumatoid arthritis ([facility] )     Rheumatoid arthritis ([facility] )     Sepsis ([facility] )     last assessed 11/10/2016    Tick bite     last assessed 10/30/2015    Vitamin B12 deficiency     Vitamin D deficiency        Past Surgical History:   Procedure Laterality Date    CATARACT EXTRACTION Bilateral     CHOLECYSTECTOMY      EYE SURGERY      Bilateral Cataracts    HYSTERECTOMY      IR THORACENTESIS  2019    JOINT REPLACEMENT      left knee    KNEE ARTHROSCOPY      NEUROPLASTY / TRANSPOSITION MEDIAN NERVE AT CARPAL TUNNEL      TUBAL LIGATION         Family History   Problem Relation Age of Onset    Stroke Mother     Asthma Family     Cancer Family     Diabetes Family     Hypertension Family      I have reviewed and agree with the history as documented      Social History     Tobacco Use    Smoking status: Former Smoker     Packs/day: 1 00     Years: 48 00     Pack years: 48 00     Types: E-Cigarettes     Last attempt to quit: 2012     Years since quittin 2    Smokeless tobacco: Never Used    Tobacco comment: per allscripts - "current every day smoker, former smoker"   Substance Use Topics    Alcohol use: Never     Alcohol/week: 0 0 standard drinks     Frequency: Never     Binge frequency: Never     Comment: stopped drinking alcohol    Drug use: Never        Review of Systems   Constitutional: Negative for chills and fever  HENT: Negative  Eyes: Negative  Respiratory: Negative for cough, chest tightness and shortness of breath  Cardiovascular: Negative for chest pain and palpitations  Gastrointestinal: Positive for constipation (Chronic, unchanged)  Negative for abdominal pain, anorexia, diarrhea, hematochezia, melena, nausea and vomiting  Genitourinary: Positive for dysuria and flank pain  Negative for hematuria, vaginal bleeding and vaginal discharge  Musculoskeletal: Negative for back pain  Skin: Negative for pallor and rash  Neurological: Negative for weakness  Psychiatric/Behavioral: Negative  All other systems reviewed and are negative  Physical Exam  Physical Exam   Constitutional: She is oriented to person, place, and time  She appears well-developed and well-nourished  No distress  HENT:   Head: Normocephalic and atraumatic  Mouth/Throat: Oropharynx is clear and moist  No oropharyngeal exudate  Eyes: Pupils are equal, round, and reactive to light  Conjunctivae and EOM are normal  No scleral icterus  Neck: Normal range of motion  Neck supple  Cardiovascular: Normal rate and regular rhythm  No murmur heard  Pulmonary/Chest: Effort normal and breath sounds normal    Abdominal: Soft  Bowel sounds are normal  There is no tenderness  Minimal right CVA TTP  Obese  Musculoskeletal: Normal range of motion  She exhibits no edema  Lumbar back: She exhibits tenderness, bony tenderness and spasm  She exhibits no deformity  Neurological: She is alert and oriented to person, place, and time  She has normal reflexes  She exhibits normal muscle tone  Skin: Skin is warm and dry  No rash noted  She is not diaphoretic  No erythema  Skin overlying the area is nontender to palpation   Psychiatric: She has a normal mood and affect  Her behavior is normal  Thought content normal    Vitals reviewed        Vital Signs  ED Triage Vitals [02/01/20 1214]   Temperature Pulse Respirations Blood Pressure SpO2   98 9 °F (37 2 °C) 98 17 135/73 95 %      Temp Source Heart Rate Source Patient Position - Orthostatic VS BP Location FiO2 (%)   Temporal Monitor Sitting Right arm --      Pain Score       Worst Possible Pain           Vitals:    02/01/20 1214   BP: 135/73   Pulse: 98   Patient Position - Orthostatic VS: Sitting         Visual Acuity      ED Medications  Medications   fluconazole (DIFLUCAN) tablet 200 mg (has no administration in time range)   ketorolac (TORADOL) injection 15 mg (15 mg Intravenous Given 2/1/20 1245)       Diagnostic Studies  Results Reviewed     Procedure Component Value Units Date/Time    Urine Microscopic [[id number]]  (Abnormal) Collected:  02/01/20 1243    Lab Status:  Final result Specimen:  Urine, Clean Catch Updated:  02/01/20 1311     RBC, UA 2-4 /hpf      WBC, UA 30-50 /hpf      Epithelial Cells Moderate /hpf      Bacteria, UA Occasional /hpf      URINE COMMENT 4+ Yeast Seen    CMP [[id number]]  (Abnormal) Collected:  02/01/20 1242    Lab Status:  Final result Specimen:  Blood from Arm, Left Updated:  02/01/20 1307     Sodium 140 mmol/L      Potassium 4 2 mmol/L      Chloride 104 mmol/L      CO2 33 mmol/L      ANION GAP 3 mmol/L      BUN 17 mg/dL      Creatinine 0 58 mg/dL      Glucose 133 mg/dL      Calcium 9 3 mg/dL      AST 19 U/L      ALT 36 U/L      Alkaline Phosphatase 67 U/L      Total Protein 7 2 g/dL      Albumin 3 3 g/dL      Total Bilirubin 0 40 mg/dL      eGFR 92 ml/min/1 73sq m     Narrative:       [place] guidelines for Chronic Kidney Disease (CKD):     Stage 1 with normal or high GFR (GFR > 90 mL/min/1 73 square meters)    Stage 2 Mild CKD (GFR = 60-89 mL/min/1 73 square meters)    Stage 3A Moderate CKD (GFR = 45-59 mL/min/1 73 square meters)    Stage 3B Moderate CKD (GFR = 30-44 mL/min/1 73 square meters)    Stage 4 Severe CKD (GFR = 15-29 mL/min/1 73 square meters)    Stage 5 End Stage CKD (GFR <15 mL/min/1 73 square meters)  Note: GFR calculation is accurate only with a steady state creatinine    Urine culture [[id number]] Collected:  02/01/20 1243    Lab Status:   In process Specimen:  Urine, Clean Catch Updated:  02/01/20 1306    UA w Reflex to Microscopic w Reflex to Culture [[id number]]  (Abnormal) Collected:  02/01/20 1243    Lab Status:  Final result Specimen:  Urine, Clean Catch Updated:  02/01/20 1257     Color, UA Yellow     Clarity, UA Cloudy     Specific [place], UA 1 010     pH, UA 6 0     Leukocytes, UA Large     Nitrite, UA Negative     Protein, UA Negative mg/dl      Glucose, UA Negative mg/dl      Ketones, UA Negative mg/dl      Urobilinogen, UA 0 2 E U /dl      Bilirubin, UA Negative     Blood, UA Small    CBC and differential [[id number]]  (Abnormal) Collected:  02/01/20 1242    Lab Status:  Final result Specimen:  Blood from Arm, Left Updated:  02/01/20 1251     WBC 6 09 Thousand/uL      RBC 3 91 Million/uL      Hemoglobin 12 3 g/dL      Hematocrit 38 9 %       fL      MCH 31 5 pg      MCHC 31 6 g/dL      RDW 15 2 %      MPV 9 9 fL      Platelets 221 Thousands/uL      nRBC 0 /100 WBCs      Neutrophils Relative 62 %      Immat GRANS % 1 %      Lymphocytes Relative 23 %      Monocytes Relative 11 %      Eosinophils Relative 2 %      Basophils Relative 1 %      Neutrophils Absolute 3 80 Thousands/µL      Immature Grans Absolute 0 03 Thousand/uL      Lymphocytes Absolute 1 42 Thousands/µL      Monocytes Absolute 0 67 Thousand/µL      Eosinophils Absolute 0 13 Thousand/µL      Basophils Absolute 0 04 Thousands/µL                  CT renal stone study abdomen pelvis without contrast   Final Result by Dany Scherer MD (02/01 9390)         1  No definite evidence for urolithiasis or obstructive uropathy on either side  2   1 9 cm exophytic lesion arising from the posterior interpolar left kidney incompletely characterized on this unenhanced exam   This may represent a complex cyst but further investigation is warranted with contrast-enhanced CT and/or ultrasound  3   Additional findings as noted  The study was marked in EPIC for significant notification  Workstation performed: [id number]                    Procedures  Procedures         ED Course                               MDM  Number of Diagnoses or Management Options  Diagnosis management comments: DDx: Flank pain - ureteral stone, UTI/pyelo, no clinical evidence of GI cause, doubt atypical cardiac cause, VZV  A/P: Will check CT stone study, renal function, urine, treat symptoms, reevaluate for further w/u or disposition  Amount and/or Complexity of Data Reviewed  Clinical lab tests: ordered and reviewed  Tests in the radiology section of CPT®: ordered and reviewed  Review and summarize past medical records: yes          Disposition  Final diagnoses:   Right flank pain   Vaginal yeast infection   Acute right lumbar radiculopathy     Time reflects when diagnosis was documented in both MDM as applicable and the Disposition within this note     Time User Action Codes Description Comment    2/1/2020  2:16 PM [address] [place] [address],[facility]  [address] [R10 9] Right flank pain     2/1/2020  2:16  [facility] [B37 3] Vaginal yeast infection     2/1/2020  2:17 PM [address] [place] [address],[facility]  [address] [M54 16] Acute right lumbar radiculopathy       ED Disposition     ED Disposition Condition Date/Time Comment    Discharge Stable Sat Feb 1, 2020  2:18 PM Shahnaz Choi discharge to home/self care              Follow-up Information     Follow up With Specialties Details Why Contact Info Additional Information    [facility] Physical Therapy Schedule an appointment as soon as possible for a visit in 1 day for further evaluation and treatment           Patient's Medications   Discharge Prescriptions    LIDOCAINE (LIDODERM) 5 %    Apply 1 patch topically daily Remove & Discard patch within 12 hours or as directed by MD       Start Date: 2/1/2020  End Date: --       Order Dose: 1 patch       Quantity: 10 patch    Refills: 0         ED Provider  Electronically Signed by           Tim Perry DO  02/01/20 6817

## 2020-02-02 ENCOUNTER — HOSPITAL ENCOUNTER (EMERGENCY)
Facility: HOSPITAL | Age: 70
Discharge: HOME | End: 2020-02-02
Attending: EMERGENCY MEDICINE
Payer: COMMERCIAL

## 2020-02-02 VITALS
BODY MASS INDEX: 42.37 KG/M2 | DIASTOLIC BLOOD PRESSURE: 70 MMHG | HEART RATE: 71 BPM | RESPIRATION RATE: 20 BRPM | TEMPERATURE: 97.3 F | OXYGEN SATURATION: 95 % | WEIGHT: 254.63 LBS | SYSTOLIC BLOOD PRESSURE: 154 MMHG

## 2020-02-02 DIAGNOSIS — B02.9 SHINGLES: Primary | ICD-10-CM

## 2020-02-02 LAB — BACTERIA UR CULT: NORMAL

## 2020-02-02 PROCEDURE — 99283 EMERGENCY DEPT VISIT LOW MDM: CPT

## 2020-02-02 PROCEDURE — 99284 EMERGENCY DEPT VISIT MOD MDM: CPT | Performed by: EMERGENCY MEDICINE

## 2020-02-02 RX ORDER — TRAMADOL HYDROCHLORIDE 50 MG/1
50 TABLET ORAL EVERY 6 HOURS PRN
Qty: 20 TABLET | Refills: 0 | Status: SHIPPED | OUTPATIENT
Start: 2020-02-02 | End: 2021-01-12

## 2020-02-02 RX ORDER — VALACYCLOVIR HYDROCHLORIDE 500 MG/1
1000 TABLET, FILM COATED ORAL EVERY 8 HOURS SCHEDULED
Status: DISCONTINUED | OUTPATIENT
Start: 2020-02-02 | End: 2020-02-03 | Stop reason: HOSPADM

## 2020-02-02 RX ORDER — VALACYCLOVIR HYDROCHLORIDE 500 MG/1
1000 TABLET, FILM COATED ORAL EVERY 8 HOURS SCHEDULED
Status: DISCONTINUED | OUTPATIENT
Start: 2020-02-02 | End: 2020-02-02

## 2020-02-02 RX ORDER — VALACYCLOVIR HYDROCHLORIDE 1 G/1
1000 TABLET, FILM COATED ORAL 3 TIMES DAILY
Qty: 21 TABLET | Refills: 0 | Status: SHIPPED | OUTPATIENT
Start: 2020-02-02 | End: 2020-02-19

## 2020-02-02 RX ORDER — TRAMADOL HYDROCHLORIDE 50 MG/1
50 TABLET ORAL ONCE
Status: COMPLETED | OUTPATIENT
Start: 2020-02-02 | End: 2020-02-02

## 2020-02-02 RX ORDER — ACETAMINOPHEN 325 MG/1
650 TABLET ORAL ONCE
Status: COMPLETED | OUTPATIENT
Start: 2020-02-02 | End: 2020-02-02

## 2020-02-02 RX ADMIN — VALACYCLOVIR HYDROCHLORIDE 1000 MG: 500 TABLET, FILM COATED ORAL at 22:09

## 2020-02-02 RX ADMIN — TRAMADOL HYDROCHLORIDE 50 MG: 50 TABLET, FILM COATED ORAL at 21:07

## 2020-02-02 RX ADMIN — ACETAMINOPHEN 650 MG: 325 TABLET, FILM COATED ORAL at 21:07

## 2020-02-03 ENCOUNTER — NURSE TRIAGE (OUTPATIENT)
Dept: PHYSICAL THERAPY | Facility: OTHER | Age: 70
End: 2020-02-03

## 2020-02-03 DIAGNOSIS — B02.9 HERPES ZOSTER WITHOUT COMPLICATION: Primary | ICD-10-CM

## 2020-02-03 RX ORDER — ACYCLOVIR 50 MG/G
OINTMENT TOPICAL
Qty: 30 G | Refills: 0 | Status: SHIPPED | OUTPATIENT
Start: 2020-02-03 | End: 2020-02-19

## 2020-02-03 NOTE — DISCHARGE INSTRUCTIONS
Valcyclovir 1 gram 3 times a day for 7 days  Tylenol 650mg every 6 hours as needed for pain, fever (max 3000mg in 24 hours)   Tramdol for break thru pain  OK to use lidoderm patches  Avoid anyone with a suppressed immune system such as transplant patient eg newborns anyone on chemotherapy and pregnant women until blisters scab

## 2020-02-03 NOTE — TELEPHONE ENCOUNTER
Voice message left for patient to call back  Phone number and hours of business provided  This is the 1st attempt to reach the patient  Will defer per protocol  ED note states patients has visiting nurses

## 2020-02-03 NOTE — ED PROVIDER NOTES
History  Chief Complaint   Patient presents with    Back Pain     Patient states lower back pain and pain in legs  Was seen in ED yesterday for same  States numbness is new for her  66-year-old female presents for continued right low back pain she was evaluated yesterday with concerns of kidney stone she had flank pain she was diagnosed with the yeast infection and felt to have a radiculopathy she is denying any fevers she has had no trauma or falls she feels like there is a numb sensation from the right low back above the posterior iliac crest radiating to the buttock there is no history of bowel or bladder incontinence no abdominal pain no weakness  No nausea vomiting no chest pain or shortness of breath no lightheadedness  Prior to Admission Medications   Prescriptions Last Dose Informant Patient Reported? Taking? Blood Glucose Monitoring Suppl (ONE TOUCH ULTRA 2) w/Device KIT  Self No No   Sig: by Does not apply route 2 (two) times a day   DULoxetine (CYMBALTA) 20 mg capsule   No No   Sig: TAKE ONE CAPSULE BY MOUTH ONCE DAILY   Insulin Pen Needle 33G X 4 MM MISC  Self No No   Sig: by Does not apply route daily   JANUVIA 100 MG tablet   No No   Sig: TAKE 1 TABLET BY MOUTH DAILY     ONE TOUCH ULTRA TEST test strip  Self No No   Sig: Tests twice a day   ONETOUCH DELICA LANCETS FINE MISC  Self No No   Sig: by Does not apply route 2 (two) times a day   Polyethylene Glycol 3350-GRX POWD   No No   Sig: Take by mouth daily at bedtime as needed (constipation)   Tofacitinib Citrate (XELJANZ XR) 11 MG TB24  Self Yes No   Sig: Take 1 tablet by mouth daily   acetaminophen (TYLENOL) 325 mg tablet   No No   Sig: Take 2 tablets (650 mg total) by mouth every 6 (six) hours as needed for mild pain, headaches or fever   albuterol (PROVENTIL HFA,VENTOLIN HFA) 90 mcg/act inhaler  Self No No   Sig: Inhale 2 puffs every 6 (six) hours as needed for wheezing   alendronate (FOSAMAX) 70 mg tablet  Self Yes No   Sig: Take 70 mg by mouth every 7 days   atorvastatin (LIPITOR) 20 mg tablet   No No   Sig: TAKE ONE TABLET BY MOUTH EVERY DAY   cholecalciferol (VITAMIN D3) 1,000 units tablet   No No   Sig: Take 1 tablet (1,000 Units total) by mouth daily   fluticasone-salmeterol (ADVAIR) 250-50 mcg/dose  Self Yes No   Sig: Inhale 1 puff every 12 (twelve) hours    folic acid (FOLVITE) 1 mg tablet  Self No No   Sig: TAKE ONE TABLET BY MOUTH ONCE DAILY   insulin glargine (LANTUS SOLOSTAR) 100 units/mL injection pen   No No   Sig: Inject 35 Units under the skin daily at bedtime   ipratropium-albuterol (DUO-NEB) 0 5-2 5 mg/3 mL nebulizer solution   No No   Sig: Take 1 vial (3 mL total) by nebulization every 6 (six) hours as needed for wheezing or shortness of breath   levothyroxine 125 mcg tablet   No No   Sig: TAKE ONE TABLET BY MOUTH EVERY DAY IN THE AM   lidocaine (LIDODERM) 5 %   No No   Sig: Apply 1 patch topically daily Remove & Discard patch within 12 hours or as directed by MD   lisinopril (ZESTRIL) 40 mg tablet  Self No No   Sig: TAKE ONE TABLET BY MOUTH EVERY DAY   methotrexate 2 5 mg tablet  Self No No   Sig: Take 1 tablet (2 5 mg total) by mouth once a week Takes 8 tablets every    mometasone (ELOCON) 0 1 % ointment  Self No No   Sig: Apply topically 2 (two) times a day as needed (itching)   nitrofurantoin (MACROBID) 100 mg capsule   No No   Sig: Take 1 capsule (100 mg total) by mouth 2 (two) times a day for 7 days   nystatin (MYCOSTATIN) powder   No No   Sig: Apply topically 3 (three) times a day as needed (fungal skin rash)   pantoprazole (PROTONIX) 40 mg tablet   No No   Sig: TAKE ONE TABLET BY MOUTH ONCE DAILY   predniSONE 10 mg tablet   No No   Si mg daily for 3 days then 20 mg daily for 3 days then 10 mg daily for 3 days then 5 mg daily for 2 days then STOP   tiotropium (SPIRIVA RESPIMAT) 2 5 MCG/ACT AERS inhaler  Self No No   Sig: Inhale 1 puff daily at bedtime   vitamin B-12 (VITAMIN B-12) 500 MCG tablet   No No Sig: Take 1 tablet (500 mcg total) by mouth daily      Facility-Administered Medications: None       Past Medical History:   Diagnosis Date    Arthritis     Arthritis     Cancer [facility]     Candidiasis of skin     Cervical cancer ([place] [address] )     Chronic respiratory failure with hypoxia and hypercapnia ([facility] ) 4/20/2019    COPD (chronic obstructive pulmonary disease) ([place] [address] )     last assessed 11/21/2016    Current chronic use of systemic steroids     Degenerative arthritis of left knee     last assessed 1/20/2015    Diabetes mellitus ([place] [address] )     Disease of thyroid gland     hypo pill given to decrease thyroid   Fibromyalgia     Fistula of knee     last assessed 9/12/2014    GERD (gastroesophageal reflux disease)     Hyperlipidemia     Hypertension     Medial meniscus tear     of left knee, last assessed 9/12/2014    Migraine     states she has a hx of HA that she calls Helpr but never was dx by neurologist    Obesity     Obesity     Osteoarthritis     Osteopenia     Pneumonia     last assessed 11/16/2016    Psychiatric disorder     anxiety    Rheumatoid arthritis ([place] [address] )     Rheumatoid arthritis ([place] [address] )     Sepsis ([place] [address] )     last assessed 11/10/2016    Tick bite     last assessed 10/30/2015    Vitamin B12 deficiency     Vitamin D deficiency        Past Surgical History:   Procedure Laterality Date    CATARACT EXTRACTION Bilateral     CHOLECYSTECTOMY      EYE SURGERY      Bilateral Cataracts    HYSTERECTOMY      IR THORACENTESIS  5/31/2019    JOINT REPLACEMENT      left knee    KNEE ARTHROSCOPY      NEUROPLASTY / TRANSPOSITION MEDIAN NERVE AT CARPAL TUNNEL      TUBAL LIGATION         Family History   Problem Relation Age of Onset    Stroke Mother     Asthma Family     Cancer Family     Diabetes Family     Hypertension Family      I have reviewed and agree with the history as documented      Social History     Tobacco Use    Smoking status: Former Smoker     Packs/day: 1 00     Years: 48 00     Pack years: 48 00     Types: E-Cigarettes     Last attempt to quit: 2012     Years since quittin 2    Smokeless tobacco: Never Used    Tobacco comment: per allscripts - "current every day smoker, former smoker"   Substance Use Topics    Alcohol use: Never     Alcohol/week: 0 0 standard drinks     Frequency: Never     Binge frequency: Never     Comment: stopped drinking alcohol    Drug use: Never        Review of Systems   Constitutional: Negative for activity change, appetite change, chills and fever  HENT: Negative for congestion, ear pain, sinus pressure, sinus pain, sneezing, sore throat and voice change  Eyes: Negative for discharge  Respiratory: Negative for cough and shortness of breath  Cardiovascular: Negative for chest pain and leg swelling  Gastrointestinal: Negative for abdominal pain, diarrhea, nausea and vomiting  Genitourinary: Negative for difficulty urinating  Musculoskeletal: Positive for back pain  Negative for gait problem, joint swelling, myalgias, neck pain and neck stiffness  Neurological: Negative for dizziness, weakness, light-headedness and headaches  Psychiatric/Behavioral: Negative for behavioral problems  All other systems reviewed and are negative  Physical Exam  Physical Exam   Constitutional: She is oriented to person, place, and time  She appears well-developed and well-nourished  She appears distressed  Mild pain   HENT:   Head: Normocephalic and atraumatic  Right Ear: External ear normal    Left Ear: External ear normal    Nose: Nose normal    Mouth/Throat: Oropharynx is clear and moist  No oropharyngeal exudate  TMS pale   Eyes: Pupils are equal, round, and reactive to light  Conjunctivae and EOM are normal  Right eye exhibits no discharge  Left eye exhibits no discharge  Neck: Normal range of motion  Neck supple  Cardiovascular: Normal rate, regular rhythm and normal heart sounds     Pulmonary/Chest: Effort normal  No stridor  No respiratory distress  She has no wheezes  She has no rales  Distant BS    Abdominal: Soft  Bowel sounds are normal  She exhibits no distension and no mass  There is no tenderness  There is no guarding  Musculoskeletal: Normal range of motion  She exhibits no edema, tenderness or deformity  Lymphadenopathy:     She has no cervical adenopathy  Neurological: She is alert and oriented to person, place, and time  She displays normal reflexes  No cranial nerve deficit or sensory deficit  She exhibits normal muscle tone  Coordination normal    Light touch is intact throughout bilateral lower extremity she has 5/5 proximal distal muscular strength to the lower extremity great toe dorsiflexion and plantar and dorsiflexion of the feet are intact  Skin: Skin is warm and dry  Capillary refill takes less than 2 seconds  Rash noted  She is not diaphoretic  Vesicular lesions cysts from the midline spreading from of above the iliac crest around to the greater trochanters region consistent with shingles  None her secondarily infected  Psychiatric: She has a normal mood and affect         Vital Signs  ED Triage Vitals [02/02/20 2007]   Temperature Pulse Respirations Blood Pressure SpO2   (!) 97 3 °F (36 3 °C) 72 18 154/70 96 %      Temp Source Heart Rate Source Patient Position - Orthostatic VS BP Location FiO2 (%)   Temporal Monitor -- Left arm --      Pain Score       9           Vitals:    02/02/20 2007 02/02/20 2015   BP: 154/70 154/70   Pulse: 72 71         Visual Acuity      ED Medications  Medications   acetaminophen (TYLENOL) tablet 650 mg (650 mg Oral Given 2/2/20 2107)   traMADol (ULTRAM) tablet 50 mg (50 mg Oral Given 2/2/20 2107)       Diagnostic Studies  Results Reviewed     None                 No orders to display              Procedures  Procedures         ED Course  ED Course as of Feb 03 0059   Sun Feb 02, 2020 2130 PA  aware: 7/6/2018 tramadol 50mg #28 7d      2206 Ambulates with steady gait                                  [facility]  Number of Diagnoses or Management Options  Diagnosis management comments: Mdm:  Patient is not demonstrating any evidence of myelopathy no evidence of cauda equina syndrome  Skin exam does reveal a vesicular rash in the dermal distribution of L2 extending on from the above the posterior iliac crest in the midline radiating to the right greater trochanter patient will be given Tylenol with tramadol for breakthrough pain and initiated on Valtrex  Yesterday's note was extensively reviewed reviewed with patient that she should follow up with her family doctor for a cyst to the right to the left kidney      Reviewed with patient not to be around pregnant women anyone with a suppressed immune such in system such as chemotherapy or transplant patient or anyone that has not yet had chickenpox or is immunized for the chickenpox virus  This will continue until lesions are scabbed over  She can still use lidocaine patch was which was prescribed yesterday  Disposition  Final diagnoses:   Shingles - rt l2 distribution     Time reflects when diagnosis was documented in both MDM as applicable and the Disposition within this note     Time User Action Codes Description Comment    2/2/2020  8:59 PM [address] [place] [B02 9] Shingles     2/2/2020  8:59 PM Jose Garcia Modify [B02 9] Shingles rt l2 distribution      ED Disposition     ED Disposition Condition Date/Time Comment    Discharge Stable Sun Feb 2, 2020  9:03 PM Aly Sanchez discharge to home/self care              Follow-up Information     Follow up With Specialties Details Why Nancie Armstrong MD Family Medicine Go in 1 week if not improving 6 [address]  [phone]            Discharge Medication List as of 2/2/2020  9:07 PM      START taking these medications    Details   traMADol (ULTRAM) 50 mg tablet Take 1 tablet (50 mg total) by mouth every 6 (six) hours as needed for moderate pain for up to 20 doses, Starting Sun 2/2/2020, Normal      valACYclovir (VALTREX) 1,000 mg tablet Take 1 tablet (1,000 mg total) by mouth 3 (three) times a day for 7 days, Starting Sun 2/2/2020, Until Sun 2/9/2020, Normal         CONTINUE these medications which have NOT CHANGED    Details   acetaminophen (TYLENOL) 325 mg tablet Take 2 tablets (650 mg total) by mouth every 6 (six) hours as needed for mild pain, headaches or fever, Starting Thu 1/16/2020, No Print      albuterol (PROVENTIL HFA,VENTOLIN HFA) 90 mcg/act inhaler Inhale 2 puffs every 6 (six) hours as needed for wheezing, Starting Tue 9/11/2018, Print      alendronate (FOSAMAX) 70 mg tablet Take 70 mg by mouth every 7 days, Historical Med      atorvastatin (LIPITOR) 20 mg tablet TAKE ONE TABLET BY MOUTH EVERY DAY, Normal      Blood Glucose Monitoring Suppl (ONE TOUCH ULTRA 2) w/Device KIT by Does not apply route 2 (two) times a day, Starting Wed 6/6/2018, Normal      cholecalciferol (VITAMIN D3) 1,000 units tablet Take 1 tablet (1,000 Units total) by mouth daily, Starting Thu 1/16/2020, Normal      DULoxetine (CYMBALTA) 20 mg capsule TAKE ONE CAPSULE BY MOUTH ONCE DAILY, Normal      fluticasone-salmeterol (ADVAIR) 250-50 mcg/dose Inhale 1 puff every 12 (twelve) hours , Historical Med      folic acid (FOLVITE) 1 mg tablet TAKE ONE TABLET BY MOUTH ONCE DAILY, Normal      insulin glargine (LANTUS SOLOSTAR) 100 units/mL injection pen Inject 35 Units under the skin daily at bedtime, Starting Thu 1/16/2020, No Print      Insulin Pen Needle 33G X 4 MM MISC by Does not apply route daily, Starting Tue 5/21/2019, Normal      ipratropium-albuterol (DUO-NEB) 0 5-2 5 mg/3 mL nebulizer solution Take 1 vial (3 mL total) by nebulization every 6 (six) hours as needed for wheezing or shortness of breath, Starting Thu 1/16/2020, Normal      JANUVIA 100 MG tablet TAKE 1 TABLET BY MOUTH DAILY , Normal      levothyroxine 125 mcg tablet TAKE ONE TABLET BY MOUTH EVERY DAY IN THE AM, Normal      lidocaine (LIDODERM) 5 % Apply 1 patch topically daily Remove & Discard patch within 12 hours or as directed by MD, Starting Sat 2/1/2020, Normal      lisinopril (ZESTRIL) 40 mg tablet TAKE ONE TABLET BY MOUTH EVERY DAY, Normal      methotrexate 2 5 mg tablet Take 1 tablet (2 5 mg total) by mouth once a week Takes 8 tablets every sunday, Starting Fri 5/31/2019, Normal      mometasone (ELOCON) 0 1 % ointment Apply topically 2 (two) times a day as needed (itching), Starting Thu 4/25/2019, Normal      nitrofurantoin (MACROBID) 100 mg capsule Take 1 capsule (100 mg total) by mouth 2 (two) times a day for 7 days, Starting Thu 1/30/2020, Until Thu 2/6/2020, Normal      nystatin (MYCOSTATIN) powder Apply topically 3 (three) times a day as needed (fungal skin rash), Starting Thu 1/16/2020, No Print      ONE TOUCH ULTRA TEST test strip Tests twice a day, Normal      ONETOUCH DELICA LANCETS FINE MISC by Does not apply route 2 (two) times a day, Starting Mon 12/31/2018, Normal      pantoprazole (PROTONIX) 40 mg tablet TAKE ONE TABLET BY MOUTH ONCE DAILY, Normal      Polyethylene Glycol 3350-GRX POWD Take by mouth daily at bedtime as needed (constipation), Starting Thu 1/16/2020, No Print      predniSONE 10 mg tablet 30 mg daily for 3 days then 20 mg daily for 3 days then 10 mg daily for 3 days then 5 mg daily for 2 days then STOP, Normal      tiotropium (SPIRIVA RESPIMAT) 2 5 MCG/ACT AERS inhaler Inhale 1 puff daily at bedtime, Starting Fri 12/28/2018, Normal      Tofacitinib Citrate (XELJANZ XR) 11 MG TB24 Take 1 tablet by mouth daily, Historical Med      vitamin B-12 (VITAMIN B-12) 500 MCG tablet Take 1 tablet (500 mcg total) by mouth daily, Starting Thu 1/16/2020, No Print           No discharge procedures on file      ED Provider  Electronically Signed by           Laurence Kelly MD  02/03/20 5804

## 2020-02-05 ENCOUNTER — TELEPHONE (OUTPATIENT)
Dept: INTERNAL MEDICINE CLINIC | Facility: CLINIC | Age: 70
End: 2020-02-05

## 2020-02-05 ENCOUNTER — TELEPHONE (OUTPATIENT)
Dept: PHYSICAL THERAPY | Facility: OTHER | Age: 70
End: 2020-02-05

## 2020-02-05 NOTE — TELEPHONE ENCOUNTER
This nurse did leave a message for Pt to call us back, our ph # and hours of business  Message was left with Pt's daughter  Pt is diagnosed with Shingles, and does have [facility]       Referral closed

## 2020-02-05 NOTE — TELEPHONE ENCOUNTER
Juju Bales from [facility] called to let us know that Oscar Castillo did not receive her ointment due to [address]  Per Dr Robi Fish she has never seen the ointment approved by insurance, usually requires self pay  At this point since patient is several days out with shingles it likely will not be beneficial      Juju Bales also noted that patients Tramadol was not approved  I did call [facility]- but kept getting rerouted to leave a message  Left a message looking for an update and status on this medication  Per Dr Harris Sandhu should be approved for a 5 day supply

## 2020-02-06 NOTE — TELEPHONE ENCOUNTER
This nurse did return Pt's phone call from yesterday  Pt did confirm that she is receiving services through [facility]  Pt was directed to speak with her [facility] about her current back pain and discuss some possible physical therapy

## 2020-02-07 PROBLEM — R22.31: Status: RESOLVED | Noted: 2017-12-11 | Resolved: 2020-02-07

## 2020-02-10 DIAGNOSIS — K59.00 CONSTIPATION, UNSPECIFIED CONSTIPATION TYPE: Primary | ICD-10-CM

## 2020-02-10 NOTE — TELEPHONE ENCOUNTER
Received a call from Apple Cornelius, from home health  She was out seeing Soheila Martinez today, and she states that Soheila Martinez has not really been moving her bowels since coming out of the hospital  Per Apple Cornelius, she is hearing belly sounds, it is soft, and Soheila Martinez is having gas  Soheila Martinez states that she did take latctolos in the past when she felt like this, and that helped clear it up

## 2020-02-10 NOTE — TELEPHONE ENCOUNTER
Did make Dr Carissa Renteria aware, and she did ask that I call in the medication  Called into [facility]

## 2020-02-11 RX ORDER — LACTULOSE 10 G/15ML
SOLUTION ORAL
Qty: 240 ML | Refills: 1 | OUTPATIENT
Start: 2020-02-11 | End: 2021-04-13

## 2020-02-19 ENCOUNTER — OFFICE VISIT (OUTPATIENT)
Dept: INTERNAL MEDICINE CLINIC | Facility: CLINIC | Age: 70
End: 2020-02-19
Payer: COMMERCIAL

## 2020-02-19 VITALS
DIASTOLIC BLOOD PRESSURE: 74 MMHG | OXYGEN SATURATION: 96 % | SYSTOLIC BLOOD PRESSURE: 126 MMHG | BODY MASS INDEX: 40.25 KG/M2 | HEART RATE: 90 BPM | HEIGHT: 65 IN | WEIGHT: 241.6 LBS | TEMPERATURE: 98 F

## 2020-02-19 DIAGNOSIS — Z79.4 TYPE 2 DIABETES MELLITUS WITH HYPERGLYCEMIA, WITH LONG-TERM CURRENT USE OF INSULIN (HCC): Primary | ICD-10-CM

## 2020-02-19 DIAGNOSIS — Z12.11 SCREENING FOR COLORECTAL CANCER: ICD-10-CM

## 2020-02-19 DIAGNOSIS — E78.2 MIXED HYPERLIPIDEMIA: ICD-10-CM

## 2020-02-19 DIAGNOSIS — E11.65 TYPE 2 DIABETES MELLITUS WITH HYPERGLYCEMIA, WITH LONG-TERM CURRENT USE OF INSULIN (HCC): Primary | ICD-10-CM

## 2020-02-19 DIAGNOSIS — J44.1 CHRONIC OBSTRUCTIVE PULMONARY DISEASE WITH ACUTE EXACERBATION (HCC): ICD-10-CM

## 2020-02-19 DIAGNOSIS — E55.9 VITAMIN D DEFICIENCY: ICD-10-CM

## 2020-02-19 DIAGNOSIS — E53.8 VITAMIN B12 DEFICIENCY: ICD-10-CM

## 2020-02-19 DIAGNOSIS — I10 ESSENTIAL HYPERTENSION: ICD-10-CM

## 2020-02-19 DIAGNOSIS — R19.5 POSITIVE COLORECTAL CANCER SCREENING USING DNA-BASED STOOL TEST: ICD-10-CM

## 2020-02-19 DIAGNOSIS — Z12.12 SCREENING FOR COLORECTAL CANCER: ICD-10-CM

## 2020-02-19 DIAGNOSIS — B02.29 POSTHERPETIC NEURALGIA: ICD-10-CM

## 2020-02-19 DIAGNOSIS — M06.9 RHEUMATOID ARTHRITIS INVOLVING MULTIPLE SITES, UNSPECIFIED RHEUMATOID FACTOR PRESENCE: ICD-10-CM

## 2020-02-19 DIAGNOSIS — Z12.31 VISIT FOR SCREENING MAMMOGRAM: ICD-10-CM

## 2020-02-19 DIAGNOSIS — R09.02 HYPOXEMIA REQUIRING SUPPLEMENTAL OXYGEN: ICD-10-CM

## 2020-02-19 DIAGNOSIS — E03.9 HYPOTHYROIDISM, UNSPECIFIED TYPE: ICD-10-CM

## 2020-02-19 DIAGNOSIS — K59.00 CONSTIPATION, UNSPECIFIED CONSTIPATION TYPE: ICD-10-CM

## 2020-02-19 DIAGNOSIS — Z99.81 HYPOXEMIA REQUIRING SUPPLEMENTAL OXYGEN: ICD-10-CM

## 2020-02-19 PROBLEM — B37.0 THRUSH, ORAL: Status: RESOLVED | Noted: 2020-01-23 | Resolved: 2020-02-19

## 2020-02-19 PROCEDURE — 1111F DSCHRG MED/CURRENT MED MERGE: CPT | Performed by: FAMILY MEDICINE

## 2020-02-19 PROCEDURE — 3078F DIAST BP <80 MM HG: CPT | Performed by: FAMILY MEDICINE

## 2020-02-19 PROCEDURE — 3008F BODY MASS INDEX DOCD: CPT | Performed by: FAMILY MEDICINE

## 2020-02-19 PROCEDURE — 99215 OFFICE O/P EST HI 40 MIN: CPT | Performed by: FAMILY MEDICINE

## 2020-02-19 PROCEDURE — 3044F HG A1C LEVEL LT 7.0%: CPT | Performed by: FAMILY MEDICINE

## 2020-02-19 PROCEDURE — 1160F RVW MEDS BY RX/DR IN RCRD: CPT | Performed by: FAMILY MEDICINE

## 2020-02-19 PROCEDURE — 4040F PNEUMOC VAC/ADMIN/RCVD: CPT | Performed by: FAMILY MEDICINE

## 2020-02-19 PROCEDURE — 3074F SYST BP LT 130 MM HG: CPT | Performed by: FAMILY MEDICINE

## 2020-02-19 PROCEDURE — 1036F TOBACCO NON-USER: CPT | Performed by: FAMILY MEDICINE

## 2020-02-19 RX ORDER — INSULIN DEGLUDEC INJECTION 100 U/ML
INJECTION, SOLUTION SUBCUTANEOUS
COMMUNITY
Start: 2020-02-07 | End: 2020-03-25

## 2020-02-19 NOTE — PROGRESS NOTES
Diabetic Foot Exam    Patient's shoes and socks removed  Right Foot/Ankle   Right Foot Inspection  Skin Exam: skin normal, skin intact and dry skin no warmth, no callus, no erythema, no maceration, no abnormal color, no pre-ulcer, no ulcer and no callus                          Toe Exam: ROM and strength within normal limits  Sensory       Monofilament testing: intact  Vascular  Capillary refills: < 3 seconds  The right DP pulse is 1+  The right PT pulse is 1+  Left Foot/Ankle  Left Foot Inspection  Skin Exam: skin normal, skin intact and dry skinno warmth, no erythema, no maceration, normal color, no pre-ulcer, no ulcer and no callus                         Toe Exam: ROM and strength within normal limits                   Sensory       Monofilament: intact  Vascular  Capillary refills: < 3 seconds  The left DP pulse is 1+  The left PT pulse is 1+  Assign Risk Category:  No deformity present; No loss of protective sensation;  No weak pulses       Risk: 0

## 2020-02-19 NOTE — PROGRESS NOTES
Assessment/Plan:    No problem-specific Assessment & Plan notes found for this encounter  {Assess/PlanSmartLinks:10574}      Subjective:      Patient ID: Margret Pepper is a 65 y o  female      HPI    {Common ambulatory SmartLinks:11562}    Review of Systems      Objective:      /74 (BP Location: Left arm, Patient Position: Sitting, Cuff Size: Large)   Pulse 90   Temp 98 °F (36 7 °C) (Temporal)   Ht 5' 5" (1 651 m)   Wt 110 kg (241 lb 9 6 oz)   SpO2 96%   BMI 40 20 kg/m²          Physical Exam

## 2020-02-20 NOTE — ASSESSMENT & PLAN NOTE
Discussed with her using the lactulose to help with regulation in bowel movements  This has worked relatively well for her  Watch for any diarrhea associated with this  Discussed adjustment of dosing on this if needed    Advised her to contact the office if symptoms worsen or persist

## 2020-02-20 NOTE — ASSESSMENT & PLAN NOTE
Slip given for repeat laboratory testing  Discussed with her continuing with the atorvastatin  Watch diet  Increase fiber in diet  Try to gradually increase activity level especially once the weather improves

## 2020-02-20 NOTE — ASSESSMENT & PLAN NOTE
Reviewed CT scans from the emergency room with her  Discussed with her that her lungs do appear relatively stable  Recommended following up with Pulmonary  Continue with the Spiriva  Continue with the Advair  Continue with the oxygen

## 2020-02-20 NOTE — ASSESSMENT & PLAN NOTE
She has some significant post herpetic neuralgia  Hopefully this will improve over time  Advised her to watch for any worsening of the symptoms  Would like to hold off on gabapentin at this point especially since she is on so many other medications  Discussed with her the Shingrix vaccine and she is adamant that she wants to get this as soon as possible  Discussed with her getting this over the next 1-2 months when she is further out from the shingles

## 2020-02-20 NOTE — ASSESSMENT & PLAN NOTE
Lab Results   Component Value Date    HGBA1C 6 6 (H) 01/15/2020     Recent hemoglobin A1c was relatively well controlled at 6 6  Continue with the [place]  Continue with the Januvia  Continue follow blood sugars at home  Watch for any hypoglycemic episodes  Watch carbohydrate intake  Continue follow-up with Ophthalmology on a regular basis  Foot exam was completed today  She does plan on following up with Podiatry once some of her other health issues have improved  Slip given for repeat laboratory testing

## 2020-02-20 NOTE — ASSESSMENT & PLAN NOTE
She has significant degenerative changes related to her rheumatoid arthritis as well as some likely underlying osteoarthritis as well  Continue with the Jorge Rye as well as the methotrexate and folic acid  Continue follow-up with Rheumatology  Her ambulation has worsened significantly over the last few years despite these medications  She would benefit significantly from a scooter or other motorized device  This would benefit her not only from an orthopedic/rheumatologic standpoint but also because of her significant respiratory issues  Discussed with her being very careful to avoid falls  Advised her to use her cane regularly and instructed her on use of this  Adjusted the height of her cane for her  Continue with tramadol and the Aleve  Potential side effects discussed

## 2020-02-20 NOTE — ASSESSMENT & PLAN NOTE
Blood pressure controlled  Continue the lisinopril  Watch salt intake  Gradually increase activity level

## 2020-02-20 NOTE — PROGRESS NOTES
Assessment/Plan:    Type 2 diabetes mellitus with hyperglycemia, with long-term current use of insulin (HCC)    Lab Results   Component Value Date    HGBA1C 6 6 (H) 01/15/2020     Recent hemoglobin A1c was relatively well controlled at 6 6  Continue with the Victorina Flores  Continue with the Januvia  Continue follow blood sugars at home  Watch for any hypoglycemic episodes  Watch carbohydrate intake  Continue follow-up with Ophthalmology on a regular basis  Foot exam was completed today  She does plan on following up with Podiatry once some of her other health issues have improved  Slip given for repeat laboratory testing  Hypothyroidism  TSH stable  Continue same dose of the levothyroxine  Will continue to follow routine TSH with her laboratory testing prior to next visit  Chronic obstructive pulmonary disease with acute exacerbation ([facility])  Reviewed CT scans from the emergency room with her  Discussed with her that her lungs do appear relatively stable  Recommended following up with Pulmonary  Continue with the Spiriva  Continue with the Advair  Continue with the oxygen  Essential hypertension  Blood pressure controlled  Continue the lisinopril  Watch salt intake  Gradually increase activity level  Rheumatoid arthritis ([address] )  She has significant degenerative changes related to her rheumatoid arthritis as well as some likely underlying osteoarthritis as well  Continue with the [employer] as well as the methotrexate and folic acid  Continue follow-up with Rheumatology  Her ambulation has worsened significantly over the last few years despite these medications  She would benefit significantly from a scooter or other motorized device  This would benefit her not only from an orthopedic/rheumatologic standpoint but also because of her significant respiratory issues  Discussed with her being very careful to avoid falls  Advised her to use her cane regularly and instructed her on use of this  Adjusted the height of her cane for her  Continue with tramadol and the Aleve  Potential side effects discussed  Postherpetic neuralgia  She has some significant post herpetic neuralgia  Hopefully this will improve over time  Advised her to watch for any worsening of the symptoms  Would like to hold off on gabapentin at this point especially since she is on so many other medications  Discussed with her the Shingrix vaccine and she is adamant that she wants to get this as soon as possible  Discussed with her getting this over the next 1-2 months when she is further out from the shingles  Hyperlipidemia  Slip given for repeat laboratory testing  Discussed with her continuing with the atorvastatin  Watch diet  Increase fiber in diet  Try to gradually increase activity level especially once the weather improves  Constipation  Discussed with her using the lactulose to help with regulation in bowel movements  This has worked relatively well for her  Watch for any diarrhea associated with this  Discussed adjustment of dosing on this if needed  Advised her to contact the office if symptoms worsen or persist        Diagnoses and all orders for this visit:    Type 2 diabetes mellitus with hyperglycemia, with long-term current use of insulin ([facility] [address] )  -     Ambulatory referral to Ophthalmology; Future  -     Microalbumin / creatinine urine ratio  -     Hemoglobin A1C; Future    Hypothyroidism, unspecified type    Chronic obstructive pulmonary disease with acute exacerbation (HCC)  -     CBC and differential; Future    Essential hypertension  -     CBC and differential; Future  -     Comprehensive metabolic panel; Future    Rheumatoid arthritis involving multiple sites, unspecified rheumatoid factor presence (HCC)  -     CBC and differential; Future    Hypoxemia requiring supplemental oxygen    Mixed hyperlipidemia  -     Comprehensive metabolic panel; Future  -     Lipid panel;  Future  -     TSH, 3rd generation; Future    Constipation, unspecified constipation type  -     TSH, 3rd generation; Future    Visit for screening mammogram  -     Mammo screening bilateral w 3d & cad; Future    Positive colorectal cancer screening using DNA-based stool test    Screening for colorectal cancer  -     Ambulatory referral to Colorectal Surgery; Future    Vitamin D deficiency    Vitamin B12 deficiency    Postherpetic neuralgia    Other orders  -     Cancel: Ambulatory referral to Podiatry; Future        Orders recommendations as noted above  Discussed with her possible GI evaluation if the increased gassiness and appetite issues persist   Continue follow-up with numerous specialists  Over 45 minutes was spent with the patient discussing her numerous issues as well as some health maintenance issues  Over half of this time was spent in counseling  Subjective:      Patient ID: Zina Rodas is a 65 y o  female  She presents for routine follow-up  Has had a lot of issues going on  She did have the hospitalization for the respiratory issues and then subsequently she had shingles in the lumbar area  Still with some sensitivity and pain especially over the right low back and into the groin where she had the shingles rash  Very sensitive when her close touch that area  She would like to get the shingles vaccine as soon as possible  Has been having issues with constipation as well  Over-the-counter medications including MiraLax and Senokot were ineffective  She has been taking the lactulose and this has been somewhat helpful although her bowel movements are still not regular  She notices that she feels increasingly gassy at times and fills up more quickly  Continues with the insulin daily  Continues with the Januvia  Denies any hypoglycemic episodes  Blood sugars have generally been running in the low 100s  Denies any significant polyuria, polydipsia, polyphagia    She does feel that the slow but steady weight loss has helped with her blood sugars  Tolerating lisinopril without difficulty  Denies any headaches or localized weakness  Denies any chest pain or palpitations  Continues with the atorvastatin without difficulty  Denies any significant muscle aches or weakness with this  She continues to have significant amount of pain into her joints  Has difficulty walking  Has pain into her back and radiating into her legs right greater than left  Still with significant pain into her knees bilaterally  Difficulty walking any distance  Has been using the cane but sometimes feels unsteady with this  She is looking into scooter or other motorized device which would significantly help her not only with her ambulation from an orthopedic standpoint but also with her significant respiratory issues and easy fatigue  Mood has been relatively stable  Does have some stressors with some family issues  She does feel that the Cymbalta helps somewhat  Continues to follow-up with Rheumatology on a regular basis  Continues on the methotrexate and the folic acid as well as the [place]  Tolerating levothyroxine without difficulty  Energy level is slowly improving over the last week or so  She knows that she is overdue for her eye exam in does plan to schedule it once the weather improves  She does have her colo guard at home and will complete this over the next few weeks  She is requesting a slip for mammogram   She does follow-up with gyn but they want her to go down to the [facility] area to have this done    She would prefer to stay local       The following portions of the patient's history were reviewed and updated as appropriate:   She  has a past medical history of Arthritis, Arthritis, Cancer ([address] ), Candidiasis of skin, Cervical cancer ([address] ), Chronic respiratory failure with hypoxia and hypercapnia ([address] ) (4/20/2019), COPD (chronic obstructive pulmonary disease) ([address] ), Current chronic use of systemic steroids, Degenerative arthritis of left knee, Diabetes mellitus ([facility] ), Disease of thyroid gland, Fibromyalgia, Fistula of knee, GERD (gastroesophageal reflux disease), Hyperlipidemia, Hypertension, Medial meniscus tear, Migraine, Obesity, Obesity, Osteoarthritis, Osteopenia, Pneumonia, Psychiatric disorder, Rheumatoid arthritis ([facility] ), Rheumatoid arthritis ([facility] ), Sepsis ([place] [address] ), Tick bite, Vitamin B12 deficiency, and Vitamin D deficiency    She   Patient Active Problem List    Diagnosis Date Noted    Postherpetic neuralgia 58/37/2360    Diastolic dysfunction 57/52/9332    Macrocytic anemia 01/14/2020    Candidal UTI (urinary tract infection) 09/30/2019    Recurrent candidiasis of vagina 09/30/2019    Hypoxemia requiring supplemental oxygen 06/19/2019    History of exposure to asbestos 05/20/2019    Acute on chronic respiratory failure with hypoxia (HCC) 04/20/2019    Hyponatremia 04/20/2019    Acute cystitis with hematuria 04/19/2019    Candidal dermatitis 12/28/2018    Loculated pleural effusion 12/25/2018    Constipation 11/29/2017    Renal cyst 11/29/2017    Candidiasis, cutaneous 06/30/2017    Vitamin B12 deficiency 11/10/2016    BMI 40 0-44 9, adult ([place] [address] ) 11/05/2016    Morbid obesity with BMI of 40 0-44 9, adult ([place] [address] ) 11/04/2016    Centrilobular emphysema ([place] [address] ) 11/03/2016    Type 2 diabetes mellitus with hyperglycemia, with long-term current use of insulin ([place] [address] ) 11/03/2016    Hypothyroidism 11/03/2016    Rheumatoid arthritis ([place] [address] ) 11/03/2016    Essential hypertension 11/03/2016    Hyperlipidemia 11/03/2016    GERD (gastroesophageal reflux disease) 11/03/2016    Hypoalbuminemia 11/03/2016    Hepatic steatosis 11/03/2016    Atelectasis 11/03/2016    Anxiety 06/02/2014    Fibromyalgia 01/28/2014    Osteopenia 09/17/2012    Chronic obstructive pulmonary disease with acute exacerbation ([place] [address] ) 05/29/2012    Osteoarthritis 05/29/2012    Vitamin D deficiency 05/29/2012     She  has a past surgical history that includes Hysterectomy; Knee arthroscopy; Cataract extraction (Bilateral); Cholecystectomy; Joint replacement; Tubal ligation; Neuroplasty / transposition median nerve at carpal tunnel; Eye surgery; and IR thoracentesis (5/31/2019)  Her family history includes Asthma in her family; Cancer in her family; Diabetes in her family; Hypertension in her family; Stroke in her mother  She  reports that she quit smoking about 7 years ago  Her smoking use included e-cigarettes  She has a 48 00 pack-year smoking history  She has never used smokeless tobacco  She reports that she does not drink alcohol or use drugs    Current Outpatient Medications   Medication Sig Dispense Refill    acetaminophen (TYLENOL) 325 mg tablet Take 2 tablets (650 mg total) by mouth every 6 (six) hours as needed for mild pain, headaches or fever  0    albuterol (PROVENTIL HFA,VENTOLIN HFA) 90 mcg/act inhaler Inhale 2 puffs every 6 (six) hours as needed for wheezing 1 Inhaler 0    alendronate (FOSAMAX) 70 mg tablet Take 70 mg by mouth every 7 days      atorvastatin (LIPITOR) 20 mg tablet TAKE ONE TABLET BY MOUTH EVERY DAY 90 tablet 0    Blood Glucose Monitoring Suppl (ONE TOUCH ULTRA 2) w/Device KIT by Does not apply route 2 (two) times a day 1 each 0    cholecalciferol (VITAMIN D3) 1,000 units tablet Take 1 tablet (1,000 Units total) by mouth daily 30 tablet 0    DULoxetine (CYMBALTA) 20 mg capsule TAKE ONE CAPSULE BY MOUTH ONCE DAILY 90 capsule 0    fluticasone-salmeterol (ADVAIR) 250-50 mcg/dose Inhale 1 puff every 12 (twelve) hours       folic acid (FOLVITE) 1 mg tablet TAKE ONE TABLET BY MOUTH ONCE DAILY 90 tablet 0    insulin glargine (LANTUS SOLOSTAR) 100 units/mL injection pen Inject 35 Units under the skin daily at bedtime 15 mL 0    Insulin Pen Needle 33G X 4 MM MISC by Does not apply route daily 100 each 5    ipratropium-albuterol (DUO-NEB) 0 5-2 5 mg/3 mL nebulizer solution Take 1 vial (3 mL total) by nebulization every 6 (six) hours as needed for wheezing or shortness of breath 120 vial 0    JANUVIA 100 MG tablet TAKE 1 TABLET BY MOUTH DAILY  90 tablet 0    lactulose (CHRONULAC) 10 g/15 mL solution 1 to 2 tablespoons (15mL) daily if needed for constipation  240 mL 1    levothyroxine 125 mcg tablet TAKE ONE TABLET BY MOUTH EVERY DAY IN THE AM 90 tablet 0    lisinopril (ZESTRIL) 40 mg tablet TAKE ONE TABLET BY MOUTH EVERY DAY 90 tablet 0    methotrexate 2 5 mg tablet Take 1 tablet (2 5 mg total) by mouth once a week Takes 8 tablets every sunday 96 tablet 0    mometasone (ELOCON) 0 1 % ointment Apply topically 2 (two) times a day as needed (itching) 45 g 1    nystatin (MYCOSTATIN) powder Apply topically 3 (three) times a day as needed (fungal skin rash)      ONE TOUCH ULTRA TEST test strip Tests twice a day 100 each 5    ONETOUCH DELICA LANCETS FINE MISC by Does not apply route 2 (two) times a day 100 each 5    pantoprazole (PROTONIX) 40 mg tablet TAKE ONE TABLET BY MOUTH ONCE DAILY 90 tablet 0    Polyethylene Glycol 3350-GRX POWD Take by mouth daily at bedtime as needed (constipation) 850 g 0    tiotropium (SPIRIVA RESPIMAT) 2 5 MCG/ACT AERS inhaler Inhale 1 puff daily at bedtime 3 Inhaler 3    Tofacitinib Citrate (XELJANZ XR) 11 MG TB24 Take 1 tablet by mouth daily      traMADol (ULTRAM) 50 mg tablet Take 1 tablet (50 mg total) by mouth every 6 (six) hours as needed for moderate pain for up to 20 doses 20 tablet 0    TRESIBA FLEXTOUCH 100 units/mL injection pen       vitamin B-12 (VITAMIN B-12) 500 MCG tablet Take 1 tablet (500 mcg total) by mouth daily       No current facility-administered medications for this visit        Current Outpatient Medications on File Prior to Visit   Medication Sig    acetaminophen (TYLENOL) 325 mg tablet Take 2 tablets (650 mg total) by mouth every 6 (six) hours as needed for mild pain, headaches or fever    albuterol (PROVENTIL HFA,VENTOLIN HFA) 90 mcg/act inhaler Inhale 2 puffs every 6 (six) hours as needed for wheezing    alendronate (FOSAMAX) 70 mg tablet Take 70 mg by mouth every 7 days    atorvastatin (LIPITOR) 20 mg tablet TAKE ONE TABLET BY MOUTH EVERY DAY    Blood Glucose Monitoring Suppl (ONE TOUCH ULTRA 2) w/Device KIT by Does not apply route 2 (two) times a day    cholecalciferol (VITAMIN D3) 1,000 units tablet Take 1 tablet (1,000 Units total) by mouth daily    DULoxetine (CYMBALTA) 20 mg capsule TAKE ONE CAPSULE BY MOUTH ONCE DAILY    fluticasone-salmeterol (ADVAIR) 250-50 mcg/dose Inhale 1 puff every 12 (twelve) hours     folic acid (FOLVITE) 1 mg tablet TAKE ONE TABLET BY MOUTH ONCE DAILY    insulin glargine (LANTUS SOLOSTAR) 100 units/mL injection pen Inject 35 Units under the skin daily at bedtime    Insulin Pen Needle 33G X 4 MM MISC by Does not apply route daily    ipratropium-albuterol (DUO-NEB) 0 5-2 5 mg/3 mL nebulizer solution Take 1 vial (3 mL total) by nebulization every 6 (six) hours as needed for wheezing or shortness of breath    JANUVIA 100 MG tablet TAKE 1 TABLET BY MOUTH DAILY   lactulose (CHRONULAC) 10 g/15 mL solution 1 to 2 tablespoons (15mL) daily if needed for constipation      levothyroxine 125 mcg tablet TAKE ONE TABLET BY MOUTH EVERY DAY IN THE AM    lisinopril (ZESTRIL) 40 mg tablet TAKE ONE TABLET BY MOUTH EVERY DAY    methotrexate 2 5 mg tablet Take 1 tablet (2 5 mg total) by mouth once a week Takes 8 tablets every sunday    mometasone (ELOCON) 0 1 % ointment Apply topically 2 (two) times a day as needed (itching)    nystatin (MYCOSTATIN) powder Apply topically 3 (three) times a day as needed (fungal skin rash)    ONE TOUCH ULTRA TEST test strip Tests twice a day    ONETOUCH DELICA LANCETS FINE MISC by Does not apply route 2 (two) times a day    pantoprazole (PROTONIX) 40 mg tablet TAKE ONE TABLET BY MOUTH ONCE DAILY    Polyethylene Glycol 3350-GRX POWD Take by mouth daily at bedtime as needed (constipation)    tiotropium (SPIRIVA RESPIMAT) 2 5 MCG/ACT AERS inhaler Inhale 1 puff daily at bedtime    Tofacitinib Citrate (XELJANZ XR) 11 MG TB24 Take 1 tablet by mouth daily    traMADol (ULTRAM) 50 mg tablet Take 1 tablet (50 mg total) by mouth every 6 (six) hours as needed for moderate pain for up to 20 doses    vitamin B-12 (VITAMIN B-12) 500 MCG tablet Take 1 tablet (500 mcg total) by mouth daily    [DISCONTINUED] acyclovir (ZOVIRAX) 5 % ointment Apply to area 6 times a day x 5 days    [DISCONTINUED] lidocaine (LIDODERM) 5 % Apply 1 patch topically daily Remove & Discard patch within 12 hours or as directed by MD    [DISCONTINUED] predniSONE 10 mg tablet 30 mg daily for 3 days then 20 mg daily for 3 days then 10 mg daily for 3 days then 5 mg daily for 2 days then STOP    [DISCONTINUED] valACYclovir (VALTREX) 1,000 mg tablet Take 1 tablet (1,000 mg total) by mouth 3 (three) times a day for 7 days     No current facility-administered medications on file prior to visit  She is allergic to no active allergies       Review of Systems   Constitutional: Negative for activity change, appetite change, chills and fever  HENT: Negative for congestion and rhinorrhea  Eyes: Negative for visual disturbance  Respiratory: Negative for chest tightness and shortness of breath  Cardiovascular: Negative for chest pain and palpitations  Gastrointestinal: Negative for abdominal pain, blood in stool, diarrhea, nausea and vomiting  Endocrine: Negative for polydipsia, polyphagia and polyuria  Genitourinary: Negative for dysuria, frequency and urgency  Musculoskeletal: Negative for gait problem  Skin: Negative for color change  Neurological: Negative for dizziness and headaches  Hematological: Does not bruise/bleed easily  Psychiatric/Behavioral: Negative for confusion and sleep disturbance  The patient is not nervous/anxious            Objective:      /74 (BP Location: Left arm, Patient Position: Sitting, Cuff Size: Large) Pulse 90   Temp 98 °F (36 7 °C) (Temporal)   Ht 5' 5" (1 651 m)   Wt 110 kg (241 lb 9 6 oz)   SpO2 96%   BMI 40 20 kg/m²          Physical Exam   Constitutional: She is oriented to person, place, and time  She is cooperative  No distress  HENT:   Head: Normocephalic and atraumatic  Mouth/Throat: Oropharynx is clear and moist    Moist mucous membranes; slight cerumen   Eyes: Pupils are equal, round, and reactive to light  Conjunctivae are normal  Right eye exhibits no discharge  Left eye exhibits no discharge  Neck: No JVD present  Carotid bruit is not present  No thyromegaly present  Cardiovascular: Normal rate, regular rhythm and normal heart sounds  Exam reveals no gallop and no friction rub  No murmur heard  Pulmonary/Chest: No respiratory distress  She has decreased breath sounds  She has no wheezes  She has no rhonchi  She has no rales  Abdominal: Bowel sounds are normal  She exhibits no distension  Tenderness over the abdominal wall on the right side and radiating into the right back over the area that she previously had the shingles   Musculoskeletal: She exhibits no edema  Diffuse degenerative changes bilateral hands; slight degenerative changes bilateral feet; tenderness to palpation over the right paraspinal area in the lower thoracic area; slight muscle tightness in this area; this is above the area of her previous shingles; postsurgical changes knees   Lymphadenopathy:     She has no cervical adenopathy  Neurological: She is alert and oriented to person, place, and time  Gait abnormal    Slow, antalgic gait with cane   Skin: Skin is warm and dry  Psychiatric: She has a normal mood and affect  Her speech is normal and behavior is normal  Cognition and memory are normal    Nursing note and vitals reviewed

## 2020-02-20 NOTE — ASSESSMENT & PLAN NOTE
TSH stable  Continue same dose of the levothyroxine  Will continue to follow routine TSH with her laboratory testing prior to next visit

## 2020-03-25 DIAGNOSIS — E55.9 VITAMIN D DEFICIENCY: ICD-10-CM

## 2020-03-25 DIAGNOSIS — Z79.4 TYPE 2 DIABETES MELLITUS WITH HYPERGLYCEMIA, WITH LONG-TERM CURRENT USE OF INSULIN (HCC): Primary | ICD-10-CM

## 2020-03-25 DIAGNOSIS — E11.65 TYPE 2 DIABETES MELLITUS WITH HYPERGLYCEMIA, WITH LONG-TERM CURRENT USE OF INSULIN (HCC): Primary | ICD-10-CM

## 2020-03-25 DIAGNOSIS — M06.9 RHEUMATOID ARTHRITIS INVOLVING MULTIPLE SITES, UNSPECIFIED RHEUMATOID FACTOR PRESENCE: ICD-10-CM

## 2020-03-25 RX ORDER — INSULIN DEGLUDEC INJECTION 100 U/ML
INJECTION, SOLUTION SUBCUTANEOUS
Qty: 15 ML | Refills: 0 | Status: SHIPPED | OUTPATIENT
Start: 2020-03-25 | End: 2020-06-02 | Stop reason: SDUPTHER

## 2020-03-25 RX ORDER — ERGOCALCIFEROL 1.25 MG/1
CAPSULE ORAL
Qty: 12 CAPSULE | Refills: 0 | Status: SHIPPED | OUTPATIENT
Start: 2020-03-25 | End: 2020-06-22

## 2020-04-06 DIAGNOSIS — E11.9 TYPE 2 DIABETES MELLITUS WITHOUT COMPLICATION, WITHOUT LONG-TERM CURRENT USE OF INSULIN (HCC): ICD-10-CM

## 2020-04-06 RX ORDER — LANCETS 33 GAUGE
EACH MISCELLANEOUS 2 TIMES DAILY
Qty: 200 EACH | Refills: 5 | Status: SHIPPED | OUTPATIENT
Start: 2020-04-06 | End: 2021-04-11

## 2020-04-07 ENCOUNTER — TELEPHONE (OUTPATIENT)
Dept: INTERNAL MEDICINE CLINIC | Facility: CLINIC | Age: 70
End: 2020-04-07

## 2020-05-07 ENCOUNTER — TELEPHONE (OUTPATIENT)
Dept: INTERNAL MEDICINE CLINIC | Facility: CLINIC | Age: 70
End: 2020-05-07

## 2020-05-20 ENCOUNTER — TELEMEDICINE (OUTPATIENT)
Dept: INTERNAL MEDICINE CLINIC | Facility: CLINIC | Age: 70
End: 2020-05-20
Payer: COMMERCIAL

## 2020-05-20 VITALS
HEART RATE: 81 BPM | OXYGEN SATURATION: 97 % | DIASTOLIC BLOOD PRESSURE: 61 MMHG | HEIGHT: 65 IN | WEIGHT: 241 LBS | SYSTOLIC BLOOD PRESSURE: 140 MMHG | BODY MASS INDEX: 40.15 KG/M2

## 2020-05-20 DIAGNOSIS — E53.8 VITAMIN B12 DEFICIENCY: ICD-10-CM

## 2020-05-20 DIAGNOSIS — E03.9 HYPOTHYROIDISM, UNSPECIFIED TYPE: ICD-10-CM

## 2020-05-20 DIAGNOSIS — E11.65 TYPE 2 DIABETES MELLITUS WITH HYPERGLYCEMIA, WITH LONG-TERM CURRENT USE OF INSULIN (HCC): Primary | ICD-10-CM

## 2020-05-20 DIAGNOSIS — I10 ESSENTIAL HYPERTENSION: ICD-10-CM

## 2020-05-20 DIAGNOSIS — E55.9 VITAMIN D DEFICIENCY: ICD-10-CM

## 2020-05-20 DIAGNOSIS — F41.9 ANXIETY: ICD-10-CM

## 2020-05-20 DIAGNOSIS — M06.9 RHEUMATOID ARTHRITIS INVOLVING MULTIPLE SITES, UNSPECIFIED RHEUMATOID FACTOR PRESENCE: ICD-10-CM

## 2020-05-20 DIAGNOSIS — Z79.4 TYPE 2 DIABETES MELLITUS WITH HYPERGLYCEMIA, WITH LONG-TERM CURRENT USE OF INSULIN (HCC): Primary | ICD-10-CM

## 2020-05-20 DIAGNOSIS — J96.11 CHRONIC RESPIRATORY FAILURE WITH HYPOXIA (HCC): ICD-10-CM

## 2020-05-20 DIAGNOSIS — E78.2 MIXED HYPERLIPIDEMIA: ICD-10-CM

## 2020-05-20 PROBLEM — N30.01 ACUTE CYSTITIS WITH HEMATURIA: Status: RESOLVED | Noted: 2019-04-19 | Resolved: 2020-05-20

## 2020-05-20 PROBLEM — B37.49 CANDIDAL UTI (URINARY TRACT INFECTION): Status: RESOLVED | Noted: 2019-09-30 | Resolved: 2020-05-20

## 2020-05-20 PROBLEM — B37.3 RECURRENT CANDIDIASIS OF VAGINA: Status: RESOLVED | Noted: 2019-09-30 | Resolved: 2020-05-20

## 2020-05-20 PROBLEM — B37.31 RECURRENT CANDIDIASIS OF VAGINA: Status: RESOLVED | Noted: 2019-09-30 | Resolved: 2020-05-20

## 2020-05-20 PROCEDURE — 3078F DIAST BP <80 MM HG: CPT | Performed by: FAMILY MEDICINE

## 2020-05-20 PROCEDURE — 3077F SYST BP >= 140 MM HG: CPT | Performed by: FAMILY MEDICINE

## 2020-05-20 PROCEDURE — 99215 OFFICE O/P EST HI 40 MIN: CPT | Performed by: FAMILY MEDICINE

## 2020-05-20 PROCEDURE — 3008F BODY MASS INDEX DOCD: CPT | Performed by: FAMILY MEDICINE

## 2020-05-20 PROCEDURE — 1160F RVW MEDS BY RX/DR IN RCRD: CPT | Performed by: FAMILY MEDICINE

## 2020-05-27 ENCOUNTER — TELEPHONE (OUTPATIENT)
Dept: INTERNAL MEDICINE CLINIC | Facility: CLINIC | Age: 70
End: 2020-05-27

## 2020-05-27 DIAGNOSIS — R60.9 PERIPHERAL EDEMA: Primary | ICD-10-CM

## 2020-05-27 RX ORDER — FUROSEMIDE 40 MG/1
40 TABLET ORAL DAILY PRN
Qty: 90 TABLET | Refills: 3 | Status: SHIPPED | OUTPATIENT
Start: 2020-05-27 | End: 2020-06-02

## 2020-05-29 ENCOUNTER — CLINICAL SUPPORT (OUTPATIENT)
Dept: INTERNAL MEDICINE CLINIC | Facility: CLINIC | Age: 70
End: 2020-05-29
Payer: COMMERCIAL

## 2020-05-29 DIAGNOSIS — Z23 NEED FOR ZOSTER VACCINE: Primary | ICD-10-CM

## 2020-05-29 PROCEDURE — 90750 HZV VACC RECOMBINANT IM: CPT

## 2020-05-29 PROCEDURE — 90471 IMMUNIZATION ADMIN: CPT

## 2020-06-02 DIAGNOSIS — M06.9 RHEUMATOID ARTHRITIS INVOLVING MULTIPLE SITES, UNSPECIFIED RHEUMATOID FACTOR PRESENCE: ICD-10-CM

## 2020-06-02 DIAGNOSIS — E11.65 TYPE 2 DIABETES MELLITUS WITH HYPERGLYCEMIA, WITH LONG-TERM CURRENT USE OF INSULIN (HCC): ICD-10-CM

## 2020-06-02 DIAGNOSIS — R60.9 PERIPHERAL EDEMA: ICD-10-CM

## 2020-06-02 DIAGNOSIS — Z79.4 TYPE 2 DIABETES MELLITUS WITH HYPERGLYCEMIA, WITH LONG-TERM CURRENT USE OF INSULIN (HCC): ICD-10-CM

## 2020-06-02 RX ORDER — FUROSEMIDE 40 MG/1
TABLET ORAL
Qty: 90 TABLET | Refills: 0 | Status: SHIPPED | OUTPATIENT
Start: 2020-06-02 | End: 2020-07-16

## 2020-06-02 RX ORDER — FOLIC ACID 1 MG/1
TABLET ORAL
Qty: 90 TABLET | Refills: 0 | Status: SHIPPED | OUTPATIENT
Start: 2020-06-02 | End: 2020-07-16

## 2020-06-22 DIAGNOSIS — E55.9 VITAMIN D DEFICIENCY: ICD-10-CM

## 2020-06-22 DIAGNOSIS — M06.9 RHEUMATOID ARTHRITIS INVOLVING MULTIPLE SITES, UNSPECIFIED RHEUMATOID FACTOR PRESENCE: ICD-10-CM

## 2020-06-22 RX ORDER — ERGOCALCIFEROL 1.25 MG/1
CAPSULE ORAL
Qty: 12 CAPSULE | Refills: 1 | Status: SHIPPED | OUTPATIENT
Start: 2020-06-22 | End: 2020-09-25

## 2020-06-24 DIAGNOSIS — M06.9 RHEUMATOID ARTHRITIS INVOLVING MULTIPLE SITES, UNSPECIFIED RHEUMATOID FACTOR PRESENCE: ICD-10-CM

## 2020-07-03 ENCOUNTER — APPOINTMENT (EMERGENCY)
Dept: RADIOLOGY | Facility: HOSPITAL | Age: 70
End: 2020-07-03
Payer: COMMERCIAL

## 2020-07-03 ENCOUNTER — HOSPITAL ENCOUNTER (EMERGENCY)
Facility: HOSPITAL | Age: 70
Discharge: HOME | End: 2020-07-03
Attending: EMERGENCY MEDICINE | Admitting: FAMILY MEDICINE
Payer: COMMERCIAL

## 2020-07-03 VITALS
SYSTOLIC BLOOD PRESSURE: 141 MMHG | BODY MASS INDEX: 41.65 KG/M2 | RESPIRATION RATE: 16 BRPM | TEMPERATURE: 98 F | DIASTOLIC BLOOD PRESSURE: 65 MMHG | HEIGHT: 65 IN | OXYGEN SATURATION: 96 % | HEART RATE: 81 BPM | WEIGHT: 250 LBS

## 2020-07-03 DIAGNOSIS — B37.3 VAGINAL YEAST INFECTION: ICD-10-CM

## 2020-07-03 DIAGNOSIS — N39.0 UTI (URINARY TRACT INFECTION): Primary | ICD-10-CM

## 2020-07-03 LAB
ALBUMIN SERPL BCP-MCNC: 4.1 G/DL (ref 3.5–5.7)
ALP SERPL-CCNC: 64 U/L (ref 55–165)
ALT SERPL W P-5'-P-CCNC: 27 U/L (ref 7–52)
ANION GAP SERPL CALCULATED.3IONS-SCNC: 7 MMOL/L (ref 4–13)
AST SERPL W P-5'-P-CCNC: 26 U/L (ref 13–39)
BACTERIA UR QL AUTO: ABNORMAL /HPF
BASOPHILS # BLD AUTO: 0 THOUSANDS/ΜL (ref 0–0.1)
BASOPHILS NFR BLD AUTO: 1 % (ref 0–2)
BILIRUB SERPL-MCNC: 0.7 MG/DL (ref 0.2–1)
BILIRUB UR QL STRIP: NEGATIVE
BUN SERPL-MCNC: 12 MG/DL (ref 7–25)
CALCIUM SERPL-MCNC: 9.4 MG/DL (ref 8.6–10.5)
CHLORIDE SERPL-SCNC: 93 MMOL/L (ref 98–107)
CLARITY UR: ABNORMAL
CO2 SERPL-SCNC: 31 MMOL/L (ref 21–31)
COLOR UR: YELLOW
CREAT SERPL-MCNC: 0.72 MG/DL (ref 0.6–1.2)
EOSINOPHIL # BLD AUTO: 0 THOUSAND/ΜL (ref 0–0.61)
EOSINOPHIL NFR BLD AUTO: 0 % (ref 0–5)
ERYTHROCYTE [DISTWIDTH] IN BLOOD BY AUTOMATED COUNT: 16.3 % (ref 11.5–14.5)
GFR SERPL CREATININE-BSD FRML MDRD: 84 ML/MIN/1.73SQ M
GLUCOSE SERPL-MCNC: 156 MG/DL (ref 65–99)
GLUCOSE UR STRIP-MCNC: NEGATIVE MG/DL
HCT VFR BLD AUTO: 35.8 % (ref 42–47)
HGB BLD-MCNC: 12 G/DL (ref 12–16)
HGB UR QL STRIP.AUTO: ABNORMAL
KETONES UR STRIP-MCNC: NEGATIVE MG/DL
LACTATE SERPL-SCNC: 0.9 MMOL/L (ref 0.5–2)
LEUKOCYTE ESTERASE UR QL STRIP: ABNORMAL
LYMPHOCYTES # BLD AUTO: 0.6 THOUSANDS/ΜL (ref 0.6–4.47)
LYMPHOCYTES NFR BLD AUTO: 13 % (ref 21–51)
MCH RBC QN AUTO: 31.7 PG (ref 26–34)
MCHC RBC AUTO-ENTMCNC: 33.5 G/DL (ref 31–37)
MCV RBC AUTO: 95 FL (ref 81–99)
MONOCYTES # BLD AUTO: 0.6 THOUSAND/ΜL (ref 0.17–1.22)
MONOCYTES NFR BLD AUTO: 12 % (ref 2–12)
NEUTROPHILS # BLD AUTO: 3.6 THOUSANDS/ΜL (ref 1.4–6.5)
NEUTS SEG NFR BLD AUTO: 74 % (ref 42–75)
NITRITE UR QL STRIP: NEGATIVE
NON-SQ EPI CELLS URNS QL MICRO: ABNORMAL /HPF
OTHER STN SPEC: ABNORMAL
PH UR STRIP.AUTO: 7 [PH]
PLATELET # BLD AUTO: 229 THOUSANDS/UL (ref 149–390)
PMV BLD AUTO: 8.6 FL (ref 8.6–11.7)
POTASSIUM SERPL-SCNC: 3.8 MMOL/L (ref 3.5–5.5)
PROT SERPL-MCNC: 7.4 G/DL (ref 6.4–8.9)
PROT UR STRIP-MCNC: NEGATIVE MG/DL
RBC # BLD AUTO: 3.78 MILLION/UL (ref 3.9–5.2)
RBC #/AREA URNS AUTO: ABNORMAL /HPF
SARS-COV-2 RNA RESP QL NAA+PROBE: NEGATIVE
SODIUM SERPL-SCNC: 131 MMOL/L (ref 134–143)
SP GR UR STRIP.AUTO: <=1.005 (ref 1–1.03)
TROPONIN I SERPL-MCNC: <0.03 NG/ML
UROBILINOGEN UR QL STRIP.AUTO: 1 E.U./DL
WBC # BLD AUTO: 4.9 THOUSAND/UL (ref 4.8–10.8)
WBC #/AREA URNS AUTO: ABNORMAL /HPF

## 2020-07-03 PROCEDURE — 87635 SARS-COV-2 COVID-19 AMP PRB: CPT | Performed by: EMERGENCY MEDICINE

## 2020-07-03 PROCEDURE — 73630 X-RAY EXAM OF FOOT: CPT

## 2020-07-03 PROCEDURE — 96374 THER/PROPH/DIAG INJ IV PUSH: CPT

## 2020-07-03 PROCEDURE — 87040 BLOOD CULTURE FOR BACTERIA: CPT | Performed by: EMERGENCY MEDICINE

## 2020-07-03 PROCEDURE — 85025 COMPLETE CBC W/AUTO DIFF WBC: CPT | Performed by: EMERGENCY MEDICINE

## 2020-07-03 PROCEDURE — 83605 ASSAY OF LACTIC ACID: CPT | Performed by: EMERGENCY MEDICINE

## 2020-07-03 PROCEDURE — 71045 X-RAY EXAM CHEST 1 VIEW: CPT

## 2020-07-03 PROCEDURE — 36415 COLL VENOUS BLD VENIPUNCTURE: CPT | Performed by: EMERGENCY MEDICINE

## 2020-07-03 PROCEDURE — 84484 ASSAY OF TROPONIN QUANT: CPT | Performed by: PHYSICIAN ASSISTANT

## 2020-07-03 PROCEDURE — 96361 HYDRATE IV INFUSION ADD-ON: CPT

## 2020-07-03 PROCEDURE — 73562 X-RAY EXAM OF KNEE 3: CPT

## 2020-07-03 PROCEDURE — 73502 X-RAY EXAM HIP UNI 2-3 VIEWS: CPT

## 2020-07-03 PROCEDURE — 99285 EMERGENCY DEPT VISIT HI MDM: CPT | Performed by: PHYSICIAN ASSISTANT

## 2020-07-03 PROCEDURE — 80053 COMPREHEN METABOLIC PANEL: CPT | Performed by: EMERGENCY MEDICINE

## 2020-07-03 PROCEDURE — 99284 EMERGENCY DEPT VISIT MOD MDM: CPT

## 2020-07-03 PROCEDURE — 81001 URINALYSIS AUTO W/SCOPE: CPT | Performed by: EMERGENCY MEDICINE

## 2020-07-03 RX ORDER — FLUCONAZOLE 200 MG/1
200 TABLET ORAL DAILY PRN
Qty: 2 TABLET | Refills: 0 | Status: SHIPPED | OUTPATIENT
Start: 2020-07-04 | End: 2020-07-07

## 2020-07-03 RX ORDER — ACETAMINOPHEN 325 MG/1
650 TABLET ORAL ONCE
Status: COMPLETED | OUTPATIENT
Start: 2020-07-03 | End: 2020-07-03

## 2020-07-03 RX ORDER — CEPHALEXIN 500 MG/1
500 CAPSULE ORAL EVERY 8 HOURS SCHEDULED
Qty: 15 CAPSULE | Refills: 0 | Status: SHIPPED | OUTPATIENT
Start: 2020-07-04 | End: 2020-07-09

## 2020-07-03 RX ORDER — MORPHINE SULFATE 4 MG/ML
4 INJECTION, SOLUTION INTRAMUSCULAR; INTRAVENOUS ONCE
Status: COMPLETED | OUTPATIENT
Start: 2020-07-03 | End: 2020-07-03

## 2020-07-03 RX ORDER — CEPHALEXIN 500 MG/1
500 CAPSULE ORAL ONCE
Status: COMPLETED | OUTPATIENT
Start: 2020-07-03 | End: 2020-07-03

## 2020-07-03 RX ADMIN — ACETAMINOPHEN 650 MG: 325 TABLET ORAL at 18:27

## 2020-07-03 RX ADMIN — MORPHINE SULFATE 4 MG: 4 INJECTION INTRAVENOUS at 18:27

## 2020-07-03 RX ADMIN — CEPHALEXIN 500 MG: 500 CAPSULE ORAL at 20:27

## 2020-07-03 RX ADMIN — SODIUM CHLORIDE 1000 ML: 0.9 INJECTION, SOLUTION INTRAVENOUS at 18:05

## 2020-07-03 NOTE — ED PROVIDER NOTES
History  Chief Complaint   Patient presents with    Fever - 9 weeks to 74 years     100 7, feverish at home  fell 1 week ago, pain to knees bilaterally, left foot, ecchymosis   Type 2 diabetes,  75-year-old female history of type to diabetes, hypertension,, hyperlipidemia, right-sided pleural effusion presents via EMS complaining of fever  She reports that she noticed herself getting more warm the past 2 days  She denies chills, cough or shortness of breath  She does report low back pain and left foot pain  She states that 1 week ago she fell going up stairs landing on both knees  She reports that it was a mechanical fall in nature  She denies any dizziness, lightheadedness, chest pain or palpitations prior to or since the fall  She reports she has been walking funny since her left foot started bothering her which is when her back started bothering her  She did not hit her head, neck or back during the fall or since the fall  She denies any other complaints at this time  She does report that she was supposed to follow-up with a cardiothoracic surgeon to get her chronic R sided pleural effusion drained however due to transportation issues has not been able to schedule an appointment  Prior to Admission Medications   Prescriptions Last Dose Informant Patient Reported? Taking? Blood Glucose Monitoring Suppl (ONE TOUCH ULTRA 2) w/Device KIT  Self No No   Sig: by Does not apply route 2 (two) times a day   DULoxetine (CYMBALTA) 20 mg capsule   No No   Sig: TAKE ONE CAPSULE BY MOUTH ONCE DAILY   Insulin Pen Needle 33G X 4 MM MISC  Self No No   Sig: by Does not apply route daily   JANUVIA 100 MG tablet   No No   Sig: TAKE 1 TABLET BY MOUTH DAILY  ONE TOUCH ULTRA TEST test strip  Self No No   Sig: Tests twice a day   OneTouch Delica Lancets 70T MISC   No No   Sig: by Does not apply route 2 (two) times a day Test twice daily   DX E11 9   Polyethylene Glycol 3350-GRX POWD   No No   Sig: Take by mouth daily at bedtime as needed (constipation)   Tofacitinib Citrate (XELJANZ XR) 11 MG TB24  Self Yes No   Sig: Take 1 tablet by mouth daily   acetaminophen (TYLENOL) 325 mg tablet   No No   Sig: Take 2 tablets (650 mg total) by mouth every 6 (six) hours as needed for mild pain, headaches or fever   albuterol (PROVENTIL HFA,VENTOLIN HFA) 90 mcg/act inhaler  Self No No   Sig: Inhale 2 puffs every 6 (six) hours as needed for wheezing   alendronate (FOSAMAX) 70 mg tablet  Self Yes No   Sig: Take 70 mg by mouth every 7 days   atorvastatin (LIPITOR) 20 mg tablet   No No   Sig: TAKE ONE TABLET BY MOUTH EVERY DAY   cholecalciferol (VITAMIN D3) 1,000 units tablet   No No   Sig: Take 1 tablet (1,000 Units total) by mouth daily   ergocalciferol (VITAMIN D2) 50,000 units   No No   Sig: TAKE ONE CAPSULE BY MOUTH WEEKLY   fluticasone-salmeterol (ADVAIR) 250-50 mcg/dose  Self Yes No   Sig: Inhale 1 puff every 12 (twelve) hours    folic acid (FOLVITE) 1 mg tablet   No No   Sig: TAKE ONE TABLET BY MOUTH ONCE DAILY   furosemide (LASIX) 40 mg tablet   No No   Sig: TAKE ONE TABLET BY MOUTH EVERY DAY   insulin degludec Ryan HooseTresiba FlexTouch) 100 units/mL injection pen   No No   Sig: Inject 30 Units under the skin daily   ipratropium-albuterol (DUO-NEB) 0 5-2 5 mg/3 mL nebulizer solution   No No   Sig: Take 1 vial (3 mL total) by nebulization every 6 (six) hours as needed for wheezing or shortness of breath   lactulose (CHRONULAC) 10 g/15 mL solution   No No   Si to 2 tablespoons (15mL) daily if needed for constipation     levothyroxine 125 mcg tablet   No No   Sig: TAKE ONE TABLET BY MOUTH EVERY DAY IN THE AM   lisinopril (ZESTRIL) 40 mg tablet  Self No No   Sig: TAKE ONE TABLET BY MOUTH EVERY DAY   methotrexate 2 5 mg tablet   No No   Sig: TAKE 8 TABLETS BY MOUTH EVERY    mometasone (ELOCON) 0 1 % ointment  Self No No   Sig: Apply topically 2 (two) times a day as needed (itching)   nystatin (MYCOSTATIN) powder   No No   Sig: Apply topically 3 (three) times a day as needed (fungal skin rash)   pantoprazole (PROTONIX) 40 mg tablet   No No   Sig: TAKE ONE TABLET BY MOUTH ONCE DAILY   tiotropium (SPIRIVA RESPIMAT) 2 5 MCG/ACT AERS inhaler  Self No No   Sig: Inhale 1 puff daily at bedtime   traMADol (ULTRAM) 50 mg tablet   No No   Sig: Take 1 tablet (50 mg total) by mouth every 6 (six) hours as needed for moderate pain for up to 20 doses   vitamin B-12 (VITAMIN B-12) 500 MCG tablet   No No   Sig: Take 1 tablet (500 mcg total) by mouth daily      Facility-Administered Medications: None       Past Medical History:   Diagnosis Date    Arthritis     Arthritis     Cancer ([facility] )     Candidiasis of skin     Cervical cancer (HCC)     Chronic respiratory failure with hypoxia and hypercapnia (HCC) 4/20/2019    COPD (chronic obstructive pulmonary disease) ([facility] )     last assessed 11/21/2016    Current chronic use of systemic steroids     Degenerative arthritis of left knee     last assessed 1/20/2015    Diabetes mellitus ([facility] )     Disease of thyroid gland     hypo pill given to decrease thyroid       Fibromyalgia     Fistula of knee     last assessed 9/12/2014    GERD (gastroesophageal reflux disease)     Hyperlipidemia     Hypertension     Medial meniscus tear     of left knee, last assessed 9/12/2014    Migraine     states she has a hx of HA that she calls [employer] but never was dx by neurologist    Obesity     Obesity     Osteoarthritis     Osteopenia     Pneumonia     last assessed 11/16/2016    Psychiatric disorder     anxiety    Rheumatoid arthritis ([facility] )     Rheumatoid arthritis ([facility] )     Sepsis ([facility] )     last assessed 11/10/2016    Tick bite     last assessed 10/30/2015    Vitamin B12 deficiency     Vitamin D deficiency        Past Surgical History:   Procedure Laterality Date    CATARACT EXTRACTION Bilateral     CHOLECYSTECTOMY      EYE SURGERY      Bilateral Cataracts    HYSTERECTOMY      IR THORACENTESIS 2019    JOINT REPLACEMENT      left knee    KNEE ARTHROSCOPY      NEUROPLASTY / TRANSPOSITION MEDIAN NERVE AT CARPAL TUNNEL      TUBAL LIGATION         Family History   Problem Relation Age of Onset    Stroke Mother     Asthma Family     Cancer Family     Diabetes Family     Hypertension Family      I have reviewed and agree with the history as documented  E-Cigarette/Vaping     E-Cigarette/Vaping Substances     Social History     Tobacco Use    Smoking status: Former Smoker     Packs/day: 1 00     Years: 48 00     Pack years: 48 00     Types: E-Cigarettes     Last attempt to quit: 2012     Years since quittin 6    Smokeless tobacco: Never Used    Tobacco comment: per allscripts - "current every day smoker, former smoker"   Substance Use Topics    Alcohol use: Never     Alcohol/week: 0 0 standard drinks     Frequency: Never     Binge frequency: Never     Comment: stopped drinking alcohol    Drug use: Never       Review of Systems   Constitutional: Positive for fever  Negative for chills and fatigue  HENT: Negative for ear pain and sore throat  Eyes: Negative for pain  Respiratory: Negative for cough, shortness of breath and wheezing  Cardiovascular: Negative for chest pain, palpitations and leg swelling  Gastrointestinal: Negative for abdominal pain, constipation, diarrhea, nausea and vomiting  Endocrine: Negative for polyuria  Genitourinary: Negative for dysuria and pelvic pain  Musculoskeletal: Positive for arthralgias and gait problem  Negative for myalgias, neck pain and neck stiffness  Skin: Negative for rash  Neurological: Negative for dizziness, syncope, light-headedness and headaches  All other systems reviewed and are negative  Physical Exam  Physical Exam   Constitutional: She is oriented to person, place, and time  She appears well-developed and well-nourished  HENT:   Head: Normocephalic and atraumatic     Mouth/Throat: No oropharyngeal exudate  Eyes: EOM are normal    Neck: Normal range of motion  Cardiovascular: Normal rate, regular rhythm and normal heart sounds  2+ bilateral radial and DP pulses   Pulmonary/Chest: Effort normal and breath sounds normal    Airway intact  Bilateral breath sounds   Abdominal: Soft  Bowel sounds are normal  There is no tenderness  Musculoskeletal: Normal range of motion  Moderate distal left foot ecchymosis, edema and tenderness along 2nd through 4th distal metatarsals and proximal phalanx  Full range of motion of bilateral hips however there is some pain with left hip flexion  Pelvis is stable  Bilateral knees are stable to collateral stress with negative Lachman's  Full C-spine active range of motion  No tenderness, step-off or deformity throughout cervical, thoracic and lumbar spine  Neurological: She is alert and oriented to person, place, and time  Following all commands  No focal deficits  Cranial nerves II-XII grossly intact   Skin: Skin is warm  Capillary refill takes less than 2 seconds  Psychiatric: She has a normal mood and affect         Vital Signs  ED Triage Vitals   Temperature Pulse Respirations Blood Pressure SpO2   07/03/20 1730 07/03/20 1730 07/03/20 1730 07/03/20 1730 07/03/20 1730   (!) 100 7 °F (38 2 °C) 99 16 (!) 172/73 96 %      Temp Source Heart Rate Source Patient Position - Orthostatic VS BP Location FiO2 (%)   07/03/20 1730 07/03/20 1730 07/03/20 2036 07/03/20 2036 --   Temporal Monitor Sitting Left arm       Pain Score       07/03/20 1827       8           Vitals:    07/03/20 1730 07/03/20 2036   BP: (!) 172/73 141/65   Pulse: 99 81   Patient Position - Orthostatic VS:  Sitting         Visual Acuity      ED Medications  Medications   sodium chloride 0 9 % bolus 1,000 mL (0 mL Intravenous Stopped 7/3/20 1905)   morphine (PF) 4 mg/mL injection 4 mg (4 mg Intravenous Given 7/3/20 1827)   acetaminophen (TYLENOL) tablet 650 mg (650 mg Oral Given 7/3/20 1827) cephalexin (KEFLEX) capsule 500 mg (500 mg Oral Given 7/3/20 2027)       Diagnostic Studies  Results Reviewed     Procedure Component Value Units Date/Time    Urine Microscopic [[id number]]  (Abnormal) Collected:  07/03/20 1959    Lab Status:  Final result Specimen:  Urine, Other Updated:  07/03/20 2023     RBC, UA 1-2 /hpf      WBC, UA 4-10 /hpf      Epithelial Cells Moderate /hpf      Bacteria, UA Occasional /hpf      OTHER OBSERVATIONS Yeast Cells Present    UA w Reflex to Microscopic w Reflex to Culture [[id number]]  (Abnormal) Collected:  07/03/20 1959    Lab Status:  Final result Specimen:  Urine, Other Updated:  07/03/20 2009     Color, UA Yellow     Clarity, UA Cloudy     Specific Gravity, UA <=1 005     pH, UA 7 0     Leukocytes, UA 3+     Nitrite, UA Negative     Protein, UA Negative mg/dl      Glucose, UA Negative mg/dl      Ketones, UA Negative mg/dl      Urobilinogen, UA 1 0 E U /dl      Bilirubin, UA Negative     Blood, UA Trace-Intact    Novel Coronavirus [facility] [[id number]]  (Normal) Collected:  07/03/20 1802    Lab Status:  Final result Specimen:  Nares from Nose Updated:  07/03/20 1920     SARS-CoV-2 Negative    Narrative: The specimen collection materials, transport medium, and/or testing methodology utilized in the production of these test results have been proven to be reliable in a limited validation with an abbreviated program under the Emergency Utilization Authorization provided by the FDA  Testing reported as "Presumptive positive" will be confirmed with secondary testing with a reference laboratory to ensure result accuracy  Clinical caution and judgement should be used with the interpretation of these results with consideration of the clinical impression and other laboratory testing  Testing reported as "Positive" or "Negative" has been proven to be accurate according to standard laboratory validation requirements    All testing is performed with control materials showing appropriate reactivity at standard intervals  Troponin I [[id number]]  (Normal) Collected:  07/03/20 1802    Lab Status:  Final result Specimen:  Blood from Arm, Left Updated:  07/03/20 1836     Troponin I <0 03 ng/mL     Lactic acid [[id number]]  (Normal) Collected:  07/03/20 1802    Lab Status:  Final result Specimen:  Blood from Arm, Left Updated:  07/03/20 1835     LACTIC ACID 0 9 mmol/L     Narrative:       Result may be elevated if tourniquet was used during collection      Comprehensive metabolic panel [[id number]]  (Abnormal) Collected:  07/03/20 1802    Lab Status:  Final result Specimen:  Blood from Arm, Left Updated:  07/03/20 1835     Sodium 131 mmol/L      Potassium 3 8 mmol/L      Chloride 93 mmol/L      CO2 31 mmol/L      ANION GAP 7 mmol/L      BUN 12 mg/dL      Creatinine 0 72 mg/dL      Glucose 156 mg/dL      Calcium 9 4 mg/dL      AST 26 U/L      ALT 27 U/L      Alkaline Phosphatase 64 U/L      Total Protein 7 4 g/dL      Albumin 4 1 g/dL      Total Bilirubin 0 70 mg/dL      eGFR 84 ml/min/1 73sq m     Narrative:       [place] guidelines for Chronic Kidney Disease (CKD):     Stage 1 with normal or high GFR (GFR > 90 mL/min/1 73 square meters)    Stage 2 Mild CKD (GFR = 60-89 mL/min/1 73 square meters)    Stage 3A Moderate CKD (GFR = 45-59 mL/min/1 73 square meters)    Stage 3B Moderate CKD (GFR = 30-44 mL/min/1 73 square meters)    Stage 4 Severe CKD (GFR = 15-29 mL/min/1 73 square meters)    Stage 5 End Stage CKD (GFR <15 mL/min/1 73 square meters)  Note: GFR calculation is accurate only with a steady state creatinine    CBC and differential [[id number]]  (Abnormal) Collected:  07/03/20 1802    Lab Status:  Final result Specimen:  Blood from Arm, Left Updated:  07/03/20 1816     WBC 4 90 Thousand/uL      RBC 3 78 Million/uL      Hemoglobin 12 0 g/dL      Hematocrit 35 8 %      MCV 95 fL      MCH 31 7 pg      MCHC 33 5 g/dL      RDW 16 3 %      MPV 8 6 fL Platelets 759 Thousands/uL      Neutrophils Relative 74 %      Lymphocytes Relative 13 %      Monocytes Relative 12 %      Eosinophils Relative 0 %      Basophils Relative 1 %      Neutrophils Absolute 3 60 Thousands/µL      Lymphocytes Absolute 0 60 Thousands/µL      Monocytes Absolute 0 60 Thousand/µL      Eosinophils Absolute 0 00 Thousand/µL      Basophils Absolute 0 00 Thousands/µL     Blood culture #1 [[id number]] Collected:  07/03/20 1802    Lab Status: In process Specimen:  Blood from Arm, Left Updated:  07/03/20 1812    Blood culture #2 [[id number]] Collected:  07/03/20 1802    Lab Status: In process Specimen:  Blood from Arm, Left Updated:  07/03/20 1812                 XR chest 1 view   Final Result by Albert Dey MD (07/03 2026)      Left basilar effusion and consolidation  Normal right lung  No pneumothorax bilaterally  Workstation performed: [id number]         XR hip/pelv 2-3 vws left    (Results Pending)   XR knee 3 views left non injury    (Results Pending)   XR foot 3+ views LEFT    (Results Pending)              Procedures  ECG 12 Lead Documentation Only  Date/Time: 7/3/2020 8:42 PM  Performed by: Kerwin Song PA-C  Authorized by:  Kerwin Song PA-C     ECG reviewed by me, the ED Provider: yes    Patient location:  ED  Previous ECG:     Previous ECG:  Compared to current    Similarity:  No change    Comparison to cardiac monitor: Yes    Interpretation:     Interpretation: normal    Rate:     ECG rate:  98    ECG rate assessment: normal    Rhythm:     Rhythm: sinus rhythm    Ectopy:     Ectopy: none    QRS:     QRS axis:  Normal  Conduction:     Conduction: normal    ST segments:     ST segments:  Normal  T waves:     T waves: normal    Comments:      No evidence of acute cardiac ischemia             ED Course                                             MDM  Number of Diagnoses or Management Options  UTI (urinary tract infection):   Vaginal yeast infection:   Diagnosis management comments: Patient presented with multiple complaints  She states that she fell 1 week ago and has been having some back pain since then  This is likely due to the patient's change in gait pattern secondary to left foot injury  There appeared to be small proximal phalanx fractures although not yet confirmed on official radiologist read  Out of an abundance of caution and due to patient's considerable tenderness and ecchymosis on exam, will place patient in cam walker boot with recommendation to use her walker at home and only weight bear as tolerated until she can follow-up with orthopedics next week  Patient has a chronic left-sided pleural effusion that appears unchanged from previous  Underlying malignancy cannot be excluded patient was informed of the necessity to follow up with Cardiothoracic surgery as pleural effusions and fevers may be a sign of cancer  Patient expressed her understanding and states that she will call to follow-up next week  Patient was offered admission however she states she has holiday plans tomorrow and would refuse admission  Patient admitted to some urinary frequency burning with urination  3+ leukocytes on urinalysis however only occasional bacteria on urine microscopic  Yeast cells were present and patient has vaginal and urinary burning  Vaginal yeast infection cannot be excluded  Will empirically treat with Keflex  Patient being febrile next her see more likely to have a bacterial cause  COVID-19 negative  Does not appear to be a pneumonia as her chest x-ray appears unchanged from previous however will follow up with patient if ED preliminary read is different from official radiologist read  Patient's fever subsided Tylenol, she had no cough worse than usual and no leukocytosis so in pneumonia is unlikely  For provided ambulatory referral to cardiothoracic surgery for pleural effusion drainage  I personally discussed return precautions with this patient  I provided the patient with written discharge instructions and particularly highlighted specific areas of interest to this patient, including but not limited to: medications for symptom management such as Tylenol for pain and fever, follow up recommendations with Orthopedics, cardiothoracic surgery and her family doctor, and return precautions  Patient is agreeable to plan  Disposition  Final diagnoses:   UTI (urinary tract infection)   Vaginal yeast infection     Time reflects when diagnosis was documented in both MDM as applicable and the Disposition within this note     Time User Action Codes Description Comment    7/3/2020  8:28 PM Amee Tipton Add [N39 0] UTI (urinary tract infection)     7/3/2020  8:28 PM Amee Preethi Add [B37 3] Vaginal yeast infection     7/3/2020  8:28 PM Amee Preethi Modify [N39 0] UTI (urinary tract infection)       ED Disposition     ED Disposition Condition Date/Time Comment    Discharge Stable Fri Jul 3, 2020  8:30 PM Aleena Garrison discharge to home/self care              Follow-up Information     Follow up With Specialties Details Why Contact Info Additional Information    [facility] Orthopedic Surgery Schedule an appointment as soon as possible for a visit   [address] [facility], [address] [place] [address] 70    Brandon Akers MD Family Medicine Schedule an appointment as soon as possible for a visit   [address] [place], [place] [phone]             Patient's Medications   Discharge Prescriptions    CEPHALEXIN (KEFLEX) 500 MG CAPSULE    Take 1 capsule (500 mg total) by mouth every 8 (eight) hours for 5 days       Start Date: 7/4/2020  End Date: 7/9/2020       Order Dose: 500 mg       Quantity: 15 capsule    Refills: 0    FLUCONAZOLE (DIFLUCAN) 200 MG TABLET    Take 1 tablet (200 mg total) by mouth daily as needed (vaginal itching or burning after antibiotic use) for up to 2 doses       Start Date: 7/4/2020  End Date: 7/7/2020       Order Dose: 200 mg       Quantity: 2 tablet    Refills: 0     No discharge procedures on file      PDMP Review     None          ED Provider  Electronically Signed by           Nasrin Toro PA-C  07/03/20 2050       Nasrin Toro PA-C  07/03/20 2056

## 2020-07-04 NOTE — DISCHARGE INSTRUCTIONS
antibiotic tomorrow morning and take it as directed  Take Diflucan if you develop any vaginal itching or burning  Follow-up with orthopedics  Remain in boot and use walker and only weight bear as tolerated until cleared by Orthopedics  Follow-up with your family doctor to recheck urine in 5 days  Take Tylenol as needed for pain and fever  Keep your foot elevated  Enjoy your holiday

## 2020-07-07 ENCOUNTER — TELEPHONE (OUTPATIENT)
Dept: INTERNAL MEDICINE CLINIC | Facility: CLINIC | Age: 70
End: 2020-07-07

## 2020-07-07 DIAGNOSIS — B37.49 CANDIDAL UTI (URINARY TRACT INFECTION): Primary | ICD-10-CM

## 2020-07-07 DIAGNOSIS — B37.2 CANDIDAL DERMATITIS: ICD-10-CM

## 2020-07-07 RX ORDER — NYSTATIN 100000 U/G
CREAM TOPICAL 2 TIMES DAILY
Qty: 30 G | Refills: 2 | Status: SHIPPED | OUTPATIENT
Start: 2020-07-07 | End: 2021-06-21 | Stop reason: SDUPTHER

## 2020-07-07 RX ORDER — FLUCONAZOLE 150 MG/1
150 TABLET ORAL DAILY
Qty: 5 TABLET | Refills: 0 | Status: SHIPPED | OUTPATIENT
Start: 2020-07-07 | End: 2020-07-12

## 2020-07-07 NOTE — TELEPHONE ENCOUNTER
Patient called today looking for a cream to be called in for her rash she typically gets under her apron from sweating  She stated it is the same as she always gets, no different  You also stated you would call in Diflucan 150mg to continue for an extra 5 days  Patient also wants to know how long she can remain on tyenlol and what dose she should take for her foot fracture  The pain comes and goes

## 2020-07-07 NOTE — TELEPHONE ENCOUNTER
I did talk to Farooq escalante, she thought the ER told her that she had a fracture in her foot  She has some discomfort and it is bruised  Did let her know that the x-ray was negative for fracture

## 2020-07-09 LAB
BACTERIA BLD CULT: NORMAL
BACTERIA BLD CULT: NORMAL

## 2020-07-15 DIAGNOSIS — M06.9 RHEUMATOID ARTHRITIS INVOLVING MULTIPLE SITES, UNSPECIFIED RHEUMATOID FACTOR PRESENCE: ICD-10-CM

## 2020-07-15 DIAGNOSIS — Z79.4 TYPE 2 DIABETES MELLITUS WITH HYPERGLYCEMIA, WITH LONG-TERM CURRENT USE OF INSULIN (HCC): ICD-10-CM

## 2020-07-15 DIAGNOSIS — E11.65 TYPE 2 DIABETES MELLITUS WITH HYPERGLYCEMIA, WITH LONG-TERM CURRENT USE OF INSULIN (HCC): ICD-10-CM

## 2020-07-15 DIAGNOSIS — R60.9 PERIPHERAL EDEMA: ICD-10-CM

## 2020-07-16 RX ORDER — INSULIN DEGLUDEC INJECTION 100 U/ML
INJECTION, SOLUTION SUBCUTANEOUS
Qty: 15 ML | Refills: 3 | Status: SHIPPED | OUTPATIENT
Start: 2020-07-16 | End: 2021-04-19

## 2020-07-16 RX ORDER — FOLIC ACID 1 MG/1
TABLET ORAL
Qty: 90 TABLET | Refills: 3 | Status: SHIPPED | OUTPATIENT
Start: 2020-07-16 | End: 2021-06-08

## 2020-07-16 RX ORDER — FUROSEMIDE 40 MG/1
TABLET ORAL
Qty: 90 TABLET | Refills: 3 | Status: SHIPPED | OUTPATIENT
Start: 2020-07-16 | End: 2021-06-08

## 2020-07-17 DIAGNOSIS — R39.9 UTI SYMPTOMS: Primary | ICD-10-CM

## 2020-07-26 ENCOUNTER — HOSPITAL ENCOUNTER (EMERGENCY)
Facility: HOSPITAL | Age: 70
Discharge: HOME | End: 2020-07-26
Attending: EMERGENCY MEDICINE | Admitting: EMERGENCY MEDICINE
Payer: COMMERCIAL

## 2020-07-26 ENCOUNTER — APPOINTMENT (EMERGENCY)
Dept: CT IMAGING | Facility: HOSPITAL | Age: 70
End: 2020-07-26
Payer: COMMERCIAL

## 2020-07-26 VITALS
TEMPERATURE: 98.8 F | BODY MASS INDEX: 43.01 KG/M2 | SYSTOLIC BLOOD PRESSURE: 161 MMHG | OXYGEN SATURATION: 96 % | RESPIRATION RATE: 20 BRPM | HEART RATE: 92 BPM | HEIGHT: 65 IN | DIASTOLIC BLOOD PRESSURE: 78 MMHG | WEIGHT: 258.16 LBS

## 2020-07-26 DIAGNOSIS — R21 RASH: ICD-10-CM

## 2020-07-26 DIAGNOSIS — R51.9 HEADACHE: Primary | ICD-10-CM

## 2020-07-26 LAB
ANION GAP SERPL CALCULATED.3IONS-SCNC: 6 MMOL/L (ref 4–13)
APTT PPP: 27 SECONDS (ref 23–37)
BACTERIA UR QL AUTO: ABNORMAL /HPF
BASOPHILS # BLD AUTO: 0.03 THOUSANDS/ΜL (ref 0–0.1)
BASOPHILS NFR BLD AUTO: 1 % (ref 0–1)
BILIRUB UR QL STRIP: NEGATIVE
BUN SERPL-MCNC: 11 MG/DL (ref 5–25)
CALCIUM SERPL-MCNC: 9.2 MG/DL (ref 8.3–10.1)
CHLORIDE SERPL-SCNC: 96 MMOL/L (ref 100–108)
CLARITY UR: ABNORMAL
CO2 SERPL-SCNC: 31 MMOL/L (ref 21–32)
COLOR UR: YELLOW
CREAT SERPL-MCNC: 0.85 MG/DL (ref 0.6–1.3)
CREAT UR-MCNC: 74.5 MG/DL
CRP SERPL QL: 25.1 MG/L
EOSINOPHIL # BLD AUTO: 0.1 THOUSAND/ΜL (ref 0–0.61)
EOSINOPHIL NFR BLD AUTO: 2 % (ref 0–6)
ERYTHROCYTE [DISTWIDTH] IN BLOOD BY AUTOMATED COUNT: 15 % (ref 11.6–15.1)
ERYTHROCYTE [SEDIMENTATION RATE] IN BLOOD: 57 MM/HOUR (ref 0–20)
GFR SERPL CREATININE-BSD FRML MDRD: 69 ML/MIN/1.73SQ M
GLUCOSE SERPL-MCNC: 140 MG/DL (ref 65–140)
GLUCOSE UR STRIP-MCNC: NEGATIVE MG/DL
HCT VFR BLD AUTO: 35.9 % (ref 34.8–46.1)
HGB BLD-MCNC: 11.5 G/DL (ref 11.5–15.4)
HGB UR QL STRIP.AUTO: ABNORMAL
IMM GRANULOCYTES # BLD AUTO: 0.04 THOUSAND/UL (ref 0–0.2)
IMM GRANULOCYTES NFR BLD AUTO: 1 % (ref 0–2)
INR PPP: 1.02 (ref 0.84–1.19)
KETONES UR STRIP-MCNC: NEGATIVE MG/DL
LEUKOCYTE ESTERASE UR QL STRIP: ABNORMAL
LYMPHOCYTES # BLD AUTO: 1.28 THOUSANDS/ΜL (ref 0.6–4.47)
LYMPHOCYTES NFR BLD AUTO: 21 % (ref 14–44)
MCH RBC QN AUTO: 31.6 PG (ref 26.8–34.3)
MCHC RBC AUTO-ENTMCNC: 32 G/DL (ref 31.4–37.4)
MCV RBC AUTO: 99 FL (ref 82–98)
MICROALBUMIN UR-MCNC: 23.9 MG/L (ref 0–20)
MICROALBUMIN/CREAT 24H UR: 32 MG/G CREATININE (ref 0–30)
MONOCYTES # BLD AUTO: 0.59 THOUSAND/ΜL (ref 0.17–1.22)
MONOCYTES NFR BLD AUTO: 10 % (ref 4–12)
NEUTROPHILS # BLD AUTO: 4 THOUSANDS/ΜL (ref 1.85–7.62)
NEUTS SEG NFR BLD AUTO: 65 % (ref 43–75)
NITRITE UR QL STRIP: NEGATIVE
NON-SQ EPI CELLS URNS QL MICRO: ABNORMAL /HPF
NRBC BLD AUTO-RTO: 0 /100 WBCS
PH UR STRIP.AUTO: 6 [PH]
PLATELET # BLD AUTO: 273 THOUSANDS/UL (ref 149–390)
PMV BLD AUTO: 9.2 FL (ref 8.9–12.7)
POTASSIUM SERPL-SCNC: 3.7 MMOL/L (ref 3.5–5.3)
PROT UR STRIP-MCNC: NEGATIVE MG/DL
PROTHROMBIN TIME: 13.4 SECONDS (ref 11.6–14.5)
RBC # BLD AUTO: 3.64 MILLION/UL (ref 3.81–5.12)
RBC #/AREA URNS AUTO: ABNORMAL /HPF
SODIUM SERPL-SCNC: 133 MMOL/L (ref 136–145)
SP GR UR STRIP.AUTO: <=1.005 (ref 1–1.03)
UROBILINOGEN UR QL STRIP.AUTO: 1 E.U./DL
WBC # BLD AUTO: 6.04 THOUSAND/UL (ref 4.31–10.16)
WBC #/AREA URNS AUTO: ABNORMAL /HPF

## 2020-07-26 PROCEDURE — 96368 THER/DIAG CONCURRENT INF: CPT

## 2020-07-26 PROCEDURE — 3060F POS MICROALBUMINURIA REV: CPT | Performed by: FAMILY MEDICINE

## 2020-07-26 PROCEDURE — 85025 COMPLETE CBC W/AUTO DIFF WBC: CPT | Performed by: EMERGENCY MEDICINE

## 2020-07-26 PROCEDURE — 85652 RBC SED RATE AUTOMATED: CPT | Performed by: EMERGENCY MEDICINE

## 2020-07-26 PROCEDURE — 81001 URINALYSIS AUTO W/SCOPE: CPT | Performed by: EMERGENCY MEDICINE

## 2020-07-26 PROCEDURE — 36415 COLL VENOUS BLD VENIPUNCTURE: CPT | Performed by: EMERGENCY MEDICINE

## 2020-07-26 PROCEDURE — 86140 C-REACTIVE PROTEIN: CPT | Performed by: EMERGENCY MEDICINE

## 2020-07-26 PROCEDURE — 96375 TX/PRO/DX INJ NEW DRUG ADDON: CPT

## 2020-07-26 PROCEDURE — 85610 PROTHROMBIN TIME: CPT | Performed by: EMERGENCY MEDICINE

## 2020-07-26 PROCEDURE — 96365 THER/PROPH/DIAG IV INF INIT: CPT

## 2020-07-26 PROCEDURE — 86617 LYME DISEASE ANTIBODY: CPT | Performed by: EMERGENCY MEDICINE

## 2020-07-26 PROCEDURE — 96361 HYDRATE IV INFUSION ADD-ON: CPT

## 2020-07-26 PROCEDURE — 99284 EMERGENCY DEPT VISIT MOD MDM: CPT

## 2020-07-26 PROCEDURE — 82570 ASSAY OF URINE CREATININE: CPT | Performed by: EMERGENCY MEDICINE

## 2020-07-26 PROCEDURE — 70498 CT ANGIOGRAPHY NECK: CPT

## 2020-07-26 PROCEDURE — 99284 EMERGENCY DEPT VISIT MOD MDM: CPT | Performed by: EMERGENCY MEDICINE

## 2020-07-26 PROCEDURE — 85730 THROMBOPLASTIN TIME PARTIAL: CPT | Performed by: EMERGENCY MEDICINE

## 2020-07-26 PROCEDURE — 70496 CT ANGIOGRAPHY HEAD: CPT

## 2020-07-26 PROCEDURE — 82043 UR ALBUMIN QUANTITATIVE: CPT | Performed by: EMERGENCY MEDICINE

## 2020-07-26 PROCEDURE — 96376 TX/PRO/DX INJ SAME DRUG ADON: CPT

## 2020-07-26 PROCEDURE — 87086 URINE CULTURE/COLONY COUNT: CPT | Performed by: EMERGENCY MEDICINE

## 2020-07-26 PROCEDURE — 86618 LYME DISEASE ANTIBODY: CPT | Performed by: EMERGENCY MEDICINE

## 2020-07-26 PROCEDURE — 80048 BASIC METABOLIC PNL TOTAL CA: CPT | Performed by: EMERGENCY MEDICINE

## 2020-07-26 RX ORDER — METOCLOPRAMIDE HYDROCHLORIDE 5 MG/ML
10 INJECTION INTRAMUSCULAR; INTRAVENOUS ONCE
Status: COMPLETED | OUTPATIENT
Start: 2020-07-26 | End: 2020-07-26

## 2020-07-26 RX ORDER — DIPHENHYDRAMINE HYDROCHLORIDE 50 MG/ML
25 INJECTION INTRAMUSCULAR; INTRAVENOUS ONCE
Status: COMPLETED | OUTPATIENT
Start: 2020-07-26 | End: 2020-07-26

## 2020-07-26 RX ORDER — DEXAMETHASONE SODIUM PHOSPHATE 10 MG/ML
10 INJECTION, SOLUTION INTRAMUSCULAR; INTRAVENOUS ONCE
Status: COMPLETED | OUTPATIENT
Start: 2020-07-26 | End: 2020-07-26

## 2020-07-26 RX ORDER — KETOROLAC TROMETHAMINE 30 MG/ML
15 INJECTION, SOLUTION INTRAMUSCULAR; INTRAVENOUS ONCE
Status: COMPLETED | OUTPATIENT
Start: 2020-07-26 | End: 2020-07-26

## 2020-07-26 RX ORDER — MAGNESIUM SULFATE HEPTAHYDRATE 40 MG/ML
2 INJECTION, SOLUTION INTRAVENOUS ONCE
Status: COMPLETED | OUTPATIENT
Start: 2020-07-26 | End: 2020-07-26

## 2020-07-26 RX ADMIN — DIPHENHYDRAMINE HYDROCHLORIDE 25 MG: 50 INJECTION, SOLUTION INTRAMUSCULAR; INTRAVENOUS at 09:47

## 2020-07-26 RX ADMIN — IOHEXOL 100 ML: 350 INJECTION, SOLUTION INTRAVENOUS at 11:46

## 2020-07-26 RX ADMIN — MAGNESIUM SULFATE HEPTAHYDRATE 2 G: 40 INJECTION, SOLUTION INTRAVENOUS at 13:14

## 2020-07-26 RX ADMIN — METOCLOPRAMIDE HYDROCHLORIDE 10 MG: 5 INJECTION INTRAMUSCULAR; INTRAVENOUS at 12:53

## 2020-07-26 RX ADMIN — KETOROLAC TROMETHAMINE 15 MG: 30 INJECTION, SOLUTION INTRAMUSCULAR at 09:45

## 2020-07-26 RX ADMIN — METOCLOPRAMIDE HYDROCHLORIDE 10 MG: 5 INJECTION INTRAMUSCULAR; INTRAVENOUS at 09:48

## 2020-07-26 RX ADMIN — DEXAMETHASONE SODIUM PHOSPHATE 10 MG: 10 INJECTION, SOLUTION INTRAMUSCULAR; INTRAVENOUS at 12:55

## 2020-07-26 RX ADMIN — SODIUM CHLORIDE 1000 ML: 0.9 INJECTION, SOLUTION INTRAVENOUS at 09:45

## 2020-07-26 RX ADMIN — VALPROATE SODIUM 1000 MG: 100 INJECTION, SOLUTION INTRAVENOUS at 12:56

## 2020-07-26 NOTE — ED NOTES
Pt states had tick behind left ear 2 weeks ago which was removed   Noticed red circular type rash on chest and abdomen also on back this am      Roger Weldon RN  07/26/20 1099

## 2020-07-26 NOTE — DISCHARGE INSTRUCTIONS
We treated you are throbbing headache today with migraine cocktail  You received a dose of Decadron, as steroid which may result in high blood glucose level for the next several days  Your inflammatory markers are elevated today with sedimentation rate of 57 and C-reactive protein of 25  This is nonspecific and may be due to your rheumatoid arthritis, or it may be due to inflammation of the blood vessel wear your throbbing headache was today - temporal artery  It may also be due to Lyme disease the result of which is still pending  We will call you with the results of your Lyme disease test   Please call your rheumatologist on Monday to arrange for close follow-up because of your elevated inflammatory markers and because you had pain in your left temple  You may need further evaluation for a condition called temporal arteritis  Return to emergency department if you are having difficulties with your vision or if you have an uncontrolled headache

## 2020-07-26 NOTE — ED PROVIDER NOTES
EMERGENCY DEPARTMENT ENCOUNTER NOTE  ? CHIEF COMPLAINT  Chief Complaint   Patient presents with    Headache - Recurrent or Known Dx Migraines     Headache for 1 week  Also rash on abdomen       HPI  Meggan Ceja is a 65 y o  female with PMH of hypertension, hyperlipidemia, migraine headaches, rheumatoid arthritis, cervical cancer, COPD on chronic supplemental O2, presenting with left-sided pounding headache, as well as with a rash  Patient reports that 1 week ago she developed a headache located at the left temple, pounding, persistent, gradual in onset, and essentially nonradiating  There are no changes in vision:  no blurry vision, no decreased vision, no photopsia is, and no double vision  Pain does not appear to be worsened with movement, light, or sound  There is no neck pain  There is no weakness or numbness or difficulty with gait  Patient reports that yesterday, she noticed red patches to her abdomen  She reports sustaining a bite to her right breast within the past few days  Patient's daughter reports that she removed a tick from her mother's here approximately 1 month ago, they are not sure of the type of tick and patient had no Lyme disease prophylaxis at that time  Patient has not had any fevers or chills  There are no body aches  There is no chest pain or shortness of breath  Patient does report left forearm pain after sustaining a fall during which she also injured her left foot, this occurred about 2 weeks ago  Patient has been taking Tylenol for pains although it does not appear to help with the headache at all  She reports that the headache is similar to when she has had headaches in the past in the fact that it is localized and pounding      REVIEW OF SYSTEMS  Constitutional: ?Denies fevers, chills  Eyes: ?Denies changes in vision  ENT: Denies earache or sore throat  CV: Denies chest pain   Resp: Denies shortness of breath or coughing  GI: ?Denies abdominal pain, vomiting, or diarrhea Skin:  As above  Neuro:  As above  Ten systems were reviewed otherwise were unremarkable    PAST MEDICAL HISTORY  Past Medical History:   Diagnosis Date    Arthritis     Arthritis     Cancer ([facility] )     Candidiasis of skin     [facility]     Chronic respiratory failure with hypoxia and hypercapnia ([facility] ) 4/20/2019    COPD (chronic obstructive pulmonary disease) ([facility] )     last assessed 11/21/2016    Current chronic use of systemic steroids     Degenerative arthritis of left knee     last assessed 1/20/2015    Diabetes mellitus ([facility] )     Disease of thyroid gland     hypo pill given to decrease thyroid   Fibromyalgia     Fistula of knee     last assessed 9/12/2014    GERD (gastroesophageal reflux disease)     Hyperlipidemia     Hypertension     Medial meniscus tear     of left knee, last assessed 9/12/2014    Migraine     states she has a hx of HA that she calls [employer] but never was dx by neurologist    Obesity     Obesity     Osteoarthritis     Osteopenia     Pneumonia     last assessed 11/16/2016    Psychiatric disorder     anxiety    Rheumatoid arthritis ([facility] )     Rheumatoid arthritis ([facility] )     Sepsis ([facility] )     last assessed 11/10/2016    Tick bite     last assessed 10/30/2015    Vitamin B12 deficiency     Vitamin D deficiency        SURGICAL HISTORY  Past Surgical History:   Procedure Laterality Date    CATARACT EXTRACTION Bilateral     CHOLECYSTECTOMY      EYE SURGERY      Bilateral Cataracts    HYSTERECTOMY      IR THORACENTESIS  5/31/2019    JOINT REPLACEMENT      left knee    KNEE ARTHROSCOPY      NEUROPLASTY / TRANSPOSITION MEDIAN NERVE AT CARPAL TUNNEL      TUBAL LIGATION         FAMILY HISTORY  Family History   Problem Relation Age of Onset    Stroke Mother     Asthma Family     Cancer Family     Diabetes Family     Hypertension Family         CURRENT MEDICATIONS  No current facility-administered medications on file prior to encounter        Current Outpatient Medications on File Prior to Encounter   Medication Sig    acetaminophen (TYLENOL) 325 mg tablet Take 2 tablets (650 mg total) by mouth every 6 (six) hours as needed for mild pain, headaches or fever    albuterol (PROVENTIL HFA,VENTOLIN HFA) 90 mcg/act inhaler Inhale 2 puffs every 6 (six) hours as needed for wheezing    alendronate (FOSAMAX) 70 mg tablet Take 70 mg by mouth every 7 days    atorvastatin (LIPITOR) 20 mg tablet TAKE ONE TABLET BY MOUTH EVERY DAY    Blood Glucose Monitoring Suppl (ONE TOUCH ULTRA 2) w/Device KIT by Does not apply route 2 (two) times a day    cholecalciferol (VITAMIN D3) 1,000 units tablet Take 1 tablet (1,000 Units total) by mouth daily    DULoxetine (CYMBALTA) 20 mg capsule TAKE ONE CAPSULE BY MOUTH ONCE DAILY    ergocalciferol (VITAMIN D2) 50,000 units TAKE ONE CAPSULE BY MOUTH WEEKLY    fluticasone-salmeterol (ADVAIR) 250-50 mcg/dose Inhale 1 puff every 12 (twelve) hours     folic acid (FOLVITE) 1 mg tablet TAKE ONE TABLET BY MOUTH ONCE DAILY    furosemide (LASIX) 40 mg tablet TAKE ONE TABLET BY MOUTH EVERY DAY    Insulin Pen Needle 33G X 4 MM MISC by Does not apply route daily    ipratropium-albuterol (DUO-NEB) 0 5-2 5 mg/3 mL nebulizer solution Take 1 vial (3 mL total) by nebulization every 6 (six) hours as needed for wheezing or shortness of breath    JANUVIA 100 MG tablet TAKE 1 TABLET BY MOUTH DAILY   lactulose (CHRONULAC) 10 g/15 mL solution 1 to 2 tablespoons (15mL) daily if needed for constipation      levothyroxine 125 mcg tablet TAKE ONE TABLET BY MOUTH EVERY DAY IN THE AM    lisinopril (ZESTRIL) 40 mg tablet TAKE ONE TABLET BY MOUTH EVERY DAY    methotrexate 2 5 mg tablet TAKE 8 TABLETS BY MOUTH EVERY SUNDAY    mometasone (ELOCON) 0 1 % ointment Apply topically 2 (two) times a day as needed (itching)    nystatin (MYCOSTATIN) cream Apply topically 2 (two) times a day    nystatin (MYCOSTATIN) powder Apply topically 3 (three) times a day as needed (fungal skin rash)    ONE TOUCH ULTRA TEST test strip Tests twice a day    OneTouch Delica Lancets 48F MISC by Does not apply route 2 (two) times a day Test twice daily  DX E11 9    pantoprazole (PROTONIX) 40 mg tablet TAKE ONE TABLET BY MOUTH ONCE DAILY    Polyethylene Glycol 3350-GRX POWD Take by mouth daily at bedtime as needed (constipation)    tiotropium (SPIRIVA RESPIMAT) 2 5 MCG/ACT AERS inhaler Inhale 1 puff daily at bedtime    Tofacitinib Citrate (XELJANZ XR) 11 MG TB24 Take 1 tablet by mouth daily    traMADol (ULTRAM) 50 mg tablet Take 1 tablet (50 mg total) by mouth every 6 (six) hours as needed for moderate pain for up to 20 doses    TRESIBA FLEXTOUCH 100 units/mL injection pen Inject 30 Units under the skin daily    vitamin B-12 (VITAMIN B-12) 500 MCG tablet Take 1 tablet (500 mcg total) by mouth daily       ALLERGIES  No Known Allergies    SOCIAL HISTORY  Social History     Socioeconomic History    Marital status:       Spouse name: None    Number of children: None    Years of education: None    Highest education level: None   Occupational History    None   Social Needs    Financial resource strain: None    Food insecurity:     Worry: Never true     Inability: Never true    Transportation needs:     Medical: No     Non-medical: No   Tobacco Use    Smoking status: Former Smoker     Packs/day: 1 00     Years: 48 00     Pack years: 48 00     Types: Cigarettes, E-Cigarettes     Last attempt to quit: 2012     Years since quittin 6    Smokeless tobacco: Never Used    Tobacco comment: per allscripts - "current every day smoker, former smoker"   Substance and Sexual Activity    Alcohol use: Never     Alcohol/week: 0 0 standard drinks     Frequency: Never     Binge frequency: Never     Comment: stopped drinking alcohol    Drug use: Never    Sexual activity: Never   Lifestyle    Physical activity:     Days per week: None     Minutes per session: None    Stress: None   Relationships    Social connections:     Talks on phone: More than three times a week     Gets together: None     Attends Mu-ism service: None     Active member of club or organization: None     Attends meetings of clubs or organizations: None     Relationship status: None    Intimate partner violence:     Fear of current or ex partner: None     Emotionally abused: None     Physically abused: None     Forced sexual activity: None   Other Topics Concern    None   Social History Narrative    No living will       PHYSICAL EXAM    BP (!) 180/72 (BP Location: Left arm)   Pulse 90   Temp 98 8 °F (37 1 °C) (Temporal)   Resp 20   Ht 5' 5" (1 651 m)   Wt 117 kg (258 lb 2 5 oz)   SpO2 97%   BMI 42 96 kg/m²   Vital signs and nursing notes reviewed  CONSTITUTIONAL: female appearing stated age resting in bed, in no acute distress  HEENT: atraumatic, normocephalic  Sclera anicteric, conjunctiva are not injected  There is mild tenderness with palpation along the left temporal artery  Pupils 2 mm and reactive bilaterally  Extraocular eye movements are intact  There is a 2 beat horizontal nystagmus to the left  Moist oral mucosa  CARDIOVASCULAR/CHEST: RRR, no M/R/G  2+ radial pulses  PULMONARY: Breathing comfortably on RA  Breath sounds are equal and clear to auscultation  ABDOMEN: non-distended  BS present, normoactive  Non-tender  MSK: moves all extremities, no deformities, no peripheral edema  NEURO: Awake, alert, and oriented x 3  Pupils 2 mm and reactive  Extraocular movements are intact with a 2 beat horizontal nystagmus to the left, which extinguishes  Sensation to light touch is intact in cranial nerve 5 distributions bilaterally  Face is symmetric  Uvula elevates midline, shoulder shrug symmetric bilaterally, tongue protrudes midline without fasciculations  5/5 strength in hand  and ankles  No pronator drift in any of the extremities    SKIN:  There are erythematous patches with central clearing of varying size between 3 and 10 cm involving abdomen, right breast, as well as mid back which appear as target lesions  MENTAL STATUS: Normal affect  ? LABS AND TESTS    Results Reviewed     Procedure Component Value Units Date/Time    Sedimentation rate, automated [[id number]]  (Abnormal) Collected:  07/26/20 0938    Lab Status:  Final result Specimen:  Blood from Arm, Left Updated:  07/26/20 1330     Sed Rate 57 mm/hour     Urine Microscopic [[id number]]  (Abnormal) Collected:  07/26/20 1141    Lab Status:  Final result Specimen:  Urine, Clean Catch Updated:  07/26/20 1203     RBC, UA 1-2 /hpf      WBC, UA Innumerable /hpf      Epithelial Cells Moderate /hpf      Bacteria, UA Moderate /hpf     Urine culture [[id number]] Collected:  07/26/20 1141    Lab Status:   In process Specimen:  Urine, Clean Catch Updated:  07/26/20 1203    UA w Reflex to Microscopic w Reflex to Culture [[id number]]  (Abnormal) Collected:  07/26/20 1141    Lab Status:  Final result Specimen:  Urine, Clean Catch Updated:  07/26/20 1154     Color, UA Yellow     Clarity, UA Cloudy     Specific Gravity, UA <=1 005     pH, UA 6 0     Leukocytes, UA Moderate     Nitrite, UA Negative     Protein, UA Negative mg/dl      Glucose, UA Negative mg/dl      Ketones, UA Negative mg/dl      Urobilinogen, UA 1 0 E U /dl      Bilirubin, UA Negative     Blood, UA Trace-Intact    Basic metabolic panel [[id number]]  (Abnormal) Collected:  07/26/20 0938    Lab Status:  Final result Specimen:  Blood from Arm, Left Updated:  07/26/20 0953     Sodium 133 mmol/L      Potassium 3 7 mmol/L      Chloride 96 mmol/L      CO2 31 mmol/L      ANION GAP 6 mmol/L      BUN 11 mg/dL      Creatinine 0 85 mg/dL      Glucose 140 mg/dL      Calcium 9 2 mg/dL      eGFR 69 ml/min/1 73sq m     Narrative:       [place] guidelines for Chronic Kidney Disease (CKD):     Stage 1 with normal or high GFR (GFR > 90 mL/min/1 73 square meters)    Stage 2 Mild CKD (GFR = 60-89 mL/min/1 73 square meters)    Stage 3A Moderate CKD (GFR = 45-59 mL/min/1 73 square meters)    Stage 3B Moderate CKD (GFR = 30-44 mL/min/1 73 square meters)    Stage 4 Severe CKD (GFR = 15-29 mL/min/1 73 square meters)    Stage 5 End Stage CKD (GFR <15 mL/min/1 73 square meters)  Note: GFR calculation is accurate only with a steady state creatinine    C-reactive protein [[id number]]  (Abnormal) Collected:  07/26/20 0938    Lab Status:  Final result Specimen:  Blood from Arm, Left Updated:  07/26/20 0953     CRP 25 1 mg/L     Protime-INR [[id number]]  (Normal) Collected:  07/26/20 0938    Lab Status:  Final result Specimen:  Blood from Arm, Left Updated:  07/26/20 0952     Protime 13 4 seconds      INR 1 02    APTT [[id number]]  (Normal) Collected:  07/26/20 0938    Lab Status:  Final result Specimen:  Blood from Arm, Left Updated:  07/26/20 0952     PTT 27 seconds     CBC and differential [[id number]]  (Abnormal) Collected:  07/26/20 0938    Lab Status:  Final result Specimen:  Blood from Arm, Left Updated:  07/26/20 0943     WBC 6 04 Thousand/uL      RBC 3 64 Million/uL      Hemoglobin 11 5 g/dL      Hematocrit 35 9 %      MCV 99 fL      MCH 31 6 pg      MCHC 32 0 g/dL      RDW 15 0 %      MPV 9 2 fL      Platelets 926 Thousands/uL      nRBC 0 /100 WBCs      Neutrophils Relative 65 %      Immat GRANS % 1 %      Lymphocytes Relative 21 %      Monocytes Relative 10 %      Eosinophils Relative 2 %      Basophils Relative 1 %      Neutrophils Absolute 4 00 Thousands/µL      Immature Grans Absolute 0 04 Thousand/uL      Lymphocytes Absolute 1 28 Thousands/µL      Monocytes Absolute 0 59 Thousand/µL      Eosinophils Absolute 0 10 Thousand/µL      Basophils Absolute 0 03 Thousands/µL     Lyme Antibody Profile with reflex to [facility] [[id number]] Collected:  07/26/20 0938    Lab Status:   In process Specimen:  Blood from Arm, Left Updated:  07/26/20 0941          CTA head and neck with and without contrast   Final Result by Chente Buckner MD (07/26 1216)      No acute intracranial disease  Progression of chronic small vessel disease since prior exam 1/13/2017  Relatively stable CTA appearance of the head and neck, no large vessel flow restrictive disease  This is not a sensitive modality to evaluate for temporal arteritis  Workstation performed: [id number]             ED COURSE & MEDICAL DECISION MAKING       Procedures    Medications   sodium chloride 0 9 % bolus 1,000 mL (0 mL Intravenous Stopped 7/26/20 1416)   ketorolac (TORADOL) injection 15 mg (15 mg Intravenous Given 7/26/20 0945)   metoclopramide (REGLAN) injection 10 mg (10 mg Intravenous Given 7/26/20 0948)   diphenhydrAMINE (BENADRYL) injection 25 mg (25 mg Intravenous Given 7/26/20 0947)   iohexol (OMNIPAQUE) 350 MG/ML injection (SINGLE-DOSE) 100 mL (100 mL Intravenous Given 7/26/20 1146)   metoclopramide (REGLAN) injection 10 mg (10 mg Intravenous Given 7/26/20 1253)   magnesium sulfate 2 g/50 mL IVPB (premix) 2 g (0 g Intravenous Stopped 7/26/20 1416)   valproate (DEPACON) 1,000 mg in sodium chloride 0 9 % 50 mL IVPB (0 mg Intravenous Stopped 7/26/20 1416)   dexamethasone (PF) (DECADRON) injection 10 mg (10 mg Intravenous Given 7/26/20 155)     64-year-old female presenting with left-sided pounding headache, as well as target lesions to abdomen and back  Vital signs reviewed, afebrile, hypertensive, within normal limits otherwise  Differential diagnosis is broad, including migraine headache, another type of primary headache, temporal arteritis/giant cell arteritis, expanding aneurysm versus aneurysmal sentinel bleed, Lyme disease, erythema marginatum versus another etiology of symptoms  We will obtain labs including inflammatory markers ESR and CRP, test for Lyme disease, as well as obtain angiography of cerebral vessels to rule out evidence of arteritis or aneurysm    We will treat patient's headache with a migraine cocktail: reglan, benadryl, IV fluids and toradol  CT angiography head/neck reveals no aneurysms, no acute abnormality  Labs reveal BMP with mild hyponatremia, CBC without leukocytosis or anemia or thrombocytopenia  Sed rate and CRP are both elevated above patient's baseline with Sed rate of 57 and CRP of 25 1 This could be secondary to Lyme disease, due to temporal arteritis, or due to patient's underlying rheumatoid arthritis, although patient denies an obvious flare at present  On re-evaluation, headache persists  Magnesium 2 gm, valproate 1000 mg, and decadron 10 mg ordered  On re-evaluation, headache is significantly improved, down to 2/10  Unfortunately, patient's etiology of symptoms is undifferentiated  Since she is a diabetic, empiric steroids for possible temporal arteritis are not without risk  I requested that patient call her rheumatologist first thing in the morning for further evaluation  If Lyme returns as positive, will start patient on a course of doxycycline  Of note, patient did ask to have her urine checked as she is due to have UA and urine microalbumin/creatinine checked by her PCP  These labs were obtained but were not part of patient's evaluation today  She denies obvious UTI symptoms at present, reports that when she had UTI in early July, she had a strong odor of urine and some redness         MDM  Number of Diagnoses or Management Options  Headache: new and requires workup  Rash: new and requires workup     Amount and/or Complexity of Data Reviewed  Clinical lab tests: ordered and reviewed  Tests in the radiology section of CPT®: ordered and reviewed  Decide to obtain previous medical records or to obtain history from someone other than the patient: yes  Obtain history from someone other than the patient: yes (Daughter)  Review and summarize past medical records: yes    Risk of Complications, Morbidity, and/or Mortality  Presenting problems: high  Diagnostic procedures: high  Management options: moderate    Patient Progress  Patient progress: improved      CLINICAL IMPRESSION  Final diagnoses:   Headache   Rash       DISPOSITION  Time reflects when diagnosis was documented in both MDM as applicable and the Disposition within this note     Time User Action Codes Description Comment    7/26/2020  3:03 PM Lesly Hammed Add [R51] Headache     7/26/2020  3:04 PM Lesly Hammed Add [R21] Rash       ED Disposition     ED Disposition Condition Date/Time Comment    Discharge Stable Sun Jul 26, 2020  3:03 PM Beatrice Jo discharge to home/self care  Follow-up Information     Follow up With Specialties Details Why Contact Info    Your Rheumatologist  Schedule an appointment as soon as possible for a visit in 1 day Emergency Room Follow-up Call on Monday for follow up    Melvin Guzman MD [facility] Schedule an appointment as soon as possible for a visit in 3 days Emergency Room Follow-up [address] [place] [address]  No new medications      This note has been generated using a voice recognition software  There may be typographic, grammatic, or word substitution errors that have escaped editorial review       Yojana Engel MD  07/27/20 3096

## 2020-07-27 LAB — BACTERIA UR CULT: NORMAL

## 2020-07-28 ENCOUNTER — HOSPITAL ENCOUNTER (EMERGENCY)
Facility: HOSPITAL | Age: 70
Discharge: HOME | End: 2020-07-28
Attending: EMERGENCY MEDICINE | Admitting: EMERGENCY MEDICINE
Payer: COMMERCIAL

## 2020-07-28 VITALS
OXYGEN SATURATION: 99 % | TEMPERATURE: 98.8 F | DIASTOLIC BLOOD PRESSURE: 71 MMHG | WEIGHT: 258 LBS | HEART RATE: 78 BPM | SYSTOLIC BLOOD PRESSURE: 167 MMHG | BODY MASS INDEX: 42.93 KG/M2 | RESPIRATION RATE: 18 BRPM

## 2020-07-28 DIAGNOSIS — G43.009 MIGRAINE WITHOUT AURA AND WITHOUT STATUS MIGRAINOSUS, NOT INTRACTABLE: Primary | ICD-10-CM

## 2020-07-28 LAB
ANION GAP SERPL CALCULATED.3IONS-SCNC: 4 MMOL/L (ref 4–13)
BASOPHILS # BLD AUTO: 0.04 THOUSANDS/ΜL (ref 0–0.1)
BASOPHILS NFR BLD AUTO: 1 % (ref 0–1)
BUN SERPL-MCNC: 17 MG/DL (ref 5–25)
CALCIUM SERPL-MCNC: 9.2 MG/DL (ref 8.3–10.1)
CHLORIDE SERPL-SCNC: 99 MMOL/L (ref 100–108)
CO2 SERPL-SCNC: 31 MMOL/L (ref 21–32)
CREAT SERPL-MCNC: 0.6 MG/DL (ref 0.6–1.3)
EOSINOPHIL # BLD AUTO: 0.16 THOUSAND/ΜL (ref 0–0.61)
EOSINOPHIL NFR BLD AUTO: 3 % (ref 0–6)
ERYTHROCYTE [DISTWIDTH] IN BLOOD BY AUTOMATED COUNT: 14.9 % (ref 11.6–15.1)
GFR SERPL CREATININE-BSD FRML MDRD: 91 ML/MIN/1.73SQ M
GLUCOSE SERPL-MCNC: 147 MG/DL (ref 65–140)
HCT VFR BLD AUTO: 37 % (ref 34.8–46.1)
HGB BLD-MCNC: 11.9 G/DL (ref 11.5–15.4)
IMM GRANULOCYTES # BLD AUTO: 0.02 THOUSAND/UL (ref 0–0.2)
IMM GRANULOCYTES NFR BLD AUTO: 0 % (ref 0–2)
LYMPHOCYTES # BLD AUTO: 1.61 THOUSANDS/ΜL (ref 0.6–4.47)
LYMPHOCYTES NFR BLD AUTO: 28 % (ref 14–44)
MCH RBC QN AUTO: 31.4 PG (ref 26.8–34.3)
MCHC RBC AUTO-ENTMCNC: 32.2 G/DL (ref 31.4–37.4)
MCV RBC AUTO: 98 FL (ref 82–98)
MONOCYTES # BLD AUTO: 0.55 THOUSAND/ΜL (ref 0.17–1.22)
MONOCYTES NFR BLD AUTO: 10 % (ref 4–12)
NEUTROPHILS # BLD AUTO: 3.43 THOUSANDS/ΜL (ref 1.85–7.62)
NEUTS SEG NFR BLD AUTO: 58 % (ref 43–75)
NRBC BLD AUTO-RTO: 0 /100 WBCS
PLATELET # BLD AUTO: 378 THOUSANDS/UL (ref 149–390)
PMV BLD AUTO: 10 FL (ref 8.9–12.7)
POTASSIUM SERPL-SCNC: 4.2 MMOL/L (ref 3.5–5.3)
RBC # BLD AUTO: 3.79 MILLION/UL (ref 3.81–5.12)
SODIUM SERPL-SCNC: 134 MMOL/L (ref 136–145)
WBC # BLD AUTO: 5.81 THOUSAND/UL (ref 4.31–10.16)

## 2020-07-28 PROCEDURE — 99284 EMERGENCY DEPT VISIT MOD MDM: CPT | Performed by: EMERGENCY MEDICINE

## 2020-07-28 PROCEDURE — 85025 COMPLETE CBC W/AUTO DIFF WBC: CPT | Performed by: EMERGENCY MEDICINE

## 2020-07-28 PROCEDURE — 99283 EMERGENCY DEPT VISIT LOW MDM: CPT

## 2020-07-28 PROCEDURE — 96365 THER/PROPH/DIAG IV INF INIT: CPT

## 2020-07-28 PROCEDURE — 96367 TX/PROPH/DG ADDL SEQ IV INF: CPT

## 2020-07-28 PROCEDURE — 96375 TX/PRO/DX INJ NEW DRUG ADDON: CPT

## 2020-07-28 PROCEDURE — 80048 BASIC METABOLIC PNL TOTAL CA: CPT | Performed by: EMERGENCY MEDICINE

## 2020-07-28 PROCEDURE — 36415 COLL VENOUS BLD VENIPUNCTURE: CPT | Performed by: EMERGENCY MEDICINE

## 2020-07-28 RX ORDER — BUTALBITAL, ACETAMINOPHEN AND CAFFEINE 50; 325; 40 MG/1; MG/1; MG/1
1 TABLET ORAL ONCE
Status: COMPLETED | OUTPATIENT
Start: 2020-07-28 | End: 2020-07-28

## 2020-07-28 RX ORDER — KETOROLAC TROMETHAMINE 30 MG/ML
15 INJECTION, SOLUTION INTRAMUSCULAR; INTRAVENOUS ONCE
Status: COMPLETED | OUTPATIENT
Start: 2020-07-28 | End: 2020-07-28

## 2020-07-28 RX ORDER — METOCLOPRAMIDE 10 MG/1
10 TABLET ORAL EVERY 6 HOURS PRN
Qty: 30 TABLET | Refills: 0 | Status: SHIPPED | OUTPATIENT
Start: 2020-07-28 | End: 2020-08-27

## 2020-07-28 RX ORDER — DOXYCYCLINE HYCLATE 100 MG/1
100 CAPSULE ORAL ONCE
Status: COMPLETED | OUTPATIENT
Start: 2020-07-28 | End: 2020-07-28

## 2020-07-28 RX ORDER — MAGNESIUM SULFATE HEPTAHYDRATE 40 MG/ML
2 INJECTION, SOLUTION INTRAVENOUS ONCE
Status: COMPLETED | OUTPATIENT
Start: 2020-07-28 | End: 2020-07-28

## 2020-07-28 RX ORDER — DIPHENHYDRAMINE HYDROCHLORIDE 50 MG/ML
25 INJECTION INTRAMUSCULAR; INTRAVENOUS ONCE
Status: COMPLETED | OUTPATIENT
Start: 2020-07-28 | End: 2020-07-28

## 2020-07-28 RX ORDER — BUTALBITAL, ACETAMINOPHEN AND CAFFEINE 50; 325; 40 MG/1; MG/1; MG/1
1 TABLET ORAL EVERY 4 HOURS PRN
Qty: 30 TABLET | Refills: 0 | Status: SHIPPED | OUTPATIENT
Start: 2020-07-28 | End: 2021-06-21

## 2020-07-28 RX ORDER — DEXAMETHASONE SODIUM PHOSPHATE 10 MG/ML
10 INJECTION, SOLUTION INTRAMUSCULAR; INTRAVENOUS ONCE
Status: COMPLETED | OUTPATIENT
Start: 2020-07-28 | End: 2020-07-28

## 2020-07-28 RX ORDER — ACETAMINOPHEN 325 MG/1
650 TABLET ORAL ONCE
Status: COMPLETED | OUTPATIENT
Start: 2020-07-28 | End: 2020-07-28

## 2020-07-28 RX ORDER — DOXYCYCLINE HYCLATE 100 MG/1
100 CAPSULE ORAL 2 TIMES DAILY
Qty: 28 CAPSULE | Refills: 0 | Status: SHIPPED | OUTPATIENT
Start: 2020-07-28 | End: 2020-08-11

## 2020-07-28 RX ORDER — METOCLOPRAMIDE HYDROCHLORIDE 5 MG/ML
10 INJECTION INTRAMUSCULAR; INTRAVENOUS ONCE
Status: COMPLETED | OUTPATIENT
Start: 2020-07-28 | End: 2020-07-28

## 2020-07-28 RX ADMIN — ACETAMINOPHEN 650 MG: 325 TABLET, FILM COATED ORAL at 15:05

## 2020-07-28 RX ADMIN — METOCLOPRAMIDE HYDROCHLORIDE 10 MG: 5 INJECTION INTRAMUSCULAR; INTRAVENOUS at 15:08

## 2020-07-28 RX ADMIN — VALPROATE SODIUM 1000 MG: 100 INJECTION, SOLUTION INTRAVENOUS at 16:13

## 2020-07-28 RX ADMIN — DOXYCYCLINE HYCLATE 100 MG: 100 CAPSULE ORAL at 15:24

## 2020-07-28 RX ADMIN — DEXAMETHASONE SODIUM PHOSPHATE 10 MG: 10 INJECTION, SOLUTION INTRAMUSCULAR; INTRAVENOUS at 15:10

## 2020-07-28 RX ADMIN — DIPHENHYDRAMINE HYDROCHLORIDE 25 MG: 50 INJECTION, SOLUTION INTRAMUSCULAR; INTRAVENOUS at 15:07

## 2020-07-28 RX ADMIN — MAGNESIUM SULFATE HEPTAHYDRATE 2 G: 40 INJECTION, SOLUTION INTRAVENOUS at 15:16

## 2020-07-28 RX ADMIN — BUTALBITAL, ACETAMINOPHEN, AND CAFFEINE 1 TABLET: 50; 325; 40 TABLET ORAL at 15:05

## 2020-07-28 RX ADMIN — KETOROLAC TROMETHAMINE 15 MG: 30 INJECTION, SOLUTION INTRAMUSCULAR at 15:11

## 2020-07-28 NOTE — ED NOTES
Lights in room dimmed and patient provided with ice pack per her request  Patients daughter is present at bedside        Ese Castellanos RN  07/28/20 2950

## 2020-07-28 NOTE — ED RE-EVALUATION NOTE
5:08 PM - Received sign out from Dr Nicholas Saint concerning pt w/ a history of HTN, HLD, fibromyalgia, migraines in the past, who started with a left sided headache 2 days ago, "throbbing," similar to her prior migraines except "this one doesn't want to go away," mild to moderate intensity, gradual onset, worse w/ lights and noises, improved lying in a dark room, currently present  Pt was seen on 7/26/2020 here in the ED and had a full workup including CTA head that was negative for any acute process  She had gone home but now returns due to worsening again of her headache  Pt o/w denies any symptoms  Pt denies fevers, cough, cp, sob, n/v/d/c, abd pain, dysuria, focal def or syncope  Currently the patient is improved with the migraine cocktail that she received  Will re-evaluate the patient once the cocktail is complete   5:26 PM - The patient's symptoms are completely resolved  She will f/up w/ her pcp and return immediately for any worsening       Lou Christianson DO  08/06/20 5924

## 2020-07-28 NOTE — ED NOTES
Patient resting in bed until her daughter is here to pick her up        Kb Castellanos RN  07/28/20 2157

## 2020-07-28 NOTE — ED NOTES
Patient ambulating independently to the bathroom  She reports that her headache is completely gone  Dr Toney Zayas made aware        Artis Castellanos RN  07/28/20 9773

## 2020-07-28 NOTE — ED NOTES
Patient took her daily medications that she brought in with her  Dr Bruce [employer]olla [employer], RN  07/28/20 3606

## 2020-07-28 NOTE — ED PROVIDER NOTES
History  Chief Complaint   Patient presents with    Headache     Patient was seen two days ago for a headache, patient was to be admitted however wanted to go home to try to get rid of her headahce on her own  Patient was unable to relieve headache and presents with the same symptom  C/O headache on left side      This is a 78-year-old female with history of diabetes, hypertension, hyperlipidemia, fibromyalgia, migraine who presents with a left-sided headache  Over the past week, the patient has been experiencing a left-sided headache described as sharp/throbbing, constant, waxing and waning in intensity, without any associated symptoms  Pain is made worse by watching television  She has been taking Tylenol without relief  She has experienced similar headaches in the past, but never this bad  The patient was seen in the emergency department for this headache 2 days ago  She was given an extensive regimen of medications for headache  Her headache did improve but did not resolve  She was told to come back if her headache worsened  Her headache continued to worsen so she came back to the emergency department  Of note, the patient did notice a rash on her chest and abdomen a few days ago  Daughter does report pulling a tick off of her left ear approximately 1 month ago  She did not receive prophylaxis at this time  Lyme antibody profile was drawn on her ER visit 2 days ago  Treatment was deferred until results of the Lyme antibody came back  Denies fever/chills, nausea/vomiting, lightheadedness/dizziness, numbness/weakness, change in vision, URI symptoms, neck pain, chest pain, palpitations, shortness of breath, cough, back pain, flank pain, abdominal pain, diarrhea, hematochezia, melena, dysuria, hematuria, abnormal vaginal discharge/bleeding  Prior to Admission Medications   Prescriptions Last Dose Informant Patient Reported? Taking?    Blood Glucose Monitoring Suppl (ONE TOUCH ULTRA 2) w/Device KIT  Self No No   Sig: by Does not apply route 2 (two) times a day   DULoxetine (CYMBALTA) 20 mg capsule 7/28/2020 at Unknown time  No Yes   Sig: TAKE ONE CAPSULE BY MOUTH ONCE DAILY   Insulin Pen Needle 33G X 4 MM MISC 7/28/2020 at Unknown time Self No Yes   Sig: by Does not apply route daily   JANUVIA 100 MG tablet 7/28/2020 at Unknown time  No Yes   Sig: TAKE 1 TABLET BY MOUTH DAILY  ONE TOUCH ULTRA TEST test strip  Self No No   Sig: Tests twice a day   OneTouch Delica Lancets 99B MISC   No No   Sig: by Does not apply route 2 (two) times a day Test twice daily   DX E11 9   Polyethylene Glycol 3350-GRX POWD Unknown at Unknown time  No No   Sig: Take by mouth daily at bedtime as needed (constipation)   TRESIBA FLEXTOUCH 100 units/mL injection pen 7/28/2020 at Unknown time  No Yes   Sig: Inject 30 Units under the skin daily   Tofacitinib Citrate (XELJANZ XR) 11 MG TB24 7/28/2020 at Unknown time Self Yes Yes   Sig: Take 1 tablet by mouth daily   acetaminophen (TYLENOL) 325 mg tablet 7/28/2020 at Unknown time  No Yes   Sig: Take 2 tablets (650 mg total) by mouth every 6 (six) hours as needed for mild pain, headaches or fever   albuterol (PROVENTIL HFA,VENTOLIN HFA) 90 mcg/act inhaler Unknown at Unknown time Self No No   Sig: Inhale 2 puffs every 6 (six) hours as needed for wheezing   alendronate (FOSAMAX) 70 mg tablet 7/28/2020 at Unknown time Self Yes Yes   Sig: Take 70 mg by mouth every 7 days   atorvastatin (LIPITOR) 20 mg tablet 7/28/2020 at Unknown time  No Yes   Sig: TAKE ONE TABLET BY MOUTH EVERY DAY   cholecalciferol (VITAMIN D3) 1,000 units tablet 7/28/2020 at Unknown time  No Yes   Sig: Take 1 tablet (1,000 Units total) by mouth daily   ergocalciferol (VITAMIN D2) 50,000 units 7/28/2020 at Unknown time  No Yes   Sig: TAKE ONE CAPSULE BY MOUTH WEEKLY   fluticasone-salmeterol (ADVAIR) 250-50 mcg/dose 7/28/2020 at Unknown time Self Yes Yes   Sig: Inhale 1 puff every 12 (twelve) hours    folic acid (FOLVITE) 1 mg tablet 2020 at Unknown time  No Yes   Sig: TAKE ONE TABLET BY MOUTH ONCE DAILY   furosemide (LASIX) 40 mg tablet 2020 at Unknown time  No Yes   Sig: TAKE ONE TABLET BY MOUTH EVERY DAY   Patient taking differently: daily as needed    ipratropium-albuterol (DUO-NEB) 0 5-2 5 mg/3 mL nebulizer solution Unknown at Unknown time  No No   Sig: Take 1 vial (3 mL total) by nebulization every 6 (six) hours as needed for wheezing or shortness of breath   lactulose (CHRONULAC) 10 g/15 mL solution Unknown at Unknown time  No No   Si to 2 tablespoons (15mL) daily if needed for constipation     levothyroxine 125 mcg tablet 2020 at Unknown time  No Yes   Sig: TAKE ONE TABLET BY MOUTH EVERY DAY IN THE AM   lisinopril (ZESTRIL) 40 mg tablet 2020 at Unknown time Self No Yes   Sig: TAKE ONE TABLET BY MOUTH EVERY DAY   methotrexate 2 5 mg tablet 2020  No No   Sig: TAKE 8 TABLETS BY MOUTH EVERY    mometasone (ELOCON) 0 1 % ointment Unknown at Unknown time Self No No   Sig: Apply topically 2 (two) times a day as needed (itching)   nystatin (MYCOSTATIN) cream Unknown at Unknown time  No No   Sig: Apply topically 2 (two) times a day   nystatin (MYCOSTATIN) powder Unknown at Unknown time  No No   Sig: Apply topically 3 (three) times a day as needed (fungal skin rash)   pantoprazole (PROTONIX) 40 mg tablet 2020 at Unknown time  No Yes   Sig: TAKE ONE TABLET BY MOUTH ONCE DAILY   tiotropium (SPIRIVA RESPIMAT) 2 5 MCG/ACT AERS inhaler 2020 at Unknown time Self No Yes   Sig: Inhale 1 puff daily at bedtime   traMADol (ULTRAM) 50 mg tablet Unknown at Unknown time  No No   Sig: Take 1 tablet (50 mg total) by mouth every 6 (six) hours as needed for moderate pain for up to 20 doses   vitamin B-12 (VITAMIN B-12) 500 MCG tablet 2020 at Unknown time  No Yes   Sig: Take 1 tablet (500 mcg total) by mouth daily      Facility-Administered Medications: None       Past Medical History:   Diagnosis Date    Arthritis     Arthritis     Cancer ([facility] )     Candidiasis of skin     Cervical cancer ([facility] )     Chronic respiratory failure with hypoxia and hypercapnia ([facility] ) 4/20/2019    COPD (chronic obstructive pulmonary disease) ([facility] )     last assessed 11/21/2016    Current chronic use of systemic steroids     Degenerative arthritis of left knee     last assessed 1/20/2015    Diabetes mellitus ([facility] )     Disease of thyroid gland     hypo pill given to decrease thyroid   Fibromyalgia     Fistula of knee     last assessed 9/12/2014    GERD (gastroesophageal reflux disease)     Hyperlipidemia     Hypertension     Medial meniscus tear     of left knee, last assessed 9/12/2014    Migraine     states she has a hx of HA that she calls [employer] but never was dx by neurologist    Obesity     Obesity     Osteoarthritis     Osteopenia     Pneumonia     last assessed 11/16/2016    Psychiatric disorder     anxiety    Rheumatoid arthritis ([facility] )     Rheumatoid arthritis ([facility] )     Sepsis ([facility] )     last assessed 11/10/2016    Tick bite     last assessed 10/30/2015    Vitamin B12 deficiency     Vitamin D deficiency        Past Surgical History:   Procedure Laterality Date    CATARACT EXTRACTION Bilateral     CHOLECYSTECTOMY      EYE SURGERY      Bilateral Cataracts    HYSTERECTOMY      IR THORACENTESIS  5/31/2019    JOINT REPLACEMENT      left knee    KNEE ARTHROSCOPY      NEUROPLASTY / TRANSPOSITION MEDIAN NERVE AT CARPAL TUNNEL      TUBAL LIGATION         Family History   Problem Relation Age of Onset    Stroke Mother     Asthma Family     Cancer Family     Diabetes Family     Hypertension Family      I have reviewed and agree with the history as documented      E-Cigarette/Vaping    E-Cigarette Use Former User      E-Cigarette/Vaping Substances     Social History     Tobacco Use    Smoking status: Former Smoker     Packs/day: 1 00     Years: 48 00     Pack years: 48 00     Types: Cigarettes, E-Cigarettes     Last attempt to quit: 2012     Years since quittin 6    Smokeless tobacco: Never Used    Tobacco comment: per allscripts - "current every day smoker, former smoker"   Substance Use Topics    Alcohol use: Never     Alcohol/week: 0 0 standard drinks     Frequency: Never     Binge frequency: Never     Comment: stopped drinking alcohol    Drug use: Never       Review of Systems   Constitutional: Negative for chills and fever  HENT: Negative for rhinorrhea, sore throat and trouble swallowing  Eyes: Negative for photophobia and visual disturbance  Respiratory: Negative for cough, chest tightness and shortness of breath  Cardiovascular: Negative for chest pain, palpitations and leg swelling  Gastrointestinal: Negative for abdominal pain, blood in stool, diarrhea, nausea and vomiting  Endocrine: Negative for polyuria  Genitourinary: Negative for dysuria, flank pain, hematuria, vaginal bleeding and vaginal discharge  Musculoskeletal: Negative for back pain and neck pain  Skin: Positive for rash  Negative for color change  Allergic/Immunologic: Negative for immunocompromised state  Neurological: Positive for headaches  Negative for dizziness, weakness, light-headedness and numbness  All other systems reviewed and are negative  Physical Exam  Physical Exam   Constitutional: She is oriented to person, place, and time  Vital signs are normal  She appears well-developed and well-nourished  She is cooperative  Non-toxic appearance  She does not have a sickly appearance  She does not appear ill  No distress  HENT:   Head: Normocephalic and atraumatic  Right Ear: Hearing, tympanic membrane, external ear and ear canal normal    Left Ear: Hearing, tympanic membrane, external ear and ear canal normal    Nose: Nose normal    Mouth/Throat: Uvula is midline, oropharynx is clear and moist and mucous membranes are normal  No tonsillar exudate     No rash or ulceration inside of left ear  No rash throughout scalp or face  No tenderness over left temporal area  No skin sensitivity over left side of face or head  Eyes: Pupils are equal, round, and reactive to light  Conjunctivae, EOM and lids are normal    Fundoscopic exam:       The right eye shows no AV nicking and no papilledema  The left eye shows no AV nicking and no papilledema  Neck: Trachea normal, normal range of motion and full passive range of motion without pain  Neck supple  No Brudzinski's sign and no Kernig's sign noted  No thyroid mass present  Cardiovascular: Normal rate, regular rhythm, normal heart sounds and normal pulses  No murmur heard  Pulmonary/Chest: Effort normal and breath sounds normal    Abdominal: Soft  Normal appearance and bowel sounds are normal  There is no tenderness  There is no rigidity, no rebound, no guarding and no CVA tenderness  Musculoskeletal:   Multiple areas of erythema migrans over torso  Neurological: She is alert and oriented to person, place, and time  She has normal strength  No cranial nerve deficit or sensory deficit  She displays a negative Romberg sign  Coordination and gait normal  GCS eye subscore is 4  GCS verbal subscore is 5  GCS motor subscore is 6  Cranial nerves 2-12 intact, strength 5/5 throughout, sensation intact throughout  Visual fields normal  Normal finger-to-nose and heel-to-shin  Normal gait  Psychiatric: She has a normal mood and affect   Her speech is normal and behavior is normal  Thought content normal        Vital Signs  ED Triage Vitals   Temperature Pulse Respirations Blood Pressure SpO2   07/28/20 1444 07/28/20 1444 07/28/20 1444 07/28/20 1444 07/28/20 1444   98 8 °F (37 1 °C) 96 20 168/76 95 %      Temp Source Heart Rate Source Patient Position - Orthostatic VS BP Location FiO2 (%)   07/28/20 1444 07/28/20 1444 07/28/20 1444 07/28/20 1444 --   Temporal Monitor Sitting Left arm       Pain Score       07/28/20 1454 5           Vitals:    07/28/20 1530 07/28/20 1600 07/28/20 1630 07/28/20 1712   BP: (!) 171/82 166/78 167/87 167/71   Pulse: 72 77 69 78   Patient Position - Orthostatic VS: Sitting Lying Lying Sitting         Visual Acuity  Visual Acuity      Most Recent Value   L Pupil Size (mm)  3   R Pupil Size (mm)  3          ED Medications  Medications   diphenhydrAMINE (BENADRYL) injection 25 mg (25 mg Intravenous Given 7/28/20 1507)   metoclopramide (REGLAN) injection 10 mg (10 mg Intravenous Given 7/28/20 1508)   ketorolac (TORADOL) injection 15 mg (15 mg Intravenous Given 7/28/20 1511)   magnesium sulfate 2 g/50 mL IVPB (premix) 2 g (0 g Intravenous Stopped 7/28/20 1613)   dexamethasone (PF) (DECADRON) injection 10 mg (10 mg Intravenous Given 7/28/20 1510)   valproate (DEPACON) 1,000 mg in sodium chloride 0 9 % 50 mL for headache (0 g Intravenous Stopped 7/28/20 1635)   acetaminophen (TYLENOL) tablet 650 mg (650 mg Oral Given 7/28/20 1505)   butalbital-acetaminophen-caffeine (FIORICET,ESGIC) -40 mg per tablet 1 tablet (1 tablet Oral Given 7/28/20 1505)   doxycycline hyclate (VIBRAMYCIN) capsule 100 mg (100 mg Oral Given 7/28/20 1524)       Diagnostic Studies  Results Reviewed     Procedure Component Value Units Date/Time    Basic metabolic panel [[id number]]  (Abnormal) Collected:  07/28/20 1452    Lab Status:  Final result Specimen:  Blood from Arm, Left Updated:  07/28/20 1536     Sodium 134 mmol/L      Potassium 4 2 mmol/L      Chloride 99 mmol/L      CO2 31 mmol/L      ANION GAP 4 mmol/L      BUN 17 mg/dL      Creatinine 0 60 mg/dL      Glucose 147 mg/dL      Calcium 9 2 mg/dL      eGFR 91 ml/min/1 73sq m     Narrative:       Sariah guidelines for Chronic Kidney Disease (CKD):     Stage 1 with normal or high GFR (GFR > 90 mL/min/1 73 square meters)    Stage 2 Mild CKD (GFR = 60-89 mL/min/1 73 square meters)    Stage 3A Moderate CKD (GFR = 45-59 mL/min/1 73 square meters)   Stage 3B Moderate CKD (GFR = 30-44 mL/min/1 73 square meters)    Stage 4 Severe CKD (GFR = 15-29 mL/min/1 73 square meters)    Stage 5 End Stage CKD (GFR <15 mL/min/1 73 square meters)  Note: GFR calculation is accurate only with a steady state creatinine    CBC and differential [[id number]]  (Abnormal) Collected:  07/28/20 1452    Lab Status:  Final result Specimen:  Blood from Arm, Left Updated:  07/28/20 1537     WBC 5 81 Thousand/uL      RBC 3 79 Million/uL      Hemoglobin 11 9 g/dL      Hematocrit 37 0 %      MCV 98 fL      MCH 31 4 pg      MCHC 32 2 g/dL      RDW 14 9 %      MPV 10 0 fL      Platelets 085 Thousands/uL      nRBC 0 /100 WBCs      Neutrophils Relative 58 %      Immat GRANS % 0 %      Lymphocytes Relative 28 %      Monocytes Relative 10 %      Eosinophils Relative 3 %      Basophils Relative 1 %      Neutrophils Absolute 3 43 Thousands/µL      Immature Grans Absolute 0 02 Thousand/uL      Lymphocytes Absolute 1 61 Thousands/µL      Monocytes Absolute 0 55 Thousand/µL      Eosinophils Absolute 0 16 Thousand/µL      Basophils Absolute 0 04 Thousands/µL                  No orders to display              Procedures  Procedures         ED Course       US AUDIT      Most Recent Value   Initial Alcohol Screen: US AUDIT-C    1  How often do you have a drink containing alcohol?  0 Filed at: 07/28/2020 1453   2  How many drinks containing alcohol do you have on a typical day you are drinking? 0 Filed at: 07/28/2020 1453   3a  Male UNDER 65: How often do you have five or more drinks on one occasion? 0 Filed at: 07/28/2020 1453   3b  FEMALE Any Age, or MALE 65+: How often do you have 4 or more drinks on one occassion? 0 Filed at: 07/28/2020 1453   Audit-C Score  0 Filed at: 07/28/2020 1453                  CHANTEL/DAST-10      Most Recent Value   How many times in the past year have you    Used an illegal drug or used a prescription medication for non-medical reasons?   Never Filed at: 07/28/2020 1445 [facility]  Number of Diagnoses or Management Options  Diagnosis management comments: Plan to check repeat labs  Will treat her migraine  Will treat empirically with doxycycline for Lyme disease  Patient will ultimately need 100 mg doxycycline b i d  For 14 days  Disposition pending symptomatic treatment  Disposition  Final diagnoses:   Migraine without aura and without status migrainosus, not intractable     Time reflects when diagnosis was documented in both MDM as applicable and the Disposition within this note     Time User Action Codes Description Comment    7/28/2020  4:47 PM Aubrey Bragg Add [G43 009] Migraine without aura and without status migrainosus, not intractable       ED Disposition     ED Disposition Condition Date/Time Comment    Discharge Stable Tue Jul 28, 2020  4:46 PM Jeannette Perales discharge to home/self care              Follow-up Information     Follow up With Specialties Details Why Contact Info    Jaymie Templeton MD Family Medicine Schedule an appointment as soon as possible for a visit in 2 days Return immediately, If symptoms worsen [address] [phone]            Discharge Medication List as of 7/28/2020  5:06 PM      START taking these medications    Details   butalbital-acetaminophen-caffeine (Esgic) -40 mg per tablet Take 1 tablet by mouth every 4 (four) hours as needed for headaches, Starting Tue 7/28/2020, Normal      doxycycline hyclate (VIBRAMYCIN) 100 mg capsule Take 1 capsule (100 mg total) by mouth 2 (two) times a day for 14 days, Starting Tue 7/28/2020, Until Tue 8/11/2020, Normal      metoclopramide (REGLAN) 10 mg tablet Take 1 tablet (10 mg total) by mouth every 6 (six) hours as needed (nausea and vomiting), Starting Tue 7/28/2020, Until Thu 8/27/2020, Normal         CONTINUE these medications which have NOT CHANGED    Details   acetaminophen (TYLENOL) 325 mg tablet Take 2 tablets (650 mg total) by mouth every 6 (six) hours as needed for mild pain, headaches or fever, Starting Thu 1/16/2020, No Print      alendronate (FOSAMAX) 70 mg tablet Take 70 mg by mouth every 7 days, Historical Med      atorvastatin (LIPITOR) 20 mg tablet TAKE ONE TABLET BY MOUTH EVERY DAY, Normal      cholecalciferol (VITAMIN D3) 1,000 units tablet Take 1 tablet (1,000 Units total) by mouth daily, Starting Thu 1/16/2020, Normal      DULoxetine (CYMBALTA) 20 mg capsule TAKE ONE CAPSULE BY MOUTH ONCE DAILY, Normal      ergocalciferol (VITAMIN D2) 50,000 units TAKE ONE CAPSULE BY MOUTH WEEKLY, Normal      fluticasone-salmeterol (ADVAIR) 250-50 mcg/dose Inhale 1 puff every 12 (twelve) hours , Historical Med      folic acid (FOLVITE) 1 mg tablet TAKE ONE TABLET BY MOUTH ONCE DAILY, Normal      furosemide (LASIX) 40 mg tablet TAKE ONE TABLET BY MOUTH EVERY DAY, Normal      Insulin Pen Needle 33G X 4 MM MISC by Does not apply route daily, Starting Tue 5/21/2019, Normal      JANUVIA 100 MG tablet TAKE 1 TABLET BY MOUTH DAILY , Normal      levothyroxine 125 mcg tablet TAKE ONE TABLET BY MOUTH EVERY DAY IN THE AM, Normal      lisinopril (ZESTRIL) 40 mg tablet TAKE ONE TABLET BY MOUTH EVERY DAY, Normal      pantoprazole (PROTONIX) 40 mg tablet TAKE ONE TABLET BY MOUTH ONCE DAILY, Normal      tiotropium (SPIRIVA RESPIMAT) 2 5 MCG/ACT AERS inhaler Inhale 1 puff daily at bedtime, Starting Fri 12/28/2018, Normal      Tofacitinib Citrate (XELJANZ XR) 11 MG TB24 Take 1 tablet by mouth daily, Historical Med      TRESIBA FLEXTOUCH 100 units/mL injection pen Inject 30 Units under the skin daily, Normal      vitamin B-12 (VITAMIN B-12) 500 MCG tablet Take 1 tablet (500 mcg total) by mouth daily, Starting Thu 1/16/2020, No Print      albuterol (PROVENTIL HFA,VENTOLIN HFA) 90 mcg/act inhaler Inhale 2 puffs every 6 (six) hours as needed for wheezing, Starting Tue 9/11/2018, Print      Blood Glucose Monitoring Suppl (ONE TOUCH ULTRA 2) w/Device KIT by Does not apply route 2 (two) times a day, Starting Wed 6/6/2018, Normal      ipratropium-albuterol (DUO-NEB) 0 5-2 5 mg/3 mL nebulizer solution Take 1 vial (3 mL total) by nebulization every 6 (six) hours as needed for wheezing or shortness of breath, Starting Thu 1/16/2020, Normal      lactulose (CHRONULAC) 10 g/15 mL solution 1 to 2 tablespoons (15mL) daily if needed for constipation  , Phone In      methotrexate 2 5 mg tablet TAKE 8 TABLETS BY MOUTH EVERY SUNDAY, Normal      mometasone (ELOCON) 0 1 % ointment Apply topically 2 (two) times a day as needed (itching), Starting Thu 4/25/2019, Normal      nystatin (MYCOSTATIN) cream Apply topically 2 (two) times a day, Starting Tue 7/7/2020, Normal      nystatin (MYCOSTATIN) powder Apply topically 3 (three) times a day as needed (fungal skin rash), Starting Thu 1/16/2020, No Print      ONE TOUCH ULTRA TEST test strip Tests twice a day, Normal      OneTouch Delica Lancets 76X MISC by Does not apply route 2 (two) times a day Test twice daily  DX E11 9, Starting Mon 4/6/2020, Normal      Polyethylene Glycol 3350-GRX POWD Take by mouth daily at bedtime as needed (constipation), Starting Thu 1/16/2020, No Print      traMADol (ULTRAM) 50 mg tablet Take 1 tablet (50 mg total) by mouth every 6 (six) hours as needed for moderate pain for up to 20 doses, Starting Sun 2/2/2020, Normal           No discharge procedures on file      PDMP Review     None          ED Provider  Electronically Signed by           Edwina Harman MD  07/29/20 2392

## 2020-07-29 LAB
B BURGDOR IGG PATRN SER IB-IMP: NEGATIVE
B BURGDOR IGG+IGM SER-ACNC: 1.44 ISR (ref 0–0.9)
B BURGDOR IGM PATRN SER IB-IMP: NEGATIVE
B BURGDOR18KD IGG SER QL IB: ABNORMAL
B BURGDOR23KD IGG SER QL IB: PRESENT
B BURGDOR23KD IGM SER QL IB: PRESENT
B BURGDOR28KD IGG SER QL IB: ABNORMAL
B BURGDOR30KD IGG SER QL IB: ABNORMAL
B BURGDOR39KD IGG SER QL IB: ABNORMAL
B BURGDOR39KD IGM SER QL IB: ABNORMAL
B BURGDOR41KD IGG SER QL IB: PRESENT
B BURGDOR41KD IGM SER QL IB: ABNORMAL
B BURGDOR45KD IGG SER QL IB: ABNORMAL
B BURGDOR58KD IGG SER QL IB: ABNORMAL
B BURGDOR66KD IGG SER QL IB: ABNORMAL
B BURGDOR93KD IGG SER QL IB: ABNORMAL

## 2020-08-31 ENCOUNTER — TELEPHONE (OUTPATIENT)
Dept: CARDIAC SURGERY | Facility: CLINIC | Age: 70
End: 2020-08-31

## 2020-08-31 NOTE — TELEPHONE ENCOUNTER
LM for pt to call to regarding her NP appt for pleural effusion  She was referred to us by Dr Jessa Masterson in January 2020

## 2020-09-08 ENCOUNTER — OFFICE VISIT (OUTPATIENT)
Dept: INTERNAL MEDICINE CLINIC | Facility: CLINIC | Age: 70
End: 2020-09-08
Payer: COMMERCIAL

## 2020-09-08 VITALS
OXYGEN SATURATION: 94 % | SYSTOLIC BLOOD PRESSURE: 138 MMHG | TEMPERATURE: 97.8 F | DIASTOLIC BLOOD PRESSURE: 81 MMHG | HEIGHT: 65 IN | BODY MASS INDEX: 42.15 KG/M2 | HEART RATE: 81 BPM | WEIGHT: 253 LBS

## 2020-09-08 DIAGNOSIS — Z23 NEED FOR INFLUENZA VACCINATION: ICD-10-CM

## 2020-09-08 DIAGNOSIS — J96.11 CHRONIC RESPIRATORY FAILURE WITH HYPOXIA (HCC): ICD-10-CM

## 2020-09-08 DIAGNOSIS — M06.9 RHEUMATOID ARTHRITIS INVOLVING MULTIPLE SITES, UNSPECIFIED RHEUMATOID FACTOR PRESENCE: ICD-10-CM

## 2020-09-08 DIAGNOSIS — E55.9 VITAMIN D DEFICIENCY: ICD-10-CM

## 2020-09-08 DIAGNOSIS — E11.65 TYPE 2 DIABETES MELLITUS WITH HYPERGLYCEMIA, WITH LONG-TERM CURRENT USE OF INSULIN (HCC): Primary | ICD-10-CM

## 2020-09-08 DIAGNOSIS — B02.29 POSTHERPETIC NEURALGIA: ICD-10-CM

## 2020-09-08 DIAGNOSIS — M79.672 LEFT FOOT PAIN: ICD-10-CM

## 2020-09-08 DIAGNOSIS — Z00.00 MEDICARE ANNUAL WELLNESS VISIT, SUBSEQUENT: ICD-10-CM

## 2020-09-08 DIAGNOSIS — E03.9 HYPOTHYROIDISM, UNSPECIFIED TYPE: ICD-10-CM

## 2020-09-08 DIAGNOSIS — J44.1 CHRONIC OBSTRUCTIVE PULMONARY DISEASE WITH ACUTE EXACERBATION (HCC): ICD-10-CM

## 2020-09-08 DIAGNOSIS — G43.009 MIGRAINE WITHOUT AURA AND WITHOUT STATUS MIGRAINOSUS, NOT INTRACTABLE: ICD-10-CM

## 2020-09-08 DIAGNOSIS — Z79.4 TYPE 2 DIABETES MELLITUS WITH HYPERGLYCEMIA, WITH LONG-TERM CURRENT USE OF INSULIN (HCC): Primary | ICD-10-CM

## 2020-09-08 DIAGNOSIS — I10 ESSENTIAL HYPERTENSION: ICD-10-CM

## 2020-09-08 DIAGNOSIS — E66.01 MORBID OBESITY WITH BMI OF 40.0-44.9, ADULT (HCC): ICD-10-CM

## 2020-09-08 DIAGNOSIS — Z23 NEED FOR SHINGLES VACCINE: ICD-10-CM

## 2020-09-08 DIAGNOSIS — E53.8 VITAMIN B12 DEFICIENCY: ICD-10-CM

## 2020-09-08 DIAGNOSIS — F51.01 PRIMARY INSOMNIA: ICD-10-CM

## 2020-09-08 DIAGNOSIS — E78.2 MIXED HYPERLIPIDEMIA: ICD-10-CM

## 2020-09-08 PROCEDURE — 3079F DIAST BP 80-89 MM HG: CPT | Performed by: FAMILY MEDICINE

## 2020-09-08 PROCEDURE — 1160F RVW MEDS BY RX/DR IN RCRD: CPT | Performed by: FAMILY MEDICINE

## 2020-09-08 PROCEDURE — 99214 OFFICE O/P EST MOD 30 MIN: CPT | Performed by: FAMILY MEDICINE

## 2020-09-08 PROCEDURE — 90662 IIV NO PRSV INCREASED AG IM: CPT | Performed by: FAMILY MEDICINE

## 2020-09-08 PROCEDURE — 3075F SYST BP GE 130 - 139MM HG: CPT | Performed by: FAMILY MEDICINE

## 2020-09-08 PROCEDURE — 90750 HZV VACC RECOMBINANT IM: CPT | Performed by: FAMILY MEDICINE

## 2020-09-08 PROCEDURE — 90471 IMMUNIZATION ADMIN: CPT | Performed by: FAMILY MEDICINE

## 2020-09-08 PROCEDURE — 1036F TOBACCO NON-USER: CPT | Performed by: FAMILY MEDICINE

## 2020-09-08 PROCEDURE — G0439 PPPS, SUBSEQ VISIT: HCPCS | Performed by: FAMILY MEDICINE

## 2020-09-08 PROCEDURE — G0008 ADMIN INFLUENZA VIRUS VAC: HCPCS | Performed by: FAMILY MEDICINE

## 2020-09-08 PROCEDURE — 3725F SCREEN DEPRESSION PERFORMED: CPT | Performed by: FAMILY MEDICINE

## 2020-09-08 PROCEDURE — 1125F AMNT PAIN NOTED PAIN PRSNT: CPT | Performed by: FAMILY MEDICINE

## 2020-09-08 PROCEDURE — 1170F FXNL STATUS ASSESSED: CPT | Performed by: FAMILY MEDICINE

## 2020-09-08 RX ORDER — MIRTAZAPINE 15 MG/1
15 TABLET, FILM COATED ORAL
Qty: 90 TABLET | Refills: 3 | Status: SHIPPED | OUTPATIENT
Start: 2020-09-08 | End: 2021-06-21 | Stop reason: SDUPTHER

## 2020-09-08 NOTE — PATIENT INSTRUCTIONS
Medicare Preventive Visit Patient Instructions  Thank you for completing your Welcome to Medicare Visit or Medicare Annual Wellness Visit today  Your next wellness visit will be due in one year (9/8/2021)  The screening/preventive services that you may require over the next 5-10 years are detailed below  Some tests may not apply to you based off risk factors and/or age  Screening tests ordered at today's visit but not completed yet may show as past due  Also, please note that scanned in results may not display below  Preventive Screenings:  Service Recommendations Previous Testing/Comments   Colorectal Cancer Screening  * Colonoscopy    * Fecal Occult Blood Test (FOBT)/Fecal Immunochemical Test (FIT)  * Fecal DNA/Cologuard Test  * Flexible Sigmoidoscopy Age: 54-65 years old   Colonoscopy: every 10 years (may be performed more frequently if at higher risk)  OR  FOBT/FIT: every 1 year  OR  Cologuard: every 3 years  OR  Sigmoidoscopy: every 5 years  Screening may be recommended earlier than age 48 if at higher risk for colorectal cancer  Also, an individualized decision between you and your healthcare provider will decide whether screening between the ages of 74-80 would be appropriate  Colonoscopy: 04/30/2008  FOBT/FIT: Not on file  Cologuard: Not on file  Sigmoidoscopy: Not on file         Breast Cancer Screening Age: 36 years old  Frequency: every 1-2 years  Not required if history of left and right mastectomy Mammogram: 08/10/2016       Cervical Cancer Screening Between the ages of 21-29, pap smear recommended once every 3 years  Between the ages of 33-67, can perform pap smear with HPV co-testing every 5 years     Recommendations may differ for women with a history of total hysterectomy, cervical cancer, or abnormal pap smears in past  Pap Smear: Not on file    History Cervical Cancer   Hepatitis C Screening Once for adults born between 1945 and 1965  More frequently in patients at high risk for Hepatitis C Hep C Antibody: 11/04/2016    Screening Current   Diabetes Screening 1-2 times per year if you're at risk for diabetes or have pre-diabetes Fasting glucose: 128 mg/dL   A1C: 6 6 %    Screening Not Indicated  History Diabetes   Cholesterol Screening Once every 5 years if you don't have a lipid disorder  May order more often based on risk factors  Lipid panel: 06/28/2019    Screening Not Indicated  History Lipid Disorder     Other Preventive Screenings Covered by Medicare:  1  Abdominal Aortic Aneurysm (AAA) Screening: covered once if your at risk  You're considered to be at risk if you have a family history of AAA  2  Lung Cancer Screening: covers low dose CT scan once per year if you meet all of the following conditions: (1) Age 50-69; (2) No signs or symptoms of lung cancer; (3) Current smoker or have quit smoking within the last 15 years; (4) You have a tobacco smoking history of at least 30 pack years (packs per day multiplied by number of years you smoked); (5) You get a written order from a healthcare provider  3  Glaucoma Screening: covered annually if you're considered high risk: (1) You have diabetes OR (2) Family history of glaucoma OR (3)  aged 48 and older OR (3)  American aged 72 and older  3  Osteoporosis Screening: covered every 2 years if you meet one of the following conditions: (1) You're estrogen deficient and at risk for osteoporosis based off medical history and other findings; (2) Have a vertebral abnormality; (3) On glucocorticoid therapy for more than 3 months; (4) Have primary hyperparathyroidism; (5) On osteoporosis medications and need to assess response to drug therapy  · Last bone density test (DXA Scan): 08/10/2016   5  HIV Screening: covered annually if you're between the age of 15-65  Also covered annually if you are younger than 13 and older than 72 with risk factors for HIV infection   For pregnant patients, it is covered up to 3 times per pregnancy  Immunizations:  Immunization Recommendations   Influenza Vaccine Annual influenza vaccination during flu season is recommended for all persons aged >= 6 months who do not have contraindications   Pneumococcal Vaccine (Prevnar and Pneumovax)  * Prevnar = PCV13  * Pneumovax = PPSV23   Adults 25-60 years old: 1-3 doses may be recommended based on certain risk factors  Adults 72 years old: Prevnar (PCV13) vaccine recommended followed by Pneumovax (PPSV23) vaccine  If already received PPSV23 since turning 65, then PCV13 recommended at least one year after PPSV23 dose  Hepatitis B Vaccine 3 dose series if at intermediate or high risk (ex: diabetes, end stage renal disease, liver disease)   Tetanus (Td) Vaccine - COST NOT COVERED BY MEDICARE PART B Following completion of primary series, a booster dose should be given every 10 years to maintain immunity against tetanus  Td may also be given as tetanus wound prophylaxis  Tdap Vaccine - COST NOT COVERED BY MEDICARE PART B Recommended at least once for all adults  For pregnant patients, recommended with each pregnancy  Shingles Vaccine (Shingrix) - COST NOT COVERED BY MEDICARE PART B  2 shot series recommended in those aged 48 and above     Health Maintenance Due:      Topic Date Due    MAMMOGRAM  08/10/2018    Hepatitis C Screening  Completed     Immunizations Due:      Topic Date Due    Influenza Vaccine  07/01/2020     Advance Directives   What are advance directives? Advance directives are legal documents that state your wishes and plans for medical care  These plans are made ahead of time in case you lose your ability to make decisions for yourself  Advance directives can apply to any medical decision, such as the treatments you want, and if you want to donate organs  What are the types of advance directives? There are many types of advance directives, and each state has rules about how to use them   You may choose a combination of any of the following:  · Living will: This is a written record of the treatment you want  You can also choose which treatments you do not want, which to limit, and which to stop at a certain time  This includes surgery, medicine, IV fluid, and tube feedings  · Durable power of  for healthcare [facility]): This is a written record that states who you want to make healthcare choices for you when you are unable to make them for yourself  This person, called a proxy, is usually a family member or a friend  You may choose more than 1 proxy  · Do not resuscitate (DNR) order:  A DNR order is used in case your heart stops beating or you stop breathing  It is a request not to have certain forms of treatment, such as CPR  A DNR order may be included in other types of advance directives  · Medical directive: This covers the care that you want if you are in a coma, near death, or unable to make decisions for yourself  You can list the treatments you want for each condition  Treatment may include pain medicine, surgery, blood transfusions, dialysis, IV or tube feedings, and a ventilator (breathing machine)  · Values history: This document has questions about your views, beliefs, and how you feel and think about life  This information can help others choose the care that you would choose  Why are advance directives important? An advance directive helps you control your care  Although spoken wishes may be used, it is better to have your wishes written down  Spoken wishes can be misunderstood, or not followed  Treatments may be given even if you do not want them  An advance directive may make it easier for your family to make difficult choices about your care  Fall Prevention    Fall prevention  includes ways to make your home and other areas safer  It also includes ways you can move more carefully to prevent a fall   Health conditions that cause changes in your blood pressure, vision, or muscle strength and coordination may increase your risk for falls  Medicines may also increase your risk for falls if they make you dizzy, weak, or sleepy  Fall prevention tips:   · Stand or sit up slowly  · Use assistive devices as directed  · Wear shoes that fit well and have soles that   · Wear a personal alarm  · Stay active  · Manage your medical conditions  Home Safety Tips:  · Add items to prevent falls in the bathroom  · Keep paths clear  · Install bright lights in your home  · Keep items you use often on shelves within reach  · Paint or place reflective tape on the edges of your stairs  Weight Management   Why it is important to manage your weight:  Being overweight increases your risk of health conditions such as heart disease, high blood pressure, type 2 diabetes, and certain types of cancer  It can also increase your risk for osteoarthritis, sleep apnea, and other respiratory problems  Aim for a slow, steady weight loss  Even a small amount of weight loss can lower your risk of health problems  How to lose weight safely:  A safe and healthy way to lose weight is to eat fewer calories and get regular exercise  You can lose up about 1 pound a week by decreasing the number of calories you eat by 500 calories each day  Healthy meal plan for weight management:  A healthy meal plan includes a variety of foods, contains fewer calories, and helps you stay healthy  A healthy meal plan includes the following:  · Eat whole-grain foods more often  A healthy meal plan should contain fiber  Fiber is the part of grains, fruits, and vegetables that is not broken down by your body  Whole-grain foods are healthy and provide extra fiber in your diet  Some examples of whole-grain foods are whole-wheat breads and pastas, oatmeal, brown rice, and bulgur  · Eat a variety of vegetables every day  Include dark, leafy greens such as spinach, kale, laci greens, and mustard greens   Eat yellow and orange vegetables such as carrots, sweet potatoes, and winter squash  · Eat a variety of fruits every day  Choose fresh or canned fruit (canned in its own juice or light syrup) instead of juice  Fruit juice has very little or no fiber  · Eat low-fat dairy foods  Drink fat-free (skim) milk or 1% milk  Eat fat-free yogurt and low-fat cottage cheese  Try low-fat cheeses such as mozzarella and other reduced-fat cheeses  · Choose meat and other protein foods that are low in fat  Choose beans or other legumes such as split peas or lentils  Choose fish, skinless poultry (chicken or turkey), or lean cuts of red meat (beef or pork)  Before you cook meat or poultry, cut off any visible fat  · Use less fat and oil  Try baking foods instead of frying them  Add less fat, such as margarine, sour cream, regular salad dressing and mayonnaise to foods  Eat fewer high-fat foods  Some examples of high-fat foods include french fries, doughnuts, ice cream, and cakes  · Eat fewer sweets  Limit foods and drinks that are high in sugar  This includes candy, cookies, regular soda, and sweetened drinks  Exercise:  Exercise at least 30 minutes per day on most days of the week  Some examples of exercise include walking, biking, dancing, and swimming  You can also fit in more physical activity by taking the stairs instead of the elevator or parking farther away from stores  Ask your healthcare provider about the best exercise plan for you  © Copyright [employer] 2018 Information is for End User's use only and may not be sold, redistributed or otherwise used for commercial purposes   All illustrations and images included in CareNotes® are the copyrighted property of [employer]  or [address] [place] [address] [employer]

## 2020-09-08 NOTE — PROGRESS NOTES
Assessment and Plan:     Problem List Items Addressed This Visit        Endocrine    Type 2 diabetes mellitus with hyperglycemia, with long-term current use of insulin ([facility] [address] ) - Primary    Hypothyroidism       Respiratory    Chronic obstructive pulmonary disease with acute exacerbation (HCC)    Chronic respiratory failure with hypoxia (HCC)       Cardiovascular and Mediastinum    Essential hypertension    Migraine without aura and without status migrainosus, not intractable    Relevant Medications    mirtazapine (REMERON) 15 mg tablet       Nervous and Auditory    Postherpetic neuralgia       Musculoskeletal and Integument    Rheumatoid arthritis (HCC)    Relevant Orders    Lyme Antibody Profile with reflex to WB       Other    Hyperlipidemia    Morbid obesity with BMI of 40 0-44 9, adult (HCC)    Vitamin B12 deficiency    Vitamin D deficiency    Primary insomnia    Relevant Medications    mirtazapine (REMERON) 15 mg tablet      Other Visit Diagnoses     Need for shingles vaccine        Relevant Orders    Zoster Vaccine Recombinant IM (Completed)    Need for influenza vaccination        Relevant Orders    influenza vaccine, high-dose, PF 0 7 mL (FLUZONE HIGH-DOSE) (Completed)    Left foot pain        Relevant Orders    Uric acid    XR foot 3+ vw left    Medicare annual wellness visit, subsequent               Preventive health issues were discussed with patient, and age appropriate screening tests were ordered as noted in patient's After Visit Summary  Personalized health advice and appropriate referrals for health education or preventive services given if needed, as noted in patient's After Visit Summary       History of Present Illness:     Patient presents for Medicare Annual Wellness visit    Patient Care Team:  Lee Wilks MD as PCP - General  MD Pravin Borges MD     Problem List:     Patient Active Problem List   Diagnosis    Centrilobular emphysema ([facility] [address] )    Type 2 diabetes mellitus with hyperglycemia, with long-term current use of insulin (HCC)    Hypothyroidism    Rheumatoid arthritis ([address] )    Essential hypertension    Hyperlipidemia    GERD (gastroesophageal reflux disease)    Hypoalbuminemia    Hepatic steatosis    Atelectasis    Morbid obesity with BMI of 40 0-44 9, adult (HCC)    BMI 40 0-44 9, adult (HCC)    Anxiety    Candidiasis, cutaneous    Chronic obstructive pulmonary disease with acute exacerbation (HCC)    Constipation    Fibromyalgia    Osteoarthritis    Osteopenia    Renal cyst    Vitamin B12 deficiency    Vitamin D deficiency    Loculated pleural effusion    Candidal dermatitis    Chronic respiratory failure with hypoxia (HCC)    Hyponatremia    History of exposure to asbestos    Hypoxemia requiring supplemental oxygen    Macrocytic anemia    Diastolic dysfunction    Postherpetic neuralgia    Migraine without aura and without status migrainosus, not intractable    Primary insomnia      Past Medical and Surgical History:     Past Medical History:   Diagnosis Date    Arthritis     Arthritis     Cancer ([facility] [address] )     Candidiasis of skin     Cervical cancer ([facility] [address] )     Chronic respiratory failure with hypoxia and hypercapnia ([facility]) 4/20/2019    COPD (chronic obstructive pulmonary disease) ([facility])     last assessed 11/21/2016    Current chronic use of systemic steroids     Degenerative arthritis of left knee     last assessed 1/20/2015    Diabetes mellitus ([facility] [address] )     Disease of thyroid gland     hypo pill given to decrease thyroid       Fibromyalgia     Fistula of knee     last assessed 9/12/2014    GERD (gastroesophageal reflux disease)     Hyperlipidemia     Hypertension     Medial meniscus tear     of left knee, last assessed 9/12/2014    Migraine     states she has a hx of HA that she calls [employer] but never was dx by neurologist    Obesity     Obesity     Osteoarthritis     Osteopenia     Pneumonia     last assessed 11/16/2016   [facility] Psychiatric disorder     anxiety    Rheumatoid arthritis ([facility]     Rheumatoid arthritis ([facility] )     Sepsis ([facility] )     last assessed 11/10/2016    Tick bite     last assessed 10/30/2015    Vitamin B12 deficiency     Vitamin D deficiency      Past Surgical History:   Procedure Laterality Date    CATARACT EXTRACTION Bilateral     CHOLECYSTECTOMY      EYE SURGERY      Bilateral Cataracts    HYSTERECTOMY      IR THORACENTESIS  2019    JOINT REPLACEMENT      left knee    KNEE ARTHROSCOPY      NEUROPLASTY / TRANSPOSITION MEDIAN NERVE AT CARPAL TUNNEL      TUBAL LIGATION        Family History:     Family History   Problem Relation Age of Onset    Stroke Mother     Asthma Family     Cancer Family     Diabetes Family     Hypertension Family       Social History:        Social History     Socioeconomic History    Marital status:       Spouse name: Not on file    Number of children: Not on file    Years of education: Not on file    Highest education level: Not on file   Occupational History    Not on file   Social Needs    Financial resource strain: Not on file    Food insecurity     Worry: Never true     Inability: Never true    Transportation needs     Medical: No     Non-medical: No   Tobacco Use    Smoking status: Former Smoker     Packs/day: 1 00     Years: 48 00     Pack years: 48 00     Types: Cigarettes, E-Cigarettes     Last attempt to quit: 2012     Years since quittin 8    Smokeless tobacco: Never Used    Tobacco comment: per allscripts - "current every day smoker, former smoker"   Substance and Sexual Activity    Alcohol use: Never     Alcohol/week: 0 0 standard drinks     Frequency: Never     Binge frequency: Never     Comment: stopped drinking alcohol    Drug use: Never    Sexual activity: Never   Lifestyle    Physical activity     Days per week: Not on file     Minutes per session: Not on file    Stress: Not on file   Relationships    Social connections Talks on phone: More than three times a week     Gets together: Not on file     Attends Shinto service: Not on file     Active member of club or organization: Not on file     Attends meetings of clubs or organizations: Not on file     Relationship status: Not on file    Intimate partner violence     Fear of current or ex partner: Not on file     Emotionally abused: Not on file     Physically abused: Not on file     Forced sexual activity: Not on file   Other Topics Concern    Not on file   Social History Narrative    No living will      Medications and Allergies:     Current Outpatient Medications   Medication Sig Dispense Refill    acetaminophen (TYLENOL) 325 mg tablet Take 2 tablets (650 mg total) by mouth every 6 (six) hours as needed for mild pain, headaches or fever  0    albuterol (PROVENTIL HFA,VENTOLIN HFA) 90 mcg/act inhaler Inhale 2 puffs every 6 (six) hours as needed for wheezing 1 Inhaler 0    alendronate (FOSAMAX) 70 mg tablet Take 70 mg by mouth every 7 days      atorvastatin (LIPITOR) 20 mg tablet TAKE ONE TABLET BY MOUTH EVERY DAY 90 tablet 0    Blood Glucose Monitoring Suppl (ONE TOUCH ULTRA 2) w/Device KIT by Does not apply route 2 (two) times a day 1 each 0    butalbital-acetaminophen-caffeine (Esgic) -40 mg per tablet Take 1 tablet by mouth every 4 (four) hours as needed for headaches 30 tablet 0    cholecalciferol (VITAMIN D3) 1,000 units tablet Take 1 tablet (1,000 Units total) by mouth daily 30 tablet 0    DULoxetine (CYMBALTA) 20 mg capsule TAKE ONE CAPSULE BY MOUTH ONCE DAILY 90 capsule 0    ergocalciferol (VITAMIN D2) 50,000 units TAKE ONE CAPSULE BY MOUTH WEEKLY 12 capsule 1    fluticasone-salmeterol (ADVAIR) 250-50 mcg/dose Inhale 1 puff every 12 (twelve) hours       folic acid (FOLVITE) 1 mg tablet TAKE ONE TABLET BY MOUTH ONCE DAILY 90 tablet 3    furosemide (LASIX) 40 mg tablet TAKE ONE TABLET BY MOUTH EVERY DAY (Patient taking differently: daily as needed ) 90 tablet 3    Insulin Pen Needle 33G X 4 MM MISC by Does not apply route daily 100 each 5    ipratropium-albuterol (DUO-NEB) 0 5-2 5 mg/3 mL nebulizer solution Take 1 vial (3 mL total) by nebulization every 6 (six) hours as needed for wheezing or shortness of breath 120 vial 0    JANUVIA 100 MG tablet TAKE 1 TABLET BY MOUTH DAILY  90 tablet 0    lactulose (CHRONULAC) 10 g/15 mL solution 1 to 2 tablespoons (15mL) daily if needed for constipation  240 mL 1    levothyroxine 125 mcg tablet TAKE ONE TABLET BY MOUTH EVERY DAY IN THE AM 90 tablet 0    lisinopril (ZESTRIL) 40 mg tablet TAKE ONE TABLET BY MOUTH EVERY DAY 90 tablet 0    methotrexate 2 5 mg tablet TAKE 8 TABLETS BY MOUTH EVERY SUNDAY 96 tablet 1    mirtazapine (REMERON) 15 mg tablet Take 1 tablet (15 mg total) by mouth daily at bedtime 90 tablet 3    mometasone (ELOCON) 0 1 % ointment Apply topically 2 (two) times a day as needed (itching) 45 g 1    nystatin (MYCOSTATIN) cream Apply topically 2 (two) times a day 30 g 2    nystatin (MYCOSTATIN) powder Apply topically 3 (three) times a day as needed (fungal skin rash)      ONE TOUCH ULTRA TEST test strip Tests twice a day 100 each 5    OneTouch Delica Lancets 09L MISC by Does not apply route 2 (two) times a day Test twice daily   DX E11 9 200 each 5    pantoprazole (PROTONIX) 40 mg tablet TAKE ONE TABLET BY MOUTH ONCE DAILY 90 tablet 0    Polyethylene Glycol 3350-GRX POWD Take by mouth daily at bedtime as needed (constipation) 850 g 0    tiotropium (SPIRIVA RESPIMAT) 2 5 MCG/ACT AERS inhaler Inhale 1 puff daily at bedtime 3 Inhaler 3    Tofacitinib Citrate (XELJANZ XR) 11 MG TB24 Take 1 tablet by mouth daily      traMADol (ULTRAM) 50 mg tablet Take 1 tablet (50 mg total) by mouth every 6 (six) hours as needed for moderate pain for up to 20 doses 20 tablet 0    TRESIBA FLEXTOUCH 100 units/mL injection pen Inject 30 Units under the skin daily 15 mL 3    vitamin B-12 (VITAMIN B-12) 500 MCG tablet Take 1 tablet (500 mcg total) by mouth daily       No current facility-administered medications for this visit  No Known Allergies   Immunizations:     Immunization History   Administered Date(s) Administered    INFLUENZA 10/13/2015, 10/27/2016    Influenza Split High Dose Preservative Free IM 09/15/2014, 10/13/2015, 10/27/2016, 09/11/2017    Influenza TIV (IM) 10/01/2013    Influenza, Quadrivalent (nasal) 11/06/2016    Influenza, high dose seasonal 0 7 mL 09/24/2018, 09/30/2019, 09/08/2020    Pneumococcal Conjugate 13-Valent 02/10/2016    Pneumococcal Polysaccharide PPV23 11/18/2008, 12/02/2013    Tdap 10/30/2015    Zoster Vaccine Recombinant 05/29/2020, 09/08/2020      Health Maintenance:         Topic Date Due    MAMMOGRAM  08/10/2018    Hepatitis C Screening  Completed     There are no preventive care reminders to display for this patient  Medicare Health Risk Assessment:     /81   Pulse 81   Temp 97 8 °F (36 6 °C)   Ht 5' 5" (1 651 m)   Wt 115 kg (253 lb)   SpO2 94%   BMI 42 10 kg/m²      Mackenzie Bruce is here for her Subsequent Wellness visit  Last Medicare Wellness visit information reviewed, patient interviewed and updates made to the record today  Health Risk Assessment:   Patient rates overall health as very good  Patient feels that their physical health rating is same  Eyesight was rated as same  Hearing was rated as same  Patient feels that their emotional and mental health rating is same  Pain experienced in the last 7 days has been some  Patient's pain rating has been 4/10  Patient states that she has experienced no weight loss or gain in last 6 months  Depression Screening:   PHQ-2 Score: 0      Fall Risk Screening:    In the past year, patient has experienced: history of falling in past year    Injured during fall?: Yes    Feels unsteady when standing or walking?: Yes    Worried about falling?: Yes      Home Safety:  Patient has trouble with stairs inside or outside of their home  Patient has working smoke alarms and has working carbon monoxide detector  Home safety hazards include: none  Nutrition:   Current diet is Diabetic, Low Cholesterol and Low Saturated Fat  Medications:   Patient is currently taking over-the-counter supplements  OTC medications include: see medication list  Patient is able to manage medications  Activities of Daily Living (ADLs)/Instrumental Activities of Daily Living (IADLs):   Walk and transfer into and out of bed and chair?: Yes  Dress and groom yourself?: Yes    Bathe or shower yourself?: Yes    Feed yourself? Yes  Do your laundry/housekeeping?: Yes  Manage your money, pay your bills and track your expenses?: Yes  Make your own meals?: Yes    Do your own shopping?: Yes    Previous Hospitalizations:   Any hospitalizations or ED visits within the last 12 months?: Yes    How many hospitalizations have you had in the last year?: 1-2    Advance Care Planning:   Living will: No    Durable POA for healthcare: No    Advanced directive: No    Advanced directive counseling given: Yes    Five wishes given: Yes      Cognitive Screening:   Provider or family/friend/caregiver concerned regarding cognition?: No    PREVENTIVE SCREENINGS      Cardiovascular Screening:    General: Screening Not Indicated and History Lipid Disorder      Diabetes Screening:     General: Screening Not Indicated and History Diabetes      Colorectal Cancer Screening:     General: Patient Declines      Breast Cancer Screening:     General: Risks and Benefits Discussed      Cervical Cancer Screening:    General: History Cervical Cancer and Risks and Benefits Discussed      Osteoporosis Screening:    General: Patient Declines      Abdominal Aortic Aneurysm (AAA) Screening:        General: Screening Not Indicated      Lung Cancer Screening:     General: Patient Declines      Hepatitis C Screening:    General: Screening Current      Candis Ireland MD  BMI Counseling:  Body mass index is 42 1 kg/m²  The BMI is above normal  Nutrition recommendations include 3-5 servings of fruits/vegetables daily, consuming healthier snacks, decreasing soda and/or juice intake, moderation in carbohydrate intake and reducing intake of cholesterol  Exercise recommendations include exercising 3-5 times per week

## 2020-09-09 NOTE — PROGRESS NOTES
Assessment/Plan:    No problem-specific Assessment & Plan notes found for this encounter  Diagnoses and all orders for this visit:    Type 2 diabetes mellitus with hyperglycemia, with long-term current use of insulin (HCC)    Hypothyroidism, unspecified type    Chronic respiratory failure with hypoxia (HCC)    Chronic obstructive pulmonary disease with acute exacerbation (HCC)    Essential hypertension    Rheumatoid arthritis involving multiple sites, unspecified rheumatoid factor presence (HCC)  -     Lyme Antibody Profile with reflex to WB; Future    Migraine without aura and without status migrainosus, not intractable    Vitamin D deficiency    Vitamin B12 deficiency    Mixed hyperlipidemia    Primary insomnia  -     mirtazapine (REMERON) 15 mg tablet; Take 1 tablet (15 mg total) by mouth daily at bedtime    Postherpetic neuralgia    Need for shingles vaccine  -     Zoster Vaccine Recombinant IM    Need for influenza vaccination  -     influenza vaccine, high-dose, PF 0 7 mL (FLUZONE HIGH-DOSE)    Left foot pain  -     Uric acid; Future  -     XR foot 3+ vw left; Future        Orders recommendations as noted above  Discussed with her getting her laboratory testing done since it has been quite awhile since she had her hemoglobin A1c checked  Reviewed her recent emergency room visits with her  Reviewed the testing for this through her  She is due for repeat testing for the Lyme disease since the previous testing may have been too early  Discussed with her the importance of wearing the oxygen at all times if possible  Discussed options for sleep since insomnia remains an issue and trazodone was ineffective  Will start Remeron which is not likely to affect her respiratory issues  Discussed her left foot issues with her  She has very limited range of motion and will recheck an x-ray  Discussed following up with Podiatry again  Continue follow blood sugars at home    Will check hemoglobin A1c with upcoming laboratory testing  Discussed with her following up with Ophthalmology as previously recommended especially since she persists with the right medial eye redness  Watch diet  Watch carbohydrate intake  Continue with the atorvastatin  Discussed goal of LDL less than 100  She has had worsening dry skin which she commonly gets when her thyroid is off  Will check TSH and adjust dose if needed  Continue with the lisinopril as previously  Watch for any worsening constipation symptoms  Continue follow-up with Rheumatology on a regular basis  Continue with the Estelita Caicedooa as well as the methotrexate and folic acid as per their recommendations  Flu shot and Shingrix shot given today  Up-to-date on pneumonia vaccines  Discussed mammograms yearly and bone densities every 2 years  Will have her follow up in about 3-5 months or sooner if needed  Subjective:      Patient ID: Fer Abbasi is a 65 y o  female  She presents for routine follow-up as well as Medicare wellness  Has generally been doing well  She has had persistent back issues and this often limits her activities  She is able to do most of her activities around her home but needs to stop at times to rest because of the pain  Continues to follow-up with Rheumatology on a regular basis  Continues with the Estelita Caicedooa and the methotrexate with the folic acid  Does feel this helps significantly but does not completely alleviate her symptoms  Blood sugars were elevated and she was eating more poorly  She has been trying to watch more closely and her blood sugars are starting come down  She did not have her laboratory testing done prior to this visit since she thought that this was done during her recent emergency room visit  She was at the emergency room for headaches and rash  She did have Lyme testing done which was equivocal but they recommended having her have this repeated and completing the doxycycline  Migraines/headaches have resolved  Appetite has been generally good  Denies any nausea, vomiting, or diarrhea  Denies any changes in bowel movements  Some peripheral edema at times  Continues with the Lasix  Tolerating lisinopril without difficulty  Denies any recent headaches since the emergency room visit  She has continued with the left foot pain  She is limited range of motion in the right 1st toe with persistent swelling and pain  Has not followed up with Podiatry recently  Has not followed up with Ophthalmology and continues with the redness in the medial right eye  Continues with the calcium and vitamin-D supplementation  Breathing has been relatively stable  Has not been wearing her oxygen today but usually wears it at all times  Has chronic issues sleeping  Trazodone helped her sleep about 3-4 hours but never was able to get a full night's sleep with this  Denies any chest pain or palpitations  The following portions of the patient's history were reviewed and updated as appropriate: She  has a past medical history of Arthritis, Arthritis, Cancer ([address] ), Candidiasis of skin, Cervical cancer ([address] ), Chronic respiratory failure with hypoxia and hypercapnia ([address] ) (4/20/2019), COPD (chronic obstructive pulmonary disease) ([address] ), Current chronic use of systemic steroids, Degenerative arthritis of left knee, Diabetes mellitus ([address] ), Disease of thyroid gland, Fibromyalgia, Fistula of knee, GERD (gastroesophageal reflux disease), Hyperlipidemia, Hypertension, Medial meniscus tear, Migraine, Obesity, Obesity, Osteoarthritis, Osteopenia, Pneumonia, Psychiatric disorder, Rheumatoid arthritis ([address] ), Rheumatoid arthritis ([address] ), Sepsis ([address] ), Tick bite, Vitamin B12 deficiency, and Vitamin D deficiency    She   Patient Active Problem List    Diagnosis Date Noted    Primary insomnia 09/08/2020    Migraine without aura and without status migrainosus, not intractable 07/28/2020    Postherpetic neuralgia 43/79/1789    Diastolic dysfunction 01/16/2020    Macrocytic anemia 01/14/2020    Hypoxemia requiring supplemental oxygen 06/19/2019    History of exposure to asbestos 05/20/2019    Chronic respiratory failure with hypoxia (HCC) 04/20/2019    Hyponatremia 04/20/2019    Candidal dermatitis 12/28/2018    Loculated pleural effusion 12/25/2018    Constipation 11/29/2017    Renal cyst 11/29/2017    Candidiasis, cutaneous 06/30/2017    Vitamin B12 deficiency 11/10/2016    BMI 40 0-44 9, adult ([place] [address] ) 11/05/2016    Morbid obesity with BMI of 40 0-44 9, adult ([place] [address] ) 11/04/2016    Centrilobular emphysema ([place] [address] ) 11/03/2016    Type 2 diabetes mellitus with hyperglycemia, with long-term current use of insulin ([place] [address] ) 11/03/2016    Hypothyroidism 11/03/2016    Rheumatoid arthritis ([place] [address] ) 11/03/2016    Essential hypertension 11/03/2016    Hyperlipidemia 11/03/2016    GERD (gastroesophageal reflux disease) 11/03/2016    Hypoalbuminemia 11/03/2016    Hepatic steatosis 11/03/2016    Atelectasis 11/03/2016    Anxiety 06/02/2014    Fibromyalgia 01/28/2014    Osteopenia 09/17/2012    Chronic obstructive pulmonary disease with acute exacerbation ([place] [address] ) 05/29/2012    Osteoarthritis 05/29/2012    Vitamin D deficiency 05/29/2012     She  has a past surgical history that includes Hysterectomy; Knee arthroscopy; Cataract extraction (Bilateral); Cholecystectomy; Joint replacement; Tubal ligation; Neuroplasty / transposition median nerve at carpal tunnel; Eye surgery; and IR thoracentesis (5/31/2019)  Her family history includes Asthma in her family; Cancer in her family; Diabetes in her family; Hypertension in her family; Stroke in her mother  She  reports that she quit smoking about 7 years ago  Her smoking use included cigarettes and e-cigarettes  She has a 48 00 pack-year smoking history  She has never used smokeless tobacco  She reports that she does not drink alcohol or use drugs    Current Outpatient Medications   Medication Sig Dispense Refill    acetaminophen (TYLENOL) 325 mg tablet Take 2 tablets (650 mg total) by mouth every 6 (six) hours as needed for mild pain, headaches or fever  0    albuterol (PROVENTIL HFA,VENTOLIN HFA) 90 mcg/act inhaler Inhale 2 puffs every 6 (six) hours as needed for wheezing 1 Inhaler 0    alendronate (FOSAMAX) 70 mg tablet Take 70 mg by mouth every 7 days      atorvastatin (LIPITOR) 20 mg tablet TAKE ONE TABLET BY MOUTH EVERY DAY 90 tablet 0    Blood Glucose Monitoring Suppl (ONE TOUCH ULTRA 2) w/Device KIT by Does not apply route 2 (two) times a day 1 each 0    butalbital-acetaminophen-caffeine (Esgic) -40 mg per tablet Take 1 tablet by mouth every 4 (four) hours as needed for headaches 30 tablet 0    cholecalciferol (VITAMIN D3) 1,000 units tablet Take 1 tablet (1,000 Units total) by mouth daily 30 tablet 0    DULoxetine (CYMBALTA) 20 mg capsule TAKE ONE CAPSULE BY MOUTH ONCE DAILY 90 capsule 0    ergocalciferol (VITAMIN D2) 50,000 units TAKE ONE CAPSULE BY MOUTH WEEKLY 12 capsule 1    fluticasone-salmeterol (ADVAIR) 250-50 mcg/dose Inhale 1 puff every 12 (twelve) hours       folic acid (FOLVITE) 1 mg tablet TAKE ONE TABLET BY MOUTH ONCE DAILY 90 tablet 3    furosemide (LASIX) 40 mg tablet TAKE ONE TABLET BY MOUTH EVERY DAY (Patient taking differently: daily as needed ) 90 tablet 3    Insulin Pen Needle 33G X 4 MM MISC by Does not apply route daily 100 each 5    ipratropium-albuterol (DUO-NEB) 0 5-2 5 mg/3 mL nebulizer solution Take 1 vial (3 mL total) by nebulization every 6 (six) hours as needed for wheezing or shortness of breath 120 vial 0    JANUVIA 100 MG tablet TAKE 1 TABLET BY MOUTH DAILY  90 tablet 0    lactulose (CHRONULAC) 10 g/15 mL solution 1 to 2 tablespoons (15mL) daily if needed for constipation   240 mL 1    levothyroxine 125 mcg tablet TAKE ONE TABLET BY MOUTH EVERY DAY IN THE AM 90 tablet 0    lisinopril (ZESTRIL) 40 mg tablet TAKE ONE TABLET BY MOUTH EVERY DAY 90 tablet 0    methotrexate 2 5 mg tablet TAKE 8 TABLETS BY MOUTH EVERY SUNDAY 96 tablet 1    mirtazapine (REMERON) 15 mg tablet Take 1 tablet (15 mg total) by mouth daily at bedtime 90 tablet 3    mometasone (ELOCON) 0 1 % ointment Apply topically 2 (two) times a day as needed (itching) 45 g 1    nystatin (MYCOSTATIN) cream Apply topically 2 (two) times a day 30 g 2    nystatin (MYCOSTATIN) powder Apply topically 3 (three) times a day as needed (fungal skin rash)      ONE TOUCH ULTRA TEST test strip Tests twice a day 100 each 5    OneTouch Delica Lancets 06Z MISC by Does not apply route 2 (two) times a day Test twice daily  DX E11 9 200 each 5    pantoprazole (PROTONIX) 40 mg tablet TAKE ONE TABLET BY MOUTH ONCE DAILY 90 tablet 0    Polyethylene Glycol 3350-GRX POWD Take by mouth daily at bedtime as needed (constipation) 850 g 0    tiotropium (SPIRIVA RESPIMAT) 2 5 MCG/ACT AERS inhaler Inhale 1 puff daily at bedtime 3 Inhaler 3    Tofacitinib Citrate (XELJANZ XR) 11 MG TB24 Take 1 tablet by mouth daily      traMADol (ULTRAM) 50 mg tablet Take 1 tablet (50 mg total) by mouth every 6 (six) hours as needed for moderate pain for up to 20 doses 20 tablet 0    TRESIBA FLEXTOUCH 100 units/mL injection pen Inject 30 Units under the skin daily 15 mL 3    vitamin B-12 (VITAMIN B-12) 500 MCG tablet Take 1 tablet (500 mcg total) by mouth daily       No current facility-administered medications for this visit        Current Outpatient Medications on File Prior to Visit   Medication Sig    acetaminophen (TYLENOL) 325 mg tablet Take 2 tablets (650 mg total) by mouth every 6 (six) hours as needed for mild pain, headaches or fever    albuterol (PROVENTIL HFA,VENTOLIN HFA) 90 mcg/act inhaler Inhale 2 puffs every 6 (six) hours as needed for wheezing    alendronate (FOSAMAX) 70 mg tablet Take 70 mg by mouth every 7 days    atorvastatin (LIPITOR) 20 mg tablet TAKE ONE TABLET BY MOUTH EVERY DAY    Blood Glucose Monitoring Suppl (ONE TOUCH ULTRA 2) w/Device KIT by Does not apply route 2 (two) times a day    butalbital-acetaminophen-caffeine (Esgic) -40 mg per tablet Take 1 tablet by mouth every 4 (four) hours as needed for headaches    cholecalciferol (VITAMIN D3) 1,000 units tablet Take 1 tablet (1,000 Units total) by mouth daily    DULoxetine (CYMBALTA) 20 mg capsule TAKE ONE CAPSULE BY MOUTH ONCE DAILY    ergocalciferol (VITAMIN D2) 50,000 units TAKE ONE CAPSULE BY MOUTH WEEKLY    fluticasone-salmeterol (ADVAIR) 250-50 mcg/dose Inhale 1 puff every 12 (twelve) hours     folic acid (FOLVITE) 1 mg tablet TAKE ONE TABLET BY MOUTH ONCE DAILY    furosemide (LASIX) 40 mg tablet TAKE ONE TABLET BY MOUTH EVERY DAY (Patient taking differently: daily as needed )    Insulin Pen Needle 33G X 4 MM MISC by Does not apply route daily    ipratropium-albuterol (DUO-NEB) 0 5-2 5 mg/3 mL nebulizer solution Take 1 vial (3 mL total) by nebulization every 6 (six) hours as needed for wheezing or shortness of breath    JANUVIA 100 MG tablet TAKE 1 TABLET BY MOUTH DAILY   lactulose (CHRONULAC) 10 g/15 mL solution 1 to 2 tablespoons (15mL) daily if needed for constipation   levothyroxine 125 mcg tablet TAKE ONE TABLET BY MOUTH EVERY DAY IN THE AM    lisinopril (ZESTRIL) 40 mg tablet TAKE ONE TABLET BY MOUTH EVERY DAY    methotrexate 2 5 mg tablet TAKE 8 TABLETS BY MOUTH EVERY SUNDAY    mometasone (ELOCON) 0 1 % ointment Apply topically 2 (two) times a day as needed (itching)    nystatin (MYCOSTATIN) cream Apply topically 2 (two) times a day    nystatin (MYCOSTATIN) powder Apply topically 3 (three) times a day as needed (fungal skin rash)    ONE TOUCH ULTRA TEST test strip Tests twice a day    OneTouch Delica Lancets 36O MISC by Does not apply route 2 (two) times a day Test twice daily   DX E11 9    pantoprazole (PROTONIX) 40 mg tablet TAKE ONE TABLET BY MOUTH ONCE DAILY    Polyethylene Glycol 3350-GRX POWD Take by mouth daily at bedtime as needed (constipation)    tiotropium (SPIRIVA RESPIMAT) 2 5 MCG/ACT AERS inhaler Inhale 1 puff daily at bedtime    Tofacitinib Citrate (XELJANZ XR) 11 MG TB24 Take 1 tablet by mouth daily    traMADol (ULTRAM) 50 mg tablet Take 1 tablet (50 mg total) by mouth every 6 (six) hours as needed for moderate pain for up to 20 doses    TRESIBA FLEXTOUCH 100 units/mL injection pen Inject 30 Units under the skin daily    vitamin B-12 (VITAMIN B-12) 500 MCG tablet Take 1 tablet (500 mcg total) by mouth daily     No current facility-administered medications on file prior to visit  She has No Known Allergies       Review of Systems   Constitutional: Positive for activity change  Negative for appetite change, chills, fatigue and fever  HENT: Negative for congestion and rhinorrhea  Eyes: Positive for redness  Negative for visual disturbance  Respiratory: Positive for shortness of breath  Negative for cough and chest tightness  Cardiovascular: Negative for chest pain and palpitations  Gastrointestinal: Negative for abdominal pain, blood in stool, diarrhea, nausea and vomiting  Endocrine: Negative for polydipsia, polyphagia and polyuria  Genitourinary: Negative for dysuria, frequency and urgency  Musculoskeletal: Positive for arthralgias, back pain and gait problem  Skin: Negative for color change  Very dry and scaling scan over the past month or so  She has noticed this when she has needed an increase and her levothyroxine  Neurological: Negative for dizziness and headaches  Hematological: Does not bruise/bleed easily  Psychiatric/Behavioral: Positive for decreased concentration and sleep disturbance  Negative for confusion  The patient is not nervous/anxious  Objective:      /81   Pulse 81   Temp 97 8 °F (36 6 °C)   Ht 5' 5" (1 651 m)   Wt 115 kg (253 lb)   SpO2 94%   BMI 42 10 kg/m²          Physical Exam  Vitals signs and nursing note reviewed     Constitutional: General: She is not in acute distress  Appearance: She is obese  HENT:      Head: Normocephalic and atraumatic  Comments: Slight cerumen bilaterally  Eyes:      General:         Right eye: No discharge  Left eye: No discharge  Conjunctiva/sclera: Conjunctivae normal       Pupils: Pupils are equal, round, and reactive to light  Comments: Erythema and swollen area in the medial aspect of the eye with some scleral injection   Neck:      Vascular: No carotid bruit  Cardiovascular:      Rate and Rhythm: Normal rate and regular rhythm  Heart sounds: Normal heart sounds  No murmur  No friction rub  No gallop  Pulmonary:      Effort: No respiratory distress  Breath sounds: Decreased breath sounds present  No wheezing or rales  Abdominal:      General: Bowel sounds are normal  There is no distension  Tenderness: There is no abdominal tenderness  Musculoskeletal:      Comments: Slight swelling and tenderness with decreased range of motion over left 1st MTP   Lymphadenopathy:      Cervical: No cervical adenopathy  Skin:     General: Skin is warm and dry  Neurological:      Mental Status: She is alert and oriented to person, place, and time  Psychiatric:         Mood and Affect: Mood and affect normal          Speech: Speech normal          Behavior: Behavior normal  Behavior is cooperative           Cognition and Memory: Cognition and memory normal          Judgment: Judgment normal

## 2020-09-25 DIAGNOSIS — M06.9 RHEUMATOID ARTHRITIS INVOLVING MULTIPLE SITES, UNSPECIFIED RHEUMATOID FACTOR PRESENCE: ICD-10-CM

## 2020-09-25 DIAGNOSIS — I10 ESSENTIAL HYPERTENSION: ICD-10-CM

## 2020-09-25 DIAGNOSIS — E03.9 HYPOTHYROIDISM, UNSPECIFIED TYPE: ICD-10-CM

## 2020-09-25 DIAGNOSIS — K21.9 GASTROESOPHAGEAL REFLUX DISEASE, ESOPHAGITIS PRESENCE NOT SPECIFIED: ICD-10-CM

## 2020-09-25 DIAGNOSIS — M06.9 RHEUMATOID ARTHRITIS, INVOLVING UNSPECIFIED SITE, UNSPECIFIED RHEUMATOID FACTOR PRESENCE: ICD-10-CM

## 2020-09-25 DIAGNOSIS — E55.9 VITAMIN D DEFICIENCY: ICD-10-CM

## 2020-09-25 PROCEDURE — 4010F ACE/ARB THERAPY RXD/TAKEN: CPT | Performed by: FAMILY MEDICINE

## 2020-09-25 RX ORDER — ERGOCALCIFEROL 1.25 MG/1
CAPSULE ORAL
Qty: 12 CAPSULE | Refills: 0 | Status: SHIPPED | OUTPATIENT
Start: 2020-09-25 | End: 2020-12-22

## 2020-09-25 RX ORDER — PANTOPRAZOLE SODIUM 40 MG/1
TABLET, DELAYED RELEASE ORAL
Qty: 90 TABLET | Refills: 0 | Status: SHIPPED | OUTPATIENT
Start: 2020-09-25 | End: 2020-12-22

## 2020-09-25 RX ORDER — LISINOPRIL 40 MG/1
TABLET ORAL
Qty: 90 TABLET | Refills: 0 | Status: SHIPPED | OUTPATIENT
Start: 2020-09-25 | End: 2021-01-12

## 2020-09-25 RX ORDER — DULOXETIN HYDROCHLORIDE 20 MG/1
CAPSULE, DELAYED RELEASE ORAL
Qty: 90 CAPSULE | Refills: 0 | Status: SHIPPED | OUTPATIENT
Start: 2020-09-25 | End: 2020-12-22

## 2020-09-25 RX ORDER — LEVOTHYROXINE SODIUM 0.12 MG/1
TABLET ORAL
Qty: 90 TABLET | Refills: 0 | Status: SHIPPED | OUTPATIENT
Start: 2020-09-25 | End: 2020-12-22

## 2020-10-22 NOTE — TELEPHONE ENCOUNTER
[address] [facility] Nurse called stating that patient has oxygen on continuously and not wearing the CPAP at bedtime  I called [employer] spoke to Donnie Taveras and she stated that there is an adapter which is no cost   Could you please put an order/request in and they will be able to give it to patient  Thank you 
I think this would have to be written since I cannot find an order in there for an adaptor  Remind me to write this tomorrow 
Satisfactory

## 2020-11-19 ENCOUNTER — TELEPHONE (OUTPATIENT)
Dept: INTERNAL MEDICINE CLINIC | Facility: CLINIC | Age: 70
End: 2020-11-19

## 2020-11-20 ENCOUNTER — TELEPHONE (OUTPATIENT)
Dept: INTERNAL MEDICINE CLINIC | Facility: CLINIC | Age: 70
End: 2020-11-20

## 2020-11-22 DIAGNOSIS — R39.9 UTI SYMPTOMS: Primary | ICD-10-CM

## 2020-12-04 ENCOUNTER — TELEPHONE (OUTPATIENT)
Dept: CARDIAC SURGERY | Facility: CLINIC | Age: 70
End: 2020-12-04

## 2020-12-08 DIAGNOSIS — Z79.4 TYPE 2 DIABETES MELLITUS WITH HYPERGLYCEMIA, WITH LONG-TERM CURRENT USE OF INSULIN (HCC): Primary | ICD-10-CM

## 2020-12-08 DIAGNOSIS — E11.65 TYPE 2 DIABETES MELLITUS WITH HYPERGLYCEMIA, WITH LONG-TERM CURRENT USE OF INSULIN (HCC): Primary | ICD-10-CM

## 2020-12-08 RX ORDER — PEN NEEDLE, DIABETIC 32GX 5/32"
NEEDLE, DISPOSABLE MISCELLANEOUS
Qty: 100 EACH | Refills: 0 | Status: SHIPPED | OUTPATIENT
Start: 2020-12-08 | End: 2021-01-12

## 2020-12-22 DIAGNOSIS — M06.9 RHEUMATOID ARTHRITIS (HCC): ICD-10-CM

## 2020-12-22 DIAGNOSIS — E03.9 HYPOTHYROIDISM, UNSPECIFIED TYPE: ICD-10-CM

## 2020-12-22 DIAGNOSIS — M06.9 RHEUMATOID ARTHRITIS INVOLVING MULTIPLE SITES (HCC): ICD-10-CM

## 2020-12-22 DIAGNOSIS — K21.9 GASTROESOPHAGEAL REFLUX DISEASE: ICD-10-CM

## 2020-12-22 DIAGNOSIS — E78.2 MIXED HYPERLIPIDEMIA: ICD-10-CM

## 2020-12-22 DIAGNOSIS — E55.9 VITAMIN D DEFICIENCY: ICD-10-CM

## 2020-12-22 RX ORDER — DULOXETIN HYDROCHLORIDE 20 MG/1
CAPSULE, DELAYED RELEASE ORAL
Qty: 90 CAPSULE | Refills: 0 | Status: SHIPPED | OUTPATIENT
Start: 2020-12-22 | End: 2021-01-12

## 2020-12-22 RX ORDER — ATORVASTATIN CALCIUM 20 MG/1
TABLET, FILM COATED ORAL
Qty: 90 TABLET | Refills: 0 | Status: SHIPPED | OUTPATIENT
Start: 2020-12-22 | End: 2021-01-12

## 2020-12-22 RX ORDER — PANTOPRAZOLE SODIUM 40 MG/1
TABLET, DELAYED RELEASE ORAL
Qty: 90 TABLET | Refills: 0 | Status: SHIPPED | OUTPATIENT
Start: 2020-12-22 | End: 2021-01-12

## 2020-12-22 RX ORDER — LEVOTHYROXINE SODIUM 0.12 MG/1
TABLET ORAL
Qty: 90 TABLET | Refills: 0 | Status: SHIPPED | OUTPATIENT
Start: 2020-12-22 | End: 2021-01-12

## 2020-12-22 RX ORDER — ERGOCALCIFEROL 1.25 MG/1
CAPSULE ORAL
Qty: 12 CAPSULE | Refills: 0 | Status: SHIPPED | OUTPATIENT
Start: 2020-12-22 | End: 2021-01-12

## 2021-01-11 DIAGNOSIS — M06.9 RHEUMATOID ARTHRITIS INVOLVING MULTIPLE SITES (HCC): ICD-10-CM

## 2021-01-11 DIAGNOSIS — M06.9 RHEUMATOID ARTHRITIS (HCC): ICD-10-CM

## 2021-01-11 DIAGNOSIS — E78.2 MIXED HYPERLIPIDEMIA: ICD-10-CM

## 2021-01-11 DIAGNOSIS — E55.9 VITAMIN D DEFICIENCY: ICD-10-CM

## 2021-01-11 DIAGNOSIS — K21.9 GASTROESOPHAGEAL REFLUX DISEASE: ICD-10-CM

## 2021-01-11 DIAGNOSIS — I10 ESSENTIAL HYPERTENSION: ICD-10-CM

## 2021-01-11 DIAGNOSIS — Z79.4 TYPE 2 DIABETES MELLITUS WITH HYPERGLYCEMIA, WITH LONG-TERM CURRENT USE OF INSULIN (HCC): ICD-10-CM

## 2021-01-11 DIAGNOSIS — E11.65 TYPE 2 DIABETES MELLITUS WITH HYPERGLYCEMIA, WITH LONG-TERM CURRENT USE OF INSULIN (HCC): ICD-10-CM

## 2021-01-11 DIAGNOSIS — E03.9 HYPOTHYROIDISM, UNSPECIFIED TYPE: ICD-10-CM

## 2021-01-12 ENCOUNTER — OFFICE VISIT (OUTPATIENT)
Dept: INTERNAL MEDICINE CLINIC | Facility: CLINIC | Age: 71
End: 2021-01-12
Payer: COMMERCIAL

## 2021-01-12 VITALS
BODY MASS INDEX: 43.32 KG/M2 | HEART RATE: 105 BPM | SYSTOLIC BLOOD PRESSURE: 128 MMHG | OXYGEN SATURATION: 93 % | HEIGHT: 65 IN | TEMPERATURE: 97.7 F | WEIGHT: 260 LBS | DIASTOLIC BLOOD PRESSURE: 82 MMHG

## 2021-01-12 DIAGNOSIS — E66.01 MORBID OBESITY WITH BMI OF 40.0-44.9, ADULT (HCC): ICD-10-CM

## 2021-01-12 DIAGNOSIS — J96.11 CHRONIC RESPIRATORY FAILURE WITH HYPOXIA (HCC): ICD-10-CM

## 2021-01-12 DIAGNOSIS — Z78.0 POSTMENOPAUSAL: ICD-10-CM

## 2021-01-12 DIAGNOSIS — E78.2 MIXED HYPERLIPIDEMIA: ICD-10-CM

## 2021-01-12 DIAGNOSIS — I10 ESSENTIAL HYPERTENSION: ICD-10-CM

## 2021-01-12 DIAGNOSIS — E03.9 HYPOTHYROIDISM, UNSPECIFIED TYPE: ICD-10-CM

## 2021-01-12 DIAGNOSIS — M06.9 RHEUMATOID ARTHRITIS INVOLVING MULTIPLE SITES, UNSPECIFIED WHETHER RHEUMATOID FACTOR PRESENT (HCC): ICD-10-CM

## 2021-01-12 DIAGNOSIS — E11.65 TYPE 2 DIABETES MELLITUS WITH HYPERGLYCEMIA, WITH LONG-TERM CURRENT USE OF INSULIN (HCC): Primary | ICD-10-CM

## 2021-01-12 DIAGNOSIS — Z79.4 TYPE 2 DIABETES MELLITUS WITH HYPERGLYCEMIA, WITH LONG-TERM CURRENT USE OF INSULIN (HCC): Primary | ICD-10-CM

## 2021-01-12 DIAGNOSIS — E55.9 VITAMIN D DEFICIENCY: ICD-10-CM

## 2021-01-12 DIAGNOSIS — J44.1 CHRONIC OBSTRUCTIVE PULMONARY DISEASE WITH ACUTE EXACERBATION (HCC): ICD-10-CM

## 2021-01-12 PROCEDURE — 3079F DIAST BP 80-89 MM HG: CPT | Performed by: FAMILY MEDICINE

## 2021-01-12 PROCEDURE — 1160F RVW MEDS BY RX/DR IN RCRD: CPT | Performed by: FAMILY MEDICINE

## 2021-01-12 PROCEDURE — 99214 OFFICE O/P EST MOD 30 MIN: CPT | Performed by: FAMILY MEDICINE

## 2021-01-12 PROCEDURE — 3008F BODY MASS INDEX DOCD: CPT | Performed by: FAMILY MEDICINE

## 2021-01-12 PROCEDURE — 4010F ACE/ARB THERAPY RXD/TAKEN: CPT | Performed by: FAMILY MEDICINE

## 2021-01-12 PROCEDURE — 1101F PT FALLS ASSESS-DOCD LE1/YR: CPT | Performed by: FAMILY MEDICINE

## 2021-01-12 PROCEDURE — 1100F PTFALLS ASSESS-DOCD GE2>/YR: CPT | Performed by: FAMILY MEDICINE

## 2021-01-12 PROCEDURE — 1036F TOBACCO NON-USER: CPT | Performed by: FAMILY MEDICINE

## 2021-01-12 PROCEDURE — 3288F FALL RISK ASSESSMENT DOCD: CPT | Performed by: FAMILY MEDICINE

## 2021-01-12 PROCEDURE — 3074F SYST BP LT 130 MM HG: CPT | Performed by: FAMILY MEDICINE

## 2021-01-12 RX ORDER — INSULIN GLARGINE 100 [IU]/ML
INJECTION, SOLUTION SUBCUTANEOUS
COMMUNITY
End: 2021-01-12

## 2021-01-12 RX ORDER — LISINOPRIL 40 MG/1
TABLET ORAL
Qty: 90 TABLET | Refills: 0 | Status: SHIPPED | OUTPATIENT
Start: 2021-01-12 | End: 2021-03-16

## 2021-01-12 RX ORDER — ERGOCALCIFEROL 1.25 MG/1
CAPSULE ORAL
Qty: 12 CAPSULE | Refills: 0 | Status: SHIPPED | OUTPATIENT
Start: 2021-01-12 | End: 2021-06-08

## 2021-01-12 RX ORDER — PANTOPRAZOLE SODIUM 40 MG/1
TABLET, DELAYED RELEASE ORAL
Qty: 90 TABLET | Refills: 0 | Status: SHIPPED | OUTPATIENT
Start: 2021-01-12 | End: 2021-06-08

## 2021-01-12 RX ORDER — LEVOTHYROXINE SODIUM 0.12 MG/1
TABLET ORAL
Qty: 90 TABLET | Refills: 0 | Status: SHIPPED | OUTPATIENT
Start: 2021-01-12 | End: 2021-04-13 | Stop reason: SDUPTHER

## 2021-01-12 RX ORDER — DULOXETIN HYDROCHLORIDE 20 MG/1
CAPSULE, DELAYED RELEASE ORAL
Qty: 90 CAPSULE | Refills: 0 | Status: SHIPPED | OUTPATIENT
Start: 2021-01-12 | End: 2021-06-08

## 2021-01-12 RX ORDER — ALBUTEROL SULFATE 2.5 MG/3ML
SOLUTION RESPIRATORY (INHALATION)
COMMUNITY

## 2021-01-12 RX ORDER — ATORVASTATIN CALCIUM 20 MG/1
TABLET, FILM COATED ORAL
Qty: 90 TABLET | Refills: 0 | Status: SHIPPED | OUTPATIENT
Start: 2021-01-12 | End: 2021-04-11

## 2021-01-12 RX ORDER — PEN NEEDLE, DIABETIC 32GX 5/32"
NEEDLE, DISPOSABLE MISCELLANEOUS
Qty: 100 EACH | Refills: 0 | Status: SHIPPED | OUTPATIENT
Start: 2021-01-12 | End: 2021-03-16

## 2021-01-12 NOTE — PROGRESS NOTES
Assessment/Plan:    No problem-specific Assessment & Plan notes found for this encounter  Diagnoses and all orders for this visit:    Type 2 diabetes mellitus with hyperglycemia, with long-term current use of insulin ([facility])    Hypothyroidism, unspecified type    Essential hypertension    Chronic respiratory failure with hypoxia ([facility])    Rheumatoid arthritis involving multiple sites, unspecified whether rheumatoid factor present ([facility] )  -     Walker    Mixed hyperlipidemia    Chronic obstructive pulmonary disease with acute exacerbation ([facility] )    Morbid obesity with BMI of 40 0-44 9, adult ([facility])    Vitamin D deficiency    Postmenopausal  -     DXA bone density spine hip and pelvis; Future    Other orders  -     Discontinue: insulin glargine (Lantus SoloStar) 100 units/mL injection pen; USE AS DIRECTED  -     albuterol (2 5 mg/3 mL) 0 083 % nebulizer solution; USE 1 UNIT DOSE IN NEBULIZER EVERY 4 HOURS AS NEEDED  Orders recommendations as noted above  Slip reprinted for her laboratory testing  Discussed with her that some of her symptoms seem consistent with hypothyroidism  She had similar symptoms when her levothyroxine needed to be adjusted  Advised her to get the laboratory testing done as soon as possible  Continue with the [place] as previously  Attempted Effexor glucometer but it seems that she needs new batteries  If she is still unable to get this to work, advised her to contact the office and we will attempt to get her a new glucometer  Watch carbohydrate intake  Try to remain as active as possible around her apartment  Follow-up with Ophthalmology regularly  Check feet daily  Continue follow-up with Podiatry regularly  She sees Dr Radha Perez and last appointment was about 3 months ago  Blood pressure controlled  Continue current medications  Continue the atorvastatin as previously  Slip given for her mammogram and bone density for which she is overdue    Continue follow-up with Pulmonary on a regular basis  Continue with the oxygen  Watch for any worsening of respiratory status  Continue follow-up with Rheumatology regularly  Script for a new walker prescribed since hers has significant issues with wheels  She is up-to-date on her immunizations  She is interested in coronavirus vaccine as soon as possible  Coronavirus precautions discussed  Will have her follow up as in approximately 3 months or sooner if needed  Subjective:      Patient ID: Jai Zimmer is a 66 y o  female  She presents for follow-up  She is having increasing difficulty with ambulation  Needs to use a walker almost at all times because of the joint pains and back pain  Did have a recent fall  She tripped over her cat  Did hit the back of her head lightly but denies any significant pain or injury  This occurred about 3-4 weeks ago  Denies any dizziness or lightheadedness with this  Denies any vision changes  No headaches  Denies any nausea or vomiting  She has chronic back pain but denies any worsening  Has some chronic neck pain but not any worse  Tolerating her [place]  Not following her blood sugars at home  The her glucometer is not working  She tried new batteries and our office attempted this as well  She is going to try new batteries again at home  Has not had any definite dysuria but has had some vaginal itching at times  Breathing has been relatively stable  She continues with the oxygen  Continues with her inhalers  Denies any recent illnesses  She has noticed that her voice has changed somewhat inches more tired  She feels this is related to her thyroid since this has occurred in the past   She continues to follow-up with Rheumatology and continues with the methotrexate in the [facility]  Has been tolerating these without difficulty  She is still trying to get mechanical scooter since she cannot ambulate any distance at present    She would definitely benefit from this since even normal activities such as shopping is almost impossible  Still with some issues with constipation at times  Denies any nausea or vomiting  The following portions of the patient's history were reviewed and updated as appropriate: She  has a past medical history of Arthritis, Arthritis, Cancer ([facility] ), Candidiasis of skin, Cervical cancer ([facility] ), Chronic respiratory failure with hypoxia and hypercapnia ([facility] ) (4/20/2019), COPD (chronic obstructive pulmonary disease) ([facility] ), Current chronic use of systemic steroids, Degenerative arthritis of left knee, Diabetes mellitus ([facility] ), Disease of thyroid gland, Fibromyalgia, Fistula of knee, GERD (gastroesophageal reflux disease), Hyperlipidemia, Hypertension, Medial meniscus tear, Migraine, Obesity, Obesity, Osteoarthritis, Osteopenia, Pneumonia, Psychiatric disorder, Rheumatoid arthritis ([facility] ), Rheumatoid arthritis ([facility] ), Sepsis ([facility] ), Tick bite, Vitamin B12 deficiency, and Vitamin D deficiency    She   Patient Active Problem List    Diagnosis Date Noted    Primary insomnia 09/08/2020    Migraine without aura and without status migrainosus, not intractable 07/28/2020    Postherpetic neuralgia 70/70/9166    Diastolic dysfunction 87/20/8707    Macrocytic anemia 01/14/2020    Hypoxemia requiring supplemental oxygen 06/19/2019    History of exposure to asbestos 05/20/2019    Chronic respiratory failure with hypoxia (HCC) 04/20/2019    Hyponatremia 04/20/2019    Candidal dermatitis 12/28/2018    Loculated pleural effusion 12/25/2018    Constipation 11/29/2017    Renal cyst 11/29/2017    Candidiasis, cutaneous 06/30/2017    Vitamin B12 deficiency 11/10/2016    BMI 40 0-44 9, adult ([facility] ) 11/05/2016    Morbid obesity with BMI of 40 0-44 9, adult ([facility] ) 11/04/2016    Centrilobular emphysema ([facility] ) 11/03/2016    Type 2 diabetes mellitus with hyperglycemia, with long-term current use of insulin ([facility] ) 11/03/2016    Hypothyroidism 11/03/2016    Rheumatoid arthritis ([facility] ) 11/03/2016    Essential hypertension 11/03/2016    Hyperlipidemia 11/03/2016    GERD (gastroesophageal reflux disease) 11/03/2016    Hypoalbuminemia 11/03/2016    Hepatic steatosis 11/03/2016    Atelectasis 11/03/2016    Anxiety 06/02/2014    Fibromyalgia 01/28/2014    Osteopenia 09/17/2012    Chronic obstructive pulmonary disease with acute exacerbation ([facility] ) 05/29/2012    Osteoarthritis 05/29/2012    Vitamin D deficiency 05/29/2012     She  has a past surgical history that includes Hysterectomy; Knee arthroscopy; Cataract extraction (Bilateral); Cholecystectomy; Joint replacement; Tubal ligation; Neuroplasty / transposition median nerve at carpal tunnel; Eye surgery; and IR thoracentesis (5/31/2019)  Her family history includes Asthma in her family; Cancer in her family; Diabetes in her family; Hypertension in her family; Stroke in her mother  She  reports that she quit smoking about 8 years ago  Her smoking use included cigarettes and e-cigarettes  She has a 48 00 pack-year smoking history  She has never used smokeless tobacco  She reports that she does not drink alcohol or use drugs    Current Outpatient Medications   Medication Sig Dispense Refill    acetaminophen (TYLENOL) 325 mg tablet Take 2 tablets (650 mg total) by mouth every 6 (six) hours as needed for mild pain, headaches or fever  0    albuterol (PROVENTIL HFA,VENTOLIN HFA) 90 mcg/act inhaler Inhale 2 puffs every 6 (six) hours as needed for wheezing 1 Inhaler 0    alendronate (FOSAMAX) 70 mg tablet Take 70 mg by mouth every 7 days      atorvastatin (LIPITOR) 20 mg tablet TAKE ONE TABLET BY MOUTH EVERY DAY 90 tablet 0    BD Pen Needle Mamta U/F 32G X 4 MM MISC USE AS DIRECTED 100 each 0    Blood Glucose Monitoring Suppl (ONE TOUCH ULTRA 2) w/Device KIT by Does not apply route 2 (two) times a day 1 each 0    butalbital-acetaminophen-caffeine (Esgic) -40 mg per tablet Take 1 tablet by mouth every 4 (four) hours as needed for headaches 30 tablet 0    DULoxetine (CYMBALTA) 20 mg capsule TAKE ONE CAPSULE BY MOUTH ONCE DAILY 90 capsule 0    ergocalciferol (VITAMIN D2) 50,000 units TAKE ONE CAPSULE BY MOUTH WEEKLY 12 capsule 0    fluticasone-salmeterol (ADVAIR) 250-50 mcg/dose Inhale 1 puff every 12 (twelve) hours       folic acid (FOLVITE) 1 mg tablet TAKE ONE TABLET BY MOUTH ONCE DAILY 90 tablet 3    furosemide (LASIX) 40 mg tablet TAKE ONE TABLET BY MOUTH EVERY DAY (Patient taking differently: daily as needed ) 90 tablet 3    Insulin Pen Needle 33G X 4 MM MISC by Does not apply route daily 100 each 5    ipratropium-albuterol (DUO-NEB) 0 5-2 5 mg/3 mL nebulizer solution Take 1 vial (3 mL total) by nebulization every 6 (six) hours as needed for wheezing or shortness of breath 120 vial 0    JANUVIA 100 MG tablet TAKE 1 TABLET BY MOUTH DAILY  90 tablet 0    lactulose (CHRONULAC) 10 g/15 mL solution 1 to 2 tablespoons (15mL) daily if needed for constipation  240 mL 1    levothyroxine 125 mcg tablet TAKE ONE TABLET BY MOUTH EVERY DAY IN THE AM 90 tablet 0    lisinopril (ZESTRIL) 40 mg tablet TAKE ONE TABLET BY MOUTH EVERY DAY 90 tablet 0    methotrexate 2 5 mg tablet TAKE 8 TABLETS BY MOUTH EVERY SUNDAY 96 tablet 0    mirtazapine (REMERON) 15 mg tablet Take 1 tablet (15 mg total) by mouth daily at bedtime 90 tablet 3    mometasone (ELOCON) 0 1 % ointment Apply topically 2 (two) times a day as needed (itching) 45 g 1    nystatin (MYCOSTATIN) cream Apply topically 2 (two) times a day 30 g 2    nystatin (MYCOSTATIN) powder Apply topically 3 (three) times a day as needed (fungal skin rash)      ONE TOUCH ULTRA TEST test strip Tests twice a day 100 each 5    OneTouch Delica Lancets 87W MISC by Does not apply route 2 (two) times a day Test twice daily   DX E11 9 200 each 5    pantoprazole (PROTONIX) 40 mg tablet TAKE ONE TABLET BY MOUTH ONCE DAILY 90 tablet 0    Polyethylene Glycol 3350-GRX POWD Take by mouth daily at bedtime as needed (constipation) 850 g 0    tiotropium (SPIRIVA RESPIMAT) 2 5 MCG/ACT AERS inhaler Inhale 1 puff daily at bedtime 3 Inhaler 3    Tofacitinib Citrate (XELJANZ XR) 11 MG TB24 Take 1 tablet by mouth daily      TRESIBA FLEXTOUCH 100 units/mL injection pen Inject 30 Units under the skin daily 15 mL 3    vitamin B-12 (VITAMIN B-12) 500 MCG tablet Take 1 tablet (500 mcg total) by mouth daily      albuterol (2 5 mg/3 mL) 0 083 % nebulizer solution USE 1 UNIT DOSE IN NEBULIZER EVERY 4 HOURS AS NEEDED  No current facility-administered medications for this visit        Current Outpatient Medications on File Prior to Visit   Medication Sig    acetaminophen (TYLENOL) 325 mg tablet Take 2 tablets (650 mg total) by mouth every 6 (six) hours as needed for mild pain, headaches or fever    albuterol (PROVENTIL HFA,VENTOLIN HFA) 90 mcg/act inhaler Inhale 2 puffs every 6 (six) hours as needed for wheezing    alendronate (FOSAMAX) 70 mg tablet Take 70 mg by mouth every 7 days    atorvastatin (LIPITOR) 20 mg tablet TAKE ONE TABLET BY MOUTH EVERY DAY    BD Pen Needle Mamta U/F 32G X 4 MM MISC USE AS DIRECTED    Blood Glucose Monitoring Suppl (ONE TOUCH ULTRA 2) w/Device KIT by Does not apply route 2 (two) times a day    butalbital-acetaminophen-caffeine (Esgic) -40 mg per tablet Take 1 tablet by mouth every 4 (four) hours as needed for headaches    DULoxetine (CYMBALTA) 20 mg capsule TAKE ONE CAPSULE BY MOUTH ONCE DAILY    ergocalciferol (VITAMIN D2) 50,000 units TAKE ONE CAPSULE BY MOUTH WEEKLY    fluticasone-salmeterol (ADVAIR) 250-50 mcg/dose Inhale 1 puff every 12 (twelve) hours     folic acid (FOLVITE) 1 mg tablet TAKE ONE TABLET BY MOUTH ONCE DAILY    furosemide (LASIX) 40 mg tablet TAKE ONE TABLET BY MOUTH EVERY DAY (Patient taking differently: daily as needed )    Insulin Pen Needle 33G X 4 MM MISC by Does not apply route daily    ipratropium-albuterol (DUO-NEB) 0 5-2 5 mg/3 mL nebulizer solution Take 1 vial (3 mL total) by nebulization every 6 (six) hours as needed for wheezing or shortness of breath    JANUVIA 100 MG tablet TAKE 1 TABLET BY MOUTH DAILY   lactulose (CHRONULAC) 10 g/15 mL solution 1 to 2 tablespoons (15mL) daily if needed for constipation   levothyroxine 125 mcg tablet TAKE ONE TABLET BY MOUTH EVERY DAY IN THE AM    lisinopril (ZESTRIL) 40 mg tablet TAKE ONE TABLET BY MOUTH EVERY DAY    methotrexate 2 5 mg tablet TAKE 8 TABLETS BY MOUTH EVERY SUNDAY    mirtazapine (REMERON) 15 mg tablet Take 1 tablet (15 mg total) by mouth daily at bedtime    mometasone (ELOCON) 0 1 % ointment Apply topically 2 (two) times a day as needed (itching)    nystatin (MYCOSTATIN) cream Apply topically 2 (two) times a day    nystatin (MYCOSTATIN) powder Apply topically 3 (three) times a day as needed (fungal skin rash)    ONE TOUCH ULTRA TEST test strip Tests twice a day    OneTouch Delica Lancets 62R MISC by Does not apply route 2 (two) times a day Test twice daily  DX E11 9    pantoprazole (PROTONIX) 40 mg tablet TAKE ONE TABLET BY MOUTH ONCE DAILY    Polyethylene Glycol 3350-GRX POWD Take by mouth daily at bedtime as needed (constipation)    tiotropium (SPIRIVA RESPIMAT) 2 5 MCG/ACT AERS inhaler Inhale 1 puff daily at bedtime    Tofacitinib Citrate (XELJANZ XR) 11 MG TB24 Take 1 tablet by mouth daily    TRESIBA FLEXTOUCH 100 units/mL injection pen Inject 30 Units under the skin daily    vitamin B-12 (VITAMIN B-12) 500 MCG tablet Take 1 tablet (500 mcg total) by mouth daily    albuterol (2 5 mg/3 mL) 0 083 % nebulizer solution USE 1 UNIT DOSE IN NEBULIZER EVERY 4 HOURS AS NEEDED      [DISCONTINUED] atorvastatin (LIPITOR) 20 mg tablet TAKE ONE TABLET BY MOUTH EVERY DAY    [DISCONTINUED] BD Pen Needle Mamta U/F 32G X 4 MM MISC USE AS DIRECTED    [DISCONTINUED] cholecalciferol (VITAMIN D3) 1,000 units tablet Take 1 tablet (1,000 Units total) by mouth daily    [DISCONTINUED] DULoxetine (CYMBALTA) 20 mg capsule TAKE ONE CAPSULE BY MOUTH ONCE DAILY    [DISCONTINUED] ergocalciferol (VITAMIN D2) 50,000 units TAKE ONE CAPSULE BY MOUTH WEEKLY    [DISCONTINUED] insulin glargine (Lantus SoloStar) 100 units/mL injection pen USE AS DIRECTED   [DISCONTINUED] levothyroxine 125 mcg tablet TAKE ONE TABLET BY MOUTH EVERY DAY IN THE AM    [DISCONTINUED] lisinopril (ZESTRIL) 40 mg tablet TAKE ONE TABLET BY MOUTH EVERY DAY    [DISCONTINUED] lisinopril (ZESTRIL) 40 mg tablet TAKE ONE TABLET BY MOUTH EVERY DAY    [DISCONTINUED] methotrexate 2 5 mg tablet TAKE 8 TABLETS BY MOUTH EVERY SUNDAY    [DISCONTINUED] pantoprazole (PROTONIX) 40 mg tablet TAKE ONE TABLET BY MOUTH ONCE DAILY    [DISCONTINUED] traMADol (ULTRAM) 50 mg tablet Take 1 tablet (50 mg total) by mouth every 6 (six) hours as needed for moderate pain for up to 20 doses     No current facility-administered medications on file prior to visit  She has No Known Allergies       Review of Systems   Constitutional: Positive for activity change and fatigue  Negative for appetite change, chills and fever  HENT: Positive for voice change  Negative for congestion and rhinorrhea  Eyes: Negative for visual disturbance  Respiratory: Positive for shortness of breath  Negative for chest tightness and wheezing  Cardiovascular: Negative for chest pain and palpitations  Gastrointestinal: Positive for constipation  Negative for abdominal pain, blood in stool, diarrhea, nausea and vomiting  Endocrine: Negative for polydipsia, polyphagia and polyuria  Genitourinary: Positive for frequency and urgency  Negative for dysuria  Musculoskeletal: Positive for arthralgias, back pain, gait problem and neck stiffness  Skin: Negative for color change  Neurological: Negative for dizziness and headaches  Hematological: Does not bruise/bleed easily     Psychiatric/Behavioral: Positive for decreased concentration and dysphoric mood  Negative for confusion and sleep disturbance  The patient is not nervous/anxious  Objective:      /82 (BP Location: Left arm, Patient Position: Sitting, Cuff Size: Large)   Pulse 105   Temp 97 7 °F (36 5 °C)   Ht 5' 5" (1 651 m)   Wt 118 kg (260 lb)   SpO2 93%   BMI 43 27 kg/m²          Physical Exam  Vitals signs reviewed  Constitutional:       General: She is not in acute distress  Appearance: She is obese  HENT:      Head: Normocephalic and atraumatic  Eyes:      General:         Right eye: No discharge  Left eye: No discharge  Conjunctiva/sclera: Conjunctivae normal       Pupils: Pupils are equal, round, and reactive to light  Cardiovascular:      Rate and Rhythm: Normal rate and regular rhythm  Heart sounds: Normal heart sounds  No murmur  No friction rub  No gallop  Pulmonary:      Effort: No respiratory distress  Breath sounds: Decreased breath sounds present  No wheezing, rhonchi or rales  Abdominal:      General: Bowel sounds are normal  There is no distension  Tenderness: There is no abdominal tenderness  Musculoskeletal:      Comments: Extensive degenerative changes bilateral hands; slow antalgic gait with walker   Lymphadenopathy:      Cervical: No cervical adenopathy  Skin:     General: Skin is warm and dry  Neurological:      Mental Status: She is alert and oriented to person, place, and time  Psychiatric:         Mood and Affect: Mood and affect normal          Speech: Speech normal          Behavior: Behavior normal  Behavior is cooperative  Falls Plan of Care: balance, strength, and gait training instructions were provided  Recommended assistive device to help with gait and balance  Vitamin D supplementation was recommended

## 2021-03-16 DIAGNOSIS — Z79.4 TYPE 2 DIABETES MELLITUS WITH HYPERGLYCEMIA, WITH LONG-TERM CURRENT USE OF INSULIN (HCC): ICD-10-CM

## 2021-03-16 DIAGNOSIS — E11.65 TYPE 2 DIABETES MELLITUS WITH HYPERGLYCEMIA, WITH LONG-TERM CURRENT USE OF INSULIN (HCC): ICD-10-CM

## 2021-03-16 DIAGNOSIS — I10 ESSENTIAL HYPERTENSION: ICD-10-CM

## 2021-03-16 DIAGNOSIS — M06.9 RHEUMATOID ARTHRITIS INVOLVING MULTIPLE SITES (HCC): ICD-10-CM

## 2021-03-16 RX ORDER — LISINOPRIL 40 MG/1
TABLET ORAL
Qty: 90 TABLET | Refills: 0 | Status: SHIPPED | OUTPATIENT
Start: 2021-03-16 | End: 2021-06-08

## 2021-03-16 RX ORDER — PEN NEEDLE, DIABETIC 32GX 5/32"
NEEDLE, DISPOSABLE MISCELLANEOUS
Qty: 100 EACH | Refills: 0 | Status: SHIPPED | OUTPATIENT
Start: 2021-03-16 | End: 2022-07-06

## 2021-03-16 RX ORDER — LISINOPRIL 40 MG/1
TABLET ORAL
Qty: 90 TABLET | Refills: 0 | Status: SHIPPED | OUTPATIENT
Start: 2021-03-16 | End: 2021-04-13

## 2021-04-07 ENCOUNTER — RA CDI HCC (OUTPATIENT)
Dept: OTHER | Facility: HOSPITAL | Age: 71
End: 2021-04-07

## 2021-04-09 DIAGNOSIS — E11.9 TYPE 2 DIABETES MELLITUS WITHOUT COMPLICATION, WITHOUT LONG-TERM CURRENT USE OF INSULIN (HCC): ICD-10-CM

## 2021-04-09 DIAGNOSIS — E78.2 MIXED HYPERLIPIDEMIA: ICD-10-CM

## 2021-04-11 RX ORDER — ATORVASTATIN CALCIUM 20 MG/1
TABLET, FILM COATED ORAL
Qty: 90 TABLET | Refills: 0 | Status: SHIPPED | OUTPATIENT
Start: 2021-04-11 | End: 2021-06-08

## 2021-04-11 RX ORDER — LANCETS 33 GAUGE
EACH MISCELLANEOUS
Qty: 200 EACH | Refills: 0 | Status: SHIPPED | OUTPATIENT
Start: 2021-04-11 | End: 2022-05-06 | Stop reason: SDUPTHER

## 2021-04-12 ENCOUNTER — APPOINTMENT (OUTPATIENT)
Dept: LAB | Facility: HOSPITAL | Age: 71
End: 2021-04-12
Payer: COMMERCIAL

## 2021-04-12 DIAGNOSIS — M06.9 RHEUMATOID ARTHRITIS INVOLVING MULTIPLE SITES (HCC): ICD-10-CM

## 2021-04-12 DIAGNOSIS — E11.65 TYPE 2 DIABETES MELLITUS WITH HYPERGLYCEMIA, WITH LONG-TERM CURRENT USE OF INSULIN (HCC): ICD-10-CM

## 2021-04-12 DIAGNOSIS — Z79.4 TYPE 2 DIABETES MELLITUS WITH HYPERGLYCEMIA, WITH LONG-TERM CURRENT USE OF INSULIN (HCC): ICD-10-CM

## 2021-04-12 DIAGNOSIS — E53.8 VITAMIN B12 DEFICIENCY: ICD-10-CM

## 2021-04-12 DIAGNOSIS — M79.672 LEFT FOOT PAIN: ICD-10-CM

## 2021-04-12 DIAGNOSIS — E03.9 HYPOTHYROIDISM, UNSPECIFIED TYPE: ICD-10-CM

## 2021-04-12 DIAGNOSIS — E78.2 MIXED HYPERLIPIDEMIA: ICD-10-CM

## 2021-04-12 DIAGNOSIS — E55.9 VITAMIN D DEFICIENCY: ICD-10-CM

## 2021-04-12 DIAGNOSIS — R39.9 UTI SYMPTOMS: ICD-10-CM

## 2021-04-12 LAB
25(OH)D3 SERPL-MCNC: 45.5 NG/ML (ref 30–100)
ALBUMIN SERPL BCP-MCNC: 3.9 G/DL (ref 3.5–5)
ALP SERPL-CCNC: 100 U/L (ref 46–116)
ALT SERPL W P-5'-P-CCNC: 44 U/L (ref 12–78)
ANION GAP SERPL CALCULATED.3IONS-SCNC: 9 MMOL/L (ref 4–13)
AST SERPL W P-5'-P-CCNC: 26 U/L (ref 5–45)
BACTERIA UR QL AUTO: ABNORMAL /HPF
BASOPHILS # BLD AUTO: 0.06 THOUSANDS/ΜL (ref 0–0.1)
BASOPHILS NFR BLD AUTO: 1 % (ref 0–1)
BILIRUB SERPL-MCNC: 0.48 MG/DL (ref 0.2–1)
BILIRUB UR QL STRIP: NEGATIVE
BUN SERPL-MCNC: 14 MG/DL (ref 5–25)
CALCIUM SERPL-MCNC: 9.7 MG/DL (ref 8.3–10.1)
CHLORIDE SERPL-SCNC: 95 MMOL/L (ref 100–108)
CHOLEST SERPL-MCNC: 242 MG/DL (ref 50–200)
CLARITY UR: ABNORMAL
CO2 SERPL-SCNC: 33 MMOL/L (ref 21–32)
COLOR UR: ABNORMAL
CREAT SERPL-MCNC: 0.92 MG/DL (ref 0.6–1.3)
CREAT UR-MCNC: 170 MG/DL
EOSINOPHIL # BLD AUTO: 0.26 THOUSAND/ΜL (ref 0–0.61)
EOSINOPHIL NFR BLD AUTO: 4 % (ref 0–6)
ERYTHROCYTE [DISTWIDTH] IN BLOOD BY AUTOMATED COUNT: 15.7 % (ref 11.6–15.1)
EST. AVERAGE GLUCOSE BLD GHB EST-MCNC: 246 MG/DL
GFR SERPL CREATININE-BSD FRML MDRD: 62 ML/MIN/1.73SQ M
GLUCOSE P FAST SERPL-MCNC: 291 MG/DL (ref 65–99)
GLUCOSE UR STRIP-MCNC: ABNORMAL MG/DL
HBA1C MFR BLD: 10.2 %
HCT VFR BLD AUTO: 41.1 % (ref 34.8–46.1)
HDLC SERPL-MCNC: 71 MG/DL
HGB BLD-MCNC: 12.9 G/DL (ref 11.5–15.4)
HGB UR QL STRIP.AUTO: ABNORMAL
IMM GRANULOCYTES # BLD AUTO: 0.03 THOUSAND/UL (ref 0–0.2)
IMM GRANULOCYTES NFR BLD AUTO: 0 % (ref 0–2)
KETONES UR STRIP-MCNC: NEGATIVE MG/DL
LDLC SERPL CALC-MCNC: 130 MG/DL (ref 0–100)
LEUKOCYTE ESTERASE UR QL STRIP: ABNORMAL
LYMPHOCYTES # BLD AUTO: 2.56 THOUSANDS/ΜL (ref 0.6–4.47)
LYMPHOCYTES NFR BLD AUTO: 37 % (ref 14–44)
MCH RBC QN AUTO: 31.8 PG (ref 26.8–34.3)
MCHC RBC AUTO-ENTMCNC: 31.4 G/DL (ref 31.4–37.4)
MCV RBC AUTO: 101 FL (ref 82–98)
MICROALBUMIN UR-MCNC: 610 MG/L (ref 0–20)
MICROALBUMIN/CREAT 24H UR: 359 MG/G CREATININE (ref 0–30)
MONOCYTES # BLD AUTO: 0.84 THOUSAND/ΜL (ref 0.17–1.22)
MONOCYTES NFR BLD AUTO: 12 % (ref 4–12)
NEUTROPHILS # BLD AUTO: 3.22 THOUSANDS/ΜL (ref 1.85–7.62)
NEUTS SEG NFR BLD AUTO: 46 % (ref 43–75)
NITRITE UR QL STRIP: NEGATIVE
NON-SQ EPI CELLS URNS QL MICRO: ABNORMAL /HPF
NONHDLC SERPL-MCNC: 171 MG/DL
NRBC BLD AUTO-RTO: 0 /100 WBCS
OTHER STN SPEC: ABNORMAL
PH UR STRIP.AUTO: 5.5 [PH]
PLATELET # BLD AUTO: 315 THOUSANDS/UL (ref 149–390)
PMV BLD AUTO: 10.5 FL (ref 8.9–12.7)
POTASSIUM SERPL-SCNC: 3.8 MMOL/L (ref 3.5–5.3)
PROT SERPL-MCNC: 8 G/DL (ref 6.4–8.2)
PROT UR STRIP-MCNC: ABNORMAL MG/DL
RBC # BLD AUTO: 4.06 MILLION/UL (ref 3.81–5.12)
RBC #/AREA URNS AUTO: ABNORMAL /HPF
SODIUM SERPL-SCNC: 137 MMOL/L (ref 136–145)
SP GR UR STRIP.AUTO: >=1.03 (ref 1–1.03)
TRIGL SERPL-MCNC: 206 MG/DL
TSH SERPL DL<=0.05 MIU/L-ACNC: 33.43 UIU/ML (ref 0.36–3.74)
URATE SERPL-MCNC: 5.2 MG/DL (ref 2–6.8)
UROBILINOGEN UR QL STRIP.AUTO: 0.2 E.U./DL
VIT B12 SERPL-MCNC: 687 PG/ML (ref 100–900)
WBC # BLD AUTO: 6.97 THOUSAND/UL (ref 4.31–10.16)
WBC #/AREA URNS AUTO: ABNORMAL /HPF

## 2021-04-12 PROCEDURE — 84550 ASSAY OF BLOOD/URIC ACID: CPT

## 2021-04-12 PROCEDURE — 80053 COMPREHEN METABOLIC PANEL: CPT

## 2021-04-12 PROCEDURE — 83036 HEMOGLOBIN GLYCOSYLATED A1C: CPT

## 2021-04-12 PROCEDURE — 81001 URINALYSIS AUTO W/SCOPE: CPT | Performed by: FAMILY MEDICINE

## 2021-04-12 PROCEDURE — 85025 COMPLETE CBC W/AUTO DIFF WBC: CPT

## 2021-04-12 PROCEDURE — 36415 COLL VENOUS BLD VENIPUNCTURE: CPT

## 2021-04-12 PROCEDURE — 86618 LYME DISEASE ANTIBODY: CPT

## 2021-04-12 PROCEDURE — 82043 UR ALBUMIN QUANTITATIVE: CPT | Performed by: FAMILY MEDICINE

## 2021-04-12 PROCEDURE — 82306 VITAMIN D 25 HYDROXY: CPT

## 2021-04-12 PROCEDURE — 82570 ASSAY OF URINE CREATININE: CPT | Performed by: FAMILY MEDICINE

## 2021-04-12 PROCEDURE — 84443 ASSAY THYROID STIM HORMONE: CPT

## 2021-04-12 PROCEDURE — 86617 LYME DISEASE ANTIBODY: CPT

## 2021-04-12 PROCEDURE — 87106 FUNGI IDENTIFICATION YEAST: CPT

## 2021-04-12 PROCEDURE — 87086 URINE CULTURE/COLONY COUNT: CPT

## 2021-04-12 PROCEDURE — 80061 LIPID PANEL: CPT

## 2021-04-12 PROCEDURE — 82607 VITAMIN B-12: CPT

## 2021-04-13 ENCOUNTER — OFFICE VISIT (OUTPATIENT)
Dept: INTERNAL MEDICINE CLINIC | Facility: CLINIC | Age: 71
End: 2021-04-13
Payer: COMMERCIAL

## 2021-04-13 VITALS
TEMPERATURE: 96.1 F | OXYGEN SATURATION: 96 % | HEIGHT: 65 IN | DIASTOLIC BLOOD PRESSURE: 78 MMHG | SYSTOLIC BLOOD PRESSURE: 124 MMHG | HEART RATE: 77 BPM | BODY MASS INDEX: 44 KG/M2 | WEIGHT: 264.1 LBS

## 2021-04-13 DIAGNOSIS — E03.9 HYPOTHYROIDISM, UNSPECIFIED TYPE: ICD-10-CM

## 2021-04-13 DIAGNOSIS — J96.11 CHRONIC RESPIRATORY FAILURE WITH HYPOXIA (HCC): ICD-10-CM

## 2021-04-13 DIAGNOSIS — B37.49 CANDIDAL URINARY TRACT INFECTION: ICD-10-CM

## 2021-04-13 DIAGNOSIS — E11.65 TYPE 2 DIABETES MELLITUS WITH HYPERGLYCEMIA, WITH LONG-TERM CURRENT USE OF INSULIN (HCC): Primary | ICD-10-CM

## 2021-04-13 DIAGNOSIS — J43.2 CENTRILOBULAR EMPHYSEMA (HCC): ICD-10-CM

## 2021-04-13 DIAGNOSIS — E78.2 MIXED HYPERLIPIDEMIA: ICD-10-CM

## 2021-04-13 DIAGNOSIS — I10 ESSENTIAL HYPERTENSION: ICD-10-CM

## 2021-04-13 DIAGNOSIS — M06.9 RHEUMATOID ARTHRITIS INVOLVING MULTIPLE SITES, UNSPECIFIED WHETHER RHEUMATOID FACTOR PRESENT (HCC): ICD-10-CM

## 2021-04-13 DIAGNOSIS — E66.01 MORBID OBESITY WITH BMI OF 40.0-44.9, ADULT (HCC): ICD-10-CM

## 2021-04-13 DIAGNOSIS — Z79.4 TYPE 2 DIABETES MELLITUS WITH HYPERGLYCEMIA, WITH LONG-TERM CURRENT USE OF INSULIN (HCC): Primary | ICD-10-CM

## 2021-04-13 DIAGNOSIS — Z01.419 WELL WOMAN EXAM WITH ROUTINE GYNECOLOGICAL EXAM: ICD-10-CM

## 2021-04-13 LAB
B BURGDOR IGG+IGM SER-ACNC: 391
BACTERIA UR CULT: ABNORMAL
BACTERIA UR CULT: ABNORMAL

## 2021-04-13 PROCEDURE — 99214 OFFICE O/P EST MOD 30 MIN: CPT | Performed by: FAMILY MEDICINE

## 2021-04-13 RX ORDER — LEVOTHYROXINE SODIUM 0.15 MG/1
150 TABLET ORAL DAILY
Qty: 90 TABLET | Refills: 1 | Status: SHIPPED | OUTPATIENT
Start: 2021-04-13 | End: 2021-06-08 | Stop reason: SDUPTHER

## 2021-04-13 RX ORDER — SEMAGLUTIDE 1.34 MG/ML
0.5 INJECTION, SOLUTION SUBCUTANEOUS WEEKLY
Qty: 6 PEN | Refills: 1 | Status: SHIPPED | OUTPATIENT
Start: 2021-04-13 | End: 2021-07-12 | Stop reason: SDUPTHER

## 2021-04-13 RX ORDER — FLUCONAZOLE 150 MG/1
150 TABLET ORAL DAILY
Qty: 7 TABLET | Refills: 0 | Status: SHIPPED | OUTPATIENT
Start: 2021-04-13 | End: 2021-04-20

## 2021-04-14 LAB
B BURGDOR IGG PATRN SER IB-IMP: POSITIVE
B BURGDOR IGM PATRN SER IB-IMP: NEGATIVE
B BURGDOR18KD IGG SER QL IB: PRESENT
B BURGDOR23KD IGG SER QL IB: PRESENT
B BURGDOR23KD IGM SER QL IB: PRESENT
B BURGDOR28KD IGG SER QL IB: ABNORMAL
B BURGDOR30KD IGG SER QL IB: PRESENT
B BURGDOR39KD IGG SER QL IB: PRESENT
B BURGDOR39KD IGM SER QL IB: ABNORMAL
B BURGDOR41KD IGG SER QL IB: PRESENT
B BURGDOR41KD IGM SER QL IB: ABNORMAL
B BURGDOR45KD IGG SER QL IB: ABNORMAL
B BURGDOR58KD IGG SER QL IB: PRESENT
B BURGDOR66KD IGG SER QL IB: ABNORMAL
B BURGDOR93KD IGG SER QL IB: ABNORMAL

## 2021-04-14 NOTE — PROGRESS NOTES
Diabetic Foot Exam    Patient's shoes and socks removed  Right Foot/Ankle   Right Foot Inspection  Skin Exam: skin intact and dry skin no warmth, no callus, no erythema, no maceration, no abnormal color, no pre-ulcer, no ulcer and no callus                          Toe Exam: ROM and strength within normal limits  Sensory       Monofilament testing: diminished  Vascular  Capillary refills: < 3 seconds  The right DP pulse is 1+  The right PT pulse is 0  Left Foot/Ankle  Left Foot Inspection  Skin Exam: skin intact and dry skinno warmth, no erythema, no maceration, normal color, no pre-ulcer, no ulcer and no callus                         Toe Exam: ROM and strength within normal limits                   Sensory       Monofilament: diminished  Vascular  Capillary refills: < 3 seconds  The left DP pulse is 1+  The left PT pulse is 0  Assign Risk Category:  No deformity present;  Loss of protective sensation; Weak pulses       Risk: 2

## 2021-04-14 NOTE — PROGRESS NOTES
Assessment/Plan:    No problem-specific Assessment & Plan notes found for this encounter  Diagnoses and all orders for this visit:    Type 2 diabetes mellitus with hyperglycemia, with long-term current use of insulin ([facility] )  -     Ambulatory referral to Ophthalmology; Future  -     Semaglutide,0 25 or 0 5MG/DOS, (Ozempic, 0 25 or 0 5 MG/DOSE,) 2 MG/1 5ML SOPN; Inject 0 5 mg under the skin once a week    Hypothyroidism, unspecified type  -     TSH, 3rd generation; Future  -     levothyroxine 150 mcg tablet; Take 1 tablet (150 mcg total) by mouth daily  -     TSH, 3rd generation; Future    Centrilobular emphysema (HCC)    Chronic respiratory failure with hypoxia (HCC)    Essential hypertension    Rheumatoid arthritis involving multiple sites, unspecified whether rheumatoid factor present ([facility] )    Mixed hyperlipidemia    Morbid obesity with BMI of 40 0-44 9, adult [facility])    Well woman exam with routine gynecological exam  -     Ambulatory referral to Gynecology; Future    Candidal urinary tract infection  -     fluconazole (DIFLUCAN) 150 mg tablet; Take 1 tablet (150 mg total) by mouth daily for 7 doses         orders and recommendations as noted above  Reviewed her recent laboratory testing with her  Her blood work is significantly above normal   Reviewed her medications with her and she states that she is taking all some but is unsure only of the Fosamax  Advised her to take the list of her medications and check them against the medication she has at home to confirm this especially since her laboratory testing is so abnormal   TSH is significantly elevated at approximately 33  Discussed with her taking her levothyroxine regularly  She does admit that she has missed a few doses but not many  Will increase the levothyroxine to 150 mcg  This is likely contributing to some of the other abnormal labs as well as some of her overall symptoms  Will recheck a TSH in 6-8 weeks    Will have her follow up in about 8 weeks following this  Hemoglobin A1c significantly more elevated at above 10  Discussed with her her current medications  She states that she is taking the [place] and the [place]uvia regularly  Advised her to confirm these medications when she returns home  [facility] [address] [facility]  Potential side effects discussed  Watch for any hypoglycemic episodes although this is unlikely with how high her blood sugars have been  Follow blood sugars at home  Stressed to her the importance of following with Ophthalmology regularly  Check feet daily  Try to increase activity level although this is difficult since she has such increased pain  Continue follow-up with Rheumatology  Hopefully the [employer] will be covered on the patient assistance program   She had been much better controlled on this along with the methotrexate  Elevated blood sugars as well as the significantly elevated TSH likely contributing somewhat to her pain  Continue with the oxygen  Continue with the Advair as as previously  Continue with the nebulizer treatments  Watch for any increased shortness of breath or wheezing  Elevated blood sugars likely contributing to the recurrent issues with urinary tract infections especially with [place]  Will treat her again with the fluconazole  Will likely need referral to Urology especially since this is recurrent  Stressed to her the importance of following up with Podiatry regularly  She is overdue for this and her nails are quite long  Will have her follow up in about 6-8 weeks  Will likely have her continue follow-up every 6-8 weeks until her medical conditions are better controlled  Subjective:      Patient ID: Evan Packer is a 66 y o  female  She presents for follow-up  She has generally not been feeling well  She has noticed she has been more tired  Voice has been more hoarse  She feels that this is usually associated with her thyroid being off  Blood sugars have been much more elevated  Not eating quite as healthy  Has not been able to get the Jeronimo Akers because of cost   They are trying to get this approved through a patient assistance program   Joint pain has worsened significantly  Has a lot of pain into her legs but also into the back  Continues with the methotrexate and the folic acid  Continues to follow-up with Rheumatology  States that she has been taking her medications regularly despite her laboratory testing being significantly abnormal   Does admit that she has missed a few doses of the levothyroxine but has taken it at least 80-90% of the time  States that she is taking her atorvastatin regularly  Breathing has been stable  Slightly more short of breath with exertion but feels this is related to her weight  Blood sugars have been more elevated  Denies any chest pain or palpitations  Urination has been frequent and urine has appeared cloudy  Is awakening at least 3-5 times a night to urinate  Resting more during the day  Denies any nausea, vomiting, or diarrhea  Denies any abdominal pain  Vision has been somewhat more blurry  She admits that she is overdue to follow-up with Podiatry        The following portions of the patient's history were reviewed and updated as appropriate:   She  has a past medical history of Arthritis, Arthritis, Cancer ([address] ), Candidiasis of skin, Cervical cancer ([address] ), Chronic respiratory failure with hypoxia and hypercapnia ([address] ) (4/20/2019), COPD (chronic obstructive pulmonary disease) ([address] ), Current chronic use of systemic steroids, Degenerative arthritis of left knee, Diabetes mellitus ([address] ), Disease of thyroid gland, Fibromyalgia, Fistula of knee, GERD (gastroesophageal reflux disease), Hyperlipidemia, Hypertension, Medial meniscus tear, Migraine, Obesity, Obesity, Osteoarthritis, Osteopenia, Pneumonia, Psychiatric disorder, Rheumatoid arthritis ([address] ), Rheumatoid arthritis ([address] ), Sepsis ([address] ), Tick bite, Vitamin B12 deficiency, and Vitamin D deficiency  She   Patient Active Problem List    Diagnosis Date Noted    Primary insomnia 09/08/2020    Migraine without aura and without status migrainosus, not intractable 07/28/2020    Postherpetic neuralgia 29/25/4705    Diastolic dysfunction 62/17/9268    Macrocytic anemia 01/14/2020    Hypoxemia requiring supplemental oxygen 06/19/2019    History of exposure to asbestos 05/20/2019    Chronic respiratory failure with hypoxia (HCC) 04/20/2019    Hyponatremia 04/20/2019    Candidal dermatitis 12/28/2018    Loculated pleural effusion 12/25/2018    Constipation 11/29/2017    Renal cyst 11/29/2017    Candidiasis, cutaneous 06/30/2017    Vitamin B12 deficiency 11/10/2016    BMI 40 0-44 9, adult ([facility] ) 11/05/2016    Morbid obesity with BMI of 40 0-44 9, adult ([facility] ) 11/04/2016    Centrilobular emphysema ([place] [address] ) 11/03/2016    Type 2 diabetes mellitus with hyperglycemia, with long-term current use of insulin ([place] [address] ) 11/03/2016    Hypothyroidism 11/03/2016    Rheumatoid arthritis ([facility] ) 11/03/2016    Essential hypertension 11/03/2016    Hyperlipidemia 11/03/2016    GERD (gastroesophageal reflux disease) 11/03/2016    Hypoalbuminemia 11/03/2016    Hepatic steatosis 11/03/2016    Atelectasis 11/03/2016    Anxiety 06/02/2014    Fibromyalgia 01/28/2014    Osteopenia 09/17/2012    Chronic obstructive pulmonary disease with acute exacerbation ([place] [address] ) 05/29/2012    Osteoarthritis 05/29/2012    Vitamin D deficiency 05/29/2012     She  has a past surgical history that includes Hysterectomy; Knee arthroscopy; Cataract extraction (Bilateral); Cholecystectomy; Joint replacement; Tubal ligation; Neuroplasty / transposition median nerve at carpal tunnel; Eye surgery; and IR thoracentesis (5/31/2019)  Her family history includes Asthma in her family; Cancer in her family; Diabetes in her family; Hypertension in her family; Stroke in her mother  She  reports that she quit smoking about 8 years ago   Her smoking use included cigarettes and e-cigarettes  She has a 48 00 pack-year smoking history  She has never used smokeless tobacco  She reports that she does not drink alcohol or use drugs  Current Outpatient Medications   Medication Sig Dispense Refill    acetaminophen (TYLENOL) 325 mg tablet Take 2 tablets (650 mg total) by mouth every 6 (six) hours as needed for mild pain, headaches or fever  0    albuterol (2 5 mg/3 mL) 0 083 % nebulizer solution USE 1 UNIT DOSE IN NEBULIZER EVERY 4 HOURS AS NEEDED   albuterol (PROVENTIL HFA,VENTOLIN HFA) 90 mcg/act inhaler Inhale 2 puffs every 6 (six) hours as needed for wheezing 1 Inhaler 0    atorvastatin (LIPITOR) 20 mg tablet TAKE ONE TABLET BY MOUTH EVERY DAY 90 tablet 0    BD Pen Needle Mamta U/F 32G X 4 MM MISC USE AS DIRECTED 100 each 0    BD Pen Needle Mamta U/F 32G X 4 MM MISC USE AS DIRECTED 100 each 0    Blood Glucose Monitoring Suppl (ONE TOUCH ULTRA 2) w/Device KIT by Does not apply route 2 (two) times a day 1 each 0    DULoxetine (CYMBALTA) 20 mg capsule TAKE ONE CAPSULE BY MOUTH ONCE DAILY 90 capsule 0    ergocalciferol (VITAMIN D2) 50,000 units TAKE ONE CAPSULE BY MOUTH WEEKLY 12 capsule 0    fluticasone-salmeterol (ADVAIR) 250-50 mcg/dose Inhale 1 puff every 12 (twelve) hours       folic acid (FOLVITE) 1 mg tablet TAKE ONE TABLET BY MOUTH ONCE DAILY 90 tablet 3    furosemide (LASIX) 40 mg tablet TAKE ONE TABLET BY MOUTH EVERY DAY (Patient taking differently: daily as needed ) 90 tablet 3    Insulin Pen Needle 33G X 4 MM MISC by Does not apply route daily 100 each 5    ipratropium-albuterol (DUO-NEB) 0 5-2 5 mg/3 mL nebulizer solution Take 1 vial (3 mL total) by nebulization every 6 (six) hours as needed for wheezing or shortness of breath 120 vial 0    JANUVIA 100 MG tablet TAKE 1 TABLET BY MOUTH DAILY   90 tablet 0    Lancets (OneTouch Delica Plus [id number]) MISC TEST 2 TIMES A  each 0    levothyroxine 150 mcg tablet Take 1 tablet (150 mcg total) by mouth daily 90 tablet 1    lisinopril (ZESTRIL) 40 mg tablet TAKE ONE TABLET BY MOUTH EVERY DAY 90 tablet 0    methotrexate 2 5 mg tablet TAKE 8 TABLETS BY MOUTH EVERY SUNDAY 96 tablet 0    mirtazapine (REMERON) 15 mg tablet Take 1 tablet (15 mg total) by mouth daily at bedtime 90 tablet 3    mometasone (ELOCON) 0 1 % ointment Apply topically 2 (two) times a day as needed (itching) 45 g 1    nystatin (MYCOSTATIN) cream Apply topically 2 (two) times a day 30 g 2    nystatin (MYCOSTATIN) powder Apply topically 3 (three) times a day as needed (fungal skin rash)      ONE TOUCH ULTRA TEST test strip Tests twice a day 100 each 5    pantoprazole (PROTONIX) 40 mg tablet TAKE ONE TABLET BY MOUTH ONCE DAILY 90 tablet 0    Polyethylene Glycol 3350-GRX POWD Take by mouth daily at bedtime as needed (constipation) 850 g 0    tiotropium (SPIRIVA RESPIMAT) 2 5 MCG/ACT AERS inhaler Inhale 1 puff daily at bedtime 3 Inhaler 3    TRESIBA FLEXTOUCH 100 units/mL injection pen Inject 30 Units under the skin daily 15 mL 3    alendronate (FOSAMAX) 70 mg tablet Take 70 mg by mouth every 7 days      butalbital-acetaminophen-caffeine (Esgic) -40 mg per tablet Take 1 tablet by mouth every 4 (four) hours as needed for headaches (Patient not taking: Reported on 4/13/2021) 30 tablet 0    fluconazole (DIFLUCAN) 150 mg tablet Take 1 tablet (150 mg total) by mouth daily for 7 doses 7 tablet 0    Semaglutide,0 25 or 0 5MG/DOS, (Ozempic, 0 25 or 0 5 MG/DOSE,) 2 MG/1 5ML SOPN Inject 0 5 mg under the skin once a week 6 pen 1    Tofacitinib Citrate (XELJANZ XR) 11 MG TB24 Take 1 tablet by mouth daily      vitamin B-12 (VITAMIN B-12) 500 MCG tablet Take 1 tablet (500 mcg total) by mouth daily (Patient not taking: Reported on 4/13/2021)       No current facility-administered medications for this visit        Current Outpatient Medications on File Prior to Visit   Medication Sig    acetaminophen (TYLENOL) 325 mg tablet Take 2 tablets (650 mg total) by mouth every 6 (six) hours as needed for mild pain, headaches or fever    albuterol (2 5 mg/3 mL) 0 083 % nebulizer solution USE 1 UNIT DOSE IN NEBULIZER EVERY 4 HOURS AS NEEDED   albuterol (PROVENTIL HFA,VENTOLIN HFA) 90 mcg/act inhaler Inhale 2 puffs every 6 (six) hours as needed for wheezing    atorvastatin (LIPITOR) 20 mg tablet TAKE ONE TABLET BY MOUTH EVERY DAY    BD Pen Needle Mamta U/F 32G X 4 MM MISC USE AS DIRECTED    BD Pen Needle Mamta U/F 32G X 4 MM MISC USE AS DIRECTED    Blood Glucose Monitoring Suppl (ONE TOUCH ULTRA 2) w/Device KIT by Does not apply route 2 (two) times a day    DULoxetine (CYMBALTA) 20 mg capsule TAKE ONE CAPSULE BY MOUTH ONCE DAILY    ergocalciferol (VITAMIN D2) 50,000 units TAKE ONE CAPSULE BY MOUTH WEEKLY    fluticasone-salmeterol (ADVAIR) 250-50 mcg/dose Inhale 1 puff every 12 (twelve) hours     folic acid (FOLVITE) 1 mg tablet TAKE ONE TABLET BY MOUTH ONCE DAILY    furosemide (LASIX) 40 mg tablet TAKE ONE TABLET BY MOUTH EVERY DAY (Patient taking differently: daily as needed )    Insulin Pen Needle 33G X 4 MM MISC by Does not apply route daily    ipratropium-albuterol (DUO-NEB) 0 5-2 5 mg/3 mL nebulizer solution Take 1 vial (3 mL total) by nebulization every 6 (six) hours as needed for wheezing or shortness of breath    JANUVIA 100 MG tablet TAKE 1 TABLET BY MOUTH DAILY      Lancets (OneTouch Delica Plus [id number]) MISC TEST 2 TIMES A DAY    lisinopril (ZESTRIL) 40 mg tablet TAKE ONE TABLET BY MOUTH EVERY DAY    methotrexate 2 5 mg tablet TAKE 8 TABLETS BY MOUTH EVERY SUNDAY    mirtazapine (REMERON) 15 mg tablet Take 1 tablet (15 mg total) by mouth daily at bedtime    mometasone (ELOCON) 0 1 % ointment Apply topically 2 (two) times a day as needed (itching)    nystatin (MYCOSTATIN) cream Apply topically 2 (two) times a day    nystatin (MYCOSTATIN) powder Apply topically 3 (three) times a day as needed (fungal skin rash)    ONE TOUCH ULTRA TEST test strip Tests twice a day    pantoprazole (PROTONIX) 40 mg tablet TAKE ONE TABLET BY MOUTH ONCE DAILY    Polyethylene Glycol 3350-GRX POWD Take by mouth daily at bedtime as needed (constipation)    tiotropium (SPIRIVA RESPIMAT) 2 5 MCG/ACT AERS inhaler Inhale 1 puff daily at bedtime    TRESIBA FLEXTOUCH 100 units/mL injection pen Inject 30 Units under the skin daily    [DISCONTINUED] levothyroxine 125 mcg tablet TAKE ONE TABLET BY MOUTH EVERY DAY IN THE AM    alendronate (FOSAMAX) 70 mg tablet Take 70 mg by mouth every 7 days    butalbital-acetaminophen-caffeine (Esgic) -40 mg per tablet Take 1 tablet by mouth every 4 (four) hours as needed for headaches (Patient not taking: Reported on 4/13/2021)    Tofacitinib Citrate (XELJANZ XR) 11 MG TB24 Take 1 tablet by mouth daily    vitamin B-12 (VITAMIN B-12) 500 MCG tablet Take 1 tablet (500 mcg total) by mouth daily (Patient not taking: Reported on 4/13/2021)    [DISCONTINUED] lactulose (CHRONULAC) 10 g/15 mL solution 1 to 2 tablespoons (15mL) daily if needed for constipation   [DISCONTINUED] lisinopril (ZESTRIL) 40 mg tablet TAKE ONE TABLET BY MOUTH EVERY DAY     No current facility-administered medications on file prior to visit  She has No Known Allergies       Review of Systems   Constitutional: Positive for activity change and fatigue  Negative for appetite change, chills and fever  HENT: Positive for voice change  Negative for congestion, rhinorrhea and sore throat  Eyes: Negative for visual disturbance  Respiratory: Positive for shortness of breath  Negative for cough and chest tightness  Cardiovascular: Negative for chest pain and palpitations  Gastrointestinal: Negative for abdominal pain, blood in stool, diarrhea, nausea and vomiting  Endocrine: Positive for polyuria  Negative for polydipsia and polyphagia  Genitourinary: Positive for dysuria, frequency and urgency     Musculoskeletal: Positive for arthralgias, back pain, gait problem and joint swelling  Skin: Negative for color change  Neurological: Negative for dizziness, light-headedness and headaches  Hematological: Does not bruise/bleed easily  Psychiatric/Behavioral: Positive for decreased concentration and dysphoric mood  Negative for confusion and sleep disturbance  The patient is not nervous/anxious  Objective:      /78 (BP Location: Left arm, Patient Position: Sitting, Cuff Size: Large)   Pulse 77   Temp (!) 96 1 °F (35 6 °C)   Ht 5' 5" (1 651 m)   Wt 120 kg (264 lb 1 6 oz)   SpO2 96% Comment: 3 liters  BMI 43 95 kg/m²          Physical Exam  Vitals signs and nursing note reviewed  Constitutional:       General: She is not in acute distress  Appearance: She is well-developed and well-groomed  She is morbidly obese  HENT:      Head: Normocephalic and atraumatic  Comments:  Hoarse voice  Eyes:      General:         Right eye: No discharge  Left eye: No discharge  Conjunctiva/sclera: Conjunctivae normal       Pupils: Pupils are equal, round, and reactive to light  Neck:      Vascular: No carotid bruit  Cardiovascular:      Rate and Rhythm: Normal rate and regular rhythm  Heart sounds: Normal heart sounds  No murmur  No friction rub  No gallop  Pulmonary:      Effort: No respiratory distress  Breath sounds: Decreased breath sounds present  No wheezing, rhonchi or rales  Abdominal:      General: Bowel sounds are normal  There is no distension  Tenderness: There is no abdominal tenderness  Musculoskeletal:      Comments:  Degenerative changes bilateral hands   Lymphadenopathy:      Cervical: No cervical adenopathy  Skin:     General: Skin is warm and dry  Neurological:      Mental Status: She is alert and oriented to person, place, and time  Psychiatric:         Mood and Affect: Mood normal  Affect is flat  Behavior: Behavior normal  Behavior is cooperative  Comments:  Tired appearing         BMI Counseling: Body mass index is 43 95 kg/m²  The BMI is above normal  Nutrition recommendations include encouraging healthy choices of fruits and vegetables, consuming healthier snacks, limiting drinks that contain sugar, moderation in carbohydrate intake and reducing intake of cholesterol  Exercise recommendations include exercising 3-5 times per week

## 2021-04-16 ENCOUNTER — TELEPHONE (OUTPATIENT)
Dept: INTERNAL MEDICINE CLINIC | Facility: CLINIC | Age: 71
End: 2021-04-16

## 2021-04-16 NOTE — TELEPHONE ENCOUNTER
Received a call from [place] stating that she was told to look over her meds to see if she's taking everything  She doesn't have foxamax, esgic, Januvia, ipratropium-albuterol       Just wanted to let you know, and if any need to be sent in would like to go to [facility]

## 2021-04-19 DIAGNOSIS — E11.65 TYPE 2 DIABETES MELLITUS WITH HYPERGLYCEMIA, WITH LONG-TERM CURRENT USE OF INSULIN (HCC): ICD-10-CM

## 2021-04-19 DIAGNOSIS — Z79.4 TYPE 2 DIABETES MELLITUS WITH HYPERGLYCEMIA, WITH LONG-TERM CURRENT USE OF INSULIN (HCC): ICD-10-CM

## 2021-04-19 RX ORDER — INSULIN DEGLUDEC INJECTION 100 U/ML
INJECTION, SOLUTION SUBCUTANEOUS
Qty: 15 ML | Refills: 0 | Status: SHIPPED | OUTPATIENT
Start: 2021-04-19 | End: 2021-04-24

## 2021-04-21 DIAGNOSIS — E11.65 TYPE 2 DIABETES MELLITUS WITH HYPERGLYCEMIA, WITH LONG-TERM CURRENT USE OF INSULIN (HCC): Primary | ICD-10-CM

## 2021-04-21 DIAGNOSIS — Z79.4 TYPE 2 DIABETES MELLITUS WITH HYPERGLYCEMIA, WITH LONG-TERM CURRENT USE OF INSULIN (HCC): Primary | ICD-10-CM

## 2021-04-21 RX ORDER — BLOOD SUGAR DIAGNOSTIC
STRIP MISCELLANEOUS
Qty: 300 EACH | Refills: 3 | Status: SHIPPED | OUTPATIENT
Start: 2021-04-21 | End: 2022-05-06 | Stop reason: SDUPTHER

## 2021-04-21 NOTE — TELEPHONE ENCOUNTER
She should continue the same dose of the vitamin-D  Should be at least 0194-3877 units on a daily basis  How much is she taking currently? Is she taking vitamin B12 currently? If so how much is she taking?

## 2021-04-23 DIAGNOSIS — Z79.4 TYPE 2 DIABETES MELLITUS WITH HYPERGLYCEMIA, WITH LONG-TERM CURRENT USE OF INSULIN (HCC): ICD-10-CM

## 2021-04-23 DIAGNOSIS — E11.65 TYPE 2 DIABETES MELLITUS WITH HYPERGLYCEMIA, WITH LONG-TERM CURRENT USE OF INSULIN (HCC): ICD-10-CM

## 2021-04-24 RX ORDER — INSULIN DEGLUDEC INJECTION 100 U/ML
INJECTION, SOLUTION SUBCUTANEOUS
Qty: 30 ML | Refills: 3 | Status: SHIPPED | OUTPATIENT
Start: 2021-04-24 | End: 2021-12-22 | Stop reason: SDUPTHER

## 2021-04-24 RX ORDER — INSULIN DEGLUDEC INJECTION 100 U/ML
INJECTION, SOLUTION SUBCUTANEOUS
Qty: 15 ML | Refills: 0 | Status: SHIPPED | OUTPATIENT
Start: 2021-04-24 | End: 2021-06-21

## 2021-06-08 DIAGNOSIS — E03.9 HYPOTHYROIDISM, UNSPECIFIED TYPE: ICD-10-CM

## 2021-06-08 DIAGNOSIS — I10 ESSENTIAL HYPERTENSION: ICD-10-CM

## 2021-06-08 DIAGNOSIS — E55.9 VITAMIN D DEFICIENCY: ICD-10-CM

## 2021-06-08 DIAGNOSIS — K21.9 GASTROESOPHAGEAL REFLUX DISEASE: ICD-10-CM

## 2021-06-08 DIAGNOSIS — R60.9 PERIPHERAL EDEMA: ICD-10-CM

## 2021-06-08 DIAGNOSIS — M06.9 RHEUMATOID ARTHRITIS (HCC): ICD-10-CM

## 2021-06-08 DIAGNOSIS — E78.2 MIXED HYPERLIPIDEMIA: ICD-10-CM

## 2021-06-08 DIAGNOSIS — M06.9 RHEUMATOID ARTHRITIS INVOLVING MULTIPLE SITES (HCC): ICD-10-CM

## 2021-06-08 DIAGNOSIS — E03.9 HYPOTHYROIDISM, UNSPECIFIED TYPE: Primary | ICD-10-CM

## 2021-06-08 PROCEDURE — 4010F ACE/ARB THERAPY RXD/TAKEN: CPT | Performed by: FAMILY MEDICINE

## 2021-06-08 RX ORDER — LISINOPRIL 40 MG/1
TABLET ORAL
Qty: 90 TABLET | Refills: 0 | Status: SHIPPED | OUTPATIENT
Start: 2021-06-08 | End: 2022-07-05

## 2021-06-08 RX ORDER — ERGOCALCIFEROL 1.25 MG/1
CAPSULE ORAL
Qty: 12 CAPSULE | Refills: 0 | Status: SHIPPED | OUTPATIENT
Start: 2021-06-08 | End: 2021-10-28

## 2021-06-08 RX ORDER — LEVOTHYROXINE SODIUM 0.15 MG/1
150 TABLET ORAL DAILY
Qty: 90 TABLET | Refills: 3 | Status: SHIPPED | OUTPATIENT
Start: 2021-06-08 | End: 2022-07-05

## 2021-06-08 RX ORDER — PANTOPRAZOLE SODIUM 40 MG/1
TABLET, DELAYED RELEASE ORAL
Qty: 90 TABLET | Refills: 0 | Status: SHIPPED | OUTPATIENT
Start: 2021-06-08 | End: 2022-02-01

## 2021-06-08 RX ORDER — FUROSEMIDE 40 MG/1
TABLET ORAL
Qty: 90 TABLET | Refills: 0 | Status: SHIPPED | OUTPATIENT
Start: 2021-06-08

## 2021-06-08 RX ORDER — FOLIC ACID 1 MG/1
TABLET ORAL
Qty: 90 TABLET | Refills: 0 | Status: SHIPPED | OUTPATIENT
Start: 2021-06-08 | End: 2022-02-01

## 2021-06-08 RX ORDER — LEVOTHYROXINE SODIUM 0.12 MG/1
TABLET ORAL
Qty: 90 TABLET | Refills: 0 | Status: SHIPPED | OUTPATIENT
Start: 2021-06-08 | End: 2021-06-21

## 2021-06-08 RX ORDER — DULOXETIN HYDROCHLORIDE 20 MG/1
CAPSULE, DELAYED RELEASE ORAL
Qty: 90 CAPSULE | Refills: 0 | Status: SHIPPED | OUTPATIENT
Start: 2021-06-08 | End: 2022-02-01

## 2021-06-08 RX ORDER — ATORVASTATIN CALCIUM 20 MG/1
TABLET, FILM COATED ORAL
Qty: 90 TABLET | Refills: 0 | Status: SHIPPED | OUTPATIENT
Start: 2021-06-08 | End: 2022-07-05

## 2021-06-21 ENCOUNTER — OFFICE VISIT (OUTPATIENT)
Dept: INTERNAL MEDICINE CLINIC | Facility: CLINIC | Age: 71
End: 2021-06-21
Payer: COMMERCIAL

## 2021-06-21 ENCOUNTER — APPOINTMENT (OUTPATIENT)
Dept: LAB | Facility: HOSPITAL | Age: 71
End: 2021-06-21
Payer: COMMERCIAL

## 2021-06-21 VITALS
TEMPERATURE: 97.5 F | WEIGHT: 251.6 LBS | BODY MASS INDEX: 41.92 KG/M2 | HEART RATE: 96 BPM | SYSTOLIC BLOOD PRESSURE: 122 MMHG | DIASTOLIC BLOOD PRESSURE: 70 MMHG | OXYGEN SATURATION: 96 % | HEIGHT: 65 IN

## 2021-06-21 DIAGNOSIS — I10 ESSENTIAL HYPERTENSION: ICD-10-CM

## 2021-06-21 DIAGNOSIS — M06.9 RHEUMATOID ARTHRITIS INVOLVING MULTIPLE SITES, UNSPECIFIED WHETHER RHEUMATOID FACTOR PRESENT (HCC): ICD-10-CM

## 2021-06-21 DIAGNOSIS — B37.49 CANDIDAL UTI (URINARY TRACT INFECTION): ICD-10-CM

## 2021-06-21 DIAGNOSIS — B37.2 CANDIDAL DERMATITIS: ICD-10-CM

## 2021-06-21 DIAGNOSIS — Z79.4 TYPE 2 DIABETES MELLITUS WITH HYPERGLYCEMIA, WITH LONG-TERM CURRENT USE OF INSULIN (HCC): Primary | ICD-10-CM

## 2021-06-21 DIAGNOSIS — E78.2 MIXED HYPERLIPIDEMIA: ICD-10-CM

## 2021-06-21 DIAGNOSIS — E11.65 TYPE 2 DIABETES MELLITUS WITH HYPERGLYCEMIA, WITH LONG-TERM CURRENT USE OF INSULIN (HCC): Primary | ICD-10-CM

## 2021-06-21 DIAGNOSIS — E03.9 HYPOTHYROIDISM, UNSPECIFIED TYPE: ICD-10-CM

## 2021-06-21 DIAGNOSIS — Z79.4 TYPE 2 DIABETES MELLITUS WITH HYPERGLYCEMIA, WITH LONG-TERM CURRENT USE OF INSULIN (HCC): ICD-10-CM

## 2021-06-21 DIAGNOSIS — F51.01 PRIMARY INSOMNIA: ICD-10-CM

## 2021-06-21 DIAGNOSIS — E53.8 VITAMIN B12 DEFICIENCY: ICD-10-CM

## 2021-06-21 DIAGNOSIS — B37.2 CANDIDIASIS, CUTANEOUS: ICD-10-CM

## 2021-06-21 DIAGNOSIS — E11.65 TYPE 2 DIABETES MELLITUS WITH HYPERGLYCEMIA, WITH LONG-TERM CURRENT USE OF INSULIN (HCC): ICD-10-CM

## 2021-06-21 DIAGNOSIS — E55.9 VITAMIN D DEFICIENCY: ICD-10-CM

## 2021-06-21 LAB
25(OH)D3 SERPL-MCNC: 63.3 NG/ML (ref 30–100)
ALBUMIN SERPL BCP-MCNC: 3.5 G/DL (ref 3.5–5)
ALP SERPL-CCNC: 79 U/L (ref 46–116)
ALT SERPL W P-5'-P-CCNC: 55 U/L (ref 12–78)
ANION GAP SERPL CALCULATED.3IONS-SCNC: 6 MMOL/L (ref 4–13)
AST SERPL W P-5'-P-CCNC: 35 U/L (ref 5–45)
BASOPHILS # BLD AUTO: 0.05 THOUSANDS/ΜL (ref 0–0.1)
BASOPHILS NFR BLD AUTO: 1 % (ref 0–1)
BILIRUB SERPL-MCNC: 0.41 MG/DL (ref 0.2–1)
BUN SERPL-MCNC: 16 MG/DL (ref 5–25)
CALCIUM SERPL-MCNC: 9.5 MG/DL (ref 8.3–10.1)
CHLORIDE SERPL-SCNC: 101 MMOL/L (ref 100–108)
CHOLEST SERPL-MCNC: 170 MG/DL (ref 50–200)
CO2 SERPL-SCNC: 35 MMOL/L (ref 21–32)
CREAT SERPL-MCNC: 0.67 MG/DL (ref 0.6–1.3)
EOSINOPHIL # BLD AUTO: 0.26 THOUSAND/ΜL (ref 0–0.61)
EOSINOPHIL NFR BLD AUTO: 3 % (ref 0–6)
ERYTHROCYTE [DISTWIDTH] IN BLOOD BY AUTOMATED COUNT: 14 % (ref 11.6–15.1)
GFR SERPL CREATININE-BSD FRML MDRD: 87 ML/MIN/1.73SQ M
GLUCOSE P FAST SERPL-MCNC: 130 MG/DL (ref 65–99)
HCT VFR BLD AUTO: 42.1 % (ref 34.8–46.1)
HDLC SERPL-MCNC: 58 MG/DL
HGB BLD-MCNC: 13.1 G/DL (ref 11.5–15.4)
IMM GRANULOCYTES # BLD AUTO: 0.04 THOUSAND/UL (ref 0–0.2)
IMM GRANULOCYTES NFR BLD AUTO: 0 % (ref 0–2)
LDLC SERPL CALC-MCNC: 83 MG/DL (ref 0–100)
LYMPHOCYTES # BLD AUTO: 3.29 THOUSANDS/ΜL (ref 0.6–4.47)
LYMPHOCYTES NFR BLD AUTO: 32 % (ref 14–44)
MCH RBC QN AUTO: 31.5 PG (ref 26.8–34.3)
MCHC RBC AUTO-ENTMCNC: 31.1 G/DL (ref 31.4–37.4)
MCV RBC AUTO: 101 FL (ref 82–98)
MONOCYTES # BLD AUTO: 0.73 THOUSAND/ΜL (ref 0.17–1.22)
MONOCYTES NFR BLD AUTO: 7 % (ref 4–12)
NEUTROPHILS # BLD AUTO: 5.9 THOUSANDS/ΜL (ref 1.85–7.62)
NEUTS SEG NFR BLD AUTO: 57 % (ref 43–75)
NONHDLC SERPL-MCNC: 112 MG/DL
NRBC BLD AUTO-RTO: 0 /100 WBCS
PLATELET # BLD AUTO: 305 THOUSANDS/UL (ref 149–390)
PMV BLD AUTO: 10.3 FL (ref 8.9–12.7)
POTASSIUM SERPL-SCNC: 4 MMOL/L (ref 3.5–5.3)
PROT SERPL-MCNC: 7.8 G/DL (ref 6.4–8.2)
RBC # BLD AUTO: 4.16 MILLION/UL (ref 3.81–5.12)
SODIUM SERPL-SCNC: 142 MMOL/L (ref 136–145)
TRIGL SERPL-MCNC: 143 MG/DL
TSH SERPL DL<=0.05 MIU/L-ACNC: 0.22 UIU/ML (ref 0.36–3.74)
VIT B12 SERPL-MCNC: 794 PG/ML (ref 100–900)
WBC # BLD AUTO: 10.27 THOUSAND/UL (ref 4.31–10.16)

## 2021-06-21 PROCEDURE — 82607 VITAMIN B-12: CPT

## 2021-06-21 PROCEDURE — 83036 HEMOGLOBIN GLYCOSYLATED A1C: CPT

## 2021-06-21 PROCEDURE — 3074F SYST BP LT 130 MM HG: CPT | Performed by: FAMILY MEDICINE

## 2021-06-21 PROCEDURE — 1036F TOBACCO NON-USER: CPT | Performed by: FAMILY MEDICINE

## 2021-06-21 PROCEDURE — 84443 ASSAY THYROID STIM HORMONE: CPT

## 2021-06-21 PROCEDURE — 3078F DIAST BP <80 MM HG: CPT | Performed by: FAMILY MEDICINE

## 2021-06-21 PROCEDURE — 3008F BODY MASS INDEX DOCD: CPT | Performed by: FAMILY MEDICINE

## 2021-06-21 PROCEDURE — 80061 LIPID PANEL: CPT

## 2021-06-21 PROCEDURE — 36415 COLL VENOUS BLD VENIPUNCTURE: CPT

## 2021-06-21 PROCEDURE — 80053 COMPREHEN METABOLIC PANEL: CPT

## 2021-06-21 PROCEDURE — 99214 OFFICE O/P EST MOD 30 MIN: CPT | Performed by: FAMILY MEDICINE

## 2021-06-21 PROCEDURE — 1160F RVW MEDS BY RX/DR IN RCRD: CPT | Performed by: FAMILY MEDICINE

## 2021-06-21 PROCEDURE — 82306 VITAMIN D 25 HYDROXY: CPT

## 2021-06-21 PROCEDURE — 85025 COMPLETE CBC W/AUTO DIFF WBC: CPT

## 2021-06-21 RX ORDER — NYSTATIN 100000 U/G
CREAM TOPICAL 2 TIMES DAILY
Qty: 30 G | Refills: 2 | Status: SHIPPED | OUTPATIENT
Start: 2021-06-21 | End: 2022-03-01 | Stop reason: SDUPTHER

## 2021-06-21 RX ORDER — MIRTAZAPINE 15 MG/1
15 TABLET, FILM COATED ORAL
Qty: 90 TABLET | Refills: 3 | Status: SHIPPED | OUTPATIENT
Start: 2021-06-21 | End: 2021-12-13 | Stop reason: HOSPADM

## 2021-06-22 LAB
EST. AVERAGE GLUCOSE BLD GHB EST-MCNC: 186 MG/DL
HBA1C MFR BLD: 8.1 %

## 2021-06-22 PROCEDURE — 3052F HG A1C>EQUAL 8.0%<EQUAL 9.0%: CPT | Performed by: FAMILY MEDICINE

## 2021-06-22 NOTE — PROGRESS NOTES
Assessment/Plan:    No problem-specific Assessment & Plan notes found for this encounter  Diagnoses and all orders for this visit:    Type 2 diabetes mellitus with hyperglycemia, with long-term current use of insulin ([address] )  -     Comprehensive metabolic panel; Future  -     Hemoglobin A1C; Future  -     Lipid panel; Future    Primary insomnia  -     mirtazapine (REMERON) 15 mg tablet; Take 1 tablet (15 mg total) by mouth daily at bedtime    Hypothyroidism, unspecified type  -     Lipid panel; Future  -     TSH, 3rd generation; Future    Essential hypertension  -     CBC and differential; Future  -     Comprehensive metabolic panel; Future  -     Lipid panel; Future    Rheumatoid arthritis involving multiple sites, unspecified whether rheumatoid factor present (HCC)  -     CBC and differential; Future    Mixed hyperlipidemia  -     Comprehensive metabolic panel; Future  -     Lipid panel; Future    Vitamin B12 deficiency  -     Vitamin B12; Future    Vitamin D deficiency  -     Vitamin D 25 hydroxy; Future    Candidiasis, cutaneous    Candidal UTI (urinary tract infection)  -     nystatin (MYCOSTATIN) cream; Apply topically 2 (two) times a day    Candidal dermatitis  -     nystatin (MYCOSTATIN) cream; Apply topically 2 (two) times a day         orders recommendations as noted above  Recommended that she get the TSH done to see if the dosage of her levothyroxine has to be adjusted  She is feeling significantly better so hopefully the TSH has improved  Watch carbohydrate intake  Continue with the insulin  Watch for any hypoglycemic episodes  Try to gradually increase activity level  Blood pressure controlled  Continue current medications  Watch salt intake  Continue follow-up with Rheumatology  The Callie Ag has helped significantly especially now that she is back on it  Will have her restart the Remeron since this had helped her sleep  Potential side effects discussed  Refill placed for the nystatin  Call or follow-up if rash worsens  Continue with vitamin-D and vitamin B12 supplementation  Watch for any recurrent issues with the urine  Follow-up with gyn recommended if needed  Will have her follow up in about 2-3 months or sooner if needed  Subjective:      Patient ID: Vaishnavi Rick is a 66 y o  female  She presents for follow-up  Has been generally feeling better  She has noticed that her energy level has improved  Her pain has also improved  Mood is better  She does feel that the increased dose of levothyroxine has helped  Blood sugars have improved as well  She feels that her overall status is better  Appetite has remained good  Denies any nausea, vomiting, or diarrhea  She has noticed that being back on the Imagene Button has helped her joint significantly  Continues to follow-up with Rheumatology  Continues with the methotrexate and the folic acid  Appetite has been good  Denies any nausea, vomiting, or diarrhea  Denies any changes in bowel movements  He has had increased issues with sleep  Had run out of the Remeron and has noticed that her sleep has worsened  Is some return of the rash under the breasts with the warmer weather  Requesting a refill on the nystatin  Urinary symptoms have improved  Did follow-up with gyn and feels the treatment has helped significantly        The following portions of the patient's history were reviewed and updated as appropriate:   She  has a past medical history of Arthritis, Arthritis, Cancer ([address] ), Candidiasis of skin, Cervical cancer ([address] ), Chronic respiratory failure with hypoxia and hypercapnia ([address] ) (4/20/2019), COPD (chronic obstructive pulmonary disease) ([address] ), Current chronic use of systemic steroids, Degenerative arthritis of left knee, Diabetes mellitus ([address] ), Disease of thyroid gland, Fibromyalgia, Fistula of knee, GERD (gastroesophageal reflux disease), Hyperlipidemia, Hypertension, Medial meniscus tear, Migraine, Obesity, Obesity, Osteoarthritis, Osteopenia, Pneumonia, Psychiatric disorder, Rheumatoid arthritis ([place] [id number] ), Rheumatoid arthritis ([place] [address] ), Sepsis ([place] [address] ), Tick bite, Vitamin B12 deficiency, and Vitamin D deficiency  She   Patient Active Problem List    Diagnosis Date Noted    Primary insomnia 09/08/2020    Migraine without aura and without status migrainosus, not intractable 07/28/2020    Postherpetic neuralgia 69/95/4118    Diastolic dysfunction 95/89/6616    Macrocytic anemia 01/14/2020    Hypoxemia requiring supplemental oxygen 06/19/2019    History of exposure to asbestos 05/20/2019    Chronic respiratory failure with hypoxia (HCC) 04/20/2019    Hyponatremia 04/20/2019    Candidal dermatitis 12/28/2018    Loculated pleural effusion 12/25/2018    Constipation 11/29/2017    Renal cyst 11/29/2017    Candidiasis, cutaneous 06/30/2017    Vitamin B12 deficiency 11/10/2016    BMI 40 0-44 9, adult ([place] [id number] ) 11/05/2016    Morbid obesity with BMI of 40 0-44 9, adult ([place] [address] ) 11/04/2016    Centrilobular emphysema ([place] [address] ) 11/03/2016    Type 2 diabetes mellitus with hyperglycemia, with long-term current use of insulin ([place] [address] ) 11/03/2016    Hypothyroidism 11/03/2016    Rheumatoid arthritis ([place] [address] ) 11/03/2016    Essential hypertension 11/03/2016    Hyperlipidemia 11/03/2016    GERD (gastroesophageal reflux disease) 11/03/2016    Hypoalbuminemia 11/03/2016    Hepatic steatosis 11/03/2016    Atelectasis 11/03/2016    Anxiety 06/02/2014    Fibromyalgia 01/28/2014    Osteopenia 09/17/2012    Chronic obstructive pulmonary disease with acute exacerbation ([place] [address] ) 05/29/2012    Osteoarthritis 05/29/2012    Vitamin D deficiency 05/29/2012     She  has a past surgical history that includes Hysterectomy; Knee arthroscopy; Cataract extraction (Bilateral); Cholecystectomy; Joint replacement; Tubal ligation; Neuroplasty / transposition median nerve at carpal tunnel; Eye surgery; and IR thoracentesis (5/31/2019)    Her family history includes Asthma in her family; Cancer in her family; Diabetes in her family; Hypertension in her family; Stroke in her mother  She  reports that she quit smoking about 8 years ago  Her smoking use included cigarettes and e-cigarettes  She has a 48 00 pack-year smoking history  She has never used smokeless tobacco  She reports that she does not drink alcohol and does not use drugs  Current Outpatient Medications   Medication Sig Dispense Refill    acetaminophen (TYLENOL) 325 mg tablet Take 2 tablets (650 mg total) by mouth every 6 (six) hours as needed for mild pain, headaches or fever  0    albuterol (2 5 mg/3 mL) 0 083 % nebulizer solution USE 1 UNIT DOSE IN NEBULIZER EVERY 4 HOURS AS NEEDED        albuterol (PROVENTIL HFA,VENTOLIN HFA) 90 mcg/act inhaler Inhale 2 puffs every 6 (six) hours as needed for wheezing 1 Inhaler 0    alendronate (FOSAMAX) 70 mg tablet Take 70 mg by mouth every 7 days      atorvastatin (LIPITOR) 20 mg tablet TAKE ONE TABLET BY MOUTH EVERY DAY 90 tablet 0    BD Pen Needle Mamta U/F 32G X 4 MM MISC USE AS DIRECTED 100 each 0    BD Pen Needle Mamta U/F 32G X 4 MM MISC USE AS DIRECTED 100 each 0    Blood Glucose Monitoring Suppl (ONE TOUCH ULTRA 2) w/Device KIT by Does not apply route 2 (two) times a day 1 each 0    DULoxetine (CYMBALTA) 20 mg capsule TAKE ONE CAPSULE BY MOUTH ONCE DAILY 90 capsule 0    ergocalciferol (VITAMIN D2) 50,000 units TAKE ONE CAPSULE BY MOUTH WEEKLY 12 capsule 0    fluticasone-salmeterol (ADVAIR) 250-50 mcg/dose Inhale 1 puff every 12 (twelve) hours       folic acid (FOLVITE) 1 mg tablet TAKE ONE TABLET BY MOUTH ONCE DAILY 90 tablet 0    furosemide (LASIX) 40 mg tablet TAKE ONE TABLET BY MOUTH EVERY DAY 90 tablet 0    glucose blood (OneTouch Ultra) test strip Test 3 times daily   Dx: E11 65 300 each 3    Insulin Pen Needle 33G X 4 MM MISC by Does not apply route daily 100 each 5    ipratropium-albuterol (DUO-NEB) 0 5-2 5 mg/3 mL nebulizer solution Take 1 vial (3 mL total) by nebulization every 6 (six) hours as needed for wheezing or shortness of breath 120 vial 0    JANUVIA 100 MG tablet TAKE 1 TABLET BY MOUTH DAILY  90 tablet 0    Lancets (OneTouch Delica Plus [id number]) MISC TEST 2 TIMES A  each 0    levothyroxine 150 mcg tablet Take 1 tablet (150 mcg total) by mouth daily 90 tablet 3    lisinopril (ZESTRIL) 40 mg tablet TAKE ONE TABLET BY MOUTH EVERY DAY 90 tablet 0    methotrexate 2 5 mg tablet TAKE 8 TABLETS BY MOUTH EVERY SUNDAY 96 tablet 0    mirtazapine (REMERON) 15 mg tablet Take 1 tablet (15 mg total) by mouth daily at bedtime 90 tablet 3    mometasone (ELOCON) 0 1 % ointment Apply topically 2 (two) times a day as needed (itching) 45 g 1    nystatin (MYCOSTATIN) cream Apply topically 2 (two) times a day 30 g 2    nystatin (MYCOSTATIN) powder Apply topically 3 (three) times a day as needed (fungal skin rash)      ONE TOUCH ULTRA TEST test strip Tests twice a day 100 each 5    pantoprazole (PROTONIX) 40 mg tablet TAKE ONE TABLET BY MOUTH ONCE DAILY 90 tablet 0    Polyethylene Glycol 3350-GRX POWD Take by mouth daily at bedtime as needed (constipation) 850 g 0    Semaglutide,0 25 or 0 5MG/DOS, (Ozempic, 0 25 or 0 5 MG/DOSE,) 2 MG/1 5ML SOPN Inject 0 5 mg under the skin once a week 6 pen 1    tiotropium (SPIRIVA RESPIMAT) 2 5 MCG/ACT AERS inhaler Inhale 1 puff daily at bedtime 3 Inhaler 3    Tofacitinib Citrate (XELJANZ XR) 11 MG TB24 Take 1 tablet by mouth daily      Tresiba FlexTouch 100 units/mL injection pen INJECT 30 UNITS UNDER THE SKIN DAILY 30 mL 3    vitamin B-12 (VITAMIN B-12) 500 MCG tablet Take 1 tablet (500 mcg total) by mouth daily       No current facility-administered medications for this visit       Current Outpatient Medications on File Prior to Visit   Medication Sig    acetaminophen (TYLENOL) 325 mg tablet Take 2 tablets (650 mg total) by mouth every 6 (six) hours as needed for mild pain, headaches or fever    albuterol (2 5 mg/3 mL) 0 083 % nebulizer solution USE 1 UNIT DOSE IN NEBULIZER EVERY 4 HOURS AS NEEDED   albuterol (PROVENTIL HFA,VENTOLIN HFA) 90 mcg/act inhaler Inhale 2 puffs every 6 (six) hours as needed for wheezing    alendronate (FOSAMAX) 70 mg tablet Take 70 mg by mouth every 7 days    atorvastatin (LIPITOR) 20 mg tablet TAKE ONE TABLET BY MOUTH EVERY DAY    BD Pen Needle Mamta U/F 32G X 4 MM MISC USE AS DIRECTED    BD Pen Needle Mamta U/F 32G X 4 MM MISC USE AS DIRECTED    Blood Glucose Monitoring Suppl (ONE TOUCH ULTRA 2) w/Device KIT by Does not apply route 2 (two) times a day    DULoxetine (CYMBALTA) 20 mg capsule TAKE ONE CAPSULE BY MOUTH ONCE DAILY    ergocalciferol (VITAMIN D2) 50,000 units TAKE ONE CAPSULE BY MOUTH WEEKLY    fluticasone-salmeterol (ADVAIR) 250-50 mcg/dose Inhale 1 puff every 12 (twelve) hours     folic acid (FOLVITE) 1 mg tablet TAKE ONE TABLET BY MOUTH ONCE DAILY    furosemide (LASIX) 40 mg tablet TAKE ONE TABLET BY MOUTH EVERY DAY    glucose blood (OneTouch Ultra) test strip Test 3 times daily   Dx: E11 65    Insulin Pen Needle 33G X 4 MM MISC by Does not apply route daily    ipratropium-albuterol (DUO-NEB) 0 5-2 5 mg/3 mL nebulizer solution Take 1 vial (3 mL total) by nebulization every 6 (six) hours as needed for wheezing or shortness of breath    JANUVIA 100 MG tablet TAKE 1 TABLET BY MOUTH DAILY      Lancets (OneTouch Delica Plus [id number]) MISC TEST 2 TIMES A DAY    levothyroxine 150 mcg tablet Take 1 tablet (150 mcg total) by mouth daily    lisinopril (ZESTRIL) 40 mg tablet TAKE ONE TABLET BY MOUTH EVERY DAY    methotrexate 2 5 mg tablet TAKE 8 TABLETS BY MOUTH EVERY SUNDAY    mometasone (ELOCON) 0 1 % ointment Apply topically 2 (two) times a day as needed (itching)    nystatin (MYCOSTATIN) powder Apply topically 3 (three) times a day as needed (fungal skin rash)    ONE TOUCH ULTRA TEST test strip Tests twice a day    pantoprazole (PROTONIX) 40 mg tablet TAKE ONE TABLET BY MOUTH ONCE DAILY    Polyethylene Glycol 3350-GRX POWD Take by mouth daily at bedtime as needed (constipation)    Semaglutide,0 25 or 0 5MG/DOS, (Ozempic, 0 25 or 0 5 MG/DOSE,) 2 MG/1 5ML SOPN Inject 0 5 mg under the skin once a week    tiotropium (SPIRIVA RESPIMAT) 2 5 MCG/ACT AERS inhaler Inhale 1 puff daily at bedtime    Tofacitinib Citrate (XELJANZ XR) 11 MG TB24 Take 1 tablet by mouth daily    Tresiba FlexTouch 100 units/mL injection pen INJECT 30 UNITS UNDER THE SKIN DAILY    vitamin B-12 (VITAMIN B-12) 500 MCG tablet Take 1 tablet (500 mcg total) by mouth daily    [DISCONTINUED] mirtazapine (REMERON) 15 mg tablet Take 1 tablet (15 mg total) by mouth daily at bedtime    [DISCONTINUED] nystatin (MYCOSTATIN) cream Apply topically 2 (two) times a day    [DISCONTINUED] butalbital-acetaminophen-caffeine (Esgic) -40 mg per tablet Take 1 tablet by mouth every 4 (four) hours as needed for headaches    [DISCONTINUED] levothyroxine 125 mcg tablet TAKE ONE TABLET BY MOUTH EVERY DAY IN THE AM    [DISCONTINUED] [place] FlexTouch 100 units/mL injection pen INJECT 30 UNITS UNDER THE SKIN DAILY     No current facility-administered medications on file prior to visit  She has No Known Allergies       Review of Systems   Constitutional: Positive for activity change  Negative for appetite change, chills, fatigue and fever  HENT: Negative for congestion and rhinorrhea  Eyes: Negative for visual disturbance  Respiratory: Positive for shortness of breath  Negative for cough and chest tightness  Cardiovascular: Negative for chest pain and palpitations  Gastrointestinal: Negative for abdominal pain, blood in stool, diarrhea, nausea and vomiting  Endocrine: Negative for polydipsia, polyphagia and polyuria  Genitourinary: Negative for dysuria, frequency and urgency  Musculoskeletal: Positive for arthralgias  Negative for gait problem  Skin: Negative for color change     Neurological: Negative for dizziness and headaches  Hematological: Does not bruise/bleed easily  Psychiatric/Behavioral: Negative for confusion, decreased concentration and sleep disturbance  The patient is not nervous/anxious  Objective:      /70 (BP Location: Left arm, Patient Position: Sitting, Cuff Size: Large)   Pulse 96   Temp 97 5 °F (36 4 °C)   Ht 5' 5" (1 651 m)   Wt 114 kg (251 lb 9 6 oz)   SpO2 96%   BMI 41 87 kg/m²          Physical Exam  Vitals and nursing note reviewed  Constitutional:       General: She is not in acute distress  Appearance: She is well-developed and well-groomed  She is morbidly obese  HENT:      Head: Normocephalic and atraumatic  Comments:  Slight cerumen  Eyes:      General: Lids are normal          Right eye: No discharge  Left eye: No discharge  Conjunctiva/sclera: Conjunctivae normal       Pupils: Pupils are equal, round, and reactive to light  Neck:      Thyroid: No thyromegaly  Vascular: No carotid bruit  Cardiovascular:      Rate and Rhythm: Normal rate and regular rhythm  Heart sounds: Normal heart sounds  No murmur heard  No friction rub  No gallop  Pulmonary:      Effort: No respiratory distress  Breath sounds: No decreased breath sounds, wheezing or rales  Abdominal:      General: Bowel sounds are normal  There is no distension  Tenderness: There is no abdominal tenderness  Musculoskeletal:      Comments:  Degenerative changes bilateral hands   Lymphadenopathy:      Cervical: No cervical adenopathy  Skin:     General: Skin is warm and dry  Neurological:      Mental Status: She is alert and oriented to person, place, and time  Psychiatric:         Mood and Affect: Mood and affect normal          Speech: Speech normal          Behavior: Behavior normal  Behavior is cooperative

## 2021-07-12 DIAGNOSIS — E11.65 TYPE 2 DIABETES MELLITUS WITH HYPERGLYCEMIA, WITH LONG-TERM CURRENT USE OF INSULIN (HCC): ICD-10-CM

## 2021-07-12 DIAGNOSIS — Z79.4 TYPE 2 DIABETES MELLITUS WITH HYPERGLYCEMIA, WITH LONG-TERM CURRENT USE OF INSULIN (HCC): ICD-10-CM

## 2021-07-12 RX ORDER — SEMAGLUTIDE 1.34 MG/ML
0.5 INJECTION, SOLUTION SUBCUTANEOUS WEEKLY
Qty: 4.5 ML | Refills: 1 | Status: SHIPPED | OUTPATIENT
Start: 2021-07-12 | End: 2021-11-18 | Stop reason: SDUPTHER

## 2021-09-16 ENCOUNTER — RA CDI HCC (OUTPATIENT)
Dept: OTHER | Facility: HOSPITAL | Age: 71
End: 2021-09-16

## 2021-09-16 NOTE — PROGRESS NOTES
[facility]  coding opportunities       Chart reviewed, no opportunity found: CHART REVIEWED, NO OPPORTUNITY FOUND                        Patients insurance company: [facility] [address]  (Medicare Advantage and Commercial)

## 2021-09-22 ENCOUNTER — TELEPHONE (OUTPATIENT)
Dept: INTERNAL MEDICINE CLINIC | Facility: CLINIC | Age: 71
End: 2021-09-22

## 2021-09-22 ENCOUNTER — OFFICE VISIT (OUTPATIENT)
Dept: INTERNAL MEDICINE CLINIC | Facility: CLINIC | Age: 71
End: 2021-09-22
Payer: COMMERCIAL

## 2021-09-22 VITALS
HEIGHT: 65 IN | HEART RATE: 94 BPM | TEMPERATURE: 97.3 F | SYSTOLIC BLOOD PRESSURE: 126 MMHG | BODY MASS INDEX: 41.54 KG/M2 | DIASTOLIC BLOOD PRESSURE: 62 MMHG | WEIGHT: 249.3 LBS | OXYGEN SATURATION: 95 %

## 2021-09-22 DIAGNOSIS — Z79.4 TYPE 2 DIABETES MELLITUS WITH HYPERGLYCEMIA, WITH LONG-TERM CURRENT USE OF INSULIN (HCC): ICD-10-CM

## 2021-09-22 DIAGNOSIS — Z00.00 MEDICARE ANNUAL WELLNESS VISIT, SUBSEQUENT: ICD-10-CM

## 2021-09-22 DIAGNOSIS — E11.65 TYPE 2 DIABETES MELLITUS WITH HYPERGLYCEMIA, WITH LONG-TERM CURRENT USE OF INSULIN (HCC): ICD-10-CM

## 2021-09-22 DIAGNOSIS — M06.9 RHEUMATOID ARTHRITIS INVOLVING MULTIPLE SITES, UNSPECIFIED WHETHER RHEUMATOID FACTOR PRESENT (HCC): ICD-10-CM

## 2021-09-22 DIAGNOSIS — I10 ESSENTIAL HYPERTENSION: ICD-10-CM

## 2021-09-22 DIAGNOSIS — E53.8 VITAMIN B12 DEFICIENCY: ICD-10-CM

## 2021-09-22 DIAGNOSIS — E66.01 MORBID OBESITY WITH BMI OF 40.0-44.9, ADULT (HCC): ICD-10-CM

## 2021-09-22 DIAGNOSIS — E78.2 MIXED HYPERLIPIDEMIA: ICD-10-CM

## 2021-09-22 DIAGNOSIS — E55.9 VITAMIN D DEFICIENCY: ICD-10-CM

## 2021-09-22 DIAGNOSIS — J96.11 CHRONIC RESPIRATORY FAILURE WITH HYPOXIA (HCC): ICD-10-CM

## 2021-09-22 DIAGNOSIS — B37.49 CANDIDAL UTI (URINARY TRACT INFECTION): ICD-10-CM

## 2021-09-22 DIAGNOSIS — B37.2 CANDIDAL DERMATITIS: ICD-10-CM

## 2021-09-22 DIAGNOSIS — J44.1 CHRONIC OBSTRUCTIVE PULMONARY DISEASE WITH ACUTE EXACERBATION (HCC): ICD-10-CM

## 2021-09-22 DIAGNOSIS — J43.2 CENTRILOBULAR EMPHYSEMA (HCC): Primary | ICD-10-CM

## 2021-09-22 DIAGNOSIS — E03.9 HYPOTHYROIDISM, UNSPECIFIED TYPE: ICD-10-CM

## 2021-09-22 PROCEDURE — 99214 OFFICE O/P EST MOD 30 MIN: CPT | Performed by: FAMILY MEDICINE

## 2021-09-22 PROCEDURE — 1160F RVW MEDS BY RX/DR IN RCRD: CPT | Performed by: FAMILY MEDICINE

## 2021-09-22 PROCEDURE — 1125F AMNT PAIN NOTED PAIN PRSNT: CPT | Performed by: FAMILY MEDICINE

## 2021-09-22 PROCEDURE — G0439 PPPS, SUBSEQ VISIT: HCPCS | Performed by: FAMILY MEDICINE

## 2021-09-22 PROCEDURE — 1170F FXNL STATUS ASSESSED: CPT | Performed by: FAMILY MEDICINE

## 2021-09-22 PROCEDURE — 1036F TOBACCO NON-USER: CPT | Performed by: FAMILY MEDICINE

## 2021-09-22 PROCEDURE — 3288F FALL RISK ASSESSMENT DOCD: CPT | Performed by: FAMILY MEDICINE

## 2021-09-22 PROCEDURE — 3725F SCREEN DEPRESSION PERFORMED: CPT | Performed by: FAMILY MEDICINE

## 2021-09-22 PROCEDURE — 3008F BODY MASS INDEX DOCD: CPT | Performed by: FAMILY MEDICINE

## 2021-09-22 PROCEDURE — 3074F SYST BP LT 130 MM HG: CPT | Performed by: FAMILY MEDICINE

## 2021-09-22 PROCEDURE — 3078F DIAST BP <80 MM HG: CPT | Performed by: FAMILY MEDICINE

## 2021-09-22 RX ORDER — DOXYCYCLINE 100 MG/1
100 CAPSULE ORAL 2 TIMES DAILY
Qty: 20 CAPSULE | Refills: 0 | Status: SHIPPED | OUTPATIENT
Start: 2021-09-22 | End: 2021-09-29

## 2021-09-22 RX ORDER — FLUCONAZOLE 150 MG/1
150 TABLET ORAL DAILY
Qty: 3 TABLET | Refills: 2 | Status: SHIPPED | OUTPATIENT
Start: 2021-09-22 | End: 2021-09-25

## 2021-09-22 NOTE — PATIENT INSTRUCTIONS
Medicare Preventive Visit Patient Instructions  Thank you for completing your Welcome to Medicare Visit or Medicare Annual Wellness Visit today  Your next wellness visit will be due in one year (9/23/2022)  The screening/preventive services that you may require over the next 5-10 years are detailed below  Some tests may not apply to you based off risk factors and/or age  Screening tests ordered at today's visit but not completed yet may show as past due  Also, please note that scanned in results may not display below  Preventive Screenings:  Service Recommendations Previous Testing/Comments   Colorectal Cancer Screening  * Colonoscopy    * Fecal Occult Blood Test (FOBT)/Fecal Immunochemical Test (FIT)  * Fecal DNA/Cologuard Test  * Flexible Sigmoidoscopy Age: 54-65 years old   Colonoscopy: every 10 years (may be performed more frequently if at higher risk)  OR  FOBT/FIT: every 1 year  OR  Cologuard: every 3 years  OR  Sigmoidoscopy: every 5 years  Screening may be recommended earlier than age 48 if at higher risk for colorectal cancer  Also, an individualized decision between you and your healthcare provider will decide whether screening between the ages of 74-80 would be appropriate  Colonoscopy: 04/30/2008  FOBT/FIT: Not on file  Cologuard: Not on file  Sigmoidoscopy: Not on file          Breast Cancer Screening Age: 36 years old  Frequency: every 1-2 years  Not required if history of left and right mastectomy Mammogram: 08/10/2016        Cervical Cancer Screening Between the ages of 21-29, pap smear recommended once every 3 years  Between the ages of 33-67, can perform pap smear with HPV co-testing every 5 years     Recommendations may differ for women with a history of total hysterectomy, cervical cancer, or abnormal pap smears in past  Pap Smear: Not on file    History Cervical Cancer   Hepatitis C Screening Once for adults born between 1945 and 1965  More frequently in patients at high risk for Hepatitis C Hep C Antibody: 11/04/2016    Screening Current   Diabetes Screening 1-2 times per year if you're at risk for diabetes or have pre-diabetes Fasting glucose: 130 mg/dL   A1C: 8 1 %    Screening Not Indicated  History Diabetes   Cholesterol Screening Once every 5 years if you don't have a lipid disorder  May order more often based on risk factors  Lipid panel: 06/21/2021    Screening Not Indicated  History Lipid Disorder     Other Preventive Screenings Covered by Medicare:  1  Abdominal Aortic Aneurysm (AAA) Screening: covered once if your at risk  You're considered to be at risk if you have a family history of AAA  2  Lung Cancer Screening: covers low dose CT scan once per year if you meet all of the following conditions: (1) Age 50-69; (2) No signs or symptoms of lung cancer; (3) Current smoker or have quit smoking within the last 15 years; (4) You have a tobacco smoking history of at least 30 pack years (packs per day multiplied by number of years you smoked); (5) You get a written order from a healthcare provider  3  Glaucoma Screening: covered annually if you're considered high risk: (1) You have diabetes OR (2) Family history of glaucoma OR (3)  aged 48 and older OR (3)  American aged 72 and older  3  Osteoporosis Screening: covered every 2 years if you meet one of the following conditions: (1) You're estrogen deficient and at risk for osteoporosis based off medical history and other findings; (2) Have a vertebral abnormality; (3) On glucocorticoid therapy for more than 3 months; (4) Have primary hyperparathyroidism; (5) On osteoporosis medications and need to assess response to drug therapy  · Last bone density test (DXA Scan): 08/10/2016   5  HIV Screening: covered annually if you're between the age of 15-65  Also covered annually if you are younger than 13 and older than 72 with risk factors for HIV infection   For pregnant patients, it is covered up to 3 times per pregnancy  Immunizations:  Immunization Recommendations   Influenza Vaccine Annual influenza vaccination during flu season is recommended for all persons aged >= 6 months who do not have contraindications   Pneumococcal Vaccine (Prevnar and Pneumovax)  * Prevnar = PCV13  * Pneumovax = PPSV23   Adults 25-60 years old: 1-3 doses may be recommended based on certain risk factors  Adults 72 years old: Prevnar (PCV13) vaccine recommended followed by Pneumovax (PPSV23) vaccine  If already received PPSV23 since turning 65, then PCV13 recommended at least one year after PPSV23 dose  Hepatitis B Vaccine 3 dose series if at intermediate or high risk (ex: diabetes, end stage renal disease, liver disease)   Tetanus (Td) Vaccine - COST NOT COVERED BY MEDICARE PART B Following completion of primary series, a booster dose should be given every 10 years to maintain immunity against tetanus  Td may also be given as tetanus wound prophylaxis  Tdap Vaccine - COST NOT COVERED BY MEDICARE PART B Recommended at least once for all adults  For pregnant patients, recommended with each pregnancy  Shingles Vaccine (Shingrix) - COST NOT COVERED BY MEDICARE PART B  2 shot series recommended in those aged 48 and above     Health Maintenance Due:      Topic Date Due    Colorectal Cancer Screening  04/30/2018    Lung Cancer Screening  01/14/2021    Hepatitis C Screening  Completed     Immunizations Due:      Topic Date Due    COVID-19 Vaccine (3 - Moderna risk 3-dose series) 06/01/2021    Influenza Vaccine (1) 09/01/2021     Advance Directives   What are advance directives? Advance directives are legal documents that state your wishes and plans for medical care  These plans are made ahead of time in case you lose your ability to make decisions for yourself  Advance directives can apply to any medical decision, such as the treatments you want, and if you want to donate organs  What are the types of advance directives?   There are many types of advance directives, and each state has rules about how to use them  You may choose a combination of any of the following:  · Living will: This is a written record of the treatment you want  You can also choose which treatments you do not want, which to limit, and which to stop at a certain time  This includes surgery, medicine, IV fluid, and tube feedings  · Durable power of  for healthcare [facility]): This is a written record that states who you want to make healthcare choices for you when you are unable to make them for yourself  This person, called a proxy, is usually a family member or a friend  You may choose more than 1 proxy  · Do not resuscitate (DNR) order:  A DNR order is used in case your heart stops beating or you stop breathing  It is a request not to have certain forms of treatment, such as CPR  A DNR order may be included in other types of advance directives  · Medical directive: This covers the care that you want if you are in a coma, near death, or unable to make decisions for yourself  You can list the treatments you want for each condition  Treatment may include pain medicine, surgery, blood transfusions, dialysis, IV or tube feedings, and a ventilator (breathing machine)  · Values history: This document has questions about your views, beliefs, and how you feel and think about life  This information can help others choose the care that you would choose  Why are advance directives important? An advance directive helps you control your care  Although spoken wishes may be used, it is better to have your wishes written down  Spoken wishes can be misunderstood, or not followed  Treatments may be given even if you do not want them  An advance directive may make it easier for your family to make difficult choices about your care  Fall Prevention    Fall prevention  includes ways to make your home and other areas safer   It also includes ways you can move more carefully to prevent a fall  Health conditions that cause changes in your blood pressure, vision, or muscle strength and coordination may increase your risk for falls  Medicines may also increase your risk for falls if they make you dizzy, weak, or sleepy  Fall prevention tips:   · Stand or sit up slowly  · Use assistive devices as directed  · Wear shoes that fit well and have soles that   · Wear a personal alarm  · Stay active  · Manage your medical conditions  Home Safety Tips:  · Add items to prevent falls in the bathroom  · Keep paths clear  · Install bright lights in your home  · Keep items you use often on shelves within reach  · Paint or place reflective tape on the edges of your stairs  Urinary Incontinence   Urinary incontinence (UI)  is when you lose control of your bladder  UI develops because your bladder cannot store or empty urine properly  The 3 most common types of UI are stress incontinence, urge incontinence, or both  Medicines:   · May be given to help strengthen your bladder control  Report any side effects of medication to your healthcare provider  Do pelvic muscle exercises often:  Your pelvic muscles help you stop urinating  Squeeze these muscles tight for 5 seconds, then relax for 5 seconds  Gradually work up to squeezing for 10 seconds  Do 3 sets of 15 repetitions a day, or as directed  This will help strengthen your pelvic muscles and improve bladder control  Train your bladder:  Go to the bathroom at set times, such as every 2 hours, even if you do not feel the urge to go  You can also try to hold your urine when you feel the urge to go  For example, hold your urine for 5 minutes when you feel the urge to go  As that becomes easier, hold your urine for 10 minutes  Self-care:   · Keep a UI record  Write down how often you leak urine and how much you leak  Make a note of what you were doing when you leaked urine  · Drink liquids as directed   You may need to limit the amount of liquid you drink to help control your urine leakage  Do not drink any liquid right before you go to bed  Limit or do not have drinks that contain caffeine or alcohol  · Prevent constipation  Eat a variety of high-fiber foods  Good examples are high-fiber cereals, beans, vegetables, and whole-grain breads  Walking is the best way to trigger your intestines to have a bowel movement  · Exercise regularly and maintain a healthy weight  Weight loss and exercise will decrease pressure on your bladder and help you control your leakage  · Use a catheter as directed  to help empty your bladder  A catheter is a tiny, plastic tube that is put into your bladder to drain your urine  · Go to behavior therapy as directed  Behavior therapy may be used to help you learn to control your urge to urinate  Weight Management   Why it is important to manage your weight:  Being overweight increases your risk of health conditions such as heart disease, high blood pressure, type 2 diabetes, and certain types of cancer  It can also increase your risk for osteoarthritis, sleep apnea, and other respiratory problems  Aim for a slow, steady weight loss  Even a small amount of weight loss can lower your risk of health problems  How to lose weight safely:  A safe and healthy way to lose weight is to eat fewer calories and get regular exercise  You can lose up about 1 pound a week by decreasing the number of calories you eat by 500 calories each day  Healthy meal plan for weight management:  A healthy meal plan includes a variety of foods, contains fewer calories, and helps you stay healthy  A healthy meal plan includes the following:  · Eat whole-grain foods more often  A healthy meal plan should contain fiber  Fiber is the part of grains, fruits, and vegetables that is not broken down by your body  Whole-grain foods are healthy and provide extra fiber in your diet   Some examples of whole-grain foods are whole-wheat breads and pastas, oatmeal, brown rice, and bulgur  · Eat a variety of vegetables every day  Include dark, leafy greens such as spinach, kale, laci greens, and mustard greens  Eat yellow and orange vegetables such as carrots, sweet potatoes, and winter squash  · Eat a variety of fruits every day  Choose fresh or canned fruit (canned in its own juice or light syrup) instead of juice  Fruit juice has very little or no fiber  · Eat low-fat dairy foods  Drink fat-free (skim) milk or 1% milk  Eat fat-free yogurt and low-fat cottage cheese  Try low-fat cheeses such as mozzarella and other reduced-fat cheeses  · Choose meat and other protein foods that are low in fat  Choose beans or other legumes such as split peas or lentils  Choose fish, skinless poultry (chicken or turkey), or lean cuts of red meat (beef or pork)  Before you cook meat or poultry, cut off any visible fat  · Use less fat and oil  Try baking foods instead of frying them  Add less fat, such as margarine, sour cream, regular salad dressing and mayonnaise to foods  Eat fewer high-fat foods  Some examples of high-fat foods include french fries, doughnuts, ice cream, and cakes  · Eat fewer sweets  Limit foods and drinks that are high in sugar  This includes candy, cookies, regular soda, and sweetened drinks  Exercise:  Exercise at least 30 minutes per day on most days of the week  Some examples of exercise include walking, biking, dancing, and swimming  You can also fit in more physical activity by taking the stairs instead of the elevator or parking farther away from stores  Ask your healthcare provider about the best exercise plan for you  © Copyright [employer] 2018 Information is for End User's use only and may not be sold, redistributed or otherwise used for commercial purposes   All illustrations and images included in CareNotes® are the copyrighted property of [employer]  or [address] [place] [address] [employer]

## 2021-09-22 NOTE — PROGRESS NOTES
Assessment and Plan:     Problem List Items Addressed This Visit        Endocrine    Type 2 diabetes mellitus with hyperglycemia, with long-term current use of insulin ([facility] [address] )    Relevant Orders    CBC and differential    Comprehensive metabolic panel    Hemoglobin A1C    Hypothyroidism    Relevant Orders    TSH, 3rd generation       Respiratory    Centrilobular emphysema ([facility] [address] ) - Primary    Relevant Orders    CBC and differential    Chronic obstructive pulmonary disease with acute exacerbation (HCC)    Relevant Medications    doxycycline monohydrate (MONODOX) 100 mg capsule    Other Relevant Orders    CBC and differential    Chronic respiratory failure with hypoxia ([facility])       Cardiovascular and Mediastinum    Essential hypertension       Musculoskeletal and Integument    Rheumatoid arthritis ([facility])    Relevant Orders    CBC and differential    Candidal dermatitis    Relevant Medications    fluconazole (DIFLUCAN) 150 mg tablet       Other    Hyperlipidemia    Relevant Orders    Comprehensive metabolic panel    Lipid panel    Morbid obesity with BMI of 40 0-44 9, adult ([facility])    Relevant Orders    CBC and differential    Vitamin B12 deficiency    Relevant Orders    CBC and differential    Vitamin B12    Vitamin D deficiency    Relevant Orders    CBC and differential    Vitamin D 25 hydroxy      Other Visit Diagnoses     Candidal UTI (urinary tract infection)        Relevant Medications    fluconazole (DIFLUCAN) 150 mg tablet    Medicare annual wellness visit, subsequent              Depression Screening and Follow-up Plan:   Patient was screened for depression during today's encounter  They screened negative with a PHQ-2 score of 0  Falls Plan of Care: balance, strength, and gait training instructions were provided  Recommended assistive device to help with gait and balance         Preventive health issues were discussed with patient, and age appropriate screening tests were ordered as noted in patient's After Visit Summary  Personalized health advice and appropriate referrals for health education or preventive services given if needed, as noted in patient's After Visit Summary       History of Present Illness:     Patient presents for Medicare Annual Wellness visit    Patient Care Team:  Jaymie Hernandez MD as PCP - General  MD Linda Cueva MD     Problem List:     Patient Active Problem List   Diagnosis    Centrilobular emphysema ([place] [address] )    Type 2 diabetes mellitus with hyperglycemia, with long-term current use of insulin ([place] [address] )    Hypothyroidism    Rheumatoid arthritis ([place] [address] )    Essential hypertension    Hyperlipidemia    GERD (gastroesophageal reflux disease)    Hypoalbuminemia    Hepatic steatosis    Atelectasis    Morbid obesity with BMI of 40 0-44 9, adult ([place] [address] )    BMI 40 0-44 9, adult ([place] [address] )    Anxiety    Candidiasis, cutaneous    Chronic obstructive pulmonary disease with acute exacerbation (HCC)    Constipation    Fibromyalgia    Osteoarthritis    Osteopenia    Renal cyst    Vitamin B12 deficiency    Vitamin D deficiency    Loculated pleural effusion    Candidal dermatitis    Chronic respiratory failure with hypoxia (HCC)    Hyponatremia    History of exposure to asbestos    Hypoxemia requiring supplemental oxygen    Macrocytic anemia    Diastolic dysfunction    Postherpetic neuralgia    Migraine without aura and without status migrainosus, not intractable    Primary insomnia      Past Medical and Surgical History:     Past Medical History:   Diagnosis Date    Arthritis     Arthritis     Cancer ([place] [address] )     Candidiasis of skin     Cervical cancer ([place] [address] )     Chronic respiratory failure with hypoxia and hypercapnia (HCC) 4/20/2019    COPD (chronic obstructive pulmonary disease) ([place] [address] )     last assessed 11/21/2016    Current chronic use of systemic steroids     Degenerative arthritis of left knee     last assessed 1/20/2015    Diabetes mellitus ([place] [address] )     Disease of thyroid gland     hypo pill given to decrease thyroid   Fibromyalgia     Fistula of knee     last assessed 2014    GERD (gastroesophageal reflux disease)     Hyperlipidemia     Hypertension     Medial meniscus tear     of left knee, last assessed 2014    Migraine     states she has a hx of HA that she calls [employer] but never was dx by neurologist    Obesity     Obesity     Osteoarthritis     Osteopenia     Pneumonia     last assessed 2016    Psychiatric disorder     anxiety    Rheumatoid arthritis ([facility] [address] )     Rheumatoid arthritis ([facility] [address] )     Sepsis ([facility] [address] )     last assessed 11/10/2016    Tick bite     last assessed 10/30/2015    Vitamin B12 deficiency     Vitamin D deficiency      Past Surgical History:   Procedure Laterality Date    CATARACT EXTRACTION Bilateral     CHOLECYSTECTOMY      EYE SURGERY      Bilateral Cataracts    HYSTERECTOMY      IR THORACENTESIS  2019    JOINT REPLACEMENT      left knee    KNEE ARTHROSCOPY      NEUROPLASTY / TRANSPOSITION MEDIAN NERVE AT CARPAL TUNNEL      TUBAL LIGATION        Family History:     Family History   Problem Relation Age of Onset    Stroke Mother     Asthma Family     Cancer Family     Diabetes Family     Hypertension Family       Social History:     Social History     Socioeconomic History    Marital status:       Spouse name: None    Number of children: None    Years of education: None    Highest education level: None   Occupational History    None   Tobacco Use    Smoking status: Former Smoker     Packs/day: 1 00     Years: 48 00     Pack years: 48 00     Types: Cigarettes, E-Cigarettes     Quit date: 2012     Years since quittin 8    Smokeless tobacco: Never Used    Tobacco comment: per allscripts - "current every day smoker, former smoker"   Vaping Use    Vaping Use: Former   Substance and Sexual Activity    Alcohol use: Never     Alcohol/week: 0 0 standard drinks     Comment: stopped drinking alcohol    Drug use: Never    Sexual activity: Never   Other Topics Concern    None   Social History Narrative    No living will     Social Determinants of Health     Financial Resource Strain:     Difficulty of Paying Living Expenses:    Food Insecurity: No Food Insecurity    Worried About Running Out of Food in the Last Year: Never true    Samy of Food in the Last Year: Never true   Transportation Needs: No Transportation Needs    Lack of Transportation (Medical): No    Lack of Transportation (Non-Medical): No   Physical Activity:     Days of Exercise per Week:     Minutes of Exercise per Session:    Stress:     Feeling of Stress :    Social Connections:     Frequency of Communication with Friends and Family:     Frequency of Social Gatherings with Friends and Family:     Attends Nondenominational Services:     Active Member of Clubs or Organizations:     Attends Club or Organization Meetings:     Marital Status:    Intimate Partner Violence:     Fear of Current or Ex-Partner:     Emotionally Abused:     Physically Abused:     Sexually Abused:       Medications and Allergies:     Current Outpatient Medications   Medication Sig Dispense Refill    acetaminophen (TYLENOL) 325 mg tablet Take 2 tablets (650 mg total) by mouth every 6 (six) hours as needed for mild pain, headaches or fever  0    albuterol (2 5 mg/3 mL) 0 083 % nebulizer solution USE 1 UNIT DOSE IN NEBULIZER EVERY 4 HOURS AS NEEDED        albuterol (PROVENTIL HFA,VENTOLIN HFA) 90 mcg/act inhaler Inhale 2 puffs every 6 (six) hours as needed for wheezing 1 Inhaler 0    alendronate (FOSAMAX) 70 mg tablet Take 70 mg by mouth every 7 days      atorvastatin (LIPITOR) 20 mg tablet TAKE ONE TABLET BY MOUTH EVERY DAY 90 tablet 0    BD Pen Needle Mamta U/F 32G X 4 MM MISC USE AS DIRECTED 100 each 0    BD Pen Needle Mamta U/F 32G X 4 MM MISC USE AS DIRECTED 100 each 0    Blood Glucose Monitoring Suppl (ONE TOUCH ULTRA 2) w/Device KIT by Does not apply route 2 (two) times a day 1 each 0    DULoxetine (CYMBALTA) 20 mg capsule TAKE ONE CAPSULE BY MOUTH ONCE DAILY 90 capsule 0    ergocalciferol (VITAMIN D2) 50,000 units TAKE ONE CAPSULE BY MOUTH WEEKLY 12 capsule 0    fluticasone-salmeterol (ADVAIR) 250-50 mcg/dose Inhale 1 puff every 12 (twelve) hours       folic acid (FOLVITE) 1 mg tablet TAKE ONE TABLET BY MOUTH ONCE DAILY 90 tablet 0    furosemide (LASIX) 40 mg tablet TAKE ONE TABLET BY MOUTH EVERY DAY 90 tablet 0    glucose blood (OneTouch Ultra) test strip Test 3 times daily   Dx: E11 65 300 each 3    Insulin Pen Needle 33G X 4 MM MISC by Does not apply route daily 100 each 5    ipratropium-albuterol (DUO-NEB) 0 5-2 5 mg/3 mL nebulizer solution Take 1 vial (3 mL total) by nebulization every 6 (six) hours as needed for wheezing or shortness of breath 120 vial 0    JANUVIA 100 MG tablet TAKE 1 TABLET BY MOUTH DAILY   90 tablet 0    Lancets (OneTouch Delica Plus ZQVAQQ19P) MISC TEST 2 TIMES A  each 0    levothyroxine 150 mcg tablet Take 1 tablet (150 mcg total) by mouth daily 90 tablet 3    lisinopril (ZESTRIL) 40 mg tablet TAKE ONE TABLET BY MOUTH EVERY DAY 90 tablet 0    methotrexate 2 5 mg tablet TAKE 8 TABLETS BY MOUTH EVERY SUNDAY 96 tablet 0    mirtazapine (REMERON) 15 mg tablet Take 1 tablet (15 mg total) by mouth daily at bedtime 90 tablet 3    mometasone (ELOCON) 0 1 % ointment Apply topically 2 (two) times a day as needed (itching) 45 g 1    nystatin (MYCOSTATIN) cream Apply topically 2 (two) times a day 30 g 2    nystatin (MYCOSTATIN) powder Apply topically 3 (three) times a day as needed (fungal skin rash)      ONE TOUCH ULTRA TEST test strip Tests twice a day 100 each 5    pantoprazole (PROTONIX) 40 mg tablet TAKE ONE TABLET BY MOUTH ONCE DAILY 90 tablet 0    Polyethylene Glycol 3350-GRX POWD Take by mouth daily at bedtime as needed (constipation) 850 g 0    Semaglutide,0 25 or 0 5MG/DOS, (Ozempic, 0 25 or 0 5 MG/DOSE,) 2 MG/1 5ML SOPN Inject 0 5 mg under the skin once a week 4 5 mL 1    tiotropium (SPIRIVA RESPIMAT) 2 5 MCG/ACT AERS inhaler Inhale 1 puff daily at bedtime 3 Inhaler 3    Tofacitinib Citrate (XELJANZ XR) 11 MG TB24 Take 1 tablet by mouth daily      Tresiba FlexTouch 100 units/mL injection pen INJECT 30 UNITS UNDER THE SKIN DAILY 30 mL 3    vitamin B-12 (VITAMIN B-12) 500 MCG tablet Take 1 tablet (500 mcg total) by mouth daily      doxycycline monohydrate (MONODOX) 100 mg capsule Take 1 capsule (100 mg total) by mouth 2 (two) times a day for 7 days 20 capsule 0    fluconazole (DIFLUCAN) 150 mg tablet Take 1 tablet (150 mg total) by mouth daily for 3 doses 3 tablet 2     No current facility-administered medications for this visit       No Known Allergies   Immunizations:     Immunization History   Administered Date(s) Administered    INFLUENZA 10/13/2015, 10/27/2016    Influenza Split High Dose Preservative Free IM 09/15/2014, 10/13/2015, 10/27/2016, 09/11/2017    Influenza, Quadrivalent (nasal) 11/06/2016    Influenza, high dose seasonal 0 7 mL 09/24/2018, 09/30/2019, 09/08/2020    Influenza, seasonal, injectable 10/01/2013    Pneumococcal Conjugate 13-Valent 02/10/2016    Pneumococcal Polysaccharide PPV23 11/18/2008, 12/02/2013    SARS-CoV-2 / COVID-19 mRNA IM (Moderna) 04/06/2021, 05/04/2021    Tdap 10/30/2015    Zoster Vaccine Recombinant 05/29/2020, 09/08/2020      Health Maintenance:         Topic Date Due    Colorectal Cancer Screening  04/30/2018    Lung Cancer Screening  01/14/2021    Hepatitis C Screening  Completed         Topic Date Due    COVID-19 Vaccine (3 - Moderna risk 3-dose series) 06/01/2021    Influenza Vaccine (1) 09/01/2021      Medicare Health Risk Assessment:     /62 (BP Location: Left arm, Patient Position: Sitting, Cuff Size: Large)   Pulse 94   Temp (!) 97 3 °F (36 3 °C)   Ht 5' 5" (1 651 m)   Wt 113 kg (249 lb 4 8 oz)   SpO2 95%   BMI 41 49 kg/m²      Corby Altamirano is here for her Subsequent Wellness visit  Last Medicare Wellness visit information reviewed, patient interviewed and updates made to the record today  Health Risk Assessment:   Patient rates overall health as poor  Patient feels that their physical health rating is slightly worse  Patient is satisfied with their life  Eyesight was rated as same  Hearing was rated as same  Patient feels that their emotional and mental health rating is same  Patients states they are sometimes angry  Patient states they are sometimes unusually tired/fatigued  Pain experienced in the last 7 days has been none  Patient states that she has experienced no weight loss or gain in last 6 months  Depression Screening:   PHQ-2 Score: 0      Fall Risk Screening: In the past year, patient has experienced: history of falling in past year    Number of falls: 2 or more  Injured during fall?: No    Feels unsteady when standing or walking?: Yes    Worried about falling?: Yes      Urinary Incontinence Screening:   Patient has leaked urine accidently in the last six months  Home Safety:  Patient has trouble with stairs inside or outside of their home  Patient has working smoke alarms and has working carbon monoxide detector  Home safety hazards include: none  Nutrition:   Current diet is Regular  Medications:   Patient is currently taking over-the-counter supplements  OTC medications include: see medication list  Patient is able to manage medications  Activities of Daily Living (ADLs)/Instrumental Activities of Daily Living (IADLs):   Walk and transfer into and out of bed and chair?: Yes  Dress and groom yourself?: Yes    Bathe or shower yourself?: No    Feed yourself?  Yes  Do your laundry/housekeeping?: No  Manage your money, pay your bills and track your expenses?: Yes  Make your own meals?: No    Do your own shopping?: No    Previous Hospitalizations:   Any hospitalizations or ED visits within the last 12 months?: No      Advance Care Planning:   Living will: No    Durable POA for healthcare: No    Advanced directive: No    Five wishes given: Yes      Cognitive Screening:   Provider or family/friend/caregiver concerned regarding cognition?: No    PREVENTIVE SCREENINGS      Cardiovascular Screening:    General: Screening Not Indicated and History Lipid Disorder      Diabetes Screening:     General: Screening Not Indicated and History Diabetes      Colorectal Cancer Screening:     General: Patient Declines      Breast Cancer Screening:     General: Patient Declines      Cervical Cancer Screening:    General: History Cervical Cancer      Osteoporosis Screening:    General: Patient Declines      Abdominal Aortic Aneurysm (AAA) Screening:        General: Screening Not Indicated      Lung Cancer Screening:     General: Patient Declines      Hepatitis C Screening:    General: Screening Current    Screening, Brief Intervention, and Referral to Treatment (SBIRT)    Screening  Typical number of drinks in a day: 0  Typical number of drinks in a week: 0  Interpretation: Low risk drinking behavior  Single Item Drug Screening:  How often have you used an illegal drug (including marijuana) or a prescription medication for non-medical reasons in the past year? never    Single Item Drug Screen Score: 0  Interpretation: Negative screen for possible drug use disorder    Brief Intervention  Alcohol & drug use screenings were reviewed  No concerns regarding substance use disorder identified         Chapis Ferrari MD

## 2021-09-23 NOTE — PROGRESS NOTES
Assessment/Plan:    No problem-specific Assessment & Plan notes found for this encounter  Diagnoses and all orders for this visit:    Centrilobular emphysema ([address] )  -     CBC and differential; Future    Type 2 diabetes mellitus with hyperglycemia, with long-term current use of insulin (HCC)  -     CBC and differential; Future  -     Comprehensive metabolic panel; Future  -     Hemoglobin A1C; Future    Chronic obstructive pulmonary disease with acute exacerbation (HCC)  -     CBC and differential; Future  -     doxycycline monohydrate (MONODOX) 100 mg capsule; Take 1 capsule (100 mg total) by mouth 2 (two) times a day for 7 days    Rheumatoid arthritis involving multiple sites, unspecified whether rheumatoid factor present (HCC)  -     CBC and differential; Future    Morbid obesity with BMI of 40 0-44 9, adult (HCC)  -     CBC and differential; Future    Vitamin B12 deficiency  -     CBC and differential; Future  -     Vitamin B12; Future    Vitamin D deficiency  -     CBC and differential; Future  -     Vitamin D 25 hydroxy; Future    Mixed hyperlipidemia  -     Comprehensive metabolic panel; Future  -     Lipid panel; Future    Hypothyroidism, unspecified type  -     TSH, 3rd generation; Future    Essential hypertension    Chronic respiratory failure with hypoxia (HCC)    Candidal dermatitis    Candidal UTI (urinary tract infection)  -     fluconazole (DIFLUCAN) 150 mg tablet; Take 1 tablet (150 mg total) by mouth daily for 3 doses         orders recommendations as noted above  Reviewed her recent symptoms with her  This appears to be a mild COPD exacerbation  Orders recommendations as noted above  Watch for any worsening  Continue with the oxygen  Continue with the nebulizer treatments  Continue with the inhalers as previously  Follow-up pulse ox at home if possible  Watch for any worsening  She is beginning with some irritation into the perineal area and vaginal area    Will treat her again with the Diflucan especially since she is going to be on antibiotics  Watch for any worsening of symptoms  Reviewed her recent gyn visit with her  She did not have laboratory testing done prior to this visit  Discussed with her dietary changes especially since her blood sugars have been more elevated at home, often above 200  Watch carbohydrate intake  Reviewed current medications with her  Discussed with her getting laboratory testing done but advised her to hold off for few weeks because of this recent illness  Discussed with her the importance of continuing to follow up with Rheumatology regarding her worsening symptoms  Continue to use the walker at all times  Discussed with her contacting [facility] regarding possible assistance available especially with her significant limitations and her need for assistance with most activities of daily living  Advised her to recheck TSH with upcoming laboratory testing especially since this has been more variable recently  For now continue current dosage of the levothyroxine  Continue with vitamin B12 and vitamin-D supplementation  Will recheck this with upcoming laboratory testing  Blood pressure relatively well controlled  Continue current medications per watch salt intake  Stay adequately hydrated  Will have her follow up in about 6-8 weeks or sooner if needed  Subjective:      Patient ID: Chula Palma is a 66 y o  female  She presents for routine follow-up as well as Medicare wellness visit  She has noticed some increasing cough and chest congestion over the last week or so  Increased cough  Some increased nasal congestion  Denies any fevers or chills  Continues with nebulizer treatments if needed  Denies any known sick contacts  Does not go out often  She is requiring much more assistance at home  Her granddaughter is staying with her most of the time    She requires help with food preparation because she cannot stand for any prolonged periods because of back symptoms  She requires assistance with most of the household chores  She needs her granddaughter present for when she bathes  Back pain has worsened significantly  Continues to follow-up with Rheumatology  Blood sugars have been much more elevated at home  Often above 200  Admits that she is eating poorly  Eating much more sweets  Having some increased urination  Denies any dysuria  Some itching into the vaginal area  She has noticed this seems to occur more frequently when her blood sugars are higher  Tolerating her blood pressure medications without difficulty  Denies any headaches or localized weakness  Denies any chest pain or palpitations  Tolerating the atorvastatin without difficulty  Denies any significant muscle aches or weakness with this  Usually sleeps relatively well  The following portions of the patient's history were reviewed and updated as appropriate:   She  has a past medical history of Arthritis, Arthritis, Cancer ([address] ), Candidiasis of skin, Cervical cancer ([address] ), Chronic respiratory failure with hypoxia and hypercapnia ([address] ) (4/20/2019), COPD (chronic obstructive pulmonary disease) ([address] ), Current chronic use of systemic steroids, Degenerative arthritis of left knee, Diabetes mellitus ([address] ), Disease of thyroid gland, Fibromyalgia, Fistula of knee, GERD (gastroesophageal reflux disease), Hyperlipidemia, Hypertension, Medial meniscus tear, Migraine, Obesity, Obesity, Osteoarthritis, Osteopenia, Pneumonia, Psychiatric disorder, Rheumatoid arthritis ([address] ), Rheumatoid arthritis ([address] ), Sepsis ([address] ), Tick bite, Vitamin B12 deficiency, and Vitamin D deficiency    She   Patient Active Problem List    Diagnosis Date Noted    Primary insomnia 09/08/2020    Migraine without aura and without status migrainosus, not intractable 07/28/2020    Postherpetic neuralgia 86/22/2748    Diastolic dysfunction 60/25/7205    Macrocytic anemia 01/14/2020    Hypoxemia requiring supplemental oxygen 06/19/2019    History of exposure to asbestos 05/20/2019    Chronic respiratory failure with hypoxia (HCC) 04/20/2019    Hyponatremia 04/20/2019    Candidal dermatitis 12/28/2018    Loculated pleural effusion 12/25/2018    Constipation 11/29/2017    Renal cyst 11/29/2017    Candidiasis, cutaneous 06/30/2017    Vitamin B12 deficiency 11/10/2016    BMI 40 0-44 9, adult ([place] [address] ) 11/05/2016    Morbid obesity with BMI of 40 0-44 9, adult ([place] [address] ) 11/04/2016    Centrilobular emphysema ([place] [address] ) 11/03/2016    Type 2 diabetes mellitus with hyperglycemia, with long-term current use of insulin ([place] [address] ) 11/03/2016    Hypothyroidism 11/03/2016    Rheumatoid arthritis ([place] [address] ) 11/03/2016    Essential hypertension 11/03/2016    Hyperlipidemia 11/03/2016    GERD (gastroesophageal reflux disease) 11/03/2016    Hypoalbuminemia 11/03/2016    Hepatic steatosis 11/03/2016    Atelectasis 11/03/2016    Anxiety 06/02/2014    Fibromyalgia 01/28/2014    Osteopenia 09/17/2012    Chronic obstructive pulmonary disease with acute exacerbation ([place] [address] ) 05/29/2012    Osteoarthritis 05/29/2012    Vitamin D deficiency 05/29/2012     She  has a past surgical history that includes Hysterectomy; Knee arthroscopy; Cataract extraction (Bilateral); Cholecystectomy; Joint replacement; Tubal ligation; Neuroplasty / transposition median nerve at carpal tunnel; Eye surgery; and IR thoracentesis (5/31/2019)  Her family history includes Asthma in her family; Cancer in her family; Diabetes in her family; Hypertension in her family; Stroke in her mother  She  reports that she quit smoking about 8 years ago  Her smoking use included cigarettes and e-cigarettes  She has a 48 00 pack-year smoking history  She has never used smokeless tobacco  She reports that she does not drink alcohol and does not use drugs    Current Outpatient Medications   Medication Sig Dispense Refill    acetaminophen (TYLENOL) 325 mg tablet Take 2 tablets (650 mg total) by mouth every 6 (six) hours as needed for mild pain, headaches or fever  0    albuterol (2 5 mg/3 mL) 0 083 % nebulizer solution USE 1 UNIT DOSE IN NEBULIZER EVERY 4 HOURS AS NEEDED   albuterol (PROVENTIL HFA,VENTOLIN HFA) 90 mcg/act inhaler Inhale 2 puffs every 6 (six) hours as needed for wheezing 1 Inhaler 0    alendronate (FOSAMAX) 70 mg tablet Take 70 mg by mouth every 7 days      atorvastatin (LIPITOR) 20 mg tablet TAKE ONE TABLET BY MOUTH EVERY DAY 90 tablet 0    BD Pen Needle Mamta U/F 32G X 4 MM MISC USE AS DIRECTED 100 each 0    BD Pen Needle Mamta U/F 32G X 4 MM MISC USE AS DIRECTED 100 each 0    Blood Glucose Monitoring Suppl (ONE TOUCH ULTRA 2) w/Device KIT by Does not apply route 2 (two) times a day 1 each 0    DULoxetine (CYMBALTA) 20 mg capsule TAKE ONE CAPSULE BY MOUTH ONCE DAILY 90 capsule 0    ergocalciferol (VITAMIN D2) 50,000 units TAKE ONE CAPSULE BY MOUTH WEEKLY 12 capsule 0    fluticasone-salmeterol (ADVAIR) 250-50 mcg/dose Inhale 1 puff every 12 (twelve) hours       folic acid (FOLVITE) 1 mg tablet TAKE ONE TABLET BY MOUTH ONCE DAILY 90 tablet 0    furosemide (LASIX) 40 mg tablet TAKE ONE TABLET BY MOUTH EVERY DAY 90 tablet 0    glucose blood (OneTouch Ultra) test strip Test 3 times daily   Dx: E11 65 300 each 3    Insulin Pen Needle 33G X 4 MM MISC by Does not apply route daily 100 each 5    ipratropium-albuterol (DUO-NEB) 0 5-2 5 mg/3 mL nebulizer solution Take 1 vial (3 mL total) by nebulization every 6 (six) hours as needed for wheezing or shortness of breath 120 vial 0    JANUVIA 100 MG tablet TAKE 1 TABLET BY MOUTH DAILY   90 tablet 0    Lancets (OneTouch Delica Plus [id number]) MISC TEST 2 TIMES A  each 0    levothyroxine 150 mcg tablet Take 1 tablet (150 mcg total) by mouth daily 90 tablet 3    lisinopril (ZESTRIL) 40 mg tablet TAKE ONE TABLET BY MOUTH EVERY DAY 90 tablet 0    methotrexate 2 5 mg tablet TAKE 8 TABLETS BY MOUTH EVERY SUNDAY 96 tablet 0    mirtazapine (REMERON) 15 mg tablet Take 1 tablet (15 mg total) by mouth daily at bedtime 90 tablet 3    mometasone (ELOCON) 0 1 % ointment Apply topically 2 (two) times a day as needed (itching) 45 g 1    nystatin (MYCOSTATIN) cream Apply topically 2 (two) times a day 30 g 2    nystatin (MYCOSTATIN) powder Apply topically 3 (three) times a day as needed (fungal skin rash)      ONE TOUCH ULTRA TEST test strip Tests twice a day 100 each 5    pantoprazole (PROTONIX) 40 mg tablet TAKE ONE TABLET BY MOUTH ONCE DAILY 90 tablet 0    Polyethylene Glycol 3350-GRX POWD Take by mouth daily at bedtime as needed (constipation) 850 g 0    Semaglutide,0 25 or 0 5MG/DOS, (Ozempic, 0 25 or 0 5 MG/DOSE,) 2 MG/1 5ML SOPN Inject 0 5 mg under the skin once a week 4 5 mL 1    tiotropium (SPIRIVA RESPIMAT) 2 5 MCG/ACT AERS inhaler Inhale 1 puff daily at bedtime 3 Inhaler 3    Tofacitinib Citrate (XELJANZ XR) 11 MG TB24 Take 1 tablet by mouth daily      Tresiba FlexTouch 100 units/mL injection pen INJECT 30 UNITS UNDER THE SKIN DAILY 30 mL 3    vitamin B-12 (VITAMIN B-12) 500 MCG tablet Take 1 tablet (500 mcg total) by mouth daily      doxycycline monohydrate (MONODOX) 100 mg capsule Take 1 capsule (100 mg total) by mouth 2 (two) times a day for 7 days 20 capsule 0    fluconazole (DIFLUCAN) 150 mg tablet Take 1 tablet (150 mg total) by mouth daily for 3 doses 3 tablet 2     No current facility-administered medications for this visit  Current Outpatient Medications on File Prior to Visit   Medication Sig    acetaminophen (TYLENOL) 325 mg tablet Take 2 tablets (650 mg total) by mouth every 6 (six) hours as needed for mild pain, headaches or fever    albuterol (2 5 mg/3 mL) 0 083 % nebulizer solution USE 1 UNIT DOSE IN NEBULIZER EVERY 4 HOURS AS NEEDED      albuterol (PROVENTIL HFA,VENTOLIN HFA) 90 mcg/act inhaler Inhale 2 puffs every 6 (six) hours as needed for wheezing    alendronate (FOSAMAX) 70 mg tablet Take 70 mg by mouth every 7 days    atorvastatin (LIPITOR) 20 mg tablet TAKE ONE TABLET BY MOUTH EVERY DAY    BD Pen Needle Mamta U/F 32G X 4 MM MISC USE AS DIRECTED    BD Pen Needle Mamta U/F 32G X 4 MM MISC USE AS DIRECTED    Blood Glucose Monitoring Suppl (ONE TOUCH ULTRA 2) w/Device KIT by Does not apply route 2 (two) times a day    DULoxetine (CYMBALTA) 20 mg capsule TAKE ONE CAPSULE BY MOUTH ONCE DAILY    ergocalciferol (VITAMIN D2) 50,000 units TAKE ONE CAPSULE BY MOUTH WEEKLY    fluticasone-salmeterol (ADVAIR) 250-50 mcg/dose Inhale 1 puff every 12 (twelve) hours     folic acid (FOLVITE) 1 mg tablet TAKE ONE TABLET BY MOUTH ONCE DAILY    furosemide (LASIX) 40 mg tablet TAKE ONE TABLET BY MOUTH EVERY DAY    glucose blood (OneTouch Ultra) test strip Test 3 times daily   Dx: E11 65    Insulin Pen Needle 33G X 4 MM MISC by Does not apply route daily    ipratropium-albuterol (DUO-NEB) 0 5-2 5 mg/3 mL nebulizer solution Take 1 vial (3 mL total) by nebulization every 6 (six) hours as needed for wheezing or shortness of breath    JANUVIA 100 MG tablet TAKE 1 TABLET BY MOUTH DAILY      Lancets (OneTouch Delica Plus [id number]) MISC TEST 2 TIMES A DAY    levothyroxine 150 mcg tablet Take 1 tablet (150 mcg total) by mouth daily    lisinopril (ZESTRIL) 40 mg tablet TAKE ONE TABLET BY MOUTH EVERY DAY    methotrexate 2 5 mg tablet TAKE 8 TABLETS BY MOUTH EVERY SUNDAY    mirtazapine (REMERON) 15 mg tablet Take 1 tablet (15 mg total) by mouth daily at bedtime    mometasone (ELOCON) 0 1 % ointment Apply topically 2 (two) times a day as needed (itching)    nystatin (MYCOSTATIN) cream Apply topically 2 (two) times a day    nystatin (MYCOSTATIN) powder Apply topically 3 (three) times a day as needed (fungal skin rash)    ONE TOUCH ULTRA TEST test strip Tests twice a day    pantoprazole (PROTONIX) 40 mg tablet TAKE ONE TABLET BY MOUTH ONCE DAILY    Polyethylene Glycol 3350-GRX POWD Take by mouth daily at bedtime as needed (constipation)    Semaglutide,0 25 or 0 5MG/DOS, (Ozempic, 0 25 or 0 5 MG/DOSE,) 2 MG/1 5ML SOPN Inject 0 5 mg under the skin once a week    tiotropium (SPIRIVA RESPIMAT) 2 5 MCG/ACT AERS inhaler Inhale 1 puff daily at bedtime    Tofacitinib Citrate (XELJANZ XR) 11 MG TB24 Take 1 tablet by mouth daily    Tresiba FlexTouch 100 units/mL injection pen INJECT 30 UNITS UNDER THE SKIN DAILY    vitamin B-12 (VITAMIN B-12) 500 MCG tablet Take 1 tablet (500 mcg total) by mouth daily     No current facility-administered medications on file prior to visit  She has No Known Allergies       Review of Systems   Constitutional: Positive for activity change, appetite change and fatigue  Negative for chills and fever  HENT: Positive for congestion, postnasal drip and sinus pressure  Negative for rhinorrhea and sore throat  Eyes: Negative for visual disturbance  Respiratory: Positive for cough and wheezing  Negative for chest tightness and shortness of breath  Cardiovascular: Negative for chest pain and palpitations  Gastrointestinal: Negative for abdominal pain, blood in stool, diarrhea, nausea and vomiting  Endocrine: Negative for polydipsia, polyphagia and polyuria  Genitourinary: Negative for dysuria, frequency and urgency  Musculoskeletal: Positive for arthralgias, back pain, gait problem and neck stiffness  Skin: Negative for color change  Neurological: Negative for dizziness and headaches  Hematological: Does not bruise/bleed easily  Psychiatric/Behavioral: Negative for sleep disturbance  The patient is not nervous/anxious  Objective:      /62 (BP Location: Left arm, Patient Position: Sitting, Cuff Size: Large)   Pulse 94   Temp (!) 97 3 °F (36 3 °C)   Ht 5' 5" (1 651 m)   Wt 113 kg (249 lb 4 8 oz)   SpO2 95%   BMI 41 49 kg/m²          Physical Exam  Vitals and nursing note reviewed     Constitutional:       General: She is not in acute distress  Appearance: She is well-developed, well-groomed and overweight  HENT:      Head: Normocephalic and atraumatic  Comments:   Boggy nasal mucosa with clear nasal discharge; slight posterior pharyngeal erythema  Eyes:      General:         Right eye: No discharge  Left eye: No discharge  Conjunctiva/sclera: Conjunctivae normal       Pupils: Pupils are equal, round, and reactive to light  Cardiovascular:      Rate and Rhythm: Normal rate and regular rhythm  Heart sounds: Normal heart sounds  No murmur heard  No friction rub  No gallop  Pulmonary:      Effort: No respiratory distress  Breath sounds: Transmitted upper airway sounds present  Decreased breath sounds and rhonchi present  No wheezing or rales  Comments:  Scattered rhonchi  Abdominal:      General: Bowel sounds are normal  There is no distension  Tenderness: There is no abdominal tenderness  Musculoskeletal:      Comments:  Slow, antalgic gait; degenerative changes bilateral hands; diffuse tenderness over the low back area bilaterally; ambulates with walker   Lymphadenopathy:      Cervical: No cervical adenopathy  Skin:     General: Skin is warm and dry  Neurological:      Mental Status: She is alert and oriented to person, place, and time  Psychiatric:         Mood and Affect: Mood and affect normal          Speech: Speech normal          Behavior: Behavior normal  Behavior is cooperative

## 2021-10-25 ENCOUNTER — TELEPHONE (OUTPATIENT)
Dept: INTERNAL MEDICINE CLINIC | Facility: CLINIC | Age: 71
End: 2021-10-25

## 2021-10-27 ENCOUNTER — IMMUNIZATIONS (OUTPATIENT)
Dept: FAMILY MEDICINE CLINIC | Facility: HOSPITAL | Age: 71
End: 2021-10-27

## 2021-10-27 DIAGNOSIS — E55.9 VITAMIN D DEFICIENCY: ICD-10-CM

## 2021-10-27 DIAGNOSIS — Z23 ENCOUNTER FOR IMMUNIZATION: Primary | ICD-10-CM

## 2021-10-27 DIAGNOSIS — M06.9 RHEUMATOID ARTHRITIS INVOLVING MULTIPLE SITES (HCC): ICD-10-CM

## 2021-10-28 RX ORDER — ERGOCALCIFEROL 1.25 MG/1
CAPSULE ORAL
Qty: 12 CAPSULE | Refills: 0 | Status: SHIPPED | OUTPATIENT
Start: 2021-10-28 | End: 2021-12-13 | Stop reason: HOSPADM

## 2021-11-08 ENCOUNTER — IMMUNIZATIONS (OUTPATIENT)
Dept: FAMILY MEDICINE CLINIC | Facility: HOSPITAL | Age: 71
End: 2021-11-08

## 2021-11-08 DIAGNOSIS — Z23 ENCOUNTER FOR IMMUNIZATION: Primary | ICD-10-CM

## 2021-11-08 PROCEDURE — 91301 COVID-19 MODERNA VACC 0.5 ML: CPT

## 2021-11-08 PROCEDURE — 0011A COVID-19 MODERNA VACC 0.5 ML: CPT

## 2021-11-18 DIAGNOSIS — Z79.4 TYPE 2 DIABETES MELLITUS WITH HYPERGLYCEMIA, WITH LONG-TERM CURRENT USE OF INSULIN (HCC): ICD-10-CM

## 2021-11-18 DIAGNOSIS — E11.65 TYPE 2 DIABETES MELLITUS WITH HYPERGLYCEMIA, WITH LONG-TERM CURRENT USE OF INSULIN (HCC): ICD-10-CM

## 2021-11-19 RX ORDER — SEMAGLUTIDE 1.34 MG/ML
0.5 INJECTION, SOLUTION SUBCUTANEOUS WEEKLY
Qty: 4.5 ML | Refills: 1 | Status: SHIPPED | OUTPATIENT
Start: 2021-11-19 | End: 2022-02-01 | Stop reason: SDUPTHER

## 2021-11-22 ENCOUNTER — TELEMEDICINE (OUTPATIENT)
Dept: INTERNAL MEDICINE CLINIC | Facility: CLINIC | Age: 71
End: 2021-11-22
Payer: COMMERCIAL

## 2021-11-22 VITALS — OXYGEN SATURATION: 92 % | TEMPERATURE: 98.4 F | HEART RATE: 88 BPM

## 2021-11-22 DIAGNOSIS — J32.9 CHRONIC SINUSITIS, UNSPECIFIED LOCATION: Primary | ICD-10-CM

## 2021-11-22 DIAGNOSIS — J96.11 CHRONIC RESPIRATORY FAILURE WITH HYPOXIA (HCC): ICD-10-CM

## 2021-11-22 DIAGNOSIS — B37.49 CANDIDAL UTI (URINARY TRACT INFECTION): ICD-10-CM

## 2021-11-22 DIAGNOSIS — J44.1 CHRONIC OBSTRUCTIVE PULMONARY DISEASE WITH ACUTE EXACERBATION (HCC): ICD-10-CM

## 2021-11-22 PROCEDURE — G2012 BRIEF CHECK IN BY MD/QHP: HCPCS | Performed by: FAMILY MEDICINE

## 2021-11-22 PROCEDURE — U0003 INFECTIOUS AGENT DETECTION BY NUCLEIC ACID (DNA OR RNA); SEVERE ACUTE RESPIRATORY SYNDROME CORONAVIRUS 2 (SARS-COV-2) (CORONAVIRUS DISEASE [COVID-19]), AMPLIFIED PROBE TECHNIQUE, MAKING USE OF HIGH THROUGHPUT TECHNOLOGIES AS DESCRIBED BY CMS-2020-01-R: HCPCS | Performed by: FAMILY MEDICINE

## 2021-11-22 PROCEDURE — U0005 INFEC AGEN DETEC AMPLI PROBE: HCPCS | Performed by: FAMILY MEDICINE

## 2021-11-22 RX ORDER — DOXYCYCLINE 100 MG/1
100 CAPSULE ORAL 2 TIMES DAILY
Qty: 20 CAPSULE | Refills: 0 | Status: SHIPPED | OUTPATIENT
Start: 2021-11-22 | End: 2021-11-29

## 2021-11-22 RX ORDER — HYDROXYCHLOROQUINE SULFATE 200 MG/1
TABLET, FILM COATED ORAL
COMMUNITY
End: 2021-12-13 | Stop reason: HOSPADM

## 2021-11-22 RX ORDER — FLUCONAZOLE 150 MG/1
150 TABLET ORAL DAILY
Qty: 3 TABLET | Refills: 0 | Status: SHIPPED | OUTPATIENT
Start: 2021-11-22 | End: 2021-11-25

## 2021-11-23 LAB — SARS-COV-2 RNA RESP QL NAA+PROBE: NEGATIVE

## 2021-12-07 ENCOUNTER — APPOINTMENT (EMERGENCY)
Dept: RADIOLOGY | Facility: HOSPITAL | Age: 71
DRG: 190 | End: 2021-12-07
Payer: COMMERCIAL

## 2021-12-07 ENCOUNTER — HOSPITAL ENCOUNTER (INPATIENT)
Facility: HOSPITAL | Age: 71
LOS: 6 days | Discharge: HOME | DRG: 190 | End: 2021-12-13
Attending: EMERGENCY MEDICINE | Admitting: FAMILY MEDICINE
Payer: COMMERCIAL

## 2021-12-07 ENCOUNTER — APPOINTMENT (EMERGENCY)
Dept: CT IMAGING | Facility: HOSPITAL | Age: 71
DRG: 190 | End: 2021-12-07
Payer: COMMERCIAL

## 2021-12-07 DIAGNOSIS — B33.8 RSV INFECTION: Primary | ICD-10-CM

## 2021-12-07 DIAGNOSIS — I26.99 ACUTE PULMONARY EMBOLISM, UNSPECIFIED PULMONARY EMBOLISM TYPE, UNSPECIFIED WHETHER ACUTE COR PULMONALE PRESENT (HCC): ICD-10-CM

## 2021-12-07 DIAGNOSIS — G47.33 OSA (OBSTRUCTIVE SLEEP APNEA): ICD-10-CM

## 2021-12-07 DIAGNOSIS — J44.1 CHRONIC OBSTRUCTIVE PULMONARY DISEASE WITH ACUTE EXACERBATION (HCC): ICD-10-CM

## 2021-12-07 DIAGNOSIS — J96.12 CHRONIC RESPIRATORY FAILURE WITH HYPERCAPNIA (HCC): ICD-10-CM

## 2021-12-07 DIAGNOSIS — J90 LOCULATED PLEURAL EFFUSION: ICD-10-CM

## 2021-12-07 DIAGNOSIS — J44.1 COPD WITH ACUTE EXACERBATION (HCC): ICD-10-CM

## 2021-12-07 DIAGNOSIS — E11.9 DIABETES MELLITUS (HCC): ICD-10-CM

## 2021-12-07 DIAGNOSIS — I26.99 PULMONARY EMBOLUS (HCC): ICD-10-CM

## 2021-12-07 DIAGNOSIS — J96.11 CHRONIC RESPIRATORY FAILURE WITH HYPOXIA (HCC): ICD-10-CM

## 2021-12-07 DIAGNOSIS — J21.0 RSV (ACUTE BRONCHIOLITIS DUE TO RESPIRATORY SYNCYTIAL VIRUS): ICD-10-CM

## 2021-12-07 DIAGNOSIS — I50.32 CHRONIC DIASTOLIC CONGESTIVE HEART FAILURE (HCC): ICD-10-CM

## 2021-12-07 DIAGNOSIS — J18.9 CAP (COMMUNITY ACQUIRED PNEUMONIA): ICD-10-CM

## 2021-12-07 LAB
2HR DELTA HS TROPONIN: 0 NG/L
ALBUMIN SERPL BCP-MCNC: 3.7 G/DL (ref 3.5–5)
ALP SERPL-CCNC: 106 U/L (ref 46–116)
ALT SERPL W P-5'-P-CCNC: 45 U/L (ref 12–78)
ANION GAP SERPL CALCULATED.3IONS-SCNC: 6 MMOL/L (ref 4–13)
APTT PPP: 25 SECONDS (ref 23–37)
ARTERIAL PATENCY WRIST A: YES
AST SERPL W P-5'-P-CCNC: 25 U/L (ref 5–45)
BASE EX.OXY STD BLDV CALC-SCNC: 70.8 % (ref 60–80)
BASE EXCESS BLDA CALC-SCNC: 0.8 MMOL/L
BASE EXCESS BLDV CALC-SCNC: 2.5 MMOL/L
BASOPHILS # BLD AUTO: 0.01 THOUSANDS/ΜL (ref 0–0.1)
BASOPHILS NFR BLD AUTO: 0 % (ref 0–1)
BILIRUB SERPL-MCNC: 0.35 MG/DL (ref 0.2–1)
BUN SERPL-MCNC: 17 MG/DL (ref 5–25)
CALCIUM SERPL-MCNC: 9.9 MG/DL (ref 8.3–10.1)
CARDIAC TROPONIN I PNL SERPL HS: 4 NG/L
CARDIAC TROPONIN I PNL SERPL HS: 4 NG/L
CHLORIDE SERPL-SCNC: 98 MMOL/L (ref 100–108)
CO2 SERPL-SCNC: 32 MMOL/L (ref 21–32)
CREAT SERPL-MCNC: 0.74 MG/DL (ref 0.6–1.3)
D DIMER PPP FEU-MCNC: 2.69 UG/ML FEU
EOSINOPHIL # BLD AUTO: 0.16 THOUSAND/ΜL (ref 0–0.61)
EOSINOPHIL NFR BLD AUTO: 2 % (ref 0–6)
ERYTHROCYTE [DISTWIDTH] IN BLOOD BY AUTOMATED COUNT: 14.1 % (ref 11.6–15.1)
FLUAV RNA RESP QL NAA+PROBE: NEGATIVE
FLUBV RNA RESP QL NAA+PROBE: NEGATIVE
GFR SERPL CREATININE-BSD FRML MDRD: 81 ML/MIN/1.73SQ M
GLUCOSE SERPL-MCNC: 225 MG/DL (ref 65–140)
HCO3 BLDA-SCNC: 26.9 MMOL/L (ref 22–28)
HCO3 BLDV-SCNC: 31.4 MMOL/L (ref 24–30)
HCT VFR BLD AUTO: 43.2 % (ref 34.8–46.1)
HGB BLD-MCNC: 14 G/DL (ref 11.5–15.4)
IMM GRANULOCYTES # BLD AUTO: 0.02 THOUSAND/UL (ref 0–0.2)
IMM GRANULOCYTES NFR BLD AUTO: 0 % (ref 0–2)
INR PPP: 0.99 (ref 0.84–1.19)
IPAP: 12
LACTATE SERPL-SCNC: 1.3 MMOL/L (ref 0.5–2)
LYMPHOCYTES # BLD AUTO: 1.22 THOUSANDS/ΜL (ref 0.6–4.47)
LYMPHOCYTES NFR BLD AUTO: 17 % (ref 14–44)
MCH RBC QN AUTO: 31 PG (ref 26.8–34.3)
MCHC RBC AUTO-ENTMCNC: 32.4 G/DL (ref 31.4–37.4)
MCV RBC AUTO: 96 FL (ref 82–98)
MONOCYTES # BLD AUTO: 0.71 THOUSAND/ΜL (ref 0.17–1.22)
MONOCYTES NFR BLD AUTO: 10 % (ref 4–12)
NEUTROPHILS # BLD AUTO: 5.11 THOUSANDS/ΜL (ref 1.85–7.62)
NEUTS SEG NFR BLD AUTO: 71 % (ref 43–75)
NON VENT- BIPAP: ABNORMAL
NRBC BLD AUTO-RTO: 0 /100 WBCS
NT-PROBNP SERPL-MCNC: 10 PG/ML
O2 CT BLDA-SCNC: 17.2 ML/DL (ref 16–23)
O2 CT BLDV-SCNC: 14.7 ML/DL
OXYHGB MFR BLDA: 95.9 % (ref 94–97)
PCO2 BLDA: 48.8 MM HG (ref 36–44)
PCO2 BLDV: 68 MM HG (ref 42–50)
PEEP MAX SETTING VENT: 5 CM[H2O]
PH BLDA: 7.36 [PH] (ref 7.35–7.45)
PH BLDV: 7.28 [PH] (ref 7.3–7.4)
PLATELET # BLD AUTO: 267 THOUSANDS/UL (ref 149–390)
PMV BLD AUTO: 11.4 FL (ref 8.9–12.7)
PO2 BLDA: 91.3 MM HG (ref 75–129)
PO2 BLDV: 42.8 MM HG (ref 35–45)
POTASSIUM SERPL-SCNC: 3.8 MMOL/L (ref 3.5–5.3)
PROCALCITONIN SERPL-MCNC: <0.05 NG/ML
PROT SERPL-MCNC: 8.3 G/DL (ref 6.4–8.2)
PROTHROMBIN TIME: 12.6 SECONDS (ref 11.6–14.5)
RBC # BLD AUTO: 4.51 MILLION/UL (ref 3.81–5.12)
RSV RNA RESP QL NAA+PROBE: POSITIVE
SARS-COV-2 RNA RESP QL NAA+PROBE: NEGATIVE
SODIUM SERPL-SCNC: 136 MMOL/L (ref 136–145)
SPECIMEN SOURCE: ABNORMAL
VENT BIPAP FIO2: ABNORMAL %
WBC # BLD AUTO: 7.23 THOUSAND/UL (ref 4.31–10.16)

## 2021-12-07 PROCEDURE — 36600 WITHDRAWAL OF ARTERIAL BLOOD: CPT

## 2021-12-07 PROCEDURE — 71045 X-RAY EXAM CHEST 1 VIEW: CPT

## 2021-12-07 PROCEDURE — 99291 CRITICAL CARE FIRST HOUR: CPT | Performed by: PHYSICIAN ASSISTANT

## 2021-12-07 PROCEDURE — 94760 N-INVAS EAR/PLS OXIMETRY 1: CPT

## 2021-12-07 PROCEDURE — 94002 VENT MGMT INPAT INIT DAY: CPT

## 2021-12-07 PROCEDURE — 93005 ELECTROCARDIOGRAM TRACING: CPT

## 2021-12-07 PROCEDURE — 85610 PROTHROMBIN TIME: CPT | Performed by: PHYSICIAN ASSISTANT

## 2021-12-07 PROCEDURE — 83880 ASSAY OF NATRIURETIC PEPTIDE: CPT | Performed by: PHYSICIAN ASSISTANT

## 2021-12-07 PROCEDURE — 85379 FIBRIN DEGRADATION QUANT: CPT | Performed by: PHYSICIAN ASSISTANT

## 2021-12-07 PROCEDURE — 85730 THROMBOPLASTIN TIME PARTIAL: CPT | Performed by: PHYSICIAN ASSISTANT

## 2021-12-07 PROCEDURE — 96374 THER/PROPH/DIAG INJ IV PUSH: CPT

## 2021-12-07 PROCEDURE — 84145 PROCALCITONIN (PCT): CPT | Performed by: PHYSICIAN ASSISTANT

## 2021-12-07 PROCEDURE — 82805 BLOOD GASES W/O2 SATURATION: CPT | Performed by: PHYSICIAN ASSISTANT

## 2021-12-07 PROCEDURE — 83605 ASSAY OF LACTIC ACID: CPT | Performed by: PHYSICIAN ASSISTANT

## 2021-12-07 PROCEDURE — 84484 ASSAY OF TROPONIN QUANT: CPT | Performed by: PHYSICIAN ASSISTANT

## 2021-12-07 PROCEDURE — G1004 CDSM NDSC: HCPCS

## 2021-12-07 PROCEDURE — 96361 HYDRATE IV INFUSION ADD-ON: CPT

## 2021-12-07 PROCEDURE — 71275 CT ANGIOGRAPHY CHEST: CPT

## 2021-12-07 PROCEDURE — 87040 BLOOD CULTURE FOR BACTERIA: CPT | Performed by: PHYSICIAN ASSISTANT

## 2021-12-07 PROCEDURE — 99223 1ST HOSP IP/OBS HIGH 75: CPT | Performed by: PHYSICIAN ASSISTANT

## 2021-12-07 PROCEDURE — 36415 COLL VENOUS BLD VENIPUNCTURE: CPT | Performed by: PHYSICIAN ASSISTANT

## 2021-12-07 PROCEDURE — 99291 CRITICAL CARE FIRST HOUR: CPT

## 2021-12-07 PROCEDURE — 0241U HB NFCT DS VIR RESP RNA 4 TRGT: CPT | Performed by: PHYSICIAN ASSISTANT

## 2021-12-07 PROCEDURE — 80053 COMPREHEN METABOLIC PANEL: CPT | Performed by: PHYSICIAN ASSISTANT

## 2021-12-07 PROCEDURE — 94644 CONT INHLJ TX 1ST HOUR: CPT

## 2021-12-07 PROCEDURE — 85025 COMPLETE CBC W/AUTO DIFF WBC: CPT | Performed by: PHYSICIAN ASSISTANT

## 2021-12-07 RX ORDER — METHYLPREDNISOLONE SODIUM SUCCINATE 125 MG/2ML
100 INJECTION, POWDER, LYOPHILIZED, FOR SOLUTION INTRAMUSCULAR; INTRAVENOUS ONCE
Status: COMPLETED | OUTPATIENT
Start: 2021-12-07 | End: 2021-12-07

## 2021-12-07 RX ORDER — HEPARIN SODIUM 10000 [USP'U]/100ML
3-30 INJECTION, SOLUTION INTRAVENOUS
Status: DISCONTINUED | OUTPATIENT
Start: 2021-12-07 | End: 2021-12-09

## 2021-12-07 RX ORDER — SODIUM CHLORIDE FOR INHALATION 0.9 %
3 VIAL, NEBULIZER (ML) INHALATION ONCE
Status: COMPLETED | OUTPATIENT
Start: 2021-12-07 | End: 2021-12-07

## 2021-12-07 RX ORDER — HEPARIN SODIUM 1000 [USP'U]/ML
4400 INJECTION, SOLUTION INTRAVENOUS; SUBCUTANEOUS
Status: DISCONTINUED | OUTPATIENT
Start: 2021-12-07 | End: 2021-12-09

## 2021-12-07 RX ORDER — SODIUM CHLORIDE 9 MG/ML
3 INJECTION INTRAVENOUS ONCE AS NEEDED
Status: DISCONTINUED | OUTPATIENT
Start: 2021-12-07 | End: 2021-12-13 | Stop reason: HOSPADM

## 2021-12-07 RX ORDER — HEPARIN SODIUM 1000 [USP'U]/ML
8800 INJECTION, SOLUTION INTRAVENOUS; SUBCUTANEOUS
Status: DISCONTINUED | OUTPATIENT
Start: 2021-12-07 | End: 2021-12-09

## 2021-12-07 RX ORDER — HEPARIN SODIUM 1000 [USP'U]/ML
8800 INJECTION, SOLUTION INTRAVENOUS; SUBCUTANEOUS ONCE
Status: COMPLETED | OUTPATIENT
Start: 2021-12-07 | End: 2021-12-07

## 2021-12-07 RX ADMIN — HEPARIN SODIUM 18 UNITS/KG/HR: 10000 INJECTION, SOLUTION INTRAVENOUS at 23:39

## 2021-12-07 RX ADMIN — METHYLPREDNISOLONE SODIUM SUCCINATE 100 MG: 125 INJECTION, POWDER, FOR SOLUTION INTRAMUSCULAR; INTRAVENOUS at 17:33

## 2021-12-07 RX ADMIN — ISODIUM CHLORIDE 12 ML: 0.03 SOLUTION RESPIRATORY (INHALATION) at 17:41

## 2021-12-07 RX ADMIN — SODIUM CHLORIDE 1000 ML: 0.9 INJECTION, SOLUTION INTRAVENOUS at 17:33

## 2021-12-07 RX ADMIN — IOHEXOL 100 ML: 350 INJECTION, SOLUTION INTRAVENOUS at 18:44

## 2021-12-07 RX ADMIN — IPRATROPIUM BROMIDE 1 MG: 0.5 SOLUTION RESPIRATORY (INHALATION) at 17:41

## 2021-12-07 RX ADMIN — ALBUTEROL SULFATE 10 MG: 2.5 SOLUTION RESPIRATORY (INHALATION) at 17:41

## 2021-12-07 RX ADMIN — HEPARIN SODIUM 8800 UNITS: 1000 INJECTION INTRAVENOUS; SUBCUTANEOUS at 23:26

## 2021-12-08 ENCOUNTER — APPOINTMENT (INPATIENT)
Dept: NON INVASIVE DIAGNOSTICS | Facility: HOSPITAL | Age: 71
DRG: 190 | End: 2021-12-08
Payer: COMMERCIAL

## 2021-12-08 PROBLEM — I50.32 CHRONIC DIASTOLIC CONGESTIVE HEART FAILURE (HCC): Status: RESOLVED | Noted: 2021-12-08 | Resolved: 2021-12-08

## 2021-12-08 PROBLEM — I50.32 CHRONIC DIASTOLIC CONGESTIVE HEART FAILURE (HCC): Status: ACTIVE | Noted: 2021-12-08

## 2021-12-08 PROBLEM — J21.0 RSV (ACUTE BRONCHIOLITIS DUE TO RESPIRATORY SYNCYTIAL VIRUS): Status: ACTIVE | Noted: 2021-12-08

## 2021-12-08 LAB
ANION GAP SERPL CALCULATED.3IONS-SCNC: 7 MMOL/L (ref 4–13)
APTT PPP: 110 SECONDS (ref 23–37)
APTT PPP: 115 SECONDS (ref 23–37)
APTT PPP: 141 SECONDS (ref 23–37)
APTT PPP: 90 SECONDS (ref 23–37)
ATRIAL RATE: 110 BPM
BACTERIA UR QL AUTO: ABNORMAL /HPF
BASOPHILS # BLD AUTO: 0 THOUSANDS/ΜL (ref 0–0.1)
BASOPHILS NFR BLD AUTO: 0 % (ref 0–1)
BILIRUB UR QL STRIP: NEGATIVE
BUN SERPL-MCNC: 16 MG/DL (ref 5–25)
CALCIUM SERPL-MCNC: 9.3 MG/DL (ref 8.3–10.1)
CHLORIDE SERPL-SCNC: 102 MMOL/L (ref 100–108)
CLARITY UR: ABNORMAL
CO2 SERPL-SCNC: 28 MMOL/L (ref 21–32)
COLOR UR: YELLOW
CREAT SERPL-MCNC: 0.61 MG/DL (ref 0.6–1.3)
EOSINOPHIL # BLD AUTO: 0 THOUSAND/ΜL (ref 0–0.61)
EOSINOPHIL NFR BLD AUTO: 0 % (ref 0–6)
ERYTHROCYTE [DISTWIDTH] IN BLOOD BY AUTOMATED COUNT: 13.9 % (ref 11.6–15.1)
GFR SERPL CREATININE-BSD FRML MDRD: 90 ML/MIN/1.73SQ M
GLUCOSE SERPL-MCNC: 203 MG/DL (ref 65–140)
GLUCOSE SERPL-MCNC: 219 MG/DL (ref 65–140)
GLUCOSE SERPL-MCNC: 283 MG/DL (ref 65–140)
GLUCOSE SERPL-MCNC: 296 MG/DL (ref 65–140)
GLUCOSE SERPL-MCNC: 311 MG/DL (ref 65–140)
GLUCOSE UR STRIP-MCNC: ABNORMAL MG/DL
HCT VFR BLD AUTO: 37.7 % (ref 34.8–46.1)
HGB BLD-MCNC: 12 G/DL (ref 11.5–15.4)
HGB UR QL STRIP.AUTO: ABNORMAL
IMM GRANULOCYTES # BLD AUTO: 0.02 THOUSAND/UL (ref 0–0.2)
IMM GRANULOCYTES NFR BLD AUTO: 0 % (ref 0–2)
KETONES UR STRIP-MCNC: ABNORMAL MG/DL
L PNEUMO1 AG UR QL IA.RAPID: NEGATIVE
LEUKOCYTE ESTERASE UR QL STRIP: ABNORMAL
LYMPHOCYTES # BLD AUTO: 0.85 THOUSANDS/ΜL (ref 0.6–4.47)
LYMPHOCYTES NFR BLD AUTO: 18 % (ref 14–44)
MCH RBC QN AUTO: 30.2 PG (ref 26.8–34.3)
MCHC RBC AUTO-ENTMCNC: 31.8 G/DL (ref 31.4–37.4)
MCV RBC AUTO: 95 FL (ref 82–98)
MONOCYTES # BLD AUTO: 0.3 THOUSAND/ΜL (ref 0.17–1.22)
MONOCYTES NFR BLD AUTO: 6 % (ref 4–12)
NEUTROPHILS # BLD AUTO: 3.5 THOUSANDS/ΜL (ref 1.85–7.62)
NEUTS SEG NFR BLD AUTO: 76 % (ref 43–75)
NITRITE UR QL STRIP: NEGATIVE
NON-SQ EPI CELLS URNS QL MICRO: ABNORMAL /HPF
NRBC BLD AUTO-RTO: 0 /100 WBCS
OTHER STN SPEC: ABNORMAL
P AXIS: 77 DEGREES
PH UR STRIP.AUTO: 5 [PH]
PLATELET # BLD AUTO: 238 THOUSANDS/UL (ref 149–390)
PMV BLD AUTO: 11.3 FL (ref 8.9–12.7)
POTASSIUM SERPL-SCNC: 4.1 MMOL/L (ref 3.5–5.3)
PR INTERVAL: 170 MS
PROCALCITONIN SERPL-MCNC: <0.05 NG/ML
PROCALCITONIN SERPL-MCNC: <0.05 NG/ML
PROT UR STRIP-MCNC: NEGATIVE MG/DL
QRS AXIS: 68 DEGREES
QRSD INTERVAL: 96 MS
QT INTERVAL: 320 MS
QTC INTERVAL: 433 MS
RBC # BLD AUTO: 3.97 MILLION/UL (ref 3.81–5.12)
RBC #/AREA URNS AUTO: ABNORMAL /HPF
S PNEUM AG UR QL: NEGATIVE
SODIUM SERPL-SCNC: 137 MMOL/L (ref 136–145)
SP GR UR STRIP.AUTO: 1.01 (ref 1–1.03)
T WAVE AXIS: 63 DEGREES
UROBILINOGEN UR QL STRIP.AUTO: 0.2 E.U./DL
VENTRICULAR RATE: 110 BPM
WBC # BLD AUTO: 4.67 THOUSAND/UL (ref 4.31–10.16)
WBC #/AREA URNS AUTO: ABNORMAL /HPF

## 2021-12-08 PROCEDURE — 85730 THROMBOPLASTIN TIME PARTIAL: CPT | Performed by: PHYSICIAN ASSISTANT

## 2021-12-08 PROCEDURE — 87070 CULTURE OTHR SPECIMN AEROBIC: CPT | Performed by: PHYSICIAN ASSISTANT

## 2021-12-08 PROCEDURE — 93010 ELECTROCARDIOGRAM REPORT: CPT | Performed by: INTERNAL MEDICINE

## 2021-12-08 PROCEDURE — 85730 THROMBOPLASTIN TIME PARTIAL: CPT | Performed by: FAMILY MEDICINE

## 2021-12-08 PROCEDURE — 36415 COLL VENOUS BLD VENIPUNCTURE: CPT | Performed by: PHYSICIAN ASSISTANT

## 2021-12-08 PROCEDURE — 99233 SBSQ HOSP IP/OBS HIGH 50: CPT | Performed by: INTERNAL MEDICINE

## 2021-12-08 PROCEDURE — 94640 AIRWAY INHALATION TREATMENT: CPT

## 2021-12-08 PROCEDURE — 85730 THROMBOPLASTIN TIME PARTIAL: CPT

## 2021-12-08 PROCEDURE — 87086 URINE CULTURE/COLONY COUNT: CPT | Performed by: PHYSICIAN ASSISTANT

## 2021-12-08 PROCEDURE — 87205 SMEAR GRAM STAIN: CPT | Performed by: PHYSICIAN ASSISTANT

## 2021-12-08 PROCEDURE — 93970 EXTREMITY STUDY: CPT | Performed by: SURGERY

## 2021-12-08 PROCEDURE — 82948 REAGENT STRIP/BLOOD GLUCOSE: CPT

## 2021-12-08 PROCEDURE — 84145 PROCALCITONIN (PCT): CPT | Performed by: INTERNAL MEDICINE

## 2021-12-08 PROCEDURE — 87449 NOS EACH ORGANISM AG IA: CPT | Performed by: INTERNAL MEDICINE

## 2021-12-08 PROCEDURE — 94760 N-INVAS EAR/PLS OXIMETRY 1: CPT

## 2021-12-08 PROCEDURE — 85025 COMPLETE CBC W/AUTO DIFF WBC: CPT | Performed by: PHYSICIAN ASSISTANT

## 2021-12-08 PROCEDURE — 87147 CULTURE TYPE IMMUNOLOGIC: CPT | Performed by: PHYSICIAN ASSISTANT

## 2021-12-08 PROCEDURE — 80048 BASIC METABOLIC PNL TOTAL CA: CPT | Performed by: PHYSICIAN ASSISTANT

## 2021-12-08 PROCEDURE — 81001 URINALYSIS AUTO W/SCOPE: CPT | Performed by: PHYSICIAN ASSISTANT

## 2021-12-08 PROCEDURE — 84145 PROCALCITONIN (PCT): CPT | Performed by: PHYSICIAN ASSISTANT

## 2021-12-08 PROCEDURE — 99223 1ST HOSP IP/OBS HIGH 75: CPT | Performed by: INTERNAL MEDICINE

## 2021-12-08 PROCEDURE — 87106 FUNGI IDENTIFICATION YEAST: CPT | Performed by: PHYSICIAN ASSISTANT

## 2021-12-08 PROCEDURE — 93970 EXTREMITY STUDY: CPT

## 2021-12-08 RX ORDER — ACETAMINOPHEN 325 MG/1
650 TABLET ORAL EVERY 6 HOURS PRN
Status: DISCONTINUED | OUTPATIENT
Start: 2021-12-08 | End: 2021-12-13 | Stop reason: HOSPADM

## 2021-12-08 RX ORDER — ONDANSETRON 2 MG/ML
4 INJECTION INTRAMUSCULAR; INTRAVENOUS EVERY 4 HOURS PRN
Status: DISCONTINUED | OUTPATIENT
Start: 2021-12-08 | End: 2021-12-13 | Stop reason: HOSPADM

## 2021-12-08 RX ORDER — FUROSEMIDE 40 MG/1
40 TABLET ORAL DAILY
Status: DISCONTINUED | OUTPATIENT
Start: 2021-12-08 | End: 2021-12-13 | Stop reason: HOSPADM

## 2021-12-08 RX ORDER — DULOXETIN HYDROCHLORIDE 20 MG/1
20 CAPSULE, DELAYED RELEASE ORAL DAILY
Status: DISCONTINUED | OUTPATIENT
Start: 2021-12-08 | End: 2021-12-13 | Stop reason: HOSPADM

## 2021-12-08 RX ORDER — BENZONATATE 100 MG/1
100 CAPSULE ORAL 3 TIMES DAILY PRN
Status: DISCONTINUED | OUTPATIENT
Start: 2021-12-08 | End: 2021-12-13 | Stop reason: HOSPADM

## 2021-12-08 RX ORDER — METHYLPREDNISOLONE SODIUM SUCCINATE 40 MG/ML
40 INJECTION, POWDER, LYOPHILIZED, FOR SOLUTION INTRAMUSCULAR; INTRAVENOUS EVERY 8 HOURS
Status: DISCONTINUED | OUTPATIENT
Start: 2021-12-08 | End: 2021-12-10

## 2021-12-08 RX ORDER — SENNOSIDES 8.6 MG
1 TABLET ORAL
Status: DISCONTINUED | OUTPATIENT
Start: 2021-12-08 | End: 2021-12-13 | Stop reason: HOSPADM

## 2021-12-08 RX ORDER — CALCIUM CARBONATE 200(500)MG
1000 TABLET,CHEWABLE ORAL DAILY PRN
Status: DISCONTINUED | OUTPATIENT
Start: 2021-12-08 | End: 2021-12-13 | Stop reason: HOSPADM

## 2021-12-08 RX ORDER — CEFEPIME HYDROCHLORIDE 2 G/50ML
2000 INJECTION, SOLUTION INTRAVENOUS EVERY 12 HOURS SCHEDULED
Status: DISCONTINUED | OUTPATIENT
Start: 2021-12-08 | End: 2021-12-13 | Stop reason: HOSPADM

## 2021-12-08 RX ORDER — ATORVASTATIN CALCIUM 10 MG/1
20 TABLET, FILM COATED ORAL DAILY
Status: DISCONTINUED | OUTPATIENT
Start: 2021-12-08 | End: 2021-12-13 | Stop reason: HOSPADM

## 2021-12-08 RX ORDER — GUAIFENESIN 600 MG
600 TABLET, EXTENDED RELEASE 12 HR ORAL 2 TIMES DAILY
Status: DISCONTINUED | OUTPATIENT
Start: 2021-12-08 | End: 2021-12-13 | Stop reason: HOSPADM

## 2021-12-08 RX ORDER — LEVOTHYROXINE SODIUM 0.15 MG/1
150 TABLET ORAL
Status: DISCONTINUED | OUTPATIENT
Start: 2021-12-08 | End: 2021-12-13 | Stop reason: HOSPADM

## 2021-12-08 RX ORDER — MIRTAZAPINE 15 MG/1
15 TABLET, FILM COATED ORAL
Status: DISCONTINUED | OUTPATIENT
Start: 2021-12-08 | End: 2021-12-13 | Stop reason: HOSPADM

## 2021-12-08 RX ORDER — LEVALBUTEROL 1.25 MG/.5ML
1.25 SOLUTION, CONCENTRATE RESPIRATORY (INHALATION)
Status: DISCONTINUED | OUTPATIENT
Start: 2021-12-08 | End: 2021-12-13 | Stop reason: HOSPADM

## 2021-12-08 RX ORDER — INSULIN GLARGINE 100 [IU]/ML
30 INJECTION, SOLUTION SUBCUTANEOUS EVERY MORNING
Status: DISCONTINUED | OUTPATIENT
Start: 2021-12-08 | End: 2021-12-13 | Stop reason: HOSPADM

## 2021-12-08 RX ORDER — PANTOPRAZOLE SODIUM 40 MG/1
40 TABLET, DELAYED RELEASE ORAL
Status: DISCONTINUED | OUTPATIENT
Start: 2021-12-08 | End: 2021-12-13 | Stop reason: HOSPADM

## 2021-12-08 RX ORDER — FOLIC ACID 1 MG/1
1000 TABLET ORAL DAILY
Status: DISCONTINUED | OUTPATIENT
Start: 2021-12-08 | End: 2021-12-13 | Stop reason: HOSPADM

## 2021-12-08 RX ORDER — SODIUM CHLORIDE FOR INHALATION 0.9 %
3 VIAL, NEBULIZER (ML) INHALATION
Status: DISCONTINUED | OUTPATIENT
Start: 2021-12-08 | End: 2021-12-08

## 2021-12-08 RX ORDER — LISINOPRIL 20 MG/1
40 TABLET ORAL DAILY
Status: DISCONTINUED | OUTPATIENT
Start: 2021-12-08 | End: 2021-12-13 | Stop reason: HOSPADM

## 2021-12-08 RX ORDER — FLUTICASONE FUROATE AND VILANTEROL 200; 25 UG/1; UG/1
1 POWDER RESPIRATORY (INHALATION) DAILY
Status: DISCONTINUED | OUTPATIENT
Start: 2021-12-08 | End: 2021-12-08

## 2021-12-08 RX ADMIN — INSULIN LISPRO 1 UNITS: 100 INJECTION, SOLUTION INTRAVENOUS; SUBCUTANEOUS at 07:18

## 2021-12-08 RX ADMIN — METHYLPREDNISOLONE SODIUM SUCCINATE 40 MG: 40 INJECTION, POWDER, FOR SOLUTION INTRAMUSCULAR; INTRAVENOUS at 06:19

## 2021-12-08 RX ADMIN — LEVALBUTEROL 1.25 MG: 1.25 SOLUTION, CONCENTRATE RESPIRATORY (INHALATION) at 15:08

## 2021-12-08 RX ADMIN — MIRTAZAPINE 15 MG: 15 TABLET, FILM COATED ORAL at 03:47

## 2021-12-08 RX ADMIN — DULOXETINE HYDROCHLORIDE 20 MG: 20 CAPSULE, DELAYED RELEASE ORAL at 08:27

## 2021-12-08 RX ADMIN — GUAIFENESIN 600 MG: 600 TABLET, EXTENDED RELEASE ORAL at 18:37

## 2021-12-08 RX ADMIN — LEVOTHYROXINE SODIUM 150 MCG: 150 TABLET ORAL at 06:18

## 2021-12-08 RX ADMIN — MIRTAZAPINE 15 MG: 15 TABLET, FILM COATED ORAL at 22:34

## 2021-12-08 RX ADMIN — CEFEPIME HYDROCHLORIDE 2000 MG: 2 INJECTION, SOLUTION INTRAVENOUS at 12:18

## 2021-12-08 RX ADMIN — ATORVASTATIN CALCIUM 20 MG: 40 TABLET, FILM COATED ORAL at 08:26

## 2021-12-08 RX ADMIN — FUROSEMIDE 40 MG: 40 TABLET ORAL at 08:27

## 2021-12-08 RX ADMIN — HEPARIN SODIUM 15 UNITS/KG/HR: 10000 INJECTION, SOLUTION INTRAVENOUS at 13:56

## 2021-12-08 RX ADMIN — IPRATROPIUM BROMIDE 0.5 MG: 0.5 SOLUTION RESPIRATORY (INHALATION) at 08:29

## 2021-12-08 RX ADMIN — CEFEPIME HYDROCHLORIDE 2000 MG: 2 INJECTION, SOLUTION INTRAVENOUS at 22:34

## 2021-12-08 RX ADMIN — FOLIC ACID 1000 MCG: 1 TABLET ORAL at 08:27

## 2021-12-08 RX ADMIN — GUAIFENESIN 600 MG: 600 TABLET, EXTENDED RELEASE ORAL at 08:28

## 2021-12-08 RX ADMIN — FLUTICASONE FUROATE AND VILANTEROL TRIFENATATE 1 PUFF: 200; 25 POWDER RESPIRATORY (INHALATION) at 08:29

## 2021-12-08 RX ADMIN — METHYLPREDNISOLONE SODIUM SUCCINATE 40 MG: 40 INJECTION, POWDER, FOR SOLUTION INTRAMUSCULAR; INTRAVENOUS at 22:34

## 2021-12-08 RX ADMIN — IPRATROPIUM BROMIDE 0.5 MG: 0.5 SOLUTION RESPIRATORY (INHALATION) at 20:44

## 2021-12-08 RX ADMIN — LEVALBUTEROL 1.25 MG: 1.25 SOLUTION, CONCENTRATE RESPIRATORY (INHALATION) at 20:44

## 2021-12-08 RX ADMIN — INSULIN LISPRO 3 UNITS: 100 INJECTION, SOLUTION INTRAVENOUS; SUBCUTANEOUS at 16:28

## 2021-12-08 RX ADMIN — INSULIN GLARGINE 30 UNITS: 100 INJECTION, SOLUTION SUBCUTANEOUS at 08:28

## 2021-12-08 RX ADMIN — LEVALBUTEROL 1.25 MG: 1.25 SOLUTION, CONCENTRATE RESPIRATORY (INHALATION) at 08:28

## 2021-12-08 RX ADMIN — PANTOPRAZOLE SODIUM 40 MG: 40 TABLET, DELAYED RELEASE ORAL at 06:18

## 2021-12-08 RX ADMIN — VANCOMYCIN HYDROCHLORIDE 1500 MG: 5 INJECTION, POWDER, LYOPHILIZED, FOR SOLUTION INTRAVENOUS at 13:57

## 2021-12-08 RX ADMIN — CYANOCOBALAMIN TAB 500 MCG 500 MCG: 500 TAB at 08:27

## 2021-12-08 RX ADMIN — METHYLPREDNISOLONE SODIUM SUCCINATE 40 MG: 40 INJECTION, POWDER, FOR SOLUTION INTRAMUSCULAR; INTRAVENOUS at 13:57

## 2021-12-08 RX ADMIN — INSULIN LISPRO 3 UNITS: 100 INJECTION, SOLUTION INTRAVENOUS; SUBCUTANEOUS at 22:34

## 2021-12-08 RX ADMIN — INSULIN LISPRO 3 UNITS: 100 INJECTION, SOLUTION INTRAVENOUS; SUBCUTANEOUS at 11:53

## 2021-12-08 RX ADMIN — IPRATROPIUM BROMIDE 0.5 MG: 0.5 SOLUTION RESPIRATORY (INHALATION) at 15:08

## 2021-12-08 RX ADMIN — LISINOPRIL 40 MG: 20 TABLET ORAL at 08:27

## 2021-12-09 LAB
ANION GAP SERPL CALCULATED.3IONS-SCNC: 7 MMOL/L (ref 4–13)
APTT PPP: 104 SECONDS (ref 23–37)
APTT PPP: 51 SECONDS (ref 23–37)
BASOPHILS # BLD AUTO: 0.01 THOUSANDS/ΜL (ref 0–0.1)
BASOPHILS NFR BLD AUTO: 0 % (ref 0–1)
BUN SERPL-MCNC: 16 MG/DL (ref 5–25)
CALCIUM SERPL-MCNC: 10.3 MG/DL (ref 8.3–10.1)
CHLORIDE SERPL-SCNC: 103 MMOL/L (ref 100–108)
CO2 SERPL-SCNC: 29 MMOL/L (ref 21–32)
CREAT SERPL-MCNC: 0.74 MG/DL (ref 0.6–1.3)
EOSINOPHIL # BLD AUTO: 0 THOUSAND/ΜL (ref 0–0.61)
EOSINOPHIL NFR BLD AUTO: 0 % (ref 0–6)
ERYTHROCYTE [DISTWIDTH] IN BLOOD BY AUTOMATED COUNT: 14 % (ref 11.6–15.1)
GFR SERPL CREATININE-BSD FRML MDRD: 81 ML/MIN/1.73SQ M
GLUCOSE SERPL-MCNC: 258 MG/DL (ref 65–140)
GLUCOSE SERPL-MCNC: 268 MG/DL (ref 65–140)
GLUCOSE SERPL-MCNC: 282 MG/DL (ref 65–140)
GLUCOSE SERPL-MCNC: 307 MG/DL (ref 65–140)
GLUCOSE SERPL-MCNC: 340 MG/DL (ref 65–140)
HCT VFR BLD AUTO: 38.4 % (ref 34.8–46.1)
HGB BLD-MCNC: 12.2 G/DL (ref 11.5–15.4)
IMM GRANULOCYTES # BLD AUTO: 0.03 THOUSAND/UL (ref 0–0.2)
IMM GRANULOCYTES NFR BLD AUTO: 0 % (ref 0–2)
LYMPHOCYTES # BLD AUTO: 1 THOUSANDS/ΜL (ref 0.6–4.47)
LYMPHOCYTES NFR BLD AUTO: 15 % (ref 14–44)
MCH RBC QN AUTO: 31 PG (ref 26.8–34.3)
MCHC RBC AUTO-ENTMCNC: 31.8 G/DL (ref 31.4–37.4)
MCV RBC AUTO: 98 FL (ref 82–98)
MONOCYTES # BLD AUTO: 0.5 THOUSAND/ΜL (ref 0.17–1.22)
MONOCYTES NFR BLD AUTO: 8 % (ref 4–12)
NEUTROPHILS # BLD AUTO: 5.14 THOUSANDS/ΜL (ref 1.85–7.62)
NEUTS SEG NFR BLD AUTO: 77 % (ref 43–75)
NRBC BLD AUTO-RTO: 0 /100 WBCS
PLATELET # BLD AUTO: 273 THOUSANDS/UL (ref 149–390)
PMV BLD AUTO: 12.3 FL (ref 8.9–12.7)
POTASSIUM SERPL-SCNC: 4.5 MMOL/L (ref 3.5–5.3)
PROCALCITONIN SERPL-MCNC: <0.05 NG/ML
RBC # BLD AUTO: 3.94 MILLION/UL (ref 3.81–5.12)
SODIUM SERPL-SCNC: 139 MMOL/L (ref 136–145)
WBC # BLD AUTO: 6.68 THOUSAND/UL (ref 4.31–10.16)

## 2021-12-09 PROCEDURE — 80048 BASIC METABOLIC PNL TOTAL CA: CPT | Performed by: INTERNAL MEDICINE

## 2021-12-09 PROCEDURE — 82948 REAGENT STRIP/BLOOD GLUCOSE: CPT

## 2021-12-09 PROCEDURE — 85730 THROMBOPLASTIN TIME PARTIAL: CPT | Performed by: FAMILY MEDICINE

## 2021-12-09 PROCEDURE — 94760 N-INVAS EAR/PLS OXIMETRY 1: CPT

## 2021-12-09 PROCEDURE — 99232 SBSQ HOSP IP/OBS MODERATE 35: CPT | Performed by: INTERNAL MEDICINE

## 2021-12-09 PROCEDURE — 94640 AIRWAY INHALATION TREATMENT: CPT

## 2021-12-09 PROCEDURE — 85025 COMPLETE CBC W/AUTO DIFF WBC: CPT | Performed by: INTERNAL MEDICINE

## 2021-12-09 PROCEDURE — 84145 PROCALCITONIN (PCT): CPT | Performed by: INTERNAL MEDICINE

## 2021-12-09 RX ADMIN — MIRTAZAPINE 15 MG: 15 TABLET, FILM COATED ORAL at 21:55

## 2021-12-09 RX ADMIN — LEVALBUTEROL 1.25 MG: 1.25 SOLUTION, CONCENTRATE RESPIRATORY (INHALATION) at 21:07

## 2021-12-09 RX ADMIN — ATORVASTATIN CALCIUM 20 MG: 40 TABLET, FILM COATED ORAL at 08:54

## 2021-12-09 RX ADMIN — LEVALBUTEROL 1.25 MG: 1.25 SOLUTION, CONCENTRATE RESPIRATORY (INHALATION) at 08:37

## 2021-12-09 RX ADMIN — VANCOMYCIN HYDROCHLORIDE 1500 MG: 5 INJECTION, POWDER, LYOPHILIZED, FOR SOLUTION INTRAVENOUS at 02:57

## 2021-12-09 RX ADMIN — CYANOCOBALAMIN TAB 500 MCG 500 MCG: 500 TAB at 08:54

## 2021-12-09 RX ADMIN — DULOXETINE HYDROCHLORIDE 20 MG: 20 CAPSULE, DELAYED RELEASE ORAL at 08:54

## 2021-12-09 RX ADMIN — APIXABAN 10 MG: 5 TABLET, FILM COATED ORAL at 17:13

## 2021-12-09 RX ADMIN — FUROSEMIDE 40 MG: 40 TABLET ORAL at 08:54

## 2021-12-09 RX ADMIN — LEVOTHYROXINE SODIUM 150 MCG: 150 TABLET ORAL at 06:01

## 2021-12-09 RX ADMIN — FOLIC ACID 1000 MCG: 1 TABLET ORAL at 08:53

## 2021-12-09 RX ADMIN — GUAIFENESIN 600 MG: 600 TABLET, EXTENDED RELEASE ORAL at 08:53

## 2021-12-09 RX ADMIN — INSULIN LISPRO 8 UNITS: 100 INJECTION, SOLUTION INTRAVENOUS; SUBCUTANEOUS at 13:53

## 2021-12-09 RX ADMIN — PANTOPRAZOLE SODIUM 40 MG: 40 TABLET, DELAYED RELEASE ORAL at 06:01

## 2021-12-09 RX ADMIN — METHYLPREDNISOLONE SODIUM SUCCINATE 40 MG: 40 INJECTION, POWDER, FOR SOLUTION INTRAMUSCULAR; INTRAVENOUS at 06:01

## 2021-12-09 RX ADMIN — METHYLPREDNISOLONE SODIUM SUCCINATE 40 MG: 40 INJECTION, POWDER, FOR SOLUTION INTRAMUSCULAR; INTRAVENOUS at 13:55

## 2021-12-09 RX ADMIN — IPRATROPIUM BROMIDE 0.5 MG: 0.5 SOLUTION RESPIRATORY (INHALATION) at 21:07

## 2021-12-09 RX ADMIN — INSULIN GLARGINE 30 UNITS: 100 INJECTION, SOLUTION SUBCUTANEOUS at 08:54

## 2021-12-09 RX ADMIN — CEFEPIME HYDROCHLORIDE 2000 MG: 2 INJECTION, SOLUTION INTRAVENOUS at 21:55

## 2021-12-09 RX ADMIN — VANCOMYCIN HYDROCHLORIDE 1500 MG: 5 INJECTION, POWDER, LYOPHILIZED, FOR SOLUTION INTRAVENOUS at 14:23

## 2021-12-09 RX ADMIN — GUAIFENESIN 600 MG: 600 TABLET, EXTENDED RELEASE ORAL at 17:13

## 2021-12-09 RX ADMIN — CEFEPIME HYDROCHLORIDE 2000 MG: 2 INJECTION, SOLUTION INTRAVENOUS at 08:53

## 2021-12-09 RX ADMIN — IPRATROPIUM BROMIDE 0.5 MG: 0.5 SOLUTION RESPIRATORY (INHALATION) at 08:37

## 2021-12-09 RX ADMIN — LISINOPRIL 40 MG: 20 TABLET ORAL at 08:54

## 2021-12-09 RX ADMIN — INSULIN LISPRO 8 UNITS: 100 INJECTION, SOLUTION INTRAVENOUS; SUBCUTANEOUS at 16:46

## 2021-12-09 RX ADMIN — METHYLPREDNISOLONE SODIUM SUCCINATE 40 MG: 40 INJECTION, POWDER, FOR SOLUTION INTRAMUSCULAR; INTRAVENOUS at 21:55

## 2021-12-09 RX ADMIN — APIXABAN 10 MG: 5 TABLET, FILM COATED ORAL at 08:54

## 2021-12-09 RX ADMIN — INSULIN LISPRO 3 UNITS: 100 INJECTION, SOLUTION INTRAVENOUS; SUBCUTANEOUS at 21:55

## 2021-12-09 RX ADMIN — INSULIN LISPRO 2 UNITS: 100 INJECTION, SOLUTION INTRAVENOUS; SUBCUTANEOUS at 08:56

## 2021-12-09 RX ADMIN — INSULIN LISPRO 3 UNITS: 100 INJECTION, SOLUTION INTRAVENOUS; SUBCUTANEOUS at 16:46

## 2021-12-09 RX ADMIN — INSULIN LISPRO 4 UNITS: 100 INJECTION, SOLUTION INTRAVENOUS; SUBCUTANEOUS at 11:14

## 2021-12-10 LAB
ANION GAP SERPL CALCULATED.3IONS-SCNC: 8 MMOL/L (ref 4–13)
BASOPHILS # BLD AUTO: 0.01 THOUSANDS/ΜL (ref 0–0.1)
BASOPHILS NFR BLD AUTO: 0 % (ref 0–1)
BUN SERPL-MCNC: 23 MG/DL (ref 5–25)
CALCIUM SERPL-MCNC: 9.6 MG/DL (ref 8.3–10.1)
CHLORIDE SERPL-SCNC: 101 MMOL/L (ref 100–108)
CO2 SERPL-SCNC: 27 MMOL/L (ref 21–32)
CREAT SERPL-MCNC: 0.81 MG/DL (ref 0.6–1.3)
EOSINOPHIL # BLD AUTO: 0 THOUSAND/ΜL (ref 0–0.61)
EOSINOPHIL NFR BLD AUTO: 0 % (ref 0–6)
ERYTHROCYTE [DISTWIDTH] IN BLOOD BY AUTOMATED COUNT: 14 % (ref 11.6–15.1)
GFR SERPL CREATININE-BSD FRML MDRD: 72 ML/MIN/1.73SQ M
GLUCOSE SERPL-MCNC: 267 MG/DL (ref 65–140)
GLUCOSE SERPL-MCNC: 288 MG/DL (ref 65–140)
GLUCOSE SERPL-MCNC: 304 MG/DL (ref 65–140)
GLUCOSE SERPL-MCNC: 313 MG/DL (ref 65–140)
GLUCOSE SERPL-MCNC: 338 MG/DL (ref 65–140)
HCT VFR BLD AUTO: 35.6 % (ref 34.8–46.1)
HGB BLD-MCNC: 11.6 G/DL (ref 11.5–15.4)
IMM GRANULOCYTES # BLD AUTO: 0.05 THOUSAND/UL (ref 0–0.2)
IMM GRANULOCYTES NFR BLD AUTO: 1 % (ref 0–2)
LYMPHOCYTES # BLD AUTO: 0.75 THOUSANDS/ΜL (ref 0.6–4.47)
LYMPHOCYTES NFR BLD AUTO: 10 % (ref 14–44)
MCH RBC QN AUTO: 31.2 PG (ref 26.8–34.3)
MCHC RBC AUTO-ENTMCNC: 32.6 G/DL (ref 31.4–37.4)
MCV RBC AUTO: 96 FL (ref 82–98)
MONOCYTES # BLD AUTO: 0.43 THOUSAND/ΜL (ref 0.17–1.22)
MONOCYTES NFR BLD AUTO: 6 % (ref 4–12)
NEUTROPHILS # BLD AUTO: 6.17 THOUSANDS/ΜL (ref 1.85–7.62)
NEUTS SEG NFR BLD AUTO: 83 % (ref 43–75)
NRBC BLD AUTO-RTO: 0 /100 WBCS
PLATELET # BLD AUTO: 265 THOUSANDS/UL (ref 149–390)
PMV BLD AUTO: 12.1 FL (ref 8.9–12.7)
POTASSIUM SERPL-SCNC: 4.2 MMOL/L (ref 3.5–5.3)
PROCALCITONIN SERPL-MCNC: <0.05 NG/ML
RBC # BLD AUTO: 3.72 MILLION/UL (ref 3.81–5.12)
SODIUM SERPL-SCNC: 136 MMOL/L (ref 136–145)
VANCOMYCIN TROUGH SERPL-MCNC: 15.8 UG/ML (ref 10–20)
WBC # BLD AUTO: 7.41 THOUSAND/UL (ref 4.31–10.16)

## 2021-12-10 PROCEDURE — 99232 SBSQ HOSP IP/OBS MODERATE 35: CPT | Performed by: INTERNAL MEDICINE

## 2021-12-10 PROCEDURE — 84145 PROCALCITONIN (PCT): CPT | Performed by: INTERNAL MEDICINE

## 2021-12-10 PROCEDURE — 80048 BASIC METABOLIC PNL TOTAL CA: CPT | Performed by: INTERNAL MEDICINE

## 2021-12-10 PROCEDURE — 94640 AIRWAY INHALATION TREATMENT: CPT

## 2021-12-10 PROCEDURE — 82948 REAGENT STRIP/BLOOD GLUCOSE: CPT

## 2021-12-10 PROCEDURE — 94760 N-INVAS EAR/PLS OXIMETRY 1: CPT

## 2021-12-10 PROCEDURE — 80202 ASSAY OF VANCOMYCIN: CPT | Performed by: INTERNAL MEDICINE

## 2021-12-10 PROCEDURE — 85025 COMPLETE CBC W/AUTO DIFF WBC: CPT | Performed by: INTERNAL MEDICINE

## 2021-12-10 RX ORDER — METHYLPREDNISOLONE SODIUM SUCCINATE 40 MG/ML
40 INJECTION, POWDER, LYOPHILIZED, FOR SOLUTION INTRAMUSCULAR; INTRAVENOUS EVERY 12 HOURS SCHEDULED
Status: DISCONTINUED | OUTPATIENT
Start: 2021-12-10 | End: 2021-12-13 | Stop reason: HOSPADM

## 2021-12-10 RX ADMIN — METHYLPREDNISOLONE SODIUM SUCCINATE 40 MG: 40 INJECTION, POWDER, FOR SOLUTION INTRAMUSCULAR; INTRAVENOUS at 21:22

## 2021-12-10 RX ADMIN — FOLIC ACID 1000 MCG: 1 TABLET ORAL at 08:08

## 2021-12-10 RX ADMIN — INSULIN LISPRO 3 UNITS: 100 INJECTION, SOLUTION INTRAVENOUS; SUBCUTANEOUS at 08:08

## 2021-12-10 RX ADMIN — PANTOPRAZOLE SODIUM 40 MG: 40 TABLET, DELAYED RELEASE ORAL at 05:54

## 2021-12-10 RX ADMIN — GUAIFENESIN 600 MG: 600 TABLET, EXTENDED RELEASE ORAL at 08:08

## 2021-12-10 RX ADMIN — LISINOPRIL 40 MG: 20 TABLET ORAL at 08:08

## 2021-12-10 RX ADMIN — CEFEPIME HYDROCHLORIDE 2000 MG: 2 INJECTION, SOLUTION INTRAVENOUS at 21:22

## 2021-12-10 RX ADMIN — CYANOCOBALAMIN TAB 500 MCG 500 MCG: 500 TAB at 08:08

## 2021-12-10 RX ADMIN — INSULIN LISPRO 2 UNITS: 100 INJECTION, SOLUTION INTRAVENOUS; SUBCUTANEOUS at 21:21

## 2021-12-10 RX ADMIN — LEVALBUTEROL 1.25 MG: 1.25 SOLUTION, CONCENTRATE RESPIRATORY (INHALATION) at 21:22

## 2021-12-10 RX ADMIN — VANCOMYCIN HYDROCHLORIDE 1500 MG: 5 INJECTION, POWDER, LYOPHILIZED, FOR SOLUTION INTRAVENOUS at 02:59

## 2021-12-10 RX ADMIN — INSULIN LISPRO 3 UNITS: 100 INJECTION, SOLUTION INTRAVENOUS; SUBCUTANEOUS at 17:22

## 2021-12-10 RX ADMIN — INSULIN LISPRO 8 UNITS: 100 INJECTION, SOLUTION INTRAVENOUS; SUBCUTANEOUS at 08:09

## 2021-12-10 RX ADMIN — INSULIN LISPRO 8 UNITS: 100 INJECTION, SOLUTION INTRAVENOUS; SUBCUTANEOUS at 17:23

## 2021-12-10 RX ADMIN — GUAIFENESIN 600 MG: 600 TABLET, EXTENDED RELEASE ORAL at 17:22

## 2021-12-10 RX ADMIN — INSULIN GLARGINE 30 UNITS: 100 INJECTION, SOLUTION SUBCUTANEOUS at 08:07

## 2021-12-10 RX ADMIN — IPRATROPIUM BROMIDE 0.5 MG: 0.5 SOLUTION RESPIRATORY (INHALATION) at 14:26

## 2021-12-10 RX ADMIN — DULOXETINE HYDROCHLORIDE 20 MG: 20 CAPSULE, DELAYED RELEASE ORAL at 08:08

## 2021-12-10 RX ADMIN — INSULIN LISPRO 4 UNITS: 100 INJECTION, SOLUTION INTRAVENOUS; SUBCUTANEOUS at 11:20

## 2021-12-10 RX ADMIN — APIXABAN 10 MG: 5 TABLET, FILM COATED ORAL at 08:08

## 2021-12-10 RX ADMIN — CEFEPIME HYDROCHLORIDE 2000 MG: 2 INJECTION, SOLUTION INTRAVENOUS at 08:06

## 2021-12-10 RX ADMIN — VANCOMYCIN HYDROCHLORIDE 1500 MG: 5 INJECTION, POWDER, LYOPHILIZED, FOR SOLUTION INTRAVENOUS at 13:46

## 2021-12-10 RX ADMIN — IPRATROPIUM BROMIDE 0.5 MG: 0.5 SOLUTION RESPIRATORY (INHALATION) at 21:22

## 2021-12-10 RX ADMIN — METHYLPREDNISOLONE SODIUM SUCCINATE 40 MG: 40 INJECTION, POWDER, FOR SOLUTION INTRAMUSCULAR; INTRAVENOUS at 05:54

## 2021-12-10 RX ADMIN — LEVALBUTEROL 1.25 MG: 1.25 SOLUTION, CONCENTRATE RESPIRATORY (INHALATION) at 14:26

## 2021-12-10 RX ADMIN — ATORVASTATIN CALCIUM 20 MG: 40 TABLET, FILM COATED ORAL at 08:08

## 2021-12-10 RX ADMIN — FUROSEMIDE 40 MG: 40 TABLET ORAL at 08:08

## 2021-12-10 RX ADMIN — MIRTAZAPINE 15 MG: 15 TABLET, FILM COATED ORAL at 21:22

## 2021-12-10 RX ADMIN — INSULIN LISPRO 8 UNITS: 100 INJECTION, SOLUTION INTRAVENOUS; SUBCUTANEOUS at 11:20

## 2021-12-10 RX ADMIN — APIXABAN 10 MG: 5 TABLET, FILM COATED ORAL at 17:22

## 2021-12-10 RX ADMIN — LEVOTHYROXINE SODIUM 150 MCG: 150 TABLET ORAL at 05:54

## 2021-12-11 LAB
ANION GAP SERPL CALCULATED.3IONS-SCNC: 5 MMOL/L (ref 4–13)
BACTERIA UR CULT: ABNORMAL
BACTERIA UR CULT: ABNORMAL
BASOPHILS # BLD MANUAL: 0 THOUSAND/UL (ref 0–0.1)
BASOPHILS NFR MAR MANUAL: 0 % (ref 0–1)
BUN SERPL-MCNC: 24 MG/DL (ref 5–25)
CALCIUM SERPL-MCNC: 9.6 MG/DL (ref 8.3–10.1)
CHLORIDE SERPL-SCNC: 100 MMOL/L (ref 100–108)
CO2 SERPL-SCNC: 32 MMOL/L (ref 21–32)
CREAT SERPL-MCNC: 0.81 MG/DL (ref 0.6–1.3)
EOSINOPHIL # BLD MANUAL: 0 THOUSAND/UL (ref 0–0.4)
EOSINOPHIL NFR BLD MANUAL: 0 % (ref 0–6)
ERYTHROCYTE [DISTWIDTH] IN BLOOD BY AUTOMATED COUNT: 14 % (ref 11.6–15.1)
GFR SERPL CREATININE-BSD FRML MDRD: 72 ML/MIN/1.73SQ M
GLUCOSE SERPL-MCNC: 288 MG/DL (ref 65–140)
GLUCOSE SERPL-MCNC: 297 MG/DL (ref 65–140)
GLUCOSE SERPL-MCNC: 324 MG/DL (ref 65–140)
GLUCOSE SERPL-MCNC: 353 MG/DL (ref 65–140)
GLUCOSE SERPL-MCNC: 356 MG/DL (ref 65–140)
HCT VFR BLD AUTO: 35.9 % (ref 34.8–46.1)
HGB BLD-MCNC: 11.5 G/DL (ref 11.5–15.4)
LYMPHOCYTES # BLD AUTO: 0.66 THOUSAND/UL (ref 0.6–4.47)
LYMPHOCYTES # BLD AUTO: 10 % (ref 14–44)
MCH RBC QN AUTO: 30.5 PG (ref 26.8–34.3)
MCHC RBC AUTO-ENTMCNC: 32 G/DL (ref 31.4–37.4)
MCV RBC AUTO: 95 FL (ref 82–98)
MONOCYTES # BLD AUTO: 0.33 THOUSAND/UL (ref 0–1.22)
MONOCYTES NFR BLD: 5 % (ref 4–12)
NEUTROPHILS # BLD MANUAL: 5.37 THOUSAND/UL (ref 1.85–7.62)
NEUTS SEG NFR BLD AUTO: 81 % (ref 43–75)
PLATELET # BLD AUTO: 275 THOUSANDS/UL (ref 149–390)
PLATELET BLD QL SMEAR: ADEQUATE
PMV BLD AUTO: 11.6 FL (ref 8.9–12.7)
POTASSIUM SERPL-SCNC: 4.3 MMOL/L (ref 3.5–5.3)
PROCALCITONIN SERPL-MCNC: <0.05 NG/ML
RBC # BLD AUTO: 3.77 MILLION/UL (ref 3.81–5.12)
SODIUM SERPL-SCNC: 137 MMOL/L (ref 136–145)
VARIANT LYMPHS # BLD AUTO: 4 %
WBC # BLD AUTO: 6.63 THOUSAND/UL (ref 4.31–10.16)

## 2021-12-11 PROCEDURE — 99232 SBSQ HOSP IP/OBS MODERATE 35: CPT | Performed by: INTERNAL MEDICINE

## 2021-12-11 PROCEDURE — 84145 PROCALCITONIN (PCT): CPT | Performed by: INTERNAL MEDICINE

## 2021-12-11 PROCEDURE — 85007 BL SMEAR W/DIFF WBC COUNT: CPT | Performed by: INTERNAL MEDICINE

## 2021-12-11 PROCEDURE — 87081 CULTURE SCREEN ONLY: CPT | Performed by: INTERNAL MEDICINE

## 2021-12-11 PROCEDURE — 82948 REAGENT STRIP/BLOOD GLUCOSE: CPT

## 2021-12-11 PROCEDURE — 80048 BASIC METABOLIC PNL TOTAL CA: CPT | Performed by: INTERNAL MEDICINE

## 2021-12-11 PROCEDURE — 94760 N-INVAS EAR/PLS OXIMETRY 1: CPT

## 2021-12-11 PROCEDURE — 94640 AIRWAY INHALATION TREATMENT: CPT

## 2021-12-11 PROCEDURE — 85027 COMPLETE CBC AUTOMATED: CPT | Performed by: INTERNAL MEDICINE

## 2021-12-11 RX ADMIN — METHYLPREDNISOLONE SODIUM SUCCINATE 40 MG: 40 INJECTION, POWDER, FOR SOLUTION INTRAMUSCULAR; INTRAVENOUS at 09:42

## 2021-12-11 RX ADMIN — FUROSEMIDE 40 MG: 40 TABLET ORAL at 09:43

## 2021-12-11 RX ADMIN — INSULIN LISPRO 8 UNITS: 100 INJECTION, SOLUTION INTRAVENOUS; SUBCUTANEOUS at 13:12

## 2021-12-11 RX ADMIN — APIXABAN 10 MG: 5 TABLET, FILM COATED ORAL at 17:09

## 2021-12-11 RX ADMIN — APIXABAN 10 MG: 5 TABLET, FILM COATED ORAL at 09:43

## 2021-12-11 RX ADMIN — GUAIFENESIN 600 MG: 600 TABLET, EXTENDED RELEASE ORAL at 17:09

## 2021-12-11 RX ADMIN — INSULIN LISPRO 2 UNITS: 100 INJECTION, SOLUTION INTRAVENOUS; SUBCUTANEOUS at 22:54

## 2021-12-11 RX ADMIN — LEVALBUTEROL 1.25 MG: 1.25 SOLUTION, CONCENTRATE RESPIRATORY (INHALATION) at 09:27

## 2021-12-11 RX ADMIN — VANCOMYCIN HYDROCHLORIDE 1500 MG: 5 INJECTION, POWDER, LYOPHILIZED, FOR SOLUTION INTRAVENOUS at 02:27

## 2021-12-11 RX ADMIN — CEFEPIME HYDROCHLORIDE 2000 MG: 2 INJECTION, SOLUTION INTRAVENOUS at 22:53

## 2021-12-11 RX ADMIN — CYANOCOBALAMIN TAB 500 MCG 500 MCG: 500 TAB at 09:43

## 2021-12-11 RX ADMIN — IPRATROPIUM BROMIDE 0.5 MG: 0.5 SOLUTION RESPIRATORY (INHALATION) at 22:53

## 2021-12-11 RX ADMIN — INSULIN LISPRO 4 UNITS: 100 INJECTION, SOLUTION INTRAVENOUS; SUBCUTANEOUS at 17:08

## 2021-12-11 RX ADMIN — CEFEPIME HYDROCHLORIDE 2000 MG: 2 INJECTION, SOLUTION INTRAVENOUS at 09:42

## 2021-12-11 RX ADMIN — PANTOPRAZOLE SODIUM 40 MG: 40 TABLET, DELAYED RELEASE ORAL at 05:46

## 2021-12-11 RX ADMIN — LEVALBUTEROL 1.25 MG: 1.25 SOLUTION, CONCENTRATE RESPIRATORY (INHALATION) at 22:53

## 2021-12-11 RX ADMIN — INSULIN GLARGINE 30 UNITS: 100 INJECTION, SOLUTION SUBCUTANEOUS at 09:42

## 2021-12-11 RX ADMIN — LISINOPRIL 40 MG: 20 TABLET ORAL at 09:43

## 2021-12-11 RX ADMIN — METHYLPREDNISOLONE SODIUM SUCCINATE 40 MG: 40 INJECTION, POWDER, FOR SOLUTION INTRAMUSCULAR; INTRAVENOUS at 22:53

## 2021-12-11 RX ADMIN — MIRTAZAPINE 15 MG: 15 TABLET, FILM COATED ORAL at 22:53

## 2021-12-11 RX ADMIN — INSULIN LISPRO 8 UNITS: 100 INJECTION, SOLUTION INTRAVENOUS; SUBCUTANEOUS at 17:09

## 2021-12-11 RX ADMIN — SENNOSIDES 8.6 MG: 8.6 TABLET, FILM COATED ORAL at 22:54

## 2021-12-11 RX ADMIN — INSULIN LISPRO 3 UNITS: 100 INJECTION, SOLUTION INTRAVENOUS; SUBCUTANEOUS at 09:42

## 2021-12-11 RX ADMIN — INSULIN LISPRO 4 UNITS: 100 INJECTION, SOLUTION INTRAVENOUS; SUBCUTANEOUS at 13:11

## 2021-12-11 RX ADMIN — VANCOMYCIN HYDROCHLORIDE 1500 MG: 5 INJECTION, POWDER, LYOPHILIZED, FOR SOLUTION INTRAVENOUS at 15:17

## 2021-12-11 RX ADMIN — INSULIN LISPRO 8 UNITS: 100 INJECTION, SOLUTION INTRAVENOUS; SUBCUTANEOUS at 09:45

## 2021-12-11 RX ADMIN — LEVOTHYROXINE SODIUM 150 MCG: 150 TABLET ORAL at 05:46

## 2021-12-11 RX ADMIN — ATORVASTATIN CALCIUM 20 MG: 40 TABLET, FILM COATED ORAL at 09:43

## 2021-12-11 RX ADMIN — LEVALBUTEROL 1.25 MG: 1.25 SOLUTION, CONCENTRATE RESPIRATORY (INHALATION) at 13:43

## 2021-12-11 RX ADMIN — DULOXETINE HYDROCHLORIDE 20 MG: 20 CAPSULE, DELAYED RELEASE ORAL at 09:43

## 2021-12-11 RX ADMIN — GUAIFENESIN 600 MG: 600 TABLET, EXTENDED RELEASE ORAL at 09:43

## 2021-12-11 RX ADMIN — FOLIC ACID 1000 MCG: 1 TABLET ORAL at 09:43

## 2021-12-11 RX ADMIN — IPRATROPIUM BROMIDE 0.5 MG: 0.5 SOLUTION RESPIRATORY (INHALATION) at 09:26

## 2021-12-11 RX ADMIN — IPRATROPIUM BROMIDE 0.5 MG: 0.5 SOLUTION RESPIRATORY (INHALATION) at 13:43

## 2021-12-12 LAB
ANION GAP SERPL CALCULATED.3IONS-SCNC: 5 MMOL/L (ref 4–13)
BACTERIA BLD CULT: NORMAL
BACTERIA BLD CULT: NORMAL
BACTERIA SPT RESP CULT: ABNORMAL
BUN SERPL-MCNC: 22 MG/DL (ref 5–25)
CALCIUM SERPL-MCNC: 9.2 MG/DL (ref 8.3–10.1)
CHLORIDE SERPL-SCNC: 99 MMOL/L (ref 100–108)
CO2 SERPL-SCNC: 32 MMOL/L (ref 21–32)
CREAT SERPL-MCNC: 0.67 MG/DL (ref 0.6–1.3)
ERYTHROCYTE [DISTWIDTH] IN BLOOD BY AUTOMATED COUNT: 14.3 % (ref 11.6–15.1)
GFR SERPL CREATININE-BSD FRML MDRD: 87 ML/MIN/1.73SQ M
GLUCOSE SERPL-MCNC: 318 MG/DL (ref 65–140)
GLUCOSE SERPL-MCNC: 323 MG/DL (ref 65–140)
GLUCOSE SERPL-MCNC: 324 MG/DL (ref 65–140)
GLUCOSE SERPL-MCNC: 353 MG/DL (ref 65–140)
GLUCOSE SERPL-MCNC: 454 MG/DL (ref 65–140)
GRAM STN SPEC: ABNORMAL
HCT VFR BLD AUTO: 36.6 % (ref 34.8–46.1)
HGB BLD-MCNC: 12.3 G/DL (ref 11.5–15.4)
MCH RBC QN AUTO: 32.2 PG (ref 26.8–34.3)
MCHC RBC AUTO-ENTMCNC: 33.6 G/DL (ref 31.4–37.4)
MCV RBC AUTO: 96 FL (ref 82–98)
MRSA NOSE QL CULT: NORMAL
PLATELET # BLD AUTO: 297 THOUSANDS/UL (ref 149–390)
PMV BLD AUTO: 11.4 FL (ref 8.9–12.7)
POTASSIUM SERPL-SCNC: 4.6 MMOL/L (ref 3.5–5.3)
RBC # BLD AUTO: 3.82 MILLION/UL (ref 3.81–5.12)
SODIUM SERPL-SCNC: 136 MMOL/L (ref 136–145)
WBC # BLD AUTO: 7.65 THOUSAND/UL (ref 4.31–10.16)

## 2021-12-12 PROCEDURE — 85027 COMPLETE CBC AUTOMATED: CPT | Performed by: INTERNAL MEDICINE

## 2021-12-12 PROCEDURE — 82948 REAGENT STRIP/BLOOD GLUCOSE: CPT

## 2021-12-12 PROCEDURE — 94760 N-INVAS EAR/PLS OXIMETRY 1: CPT

## 2021-12-12 PROCEDURE — 99232 SBSQ HOSP IP/OBS MODERATE 35: CPT | Performed by: INTERNAL MEDICINE

## 2021-12-12 PROCEDURE — 80048 BASIC METABOLIC PNL TOTAL CA: CPT | Performed by: INTERNAL MEDICINE

## 2021-12-12 PROCEDURE — 94640 AIRWAY INHALATION TREATMENT: CPT

## 2021-12-12 RX ADMIN — INSULIN GLARGINE 30 UNITS: 100 INJECTION, SOLUTION SUBCUTANEOUS at 08:24

## 2021-12-12 RX ADMIN — GUAIFENESIN 600 MG: 600 TABLET, EXTENDED RELEASE ORAL at 08:23

## 2021-12-12 RX ADMIN — INSULIN LISPRO 8 UNITS: 100 INJECTION, SOLUTION INTRAVENOUS; SUBCUTANEOUS at 13:01

## 2021-12-12 RX ADMIN — LEVALBUTEROL 1.25 MG: 1.25 SOLUTION, CONCENTRATE RESPIRATORY (INHALATION) at 22:24

## 2021-12-12 RX ADMIN — IPRATROPIUM BROMIDE 0.5 MG: 0.5 SOLUTION RESPIRATORY (INHALATION) at 10:46

## 2021-12-12 RX ADMIN — CYANOCOBALAMIN TAB 500 MCG 500 MCG: 500 TAB at 08:23

## 2021-12-12 RX ADMIN — INSULIN LISPRO 8 UNITS: 100 INJECTION, SOLUTION INTRAVENOUS; SUBCUTANEOUS at 16:25

## 2021-12-12 RX ADMIN — APIXABAN 10 MG: 5 TABLET, FILM COATED ORAL at 08:23

## 2021-12-12 RX ADMIN — METHYLPREDNISOLONE SODIUM SUCCINATE 40 MG: 40 INJECTION, POWDER, FOR SOLUTION INTRAMUSCULAR; INTRAVENOUS at 08:23

## 2021-12-12 RX ADMIN — INSULIN LISPRO 8 UNITS: 100 INJECTION, SOLUTION INTRAVENOUS; SUBCUTANEOUS at 08:22

## 2021-12-12 RX ADMIN — INSULIN LISPRO 5 UNITS: 100 INJECTION, SOLUTION INTRAVENOUS; SUBCUTANEOUS at 16:24

## 2021-12-12 RX ADMIN — FOLIC ACID 1000 MCG: 1 TABLET ORAL at 08:23

## 2021-12-12 RX ADMIN — METHYLPREDNISOLONE SODIUM SUCCINATE 40 MG: 40 INJECTION, POWDER, FOR SOLUTION INTRAMUSCULAR; INTRAVENOUS at 22:24

## 2021-12-12 RX ADMIN — CEFEPIME HYDROCHLORIDE 2000 MG: 2 INJECTION, SOLUTION INTRAVENOUS at 22:23

## 2021-12-12 RX ADMIN — IPRATROPIUM BROMIDE 0.5 MG: 0.5 SOLUTION RESPIRATORY (INHALATION) at 16:46

## 2021-12-12 RX ADMIN — LISINOPRIL 40 MG: 20 TABLET ORAL at 08:23

## 2021-12-12 RX ADMIN — INSULIN LISPRO 3 UNITS: 100 INJECTION, SOLUTION INTRAVENOUS; SUBCUTANEOUS at 22:23

## 2021-12-12 RX ADMIN — INSULIN LISPRO 4 UNITS: 100 INJECTION, SOLUTION INTRAVENOUS; SUBCUTANEOUS at 13:01

## 2021-12-12 RX ADMIN — ATORVASTATIN CALCIUM 20 MG: 40 TABLET, FILM COATED ORAL at 08:23

## 2021-12-12 RX ADMIN — VANCOMYCIN HYDROCHLORIDE 1500 MG: 5 INJECTION, POWDER, LYOPHILIZED, FOR SOLUTION INTRAVENOUS at 02:16

## 2021-12-12 RX ADMIN — CEFEPIME HYDROCHLORIDE 2000 MG: 2 INJECTION, SOLUTION INTRAVENOUS at 08:24

## 2021-12-12 RX ADMIN — INSULIN LISPRO 3 UNITS: 100 INJECTION, SOLUTION INTRAVENOUS; SUBCUTANEOUS at 08:22

## 2021-12-12 RX ADMIN — FUROSEMIDE 40 MG: 40 TABLET ORAL at 08:23

## 2021-12-12 RX ADMIN — IPRATROPIUM BROMIDE 0.5 MG: 0.5 SOLUTION RESPIRATORY (INHALATION) at 22:24

## 2021-12-12 RX ADMIN — DULOXETINE HYDROCHLORIDE 20 MG: 20 CAPSULE, DELAYED RELEASE ORAL at 08:23

## 2021-12-12 RX ADMIN — LEVALBUTEROL 1.25 MG: 1.25 SOLUTION, CONCENTRATE RESPIRATORY (INHALATION) at 16:46

## 2021-12-12 RX ADMIN — LEVOTHYROXINE SODIUM 150 MCG: 150 TABLET ORAL at 05:10

## 2021-12-12 RX ADMIN — GUAIFENESIN 600 MG: 600 TABLET, EXTENDED RELEASE ORAL at 17:55

## 2021-12-12 RX ADMIN — APIXABAN 10 MG: 5 TABLET, FILM COATED ORAL at 17:55

## 2021-12-12 RX ADMIN — VANCOMYCIN HYDROCHLORIDE 1500 MG: 5 INJECTION, POWDER, LYOPHILIZED, FOR SOLUTION INTRAVENOUS at 16:25

## 2021-12-12 RX ADMIN — PANTOPRAZOLE SODIUM 40 MG: 40 TABLET, DELAYED RELEASE ORAL at 05:10

## 2021-12-12 RX ADMIN — MIRTAZAPINE 15 MG: 15 TABLET, FILM COATED ORAL at 22:23

## 2021-12-12 RX ADMIN — LEVALBUTEROL 1.25 MG: 1.25 SOLUTION, CONCENTRATE RESPIRATORY (INHALATION) at 10:46

## 2021-12-13 VITALS
RESPIRATION RATE: 18 BRPM | WEIGHT: 249.12 LBS | DIASTOLIC BLOOD PRESSURE: 76 MMHG | HEART RATE: 71 BPM | HEIGHT: 65 IN | TEMPERATURE: 97.5 F | BODY MASS INDEX: 41.51 KG/M2 | SYSTOLIC BLOOD PRESSURE: 149 MMHG | OXYGEN SATURATION: 92 %

## 2021-12-13 PROBLEM — R59.0 HILAR LYMPHADENOPATHY: Status: ACTIVE | Noted: 2021-12-13

## 2021-12-13 PROBLEM — S22.080A T12 COMPRESSION FRACTURE (HCC): Status: ACTIVE | Noted: 2021-12-13

## 2021-12-13 PROBLEM — I70.90 ATHEROSCLEROSIS: Status: ACTIVE | Noted: 2021-12-13

## 2021-12-13 PROBLEM — G47.33 OSA (OBSTRUCTIVE SLEEP APNEA): Status: ACTIVE | Noted: 2021-12-13

## 2021-12-13 PROBLEM — J21.0 RSV (ACUTE BRONCHIOLITIS DUE TO RESPIRATORY SYNCYTIAL VIRUS): Status: RESOLVED | Noted: 2021-12-08 | Resolved: 2021-12-13

## 2021-12-13 LAB
ANION GAP SERPL CALCULATED.3IONS-SCNC: 3 MMOL/L (ref 4–13)
BASOPHILS # BLD AUTO: 0.03 THOUSANDS/ΜL (ref 0–0.1)
BASOPHILS NFR BLD AUTO: 0 % (ref 0–1)
BUN SERPL-MCNC: 26 MG/DL (ref 5–25)
CALCIUM SERPL-MCNC: 9.1 MG/DL (ref 8.3–10.1)
CHLORIDE SERPL-SCNC: 100 MMOL/L (ref 100–108)
CO2 SERPL-SCNC: 33 MMOL/L (ref 21–32)
CREAT SERPL-MCNC: 0.66 MG/DL (ref 0.6–1.3)
EOSINOPHIL # BLD AUTO: 0.01 THOUSAND/ΜL (ref 0–0.61)
EOSINOPHIL NFR BLD AUTO: 0 % (ref 0–6)
ERYTHROCYTE [DISTWIDTH] IN BLOOD BY AUTOMATED COUNT: 14.1 % (ref 11.6–15.1)
GFR SERPL CREATININE-BSD FRML MDRD: 88 ML/MIN/1.73SQ M
GLUCOSE SERPL-MCNC: 317 MG/DL (ref 65–140)
GLUCOSE SERPL-MCNC: 319 MG/DL (ref 65–140)
GLUCOSE SERPL-MCNC: 441 MG/DL (ref 65–140)
HCT VFR BLD AUTO: 35.7 % (ref 34.8–46.1)
HGB BLD-MCNC: 11.7 G/DL (ref 11.5–15.4)
IMM GRANULOCYTES # BLD AUTO: 0.14 THOUSAND/UL (ref 0–0.2)
IMM GRANULOCYTES NFR BLD AUTO: 2 % (ref 0–2)
LYMPHOCYTES # BLD AUTO: 1.7 THOUSANDS/ΜL (ref 0.6–4.47)
LYMPHOCYTES NFR BLD AUTO: 18 % (ref 14–44)
MCH RBC QN AUTO: 31 PG (ref 26.8–34.3)
MCHC RBC AUTO-ENTMCNC: 32.8 G/DL (ref 31.4–37.4)
MCV RBC AUTO: 95 FL (ref 82–98)
MONOCYTES # BLD AUTO: 0.52 THOUSAND/ΜL (ref 0.17–1.22)
MONOCYTES NFR BLD AUTO: 6 % (ref 4–12)
NEUTROPHILS # BLD AUTO: 6.92 THOUSANDS/ΜL (ref 1.85–7.62)
NEUTS SEG NFR BLD AUTO: 74 % (ref 43–75)
NRBC BLD AUTO-RTO: 0 /100 WBCS
PLATELET # BLD AUTO: 276 THOUSANDS/UL (ref 149–390)
PMV BLD AUTO: 11.2 FL (ref 8.9–12.7)
POTASSIUM SERPL-SCNC: 4.5 MMOL/L (ref 3.5–5.3)
RBC # BLD AUTO: 3.77 MILLION/UL (ref 3.81–5.12)
SODIUM SERPL-SCNC: 136 MMOL/L (ref 136–145)
VANCOMYCIN TROUGH SERPL-MCNC: 17.7 UG/ML (ref 10–20)
WBC # BLD AUTO: 9.32 THOUSAND/UL (ref 4.31–10.16)

## 2021-12-13 PROCEDURE — 80048 BASIC METABOLIC PNL TOTAL CA: CPT | Performed by: INTERNAL MEDICINE

## 2021-12-13 PROCEDURE — 99232 SBSQ HOSP IP/OBS MODERATE 35: CPT | Performed by: INTERNAL MEDICINE

## 2021-12-13 PROCEDURE — 80202 ASSAY OF VANCOMYCIN: CPT | Performed by: FAMILY MEDICINE

## 2021-12-13 PROCEDURE — 82948 REAGENT STRIP/BLOOD GLUCOSE: CPT

## 2021-12-13 PROCEDURE — 99239 HOSP IP/OBS DSCHRG MGMT >30: CPT | Performed by: INTERNAL MEDICINE

## 2021-12-13 PROCEDURE — 94761 N-INVAS EAR/PLS OXIMETRY MLT: CPT

## 2021-12-13 PROCEDURE — 94760 N-INVAS EAR/PLS OXIMETRY 1: CPT

## 2021-12-13 PROCEDURE — 85025 COMPLETE CBC W/AUTO DIFF WBC: CPT | Performed by: INTERNAL MEDICINE

## 2021-12-13 PROCEDURE — 94640 AIRWAY INHALATION TREATMENT: CPT

## 2021-12-13 RX ORDER — ALBUTEROL SULFATE 90 UG/1
2 AEROSOL, METERED RESPIRATORY (INHALATION) EVERY 6 HOURS PRN
Refills: 0
Start: 2021-12-13

## 2021-12-13 RX ORDER — PREDNISONE 10 MG/1
TABLET ORAL
Qty: 13 TABLET | Refills: 0 | Status: SHIPPED | OUTPATIENT
Start: 2021-12-13 | End: 2022-07-06

## 2021-12-13 RX ORDER — ACETAMINOPHEN 325 MG/1
650 TABLET ORAL EVERY 6 HOURS PRN
Refills: 0
Start: 2021-12-13

## 2021-12-13 RX ADMIN — INSULIN LISPRO 3 UNITS: 100 INJECTION, SOLUTION INTRAVENOUS; SUBCUTANEOUS at 08:42

## 2021-12-13 RX ADMIN — INSULIN GLARGINE 30 UNITS: 100 INJECTION, SOLUTION SUBCUTANEOUS at 08:43

## 2021-12-13 RX ADMIN — PANTOPRAZOLE SODIUM 40 MG: 40 TABLET, DELAYED RELEASE ORAL at 05:20

## 2021-12-13 RX ADMIN — VANCOMYCIN HYDROCHLORIDE 1500 MG: 5 INJECTION, POWDER, LYOPHILIZED, FOR SOLUTION INTRAVENOUS at 02:05

## 2021-12-13 RX ADMIN — ATORVASTATIN CALCIUM 20 MG: 40 TABLET, FILM COATED ORAL at 08:44

## 2021-12-13 RX ADMIN — INSULIN LISPRO 5 UNITS: 100 INJECTION, SOLUTION INTRAVENOUS; SUBCUTANEOUS at 11:53

## 2021-12-13 RX ADMIN — LEVOTHYROXINE SODIUM 150 MCG: 150 TABLET ORAL at 05:20

## 2021-12-13 RX ADMIN — INSULIN LISPRO 8 UNITS: 100 INJECTION, SOLUTION INTRAVENOUS; SUBCUTANEOUS at 08:42

## 2021-12-13 RX ADMIN — APIXABAN 10 MG: 5 TABLET, FILM COATED ORAL at 08:44

## 2021-12-13 RX ADMIN — CYANOCOBALAMIN TAB 500 MCG 500 MCG: 500 TAB at 08:44

## 2021-12-13 RX ADMIN — METHYLPREDNISOLONE SODIUM SUCCINATE 40 MG: 40 INJECTION, POWDER, FOR SOLUTION INTRAMUSCULAR; INTRAVENOUS at 08:43

## 2021-12-13 RX ADMIN — FOLIC ACID 1000 MCG: 1 TABLET ORAL at 08:44

## 2021-12-13 RX ADMIN — DULOXETINE HYDROCHLORIDE 20 MG: 20 CAPSULE, DELAYED RELEASE ORAL at 08:44

## 2021-12-13 RX ADMIN — INSULIN LISPRO 8 UNITS: 100 INJECTION, SOLUTION INTRAVENOUS; SUBCUTANEOUS at 11:54

## 2021-12-13 RX ADMIN — LISINOPRIL 40 MG: 20 TABLET ORAL at 08:44

## 2021-12-13 RX ADMIN — LEVALBUTEROL 1.25 MG: 1.25 SOLUTION, CONCENTRATE RESPIRATORY (INHALATION) at 09:29

## 2021-12-13 RX ADMIN — CEFEPIME HYDROCHLORIDE 2000 MG: 2 INJECTION, SOLUTION INTRAVENOUS at 08:43

## 2021-12-13 RX ADMIN — FUROSEMIDE 40 MG: 40 TABLET ORAL at 08:44

## 2021-12-13 RX ADMIN — GUAIFENESIN 600 MG: 600 TABLET, EXTENDED RELEASE ORAL at 08:44

## 2021-12-13 RX ADMIN — IPRATROPIUM BROMIDE 0.5 MG: 0.5 SOLUTION RESPIRATORY (INHALATION) at 09:29

## 2021-12-21 ENCOUNTER — TRANSITIONAL CARE MANAGEMENT (OUTPATIENT)
Dept: INTERNAL MEDICINE CLINIC | Facility: CLINIC | Age: 71
End: 2021-12-21

## 2021-12-22 ENCOUNTER — OFFICE VISIT (OUTPATIENT)
Dept: INTERNAL MEDICINE CLINIC | Facility: CLINIC | Age: 71
End: 2021-12-22
Payer: COMMERCIAL

## 2021-12-22 VITALS
DIASTOLIC BLOOD PRESSURE: 76 MMHG | HEIGHT: 65 IN | WEIGHT: 240 LBS | BODY MASS INDEX: 39.99 KG/M2 | OXYGEN SATURATION: 93 % | SYSTOLIC BLOOD PRESSURE: 132 MMHG | HEART RATE: 115 BPM | TEMPERATURE: 97.5 F

## 2021-12-22 DIAGNOSIS — E53.8 VITAMIN B12 DEFICIENCY: ICD-10-CM

## 2021-12-22 DIAGNOSIS — I10 ESSENTIAL HYPERTENSION: ICD-10-CM

## 2021-12-22 DIAGNOSIS — J96.11 CHRONIC RESPIRATORY FAILURE WITH HYPOXIA (HCC): ICD-10-CM

## 2021-12-22 DIAGNOSIS — E55.9 VITAMIN D DEFICIENCY: ICD-10-CM

## 2021-12-22 DIAGNOSIS — I26.99 ACUTE PULMONARY EMBOLISM, UNSPECIFIED PULMONARY EMBOLISM TYPE, UNSPECIFIED WHETHER ACUTE COR PULMONALE PRESENT (HCC): ICD-10-CM

## 2021-12-22 DIAGNOSIS — E03.9 HYPOTHYROIDISM, UNSPECIFIED TYPE: ICD-10-CM

## 2021-12-22 DIAGNOSIS — E11.65 TYPE 2 DIABETES MELLITUS WITH HYPERGLYCEMIA, WITH LONG-TERM CURRENT USE OF INSULIN (HCC): ICD-10-CM

## 2021-12-22 DIAGNOSIS — J21.0 RSV (ACUTE BRONCHIOLITIS DUE TO RESPIRATORY SYNCYTIAL VIRUS): ICD-10-CM

## 2021-12-22 DIAGNOSIS — Z79.4 TYPE 2 DIABETES MELLITUS WITH HYPERGLYCEMIA, WITH LONG-TERM CURRENT USE OF INSULIN (HCC): ICD-10-CM

## 2021-12-22 DIAGNOSIS — J44.1 COPD WITH ACUTE EXACERBATION (HCC): Primary | ICD-10-CM

## 2021-12-22 DIAGNOSIS — Z23 NEED FOR INFLUENZA VACCINATION: ICD-10-CM

## 2021-12-22 PROCEDURE — 1111F DSCHRG MED/CURRENT MED MERGE: CPT | Performed by: FAMILY MEDICINE

## 2021-12-22 PROCEDURE — 90662 IIV NO PRSV INCREASED AG IM: CPT | Performed by: FAMILY MEDICINE

## 2021-12-22 PROCEDURE — G0008 ADMIN INFLUENZA VIRUS VAC: HCPCS | Performed by: FAMILY MEDICINE

## 2021-12-22 PROCEDURE — 99495 TRANSJ CARE MGMT MOD F2F 14D: CPT | Performed by: FAMILY MEDICINE

## 2021-12-22 RX ORDER — INSULIN DEGLUDEC INJECTION 100 U/ML
40 INJECTION, SOLUTION SUBCUTANEOUS DAILY
Qty: 30 ML | Refills: 3 | Status: SHIPPED | OUTPATIENT
Start: 2021-12-22 | End: 2022-01-03 | Stop reason: SDUPTHER

## 2022-01-03 DIAGNOSIS — E11.65 TYPE 2 DIABETES MELLITUS WITH HYPERGLYCEMIA, WITH LONG-TERM CURRENT USE OF INSULIN (HCC): ICD-10-CM

## 2022-01-03 DIAGNOSIS — I26.99 ACUTE PULMONARY EMBOLISM, UNSPECIFIED PULMONARY EMBOLISM TYPE, UNSPECIFIED WHETHER ACUTE COR PULMONALE PRESENT (HCC): ICD-10-CM

## 2022-01-03 DIAGNOSIS — Z79.4 TYPE 2 DIABETES MELLITUS WITH HYPERGLYCEMIA, WITH LONG-TERM CURRENT USE OF INSULIN (HCC): ICD-10-CM

## 2022-01-03 RX ORDER — INSULIN DEGLUDEC INJECTION 100 U/ML
40 INJECTION, SOLUTION SUBCUTANEOUS DAILY
Qty: 30 ML | Refills: 3 | Status: SHIPPED | OUTPATIENT
Start: 2022-01-03

## 2022-01-04 ENCOUNTER — APPOINTMENT (EMERGENCY)
Dept: RADIOLOGY | Facility: HOSPITAL | Age: 72
End: 2022-01-04
Payer: COMMERCIAL

## 2022-01-04 ENCOUNTER — APPOINTMENT (EMERGENCY)
Dept: CT IMAGING | Facility: HOSPITAL | Age: 72
End: 2022-01-04
Payer: COMMERCIAL

## 2022-01-04 ENCOUNTER — HOSPITAL ENCOUNTER (EMERGENCY)
Facility: HOSPITAL | Age: 72
Discharge: HOME | End: 2022-01-04
Attending: EMERGENCY MEDICINE | Admitting: EMERGENCY MEDICINE
Payer: COMMERCIAL

## 2022-01-04 VITALS
HEIGHT: 65 IN | BODY MASS INDEX: 39.67 KG/M2 | OXYGEN SATURATION: 97 % | SYSTOLIC BLOOD PRESSURE: 122 MMHG | TEMPERATURE: 97.9 F | RESPIRATION RATE: 20 BRPM | DIASTOLIC BLOOD PRESSURE: 72 MMHG | WEIGHT: 238.1 LBS | HEART RATE: 100 BPM

## 2022-01-04 DIAGNOSIS — S62.109A WRIST FRACTURE: Primary | ICD-10-CM

## 2022-01-04 PROCEDURE — 72125 CT NECK SPINE W/O DYE: CPT

## 2022-01-04 PROCEDURE — 99284 EMERGENCY DEPT VISIT MOD MDM: CPT | Performed by: EMERGENCY MEDICINE

## 2022-01-04 PROCEDURE — 71045 X-RAY EXAM CHEST 1 VIEW: CPT

## 2022-01-04 PROCEDURE — 99284 EMERGENCY DEPT VISIT MOD MDM: CPT

## 2022-01-04 PROCEDURE — 73110 X-RAY EXAM OF WRIST: CPT

## 2022-01-04 PROCEDURE — 29125 APPL SHORT ARM SPLINT STATIC: CPT | Performed by: EMERGENCY MEDICINE

## 2022-01-04 PROCEDURE — 70450 CT HEAD/BRAIN W/O DYE: CPT

## 2022-01-04 RX ORDER — ACETAMINOPHEN 325 MG/1
650 TABLET ORAL ONCE
Status: COMPLETED | OUTPATIENT
Start: 2022-01-04 | End: 2022-01-04

## 2022-01-04 RX ADMIN — ACETAMINOPHEN 650 MG: 325 TABLET, FILM COATED ORAL at 14:36

## 2022-01-04 NOTE — ED PROVIDER NOTES
Emergency Department Trauma Note  Evelyn Monroy 74 y o  female MRN: [id number]  Unit/Bed#: Z1H1/Z1H1 Encounter: [id number]      Trauma Alert: Trauma Acuity: Trauma Evaluation  Model of Arrival: Mode of Arrival:  (private vehicle ) via    Trauma Team: Current Providers  Attending Provider: Eyad Starr MD  Consultants: None      History of Present Illness     Chief Complaint:   Chief Complaint   Patient presents with    Wrist Pain     patient slipped outside landing on right side; patient has complaints of right knee, right wrist pain; patient states she bumped the front of her forehead on the ground after falling; denies LOC but takes eliquis      HPI:  Evelyn Monroy is a 74 y o  female who presents with a ground level fall  Mechanism:Details of Incident: patient slipped outside on ground hitting right knee, right wrist, and right forehead  Injury Date: 01/04/22 Injury Time: 1215 Injury Occurence Location - [address] [place] [address] Way: [facility]     HPI     Patient is a pleasant 74 yof who presents after a mechanical ground level fall  She notes head strike but no loc  No f/c/s  No vomiting      + achi mild right wrist pain with no deformity  This happened today  Taking eliquis  No tenderness in any extremity other than right wrist      Trauma Secondary exam performed (must be performed and documented on all traumas): GCS 15, full ROM of bilateral upper and lower extremities  Airway intact, bilateral breath sounds, palpable pulses  No active bleeding  No bony point tenderness in chest, abdomen or c/t,l spine  No crepitus, abdomen soft/non tender  Chest wall soft non tender with no deformities  Pelvis stable  Stat portable CXR prior to going to CT chest? Yes   FAST Results: Negative    Of note, C-spine clear after CT scans resulted  Static Splint Application and Check  Splint was applied on the same date as the visit  Consent: Verbal consent obtained   Risks and benefits: risks, benefits and alternatives were discussed  Consent given by: patient or family   Patient understanding: patient states understanding of the procedure being performed   Patient consent: the patient's understanding of the procedure matches consent given   Patient identity confirmed: verbally with patient and arm band   Location details: right wrist  Splint type: sugar tong  Post-procedure: The splinted body part was neurovascularly unchanged following the procedure  Patient tolerance: Patient tolerated the procedure well with no immediate complications  Review of Systems   Musculoskeletal: Positive for arthralgias  All other systems reviewed and are negative  Historical Information     Immunizations:   Immunization History   Administered Date(s) Administered    COVID-19 MODERNA VACC 0 5 ML IM 04/06/2021, 05/04/2021, 11/08/2021    INFLUENZA 10/13/2015, 10/27/2016    Influenza Split High Dose Preservative Free IM 09/15/2014, 10/13/2015, 10/27/2016, 09/11/2017    Influenza, Quadrivalent (nasal) 11/06/2016    Influenza, high dose seasonal 0 7 mL 09/24/2018, 09/30/2019, 09/08/2020, 12/22/2021    Influenza, seasonal, injectable 10/01/2013    Pneumococcal Conjugate 13-Valent 02/10/2016    Pneumococcal Polysaccharide PPV23 11/18/2008, 12/02/2013    Tdap 10/30/2015    Zoster Vaccine Recombinant 05/29/2020, 09/08/2020       Past Medical History:   Diagnosis Date    Arthritis     Arthritis     [facility]     Candidiasis of skin     Cervical cancer [facility]     Chronic respiratory failure with hypoxia and hypercapnia ([facility] ) 4/20/2019    COPD (chronic obstructive pulmonary disease) ([facility]     last assessed 11/21/2016    Current chronic use of systemic steroids     Degenerative arthritis of left knee     last assessed 1/20/2015    Diabetes mellitus ([facility]     Disease of thyroid gland     hypo pill given to decrease thyroid       Fibromyalgia     Fistula of knee     last assessed 9/12/2014    GERD (gastroesophageal reflux disease)     Hyperlipidemia     Hypertension     Medial meniscus tear     of left knee, last assessed 2014    Migraine     states she has a hx of HA that she calls Skwibl but never was dx by neurologist    Obesity     Obesity     Osteoarthritis     Osteopenia     Pneumonia     last assessed 2016    Psychiatric disorder     anxiety    [facility]     Rheumatoid arthritis ([facility]     Sepsis [facility]     last assessed 11/10/2016    Tick bite     last assessed 10/30/2015    Vitamin B12 deficiency     Vitamin D deficiency        Family History   Problem Relation Age of Onset    Stroke Mother     Asthma Family     Cancer Family     Diabetes Family     Hypertension Family      Past Surgical History:   Procedure Laterality Date    CATARACT EXTRACTION Bilateral     CHOLECYSTECTOMY      EYE SURGERY      Bilateral Cataracts    HYSTERECTOMY      IR THORACENTESIS  2019    JOINT REPLACEMENT      left knee    KNEE ARTHROSCOPY      NEUROPLASTY / TRANSPOSITION MEDIAN NERVE AT CARPAL TUNNEL      TUBAL LIGATION       Social History     Tobacco Use    Smoking status: Former Smoker     Packs/day: 1 00     Years: 48 00     Pack years: 48 00     Types: Cigarettes, E-Cigarettes     Quit date: 2012     Years since quittin 1    Smokeless tobacco: Never Used    Tobacco comment: per allscripts - "current every day smoker, former smoker"   Vaping Use    Vaping Use: Former   Substance Use Topics    Alcohol use: Never     Alcohol/week: 0 0 standard drinks     Comment: stopped drinking alcohol    Drug use: Never     E-Cigarette/Vaping    E-Cigarette Use Former User      E-Cigarette/Vaping Substances       Family History: non-contributory    Meds/Allergies   Prior to Admission Medications   Prescriptions Last Dose Informant Patient Reported? Taking?    BD Pen Needle Mamta U/F 32G X 4 MM MISC   No No   Sig: USE AS DIRECTED   BD Pen Needle Mamta U/F 32G X 4 MM MISC   No No   Sig: USE AS DIRECTED   Blood Glucose Monitoring Suppl (ONE TOUCH ULTRA 2) w/Device KIT  Self No No   Sig: by Does not apply route 2 (two) times a day   DULoxetine (CYMBALTA) 20 mg capsule   No No   Sig: TAKE ONE CAPSULE BY MOUTH ONCE DAILY   Insulin Pen Needle 33G X 4 MM MISC  Self No No   Sig: by Does not apply route daily   JANUVIA 100 MG tablet   No No   Sig: TAKE 1 TABLET BY MOUTH DAILY  Lancets (OneTouch Delica Plus [id number]) MISC   No No   Sig: TEST 2 TIMES A DAY   Polyethylene Glycol 3350-GRX POWD   No No   Sig: Take by mouth daily at bedtime as needed (constipation)   Semaglutide,0 25 or 0 5MG/DOS, (Ozempic, 0 25 or 0 5 MG/DOSE,) 2 MG/1 5ML SOPN   No No   Sig: Inject 0 5 mg under the skin once a week   Tofacitinib Citrate (XELJANZ XR) 11 MG TB24  Self Yes No   Sig: Take 1 tablet by mouth daily   acetaminophen (TYLENOL) 325 mg tablet   No No   Sig: Take 2 tablets (650 mg total) by mouth every 6 (six) hours as needed for mild pain, headaches or fever   albuterol (2 5 mg/3 mL) 0 083 % nebulizer solution   Yes No   Sig: USE 1 UNIT DOSE IN NEBULIZER EVERY 4 HOURS AS NEEDED     albuterol (PROVENTIL HFA,VENTOLIN HFA) 90 mcg/act inhaler   No No   Sig: Inhale 2 puffs every 6 (six) hours as needed for wheezing   alendronate (FOSAMAX) 70 mg tablet  Self Yes No   Sig: Take 70 mg by mouth every 7 days   Patient not taking: Reported on 12/8/2021    apixaban (Eliquis) 5 mg   No No   Sig: Take 1 tablet (5 mg total) by mouth 2 (two) times a day 10 mg PO BID for 5 doses then 5 mg PO BID thereafter   atorvastatin (LIPITOR) 20 mg tablet   No No   Sig: TAKE ONE TABLET BY MOUTH EVERY DAY   fluticasone-salmeterol (ADVAIR) 250-50 mcg/dose  Self Yes No   Sig: Inhale 1 puff every 12 (twelve) hours    folic acid (FOLVITE) 1 mg tablet   No No   Sig: TAKE ONE TABLET BY MOUTH ONCE DAILY   furosemide (LASIX) 40 mg tablet   No No   Sig: TAKE ONE TABLET BY MOUTH EVERY DAY   glucose blood (OneTouch Ultra) test strip   No No   Sig: Test 3 times daily   Dx: E11 65   insulin degludec Jama Heck FlexTouch) 100 units/mL injection pen   No No   Sig: Inject 40 Units under the skin daily   levothyroxine 150 mcg tablet   No No   Sig: Take 1 tablet (150 mcg total) by mouth daily   lisinopril (ZESTRIL) 40 mg tablet   No No   Sig: TAKE ONE TABLET BY MOUTH EVERY DAY   methotrexate 2 5 mg tablet   No No   Sig: TAKE 8 TABLETS BY MOUTH EVERY    mometasone (ELOCON) 0 1 % ointment  Self No No   Sig: Apply topically 2 (two) times a day as needed (itching)   nystatin (MYCOSTATIN) cream   No No   Sig: Apply topically 2 (two) times a day   pantoprazole (PROTONIX) 40 mg tablet   No No   Sig: TAKE ONE TABLET BY MOUTH ONCE DAILY   predniSONE 10 mg tablet   No No   Si mg PO Qdaily for 2 days then 20 mg daily for 2 days then 10 mg daily for 2 days then 5 mg daily for 2 days then STOP   tiotropium (SPIRIVA RESPIMAT) 2 5 MCG/ACT AERS inhaler  Self No No   Sig: Inhale 1 puff daily at bedtime   vitamin B-12 (VITAMIN B-12) 500 MCG tablet   No No   Sig: Take 1 tablet (500 mcg total) by mouth daily   Patient not taking: Reported on 2021       Facility-Administered Medications: None       No Known Allergies    PHYSICAL EXAM        Objective   Vitals:   First set: Temperature: 98 °F (36 7 °C) (22 1236)  Pulse: 74 (22 1236)  Respirations: 20 (22 1236)  Blood Pressure: 142/82 (22 1236)  SpO2: 92 % (22 1236)       Primary Survey:   (A) Airway: intact  (B) Breathing: +breath sounds bilaterally  (C) Circulation: Pulses:   normal  (D) Disabliity:  GCS Total:  15  (E) Expose:  Completed      Secondary Survey: (Click on Physical Exam tab above)  Physical Exam  Vitals and nursing note reviewed  Constitutional:       Appearance: She is well-developed  HENT:      Head: Normocephalic and atraumatic        Right Ear: External ear normal       Left Ear: External ear normal    Eyes:      Conjunctiva/sclera: Conjunctivae normal    Neck:      Vascular: No JVD  Trachea: No tracheal deviation  Cardiovascular:      Rate and Rhythm: Normal rate and regular rhythm  Heart sounds: Normal heart sounds  Pulmonary:      Effort: Pulmonary effort is normal  No respiratory distress  Breath sounds: No wheezing or rales  Abdominal:      Palpations: Abdomen is soft  Tenderness: There is no abdominal tenderness  There is no guarding or rebound  Musculoskeletal:         General: Tenderness present  Cervical back: Normal range of motion and neck supple  Skin:     General: Skin is warm and dry  Findings: No erythema or rash  Neurological:      General: No focal deficit present  Mental Status: She is alert and oriented to person, place, and time  Motor: No weakness  Psychiatric:         Behavior: Behavior normal          Thought Content: Thought content normal          Cervical spine cleared by clinical criteria? No (imaging required)      Invasive Devices  Report    None                 Lab Results:   Results Reviewed     None                 Imaging Studies:   Direct to CT: No  XR wrist 3+ views RIGHT   ED Interpretation by Harjinder Wakefield MD (01/04 1340)   + fracture      Final Result by Irving Hadley DO (01/04 1626)      Distal radius fracture  Findings concur with the preliminary report by the referring clinician already in PACS and/or our electronic record EPIC  Workstation performed: [id number]         TRAUMA - CT spine cervical wo contrast   Final Result by Jessica Chew MD (01/04 1330)      No cervical spine fracture or traumatic malalignment  Workstation performed: [id number]         XR Trauma chest portable   Final Result by Jeannie Seth DO (01/04 1626)      Stable left basilar effusion  Clear lung fields otherwise                    Workstation performed: [id number]         TRAUMA - CT head wo contrast   Final Result by Jessica Chew MD (01/04 1323)      No acute intracranial abnormality  Secretions in the right sphenoid sinus with air-fluid level  Correlate for acute sinusitis  Workstation performed: [id number]               Procedures  Procedures         ED Course  ED Course as of 01/08/22 2302   Tue Jan 04, 2022   1426 TRAUMA - CT spine cervical wo contrast           MDM        Disposition  [facility] Transfer: No  Final diagnoses:   Wrist fracture     Time reflects when diagnosis was documented in both MDM as applicable and the Disposition within this note     Time User Action Codes Description Comment    1/4/2022  1:40 PM Richmond Murphy Wrist fracture       ED Disposition     ED Disposition Condition Date/Time Comment    Discharge  Tue Jan 4, 2022  2:42 PM Maximo Springer discharge to home/self care            Follow-up Information     Follow up With Specialties Details Why Contact Info Additional Information    Arlin Rodriguez MD Family Medicine In 1 day  [address] [address]       [address] [place] [address] Orthopedic Surgery Schedule an appointment as soon as possible for a visit  for evaluation of wrist fracture [address] [id number] [phone] [address] [place] [address], [address] [place] [address], [address]  [facility]        Discharge Medication List as of 1/4/2022  1:42 PM      CONTINUE these medications which have NOT CHANGED    Details   acetaminophen (TYLENOL) 325 mg tablet Take 2 tablets (650 mg total) by mouth every 6 (six) hours as needed for mild pain, headaches or fever, Starting Mon 12/13/2021, No Print      albuterol (2 5 mg/3 mL) 0 083 % nebulizer solution USE 1 UNIT DOSE IN NEBULIZER EVERY 4 HOURS AS NEEDED , Historical Med      albuterol (PROVENTIL HFA,VENTOLIN HFA) 90 mcg/act inhaler Inhale 2 puffs every 6 (six) hours as needed for wheezing, Starting Mon 12/13/2021, No Print      alendronate (FOSAMAX) 70 mg tablet Take 70 mg by mouth every 7 days, Historical Med      apixaban (Eliquis) 5 mg Take 1 tablet (5 mg total) by mouth 2 (two) times a day 10 mg PO BID for 5 doses then 5 mg PO BID thereafter, Starting Mon 1/3/2022, Normal      atorvastatin (LIPITOR) 20 mg tablet TAKE ONE TABLET BY MOUTH EVERY DAY, Normal      !! BD Pen Needle Mamta U/F 32G X 4 MM MISC USE AS DIRECTED, Normal      !! BD Pen Needle Mamta U/F 32G X 4 MM MISC USE AS DIRECTED, Normal      Blood Glucose Monitoring Suppl (ONE TOUCH ULTRA 2) w/Device KIT by Does not apply route 2 (two) times a day, Starting Wed 6/6/2018, Normal      DULoxetine (CYMBALTA) 20 mg capsule TAKE ONE CAPSULE BY MOUTH ONCE DAILY, Normal      fluticasone-salmeterol (ADVAIR) 250-50 mcg/dose Inhale 1 puff every 12 (twelve) hours , Historical Med      folic acid (FOLVITE) 1 mg tablet TAKE ONE TABLET BY MOUTH ONCE DAILY, Normal      furosemide (LASIX) 40 mg tablet TAKE ONE TABLET BY MOUTH EVERY DAY, Normal      glucose blood (OneTouch Ultra) test strip Test 3 times daily   Dx: E11 65, Normal      insulin degludec Dois Minors FlexTouch) 100 units/mL injection pen Inject 40 Units under the skin daily, Starting Mon 1/3/2022, Normal      !!  Insulin Pen Needle 33G X 4 MM MISC by Does not apply route daily, Starting Tue 5/21/2019, Normal      JANUVIA 100 MG tablet TAKE 1 TABLET BY MOUTH DAILY , Normal      Lancets (OneTouch Delica Plus [id number]) MISC TEST 2 TIMES A DAY, Normal      levothyroxine 150 mcg tablet Take 1 tablet (150 mcg total) by mouth daily, Starting Tue 6/8/2021, Normal      lisinopril (ZESTRIL) 40 mg tablet TAKE ONE TABLET BY MOUTH EVERY DAY, Normal      methotrexate 2 5 mg tablet TAKE 8 TABLETS BY MOUTH EVERY SUNDAY, Normal      mometasone (ELOCON) 0 1 % ointment Apply topically 2 (two) times a day as needed (itching), Starting Thu 4/25/2019, Normal      nystatin (MYCOSTATIN) cream Apply topically 2 (two) times a day, Starting Mon 6/21/2021, Normal      pantoprazole (PROTONIX) 40 mg tablet TAKE ONE TABLET BY MOUTH ONCE DAILY, Normal      Polyethylene Glycol 3350-GRX POWD Take by mouth daily at bedtime as needed (constipation), Starting Thu 1/16/2020, No Print      predniSONE 10 mg tablet 30 mg PO Qdaily for 2 days then 20 mg daily for 2 days then 10 mg daily for 2 days then 5 mg daily for 2 days then STOP, Normal      Semaglutide,0 25 or 0 5MG/DOS, (Ozempic, 0 25 or 0 5 MG/DOSE,) 2 MG/1 5ML SOPN Inject 0 5 mg under the skin once a week, Starting Fri 11/19/2021, Normal      tiotropium (SPIRIVA RESPIMAT) 2 5 MCG/ACT AERS inhaler Inhale 1 puff daily at bedtime, Starting Fri 12/28/2018, Normal      Tofacitinib Citrate (XELJANZ XR) 11 MG TB24 Take 1 tablet by mouth daily, Historical Med      vitamin B-12 (VITAMIN B-12) 500 MCG tablet Take 1 tablet (500 mcg total) by mouth daily, Starting Thu 1/16/2020, No Print       !! - Potential duplicate medications found  Please discuss with provider  No discharge procedures on file      PDMP Review     None          ED Provider  Electronically Signed by         Isaias Reyes MD  01/04/22 1323 [facility] Lawyer Bethanie MD  01/08/22 1175

## 2022-01-05 ENCOUNTER — TELEPHONE (OUTPATIENT)
Dept: INTERNAL MEDICINE CLINIC | Facility: CLINIC | Age: 72
End: 2022-01-05

## 2022-01-05 NOTE — TELEPHONE ENCOUNTER
Patient left a message stating she has a fx and ER told her to call  Did look at chart and spoke with Dr Shahab Brunett  Encouraged patient to contact Dr Vini Bernstein who she sees for ortho  Patient stated she will call them

## 2022-01-18 ENCOUNTER — TELEPHONE (OUTPATIENT)
Dept: INTERNAL MEDICINE CLINIC | Facility: CLINIC | Age: 72
End: 2022-01-18

## 2022-01-18 NOTE — TELEPHONE ENCOUNTER
Received a call from Farooq escalante stating that she needed help with her mychart  It is active and we are unable to do anything else from our end  Did provide her with the [facility] number  She also did inquire if she may take her diflucan with her eliquis  Did speak with Dr Mikki Swann, and she stated that she may  Did ask if Farooq escalante is having a yeast infection, and she stated that her urine is cloudy again  Per Dr Gisel Serrano does get yeast in her urine, and she is to take this

## 2022-01-24 DIAGNOSIS — E55.9 VITAMIN D DEFICIENCY: Primary | ICD-10-CM

## 2022-01-24 RX ORDER — ERGOCALCIFEROL 1.25 MG/1
CAPSULE ORAL
Qty: 12 CAPSULE | Refills: 0 | Status: SHIPPED | OUTPATIENT
Start: 2022-01-24 | End: 2022-04-05

## 2022-02-01 DIAGNOSIS — E11.65 TYPE 2 DIABETES MELLITUS WITH HYPERGLYCEMIA, WITH LONG-TERM CURRENT USE OF INSULIN (HCC): ICD-10-CM

## 2022-02-01 DIAGNOSIS — K21.9 GASTROESOPHAGEAL REFLUX DISEASE: ICD-10-CM

## 2022-02-01 DIAGNOSIS — Z79.4 TYPE 2 DIABETES MELLITUS WITH HYPERGLYCEMIA, WITH LONG-TERM CURRENT USE OF INSULIN (HCC): ICD-10-CM

## 2022-02-01 DIAGNOSIS — M06.9 RHEUMATOID ARTHRITIS INVOLVING MULTIPLE SITES (HCC): ICD-10-CM

## 2022-02-01 DIAGNOSIS — M06.9 RHEUMATOID ARTHRITIS (HCC): ICD-10-CM

## 2022-02-01 DIAGNOSIS — E11.9 TYPE 2 DIABETES MELLITUS WITHOUT COMPLICATION, WITHOUT LONG-TERM CURRENT USE OF INSULIN (HCC): ICD-10-CM

## 2022-02-01 RX ORDER — FOLIC ACID 1 MG/1
TABLET ORAL
Qty: 90 TABLET | Refills: 1 | Status: SHIPPED | OUTPATIENT
Start: 2022-02-01 | End: 2022-07-23

## 2022-02-01 RX ORDER — DULOXETIN HYDROCHLORIDE 20 MG/1
CAPSULE, DELAYED RELEASE ORAL
Qty: 90 CAPSULE | Refills: 1 | Status: SHIPPED | OUTPATIENT
Start: 2022-02-01 | End: 2022-04-29

## 2022-02-01 RX ORDER — PANTOPRAZOLE SODIUM 40 MG/1
TABLET, DELAYED RELEASE ORAL
Qty: 90 TABLET | Refills: 1 | Status: SHIPPED | OUTPATIENT
Start: 2022-02-01 | End: 2022-07-23

## 2022-02-01 RX ORDER — SEMAGLUTIDE 1.34 MG/ML
0.5 INJECTION, SOLUTION SUBCUTANEOUS WEEKLY
Qty: 4.5 ML | Refills: 1 | Status: SHIPPED | OUTPATIENT
Start: 2022-02-01 | End: 2022-07-22

## 2022-02-06 ENCOUNTER — HOSPITAL ENCOUNTER (EMERGENCY)
Facility: HOSPITAL | Age: 72
Discharge: HOME | End: 2022-02-06
Attending: EMERGENCY MEDICINE | Admitting: EMERGENCY MEDICINE
Payer: COMMERCIAL

## 2022-02-06 ENCOUNTER — APPOINTMENT (EMERGENCY)
Dept: CT IMAGING | Facility: HOSPITAL | Age: 72
End: 2022-02-06
Payer: COMMERCIAL

## 2022-02-06 ENCOUNTER — APPOINTMENT (EMERGENCY)
Dept: RADIOLOGY | Facility: HOSPITAL | Age: 72
End: 2022-02-06
Payer: COMMERCIAL

## 2022-02-06 VITALS
TEMPERATURE: 100.5 F | SYSTOLIC BLOOD PRESSURE: 163 MMHG | DIASTOLIC BLOOD PRESSURE: 67 MMHG | HEART RATE: 96 BPM | OXYGEN SATURATION: 96 % | RESPIRATION RATE: 20 BRPM | BODY MASS INDEX: 49.31 KG/M2 | WEIGHT: 293 LBS

## 2022-02-06 DIAGNOSIS — S62.109A WRIST FRACTURE: ICD-10-CM

## 2022-02-06 DIAGNOSIS — R07.89 CHEST WALL PAIN: Primary | ICD-10-CM

## 2022-02-06 LAB
2HR DELTA HS TROPONIN: 0 NG/L
ALBUMIN SERPL BCP-MCNC: 3.2 G/DL (ref 3.5–5)
ALP SERPL-CCNC: 106 U/L (ref 46–116)
ALT SERPL W P-5'-P-CCNC: 25 U/L (ref 12–78)
ANION GAP SERPL CALCULATED.3IONS-SCNC: 2 MMOL/L (ref 4–13)
AST SERPL W P-5'-P-CCNC: 19 U/L (ref 5–45)
BASOPHILS # BLD AUTO: 0.06 THOUSANDS/ΜL (ref 0–0.1)
BASOPHILS NFR BLD AUTO: 0 % (ref 0–1)
BILIRUB SERPL-MCNC: 0.51 MG/DL (ref 0.2–1)
BUN SERPL-MCNC: 11 MG/DL (ref 5–25)
CALCIUM ALBUM COR SERPL-MCNC: 10 MG/DL (ref 8.3–10.1)
CALCIUM SERPL-MCNC: 9.4 MG/DL (ref 8.3–10.1)
CARDIAC TROPONIN I PNL SERPL HS: 3 NG/L
CARDIAC TROPONIN I PNL SERPL HS: 3 NG/L
CHLORIDE SERPL-SCNC: 100 MMOL/L (ref 100–108)
CO2 SERPL-SCNC: 32 MMOL/L (ref 21–32)
CREAT SERPL-MCNC: 0.65 MG/DL (ref 0.6–1.3)
EOSINOPHIL # BLD AUTO: 0.18 THOUSAND/ΜL (ref 0–0.61)
EOSINOPHIL NFR BLD AUTO: 1 % (ref 0–6)
ERYTHROCYTE [DISTWIDTH] IN BLOOD BY AUTOMATED COUNT: 14.6 % (ref 11.6–15.1)
GFR SERPL CREATININE-BSD FRML MDRD: 87 ML/MIN/1.73SQ M
GLUCOSE SERPL-MCNC: 269 MG/DL (ref 65–140)
HCT VFR BLD AUTO: 36.4 % (ref 34.8–46.1)
HGB BLD-MCNC: 11.6 G/DL (ref 11.5–15.4)
HOLD SPECIMEN: NORMAL
IMM GRANULOCYTES # BLD AUTO: 0.03 THOUSAND/UL (ref 0–0.2)
IMM GRANULOCYTES NFR BLD AUTO: 0 % (ref 0–2)
LYMPHOCYTES # BLD AUTO: 1.39 THOUSANDS/ΜL (ref 0.6–4.47)
LYMPHOCYTES NFR BLD AUTO: 10 % (ref 14–44)
MCH RBC QN AUTO: 30.9 PG (ref 26.8–34.3)
MCHC RBC AUTO-ENTMCNC: 31.9 G/DL (ref 31.4–37.4)
MCV RBC AUTO: 97 FL (ref 82–98)
MONOCYTES # BLD AUTO: 1.35 THOUSAND/ΜL (ref 0.17–1.22)
MONOCYTES NFR BLD AUTO: 10 % (ref 4–12)
NEUTROPHILS # BLD AUTO: 10.45 THOUSANDS/ΜL (ref 1.85–7.62)
NEUTS SEG NFR BLD AUTO: 79 % (ref 43–75)
NRBC BLD AUTO-RTO: 0 /100 WBCS
PLATELET # BLD AUTO: 313 THOUSANDS/UL (ref 149–390)
PMV BLD AUTO: 10.4 FL (ref 8.9–12.7)
POTASSIUM SERPL-SCNC: 5 MMOL/L (ref 3.5–5.3)
PROT SERPL-MCNC: 7.4 G/DL (ref 6.4–8.2)
RBC # BLD AUTO: 3.75 MILLION/UL (ref 3.81–5.12)
SODIUM SERPL-SCNC: 134 MMOL/L (ref 136–145)
WBC # BLD AUTO: 13.46 THOUSAND/UL (ref 4.31–10.16)

## 2022-02-06 PROCEDURE — 71275 CT ANGIOGRAPHY CHEST: CPT

## 2022-02-06 PROCEDURE — 99284 EMERGENCY DEPT VISIT MOD MDM: CPT | Performed by: EMERGENCY MEDICINE

## 2022-02-06 PROCEDURE — 36415 COLL VENOUS BLD VENIPUNCTURE: CPT

## 2022-02-06 PROCEDURE — G1004 CDSM NDSC: HCPCS

## 2022-02-06 PROCEDURE — 80053 COMPREHEN METABOLIC PANEL: CPT | Performed by: EMERGENCY MEDICINE

## 2022-02-06 PROCEDURE — 93005 ELECTROCARDIOGRAM TRACING: CPT

## 2022-02-06 PROCEDURE — 96361 HYDRATE IV INFUSION ADD-ON: CPT

## 2022-02-06 PROCEDURE — 71045 X-RAY EXAM CHEST 1 VIEW: CPT

## 2022-02-06 PROCEDURE — 99285 EMERGENCY DEPT VISIT HI MDM: CPT

## 2022-02-06 PROCEDURE — 85025 COMPLETE CBC W/AUTO DIFF WBC: CPT | Performed by: EMERGENCY MEDICINE

## 2022-02-06 PROCEDURE — 96360 HYDRATION IV INFUSION INIT: CPT

## 2022-02-06 PROCEDURE — 84484 ASSAY OF TROPONIN QUANT: CPT | Performed by: EMERGENCY MEDICINE

## 2022-02-06 RX ORDER — SODIUM CHLORIDE 9 MG/ML
125 INJECTION, SOLUTION INTRAVENOUS CONTINUOUS
Status: DISCONTINUED | OUTPATIENT
Start: 2022-02-06 | End: 2022-02-06 | Stop reason: HOSPADM

## 2022-02-06 RX ADMIN — SODIUM CHLORIDE 125 ML/HR: 0.9 INJECTION, SOLUTION INTRAVENOUS at 10:22

## 2022-02-06 RX ADMIN — IOHEXOL 100 ML: 350 INJECTION, SOLUTION INTRAVENOUS at 11:53

## 2022-02-06 NOTE — ED PROVIDER NOTES
History  Chief Complaint   Patient presents with    Chest Pain     patient reports chest "soreness" beginning late last night after coughing  recently treated for blood clots in the lungs  HPI  64yo female with cc worsening chest pain during moments of her cough, last evening began, ;dx 7 dec with pe , on eliquis; fx wrist jan 4, statess she cant get appt for months; notes would not have come for the wrist, just wanted to have checked what to do since she was here; notes bump on cast is bothersome, but not tight or painful on it; No hemoptyis, no fevers or sweats or sputum; no other chest pain besides the one of cc which worsens during the process of the second of the cough,    Prior to Admission Medications   Prescriptions Last Dose Informant Patient Reported? Taking? BD Pen Needle Mamta U/F 32G X 4 MM MISC   No No   Sig: USE AS DIRECTED   BD Pen Needle Mamta U/F 32G X 4 MM MISC   No No   Sig: USE AS DIRECTED   Blood Glucose Monitoring Suppl (ONE TOUCH ULTRA 2) w/Device KIT  Self No No   Sig: by Does not apply route 2 (two) times a day   DULoxetine (CYMBALTA) 20 mg capsule   No No   Sig: TAKE ONE CAPSULE BY MOUTH ONCE DAILY   Insulin Pen Needle 33G X 4 MM MISC  Self No No   Sig: by Does not apply route daily   Lancets (OneTouch Delica Plus [id number]) MISC   No No   Sig: TEST 2 TIMES A DAY   Polyethylene Glycol 3350-GRX POWD   No No   Sig: Take by mouth daily at bedtime as needed (constipation)   Semaglutide,0 25 or 0 5MG/DOS, (Ozempic, 0 25 or 0 5 MG/DOSE,) 2 MG/1 5ML SOPN   No No   Sig: Inject 0 5 mg under the skin once a week   Tofacitinib Citrate (XELJANZ XR) 11 MG TB24  Self Yes No   Sig: Take 1 tablet by mouth daily   acetaminophen (TYLENOL) 325 mg tablet   No No   Sig: Take 2 tablets (650 mg total) by mouth every 6 (six) hours as needed for mild pain, headaches or fever   albuterol (2 5 mg/3 mL) 0 083 % nebulizer solution   Yes No   Sig: USE 1 UNIT DOSE IN NEBULIZER EVERY 4 HOURS AS NEEDED  albuterol (PROVENTIL HFA,VENTOLIN HFA) 90 mcg/act inhaler   No No   Sig: Inhale 2 puffs every 6 (six) hours as needed for wheezing   alendronate (FOSAMAX) 70 mg tablet  Self Yes No   Sig: Take 70 mg by mouth every 7 days   Patient not taking: Reported on 2021    apixaban (Eliquis) 5 mg   No No   Sig: Take 1 tablet (5 mg total) by mouth 2 (two) times a day 10 mg PO BID for 5 doses then 5 mg PO BID thereafter   atorvastatin (LIPITOR) 20 mg tablet   No No   Sig: TAKE ONE TABLET BY MOUTH EVERY DAY   ergocalciferol (VITAMIN D2) 50,000 units   No No   Sig: TAKE ONE CAPSULE BY MOUTH WEEKLY   fluticasone-salmeterol (ADVAIR) 250-50 mcg/dose  Self Yes No   Sig: Inhale 1 puff every 12 (twelve) hours    folic acid (FOLVITE) 1 mg tablet   No No   Sig: TAKE ONE TABLET BY MOUTH ONCE DAILY   furosemide (LASIX) 40 mg tablet   No No   Sig: TAKE ONE TABLET BY MOUTH EVERY DAY   glucose blood (OneTouch Ultra) test strip   No No   Sig: Test 3 times daily   Dx: E11 65   insulin degludec Harry Fernando FlexTouch) 100 units/mL injection pen   No No   Sig: Inject 40 Units under the skin daily   levothyroxine 150 mcg tablet   No No   Sig: Take 1 tablet (150 mcg total) by mouth daily   lisinopril (ZESTRIL) 40 mg tablet   No No   Sig: TAKE ONE TABLET BY MOUTH EVERY DAY   methotrexate 2 5 mg tablet   No No   Sig: TAKE 8 TABLETS BY MOUTH EVERY    mometasone (ELOCON) 0 1 % ointment  Self No No   Sig: Apply topically 2 (two) times a day as needed (itching)   nystatin (MYCOSTATIN) cream   No No   Sig: Apply topically 2 (two) times a day   pantoprazole (PROTONIX) 40 mg tablet   No No   Sig: TAKE ONE TABLET BY MOUTH ONCE DAILY   predniSONE 10 mg tablet   No No   Si mg PO Qdaily for 2 days then 20 mg daily for 2 days then 10 mg daily for 2 days then 5 mg daily for 2 days then STOP   sitaGLIPtin (Januvia) 100 mg tablet   No No   Sig: Take 1 tablet (100 mg total) by mouth daily   tiotropium (SPIRIVA RESPIMAT) 2 5 MCG/ACT AERS inhaler  Self No No   Sig: Inhale 1 puff daily at bedtime   vitamin B-12 (VITAMIN B-12) 500 MCG tablet   No No   Sig: Take 1 tablet (500 mcg total) by mouth daily   Patient not taking: Reported on 12/22/2021       Facility-Administered Medications: None       Past Medical History:   Diagnosis Date    Arthritis     Arthritis     Cancer ([facility] )     Candidiasis of skin     Cervical cancer ([facility] )     Chronic respiratory failure with hypoxia and hypercapnia ([facility] ) 4/20/2019    COPD (chronic obstructive pulmonary disease) ([place] [address] )     last assessed 11/21/2016    Current chronic use of systemic steroids     Degenerative arthritis of left knee     last assessed 1/20/2015    Diabetes mellitus ([place] [address] )     Disease of thyroid gland     hypo pill given to decrease thyroid       Fibromyalgia     Fistula of knee     last assessed 9/12/2014    GERD (gastroesophageal reflux disease)     Hyperlipidemia     Hypertension     Medial meniscus tear     of left knee, last assessed 9/12/2014    Migraine     states she has a hx of HA that she calls [employer] but never was dx by neurologist    Obesity     Obesity     Osteoarthritis     Osteopenia     Pneumonia     last assessed 11/16/2016    Psychiatric disorder     anxiety    Rheumatoid arthritis ([place] [address] )     Rheumatoid arthritis ([place] [address] )     Sepsis ([facility] )     last assessed 11/10/2016    Tick bite     last assessed 10/30/2015    Vitamin B12 deficiency     Vitamin D deficiency        Past Surgical History:   Procedure Laterality Date    CATARACT EXTRACTION Bilateral     CHOLECYSTECTOMY      EYE SURGERY      Bilateral Cataracts    HYSTERECTOMY      IR THORACENTESIS  5/31/2019    JOINT REPLACEMENT      left knee    KNEE ARTHROSCOPY      NEUROPLASTY / TRANSPOSITION MEDIAN NERVE AT CARPAL TUNNEL      TUBAL LIGATION         Family History   Problem Relation Age of Onset    Stroke Mother     Asthma Family     Cancer Family     Diabetes Family     Hypertension Family      I have reviewed and agree with the history as documented  E-Cigarette/Vaping    E-Cigarette Use Former User      E-Cigarette/Vaping Substances     Social History     Tobacco Use    Smoking status: Former Smoker     Packs/day: 1 00     Years: 48 00     Pack years: 48 00     Types: Cigarettes, E-Cigarettes     Quit date: 2012     Years since quittin 2    Smokeless tobacco: Never Used    Tobacco comment: per allscripts - "current every day smoker, former smoker"   Vaping Use    Vaping Use: Former   Substance Use Topics    Alcohol use: Never     Alcohol/week: 0 0 standard drinks     Comment: stopped drinking alcohol    Drug use: Never       Review of Systems   Constitutional: Negative for chills and fever  HENT: Negative for ear pain and sore throat  Eyes: Negative for pain and visual disturbance  Respiratory: Positive for cough  Negative for choking, chest tightness, shortness of breath and stridor  Cardiovascular: Positive for chest pain  Negative for palpitations and leg swelling  Gastrointestinal: Negative for abdominal pain and vomiting  Genitourinary: Negative for dysuria and hematuria  Musculoskeletal: Negative for arthralgias and back pain  Skin: Negative for color change and rash  Neurological: Negative for seizures and syncope  All other systems reviewed and are negative  Physical Exam  Physical Exam  Vitals and nursing note reviewed  Constitutional:       General: She is not in acute distress  Appearance: She is well-developed  HENT:      Head: Normocephalic and atraumatic  Eyes:      Extraocular Movements: Extraocular movements intact  Conjunctiva/sclera: Conjunctivae normal    Cardiovascular:      Rate and Rhythm: Normal rate and regular rhythm  Heart sounds: Normal heart sounds  No murmur heard  Pulmonary:      Effort: Pulmonary effort is normal  No respiratory distress  Breath sounds: Examination of the right-middle field reveals rhonchi  Examination of the left-middle field reveals rhonchi  Rhonchi present  Chest:      Chest wall: No tenderness  Abdominal:      Palpations: Abdomen is soft  Tenderness: There is no abdominal tenderness  Musculoskeletal:      Cervical back: Neck supple  Skin:     General: Skin is warm and dry  Capillary Refill: Capillary refill takes less than 2 seconds  Coloration: Skin is not pale  Neurological:      General: No focal deficit present  Mental Status: She is alert and oriented to person, place, and time  Psychiatric:         Behavior: Behavior normal          Vital Signs  ED Triage Vitals [02/06/22 0926]   Temperature Pulse Respirations Blood Pressure SpO2   100 5 °F (38 1 °C) (!) 107 22 (!) 195/73 100 %      Temp Source Heart Rate Source Patient Position - Orthostatic VS BP Location FiO2 (%)   Tympanic Monitor Sitting Left arm --      Pain Score       3           Vitals:    02/06/22 1130 02/06/22 1200 02/06/22 1300 02/06/22 1330   BP: 143/63 146/70 136/78 163/67   Pulse: 96 97 92 96   Patient Position - Orthostatic VS: Sitting Sitting Sitting Sitting         Visual Acuity      ED Medications  Medications   sodium chloride 0 9 % infusion (0 mL/hr Intravenous Stopped 2/6/22 1441)   iohexol (OMNIPAQUE) 350 MG/ML injection (SINGLE-DOSE) 100 mL (100 mL Intravenous Given 2/6/22 1153)       Diagnostic Studies  Results Reviewed     Procedure Component Value Units Date/Time    HS Troponin I 2hr [[id number]]  (Normal) Collected: 02/06/22 1227    Lab Status: Final result Specimen: Blood from Arm, Left Updated: 02/06/22 1255     hs TnI 2hr 3 ng/L      Delta 2hr hsTnI 0 ng/L     [place] draw [[id number]] Collected: 02/06/22 0932    Lab Status: Final result Specimen: Blood from Arm, Left Updated: 02/06/22 1101    Narrative: The following orders were created for panel order [place] draw    Procedure                               Abnormality         Status                     --------- -----------         ------                     Shakeel Henderson Top on [facility][[id number]]                           Final result               Green / Yellow tube on [facility][[id number]]                      Final result                 Please view results for these tests on the individual orders      HS Troponin 0hr (reflex protocol) [[id number]]  (Normal) Collected: 02/06/22 0958    Lab Status: Final result Specimen: Blood from Arm, Left Updated: 02/06/22 1026     hs TnI 0hr 3 ng/L     Comprehensive metabolic panel [[id number]]  (Abnormal) Collected: 02/06/22 0932    Lab Status: Final result Specimen: Blood from Arm, Left Updated: 02/06/22 0958     Sodium 134 mmol/L      Potassium 5 0 mmol/L      Chloride 100 mmol/L      CO2 32 mmol/L      ANION GAP 2 mmol/L      BUN 11 mg/dL      Creatinine 0 65 mg/dL      Glucose 269 mg/dL      Calcium 9 4 mg/dL      Corrected Calcium 10 0 mg/dL      AST 19 U/L      ALT 25 U/L      Alkaline Phosphatase 106 U/L      Total Protein 7 4 g/dL      Albumin 3 2 g/dL      Total Bilirubin 0 51 mg/dL      eGFR 87 ml/min/1 73sq m     Narrative:      [place] guidelines for Chronic Kidney Disease (CKD):     Stage 1 with normal or high GFR (GFR > 90 mL/min/1 73 square meters)    Stage 2 Mild CKD (GFR = 60-89 mL/min/1 73 square meters)    Stage 3A Moderate CKD (GFR = 45-59 mL/min/1 73 square meters)    Stage 3B Moderate CKD (GFR = 30-44 mL/min/1 73 square meters)    Stage 4 Severe CKD (GFR = 15-29 mL/min/1 73 square meters)    Stage 5 End Stage CKD (GFR <15 mL/min/1 73 square meters)  Note: GFR calculation is accurate only with a steady state creatinine    CBC and differential [[id number]]  (Abnormal) Collected: 02/06/22 0932    Lab Status: Final result Specimen: Blood from Arm, Left Updated: 02/06/22 0941     WBC 13 46 Thousand/uL      RBC 3 75 Million/uL      Hemoglobin 11 6 g/dL      Hematocrit 36 4 %      MCV 97 fL      MCH 30 9 pg      MCHC 31 9 g/dL      RDW 14 6 %      MPV 10 4 fL      Platelets 424 Thousands/uL      nRBC 0 /100 WBCs      Neutrophils Relative 79 %      Immat GRANS % 0 %      Lymphocytes Relative 10 %      Monocytes Relative 10 %      Eosinophils Relative 1 %      Basophils Relative 0 %      Neutrophils Absolute 10 45 Thousands/µL      Immature Grans Absolute 0 03 Thousand/uL      Lymphocytes Absolute 1 39 Thousands/µL      Monocytes Absolute 1 35 Thousand/µL      Eosinophils Absolute 0 18 Thousand/µL      Basophils Absolute 0 06 Thousands/µL                  CTA ED chest PE study   Final Result by Jaya Diego MD (02/06 1238)      No new emboli are noted  Stable large bulla in the right upper lobe  Stable loculated left pleural effusion with thickening      Mediastinal nodes are slightly less pronounced probably reactive  Workstation performed: [id number]         XR chest 1 view portable    (Results Pending)              Procedures  Procedures         ED Course             patient notes she feels much better, still hurts during cough, but not bothered by it as much; appears much better; Ct chest no pna or extention of pe;  sats 98 % room air;          Patient requests new splint since current splint is bothering her elbow; not pinching or problem at hand or wrist, but is bulky and bangs things at elbow;     Xray reviewed: shows non displaced fx distal unal and radius; Will replace with another splint;    staff replaces splint; patient states markedly better            SBIRT 22yo+      Most Recent Value   SBIRT (22 yo +)    In order to provide better care to our patients, we are screening all of our patients for alcohol and drug use  Would it be okay to ask you these screening questions? Yes Filed at: 02/06/2022 1146   Initial Alcohol Screen: US AUDIT-C     1  How often do you have a drink containing alcohol? 0 Filed at: 02/06/2022 0928   2   How many drinks containing alcohol do you have on a typical day you are drinking? 0 Filed at: 02/06/2022 0928   3a  Male UNDER 65: How often do you have five or more drinks on one occasion? 0 Filed at: 02/06/2022 0928   3b  FEMALE Any Age, or MALE 65+: How often do you have 4 or more drinks on one occassion? 0 Filed at: 02/06/2022 1930   Audit-C Score 0 Filed at: 02/06/2022 1517   CHANTEL: How many times in the past year have you    Used an illegal drug or used a prescription medication for non-medical reasons? Never Filed at: 02/06/2022 0928                    MDM  Dif dx: pe, chest wall pain, pneumonia; Ct neg for pna ptx extention of PE;   Disposition  Final diagnoses:   Chest wall pain   Wrist fracture     Time reflects when diagnosis was documented in both MDM as applicable and the Disposition within this note     Time User Action Codes Description Comment    2/6/2022  2:22 PM Myrla Collar Add [R07 89] Chest wall pain     2/6/2022  2:22 PM Myrla Collar Add [Y55 193L] Wrist fracture       ED Disposition     ED Disposition Condition Date/Time Comment    Discharge Stable Sun Feb 6, 2022  2:22 PM Fer Abbasi discharge to home/self care              Follow-up Information     Follow up With Specialties Details Why [address], [place] Family Medicine   [address] [place] [id number]            Discharge Medication List as of 2/6/2022  2:44 PM      CONTINUE these medications which have NOT CHANGED    Details   acetaminophen (TYLENOL) 325 mg tablet Take 2 tablets (650 mg total) by mouth every 6 (six) hours as needed for mild pain, headaches or fever, Starting Mon 12/13/2021, No Print      albuterol (2 5 mg/3 mL) 0 083 % nebulizer solution USE 1 UNIT DOSE IN NEBULIZER EVERY 4 HOURS AS NEEDED , Historical Med      albuterol (PROVENTIL HFA,VENTOLIN HFA) 90 mcg/act inhaler Inhale 2 puffs every 6 (six) hours as needed for wheezing, Starting Mon 12/13/2021, No Print      alendronate (FOSAMAX) 70 mg tablet Take 70 mg by mouth every 7 days, Historical Med      apixaban (Eliquis) 5 mg Take 1 tablet (5 mg total) by mouth 2 (two) times a day 10 mg PO BID for 5 doses then 5 mg PO BID thereafter, Starting Mon 1/3/2022, Normal      atorvastatin (LIPITOR) 20 mg tablet TAKE ONE TABLET BY MOUTH EVERY DAY, Normal      !! BD Pen Needle Mamta U/F 32G X 4 MM MISC USE AS DIRECTED, Normal      !! BD Pen Needle Mamta U/F 32G X 4 MM MISC USE AS DIRECTED, Normal      Blood Glucose Monitoring Suppl (ONE TOUCH ULTRA 2) w/Device KIT by Does not apply route 2 (two) times a day, Starting Wed 6/6/2018, Normal      DULoxetine (CYMBALTA) 20 mg capsule TAKE ONE CAPSULE BY MOUTH ONCE DAILY, Normal      ergocalciferol (VITAMIN D2) 50,000 units TAKE ONE CAPSULE BY MOUTH WEEKLY, Normal      fluticasone-salmeterol (ADVAIR) 250-50 mcg/dose Inhale 1 puff every 12 (twelve) hours , Historical Med      folic acid (FOLVITE) 1 mg tablet TAKE ONE TABLET BY MOUTH ONCE DAILY, Normal      furosemide (LASIX) 40 mg tablet TAKE ONE TABLET BY MOUTH EVERY DAY, Normal      glucose blood (OneTouch Ultra) test strip Test 3 times daily   Dx: E11 65, Normal      insulin degludec Dee Erwins FlexTouch) 100 units/mL injection pen Inject 40 Units under the skin daily, Starting Mon 1/3/2022, Normal      !!  Insulin Pen Needle 33G X 4 MM MISC by Does not apply route daily, Starting Tue 5/21/2019, Normal      Lancets (OneTouch Delica Plus [id number]) MISC TEST 2 TIMES A DAY, Normal      levothyroxine 150 mcg tablet Take 1 tablet (150 mcg total) by mouth daily, Starting Tue 6/8/2021, Normal      lisinopril (ZESTRIL) 40 mg tablet TAKE ONE TABLET BY MOUTH EVERY DAY, Normal      methotrexate 2 5 mg tablet TAKE 8 TABLETS BY MOUTH EVERY SUNDAY, Normal      mometasone (ELOCON) 0 1 % ointment Apply topically 2 (two) times a day as needed (itching), Starting Thu 4/25/2019, Normal      nystatin (MYCOSTATIN) cream Apply topically 2 (two) times a day, Starting Mon 6/21/2021, Normal      pantoprazole (PROTONIX) 40 mg tablet TAKE ONE TABLET BY MOUTH ONCE DAILY, Normal      Polyethylene Glycol 3350-GRX POWD Take by mouth daily at bedtime as needed (constipation), Starting Thu 1/16/2020, No Print      predniSONE 10 mg tablet 30 mg PO Qdaily for 2 days then 20 mg daily for 2 days then 10 mg daily for 2 days then 5 mg daily for 2 days then STOP, Normal      Semaglutide,0 25 or 0 5MG/DOS, (Ozempic, 0 25 or 0 5 MG/DOSE,) 2 MG/1 5ML SOPN Inject 0 5 mg under the skin once a week, Starting Tue 2/1/2022, Normal      sitaGLIPtin (Januvia) 100 mg tablet Take 1 tablet (100 mg total) by mouth daily, Starting Tue 2/1/2022, Normal      tiotropium (SPIRIVA RESPIMAT) 2 5 MCG/ACT AERS inhaler Inhale 1 puff daily at bedtime, Starting Fri 12/28/2018, Normal      Tofacitinib Citrate (XELJANZ XR) 11 MG TB24 Take 1 tablet by mouth daily, Historical Med      vitamin B-12 (VITAMIN B-12) 500 MCG tablet Take 1 tablet (500 mcg total) by mouth daily, Starting Thu 1/16/2020, No Print       !! - Potential duplicate medications found  Please discuss with provider  No discharge procedures on file      PDMP Review     None          ED Provider  Electronically Signed by           Tim Estrella MD  02/06/22 5360

## 2022-02-06 NOTE — DISCHARGE INSTRUCTIONS
Call your primary and pulmonary doctor Monday; if severe pain returns and persists, call doctor or return to emergency dept'  Return to emergency department by ambulance if short of breath     Regarding the wrist: call this number of [facility] orthopedic doctors, and tell them where you live and ask for appointment with orthopedic doctor near you [address] [phone]

## 2022-02-07 ENCOUNTER — TELEPHONE (OUTPATIENT)
Dept: INTERNAL MEDICINE CLINIC | Facility: CLINIC | Age: 72
End: 2022-02-07

## 2022-02-07 LAB
ATRIAL RATE: 106 BPM
P AXIS: 72 DEGREES
PR INTERVAL: 160 MS
QRS AXIS: 59 DEGREES
QRSD INTERVAL: 94 MS
QT INTERVAL: 334 MS
QTC INTERVAL: 443 MS
T WAVE AXIS: 41 DEGREES
VENTRICULAR RATE: 106 BPM

## 2022-02-07 PROCEDURE — 93010 ELECTROCARDIOGRAM REPORT: CPT | Performed by: INTERNAL MEDICINE

## 2022-02-07 NOTE — TELEPHONE ENCOUNTER
Received a call from Kitty Garrison stating that she went to the ER yesterday due to a continuing cough since Friday  She is getting up yellow thick mucous, that started Saturday night  Pulse ox 97%    Did speak with Dr Aleta Rivera, she will call in an abx, but wants her to come to be seen tomorrow  Kitty Garrison has no preference in abx

## 2022-02-08 ENCOUNTER — TELEMEDICINE (OUTPATIENT)
Dept: INTERNAL MEDICINE CLINIC | Facility: CLINIC | Age: 72
End: 2022-02-08
Payer: COMMERCIAL

## 2022-02-08 DIAGNOSIS — U07.1 COVID-19: Primary | ICD-10-CM

## 2022-02-08 PROCEDURE — 87811 SARS-COV-2 COVID19 W/OPTIC: CPT | Performed by: FAMILY MEDICINE

## 2022-02-08 PROCEDURE — 99442 PR PHYS/QHP TELEPHONE EVALUATION 11-20 MIN: CPT | Performed by: FAMILY MEDICINE

## 2022-02-08 RX ORDER — ACETAMINOPHEN 325 MG/1
650 TABLET ORAL ONCE AS NEEDED
Status: CANCELLED | OUTPATIENT
Start: 2022-02-08

## 2022-02-08 RX ORDER — ALBUTEROL SULFATE 90 UG/1
3 AEROSOL, METERED RESPIRATORY (INHALATION) ONCE AS NEEDED
Status: CANCELLED | OUTPATIENT
Start: 2022-02-08

## 2022-02-08 RX ORDER — SODIUM CHLORIDE 9 MG/ML
20 INJECTION, SOLUTION INTRAVENOUS ONCE
Status: CANCELLED | OUTPATIENT
Start: 2022-02-08

## 2022-02-08 RX ORDER — ONDANSETRON 2 MG/ML
4 INJECTION INTRAMUSCULAR; INTRAVENOUS ONCE AS NEEDED
Status: CANCELLED | OUTPATIENT
Start: 2022-02-08

## 2022-02-08 NOTE — PROGRESS NOTES
COVID-19 Outpatient Progress Note    Assessment/Plan:    Problem List Items Addressed This Visit        Other    COVID-19 - Primary    Relevant Orders    Poct Covid 19 Rapid Antigen Test (Completed)         Disposition:     Rapid antigen testing was performed and the result is POSITIVE for COVID-19  Patient has COVID-19 infection  Based off CDC guidelines, they were recommended to isolate for 5 days from the date of the positive test  If they remain asymptomatic, isolation may be ended followed by 5 days of wearing a mask when around othes to minimize risk of infecting others  If they have a fever, continue to stay home until fever resolves for at least 24 hours  Fluids  Rest   Tylenol or Motrin if needed  Stay adequately hydrated  Deep breathing exercises recommended  Frequent position changes recommended  Prone positioning recommended  Coronavirus testing completed  Rapid testing completed and was positive  Warning signs and symptoms discussed  Continue with the doxycycline as previously  Stay adequately hydrated  Deep breathing exercises discussed  Monoclonal antibody discussed  This is definitely indicated with her comorbidities  She is willing to proceed with this  Risks and benefits discussed  Continue with nebulizer treatments  Continue with the oxygen  Watch for blood sugar variability  Watch for any worsening  We will be following up with her later this week after the monoclonal antibody infusion  Patient is at increased risk of progressing towards severe COVID-19 due to the following high risk criteria:   - Older age (age >= 72years old)  - Obesity or being overweight  - Diabetes  - Immunosuppressive disease or immunosuppressive treatment  - Cardiovascular disease  - Chronic lung disease    Patient meets criteria for Sotrovimab infusion  They were counseled in regards to risks, benefits, and side effects of this infusion      Sotrovimab is an investigational medicine used to treat mild-to-moderate symptoms of COVID-19 in adults and children (15years of age and older weighing at least 80 pounds (40 kg)) with positive results of direct SARS-CoV-2 viral testing, and who are at high risk of progression to severe COVID-19, including hospitalization or death  Sotrovimab is investigational because it is still being studied  There is limited information about the safety and effectiveness of using sotrovimab to treat people with mild-to-moderate COVID-19  The FDA has authorized the emergency use of sotrovimab for the treatment of COVID-19 under an Emergency Use Authorization (EUA)  How will I receive sotrovimab?    - You will receive 1 dose of sotrovimab  - Sotrovimab will be given through a vein (intravenous or IV infusion) over 30 minutes    Possible side effects of sotrovimab: Allergic reactions can happen during and after infusion with sotrovimab  Possible reactions include: fever, chills, nausea, headache, shortness of breath, low or high blood pressure, rapid or slow heart rate, chest discomfort or pain, weakness, confusion, feeling tired, wheezing, swelling of your lips, face, or throat, rash including hives, itching, muscle aches, dizziness, and sweating  These reactions may be severe or life threatening  Worsening symptoms after treatment: You may experience new or worsening symptoms after infusion, including fever, difficulty breathing, rapid or slow heart rate, tiredness, weakness or confusion  If these occur, contact your healthcare provider or seek immediate medical attention as some of these events have required hospitalization  It is unknown if these events are related to treatment or are due to the progression of COVID19  The side effects of getting any medicine by vein may include brief pain, bleeding, bruising of the skin, soreness, swelling, and possible infection at the infusion site  These are not all the possible side effects of sotrovimab   Not a lot of people have been given sotrovimab  Serious and unexpected side effects may happen  Sotrovimab are still being studied so it is possible that all of the risks are not known at this time  It is possible that sotrovimab could interfere with your body's own ability to fight off a future infection of SARS-CoV-2  Similarly, sotrovimab may reduce your bodys immune response to a vaccine for SARS-CoV-2  Specific studies have not been conducted to address these possible risks  Talk to your healthcare provider if you have any questions  Emergency Use Authorization:    The [facility] FDA has made sotrovimab available under an emergency access mechanism called an EUA  The EUA is supported by a  of Health and Human Service ([facility]) declaration that circumstances exist to justify the emergency use of drugs and biological products during the COVID-19 pandemic  Sotrovimab have not undergone the same type of review as an FDA-approved or cleared product  The FDA may issue an EUA when certain criteria are met, which includes that there are no adequate, approved, and available alternatives  In addition, the FDA decision is based on the totality of scientific evidence available showing that it is reasonable to believe that the product meets certain criteria for safety, performance, and labeling and may be effective in treatment of patients during the COVID-19 pandemic  All of these criteria must be met to allow for the product to be used in the treatment of patients during the COVID-19 pandemic  The EUA for sotrovimab together is in effect for the duration of the COVID-19 declaration justifying emergency use of these products, unless terminated or revoked (after which the product may no longer be used)  What if I am pregnant or breastfeeding? There is no experience treating pregnant women or breastfeeding mothers with sotrovimab   For a mother and unborn baby, the benefit of receiving sotrovimab may be greater than the risk from the treatment  If you are pregnant or breastfeeding, discuss your options and specific situation with your healthcare provider  Regarding COVID-19 Vaccination:    Currently there is no data or safety or efficacy of COVID-19 vaccination in persons who received monoclonal antibodies as part of COVID-19 treatment  Treatment should be deferred for at least 90 days to avoid interference of the treatment with vaccine-induced immune responses (this is based on estimated half-life of therapies and evidence suggesting reinfection is uncommon within 90 days of initial infection)  Full fact sheet document for patients can be found at: [employer]    The patient consents to proceed with sotrovimab infusion  I have spent 20 minutes directly with the patient  Greater than 50% of this time was spent in counseling/coordination of care regarding: diagnostic results, prognosis, risks and benefits of treatment options, instructions for management, patient and family education, importance of treatment compliance, risk factor reductions and impressions  Encounter provider Erika Gonsalves MD    Provider located at [address] [place] [id number]    Recent Visits  Date Type Provider Dept   02/08/22 Telemedicine Erika Gonsalves MD [facility] [place]   02/07/22 Telephone Eunice Alonzo MA [facility] [place]   Showing recent visits within past 7 days and meeting all other requirements  Future Appointments  No visits were found meeting these conditions  Showing future appointments within next 150 days and meeting all other requirements     Subjective:   Shayy Saravia is a 74 y o  female who is concerned about COVID-19  Patient's symptoms include fatigue, malaise, nasal congestion, rhinorrhea, sore throat, cough, shortness of breath, chest tightness, myalgias and headache  Patient denies fever, chills, anosmia, loss of taste, abdominal pain, nausea, vomiting and diarrhea  - Date of symptom onset: 2/6/2022      COVID-19 vaccination status: Fully vaccinated with booster    Exposure:   Contact with a person who is under investigation (PUI) for or who is positive for COVID-19 within the last 14 days?: No    Hospitalized recently for fever and/or lower respiratory symptoms?: No      Currently a healthcare worker that is involved in direct patient care?: No      Works in a special setting where the risk of COVID-19 transmission may be high? (this may include long-term care, correctional and [facility] facilities; homeless shelters; assisted-living facilities and group homes ): No      Resident in a special setting where the risk of COVID-19 transmission may be high? (this may include long-term care, correctional and [facility] facilities; homeless shelters; assisted-living facilities and group homes ): No      She presents for acute visit  Started on Saturday just not feeling herself and slight nasal congestion  Sunday began with significant cough and chest congestion as well as some increased shortness of breath  Was evaluated through the emergency room  Coronavirus testing was not completed  Cardiac workup was completed and was negative through the ER  She contacted our office yesterday and was started on doxycycline an appointment was arranged for today  Feeling more tired  Somewhat more short of breath  Cough has been deep and productive of yellowish sputum at times      Lab Results   Component Value Date    SARSCOV2 Negative 12/07/2021    SARSCOVAG Positive (A) 02/09/2022     Past Medical History:   Diagnosis Date    Arthritis     Arthritis     Cancer [facility]     Candidiasis of skin     Cervical cancer ([facility]     Chronic respiratory failure with hypoxia and hypercapnia ([facility] ) 4/20/2019    COPD (chronic obstructive pulmonary disease) ([facility] )     last assessed 11/21/2016    Current chronic use of systemic steroids     Degenerative arthritis of left knee     last assessed 1/20/2015    Diabetes mellitus ([facility]     Disease of thyroid gland     hypo pill given to decrease thyroid   Fibromyalgia     Fistula of knee     last assessed 9/12/2014    GERD (gastroesophageal reflux disease)     Hyperlipidemia     Hypertension     Medial meniscus tear     of left knee, last assessed 9/12/2014    Migraine     states she has a hx of HA that she calls Truminim but never was dx by neurologist    Obesity     Obesity     Osteoarthritis     Osteopenia     Pneumonia     last assessed 11/16/2016    Psychiatric disorder     anxiety    Rheumatoid arthritis ([facility]     [facility]     Sepsis ([facility] )     last assessed 11/10/2016    Tick bite     last assessed 10/30/2015    Vitamin B12 deficiency     Vitamin D deficiency      Past Surgical History:   Procedure Laterality Date    CATARACT EXTRACTION Bilateral     CHOLECYSTECTOMY      EYE SURGERY      Bilateral Cataracts    HYSTERECTOMY      IR THORACENTESIS  5/31/2019    JOINT REPLACEMENT      left knee    KNEE ARTHROSCOPY      NEUROPLASTY / TRANSPOSITION MEDIAN NERVE AT CARPAL TUNNEL      TUBAL LIGATION       Current Outpatient Medications   Medication Sig Dispense Refill    acetaminophen (TYLENOL) 325 mg tablet Take 2 tablets (650 mg total) by mouth every 6 (six) hours as needed for mild pain, headaches or fever  0    albuterol (2 5 mg/3 mL) 0 083 % nebulizer solution USE 1 UNIT DOSE IN NEBULIZER EVERY 4 HOURS AS NEEDED        albuterol (PROVENTIL HFA,VENTOLIN HFA) 90 mcg/act inhaler Inhale 2 puffs every 6 (six) hours as needed for wheezing  0    alendronate (FOSAMAX) 70 mg tablet Take 70 mg by mouth every 7 days (Patient not taking: Reported on 12/8/2021 )      apixaban (Eliquis) 5 mg Take 1 tablet (5 mg total) by mouth 2 (two) times a day 10 mg PO BID for 5 doses then 5 mg PO BID thereafter 60 tablet 3    atorvastatin (LIPITOR) 20 mg tablet TAKE ONE TABLET BY MOUTH EVERY DAY 90 tablet 0    BD Pen Needle Mamta U/F 32G X 4 MM MISC USE AS DIRECTED 100 each 0    BD Pen Needle Mamta U/F 32G X 4 MM MISC USE AS DIRECTED 100 each 0    Blood Glucose Monitoring Suppl (ONE TOUCH ULTRA 2) w/Device KIT by Does not apply route 2 (two) times a day 1 each 0    doxycycline monohydrate (MONODOX) 100 mg capsule Take 1 capsule (100 mg total) by mouth 2 (two) times a day for 7 days 14 capsule 0    DULoxetine (CYMBALTA) 20 mg capsule TAKE ONE CAPSULE BY MOUTH ONCE DAILY 90 capsule 1    ergocalciferol (VITAMIN D2) 50,000 units TAKE ONE CAPSULE BY MOUTH WEEKLY 12 capsule 0    fluticasone-salmeterol (ADVAIR) 250-50 mcg/dose Inhale 1 puff every 12 (twelve) hours       folic acid (FOLVITE) 1 mg tablet TAKE ONE TABLET BY MOUTH ONCE DAILY 90 tablet 1    furosemide (LASIX) 40 mg tablet TAKE ONE TABLET BY MOUTH EVERY DAY 90 tablet 0    glucose blood (OneTouch Ultra) test strip Test 3 times daily   Dx: E11 65 300 each 3    insulin degludec (Tresiba FlexTouch) 100 units/mL injection pen Inject 40 Units under the skin daily 30 mL 3    Insulin Pen Needle 33G X 4 MM MISC by Does not apply route daily 100 each 5    Lancets (OneTouch Delica Plus EXLHRR57R) MISC TEST 2 TIMES A  each 0    levothyroxine 150 mcg tablet Take 1 tablet (150 mcg total) by mouth daily 90 tablet 3    lisinopril (ZESTRIL) 40 mg tablet TAKE ONE TABLET BY MOUTH EVERY DAY 90 tablet 0    methotrexate 2 5 mg tablet TAKE 8 TABLETS BY MOUTH EVERY SUNDAY 32 tablet 2    mometasone (ELOCON) 0 1 % ointment Apply topically 2 (two) times a day as needed (itching) 45 g 1    nystatin (MYCOSTATIN) cream Apply topically 2 (two) times a day 30 g 2    pantoprazole (PROTONIX) 40 mg tablet TAKE ONE TABLET BY MOUTH ONCE DAILY 90 tablet 1    Polyethylene Glycol 3350-GRX POWD Take by mouth daily at bedtime as needed (constipation) 850 g 0    predniSONE 10 mg tablet 30 mg PO Qdaily for 2 days then 20 mg daily for 2 days then 10 mg daily for 2 days then 5 mg daily for 2 days then STOP 13 tablet 0    Semaglutide,0 25 or 0 5MG/DOS, (Ozempic, 0 25 or 0 5 MG/DOSE,) 2 MG/1 5ML SOPN Inject 0 5 mg under the skin once a week 4 5 mL 1    sitaGLIPtin (Januvia) 100 mg tablet Take 1 tablet (100 mg total) by mouth daily 90 tablet 1    tiotropium (SPIRIVA RESPIMAT) 2 5 MCG/ACT AERS inhaler Inhale 1 puff daily at bedtime 3 Inhaler 3    Tofacitinib Citrate (XELJANZ XR) 11 MG TB24 Take 1 tablet by mouth daily      vitamin B-12 (VITAMIN B-12) 500 MCG tablet Take 1 tablet (500 mcg total) by mouth daily (Patient not taking: Reported on 12/22/2021 )       No current facility-administered medications for this visit  No Known Allergies    Review of Systems   Constitutional: Positive for fatigue  Negative for chills and fever  HENT: Positive for congestion, rhinorrhea and sore throat  Respiratory: Positive for cough, chest tightness and shortness of breath  Gastrointestinal: Negative for abdominal pain, diarrhea, nausea and vomiting  Musculoskeletal: Positive for myalgias  Neurological: Positive for headaches  Objective:    Vitals:    02/08/22 0158   Pulse: 84   Temp: 99 9 °F (37 7 °C)   SpO2: 97%       Physical Exam  Vitals and nursing note reviewed  Constitutional:       Appearance: She is well-developed and well-groomed  She is morbidly obese  HENT:      Head:      Comments: Moist mucous membranes; boggy nasal mucosa with clear nasal discharge; slight posterior pharyngeal erythema  Pulmonary:      Breath sounds: Rhonchi present  No wheezing  Comments: Diffuse rhonchi; no significant wheezing; moving air relatively well  Musculoskeletal:      Cervical back: Neck supple  Lymphadenopathy:      Cervical: No cervical adenopathy  Neurological:      Mental Status: She is alert  Psychiatric:         Behavior: Behavior is cooperative           VIRTUAL VISIT DISCLAIMER    Jesusita Homans Martine Pennington verbally agrees to participate in [employer]  Pt is aware that [employer] could be limited without vital signs or the ability to perform a full hands-on physical Mariana Sepulveda understands she or the provider may request at any time to terminate the video visit and request the patient to seek care or treatment in person

## 2022-02-09 ENCOUNTER — HOSPITAL ENCOUNTER (OUTPATIENT)
Dept: INFUSION CENTER | Facility: HOSPITAL | Age: 72
Discharge: HOME | End: 2022-02-09
Payer: COMMERCIAL

## 2022-02-09 VITALS
RESPIRATION RATE: 20 BRPM | SYSTOLIC BLOOD PRESSURE: 122 MMHG | DIASTOLIC BLOOD PRESSURE: 58 MMHG | OXYGEN SATURATION: 93 % | HEART RATE: 98 BPM | TEMPERATURE: 97.6 F

## 2022-02-09 VITALS — HEART RATE: 84 BPM | TEMPERATURE: 99.9 F | OXYGEN SATURATION: 97 %

## 2022-02-09 DIAGNOSIS — U07.1 COVID-19: Primary | ICD-10-CM

## 2022-02-09 LAB
SARS-COV-2 AG UPPER RESP QL IA: POSITIVE
VALID CONTROL: ABNORMAL

## 2022-02-09 PROCEDURE — M0247 HB SOTROVIMAB INF ADMIN: HCPCS | Performed by: FAMILY MEDICINE

## 2022-02-09 RX ORDER — ALBUTEROL SULFATE 90 UG/1
3 AEROSOL, METERED RESPIRATORY (INHALATION) ONCE AS NEEDED
Status: CANCELLED | OUTPATIENT
Start: 2022-02-09

## 2022-02-09 RX ORDER — ALBUTEROL SULFATE 90 UG/1
3 AEROSOL, METERED RESPIRATORY (INHALATION) ONCE AS NEEDED
Status: DISCONTINUED | OUTPATIENT
Start: 2022-02-09 | End: 2022-02-12 | Stop reason: HOSPADM

## 2022-02-09 RX ORDER — SODIUM CHLORIDE 9 MG/ML
20 INJECTION, SOLUTION INTRAVENOUS ONCE
Status: COMPLETED | OUTPATIENT
Start: 2022-02-09 | End: 2022-02-09

## 2022-02-09 RX ORDER — ONDANSETRON 2 MG/ML
4 INJECTION INTRAMUSCULAR; INTRAVENOUS ONCE AS NEEDED
Status: DISCONTINUED | OUTPATIENT
Start: 2022-02-09 | End: 2022-02-12 | Stop reason: HOSPADM

## 2022-02-09 RX ORDER — ACETAMINOPHEN 325 MG/1
650 TABLET ORAL ONCE AS NEEDED
Status: CANCELLED | OUTPATIENT
Start: 2022-02-09

## 2022-02-09 RX ORDER — ACETAMINOPHEN 325 MG/1
650 TABLET ORAL ONCE AS NEEDED
Status: DISCONTINUED | OUTPATIENT
Start: 2022-02-09 | End: 2022-02-12 | Stop reason: HOSPADM

## 2022-02-09 RX ORDER — ONDANSETRON 2 MG/ML
4 INJECTION INTRAMUSCULAR; INTRAVENOUS ONCE AS NEEDED
Status: CANCELLED | OUTPATIENT
Start: 2022-02-09

## 2022-02-09 RX ORDER — SODIUM CHLORIDE 9 MG/ML
20 INJECTION, SOLUTION INTRAVENOUS ONCE
Status: CANCELLED | OUTPATIENT
Start: 2022-02-09

## 2022-02-09 RX ADMIN — SODIUM CHLORIDE 500 MG: 9 INJECTION, SOLUTION INTRAVENOUS at 09:00

## 2022-02-09 RX ADMIN — SODIUM CHLORIDE 20 ML/HR: 9 INJECTION, SOLUTION INTRAVENOUS at 08:32

## 2022-02-09 NOTE — PROGRESS NOTES
One hour observation completed  Vitals checked and stable  Iv removed  Discharge instructions given  Patient brought out of unit via wheel chair

## 2022-02-09 NOTE — PROGRESS NOTES
Patient checked in for sotrovimab antibody treatment  EUA given and verbal consent obtained  Iv placed with kvo running,  Vitals checked and stable  All questions answered at this time  Call bell within reach

## 2022-02-23 ENCOUNTER — TELEPHONE (OUTPATIENT)
Dept: INTERNAL MEDICINE CLINIC | Facility: CLINIC | Age: 72
End: 2022-02-23

## 2022-02-23 NOTE — TELEPHONE ENCOUNTER
I made an appt for Pebbles Mendieta at [facility] for her wrist   Appt for March 2 @ 1:30 to be there for 1:15 in [facility] pass  I cannot get a hold of Pebbles Mendieta to inform her of the appt

## 2022-03-01 ENCOUNTER — TELEPHONE (OUTPATIENT)
Dept: INTERNAL MEDICINE CLINIC | Facility: CLINIC | Age: 72
End: 2022-03-01

## 2022-03-01 DIAGNOSIS — J44.1 COPD WITH ACUTE EXACERBATION (HCC): Primary | ICD-10-CM

## 2022-03-01 DIAGNOSIS — J96.11 CHRONIC RESPIRATORY FAILURE WITH HYPOXIA (HCC): ICD-10-CM

## 2022-03-01 DIAGNOSIS — B37.49 CANDIDAL UTI (URINARY TRACT INFECTION): ICD-10-CM

## 2022-03-01 DIAGNOSIS — Z99.81 HYPOXEMIA REQUIRING SUPPLEMENTAL OXYGEN: ICD-10-CM

## 2022-03-01 DIAGNOSIS — R26.2 DIFFICULTY WALKING: ICD-10-CM

## 2022-03-01 DIAGNOSIS — B37.2 CANDIDAL DERMATITIS: ICD-10-CM

## 2022-03-01 DIAGNOSIS — R09.02 HYPOXEMIA REQUIRING SUPPLEMENTAL OXYGEN: ICD-10-CM

## 2022-03-01 RX ORDER — NYSTATIN 100000 U/G
CREAM TOPICAL 2 TIMES DAILY
Qty: 30 G | Refills: 5 | Status: SHIPPED | OUTPATIENT
Start: 2022-03-01

## 2022-03-01 NOTE — TELEPHONE ENCOUNTER
Pt called requesting nystatin powder because she is raw under her belly  She is also requesting 3 tabs of diflucan due to having irritation in the vaginal area  She states that she thinks either from high glucose, or from having recent antibiotics

## 2022-03-17 ENCOUNTER — TELEPHONE (OUTPATIENT)
Dept: INTERNAL MEDICINE CLINIC | Facility: CLINIC | Age: 72
End: 2022-03-17

## 2022-03-17 NOTE — TELEPHONE ENCOUNTER
Received a call from [place] stating that her nostrils are raw from the oxygen  She was asking what she can use  Per Dr Madhuri Breaux, she can send in mupirocin and she can put a little bit in each nostril, but she can also try k-y since it is water based, but that will dry quickly    [place] would like to go with the mupirocin  To [facility] [place]

## 2022-03-21 ENCOUNTER — TELEPHONE (OUTPATIENT)
Dept: INTERNAL MEDICINE CLINIC | Facility: CLINIC | Age: 72
End: 2022-03-21

## 2022-03-21 NOTE — TELEPHONE ENCOUNTER
Bruna Smith from [facility] called stating she is trying to get [place] scheduled for a scooter chair eval and cannot get in touch with her  She verified the number    I did give her Shakira's dtr number to try and reach her for the appt

## 2022-04-05 DIAGNOSIS — E55.9 VITAMIN D DEFICIENCY: ICD-10-CM

## 2022-04-05 RX ORDER — ERGOCALCIFEROL 1.25 MG/1
CAPSULE ORAL
Qty: 12 CAPSULE | Refills: 0 | Status: SHIPPED | OUTPATIENT
Start: 2022-04-05 | End: 2022-06-19

## 2022-04-07 ENCOUNTER — APPOINTMENT (EMERGENCY)
Dept: RADIOLOGY | Facility: HOSPITAL | Age: 72
End: 2022-04-07
Payer: COMMERCIAL

## 2022-04-07 ENCOUNTER — TELEPHONE (OUTPATIENT)
Dept: INTERNAL MEDICINE CLINIC | Facility: CLINIC | Age: 72
End: 2022-04-07

## 2022-04-07 ENCOUNTER — HOSPITAL ENCOUNTER (EMERGENCY)
Facility: HOSPITAL | Age: 72
Discharge: HOME | End: 2022-04-07
Attending: EMERGENCY MEDICINE
Payer: COMMERCIAL

## 2022-04-07 VITALS
OXYGEN SATURATION: 99 % | BODY MASS INDEX: 40.8 KG/M2 | RESPIRATION RATE: 18 BRPM | DIASTOLIC BLOOD PRESSURE: 87 MMHG | WEIGHT: 245.15 LBS | TEMPERATURE: 97.8 F | HEART RATE: 86 BPM | SYSTOLIC BLOOD PRESSURE: 134 MMHG

## 2022-04-07 DIAGNOSIS — M65.4 DE QUERVAIN'S TENOSYNOVITIS, LEFT: Primary | ICD-10-CM

## 2022-04-07 PROCEDURE — 73110 X-RAY EXAM OF WRIST: CPT

## 2022-04-07 PROCEDURE — 99283 EMERGENCY DEPT VISIT LOW MDM: CPT

## 2022-04-07 PROCEDURE — 73140 X-RAY EXAM OF FINGER(S): CPT

## 2022-04-07 PROCEDURE — 99284 EMERGENCY DEPT VISIT MOD MDM: CPT | Performed by: EMERGENCY MEDICINE

## 2022-04-07 NOTE — TELEPHONE ENCOUNTER
Patient just called asking if you still want her on the fosamax because she hasn't taken it in a long time

## 2022-04-07 NOTE — ED NOTES
Brace to [address] applied, safety/care with education expressed, pt demonstrated understanding and is in agreement with treatment       Zoya Hopkins RN  04/07/22 0529

## 2022-04-07 NOTE — ED PROVIDER NOTES
History  Chief Complaint   Patient presents with    Hand Pain     left hand pain since monday, stated woke tonight and it was red and painfult to touch  Is on elequis, has not taken in 1 week  This is a 13-year-old female with multiple medical issues who presents with left hand pain starting on Monday  Denies any inciting event  Denies any injury  Patient states that she has been using her left hand a lot more recently due to chronic right arm pain  Pain is made worse with ranging her left thumb  Prior to Admission Medications   Prescriptions Last Dose Informant Patient Reported? Taking? BD Pen Needle Mamta U/F 32G X 4 MM MISC   No No   Sig: USE AS DIRECTED   BD Pen Needle Mamta U/F 32G X 4 MM MISC   No No   Sig: USE AS DIRECTED   Blood Glucose Monitoring Suppl (ONE TOUCH ULTRA 2) w/Device KIT  Self No No   Sig: by Does not apply route 2 (two) times a day   DULoxetine (CYMBALTA) 20 mg capsule   No No   Sig: TAKE ONE CAPSULE BY MOUTH ONCE DAILY   Insulin Pen Needle 33G X 4 MM MISC  Self No No   Sig: by Does not apply route daily   Lancets (OneTouch Delica Plus [id number]) MISC   No No   Sig: TEST 2 TIMES A DAY   Polyethylene Glycol 3350-GRX POWD   No No   Sig: Take by mouth daily at bedtime as needed (constipation)   Semaglutide,0 25 or 0 5MG/DOS, (Ozempic, 0 25 or 0 5 MG/DOSE,) 2 MG/1 5ML SOPN   No No   Sig: Inject 0 5 mg under the skin once a week   Tofacitinib Citrate (XELJANZ XR) 11 MG TB24  Self Yes No   Sig: Take 1 tablet by mouth daily   acetaminophen (TYLENOL) 325 mg tablet   No No   Sig: Take 2 tablets (650 mg total) by mouth every 6 (six) hours as needed for mild pain, headaches or fever   albuterol (2 5 mg/3 mL) 0 083 % nebulizer solution   Yes No   Sig: USE 1 UNIT DOSE IN NEBULIZER EVERY 4 HOURS AS NEEDED     albuterol (PROVENTIL HFA,VENTOLIN HFA) 90 mcg/act inhaler   No No   Sig: Inhale 2 puffs every 6 (six) hours as needed for wheezing   alendronate (FOSAMAX) 70 mg tablet  Self Yes No Sig: Take 70 mg by mouth every 7 days     apixaban (Eliquis) 5 mg   No No   Sig: Take 1 tablet (5 mg total) by mouth 2 (two) times a day 10 mg PO BID for 5 doses then 5 mg PO BID thereafter   atorvastatin (LIPITOR) 20 mg tablet   No No   Sig: TAKE ONE TABLET BY MOUTH EVERY DAY   ergocalciferol (VITAMIN D2) 50,000 units   No No   Sig: TAKE ONE CAPSULE BY MOUTH WEEKLY   fluticasone-salmeterol (ADVAIR) 250-50 mcg/dose  Self Yes No   Sig: Inhale 1 puff every 12 (twelve) hours    folic acid (FOLVITE) 1 mg tablet   No No   Sig: TAKE ONE TABLET BY MOUTH ONCE DAILY   furosemide (LASIX) 40 mg tablet   No No   Sig: TAKE ONE TABLET BY MOUTH EVERY DAY   glucose blood (OneTouch Ultra) test strip   No No   Sig: Test 3 times daily   Dx: E11 65   insulin degludec Renée Poser FlexTouch) 100 units/mL injection pen   No No   Sig: Inject 40 Units under the skin daily   levothyroxine 150 mcg tablet   No No   Sig: Take 1 tablet (150 mcg total) by mouth daily   lisinopril (ZESTRIL) 40 mg tablet   No No   Sig: TAKE ONE TABLET BY MOUTH EVERY DAY   methotrexate 2 5 mg tablet   No No   Sig: TAKE 8 TABLETS BY MOUTH EVERY    mometasone (ELOCON) 0 1 % ointment  Self No No   Sig: Apply topically 2 (two) times a day as needed (itching)   mupirocin (BACTROBAN) 2 % ointment   No No   Sig: Apply topically 2 (two) times a day   nystatin (MYCOSTATIN) cream   No No   Sig: Apply topically 2 (two) times a day   nystatin (MYCOSTATIN) powder   No No   Sig: Apply topically 3 (three) times a day as needed (rash/irritation)   pantoprazole (PROTONIX) 40 mg tablet   No No   Sig: TAKE ONE TABLET BY MOUTH ONCE DAILY   predniSONE 10 mg tablet   No No   Si mg PO Qdaily for 2 days then 20 mg daily for 2 days then 10 mg daily for 2 days then 5 mg daily for 2 days then STOP   sitaGLIPtin (Januvia) 100 mg tablet   No No   Sig: Take 1 tablet (100 mg total) by mouth daily   tiotropium (SPIRIVA RESPIMAT) 2 5 MCG/ACT AERS inhaler  Self No No   Sig: Inhale 1 puff daily at bedtime   vitamin B-12 (VITAMIN B-12) 500 MCG tablet   No No   Sig: Take 1 tablet (500 mcg total) by mouth daily      Facility-Administered Medications: None       Past Medical History:   Diagnosis Date    Arthritis     Arthritis     Cancer ([place] [address] )     Candidiasis of skin     Cervical cancer ([place] [address] )     Chronic respiratory failure with hypoxia and hypercapnia ([place] [address] ) 4/20/2019    COPD (chronic obstructive pulmonary disease) ([place] [address] )     last assessed 11/21/2016    Current chronic use of systemic steroids     Degenerative arthritis of left knee     last assessed 1/20/2015    Diabetes mellitus ([place] [address] )     Disease of thyroid gland     hypo pill given to decrease thyroid   Fibromyalgia     Fistula of knee     last assessed 9/12/2014    GERD (gastroesophageal reflux disease)     Hyperlipidemia     Hypertension     Medial meniscus tear     of left knee, last assessed 9/12/2014    Migraine     states she has a hx of HA that she calls [employer] but never was dx by neurologist    Obesity     Obesity     Osteoarthritis     Osteopenia     Pneumonia     last assessed 11/16/2016    Psychiatric disorder     anxiety    Rheumatoid arthritis ([place] [address] )     Rheumatoid arthritis ([place] [address] )     Sepsis ([place] [address] )     last assessed 11/10/2016    Tick bite     last assessed 10/30/2015    Vitamin B12 deficiency     Vitamin D deficiency        Past Surgical History:   Procedure Laterality Date    CATARACT EXTRACTION Bilateral     CHOLECYSTECTOMY      EYE SURGERY      Bilateral Cataracts    HYSTERECTOMY      IR THORACENTESIS  5/31/2019    JOINT REPLACEMENT      left knee    KNEE ARTHROSCOPY      NEUROPLASTY / TRANSPOSITION MEDIAN NERVE AT CARPAL TUNNEL      TUBAL LIGATION         Family History   Problem Relation Age of Onset    Stroke Mother     Asthma Family     Cancer Family     Diabetes Family     Hypertension Family      I have reviewed and agree with the history as documented      E-Cigarette/Vaping    E-Cigarette Use Former User      E-Cigarette/Vaping Substances     Social History     Tobacco Use    Smoking status: Former Smoker     Packs/day: 1 00     Years: 48 00     Pack years: 48 00     Types: Cigarettes, E-Cigarettes     Quit date: 2012     Years since quittin 3    Smokeless tobacco: Never Used    Tobacco comment: per allscripts - "current every day smoker, former smoker"   Vaping Use    Vaping Use: Former   Substance Use Topics    Alcohol use: Never     Alcohol/week: 0 0 standard drinks     Comment: stopped drinking alcohol    Drug use: Never       Review of Systems   Constitutional: Negative for chills and fever  HENT: Negative for congestion, rhinorrhea, sore throat and trouble swallowing  Respiratory: Negative for cough, chest tightness, shortness of breath and wheezing  Cardiovascular: Negative for chest pain and palpitations  Gastrointestinal: Negative for abdominal pain, blood in stool, diarrhea, nausea and vomiting  Musculoskeletal: Positive for arthralgias  Negative for back pain and neck pain  All other systems reviewed and are negative  Physical Exam  Physical Exam  Constitutional:       Appearance: Normal appearance  She is well-developed  She is morbidly obese  She is not ill-appearing or toxic-appearing  Comments: Chronically ill-appearing  HENT:      Head: Normocephalic  Mouth/Throat:      Pharynx: Uvula midline  Tonsils: No tonsillar exudate  Eyes:      General: Lids are normal       Conjunctiva/sclera: Conjunctivae normal       Pupils: Pupils are equal, round, and reactive to light  Cardiovascular:      Rate and Rhythm: Normal rate and regular rhythm  Pulmonary:      Effort: Pulmonary effort is normal  No tachypnea  Abdominal:      General: There is no distension  Palpations: Abdomen is soft  Abdomen is not rigid  Musculoskeletal:      Comments: No evidence of trauma, effusion, erythema to the left wrist/thumb  No warmth  Pain with passive flexion and extension of the left thumb  Tenderness along the tendon at the base of the left thumb  Neurological:      Mental Status: She is alert  Psychiatric:         Speech: Speech normal          Behavior: Behavior normal  Behavior is cooperative  Vital Signs  ED Triage Vitals [04/07/22 0057]   Temperature Pulse Respirations Blood Pressure SpO2   97 8 °F (36 6 °C) 86 18 134/87 99 %      Temp Source Heart Rate Source Patient Position - Orthostatic VS BP Location FiO2 (%)   Tympanic Monitor Sitting Right arm --      Pain Score       8           Vitals:    04/07/22 0057   BP: 134/87   Pulse: 86   Patient Position - Orthostatic VS: Sitting         Visual Acuity      ED Medications  Medications - No data to display    Diagnostic Studies  Results Reviewed     None                 XR wrist 3+ views LEFT    (Results Pending)   XR thumb first digit-min 2 views LEFT    (Results Pending)              Procedures  Procedures         ED Course  ED Course as of 04/07/22 0147   Thu Apr 07, 2022   0146 X-ray wrist and thumb interpreted by myself  No acute osseous abnormality in the wrist or thumb  MDM  Number of Diagnoses or Management Options  Diagnosis management comments: Likely de Quervain tenosynovitis  Will check x-rays  Disposition  Final diagnoses:   De Quervain's tenosynovitis, left     Time reflects when diagnosis was documented in both MDM as applicable and the Disposition within this note     Time User Action Codes Description Comment    4/7/2022  1:44 AM Roscoe Lovett Add [M65 4] De Quervain's tenosynovitis, left       ED Disposition     ED Disposition Condition Date/Time Comment    Discharge Stable u Apr 7, 2022  1:44 AM Elif Lira discharge to home/self care              Follow-up Information     Follow up With Specialties Details Why Contact Info Additional [address] [facility] [address] [facility] Schedule an appointment as soon as possible for a visit   [address] [address]  [address], [place]    [facility] Emergency Medicine Go to  If symptoms worsen [facility] [phone]  [address] [facility] Emergency Department, [address] [employer] [place], [place], [address]          Patient's Medications   Discharge Prescriptions    No medications on file       No discharge procedures on file      PDMP Review     None          ED Provider  Electronically Signed by           Miryam Prado MD  04/07/22 8641

## 2022-04-15 DIAGNOSIS — E11.65 TYPE 2 DIABETES MELLITUS WITH HYPERGLYCEMIA, WITH LONG-TERM CURRENT USE OF INSULIN (HCC): Primary | ICD-10-CM

## 2022-04-15 DIAGNOSIS — Z79.4 TYPE 2 DIABETES MELLITUS WITH HYPERGLYCEMIA, WITH LONG-TERM CURRENT USE OF INSULIN (HCC): Primary | ICD-10-CM

## 2022-04-15 RX ORDER — PEN NEEDLE, DIABETIC 32GX 5/32"
NEEDLE, DISPOSABLE MISCELLANEOUS
Qty: 100 EACH | Refills: 0 | Status: SHIPPED | OUTPATIENT
Start: 2022-04-15

## 2022-04-29 DIAGNOSIS — M06.9 RHEUMATOID ARTHRITIS (HCC): ICD-10-CM

## 2022-04-29 RX ORDER — DULOXETIN HYDROCHLORIDE 20 MG/1
CAPSULE, DELAYED RELEASE ORAL
Qty: 90 CAPSULE | Refills: 0 | Status: SHIPPED | OUTPATIENT
Start: 2022-04-29 | End: 2022-07-05

## 2022-05-06 DIAGNOSIS — E11.9 TYPE 2 DIABETES MELLITUS WITHOUT COMPLICATION, WITHOUT LONG-TERM CURRENT USE OF INSULIN (HCC): ICD-10-CM

## 2022-05-06 DIAGNOSIS — Z79.4 TYPE 2 DIABETES MELLITUS WITH HYPERGLYCEMIA, WITH LONG-TERM CURRENT USE OF INSULIN (HCC): ICD-10-CM

## 2022-05-06 DIAGNOSIS — E11.65 TYPE 2 DIABETES MELLITUS WITH HYPERGLYCEMIA, WITH LONG-TERM CURRENT USE OF INSULIN (HCC): ICD-10-CM

## 2022-05-06 RX ORDER — BLOOD SUGAR DIAGNOSTIC
STRIP MISCELLANEOUS
Qty: 300 EACH | Refills: 3 | Status: SHIPPED | OUTPATIENT
Start: 2022-05-06 | End: 2022-05-20 | Stop reason: SDUPTHER

## 2022-05-06 RX ORDER — LANCETS 33 GAUGE
EACH MISCELLANEOUS
Qty: 200 EACH | Refills: 0 | Status: SHIPPED | OUTPATIENT
Start: 2022-05-06

## 2022-05-20 DIAGNOSIS — E11.65 TYPE 2 DIABETES MELLITUS WITH HYPERGLYCEMIA, WITH LONG-TERM CURRENT USE OF INSULIN (HCC): ICD-10-CM

## 2022-05-20 DIAGNOSIS — Z79.4 TYPE 2 DIABETES MELLITUS WITH HYPERGLYCEMIA, WITH LONG-TERM CURRENT USE OF INSULIN (HCC): ICD-10-CM

## 2022-05-20 RX ORDER — BLOOD SUGAR DIAGNOSTIC
STRIP MISCELLANEOUS
Qty: 300 EACH | Refills: 3 | Status: SHIPPED | OUTPATIENT
Start: 2022-05-20

## 2022-05-20 NOTE — TELEPHONE ENCOUNTER
Gregory Shi called to get her test strips refilled  She stated that she is going to run out this weekend

## 2022-06-08 ENCOUNTER — TELEPHONE (OUTPATIENT)
Dept: INTERNAL MEDICINE CLINIC | Facility: CLINIC | Age: 72
End: 2022-06-08

## 2022-06-08 NOTE — TELEPHONE ENCOUNTER
Xiomara Shukla called and left a message for someone to call her back  I called Xiomara Shukla back and she stated she was calling to see if Dr Mehul Castro filled out the form for the waiver program  I asked Dr Mehul Castro and she stated that she did fill it out  I check up front with Galen Stern and Sil[employer] and they will double check if it was sent over  I informed Xiomara Shukla of this and she stated that she understood

## 2022-06-17 DIAGNOSIS — E55.9 VITAMIN D DEFICIENCY: ICD-10-CM

## 2022-06-19 RX ORDER — ERGOCALCIFEROL 1.25 MG/1
CAPSULE ORAL
Qty: 12 CAPSULE | Refills: 0 | Status: SHIPPED | OUTPATIENT
Start: 2022-06-19

## 2022-07-06 ENCOUNTER — OFFICE VISIT (OUTPATIENT)
Dept: INTERNAL MEDICINE CLINIC | Facility: CLINIC | Age: 72
End: 2022-07-06
Payer: COMMERCIAL

## 2022-07-06 VITALS
OXYGEN SATURATION: 94 % | BODY MASS INDEX: 37.87 KG/M2 | SYSTOLIC BLOOD PRESSURE: 126 MMHG | DIASTOLIC BLOOD PRESSURE: 82 MMHG | HEIGHT: 65 IN | HEART RATE: 114 BPM | WEIGHT: 227.3 LBS | TEMPERATURE: 97.5 F

## 2022-07-06 DIAGNOSIS — E55.9 VITAMIN D DEFICIENCY: ICD-10-CM

## 2022-07-06 DIAGNOSIS — E11.65 TYPE 2 DIABETES MELLITUS WITH HYPERGLYCEMIA, WITH LONG-TERM CURRENT USE OF INSULIN (HCC): Primary | ICD-10-CM

## 2022-07-06 DIAGNOSIS — M06.9 RHEUMATOID ARTHRITIS, INVOLVING UNSPECIFIED SITE, UNSPECIFIED WHETHER RHEUMATOID FACTOR PRESENT (HCC): ICD-10-CM

## 2022-07-06 DIAGNOSIS — M06.9 RHEUMATOID ARTHRITIS (HCC): ICD-10-CM

## 2022-07-06 DIAGNOSIS — I50.32 CHRONIC DIASTOLIC (CONGESTIVE) HEART FAILURE (HCC): ICD-10-CM

## 2022-07-06 DIAGNOSIS — E03.9 HYPOTHYROIDISM, UNSPECIFIED TYPE: ICD-10-CM

## 2022-07-06 DIAGNOSIS — J96.11 CHRONIC RESPIRATORY FAILURE WITH HYPOXIA (HCC): ICD-10-CM

## 2022-07-06 DIAGNOSIS — Z79.4 TYPE 2 DIABETES MELLITUS WITH HYPERGLYCEMIA, WITH LONG-TERM CURRENT USE OF INSULIN (HCC): Primary | ICD-10-CM

## 2022-07-06 DIAGNOSIS — I26.99 ACUTE PULMONARY EMBOLISM, UNSPECIFIED PULMONARY EMBOLISM TYPE, UNSPECIFIED WHETHER ACUTE COR PULMONALE PRESENT (HCC): ICD-10-CM

## 2022-07-06 DIAGNOSIS — E53.8 VITAMIN B12 DEFICIENCY: ICD-10-CM

## 2022-07-06 DIAGNOSIS — Z12.11 SCREENING FOR COLON CANCER: ICD-10-CM

## 2022-07-06 DIAGNOSIS — E78.2 MIXED HYPERLIPIDEMIA: ICD-10-CM

## 2022-07-06 DIAGNOSIS — Z12.31 VISIT FOR SCREENING MAMMOGRAM: ICD-10-CM

## 2022-07-06 DIAGNOSIS — F51.01 PRIMARY INSOMNIA: ICD-10-CM

## 2022-07-06 PROBLEM — Z99.81 DEPENDENCE ON CONTINUOUS SUPPLEMENTAL OXYGEN: Status: ACTIVE | Noted: 2022-03-02

## 2022-07-06 PROBLEM — J21.0 RSV (ACUTE BRONCHIOLITIS DUE TO RESPIRATORY SYNCYTIAL VIRUS): Status: RESOLVED | Noted: 2021-12-08 | Resolved: 2022-07-06

## 2022-07-06 PROBLEM — IMO0002 EXCESSIVE ANTICOAGULATION: Status: ACTIVE | Noted: 2022-03-02

## 2022-07-06 PROCEDURE — 99214 OFFICE O/P EST MOD 30 MIN: CPT | Performed by: FAMILY MEDICINE

## 2022-07-06 RX ORDER — DULOXETIN HYDROCHLORIDE 60 MG/1
60 CAPSULE, DELAYED RELEASE ORAL DAILY
Qty: 90 CAPSULE | Refills: 1 | Status: SHIPPED | OUTPATIENT
Start: 2022-07-06

## 2022-07-07 RX ORDER — MIRTAZAPINE 15 MG/1
15 TABLET, FILM COATED ORAL
Qty: 90 TABLET | Refills: 3 | Status: SHIPPED | OUTPATIENT
Start: 2022-07-07
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
## 2022-07-08 ENCOUNTER — TELEPHONE (OUTPATIENT)
Dept: INTERNAL MEDICINE CLINIC | Facility: CLINIC | Age: 72
End: 2022-07-08

## 2022-07-08 NOTE — TELEPHONE ENCOUNTER
Shiloh Vega called in that she needs a new script for a motorized scooter  She stated that she called the insurance and that is what she was told  She said to send it to the [facility] location again

## 2022-07-11 LAB
LEFT EYE DIABETIC RETINOPATHY: NORMAL
RIGHT EYE DIABETIC RETINOPATHY: NORMAL
SEVERITY (EYE EXAM): NORMAL

## 2022-07-12 ENCOUNTER — HOSPITAL ENCOUNTER (OUTPATIENT)
Dept: MAMMOGRAPHY | Facility: HOSPITAL | Age: 72
Discharge: HOME | End: 2022-07-12
Payer: COMMERCIAL

## 2022-07-12 ENCOUNTER — APPOINTMENT (OUTPATIENT)
Dept: LAB | Facility: HOSPITAL | Age: 72
End: 2022-07-12
Payer: COMMERCIAL

## 2022-07-12 ENCOUNTER — TELEPHONE (OUTPATIENT)
Dept: INTERNAL MEDICINE CLINIC | Facility: CLINIC | Age: 72
End: 2022-07-12

## 2022-07-12 VITALS — BODY MASS INDEX: 37.83 KG/M2 | HEIGHT: 65 IN | WEIGHT: 227.07 LBS

## 2022-07-12 DIAGNOSIS — Z12.31 ENCOUNTER FOR SCREENING MAMMOGRAM FOR MALIGNANT NEOPLASM OF BREAST: ICD-10-CM

## 2022-07-12 DIAGNOSIS — Z12.31 VISIT FOR SCREENING MAMMOGRAM: ICD-10-CM

## 2022-07-12 DIAGNOSIS — E03.9 HYPOTHYROIDISM, UNSPECIFIED TYPE: ICD-10-CM

## 2022-07-12 DIAGNOSIS — M06.9 RHEUMATOID ARTHRITIS, INVOLVING UNSPECIFIED SITE, UNSPECIFIED WHETHER RHEUMATOID FACTOR PRESENT (HCC): ICD-10-CM

## 2022-07-12 DIAGNOSIS — Z79.4 TYPE 2 DIABETES MELLITUS WITH HYPERGLYCEMIA, WITH LONG-TERM CURRENT USE OF INSULIN (HCC): ICD-10-CM

## 2022-07-12 DIAGNOSIS — J96.11 CHRONIC RESPIRATORY FAILURE WITH HYPOXIA (HCC): ICD-10-CM

## 2022-07-12 DIAGNOSIS — M06.9 RHEUMATOID ARTHRITIS (HCC): ICD-10-CM

## 2022-07-12 DIAGNOSIS — E78.2 MIXED HYPERLIPIDEMIA: ICD-10-CM

## 2022-07-12 DIAGNOSIS — E55.9 VITAMIN D DEFICIENCY: ICD-10-CM

## 2022-07-12 DIAGNOSIS — E53.8 VITAMIN B12 DEFICIENCY: ICD-10-CM

## 2022-07-12 DIAGNOSIS — E11.65 TYPE 2 DIABETES MELLITUS WITH HYPERGLYCEMIA, WITH LONG-TERM CURRENT USE OF INSULIN (HCC): ICD-10-CM

## 2022-07-12 LAB
25(OH)D3 SERPL-MCNC: 69.7 NG/ML (ref 30–100)
ALBUMIN SERPL BCP-MCNC: 3.2 G/DL (ref 3.5–5)
ALP SERPL-CCNC: 75 U/L (ref 46–116)
ALT SERPL W P-5'-P-CCNC: 36 U/L (ref 12–78)
ANION GAP SERPL CALCULATED.3IONS-SCNC: 6 MMOL/L (ref 4–13)
AST SERPL W P-5'-P-CCNC: 21 U/L (ref 5–45)
BASOPHILS # BLD AUTO: 0.03 THOUSANDS/ΜL (ref 0–0.1)
BASOPHILS NFR BLD AUTO: 1 % (ref 0–1)
BILIRUB SERPL-MCNC: 0.5 MG/DL (ref 0.2–1)
BUN SERPL-MCNC: 14 MG/DL (ref 5–25)
CALCIUM ALBUM COR SERPL-MCNC: 9.6 MG/DL (ref 8.3–10.1)
CALCIUM SERPL-MCNC: 9 MG/DL (ref 8.3–10.1)
CHLORIDE SERPL-SCNC: 99 MMOL/L (ref 100–108)
CHOLEST SERPL-MCNC: 161 MG/DL
CO2 SERPL-SCNC: 31 MMOL/L (ref 21–32)
CREAT SERPL-MCNC: 0.71 MG/DL (ref 0.6–1.3)
EOSINOPHIL # BLD AUTO: 0.19 THOUSAND/ΜL (ref 0–0.61)
EOSINOPHIL NFR BLD AUTO: 4 % (ref 0–6)
ERYTHROCYTE [DISTWIDTH] IN BLOOD BY AUTOMATED COUNT: 16.1 % (ref 11.6–15.1)
EST. AVERAGE GLUCOSE BLD GHB EST-MCNC: 157 MG/DL
GFR SERPL CREATININE-BSD FRML MDRD: 84 ML/MIN/1.73SQ M
GLUCOSE P FAST SERPL-MCNC: 138 MG/DL (ref 65–99)
HBA1C MFR BLD: 7.1 %
HCT VFR BLD AUTO: 39.2 % (ref 34.8–46.1)
HDLC SERPL-MCNC: 55 MG/DL
HGB BLD-MCNC: 12.4 G/DL (ref 11.5–15.4)
IMM GRANULOCYTES # BLD AUTO: 0.02 THOUSAND/UL (ref 0–0.2)
IMM GRANULOCYTES NFR BLD AUTO: 0 % (ref 0–2)
LDLC SERPL CALC-MCNC: 87 MG/DL (ref 0–100)
LYMPHOCYTES # BLD AUTO: 1.86 THOUSANDS/ΜL (ref 0.6–4.47)
LYMPHOCYTES NFR BLD AUTO: 35 % (ref 14–44)
MCH RBC QN AUTO: 29.4 PG (ref 26.8–34.3)
MCHC RBC AUTO-ENTMCNC: 31.6 G/DL (ref 31.4–37.4)
MCV RBC AUTO: 93 FL (ref 82–98)
MONOCYTES # BLD AUTO: 0.62 THOUSAND/ΜL (ref 0.17–1.22)
MONOCYTES NFR BLD AUTO: 12 % (ref 4–12)
NEUTROPHILS # BLD AUTO: 2.55 THOUSANDS/ΜL (ref 1.85–7.62)
NEUTS SEG NFR BLD AUTO: 48 % (ref 43–75)
NONHDLC SERPL-MCNC: 106 MG/DL
NRBC BLD AUTO-RTO: 0 /100 WBCS
PLATELET # BLD AUTO: 308 THOUSANDS/UL (ref 149–390)
PMV BLD AUTO: 10.2 FL (ref 8.9–12.7)
POTASSIUM SERPL-SCNC: 3.9 MMOL/L (ref 3.5–5.3)
PROT SERPL-MCNC: 7.5 G/DL (ref 6.4–8.2)
RBC # BLD AUTO: 4.22 MILLION/UL (ref 3.81–5.12)
SODIUM SERPL-SCNC: 136 MMOL/L (ref 136–145)
TRIGL SERPL-MCNC: 93 MG/DL
TSH SERPL DL<=0.05 MIU/L-ACNC: 0.03 UIU/ML (ref 0.45–4.5)
VIT B12 SERPL-MCNC: 522 PG/ML (ref 100–900)
WBC # BLD AUTO: 5.27 THOUSAND/UL (ref 4.31–10.16)

## 2022-07-12 PROCEDURE — 80061 LIPID PANEL: CPT

## 2022-07-12 PROCEDURE — 82607 VITAMIN B-12: CPT

## 2022-07-12 PROCEDURE — 77067 SCR MAMMO BI INCL CAD: CPT

## 2022-07-12 PROCEDURE — 77063 BREAST TOMOSYNTHESIS BI: CPT

## 2022-07-12 PROCEDURE — 84443 ASSAY THYROID STIM HORMONE: CPT

## 2022-07-12 PROCEDURE — 36415 COLL VENOUS BLD VENIPUNCTURE: CPT

## 2022-07-12 PROCEDURE — 85025 COMPLETE CBC W/AUTO DIFF WBC: CPT

## 2022-07-12 PROCEDURE — 83036 HEMOGLOBIN GLYCOSYLATED A1C: CPT

## 2022-07-12 PROCEDURE — 80053 COMPREHEN METABOLIC PANEL: CPT

## 2022-07-12 PROCEDURE — 82306 VITAMIN D 25 HYDROXY: CPT

## 2022-07-22 DIAGNOSIS — Z79.4 TYPE 2 DIABETES MELLITUS WITH HYPERGLYCEMIA, WITH LONG-TERM CURRENT USE OF INSULIN (HCC): ICD-10-CM

## 2022-07-22 DIAGNOSIS — E11.65 TYPE 2 DIABETES MELLITUS WITH HYPERGLYCEMIA, WITH LONG-TERM CURRENT USE OF INSULIN (HCC): ICD-10-CM

## 2022-07-22 RX ORDER — SEMAGLUTIDE 1.34 MG/ML
INJECTION, SOLUTION SUBCUTANEOUS
Qty: 1.5 ML | Refills: 0 | Status: SHIPPED | OUTPATIENT
Start: 2022-07-22 | End: 2022-10-11

## 2022-07-23 DIAGNOSIS — M06.9 RHEUMATOID ARTHRITIS INVOLVING MULTIPLE SITES (HCC): ICD-10-CM

## 2022-07-23 DIAGNOSIS — K21.9 GASTROESOPHAGEAL REFLUX DISEASE: ICD-10-CM

## 2022-07-23 RX ORDER — PANTOPRAZOLE SODIUM 40 MG/1
TABLET, DELAYED RELEASE ORAL
Qty: 90 TABLET | Refills: 0 | Status: SHIPPED | OUTPATIENT
Start: 2022-07-23 | End: 2022-07-25

## 2022-07-23 RX ORDER — FOLIC ACID 1 MG/1
TABLET ORAL
Qty: 90 TABLET | Refills: 0 | Status: SHIPPED | OUTPATIENT
Start: 2022-07-23 | End: 2022-07-25

## 2022-07-25 DIAGNOSIS — M06.9 RHEUMATOID ARTHRITIS INVOLVING MULTIPLE SITES (HCC): ICD-10-CM

## 2022-07-25 DIAGNOSIS — K21.9 GASTROESOPHAGEAL REFLUX DISEASE: ICD-10-CM

## 2022-07-25 RX ORDER — PANTOPRAZOLE SODIUM 40 MG/1
TABLET, DELAYED RELEASE ORAL
Qty: 90 TABLET | Refills: 0 | Status: SHIPPED | OUTPATIENT
Start: 2022-07-25 | End: 2022-10-11

## 2022-07-25 RX ORDER — FOLIC ACID 1 MG/1
TABLET ORAL
Qty: 90 TABLET | Refills: 0 | Status: SHIPPED | OUTPATIENT
Start: 2022-07-25 | End: 2022-10-11

## 2022-07-31 DIAGNOSIS — E03.9 HYPOTHYROIDISM, UNSPECIFIED TYPE: ICD-10-CM

## 2022-07-31 RX ORDER — LEVOTHYROXINE SODIUM 137 UG/1
150 TABLET ORAL DAILY
Qty: 90 TABLET | Refills: 1 | Status: SHIPPED | OUTPATIENT
Start: 2022-07-31 | End: 2023-01-09

## 2022-08-03 DIAGNOSIS — I26.99 ACUTE PULMONARY EMBOLISM, UNSPECIFIED PULMONARY EMBOLISM TYPE, UNSPECIFIED WHETHER ACUTE COR PULMONALE PRESENT (HCC): ICD-10-CM

## 2022-08-03 DIAGNOSIS — E11.9 TYPE 2 DIABETES MELLITUS WITHOUT COMPLICATION, WITHOUT LONG-TERM CURRENT USE OF INSULIN (HCC): ICD-10-CM

## 2022-08-03 RX ORDER — APIXABAN 5 MG/1
TABLET, FILM COATED ORAL
Qty: 60 TABLET | Refills: 0 | Status: SHIPPED | OUTPATIENT
Start: 2022-08-03 | End: 2022-09-02

## 2022-08-03 RX ORDER — SITAGLIPTIN 100 MG/1
TABLET, FILM COATED ORAL
Qty: 30 TABLET | Refills: 0 | Status: SHIPPED | OUTPATIENT
Start: 2022-08-03 | End: 2022-10-11

## 2022-08-29 DIAGNOSIS — Z79.4 TYPE 2 DIABETES MELLITUS WITH HYPERGLYCEMIA, WITH LONG-TERM CURRENT USE OF INSULIN (HCC): ICD-10-CM

## 2022-08-29 DIAGNOSIS — E11.65 TYPE 2 DIABETES MELLITUS WITH HYPERGLYCEMIA, WITH LONG-TERM CURRENT USE OF INSULIN (HCC): ICD-10-CM

## 2022-08-30 RX ORDER — PEN NEEDLE, DIABETIC 32GX 5/32"
NEEDLE, DISPOSABLE MISCELLANEOUS
Qty: 100 EACH | Refills: 0 | Status: SHIPPED | OUTPATIENT
Start: 2022-08-30

## 2022-09-02 DIAGNOSIS — I26.99 ACUTE PULMONARY EMBOLISM, UNSPECIFIED PULMONARY EMBOLISM TYPE, UNSPECIFIED WHETHER ACUTE COR PULMONALE PRESENT (HCC): ICD-10-CM

## 2022-09-02 RX ORDER — APIXABAN 5 MG/1
TABLET, FILM COATED ORAL
Qty: 60 TABLET | Refills: 0 | Status: SHIPPED | OUTPATIENT
Start: 2022-09-02 | End: 2022-10-11

## 2022-09-08 NOTE — PHYSICAL THERAPY NOTE
PHYSICAL THERAPY NOTE          Patient Name: Sahil Ron  IBICG'Y Date: 1/14/2020 01/14/20 0847   Subjective   Subjective Reports she has back pain but it's not too bad today  c/o SOB with ambulation  Reports she has [address] her home  Bed Mobility   Additional Comments seated EOB at start and end of PT session   Transfers   Sit to Stand 5  Supervision   Additional items Assist x 1;Bedrails;Verbal cues   Stand to Sit 5  Supervision   Additional items Assist x 1;Bedrails   Stand pivot 5  Supervision   Ambulation/Elevation   Gait pattern   (Short stride; Excessively slow; Foward flexed;Decreased foot c)   Gait Assistance 5  Supervision   Additional items Assist x 1;Verbal cues   Assistive Device Rolling walker   Distance 60' x 2 with standing rest break   Balance   Ambulatory Fair   Endurance Deficit   Endurance Deficit Yes   Activity Tolerance   Activity Tolerance Patient limited by fatigue   Exercises   Hip Flexion Standing;20 reps   Hip Abduction Sitting;20 reps   Hip Adduction Sitting;20 reps   Knee AROM Long Arc Quad Sitting;20 reps   Ankle Pumps Sitting;20 reps   Assessment   Prognosis Good   Problem List Decreased strength;Decreased endurance; Impaired balance;Decreased mobility;Pain   Assessment Pt  seen for PT treatment session this date with interventions consisting of gait training w/ emphasis on improving pt's ability to ambulate  Pt  Currently performing  tx and ambulation at (SUP ) x 1 level of function     In comparison to previous session, Pt  With improvements in activity tolerance  However, Pt  Is SOB with exertion  Barrier to return home to apartment: [address]  Pt is in need of continued activity in PT to improve strength balance endurance mobility transfers and ambulation with return to maximize LOF   From PT/mobility standpoint, recommendation at time of d/c would be STR  in order to promote return to [facility] and independence  Goals   LTG Expiration Date 01/27/20   PT Treatment Day 1   Plan   Treatment/Interventions Functional transfer training;LE strengthening/ROM; Therapeutic exercise;Elevations; Bed mobility;Gait training   Progress Progressing toward goals   Recommendation   Recommendation Short-term skilled PT   Pt  Seated EOB   with call bell within reach  at end of PT session  Discussed with Dasia Nguyen, PT today's treatment and patient's current level of function for care coordination  Ear Wedge Repair Text: Due to exposed cartilage and to restore structure and function of the ear, a wedge excision was completed by carrying down an excision through the full thickness of the ear and cartilage with an inward facing Burow's triangle. The wound was then closed in a layered fashion.

## 2022-10-04 ENCOUNTER — TELEPHONE (OUTPATIENT)
Dept: INTERNAL MEDICINE CLINIC | Facility: CLINIC | Age: 72
End: 2022-10-04

## 2022-10-07 ENCOUNTER — NURSE TRIAGE (OUTPATIENT)
Dept: OTHER | Facility: OTHER | Age: 72
End: 2022-10-07

## 2022-10-07 DIAGNOSIS — R60.9 PERIPHERAL EDEMA: ICD-10-CM

## 2022-10-07 RX ORDER — FUROSEMIDE 40 MG/1
TABLET ORAL
Qty: 90 TABLET | Refills: 0 | Status: SHIPPED | OUTPATIENT
Start: 2022-10-07

## 2022-10-08 NOTE — TELEPHONE ENCOUNTER
Per on call provider-  Hold tomorrow  Watch for any palpitation, anxiety, nausea  Patient verbalized understanding

## 2022-10-08 NOTE — TELEPHONE ENCOUNTER
Reports taking a dose of her Synthroid 137mcg about mid day and then accidentally took a second dose this evening  Denies any nausea, vomiting or other symptoms at this time  How would you advise? Also should she take her synthroid tomorrow as prescribed? On call provider notified    Reason for Disposition   [1] DOUBLE DOSE (an extra dose or lesser amount) of prescription drug AND [2] NO symptoms (Exception: a double dose of antibiotics)    Answer Assessment - Initial Assessment Questions  1  NAME of MEDICATION: "What medicine are you calling about?"      Synthroid 137mcg  2  QUESTION: "What is your question?" (e g , medication refill, side effect)      I accidentally took a second does of my synthroid today  3  PRESCRIBING HCP: "Who prescribed it?" Reason: if prescribed by specialist, call should be referred to that group  Dr Km Taylor  4   SYMPTOMS: "Do you have any symptoms?"      denies    Protocols used: MEDICATION QUESTION CALL-ADULT-

## 2022-10-08 NOTE — TELEPHONE ENCOUNTER
Regarding: took two dosage of medication   ----- Message from Ginny Mills sent at 10/7/2022 11:23 PM EDT -----  "what do i do if i took a 2nd levothyroxine 137mcg this evening "

## 2022-10-10 DIAGNOSIS — K21.9 GASTROESOPHAGEAL REFLUX DISEASE: ICD-10-CM

## 2022-10-10 DIAGNOSIS — E11.9 TYPE 2 DIABETES MELLITUS WITHOUT COMPLICATION, WITHOUT LONG-TERM CURRENT USE OF INSULIN (HCC): ICD-10-CM

## 2022-10-10 DIAGNOSIS — E11.65 TYPE 2 DIABETES MELLITUS WITH HYPERGLYCEMIA, WITH LONG-TERM CURRENT USE OF INSULIN (HCC): ICD-10-CM

## 2022-10-10 DIAGNOSIS — E78.2 MIXED HYPERLIPIDEMIA: ICD-10-CM

## 2022-10-10 DIAGNOSIS — I10 ESSENTIAL HYPERTENSION: ICD-10-CM

## 2022-10-10 DIAGNOSIS — M06.9 RHEUMATOID ARTHRITIS INVOLVING MULTIPLE SITES (HCC): ICD-10-CM

## 2022-10-10 DIAGNOSIS — Z79.4 TYPE 2 DIABETES MELLITUS WITH HYPERGLYCEMIA, WITH LONG-TERM CURRENT USE OF INSULIN (HCC): ICD-10-CM

## 2022-10-10 DIAGNOSIS — I26.99 ACUTE PULMONARY EMBOLISM, UNSPECIFIED PULMONARY EMBOLISM TYPE, UNSPECIFIED WHETHER ACUTE COR PULMONALE PRESENT (HCC): ICD-10-CM

## 2022-10-11 RX ORDER — LISINOPRIL 40 MG/1
TABLET ORAL
Qty: 90 TABLET | Refills: 0 | Status: SHIPPED | OUTPATIENT
Start: 2022-10-11

## 2022-10-11 RX ORDER — SEMAGLUTIDE 1.34 MG/ML
INJECTION, SOLUTION SUBCUTANEOUS
Qty: 1.5 ML | Refills: 0 | Status: SHIPPED | OUTPATIENT
Start: 2022-10-11

## 2022-10-11 RX ORDER — FOLIC ACID 1 MG/1
TABLET ORAL
Qty: 90 TABLET | Refills: 0 | Status: SHIPPED | OUTPATIENT
Start: 2022-10-11

## 2022-10-11 RX ORDER — SITAGLIPTIN 100 MG/1
TABLET, FILM COATED ORAL
Qty: 30 TABLET | Refills: 0 | Status: SHIPPED | OUTPATIENT
Start: 2022-10-11

## 2022-10-11 RX ORDER — PANTOPRAZOLE SODIUM 40 MG/1
TABLET, DELAYED RELEASE ORAL
Qty: 90 TABLET | Refills: 0 | Status: SHIPPED | OUTPATIENT
Start: 2022-10-11

## 2022-10-11 RX ORDER — ATORVASTATIN CALCIUM 20 MG/1
TABLET, FILM COATED ORAL
Qty: 90 TABLET | Refills: 0 | Status: SHIPPED | OUTPATIENT
Start: 2022-10-11

## 2022-10-11 RX ORDER — APIXABAN 5 MG/1
TABLET, FILM COATED ORAL
Qty: 60 TABLET | Refills: 0 | Status: SHIPPED | OUTPATIENT
Start: 2022-10-11

## 2022-10-12 PROBLEM — N30.00 ACUTE CYSTITIS: Status: RESOLVED | Noted: 2019-04-19 | Resolved: 2022-10-12

## 2022-10-13 ENCOUNTER — TELEMEDICINE (OUTPATIENT)
Dept: INTERNAL MEDICINE CLINIC | Facility: CLINIC | Age: 72
End: 2022-10-13
Payer: COMMERCIAL

## 2022-10-13 VITALS
HEART RATE: 94 BPM | DIASTOLIC BLOOD PRESSURE: 86 MMHG | BODY MASS INDEX: 42.3 KG/M2 | OXYGEN SATURATION: 94 % | SYSTOLIC BLOOD PRESSURE: 152 MMHG | WEIGHT: 253.9 LBS | TEMPERATURE: 97.5 F | HEIGHT: 65 IN

## 2022-10-13 DIAGNOSIS — U07.1 COVID-19: Primary | ICD-10-CM

## 2022-10-13 PROCEDURE — 87811 SARS-COV-2 COVID19 W/OPTIC: CPT | Performed by: FAMILY MEDICINE

## 2022-10-13 PROCEDURE — 99213 OFFICE O/P EST LOW 20 MIN: CPT | Performed by: FAMILY MEDICINE

## 2022-10-13 RX ORDER — METHOTREXATE 2.5 MG/1
TABLET ORAL
Qty: 98 TABLET | Refills: 0 | Status: SHIPPED | OUTPATIENT
Start: 2022-10-13

## 2022-10-13 RX ORDER — NIRMATRELVIR AND RITONAVIR 300-100 MG
3 KIT ORAL 2 TIMES DAILY
Qty: 30 TABLET | Refills: 0 | Status: SHIPPED | OUTPATIENT
Start: 2022-10-13 | End: 2022-10-18

## 2022-10-13 RX ORDER — DULOXETIN HYDROCHLORIDE 20 MG/1
CAPSULE, DELAYED RELEASE ORAL
Qty: 90 CAPSULE | Refills: 0 | Status: SHIPPED | OUTPATIENT
Start: 2022-10-13

## 2022-10-13 NOTE — PROGRESS NOTES
COVID-19 Outpatient Progress Note    Assessment/Plan:    Problem List Items Addressed This Visit        Other    COVID-19 - Primary    Relevant Medications    nirmatrelvir & ritonavir (Paxlovid, 300/100,) tablet therapy pack    Other Relevant Orders    Poct Covid 19 Rapid Antigen Test         COVID-19 Plan    Encounter provider: Poonam Carrasquillo MD     Provider located at: [address]  [address] [address]     Recent Visits  No visits were found meeting these conditions. Showing recent visits within past 7 days and meeting all other requirements  Today's Visits  Date Type Provider Dept   10/13/22 Melissa Sneed MD  [facility] [place]   Showing today's visits and meeting all other requirements  Future Appointments  No visits were found meeting these conditions. Showing future appointments within next 150 days and meeting all other requirements     COVID-19 HPI  Lab Results   Component Value Date    SARSCOV2 Negative 12/07/2021    SARSCOVAG Positive (A) 02/09/2022       Review of Systems  Current Outpatient Medications on File Prior to Visit   Medication Sig   • acetaminophen (TYLENOL) 325 mg tablet Take 2 tablets (650 mg total) by mouth every 6 (six) hours as needed for mild pain, headaches or fever   • albuterol (2.5 mg/3 mL) 0.083 % nebulizer solution USE 1 UNIT DOSE IN NEBULIZER EVERY 4 HOURS AS NEEDED.    • albuterol (PROVENTIL HFA,VENTOLIN HFA) 90 mcg/act inhaler Inhale 2 puffs every 6 (six) hours as needed for wheezing   • alendronate (FOSAMAX) 70 mg tablet Take 70 mg by mouth every 7 days     • atorvastatin (LIPITOR) 20 mg tablet TAKE ONE TABLET BY MOUTH EVERY DAY   • BD Pen Needle [place] U/F 32G X 4 MM MISC USE AS DIRECTED   • Blood Glucose Monitoring Suppl (ONE TOUCH ULTRA 2) w/Device KIT by Does not apply route 2 (two) times a day   • DULoxetine (CYMBALTA) 20 mg capsule TAKE ONE CAPSULE BY MOUTH ONCE DAILY • DULoxetine (CYMBALTA) 60 mg delayed release capsule Take 1 capsule (60 mg total) by mouth daily   • Eliquis 5 MG TAKE 1 TABLET BY MOUTH TWICE DAILY   • ergocalciferol (VITAMIN D2) 50,000 units TAKE ONE CAPSULE BY MOUTH WEEKLY   • fluticasone-salmeterol (ADVAIR) 250-50 mcg/dose Inhale 1 puff every 12 (twelve) hours    • folic acid (FOLVITE) 1 mg tablet TAKE ONE TABLET BY MOUTH ONCE DAILY   • furosemide (LASIX) 40 mg tablet TAKE ONE TABLET BY MOUTH EVERY DAY   • glucose blood (OneTouch Ultra) test strip Test 3 times daily   Dx: E11.65   • insulin degludec [place] FlexTouch) 100 units/mL injection pen Inject 40 Units under the skin daily   • Insulin Pen Needle 33G X 4 MM MISC by Does not apply route daily   • Januvia 100 MG tablet TAKE ONE TABLET BY MOUTH EVERY DAY   • Lancets (OneTouch Delica Plus [id number]) MISC Test 2 times a day   • levothyroxine 137 mcg tablet Take 1 tablet (137 mcg total) by mouth daily   • lisinopril (ZESTRIL) 40 mg tablet TAKE ONE TABLET BY MOUTH EVERY DAY   • methotrexate 2.5 MG tablet TAKE 8 TABLETS BY MOUTH EVERY SUNDAY   • mirtazapine (REMERON) 15 mg tablet Take 1 tablet (15 mg total) by mouth daily at bedtime   • mometasone (ELOCON) 0.1 % ointment Apply topically 2 (two) times a day as needed (itching)   • mupirocin (BACTROBAN) 2 % ointment Apply topically 2 (two) times a day   • nystatin (MYCOSTATIN) cream Apply topically 2 (two) times a day   • nystatin (MYCOSTATIN) powder Apply topically 3 (three) times a day as needed (rash/irritation)   • Ozempic, 0.25 or 0.5 MG/DOSE, 2 MG/1.5ML SOPN INJECT 0.5MG UNDER THE SKIN ONCE A WEEK   • pantoprazole (PROTONIX) 40 mg tablet TAKE ONE TABLET BY MOUTH ONCE DAILY   • Polyethylene Glycol 3350-GRX POWD Take by mouth daily at bedtime as needed (constipation) (Patient not taking: Reported on 7/6/2022)   • tiotropium (SPIRIVA RESPIMAT) 2.5 MCG/ACT AERS inhaler Inhale 1 puff daily at bedtime   • Tofacitinib Citrate ER 11 MG TB24 Take 1 tablet by mouth daily   • vitamin B-12 (VITAMIN B-12) 500 MCG tablet Take 1 tablet (500 mcg total) by mouth daily (Patient not taking: Reported on 7/6/2022)   • [DISCONTINUED] methotrexate 2.5 MG tablet TAKE 8 TABLETS BY MOUTH EVERY SUNDAY       Objective: There were no vitals taken for this visit.      Physical Exam  Emil Arzola MD

## 2022-10-14 LAB
SARS-COV-2 AG UPPER RESP QL IA: POSITIVE
VALID CONTROL: ABNORMAL

## 2022-10-14 RX ORDER — CLINDAMYCIN PHOSPHATE 10 UG/ML
LOTION TOPICAL
COMMUNITY
Start: 2022-08-02

## 2022-10-14 NOTE — PROGRESS NOTES
COVID-19 Outpatient Progress Note    Assessment/Plan:    Problem List Items Addressed This Visit        Other    COVID-19 - Primary         Disposition:     Rapid antigen testing was performed and the result is POSITIVE for COVID-19  Patient has asymptomatic or mild COVID-19 infection  Based off CDC guidelines, they were recommended to isolate for 5 days  If they are asymptomatic or symptoms are improving with no fevers in the past 24 hours, isolation may be ended followed by 5 days of wearing a mask when around othes to minimize risk of infecting others  If still have a fever or other symptoms have not improved, continue to isolate until they improve  Regardless of when they end isolation, avoid being around people who are more likely to get very sick from COVID-19 until at least day 11  If COVID symptoms worsen after ending isolation, restart isolation at day 0  Discussed symptom directed medication options with patient  Patient meets criteria for PAXLOVID and they have been counseled appropriately according to EUA documentation released by the FDA  After discussion, patient agrees to treatment  Miladys Martinezs is an investigational medicine used to treat mild-to-moderate COVID-19 in adults and children (15years of age and older weighing at least 80 pounds (40 kg)) with positive results of direct SARS-CoV-2 viral testing, and who are at high risk for progression to severe COVID-19, including hospitalization or death  PAXLOVID is investigational because it is still being studied  There is limited information about the safety and effectiveness of using PAXLOVID to treat people with mild-to-moderate COVID-19      The FDA has authorized the emergency use of PAXLOVID for the treatment of mild-tomoderate COVID-19 in adults and children (15years of age and older weighing at least 80 pounds (40 kg)) with a positive test for the virus that causes COVID-19, and who are at high risk for progression to severe COVID-19, including hospitalization or death, under an EUA  What should I tell my healthcare provider before I take PAXLOVID? Tell your healthcare provider if you:  - Have any allergies  - Have liver or kidney disease  - Are pregnant or plan to become pregnant  - Are breastfeeding a child  - Have any serious illnesses    Tell your healthcare provider about all the medicines you take, including prescription and over-the-counter medicines, vitamins, and herbal supplements  Some medicines may interact with PAXLOVID and may cause serious side effects  Keep a list of your medicines to show your healthcare provider and pharmacist when you get a new medicine  You can ask your healthcare provider or pharmacist for a list of medicines that interact with PAXLOVID  Do not start taking a new medicine without telling your healthcare provider  Your healthcare provider can tell you if it is safe to take PAXLOVID with other medicines  Tell your healthcare provider if you are taking combined hormonal contraceptive  PAXLOVID may affect how your birth control pills work  Females who are able to become pregnant should use another effective alternative form of contraception or an additional barrier method of contraception  Talk to your healthcare provider if you have any questions about contraceptive methods that might be right for you  How do I take PAXLOVID? PAXLOVID consists of 2 medicines: nirmatrelvir and ritonavir  - Take 2 pink tablets of nirmatrelvir with 1 white tablet of ritonavir by mouth 2 times each day (in the morning and in the evening) for 5 days  For each dose, take all 3 tablets at the same time  - If you have kidney disease, talk to your healthcare provider  You may need a different dose  - Swallow the tablets whole  Do not chew, break, or crush the tablets  - Take PAXLOVID with or without food  - Do not stop taking PAXLOVID without talking to your healthcare provider, even if you feel better    - If you miss a dose of PAXLOVID within 8 hours of the time it is usually taken, take it as soon as you remember  If you miss a dose by more than 8 hours, skip the missed dose and take the next dose at your regular time  Do not take 2 doses of PAXLOVID at the same time  - If you take too much PAXLOVID, call your healthcare provider or go to the nearest hospital emergency room right away  - If you are taking a ritonavir- or cobicistat-containing medicine to treat hepatitis C or Human Immunodeficiency Virus (HIV), you should continue to take your medicine as prescribed by your healthcare provider   - Talk to your healthcare provider if you do not feel better or if you feel worse after 5 days  Who should generally not take PAXLOVID? Do not take PAXLOVID if:  You are allergic to nirmatrelvir, ritonavir, or any of the ingredients in PAXLOVID  You are taking any of the following medicines:  - Alfuzosin  - Pethidine, piroxicam, propoxyphene  - Ranolazine  - Amiodarone, dronedarone, flecainide, propafenone, quinidine  - Colchicine  - Lurasidone, pimozide, clozapine  - Dihydroergotamine, ergotamine, methylergonovine  - Lovastatin, simvastatin  - Sildenafil (Revatio®) for pulmonary arterial hypertension (PAH)  - Triazolam, oral midazolam  - Apalutamide  - Carbamazepine, phenobarbital, phenytoin  - Rifampin  - St  Henry’s Wort (hypericum perforatum)    What are the important possible side effects of PAXLOVID? Possible side effects of PAXLOVID are:  - Liver Problems  Tell your healthcare provider right away if you have any of these signs and symptoms of liver problems: loss of appetite, yellowing of your skin and the whites of eyes (jaundice), dark-colored urine, pale colored stools and itchy skin, stomach area (abdominal) pain  - Resistance to HIV Medicines  If you have untreated HIV infection, PAXLOVID may lead to some HIV medicines not working as well in the future    - Other possible side effects include: altered sense of taste, diarrhea, high blood pressure, or muscle aches    These are not all the possible side effects of PAXLOVID  Not many people have taken PAXLOVID  Serious and unexpected side effects may happen  Larita Blizzard is still being studied, so it is possible that all of the risks are not known at this time  What other treatment choices are there? Like Roland Nettie may allow for the emergency use of other medicines to treat people with COVID-19  Go to https://Flyfit/ for information on the emergency use of other medicines that are authorized by FDA to treat people with COVID-19  Your healthcare provider may talk with you about clinical trials for which you may be eligible  It is your choice to be treated or not to be treated with PAXLOVID  Should you decide not to receive it or for your child not to receive it, it will not change your standard medical care  What if I am pregnant or breastfeeding? There is no experience treating pregnant women or breastfeeding mothers with PAXLOVID  For a mother and unborn baby, the benefit of taking PAXLOVID may be greater than the risk from the treatment  If you are pregnant, discuss your options and specific situation with your healthcare provider  It is recommended that you use effective barrier contraception or do not have sexual activity while taking PAXLOVID  If you are breastfeeding, discuss your options and specific situation with your healthcare provider  How do I report side effects with PAXLOVID? Contact your healthcare provider if you have any side effects that bother you or do not go away  Report side effects to FDA MedWatch at www fda gov/medwatch or call 3-337-YAF4581 or you can report side effects to [facility]  at the contact information provided below  Website Fax number Telephone number   [employer] [place] [phone]     How should I store Chepe Gonzalez? Store PAXLOVID tablets at room temperature between 68°F to 77°F (20°C to 25°C)  Full fact sheet for patients, parents, and caregivers can be found at: http://uMix.TV/    I have spent 15 minutes directly with the patient  Greater than 50% of this time was spent in counseling/coordination of care regarding: diagnostic results, prognosis, risks and benefits of treatment options, instructions for management, patient and family education, importance of treatment compliance, risk factor reductions and impressions  Encounter provider: Everardo Rodríguez MD     Provider located at: [facility] [address]  [address] [address]     Recent Visits  Date Type Provider Dept   10/13/22 Sean Luther MD  Primary Care [place]   Showing recent visits within past 7 days and meeting all other requirements  Future Appointments  No visits were found meeting these conditions  Showing future appointments within next 150 days and meeting all other requirements     Subjective:   Toni Castellano is a 74 y o  female who is concerned about COVID-19  Patient's symptoms include fatigue, malaise, nasal congestion, rhinorrhea, sore throat, cough, shortness of breath, myalgias and headache  Patient denies fever, chills, anosmia, loss of taste, chest tightness, abdominal pain, nausea, vomiting and diarrhea       - Date of symptom onset: 10/12/2022      COVID-19 vaccination status: Fully vaccinated with booster    Exposure:   Contact with a person who is under investigation (PUI) for or who is positive for COVID-19 within the last 14 days?: Yes    Hospitalized recently for fever and/or lower respiratory symptoms?: No      Currently a healthcare worker that is involved in direct patient care?: No      Works in a special setting where the risk of COVID-19 transmission may be high? (this may include long-term care, correctional and half-way facilities; homeless shelters; assisted-living facilities and group homes ): No      Resident in a special setting where the risk of COVID-19 transmission may be high? (this may include long-term care, correctional and half-way facilities; homeless shelters; assisted-living facilities and group homes ): No      Started with symptoms over the last 24-48 hours  Or congested  Feeling more tired  Denies fevers or chills  Chronically short of breath but may be slightly more than her typical   Denies any chest pain or palpitations  Granddaughter had respiratory symptoms over the last week or so  Lab Results   Component Value Date    SARSCOV2 Negative 12/07/2021    SARSCOVAG Positive (A) 10/13/2022       Review of Systems   Constitutional: Positive for fatigue  Negative for chills and fever  HENT: Positive for congestion, rhinorrhea and sore throat  Respiratory: Positive for cough and shortness of breath  Negative for chest tightness  Gastrointestinal: Negative for abdominal pain, diarrhea, nausea and vomiting  Musculoskeletal: Positive for myalgias  Neurological: Positive for headaches  Current Outpatient Medications on File Prior to Visit   Medication Sig   • clindamycin (CLEOCIN T) 1 % lotion clindamycin 1 % lotion   APPLY LOTION TOPICALLY TWICE DAILY   • acetaminophen (TYLENOL) 325 mg tablet Take 2 tablets (650 mg total) by mouth every 6 (six) hours as needed for mild pain, headaches or fever   • albuterol (2 5 mg/3 mL) 0 083 % nebulizer solution USE 1 UNIT DOSE IN NEBULIZER EVERY 4 HOURS AS NEEDED     • albuterol (PROVENTIL HFA,VENTOLIN HFA) 90 mcg/act inhaler Inhale 2 puffs every 6 (six) hours as needed for wheezing   • alendronate (FOSAMAX) 70 mg tablet Take 70 mg by mouth every 7 days     • atorvastatin (LIPITOR) 20 mg tablet TAKE ONE TABLET BY MOUTH EVERY DAY   • BD Pen Needle [place] U/F 32G X 4 MM MISC USE AS DIRECTED   • Blood Glucose Monitoring Suppl (ONE TOUCH ULTRA 2) w/Device KIT by Does not apply route 2 (two) times a day   • DULoxetine (CYMBALTA) 20 mg capsule TAKE ONE CAPSULE BY MOUTH ONCE DAILY   • DULoxetine (CYMBALTA) 60 mg delayed release capsule Take 1 capsule (60 mg total) by mouth daily   • Eliquis 5 MG TAKE 1 TABLET BY MOUTH TWICE DAILY   • ergocalciferol (VITAMIN D2) 50,000 units TAKE ONE CAPSULE BY MOUTH WEEKLY   • fluticasone-salmeterol (ADVAIR) 250-50 mcg/dose Inhale 1 puff every 12 (twelve) hours    • folic acid (FOLVITE) 1 mg tablet TAKE ONE TABLET BY MOUTH ONCE DAILY   • furosemide (LASIX) 40 mg tablet TAKE ONE TABLET BY MOUTH EVERY DAY   • glucose blood (OneTouch Ultra) test strip Test 3 times daily   Dx: E11 65   • insulin degludec Angelique [place] FlexTouch) 100 units/mL injection pen Inject 40 Units under the skin daily   • Insulin Pen Needle 33G X 4 MM MISC by Does not apply route daily   • Januvia 100 MG tablet TAKE ONE TABLET BY MOUTH EVERY DAY   • Lancets (OneTouch Delica Plus [id number]) MISC Test 2 times a day   • levothyroxine 137 mcg tablet Take 1 tablet (137 mcg total) by mouth daily   • lisinopril (ZESTRIL) 40 mg tablet TAKE ONE TABLET BY MOUTH EVERY DAY   • methotrexate 2 5 MG tablet TAKE 8 TABLETS BY MOUTH EVERY SUNDAY   • mirtazapine (REMERON) 15 mg tablet Take 1 tablet (15 mg total) by mouth daily at bedtime   • mometasone (ELOCON) 0 1 % ointment Apply topically 2 (two) times a day as needed (itching)   • mupirocin (BACTROBAN) 2 % ointment Apply topically 2 (two) times a day   • nystatin (MYCOSTATIN) cream Apply topically 2 (two) times a day   • nystatin (MYCOSTATIN) powder Apply topically 3 (three) times a day as needed (rash/irritation)   • Ozempic, 0 25 or 0 5 MG/DOSE, 2 MG/1 5ML SOPN INJECT 0 5MG UNDER THE SKIN ONCE A WEEK   • pantoprazole (PROTONIX) 40 mg tablet TAKE ONE TABLET BY MOUTH ONCE DAILY   • Polyethylene Glycol 3350-GRX POWD Take by mouth daily at bedtime as needed (constipation) (Patient not taking: Reported on 7/6/2022)   • tiotropium (SPIRIVA RESPIMAT) 2 5 MCG/ACT AERS inhaler Inhale 1 puff daily at bedtime   • Tofacitinib Citrate ER 11 MG TB24 Take 1 tablet by mouth daily   • vitamin B-12 (VITAMIN B-12) 500 MCG tablet Take 1 tablet (500 mcg total) by mouth daily (Patient not taking: Reported on 7/6/2022)       Objective:    /86 (BP Location: Left arm, Patient Position: Sitting, Cuff Size: Large)   Pulse 94   Temp 97 5 °F (36 4 °C)   Ht 5' 5" (1 651 m)   Wt 115 kg (253 lb 14 4 oz)   SpO2 94%   BMI 42 25 kg/m²      Physical Exam  Vitals and nursing note reviewed  Constitutional:       General: She is not in acute distress  Appearance: She is well-developed and well-groomed  She is morbidly obese  HENT:      Head:      Comments: Oxygen via nasal cannula; boggy nasal mucosa with clear nasal discharge; moist mucous membranes; slight posterior pharyngeal erythema  Cardiovascular:      Rate and Rhythm: Normal rate and regular rhythm  Pulmonary:      Breath sounds: Decreased breath sounds present  No wheezing or rhonchi  Musculoskeletal:      Cervical back: Neck supple  Lymphadenopathy:      Cervical: No cervical adenopathy  Neurological:      Mental Status: She is alert  Psychiatric:         Behavior: Behavior is cooperative         Irais Amador MD Muscle Hinge Flap Text: The defect edges were debeveled with a #15 scalpel blade.  Given the size, depth and location of the defect and the proximity to free margins a muscle hinge flap was deemed most appropriate.  Using a sterile surgical marker, an appropriate hinge flap was drawn incorporating the defect. The area thus outlined was incised with a #15 scalpel blade.  The skin margins were undermined to an appropriate distance in all directions utilizing iris scissors.

## 2022-10-17 ENCOUNTER — TELEPHONE (OUTPATIENT)
Dept: INTERNAL MEDICINE CLINIC | Facility: CLINIC | Age: 72
End: 2022-10-17

## 2022-10-17 NOTE — TELEPHONE ENCOUNTER
Thu Rosa called to inform Dr Shahab Burnett that she is feeling much better  She stated that she feels great  She did ask if she could have her procedure done next week and I informed her she needed to reach out to the doctors office doing the procedure and speak with them  She stated she understood and she would call them

## 2022-10-20 ENCOUNTER — RA CDI HCC (OUTPATIENT)
Dept: OTHER | Facility: HOSPITAL | Age: 72
End: 2022-10-20

## 2022-10-20 NOTE — PROGRESS NOTES
Sandra [facility] 73  coding opportunities     I11 0     Chart Reviewed number of suggestions sent to Provider: 1     Patients Insurance     Medicare Insurance: [address] [place] [address]

## 2022-10-24 ENCOUNTER — OFFICE VISIT (OUTPATIENT)
Dept: INTERNAL MEDICINE CLINIC | Facility: CLINIC | Age: 72
End: 2022-10-24
Payer: COMMERCIAL

## 2022-10-24 VITALS
WEIGHT: 255 LBS | OXYGEN SATURATION: 96 % | SYSTOLIC BLOOD PRESSURE: 158 MMHG | DIASTOLIC BLOOD PRESSURE: 84 MMHG | HEART RATE: 103 BPM | BODY MASS INDEX: 42.49 KG/M2 | HEIGHT: 65 IN | TEMPERATURE: 98 F

## 2022-10-24 DIAGNOSIS — E78.2 MIXED HYPERLIPIDEMIA: ICD-10-CM

## 2022-10-24 DIAGNOSIS — E11.65 TYPE 2 DIABETES MELLITUS WITH HYPERGLYCEMIA, WITH LONG-TERM CURRENT USE OF INSULIN (HCC): ICD-10-CM

## 2022-10-24 DIAGNOSIS — E55.9 VITAMIN D DEFICIENCY: ICD-10-CM

## 2022-10-24 DIAGNOSIS — U07.1 COVID-19: ICD-10-CM

## 2022-10-24 DIAGNOSIS — Z01.818 PRE-OP EVALUATION: Primary | ICD-10-CM

## 2022-10-24 DIAGNOSIS — I10 ESSENTIAL HYPERTENSION: ICD-10-CM

## 2022-10-24 DIAGNOSIS — I50.32 CHRONIC DIASTOLIC (CONGESTIVE) HEART FAILURE (HCC): ICD-10-CM

## 2022-10-24 DIAGNOSIS — M06.9 RHEUMATOID ARTHRITIS INVOLVING MULTIPLE SITES, UNSPECIFIED WHETHER RHEUMATOID FACTOR PRESENT (HCC): ICD-10-CM

## 2022-10-24 DIAGNOSIS — N39.0 RECURRENT UTI: ICD-10-CM

## 2022-10-24 DIAGNOSIS — E53.8 VITAMIN B12 DEFICIENCY: ICD-10-CM

## 2022-10-24 DIAGNOSIS — G47.33 OSA (OBSTRUCTIVE SLEEP APNEA): ICD-10-CM

## 2022-10-24 DIAGNOSIS — J96.11 CHRONIC RESPIRATORY FAILURE WITH HYPOXIA (HCC): ICD-10-CM

## 2022-10-24 DIAGNOSIS — Z79.4 TYPE 2 DIABETES MELLITUS WITH HYPERGLYCEMIA, WITH LONG-TERM CURRENT USE OF INSULIN (HCC): ICD-10-CM

## 2022-10-24 DIAGNOSIS — J44.1 COPD WITH ACUTE EXACERBATION (HCC): ICD-10-CM

## 2022-10-24 LAB
SARS-COV-2 AG UPPER RESP QL IA: POSITIVE
VALID CONTROL: ABNORMAL

## 2022-10-24 PROCEDURE — 87811 SARS-COV-2 COVID19 W/OPTIC: CPT | Performed by: FAMILY MEDICINE

## 2022-10-24 PROCEDURE — 99214 OFFICE O/P EST MOD 30 MIN: CPT | Performed by: FAMILY MEDICINE

## 2022-10-24 RX ORDER — FLUCONAZOLE 150 MG/1
150 TABLET ORAL DAILY
Qty: 3 TABLET | Refills: 0 | Status: SHIPPED | OUTPATIENT
Start: 2022-10-24 | End: 2022-10-27

## 2022-10-25 NOTE — PROGRESS NOTES
Assessment/Plan:    No problem-specific Assessment & Plan notes found for this encounter  Diagnoses and all orders for this visit:    Pre-op evaluation  -     CBC and differential; Future    Type 2 diabetes mellitus with hyperglycemia, with long-term current use of insulin (HCC)  -     CBC and differential; Future  -     Comprehensive metabolic panel; Future  -     Hemoglobin A1C; Future    Chronic respiratory failure with hypoxia (HCC)  -     CBC and differential; Future    MORENO (obstructive sleep apnea)  -     CBC and differential; Future    Chronic diastolic (congestive) heart failure (HCC)  -     CBC and differential; Future    COVID-19  -     CBC and differential; Future  -     POCT Rapid Covid Ag    Rheumatoid arthritis involving multiple sites, unspecified whether rheumatoid factor present ([facility] )    Vitamin B12 deficiency  -     Vitamin B12; Future    Vitamin D deficiency  -     Vitamin D 25 hydroxy; Future    Mixed hyperlipidemia  -     Lipid panel; Future  -     TSH, 3rd generation; Future    Essential hypertension    COPD with acute exacerbation ([facility])    Body mass index (BMI) 40 0-44 9, adult ([facility])    Recurrent UTI  -     fluconazole (DIFLUCAN) 150 mg tablet; Take 1 tablet (150 mg total) by mouth daily for 3 doses    orders and recommendations as noted above  She is not cleared for the upcoming surgery  She is still testing positive for coronavirus  She does have some persistent symptoms as well  Discussed with her the possibility of other rebound after the antiviral or persistent symptoms and positivity of test because of her being immunocompromised  Continue with inhalers  Use nebulizer treatments at least 2 to 3 times a day  Deep breathing exercises discussed  Discussed her blood sugars with her which at times in our over 300  This alone likely would limit or ability to clear her for surgery  Slip for laboratory testing given  Watch for any increasing shortness of breath    Continue with inhalers on a regular basis and nebulizer treatments if needed  Continue to use the CPAP  Continue on the oxygen  Will have her follow up in about 3-4 weeks for of routine visit and to review laboratory testing  Will likely need to be retested at that point to clear her for surgery  Subjective:      Patient ID: Ade Armstrong is a 74 y o  female  She presents for preop clearance prior to upcoming surgery to 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  She is still having some cough and congestion  Did feel this symptoms of the coronavirus got better but then worsened over the last 2-3 days  Increasing cough and congestion  Denies any fevers or chills  Did coronavirus test at home and said that it could have been slightly positive  Surgeon needs a negative test prior to proceeding with the procedure  She also has been having blood sugars that have been over 300 at times  Has not been eating healthy  Eating a lot of sweets  Granddaughter is trying to limit this  Did not take her blood pressure medication yet today  She feels that is why her blood pressure is elevated  Denies any headaches or localized weakness  Denies any chest pain or palpitations  Continues with the CPAP and the oxygen  Appetite has been good  Has been eating more recently  Denies any nausea or vomiting  Chronic pain into the joints especially lower extremities  Difficulty walking and standing for any prolonged period because of her back        The following portions of the patient's history were reviewed and updated as appropriate: She  has a past medical history of Arthritis, Arthritis, Cancer ([address] ), Candidiasis of skin, Cervical cancer ([address] ), Chronic respiratory failure with hypoxia and hypercapnia ([address] ) (4/20/2019), COPD (chronic obstructive pulmonary disease) ([address] ), Current chronic use of systemic steroids, Degenerative arthritis of left knee, Diabetes mellitus ([address] ), Disease of thyroid gland, Fibromyalgia, Fistula of knee, GERD (gastroesophageal reflux disease), Hyperlipidemia, Hypertension, Medial meniscus tear, Migraine, Obesity, Obesity, Osteoarthritis, Osteopenia, Pneumonia, Psychiatric disorder, Rheumatoid arthritis ([facility] ), Rheumatoid arthritis ([facility] ), Sepsis ([facility] ), Tick bite, Vitamin B12 deficiency, and Vitamin D deficiency    She   Patient Active Problem List    Diagnosis Date Noted   • Pre-op evaluation 10/24/2022   • Recurrent UTI 10/24/2022   • Dependence on continuous supplemental oxygen 03/02/2022   • Excessive anticoagulation 03/02/2022   • COVID-19 02/08/2022   • MORENO (obstructive sleep apnea) 12/13/2021   • Hilar lymphadenopathy 12/13/2021   • Atherosclerosis 12/13/2021   • T12 compression fracture ([place] [address] ) 12/13/2021   • Chronic diastolic (congestive) heart failure (HCC)    • Acute pulmonary embolism ([place] [address] ) 12/07/2021   • Chronic sinusitis 11/22/2021   • Primary insomnia 09/08/2020   • Migraine without aura and without status migrainosus, not intractable 07/28/2020   • Postherpetic neuralgia 02/59/4429   • Diastolic dysfunction 42/05/4674   • Macrocytic anemia 01/14/2020   • Hypoxemia requiring supplemental oxygen 06/19/2019   • History of exposure to asbestos 05/20/2019   • Chronic respiratory failure with hypoxia ([place] [address] ) 04/20/2019   • Hyponatremia 04/20/2019   • Candidal dermatitis 12/28/2018   • Loculated pleural effusion 12/25/2018   • Constipation 11/29/2017   • Renal cyst 11/29/2017   • Candidiasis, cutaneous 06/30/2017   • Vitamin B12 deficiency 11/10/2016   • Morbid obesity with BMI of 40 0-44 9, adult ([facility] ) 11/04/2016   • Centrilobular emphysema ([facility] ) 11/03/2016   • Type 2 diabetes mellitus with hyperglycemia, with long-term current use of insulin ([facility] ) 11/03/2016   • Hypothyroidism 11/03/2016   • Rheumatoid arthritis ([facility] ) 11/03/2016   • Essential hypertension 11/03/2016   • Hyperlipidemia 11/03/2016   • Gastroesophageal reflux disease without esophagitis 11/03/2016   • Hypoalbuminemia 11/03/2016   • Hepatic steatosis 11/03/2016   • Atelectasis 11/03/2016   • Anxiety 06/02/2014   • Fibromyalgia 01/28/2014   • Osteopenia 09/17/2012   • Osteoarthritis 05/29/2012   • Vitamin D deficiency 05/29/2012     She  has a past surgical history that includes Hysterectomy; Knee arthroscopy; Cataract extraction (Bilateral); Cholecystectomy; Joint replacement; Tubal ligation; Neuroplasty / transposition median nerve at carpal tunnel; Eye surgery; and IR thoracentesis (5/31/2019)  Her family history includes Addiction problem in her son; Asthma in her family; Breast cancer (age of onset: 39) in her half-sister; Cancer in her family; Diabetes in her family; Hypertension in her family; No Known Problems in her daughter, half-brother, half-brother, maternal aunt, maternal grandfather, maternal grandmother, paternal grandfather, paternal grandmother, son, and son; Stroke in her mother  She  reports that she quit smoking about 9 years ago  Her smoking use included cigarettes and e-cigarettes  She has a 48 00 pack-year smoking history  She has never used smokeless tobacco  She reports that she does not drink alcohol and does not use drugs  Current Outpatient Medications   Medication Sig Dispense Refill   • acetaminophen (TYLENOL) 325 mg tablet Take 2 tablets (650 mg total) by mouth every 6 (six) hours as needed for mild pain, headaches or fever  0   • albuterol (2 5 mg/3 mL) 0 083 % nebulizer solution USE 1 UNIT DOSE IN NEBULIZER EVERY 4 HOURS AS NEEDED       • albuterol (PROVENTIL HFA,VENTOLIN HFA) 90 mcg/act inhaler Inhale 2 puffs every 6 (six) hours as needed for wheezing  0   • atorvastatin (LIPITOR) 20 mg tablet TAKE ONE TABLET BY MOUTH EVERY DAY 90 tablet 0   • BD Pen Needle Mamta U/F 32G X 4 MM MISC USE AS DIRECTED 100 each 0   • Blood Glucose Monitoring Suppl (ONE TOUCH ULTRA 2) w/Device KIT by Does not apply route 2 (two) times a day 1 each 0   • clindamycin (CLEOCIN T) 1 % lotion clindamycin 1 % lotion   APPLY LOTION TOPICALLY TWICE DAILY     • DULoxetine (CYMBALTA) 20 mg capsule TAKE ONE CAPSULE BY MOUTH ONCE DAILY 90 capsule 0   • DULoxetine (CYMBALTA) 60 mg delayed release capsule Take 1 capsule (60 mg total) by mouth daily 90 capsule 1   • Eliquis 5 MG TAKE 1 TABLET BY MOUTH TWICE DAILY 60 tablet 0   • ergocalciferol (VITAMIN D2) 50,000 units TAKE ONE CAPSULE BY MOUTH WEEKLY 12 capsule 0   • fluconazole (DIFLUCAN) 150 mg tablet Take 1 tablet (150 mg total) by mouth daily for 3 doses 3 tablet 0   • fluticasone-salmeterol (ADVAIR) 250-50 mcg/dose Inhale 1 puff every 12 (twelve) hours      • folic acid (FOLVITE) 1 mg tablet TAKE ONE TABLET BY MOUTH ONCE DAILY 90 tablet 0   • furosemide (LASIX) 40 mg tablet TAKE ONE TABLET BY MOUTH EVERY DAY 90 tablet 0   • glucose blood (OneTouch Ultra) test strip Test 3 times daily   Dx: E11 65 300 each 3   • insulin degludec [place] FlexTouch) 100 units/mL injection pen Inject 40 Units under the skin daily 30 mL 3   • Insulin Pen Needle 33G X 4 MM MISC by Does not apply route daily 100 each 5   • Januvia 100 MG tablet TAKE ONE TABLET BY MOUTH EVERY DAY 30 tablet 0   • Lancets (OneTouch Delica Plus JWPPPR10C) MISC Test 2 times a day 200 each 0   • levothyroxine 137 mcg tablet Take 1 tablet (137 mcg total) by mouth daily 90 tablet 1   • lisinopril (ZESTRIL) 40 mg tablet TAKE ONE TABLET BY MOUTH EVERY DAY 90 tablet 0   • methotrexate 2 5 MG tablet TAKE 8 TABLETS BY MOUTH EVERY SUNDAY 98 tablet 0   • mirtazapine (REMERON) 15 mg tablet Take 1 tablet (15 mg total) by mouth daily at bedtime 90 tablet 3   • mometasone (ELOCON) 0 1 % ointment Apply topically 2 (two) times a day as needed (itching) 45 g 1   • mupirocin (BACTROBAN) 2 % ointment Apply topically 2 (two) times a day 22 g 0   • nystatin (MYCOSTATIN) cream Apply topically 2 (two) times a day 30 g 5   • nystatin (MYCOSTATIN) powder Apply topically 3 (three) times a day as needed (rash/irritation) 45 g 1   • Ozempic, 0 25 or 0 5 MG/DOSE, 2 MG/1 5ML SOPN INJECT 0 5MG UNDER THE SKIN ONCE A WEEK 1 5 mL 0   • pantoprazole (PROTONIX) 40 mg tablet TAKE ONE TABLET BY MOUTH ONCE DAILY 90 tablet 0   • Polyethylene Glycol 3350-GRX POWD Take by mouth daily at bedtime as needed (constipation) 850 g 0   • tiotropium (SPIRIVA RESPIMAT) 2 5 MCG/ACT AERS inhaler Inhale 1 puff daily at bedtime 3 Inhaler 3   • Tofacitinib Citrate ER 11 MG TB24 Take 1 tablet by mouth daily     • vitamin B-12 (VITAMIN B-12) 500 MCG tablet Take 1 tablet (500 mcg total) by mouth daily       No current facility-administered medications for this visit  Current Outpatient Medications on File Prior to Visit   Medication Sig   • acetaminophen (TYLENOL) 325 mg tablet Take 2 tablets (650 mg total) by mouth every 6 (six) hours as needed for mild pain, headaches or fever   • albuterol (2 5 mg/3 mL) 0 083 % nebulizer solution USE 1 UNIT DOSE IN NEBULIZER EVERY 4 HOURS AS NEEDED     • albuterol (PROVENTIL HFA,VENTOLIN HFA) 90 mcg/act inhaler Inhale 2 puffs every 6 (six) hours as needed for wheezing   • atorvastatin (LIPITOR) 20 mg tablet TAKE ONE TABLET BY MOUTH EVERY DAY   • BD Pen Needle Mamta U/F 32G X 4 MM MISC USE AS DIRECTED   • Blood Glucose Monitoring Suppl (ONE TOUCH ULTRA 2) w/Device KIT by Does not apply route 2 (two) times a day   • clindamycin (CLEOCIN T) 1 % lotion clindamycin 1 % lotion   APPLY LOTION TOPICALLY TWICE DAILY   • DULoxetine (CYMBALTA) 20 mg capsule TAKE ONE CAPSULE BY MOUTH ONCE DAILY   • DULoxetine (CYMBALTA) 60 mg delayed release capsule Take 1 capsule (60 mg total) by mouth daily   • Eliquis 5 MG TAKE 1 TABLET BY MOUTH TWICE DAILY   • ergocalciferol (VITAMIN D2) 50,000 units TAKE ONE CAPSULE BY MOUTH WEEKLY   • fluticasone-salmeterol (ADVAIR) 250-50 mcg/dose Inhale 1 puff every 12 (twelve) hours    • folic acid (FOLVITE) 1 mg tablet TAKE ONE TABLET BY MOUTH ONCE DAILY   • furosemide (LASIX) 40 mg tablet TAKE ONE TABLET BY MOUTH EVERY DAY   • glucose blood (OneTouch Ultra) test strip Test 3 times daily   Dx: E11 65   • insulin degludec Loral[place] FlexTouch) 100 units/mL injection pen Inject 40 Units under the skin daily   • Insulin Pen Needle 33G X 4 MM MISC by Does not apply route daily   • Januvia 100 MG tablet TAKE ONE TABLET BY MOUTH EVERY DAY   • Lancets (OneTouch Delica Plus [id number]) MISC Test 2 times a day   • levothyroxine 137 mcg tablet Take 1 tablet (137 mcg total) by mouth daily   • lisinopril (ZESTRIL) 40 mg tablet TAKE ONE TABLET BY MOUTH EVERY DAY   • methotrexate 2 5 MG tablet TAKE 8 TABLETS BY MOUTH EVERY SUNDAY   • mirtazapine (REMERON) 15 mg tablet Take 1 tablet (15 mg total) by mouth daily at bedtime   • mometasone (ELOCON) 0 1 % ointment Apply topically 2 (two) times a day as needed (itching)   • mupirocin (BACTROBAN) 2 % ointment Apply topically 2 (two) times a day   • nystatin (MYCOSTATIN) cream Apply topically 2 (two) times a day   • nystatin (MYCOSTATIN) powder Apply topically 3 (three) times a day as needed (rash/irritation)   • Ozempic, 0 25 or 0 5 MG/DOSE, 2 MG/1 5ML SOPN INJECT 0 5MG UNDER THE SKIN ONCE A WEEK   • pantoprazole (PROTONIX) 40 mg tablet TAKE ONE TABLET BY MOUTH ONCE DAILY   • Polyethylene Glycol 3350-GRX POWD Take by mouth daily at bedtime as needed (constipation)   • tiotropium (SPIRIVA RESPIMAT) 2 5 MCG/ACT AERS inhaler Inhale 1 puff daily at bedtime   • Tofacitinib Citrate ER 11 MG TB24 Take 1 tablet by mouth daily   • vitamin B-12 (VITAMIN B-12) 500 MCG tablet Take 1 tablet (500 mcg total) by mouth daily     No current facility-administered medications on file prior to visit  She has No Known Allergies       Review of Systems   Constitutional: Positive for fatigue  Negative for activity change, appetite change, chills and fever  HENT: Positive for sinus pressure  Negative for congestion and rhinorrhea  Eyes: Negative for visual disturbance  Respiratory: Positive for cough  Negative for chest tightness and shortness of breath  Cardiovascular: Positive for leg swelling  Negative for chest pain and palpitations  Gastrointestinal: Negative for abdominal pain, blood in stool, diarrhea, nausea and vomiting  Endocrine: Negative for polydipsia, polyphagia and polyuria  Genitourinary: Negative for dysuria, frequency and urgency  Musculoskeletal: Positive for arthralgias, back pain, gait problem and joint swelling  Skin: Negative for color change  Neurological: Negative for dizziness and headaches  Hematological: Does not bruise/bleed easily  Psychiatric/Behavioral: Negative for confusion and sleep disturbance  The patient is not nervous/anxious  Objective:      /84 (BP Location: Left arm, Patient Position: Sitting, Cuff Size: Large)   Pulse 103   Temp 98 °F (36 7 °C)   Ht 5' 5" (1 651 m)   Wt 116 kg (255 lb)   SpO2 96% Comment: 3 liters  BMI 42 43 kg/m²          Physical Exam  Vitals and nursing note reviewed  Constitutional:       General: She is not in acute distress  HENT:      Head: Normocephalic and atraumatic  Comments: Boggy nasal mucosa with clear nasal discharge; slight posterior pharyngeal erythema; slight white patches on the buccal mucosa  Eyes:      General:         Right eye: No discharge  Left eye: No discharge  Conjunctiva/sclera: Conjunctivae normal       Pupils: Pupils are equal, round, and reactive to light  Cardiovascular:      Rate and Rhythm: Normal rate and regular rhythm  Heart sounds: Normal heart sounds  No murmur heard  No friction rub  No gallop  Pulmonary:      Effort: No respiratory distress  Breath sounds: Transmitted upper airway sounds present  Decreased breath sounds present  No wheezing or rales  Abdominal:      General: Bowel sounds are normal  There is no distension  Tenderness: There is no abdominal tenderness  Lymphadenopathy:      Cervical: No cervical adenopathy  Skin:     General: Skin is warm and dry     Neurological: Mental Status: She is alert and oriented to person, place, and time  Psychiatric:         Mood and Affect: Mood and affect normal          Speech: Speech normal          Behavior: Behavior normal  Behavior is cooperative           Cognition and Memory: Cognition and memory normal

## 2022-11-10 DIAGNOSIS — E11.9 TYPE 2 DIABETES MELLITUS WITHOUT COMPLICATION, WITHOUT LONG-TERM CURRENT USE OF INSULIN (HCC): ICD-10-CM

## 2022-11-10 DIAGNOSIS — Z79.4 TYPE 2 DIABETES MELLITUS WITH HYPERGLYCEMIA, WITH LONG-TERM CURRENT USE OF INSULIN (HCC): ICD-10-CM

## 2022-11-10 DIAGNOSIS — I26.99 ACUTE PULMONARY EMBOLISM, UNSPECIFIED PULMONARY EMBOLISM TYPE, UNSPECIFIED WHETHER ACUTE COR PULMONALE PRESENT (HCC): ICD-10-CM

## 2022-11-10 DIAGNOSIS — E11.65 TYPE 2 DIABETES MELLITUS WITH HYPERGLYCEMIA, WITH LONG-TERM CURRENT USE OF INSULIN (HCC): ICD-10-CM

## 2022-11-10 RX ORDER — APIXABAN 5 MG/1
TABLET, FILM COATED ORAL
Qty: 60 TABLET | Refills: 0 | Status: SHIPPED | OUTPATIENT
Start: 2022-11-10

## 2022-11-10 RX ORDER — SITAGLIPTIN 100 MG/1
TABLET, FILM COATED ORAL
Qty: 30 TABLET | Refills: 0 | Status: SHIPPED | OUTPATIENT
Start: 2022-11-10

## 2022-11-10 RX ORDER — SEMAGLUTIDE 1.34 MG/ML
INJECTION, SOLUTION SUBCUTANEOUS
Qty: 1.5 ML | Refills: 0 | Status: SHIPPED | OUTPATIENT
Start: 2022-11-10

## 2022-11-21 ENCOUNTER — IMMUNIZATIONS (OUTPATIENT)
Dept: INTERNAL MEDICINE CLINIC | Facility: CLINIC | Age: 72
End: 2022-11-21

## 2022-11-21 DIAGNOSIS — Z23 ENCOUNTER FOR IMMUNIZATION: Primary | ICD-10-CM

## 2022-11-22 ENCOUNTER — TELEPHONE (OUTPATIENT)
Dept: INTERNAL MEDICINE CLINIC | Facility: CLINIC | Age: 72
End: 2022-11-22

## 2022-11-22 NOTE — TELEPHONE ENCOUNTER
Evelyn from [facility] called  Ruthie Fisher was requesting a refill of Cymbalta and told him it was increased to 60mg  He does not have a current script on file  I didn't see anything in chart  Can you please send updated/correct scrip to [facility]?

## 2022-11-23 DIAGNOSIS — E11.65 TYPE 2 DIABETES MELLITUS WITH HYPERGLYCEMIA, WITH LONG-TERM CURRENT USE OF INSULIN (HCC): ICD-10-CM

## 2022-11-23 DIAGNOSIS — Z79.4 TYPE 2 DIABETES MELLITUS WITH HYPERGLYCEMIA, WITH LONG-TERM CURRENT USE OF INSULIN (HCC): ICD-10-CM

## 2022-11-23 DIAGNOSIS — M06.9 RHEUMATOID ARTHRITIS (HCC): ICD-10-CM

## 2022-11-23 RX ORDER — PEN NEEDLE, DIABETIC 32GX 5/32"
NEEDLE, DISPOSABLE MISCELLANEOUS
Qty: 100 EACH | Refills: 0 | Status: SHIPPED | OUTPATIENT
Start: 2022-11-23

## 2022-11-23 RX ORDER — DULOXETIN HYDROCHLORIDE 60 MG/1
60 CAPSULE, DELAYED RELEASE ORAL DAILY
Qty: 90 CAPSULE | Refills: 1 | Status: SHIPPED | OUTPATIENT
Start: 2022-11-23 | End: 2023-06-29

## 2022-12-12 DIAGNOSIS — E11.9 TYPE 2 DIABETES MELLITUS WITHOUT COMPLICATION, WITHOUT LONG-TERM CURRENT USE OF INSULIN (HCC): ICD-10-CM

## 2022-12-12 DIAGNOSIS — I26.99 ACUTE PULMONARY EMBOLISM, UNSPECIFIED PULMONARY EMBOLISM TYPE, UNSPECIFIED WHETHER ACUTE COR PULMONALE PRESENT (HCC): ICD-10-CM

## 2022-12-12 RX ORDER — APIXABAN 5 MG/1
TABLET, FILM COATED ORAL
Qty: 60 TABLET | Refills: 0 | Status: SHIPPED | OUTPATIENT
Start: 2022-12-12

## 2022-12-12 RX ORDER — SITAGLIPTIN 100 MG/1
TABLET, FILM COATED ORAL
Qty: 30 TABLET | Refills: 0 | Status: SHIPPED | OUTPATIENT
Start: 2022-12-12

## 2022-12-14 DIAGNOSIS — E11.65 TYPE 2 DIABETES MELLITUS WITH HYPERGLYCEMIA, WITH LONG-TERM CURRENT USE OF INSULIN (HCC): ICD-10-CM

## 2022-12-14 DIAGNOSIS — Z79.4 TYPE 2 DIABETES MELLITUS WITH HYPERGLYCEMIA, WITH LONG-TERM CURRENT USE OF INSULIN (HCC): ICD-10-CM

## 2022-12-14 RX ORDER — SEMAGLUTIDE 1.34 MG/ML
INJECTION, SOLUTION SUBCUTANEOUS
Qty: 1.5 ML | Refills: 0 | Status: SHIPPED | OUTPATIENT
Start: 2022-12-14

## 2022-12-26 DIAGNOSIS — E11.65 TYPE 2 DIABETES MELLITUS WITH HYPERGLYCEMIA, WITH LONG-TERM CURRENT USE OF INSULIN (HCC): ICD-10-CM

## 2022-12-26 DIAGNOSIS — E55.9 VITAMIN D DEFICIENCY: ICD-10-CM

## 2022-12-26 DIAGNOSIS — I26.99 ACUTE PULMONARY EMBOLISM, UNSPECIFIED PULMONARY EMBOLISM TYPE, UNSPECIFIED WHETHER ACUTE COR PULMONALE PRESENT (HCC): ICD-10-CM

## 2022-12-26 DIAGNOSIS — Z79.4 TYPE 2 DIABETES MELLITUS WITH HYPERGLYCEMIA, WITH LONG-TERM CURRENT USE OF INSULIN (HCC): ICD-10-CM

## 2022-12-26 DIAGNOSIS — E11.9 TYPE 2 DIABETES MELLITUS WITHOUT COMPLICATION, WITHOUT LONG-TERM CURRENT USE OF INSULIN (HCC): ICD-10-CM

## 2022-12-26 DIAGNOSIS — K21.9 GASTROESOPHAGEAL REFLUX DISEASE: ICD-10-CM

## 2022-12-26 RX ORDER — SITAGLIPTIN 100 MG/1
TABLET, FILM COATED ORAL
Qty: 30 TABLET | Refills: 0 | Status: SHIPPED | OUTPATIENT
Start: 2022-12-26

## 2022-12-26 RX ORDER — APIXABAN 5 MG/1
TABLET, FILM COATED ORAL
Qty: 30 TABLET | Refills: 0 | Status: SHIPPED | OUTPATIENT
Start: 2022-12-26

## 2022-12-28 RX ORDER — METHOTREXATE 2.5 MG/1
TABLET ORAL
Qty: 96 TABLET | Refills: 0 | Status: SHIPPED | OUTPATIENT
Start: 2022-12-28

## 2022-12-28 RX ORDER — PEN NEEDLE, DIABETIC 32GX 5/32"
NEEDLE, DISPOSABLE MISCELLANEOUS
Qty: 100 EACH | Refills: 0 | Status: SHIPPED | OUTPATIENT
Start: 2022-12-28

## 2022-12-28 RX ORDER — ERGOCALCIFEROL 1.25 MG/1
CAPSULE ORAL
Qty: 12 CAPSULE | Refills: 0 | Status: SHIPPED | OUTPATIENT
Start: 2022-12-28

## 2023-01-09 DIAGNOSIS — K21.9 GASTROESOPHAGEAL REFLUX DISEASE: ICD-10-CM

## 2023-01-09 DIAGNOSIS — E11.9 TYPE 2 DIABETES MELLITUS WITHOUT COMPLICATION, WITHOUT LONG-TERM CURRENT USE OF INSULIN (HCC): ICD-10-CM

## 2023-01-09 DIAGNOSIS — I10 ESSENTIAL HYPERTENSION: ICD-10-CM

## 2023-01-09 DIAGNOSIS — E78.2 MIXED HYPERLIPIDEMIA: ICD-10-CM

## 2023-01-09 DIAGNOSIS — R60.9 PERIPHERAL EDEMA: ICD-10-CM

## 2023-01-09 DIAGNOSIS — E03.9 HYPOTHYROIDISM, UNSPECIFIED TYPE: ICD-10-CM

## 2023-01-09 RX ORDER — ATORVASTATIN CALCIUM 20 MG/1
TABLET, FILM COATED ORAL
Qty: 90 TABLET | Refills: 0 | Status: SHIPPED | OUTPATIENT
Start: 2023-01-09

## 2023-01-09 RX ORDER — FUROSEMIDE 40 MG/1
TABLET ORAL
Qty: 90 TABLET | Refills: 0 | Status: SHIPPED | OUTPATIENT
Start: 2023-01-09

## 2023-01-09 RX ORDER — LISINOPRIL 40 MG/1
TABLET ORAL
Qty: 90 TABLET | Refills: 0 | Status: SHIPPED | OUTPATIENT
Start: 2023-01-09

## 2023-01-09 RX ORDER — FOLIC ACID 1 MG/1
TABLET ORAL
Qty: 90 TABLET | Refills: 0 | Status: SHIPPED | OUTPATIENT
Start: 2023-01-09

## 2023-01-09 RX ORDER — LEVOTHYROXINE SODIUM 137 UG/1
TABLET ORAL
Qty: 90 TABLET | Refills: 0 | Status: SHIPPED | OUTPATIENT
Start: 2023-01-09

## 2023-01-09 RX ORDER — PANTOPRAZOLE SODIUM 40 MG/1
TABLET, DELAYED RELEASE ORAL
Qty: 90 TABLET | Refills: 0 | Status: SHIPPED | OUTPATIENT
Start: 2023-01-09

## 2023-01-11 RX ORDER — SITAGLIPTIN 100 MG/1
TABLET, FILM COATED ORAL
Qty: 30 TABLET | Refills: 0 | Status: SHIPPED | OUTPATIENT
Start: 2023-01-11 | End: 2023-01-14

## 2023-01-13 DIAGNOSIS — E11.9 TYPE 2 DIABETES MELLITUS WITHOUT COMPLICATION, WITHOUT LONG-TERM CURRENT USE OF INSULIN (HCC): ICD-10-CM

## 2023-01-14 RX ORDER — SITAGLIPTIN 100 MG/1
TABLET, FILM COATED ORAL
Qty: 30 TABLET | Refills: 0 | Status: SHIPPED | OUTPATIENT
Start: 2023-01-14

## 2023-01-30 DIAGNOSIS — Z79.4 TYPE 2 DIABETES MELLITUS WITH HYPERGLYCEMIA, WITH LONG-TERM CURRENT USE OF INSULIN (HCC): ICD-10-CM

## 2023-01-30 DIAGNOSIS — E11.65 TYPE 2 DIABETES MELLITUS WITH HYPERGLYCEMIA, WITH LONG-TERM CURRENT USE OF INSULIN (HCC): ICD-10-CM

## 2023-01-30 DIAGNOSIS — E11.65 TYPE 2 DIABETES MELLITUS WITH HYPERGLYCEMIA, WITH LONG-TERM CURRENT USE OF INSULIN (HCC): Primary | ICD-10-CM

## 2023-01-30 DIAGNOSIS — Z79.4 TYPE 2 DIABETES MELLITUS WITH HYPERGLYCEMIA, WITH LONG-TERM CURRENT USE OF INSULIN (HCC): Primary | ICD-10-CM

## 2023-01-30 RX ORDER — SEMAGLUTIDE 1.34 MG/ML
INJECTION, SOLUTION SUBCUTANEOUS
Qty: 1.5 ML | Refills: 0 | Status: SHIPPED | OUTPATIENT
Start: 2023-01-30

## 2023-01-30 RX ORDER — INSULIN DEGLUDEC INJECTION 100 U/ML
40 INJECTION, SOLUTION SUBCUTANEOUS DAILY
Qty: 30 ML | Refills: 2 | Status: SHIPPED | OUTPATIENT
Start: 2023-01-30

## 2023-01-30 RX ORDER — INSULIN GLARGINE 100 [IU]/ML
40 INJECTION, SOLUTION SUBCUTANEOUS DAILY
Qty: 30 ML | Refills: 0 | Status: SHIPPED | OUTPATIENT
Start: 2023-01-30 | End: 2023-01-30

## 2023-01-30 RX ORDER — INSULIN DEGLUDEC INJECTION 100 U/ML
40 INJECTION, SOLUTION SUBCUTANEOUS DAILY
Qty: 30 ML | Refills: 0 | Status: CANCELLED | OUTPATIENT
Start: 2023-01-30

## 2023-02-10 DIAGNOSIS — K21.9 GASTROESOPHAGEAL REFLUX DISEASE: ICD-10-CM

## 2023-02-10 DIAGNOSIS — E55.9 VITAMIN D DEFICIENCY: ICD-10-CM

## 2023-02-10 RX ORDER — ERGOCALCIFEROL 1.25 MG/1
CAPSULE ORAL
Qty: 12 CAPSULE | Refills: 0 | Status: SHIPPED | OUTPATIENT
Start: 2023-02-10

## 2023-02-11 RX ORDER — METHOTREXATE 2.5 MG/1
TABLET ORAL
Qty: 96 TABLET | Refills: 0 | Status: SHIPPED | OUTPATIENT
Start: 2023-02-11

## 2023-03-22 NOTE — TELEPHONE ENCOUNTER
Patient unable to find transportation to her visit hailey  8/9 @ 12pm in [facility]  She is also not feeling well, going to the doctor later today  I informed patient that she would need to see us in our [place] office as we will not be going back to our [facility] office till October  She was unsure if anyone could bring her  I called [facility] to see if they could take her, since she does not have a cancer diagnosis they told me they can not  I told her to see if someone can bring her on any Wednesday, and to call me back and let me know  She understood and agreed she will call me back  normal...

## 2023-05-08 ENCOUNTER — HOSPITAL ENCOUNTER (EMERGENCY)
Facility: HOSPITAL | Age: 73
Discharge: HOME | End: 2023-05-09
Attending: EMERGENCY MEDICINE

## 2023-05-08 VITALS
SYSTOLIC BLOOD PRESSURE: 114 MMHG | TEMPERATURE: 98 F | RESPIRATION RATE: 16 BRPM | OXYGEN SATURATION: 94 % | DIASTOLIC BLOOD PRESSURE: 74 MMHG | HEART RATE: 112 BPM

## 2023-05-08 DIAGNOSIS — R07.9 CHEST PAIN: ICD-10-CM

## 2023-05-08 DIAGNOSIS — S91.101A OPEN WOUND OF RIGHT GREAT TOE: Primary | ICD-10-CM

## 2023-05-09 ENCOUNTER — APPOINTMENT (EMERGENCY)
Dept: RADIOLOGY | Facility: HOSPITAL | Age: 73
End: 2023-05-09

## 2023-05-09 LAB — CARDIAC TROPONIN I PNL SERPL HS: <2 NG/L (ref 8–18)

## 2023-05-09 RX ADMIN — TETANUS TOXOID, REDUCED DIPHTHERIA TOXOID AND ACELLULAR PERTUSSIS VACCINE, ADSORBED 0.5 ML: 5; 2.5; 8; 8; 2.5 SUSPENSION INTRAMUSCULAR at 02:04

## 2023-05-09 NOTE — ED PROVIDER NOTES
"History  Chief Complaint   Patient presents with   • Foot Pain     Right foot had callous now the foot is cracked, red and sore  • Chest Pain     At least a week, improved then got worse again and feels like she is \"not getting enough air\"     73 yo female with history of COPD maintained on 3 L nasal cannula type 2 diabetes rheumatoid arthritis on methotrexate hyperlipidemia hypertension GERD fibromyalgia and history of cervical cancer h/o PE in 2021 on eliquis presents with concerns about a cracked callus on her right great toe she noticed it a couple of days ago after a familty  was trimming her toenail she does see Dr Rosendo Trent of podiatry but its been a while  Patient has a history of type 2 diabetes  She complains of burning sensation of the toe there is been no drainage increased swelling no history of any trauma no fevers upon arrival here she did complain briefly of some chest pain she states there was some present lasting seconds earlier today along with some shortness of breath but she states she just got herself worked up  She had a cough productive of clearish phlegm she denies any calf pain or swelling  Last tetanus was 2015          Prior to Admission Medications   Prescriptions Last Dose Informant Patient Reported? Taking?    BD Pen Needle Mamta U/F 32G X 4 MM MISC   No No   Sig: USE AS DIRECTED   Blood Glucose Monitoring Suppl (ONE TOUCH ULTRA 2) w/Device KIT   No No   Sig: by Does not apply route 2 (two) times a day   DULoxetine (CYMBALTA) 60 mg delayed release capsule   No No   Sig: Take 1 capsule (60 mg total) by mouth daily   Eliquis 5 MG   No No   Sig: TAKE 1 TABLET BY MOUTH TWICE DAILY   Insulin Pen Needle 33G X 4 MM MISC   No No   Sig: by Does not apply route daily   Januvia 100 MG tablet   No No   Sig: TAKE ONE TABLET BY MOUTH EVERY DAY   Lancets (OneTouch Delica Plus [id number]) MISC   No No   Sig: Test 2 times a day   Ozempic, 0 25 or 0 5 MG/DOSE, 2 MG/1 5ML injection pen   No No " Sig: INJECT 0 5MG UNDER THE SKIN ONCE A WEEK   Polyethylene Glycol 3350-GRX POWD   No No   Sig: Take by mouth daily at bedtime as needed (constipation)   Tofacitinib Citrate ER 11 MG TB24   Yes No   Sig: Take 1 tablet by mouth daily   acetaminophen (TYLENOL) 325 mg tablet   No No   Sig: Take 2 tablets (650 mg total) by mouth every 6 (six) hours as needed for mild pain, headaches or fever   albuterol (2 5 mg/3 mL) 0 083 % nebulizer solution   Yes No   Sig: USE 1 UNIT DOSE IN NEBULIZER EVERY 4 HOURS AS NEEDED     albuterol (PROVENTIL HFA,VENTOLIN HFA) 90 mcg/act inhaler   No No   Sig: Inhale 2 puffs every 6 (six) hours as needed for wheezing   atorvastatin (LIPITOR) 20 mg tablet   No No   Sig: TAKE ONE TABLET BY MOUTH EVERY DAY   clindamycin (CLEOCIN T) 1 % lotion   Yes No   Sig: clindamycin 1 % lotion   APPLY LOTION TOPICALLY TWICE DAILY   ergocalciferol (VITAMIN D2) 50,000 units   No No   Sig: TAKE ONE CAPSULE BY MOUTH WEEKLY   fluticasone-salmeterol (ADVAIR) 250-50 mcg/dose   Yes No   Sig: Inhale 1 puff every 12 (twelve) hours    folic acid (FOLVITE) 1 mg tablet   No No   Sig: TAKE ONE TABLET BY MOUTH ONCE DAILY   furosemide (LASIX) 40 mg tablet   No No   Sig: TAKE ONE TABLET BY MOUTH EVERY DAY   glucose blood (OneTouch Ultra) test strip   No No   Sig: Test 3 times daily   Dx: E11 65   insulin degludec Sidonie Carrel FlexTouch) 100 units/mL injection pen   No No   Sig: Inject 40 Units under the skin daily   levothyroxine 137 mcg tablet   No No   Sig: TAKE ONE TABLET BY MOUTH EVERY DAY   lisinopril (ZESTRIL) 40 mg tablet   No No   Sig: TAKE ONE TABLET BY MOUTH EVERY DAY   methotrexate 2 5 MG tablet   No No   Sig: TAKE 8 TABLETS BY MOUTH EVERY SUNDAY   mirtazapine (REMERON) 15 mg tablet   No No   Sig: Take 1 tablet (15 mg total) by mouth daily at bedtime   mometasone (ELOCON) 0 1 % ointment   No No   Sig: Apply topically 2 (two) times a day as needed (itching)   mupirocin (BACTROBAN) 2 % ointment   No No   Sig: Apply topically 2 (two) times a day   nystatin (MYCOSTATIN) cream   No No   Sig: Apply topically 2 (two) times a day   nystatin (MYCOSTATIN) powder   No No   Sig: Apply topically 3 (three) times a day as needed (rash/irritation)   pantoprazole (PROTONIX) 40 mg tablet   No No   Sig: TAKE ONE TABLET BY MOUTH ONCE DAILY   tiotropium (SPIRIVA RESPIMAT) 2 5 MCG/ACT AERS inhaler   No No   Sig: Inhale 1 puff daily at bedtime   vitamin B-12 (VITAMIN B-12) 500 MCG tablet   No No   Sig: Take 1 tablet (500 mcg total) by mouth daily      Facility-Administered Medications: None       Past Medical History:   Diagnosis Date   • Arthritis    • Arthritis    • Cancer ([facility] )    • Candidiasis of skin    • Cervical cancer (HCC)    • Chronic respiratory failure with hypoxia and hypercapnia (HCC) 4/20/2019   • COPD (chronic obstructive pulmonary disease) ([facility] )     last assessed 11/21/2016   • Current chronic use of systemic steroids    • Degenerative arthritis of left knee     last assessed 1/20/2015   • Diabetes mellitus ([facility] )    • Disease of thyroid gland     hypo pill given to decrease thyroid      • Fibromyalgia    • Fistula of knee     last assessed 9/12/2014   • GERD (gastroesophageal reflux disease)    • Hyperlipidemia    • Hypertension    • Medial meniscus tear     of left knee, last assessed 9/12/2014   • Migraine     states she has a hx of HA that she calls [employer] but never was dx by neurologist   • Obesity    • Obesity    • Osteoarthritis    • Osteopenia    • Pneumonia     last assessed 11/16/2016   • Psychiatric disorder     anxiety   • Rheumatoid arthritis ([facility] )    • Rheumatoid arthritis ([facility] )    • Sepsis ([facility] )     last assessed 11/10/2016   • Tick bite     last assessed 10/30/2015   • Vitamin B12 deficiency    • Vitamin D deficiency        Past Surgical History:   Procedure Laterality Date   • CATARACT EXTRACTION Bilateral    • CHOLECYSTECTOMY     • EYE SURGERY      Bilateral Cataracts   • HYSTERECTOMY     • IR THORACENTESIS "5/31/2019   • JOINT REPLACEMENT      left knee   • KNEE ARTHROSCOPY     • NEUROPLASTY / TRANSPOSITION MEDIAN NERVE AT CARPAL TUNNEL     • TUBAL LIGATION         Family History   Problem Relation Age of Onset   • Stroke Mother    • No Known Problems Daughter    • No Known Problems Maternal Grandmother    • No Known Problems Maternal Grandfather    • No Known Problems Paternal Grandmother    • No Known Problems Paternal Grandfather    • Asthma Family    • Cancer Family    • Diabetes Family    • Hypertension Family    • No Known Problems Son    • No Known Problems Son    • Addiction problem Son    • Breast cancer Half-Sister 39   • No Known Problems Half-Brother    • No Known Problems Half-Brother    • No Known Problems Maternal Aunt      I have reviewed and agree with the history as documented  E-Cigarette/Vaping   • E-Cigarette Use Former User      E-Cigarette/Vaping Substances     Social History     Tobacco Use   • Smoking status: Former     Packs/day: 1 00     Years: 48 00     Pack years: 48 00     Types: Cigarettes, E-Cigarettes     Quit date: 11/21/2012     Years since quitting: 10 4   • Smokeless tobacco: Never   • Tobacco comments:     per allscripts - \"current every day smoker, former smoker\"   Vaping Use   • Vaping Use: Former   Substance Use Topics   • Alcohol use: Never     Alcohol/week: 0 0 standard drinks     Comment: stopped drinking alcohol   • Drug use: Never       Review of Systems   Constitutional: Negative for activity change, appetite change, chills, diaphoresis and fever  HENT: Negative for congestion, ear pain, rhinorrhea, sneezing and sore throat  Eyes: Negative for discharge  Respiratory: Positive for cough and shortness of breath  Negative for wheezing  Cardiovascular: Positive for chest pain  Negative for leg swelling  Gastrointestinal: Positive for abdominal pain  Negative for blood in stool, diarrhea, nausea and vomiting  Endocrine: Negative for polyuria     Genitourinary: " Negative for urgency  Musculoskeletal: Negative for back pain  Skin: Negative for rash  Wound: Cracked callus to the plantar aspect of the right great toe  Neurological: Negative for dizziness and numbness  Hematological: Negative for adenopathy  Psychiatric/Behavioral: Negative for confusion  All other systems reviewed and are negative  Physical Exam  Physical Exam  Vitals and nursing note reviewed  Constitutional:       General: She is not in acute distress  Appearance: She is not ill-appearing, toxic-appearing or diaphoretic  HENT:      Head: Normocephalic  Right Ear: Tympanic membrane and external ear normal       Left Ear: Tympanic membrane and external ear normal       Nose: Nose normal  No congestion or rhinorrhea  Mouth/Throat:      Mouth: Mucous membranes are moist       Pharynx: Posterior oropharyngeal erythema present  No oropharyngeal exudate  Eyes:      General:         Right eye: No discharge  Left eye: No discharge  Extraocular Movements: Extraocular movements intact  Conjunctiva/sclera: Conjunctivae normal       Pupils: Pupils are equal, round, and reactive to light  Cardiovascular:      Rate and Rhythm: Normal rate  Pulmonary:      Comments: Distant BS diminished  Bibasilar; kadie 94% on baseline 3L NC  Abdominal:      General: Abdomen is flat  There is no distension  Tenderness: There is no right CVA tenderness, left CVA tenderness or guarding  Musculoskeletal:      Cervical back: Normal range of motion and neck supple  Right lower leg: No edema  Left lower leg: No edema  Comments: Rt foot Reddish to all toes  Nail of right great toe surgically absent  Plantar aspect has a crack in the callus  No drainage fluctuance or induration no evidence of ascending lymphangitis  No significant edema of foot   dopplerable biphasic AT pulse obtained see media tab for additioanl details   Skin:     Capillary Refill: Capillary refill takes less than 2 seconds  Neurological:      General: No focal deficit present  Mental Status: She is alert  Cranial Nerves: No cranial nerve deficit  Sensory: No sensory deficit  Motor: No weakness  Coordination: Coordination normal       Gait: Gait normal    Psychiatric:         Mood and Affect: Mood normal          Vital Signs  ED Triage Vitals [05/08/23 2339]   Temperature Pulse Respirations Blood Pressure SpO2   98 °F (36 7 °C) (!) 112 16 114/74 94 %      Temp Source Heart Rate Source Patient Position - Orthostatic VS BP Location FiO2 (%)   Temporal Monitor Sitting Right arm --      Pain Score       --           Vitals:    05/08/23 2339   BP: 114/74   Pulse: (!) 112   Patient Position - Orthostatic VS: Sitting         Visual Acuity      ED Medications  Medications   tetanus-diphtheria-acellular pertussis (BOOSTRIX) IM injection 0 5 mL (0 5 mL Intramuscular Given 5/9/23 0204)       Diagnostic Studies  Results Reviewed     Procedure Component Value Units Date/Time    High Sensitivity Troponin I Random [[id number]]  (Abnormal) Collected: 05/09/23 0053    Lab Status: Final result Specimen: Blood from Hand, Right Updated: 05/09/23 0125     HS TnI random <2 ng/L                  XR foot 3+ views RIGHT   ED Interpretation by Gerri Vargas MD (05/09 0139)   Per my independent interpretation  Radiologist to provide formal read  No acute fracture or gas in soft tissue      XR chest 1 view portable   ED Interpretation by Gerri Vargas MD (05/09 0140)   Per my independent interpretation  Radiologist to provide formal read   No acute process left pleural effusion improved slightly vs prev 2/6/22                 Procedures  ECG 12 Lead Documentation Only    Date/Time: 5/8/2023 11:46 PM  Performed by: Gerri Vargas MD  Authorized by: Gerri Vargas MD     Indications / Diagnosis:  Chest pain  ECG reviewed by me, the ED Provider: yes    Patient location: ED  Previous ECG:     Previous ECG:  Compared to current    Comparison ECG info:  2/6/22 0924    Similarity:  No change  Rate:     ECG rate:  106    ECG rate assessment: tachycardic    Rhythm:     Rhythm: sinus tachycardia    QRS:     QRS axis:  Normal  Comments:      No acute ischemic changes             ED Course             HEART Risk Score    Flowsheet Row Most Recent Value   Heart Score Risk Calculator    History 1 Filed at: 05/09/2023 0141   ECG 1 Filed at: 05/09/2023 0141   Age 2 Filed at: 05/09/2023 0141   Risk Factors 2 Filed at: 05/09/2023 0141   Troponin 0 Filed at: 05/09/2023 0141   HEART Score 6 Filed at: 05/09/2023 0141                                      Medical Decision Making  Mdm: pateint states chest pain has been brief and fleeing will check EKG for arrythmia/acs and random trop right great toe wound not secondarily infected no cellulits will check plain fill xtray for soft tissue gas for fracture and recommend podiatry followup      Patent placed in xeroform dressing to right great toe  and post-op shoe with CMS at baseline  Tetanus uptated    Amount and/or Complexity of Data Reviewed  Labs: ordered  Radiology: ordered and independent interpretation performed            Disposition  Final diagnoses:   Open wound of right great toe - cracked callous plantar aspect   Chest pain     Time reflects when diagnosis was documented in both MDM as applicable and the Disposition within this note     Time User Action Codes Description Comment    5/9/2023  1:59 AM [address] Open wound of right great toe     5/9/2023  1:59 AM Clydell Grammes Modify [F33 822C] Open wound of right great toe cracked callous    5/9/2023  1:59 AM Clydell Grammes Add [R07 9] Chest pain     5/9/2023  2:15 AM Clydell Grammes Modify [G40 326R] Open wound of right great toe cracked callous plantar aspect      ED Disposition     ED Disposition   Discharge    Condition   Stable    Date/Time   Tue May 9, 2023  1:40 AM    Comment   Eamon Puckett discharge to home/self care  Follow-up Information     Follow up With Specialties Details Why La Hartmann MD Family Medicine Go to  recheck of chest pain and shortness of breath [address]      Nakia Castle DPM Podiatry Call in 1 day furhter followup of left great toe wound Daryl [address]  [address], [address] [phone]            Discharge Medication List as of 5/9/2023  2:05 AM      CONTINUE these medications which have NOT CHANGED    Details   acetaminophen (TYLENOL) 325 mg tablet Take 2 tablets (650 mg total) by mouth every 6 (six) hours as needed for mild pain, headaches or fever, Starting Mon 12/13/2021, No Print      albuterol (2 5 mg/3 mL) 0 083 % nebulizer solution USE 1 UNIT DOSE IN NEBULIZER EVERY 4 HOURS AS NEEDED , Historical Med      albuterol (PROVENTIL HFA,VENTOLIN HFA) 90 mcg/act inhaler Inhale 2 puffs every 6 (six) hours as needed for wheezing, Starting Mon 12/13/2021, No Print      atorvastatin (LIPITOR) 20 mg tablet TAKE ONE TABLET BY MOUTH EVERY DAY, Normal      !!  BD Pen Needle Mamta U/F 32G X 4 MM MISC USE AS DIRECTED, Normal      Blood Glucose Monitoring Suppl (ONE TOUCH ULTRA 2) w/Device KIT by Does not apply route 2 (two) times a day, Starting Wed 6/6/2018, Normal      clindamycin (CLEOCIN T) 1 % lotion clindamycin 1 % lotion   APPLY LOTION TOPICALLY TWICE DAILY, Historical Med      DULoxetine (CYMBALTA) 60 mg delayed release capsule Take 1 capsule (60 mg total) by mouth daily, Starting Wed 11/23/2022, Normal      Eliquis 5 MG TAKE 1 TABLET BY MOUTH TWICE DAILY, Normal      ergocalciferol (VITAMIN D2) 50,000 units TAKE ONE CAPSULE BY MOUTH WEEKLY, Normal      fluticasone-salmeterol (ADVAIR) 250-50 mcg/dose Inhale 1 puff every 12 (twelve) hours , Historical Med      folic acid (FOLVITE) 1 mg tablet TAKE ONE TABLET BY MOUTH ONCE DAILY, Normal furosemide (LASIX) 40 mg tablet TAKE ONE TABLET BY MOUTH EVERY DAY, Normal      glucose blood (OneTouch Ultra) test strip Test 3 times daily   Dx: E11 65, Normal      insulin degludec Jose Angel Avila FlexTouch) 100 units/mL injection pen Inject 40 Units under the skin daily, Starting Mon 1/30/2023, Normal      !!  Insulin Pen Needle 33G X 4 MM MISC by Does not apply route daily, Starting Tue 5/21/2019, Normal      Januvia 100 MG tablet TAKE ONE TABLET BY MOUTH EVERY DAY, Normal      Lancets (OneTouch Delica Plus [id number]) MISC Test 2 times a day, Normal      levothyroxine 137 mcg tablet TAKE ONE TABLET BY MOUTH EVERY DAY, Normal      lisinopril (ZESTRIL) 40 mg tablet TAKE ONE TABLET BY MOUTH EVERY DAY, Normal      methotrexate 2 5 MG tablet TAKE 8 TABLETS BY MOUTH EVERY SUNDAY, Normal      mirtazapine (REMERON) 15 mg tablet Take 1 tablet (15 mg total) by mouth daily at bedtime, Starting Thu 7/7/2022, Normal      mometasone (ELOCON) 0 1 % ointment Apply topically 2 (two) times a day as needed (itching), Starting Thu 4/25/2019, Normal      mupirocin (BACTROBAN) 2 % ointment Apply topically 2 (two) times a day, Starting Thu 3/17/2022, Normal      nystatin (MYCOSTATIN) cream Apply topically 2 (two) times a day, Starting Tue 3/1/2022, Normal      nystatin (MYCOSTATIN) powder Apply topically 3 (three) times a day as needed (rash/irritation), Starting Wed 3/2/2022, Normal      Ozempic, 0 25 or 0 5 MG/DOSE, 2 MG/1 5ML injection pen INJECT 0 5MG UNDER THE SKIN ONCE A WEEK, Normal      pantoprazole (PROTONIX) 40 mg tablet TAKE ONE TABLET BY MOUTH ONCE DAILY, Normal      Polyethylene Glycol 3350-GRX POWD Take by mouth daily at bedtime as needed (constipation), Starting Thu 1/16/2020, No Print      tiotropium (SPIRIVA RESPIMAT) 2 5 MCG/ACT AERS inhaler Inhale 1 puff daily at bedtime, Starting Fri 12/28/2018, Normal      Tofacitinib Citrate ER 11 MG TB24 Take 1 tablet by mouth daily, Historical Med      vitamin B-12 (VITAMIN B-12) 500 MCG tablet Take 1 tablet (500 mcg total) by mouth daily, Starting Thu 1/16/2020, No Print       !! - Potential duplicate medications found  Please discuss with provider  No discharge procedures on file      PDMP Review     None          ED Provider  Electronically Signed by           Satish Mullen MD  05/09/23 7411

## 2023-05-09 NOTE — DISCHARGE INSTRUCTIONS
See your podiatrist this week  Xeroform dressing (yellow stuff) to wound with dry dressing  Cast shoe until recheck with podiatry  See your family doctor for recheck of chest pain  Return with fever drainage swelling of right great toe any recurrent chest pain shortness of breath or any new or worsening symptoms

## 2023-05-10 ENCOUNTER — VBI (OUTPATIENT)
Dept: ADMINISTRATIVE | Facility: OTHER | Age: 73
End: 2023-05-10

## 2023-05-10 NOTE — LETTER
[facility]  [address] [facility] [address]    Date: 05/12/23  Thalia Mo  [address] [place] [address]    Dear Pavithra Ahumada: Thank you for choosing [facility] emergency department for care  Your primary care provider wants to make sure that your ongoing medical care is being addressed  If you require follow up care as a result of your emergency department visit, there are a few things the practice would like you to know  As part of the network's continuing commitment to caring for our patients, we have added more same day appointments and have extended office hours to meet your medical needs  After hours, on-call physicians are available via your primary care provider's main office line  We encourage you to contact our office prior to seeking treatment to discuss your symptoms with the medical staff  Together, we can determine the correct course of action  A majority of non-emergent conditions such as: common cold, flu-like symptoms, fevers, strains/sprains, dislocations, minor burns, cuts and animal bites can be treated at [facility] facilities  Diagnostic testing is available at some sites  Of course, if you are experiencing a life threatening medical emergency call 911 or proceed directly to the nearest emergency room      [facility] facility is conveniently located at:    [address]  [place], [address] [facility]  [phone]  SKIP THE WAIT  Conveniently offered at most [address] [address] your spot online at www [facility] org/[email or web address]-[email or web address]/locations or on the [facility]    Sincerely,  [facility]

## 2023-05-10 NOTE — TELEPHONE ENCOUNTER
Bethanie Sultana    ED Visit Information     Ed visit date: 5/8/2023  Diagnosis Description: Foot Pain   In Network? Yes [facility]  Discharge status: Home  Discharged with meds ? No  Number of ED visits to date: 1  ED Severity:3     Outreach Information    Outreach successful: No 3  Date letter mailed:yes  Via my chart  Date Finalized:5-12-23    Care Coordination    Follow up appointment with pcp: no 0  Transportation issues ?  NA    [facility] Outreach    Outreach type: 7 Day Outreach  Emergent necessity warranted by diagnosis: No  [facility] PCP: Yes  Practice Contacted Patient ?: No  Pt had ED follow up with pcp/staff ?: No    05/10/2023 01:19 PM EDT by Guilherme Aguilar MA - Left Message  05/11/2023 10:45 AM EDT by Cong Alcala - Left Message  05/12/2023 09:23 AM EDT by Cong Alcala - Left Message- att x3 CN letter will be sent

## 2023-05-16 ENCOUNTER — OFFICE VISIT (OUTPATIENT)
Dept: INTERNAL MEDICINE CLINIC | Facility: CLINIC | Age: 73
End: 2023-05-16

## 2023-05-16 VITALS
DIASTOLIC BLOOD PRESSURE: 88 MMHG | HEART RATE: 100 BPM | SYSTOLIC BLOOD PRESSURE: 146 MMHG | BODY MASS INDEX: 41.8 KG/M2 | HEIGHT: 65 IN | OXYGEN SATURATION: 91 % | WEIGHT: 250.9 LBS | TEMPERATURE: 98.3 F

## 2023-05-16 DIAGNOSIS — J96.11 CHRONIC RESPIRATORY FAILURE WITH HYPOXIA (HCC): ICD-10-CM

## 2023-05-16 DIAGNOSIS — J44.1 COPD EXACERBATION (HCC): ICD-10-CM

## 2023-05-16 DIAGNOSIS — E03.9 HYPOTHYROIDISM, UNSPECIFIED TYPE: ICD-10-CM

## 2023-05-16 DIAGNOSIS — N39.3 FEMALE STRESS INCONTINENCE: ICD-10-CM

## 2023-05-16 DIAGNOSIS — I26.99 ACUTE PULMONARY EMBOLISM, UNSPECIFIED PULMONARY EMBOLISM TYPE, UNSPECIFIED WHETHER ACUTE COR PULMONALE PRESENT (HCC): ICD-10-CM

## 2023-05-16 DIAGNOSIS — M06.9 RHEUMATOID ARTHRITIS, INVOLVING UNSPECIFIED SITE, UNSPECIFIED WHETHER RHEUMATOID FACTOR PRESENT (HCC): ICD-10-CM

## 2023-05-16 DIAGNOSIS — E55.9 VITAMIN D DEFICIENCY: ICD-10-CM

## 2023-05-16 DIAGNOSIS — L97.511 SKIN ULCER OF SECOND TOE OF RIGHT FOOT, LIMITED TO BREAKDOWN OF SKIN (HCC): ICD-10-CM

## 2023-05-16 DIAGNOSIS — R60.9 PERIPHERAL EDEMA: ICD-10-CM

## 2023-05-16 DIAGNOSIS — Z79.4 TYPE 2 DIABETES MELLITUS WITH HYPERGLYCEMIA, WITH LONG-TERM CURRENT USE OF INSULIN (HCC): Primary | ICD-10-CM

## 2023-05-16 DIAGNOSIS — K21.9 GASTROESOPHAGEAL REFLUX DISEASE: ICD-10-CM

## 2023-05-16 DIAGNOSIS — B37.2 CANDIDAL DERMATITIS: ICD-10-CM

## 2023-05-16 DIAGNOSIS — E66.01 MORBID OBESITY WITH BMI OF 40.0-44.9, ADULT (HCC): ICD-10-CM

## 2023-05-16 DIAGNOSIS — E11.65 TYPE 2 DIABETES MELLITUS WITH HYPERGLYCEMIA, WITH LONG-TERM CURRENT USE OF INSULIN (HCC): Primary | ICD-10-CM

## 2023-05-16 DIAGNOSIS — I50.32 CHRONIC DIASTOLIC (CONGESTIVE) HEART FAILURE (HCC): ICD-10-CM

## 2023-05-16 DIAGNOSIS — Z59.9 FINANCIAL DIFFICULTIES: ICD-10-CM

## 2023-05-16 DIAGNOSIS — B37.49 CANDIDAL UTI (URINARY TRACT INFECTION): ICD-10-CM

## 2023-05-16 DIAGNOSIS — E53.8 VITAMIN B12 DEFICIENCY: ICD-10-CM

## 2023-05-16 DIAGNOSIS — I10 ESSENTIAL HYPERTENSION: ICD-10-CM

## 2023-05-16 DIAGNOSIS — E78.2 MIXED HYPERLIPIDEMIA: ICD-10-CM

## 2023-05-16 RX ORDER — DOXYCYCLINE 100 MG/1
100 CAPSULE ORAL 2 TIMES DAILY
Qty: 14 CAPSULE | Refills: 0 | Status: SHIPPED | OUTPATIENT
Start: 2023-05-16 | End: 2023-05-23

## 2023-05-16 RX ORDER — NYSTATIN 100000 U/G
CREAM TOPICAL
Qty: 30 G | Refills: 0 | Status: SHIPPED | OUTPATIENT
Start: 2023-05-16

## 2023-05-16 SDOH — ECONOMIC STABILITY - INCOME SECURITY: PROBLEM RELATED TO HOUSING AND ECONOMIC CIRCUMSTANCES, UNSPECIFIED: Z59.9

## 2023-05-16 NOTE — PATIENT INSTRUCTIONS
Medicare Preventive Visit Patient Instructions  Thank you for completing your Welcome to Medicare Visit or Medicare Annual Wellness Visit today  Your next wellness visit will be due in one year (5/16/2024)  The screening/preventive services that you may require over the next 5-10 years are detailed below  Some tests may not apply to you based off risk factors and/or age  Screening tests ordered at today's visit but not completed yet may show as past due  Also, please note that scanned in results may not display below  Preventive Screenings:  Service Recommendations Previous Testing/Comments   Colorectal Cancer Screening  * Colonoscopy    * Fecal Occult Blood Test (FOBT)/Fecal Immunochemical Test (FIT)  * Fecal DNA/Cologuard Test  * Flexible Sigmoidoscopy Age: 39-70 years old   Colonoscopy: every 10 years (may be performed more frequently if at higher risk)  OR  FOBT/FIT: every 1 year  OR  Cologuard: every 3 years  OR  Sigmoidoscopy: every 5 years  Screening may be recommended earlier than age 39 if at higher risk for colorectal cancer  Also, an individualized decision between you and your healthcare provider will decide whether screening between the ages of 74-80 would be appropriate  Colonoscopy: 04/30/2008  FOBT/FIT: Not on file  Cologuard: Not on file  Sigmoidoscopy: Not on file          Breast Cancer Screening Age: 36 years old  Frequency: every 1-2 years  Not required if history of left and right mastectomy Mammogram: 07/12/2022    Screening Current   Cervical Cancer Screening Between the ages of 21-29, pap smear recommended once every 3 years  Between the ages of 33-67, can perform pap smear with HPV co-testing every 5 years     Recommendations may differ for women with a history of total hysterectomy, cervical cancer, or abnormal pap smears in past  Pap Smear: Not on file    History Cervical Cancer   Hepatitis C Screening Once for adults born between 1945 and 1965  More frequently in patients at high risk for Hepatitis C Hep C Antibody: 11/04/2016    Screening Current   Diabetes Screening 1-2 times per year if you're at risk for diabetes or have pre-diabetes Fasting glucose: 138 mg/dL (7/12/2022)  A1C: 7 1 % (7/12/2022)  Screening Not Indicated  History Diabetes   Cholesterol Screening Once every 5 years if you don't have a lipid disorder  May order more often based on risk factors  Lipid panel: 07/12/2022    Screening Not Indicated  History Lipid Disorder     Other Preventive Screenings Covered by Medicare:  1  Abdominal Aortic Aneurysm (AAA) Screening: covered once if your at risk  You're considered to be at risk if you have a family history of AAA  2  Lung Cancer Screening: covers low dose CT scan once per year if you meet all of the following conditions: (1) Age 50-69; (2) No signs or symptoms of lung cancer; (3) Current smoker or have quit smoking within the last 15 years; (4) You have a tobacco smoking history of at least 20 pack years (packs per day multiplied by number of years you smoked); (5) You get a written order from a healthcare provider  3  Glaucoma Screening: covered annually if you're considered high risk: (1) You have diabetes OR (2) Family history of glaucoma OR (3)  aged 48 and older OR (3)  American aged 72 and older  3  Osteoporosis Screening: covered every 2 years if you meet one of the following conditions: (1) You're estrogen deficient and at risk for osteoporosis based off medical history and other findings; (2) Have a vertebral abnormality; (3) On glucocorticoid therapy for more than 3 months; (4) Have primary hyperparathyroidism; (5) On osteoporosis medications and need to assess response to drug therapy  · Last bone density test (DXA Scan): 08/10/2016   5  HIV Screening: covered annually if you're between the age of 15-65  Also covered annually if you are younger than 13 and older than 72 with risk factors for HIV infection   For pregnant patients, it is covered up to 3 times per pregnancy  Immunizations:  Immunization Recommendations   Influenza Vaccine Annual influenza vaccination during flu season is recommended for all persons aged >= 6 months who do not have contraindications   Pneumococcal Vaccine   * Pneumococcal conjugate vaccine = PCV13 (Prevnar 13), PCV15 (Vaxneuvance), PCV20 (Prevnar 20)  * Pneumococcal polysaccharide vaccine = PPSV23 (Pneumovax) Adults 25-60 years old: 1-3 doses may be recommended based on certain risk factors  Adults 72 years old: 1-2 doses may be recommended based off what pneumonia vaccine you previously received   Hepatitis B Vaccine 3 dose series if at intermediate or high risk (ex: diabetes, end stage renal disease, liver disease)   Tetanus (Td) Vaccine - COST NOT COVERED BY MEDICARE PART B Following completion of primary series, a booster dose should be given every 10 years to maintain immunity against tetanus  Td may also be given as tetanus wound prophylaxis  Tdap Vaccine - COST NOT COVERED BY MEDICARE PART B Recommended at least once for all adults  For pregnant patients, recommended with each pregnancy  Shingles Vaccine (Shingrix) - COST NOT COVERED BY MEDICARE PART B  2 shot series recommended in those aged 48 and above     Health Maintenance Due:      Topic Date Due   • Colorectal Cancer Screening  04/30/2018   • Lung Cancer Screening  02/06/2023   • Breast Cancer Screening: Mammogram  07/12/2024   • Hepatitis C Screening  Completed     Immunizations Due:      Topic Date Due   • COVID-19 Vaccine (4 - Booster for Ilda Hickory series) 01/03/2022     Advance Directives   What are advance directives? Advance directives are legal documents that state your wishes and plans for medical care  These plans are made ahead of time in case you lose your ability to make decisions for yourself  Advance directives can apply to any medical decision, such as the treatments you want, and if you want to donate organs     What are the types of advance directives? There are many types of advance directives, and each state has rules about how to use them  You may choose a combination of any of the following:  · Living will: This is a written record of the treatment you want  You can also choose which treatments you do not want, which to limit, and which to stop at a certain time  This includes surgery, medicine, IV fluid, and tube feedings  · Durable power of  for healthcare [facility]): This is a written record that states who you want to make healthcare choices for you when you are unable to make them for yourself  This person, called a proxy, is usually a family member or a friend  You may choose more than 1 proxy  · Do not resuscitate (DNR) order:  A DNR order is used in case your heart stops beating or you stop breathing  It is a request not to have certain forms of treatment, such as CPR  A DNR order may be included in other types of advance directives  · Medical directive: This covers the care that you want if you are in a coma, near death, or unable to make decisions for yourself  You can list the treatments you want for each condition  Treatment may include pain medicine, surgery, blood transfusions, dialysis, IV or tube feedings, and a ventilator (breathing machine)  · Values history: This document has questions about your views, beliefs, and how you feel and think about life  This information can help others choose the care that you would choose  Why are advance directives important? An advance directive helps you control your care  Although spoken wishes may be used, it is better to have your wishes written down  Spoken wishes can be misunderstood, or not followed  Treatments may be given even if you do not want them  An advance directive may make it easier for your family to make difficult choices about your care  Fall Prevention    Fall prevention  includes ways to make your home and other areas safer   It also includes ways you can move more carefully to prevent a fall  Health conditions that cause changes in your blood pressure, vision, or muscle strength and coordination may increase your risk for falls  Medicines may also increase your risk for falls if they make you dizzy, weak, or sleepy  Fall prevention tips:   · Stand or sit up slowly  · Use assistive devices as directed  · Wear shoes that fit well and have soles that   · Wear a personal alarm  · Stay active  · Manage your medical conditions  Home Safety Tips:  · Add items to prevent falls in the bathroom  · Keep paths clear  · Install bright lights in your home  · Keep items you use often on shelves within reach  · Paint or place reflective tape on the edges of your stairs  Urinary Incontinence   Urinary incontinence (UI)  is when you lose control of your bladder  UI develops because your bladder cannot store or empty urine properly  The 3 most common types of UI are stress incontinence, urge incontinence, or both  Medicines:   · May be given to help strengthen your bladder control  Report any side effects of medication to your healthcare provider  Do pelvic muscle exercises often:  Your pelvic muscles help you stop urinating  Squeeze these muscles tight for 5 seconds, then relax for 5 seconds  Gradually work up to squeezing for 10 seconds  Do 3 sets of 15 repetitions a day, or as directed  This will help strengthen your pelvic muscles and improve bladder control  Train your bladder:  Go to the bathroom at set times, such as every 2 hours, even if you do not feel the urge to go  You can also try to hold your urine when you feel the urge to go  For example, hold your urine for 5 minutes when you feel the urge to go  As that becomes easier, hold your urine for 10 minutes  Self-care:   · Keep a UI record  Write down how often you leak urine and how much you leak  Make a note of what you were doing when you leaked urine    · Drink liquids as directed  You may need to limit the amount of liquid you drink to help control your urine leakage  Do not drink any liquid right before you go to bed  Limit or do not have drinks that contain caffeine or alcohol  · Prevent constipation  Eat a variety of high-fiber foods  Good examples are high-fiber cereals, beans, vegetables, and whole-grain breads  Walking is the best way to trigger your intestines to have a bowel movement  · Exercise regularly and maintain a healthy weight  Weight loss and exercise will decrease pressure on your bladder and help you control your leakage  · Use a catheter as directed  to help empty your bladder  A catheter is a tiny, plastic tube that is put into your bladder to drain your urine  · Go to behavior therapy as directed  Behavior therapy may be used to help you learn to control your urge to urinate  Weight Management   Why it is important to manage your weight:  Being overweight increases your risk of health conditions such as heart disease, high blood pressure, type 2 diabetes, and certain types of cancer  It can also increase your risk for osteoarthritis, sleep apnea, and other respiratory problems  Aim for a slow, steady weight loss  Even a small amount of weight loss can lower your risk of health problems  How to lose weight safely:  A safe and healthy way to lose weight is to eat fewer calories and get regular exercise  You can lose up about 1 pound a week by decreasing the number of calories you eat by 500 calories each day  Healthy meal plan for weight management:  A healthy meal plan includes a variety of foods, contains fewer calories, and helps you stay healthy  A healthy meal plan includes the following:  · Eat whole-grain foods more often  A healthy meal plan should contain fiber  Fiber is the part of grains, fruits, and vegetables that is not broken down by your body  Whole-grain foods are healthy and provide extra fiber in your diet   Some examples of whole-grain foods are whole-wheat breads and pastas, oatmeal, brown rice, and bulgur  · Eat a variety of vegetables every day  Include dark, leafy greens such as spinach, kale, laci greens, and mustard greens  Eat yellow and orange vegetables such as carrots, sweet potatoes, and winter squash  · Eat a variety of fruits every day  Choose fresh or canned fruit (canned in its own juice or light syrup) instead of juice  Fruit juice has very little or no fiber  · Eat low-fat dairy foods  Drink fat-free (skim) milk or 1% milk  Eat fat-free yogurt and low-fat cottage cheese  Try low-fat cheeses such as mozzarella and other reduced-fat cheeses  · Choose meat and other protein foods that are low in fat  Choose beans or other legumes such as split peas or lentils  Choose fish, skinless poultry (chicken or turkey), or lean cuts of red meat (beef or pork)  Before you cook meat or poultry, cut off any visible fat  · Use less fat and oil  Try baking foods instead of frying them  Add less fat, such as margarine, sour cream, regular salad dressing and mayonnaise to foods  Eat fewer high-fat foods  Some examples of high-fat foods include french fries, doughnuts, ice cream, and cakes  · Eat fewer sweets  Limit foods and drinks that are high in sugar  This includes candy, cookies, regular soda, and sweetened drinks  Exercise:  Exercise at least 30 minutes per day on most days of the week  Some examples of exercise include walking, biking, dancing, and swimming  You can also fit in more physical activity by taking the stairs instead of the elevator or parking farther away from stores  Ask your healthcare provider about the best exercise plan for you  © Copyright [employer] 2018 Information is for End User's use only and may not be sold, redistributed or otherwise used for commercial purposes   All illustrations and images included in CareNotes® are the copyrighted property of [employer]  or [employer] [address]

## 2023-05-16 NOTE — PROGRESS NOTES
Assessment and Plan:     Problem List Items Addressed This Visit        Endocrine    Type 2 diabetes mellitus with hyperglycemia, with long-term current use of insulin ([facility] [address] ) - Primary    Relevant Orders    CBC and differential    Comprehensive metabolic panel    Hemoglobin A1C    Lipid panel    Albumin / creatinine urine ratio    Hypothyroidism    Relevant Orders    CBC and differential    Lipid panel    TSH, 3rd generation       Respiratory    Chronic respiratory failure with hypoxia (HCC)    Relevant Orders    CBC and differential    COPD exacerbation ([facility])    Relevant Medications    doxycycline monohydrate (MONODOX) 100 mg capsule    Other Relevant Orders    CBC and differential       Cardiovascular and Mediastinum    Chronic diastolic (congestive) heart failure (HCC)    Relevant Orders    CBC and differential       Musculoskeletal and Integument    Rheumatoid arthritis ([facility] )    Relevant Orders    CBC and differential    Skin ulcer of second toe of right foot, limited to breakdown of skin ([facility])    Relevant Orders    CBC and differential    VAS lower limb arterial duplex, complete bilateral       Other    Morbid obesity with BMI of 40 0-44 9, adult ([facility])    Relevant Orders    CBC and differential    Vitamin B12 deficiency    Relevant Orders    CBC and differential    Vitamin B12    Vitamin D deficiency    Relevant Orders    CBC and differential    Vitamin D 25 hydroxy   Other Visit Diagnoses     Financial difficulties        Relevant Orders    Ambulatory referral to social work care management program    Female stress incontinence            Orders and recommendations as noted above  Discussed with her the importance of getting her laboratory testing completed  Follow-up with podiatry as scheduled later this week  Right foot ulceration near crack and a callus discussed  There may be some early infection  We will start her on doxycycline not only for the foot but also for her ongoing respiratory symptoms  Watch for any worsening  Keep the area of her foot clean and covered  Watch for any worsening  Importance of better blood sugar control discussed  We will check laboratory testing since she is well overdue  Continue with the insulin as well as the Januvia and the Ozempic  Discussed with her and her granddaughter increasing the dose of the Ozempic after completing her laboratory testing  Watch carbohydrate intake  Follow-up with ophthalmology regularly  Check feet daily  Follow-up with podiatry as planned  Continue current dose of levothyroxine  We will check TSH and adjust dose if needed  Watch for any increased shortness of breath  Continue with the oxygen regularly  Continue with the inhalers and nebulizer treatments as previously  Watch for any increased shortness of breath, increased peripheral edema, or sudden increases in weight  Continue to follow-up with rheumatology regularly  Continue with the methotrexate  Restart the Waleska Lange as soon as she receives this  Continue with the calcium and vitamin D supplementation  Continue with the vitamin B12 supplementation  We will check these levels with upcoming laboratory testing  We will have her follow-up in about 1 month or sooner if needed  BMI Counseling: Body mass index is 41 75 kg/m²  The BMI is above normal  Nutrition recommendations include decreasing portion sizes, encouraging healthy choices of fruits and vegetables, decreasing fast food intake, consuming healthier snacks, limiting drinks that contain sugar, moderation in carbohydrate intake and reducing intake of cholesterol  Exercise recommendations include exercising 3-5 times per week  Rationale for BMI follow-up plan is due to patient being overweight or obese  Depression Screening and Follow-up Plan: Patient was screened for depression during today's encounter  They screened negative with a PHQ-2 score of 0      Urinary Incontinence Plan of Care: counseling topics discussed: use restroom every 2 hours and limiting fluid intake 3-4 hours before bed  Preventive health issues were discussed with patient, and age appropriate screening tests were ordered as noted in patient's After Visit Summary  Personalized health advice and appropriate referrals for health education or preventive services given if needed, as noted in patient's After Visit Summary  History of Present Illness:     Patient presents for a Medicare Wellness Visit    She presents for routine follow-up as well as follow-up after recent emergency room visit and Medicare wellness  Has been having increasing difficulty with her right foot  Has had calluses but has not had any cracking or sores in the area until recently  Had noticed some pain and an open area on her right first toe  Callus  Had been evaluated through the emergency room  X-rays were completed which were negative  She has some pain over the area and has difficulty with walking  Is wearing a postop shoe  Does have follow-up with podiatry later this week  Has noticed some increased cough and congestion  Some postnasal drip  Denies any fevers or chills  Has had some increased joint symptoms with her rheumatoid arthritis  Continues with the methotrexate  Has not had the [place] because of difficulty obtaining the medication and with the patient assistance program through rheumatology  Does have upcoming appointment with rheumatology  Continues with her nebulizer treatments if needed  Continues with the inhalers as previously  Cough has been more productive  Continues to use the oxygen regularly  Appetite has been relatively stable  Blood sugars have been more elevated  Usually running in at least the 200s but often into the 300s  Continues with the Januvia  Continues with the Ozempic  Continues with the insulin  Denies any hypoglycemic episodes       Patient Care Team:  Rosalie Peñaloza MD as PCP - Victorino Arce Niles Salgado MD as PCP - [facility]CP-[facility] ([facility])  MD Bridget Wallace MD     Review of Systems:     Review of Systems   Constitutional: Positive for activity change and fatigue  Negative for appetite change, chills and fever  HENT: Negative for congestion and rhinorrhea  Eyes: Negative for visual disturbance  Respiratory: Positive for cough and shortness of breath  Negative for chest tightness  Cardiovascular: Negative for chest pain and palpitations  Gastrointestinal: Negative for abdominal pain, blood in stool, diarrhea, nausea and vomiting  Endocrine: Negative for polydipsia, polyphagia and polyuria  As per HPI   Genitourinary: Negative for dysuria, frequency and urgency  Musculoskeletal: Positive for arthralgias, gait problem and myalgias  Skin: Positive for wound  Negative for color change  Neurological: Negative for dizziness and headaches  Hematological: Does not bruise/bleed easily  Psychiatric/Behavioral: Positive for sleep disturbance  Negative for confusion  The patient is not nervous/anxious           Problem List:     Patient Active Problem List   Diagnosis   • Centrilobular emphysema (HCC)   • Type 2 diabetes mellitus with hyperglycemia, with long-term current use of insulin ([facility] [address] )   • Hypothyroidism   • Rheumatoid arthritis ([facility] [address] )   • Essential hypertension   • Hyperlipidemia   • Gastroesophageal reflux disease without esophagitis   • Hypoalbuminemia   • Hepatic steatosis   • Atelectasis   • Morbid obesity with BMI of 40 0-44 9, adult ([facility])   • Anxiety   • Candidiasis, cutaneous   • Constipation   • Fibromyalgia   • Osteoarthritis   • Osteopenia   • Renal cyst   • Vitamin B12 deficiency   • Vitamin D deficiency   • Loculated pleural effusion   • Candidal dermatitis   • Chronic respiratory failure with hypoxia ([facility])   • Hyponatremia   • History of exposure to asbestos   • Hypoxemia requiring supplemental oxygen   • Macrocytic anemia   • Diastolic dysfunction   • Postherpetic neuralgia   • Migraine without aura and without status migrainosus, not intractable   • Primary insomnia   • Chronic sinusitis   • Acute pulmonary embolism ([facility])   • Chronic diastolic (congestive) heart failure ([facility])   • MORENO (obstructive sleep apnea)   • Hilar lymphadenopathy   • Atherosclerosis   • T12 compression fracture ([facility])   • COVID-19   • Dependence on continuous supplemental oxygen   • Excessive anticoagulation   • Pre-op evaluation   • Recurrent UTI   • Skin ulcer of second toe of right foot, limited to breakdown of skin ([facility] )   • COPD exacerbation ([facility])      Past Medical and Surgical History:     Past Medical History:   Diagnosis Date   • Arthritis    • Arthritis    • Cancer ([place] [address] )    • Candidiasis of skin    • Cervical cancer ([place] [address] )    • Chronic respiratory failure with hypoxia and hypercapnia ([facility]) 4/20/2019   • COPD (chronic obstructive pulmonary disease) ([facility] )     last assessed 11/21/2016   • Current chronic use of systemic steroids    • Degenerative arthritis of left knee     last assessed 1/20/2015   • Diabetes mellitus ([place] [address] )    • Disease of thyroid gland     hypo pill given to decrease thyroid      • Fibromyalgia    • Fistula of knee     last assessed 9/12/2014   • GERD (gastroesophageal reflux disease)    • Hyperlipidemia    • Hypertension    • Medial meniscus tear     of left knee, last assessed 9/12/2014   • Migraine     states she has a hx of HA that she calls Pearlfection but never was dx by neurologist   • Obesity    • Obesity    • Osteoarthritis    • Osteopenia    • Pneumonia     last assessed 11/16/2016   • Psychiatric disorder     anxiety   • Rheumatoid arthritis ([facility] [address] )    • Rheumatoid arthritis ([facility] )    • Sepsis ([facility] )     last assessed 11/10/2016   • Tick bite     last assessed 10/30/2015   • Vitamin B12 deficiency    • Vitamin D deficiency      Past Surgical History:   Procedure Laterality Date   • CATARACT EXTRACTION Bilateral    • CHOLECYSTECTOMY     • EYE "SURGERY      Bilateral Cataracts   • HYSTERECTOMY     • IR THORACENTESIS  5/31/2019   • JOINT REPLACEMENT      left knee   • KNEE ARTHROSCOPY     • NEUROPLASTY / TRANSPOSITION MEDIAN NERVE AT CARPAL TUNNEL     • TUBAL LIGATION        Family History:     Family History   Problem Relation Age of Onset   • Stroke Mother    • No Known Problems Daughter    • No Known Problems Maternal Grandmother    • No Known Problems Maternal Grandfather    • No Known Problems Paternal Grandmother    • No Known Problems Paternal Grandfather    • Asthma Family    • Cancer Family    • Diabetes Family    • Hypertension Family    • No Known Problems Son    • No Known Problems Son    • Addiction problem Son    • Breast cancer Half-Sister 39   • No Known Problems Half-Brother    • No Known Problems Half-Brother    • No Known Problems Maternal Aunt       Social History:     Social History     Socioeconomic History   • Marital status:      Spouse name: None   • Number of children: None   • Years of education: None   • Highest education level: None   Occupational History   • None   Tobacco Use   • Smoking status: Former     Packs/day: 1 00     Years: 48 00     Pack years: 48 00     Types: Cigarettes, E-Cigarettes     Quit date: 11/21/2012     Years since quitting: 10 4   • Smokeless tobacco: Never   • Tobacco comments:     per allscripts - \"current every day smoker, former smoker\"   Vaping Use   • Vaping Use: Former   Substance and Sexual Activity   • Alcohol use: Never     Alcohol/week: 0 0 standard drinks     Comment: stopped drinking alcohol   • Drug use: Never   • Sexual activity: Never   Other Topics Concern   • None   Social History Narrative    No living will     Social Determinants of Health     Financial Resource Strain: Medium Risk   • Difficulty of Paying Living Expenses: Somewhat hard   Food Insecurity: Not on file   Transportation Needs: No Transportation Needs   • Lack of Transportation (Medical):  No   • Lack of " Transportation (Non-Medical): No   Physical Activity: Not on file   Stress: Not on file   Social Connections: Not on file   Intimate Partner Violence: Not on file   Housing Stability: Not on file      Medications and Allergies:     Current Outpatient Medications   Medication Sig Dispense Refill   • acetaminophen (TYLENOL) 325 mg tablet Take 2 tablets (650 mg total) by mouth every 6 (six) hours as needed for mild pain, headaches or fever  0   • albuterol (2 5 mg/3 mL) 0 083 % nebulizer solution USE 1 UNIT DOSE IN NEBULIZER EVERY 4 HOURS AS NEEDED       • albuterol (PROVENTIL HFA,VENTOLIN HFA) 90 mcg/act inhaler Inhale 2 puffs every 6 (six) hours as needed for wheezing  0   • atorvastatin (LIPITOR) 20 mg tablet TAKE ONE TABLET BY MOUTH EVERY DAY 90 tablet 0   • BD Pen Needle Mamta U/F 32G X 4 MM MISC USE AS DIRECTED 100 each 0   • Blood Glucose Monitoring Suppl (ONE TOUCH ULTRA 2) w/Device KIT by Does not apply route 2 (two) times a day 1 each 0   • doxycycline monohydrate (MONODOX) 100 mg capsule Take 1 capsule (100 mg total) by mouth 2 (two) times a day for 7 days 14 capsule 0   • DULoxetine (CYMBALTA) 60 mg delayed release capsule Take 1 capsule (60 mg total) by mouth daily 90 capsule 1   • Eliquis 5 MG TAKE 1 TABLET BY MOUTH TWICE DAILY 60 tablet 0   • ergocalciferol (VITAMIN D2) 50,000 units TAKE ONE CAPSULE BY MOUTH WEEKLY 12 capsule 0   • fluticasone-salmeterol (ADVAIR) 250-50 mcg/dose Inhale 1 puff every 12 (twelve) hours      • folic acid (FOLVITE) 1 mg tablet TAKE ONE TABLET BY MOUTH ONCE DAILY 90 tablet 0   • furosemide (LASIX) 40 mg tablet TAKE ONE TABLET BY MOUTH EVERY DAY 60 tablet 0   • glucose blood (OneTouch Ultra) test strip Test 3 times daily   Dx: E11 65 300 each 3   • insulin degludec Germaine Rosario FlexTouch) 100 units/mL injection pen Inject 40 Units under the skin daily 30 mL 2   • Insulin Pen Needle 33G X 4 MM MISC by Does not apply route daily 100 each 5   • Januvia 100 MG tablet TAKE ONE TABLET BY MOUTH EVERY DAY 30 tablet 0   • Lancets (OneTouch Delica Plus [id number]) MISC Test 2 times a day 200 each 0   • levothyroxine 137 mcg tablet TAKE ONE TABLET BY MOUTH EVERY DAY 60 tablet 0   • lisinopril (ZESTRIL) 40 mg tablet TAKE ONE TABLET BY MOUTH EVERY DAY 60 tablet 0   • methotrexate 2 5 MG tablet TAKE 8 TABLETS BY MOUTH EVERY SUNDAY 96 tablet 0   • mirtazapine (REMERON) 15 mg tablet Take 1 tablet (15 mg total) by mouth daily at bedtime 90 tablet 3   • Ozempic, 0 25 or 0 5 MG/DOSE, 2 MG/1 5ML injection pen INJECT 0 5MG UNDER THE SKIN ONCE A WEEK 1 5 mL 0   • pantoprazole (PROTONIX) 40 mg tablet TAKE ONE TABLET BY MOUTH ONCE DAILY 90 tablet 0   • Polyethylene Glycol 3350-GRX POWD Take by mouth daily at bedtime as needed (constipation) 850 g 0   • tiotropium (SPIRIVA RESPIMAT) 2 5 MCG/ACT AERS inhaler Inhale 1 puff daily at bedtime 3 Inhaler 3   • Tofacitinib Citrate ER 11 MG TB24 Take 1 tablet by mouth daily     • vitamin B-12 (VITAMIN B-12) 500 MCG tablet Take 1 tablet (500 mcg total) by mouth daily     • clindamycin (CLEOCIN T) 1 % lotion clindamycin 1 % lotion   APPLY LOTION TOPICALLY TWICE DAILY     • mometasone (ELOCON) 0 1 % ointment Apply topically 2 (two) times a day as needed (itching) 45 g 1   • mupirocin (BACTROBAN) 2 % ointment Apply topically 2 (two) times a day 22 g 0   • nystatin (MYCOSTATIN) cream APPLY  CREAM TOPICALLY TWICE DAILY 30 g 0   • nystatin (MYCOSTATIN) powder Apply topically 3 (three) times a day as needed (rash/irritation) 45 g 1     No current facility-administered medications for this visit       No Known Allergies   Immunizations:     Immunization History   Administered Date(s) Administered   • COVID-19 MODERNA VACC 0 5 ML IM 04/06/2021, 05/04/2021, 11/08/2021   • INFLUENZA 10/13/2015, 10/27/2016   • Influenza Split High Dose Preservative Free IM 09/15/2014, 10/13/2015, 10/27/2016, 09/11/2017   • Influenza, Quadrivalent (nasal) 11/06/2016   • Influenza, high dose seasonal 0 7 mL 09/24/2018, 09/30/2019, 09/08/2020, 12/22/2021, 11/21/2022   • Influenza, seasonal, injectable 10/01/2013   • Pneumococcal Conjugate 13-Valent 02/10/2016   • Pneumococcal Polysaccharide PPV23 11/18/2008, 12/02/2013   • Tdap 10/30/2015, 05/09/2023   • Zoster Vaccine Recombinant 05/29/2020, 09/08/2020      Health Maintenance:         Topic Date Due   • Colorectal Cancer Screening  04/30/2018   • Lung Cancer Screening  02/06/2023   • Breast Cancer Screening: Mammogram  07/12/2024   • Hepatitis C Screening  Completed         Topic Date Due   • COVID-19 Vaccine (4 - Booster for Zachary  series) 01/03/2022      Medicare Screening Tests and Risk Assessments:     Luís Bridges is here for her Subsequent Wellness visit  Health Risk Assessment:   Patient rates overall health as fair  Patient feels that their physical health rating is slightly worse  Patient is satisfied with their life  Eyesight was rated as same  Hearing was rated as same  Patient feels that their emotional and mental health rating is same  Patients states they are never, rarely angry  Patient states they are often unusually tired/fatigued  Pain experienced in the last 7 days has been a lot  Patient's pain rating has been 8/10  Patient states that she has experienced no weight loss or gain in last 6 months  Depression Screening:   PHQ-2 Score: 0      Fall Risk Screening: In the past year, patient has experienced: history of falling in past year    Number of falls: 1  Injured during fall?: No      Urinary Incontinence Screening:   Patient has leaked urine accidently in the last six months  Home Safety:  Patient has trouble with stairs inside or outside of their home  Patient has working smoke alarms and has working carbon monoxide detector  Home safety hazards include: none  Nutrition:   Current diet is Regular and Diabetic  Medications:   Patient is currently taking over-the-counter supplements   OTC medications include: see medication list  Patient is able to manage medications  Activities of Daily Living (ADLs)/Instrumental Activities of Daily Living (IADLs):   Walk and transfer into and out of bed and chair?: Yes  Dress and groom yourself?: Yes    Bathe or shower yourself?: Yes    Feed yourself? Yes  Do your laundry/housekeeping?: No  Manage your money, pay your bills and track your expenses?: Yes  Make your own meals?: No    Do your own shopping?: No    Previous Hospitalizations:   Any hospitalizations or ED visits within the last 12 months?: Yes      Advance Care Planning:   Living will: No    Durable POA for healthcare: No    Advanced directive: No    Provider agrees with end of life decisions: Yes      Cognitive Screening:   Provider or family/friend/caregiver concerned regarding cognition?: No    PREVENTIVE SCREENINGS      Cardiovascular Screening:    General: Screening Not Indicated and History Lipid Disorder      Diabetes Screening:     General: Screening Not Indicated and History Diabetes      Colorectal Cancer Screening:     General: Patient Declines      Breast Cancer Screening:     General: Screening Current      Cervical Cancer Screening:    General: History Cervical Cancer      Osteoporosis Screening:    General: Patient Declines      Abdominal Aortic Aneurysm (AAA) Screening:        General: Screening Not Indicated      Lung Cancer Screening:     General: Patient Declines      Hepatitis C Screening:    General: Screening Current    Screening, Brief Intervention, and Referral to Treatment (SBIRT)    Screening  Typical number of drinks in a day: 0  Typical number of drinks in a week: 0  Interpretation: Low risk drinking behavior      Single Item Drug Screening:  How often have you used an illegal drug (including marijuana) or a prescription medication for non-medical reasons in the past year? never    Single Item Drug Screen Score: 0  Interpretation: Negative screen for possible drug use disorder    Brief Intervention  Alcohol & drug "use screenings were reviewed  No concerns regarding substance use disorder identified  No results found  Physical Exam:     /88 (BP Location: Left arm, Patient Position: Sitting, Cuff Size: Large)   Pulse 100   Temp 98 3 °F (36 8 °C)   Ht 5' 5\" (1 651 m)   Wt 114 kg (250 lb 14 4 oz)   SpO2 91% Comment: 3 litres  BMI 41 75 kg/m²     Physical Exam  Vitals and nursing note reviewed  Constitutional:       General: She is not in acute distress  Appearance: She is well-developed and well-groomed  She is morbidly obese  HENT:      Head: Normocephalic and atraumatic  Eyes:      Conjunctiva/sclera: Conjunctivae normal    Cardiovascular:      Rate and Rhythm: Normal rate and regular rhythm  Pulses: Pulses are weak  Dorsalis pedis pulses are 1+ on the right side and 1+ on the left side  Posterior tibial pulses are 0 on the right side and 1+ on the left side  Heart sounds: No murmur heard  Pulmonary:      Effort: Pulmonary effort is normal  No respiratory distress  Breath sounds: Transmitted upper airway sounds present  Decreased breath sounds present  Abdominal:      Palpations: Abdomen is soft  Tenderness: There is no abdominal tenderness  Musculoskeletal:         General: No swelling  Cervical back: Neck supple  Comments: Degenerative changes bilateral hands; diffuse tenderness low back   Feet:      Right foot:      Skin integrity: Callus and dry skin present  No erythema or warmth  Left foot:      Skin integrity: Callus and dry skin present  No erythema or warmth  Skin:     General: Skin is warm and dry  Capillary Refill: Capillary refill takes less than 2 seconds  Neurological:      Mental Status: She is alert  Psychiatric:         Mood and Affect: Mood and affect normal          Speech: Speech normal          Behavior: Behavior is cooperative           Cognition and Memory: Cognition and memory normal         Diabetic Foot " Exam    Patient's shoes and socks removed  Right Foot/Ankle   Right Foot Inspection  Skin Exam: dry skin, callus, abnormal color and callus  Skin not intact, no warmth and no erythema  Toe Exam: ROM and strength within normal limits  Sensory   Monofilament testing: intact    Vascular  Capillary refills: elevated  The right DP pulse is 1+  The right PT pulse is 0  Left Foot/Ankle  Left Foot Inspection  Skin Exam: skin intact, dry skin and callus  No warmth, no erythema and normal color  Toe Exam: ROM and strength within normal limits  Sensory   Monofilament testing: intact    Vascular  Capillary refills: < 3 seconds  The left DP pulse is 1+  The left PT pulse is 1+       Assign Risk Category  Deformity present  No loss of protective sensation  Weak pulses  Risk: 2      Eris Kenyon MD

## 2023-05-17 ENCOUNTER — PATIENT OUTREACH (OUTPATIENT)
Dept: INTERNAL MEDICINE CLINIC | Facility: CLINIC | Age: 73
End: 2023-05-17

## 2023-05-17 LAB
ATRIAL RATE: 106 BPM
P AXIS: 58 DEGREES
PR INTERVAL: 168 MS
QRS AXIS: 52 DEGREES
QRSD INTERVAL: 86 MS
QT INTERVAL: 328 MS
QTC INTERVAL: 435 MS
T WAVE AXIS: 53 DEGREES
VENTRICULAR RATE: 106 BPM

## 2023-05-17 RX ORDER — METHOTREXATE 2.5 MG/1
TABLET ORAL
Qty: 96 TABLET | Refills: 0 | Status: SHIPPED | OUTPATIENT
Start: 2023-05-17

## 2023-05-17 RX ORDER — ERGOCALCIFEROL 1.25 MG/1
CAPSULE ORAL
Qty: 12 CAPSULE | Refills: 0 | Status: SHIPPED | OUTPATIENT
Start: 2023-05-17

## 2023-05-17 RX ORDER — APIXABAN 5 MG/1
TABLET, FILM COATED ORAL
Qty: 60 TABLET | Refills: 0 | Status: SHIPPED | OUTPATIENT
Start: 2023-05-17

## 2023-05-17 RX ORDER — ATORVASTATIN CALCIUM 20 MG/1
TABLET, FILM COATED ORAL
Qty: 90 TABLET | Refills: 0 | Status: SHIPPED | OUTPATIENT
Start: 2023-05-17

## 2023-05-17 RX ORDER — LISINOPRIL 40 MG/1
TABLET ORAL
Qty: 60 TABLET | Refills: 0 | Status: SHIPPED | OUTPATIENT
Start: 2023-05-17

## 2023-05-17 RX ORDER — FUROSEMIDE 40 MG/1
TABLET ORAL
Qty: 60 TABLET | Refills: 0 | Status: SHIPPED | OUTPATIENT
Start: 2023-05-17

## 2023-05-17 RX ORDER — LEVOTHYROXINE SODIUM 137 UG/1
TABLET ORAL
Qty: 90 TABLET | Refills: 0 | Status: SHIPPED | OUTPATIENT
Start: 2023-05-17

## 2023-05-17 NOTE — PROGRESS NOTES
TAI ISBELL reviewed chart  Pt was referred by PCP d/t EM  Pt reported that it is somewhat difficult for her to afford her basic needs  TAI ISBELL placed call to pt to introduce self and discuss needs  Patient did not answer  TAI ISBELL left message asking patient to return call

## 2023-05-22 ENCOUNTER — PATIENT OUTREACH (OUTPATIENT)
Dept: INTERNAL MEDICINE CLINIC | Facility: CLINIC | Age: 73
End: 2023-05-22

## 2023-05-22 NOTE — PROGRESS NOTES
TAI ISBELL made second outreach call to pt to f/u on referral  Patient did not answer  TAI ISBELL left message asking patient to return call  Unable to reach letter will be sent  TAI ISBELL will close referral and remain available for any future needs

## 2023-05-22 NOTE — LETTER
[address]  [place] [address]    Re: Dagoberto Brown to Reach   5/22/2023       Dear Sintia Reaves am a [address] [facility] [address] [facility] Work  and wanted to be certain you had information to contact me should you desire assistance with or have questions about non-medical aspects of your care such as [but not limited to] medical insurance, housing, transportation, material needs, or emergency needs  If I do not have an answer I will assist you in finding the appropriate agency or individual who can help  Please feel free to contact me at [phone]  Thank You      Sincerely,         Damien Higgins LCSW

## 2023-06-13 ENCOUNTER — RA CDI HCC (OUTPATIENT)
Dept: OTHER | Facility: HOSPITAL | Age: 73
End: 2023-06-13

## 2023-06-13 NOTE — PROGRESS NOTES
[facility]  coding opportunities          Chart Reviewed number of suggestions sent to Provider: 1  I11 0     Patients Insurance     Medicare Insurance: [facility]

## 2023-06-14 ENCOUNTER — HOSPITAL ENCOUNTER (OUTPATIENT)
Dept: NON INVASIVE DIAGNOSTICS | Facility: HOSPITAL | Age: 73
Discharge: HOME | End: 2023-06-14
Payer: COMMERCIAL

## 2023-06-14 ENCOUNTER — APPOINTMENT (OUTPATIENT)
Dept: LAB | Facility: HOSPITAL | Age: 73
End: 2023-06-14
Payer: COMMERCIAL

## 2023-06-14 DIAGNOSIS — E55.9 VITAMIN D DEFICIENCY: ICD-10-CM

## 2023-06-14 DIAGNOSIS — E66.01 MORBID OBESITY WITH BMI OF 40.0-44.9, ADULT (HCC): ICD-10-CM

## 2023-06-14 DIAGNOSIS — M06.9 RHEUMATOID ARTHRITIS, INVOLVING UNSPECIFIED SITE, UNSPECIFIED WHETHER RHEUMATOID FACTOR PRESENT (HCC): ICD-10-CM

## 2023-06-14 DIAGNOSIS — L97.511 SKIN ULCER OF SECOND TOE OF RIGHT FOOT, LIMITED TO BREAKDOWN OF SKIN (HCC): ICD-10-CM

## 2023-06-14 DIAGNOSIS — E03.9 HYPOTHYROIDISM, UNSPECIFIED TYPE: ICD-10-CM

## 2023-06-14 DIAGNOSIS — J96.11 CHRONIC RESPIRATORY FAILURE WITH HYPOXIA (HCC): ICD-10-CM

## 2023-06-14 DIAGNOSIS — E53.8 VITAMIN B12 DEFICIENCY: ICD-10-CM

## 2023-06-14 DIAGNOSIS — I50.32 CHRONIC DIASTOLIC (CONGESTIVE) HEART FAILURE (HCC): ICD-10-CM

## 2023-06-14 DIAGNOSIS — E11.65 TYPE 2 DIABETES MELLITUS WITH HYPERGLYCEMIA, WITH LONG-TERM CURRENT USE OF INSULIN (HCC): ICD-10-CM

## 2023-06-14 DIAGNOSIS — J44.1 COPD EXACERBATION (HCC): ICD-10-CM

## 2023-06-14 DIAGNOSIS — Z79.4 TYPE 2 DIABETES MELLITUS WITH HYPERGLYCEMIA, WITH LONG-TERM CURRENT USE OF INSULIN (HCC): ICD-10-CM

## 2023-06-14 LAB
ALBUMIN SERPL BCP-MCNC: 3.8 G/DL (ref 3.5–5)
ALP SERPL-CCNC: 88 U/L (ref 34–104)
ALT SERPL W P-5'-P-CCNC: 24 U/L (ref 7–52)
ANION GAP SERPL CALCULATED.3IONS-SCNC: 5 MMOL/L (ref 4–13)
AST SERPL W P-5'-P-CCNC: 20 U/L (ref 13–39)
BASOPHILS # BLD AUTO: 0.05 THOUSANDS/ÂΜL (ref 0–0.1)
BASOPHILS NFR BLD AUTO: 1 % (ref 0–1)
BILIRUB SERPL-MCNC: 0.39 MG/DL (ref 0.2–1)
BUN SERPL-MCNC: 16 MG/DL (ref 5–25)
CALCIUM SERPL-MCNC: 9.9 MG/DL (ref 8.4–10.2)
CHLORIDE SERPL-SCNC: 94 MMOL/L (ref 96–108)
CHOLEST SERPL-MCNC: 167 MG/DL
CO2 SERPL-SCNC: 35 MMOL/L (ref 21–32)
CREAT SERPL-MCNC: 0.73 MG/DL (ref 0.6–1.3)
EOSINOPHIL # BLD AUTO: 0.37 THOUSAND/ÂΜL (ref 0–0.61)
EOSINOPHIL NFR BLD AUTO: 4 % (ref 0–6)
ERYTHROCYTE [DISTWIDTH] IN BLOOD BY AUTOMATED COUNT: 14.5 % (ref 11.6–15.1)
GFR SERPL CREATININE-BSD FRML MDRD: 80 ML/MIN/1.73SQ M
GLUCOSE SERPL-MCNC: 289 MG/DL (ref 65–140)
HCT VFR BLD AUTO: 40.2 % (ref 34.8–46.1)
HDLC SERPL-MCNC: 47 MG/DL
HGB BLD-MCNC: 13 G/DL (ref 11.5–15.4)
IMM GRANULOCYTES # BLD AUTO: 0.02 THOUSAND/UL (ref 0–0.2)
IMM GRANULOCYTES NFR BLD AUTO: 0 % (ref 0–2)
LDLC SERPL CALC-MCNC: 72 MG/DL (ref 0–100)
LYMPHOCYTES # BLD AUTO: 5.04 THOUSANDS/ÂΜL (ref 0.6–4.47)
LYMPHOCYTES NFR BLD AUTO: 60 % (ref 14–44)
MCH RBC QN AUTO: 31.9 PG (ref 26.8–34.3)
MCHC RBC AUTO-ENTMCNC: 32.3 G/DL (ref 31.4–37.4)
MCV RBC AUTO: 99 FL (ref 82–98)
MONOCYTES # BLD AUTO: 0.63 THOUSAND/ÂΜL (ref 0.17–1.22)
MONOCYTES NFR BLD AUTO: 8 % (ref 4–12)
NEUTROPHILS # BLD AUTO: 2.3 THOUSANDS/ÂΜL (ref 1.85–7.62)
NEUTS SEG NFR BLD AUTO: 27 % (ref 43–75)
NONHDLC SERPL-MCNC: 120 MG/DL
NRBC BLD AUTO-RTO: 0 /100 WBCS
PLATELET # BLD AUTO: 337 THOUSANDS/UL (ref 149–390)
PMV BLD AUTO: 10.2 FL (ref 8.9–12.7)
POTASSIUM SERPL-SCNC: 3.6 MMOL/L (ref 3.5–5.3)
PROT SERPL-MCNC: 7.6 G/DL (ref 6.4–8.4)
RBC # BLD AUTO: 4.07 MILLION/UL (ref 3.81–5.12)
SODIUM SERPL-SCNC: 134 MMOL/L (ref 135–147)
TRIGL SERPL-MCNC: 238 MG/DL
TSH SERPL DL<=0.05 MIU/L-ACNC: 9.09 UIU/ML (ref 0.45–4.5)
WBC # BLD AUTO: 8.41 THOUSAND/UL (ref 4.31–10.16)

## 2023-06-14 PROCEDURE — 93923 UPR/LXTR ART STDY 3+ LVLS: CPT

## 2023-06-14 PROCEDURE — 80053 COMPREHEN METABOLIC PANEL: CPT

## 2023-06-14 PROCEDURE — 93922 UPR/L XTREMITY ART 2 LEVELS: CPT | Performed by: SURGERY

## 2023-06-14 PROCEDURE — 93925 LOWER EXTREMITY STUDY: CPT

## 2023-06-14 PROCEDURE — 84443 ASSAY THYROID STIM HORMONE: CPT

## 2023-06-14 PROCEDURE — 80061 LIPID PANEL: CPT

## 2023-06-14 PROCEDURE — 82607 VITAMIN B-12: CPT

## 2023-06-14 PROCEDURE — 83036 HEMOGLOBIN GLYCOSYLATED A1C: CPT

## 2023-06-14 PROCEDURE — 82306 VITAMIN D 25 HYDROXY: CPT

## 2023-06-14 PROCEDURE — 85025 COMPLETE CBC W/AUTO DIFF WBC: CPT

## 2023-06-14 PROCEDURE — 36415 COLL VENOUS BLD VENIPUNCTURE: CPT

## 2023-06-14 PROCEDURE — 93925 LOWER EXTREMITY STUDY: CPT | Performed by: SURGERY

## 2023-06-15 LAB
25(OH)D3 SERPL-MCNC: 35.3 NG/ML (ref 30–100)
CREAT UR-MCNC: 97.2 MG/DL
EST. AVERAGE GLUCOSE BLD GHB EST-MCNC: 237 MG/DL
HBA1C MFR BLD: 9.9 %
MICROALBUMIN UR-MCNC: 156 MG/L (ref 0–20)
MICROALBUMIN/CREAT 24H UR: 160 MG/G CREATININE (ref 0–30)
VIT B12 SERPL-MCNC: 401 PG/ML (ref 180–914)

## 2023-06-21 ENCOUNTER — OFFICE VISIT (OUTPATIENT)
Dept: INTERNAL MEDICINE CLINIC | Facility: CLINIC | Age: 73
End: 2023-06-21
Payer: COMMERCIAL

## 2023-06-21 VITALS
SYSTOLIC BLOOD PRESSURE: 128 MMHG | HEART RATE: 128 BPM | DIASTOLIC BLOOD PRESSURE: 82 MMHG | OXYGEN SATURATION: 97 % | WEIGHT: 248 LBS | HEIGHT: 65 IN | TEMPERATURE: 98.4 F | BODY MASS INDEX: 41.32 KG/M2

## 2023-06-21 DIAGNOSIS — I73.9 PERIPHERAL VASCULAR DISEASE (HCC): ICD-10-CM

## 2023-06-21 DIAGNOSIS — I26.99 ACUTE PULMONARY EMBOLISM, UNSPECIFIED PULMONARY EMBOLISM TYPE, UNSPECIFIED WHETHER ACUTE COR PULMONALE PRESENT (HCC): ICD-10-CM

## 2023-06-21 DIAGNOSIS — E11.65 TYPE 2 DIABETES MELLITUS WITH HYPERGLYCEMIA, WITH LONG-TERM CURRENT USE OF INSULIN (HCC): Primary | ICD-10-CM

## 2023-06-21 DIAGNOSIS — E03.9 HYPOTHYROIDISM, UNSPECIFIED TYPE: ICD-10-CM

## 2023-06-21 DIAGNOSIS — E53.8 VITAMIN B12 DEFICIENCY: ICD-10-CM

## 2023-06-21 DIAGNOSIS — Z79.4 TYPE 2 DIABETES MELLITUS WITH HYPERGLYCEMIA, WITH LONG-TERM CURRENT USE OF INSULIN (HCC): Primary | ICD-10-CM

## 2023-06-21 PROBLEM — U07.1 COVID-19: Status: RESOLVED | Noted: 2022-02-08 | Resolved: 2023-06-21

## 2023-06-21 PROCEDURE — 96372 THER/PROPH/DIAG INJ SC/IM: CPT | Performed by: FAMILY MEDICINE

## 2023-06-21 PROCEDURE — 99214 OFFICE O/P EST MOD 30 MIN: CPT | Performed by: FAMILY MEDICINE

## 2023-06-21 RX ORDER — LEVOTHYROXINE SODIUM 0.15 MG/1
137 TABLET ORAL DAILY
Qty: 90 TABLET | Refills: 1 | Status: SHIPPED | OUTPATIENT
Start: 2023-06-21

## 2023-06-21 RX ORDER — CYANOCOBALAMIN 1000 UG/ML
1000 INJECTION, SOLUTION INTRAMUSCULAR; SUBCUTANEOUS
Status: SHIPPED | OUTPATIENT
Start: 2023-06-21

## 2023-06-21 RX ADMIN — CYANOCOBALAMIN 1000 MCG: 1000 INJECTION, SOLUTION INTRAMUSCULAR; SUBCUTANEOUS at 15:23

## 2023-06-22 ENCOUNTER — TELEPHONE (OUTPATIENT)
Dept: ADMINISTRATIVE | Facility: OTHER | Age: 73
End: 2023-06-22

## 2023-06-22 NOTE — LETTER
Diabetic Eye Exam Form    Date Requested: 23  Patient: Shikha Bernstein  Patient : 1950   Referring Provider: Virgilio Flores MD      DIABETIC Eye Exam Date _______________________________      Type of Exam MUST be documented for Diabetic Eye Exams  Please CHECK ONE  Retinal Exam       Dilated Retinal Exam       OCT       Optomap-Iris Exam      Fundus Photography       Left Eye - Please check Retinopathy or No Retinopathy        Exam did show retinopathy    Exam did not show retinopathy       Right Eye - Please check Retinopathy or No Retinopathy       Exam did show retinopathy    Exam did not show retinopathy       Comments __________________________________________________________    Practice Providing Exam ______________________________________________    Exam Performed By (print name) _______________________________________      Provider Signature ___________________________________________________      These reports are needed for  compliance  Please fax this completed form and a copy of the Diabetic Eye Exam report to our office located at [place] [address] as soon as possible via Fax [phone] attention Noemi: Phone [phone]  We thank you for your assistance in treating our mutual patient

## 2023-06-22 NOTE — TELEPHONE ENCOUNTER
Upon review of the In Basket request and the patient's chart, initial outreach has been made via fax to facility  Please see Contacts section for details       Thank you  Bjorn Borjas MA

## 2023-06-22 NOTE — PROGRESS NOTES
Assessment/Plan:    No problem-specific Assessment & Plan notes found for this encounter  Diagnoses and all orders for this visit:    Type 2 diabetes mellitus with hyperglycemia, with long-term current use of insulin (HCC)  -     semaglutide, 1 mg/dose, (Ozempic) 4 mg/3 mL injection pen; Inject 0 75 mL (1 mg total) under the skin once a week    Hypothyroidism, unspecified type  -     levothyroxine 150 mcg tablet; Take 1 tablet (150 mcg total) by mouth daily    Vitamin B12 deficiency  -     cyanocobalamin injection 1,000 mcg    Peripheral vascular disease ([facility] )  -     Ambulatory Referral to Vascular Surgery; Future    Acute pulmonary embolism, unspecified pulmonary embolism type, unspecified whether acute cor pulmonale present ([facility] )      Orders and recommendations as noted above  Reviewed recent laboratory testing with her and her granddaughter  Hemoglobin A1c remains significantly elevated  Discussed current medications with them  We will increase the dose of her Ozempic  We will have her discontinue the Januvia  Continue with the [place]  Discussed with her checking her blood sugars regularly at least for the next few weeks  Discussed possibly increasing the dose of the [place] depending on her blood sugar response  Watch carbohydrate intake more closely  TSH elevated  Will increase the dose of her levothyroxine from 137 mcg to 150 mcg on a daily basis  Signs and symptoms of hypothyroidism and hyperthyroidism reviewed  Vitamin B12 level on the lower side  Had been on vitamin B12 injections in the past   We will give her an injection of vitamin B12 today and recommended over-the-counter supplementation  Recent vascular studies reviewed  Significant peripheral artery disease more on the right  Discussed following up with podiatry regularly  Discussed the risk of decreased healing because of the vascular issues    We will refer her to vascular surgery for further investigation and to discuss treatment options  Importance of control of risk factors discussed  Heart rate was initially elevated but did decrease after sitting  Watch for any worsening  Continue with the oxygen  Continue with the inhalers and nebulizer treatments as previously  Continue with long-term anticoagulation  Symptoms from the rheumatoid arthritis standpoint have improved  Continue with the Mahendra Finical as per rheumatology  We will have her follow-up in about 2 months or sooner if needed  Subjective:      Patient ID: Erin Ramirez is a 74 y o  female  She presents for follow-up  Has been doing somewhat better from a joint standpoint since restarting the Mahendra Finical  Has been able to be more active  Still with significant joint pains but has improved  Still with pain and redness into the right first toe  Has improved somewhat but not resolved  Has pain into the legs with ambulation  Had thought that this was mostly related to her rheumatoid arthritis  Has not been following her blood sugars regularly at home  Continues with the Ofelia Henley, and Ozempic  Patient has been relatively stable  Energy level has improved somewhat with the improvement in her joint symptoms  Continues with her levothyroxine regularly  Tolerating her atorvastatin without difficulty  Denies any significant muscle aches or weakness  Energy level remains relatively stable  Denies any headaches or localized weakness  Breathing has been relatively stable  Continues with the oxygen as well as the inhalers and nebulizer treatments if needed        The following portions of the patient's history were reviewed and updated as appropriate:   She  has a past medical history of Arthritis, Arthritis, Cancer ([address] ), Candidiasis of skin, Cervical cancer ([address] ), Chronic respiratory failure with hypoxia and hypercapnia ([address] ) (4/20/2019), COPD (chronic obstructive pulmonary disease) ([address] ), Current chronic use of systemic steroids, Degenerative arthritis of left knee, Diabetes mellitus ([place] [address] ), Disease of thyroid gland, Fibromyalgia, Fistula of knee, GERD (gastroesophageal reflux disease), Hyperlipidemia, Hypertension, Medial meniscus tear, Migraine, Obesity, Obesity, Osteoarthritis, Osteopenia, Pneumonia, Psychiatric disorder, Rheumatoid arthritis ([place] [address] ), Rheumatoid arthritis ([place] [address] ), Sepsis ([place] [address] ), Tick bite, Vitamin B12 deficiency, and Vitamin D deficiency    She   Patient Active Problem List    Diagnosis Date Noted   • Skin ulcer of second toe of right foot, limited to breakdown of skin ([place] [address] ) 05/16/2023   • COPD exacerbation ([place] [address] ) 05/16/2023   • Pre-op evaluation 10/24/2022   • Recurrent UTI 10/24/2022   • Dependence on continuous supplemental oxygen 03/02/2022   • Excessive anticoagulation 03/02/2022   • MORENO (obstructive sleep apnea) 12/13/2021   • Hilar lymphadenopathy 12/13/2021   • Atherosclerosis 12/13/2021   • T12 compression fracture ([place] [address] ) 12/13/2021   • Chronic diastolic (congestive) heart failure (HCC)    • Acute pulmonary embolism ([place] [address] ) 12/07/2021   • Chronic sinusitis 11/22/2021   • Primary insomnia 09/08/2020   • Migraine without aura and without status migrainosus, not intractable 07/28/2020   • Postherpetic neuralgia 65/35/5428   • Diastolic dysfunction 81/51/5824   • Macrocytic anemia 01/14/2020   • Hypoxemia requiring supplemental oxygen 06/19/2019   • History of exposure to asbestos 05/20/2019   • Chronic respiratory failure with hypoxia ([place] [address] ) 04/20/2019   • Hyponatremia 04/20/2019   • Candidal dermatitis 12/28/2018   • Loculated pleural effusion 12/25/2018   • Constipation 11/29/2017   • Renal cyst 11/29/2017   • Candidiasis, cutaneous 06/30/2017   • Vitamin B12 deficiency 11/10/2016   • Morbid obesity with BMI of 40 0-44 9, adult ([place] [address] ) 11/04/2016   • Centrilobular emphysema ([place] [address] ) 11/03/2016   • Type 2 diabetes mellitus with hyperglycemia, with long-term current use of insulin ([place] [address] ) 11/03/2016   • Hypothyroidism 11/03/2016   • [facility] ) 11/03/2016   • Essential hypertension 11/03/2016   • Hyperlipidemia 11/03/2016   • Gastroesophageal reflux disease without esophagitis 11/03/2016   • Hypoalbuminemia 11/03/2016   • Hepatic steatosis 11/03/2016   • Atelectasis 11/03/2016   • Anxiety 06/02/2014   • Fibromyalgia 01/28/2014   • Osteopenia 09/17/2012   • Osteoarthritis 05/29/2012   • Vitamin D deficiency 05/29/2012     She  has a past surgical history that includes Hysterectomy; Knee arthroscopy; Cataract extraction (Bilateral); Cholecystectomy; Joint replacement; Tubal ligation; Neuroplasty / transposition median nerve at carpal tunnel; Eye surgery; and IR thoracentesis (5/31/2019)  Her family history includes Addiction problem in her son; Asthma in her family; Breast cancer (age of onset: 39) in her half-sister; Cancer in her family; Diabetes in her family; Hypertension in her family; No Known Problems in her daughter, half-brother, half-brother, maternal aunt, maternal grandfather, maternal grandmother, paternal grandfather, paternal grandmother, son, and son; Stroke in her mother  She  reports that she quit smoking about 10 years ago  Her smoking use included cigarettes and e-cigarettes  She has a 48 00 pack-year smoking history  She has never used smokeless tobacco  She reports that she does not drink alcohol and does not use drugs  Current Outpatient Medications   Medication Sig Dispense Refill   • acetaminophen (TYLENOL) 325 mg tablet Take 2 tablets (650 mg total) by mouth every 6 (six) hours as needed for mild pain, headaches or fever  0   • albuterol (2 5 mg/3 mL) 0 083 % nebulizer solution USE 1 UNIT DOSE IN NEBULIZER EVERY 4 HOURS AS NEEDED       • albuterol (PROVENTIL HFA,VENTOLIN HFA) 90 mcg/act inhaler Inhale 2 puffs every 6 (six) hours as needed for wheezing  0   • atorvastatin (LIPITOR) 20 mg tablet TAKE ONE TABLET BY MOUTH EVERY DAY 90 tablet 0   • BD Pen Needle Mamta U/F 32G X 4 MM MISC USE AS DIRECTED 100 each 0   • Blood Glucose Monitoring Suppl (ONE TOUCH ULTRA 2) w/Device KIT by Does not apply route 2 (two) times a day 1 each 0   • clindamycin (CLEOCIN T) 1 % lotion clindamycin 1 % lotion   APPLY LOTION TOPICALLY TWICE DAILY     • DULoxetine (CYMBALTA) 60 mg delayed release capsule Take 1 capsule (60 mg total) by mouth daily 90 capsule 1   • Eliquis 5 MG TAKE 1 TABLET BY MOUTH TWICE DAILY 60 tablet 0   • ergocalciferol (VITAMIN D2) 50,000 units TAKE ONE CAPSULE BY MOUTH WEEKLY 12 capsule 0   • fluticasone-salmeterol (ADVAIR) 250-50 mcg/dose Inhale 1 puff every 12 (twelve) hours      • folic acid (FOLVITE) 1 mg tablet TAKE ONE TABLET BY MOUTH ONCE DAILY 90 tablet 0   • furosemide (LASIX) 40 mg tablet TAKE ONE TABLET BY MOUTH EVERY DAY 60 tablet 0   • glucose blood (OneTouch Ultra) test strip Test 3 times daily   Dx: E11 65 300 each 3   • insulin degludec [place] FlexTouch) 100 units/mL injection pen Inject 40 Units under the skin daily 30 mL 2   • Insulin Pen Needle 33G X 4 MM MISC by Does not apply route daily 100 each 5   • Lancets (OneTouch Delica Plus [id number]) MISC Test 2 times a day 200 each 0   • levothyroxine 150 mcg tablet Take 1 tablet (150 mcg total) by mouth daily 90 tablet 1   • lisinopril (ZESTRIL) 40 mg tablet TAKE ONE TABLET BY MOUTH EVERY DAY 60 tablet 0   • methotrexate 2 5 MG tablet TAKE 8 TABLETS BY MOUTH EVERY SUNDAY 96 tablet 0   • mirtazapine (REMERON) 15 mg tablet Take 1 tablet (15 mg total) by mouth daily at bedtime 90 tablet 3   • mometasone (ELOCON) 0 1 % ointment Apply topically 2 (two) times a day as needed (itching) 45 g 1   • nystatin (MYCOSTATIN) cream APPLY  CREAM TOPICALLY TWICE DAILY 30 g 0   • nystatin (MYCOSTATIN) powder Apply topically 3 (three) times a day as needed (rash/irritation) 45 g 1   • pantoprazole (PROTONIX) 40 mg tablet TAKE ONE TABLET BY MOUTH ONCE DAILY 90 tablet 0   • Polyethylene Glycol 3350-GRX POWD Take by mouth daily at bedtime as needed (constipation) 850 g 0   • semaglutide, 1 mg/dose, (Ozempic) 4 mg/3 mL injection pen Inject 0 75 mL (1 mg total) under the skin once a week 9 mL 1   • tiotropium (SPIRIVA RESPIMAT) 2 5 MCG/ACT AERS inhaler Inhale 1 puff daily at bedtime 3 Inhaler 3   • Tofacitinib Citrate ER 11 MG TB24 Take 1 tablet by mouth daily     • vitamin B-12 (VITAMIN B-12) 500 MCG tablet Take 1 tablet (500 mcg total) by mouth daily       Current Facility-Administered Medications   Medication Dose Route Frequency Provider Last Rate Last Admin   • cyanocobalamin injection 1,000 mcg  1,000 mcg Intramuscular Q30 Days Sen Mccormack MD   1,000 mcg at 06/21/23 1523     Current Outpatient Medications on File Prior to Visit   Medication Sig   • acetaminophen (TYLENOL) 325 mg tablet Take 2 tablets (650 mg total) by mouth every 6 (six) hours as needed for mild pain, headaches or fever   • albuterol (2 5 mg/3 mL) 0 083 % nebulizer solution USE 1 UNIT DOSE IN NEBULIZER EVERY 4 HOURS AS NEEDED     • albuterol (PROVENTIL HFA,VENTOLIN HFA) 90 mcg/act inhaler Inhale 2 puffs every 6 (six) hours as needed for wheezing   • atorvastatin (LIPITOR) 20 mg tablet TAKE ONE TABLET BY MOUTH EVERY DAY   • BD Pen Needle Mamta U/F 32G X 4 MM MISC USE AS DIRECTED   • Blood Glucose Monitoring Suppl (ONE TOUCH ULTRA 2) w/Device KIT by Does not apply route 2 (two) times a day   • clindamycin (CLEOCIN T) 1 % lotion clindamycin 1 % lotion   APPLY LOTION TOPICALLY TWICE DAILY   • DULoxetine (CYMBALTA) 60 mg delayed release capsule Take 1 capsule (60 mg total) by mouth daily   • Eliquis 5 MG TAKE 1 TABLET BY MOUTH TWICE DAILY   • ergocalciferol (VITAMIN D2) 50,000 units TAKE ONE CAPSULE BY MOUTH WEEKLY   • fluticasone-salmeterol (ADVAIR) 250-50 mcg/dose Inhale 1 puff every 12 (twelve) hours    • folic acid (FOLVITE) 1 mg tablet TAKE ONE TABLET BY MOUTH ONCE DAILY   • furosemide (LASIX) 40 mg tablet TAKE ONE TABLET BY MOUTH EVERY DAY   • glucose blood (OneTouch Ultra) test strip Test 3 times daily   Dx: E11 65   • insulin degludec Melda Aas FlexTouch) 100 units/mL injection pen Inject 40 Units under the skin daily   • Insulin Pen Needle 33G X 4 MM MISC by Does not apply route daily   • Lancets (OneTouch Delica Plus [id number]) MISC Test 2 times a day   • lisinopril (ZESTRIL) 40 mg tablet TAKE ONE TABLET BY MOUTH EVERY DAY   • methotrexate 2 5 MG tablet TAKE 8 TABLETS BY MOUTH EVERY SUNDAY   • mirtazapine (REMERON) 15 mg tablet Take 1 tablet (15 mg total) by mouth daily at bedtime   • mometasone (ELOCON) 0 1 % ointment Apply topically 2 (two) times a day as needed (itching)   • nystatin (MYCOSTATIN) cream APPLY  CREAM TOPICALLY TWICE DAILY   • nystatin (MYCOSTATIN) powder Apply topically 3 (three) times a day as needed (rash/irritation)   • pantoprazole (PROTONIX) 40 mg tablet TAKE ONE TABLET BY MOUTH ONCE DAILY   • Polyethylene Glycol 3350-GRX POWD Take by mouth daily at bedtime as needed (constipation)   • tiotropium (SPIRIVA RESPIMAT) 2 5 MCG/ACT AERS inhaler Inhale 1 puff daily at bedtime   • Tofacitinib Citrate ER 11 MG TB24 Take 1 tablet by mouth daily   • vitamin B-12 (VITAMIN B-12) 500 MCG tablet Take 1 tablet (500 mcg total) by mouth daily     No current facility-administered medications on file prior to visit  She has No Known Allergies       Review of Systems   Constitutional: Positive for activity change and fatigue  Negative for appetite change, chills and fever  HENT: Negative for congestion and rhinorrhea  Eyes: Negative for visual disturbance  Respiratory: Positive for shortness of breath  Negative for cough and chest tightness  Cardiovascular: Negative for chest pain and palpitations  Gastrointestinal: Negative for abdominal pain, blood in stool, diarrhea, nausea and vomiting  Endocrine: Negative for polydipsia, polyphagia and polyuria  As per HPI   Genitourinary: Negative for dysuria, frequency and urgency  Musculoskeletal: Positive for arthralgias and gait problem     Skin: Positive "for color change  Neurological: Positive for numbness  Negative for dizziness and headaches  Hematological: Does not bruise/bleed easily  Psychiatric/Behavioral: Negative for confusion, decreased concentration, dysphoric mood and sleep disturbance  The patient is not nervous/anxious  Objective:      /82 (BP Location: Right arm, Patient Position: Sitting, Cuff Size: Large)   Pulse (!) 128   Temp 98 4 °F (36 9 °C)   Ht 5' 5\" (1 651 m)   Wt 112 kg (248 lb)   SpO2 97%   BMI 41 27 kg/m²          Physical Exam  Vitals and nursing note reviewed  Constitutional:       General: She is not in acute distress  Appearance: She is well-developed and well-groomed  She is morbidly obese  HENT:      Head: Normocephalic and atraumatic  Eyes:      General:         Right eye: No discharge  Left eye: No discharge  Conjunctiva/sclera: Conjunctivae normal       Pupils: Pupils are equal, round, and reactive to light  Cardiovascular:      Rate and Rhythm: Normal rate and regular rhythm  Heart sounds: Normal heart sounds  No murmur heard  No friction rub  No gallop  Pulmonary:      Effort: No respiratory distress  Breath sounds: Decreased breath sounds present  No wheezing or rales  Abdominal:      General: Bowel sounds are normal  There is no distension  Tenderness: There is no abdominal tenderness  Lymphadenopathy:      Cervical: No cervical adenopathy  Skin:     General: Skin is warm and dry  Comments: Erythematous areas right first toe with coolness palpable and tenderness; no open areas   Neurological:      Mental Status: She is alert and oriented to person, place, and time  Psychiatric:         Mood and Affect: Mood and affect normal          Speech: Speech normal          Behavior: Behavior normal  Behavior is cooperative           Cognition and Memory: Cognition and memory normal             "

## 2023-06-22 NOTE — LETTER
Diabetic Eye Exam Form    Date Requested: 23  Patient: Heaven Glass  Patient : 1950   Referring Provider: Alonzo Craft MD      DIABETIC Eye Exam Date _______________________________      Type of Exam MUST be documented for Diabetic Eye Exams  Please CHECK ONE  Retinal Exam       Dilated Retinal Exam       OCT       Optomap-Iris Exam      Fundus Photography       Left Eye - Please check Retinopathy or No Retinopathy        Exam did show retinopathy    Exam did not show retinopathy       Right Eye - Please check Retinopathy or No Retinopathy       Exam did show retinopathy    Exam did not show retinopathy       Comments __________________________________________________________    Practice Providing Exam ______________________________________________    Exam Performed By (print name) _______________________________________      Provider Signature ___________________________________________________      These reports are needed for  compliance  Please fax this completed form and a copy of the Diabetic Eye Exam report to our office located at [place] [address] as soon as possible via Fax [phone] attention [place]emi: Phone [phone]  We thank you for your assistance in treating our mutual patient

## 2023-06-28 NOTE — TELEPHONE ENCOUNTER
Upon review of the In Basket request and the patient's chart, initial outreach has been made via fax to facility  Please see Contacts section for details       Thank you  Frank Patel MA

## 2023-06-29 DIAGNOSIS — M06.9 RHEUMATOID ARTHRITIS (HCC): ICD-10-CM

## 2023-06-29 RX ORDER — DULOXETIN HYDROCHLORIDE 60 MG/1
CAPSULE, DELAYED RELEASE ORAL
Qty: 90 CAPSULE | Refills: 0 | Status: SHIPPED | OUTPATIENT
Start: 2023-06-29

## 2023-06-30 NOTE — TELEPHONE ENCOUNTER
Upon review of the In Basket request we were able to locate, review, and update the patient chart as requested for Diabetic Eye Exam     Any additional questions or concerns should be emailed to the Practice Liaisons via the appropriate education email address, please do not reply via In Basket      Thank you  Randal Hernandez MA

## 2023-07-10 DIAGNOSIS — F51.01 PRIMARY INSOMNIA: ICD-10-CM

## 2023-07-10 NOTE — TELEPHONE ENCOUNTER
Medication Refill Request     Name remeron  Dose/Frequency 15mg/1 tablet daily at bedtime  Quantity 90  Verified pharmacy   [x]  Verified ordering Provider   [x]  Does patient have enough for the next 3 days?  Yes [] No [x]

## 2023-07-11 RX ORDER — MIRTAZAPINE 15 MG/1
15 TABLET, FILM COATED ORAL
Qty: 90 TABLET | Refills: 0 | OUTPATIENT
Start: 2023-07-11

## 2023-07-17 DIAGNOSIS — I26.99 ACUTE PULMONARY EMBOLISM, UNSPECIFIED PULMONARY EMBOLISM TYPE, UNSPECIFIED WHETHER ACUTE COR PULMONALE PRESENT (HCC): ICD-10-CM

## 2023-07-17 RX ORDER — APIXABAN 5 MG/1
TABLET, FILM COATED ORAL
Qty: 60 TABLET | Refills: 0 | Status: SHIPPED | OUTPATIENT
Start: 2023-07-17

## 2023-08-08 DIAGNOSIS — E11.65 TYPE 2 DIABETES MELLITUS WITH HYPERGLYCEMIA, WITH LONG-TERM CURRENT USE OF INSULIN (HCC): ICD-10-CM

## 2023-08-08 DIAGNOSIS — Z79.4 TYPE 2 DIABETES MELLITUS WITH HYPERGLYCEMIA, WITH LONG-TERM CURRENT USE OF INSULIN (HCC): ICD-10-CM

## 2023-08-08 DIAGNOSIS — F51.01 PRIMARY INSOMNIA: ICD-10-CM

## 2023-08-08 DIAGNOSIS — E55.9 VITAMIN D DEFICIENCY: ICD-10-CM

## 2023-08-08 DIAGNOSIS — K21.9 GASTROESOPHAGEAL REFLUX DISEASE: ICD-10-CM

## 2023-08-08 DIAGNOSIS — M06.9 RHEUMATOID ARTHRITIS (HCC): ICD-10-CM

## 2023-08-08 RX ORDER — PEN NEEDLE, DIABETIC 32GX 5/32"
NEEDLE, DISPOSABLE MISCELLANEOUS
Qty: 100 EACH | Refills: 0 | Status: SHIPPED | OUTPATIENT
Start: 2023-08-08

## 2023-08-08 RX ORDER — ERGOCALCIFEROL 1.25 MG/1
CAPSULE ORAL
Qty: 12 CAPSULE | Refills: 0 | Status: SHIPPED | OUTPATIENT
Start: 2023-08-08

## 2023-08-08 RX ORDER — MIRTAZAPINE 15 MG/1
15 TABLET, FILM COATED ORAL
Qty: 90 TABLET | Refills: 0 | OUTPATIENT
Start: 2023-08-08

## 2023-08-08 RX ORDER — PANTOPRAZOLE SODIUM 40 MG/1
TABLET, DELAYED RELEASE ORAL
Qty: 90 TABLET | Refills: 0 | Status: SHIPPED | OUTPATIENT
Start: 2023-08-08

## 2023-08-08 RX ORDER — DULOXETIN HYDROCHLORIDE 60 MG/1
CAPSULE, DELAYED RELEASE ORAL
Qty: 90 CAPSULE | Refills: 0 | Status: SHIPPED | OUTPATIENT
Start: 2023-08-08

## 2023-08-08 RX ORDER — FOLIC ACID 1 MG/1
TABLET ORAL
Qty: 90 TABLET | Refills: 0 | Status: SHIPPED | OUTPATIENT
Start: 2023-08-08

## 2023-08-08 NOTE — TELEPHONE ENCOUNTER
[facility]. Please forward to your office staff for further review as this medication was reviewed by a [facility] RN.

## 2023-08-09 ENCOUNTER — TELEPHONE (OUTPATIENT)
Dept: INTERNAL MEDICINE CLINIC | Facility: CLINIC | Age: 73
End: 2023-08-09

## 2023-08-09 NOTE — TELEPHONE ENCOUNTER
Patient called stating that after she had the lower limb arterial test done, showing no pulse in R leg - she was supposed to see a vascular doctor for a blockage. She called and tried to make an appointment but they need a referral from us.

## 2023-08-11 DIAGNOSIS — K21.9 GASTROESOPHAGEAL REFLUX DISEASE: ICD-10-CM

## 2023-08-11 DIAGNOSIS — I26.99 ACUTE PULMONARY EMBOLISM, UNSPECIFIED PULMONARY EMBOLISM TYPE, UNSPECIFIED WHETHER ACUTE COR PULMONALE PRESENT (HCC): ICD-10-CM

## 2023-08-11 RX ORDER — APIXABAN 5 MG/1
TABLET, FILM COATED ORAL
Qty: 60 TABLET | Refills: 0 | Status: SHIPPED | OUTPATIENT
Start: 2023-08-11

## 2023-08-12 RX ORDER — METHOTREXATE 2.5 MG/1
TABLET ORAL
Qty: 32 TABLET | Refills: 1 | Status: SHIPPED | OUTPATIENT
Start: 2023-08-12

## 2023-08-18 ENCOUNTER — TELEPHONE (OUTPATIENT)
Dept: VASCULAR SURGERY | Facility: CLINIC | Age: 73
End: 2023-08-18

## 2023-08-18 ENCOUNTER — CONSULT (OUTPATIENT)
Dept: VASCULAR SURGERY | Facility: CLINIC | Age: 73
End: 2023-08-18
Payer: COMMERCIAL

## 2023-08-18 VITALS
BODY MASS INDEX: 39.65 KG/M2 | HEIGHT: 65 IN | WEIGHT: 238 LBS | SYSTOLIC BLOOD PRESSURE: 136 MMHG | HEART RATE: 113 BPM | DIASTOLIC BLOOD PRESSURE: 60 MMHG

## 2023-08-18 DIAGNOSIS — I73.9 PAD (PERIPHERAL ARTERY DISEASE) (HCC): Primary | ICD-10-CM

## 2023-08-18 DIAGNOSIS — I73.9 PERIPHERAL VASCULAR DISEASE (HCC): ICD-10-CM

## 2023-08-18 PROCEDURE — 99203 OFFICE O/P NEW LOW 30 MIN: CPT

## 2023-08-18 RX ORDER — LEVOTHYROXINE SODIUM 137 UG/1
TABLET ORAL
COMMUNITY
Start: 2023-06-26

## 2023-08-18 NOTE — TELEPHONE ENCOUNTER
REMINDER: Under Reason For Call, comments MUST be formatted as:   (Surgeon's Initials) / (Procedure)      Special Instructions / FYI: needs anesthesia support per JLB    Procedure: abdominal aortogram with RLE runoff and possible intervention     Level: 2 - Route clearance(s) to [facility] Clear[facility]     Allergies: Patient has no known allergies. Instructions Given: NO Bowel Prep General Instructions     Dialysis: Patient is not on dialysis. Return Visit Required Prior to Procedure: No.    Consent: NO CONSENT REQUIRED    For Surgical Clearances     Levels   1-3   ROUTE this encounter to [facility] Clearance Pool (AND)   [facility] Surgery Coordinator Pool     Level   4   ROUTE this encounter to [facility] Surgery Coordinator Pool       HYDRATION CLEARANCES   ONLY ROUTE TO  [facility] Clearance Pool     Patient does not require any pre operative clearance. Yes, I have ROUTED this encounter to [facility] Surgery Coordinator and/or [facility] Clearance Pool. The remicade can only be done with us     I am not sure what else I can do then     Let her know

## 2023-08-18 NOTE — PROGRESS NOTES
Assessment/Plan:    PAD (peripheral artery disease) ([address])  64-year-old female with GERD, DM 2, hypothyroidism, COPD, MORENO, HTN, CHF, history of PE (on Eliquis), RA, HLD, fibromyalgia, presents today for consultation regarding peripheral arterial disease. She had an KRYSTA done by her PCP due to diminished pulses and right toe wound and was referred to our office to review. -LEAD demonstrates greater than 75% proximal right SFA stenosis and 50 to 75% proximal left SFA and AT artery stenosis with diffuse disease in femoropopliteal and tibioperoneal segments bilaterally. Right MANDY 0.39/-/-; left MANDY 0.7 4/65/55.    -Reports constant pain in right great toe which has impacted her mobility within last month. She can only ambulate short distances due to her leg pain but also complains of SOB and is on 3L O2 NC at home.   -Right hallux ulceration x1 month after her callus "split open". Follows with podiatry. Wound appears dry without signs of infection today. -Right foot/toes slightly cooler in temperature compared to left. Nonpalpable right DP/PT pulses. Right hallux appears dusky in appearance with small superficial ulceration with surround skin dryness. Motor and sensory grossly intact. Plan:  -Educated patient on pathophysiology of peripheral arterial disease, available treatment options, and indications for treatment. -Recommend RLE angiogram +/- intervention with anesthesia support for optimal wound healing ability.   -No contrast dye allergy or hx of CKD. -Will obtain updated CBC, CMP, INR prior to intervention.   -Discussed risk factor modification, including adequate glycemic and lipid control, smoking cessation, blood pressure, and lifestyle modifications. -A1c uncontrolled at 9.9%. Optimize for wound healing. Mgmt per PCP. -Continue atorvastatin 20 mg. LDL goal less than 100. -Recommend starting aspirin 81 mg for OMT of PAD. -On Eliquis for history of PE. Management per PCP.  Will need to hold 2 days prior to intervention.  -Walking program.  -Follow up after angiogram.   -Instructed patient to call the office with questions, concerns, or worsening wounds. Medications:   -Start ASA 81 mg daily  -On eliquis for afib. Mgmt per PCP. Diagnoses and all orders for this visit:    PAD (peripheral artery disease) ([address])  -     Ambulatory Referral to Interventional Radiology; Future  -     Basic metabolic panel; Future  -     CBC and Platelet; Future  -     Protime-INR; Future    Peripheral vascular disease ([address])  -     Ambulatory Referral to Vascular Surgery    Other orders  -     levothyroxine 137 mcg tablet          Subjective:      Patient ID: Lisa Guaman is a 74 y.o. female. KACI Aguirre is a 70-year-old female with GERD, DM 2, hypothyroidism, COPD, MORENO, HTN, CHF, history of PE (on Eliquis), RA, HLD, fibromyalgia, presents today for consultation regarding peripheral arterial disease. She had an KRYSTA done by her PCP due to diminished pulses and right toe wound and was referred to our office to review. Hx of a R hallux callus that split open approximately >1 month ago. She was seen by podiatry who recommended vascular referral due to diminished pulses and new wound. She states the wound has not been improving. She is not putting anything on the wound currently. She reports constant pain in the right toe but reports worsening pain when putting on shoes or having anything touch the foot. When she walks the pain starts in the ankle extends into the calf and thigh. She has also noticed more discoloration of the foot. She can only ambulate short distances due to her leg pain and is also limited by her SOB. She reports her leg pain is more disabling now and is unable to walk around her house. She is on 3L O2 at home. She is on eliquis for PE. Uncontrolled A1c. Most recent 9.9%. Hx of Fibromyalgia and RA which causes overall body pains. She is on methotrexate and xeljanz.      Operative Scheduling Information:    Hospital:  [facility] or [facility]    Physician:  Any other surgeon and Any IR doc    Surgery: abdominal aortogram with RLE runoff and possible intervention     Urgency:  Urgent: within 2 weeks    Level:  Level 2: Outpatients to be scheduled for surgery with time dependent medical necessity within 2 weeks    Case Length:  Normal    Post-op Bed:  Outpatient    OR Table:  IR    Equipment Needs:  None    Medication Instructions:  Eliquis:  Hold for 2 days prior to procedure    Hydration:  Per protocol      The following portions of the patient's history were reviewed and updated as appropriate: allergies, current medications, past family history, past medical history, past social history, past surgical history and problem list.    Review of Systems   Constitutional: Negative. HENT: Negative. Eyes: Negative. Respiratory: Negative. Cardiovascular: Negative. Gastrointestinal: Negative. Endocrine: Negative. Genitourinary: Negative. Musculoskeletal: Negative. Skin: Positive for wound. Allergic/Immunologic: Negative. Neurological: Negative. Hematological: Negative. Psychiatric/Behavioral: Negative.           Objective:      Vitals:    08/18/23 1326   BP: 136/60   BP Location: Right arm   Patient Position: Sitting   Cuff Size: Standard   Pulse: (!) 113   Weight: 108 kg (238 lb)   Height: 5' 5" (1.651 m)       Patient Active Problem List   Diagnosis   • Centrilobular emphysema (HCC)   • Type 2 diabetes mellitus with hyperglycemia, with long-term current use of insulin (HCC)   • Hypothyroidism   • Rheumatoid arthritis ([address])   • Essential hypertension   • Hyperlipidemia   • Gastroesophageal reflux disease without esophagitis   • Hypoalbuminemia   • Hepatic steatosis   • Atelectasis   • Morbid obesity with BMI of 40.0-44.9, adult (HCC)   • Anxiety   • Candidiasis, cutaneous   • Constipation   • Fibromyalgia   • Osteoarthritis   • Osteopenia   • Renal cyst   • Vitamin B12 deficiency   • Vitamin D deficiency   • Loculated pleural effusion   • Candidal dermatitis   • Chronic respiratory failure with hypoxia (HCC)   • Hyponatremia   • History of exposure to asbestos   • Hypoxemia requiring supplemental oxygen   • Macrocytic anemia   • Diastolic dysfunction   • Postherpetic neuralgia   • Migraine without aura and without status migrainosus, not intractable   • Primary insomnia   • Chronic sinusitis   • Acute pulmonary embolism ([facility])   • Chronic diastolic (congestive) heart failure ([facility])   • MORENO (obstructive sleep apnea)   • Hilar lymphadenopathy   • Atherosclerosis   • T12 compression fracture ([facility])   • Dependence on continuous supplemental oxygen   • Excessive anticoagulation   • Pre-op evaluation   • Recurrent UTI   • Skin ulcer of second toe of right foot, limited to breakdown of skin ([address])   • COPD exacerbation ([facility])   • PAD (peripheral artery disease) ([address])       Past Surgical History:   Procedure Laterality Date   • CATARACT EXTRACTION Bilateral    • CHOLECYSTECTOMY     • EYE SURGERY      Bilateral Cataracts   • HYSTERECTOMY     • IR THORACENTESIS  5/31/2019   • JOINT REPLACEMENT      left knee   • KNEE ARTHROSCOPY     • NEUROPLASTY / TRANSPOSITION MEDIAN NERVE AT CARPAL TUNNEL     • TUBAL LIGATION         Family History   Problem Relation Age of Onset   • Stroke Mother    • No Known Problems Daughter    • No Known Problems Maternal Grandmother    • No Known Problems Maternal Grandfather    • No Known Problems Paternal Grandmother    • No Known Problems Paternal Grandfather    • Asthma Family    • Cancer Family    • Diabetes Family    • Hypertension Family    • No Known Problems Son    • No Known Problems Son    • Addiction problem Son    • Breast cancer Half-Sister 39   • No Known Problems Half-Brother    • No Known Problems Half-Brother    • No Known Problems Maternal Aunt        Social History     Socioeconomic History   • Marital status:       Spouse name: Not on file   • Number of children: Not on file   • Years of education: Not on file   • Highest education level: Not on file   Occupational History   • Not on file   Tobacco Use   • Smoking status: Former     Packs/day: 1.00     Years: 48.00     Total pack years: 48.00     Types: Cigarettes, E-Cigarettes     Quit date: 11/21/2012     Years since quitting: 10.7   • Smokeless tobacco: Never   • Tobacco comments:     per allscripts - "current every day smoker, former smoker"   Vaping Use   • Vaping Use: Former   Substance and Sexual Activity   • Alcohol use: Never     Alcohol/week: 0.0 standard drinks of alcohol     Comment: stopped drinking alcohol   • Drug use: Never   • Sexual activity: Never   Other Topics Concern   • Not on file   Social History Narrative    No living will     Social Determinants of Health     Financial Resource Strain: Medium Risk (5/16/2023)    Overall Financial Resource Strain (CARDIA)    • Difficulty of Paying Living Expenses: Somewhat hard   Food Insecurity: No Food Insecurity (12/8/2021)    Hunger Vital Sign    • Worried About Running Out of Food in the Last Year: Never true    • Ran Out of Food in the Last Year: Never true   Transportation Needs: No Transportation Needs (5/16/2023)    PRAPARE - Transportation    • Lack of Transportation (Medical): No    • Lack of Transportation (Non-Medical):  No   Physical Activity: Not on file   Stress: Not on file   Social Connections: Unknown (1/8/2019)    Social Connection and Isolation Panel [NHANES]    • Frequency of Communication with Friends and Family: More than three times a week    • Frequency of Social Gatherings with Friends and Family: Not on file    • Attends Muslim Services: Not on file    • Active Member of Clubs or Organizations: Not on file    • Attends Club or Organization Meetings: Not on file    • Marital Status: Not on file   Intimate Partner Violence: Not on file   Housing Stability: Low Risk  (12/8/2021)    [facility] Sign    • Unable to Pay for Housing in the Last Year: No    • Number of Places Lived in the Last Year: 1    • Unstable Housing in the Last Year: No       No Known Allergies      Current Outpatient Medications:   •  acetaminophen (TYLENOL) 325 mg tablet, Take 2 tablets (650 mg total) by mouth every 6 (six) hours as needed for mild pain, headaches or fever, Disp: , Rfl: 0  •  albuterol (2.5 mg/3 mL) 0.083 % nebulizer solution, USE 1 UNIT DOSE IN NEBULIZER EVERY 4 HOURS AS NEEDED., Disp: , Rfl:   •  albuterol (PROVENTIL HFA,VENTOLIN HFA) 90 mcg/act inhaler, Inhale 2 puffs every 6 (six) hours as needed for wheezing, Disp: , Rfl: 0  •  atorvastatin (LIPITOR) 20 mg tablet, TAKE ONE TABLET BY MOUTH EVERY DAY, Disp: 90 tablet, Rfl: 0  •  BD Pen Needle Mamta U/F 32G X 4 MM MISC, USE AS DIRECTED, Disp: 100 each, Rfl: 0  •  Blood Glucose Monitoring Suppl (ONE TOUCH ULTRA 2) w/Device KIT, by Does not apply route 2 (two) times a day, Disp: 1 each, Rfl: 0  •  clindamycin (CLEOCIN T) 1 % lotion, clindamycin 1 % lotion  APPLY LOTION TOPICALLY TWICE DAILY, Disp: , Rfl:   •  DULoxetine (CYMBALTA) 60 mg delayed release capsule, TAKE ONE CAPSULE BY MOUTH EVERY DAY, Disp: 90 capsule, Rfl: 0  •  Eliquis 5 MG, TAKE 1 TABLET BY MOUTH TWICE DAILY, Disp: 60 tablet, Rfl: 0  •  ergocalciferol (VITAMIN D2) 50,000 units, TAKE ONE CAPSULE BY MOUTH WEEKLY, Disp: 12 capsule, Rfl: 0  •  fluticasone-salmeterol (ADVAIR) 250-50 mcg/dose, Inhale 1 puff every 12 (twelve) hours , Disp: , Rfl:   •  folic acid (FOLVITE) 1 mg tablet, TAKE ONE TABLET BY MOUTH ONCE DAILY, Disp: 90 tablet, Rfl: 0  •  furosemide (LASIX) 40 mg tablet, TAKE ONE TABLET BY MOUTH EVERY DAY, Disp: 60 tablet, Rfl: 0  •  glucose blood (OneTouch Ultra) test strip, Test 3 times daily   Dx: E11.65, Disp: 300 each, Rfl: 3  •  insulin degludec Carlin Even FlexTouch) 100 units/mL injection pen, Inject 40 Units under the skin daily, Disp: 30 mL, Rfl: 2  •  Insulin Pen Needle 33G X 4 MM MISC, by Does not apply route daily, Disp: 100 each, Rfl: 5  •  Lancets (OneTouch Delica Plus [id number]) MISC, Test 2 times a day, Disp: 200 each, Rfl: 0  •  lisinopril (ZESTRIL) 40 mg tablet, TAKE ONE TABLET BY MOUTH EVERY DAY, Disp: 60 tablet, Rfl: 0  •  methotrexate 2.5 MG tablet, TAKE 8 TABLETS BY MOUTH EVERY SUNDAY, Disp: 32 tablet, Rfl: 1  •  mirtazapine (REMERON) 15 mg tablet, Take 1 tablet (15 mg total) by mouth daily at bedtime, Disp: 90 tablet, Rfl: 3  •  mometasone (ELOCON) 0.1 % ointment, Apply topically 2 (two) times a day as needed (itching), Disp: 45 g, Rfl: 1  •  nystatin (MYCOSTATIN) cream, APPLY  CREAM TOPICALLY TWICE DAILY, Disp: 30 g, Rfl: 0  •  nystatin (MYCOSTATIN) powder, Apply topically 3 (three) times a day as needed (rash/irritation), Disp: 45 g, Rfl: 1  •  pantoprazole (PROTONIX) 40 mg tablet, TAKE ONE TABLET BY MOUTH ONCE DAILY, Disp: 90 tablet, Rfl: 0  •  Polyethylene Glycol 3350-GRX POWD, Take by mouth daily at bedtime as needed (constipation), Disp: 850 g, Rfl: 0  •  semaglutide, 1 mg/dose, (Ozempic) 4 mg/3 mL injection pen, Inject 0.75 mL (1 mg total) under the skin once a week, Disp: 9 mL, Rfl: 1  •  tiotropium (SPIRIVA RESPIMAT) 2.5 MCG/ACT AERS inhaler, Inhale 1 puff daily at bedtime, Disp: 3 Inhaler, Rfl: 3  •  Tofacitinib Citrate ER 11 MG TB24, Take 1 tablet by mouth daily, Disp: , Rfl:   •  vitamin B-12 (VITAMIN B-12) 500 MCG tablet, Take 1 tablet (500 mcg total) by mouth daily, Disp: , Rfl:   •  levothyroxine 137 mcg tablet, , Disp: , Rfl:     Current Facility-Administered Medications:   •  cyanocobalamin injection 1,000 mcg, 1,000 mcg, Intramuscular, Q30 Days, Robel Lauren MD, 1,000 mcg at 06/21/23 1523      /60 (BP Location: Right arm, Patient Position: Sitting, Cuff Size: Standard)   Pulse (!) 113   Ht 5' 5" (1.651 m)   Wt 108 kg (238 lb)   BMI 39.61 kg/m²          Physical Exam  Vitals and nursing note reviewed. Constitutional:       Appearance: Normal appearance. She is obese. HENT:      Head: Normocephalic and atraumatic. Neck:      Vascular: No carotid bruit. Cardiovascular:      Rate and Rhythm: Normal rate and regular rhythm. Pulses:           Carotid pulses are 2+ on the right side and 2+ on the left side. Radial pulses are 2+ on the right side and 2+ on the left side. Popliteal pulses are 1+ on the right side and 2+ on the left side. Dorsalis pedis pulses are detected w/ Doppler on the right side and 1+ on the left side. Posterior tibial pulses are detected w/ Doppler on the right side and detected w/ Doppler on the left side. Heart sounds: Normal heart sounds. Comments: Femoral pulses difficult to palpate due to large pannus  Pulmonary:      Effort: Pulmonary effort is normal. No respiratory distress. Breath sounds: Wheezing present. Abdominal:      General: Bowel sounds are normal. There is no distension. Palpations: Abdomen is soft. Comments: No abdominal bruit or pulsatile masses. Musculoskeletal:         General: Normal range of motion. Cervical back: Normal range of motion and neck supple. Right lower le+ Edema present. Left lower le+ Edema present. Skin:     General: Skin is warm. Capillary Refill: Capillary refill takes 2 to 3 seconds. Findings: No erythema. Comments: Right hallux dusky and dry. Small ulcerations noted on medial aspect   Neurological:      General: No focal deficit present. Mental Status: She is alert and oriented to person, place, and time.    Psychiatric:         Mood and Affect: Mood normal.         Behavior: Behavior normal.           Prisca Irvin PA-C  [facility]  [phone]    I have spent a total time of 35 minutes on 23 in caring for this patient including Diagnostic results, Prognosis, Risks and benefits of tx options, Instructions for management, Patient and family education, Importance of tx compliance, Risk factor reductions, Impressions, Counseling / Coordination of care, Documenting in the medical record, Reviewing / ordering tests, medicine, procedures   and Obtaining or reviewing history  .

## 2023-08-18 NOTE — ASSESSMENT & PLAN NOTE
68-year-old female with GERD, DM 2, hypothyroidism, COPD, MORENO, HTN, CHF, history of PE (on Eliquis), RA, HLD, fibromyalgia, presents today for consultation regarding peripheral arterial disease. She had an KRYSTA done by her PCP due to diminished pulses and was referred to our office to review. She is currently asymptomatic. -LEAD demonstrates greater than 75% proximal right SFA stenosis and 50 to 75% proximal left SFA and AT artery stenosis with diffuse disease in femoropopliteal and tibioperoneal segments bilaterally. Right MANDY 0.39/-/-; left MANDY 0.7 4/65/55.    -Educated patient on pathophysiology of peripheral arterial disease, available treatment options, and indications for treatment.   -Discussed risk factor modification, including adequate glycemic and lipid control, smoking cessation, blood pressure, and lifestyle modifications.   -Continue atorvastatin 20 mg. LDL goal less than 100. -Recommend aspirin 81 mg for OMT of PAD. -On Eliquis for history of PE. Management per PCP. -Recommend starting a structured walking program 3-5 times/week for 30 minutes. Patient should walk until claudication symptoms occur, rest for 5-10 minutes, then continue to walk. Patient should increase distance each week. -Follow up in  -Instructed patient to call the office with questions, concerns, or new symptoms.

## 2023-08-18 NOTE — PATIENT INSTRUCTIONS
-Educated patient on pathophysiology of peripheral arterial disease, available treatment options, and indications for treatment. -Recommend right leg angiogram to improve blood flow for wound healing of the right toe.   -Will need updated lab work prior.  -Discussed risk factor modification, including adequate glycemic and lipid control, smoking cessation, blood pressure, and lifestyle modifications.   -Continue attorvastatin  -Start aspirin 81 mg daily.   -Recommend starting a structured walking program 3-5 times/week for 30 minutes. Patient should walk until claudication symptoms occur, rest for 5-10 minutes, then continue to walk. Patient should increase distance each week. -Follow up after angiogram  -Instructed patient to call the office with questions, concerns, or new symptoms. Peripheral Artery Disease   AMBULATORY CARE:   Peripheral artery disease (PAD)  is narrow, weak, or blocked arteries. It may affect any arteries outside of your heart and brain. PAD is usually the result of a buildup of fat and cholesterol, also called plaque, along your artery walls. Inflammation, a blood clot, or abnormal cell growth could also block your arteries. PAD prevents normal blood flow to your legs and arms. You are at risk of an amputation if poor blood flow keeps wounds from healing or causes gangrene (tissue death). Without treatment, PAD can also cause a heart attack or stroke. Common symptoms include:  Mild PAD usually does not cause symptoms.  As the disease worsens over time, you may have the following:  Pain or cramps in your leg or hip while you walk    A numb, weak, or heavy feeling in your legs    Dry, scaly, red, or pale skin on your legs    Thick or brittle nails, or hair loss on your arms and legs    Foot sores that will not heal    Burning or aching in your feet and toes while resting (this may be worse when you lie down)    Call your local emergency number (916 in the [address]) if:   You have any of the following signs of a heart attack:      Squeezing, pressure, or pain in your chest    You may  also have any of the following:     Discomfort or pain in your back, neck, jaw, stomach, or arm    Shortness of breath    Nausea or vomiting    Lightheadedness or a sudden cold sweat    You have any of the following signs of a stroke:      Numbness or drooping on one side of your face     Weakness in an arm or leg    Confusion or difficulty speaking    Dizziness, a severe headache, or vision loss    Seek care immediately if:   You have sores or wounds that will not heal.    You notice black or discolored skin on your arm or leg. Your skin is cool to the touch. Call your doctor if:   You have leg pain when you walk 1/8 mile (200 meters) or less, even with treatment. Your legs are red, dry, or pale, even with treatment. You have questions or concerns about your condition or care. Treatment for PAD  can help reduce your risk of a heart attack, stroke, or amputation. You may need more than one of the following:  Medicines  may be given to prevent blood clots and reduce the risk of a heart attack or stroke. You may be given medicine to help prevent your PAD from getting worse. A supervised exercise program  helps you stay active in normal daily activities. Healthcare providers will help you safely walk or do strength training exercises 3 times a week for 30 to 60 minutes. You will do this for several months, then transition to walking on your own. Angioplasty  is a procedure to open your artery so blood can flow through normally. A thin tube called a catheter is used to insert a small balloon into your artery. The balloon is inflated to open your blocked artery, and then removed. A tube called a stent may be placed in your artery to hold it open. Bypass surgery  is used to make a new connection to your artery with a vein from another part of your body, or an artificial graft.  The vein or graft is attached to your artery above and below your blockage. This allows blood to flow around the blocked portion of your artery. Manage and prevent PAD:   Walk for 30 to 60 minutes at least 4 times a week. Your healthcare provider may also refer you to a supervised exercise program. The program helps increase how far you can walk without pain. It also helps you stay active in normal daily activities         Do not smoke. Nicotine and other chemicals in cigarettes and cigars can worsen PAD. They can also increase your risk for a heart attack or stroke. Ask your healthcare provider for information if you currently smoke and need help to quit. E-cigarettes or smokeless tobacco still contain nicotine. Talk to your healthcare provider before you use these products. Manage other health conditions. Take your medicines as directed and follow your healthcare provider's instructions if you have high blood pressure or high cholesterol. Perform foot care and check your blood sugar levels as directed if you have diabetes. Eat heart-healthy foods. Eat whole grains, fruits, and vegetables every day. Limit salt and high-fat foods. Ask your healthcare provider for more information on a heart healthy diet. Ask what a healthy weight is for you. Your healthcare provider can help you create a healthy weight-loss plan, if needed. Follow up with your doctor as directed:  Write down your questions so you remember to ask them during your visits. © Copyright [employer] 2022 Information is for End User's use only and may not be sold, redistributed or otherwise used for commercial purposes. The above information is an  only. It is not intended as medical advice for individual conditions or treatments. Talk to your doctor, nurse or pharmacist before following any medical regimen to see if it is safe and effective for you.   Angiogram   AMBULATORY CARE:   What you need to know about an angiogram:  An angiogram is used to examine blood flow through your arteries. Arteries carry blood from your heart to your body. How to prepare for the procedure: Your healthcare provider will tell you how to prepare for your procedure. You may be told not to eat or drink anything after midnight on the day of your procedure. Your provider will tell you which medicines to take or not take on the day of your procedure. Contrast liquid will be used during the procedure to help your arteries show up better in the pictures. Tell your provider if you have ever had an allergic reaction to contrast liquid. Arrange to have someone drive you home after your procedure. What will happen during the procedure: You may be given medicine to help you relax or make you drowsy. You may get local anesthesia to numb the area where the angiogram catheter will go in. Hair may be removed from the procedure site. A catheter will be put into an artery in your leg near your groin, or in your arm or wrist. The catheter travels through the artery to the area being studied. Contrast liquid is put through the catheter to help your blood vessels and organs show up better in the x-ray pictures. You may feel warm as the liquid is put into the catheter. You may get a headache or feel nauseated. These feelings should go away quickly. Your healthcare providers will remove the catheter and apply pressure to the wound for several minutes. They may place a pressure bandage or other pressure device over the wound to help stop any bleeding. What will happen after the procedure: If insertion was in your wrist, the pressure device will be around your wrist. Healthcare providers will slowly decrease pressure in the device. If insertion was in your groin, a pressure bandage will be in place. Keep your leg straight. Do not  get out of bed until your healthcare provider says it is okay. Healthcare providers will frequently monitor your vital signs and pulses.  They will also frequently check your wound for bleeding. After you are monitored for several hours, you may be able to go home. Risks of the procedure: You may bleed heavily after your catheter is removed. The catheter may damage your artery, and you may need surgery to fix the damage. You could have kidney problems from the contrast liquid. You could have an allergic reaction to the contrast liquid or numbing medicine. You may develop a blood clot that causes pain and swelling, and stops blood from flowing. A blood clot in your leg can break loose and travel to your lungs and become life-threatening. Call 911 for any of the following: You have any of the following signs of a heart attack:      Squeezing, pressure, or pain in your chest    You may  also have any of the following:     Discomfort or pain in your back, neck, jaw, stomach, or arm    Shortness of breath    Nausea or vomiting    Lightheadedness or a sudden cold sweat    You have any of the following signs of a stroke:      Numbness or drooping on one side of your face     Weakness in an arm or leg    Confusion or difficulty speaking    Dizziness, a severe headache, or vision loss    Seek care immediately if:   Your arm or leg feels warm, tender, and painful. It may look swollen and red. The leg or arm used for your angiogram is numb, painful, or changes color. The bruise at your catheter site gets bigger or becomes swollen. Your wound does not stop bleeding even after you apply firm pressure for 10 minutes. You have weakness in an arm or leg. You become confused or have difficulty speaking. You have dizziness, a severe headache, or vision loss. Contact your healthcare provider if:   You have a fever. Your catheter site is red, leaks pus, or smells bad. You have increasing pain at your catheter site. You have questions or concerns about your condition or care.     Follow up with your healthcare provider as directed:  Write down your questions so you remember to ask them during your visits. Activity after your procedure:   Rest for the remainder of the day of your procedure. Keep your arm or leg straight as much as possible. If you need to cough, support the area where the catheter was inserted with your hand. If the angiogram catheter was put in your leg, do not use stairs for a few days after your angiogram. When you must use stairs, step up with the leg that was not used for the angiogram. Straighten this leg to move the other leg up to the next step without putting stress on it. If the catheter was put in your arm, do not lift more than 5 pounds. Avoid pushing, pulling, or heavy lifting with that arm. Your healthcare provider will tell you when it is safe to drive and start doing your other normal daily activities. Go slowly at first.    Self-care:   Do not smoke. Nicotine and other chemicals in cigarettes and cigars can damage your blood vessels. Ask your healthcare provider for information if you currently smoke and need help to quit. E-cigarettes or smokeless tobacco still contain nicotine. Talk to your healthcare provider before you use these products. Watch for bleeding and bruising. It is normal to have a bruise and soreness where the catheter went in. Contact your healthcare provider if your bruise gets larger. If your wound bleeds, use your hand to put pressure on the bandage. If you do not have a bandage, use a clean cloth to put pressure over and just above the puncture site. Seek care immediately if the bleeding does not stop within 10 minutes. Keep your bandage clean and dry. Ask your healthcare provider when you can bathe. You will need to keep the bandage in place and dry for a few days after your procedure. Cover the bandage with a plastic bag and tape the opening around your skin to keep water out.  When you are allowed to bathe without a bandage, carefully wash the site with soap and water. Dry the area and put on new, clean bandages as directed. Change your bandage if it gets wet or dirty. Check for signs of infection every day, such as redness, swelling, or pus. Drink liquids as directed. Ask your healthcare provider how much liquid to drink each day and which liquids are best for you. Liquids will help your body flush out the contrast liquid used during the procedure. Limit alcohol. Do not drink alcohol for 24 hours after your procedure. Then limit alcohol. Women should limit alcohol to 1 drink a day. Men should limit alcohol to 2 drinks a day. A drink of alcohol is 12 ounces of beer, 5 ounces of wine, or 1½ ounces of liquor. Follow up with your healthcare provider as directed:  Write down your questions so you remember to ask them during your visits. © Copyright [employer] 2022 Information is for End User's use only and may not be sold, redistributed or otherwise used for commercial purposes. The above information is an  only. It is not intended as medical advice for individual conditions or treatments. Talk to your doctor, nurse or pharmacist before following any medical regimen to see if it is safe and effective for you.

## 2023-08-21 ENCOUNTER — PREP FOR PROCEDURE (OUTPATIENT)
Dept: INTERVENTIONAL RADIOLOGY/VASCULAR | Facility: CLINIC | Age: 73
End: 2023-08-21

## 2023-08-21 DIAGNOSIS — I73.9 PAD (PERIPHERAL ARTERY DISEASE) (HCC): Primary | ICD-10-CM

## 2023-08-21 NOTE — TELEPHONE ENCOUNTER
From: Sherita Aranda <[email or web address]   Sent: 2023 1:02 PM  To: Mary Galo <[email or web address]  Subject: RE: LEVEL 2 AGRAM    Good afternoon,    Called pt and left message, have a tentative block at [facility] on for . Waiting for pt to call back to schedule . Thank you,    From: Mary Galo <[email or web address]   Sent: 2023 10:40 AM  To: Marcelo Hampton <[email or web address]>  Cc: Vascular Surgery Schedulers [email or web address]  Subject: LEVEL 2 AGRAM  Importance: High    Good morning,      Patient was seen on Friday with Analy Cain PA-C and is requesting this agram be scheduled w/in 2 weeks please. She lives in [facility] and has Hx of Fibromyalgia and RA which causes overall body pains. General anesthesia will be needed.        Pt: Geovany Patel  : 46  abdominal aortogram with RLE runoff and possible intervention  Urgent: within 2 weeks  Medication Instructions:    Eliquis:  Hold for 2 days prior to procedure   Hydration:    Per protocol

## 2023-08-23 ENCOUNTER — TELEPHONE (OUTPATIENT)
Dept: VASCULAR SURGERY | Facility: CLINIC | Age: 73
End: 2023-08-23

## 2023-08-25 ENCOUNTER — TELEPHONE (OUTPATIENT)
Dept: RADIOLOGY | Facility: HOSPITAL | Age: 73
End: 2023-08-25

## 2023-08-25 NOTE — TELEPHONE ENCOUNTER
Patient Called  96:95 Jennifer Waldrop Left Message: Details     Assigned to User  39:03 Jennifer Waldrop WQ: IMG IR OUTPT ORDERS IN LAST 120 DAYS, Assigned to Jennifer Waldrop     Request Deferred 2023 13:22 Jennifer Krishnamurthy Deferred until 2023, Called 1x     Request Deferral  2023 00:01 Schedq Background User     Patient Called  94:27 Jennifer Waldrop Left Message: Details     Request Deferred  06:07 Jennifer Waldrop Deferred until 2023, Called 2

## 2023-08-25 NOTE — TELEPHONE ENCOUNTER
Rec call from [facility] IR scheduling. She called and left 2 messages for Ольга Felipe to [facility] and schedule agram however no response. I will send out a "several attempt" letter today.

## 2023-09-11 RX ORDER — SODIUM CHLORIDE 9 MG/ML
75 INJECTION, SOLUTION INTRAVENOUS CONTINUOUS
Status: CANCELLED | OUTPATIENT
Start: 2023-09-11

## 2023-09-13 DIAGNOSIS — I26.99 ACUTE PULMONARY EMBOLISM, UNSPECIFIED PULMONARY EMBOLISM TYPE, UNSPECIFIED WHETHER ACUTE COR PULMONALE PRESENT (HCC): ICD-10-CM

## 2023-09-14 ENCOUNTER — TELEPHONE (OUTPATIENT)
Dept: RADIOLOGY | Facility: HOSPITAL | Age: 73
End: 2023-09-14

## 2023-09-14 RX ORDER — APIXABAN 5 MG/1
TABLET, FILM COATED ORAL
Qty: 60 TABLET | Refills: 0 | Status: SHIPPED | OUTPATIENT
Start: 2023-09-14

## 2023-09-14 NOTE — PRE-PROCEDURE INSTRUCTIONS
Pre-procedure Instructions for Interventional Radiology  [address] [address] [place]  [phone]    You are scheduled for a/an lower extremity angiogram.    On Wednesday 9/20/23. Your tentative arrival time is 1015. Short stay will notify you the day before your procedure with the exact arrival time and the location to arrive. To prepare for your procedure:  1. Please arrange for someone to drive you home after the procedure and stay with you until the next morning if you are instructed to do so. This is typically for patients receiving some type of sedative or anesthetic for the procedure. 2. DO NOT EAT OR DRINK ANYTHING after midnight on the evening before your procedure including candy & gum.  3. ONLY SIPS OF WATER with your medications are allowed on the morning of your procedure. 4. TAKE ALL OF YOUR REGULAR MEDICATIONS THE MORNING OF YOUR PROCEDURE with sips of water! We may call you to stop some of your blood sugar, blood pressure and blood thinning medications depending on the procedure. Please take all of these medications unless we instruct you to stop them. 5. If you have an allergy to x-ray dye, please contact Interventional Radiology for an x-ray dye preparation which usually consists of an oral steroid and Benadryl. The day of your procedure:  1. Bring a list of the medications you take at home. 2. Bring medications you take for breathing problems (such as inhalers), medications for chest pain, or both. 3. Bring a case for your glasses or contacts. 4. Bring your insurance card and a form of photo ID.  5. Please leave all valuables such as credit cards and jewelry at home. 6. Report to the registration desk in the main lobby at [facility]. Ask to be directed to [facility].   7. While your procedure is being performed, your family may wait in the Radiology Waiting Room on the 1st floor in Radiology. if they need to leave, they may provide a number to be called following the procedure. 8. Be prepared to stay overnight just in case. Sometimes procedures will indicate the need for further observation or treatment. 9. If you are scheduled for a follow-up visit with the Interventional Radiologist after your procedure, you will be called with a date and time. Special Instructions (Medications to stop taking before your procedure etc.):  Lasix Hold AM 9/20. Lisinopril Hold AM 9/20. Eliquis Hold 2 days LD 9/17. ASA Hold 5 days LD 9/14.  Ozempic Hold 7 days LD taken was 9/10

## 2023-09-15 ENCOUNTER — LAB (OUTPATIENT)
Dept: LAB | Facility: HOSPITAL | Age: 73
End: 2023-09-15
Payer: COMMERCIAL

## 2023-09-15 DIAGNOSIS — E55.9 VITAMIN D DEFICIENCY: ICD-10-CM

## 2023-09-15 DIAGNOSIS — I73.9 PAD (PERIPHERAL ARTERY DISEASE) (HCC): ICD-10-CM

## 2023-09-15 DIAGNOSIS — Z79.4 TYPE 2 DIABETES MELLITUS WITH HYPERGLYCEMIA, WITH LONG-TERM CURRENT USE OF INSULIN (HCC): ICD-10-CM

## 2023-09-15 DIAGNOSIS — I50.32 CHRONIC DIASTOLIC (CONGESTIVE) HEART FAILURE (HCC): ICD-10-CM

## 2023-09-15 DIAGNOSIS — E53.8 VITAMIN B12 DEFICIENCY: ICD-10-CM

## 2023-09-15 DIAGNOSIS — J96.11 CHRONIC RESPIRATORY FAILURE WITH HYPOXIA (HCC): ICD-10-CM

## 2023-09-15 DIAGNOSIS — Z01.818 PRE-OP EVALUATION: ICD-10-CM

## 2023-09-15 DIAGNOSIS — G47.33 OSA (OBSTRUCTIVE SLEEP APNEA): ICD-10-CM

## 2023-09-15 DIAGNOSIS — E78.2 MIXED HYPERLIPIDEMIA: ICD-10-CM

## 2023-09-15 DIAGNOSIS — U07.1 COVID-19: ICD-10-CM

## 2023-09-15 DIAGNOSIS — E11.65 TYPE 2 DIABETES MELLITUS WITH HYPERGLYCEMIA, WITH LONG-TERM CURRENT USE OF INSULIN (HCC): ICD-10-CM

## 2023-09-15 LAB
25(OH)D3 SERPL-MCNC: 39.7 NG/ML (ref 30–100)
ALBUMIN SERPL BCP-MCNC: 4 G/DL (ref 3.5–5)
ALP SERPL-CCNC: 76 U/L (ref 34–104)
ALT SERPL W P-5'-P-CCNC: 30 U/L (ref 7–52)
ANION GAP SERPL CALCULATED.3IONS-SCNC: 10 MMOL/L
AST SERPL W P-5'-P-CCNC: 18 U/L (ref 13–39)
BASOPHILS # BLD AUTO: 0.04 THOUSANDS/ÂΜL (ref 0–0.1)
BASOPHILS NFR BLD AUTO: 1 % (ref 0–1)
BILIRUB SERPL-MCNC: 0.56 MG/DL (ref 0.2–1)
BUN SERPL-MCNC: 15 MG/DL (ref 5–25)
CALCIUM SERPL-MCNC: 10 MG/DL (ref 8.4–10.2)
CHLORIDE SERPL-SCNC: 96 MMOL/L (ref 96–108)
CHOLEST SERPL-MCNC: 181 MG/DL
CO2 SERPL-SCNC: 32 MMOL/L (ref 21–32)
CREAT SERPL-MCNC: 0.59 MG/DL (ref 0.6–1.3)
EOSINOPHIL # BLD AUTO: 0.19 THOUSAND/ÂΜL (ref 0–0.61)
EOSINOPHIL NFR BLD AUTO: 2 % (ref 0–6)
ERYTHROCYTE [DISTWIDTH] IN BLOOD BY AUTOMATED COUNT: 14.8 % (ref 11.6–15.1)
EST. AVERAGE GLUCOSE BLD GHB EST-MCNC: 192 MG/DL
GFR SERPL CREATININE-BSD FRML MDRD: 89 ML/MIN/1.73SQ M
GLUCOSE P FAST SERPL-MCNC: 145 MG/DL (ref 65–99)
HBA1C MFR BLD: 8.3 %
HCT VFR BLD AUTO: 40.1 % (ref 34.8–46.1)
HDLC SERPL-MCNC: 53 MG/DL
HGB BLD-MCNC: 12.8 G/DL (ref 11.5–15.4)
IMM GRANULOCYTES # BLD AUTO: 0.03 THOUSAND/UL (ref 0–0.2)
IMM GRANULOCYTES NFR BLD AUTO: 0 % (ref 0–2)
INR PPP: 1.05 (ref 0.84–1.19)
LDLC SERPL CALC-MCNC: 91 MG/DL (ref 0–100)
LYMPHOCYTES # BLD AUTO: 1.9 THOUSANDS/ÂΜL (ref 0.6–4.47)
LYMPHOCYTES NFR BLD AUTO: 22 % (ref 14–44)
MCH RBC QN AUTO: 30.5 PG (ref 26.8–34.3)
MCHC RBC AUTO-ENTMCNC: 31.9 G/DL (ref 31.4–37.4)
MCV RBC AUTO: 96 FL (ref 82–98)
MONOCYTES # BLD AUTO: 0.97 THOUSAND/ÂΜL (ref 0.17–1.22)
MONOCYTES NFR BLD AUTO: 11 % (ref 4–12)
NEUTROPHILS # BLD AUTO: 5.46 THOUSANDS/ÂΜL (ref 1.85–7.62)
NEUTS SEG NFR BLD AUTO: 64 % (ref 43–75)
NONHDLC SERPL-MCNC: 128 MG/DL
NRBC BLD AUTO-RTO: 0 /100 WBCS
PLATELET # BLD AUTO: 320 THOUSANDS/UL (ref 149–390)
PMV BLD AUTO: 10.6 FL (ref 8.9–12.7)
POTASSIUM SERPL-SCNC: 3.6 MMOL/L (ref 3.5–5.3)
PROT SERPL-MCNC: 7.3 G/DL (ref 6.4–8.4)
PROTHROMBIN TIME: 13.6 SECONDS (ref 11.6–14.5)
RBC # BLD AUTO: 4.2 MILLION/UL (ref 3.81–5.12)
SODIUM SERPL-SCNC: 138 MMOL/L (ref 135–147)
TRIGL SERPL-MCNC: 186 MG/DL
TSH SERPL DL<=0.05 MIU/L-ACNC: 0.03 UIU/ML (ref 0.45–4.5)
VIT B12 SERPL-MCNC: 389 PG/ML (ref 180–914)
WBC # BLD AUTO: 8.59 THOUSAND/UL (ref 4.31–10.16)

## 2023-09-15 PROCEDURE — 85025 COMPLETE CBC W/AUTO DIFF WBC: CPT

## 2023-09-15 PROCEDURE — 36415 COLL VENOUS BLD VENIPUNCTURE: CPT

## 2023-09-15 PROCEDURE — 82607 VITAMIN B-12: CPT

## 2023-09-15 PROCEDURE — 84443 ASSAY THYROID STIM HORMONE: CPT

## 2023-09-15 PROCEDURE — 83036 HEMOGLOBIN GLYCOSYLATED A1C: CPT

## 2023-09-15 PROCEDURE — 80053 COMPREHEN METABOLIC PANEL: CPT

## 2023-09-15 PROCEDURE — 85610 PROTHROMBIN TIME: CPT

## 2023-09-15 PROCEDURE — 80061 LIPID PANEL: CPT

## 2023-09-15 PROCEDURE — 82306 VITAMIN D 25 HYDROXY: CPT

## 2023-09-20 ENCOUNTER — ANESTHESIA EVENT (OUTPATIENT)
Dept: RADIOLOGY | Facility: HOSPITAL | Age: 73
End: 2023-09-20

## 2023-09-20 ENCOUNTER — ANESTHESIA (OUTPATIENT)
Dept: RADIOLOGY | Facility: HOSPITAL | Age: 73
End: 2023-09-20

## 2023-09-20 ENCOUNTER — HOSPITAL ENCOUNTER (OUTPATIENT)
Dept: RADIOLOGY | Facility: HOSPITAL | Age: 73
Discharge: HOME | End: 2023-09-20
Attending: RADIOLOGY
Payer: COMMERCIAL

## 2023-09-20 VITALS
HEART RATE: 101 BPM | DIASTOLIC BLOOD PRESSURE: 57 MMHG | SYSTOLIC BLOOD PRESSURE: 119 MMHG | HEIGHT: 65 IN | BODY MASS INDEX: 39.65 KG/M2 | TEMPERATURE: 97.7 F | WEIGHT: 238 LBS | RESPIRATION RATE: 16 BRPM | OXYGEN SATURATION: 97 %

## 2023-09-20 DIAGNOSIS — I73.9 PAD (PERIPHERAL ARTERY DISEASE) (HCC): ICD-10-CM

## 2023-09-20 LAB
ANION GAP SERPL CALCULATED.3IONS-SCNC: 10 MMOL/L
BUN SERPL-MCNC: 15 MG/DL (ref 5–25)
CALCIUM SERPL-MCNC: 9.3 MG/DL (ref 8.4–10.2)
CHLORIDE SERPL-SCNC: 101 MMOL/L (ref 96–108)
CO2 SERPL-SCNC: 26 MMOL/L (ref 21–32)
CREAT SERPL-MCNC: 0.62 MG/DL (ref 0.6–1.3)
ERYTHROCYTE [DISTWIDTH] IN BLOOD BY AUTOMATED COUNT: 15 % (ref 11.6–15.1)
GFR SERPL CREATININE-BSD FRML MDRD: 88 ML/MIN/1.73SQ M
GLUCOSE P FAST SERPL-MCNC: 170 MG/DL (ref 65–99)
GLUCOSE SERPL-MCNC: 170 MG/DL (ref 65–140)
HCT VFR BLD AUTO: 35.5 % (ref 34.8–46.1)
HGB BLD-MCNC: 11.5 G/DL (ref 11.5–15.4)
INR PPP: 0.94 (ref 0.84–1.19)
MCH RBC QN AUTO: 30.5 PG (ref 26.8–34.3)
MCHC RBC AUTO-ENTMCNC: 32.4 G/DL (ref 31.4–37.4)
MCV RBC AUTO: 94 FL (ref 82–98)
PLATELET # BLD AUTO: 303 THOUSANDS/UL (ref 149–390)
PMV BLD AUTO: 10.8 FL (ref 8.9–12.7)
POTASSIUM SERPL-SCNC: 3.9 MMOL/L (ref 3.5–5.3)
PROTHROMBIN TIME: 12.8 SECONDS (ref 11.6–14.5)
RBC # BLD AUTO: 3.77 MILLION/UL (ref 3.81–5.12)
SODIUM SERPL-SCNC: 137 MMOL/L (ref 135–147)
WBC # BLD AUTO: 8.15 THOUSAND/UL (ref 4.31–10.16)

## 2023-09-20 PROCEDURE — 37228 HB TIB/PER REVASC W/TLA: CPT

## 2023-09-20 PROCEDURE — 75710 ARTERY X-RAYS ARM/LEG: CPT | Performed by: RADIOLOGY

## 2023-09-20 PROCEDURE — C1725 CATH, TRANSLUMIN NON-LASER: HCPCS

## 2023-09-20 PROCEDURE — C1769 GUIDE WIRE: HCPCS

## 2023-09-20 PROCEDURE — C1894 INTRO/SHEATH, NON-LASER: HCPCS

## 2023-09-20 PROCEDURE — C1887 CATHETER, GUIDING: HCPCS

## 2023-09-20 PROCEDURE — 75625 CONTRAST EXAM ABDOMINL AORTA: CPT | Performed by: RADIOLOGY

## 2023-09-20 PROCEDURE — 76937 US GUIDE VASCULAR ACCESS: CPT

## 2023-09-20 PROCEDURE — C1874 STENT, COATED/COV W/DEL SYS: HCPCS

## 2023-09-20 PROCEDURE — 85027 COMPLETE CBC AUTOMATED: CPT | Performed by: RADIOLOGY

## 2023-09-20 PROCEDURE — 85610 PROTHROMBIN TIME: CPT | Performed by: RADIOLOGY

## 2023-09-20 PROCEDURE — 37226 PR REVSC OPN/PRQ FEM/POP W/STNT/ANGIOP SM VSL: CPT | Performed by: RADIOLOGY

## 2023-09-20 PROCEDURE — 75625 CONTRAST EXAM ABDOMINL AORTA: CPT

## 2023-09-20 PROCEDURE — C9765 REVASC INTRA LITHOTRIP-STENT: HCPCS

## 2023-09-20 PROCEDURE — 37228 PR REVSC OPN/PRQ TIB/PERO W/ANGIOPLASTY UNI: CPT | Performed by: RADIOLOGY

## 2023-09-20 PROCEDURE — 80048 BASIC METABOLIC PNL TOTAL CA: CPT | Performed by: RADIOLOGY

## 2023-09-20 PROCEDURE — C1760 CLOSURE DEV, VASC: HCPCS

## 2023-09-20 PROCEDURE — 75710 ARTERY X-RAYS ARM/LEG: CPT

## 2023-09-20 RX ORDER — OXYCODONE HYDROCHLORIDE 5 MG/1
5 TABLET ORAL EVERY 4 HOURS PRN
Status: DISCONTINUED | OUTPATIENT
Start: 2023-09-20 | End: 2023-09-21 | Stop reason: HOSPADM

## 2023-09-20 RX ORDER — LIDOCAINE HYDROCHLORIDE 10 MG/ML
INJECTION, SOLUTION EPIDURAL; INFILTRATION; INTRACAUDAL; PERINEURAL AS NEEDED
Status: DISCONTINUED | OUTPATIENT
Start: 2023-09-20 | End: 2023-09-21 | Stop reason: HOSPADM

## 2023-09-20 RX ORDER — FENTANYL CITRATE 50 UG/ML
INJECTION, SOLUTION INTRAMUSCULAR; INTRAVENOUS AS NEEDED
Status: DISCONTINUED | OUTPATIENT
Start: 2023-09-20 | End: 2023-09-20

## 2023-09-20 RX ORDER — PROPOFOL 10 MG/ML
INJECTION, EMULSION INTRAVENOUS CONTINUOUS PRN
Status: DISCONTINUED | OUTPATIENT
Start: 2023-09-20 | End: 2023-09-20

## 2023-09-20 RX ORDER — ACETAMINOPHEN 325 MG/1
650 TABLET ORAL EVERY 4 HOURS PRN
Status: DISCONTINUED | OUTPATIENT
Start: 2023-09-20 | End: 2023-09-21 | Stop reason: HOSPADM

## 2023-09-20 RX ORDER — ONDANSETRON 2 MG/ML
INJECTION INTRAMUSCULAR; INTRAVENOUS AS NEEDED
Status: DISCONTINUED | OUTPATIENT
Start: 2023-09-20 | End: 2023-09-20

## 2023-09-20 RX ORDER — SODIUM CHLORIDE 9 MG/ML
75 INJECTION, SOLUTION INTRAVENOUS CONTINUOUS
Status: DISCONTINUED | OUTPATIENT
Start: 2023-09-20 | End: 2023-09-21 | Stop reason: HOSPADM

## 2023-09-20 RX ORDER — SODIUM CHLORIDE 9 MG/ML
INJECTION, SOLUTION INTRAVENOUS CONTINUOUS PRN
Status: DISCONTINUED | OUTPATIENT
Start: 2023-09-20 | End: 2023-09-20

## 2023-09-20 RX ORDER — KETAMINE HCL IN NACL, ISO-OSM 100MG/10ML
SYRINGE (ML) INJECTION AS NEEDED
Status: DISCONTINUED | OUTPATIENT
Start: 2023-09-20 | End: 2023-09-20

## 2023-09-20 RX ORDER — HEPARIN SODIUM 1000 [USP'U]/ML
INJECTION, SOLUTION INTRAVENOUS; SUBCUTANEOUS AS NEEDED
Status: DISCONTINUED | OUTPATIENT
Start: 2023-09-20 | End: 2023-09-20

## 2023-09-20 RX ORDER — IODIXANOL 320 MG/ML
300 INJECTION, SOLUTION INTRAVASCULAR
Status: COMPLETED | OUTPATIENT
Start: 2023-09-20 | End: 2023-09-20

## 2023-09-20 RX ORDER — LIDOCAINE HYDROCHLORIDE 10 MG/ML
INJECTION, SOLUTION EPIDURAL; INFILTRATION; INTRACAUDAL; PERINEURAL AS NEEDED
Status: DISCONTINUED | OUTPATIENT
Start: 2023-09-20 | End: 2023-09-20

## 2023-09-20 RX ADMIN — PROPOFOL 50 MCG/KG/MIN: 10 INJECTION, EMULSION INTRAVENOUS at 11:00

## 2023-09-20 RX ADMIN — LIDOCAINE HYDROCHLORIDE 50 MG: 10 INJECTION, SOLUTION EPIDURAL; INFILTRATION; INTRACAUDAL; PERINEURAL at 11:00

## 2023-09-20 RX ADMIN — SODIUM CHLORIDE 8 MCG: 9 INJECTION, SOLUTION INTRAVENOUS at 12:19

## 2023-09-20 RX ADMIN — Medication 10 MG: at 12:50

## 2023-09-20 RX ADMIN — SODIUM CHLORIDE 75 ML/HR: 0.9 INJECTION, SOLUTION INTRAVENOUS at 09:54

## 2023-09-20 RX ADMIN — IODIXANOL 140 ML: 320 INJECTION, SOLUTION INTRAVASCULAR at 13:12

## 2023-09-20 RX ADMIN — FENTANYL CITRATE 100 MCG: 50 INJECTION, SOLUTION INTRAMUSCULAR; INTRAVENOUS at 13:06

## 2023-09-20 RX ADMIN — FENTANYL CITRATE 50 MCG: 50 INJECTION, SOLUTION INTRAMUSCULAR; INTRAVENOUS at 11:00

## 2023-09-20 RX ADMIN — Medication 100 MCG: at 12:52

## 2023-09-20 RX ADMIN — SODIUM CHLORIDE 4 MCG: 9 INJECTION, SOLUTION INTRAVENOUS at 12:30

## 2023-09-20 RX ADMIN — SODIUM CHLORIDE 4 MCG: 9 INJECTION, SOLUTION INTRAVENOUS at 11:20

## 2023-09-20 RX ADMIN — SODIUM CHLORIDE 8 MCG: 9 INJECTION, SOLUTION INTRAVENOUS at 10:50

## 2023-09-20 RX ADMIN — Medication 15 MG: at 12:38

## 2023-09-20 RX ADMIN — FENTANYL CITRATE 25 MCG: 50 INJECTION, SOLUTION INTRAMUSCULAR; INTRAVENOUS at 11:40

## 2023-09-20 RX ADMIN — SODIUM CHLORIDE: 0.9 INJECTION, SOLUTION INTRAVENOUS at 11:00

## 2023-09-20 RX ADMIN — SODIUM CHLORIDE 8 MCG: 9 INJECTION, SOLUTION INTRAVENOUS at 11:04

## 2023-09-20 RX ADMIN — HEPARIN SODIUM 7000 UNITS: 1000 INJECTION INTRAVENOUS; SUBCUTANEOUS at 11:50

## 2023-09-20 RX ADMIN — OXYCODONE HYDROCHLORIDE 5 MG: 5 TABLET ORAL at 18:41

## 2023-09-20 RX ADMIN — FENTANYL CITRATE 25 MCG: 50 INJECTION, SOLUTION INTRAMUSCULAR; INTRAVENOUS at 12:05

## 2023-09-20 RX ADMIN — ONDANSETRON 4 MG: 2 INJECTION INTRAMUSCULAR; INTRAVENOUS at 11:00

## 2023-09-20 RX ADMIN — LIDOCAINE HYDROCHLORIDE 10 ML: 10 INJECTION, SOLUTION EPIDURAL; INFILTRATION; INTRACAUDAL; PERINEURAL at 11:15

## 2023-09-20 NOTE — DISCHARGE INSTRUCTIONS
ARTERIOGRAM    WHAT YOU SHOULD KNOW:   An angiogram is a procedure to look at arteries in your body. Arteries are the blood vessels that carry blood from your heart to your body. AFTER YOU LEAVE:     Self-care:   Limit activity: Rest for the remainder of the day of your procedure. Have some one with you until the next morning. Keep your arm or leg straight as much as possible. Rest as much as possible, sitting lying or reclining. Walk only to go to the bathroom, to bed or to eat. If the angiogram catheter was put in your leg, use the stairs as little as possible. No driving for 04-59 hours. No heavy lifting, >10 lbs. Or strenuous activity for 48 hours. Keep your wound clean and dry. Remove band aid/ dressing tomorrow. You may shower 24 hours after your procedure. Shower and wash groin area or wrist area gently with soap and water: beginning tomorrow. Rinse and pat Dry. Apply new water seal band aid. Repeat this process for 5 days. If there is any drainage from the puncture site, you should put on a clean bandage. No Powders, creams, lotions or antibiotic ointments for 5 days. No tub baths, hot tubs or swimming for 5 days. Watch for bleeding and bruising: It is normal to have a bruise and soreness where the angiogram catheter went in. Medication: If your angiogram was performed to treat blockages in your leg arteries, it is strongly recommended that you take both an antiplatelet medication (like aspirin or Plavix) to prevent clotting AND a statin drug (like Lipitor or Crestor), even if you have normal cholesterol. If these drugs are not ordered for you please contact either your Vascular Surgery office or the Interventional Radiology Dept during normal daytime working hours. See Interventional Radiology telephone numbers below.   You Should Have Follow up with the vascular surgeon   call [phone] with questions  Diet:   You may resume your regular diet, Sips of flat soda will help with mild nausea. Drink more liquids than usual for the next 24 hours      IMMEDIATELY Contact Interventional Radiology at [phone]  if any of the following occur: If your bruise gets larger or if you notice any active bleeding. APPLY DIRECT PRESSURE TO THE BLEEDING SITE. If you notice increased swelling or have increased pain at the puncture site   If you have any numbness or pain in the extremity of the puncture site   If that extremity seems cold or pale. You have fever greater than 101  Persistent nausea or vomitting    Follow up with your primary healthcare provider  as directed: Write down your questions so you remember to ask them during your visits.

## 2023-09-20 NOTE — SEDATION DOCUMENTATION
Right lower extremity arteriogram completed by Dr. Crow Helm. VSS. Manual pressure for closure to Left CFA.

## 2023-09-20 NOTE — BRIEF OP NOTE (RAD/CATH)
INTERVENTIONAL RADIOLOGY PROCEDURE NOTE    Date: 9/20/2023    Procedure:   Procedure Summary     Date: 09/20/23 Room / Location: [address] [place] [address] Interventional Radiology    Anesthesia Start: 1050 Anesthesia Stop:     Procedure: IR AORTAGRAM WITH RUN-OFF Diagnosis:       PAD (peripheral artery disease) ([address])      (nonhealing right great toe ulceration)    Scheduled Providers: Rehan Salazar MD Responsible Provider: Rehan Salazar MD    Anesthesia Type: IV sedation with anesthesia ASA Status: Not recorded          Preoperative diagnosis:   1. PAD (peripheral artery disease) (HCC)         Postoperative diagnosis: Same. Surgeon: Jigar Camejo MD     Assistant: None. No qualified resident was available. Blood loss: Less than 25 ml    Specimens: none     Findings: HIgh grade proximal right SFA stenosis treated with lithotripsy balloon and drug-eluting stent. Distal PT treated with 2 mm PTA. Complications: None immediate. Anesthesia: local and MAC sedation       Vascular Quality Initiative - Peripheral Vascular Intervention     Urgency: Urgent    Functional Status:  Ambulatory and capable of all self care but unable to carry out any work activities. Up and about more than 50% of waking hours. Ambulation: Amb w/ assistance = ambulation requires use of cane, walker, person, etc    Leg Symptoms    Right: Ulcer/necrosis (gangrene): de rustam tissue loss due to peripheral arterial disease, not due to non-healing prior amputation       Tissue Loss Severity: Grade 1, Shallow = small shallow ulcer(s) on distal leg or foot, any exposed bone is only limited to distal phalanx (ie, minor tissue loss: limb salvage possible with simple digital amputation [1 or 2 digits], or skin coverage). Infection: Grade 0, None = No symptoms or signs of infection.   Left: Asymptomatic:  documented peripheral arterial disease without symptoms of claudication or ischemic pain      Treatment of Native Artery to Maintain Bypass Patency?:  No    COVID Information  COVID Symptoms Pre-Procedure: Asymptomatic    Treatment Delayed by Pandemic: None    Access   Number of Sites: 1     Access Site 1:     Side 1: Left    Site 1: Femoral Retrograde    Access Guidance 1:Fluoroscopy    Largest Sheath Size 1: 6 Fr. Closure Device 1: Perclose      Number of Closure Devices: 2     Closure Device Outcome: Closure device failed         Procedure  Fluoro Time: 30.1 minutes  Contrast Volume: Visipaque 140 ml  DAP: 172.3 Gy.cm2  CO2: no  Anticoagulant: Heparin  Protamine: No  If Creatinine is > 1.2 or missing, KAREN Prophylaxis none     Treatment Details  Indication: Occlusive Disease,    Completion Assessment  Artery 1 treated: SFA        Right               Outflow: AT,PT,Peroneal: 2                  Was this Site previously treated?: No          TASC Grade: A          Total Treated Length: 6 cm          Total Occluded Length: 0 cm          Calcification: Severe (calcification on both sides of artery > half length of lesion)          Number of Treatment types (Devices):    2           Device 1          Treatment Type: Special Balloon,  Lithoplasty Balloon                Diameter: 4 mm          Length: 60 mm         Device 2          Treatment Type: Stent,  Drug Eluting Stent                Diameter: 6 mm          Length: 40 mm            Concomitant: None          Technical result: Successful (stenosis <=30%)      Artery 2 treated: Post Tibial  Right                     Was this Site previously treated?: No          TASC Grade: B          Total Treated Length: 4 cm           Total Occluded Length: 0 cm          Calcification: None (no calcification visible on fluoroscopic, CT or IVUS imaging)          Number of Treatment types (Devices):   1           Device 1          Treatment Type: Plain Balloon            Concomitant: None          Technical result: Successful (stenosis <=30%)       None     Post Procedure  Patient currently taking: Statin, Yes      Antiplatelet Medication, Yes    Procedure Complications: No

## 2023-09-20 NOTE — ANESTHESIA PREPROCEDURE EVALUATION
Procedure:  IR AORTAGRAM WITH RUN-OFF    Relevant Problems   CARDIO   (+) Essential hypertension   (+) Hyperlipidemia   (+) Migraine without aura and without status migrainosus, not intractable      ENDO   (+) Hypothyroidism   (+) Type 2 diabetes mellitus with hyperglycemia, with long-term current use of insulin (HCC)      GI/HEPATIC   (+) Gastroesophageal reflux disease without esophagitis   (+) Hepatic steatosis      /RENAL   (+) Renal cyst      HEMATOLOGY   (+) Macrocytic anemia      MUSCULOSKELETAL   (+) Fibromyalgia   (+) Osteoarthritis   (+) Rheumatoid arthritis (HCC)      NEURO/PSYCH   (+) Anxiety   (+) Fibromyalgia   (+) Migraine without aura and without status migrainosus, not intractable      PULMONARY   (+) COPD exacerbation (HCC)   (+) Centrilobular emphysema (HCC)   (+) Chronic respiratory failure with hypoxia (HCC)   (+) Dependence on continuous supplemental oxygen   (+) Loculated pleural effusion   (+) MORENO (obstructive sleep apnea)      Other   (+) Hilar lymphadenopathy   on 3L O2 at baseline     Physical Exam    Airway    Mallampati score: III  TM Distance: >3 FB  Neck ROM: full     Dental   upper dentures and lower dentures,     Cardiovascular  Cardiovascular exam normal    Pulmonary  Pulmonary exam normal     Other Findings        Anesthesia Plan  ASA Score- 3     Anesthesia Type- IV sedation with anesthesia with ASA Monitors. Additional Monitors:   Airway Plan:           Plan Factors-Exercise tolerance (METS): <4 METS. Chart reviewed. EKG reviewed. Existing labs reviewed. Patient summary reviewed. Patient is not a current smoker. Induction-     Postoperative Plan-     Informed Consent- Anesthetic plan and risks discussed with patient. I personally reviewed this patient with the CRNA. Discussed and agreed on the Anesthesia Plan with the CRNA. Margarito Huston

## 2023-09-20 NOTE — ANESTHESIA POSTPROCEDURE EVALUATION
Post-Op Assessment Note    CV Status:  Stable  Pain Score: 0    Pain management: adequate  Multimodal analgesia used between 6 hours prior to anesthesia start to PACU discharge    Mental Status:  Alert and awake   Hydration Status:  Euvolemic and stable   PONV Controlled:  Controlled   Airway Patency:  Patent   Two or more mitigation strategies used for obstructive sleep apnea   Post Op Vitals Reviewed: Yes      Staff: CRNA         No notable events documented.     BP   132/76   Temp   97.6   Pulse  98   Resp   12   SpO2   98

## 2023-09-20 NOTE — H&P
Interventional Radiology  History and Physical 9/20/2023     Leonardo Rebolledo   1950   [id number]    Assessment/Plan:  73 yo female with nonhealing right great to ulcer for arteriogram and possible inervention    Problem List Items Addressed This Visit        Cardiovascular and Mediastinum    PAD (peripheral artery disease) ([address])    Relevant Orders    IR aortagram with run-off          Subjective:     Patient ID: Leonardo Rebolledo is a 74 y.o. female. History of Present Illness  73 yo female with callous on her toe which recently ulcerated. She has debilitating foot pain which limits her to walking to the kitchen. A duplex shows a >75% right SFA stenosis. Review of Systems   Constitutional: Negative. HENT: Negative. Eyes: Negative. Respiratory: Negative. Cardiovascular: Negative. Gastrointestinal: Negative. Endocrine: Negative. Genitourinary: Negative. Musculoskeletal:        Right foot pain   Skin: Positive for wound. Right 1st toe   Allergic/Immunologic: Negative. Neurological: Negative. Hematological: Negative. Psychiatric/Behavioral: Negative. Past Medical History:   Diagnosis Date   • Arthritis    • Arthritis    • Cancer [facility])    • Candidiasis of skin    • Cervical cancer ([address])    • Chronic respiratory failure with hypoxia and hypercapnia (HCC) 4/20/2019   • COPD (chronic obstructive pulmonary disease) ([address])     last assessed 11/21/2016   • Current chronic use of systemic steroids    • Degenerative arthritis of left knee     last assessed 1/20/2015   • Diabetes mellitus ([address])    • Disease of thyroid gland     hypo pill given to decrease thyroid.     • Fibromyalgia    • Fistula of knee     last assessed 9/12/2014   • GERD (gastroesophageal reflux disease)    • Hyperlipidemia    • Hypertension    • Medial meniscus tear     of left knee, last assessed 9/12/2014   • Migraine     states she has a hx of HA that she calls Sekal AS but never was dx by neurologist   • Obesity    • Obesity    • Osteoarthritis    • Osteopenia    • Pneumonia     last assessed 11/16/2016   • Psychiatric disorder     anxiety   • Rheumatoid arthritis ([address])    • Rheumatoid arthritis ([address])    • Sepsis ([address])     last assessed 11/10/2016   • Tick bite     last assessed 10/30/2015   • Vitamin B12 deficiency    • Vitamin D deficiency         Past Surgical History:   Procedure Laterality Date   • CATARACT EXTRACTION Bilateral    • CHOLECYSTECTOMY     • EYE SURGERY      Bilateral Cataracts   • HYSTERECTOMY     • IR THORACENTESIS  5/31/2019   • JOINT REPLACEMENT      left knee   • KNEE ARTHROSCOPY     • NEUROPLASTY / TRANSPOSITION MEDIAN NERVE AT CARPAL TUNNEL     • TUBAL LIGATION          Social History     Tobacco Use   Smoking Status Former   • Packs/day: 1.00   • Years: 48.00   • Total pack years: 48.00   • Types: Cigarettes, E-Cigarettes   • Quit date: 11/21/2012   • Years since quitting: 10.8   Smokeless Tobacco Never   Tobacco Comments    per allscripts - "current every day smoker, former smoker"        Social History     Substance and Sexual Activity   Alcohol Use Never   • Alcohol/week: 0.0 standard drinks of alcohol    Comment: stopped drinking alcohol        Social History     Substance and Sexual Activity   Drug Use Never        No Known Allergies    Current Outpatient Medications   Medication Sig Dispense Refill   • acetaminophen (TYLENOL) 325 mg tablet Take 2 tablets (650 mg total) by mouth every 6 (six) hours as needed for mild pain, headaches or fever  0   • albuterol (2.5 mg/3 mL) 0.083 % nebulizer solution USE 1 UNIT DOSE IN NEBULIZER EVERY 4 HOURS AS NEEDED.      • albuterol (PROVENTIL HFA,VENTOLIN HFA) 90 mcg/act inhaler Inhale 2 puffs every 6 (six) hours as needed for wheezing  0   • atorvastatin (LIPITOR) 20 mg tablet TAKE ONE TABLET BY MOUTH EVERY DAY 90 tablet 0   • BD Pen Needle Mamta U/F 32G X 4 MM MISC USE AS DIRECTED 100 each 0   • Blood Glucose Monitoring Suppl (ONE TOUCH ULTRA 2) w/Device KIT by Does not apply route 2 (two) times a day 1 each 0   • clindamycin (CLEOCIN T) 1 % lotion clindamycin 1 % lotion   APPLY LOTION TOPICALLY TWICE DAILY     • DULoxetine (CYMBALTA) 60 mg delayed release capsule TAKE ONE CAPSULE BY MOUTH EVERY DAY 90 capsule 0   • Eliquis 5 MG TAKE 1 TABLET BY MOUTH TWICE DAILY 60 tablet 0   • ergocalciferol (VITAMIN D2) 50,000 units TAKE ONE CAPSULE BY MOUTH WEEKLY 12 capsule 0   • fluticasone-salmeterol (ADVAIR) 250-50 mcg/dose Inhale 1 puff every 12 (twelve) hours      • folic acid (FOLVITE) 1 mg tablet TAKE ONE TABLET BY MOUTH ONCE DAILY 90 tablet 0   • furosemide (LASIX) 40 mg tablet TAKE ONE TABLET BY MOUTH EVERY DAY 60 tablet 0   • glucose blood (OneTouch Ultra) test strip Test 3 times daily   Dx: E11.65 300 each 3   • insulin degludec Lollie Balm FlexTouch) 100 units/mL injection pen Inject 40 Units under the skin daily 30 mL 2   • Insulin Pen Needle 33G X 4 MM MISC by Does not apply route daily 100 each 5   • Lancets (OneTouch Delica Plus [id number]) MISC Test 2 times a day 200 each 0   • levothyroxine 137 mcg tablet      • lisinopril (ZESTRIL) 40 mg tablet TAKE ONE TABLET BY MOUTH EVERY DAY 60 tablet 0   • methotrexate 2.5 MG tablet TAKE 8 TABLETS BY MOUTH EVERY SUNDAY 32 tablet 1   • mirtazapine (REMERON) 15 mg tablet Take 1 tablet (15 mg total) by mouth daily at bedtime 90 tablet 3   • mometasone (ELOCON) 0.1 % ointment Apply topically 2 (two) times a day as needed (itching) 45 g 1   • nystatin (MYCOSTATIN) cream APPLY  CREAM TOPICALLY TWICE DAILY 30 g 0   • nystatin (MYCOSTATIN) powder Apply topically 3 (three) times a day as needed (rash/irritation) 45 g 1   • pantoprazole (PROTONIX) 40 mg tablet TAKE ONE TABLET BY MOUTH ONCE DAILY 90 tablet 0   • Polyethylene Glycol 3350-GRX POWD Take by mouth daily at bedtime as needed (constipation) 850 g 0   • semaglutide, 1 mg/dose, (Ozempic) 4 mg/3 mL injection pen Inject 0.75 mL (1 mg total) under the skin once a week 9 mL 1   • tiotropium (SPIRIVA RESPIMAT) 2.5 MCG/ACT AERS inhaler Inhale 1 puff daily at bedtime 3 Inhaler 3   • Tofacitinib Citrate ER 11 MG TB24 Take 1 tablet by mouth daily     • vitamin B-12 (VITAMIN B-12) 500 MCG tablet Take 1 tablet (500 mcg total) by mouth daily     • aspirin (ECOTRIN LOW STRENGTH) 81 mg EC tablet Take 81 mg by mouth daily       Current Facility-Administered Medications   Medication Dose Route Frequency Provider Last Rate Last Admin   • cyanocobalamin injection 1,000 mcg  1,000 mcg Intramuscular Q30 Days Natalie Kapoor MD   1,000 mcg at 06/21/23 1523   • sodium chloride 0.9 % infusion  75 mL/hr Intravenous Continuous Fox Dryer, DO 75 mL/hr at 09/20/23 0954 75 mL/hr at 09/20/23 0954          Objective:    Vitals:    09/20/23 1010   BP: 129/79   BP Location: Left arm   Pulse: 90   Resp: 16   Temp: 98 °F (36.7 °C)   TempSrc: Temporal   SpO2: 97%   Weight: 108 kg (238 lb)   Height: 5' 5" (1.651 m)        Physical Exam  Vitals reviewed. Constitutional:       Appearance: Normal appearance. She is obese. HENT:      Head: Normocephalic and atraumatic. Cardiovascular:      Rate and Rhythm: Normal rate and regular rhythm. Pulses:           Femoral pulses are 2+ on the right side and 2+ on the left side. Dorsalis pedis pulses are detected w/ Doppler on the right side and detected w/ Doppler on the left side. Posterior tibial pulses are detected w/ Doppler on the right side and detected w/ Doppler on the left side. Heart sounds: Normal heart sounds. Pulmonary:      Breath sounds: Normal breath sounds. Abdominal:      General: Abdomen is flat. Bowel sounds are normal.      Palpations: Abdomen is soft. Musculoskeletal:         General: Normal range of motion. Cervical back: Normal range of motion. Skin:     General: Skin is warm and dry. Neurological:      Mental Status: She is alert and oriented to person, place, and time.    Psychiatric: Mood and Affect: Mood normal.         Behavior: Behavior normal.           Lab Results   Component Value Date    BNP 26 08/25/2014      Lab Results   Component Value Date    WBC 8.15 09/20/2023    HGB 11.5 09/20/2023    HCT 35.5 09/20/2023    MCV 94 09/20/2023     09/20/2023     Lab Results   Component Value Date    INR 1.05 09/15/2023    INR 0.99 12/07/2021    INR 1.02 07/26/2020    PROTIME 13.6 09/15/2023    PROTIME 12.6 12/07/2021    PROTIME 13.4 07/26/2020     Lab Results   Component Value Date    PTT 51 (H) 12/09/2021         I have personally reviewed pertinent imaging and laboratory results. Code Status: Prior  Advance Directive and Living Will:      Power of :    POLST:      This text is generated with voice recognition software. There may be translation, syntax,  or grammatical errors. If you have any questions, please contact the dictating provider.

## 2023-09-21 ENCOUNTER — TELEPHONE (OUTPATIENT)
Dept: VASCULAR SURGERY | Facility: CLINIC | Age: 73
End: 2023-09-21

## 2023-09-21 DIAGNOSIS — I10 ESSENTIAL HYPERTENSION: ICD-10-CM

## 2023-09-21 DIAGNOSIS — E78.2 MIXED HYPERLIPIDEMIA: ICD-10-CM

## 2023-09-21 NOTE — TELEPHONE ENCOUNTER
Reviewed. IR performed procedure. She can use conservative measures at this time with heating pad, lidocaine patch (OTC) at this time. Call the office for any worsening or persistent back pain, fever/ chills.

## 2023-09-21 NOTE — TELEPHONE ENCOUNTER
Spoke with pt. She stated that her leg feels fine, but she has generalized back soreness. She thinks this is from laying down in bed all day yesterday. Pt stated that the nurse from the hospital called to check on her, and she was advised to call this office because of the back pain. Routed to triage to recommend what pt can take for her sore back.

## 2023-09-21 NOTE — TELEPHONE ENCOUNTER
Pt called and left message on clinical voice mail. Message stated that she feels sore from angiogram yesterday, and wants to know what she can take for pain. Called pt back to get more information. Had to LM on  requesting a return call.

## 2023-09-22 RX ORDER — ATORVASTATIN CALCIUM 20 MG/1
TABLET, FILM COATED ORAL
Qty: 90 TABLET | Refills: 0 | Status: SHIPPED | OUTPATIENT
Start: 2023-09-22

## 2023-09-22 RX ORDER — LISINOPRIL 40 MG/1
TABLET ORAL
Qty: 90 TABLET | Refills: 0 | Status: SHIPPED | OUTPATIENT
Start: 2023-09-22

## 2023-09-29 DIAGNOSIS — E03.9 HYPOTHYROIDISM, UNSPECIFIED TYPE: ICD-10-CM

## 2023-09-29 DIAGNOSIS — B37.49 CANDIDAL UTI (URINARY TRACT INFECTION): ICD-10-CM

## 2023-09-29 DIAGNOSIS — F51.01 PRIMARY INSOMNIA: ICD-10-CM

## 2023-09-29 DIAGNOSIS — B37.2 CANDIDAL DERMATITIS: ICD-10-CM

## 2023-10-03 RX ORDER — NYSTATIN 100000 U/G
CREAM TOPICAL 2 TIMES DAILY PRN
Qty: 30 G | Refills: 0 | Status: SHIPPED | OUTPATIENT
Start: 2023-10-03

## 2023-10-03 RX ORDER — LEVOTHYROXINE SODIUM 0.15 MG/1
150 TABLET ORAL DAILY
Qty: 90 TABLET | Refills: 0 | Status: SHIPPED | OUTPATIENT
Start: 2023-10-03

## 2023-10-03 RX ORDER — MIRTAZAPINE 15 MG/1
15 TABLET, FILM COATED ORAL
Qty: 90 TABLET | Refills: 3 | Status: SHIPPED | OUTPATIENT
Start: 2023-10-03

## 2023-10-03 RX ORDER — LEVOTHYROXINE SODIUM 137 UG/1
TABLET ORAL
OUTPATIENT
Start: 2023-10-03

## 2023-10-25 ENCOUNTER — OFFICE VISIT (OUTPATIENT)
Dept: INTERNAL MEDICINE CLINIC | Facility: CLINIC | Age: 73
End: 2023-10-25
Payer: COMMERCIAL

## 2023-10-25 VITALS — WEIGHT: 237.4 LBS | OXYGEN SATURATION: 95 % | HEART RATE: 104 BPM | BODY MASS INDEX: 39.51 KG/M2 | TEMPERATURE: 98.7 F

## 2023-10-25 DIAGNOSIS — J32.9 CHRONIC SINUSITIS, UNSPECIFIED LOCATION: ICD-10-CM

## 2023-10-25 DIAGNOSIS — Z79.4 TYPE 2 DIABETES MELLITUS WITH HYPERGLYCEMIA, WITH LONG-TERM CURRENT USE OF INSULIN (HCC): ICD-10-CM

## 2023-10-25 DIAGNOSIS — J43.2 CENTRILOBULAR EMPHYSEMA (HCC): ICD-10-CM

## 2023-10-25 DIAGNOSIS — J40 TRACHEOBRONCHITIS: Primary | ICD-10-CM

## 2023-10-25 DIAGNOSIS — E03.9 HYPOTHYROIDISM, UNSPECIFIED TYPE: ICD-10-CM

## 2023-10-25 DIAGNOSIS — E11.65 TYPE 2 DIABETES MELLITUS WITH HYPERGLYCEMIA, WITH LONG-TERM CURRENT USE OF INSULIN (HCC): ICD-10-CM

## 2023-10-25 DIAGNOSIS — U07.1 COVID-19: ICD-10-CM

## 2023-10-25 DIAGNOSIS — J96.11 CHRONIC RESPIRATORY FAILURE WITH HYPOXIA (HCC): ICD-10-CM

## 2023-10-25 LAB
SARS-COV-2 AG UPPER RESP QL IA: POSITIVE
VALID CONTROL: ABNORMAL

## 2023-10-25 PROCEDURE — 87811 SARS-COV-2 COVID19 W/OPTIC: CPT | Performed by: FAMILY MEDICINE

## 2023-10-25 PROCEDURE — 99214 OFFICE O/P EST MOD 30 MIN: CPT | Performed by: FAMILY MEDICINE

## 2023-10-25 RX ORDER — LEVOTHYROXINE SODIUM 137 UG/1
137 TABLET ORAL DAILY
Qty: 90 TABLET | Refills: 1
Start: 2023-10-25

## 2023-10-25 RX ORDER — DOXYCYCLINE 100 MG/1
100 CAPSULE ORAL 2 TIMES DAILY
Qty: 20 CAPSULE | Refills: 0 | Status: SHIPPED | OUTPATIENT
Start: 2023-10-25 | End: 2023-11-04

## 2023-10-25 RX ORDER — BENZONATATE 200 MG/1
200 CAPSULE ORAL 3 TIMES DAILY PRN
Qty: 60 CAPSULE | Refills: 0 | Status: SHIPPED | OUTPATIENT
Start: 2023-10-25

## 2023-10-25 NOTE — ASSESSMENT & PLAN NOTE
Will place an order for chest x-ray and advised to continue following pulse oximeter. Advised to avoid steroid medications.

## 2023-10-25 NOTE — PROGRESS NOTES
Assessment/Plan:       1. Tracheobronchitis  Assessment & Plan: Will prescribe doxycycline with food for 10 days and advised to avoid taking dairy products, calcium, magnesium, or vitamins along with it. Orders:  -     POCT Rapid Covid Ag  -     doxycycline monohydrate (MONODOX) 100 mg capsule; Take 1 capsule (100 mg total) by mouth 2 (two) times a day for 10 days  -     benzonatate (TESSALON) 200 MG capsule; Take 1 capsule (200 mg total) by mouth 3 (three) times a day as needed for cough  -     XR chest pa & lateral; Future; Expected date: 10/25/2023    2. Chronic sinusitis, unspecified location  Assessment & Plan: Will place an order for chest x-ray and advised to continue following pulse oximeter. Advised to avoid steroid medications. Orders:  -     POCT Rapid Covid Ag  -     doxycycline monohydrate (MONODOX) 100 mg capsule; Take 1 capsule (100 mg total) by mouth 2 (two) times a day for 10 days  -     benzonatate (TESSALON) 200 MG capsule; Take 1 capsule (200 mg total) by mouth 3 (three) times a day as needed for cough  -     XR chest pa & lateral; Future; Expected date: 10/25/2023    3. COVID-19    4. Chronic respiratory failure with hypoxia (HCC)    5. Centrilobular emphysema ([address])    6. Type 2 diabetes mellitus with hyperglycemia, with long-term current use of insulin (HCC)    7. Hypothyroidism, unspecified type  Assessment & Plan:  Advised to take levothyroxine 137 mcg. Orders:  -     levothyroxine 137 mcg tablet; Take 1 tablet (137 mcg total) by mouth daily    COVID testing was slight positive. Discussed with her that her symptoms likely began with COVID and have now progressed because of her underlying medical issues. Luckily her symptoms are relatively mild at present. Doxycycline as noted above. We will try to avoid steroids. Continue with inhalers and nebulizer treatments as previously. Continue to follow pulse ox.   Discussed with her going to the emergency room if symptoms worsen. Reviewed previous laboratory testing with them. Discussed with her changing her thyroid medication as noted. Slip given for repeat laboratory testing. Hemoglobin A1c remains elevated. Watch diet more closely. Continue to follow blood sugars at home. We will have her follow-up in about 6 to 8 weeks with laboratory testing prior to that visit. Follow up sooner if needed. Immunization. Discussed medical information regarding RSV and encouraged RSV vaccine and COVID-19 vaccine. Subjective:      Patient ID: Gretchen Tirado is a 74 y.o. female who presents today for routine follow-up and is also sick. She is accompanied her granddaughter. She has been sick for approximately 2 weeks ago. She had a fever last week which was then improved. She reports rhinorrhea, cough, and dyspnea when coughing. She has been using  her nebulizer every 4 hours and Mucinex. She denies vomiting and diarrhea. Her appetite has been well. Did not think it was COVID based on her symptoms. She did not test for this. Has been following her pulse ox at home. Continues with the oxygen. She reports right ear pain. She notes having levothyroxine 137 mcg as lowest dose at home. She has been taking vitamin B12 supplements. Blood sugar has been more elevated recently. Has been close to 200 most of the time. Had previously noticed some improvement but not recently. Immunization. She cannot receive influenza vaccine today due to current condition.     The following portions of the patient's history were reviewed and updated as appropriate: She  has a past medical history of Arthritis, Arthritis, Cancer ([address]), Candidiasis of skin, Cervical cancer ([address]), Chronic respiratory failure with hypoxia and hypercapnia ([address]) (4/20/2019), COPD (chronic obstructive pulmonary disease) ([address]), Current chronic use of systemic steroids, Degenerative arthritis of left knee, Diabetes mellitus ([address]), Disease of thyroid gland, Fibromyalgia, Fistula of knee, GERD (gastroesophageal reflux disease), Hyperlipidemia, Hypertension, Medial meniscus tear, Migraine, Obesity, Obesity, Osteoarthritis, Osteopenia, Pneumonia, Psychiatric disorder, Rheumatoid arthritis ([address]), Rheumatoid arthritis ([address]), Sepsis ([address]), Tick bite, Vitamin B12 deficiency, and Vitamin D deficiency.   She   Patient Active Problem List    Diagnosis Date Noted   • Tracheobronchitis 10/25/2023   • COVID-19 10/25/2023   • PAD (peripheral artery disease) ([address]) 08/18/2023   • Skin ulcer of second toe of right foot, limited to breakdown of skin ([address]) 05/16/2023   • COPD exacerbation ([address]) 05/16/2023   • Pre-op evaluation 10/24/2022   • Recurrent UTI 10/24/2022   • Dependence on continuous supplemental oxygen 03/02/2022   • Excessive anticoagulation 03/02/2022   • MORENO (obstructive sleep apnea) 12/13/2021   • Hilar lymphadenopathy 12/13/2021   • Atherosclerosis 12/13/2021   • T12 compression fracture ([address]) 12/13/2021   • Chronic diastolic (congestive) heart failure ([address])    • Acute pulmonary embolism ([address]) 12/07/2021   • Chronic sinusitis 11/22/2021   • Primary insomnia 09/08/2020   • Migraine without aura and without status migrainosus, not intractable 07/28/2020   • Postherpetic neuralgia 32/97/5464   • Diastolic dysfunction 45/07/6596   • Macrocytic anemia 01/14/2020   • Hypoxemia requiring supplemental oxygen 06/19/2019   • History of exposure to asbestos 05/20/2019   • Chronic respiratory failure with hypoxia ([address]) 04/20/2019   • Hyponatremia 04/20/2019   • Candidal dermatitis 12/28/2018   • Loculated pleural effusion 12/25/2018   • Constipation 11/29/2017   • Renal cyst 11/29/2017   • Candidiasis, cutaneous 06/30/2017   • Vitamin B12 deficiency 11/10/2016   • Morbid obesity with BMI of 40.0-44.9, adult ([address]) 11/04/2016   • Centrilobular emphysema ([address]) 11/03/2016   • Type 2 diabetes mellitus with hyperglycemia, with long-term current use of insulin ([address]) 11/03/2016   • Hypothyroidism 11/03/2016   • Rheumatoid arthritis ([address]) 11/03/2016   • Essential hypertension 11/03/2016   • Hyperlipidemia 11/03/2016   • Gastroesophageal reflux disease without esophagitis 11/03/2016   • Hypoalbuminemia 11/03/2016   • Hepatic steatosis 11/03/2016   • Atelectasis 11/03/2016   • Anxiety 06/02/2014   • Fibromyalgia 01/28/2014   • Osteopenia 09/17/2012   • Osteoarthritis 05/29/2012   • Vitamin D deficiency 05/29/2012     She  has a past surgical history that includes Hysterectomy; Knee arthroscopy; Cataract extraction (Bilateral); Cholecystectomy; Joint replacement; Tubal ligation; Neuroplasty / transposition median nerve at carpal tunnel; Eye surgery; IR thoracentesis (5/31/2019); and IR aortagram with run-off (9/20/2023). Her family history includes Addiction problem in her son; Asthma in her family; Breast cancer (age of onset: 39) in her half-sister; Cancer in her family; Diabetes in her family; Hypertension in her family; No Known Problems in her daughter, half-brother, half-brother, maternal aunt, maternal grandfather, maternal grandmother, paternal grandfather, paternal grandmother, son, and son; Stroke in her mother. She  reports that she quit smoking about 10 years ago. Her smoking use included cigarettes and e-cigarettes. She has a 48.00 pack-year smoking history. She has never used smokeless tobacco. She reports that she does not drink alcohol and does not use drugs.   Current Outpatient Medications   Medication Sig Dispense Refill   • benzonatate (TESSALON) 200 MG capsule Take 1 capsule (200 mg total) by mouth 3 (three) times a day as needed for cough 60 capsule 0   • doxycycline monohydrate (MONODOX) 100 mg capsule Take 1 capsule (100 mg total) by mouth 2 (two) times a day for 10 days 20 capsule 0   • levothyroxine 137 mcg tablet Take 1 tablet (137 mcg total) by mouth daily 90 tablet 1   • acetaminophen (TYLENOL) 325 mg tablet Take 2 tablets (650 mg total) by mouth every 6 (six) hours as needed for mild pain, headaches or fever  0   • albuterol (2.5 mg/3 mL) 0.083 % nebulizer solution USE 1 UNIT DOSE IN NEBULIZER EVERY 4 HOURS AS NEEDED.      • albuterol (PROVENTIL HFA,VENTOLIN HFA) 90 mcg/act inhaler Inhale 2 puffs every 6 (six) hours as needed for wheezing  0   • aspirin (ECOTRIN LOW STRENGTH) 81 mg EC tablet Take 81 mg by mouth daily     • atorvastatin (LIPITOR) 20 mg tablet TAKE ONE TABLET BY MOUTH EVERY DAY 90 tablet 0   • BD Pen Needle Mamta U/F 32G X 4 MM MISC USE AS DIRECTED 100 each 0   • Blood Glucose Monitoring Suppl (ONE TOUCH ULTRA 2) w/Device KIT by Does not apply route 2 (two) times a day 1 each 0   • clindamycin (CLEOCIN T) 1 % lotion clindamycin 1 % lotion   APPLY LOTION TOPICALLY TWICE DAILY     • DULoxetine (CYMBALTA) 60 mg delayed release capsule TAKE ONE CAPSULE BY MOUTH EVERY DAY 90 capsule 0   • Eliquis 5 MG TAKE 1 TABLET BY MOUTH TWICE DAILY 60 tablet 0   • ergocalciferol (VITAMIN D2) 50,000 units TAKE ONE CAPSULE BY MOUTH WEEKLY 12 capsule 0   • fluticasone-salmeterol (ADVAIR) 250-50 mcg/dose Inhale 1 puff every 12 (twelve) hours      • folic acid (FOLVITE) 1 mg tablet TAKE ONE TABLET BY MOUTH ONCE DAILY 90 tablet 0   • furosemide (LASIX) 40 mg tablet TAKE ONE TABLET BY MOUTH EVERY DAY 60 tablet 0   • glucose blood (OneTouch Ultra) test strip Test 3 times daily   Dx: E11.65 300 each 3   • insulin degludec Carlton Qi FlexTouch) 100 units/mL injection pen Inject 40 Units under the skin daily 30 mL 2   • Insulin Pen Needle 33G X 4 MM MISC by Does not apply route daily 100 each 5   • Lancets (OneTouch Delica Plus [id number]) MISC Test 2 times a day 200 each 0   • lisinopril (ZESTRIL) 40 mg tablet TAKE ONE TABLET BY MOUTH EVERY DAY 90 tablet 0   • methotrexate 2.5 MG tablet TAKE 8 TABLETS BY MOUTH EVERY SUNDAY 32 tablet 1   • mirtazapine (REMERON) 15 mg tablet Take 1 tablet (15 mg total) by mouth daily at bedtime 90 tablet 3   • mometasone (ELOCON) 0.1 % ointment Apply topically 2 (two) times a day as needed (itching) 45 g 1   • nystatin (MYCOSTATIN) cream Apply topically 2 (two) times a day as needed (rash) 30 g 0   • nystatin (MYCOSTATIN) powder Apply topically 3 (three) times a day as needed (rash/irritation) 45 g 1   • pantoprazole (PROTONIX) 40 mg tablet TAKE ONE TABLET BY MOUTH ONCE DAILY 90 tablet 0   • Polyethylene Glycol 3350-GRX POWD Take by mouth daily at bedtime as needed (constipation) 850 g 0   • semaglutide, 1 mg/dose, (Ozempic) 4 mg/3 mL injection pen Inject 0.75 mL (1 mg total) under the skin once a week 9 mL 1   • tiotropium (SPIRIVA RESPIMAT) 2.5 MCG/ACT AERS inhaler Inhale 1 puff daily at bedtime 3 Inhaler 3   • Tofacitinib Citrate ER 11 MG TB24 Take 1 tablet by mouth daily     • vitamin B-12 (VITAMIN B-12) 500 MCG tablet Take 1 tablet (500 mcg total) by mouth daily       Current Facility-Administered Medications   Medication Dose Route Frequency Provider Last Rate Last Admin   • cyanocobalamin injection 1,000 mcg  1,000 mcg Intramuscular Q30 Days Daria Bolaños MD   1,000 mcg at 06/21/23 1523     Current Outpatient Medications on File Prior to Visit   Medication Sig   • acetaminophen (TYLENOL) 325 mg tablet Take 2 tablets (650 mg total) by mouth every 6 (six) hours as needed for mild pain, headaches or fever   • albuterol (2.5 mg/3 mL) 0.083 % nebulizer solution USE 1 UNIT DOSE IN NEBULIZER EVERY 4 HOURS AS NEEDED.    • albuterol (PROVENTIL HFA,VENTOLIN HFA) 90 mcg/act inhaler Inhale 2 puffs every 6 (six) hours as needed for wheezing   • aspirin (ECOTRIN LOW STRENGTH) 81 mg EC tablet Take 81 mg by mouth daily   • atorvastatin (LIPITOR) 20 mg tablet TAKE ONE TABLET BY MOUTH EVERY DAY   • BD Pen Needle Mamta U/F 32G X 4 MM MISC USE AS DIRECTED   • Blood Glucose Monitoring Suppl (ONE TOUCH ULTRA 2) w/Device KIT by Does not apply route 2 (two) times a day   • clindamycin (CLEOCIN T) 1 % lotion clindamycin 1 % lotion   APPLY LOTION TOPICALLY TWICE DAILY   • DULoxetine (CYMBALTA) 60 mg delayed release capsule TAKE ONE CAPSULE BY MOUTH EVERY DAY   • Eliquis 5 MG TAKE 1 TABLET BY MOUTH TWICE DAILY   • ergocalciferol (VITAMIN D2) 50,000 units TAKE ONE CAPSULE BY MOUTH WEEKLY   • fluticasone-salmeterol (ADVAIR) 250-50 mcg/dose Inhale 1 puff every 12 (twelve) hours    • folic acid (FOLVITE) 1 mg tablet TAKE ONE TABLET BY MOUTH ONCE DAILY   • furosemide (LASIX) 40 mg tablet TAKE ONE TABLET BY MOUTH EVERY DAY   • glucose blood (OneTouch Ultra) test strip Test 3 times daily   Dx: E11.65   • insulin degludec Seretha Beavers FlexTouch) 100 units/mL injection pen Inject 40 Units under the skin daily   • Insulin Pen Needle 33G X 4 MM MISC by Does not apply route daily   • Lancets (OneTouch Delica Plus [id number]) MISC Test 2 times a day   • lisinopril (ZESTRIL) 40 mg tablet TAKE ONE TABLET BY MOUTH EVERY DAY   • methotrexate 2.5 MG tablet TAKE 8 TABLETS BY MOUTH EVERY SUNDAY   • mirtazapine (REMERON) 15 mg tablet Take 1 tablet (15 mg total) by mouth daily at bedtime   • mometasone (ELOCON) 0.1 % ointment Apply topically 2 (two) times a day as needed (itching)   • nystatin (MYCOSTATIN) cream Apply topically 2 (two) times a day as needed (rash)   • nystatin (MYCOSTATIN) powder Apply topically 3 (three) times a day as needed (rash/irritation)   • pantoprazole (PROTONIX) 40 mg tablet TAKE ONE TABLET BY MOUTH ONCE DAILY   • Polyethylene Glycol 3350-GRX POWD Take by mouth daily at bedtime as needed (constipation)   • semaglutide, 1 mg/dose, (Ozempic) 4 mg/3 mL injection pen Inject 0.75 mL (1 mg total) under the skin once a week   • tiotropium (SPIRIVA RESPIMAT) 2.5 MCG/ACT AERS inhaler Inhale 1 puff daily at bedtime   • Tofacitinib Citrate ER 11 MG TB24 Take 1 tablet by mouth daily   • vitamin B-12 (VITAMIN B-12) 500 MCG tablet Take 1 tablet (500 mcg total) by mouth daily   • [DISCONTINUED] levothyroxine 150 mcg tablet TAKE ONE TABLET BY MOUTH DAILY     Current Facility-Administered Medications on File Prior to Visit   Medication   • cyanocobalamin injection 1,000 mcg     She has No Known Allergies. .    Review of Systems  Constitutional: Negative for activity change, appetite change, chills, and fever. HENT: Positive for rhinorrhea and right ear pain. Eyes: Negative for visual disturbance. Respiratory: Positive for cough and dyspnea when coughing. Cardiovascular: Negative for chest pain and palpitations. Gastrointestinal: Negative for abdominal pain, blood in stool, diarrhea, nausea, and vomiting. Endocrine: Negative for polydipsia, polyphagia, and polyuria. Genitourinary: Negative for dysuria, frequency, and urgency. Musculoskeletal: Negative for gait problem. Skin: Negative for color change. Neurological: Negative for dizziness and headaches. Hematological: Does not bruise/bleed easily. Psychiatric/Behavioral: Negative for confusion and sleep disturbance. The patient is not nervous/anxious. Objective:  Pulse 104   Temp 98.7 °F (37.1 °C)   Wt 108 kg (237 lb 6.4 oz)   SpO2 95%   BMI 39.51 kg/m²          Physical Exam  Vitals reviewed. Constitutional:       General: She is not in acute distress. Appearance: She is well-developed and well-groomed. HENT:      Head: Normocephalic and atraumatic. Comments: Moist mucous membranes; boggy nasal mucosa with purulent nasal discharge bilaterally right greater than left; right tympanic membrane with effusion; slight posterior pharyngeal erythema  Eyes:      General:         Right eye: No discharge. Left eye: No discharge. Conjunctiva/sclera: Conjunctivae normal.      Pupils: Pupils are equal, round, and reactive to light. Cardiovascular:      Rate and Rhythm: Normal rate and regular rhythm. Heart sounds: Normal heart sounds. No murmur heard. No friction rub. No gallop. Pulmonary:      Effort: No respiratory distress. Breath sounds: Rhonchi present. No wheezing or rales.    Abdominal:      General: Bowel sounds are normal. There is no distension. Tenderness: There is no abdominal tenderness. Lymphadenopathy:      Cervical: No cervical adenopathy. Skin:     General: Skin is warm and dry. Neurological:      Mental Status: She is alert and oriented to person, place, and time. Psychiatric:         Mood and Affect: Mood and affect normal.         Speech: Speech normal.         Behavior: Behavior normal. Behavior is cooperative. Cognition and Memory: Cognition and memory normal.           I have personally reviewed results with the patient. Transcribed for Valjean Gosselin, MD, by Teto Freeman on 10/25/23 at 9:22 PM. Powered by [employer].

## 2023-10-25 NOTE — ASSESSMENT & PLAN NOTE
Will prescribe doxycycline with food for 10 days and advised to avoid taking dairy products, calcium, magnesium, or vitamins along with it.

## 2023-11-01 DIAGNOSIS — E11.65 TYPE 2 DIABETES MELLITUS WITH HYPERGLYCEMIA, WITH LONG-TERM CURRENT USE OF INSULIN (HCC): ICD-10-CM

## 2023-11-01 DIAGNOSIS — Z79.4 TYPE 2 DIABETES MELLITUS WITH HYPERGLYCEMIA, WITH LONG-TERM CURRENT USE OF INSULIN (HCC): ICD-10-CM

## 2023-11-01 DIAGNOSIS — K21.9 GASTROESOPHAGEAL REFLUX DISEASE: ICD-10-CM

## 2023-11-01 DIAGNOSIS — M06.9 RHEUMATOID ARTHRITIS (HCC): ICD-10-CM

## 2023-11-01 DIAGNOSIS — I26.99 ACUTE PULMONARY EMBOLISM, UNSPECIFIED PULMONARY EMBOLISM TYPE, UNSPECIFIED WHETHER ACUTE COR PULMONALE PRESENT (HCC): ICD-10-CM

## 2023-11-01 DIAGNOSIS — E55.9 VITAMIN D DEFICIENCY: ICD-10-CM

## 2023-11-01 RX ORDER — APIXABAN 5 MG/1
TABLET, FILM COATED ORAL
Qty: 60 TABLET | Refills: 0 | Status: SHIPPED | OUTPATIENT
Start: 2023-11-01

## 2023-11-01 RX ORDER — PANTOPRAZOLE SODIUM 40 MG/1
TABLET, DELAYED RELEASE ORAL
Qty: 90 TABLET | Refills: 0 | Status: SHIPPED | OUTPATIENT
Start: 2023-11-01

## 2023-11-01 RX ORDER — METHOTREXATE 2.5 MG/1
TABLET ORAL
Qty: 32 TABLET | Refills: 1 | Status: SHIPPED | OUTPATIENT
Start: 2023-11-01

## 2023-11-01 RX ORDER — INSULIN DEGLUDEC INJECTION 100 U/ML
40 INJECTION, SOLUTION SUBCUTANEOUS DAILY
Qty: 30 ML | Refills: 0 | Status: SHIPPED | OUTPATIENT
Start: 2023-11-01

## 2023-11-01 RX ORDER — ERGOCALCIFEROL 1.25 MG/1
CAPSULE ORAL
Qty: 12 CAPSULE | Refills: 0 | Status: SHIPPED | OUTPATIENT
Start: 2023-11-01

## 2023-11-01 RX ORDER — PEN NEEDLE, DIABETIC 32GX 5/32"
NEEDLE, DISPOSABLE MISCELLANEOUS
Qty: 100 EACH | Refills: 0 | Status: SHIPPED | OUTPATIENT
Start: 2023-11-01

## 2023-11-01 RX ORDER — FOLIC ACID 1 MG/1
TABLET ORAL
Qty: 90 TABLET | Refills: 0 | Status: SHIPPED | OUTPATIENT
Start: 2023-11-01

## 2023-11-01 RX ORDER — DULOXETIN HYDROCHLORIDE 60 MG/1
CAPSULE, DELAYED RELEASE ORAL
Qty: 90 CAPSULE | Refills: 0 | Status: SHIPPED | OUTPATIENT
Start: 2023-11-01

## 2023-11-26 ENCOUNTER — OFFICE VISIT (OUTPATIENT)
Dept: URGENT CARE | Facility: CLINIC | Age: 73
End: 2023-11-26
Payer: COMMERCIAL

## 2023-11-26 VITALS — TEMPERATURE: 98 F | HEART RATE: 102 BPM | RESPIRATION RATE: 18 BRPM | OXYGEN SATURATION: 95 %

## 2023-11-26 DIAGNOSIS — R42 DIZZINESS: Primary | ICD-10-CM

## 2023-11-26 PROCEDURE — 99213 OFFICE O/P EST LOW 20 MIN: CPT | Performed by: FAMILY MEDICINE

## 2023-11-26 PROCEDURE — G0463 HOSPITAL OUTPT CLINIC VISIT: HCPCS | Performed by: FAMILY MEDICINE

## 2023-11-26 NOTE — PATIENT INSTRUCTIONS
Discussed symptoms consistent with vertigo. Strongly encourage close follow-up with PCP. Discussed if there is any progression or worsening of symptoms to be seen directly in ER. All patient's questions answered and is agreeable with this plan.

## 2023-11-26 NOTE — PROGRESS NOTES
[facility]        NAME: Hayley Dasilva is a 74 y.o. female  : 1950    MRN: [id number]  DATE: 2023  TIME: 5:47 PM    Assessment and Plan   Dizziness [R42]  1. Dizziness              Patient Instructions     Patient Instructions   Discussed symptoms consistent with vertigo. Strongly encourage close follow-up with PCP. Discussed if there is any progression or worsening of symptoms to be seen directly in ER. All patient's questions answered and is agreeable with this plan. Follow up with PCP in 3-5 days. Proceed to  ER if symptoms worsen. Chief Complaint     Chief Complaint   Patient presents with    Dizziness     3-4 weeks         History of Present Illness       Patient is a 75-year-old female presenting today with dizziness x 3 to 4 weeks. Patient notes since being diagnosed with COVID last month that she has been experiencing episodes of vertigo-like symptoms, notes that if she moves her head quickly or goes from sitting to standing she will feel dizzy, states that symptoms will resolve after resting or sitting still. Notes that over the last couple weeks symptoms have seemed to be more frequent. Has not contacted or followed up with her PCP. Denies syncope, LOC, nausea, vomiting, vision changes, blurred vision, chest pain, palpitations. Review of Systems   Review of Systems   Constitutional:  Negative for chills and fever. HENT:  Negative for ear pain and sore throat. Eyes:  Negative for pain and visual disturbance. Respiratory:  Negative for cough and shortness of breath. Cardiovascular:  Negative for chest pain and palpitations. Gastrointestinal:  Negative for abdominal pain and vomiting. Genitourinary:  Negative for dysuria and hematuria. Musculoskeletal:  Negative for arthralgias and back pain. Skin:  Negative for color change and rash. Neurological:  Positive for dizziness. Negative for seizures and syncope.    All other systems reviewed and are negative.         Current Medications       Current Outpatient Medications:     acetaminophen (TYLENOL) 325 mg tablet, Take 2 tablets (650 mg total) by mouth every 6 (six) hours as needed for mild pain, headaches or fever, Disp: , Rfl: 0    albuterol (2.5 mg/3 mL) 0.083 % nebulizer solution, USE 1 UNIT DOSE IN NEBULIZER EVERY 4 HOURS AS NEEDED., Disp: , Rfl:     albuterol (PROVENTIL HFA,VENTOLIN HFA) 90 mcg/act inhaler, Inhale 2 puffs every 6 (six) hours as needed for wheezing, Disp: , Rfl: 0    aspirin (ECOTRIN LOW STRENGTH) 81 mg EC tablet, Take 81 mg by mouth daily, Disp: , Rfl:     atorvastatin (LIPITOR) 20 mg tablet, TAKE ONE TABLET BY MOUTH EVERY DAY, Disp: 90 tablet, Rfl: 0    BD Pen Needle Mamta U/F 32G X 4 MM MISC, USE AS DIRECTED, Disp: 100 each, Rfl: 0    benzonatate (TESSALON) 200 MG capsule, Take 1 capsule (200 mg total) by mouth 3 (three) times a day as needed for cough, Disp: 60 capsule, Rfl: 0    Blood Glucose Monitoring Suppl (ONE TOUCH ULTRA 2) w/Device KIT, by Does not apply route 2 (two) times a day, Disp: 1 each, Rfl: 0    clindamycin (CLEOCIN T) 1 % lotion, clindamycin 1 % lotion  APPLY LOTION TOPICALLY TWICE DAILY, Disp: , Rfl:     DULoxetine (CYMBALTA) 60 mg delayed release capsule, TAKE ONE CAPSULE BY MOUTH EVERY DAY, Disp: 90 capsule, Rfl: 0    Eliquis 5 MG, TAKE 1 TABLET BY MOUTH TWICE DAILY, Disp: 60 tablet, Rfl: 0    ergocalciferol (VITAMIN D2) 50,000 units, TAKE ONE CAPSULE BY MOUTH WEEKLY, Disp: 12 capsule, Rfl: 0    fluticasone-salmeterol (ADVAIR) 250-50 mcg/dose, Inhale 1 puff every 12 (twelve) hours , Disp: , Rfl:     folic acid (FOLVITE) 1 mg tablet, TAKE ONE TABLET BY MOUTH ONCE DAILY, Disp: 90 tablet, Rfl: 0    furosemide (LASIX) 40 mg tablet, TAKE ONE TABLET BY MOUTH EVERY DAY, Disp: 60 tablet, Rfl: 0    glucose blood (OneTouch Ultra) test strip, Test 3 times daily   Dx: E11.65, Disp: 300 each, Rfl: 3    Insulin Pen Needle 33G X 4 MM MISC, by Does not apply route daily, Disp: 100 each, Rfl: 5    Lancets (OneTouch Delica Plus KTSXZQ11R) MISC, Test 2 times a day, Disp: 200 each, Rfl: 0    levothyroxine 137 mcg tablet, Take 1 tablet (137 mcg total) by mouth daily, Disp: 90 tablet, Rfl: 1    lisinopril (ZESTRIL) 40 mg tablet, TAKE ONE TABLET BY MOUTH EVERY DAY, Disp: 90 tablet, Rfl: 0    methotrexate 2.5 MG tablet, TAKE 8 TABLETS BY MOUTH EVERY SUNDAY, Disp: 32 tablet, Rfl: 1    mirtazapine (REMERON) 15 mg tablet, Take 1 tablet (15 mg total) by mouth daily at bedtime, Disp: 90 tablet, Rfl: 3    mometasone (ELOCON) 0.1 % ointment, Apply topically 2 (two) times a day as needed (itching), Disp: 45 g, Rfl: 1    nystatin (MYCOSTATIN) cream, Apply topically 2 (two) times a day as needed (rash), Disp: 30 g, Rfl: 0    nystatin (MYCOSTATIN) powder, Apply topically 3 (three) times a day as needed (rash/irritation), Disp: 45 g, Rfl: 1    pantoprazole (PROTONIX) 40 mg tablet, TAKE ONE TABLET BY MOUTH ONCE DAILY, Disp: 90 tablet, Rfl: 0    Polyethylene Glycol 3350-GRX POWD, Take by mouth daily at bedtime as needed (constipation), Disp: 850 g, Rfl: 0    semaglutide, 1 mg/dose, (Ozempic) 4 mg/3 mL injection pen, Inject 0.75 mL (1 mg total) under the skin once a week, Disp: 9 mL, Rfl: 1    tiotropium (SPIRIVA RESPIMAT) 2.5 MCG/ACT AERS inhaler, Inhale 1 puff daily at bedtime, Disp: 3 Inhaler, Rfl: 3    Tofacitinib Citrate ER 11 MG TB24, Take 1 tablet by mouth daily, Disp: , Rfl:     Tresiba FlexTouch 100 units/mL injection pen, INJECT 40 UNITS UNDER THE SKIN DAILY, Disp: 30 mL, Rfl: 0    vitamin B-12 (VITAMIN B-12) 500 MCG tablet, Take 1 tablet (500 mcg total) by mouth daily, Disp: , Rfl:     Current Facility-Administered Medications:     cyanocobalamin injection 1,000 mcg, 1,000 mcg, Intramuscular, Q30 Days, Valjean Gosselin, MD, 1,000 mcg at 06/21/23 1523    Current Allergies     Allergies as of 11/26/2023    (No Known Allergies)            The following portions of the patient's history were reviewed and updated as appropriate: allergies, current medications, past family history, past medical history, past social history, past surgical history and problem list.     Past Medical History:   Diagnosis Date    Arthritis     Arthritis     Cancer ([address])     Candidiasis of skin     Cervical cancer ([address])     Chronic respiratory failure with hypoxia and hypercapnia ([address]) 4/20/2019    COPD (chronic obstructive pulmonary disease) ([address])     last assessed 11/21/2016    Current chronic use of systemic steroids     Degenerative arthritis of left knee     last assessed 1/20/2015    Diabetes mellitus ([address])     Disease of thyroid gland     hypo pill given to decrease thyroid.      Fibromyalgia     Fistula of knee     last assessed 9/12/2014    GERD (gastroesophageal reflux disease)     Hyperlipidemia     Hypertension     Medial meniscus tear     of left knee, last assessed 9/12/2014    Migraine     states she has a hx of HA that she calls [employer] but never was dx by neurologist    Obesity     Obesity     Osteoarthritis     Osteopenia     Pneumonia     last assessed 11/16/2016    Psychiatric disorder     anxiety    Rheumatoid arthritis ([address])     Rheumatoid arthritis ([address])     Sepsis ([address])     last assessed 11/10/2016    Tick bite     last assessed 10/30/2015    Vitamin B12 deficiency     Vitamin D deficiency        Past Surgical History:   Procedure Laterality Date    CATARACT EXTRACTION Bilateral     CHOLECYSTECTOMY      EYE SURGERY      Bilateral Cataracts    HYSTERECTOMY      IR AORTAGRAM WITH RUN-OFF  9/20/2023    IR THORACENTESIS  5/31/2019    JOINT REPLACEMENT      left knee    KNEE ARTHROSCOPY      NEUROPLASTY / TRANSPOSITION MEDIAN NERVE AT CARPAL TUNNEL      TUBAL LIGATION         Family History   Problem Relation Age of Onset    Stroke Mother     No Known Problems Daughter     No Known Problems Maternal Grandmother     No Known Problems Maternal Grandfather     No Known Problems Paternal Grandmother     No Known Problems Paternal Grandfather     Asthma Family     Cancer Family     Diabetes Family     Hypertension Family     No Known Problems Son     No Known Problems Son     Addiction problem Son     Breast cancer Half-Sister 39    No Known Problems Half-Brother     No Known Problems Half-Brother     No Known Problems Maternal Aunt          Medications have been verified. Objective   Pulse 102   Temp 98 °F (36.7 °C)   Resp 18   SpO2 95%        Physical Exam     Physical Exam  Vitals and nursing note reviewed. Constitutional:       General: She is not in acute distress. Appearance: Normal appearance. She is not ill-appearing. Comments: Patient is in good spirits   HENT:      Head: Normocephalic. Right Ear: Tympanic membrane, ear canal and external ear normal.      Left Ear: Tympanic membrane, ear canal and external ear normal.      Nose: Congestion present. Mouth/Throat:      Mouth: Mucous membranes are moist.      Pharynx: Oropharynx is clear. Comments: Poor gross dentition  Eyes:      Extraocular Movements: Extraocular movements intact. Pupils: Pupils are equal, round, and reactive to light. Cardiovascular:      Rate and Rhythm: Normal rate and regular rhythm. Pulses: Normal pulses. Heart sounds: Normal heart sounds. Pulmonary:      Effort: Pulmonary effort is normal.      Breath sounds: Normal breath sounds. Skin:     General: Skin is warm. Capillary Refill: Capillary refill takes less than 2 seconds. Neurological:      Mental Status: She is alert.

## 2023-11-28 ENCOUNTER — APPOINTMENT (EMERGENCY)
Dept: RADIOLOGY | Facility: HOSPITAL | Age: 73
DRG: 871 | End: 2023-11-28
Payer: COMMERCIAL

## 2023-11-28 ENCOUNTER — APPOINTMENT (EMERGENCY)
Dept: CT IMAGING | Facility: HOSPITAL | Age: 73
DRG: 871 | End: 2023-11-28
Payer: COMMERCIAL

## 2023-11-28 ENCOUNTER — HOSPITAL ENCOUNTER (INPATIENT)
Facility: HOSPITAL | Age: 73
LOS: 4 days | Discharge: HOME | DRG: 871 | End: 2023-12-02
Attending: EMERGENCY MEDICINE | Admitting: INTERNAL MEDICINE
Payer: COMMERCIAL

## 2023-11-28 DIAGNOSIS — E83.42 HYPOMAGNESEMIA: ICD-10-CM

## 2023-11-28 DIAGNOSIS — J18.9 PNEUMONIA OF RIGHT LUNG DUE TO INFECTIOUS ORGANISM: Primary | ICD-10-CM

## 2023-11-28 DIAGNOSIS — J18.9 SEPSIS DUE TO PNEUMONIA (HCC): ICD-10-CM

## 2023-11-28 DIAGNOSIS — R06.09 EXERTIONAL DYSPNEA: ICD-10-CM

## 2023-11-28 DIAGNOSIS — A41.9 SEPSIS DUE TO PNEUMONIA (HCC): ICD-10-CM

## 2023-11-28 DIAGNOSIS — K21.9 GASTROESOPHAGEAL REFLUX DISEASE: ICD-10-CM

## 2023-11-28 DIAGNOSIS — J40 TRACHEOBRONCHITIS: ICD-10-CM

## 2023-11-28 DIAGNOSIS — M06.9 RHEUMATOID ARTHRITIS, INVOLVING UNSPECIFIED SITE, UNSPECIFIED WHETHER RHEUMATOID FACTOR PRESENT (HCC): ICD-10-CM

## 2023-11-28 PROBLEM — Z86.711 HISTORY OF PULMONARY EMBOLUS (PE): Status: ACTIVE | Noted: 2023-11-28

## 2023-11-28 PROBLEM — R42 VERTIGO: Status: ACTIVE | Noted: 2023-11-28

## 2023-11-28 LAB
2HR DELTA HS TROPONIN: 0 NG/L
ANION GAP SERPL CALCULATED.3IONS-SCNC: 9 MMOL/L
BASE EX.OXY STD BLDV CALC-SCNC: 43 % (ref 60–80)
BASE EXCESS BLDV CALC-SCNC: 3.5 MMOL/L
BASOPHILS # BLD AUTO: 0.04 THOUSANDS/ÂΜL (ref 0–0.1)
BASOPHILS NFR BLD AUTO: 0 % (ref 0–1)
BNP SERPL-MCNC: 19 PG/ML (ref 0–100)
BUN SERPL-MCNC: 13 MG/DL (ref 5–25)
CALCIUM SERPL-MCNC: 9.8 MG/DL (ref 8.4–10.2)
CARDIAC TROPONIN I PNL SERPL HS: 3 NG/L
CARDIAC TROPONIN I PNL SERPL HS: 3 NG/L
CHLORIDE SERPL-SCNC: 96 MMOL/L (ref 96–108)
CO2 SERPL-SCNC: 31 MMOL/L (ref 21–32)
CREAT SERPL-MCNC: 0.65 MG/DL (ref 0.6–1.3)
D DIMER PPP FEU-MCNC: 0.9 UG/ML FEU
EOSINOPHIL # BLD AUTO: 0.16 THOUSAND/ÂΜL (ref 0–0.61)
EOSINOPHIL NFR BLD AUTO: 1 % (ref 0–6)
ERYTHROCYTE [DISTWIDTH] IN BLOOD BY AUTOMATED COUNT: 14.6 % (ref 11.6–15.1)
FLUAV RNA RESP QL NAA+PROBE: NEGATIVE
FLUBV RNA RESP QL NAA+PROBE: NEGATIVE
GFR SERPL CREATININE-BSD FRML MDRD: 87 ML/MIN/1.73SQ M
GLUCOSE SERPL-MCNC: 147 MG/DL (ref 65–140)
GLUCOSE SERPL-MCNC: 312 MG/DL (ref 65–140)
GLUCOSE SERPL-MCNC: 360 MG/DL (ref 65–140)
HCO3 BLDV-SCNC: 30.9 MMOL/L (ref 24–30)
HCT VFR BLD AUTO: 38.1 % (ref 34.8–46.1)
HGB BLD-MCNC: 11.9 G/DL (ref 11.5–15.4)
IMM GRANULOCYTES # BLD AUTO: 0.03 THOUSAND/UL (ref 0–0.2)
IMM GRANULOCYTES NFR BLD AUTO: 0 % (ref 0–2)
LACTATE SERPL-SCNC: 2.5 MMOL/L (ref 0.5–2)
LACTATE SERPL-SCNC: 3 MMOL/L (ref 0.5–2)
LACTATE SERPL-SCNC: 4 MMOL/L (ref 0.5–2)
LACTATE SERPL-SCNC: 4.4 MMOL/L (ref 0.5–2)
LYMPHOCYTES # BLD AUTO: 2.11 THOUSANDS/ÂΜL (ref 0.6–4.47)
LYMPHOCYTES NFR BLD AUTO: 17 % (ref 14–44)
MAGNESIUM SERPL-MCNC: 1.8 MG/DL (ref 1.9–2.7)
MCH RBC QN AUTO: 30.6 PG (ref 26.8–34.3)
MCHC RBC AUTO-ENTMCNC: 31.2 G/DL (ref 31.4–37.4)
MCV RBC AUTO: 98 FL (ref 82–98)
MONOCYTES # BLD AUTO: 1.22 THOUSAND/ÂΜL (ref 0.17–1.22)
MONOCYTES NFR BLD AUTO: 10 % (ref 4–12)
NEUTROPHILS # BLD AUTO: 8.79 THOUSANDS/ÂΜL (ref 1.85–7.62)
NEUTS SEG NFR BLD AUTO: 72 % (ref 43–75)
NRBC BLD AUTO-RTO: 0 /100 WBCS
O2 CT BLDV-SCNC: 7.8 ML/DL
PCO2 BLDV: 59.8 MM HG (ref 42–50)
PH BLDV: 7.33 [PH] (ref 7.3–7.4)
PLATELET # BLD AUTO: 390 THOUSANDS/UL (ref 149–390)
PMV BLD AUTO: 10 FL (ref 8.9–12.7)
PO2 BLDV: 26.5 MM HG (ref 35–45)
POTASSIUM SERPL-SCNC: 3.8 MMOL/L (ref 3.5–5.3)
PROCALCITONIN SERPL-MCNC: <0.05 NG/ML
RBC # BLD AUTO: 3.89 MILLION/UL (ref 3.81–5.12)
RSV RNA RESP QL NAA+PROBE: NEGATIVE
SARS-COV-2 RNA RESP QL NAA+PROBE: NEGATIVE
SODIUM SERPL-SCNC: 136 MMOL/L (ref 135–147)
WBC # BLD AUTO: 12.35 THOUSAND/UL (ref 4.31–10.16)

## 2023-11-28 PROCEDURE — 83605 ASSAY OF LACTIC ACID: CPT | Performed by: EMERGENCY MEDICINE

## 2023-11-28 PROCEDURE — 96365 THER/PROPH/DIAG IV INF INIT: CPT

## 2023-11-28 PROCEDURE — 94640 AIRWAY INHALATION TREATMENT: CPT

## 2023-11-28 PROCEDURE — G1004 CDSM NDSC: HCPCS

## 2023-11-28 PROCEDURE — 80048 BASIC METABOLIC PNL TOTAL CA: CPT | Performed by: EMERGENCY MEDICINE

## 2023-11-28 PROCEDURE — 99223 1ST HOSP IP/OBS HIGH 75: CPT | Performed by: INTERNAL MEDICINE

## 2023-11-28 PROCEDURE — 87154 CUL TYP ID BLD PTHGN 6+ TRGT: CPT | Performed by: EMERGENCY MEDICINE

## 2023-11-28 PROCEDURE — 82948 REAGENT STRIP/BLOOD GLUCOSE: CPT

## 2023-11-28 PROCEDURE — 71045 X-RAY EXAM CHEST 1 VIEW: CPT

## 2023-11-28 PROCEDURE — 87070 CULTURE OTHR SPECIMN AEROBIC: CPT

## 2023-11-28 PROCEDURE — 82805 BLOOD GASES W/O2 SATURATION: CPT | Performed by: EMERGENCY MEDICINE

## 2023-11-28 PROCEDURE — 94760 N-INVAS EAR/PLS OXIMETRY 1: CPT

## 2023-11-28 PROCEDURE — 87181 SC STD AGAR DILUTION PER AGT: CPT | Performed by: EMERGENCY MEDICINE

## 2023-11-28 PROCEDURE — 83735 ASSAY OF MAGNESIUM: CPT | Performed by: EMERGENCY MEDICINE

## 2023-11-28 PROCEDURE — 99285 EMERGENCY DEPT VISIT HI MDM: CPT

## 2023-11-28 PROCEDURE — 87184 SC STD DISK METHOD PER PLATE: CPT

## 2023-11-28 PROCEDURE — 83880 ASSAY OF NATRIURETIC PEPTIDE: CPT | Performed by: EMERGENCY MEDICINE

## 2023-11-28 PROCEDURE — 0202U NFCT DS 22 TRGT SARS-COV-2: CPT

## 2023-11-28 PROCEDURE — 87449 NOS EACH ORGANISM AG IA: CPT

## 2023-11-28 PROCEDURE — 85379 FIBRIN DEGRADATION QUANT: CPT | Performed by: EMERGENCY MEDICINE

## 2023-11-28 PROCEDURE — 83605 ASSAY OF LACTIC ACID: CPT

## 2023-11-28 PROCEDURE — 87077 CULTURE AEROBIC IDENTIFY: CPT | Performed by: EMERGENCY MEDICINE

## 2023-11-28 PROCEDURE — 87205 SMEAR GRAM STAIN: CPT

## 2023-11-28 PROCEDURE — 36415 COLL VENOUS BLD VENIPUNCTURE: CPT | Performed by: EMERGENCY MEDICINE

## 2023-11-28 PROCEDURE — 94668 MNPJ CHEST WALL SBSQ: CPT

## 2023-11-28 PROCEDURE — 83036 HEMOGLOBIN GLYCOSYLATED A1C: CPT

## 2023-11-28 PROCEDURE — 99285 EMERGENCY DEPT VISIT HI MDM: CPT | Performed by: EMERGENCY MEDICINE

## 2023-11-28 PROCEDURE — 0241U HB NFCT DS VIR RESP RNA 4 TRGT: CPT | Performed by: EMERGENCY MEDICINE

## 2023-11-28 PROCEDURE — 94664 DEMO&/EVAL PT USE INHALER: CPT

## 2023-11-28 PROCEDURE — 84484 ASSAY OF TROPONIN QUANT: CPT | Performed by: EMERGENCY MEDICINE

## 2023-11-28 PROCEDURE — 84145 PROCALCITONIN (PCT): CPT

## 2023-11-28 PROCEDURE — 87040 BLOOD CULTURE FOR BACTERIA: CPT | Performed by: EMERGENCY MEDICINE

## 2023-11-28 PROCEDURE — 71275 CT ANGIOGRAPHY CHEST: CPT

## 2023-11-28 PROCEDURE — 93005 ELECTROCARDIOGRAM TRACING: CPT

## 2023-11-28 PROCEDURE — 85025 COMPLETE CBC W/AUTO DIFF WBC: CPT | Performed by: EMERGENCY MEDICINE

## 2023-11-28 PROCEDURE — 96375 TX/PRO/DX INJ NEW DRUG ADDON: CPT

## 2023-11-28 PROCEDURE — 87077 CULTURE AEROBIC IDENTIFY: CPT

## 2023-11-28 PROCEDURE — 87185 SC STD ENZYME DETCJ PER NZM: CPT

## 2023-11-28 RX ORDER — IPRATROPIUM BROMIDE AND ALBUTEROL SULFATE 2.5; .5 MG/3ML; MG/3ML
3 SOLUTION RESPIRATORY (INHALATION) ONCE
Status: COMPLETED | OUTPATIENT
Start: 2023-11-28 | End: 2023-11-28

## 2023-11-28 RX ORDER — MIRTAZAPINE 15 MG/1
15 TABLET, FILM COATED ORAL
Status: DISCONTINUED | OUTPATIENT
Start: 2023-11-28 | End: 2023-12-02 | Stop reason: HOSPADM

## 2023-11-28 RX ORDER — LEVALBUTEROL INHALATION SOLUTION 1.25 MG/3ML
SOLUTION RESPIRATORY (INHALATION)
Status: COMPLETED
Start: 2023-11-28 | End: 2023-11-28

## 2023-11-28 RX ORDER — AZITHROMYCIN 250 MG/1
500 TABLET, FILM COATED ORAL EVERY 24 HOURS
Status: DISCONTINUED | OUTPATIENT
Start: 2023-11-29 | End: 2023-11-30

## 2023-11-28 RX ORDER — INSULIN LISPRO 100 [IU]/ML
1-6 INJECTION, SOLUTION INTRAVENOUS; SUBCUTANEOUS
Status: DISCONTINUED | OUTPATIENT
Start: 2023-11-28 | End: 2023-12-02 | Stop reason: HOSPADM

## 2023-11-28 RX ORDER — MAGNESIUM SULFATE HEPTAHYDRATE 40 MG/ML
2 INJECTION, SOLUTION INTRAVENOUS ONCE
Status: COMPLETED | OUTPATIENT
Start: 2023-11-28 | End: 2023-11-28

## 2023-11-28 RX ORDER — BENZONATATE 100 MG/1
100 CAPSULE ORAL 3 TIMES DAILY PRN
Status: DISCONTINUED | OUTPATIENT
Start: 2023-11-28 | End: 2023-12-02 | Stop reason: HOSPADM

## 2023-11-28 RX ORDER — SODIUM CHLORIDE FOR INHALATION 0.9 %
3 VIAL, NEBULIZER (ML) INHALATION
Status: DISCONTINUED | OUTPATIENT
Start: 2023-11-28 | End: 2023-11-28

## 2023-11-28 RX ORDER — PREDNISONE 20 MG/1
40 TABLET ORAL ONCE
Status: COMPLETED | OUTPATIENT
Start: 2023-11-28 | End: 2023-11-28

## 2023-11-28 RX ORDER — BUDESONIDE 0.5 MG/2ML
0.5 INHALANT ORAL
Status: DISCONTINUED | OUTPATIENT
Start: 2023-11-28 | End: 2023-12-02 | Stop reason: HOSPADM

## 2023-11-28 RX ORDER — PANTOPRAZOLE SODIUM 40 MG/1
40 TABLET, DELAYED RELEASE ORAL
Status: DISCONTINUED | OUTPATIENT
Start: 2023-11-29 | End: 2023-12-02 | Stop reason: HOSPADM

## 2023-11-28 RX ORDER — SODIUM CHLORIDE 9 MG/ML
3 INJECTION INTRAVENOUS
Status: DISCONTINUED | OUTPATIENT
Start: 2023-11-28 | End: 2023-12-02 | Stop reason: HOSPADM

## 2023-11-28 RX ORDER — LEVALBUTEROL INHALATION SOLUTION 1.25 MG/3ML
1.25 SOLUTION RESPIRATORY (INHALATION)
Status: DISCONTINUED | OUTPATIENT
Start: 2023-11-28 | End: 2023-12-02 | Stop reason: HOSPADM

## 2023-11-28 RX ORDER — FOLIC ACID 1 MG/1
1000 TABLET ORAL DAILY
Status: DISCONTINUED | OUTPATIENT
Start: 2023-11-29 | End: 2023-12-02 | Stop reason: HOSPADM

## 2023-11-28 RX ORDER — DROPERIDOL 2.5 MG/ML
0.62 INJECTION, SOLUTION INTRAMUSCULAR; INTRAVENOUS ONCE
Status: COMPLETED | OUTPATIENT
Start: 2023-11-28 | End: 2023-11-28

## 2023-11-28 RX ORDER — DULOXETIN HYDROCHLORIDE 30 MG/1
60 CAPSULE, DELAYED RELEASE ORAL DAILY
Status: DISCONTINUED | OUTPATIENT
Start: 2023-11-29 | End: 2023-12-02 | Stop reason: HOSPADM

## 2023-11-28 RX ORDER — CEFTRIAXONE 1 G/50ML
1000 INJECTION, SOLUTION INTRAVENOUS ONCE
Status: COMPLETED | OUTPATIENT
Start: 2023-11-28 | End: 2023-11-28

## 2023-11-28 RX ORDER — ACETAMINOPHEN 325 MG/1
650 TABLET ORAL EVERY 6 HOURS PRN
Status: DISCONTINUED | OUTPATIENT
Start: 2023-11-28 | End: 2023-12-02 | Stop reason: HOSPADM

## 2023-11-28 RX ORDER — GUAIFENESIN 600 MG/1
600 TABLET, EXTENDED RELEASE ORAL 2 TIMES DAILY
Status: DISCONTINUED | OUTPATIENT
Start: 2023-11-28 | End: 2023-12-02 | Stop reason: HOSPADM

## 2023-11-28 RX ORDER — CEFTRIAXONE 2 G/50ML
2000 INJECTION, SOLUTION INTRAVENOUS EVERY 24 HOURS
Status: DISCONTINUED | OUTPATIENT
Start: 2023-11-29 | End: 2023-12-02

## 2023-11-28 RX ORDER — LISINOPRIL 20 MG/1
40 TABLET ORAL DAILY
Status: DISCONTINUED | OUTPATIENT
Start: 2023-11-29 | End: 2023-12-02 | Stop reason: HOSPADM

## 2023-11-28 RX ORDER — ATORVASTATIN CALCIUM 10 MG/1
20 TABLET, FILM COATED ORAL
Status: DISCONTINUED | OUTPATIENT
Start: 2023-11-28 | End: 2023-12-02 | Stop reason: HOSPADM

## 2023-11-28 RX ORDER — METHYLPREDNISOLONE SODIUM SUCCINATE 40 MG/ML
40 INJECTION, POWDER, LYOPHILIZED, FOR SOLUTION INTRAMUSCULAR; INTRAVENOUS EVERY 8 HOURS
Status: DISCONTINUED | OUTPATIENT
Start: 2023-11-28 | End: 2023-12-01

## 2023-11-28 RX ORDER — INSULIN GLARGINE 100 [IU]/ML
40 INJECTION, SOLUTION SUBCUTANEOUS EVERY MORNING
Status: DISCONTINUED | OUTPATIENT
Start: 2023-11-29 | End: 2023-12-02 | Stop reason: HOSPADM

## 2023-11-28 RX ADMIN — DROPERIDOL 0.62 MG: 2.5 INJECTION, SOLUTION INTRAMUSCULAR; INTRAVENOUS at 14:30

## 2023-11-28 RX ADMIN — GUAIFENESIN 600 MG: 600 TABLET ORAL at 18:26

## 2023-11-28 RX ADMIN — ATORVASTATIN CALCIUM 20 MG: 10 TABLET, FILM COATED ORAL at 18:26

## 2023-11-28 RX ADMIN — PREDNISONE 40 MG: 20 TABLET ORAL at 12:15

## 2023-11-28 RX ADMIN — BUDESONIDE 0.5 MG: 0.5 SUSPENSION RESPIRATORY (INHALATION) at 20:11

## 2023-11-28 RX ADMIN — IPRATROPIUM BROMIDE AND ALBUTEROL SULFATE 3 ML: 2.5; .5 SOLUTION RESPIRATORY (INHALATION) at 14:01

## 2023-11-28 RX ADMIN — METHYLPREDNISOLONE SODIUM SUCCINATE 40 MG: 40 INJECTION, POWDER, FOR SOLUTION INTRAMUSCULAR; INTRAVENOUS at 21:28

## 2023-11-28 RX ADMIN — ALBUTEROL SULFATE 5 MG: 2.5 SOLUTION RESPIRATORY (INHALATION) at 12:19

## 2023-11-28 RX ADMIN — CEFTRIAXONE 1000 MG: 1 INJECTION, SOLUTION INTRAVENOUS at 17:00

## 2023-11-28 RX ADMIN — INSULIN LISPRO 6 UNITS: 100 INJECTION, SOLUTION INTRAVENOUS; SUBCUTANEOUS at 21:28

## 2023-11-28 RX ADMIN — AZITHROMYCIN 500 MG: 500 INJECTION, POWDER, LYOPHILIZED, FOR SOLUTION INTRAVENOUS at 18:27

## 2023-11-28 RX ADMIN — MIRTAZAPINE 15 MG: 15 TABLET, FILM COATED ORAL at 21:28

## 2023-11-28 RX ADMIN — LEVALBUTEROL HYDROCHLORIDE 1.25 MG: 1.25 SOLUTION RESPIRATORY (INHALATION) at 18:29

## 2023-11-28 RX ADMIN — INSULIN LISPRO 5 UNITS: 100 INJECTION, SOLUTION INTRAVENOUS; SUBCUTANEOUS at 18:29

## 2023-11-28 RX ADMIN — IPRATROPIUM BROMIDE 0.5 MG: 0.5 SOLUTION RESPIRATORY (INHALATION) at 12:19

## 2023-11-28 RX ADMIN — IOHEXOL 170 ML: 350 INJECTION, SOLUTION INTRAVENOUS at 15:31

## 2023-11-28 RX ADMIN — IPRATROPIUM BROMIDE 0.5 MG: 0.5 SOLUTION RESPIRATORY (INHALATION) at 18:28

## 2023-11-28 RX ADMIN — APIXABAN 5 MG: 5 TABLET, FILM COATED ORAL at 18:26

## 2023-11-28 RX ADMIN — MAGNESIUM SULFATE HEPTAHYDRATE 2 G: 40 INJECTION, SOLUTION INTRAVENOUS at 13:42

## 2023-11-28 RX ADMIN — SODIUM CHLORIDE 1000 ML: 0.9 INJECTION, SOLUTION INTRAVENOUS at 17:30

## 2023-11-28 RX ADMIN — SODIUM CHLORIDE 1000 ML: 0.9 INJECTION, SOLUTION INTRAVENOUS at 22:42

## 2023-11-28 NOTE — PLAN OF CARE
Problem: PAIN - ADULT  Goal: Verbalizes/displays adequate comfort level or baseline comfort level  Description: Interventions:  - Encourage patient to monitor pain and request assistance  - Assess pain using appropriate pain scale  - Administer analgesics based on type and severity of pain and evaluate response  - Implement non-pharmacological measures as appropriate and evaluate response  - Consider cultural and social influences on pain and pain management  - Notify physician/advanced practitioner if interventions unsuccessful or patient reports new pain  Outcome: Progressing     Problem: INFECTION - ADULT  Goal: Absence or prevention of progression during hospitalization  Description: INTERVENTIONS:  - Assess and monitor for signs and symptoms of infection  - Monitor lab/diagnostic results  - Monitor all insertion sites, i.e. indwelling lines, tubes, and drains  - Monitor endotracheal if appropriate and nasal secretions for changes in amount and color  - [place] appropriate cooling/warming therapies per order  - Administer medications as ordered  - Instruct and encourage patient and family to use good hand hygiene technique  - Identify and instruct in appropriate isolation precautions for identified infection/condition  Outcome: Progressing  Goal: Absence of fever/infection during neutropenic period  Description: INTERVENTIONS:  - Monitor WBC    Outcome: Progressing     Problem: SAFETY ADULT  Goal: Patient will remain free of falls  Description: INTERVENTIONS:  - Educate patient/family on patient safety including physical limitations  - Instruct patient to call for assistance with activity   - Consult OT/PT to assist with strengthening/mobility   - Keep Call bell within reach  - Keep bed low and locked with side rails adjusted as appropriate  - Keep care items and personal belongings within reach  - Initiate and maintain comfort rounds  - Make Fall Risk Sign visible to staff  - Offer Toileting every 2 Hours, in advance of need  - Initiate/Maintain bed/chair alarm  - Obtain necessary fall risk management equipment: bed/chair alarm, nonskid socks, call bell within reach  - Apply yellow socks and bracelet for high fall risk patients  - Consider moving patient to room near nurses station  Outcome: Progressing  Goal: Maintain or return to baseline ADL function  Description: INTERVENTIONS:  -  Assess patient's ability to carry out ADLs; assess patient's baseline for ADL function and identify physical deficits which impact ability to perform ADLs (bathing, care of mouth/teeth, toileting, grooming, dressing, etc.)  - Assess/evaluate cause of self-care deficits   - Assess range of motion  - Assess patient's mobility; develop plan if impaired  - Assess patient's need for assistive devices and provide as appropriate  - Encourage maximum independence but intervene and supervise when necessary  - Involve family in performance of ADLs  - Assess for home care needs following discharge   - Consider OT consult to assist with ADL evaluation and planning for discharge  - Provide patient education as appropriate  Outcome: Progressing  Goal: Maintains/Returns to pre admission functional level  Description: INTERVENTIONS:  - Perform AM-PAC 6 Click Basic Mobility/ Daily Activity assessment daily.  - Set and communicate daily mobility goal to care team and patient/family/caregiver. - Collaborate with rehabilitation services on mobility goals if consulted  - Perform Range of Motion 3 times a day. - Reposition patient every 2 hours.   - Dangle patient 3 times a day  - Stand patient 3 times a day  - Ambulate patient 3 times a day  - Out of bed to chair 3 times a day   - Out of bed for meals 3 times a day  - Out of bed for toileting  - Record patient progress and toleration of activity level   Outcome: Progressing     Problem: DISCHARGE PLANNING  Goal: Discharge to home or other facility with appropriate resources  Description: INTERVENTIONS:  - Identify barriers to discharge w/patient and caregiver  - Arrange for needed discharge resources and transportation as appropriate  - Identify discharge learning needs (meds, wound care, etc.)  - Arrange for interpretive services to assist at discharge as needed  - Refer to Case Management Department for coordinating discharge planning if the patient needs post-hospital services based on physician/advanced practitioner order or complex needs related to functional status, cognitive ability, or social support system  Outcome: Progressing     Problem: Knowledge Deficit  Goal: Patient/family/caregiver demonstrates understanding of disease process, treatment plan, medications, and discharge instructions  Description: Complete learning assessment and assess knowledge base.   Interventions:  - Provide teaching at level of understanding  - Provide teaching via preferred learning methods  Outcome: Progressing     Problem: RESPIRATORY - ADULT  Goal: Achieves optimal ventilation and oxygenation  Description: INTERVENTIONS:  - Assess for changes in respiratory status  - Assess for changes in mentation and behavior  - Position to facilitate oxygenation and minimize respiratory effort  - Oxygen administered by appropriate delivery if ordered  - Initiate smoking cessation education as indicated  - Encourage broncho-pulmonary hygiene including cough, deep breathe, Incentive Spirometry  - Assess the need for suctioning and aspirate as needed  - Assess and instruct to report SOB or any respiratory difficulty  - Respiratory Therapy support as indicated  Outcome: Progressing     Problem: HEMATOLOGIC - ADULT  Goal: Maintains hematologic stability  Description: INTERVENTIONS  - Assess for signs and symptoms of bleeding or hemorrhage  - Monitor labs  - Administer supportive blood products/factors as ordered and appropriate  Outcome: Progressing     Problem: MUSCULOSKELETAL - ADULT  Goal: Maintain or return mobility to safest level of function  Description: INTERVENTIONS:  - Assess patient's ability to carry out ADLs; assess patient's baseline for ADL function and identify physical deficits which impact ability to perform ADLs (bathing, care of mouth/teeth, toileting, grooming, dressing, etc.)  - Assess/evaluate cause of self-care deficits   - Assess range of motion  - Assess patient's mobility  - Assess patient's need for assistive devices and provide as appropriate  - Encourage maximum independence but intervene and supervise when necessary  - Involve family in performance of ADLs  - Assess for home care needs following discharge   - Consider OT consult to assist with ADL evaluation and planning for discharge  - Provide patient education as appropriate  Outcome: Progressing  Goal: Maintain proper alignment of affected body part  Description: INTERVENTIONS:  - Support, maintain and protect limb and body alignment  - Provide patient/ family with appropriate education  Outcome: Progressing

## 2023-11-28 NOTE — H&P
[address]  H&P  Name: Anali Perez 74 y.o. female I MRN: [id number]  Unit/Bed#: 404-01 I Date of Admission: 11/28/2023   Date of Service: 11/28/2023 I Hospital Day: 0      Assessment/Plan   * Community acquired pneumonia  Assessment & Plan  75 yo female presents with worsening cough, wheezing, dysponea x 5 days  CTA revealing GGO in RML/RL infectious/inflammatory etiology  Stable on home 3L NC  Non-severe, DIP 4- Continue Rocephin 2g q24 / azithromycin 500 q24  Check procal  Urinary antigens ordered  Respiratory protocol       Severe sepsis (HCC)  Assessment & Plan  SIRS: tachycardia, tachypnea, leukocytosis  Organ dysfunction: lactic 2.5 > 3.0   Source: RLL PNA  Blood cultures x 2 ordered  Continue Rocephin/azithromycin  Give 1L NS only given diastolic HF  Check procal. Trend lactic acid   Trend WBCs/fever curve     COPD exacerbation (HCC)  Assessment & Plan  Solu-Medrol 40 q8  Xopenex/atrovent nebs tid  Pulmicort q12  Azithromycin as above  Sputum culture  Mucinex, pulmonary toilet, respiratory protocol    Vertigo  Assessment & Plan  Reports episodic vertigo with movement & head movement since Thanksgiving  Hx c/w BPPV   Less likely labyrinthitis - however is being treated with IV steroids for COPD exacerbation which should help this   Check orthostatics    IVF hydration   Meclizine prn  PT/OT      History of pulmonary embolus (PE)  Assessment & Plan  Continue Eliquis    Chronic diastolic (congestive) heart failure (HCC)  Assessment & Plan  Wt Readings from Last 3 Encounters:   10/25/23 108 kg (237 lb 6.4 oz)   09/20/23 108 kg (238 lb)   08/18/23 108 kg (238 lb)     Appears compensated, volume status dry   Echo 2020: LVEF 60%, G1DD  Daily weights, I&Os  Hold lasix  Caution with IVFs - monitor volume status          Chronic respiratory failure with hypoxia (HCC)  Assessment & Plan  Stable on baseline 3L NC    Essential hypertension  Assessment & Plan  Continue lisinopril with holding parameters  Hold lasix    Hypomagnesemia  Assessment & Plan  Mag 1.8  S/p 2g mag sulfate  Trend     Rheumatoid arthritis ([address])  Assessment & Plan  Continue David Flores (brought from home)  Takes methotrexate q Sundays    Hypothyroidism  Assessment & Plan  Continue levothyroxine    Type 2 diabetes mellitus with hyperglycemia, with long-term current use of insulin (HCC)  Assessment & Plan  Lab Results   Component Value Date    HGBA1C 8.3 (H) 09/15/2023       No results for input(s): "POCGLU" in the last 72 hours. Blood Sugar Average: Last 72 hrs:    Update A1c  Hold Ozempic  Continue log acting insulin 40 units + SSI   Acu-checks ac/hs           VTE Pharmacologic Prophylaxis: VTE Score: 13 Eliquis  Code Status: Level 1 - Full Code   Discussion with family: Patient declined call to . Anticipated Length of Stay: Patient will be admitted on an inpatient basis with an anticipated length of stay of greater than 2 midnights secondary to CAP, severe sepsis. Total Time Spent on Date of Encounter in care of patient:  mins. This time was spent on one or more of the following: performing physical exam; counseling and coordination of care; obtaining or reviewing history; documenting in the medical record; reviewing/ordering tests, medications or procedures; communicating with other healthcare professionals and discussing with patient's family/caregivers. Chief Complaint: worsening dyspnea x 5 days    History of Present Illness:  Sary Haskins is a 74 y.o. female with a PMH of chronic respiratory failure on 3L, COPD, RA, HFpEF who presents with worsening shortness of breath over the past 2-5 days. Associated wheezing and productive cough with green-brown phlegm. Notes great-grandson sick with cold. Denies chest pain. Reports poor appetite, generalized malaise, headache. Also reporting episodic vertiginous episodes with certain body/head movements that last seconds over the past week.  No ear pain, hearing loss, stroke-like symptoms. Not able to ambulate during episodes. Otherwise, no abdominal pain, N/VD. In ED, she was given prednisone and multiple nebs with improvement. CTA negative for PE, revealing GGO in RLL/RML. She met severe sepsis criteria with RLL pneumonia as source. She was initiated on IV antibiotics and admitted for further evaluation and treatment. Review of Systems:  Review of Systems   Constitutional:  Positive for activity change, appetite change and chills. Negative for fever. Respiratory:  Positive for cough, shortness of breath and wheezing. Cardiovascular:  Negative for chest pain, palpitations and leg swelling. Gastrointestinal:  Negative for abdominal pain, diarrhea, nausea and vomiting. Genitourinary:  Negative for difficulty urinating. Musculoskeletal:  Positive for myalgias. Neurological:  Positive for dizziness, weakness, light-headedness and headaches. Negative for syncope, facial asymmetry and speech difficulty. Past Medical and Surgical History:   Past Medical History:   Diagnosis Date    Arthritis     Arthritis     Cancer ([address])     Candidiasis of skin     Cervical cancer ([address])     Chronic respiratory failure with hypoxia and hypercapnia ([address]) 4/20/2019    COPD (chronic obstructive pulmonary disease) ([address])     last assessed 11/21/2016    Current chronic use of systemic steroids     Degenerative arthritis of left knee     last assessed 1/20/2015    Diabetes mellitus ([address])     Disease of thyroid gland     hypo pill given to decrease thyroid.      Fibromyalgia     Fistula of knee     last assessed 9/12/2014    GERD (gastroesophageal reflux disease)     Hyperlipidemia     Hypertension     Medial meniscus tear     of left knee, last assessed 9/12/2014    Migraine     states she has a hx of HA that she calls [employer] but never was dx by neurologist    Obesity     Obesity     Osteoarthritis     Osteopenia     Pneumonia     last assessed 11/16/2016    Psychiatric disorder anxiety    Rheumatoid arthritis ([address])     Rheumatoid arthritis ([address])     Sepsis ([address])     last assessed 11/10/2016    Tick bite     last assessed 10/30/2015    Vitamin B12 deficiency     Vitamin D deficiency        Past Surgical History:   Procedure Laterality Date    CATARACT EXTRACTION Bilateral     CHOLECYSTECTOMY      EYE SURGERY      Bilateral Cataracts    HYSTERECTOMY      IR AORTAGRAM WITH RUN-OFF  9/20/2023    IR THORACENTESIS  5/31/2019    JOINT REPLACEMENT      left knee    KNEE ARTHROSCOPY      NEUROPLASTY / TRANSPOSITION MEDIAN NERVE AT CARPAL TUNNEL      TUBAL LIGATION         Meds/Allergies:  Prior to Admission medications    Medication Sig Start Date End Date Taking? Authorizing Provider   acetaminophen (TYLENOL) 325 mg tablet Take 2 tablets (650 mg total) by mouth every 6 (six) hours as needed for mild pain, headaches or fever 12/13/21   Wesson Back Maureen, DO   albuterol (2.5 mg/3 mL) 0.083 % nebulizer solution USE 1 UNIT DOSE IN NEBULIZER EVERY 4 HOURS AS NEEDED.     Historical Provider, MD   albuterol (PROVENTIL HFA,VENTOLIN HFA) 90 mcg/act inhaler Inhale 2 puffs every 6 (six) hours as needed for wheezing 12/13/21   Melany Santos, DO   aspirin (ECOTRIN LOW STRENGTH) 81 mg EC tablet Take 81 mg by mouth daily    Historical Provider, MD   atorvastatin (LIPITOR) 20 mg tablet TAKE ONE TABLET BY MOUTH EVERY DAY 9/22/23   Ildefonso Martinez MD   BD Pen Needle Mamta U/F 32G X 4 MM MISC USE AS DIRECTED 11/1/23   Ildefonso Martinez MD   benzonatate (TESSALON) 200 MG capsule Take 1 capsule (200 mg total) by mouth 3 (three) times a day as needed for cough 10/25/23   Ildefonso Martinez MD   Blood Glucose Monitoring Suppl (ONE TOUCH ULTRA 2) w/Device KIT by Does not apply route 2 (two) times a day 6/6/18   Ildefonso Martinez MD   clindamycin (CLEOCIN T) 1 % lotion clindamycin 1 % lotion   APPLY LOTION TOPICALLY TWICE DAILY 8/2/22   Historical Provider, MD   DULoxetine (CYMBALTA) 60 mg delayed release capsule TAKE ONE CAPSULE BY MOUTH EVERY DAY 11/1/23   Sb Wadsworth MD   Eliquis 5 MG TAKE 1 TABLET BY MOUTH TWICE DAILY 11/1/23   Sb Wadsworth MD   ergocalciferol (VITAMIN D2) 50,000 units TAKE ONE CAPSULE BY MOUTH WEEKLY 11/1/23   Sb Wadsworth MD   fluticasone-salmeterol (ADVAIR) 250-50 mcg/dose Inhale 1 puff every 12 (twelve) hours     Historical Provider, MD   folic acid (FOLVITE) 1 mg tablet TAKE ONE TABLET BY MOUTH ONCE DAILY 11/1/23   Sb Wadsworth MD   furosemide (LASIX) 40 mg tablet TAKE ONE TABLET BY MOUTH EVERY DAY 5/17/23   Sb Wadsworth MD   glucose blood (OneTouch Ultra) test strip Test 3 times daily   Dx: E11.65 5/20/22   BO Madison   Insulin Pen Needle 33G X 4 MM MISC by Does not apply route daily 5/21/19   bS Wadsworth MD   Lancets (OneTouch Delica Plus [id number]) MISC Test 2 times a day 5/6/22   Sb Wadsworth MD   levothyroxine 137 mcg tablet Take 1 tablet (137 mcg total) by mouth daily 10/25/23   Sb Wadsworth MD   lisinopril (ZESTRIL) 40 mg tablet TAKE ONE TABLET BY MOUTH EVERY DAY 9/22/23   Sb Wadsworth MD   methotrexate 2.5 MG tablet TAKE 8 TABLETS BY MOUTH EVERY SUNDAY 11/1/23   Sb Wadsworth MD   mirtazapine (REMERON) 15 mg tablet Take 1 tablet (15 mg total) by mouth daily at bedtime 10/3/23   Sb Wadsworth MD   mometasone (ELOCON) 0.1 % ointment Apply topically 2 (two) times a day as needed (itching) 4/25/19   Sb Wadsworth MD   nystatin (MYCOSTATIN) cream Apply topically 2 (two) times a day as needed (rash) 10/3/23   Sb Wadsworth MD   nystatin (MYCOSTATIN) powder Apply topically 3 (three) times a day as needed (rash/irritation) 3/2/22   Sb Wadsworth MD   pantoprazole (PROTONIX) 40 mg tablet TAKE ONE TABLET BY MOUTH ONCE DAILY 11/1/23   Sb Wadsworth MD   Polyethylene Glycol 3350-GRX POWD Take by mouth daily at bedtime as needed (constipation) 1/16/20   Alesia Reddy,    semaglutide, 1 mg/dose, (Ozempic) 4 mg/3 mL injection pen Inject 0.75 mL (1 mg total) under the skin once a week 23   Emil Arzola MD   tiotropium (SPIRIVA RESPIMAT) 2.5 MCG/ACT AERS inhaler Inhale 1 puff daily at bedtime 18   Emil Arzola MD   Tofacitinib Citrate ER 11 MG TB24 Take 1 tablet by mouth daily    Historical Provider, MD Medina Pacholley FlexTouch 100 units/mL injection pen INJECT 40 UNITS UNDER THE SKIN DAILY 23   Emil Arzola MD   vitamin B-12 (VITAMIN B-12) 500 MCG tablet Take 1 tablet (500 mcg total) by mouth daily 20   [facility], DO     I have reviewed home medications with patient personally. Allergies: No Known Allergies    Social History:  Marital Status:     Occupation:   Patient Pre-hospital Living Situation: Home  Patient Pre-hospital Level of Mobility: walks with cane & walker  Patient Pre-hospital Diet Restrictions:   Substance Use History:   Social History     Substance and Sexual Activity   Alcohol Use Never    Alcohol/week: 0.0 standard drinks of alcohol    Comment: stopped drinking alcohol     Social History     Tobacco Use   Smoking Status Former    Packs/day: 1.00    Years: 48.00    Total pack years: 48.00    Types: Cigarettes, E-Cigarettes    Quit date: 2012    Years since quittin.0   Smokeless Tobacco Never   Tobacco Comments    per allscripts - "current every day smoker, former smoker"     Social History     Substance and Sexual Activity   Drug Use Never       Family History:  Family History   Problem Relation Age of Onset    Stroke Mother     No Known Problems Daughter     No Known Problems Maternal Grandmother     No Known Problems Maternal Grandfather     No Known Problems Paternal Grandmother     No Known Problems Paternal Grandfather     Asthma Family     Cancer Family     Diabetes Family     Hypertension Family     No Known Problems Son     No Known Problems Son     Addiction problem Son     Breast cancer Half-Sister 39    No Known Problems Half-Brother     No Known Problems Half-Brother     No Known Problems Maternal Aunt        Physical Exam:     Vitals:   Blood Pressure: 130/71 (11/28/23 1717)  Pulse: (!) 121 (11/28/23 1717)  Temperature: 97.9 °F (36.6 °C) (11/28/23 1717)  Temp Source: Temporal (11/28/23 1142)  Respirations: 16 (11/28/23 1717)  Height: 5' 5" (165.1 cm) (11/28/23 1717)  Weight - Scale: 106 kg (233 lb 14.5 oz) (11/28/23 1717)  SpO2: 93 % (11/28/23 1717)    Physical Exam  Constitutional:       General: She is not in acute distress. Appearance: She is not toxic-appearing. HENT:      Head: Normocephalic and atraumatic. Mouth/Throat:      Mouth: Mucous membranes are dry. Cardiovascular:      Rate and Rhythm: Regular rhythm. Tachycardia present. Pulmonary:      Effort: Pulmonary effort is normal. No respiratory distress. Breath sounds: Rhonchi present. Comments: Course breath sounds and rhonchi  Abdominal:      General: Abdomen is flat. Bowel sounds are normal. There is no distension. Palpations: Abdomen is soft. Musculoskeletal:         General: Normal range of motion. Right lower leg: No edema. Left lower leg: No edema. Skin:     General: Skin is warm and dry. Neurological:      General: No focal deficit present. Mental Status: She is alert and oriented to person, place, and time. Cranial Nerves: No cranial nerve deficit.    Psychiatric:         Mood and Affect: Mood normal.         Behavior: Behavior normal.          Additional Data:   Lab Results:  Results from last 7 days   Lab Units 11/28/23  1212   WBC Thousand/uL 12.35*   HEMOGLOBIN g/dL 11.9   HEMATOCRIT % 38.1   PLATELETS Thousands/uL 390   NEUTROS PCT % 72   LYMPHS PCT % 17   MONOS PCT % 10   EOS PCT % 1     Results from last 7 days   Lab Units 11/28/23  1212   SODIUM mmol/L 136   POTASSIUM mmol/L 3.8   CHLORIDE mmol/L 96   CO2 mmol/L 31   BUN mg/dL 13   CREATININE mg/dL 0.65   ANION GAP mmol/L 9   CALCIUM mg/dL 9.8   GLUCOSE RANDOM mg/dL 147*                 Results from last 7 days   Lab Units 11/28/23  1452 11/28/23  1212   LACTIC ACID mmol/L 3.0* 2.5*   PROCALCITONIN ng/ml  --  <0.05       Lines/Drains:  Invasive Devices       Peripheral Intravenous Line  Duration             Peripheral IV 11/28/23 Right Antecubital <1 day                        Imaging: Reviewed radiology reports from this admission including: chest xray and abdominal/pelvic CT  CTA ED chest PE Study   Final Result by Rogelio Sigala MD (11/28 1613)      No evidence for pulmonary embolism. Mild emphysematous changes. Stable large bulla in the right upper lobe. Small patchy foci of groundglass opacity in the right middle and lower lobes, likely infectious/inflammatory in etiology. Small to moderate sized loculated left pleural effusion with associated pleural thickening, similar to prior. Workstation performed: [id number]         X-ray chest 1 view portable   ED Interpretation by Mary Jane Quiñonez DO (11/28 1257)   Airway midline. No focal consolidation or pneumothorax. Very mild interstitial pattern. Left-sided effusion slightly increased compared to prior. Cardiac silhouette within normal limits. Osseous structures appear normal.      Final Result by Raquel Abdullahi MD (11/28 1323)      Stable chronic small left pleural of. No new infiltrates. Workstation performed: [id number]             EKG and Other Studies Reviewed on Admission:   EKG: Sinus Tachycardia. . Qtc 446. ** Please Note: This note has been constructed using a voice recognition system.  **

## 2023-11-28 NOTE — RESPIRATORY THERAPY NOTE
RT Protocol Note  Christiana Christiansen 74 y.o. female MRN: [id number]  Unit/Bed#: 404-01 Encounter: [id number]    Assessment    Principal Problem:    Community acquired pneumonia  Active Problems:    Severe sepsis ([address])    Type 2 diabetes mellitus with hyperglycemia, with long-term current use of insulin (HCC)    Hypothyroidism    Rheumatoid arthritis ([address])    Hypomagnesemia    Essential hypertension    Chronic respiratory failure with hypoxia (HCC)    Chronic diastolic (congestive) heart failure (HCC)    COPD exacerbation (HCC)    Vertigo    History of pulmonary embolus (PE)      Home Pulmonary Medications:  Home Devices/Therapy: Home O2, BiPAP/CPAP, Other (Comment) (ppt has albuterol neb/aolbuterol inhaler, spiriva respimat, Advair, oxygen 3 l/m nc baseline)    Past Medical History:   Diagnosis Date    Arthritis     Arthritis     Cancer ([address])     Candidiasis of skin     Cervical cancer ([address])     Chronic respiratory failure with hypoxia and hypercapnia ([address]) 4/20/2019    COPD (chronic obstructive pulmonary disease) ([address])     last assessed 11/21/2016    Current chronic use of systemic steroids     Degenerative arthritis of left knee     last assessed 1/20/2015    Diabetes mellitus ([address])     Disease of thyroid gland     hypo pill given to decrease thyroid.      Fibromyalgia     Fistula of knee     last assessed 9/12/2014    GERD (gastroesophageal reflux disease)     Hyperlipidemia     Hypertension     Medial meniscus tear     of left knee, last assessed 9/12/2014    Migraine     states she has a hx of HA that she calls [employer] but never was dx by neurologist    Obesity     Obesity     Osteoarthritis     Osteopenia     Pneumonia     last assessed 11/16/2016    Psychiatric disorder     anxiety    Rheumatoid arthritis ([address])     Rheumatoid arthritis ([address])     Sepsis ([address])     last assessed 11/10/2016    Tick bite     last assessed 10/30/2015    Vitamin B12 deficiency     Vitamin D deficiency      Social History Socioeconomic History    Marital status:      Spouse name: None    Number of children: None    Years of education: None    Highest education level: None   Occupational History    None   Tobacco Use    Smoking status: Former     Packs/day: 1.00     Years: 48.00     Total pack years: 48.00     Types: Cigarettes, E-Cigarettes     Quit date: 2012     Years since quittin.0    Smokeless tobacco: Never    Tobacco comments:     per allscripts - "current every day smoker, former smoker"   Vaping Use    Vaping Use: Former   Substance and Sexual Activity    Alcohol use: Never     Alcohol/week: 0.0 standard drinks of alcohol     Comment: stopped drinking alcohol    Drug use: Never    Sexual activity: Never   Other Topics Concern    None   Social History Narrative    No living will     Social Determinants of Health     Financial Resource Strain: Medium Risk (2023)    Overall Financial Resource Strain (CARDIA)     Difficulty of Paying Living Expenses: Somewhat hard   Food Insecurity: No Food Insecurity (2021)    Hunger Vital Sign     Worried About Running Out of Food in the Last Year: Never true     [address] in the Last Year: Never true   Transportation Needs: No Transportation Needs (2023)    PRAPARE - Transportation     Lack of Transportation (Medical): No     Lack of Transportation (Non-Medical):  No   Physical Activity: Not on file   Stress: Not on file   Social Connections: Unknown (2019)    Social Connection and Isolation Panel [NHANES]     Frequency of Communication with Friends and Family: More than three times a week     Frequency of Social Gatherings with Friends and Family: Not on file     Attends Gnosticist Services: Not on file     Active Member of Clubs or Organizations: Not on file     Attends Club or Organization Meetings: Not on file     Marital Status: Not on file   Intimate Partner Violence: Not on file   Housing Stability: Low Risk  (2021)    Housing Stability Vital Sign     Unable to Pay for Housing in the Last Year: No     Number of Places Lived in the Last Year: 1     Unstable Housing in the Last Year: No       Subjective         Objective    Physical Exam:   Assessment Type: Pre-treatment  General Appearance: Alert, Awake  Respiratory Pattern: Symmetrical, Spontaneous, Tachypneic  Chest Assessment: Chest expansion symmetrical, Trachea midline  Bilateral Breath Sounds: Diminished, Rhonchi  O2 Device: 3 l/m baseline    Vitals:  Blood pressure 130/71, pulse (!) 117, temperature 97.9 °F (36.6 °C), resp. rate 22, height 5' 5" (1.651 m), weight 106 kg (233 lb 14.5 oz), SpO2 95 %, not currently breastfeeding. Imaging and other studies: I have personally reviewed pertinent reports. O2 Device: 3 l/m baseline     Plan  Bronchodilator therapy xop 1.25mg/Atrovent 0.5mg TIDalbuteerol 0.083% Q6prn for SOB and wheezing, oxygen at baseline 3 l/m nc.  Budesonide 0.5mg BID, cpap , pt was offered cpap but refused, airway clearance protocol with flutter valve for mobilization of secrtions           Resp Comments: pt states has cpap at home but doesn't wear it

## 2023-11-28 NOTE — ASSESSMENT & PLAN NOTE
Solu-Medrol 40 q8  Xopenex/atrovent nebs tid  Pulmicort q12  Azithromycin as above  Sputum culture  Mucinex, pulmonary toilet, respiratory protocol

## 2023-11-28 NOTE — ASSESSMENT & PLAN NOTE
Reports episodic vertigo with movement & head movement since Thanksgiving  Hx c/w BPPV   Less likely labyrinthitis - however is being treated with IV steroids for COPD exacerbation which should help this   Check orthostatics    IVF hydration   Meclizine prn  PT/OT

## 2023-11-28 NOTE — ASSESSMENT & PLAN NOTE
Lab Results   Component Value Date    HGBA1C 8.3 (H) 09/15/2023       No results for input(s): "POCGLU" in the last 72 hours.     Blood Sugar Average: Last 72 hrs:    Update A1c  Hold Ozempic  Continue log acting insulin 40 units + SSI   Acu-checks ac/hs

## 2023-11-28 NOTE — ASSESSMENT & PLAN NOTE
Wt Readings from Last 3 Encounters:   10/25/23 108 kg (237 lb 6.4 oz)   09/20/23 108 kg (238 lb)   08/18/23 108 kg (238 lb)     Appears compensated, volume status dry   Echo 2020: LVEF 60%, G1DD  Daily weights, I&Os  Hold lasix  Caution with IVFs - monitor volume status

## 2023-11-28 NOTE — ED PROVIDER NOTES
History  Chief Complaint   Patient presents with    Shortness of Breath     Started feeling short of breath on Thursday, worsening over the last couple of days. Normally wears 3L o2 at home. Has history of COPD      This is a 75-year-old woman who presents to the emergency department with her granddaughter for evaluation of dyspnea that has been worsening over the past 5 days with increasing shortness of breath with walking less than 10 feet over flat surface as well as a cough productive of brown/green phlegm. Normally able to ambulate much more than this without difficulty. No chest pain/lightheadedness/presyncope during exertion; she notes feeling a vertiginous sensation upon standing up or turning her head to either side. The feeling of vertigo will then last at least 30 seconds prior to resolving. She is not able to walk when acutely vertiginous. This has been present for at least the past week. Was seen by primary physician 25 October 2023 for LRTI infectious sx and had positive COVID-19 test; was prescribed course of doxycycline which she completed. She did seem to be improving from the Covid-19 diagnosis prior to development of vertiginous sensation about 7 days prior. She was in close contact with her great-grandson 1 week prior who had rhinorrhea/nasal congestion/phlegmy cough at the time. Does not have any prior history of vertigo. With the vertiginous symptoms, she denies any vision loss/dysarthria/aphasia/extremity weakness/extremity paresthesias/neck pain/hearing loss. 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 Hx VTE 2021; remains on apixaban. A/p: Worsening dyspnea with multiple potential etiologies including pneumonia or viral lower respiratory tract infection, worsening pleural effusion, ACS, anemia, pneumothorax. Vertigo is episodic and provoked; does not  obviously have central features, but etiology is not immediately clear.   She is having rhonchi and wheezing on exam, will give bronchodilator and steroid as there may be a component of COPD exacerbation. Check labs for evidence of ACS/electrolyte disturbance/renal impairment/viral respiratory infection. Chest x-ray. Advanced chest imaging for PE if no other potential etiologies identified. Low threshold for hospital admission. History provided by:  Patient, medical records and relative (Patient's granddaughter)  Shortness of Breath  Associated symptoms: cough, headaches and wheezing    Associated symptoms: no abdominal pain, no chest pain, no diaphoresis, no fever, no neck pain and no vomiting        Prior to Admission Medications   Prescriptions Last Dose Informant Patient Reported? Taking? BD Pen Needle Mamta U/F 32G X 4 MM MISC   No No   Sig: USE AS DIRECTED   Blood Glucose Monitoring Suppl (ONE TOUCH ULTRA 2) w/Device KIT  Self No No   Sig: by Does not apply route 2 (two) times a day   DULoxetine (CYMBALTA) 60 mg delayed release capsule   No No   Sig: TAKE ONE CAPSULE BY MOUTH EVERY DAY   Eliquis 5 MG   No No   Sig: TAKE 1 TABLET BY MOUTH TWICE DAILY   Insulin Pen Needle 33G X 4 MM MISC  Self No No   Sig: by Does not apply route daily   Lancets (OneTouch Delica Plus [id number]) MISC  Self No No   Sig: Test 2 times a day   Polyethylene Glycol 3350-GRX POWD  Self No No   Sig: Take by mouth daily at bedtime as needed (constipation)   Tofacitinib Citrate ER 11 MG TB24  Self Yes No   Sig: Take 1 tablet by mouth daily   Tresiba FlexTouch 100 units/mL injection pen   No No   Sig: INJECT 40 UNITS UNDER THE SKIN DAILY   acetaminophen (TYLENOL) 325 mg tablet  Self No No   Sig: Take 2 tablets (650 mg total) by mouth every 6 (six) hours as needed for mild pain, headaches or fever   albuterol (2.5 mg/3 mL) 0.083 % nebulizer solution  Self Yes No   Sig: USE 1 UNIT DOSE IN NEBULIZER EVERY 4 HOURS AS NEEDED.    albuterol (PROVENTIL HFA,VENTOLIN HFA) 90 mcg/act inhaler  Self No No   Sig: Inhale 2 puffs every 6 (six) hours as needed for wheezing   aspirin (ECOTRIN LOW STRENGTH) 81 mg EC tablet   Yes No   Sig: Take 81 mg by mouth daily   atorvastatin (LIPITOR) 20 mg tablet   No No   Sig: TAKE ONE TABLET BY MOUTH EVERY DAY   benzonatate (TESSALON) 200 MG capsule   No No   Sig: Take 1 capsule (200 mg total) by mouth 3 (three) times a day as needed for cough   clindamycin (CLEOCIN T) 1 % lotion  Self Yes No   Sig: clindamycin 1 % lotion   APPLY LOTION TOPICALLY TWICE DAILY   ergocalciferol (VITAMIN D2) 50,000 units   No No   Sig: TAKE ONE CAPSULE BY MOUTH WEEKLY   fluticasone-salmeterol (ADVAIR) 250-50 mcg/dose  Self Yes No   Sig: Inhale 1 puff every 12 (twelve) hours    folic acid (FOLVITE) 1 mg tablet   No No   Sig: TAKE ONE TABLET BY MOUTH ONCE DAILY   furosemide (LASIX) 40 mg tablet  Self No No   Sig: TAKE ONE TABLET BY MOUTH EVERY DAY   glucose blood (OneTouch Ultra) test strip  Self No No   Sig: Test 3 times daily   Dx: E11.65   levothyroxine 137 mcg tablet   No No   Sig: Take 1 tablet (137 mcg total) by mouth daily   lisinopril (ZESTRIL) 40 mg tablet   No No   Sig: TAKE ONE TABLET BY MOUTH EVERY DAY   methotrexate 2.5 MG tablet   No No   Sig: TAKE 8 TABLETS BY MOUTH EVERY SUNDAY   mirtazapine (REMERON) 15 mg tablet   No No   Sig: Take 1 tablet (15 mg total) by mouth daily at bedtime   mometasone (ELOCON) 0.1 % ointment  Self No No   Sig: Apply topically 2 (two) times a day as needed (itching)   nystatin (MYCOSTATIN) cream   No No   Sig: Apply topically 2 (two) times a day as needed (rash)   nystatin (MYCOSTATIN) powder  Self No No   Sig: Apply topically 3 (three) times a day as needed (rash/irritation)   pantoprazole (PROTONIX) 40 mg tablet   No No   Sig: TAKE ONE TABLET BY MOUTH ONCE DAILY   semaglutide, 1 mg/dose, (Ozempic) 4 mg/3 mL injection pen  Self No No   Sig: Inject 0.75 mL (1 mg total) under the skin once a week   tiotropium (SPIRIVA RESPIMAT) 2.5 MCG/ACT AERS inhaler  Self No No   Sig: Inhale 1 puff daily at bedtime   vitamin B-12 (VITAMIN B-12) 500 MCG tablet  Self No No   Sig: Take 1 tablet (500 mcg total) by mouth daily      Facility-Administered Medications Last Administration Doses Remaining   cyanocobalamin injection 1,000 mcg 6/21/2023  3:23 PM           Past Medical History:   Diagnosis Date    Arthritis     Arthritis     Cancer ([address])     Candidiasis of skin     Cervical cancer ([address])     Chronic respiratory failure with hypoxia and hypercapnia ([address]) 4/20/2019    COPD (chronic obstructive pulmonary disease) ([address])     last assessed 11/21/2016    Current chronic use of systemic steroids     Degenerative arthritis of left knee     last assessed 1/20/2015    Diabetes mellitus ([address])     Disease of thyroid gland     hypo pill given to decrease thyroid.      Fibromyalgia     Fistula of knee     last assessed 9/12/2014    GERD (gastroesophageal reflux disease)     Hyperlipidemia     Hypertension     Medial meniscus tear     of left knee, last assessed 9/12/2014    Migraine     states she has a hx of HA that she calls [employer] but never was dx by neurologist    Obesity     Obesity     Osteoarthritis     Osteopenia     Pneumonia     last assessed 11/16/2016    Psychiatric disorder     anxiety    Rheumatoid arthritis ([address])     Rheumatoid arthritis ([address])     Sepsis ([address])     last assessed 11/10/2016    Tick bite     last assessed 10/30/2015    Vitamin B12 deficiency     Vitamin D deficiency        Past Surgical History:   Procedure Laterality Date    CATARACT EXTRACTION Bilateral     CHOLECYSTECTOMY      EYE SURGERY      Bilateral Cataracts    HYSTERECTOMY      IR AORTAGRAM WITH RUN-OFF  9/20/2023    IR THORACENTESIS  5/31/2019    JOINT REPLACEMENT      left knee    KNEE ARTHROSCOPY      NEUROPLASTY / TRANSPOSITION MEDIAN NERVE AT CARPAL TUNNEL      TUBAL LIGATION         Family History   Problem Relation Age of Onset    Stroke Mother     No Known Problems Daughter     No Known Problems Maternal Grandmother     No Known Problems Maternal Grandfather     No Known Problems Paternal Grandmother     No Known Problems Paternal Grandfather     Asthma Family     Cancer Family     Diabetes Family     Hypertension Family     No Known Problems Son     No Known Problems Son     Addiction problem Son     Breast cancer Half-Sister 39    No Known Problems Half-Brother     No Known Problems Half-Brother     No Known Problems Maternal Aunt      I have reviewed and agree with the history as documented. E-Cigarette/Vaping    E-Cigarette Use Former User      E-Cigarette/Vaping Substances     Social History     Tobacco Use    Smoking status: Former     Packs/day: 1.00     Years: 48.00     Total pack years: 48.00     Types: Cigarettes, E-Cigarettes     Quit date: 2012     Years since quittin.0    Smokeless tobacco: Never    Tobacco comments:     per allscripts - "current every day smoker, former smoker"   Vaping Use    Vaping Use: Former   Substance Use Topics    Alcohol use: Never     Alcohol/week: 0.0 standard drinks of alcohol     Comment: stopped drinking alcohol    Drug use: Never       Review of Systems   Constitutional:  Positive for fatigue. Negative for chills, diaphoresis and fever. HENT:  Positive for congestion. Negative for ear discharge and trouble swallowing. Respiratory:  Positive for cough, chest tightness, shortness of breath and wheezing. Cardiovascular:  Negative for chest pain, palpitations and leg swelling. Gastrointestinal:  Negative for abdominal pain, diarrhea, nausea and vomiting. Musculoskeletal:  Negative for neck pain and neck stiffness. Neurological:  Positive for dizziness, light-headedness and headaches. Negative for tremors, seizures, syncope, speech difficulty, weakness and numbness. Hematological: Negative. Physical Exam  Physical Exam  Vitals and nursing note reviewed. Constitutional:       General: She is awake. She is in acute distress (mild respiratory distress). Appearance: Normal appearance. She is well-developed. HENT:      Head: Normocephalic and atraumatic. Right Ear: Hearing and external ear normal.      Left Ear: Hearing and external ear normal.   Eyes:      General: Lids are normal.      Extraocular Movements: Extraocular movements intact. Right eye: No nystagmus. Left eye: No nystagmus. Conjunctiva/sclera: Conjunctivae normal.      Pupils: Pupils are equal, round, and reactive to light. Comments: No nystagmus present at rest or with EOM   Neck:      Trachea: Trachea and phonation normal.   Cardiovascular:      Rate and Rhythm: Regular rhythm. Tachycardia present. Pulses:           Radial pulses are 2+ on the right side and 2+ on the left side. Dorsalis pedis pulses are 2+ on the right side and 2+ on the left side. Posterior tibial pulses are 2+ on the right side and 2+ on the left side. Heart sounds: Normal heart sounds, S1 normal and S2 normal. No murmur heard. No friction rub. No gallop. Pulmonary:      Effort: Tachypnea, accessory muscle usage and respiratory distress (mild) present. Breath sounds: No stridor. Wheezing and rhonchi present. No decreased breath sounds or rales. Comments: Wheezes/rhonchi in all lung fields bilaterally  Abdominal:      General: There is no distension. Palpations: There is no mass. Tenderness: There is no abdominal tenderness. There is no guarding or rebound. Musculoskeletal:      Right lower leg: No edema. Left lower leg: No edema. Skin:     General: Skin is warm and dry. Neurological:      Mental Status: She is alert and oriented to person, place, and time. GCS: GCS eye subscore is 4. GCS verbal subscore is 5. GCS motor subscore is 6. Cranial Nerves: No cranial nerve deficit. Sensory: Sensation is intact. No sensory deficit. Motor: Motor function is intact. No abnormal muscle tone. Coordination: Coordination is intact. Coordination normal. Finger-Nose-Finger Test and Heel to [facility] Test normal.      Comments: PERRLA; EOMI. Sensation intact to light touch over face in V1-V3 distribution bilaterally. Facial expressions symmetric. Tongue/uvula midline. Shoulder shrug equal bilaterally. Strength 5/5 in UE/LE bilaterally. Sensation intact to light touch in UE/LE bilaterally.   Finger/nose and heel/shin testing wnl bilaterally  No dysarthria or aphasia         Vital Signs  ED Triage Vitals   Temperature Pulse Respirations Blood Pressure SpO2   11/28/23 1142 11/28/23 1142 11/28/23 1142 11/28/23 1144 11/28/23 1142   99.8 °F (37.7 °C) (!) 106 (!) 24 123/62 94 %      Temp Source Heart Rate Source Patient Position - Orthostatic VS BP Location FiO2 (%)   11/28/23 1142 11/28/23 1142 11/28/23 1142 11/28/23 1142 --   Temporal Monitor Sitting Left arm       Pain Score       --                  Vitals:    11/28/23 1330 11/28/23 1400 11/28/23 1500 11/28/23 1717   BP: 134/59 131/59 131/57 130/71   Pulse: (!) 112 (!) 111 (!) 114 (!) 121   Patient Position - Orthostatic VS:             Visual Acuity      ED Medications  Medications   sodium chloride (PF) 0.9 % injection 3 mL (has no administration in time range)   cefTRIAXone (ROCEPHIN) IVPB (premix in dextrose) 1,000 mg 50 mL (1,000 mg Intravenous New Bag 11/28/23 1700)   azithromycin (ZITHROMAX) 500 mg in sodium chloride 0.9% 250mL IVPB 500 mg (has no administration in time range)   sodium chloride 0.9 % bolus 1,000 mL (has no administration in time range)   ipratropium (ATROVENT) 0.02 % inhalation solution 0.5 mg (0.5 mg Nebulization Given 11/28/23 1219)   albuterol inhalation solution 5 mg (5 mg Nebulization Given 11/28/23 1219)   predniSONE tablet 40 mg (40 mg Oral Given 11/28/23 1215)   magnesium sulfate 2 g/50 mL IVPB (premix) 2 g (0 g Intravenous Stopped 11/28/23 8682)   ipratropium-albuterol (DUO-NEB) 0.5-2.5 mg/3 mL inhalation solution 3 mL (3 mL Nebulization Given 11/28/23 1401)   droperidol (INAPSINE) injection 0.625 mg (0.625 mg Intravenous Given 11/28/23 1430)   iohexol (OMNIPAQUE) 350 MG/ML injection (MULTI-DOSE) 170 mL (170 mL Intravenous Given 11/28/23 1531)       Diagnostic Studies  Results Reviewed       Procedure Component Value Units Date/Time    Procalcitonin [[id number]] Collected: 11/28/23 1212    Lab Status: In process Specimen: Blood from Arm, Right Updated: 11/28/23 1718    Blood culture #1 [[id number]] Collected: 11/28/23 1658    Lab Status: In process Specimen: Blood from Arm, Right Updated: 11/28/23 1712    Blood culture #2 [[id number]] Collected: 11/28/23 1658    Lab Status: In process Specimen: Blood from Hand, Right Updated: 11/28/23 1712    Lactic acid, plasma (w/reflex if result > 2.0) [[id number]]     Lab Status: No result Specimen: Blood     Lactic acid 2 Hours [[id number]]  (Abnormal) Collected: 11/28/23 1452    Lab Status: Final result Specimen: Blood from Arm, Right Updated: 11/28/23 1528     LACTIC ACID 3.0 mmol/L     Narrative:      Result may be elevated if tourniquet was used during collection. HS Troponin I 2hr [[id number]]  (Normal) Collected: 11/28/23 1413    Lab Status: Final result Specimen: Blood from Arm, Left Updated: 11/28/23 1438     hs TnI 2hr 3 ng/L      Delta 2hr hsTnI 0 ng/L     D-dimer, quantitative [[id number]]  (Abnormal) Collected: 11/28/23 1321    Lab Status: Final result Specimen: Blood from Arm, Right Updated: 11/28/23 1434     D-Dimer, Quant 0.90 ug/ml FEU     Narrative: In the evaluation for possible pulmonary embolism, in the appropriate (Well's Score of 4 or less) patient, the age adjusted d-dimer cutoff for this patient can be calculated as:    Age x 0.01 (in ug/mL) for Age-adjusted D-dimer exclusion threshold for a patient over 50 years.     FLU/RSV/COVID - if FLU/RSV clinically relevant [[id number]]  (Normal) Collected: 11/28/23 1212    Lab Status: Final result Specimen: Nares from Nose Updated: 11/28/23 1303     SARS-CoV-2 Negative INFLUENZA A PCR Negative     INFLUENZA B PCR Negative     RSV PCR Negative    Narrative:      FOR PEDIATRIC PATIENTS - copy/paste COVID Guidelines URL to browser: [email or web address] ashx    SARS-CoV-2 assay is a Nucleic Acid Amplification assay intended for the  qualitative detection of nucleic acid from SARS-CoV-2 in nasopharyngeal  swabs. Results are for the presumptive identification of SARS-CoV-2 RNA. Positive results are indicative of infection with SARS-CoV-2, the virus  causing COVID-19, but do not rule out bacterial infection or co-infection  with other viruses. Laboratories within the [facility] and its  territories are required to report all positive results to the appropriate  public health authorities. Negative results do not preclude SARS-CoV-2  infection and should not be used as the sole basis for treatment or other  patient management decisions. Negative results must be combined with  clinical observations, patient history, and epidemiological information. This test has not been FDA cleared or approved. This test has been authorized by FDA under an Emergency Use Authorization  (EUA). This test is only authorized for the duration of time the  declaration that circumstances exist justifying the authorization of the  emergency use of an in vitro diagnostic tests for detection of SARS-CoV-2  virus and/or diagnosis of COVID-19 infection under section 564(b)(1) of  the Act, 21 U. S.C. 816HCF-9(X)(0), unless the authorization is terminated  or revoked sooner. The test has been validated but independent review by FDA  and CLIA is pending. Test performed using [employer] GeneXpert: This RT-PCR assay targets N2,  a region unique to SARS-CoV-2. A conserved region in the E-gene was chosen  for pan-Sarbecovirus detection which includes SARS-CoV-2. According to CMS-2020-01-R, this platform meets the definition of high-throughput technology.     Lactic acid, plasma (w/reflex if result > 2.0) [[id number]]  (Abnormal) Collected: 11/28/23 1212    Lab Status: Final result Specimen: Blood from Arm, Right Updated: 11/28/23 1254     LACTIC ACID 2.5 mmol/L     Narrative:      Result may be elevated if tourniquet was used during collection.     HS Troponin 0hr (reflex protocol) [[id number]]  (Normal) Collected: 11/28/23 1212    Lab Status: Final result Specimen: Blood from Arm, Right Updated: 11/28/23 1247     hs TnI 0hr 3 ng/L     B-Type Natriuretic Peptide(BNP) [[id number]]  (Normal) Collected: 11/28/23 1212    Lab Status: Final result Specimen: Blood from Arm, Right Updated: 11/28/23 1247     BNP 19 pg/mL     Basic metabolic panel [[id number]]  (Abnormal) Collected: 11/28/23 1212    Lab Status: Final result Specimen: Blood from Arm, Right Updated: 11/28/23 1242     Sodium 136 mmol/L      Potassium 3.8 mmol/L      Chloride 96 mmol/L      CO2 31 mmol/L      ANION GAP 9 mmol/L      BUN 13 mg/dL      Creatinine 0.65 mg/dL      Glucose 147 mg/dL      Calcium 9.8 mg/dL      eGFR 87 ml/min/1.73sq m     Narrative:      [place] guidelines for Chronic Kidney Disease (CKD):     Stage 1 with normal or high GFR (GFR > 90 mL/min/1.73 square meters)    Stage 2 Mild CKD (GFR = 60-89 mL/min/1.73 square meters)    Stage 3A Moderate CKD (GFR = 45-59 mL/min/1.73 square meters)    Stage 3B Moderate CKD (GFR = 30-44 mL/min/1.73 square meters)    Stage 4 Severe CKD (GFR = 15-29 mL/min/1.73 square meters)    Stage 5 End Stage CKD (GFR <15 mL/min/1.73 square meters)  Note: GFR calculation is accurate only with a steady state creatinine    Magnesium [[id number]]  (Abnormal) Collected: 11/28/23 1212    Lab Status: Final result Specimen: Blood from Arm, Right Updated: 11/28/23 1242     Magnesium 1.8 mg/dL     CBC and differential [[id number]]  (Abnormal) Collected: 11/28/23 1212    Lab Status: Final result Specimen: Blood from Arm, Right Updated: 11/28/23 1226     WBC 12.35 Thousand/uL      RBC 3.89 Million/uL      Hemoglobin 11.9 g/dL      Hematocrit 38.1 %      MCV 98 fL      MCH 30.6 pg      MCHC 31.2 g/dL      RDW 14.6 %      MPV 10.0 fL      Platelets 871 Thousands/uL      nRBC 0 /100 WBCs      Neutrophils Relative 72 %      Immat GRANS % 0 %      Lymphocytes Relative 17 %      Monocytes Relative 10 %      Eosinophils Relative 1 %      Basophils Relative 0 %      Neutrophils Absolute 8.79 Thousands/µL      Immature Grans Absolute 0.03 Thousand/uL      Lymphocytes Absolute 2.11 Thousands/µL      Monocytes Absolute 1.22 Thousand/µL      Eosinophils Absolute 0.16 Thousand/µL      Basophils Absolute 0.04 Thousands/µL     Blood gas, venous [[id number]]  (Abnormal) Collected: 11/28/23 1212    Lab Status: Final result Specimen: Blood from Arm, Right Updated: 11/28/23 1225     pH, [place] 7.331     pCO2, [place] 59.8 mm Hg      pO2, [place] 26.5 mm Hg      HCO3, [place] 30.9 mmol/L      Base Excess, [place] 3.5 mmol/L      O2 Content, [place] 7.8 ml/dL      O2 HGB, VENOUS 43.0 %                    CTA ED chest PE Study   Final Result by Krnathi Chinchilla MD (11/28 1613)      No evidence for pulmonary embolism. Mild emphysematous changes. Stable large bulla in the right upper lobe. Small patchy foci of groundglass opacity in the right middle and lower lobes, likely infectious/inflammatory in etiology. Small to moderate sized loculated left pleural effusion with associated pleural thickening, similar to prior. Workstation performed: [id number]         X-ray chest 1 view portable   ED Interpretation by Venessa Roman DO (11/28 1257)   Airway midline. No focal consolidation or pneumothorax. Very mild interstitial pattern. Left-sided effusion slightly increased compared to prior. Cardiac silhouette within normal limits. Osseous structures appear normal.      Final Result by Ryan Balderas MD (11/28 1323)      Stable chronic small left pleural of. No new infiltrates. Workstation performed: [id number]                    Procedures  Procedures         ED Course  ED Course as of 11/28/23 1728   [place] Nov 28, 2023   1206 ECG sinus tachycardia 120 bpm   QRS 92 QTc 446  Normal axis  No Q waves  No ectopy  No acute st/t changes  Interpreted by me  Interpreted by me   1231 CBC and differential(!)  Leukocytosis, new from prior. Hemoglobin/hematocrit normal.  Platelets normal.   6976 Blood gas, venous(!)  Compensated respiratory acidosis with hypoxemia   1253 B-Type Natriuretic Peptide(BNP)  Normal   1254 HS Troponin 0hr (reflex protocol)  Nonischemic; will repeat at T+2 hr   5074 Basic metabolic panel(!)  Mild hyperglycemia; otherwise normal   1255 Magnesium(!)  Mild hypomagnesemia; will replete intravenously   1255 Lactic acid, plasma (w/reflex if result > 2.0)(!!)  Elevated   1317 FLU/RSV/COVID - if FLU/RSV clinically relevant  Negative   1317 No obvious etiology for patient's decompensated respiratory sx. Will check D-dimer to assess for PE; she is moderate risk for PE with 3 points d/t tachycardia/hx of VTE.   1358 Patient reevaluated. Repeat examination finds the patient awake and alert. The patient is in mild respiratory distress. The patient is phonating in full sentences with no evidence of dysphonia/stridor. Repeat lung examination finds unchanged aeration bilaterally with moderate wheezes and mild rhonchi. Awaiting d-dimer; plan for advanced imaging of chest/head depending upon d-dimer result. Low threshold for hospital admission given decompensated dyspnea. 1443 HS Troponin I 2hr  Delta of zero not c/w ACS   1444 D-dimer, quantitative(!)  Elevated exceeding age-adjusted D-dimer cut off; will obtain CTA chest   1522 Patient to CT Scan now   1533 Lactic acid 2 Hours(!!)  Elevated compared to prior; may be effect of nebulizer treatments pulmonary anaerobic respiratory muscle metabolism.   Otherwise remains hemodynamically stable   1553 CT completed and awaiting interpretation   3720 CTA ED chest PE Study  IMPRESSION:     No evidence for pulmonary embolism. Mild emphysematous changes. Stable large bulla in the right upper lobe. Small patchy foci of groundglass opacity in the right middle and lower lobes, likely infectious/inflammatory in etiology. Small to moderate sized loculated left pleural effusion with associated pleural thickening, similar to prior. 1629 Right middle/lower lobe patchy opacities may be infectious in nature which would certainly be consistent with patient's symptoms and account for her decompensated illness. Will start antibiotic therapy and plan to admit given multiple comorbidities and worsening respiratory status. Blood cultures ordered. Ceftriaxone/azithromycin ordered. She is agreeable to admission. [id number] Text to internal medicine Dr. Angelica Santiago to discuss case: He accepts in inpatient admission     SBIRT 22yo+      Flowsheet Row Most Recent Value   Initial Alcohol Screen: US AUDIT-C     1. How often do you have a drink containing alcohol? 0 Filed at: 11/28/2023 1144   2. How many drinks containing alcohol do you have on a typical day you are drinking? 0 Filed at: 11/28/2023 1144   3b. FEMALE Any Age, or MALE 65+: How often do you have 4 or more drinks on one occassion? 0 Filed at: 11/28/2023 1144   Audit-C Score 0 Filed at: 11/28/2023 1144   CHANTEL: How many times in the past year have you. .. Used an illegal drug or used a prescription medication for non-medical reasons? Never Filed at: 11/28/2023 1144                      Medical Decision Making  Amount and/or Complexity of Data Reviewed  Labs: ordered. Decision-making details documented in ED Course. Radiology: ordered and independent interpretation performed. Decision-making details documented in ED Course. Risk  Prescription drug management. Decision regarding hospitalization.              Disposition  Final diagnoses:   Pneumonia of right lung due to infectious organism   Sepsis due to pneumonia [facility])   Hypomagnesemia   Exertional dyspnea     Time reflects when diagnosis was documented in both MDM as applicable and the Disposition within this note       Time User Action Codes Description Comment    11/28/2023  4:40 PM Sanrdita Erwind Add [J15.61] Pneumonia of right lung due to Acinetobacter baumannii     11/28/2023  4:40 PM Sandrita Erwind Remove [J15.61] Pneumonia of right lung due to Acinetobacter baumannii     11/28/2023  4:40 PM Sandrita Guild Add [J18.9] Pneumonia of right lung due to infectious organism     11/28/2023  4:41 PM Sandrita Guild Add [J18.9,  A41.9] Sepsis due to pneumonia ([address])     11/28/2023  4:41 PM Sandrita Erwind Add [E83.42] Hypomagnesemia     11/28/2023  4:41 PM Sandrita Erwind Add [R06.09] Exertional dyspnea           ED Disposition       ED Disposition   Admit    Condition   Stable    Date/Time   Tue Nov 28, 2023 1640    Comment   Case was discussed with Dr Ines Armenta and the patient's admission status was agreed to be Admission Status: inpatient status to the service of Dr. Ines Armenta . Follow-up Information    None         Current Discharge Medication List        CONTINUE these medications which have NOT CHANGED    Details   acetaminophen (TYLENOL) 325 mg tablet Take 2 tablets (650 mg total) by mouth every 6 (six) hours as needed for mild pain, headaches or fever  Refills: 0    Comments: Do not exceed a total of 3 grams of tylenol/acetaminophen in a 24-hour period. Associated Diagnoses: Chronic obstructive pulmonary disease with acute exacerbation (HCC)      albuterol (2.5 mg/3 mL) 0.083 % nebulizer solution USE 1 UNIT DOSE IN NEBULIZER EVERY 4 HOURS AS NEEDED. albuterol (PROVENTIL HFA,VENTOLIN HFA) 90 mcg/act inhaler Inhale 2 puffs every 6 (six) hours as needed for wheezing  Refills: 0    Comments: Do not use within 2 hours of an albuterol nebulizer treatment.   Substitution to a formulary equivalent within the same pharmaceutical class is authorized.   Associated Diagnoses: CAP (community acquired pneumonia)      aspirin (ECOTRIN LOW STRENGTH) 81 mg EC tablet Take 81 mg by mouth daily      atorvastatin (LIPITOR) 20 mg tablet TAKE ONE TABLET BY MOUTH EVERY DAY  Qty: 90 tablet, Refills: 0    Associated Diagnoses: Mixed hyperlipidemia      !! BD Pen Needle Mamta U/F 32G X 4 MM MISC USE AS DIRECTED  Qty: 100 each, Refills: 0    Associated Diagnoses: Type 2 diabetes mellitus with hyperglycemia, with long-term current use of insulin ([facility])      benzonatate (TESSALON) 200 MG capsule Take 1 capsule (200 mg total) by mouth 3 (three) times a day as needed for cough  Qty: 60 capsule, Refills: 0    Associated Diagnoses: Tracheobronchitis; Chronic sinusitis, unspecified location      Blood Glucose Monitoring Suppl (ONE TOUCH ULTRA 2) w/Device KIT by Does not apply route 2 (two) times a day  Qty: 1 each, Refills: 0    Associated Diagnoses: Type 2 diabetes mellitus without complication, without long-term current use of insulin ([facility])      clindamycin (CLEOCIN T) 1 % lotion clindamycin 1 % lotion   APPLY LOTION TOPICALLY TWICE DAILY      DULoxetine (CYMBALTA) 60 mg delayed release capsule TAKE ONE CAPSULE BY MOUTH EVERY DAY  Qty: 90 capsule, Refills: 0    Associated Diagnoses: Rheumatoid arthritis ([facility])      Eliquis 5 MG TAKE 1 TABLET BY MOUTH TWICE DAILY  Qty: 60 tablet, Refills: 0    Associated Diagnoses: Acute pulmonary embolism, unspecified pulmonary embolism type, unspecified whether acute cor pulmonale present ([facility])      ergocalciferol (VITAMIN D2) 50,000 units TAKE ONE CAPSULE BY MOUTH WEEKLY  Qty: 12 capsule, Refills: 0    Associated Diagnoses: Vitamin D deficiency      fluticasone-salmeterol (ADVAIR) 250-50 mcg/dose Inhale 1 puff every 12 (twelve) hours       folic acid (FOLVITE) 1 mg tablet TAKE ONE TABLET BY MOUTH ONCE DAILY  Qty: 90 tablet, Refills: 0    Associated Diagnoses: Gastroesophageal reflux disease furosemide (LASIX) 40 mg tablet TAKE ONE TABLET BY MOUTH EVERY DAY  Qty: 60 tablet, Refills: 0    Associated Diagnoses: Peripheral edema      glucose blood (OneTouch Ultra) test strip Test 3 times daily   Dx: E11.65  Qty: 300 each, Refills: 3    Associated Diagnoses: Type 2 diabetes mellitus with hyperglycemia, with long-term current use of insulin ([address])      ! ! Insulin Pen Needle 33G X 4 MM MISC by Does not apply route daily  Qty: 100 each, Refills: 5    Associated Diagnoses: Type 2 diabetes mellitus with hyperglycemia, without long-term current use of insulin ([facility])      Lancets (OneTouch Delica Plus [id number]) MISC Test 2 times a day  Qty: 200 each, Refills: 0    Associated Diagnoses: Type 2 diabetes mellitus without complication, without long-term current use of insulin ([facility])      levothyroxine 137 mcg tablet Take 1 tablet (137 mcg total) by mouth daily  Qty: 90 tablet, Refills: 1    Associated Diagnoses: Hypothyroidism, unspecified type      lisinopril (ZESTRIL) 40 mg tablet TAKE ONE TABLET BY MOUTH EVERY DAY  Qty: 90 tablet, Refills: 0    Associated Diagnoses: Essential hypertension      methotrexate 2.5 MG tablet TAKE 8 TABLETS BY MOUTH EVERY SUNDAY  Qty: 32 tablet, Refills: 1    Associated Diagnoses: Gastroesophageal reflux disease      mirtazapine (REMERON) 15 mg tablet Take 1 tablet (15 mg total) by mouth daily at bedtime  Qty: 90 tablet, Refills: 3    Associated Diagnoses: Primary insomnia      mometasone (ELOCON) 0.1 % ointment Apply topically 2 (two) times a day as needed (itching)  Qty: 45 g, Refills: 1    Associated Diagnoses: Eczema, unspecified type      nystatin (MYCOSTATIN) cream Apply topically 2 (two) times a day as needed (rash)  Qty: 30 g, Refills: 0    Associated Diagnoses: Candidal UTI (urinary tract infection);  Candidal dermatitis      nystatin (MYCOSTATIN) powder Apply topically 3 (three) times a day as needed (rash/irritation)  Qty: 45 g, Refills: 1    Associated Diagnoses: Candidal dermatitis      pantoprazole (PROTONIX) 40 mg tablet TAKE ONE TABLET BY MOUTH ONCE DAILY  Qty: 90 tablet, Refills: 0    Associated Diagnoses: Gastroesophageal reflux disease      Polyethylene Glycol 3350-GRX POWD Take by mouth daily at bedtime as needed (constipation)  Qty: 850 g, Refills: 0    Associated Diagnoses: Constipation, unspecified constipation type      semaglutide, 1 mg/dose, (Ozempic) 4 mg/3 mL injection pen Inject 0.75 mL (1 mg total) under the skin once a week  Qty: 9 mL, Refills: 1    Associated Diagnoses: Type 2 diabetes mellitus with hyperglycemia, with long-term current use of insulin (HCC)      tiotropium (SPIRIVA RESPIMAT) 2.5 MCG/ACT AERS inhaler Inhale 1 puff daily at bedtime  Qty: 3 Inhaler, Refills: 3    Associated Diagnoses: Chronic obstructive pulmonary disease, unspecified COPD type (HCC)      Tofacitinib Citrate ER 11 MG TB24 Take 1 tablet by mouth daily      Tresiba FlexTouch 100 units/mL injection pen INJECT 40 UNITS UNDER THE SKIN DAILY  Qty: 30 mL, Refills: 0    Associated Diagnoses: Type 2 diabetes mellitus with hyperglycemia, with long-term current use of insulin (HCC)      vitamin B-12 (VITAMIN B-12) 500 MCG tablet Take 1 tablet (500 mcg total) by mouth daily    Associated Diagnoses: Macrocytic anemia       !! - Potential duplicate medications found. Please discuss with provider. No discharge procedures on file.     PDMP Review       None            ED Provider  Electronically Signed by             Venessa Roman DO  11/28/23 2184

## 2023-11-28 NOTE — ASSESSMENT & PLAN NOTE
75 yo female presents with worsening cough, wheezing, dysponea x 5 days  CTA revealing GGO in RML/RL infectious/inflammatory etiology  Stable on home 3L NC  Non-severe, DIP 4- Continue Rocephin 2g q24 / azithromycin 500 q24  Check procal  Urinary antigens ordered  Respiratory protocol

## 2023-11-28 NOTE — SEPSIS NOTE
The 30ml/kg fluid bolus was not given to the patient despite hypotension and/or significantly elevated lactate of ? 4 and/or presence of septic shock due to: Concern for fluid/volume overload. The patient will be administered 1L of crystalloid fluid instead. [facility]. The patient may receive additional colloid or crystalloid fluids thereafter based on clinical condition.      Breana Meredith, DO

## 2023-11-28 NOTE — ASSESSMENT & PLAN NOTE
SIRS: tachycardia, tachypnea, leukocytosis  Organ dysfunction: lactic 2.5 > 3.0   Source: RLL PNA  Blood cultures x 2 ordered  Continue Rocephin/azithromycin  Give 1L NS only given diastolic HF  Check procal. Trend lactic acid   Trend WBCs/fever curve

## 2023-11-28 NOTE — SEPSIS NOTE
Sepsis Note   Lynette Bernstein 74 y.o. female MRN: [id number]  Unit/Bed#: WI43 Encounter: [id number]       Initial Sepsis Screening       [address] Name 11/28/23 1651                Is the patient's history suggestive of a new or worsening infection? Yes (Proceed)  LIFESCAPE        Suspected source of infection pneumonia  -[facility]        Indicate SIRS criteria Leukocytosis (WBC > 76179 IJL) OR Leukopenia (WBC <4000 IJL) OR Bandemia (WBC >10% bands); Tachycardia > 90 bpm;Tachypnea > 20 resp per min  LIFESCAPE        Are two or more of the above signs & symptoms of infection both present and new to the patient? Yes (Proceed)  LIFESCAPE        Assess for evidence of organ dysfunction: Are any of the below criteria present within 6 hours of suspected infection and SIRS criteria that are NOT considered to be chronic conditions? Lactate > 2.0  -[facility]        Date of presentation of severe sepsis 11/28/23  LIFESCAPE        Time of presentation of severe sepsis 1652  LIFESCAPE        Sepsis Note: Click "NEXT" below (NOT "close") to generate sepsis note based on above information. YES (proceed by clicking "NEXT")  -[address] [place] [address]  (r) = Recorded By, (t) = Taken By, (c) = Cosigned By      [address] [place] [address] Name Provider Type    Tiffanie Route, DO Physician                        There is no height or weight on file to calculate BMI.   Wt Readings from Last 1 Encounters:   10/25/23 108 kg (237 lb 6.4 oz)        Ideal body weight: 57 kg (125 lb 10.6 oz)  Adjusted ideal body weight: 77.3 kg (170 lb 5.7 oz)

## 2023-11-29 LAB
ALBUMIN SERPL BCP-MCNC: 3.4 G/DL (ref 3.5–5)
ALP SERPL-CCNC: 65 U/L (ref 34–104)
ALT SERPL W P-5'-P-CCNC: 16 U/L (ref 7–52)
ANION GAP SERPL CALCULATED.3IONS-SCNC: 6 MMOL/L
ANION GAP SERPL CALCULATED.3IONS-SCNC: 6 MMOL/L
AST SERPL W P-5'-P-CCNC: 11 U/L (ref 13–39)
ATRIAL RATE: 113 BPM
ATRIAL RATE: 120 BPM
B PARAP IS1001 DNA NPH QL NAA+NON-PROBE: NOT DETECTED
B PERT.PT PRMT NPH QL NAA+NON-PROBE: NOT DETECTED
BASOPHILS # BLD AUTO: 0.01 THOUSANDS/ÂΜL (ref 0–0.1)
BASOPHILS NFR BLD AUTO: 0 % (ref 0–1)
BILIRUB SERPL-MCNC: 0.51 MG/DL (ref 0.2–1)
BUN SERPL-MCNC: 12 MG/DL (ref 5–25)
BUN SERPL-MCNC: 13 MG/DL (ref 5–25)
C PNEUM DNA NPH QL NAA+NON-PROBE: NOT DETECTED
CALCIUM ALBUM COR SERPL-MCNC: 9.5 MG/DL (ref 8.3–10.1)
CALCIUM SERPL-MCNC: 8.9 MG/DL (ref 8.4–10.2)
CALCIUM SERPL-MCNC: 9 MG/DL (ref 8.4–10.2)
CHLORIDE SERPL-SCNC: 104 MMOL/L (ref 96–108)
CHLORIDE SERPL-SCNC: 104 MMOL/L (ref 96–108)
CO2 SERPL-SCNC: 27 MMOL/L (ref 21–32)
CO2 SERPL-SCNC: 28 MMOL/L (ref 21–32)
CREAT SERPL-MCNC: 0.52 MG/DL (ref 0.6–1.3)
CREAT SERPL-MCNC: 0.56 MG/DL (ref 0.6–1.3)
DME PARACHUTE DELIVERY DATE REQUESTED: NORMAL
DME PARACHUTE ITEM DESCRIPTION: NORMAL
DME PARACHUTE ORDER STATUS: NORMAL
DME PARACHUTE SUPPLIER NAME: NORMAL
DME PARACHUTE SUPPLIER PHONE: NORMAL
EOSINOPHIL # BLD AUTO: 0 THOUSAND/ÂΜL (ref 0–0.61)
EOSINOPHIL NFR BLD AUTO: 0 % (ref 0–6)
ERYTHROCYTE [DISTWIDTH] IN BLOOD BY AUTOMATED COUNT: 14.4 % (ref 11.6–15.1)
EST. AVERAGE GLUCOSE BLD GHB EST-MCNC: 148 MG/DL
FLUAV RNA NPH QL NAA+NON-PROBE: NOT DETECTED
FLUBV RNA NPH QL NAA+NON-PROBE: NOT DETECTED
GFR SERPL CREATININE-BSD FRML MDRD: 91 ML/MIN/1.73SQ M
GFR SERPL CREATININE-BSD FRML MDRD: 93 ML/MIN/1.73SQ M
GLUCOSE SERPL-MCNC: 144 MG/DL (ref 65–140)
GLUCOSE SERPL-MCNC: 185 MG/DL (ref 65–140)
GLUCOSE SERPL-MCNC: 187 MG/DL (ref 65–140)
GLUCOSE SERPL-MCNC: 193 MG/DL (ref 65–140)
GLUCOSE SERPL-MCNC: 248 MG/DL (ref 65–140)
GLUCOSE SERPL-MCNC: 249 MG/DL (ref 65–140)
GLUCOSE SERPL-MCNC: 254 MG/DL (ref 65–140)
HADV DNA NPH QL NAA+NON-PROBE: NOT DETECTED
HBA1C MFR BLD: 6.8 %
HCOV 229E RNA NPH QL NAA+NON-PROBE: NOT DETECTED
HCOV HKU1 RNA NPH QL NAA+NON-PROBE: NOT DETECTED
HCOV NL63 RNA NPH QL NAA+NON-PROBE: NOT DETECTED
HCOV OC43 RNA NPH QL NAA+NON-PROBE: NOT DETECTED
HCT VFR BLD AUTO: 31 % (ref 34.8–46.1)
HGB BLD-MCNC: 10 G/DL (ref 11.5–15.4)
HMPV RNA NPH QL NAA+NON-PROBE: NOT DETECTED
HPIV1 RNA NPH QL NAA+NON-PROBE: NOT DETECTED
HPIV2 RNA NPH QL NAA+NON-PROBE: NOT DETECTED
HPIV3 RNA NPH QL NAA+NON-PROBE: NOT DETECTED
HPIV4 RNA NPH QL NAA+NON-PROBE: NOT DETECTED
IMM GRANULOCYTES # BLD AUTO: 0.04 THOUSAND/UL (ref 0–0.2)
IMM GRANULOCYTES NFR BLD AUTO: 0 % (ref 0–2)
L PNEUMO1 AG UR QL IA.RAPID: NEGATIVE
LACTATE SERPL-SCNC: 1.5 MMOL/L (ref 0.5–2)
LYMPHOCYTES # BLD AUTO: 0.88 THOUSANDS/ÂΜL (ref 0.6–4.47)
LYMPHOCYTES NFR BLD AUTO: 9 % (ref 14–44)
M PNEUMO DNA NPH QL NAA+NON-PROBE: NOT DETECTED
MAGNESIUM SERPL-MCNC: 2.3 MG/DL (ref 1.9–2.7)
MCH RBC QN AUTO: 31 PG (ref 26.8–34.3)
MCHC RBC AUTO-ENTMCNC: 32.3 G/DL (ref 31.4–37.4)
MCV RBC AUTO: 96 FL (ref 82–98)
MONOCYTES # BLD AUTO: 0.53 THOUSAND/ÂΜL (ref 0.17–1.22)
MONOCYTES NFR BLD AUTO: 5 % (ref 4–12)
NEUTROPHILS # BLD AUTO: 8.42 THOUSANDS/ÂΜL (ref 1.85–7.62)
NEUTS SEG NFR BLD AUTO: 86 % (ref 43–75)
NRBC BLD AUTO-RTO: 0 /100 WBCS
P AXIS: 69 DEGREES
P AXIS: 72 DEGREES
PHOSPHATE SERPL-MCNC: 2.7 MG/DL (ref 2.3–4.1)
PLATELET # BLD AUTO: 309 THOUSANDS/UL (ref 149–390)
PMV BLD AUTO: 10.2 FL (ref 8.9–12.7)
POTASSIUM SERPL-SCNC: 3.8 MMOL/L (ref 3.5–5.3)
POTASSIUM SERPL-SCNC: 4 MMOL/L (ref 3.5–5.3)
PR INTERVAL: 158 MS
PR INTERVAL: 158 MS
PROCALCITONIN SERPL-MCNC: 0.26 NG/ML
PROT SERPL-MCNC: 6.2 G/DL (ref 6.4–8.4)
QRS AXIS: 57 DEGREES
QRS AXIS: 59 DEGREES
QRSD INTERVAL: 90 MS
QRSD INTERVAL: 92 MS
QT INTERVAL: 312 MS
QT INTERVAL: 316 MS
QTC INTERVAL: 427 MS
QTC INTERVAL: 446 MS
RBC # BLD AUTO: 3.23 MILLION/UL (ref 3.81–5.12)
RSV RNA NPH QL NAA+NON-PROBE: NOT DETECTED
RV+EV RNA NPH QL NAA+NON-PROBE: NOT DETECTED
S PNEUM AG UR QL: NEGATIVE
SARS-COV-2 RNA NPH QL NAA+NON-PROBE: NOT DETECTED
SODIUM SERPL-SCNC: 137 MMOL/L (ref 135–147)
SODIUM SERPL-SCNC: 138 MMOL/L (ref 135–147)
T WAVE AXIS: 53 DEGREES
T WAVE AXIS: 64 DEGREES
VENTRICULAR RATE: 113 BPM
VENTRICULAR RATE: 120 BPM
WBC # BLD AUTO: 9.88 THOUSAND/UL (ref 4.31–10.16)

## 2023-11-29 PROCEDURE — 90662 IIV NO PRSV INCREASED AG IM: CPT | Performed by: INTERNAL MEDICINE

## 2023-11-29 PROCEDURE — 85025 COMPLETE CBC W/AUTO DIFF WBC: CPT

## 2023-11-29 PROCEDURE — 97167 OT EVAL HIGH COMPLEX 60 MIN: CPT

## 2023-11-29 PROCEDURE — 94640 AIRWAY INHALATION TREATMENT: CPT

## 2023-11-29 PROCEDURE — 94760 N-INVAS EAR/PLS OXIMETRY 1: CPT

## 2023-11-29 PROCEDURE — 84145 PROCALCITONIN (PCT): CPT

## 2023-11-29 PROCEDURE — 87040 BLOOD CULTURE FOR BACTERIA: CPT | Performed by: FAMILY MEDICINE

## 2023-11-29 PROCEDURE — 99232 SBSQ HOSP IP/OBS MODERATE 35: CPT

## 2023-11-29 PROCEDURE — 97163 PT EVAL HIGH COMPLEX 45 MIN: CPT

## 2023-11-29 PROCEDURE — 83605 ASSAY OF LACTIC ACID: CPT | Performed by: INTERNAL MEDICINE

## 2023-11-29 PROCEDURE — 94668 MNPJ CHEST WALL SBSQ: CPT

## 2023-11-29 PROCEDURE — 84100 ASSAY OF PHOSPHORUS: CPT | Performed by: INTERNAL MEDICINE

## 2023-11-29 PROCEDURE — 82948 REAGENT STRIP/BLOOD GLUCOSE: CPT

## 2023-11-29 PROCEDURE — G0008 ADMIN INFLUENZA VIRUS VAC: HCPCS | Performed by: INTERNAL MEDICINE

## 2023-11-29 PROCEDURE — 94664 DEMO&/EVAL PT USE INHALER: CPT

## 2023-11-29 PROCEDURE — 80048 BASIC METABOLIC PNL TOTAL CA: CPT | Performed by: INTERNAL MEDICINE

## 2023-11-29 PROCEDURE — 80053 COMPREHEN METABOLIC PANEL: CPT

## 2023-11-29 PROCEDURE — 83735 ASSAY OF MAGNESIUM: CPT

## 2023-11-29 RX ORDER — POLYETHYLENE GLYCOL 3350 17 G/17G
17 POWDER, FOR SOLUTION ORAL DAILY
Status: DISCONTINUED | OUTPATIENT
Start: 2023-11-29 | End: 2023-12-02 | Stop reason: HOSPADM

## 2023-11-29 RX ORDER — INSULIN LISPRO 100 [IU]/ML
3 INJECTION, SOLUTION INTRAVENOUS; SUBCUTANEOUS
Status: DISCONTINUED | OUTPATIENT
Start: 2023-11-29 | End: 2023-12-02 | Stop reason: HOSPADM

## 2023-11-29 RX ADMIN — INFLUENZA A VIRUS A/VICTORIA/4897/2022 IVR-238 (H1N1) ANTIGEN (FORMALDEHYDE INACTIVATED), INFLUENZA A VIRUS A/DARWIN/9/2021 SAN-010 (H3N2) ANTIGEN (FORMALDEHYDE INACTIVATED), INFLUENZA B VIRUS B/PHUKET/3073/2013 ANTIGEN (FORMALDEHYDE INACTIVATED), AND INFLUENZA B VIRUS B/MICHIGAN/01/2021 ANTIGEN (FORMALDEHYDE INACTIVATED) 0.7 ML: 60; 60; 60; 60 INJECTION, SUSPENSION INTRAMUSCULAR at 09:30

## 2023-11-29 RX ADMIN — DULOXETINE HYDROCHLORIDE 60 MG: 30 CAPSULE, DELAYED RELEASE ORAL at 08:17

## 2023-11-29 RX ADMIN — ASPIRIN 81 MG: 81 TABLET, COATED ORAL at 08:17

## 2023-11-29 RX ADMIN — LEVOTHYROXINE SODIUM 137 MCG: 25 TABLET ORAL at 06:05

## 2023-11-29 RX ADMIN — BUDESONIDE 0.5 MG: 0.5 SUSPENSION RESPIRATORY (INHALATION) at 08:24

## 2023-11-29 RX ADMIN — APIXABAN 5 MG: 5 TABLET, FILM COATED ORAL at 18:03

## 2023-11-29 RX ADMIN — GUAIFENESIN 600 MG: 600 TABLET ORAL at 18:03

## 2023-11-29 RX ADMIN — GUAIFENESIN 600 MG: 600 TABLET ORAL at 08:17

## 2023-11-29 RX ADMIN — CYANOCOBALAMIN TAB 500 MCG 500 MCG: 500 TAB at 08:17

## 2023-11-29 RX ADMIN — BUDESONIDE 0.5 MG: 0.5 SUSPENSION RESPIRATORY (INHALATION) at 19:25

## 2023-11-29 RX ADMIN — AZITHROMYCIN 500 MG: 250 TABLET, FILM COATED ORAL at 18:03

## 2023-11-29 RX ADMIN — METHYLPREDNISOLONE SODIUM SUCCINATE 40 MG: 40 INJECTION, POWDER, FOR SOLUTION INTRAMUSCULAR; INTRAVENOUS at 06:04

## 2023-11-29 RX ADMIN — MIRTAZAPINE 15 MG: 15 TABLET, FILM COATED ORAL at 21:35

## 2023-11-29 RX ADMIN — LEVALBUTEROL HYDROCHLORIDE 1.25 MG: 1.25 SOLUTION RESPIRATORY (INHALATION) at 13:47

## 2023-11-29 RX ADMIN — METHYLPREDNISOLONE SODIUM SUCCINATE 40 MG: 40 INJECTION, POWDER, FOR SOLUTION INTRAMUSCULAR; INTRAVENOUS at 21:35

## 2023-11-29 RX ADMIN — LEVALBUTEROL HYDROCHLORIDE 1.25 MG: 1.25 SOLUTION RESPIRATORY (INHALATION) at 19:26

## 2023-11-29 RX ADMIN — INSULIN LISPRO 3 UNITS: 100 INJECTION, SOLUTION INTRAVENOUS; SUBCUTANEOUS at 21:36

## 2023-11-29 RX ADMIN — LISINOPRIL 40 MG: 20 TABLET ORAL at 08:17

## 2023-11-29 RX ADMIN — IPRATROPIUM BROMIDE 0.5 MG: 0.5 SOLUTION RESPIRATORY (INHALATION) at 19:26

## 2023-11-29 RX ADMIN — ATORVASTATIN CALCIUM 20 MG: 10 TABLET, FILM COATED ORAL at 16:17

## 2023-11-29 RX ADMIN — TOFACITINIB 11 MG: 11 TABLET, FILM COATED, EXTENDED RELEASE ORAL at 09:31

## 2023-11-29 RX ADMIN — INSULIN LISPRO 1 UNITS: 100 INJECTION, SOLUTION INTRAVENOUS; SUBCUTANEOUS at 08:18

## 2023-11-29 RX ADMIN — INSULIN LISPRO 3 UNITS: 100 INJECTION, SOLUTION INTRAVENOUS; SUBCUTANEOUS at 12:14

## 2023-11-29 RX ADMIN — LEVALBUTEROL HYDROCHLORIDE 1.25 MG: 1.25 SOLUTION RESPIRATORY (INHALATION) at 07:40

## 2023-11-29 RX ADMIN — IPRATROPIUM BROMIDE 0.5 MG: 0.5 SOLUTION RESPIRATORY (INHALATION) at 07:40

## 2023-11-29 RX ADMIN — POLYETHYLENE GLYCOL 3350 17 G: 17 POWDER, FOR SOLUTION ORAL at 10:51

## 2023-11-29 RX ADMIN — INSULIN GLARGINE 40 UNITS: 100 INJECTION, SOLUTION SUBCUTANEOUS at 08:17

## 2023-11-29 RX ADMIN — INSULIN LISPRO 1 UNITS: 100 INJECTION, SOLUTION INTRAVENOUS; SUBCUTANEOUS at 16:17

## 2023-11-29 RX ADMIN — PANTOPRAZOLE SODIUM 40 MG: 40 TABLET, DELAYED RELEASE ORAL at 06:05

## 2023-11-29 RX ADMIN — FOLIC ACID 1000 MCG: 1 TABLET ORAL at 08:18

## 2023-11-29 RX ADMIN — APIXABAN 5 MG: 5 TABLET, FILM COATED ORAL at 08:17

## 2023-11-29 RX ADMIN — CEFTRIAXONE 2000 MG: 2 INJECTION, SOLUTION INTRAVENOUS at 16:17

## 2023-11-29 RX ADMIN — IPRATROPIUM BROMIDE 0.5 MG: 0.5 SOLUTION RESPIRATORY (INHALATION) at 13:47

## 2023-11-29 RX ADMIN — INSULIN LISPRO 3 UNITS: 100 INJECTION, SOLUTION INTRAVENOUS; SUBCUTANEOUS at 14:02

## 2023-11-29 RX ADMIN — METHYLPREDNISOLONE SODIUM SUCCINATE 40 MG: 40 INJECTION, POWDER, FOR SOLUTION INTRAMUSCULAR; INTRAVENOUS at 14:02

## 2023-11-29 RX ADMIN — INSULIN LISPRO 3 UNITS: 100 INJECTION, SOLUTION INTRAVENOUS; SUBCUTANEOUS at 16:18

## 2023-11-29 NOTE — RESPIRATORY THERAPY NOTE
11/29/23 0742   Inhalation Therapy Tx   $ Inhalation Therapy Performed Yes   $ Pulse Oximetry Spot Check Charge Completed   SpO2 97 %  (3)   Pre-Treatment Pulse 67   Pre-Treatment Respirations 22   Duration 15   Breath Sounds Pre-Treatment Bilateral Diminished;Crackles   Breath Sounds Post-Treatment Bilateral Diminished;Crackles   Post-Treatment Pulse 68   Post-Treatment Respirations 20   Delivery Source Air;UDN   Position Sitting   Treatment Tolerance Tolerated well   Resp Comments xop/atr

## 2023-11-29 NOTE — PHYSICAL THERAPY NOTE
Physical Therapy Evaluation    Patient Name: José Luis Valera    GGEZA'U Date: 11/29/2023     Problem List  Principal Problem:    Community acquired pneumonia  Active Problems:    Severe sepsis ([address])    Type 2 diabetes mellitus with hyperglycemia, with long-term current use of insulin (HCC)    Hypothyroidism    Rheumatoid arthritis ([address])    Hypomagnesemia    Essential hypertension    Chronic respiratory failure with hypoxia (HCC)    Chronic diastolic (congestive) heart failure (HCC)    COPD exacerbation (HCC)    Vertigo    History of pulmonary embolus (PE)       Past Medical History  Past Medical History:   Diagnosis Date    Arthritis     Arthritis     Cancer ([address])     Candidiasis of skin     Cervical cancer ([address])     Chronic respiratory failure with hypoxia and hypercapnia ([address]) 4/20/2019    COPD (chronic obstructive pulmonary disease) ([address])     last assessed 11/21/2016    Current chronic use of systemic steroids     Degenerative arthritis of left knee     last assessed 1/20/2015    Diabetes mellitus ([address])     Disease of thyroid gland     hypo pill given to decrease thyroid.      Fibromyalgia     Fistula of knee     last assessed 9/12/2014    GERD (gastroesophageal reflux disease)     Hyperlipidemia     Hypertension     Medial meniscus tear     of left knee, last assessed 9/12/2014    Migraine     states she has a hx of HA that she calls [facility] but never was dx by neurologist    Obesity     Obesity     Osteoarthritis     Osteopenia     Pneumonia     last assessed 11/16/2016    Psychiatric disorder     anxiety    Rheumatoid arthritis ([address])     Rheumatoid arthritis ([address])     Sepsis ([address])     last assessed 11/10/2016    Tick bite     last assessed 10/30/2015    Vitamin B12 deficiency     Vitamin D deficiency         Past Surgical History  Past Surgical History:   Procedure Laterality Date    CATARACT EXTRACTION Bilateral     CHOLECYSTECTOMY      EYE SURGERY Bilateral Cataracts    HYSTERECTOMY      IR AORTAGRAM WITH RUN-OFF  9/20/2023    IR THORACENTESIS  5/31/2019    JOINT REPLACEMENT      left knee    KNEE ARTHROSCOPY      NEUROPLASTY / TRANSPOSITION MEDIAN NERVE AT CARPAL TUNNEL      TUBAL LIGATION             11/29/23 0902   PT Last Visit   PT Visit Date 11/29/23   Note Type   Note type Evaluation   Pain Assessment   Pain Assessment Tool 0-10   Pain Score No Pain   Restrictions/Precautions   Weight Bearing Precautions Per Order No   Other Precautions Fall Risk;O2;Multiple lines   Home Living   Type of [address] [place] [address] Avenue One level;Stairs to enter with rails  (15 ANNIAK c HR)   Bathroom Shower/Tub Tub/shower unit   Bathroom Toilet Standard   Bathroom Equipment Shower chair   [address] Walker;Cane;Hospital bed; Other (Comment)  (O2, CPAP)   Additional Comments pt reports use of cane and RW at baseline   Prior Function   Level of [place] Independent with functional mobility; Needs assistance with ADLs; Needs assistance with IADLS   Lives With Alone   Receives Help From Family   IADLs Family/Friend/Other provides transportation   Falls in the last 6 months 0   Vocational Retired   General   Family/Caregiver Present No   Cognition   Overall Cognitive Status WFL   Arousal/Participation Alert   Orientation Level Oriented X4   Following Commands Follows all commands and directions without difficulty   Subjective   Subjective pt reports her granddaughter assists her as needed   RLE Assessment   RLE Assessment WFL  (4/5 grossly)   LLE Assessment   LLE Assessment WFL  (4/5 grossly)   Bed Mobility   Additional Comments pt OOB at start/end of session   Transfers   Sit to Stand 5  Supervision   Additional items Armrests; Increased time required;Verbal cues   Stand to Sit 5  Supervision   Additional items Armrests; Increased time required;Verbal cues   Additional Comments RW used   Ambulation/Elevation   Gait pattern Short stride; Foward flexed;Decreased foot clearance; Wide ALEJANDRO   Gait Assistance 5  Supervision   Additional items Verbal cues   Assistive Device Rolling walker   Distance 200'   Balance   Static Sitting Good   Dynamic Sitting Good   Static Standing Fair +   Dynamic Standing Fair   Ambulatory Fair  (with RW)   Endurance Deficit   Endurance Deficit Yes   Endurance Deficit Description pt becoming SOB with increased ambulation   Activity Tolerance   Activity Tolerance Patient limited by fatigue   Assessment   Prognosis Good   Problem List Decreased strength;Decreased endurance; Impaired balance;Decreased mobility;Obesity   Assessment Patient is a 74 y.o. female evaluated by Physical Therapy s/p admit to [address] [place] [address] on 11/28/2023 with admitting diagnosis of: Shortness of breath, Hypomagnesemia, Back pain, Exertional dyspnea, Sepsis due to pneumonia, Pneumonia of right lung due to infectious organism, and principal problem of: Community acquired pneumonia. PT was consulted to assess patient's functional mobility and discharge needs. Ordered are PT Evaluation and treatment with activity level of: up and OOB as tolerated. Comorbidities affecting patient's physical performance at time of assessment include:  DM, hypothyroidism, RA, HTN, CHF, COPD, hx of PE, centrilobular emphysema, HLD, GERD, fibromyalgia, osteoarthritis, hx of COVID-19, MORENO . Personal factors affecting the patient at time of IE include: ambulating with assistive device, step(s) to enter home, inability to navigate community distances, inability/difficulty performing IADLs, and inability/difficulty performing ADLs. Please locate objective findings from PT assessment regarding body systems outlined above. Upon evaluation, pt able to perform all functional mobility with SUP, RW, and increased time. Occasional verbal cuing provided for safety awareness and sequencing. Pt able to ambulate 200' before taking seated rest break.  Pt reporting fatigue and SOB with mobility, however HR and SpO2 remained WFL on 3L O2 throughout. Moderate postural sway demonstrated but no true LOB experienced. The patient's AM-PAC Basic Mobility Inpatient Short Form Raw Score is 18. A Raw score of greater than 16 suggests the patient may benefit from discharge to home. Please also refer to the recommendation of the Physical Therapist for safe discharge planning. Co treatment with OT secondary to complex medical condition of pt, possible A of 2 required to achieve and maintain transitional movements, requiring the need of skilled therapeutic intervention of 2 therapists to achieve delivery of services. Pt will benefit from continued PT intervention during LOS to address current deficits, increase LOF, and facilitate safe d/c to next level of care when medically appropriate. D/c recommendation at this time is rehabilitation resource intensity level III. Goals   Patient Goals to go home   LTG Expiration Date 12/13/23   Long Term Goal #1 Pt will participate in B LE strengthening exercises to facilitate improved functional activity tolerance. Pt will perform all functional transfers and bed mobility mod(I) with good safety awareness. Pt will ambulate 250' mod(I) with LRAD while maintaining good functional dynamic balance. Pt will ascend/descend a FFOS with HR and SUP to reflect the ability to safely navigate the home. Plan   Treatment/Interventions Functional transfer training;LE strengthening/ROM; Elevations; Therapeutic exercise; Endurance training;Gait training;Bed mobility   PT Frequency 3-5x/wk   Discharge Recommendation   Rehab Resource Intensity Level, PT III (Minimum Resource Intensity)   Equipment Recommended [employer] Caper Package Recommended Wheeled walker   Change/add to Plerts? No   Additional Comments pt currently has RW, but received it greater than 5 years ago.  Would like to d/c home with new one   -[facility] Basic Mobility Inpatient   Turning in Flat Bed Without Bedrails 3 Lying on Back to Sitting on Edge of Flat Bed Without Bedrails 3   Moving Bed to Chair 3   Standing Up From Chair Using Arms 3   Walk in Room 3   Climb 3-5 Stairs With Railing 3   Basic Mobility Inpatient Raw Score 18   Basic Mobility Standardized Score 41.05   Highest Level Of Mobility   -HLM Goal 6: Walk 10 steps or more   [facility]-HLM Achieved 7: Walk 25 feet or more   End of Consult   Patient Position at End of Consult Bedside chair;Bed/Chair alarm activated

## 2023-11-29 NOTE — SEPSIS NOTE
Sepsis Note   Hayley Dasilva 74 y.o. female MRN: [id number]  Unit/Bed#: 355-80 Encounter: [id number]       Initial Sepsis Screening       Row Name 11/28/23 1651                Is the patient's history suggestive of a new or worsening infection? Yes (Proceed)  LIFESCAPE        Suspected source of infection pneumonia  -[facility]        Indicate SIRS criteria Leukocytosis (WBC > 84868 IJL) OR Leukopenia (WBC <4000 IJL) OR Bandemia (WBC >10% bands); Tachycardia > 90 bpm;Tachypnea > 20 resp per min  LIFESCAPE        Are two or more of the above signs & symptoms of infection both present and new to the patient? Yes (Proceed)  LIFESCAPE        Assess for evidence of organ dysfunction: Are any of the below criteria present within 6 hours of suspected infection and SIRS criteria that are NOT considered to be chronic conditions? Lactate > 2.0  -[facility]        [facility] of [facility]        Time of presentation of severe sepsis 1652  [facility]        Sepsis Note: Click "NEXT" below (NOT "close") to generate sepsis note based on above information. YES (proceed by clicking "NEXT")  -[address] [place] [address]  (r) = Recorded By, (t) = Taken By, (c) = Cosigned By      [address] [place] [address] Name Provider Type    [facility], DO Physician                    Default [address] [place] [address] (last 720 hours)       Sepsis Reassess       Row Name 11/28/23 1914 11/28/23 1913                Repeat Volume Status and Tissue Perfusion Assessment Performed    Date of Reassessment: 11/28/23  LIFESCAPE --       Time of Reassessment: 1800  LIFESCAPE --       Sepsis Reassessment Note: Click "NEXT" below (NOT "close") to generate sepsis reassessment note.  YES (proceed by clicking "NEXT")  - YES (proceed by clicking "NEXT")  [facility]       Repeat Volume Status and Tissue Perfusion Assessment Performed -- --                 User Key  (r) = Recorded By, (t) = Taken By, (c) = Cosigned By      [address] [place] [address] Name Provider Type    [facility], DO Physician                    Body mass index is 38.92 kg/m².   Wt Readings from Last 1 Encounters:   11/28/23 106 kg (233 lb 14.5 oz)     IBW (Ideal Body Weight): 57 kg    Ideal body weight: 57 kg (125 lb 10.6 oz)  Adjusted ideal body weight: 76.6 kg (168 lb 15.4 oz)

## 2023-11-29 NOTE — PLAN OF CARE
Problem: PAIN - ADULT  Goal: Verbalizes/displays adequate comfort level or baseline comfort level  Description: Interventions:  - Encourage patient to monitor pain and request assistance  - Assess pain using appropriate pain scale  - Administer analgesics based on type and severity of pain and evaluate response  - Implement non-pharmacological measures as appropriate and evaluate response  - Consider cultural and social influences on pain and pain management  - Notify physician/advanced practitioner if interventions unsuccessful or patient reports new pain  Outcome: Progressing     Problem: INFECTION - ADULT  Goal: Absence or prevention of progression during hospitalization  Description: INTERVENTIONS:  - Assess and monitor for signs and symptoms of infection  - Monitor lab/diagnostic results  - Monitor all insertion sites, i.e. indwelling lines, tubes, and drains  - Monitor endotracheal if appropriate and nasal secretions for changes in amount and color  - [place] appropriate cooling/warming therapies per order  - Administer medications as ordered  - Instruct and encourage patient and family to use good hand hygiene technique  - Identify and instruct in appropriate isolation precautions for identified infection/condition  Outcome: Progressing  Goal: Absence of fever/infection during neutropenic period  Description: INTERVENTIONS:  - Monitor WBC    Outcome: Progressing     Problem: SAFETY ADULT  Goal: Patient will remain free of falls  Description: INTERVENTIONS:  - Educate patient/family on patient safety including physical limitations  - Instruct patient to call for assistance with activity   - Consult OT/PT to assist with strengthening/mobility   - Keep Call bell within reach  - Keep bed low and locked with side rails adjusted as appropriate  - Keep care items and personal belongings within reach  - Initiate and maintain comfort rounds  - Make Fall Risk Sign visible to staff  - Offer Toileting every 2 Hours, in advance of need  - Initiate/Maintain bed/chair alarm  - Obtain necessary fall risk management equipment:   - Apply yellow socks and bracelet for high fall risk patients  - Consider moving patient to room near nurses station  Outcome: Progressing  Goal: Maintain or return to baseline ADL function  Description: INTERVENTIONS:  -  Assess patient's ability to carry out ADLs; assess patient's baseline for ADL function and identify physical deficits which impact ability to perform ADLs (bathing, care of mouth/teeth, toileting, grooming, dressing, etc.)  - Assess/evaluate cause of self-care deficits   - Assess range of motion  - Assess patient's mobility; develop plan if impaired  - Assess patient's need for assistive devices and provide as appropriate  - Encourage maximum independence but intervene and supervise when necessary  - Involve family in performance of ADLs  - Assess for home care needs following discharge   - Consider OT consult to assist with ADL evaluation and planning for discharge  - Provide patient education as appropriate  Outcome: Progressing  Goal: Maintains/Returns to pre admission functional level  Description: INTERVENTIONS:  - Perform AM-PAC 6 Click Basic Mobility/ Daily Activity assessment daily.  - Set and communicate daily mobility goal to care team and patient/family/caregiver. - Collaborate with rehabilitation services on mobility goals if consulted  - Perform Range of Motion 3 times a day. - Reposition patient every 2 hours.   - Dangle patient 3 times a day  - Stand patient 3 times a day  - Ambulate patient 3 times a day  - Out of bed to chair 3 times a day   - Out of bed for meals 3 times a day  - Out of bed for toileting  - Record patient progress and toleration of activity level   Outcome: Progressing     Problem: DISCHARGE PLANNING  Goal: Discharge to home or other facility with appropriate resources  Description: INTERVENTIONS:  - Identify barriers to discharge w/patient and caregiver  - Arrange for needed discharge resources and transportation as appropriate  - Identify discharge learning needs (meds, wound care, etc.)  - Arrange for interpretive services to assist at discharge as needed  - Refer to Case Management Department for coordinating discharge planning if the patient needs post-hospital services based on physician/advanced practitioner order or complex needs related to functional status, cognitive ability, or social support system  Outcome: Progressing     Problem: Knowledge Deficit  Goal: Patient/family/caregiver demonstrates understanding of disease process, treatment plan, medications, and discharge instructions  Description: Complete learning assessment and assess knowledge base.   Interventions:  - Provide teaching at level of understanding  - Provide teaching via preferred learning methods  Outcome: Progressing     Problem: RESPIRATORY - ADULT  Goal: Achieves optimal ventilation and oxygenation  Description: INTERVENTIONS:  - Assess for changes in respiratory status  - Assess for changes in mentation and behavior  - Position to facilitate oxygenation and minimize respiratory effort  - Oxygen administered by appropriate delivery if ordered  - Initiate smoking cessation education as indicated  - Encourage broncho-pulmonary hygiene including cough, deep breathe, Incentive Spirometry  - Assess the need for suctioning and aspirate as needed  - Assess and instruct to report SOB or any respiratory difficulty  - Respiratory Therapy support as indicated  Outcome: Progressing     Problem: HEMATOLOGIC - ADULT  Goal: Maintains hematologic stability  Description: INTERVENTIONS  - Assess for signs and symptoms of bleeding or hemorrhage  - Monitor labs  - Administer supportive blood products/factors as ordered and appropriate  Outcome: Progressing     Problem: MUSCULOSKELETAL - ADULT  Goal: Maintain or return mobility to safest level of function  Description: INTERVENTIONS:  - Assess patient's ability to carry out ADLs; assess patient's baseline for ADL function and identify physical deficits which impact ability to perform ADLs (bathing, care of mouth/teeth, toileting, grooming, dressing, etc.)  - Assess/evaluate cause of self-care deficits   - Assess range of motion  - Assess patient's mobility  - Assess patient's need for assistive devices and provide as appropriate  - Encourage maximum independence but intervene and supervise when necessary  - Involve family in performance of ADLs  - Assess for home care needs following discharge   - Consider OT consult to assist with ADL evaluation and planning for discharge  - Provide patient education as appropriate  Outcome: Progressing  Goal: Maintain proper alignment of affected body part  Description: INTERVENTIONS:  - Support, maintain and protect limb and body alignment  - Provide patient/ family with appropriate education  Outcome: Progressing

## 2023-11-29 NOTE — ASSESSMENT & PLAN NOTE
Wt Readings from Last 3 Encounters:   11/29/23 108 kg (237 lb 14 oz)   10/25/23 108 kg (237 lb 6.4 oz)   09/20/23 108 kg (238 lb)     Appears compensated, volume status dry   Echo 2020: LVEF 60%, G1DD  Daily weights, I&Os  Hold lasix  Caution with IVFs - monitor volume status

## 2023-11-29 NOTE — PLAN OF CARE
Problem: PAIN - ADULT  Goal: Verbalizes/displays adequate comfort level or baseline comfort level  Description: Interventions:  - Encourage patient to monitor pain and request assistance  - Assess pain using appropriate pain scale  - Administer analgesics based on type and severity of pain and evaluate response  - Implement non-pharmacological measures as appropriate and evaluate response  - Consider cultural and social influences on pain and pain management  - Notify physician/advanced practitioner if interventions unsuccessful or patient reports new pain  11/29/2023 1110 by Richard Vargas RN  Outcome: Progressing  11/29/2023 1110 by Richard Vargas RN  Outcome: Progressing     Problem: INFECTION - ADULT  Goal: Absence or prevention of progression during hospitalization  Description: INTERVENTIONS:  - Assess and monitor for signs and symptoms of infection  - Monitor lab/diagnostic results  - Monitor all insertion sites, i.e. indwelling lines, tubes, and drains  - Monitor endotracheal if appropriate and nasal secretions for changes in amount and color  - [place] appropriate cooling/warming therapies per order  - Administer medications as ordered  - Instruct and encourage patient and family to use good hand hygiene technique  - Identify and instruct in appropriate isolation precautions for identified infection/condition  11/29/2023 1110 by Richard Vargas RN  Outcome: Progressing  11/29/2023 1110 by Richard Vargas RN  Outcome: Progressing  Goal: Absence of fever/infection during neutropenic period  Description: INTERVENTIONS:  - Monitor WBC    11/29/2023 1110 by Richard Vargas RN  Outcome: Progressing  11/29/2023 1110 by Richard Vargas RN  Outcome: Progressing     Problem: SAFETY ADULT  Goal: Patient will remain free of falls  Description: INTERVENTIONS:  - Educate patient/family on patient safety including physical limitations  - Instruct patient to call for assistance with activity   - Consult OT/PT to assist with strengthening/mobility   - Keep Call bell within reach  - Keep bed low and locked with side rails adjusted as appropriate  - Keep care items and personal belongings within reach  - Initiate and maintain comfort rounds  - Make Fall Risk Sign visible to staff  - Offer Toileting every 2 Hours, in advance of need  - Initiate/Maintain fall alarm  - Obtain necessary fall risk management equipment: non-skid footwear  - Apply yellow socks and bracelet for high fall risk patients  - Consider moving patient to room near nurses station  11/29/2023 1110 by Hortensia Negron RN  Outcome: Progressing  11/29/2023 1110 by Hortensia Negron RN  Outcome: Progressing  Goal: Maintain or return to baseline ADL function  Description: INTERVENTIONS:  -  Assess patient's ability to carry out ADLs; assess patient's baseline for ADL function and identify physical deficits which impact ability to perform ADLs (bathing, care of mouth/teeth, toileting, grooming, dressing, etc.)  - Assess/evaluate cause of self-care deficits   - Assess range of motion  - Assess patient's mobility; develop plan if impaired  - Assess patient's need for assistive devices and provide as appropriate  - Encourage maximum independence but intervene and supervise when necessary  - Involve family in performance of ADLs  - Assess for home care needs following discharge   - Consider OT consult to assist with ADL evaluation and planning for discharge  - Provide patient education as appropriate  11/29/2023 1110 by Hortensia Negron RN  Outcome: Progressing  11/29/2023 1110 by Hortensia Negron RN  Outcome: Progressing  Goal: Maintains/Returns to pre admission functional level  Description: INTERVENTIONS:  - Perform AM-PAC 6 Click Basic Mobility/ Daily Activity assessment daily.  - Set and communicate daily mobility goal to care team and patient/family/caregiver. - Collaborate with rehabilitation services on mobility goals if consulted  - Perform Range of Motion 3 times a day.   - Reposition patient every 2 hours. - Dangle patient 3 times a day  - Stand patient 3 times a day  - Ambulate patient 3 times a day  - Out of bed to chair 3 times a day   - Out of bed for meals 3 times a day  - Out of bed for toileting  - Record patient progress and toleration of activity level   11/29/2023 1110 by Radha Orr RN  Outcome: Progressing  11/29/2023 1110 by Radha Orr RN  Outcome: Progressing     Problem: DISCHARGE PLANNING  Goal: Discharge to home or other facility with appropriate resources  Description: INTERVENTIONS:  - Identify barriers to discharge w/patient and caregiver  - Arrange for needed discharge resources and transportation as appropriate  - Identify discharge learning needs (meds, wound care, etc.)  - Arrange for interpretive services to assist at discharge as needed  - Refer to Case Management Department for coordinating discharge planning if the patient needs post-hospital services based on physician/advanced practitioner order or complex needs related to functional status, cognitive ability, or social support system  11/29/2023 1110 by Radha Orr RN  Outcome: Progressing  11/29/2023 1110 by Radha Orr RN  Outcome: Progressing     Problem: Knowledge Deficit  Goal: Patient/family/caregiver demonstrates understanding of disease process, treatment plan, medications, and discharge instructions  Description: Complete learning assessment and assess knowledge base.   Interventions:  - Provide teaching at level of understanding  - Provide teaching via preferred learning methods  11/29/2023 1110 by Radha Orr RN  Outcome: Progressing  11/29/2023 1110 by Radha Orr RN  Outcome: Progressing     Problem: RESPIRATORY - ADULT  Goal: Achieves optimal ventilation and oxygenation  Description: INTERVENTIONS:  - Assess for changes in respiratory status  - Assess for changes in mentation and behavior  - Position to facilitate oxygenation and minimize respiratory effort  - Oxygen administered by appropriate delivery if ordered  - Initiate smoking cessation education as indicated  - Encourage broncho-pulmonary hygiene including cough, deep breathe, Incentive Spirometry  - Assess the need for suctioning and aspirate as needed  - Assess and instruct to report SOB or any respiratory difficulty  - Respiratory Therapy support as indicated  11/29/2023 1110 by Victor Manuel Silva RN  Outcome: Progressing  11/29/2023 1110 by Victor Manuel Silva RN  Outcome: Progressing     Problem: HEMATOLOGIC - ADULT  Goal: Maintains hematologic stability  Description: INTERVENTIONS  - Assess for signs and symptoms of bleeding or hemorrhage  - Monitor labs  - Administer supportive blood products/factors as ordered and appropriate  11/29/2023 1110 by Victor Manuel Silva RN  Outcome: Progressing  11/29/2023 1110 by Victor Manuel Silva RN  Outcome: Progressing     Problem: MUSCULOSKELETAL - ADULT  Goal: Maintain or return mobility to safest level of function  Description: INTERVENTIONS:  - Assess patient's ability to carry out ADLs; assess patient's baseline for ADL function and identify physical deficits which impact ability to perform ADLs (bathing, care of mouth/teeth, toileting, grooming, dressing, etc.)  - Assess/evaluate cause of self-care deficits   - Assess range of motion  - Assess patient's mobility  - Assess patient's need for assistive devices and provide as appropriate  - Encourage maximum independence but intervene and supervise when necessary  - Involve family in performance of ADLs  - Assess for home care needs following discharge   - Consider OT consult to assist with ADL evaluation and planning for discharge  - Provide patient education as appropriate  11/29/2023 1110 by Victor Manuel Silva RN  Outcome: Progressing  11/29/2023 1110 by Victor Manuel Silva RN  Outcome: Progressing  Goal: Maintain proper alignment of affected body part  Description: INTERVENTIONS:  - Support, maintain and protect limb and body alignment  - Provide patient/ family with appropriate education  11/29/2023 1110 by Ashwin Hodges, RN  Outcome: Progressing  11/29/2023 1110 by Ashwin Hodges, RN  Outcome: Progressing

## 2023-11-29 NOTE — UTILIZATION REVIEW
NOTIFICATION OF INPATIENT ADMISSION   AUTHORIZATION REQUEST   SERVICING FACILITY:   [facility] [address]  [address]  Tax ID:  [id number]  NPI: [id number] ATTENDING PROVIDER:  Attending Name and NPI#: Pedro Ly [[id number]]  Address: [address] [place] [address]  Phone: [phone]     ADMISSION INFORMATION:  Place of Service: Inpatient [address]  Place of Service Code: 21  Inpatient Admission Date/Time: 11/28/23  4:40 PM  Discharge Date/Time: No discharge date for patient encounter. Admitting Diagnosis Code/Description:  Shortness of breath [R06.02]  Hypomagnesemia [E83.42]  Back pain [M54.9]  Exertional dyspnea [R06.09]  Sepsis due to pneumonia ([address]) [J18.9, A41.9]  Pneumonia of right lung due to infectious organism [J18.9]     UTILIZATION REVIEW CONTACT:  Gissel Hinds Utilization   Network Utilization Review Department  Phone: [phone]  Fax [phone]  Email: [email or web address]  Contact for approvals/pending authorizations, clinical reviews, and discharge. PHYSICIAN ADVISORY SERVICES:  Medical Necessity Denial & [facility] Review  Phone: [phone]  Fax: [phone]  Email: [email or web address]     DISCHARGE SUPPORT TEAM:  For Patients Discharge Needs & Updates  Phone: [phone] opt. 2 Fax: [phone]  Email: [email or web address]

## 2023-11-29 NOTE — CASE MANAGEMENT
Case Management Progress Note    Patient name Paresh Mayer  Location /[id number] MRN [id number]  : 1950 Date 2023       LOS (days): 1  Geometric Mean LOS (GMLOS) (days): 5.10  Days to GMLOS:4.1        OBJECTIVE:        Current admission status: Inpatient  Preferred Pharmacy:   [facility] [address] CLAUDIA WHEELER NUMINDA [address] [facility], [address]  [address] [facility]  [place] [facility] [address]  Phone: [phone] Fax: [address] [facility]  [address] [facility] [address]  Phone: [phone] Fax: [phone]    Primary Care Provider: Ginny Vizcarra MD    Primary Insurance: [facility] [employer]  Secondary Insurance:     PROGRESS NOTE:  Pt interested in OP CM referral on dc. CM will refer pt on dc.

## 2023-11-29 NOTE — UTILIZATION REVIEW
Initial Clinical Review    Admission: Date/Time/Statement:   Admission Orders (From admission, onward)       Ordered        11/28/23 1640  Inpatient Admission  Once                          Orders Placed This Encounter   Procedures    Inpatient Admission     Standing Status:   Standing     Number of Occurrences:   1     Order Specific Question:   Level of Care     Answer:   Med Surg [16]     Order Specific Question:   Estimated length of stay     Answer:   More than 2 Midnights     Order Specific Question:   Certification     Answer:   I certify that inpatient services are medically necessary for this patient for a duration of greater than two midnights. See H&P and MD Progress Notes for additional information about the patient's course of treatment. ED Arrival Information       Expected   -    Arrival   11/28/2023 11:34    Acuity   Emergent              Means of arrival   Walk-In    Escorted by   Family Member    Service   Hospitalist    Admission type   Emergency              Arrival complaint   Shortness Of Breath, Back Pain             Chief Complaint   Patient presents with    Shortness of Breath     Started feeling short of breath on Thursday, worsening over the last couple of days. Normally wears 3L o2 at home. Has history of COPD        Initial Presentation: 74 y.o. female PMH of chronic respiratory failure on 3L, COPD, RA, HFpEF, PE on Eliquis , RA on methotrexate, DM who presents with worsening shortness of breath over the past 2-5 days. Associated wheezing and productive cough with green-brown phlegm. Notes great-grandson sick with cold . Reports poor appetite, generalized malaise, headache. Also reporting episodic vertiginous episodes with certain body/head movements that last seconds over the past week. Unable to ambulate during these episodes . On exam, pt tachycardic,tachypneic, accessory muscle use, mild resp distress. Coarse breath sounds , wheezes,  rhonchi noted all lung felds .  Pt on 3 L 02 baseline with O2 sat %. Dry mucous membranes . Labs - WBC 12.35, LA 2.5--->3.0 , low mag . CTA revealing GGO in RML/RL infectious/inflammatory etiology . Pt given IV abx , prednisone and multiple nebs in ED  with improvement . Pt admitted as Inpatient with sepsis, source RLL PNA, Community acquired PNA. COPD exac . Vertigo. Plan - Sepsis workup with blood, sputum  and urine cx . IVF x 1 L  ( 30ml/kg fluid bolus was not given 2/2 concern for vol overload ) . IV Ceftriaxone . IV Solumedrol. Neb tx . Check procal, trend LA . Pulm toileting. Check orthostatics. PRN Meclizine  . Monitor glucose . PT/OT     Date: 11/29   Day 2:   Still with productive cough of green mucus. Noted some shortness of breath with PT ambulating today . Wheezing and rhonchi on exam. Py hyperglycemic on steroids, added standing meal time insulin today. Continue to monitor glucose . Orthostatics neg . Procal up to  0.26. WBC normalized today. LA down to 1.5 today . Urinary antigens negative  Pt continues on IV Ceftriaxone, po Azithromycin, . IV Solumedrol. F/U cx . Labs in am -CBC, BMP. Procal.      Date: 11/30     Day 3: Has surpassed a 2nd midnight with active treatments and services, which include :  Pt continues on IV abx,po abx,  IV steroids overnight for CAP. Pt has a positive blood culture with growth of a gram positive organism in pairs and chains, likely representing streptococcus   ID consult placed yesterday afternoon. Blood cx  and echo ordered . Monitor resp status . Remains on 3 L O2 nc baseline need. Hypotensive overnight . ED Triage Vitals   Temperature Pulse Respirations Blood Pressure SpO2   11/28/23 1142 11/28/23 1142 11/28/23 1142 11/28/23 1144 11/28/23 1142   99.8 °F (37.7 °C) (!) 106 (!) 24 123/62 94 % on 3 L nc .        Temp Source Heart Rate Source Patient Position - Orthostatic VS BP Location FiO2 (%)   11/28/23 1142 11/28/23 1142 11/28/23 1142 11/28/23 1142 --   Temporal Monitor Sitting Left arm       Pain Score 11/28/23 1833       No Pain          Wt Readings from Last 1 Encounters:   11/29/23 108 kg (237 lb 14 oz)     Additional Vital Signs:   e/Time Temp Pulse Resp BP MAP (mmHg) SpO2 Calculated FIO2 (%) - Nasal Cannula Nasal Cannula O2 Flow Rate (L/min) O2 Device Patient Position - Orthostatic VS   11/29/23 0825 -- -- -- -- -- 90 % -- -- -- --   11/29/23 0824 -- -- -- -- -- 88 % Abnormal  32 3 L/min Nasal cannula --   11/29/23 0742 -- -- -- -- -- 97 %  -- -- -- --   SpO2: 3 at 11/29/23 0742 11/29/23 07:07:07 97.4 °F (36.3 °C) Abnormal  61 18 117/98 104 94 % 32 3 L/min Nasal cannula Lying   11/29/23 06:04:21 97.3 °F (36.3 °C) Abnormal  61 -- 138/57 84 93 % -- -- -- --   11/28/23 20:22:28 -- 132 Abnormal  -- 131/57 82 93 % -- -- -- Standing - Orthostatic VS   11/28/23 20:19:31 97.9 °F (36.6 °C) 116 Abnormal  18 112/53 73 96 % -- -- -- Sitting - Orthostatic VS   11/28/23 20:18:02 97.9 °F (36.6 °C) 113 Abnormal  -- 135/51 79 94 % -- -- -- Lying - Orthostatic VS   11/28/23 2011 -- -- -- -- -- 93 % 32 3 L/min Nasal cannula --   11/28/23 1833 -- -- -- -- -- 95 %  -- -- -- --   SpO2: 3 at 11/28/23 1833 11/28/23 1826 -- 117 Abnormal  22 -- -- 95 %  -- -- -- --   SpO2: 3 l/m at 11/28/23 1826 11/28/23 17:17:46 97.9 °F (36.6 °C) 121 Abnormal  16 130/71 91 93 % -- -- -- --   11/28/23 1500 -- 114 Abnormal  20 131/57 82 92 % 32 3 L/min Nasal cannula --   11/28/23 1400 -- 111 Abnormal  20 131/59 85 95 % 32 3 L/min Nasal cannula --   11/28/23 1330 -- 112 Abnormal  20 134/59 85 96 % 32 3 L/min Nasal cannula --   11/28/23 1230 -- 106 Abnormal  20 135/83 97 100 % 32 3 L/min Nasal cannula --   11/28/23 1200 -- 110 Abnormal  20 165/72 103 95 % 32 3 L/min Nasal cannula --     Pertinent Labs/Diagnostic Test Results:   11/28 ECG#1, #2- Sinus tachycardia       CTA ED chest PE Study   Final Result by Layton Brito MD (11/28 1613)      No evidence for pulmonary embolism. Mild emphysematous changes.  Stable large bulla in the right upper lobe. Small patchy foci of groundglass opacity in the right middle and lower lobes, likely infectious/inflammatory in etiology. Small to moderate sized loculated left pleural effusion with associated pleural thickening, similar to prior. Workstation performed: [id number]         X-ray chest 1 view portable   ED Interpretation by Braden Noel DO (11/28 1257)   Airway midline. No focal consolidation or pneumothorax. Very mild interstitial pattern. Left-sided effusion slightly increased compared to prior. Cardiac silhouette within normal limits. Osseous structures appear normal.      Final Result by Marlene Lira MD (11/28 1323)      Stable chronic small left pleural of. No new infiltrates.                   Workstation performed: [id number]           Results from last 7 days   Lab Units 11/28/23  1747 11/28/23  1212   SARS-COV-2  Not Detected Negative     Results from last 7 days   Lab Units 11/29/23  0442 11/28/23  1212   WBC Thousand/uL 9.88 12.35*   HEMOGLOBIN g/dL 10.0* 11.9   HEMATOCRIT % 31.0* 38.1   PLATELETS Thousands/uL 309 390   NEUTROS ABS Thousands/µL 8.42* 8.79*         Results from last 7 days   Lab Units 11/29/23  0442 11/29/23  0004 11/28/23  1212   SODIUM mmol/L 137 138 136   POTASSIUM mmol/L 4.0 3.8 3.8   CHLORIDE mmol/L 104 104 96   CO2 mmol/L 27 28 31   ANION GAP mmol/L 6 6 9   BUN mg/dL 13 12 13   CREATININE mg/dL 0.52* 0.56* 0.65   EGFR ml/min/1.73sq m 93 91 87   CALCIUM mg/dL 9.0 8.9 9.8   MAGNESIUM mg/dL 2.3  --  1.8*   PHOSPHORUS mg/dL 2.7  --   --      Results from last 7 days   Lab Units 11/29/23  0442   AST U/L 11*   ALT U/L 16   ALK PHOS U/L 65   TOTAL PROTEIN g/dL 6.2*   ALBUMIN g/dL 3.4*   TOTAL BILIRUBIN mg/dL 0.51     Results from last 7 days   Lab Units 11/29/23  1123 11/29/23  0704 11/28/23  2019 11/28/23  1817   POC GLUCOSE mg/dl 254* 185* 360* 312*     Results from last 7 days   Lab Units 11/29/23  0442 11/29/23  0004 11/28/23  1212   GLUCOSE RANDOM mg/dL 193* 144* 147*         Results from last 7 days   Lab Units 11/28/23  1212   HEMOGLOBIN A1C % 6.8*   EAG mg/dl 148          Results from last 7 days   Lab Units 11/28/23  1212   PH TUNDE  7.331   PCO2 TUNDE mm Hg 59.8*   PO2 TUNDE mm Hg 26.5*   HCO3 TUNDE mmol/L 30.9*   BASE EXC TUNDE mmol/L 3.5   O2 CONTENT TUNDE ml/dL 7.8   O2 HGB, VENOUS % 43.0*             Results from last 7 days   Lab Units 11/28/23  1413 11/28/23  1212   HS TNI 0HR ng/L  --  3   HS TNI 2HR ng/L 3  --    HSTNI D2 ng/L 0  --      Results from last 7 days   Lab Units 11/28/23  1321   D-DIMER QUANTITATIVE ug/ml FEU 0.90*             Results from last 7 days   Lab Units 11/29/23  0442 11/28/23  1212   PROCALCITONIN ng/ml 0.26* <0.05     Results from last 7 days   Lab Units 11/29/23  0004 11/28/23 2032 11/28/23  1745 11/28/23  1452 11/28/23  1212   LACTIC ACID mmol/L 1.5 4.4* 4.0* 3.0* 2.5*             Results from last 7 days   Lab Units 11/28/23  1212   BNP pg/mL 19                       Results from last 7 days   Lab Units 11/28/23 2032 11/28/23  1747 11/28/23  1212   STREP PNEUMONIAE ANTIGEN, URINE  Negative  --   --    LEGIONELLA URINARY ANTIGEN  Negative  --   --    INFLUENZA A PCR   --   --  Negative   INFLUENZA B PCR   --   --  Negative   RSV PCR   --   --  Negative   RESPIRATORY SYNCYTIAL VIRUS   --  Not Detected  --      Results from last 7 days   Lab Units 11/28/23 1747   ADENOVIRUS  Not Detected   BORDETELLA PARAPERTUSSIS  Not Detected   BORDETELLA PERTUSSIS  Not Detected   CHLAMYDIA PNEUMONIAE  Not Detected   CORONAVIRUS 229E  Not Detected   CORONAVIRUS HKU1  Not Detected   CORONAVIRUS NL63  Not Detected   CORONAVIRUS OC43  Not Detected   METAPNEUMOVIRUS  Not Detected   RHINOVIRUS  Not Detected   MYCOPLASMA PNEUMONIAE  Not Detected   PARAINFLUENZA 1  Not Detected   PARAINFLUENZA 2  Not Detected   PARAINFLUENZA 3  Not Detected   PARAINFLUENZA 4  Not Detected                         Results from last 7 days   Lab Units 11/28/23 2032 11/28/23  1658   BLOOD CULTURE   --  Received in Microbiology Lab. Culture in Progress. Received in Microbiology Lab. Culture in Progress. GRAM STAIN RESULT  1+ Epithelial cells per low power field*  4+ Polys*  3+ Mononuclear Cells*  2+ Gram positive cocci in clusters*  2+ Gram negative rods*  --                    ED Treatment:   Medication Administration from 11/28/2023 1133 to 11/28/2023 1711         Date/Time Order Dose Route Action     11/28/2023 1219 EST ipratropium (ATROVENT) 0.02 % inhalation solution 0.5 mg 0.5 mg Nebulization Given     11/28/2023 1219 EST albuterol inhalation solution 5 mg 5 mg Nebulization Given     11/28/2023 1215 EST predniSONE tablet 40 mg 40 mg Oral Given     11/28/2023 1442 EST magnesium sulfate 2 g/50 mL IVPB (premix) 2 g 0 g Intravenous Stopped     11/28/2023 1342 EST magnesium sulfate 2 g/50 mL IVPB (premix) 2 g 2 g Intravenous New Bag     11/28/2023 1401 EST ipratropium-albuterol (DUO-NEB) 0.5-2.5 mg/3 mL inhalation solution 3 mL 3 mL Nebulization Given     11/28/2023 1430 EST droperidol (INAPSINE) injection 0.625 mg 0.625 mg Intravenous Given     11/28/2023 1531 EST iohexol (OMNIPAQUE) 350 MG/ML injection (MULTI-DOSE) 170 mL 170 mL Intravenous Given     11/28/2023 1700 EST cefTRIAXone (ROCEPHIN) IVPB (premix in dextrose) 1,000 mg 50 mL 1,000 mg Intravenous New Bag          Past Medical History:   Diagnosis Date    Arthritis     Arthritis     Cancer ([address])     Candidiasis of skin     Cervical cancer (HCC)     Chronic respiratory failure with hypoxia and hypercapnia (HCC) 4/20/2019    COPD (chronic obstructive pulmonary disease) ([address])     last assessed 11/21/2016    Current chronic use of systemic steroids     Degenerative arthritis of left knee     last assessed 1/20/2015    Diabetes mellitus ([address])     Disease of thyroid gland     hypo pill given to decrease thyroid.      Fibromyalgia     Fistula of knee     last assessed 9/12/2014    GERD (gastroesophageal reflux disease) Hyperlipidemia     Hypertension     Medial meniscus tear     of left knee, last assessed 9/12/2014    Migraine     states she has a hx of HA that she calls "[employer] but never was dx by neurologist    Obesity     Obesity     Osteoarthritis     Osteopenia     Pneumonia     last assessed 11/16/2016    Psychiatric disorder     anxiety    Rheumatoid arthritis ([address] )     Rheumatoid arthritis ([address] )     Sepsis ([address] [facility])     last assessed 11/10/2016    Tick bite     last assessed 10/30/2015    Vitamin B12 deficiency     Vitamin D deficiency      Present on Admission:   Hypothyroidism   Community acquired pneumonia   Essential hypertension   Rheumatoid arthritis ([address])   Chronic respiratory failure with hypoxia (HCC)   Chronic diastolic (congestive) heart failure (HCC)   Severe sepsis (HCC)   COPD exacerbation (HCC)   Hypomagnesemia      Admitting Diagnosis: Shortness of breath [R06.02]  Hypomagnesemia [E83.42]  Back pain [M54.9]  Exertional dyspnea [R06.09]  Sepsis due to pneumonia ([address] ) [J18.9, A41.9]  Pneumonia of right lung due to infectious organism [J18.9]  Age/Sex: 74 y.o. female  Admission Orders:  Scheduled Medications:  apixaban, 5 mg, Oral, BID  aspirin, 81 mg, Oral, Daily  atorvastatin, 20 mg, Oral, Daily With Dinner  azithromycin, 500 mg, Oral, Q24H  budesonide, 0.5 mg, Nebulization, Q12H  cefTRIAXone, 2,000 mg, Intravenous, Q24H  cyanocobalamin, 500 mcg, Oral, Daily  DULoxetine, 60 mg, Oral, Daily  folic acid, 4,787 mcg, Oral, Daily  guaiFENesin, 600 mg, Oral, BID  insulin glargine, 40 Units, Subcutaneous, QAM  insulin lispro, 1-6 Units, Subcutaneous, TID AC  insulin lispro, 1-6 Units, Subcutaneous, HS  insulin lispro, 3 Units, Subcutaneous, TID With Meals  ipratropium, 0.5 mg, Nebulization, TID  levalbuterol, 1.25 mg, Nebulization, TID  levothyroxine, 137 mcg, Oral, Early Morning  lisinopril, 40 mg, Oral, Daily  methylPREDNISolone sodium succinate, 40 mg, Intravenous, Q8H  mirtazapine, 15 mg, Oral, HS  pantoprazole, 40 mg, Oral, Daily Before Breakfast  polyethylene glycol, 17 g, Oral, Daily  Tofacitinib Citrate ER, 1 tablet, Oral, Daily      sodium chloride 0.9 % bolus 1,000 mL  Dose: 1,000 mL  Freq: Once Route: IV  Last Dose: 1,000 mL (11/28/23 2242)  Start: 11/28/23 2245 End: 11/29/23 0042      sodium chloride 0.9 % bolus 1,000 mL  Dose: 1,000 mL  Freq: Once Route: IV  Last Dose: Stopped (11/28/23 1830)  Start: 11/28/23 1700 End: 11/28/23 1830      levalbuterol (XOPENEX) 1.25 mg/3 mL inhalation solution **ADS Override Pull**  Start: 11/28/23 1824 End: 11/28/23 1829  x1 11/28  pratropium (ATROVENT) 0.02 % inhalation solution **ADS Override Pull**  Start: 11/28/23 1824 End: 11/28/23 1828  x1 11/28   azithromycin (ZITHROMAX) 500 mg in sodium chloride 0.9% 250mL IVPB 500 mg  Dose: 500 mg  Freq: Once Route: IV  Last Dose: Stopped (11/29/23 0813)  Start: 11/28/23 1630 End: 11/29/23 0813       Continuous IV Infusions:     PRN Meds:  acetaminophen, 650 mg, Oral, Q6H PRN  benzonatate, 100 mg, Oral, TID PRN  sodium chloride (PF), 3 mL, Intravenous, Q1H PRN    HOB elevation    Spirometry   Ortho BP x 1,   OOB as radha  SCD's          Network Utilization Review Department  ATTENTION: Please call with any questions or concerns to [phone] and carefully listen to the prompts so that you are directed to the right person. All voicemails are confidential.   For Discharge needs, contact Care Management DC Support Team at [phone] opt. 2  Send all requests for admission clinical reviews, approved or denied determinations and any other requests to dedicated fax number below belonging to the campus where the patient is receiving treatment.  List of dedicated fax numbers for the Facilities:  [place] DENIALS (Administrative/Medical Necessity) [phone]   DISCHARGE SUPPORT TEAM (NETWORK) [id number] (Maternity/NICU/Pediatrics) [address] [facility] [phone]   [facility] [address] [facility] [phone] [address] [facility] [address] [place] [address] [phone]   [address] [phone]

## 2023-11-29 NOTE — PLAN OF CARE
Problem: OCCUPATIONAL THERAPY ADULT  Goal: Performs self-care activities at highest level of function for planned discharge setting. See evaluation for individualized goals. Description: Treatment Interventions: ADL retraining, UE strengthening/ROM, Functional transfer training, Endurance training, Patient/family training, Equipment evaluation/education, Activityengagement          See flowsheet documentation for full assessment, interventions and recommendations. Note: Limitation: Decreased ADL status, Decreased UE strength, Decreased endurance, Decreased self-care trans, Decreased high-level ADLs     Assessment: Pt is a 74 y.o. female seen for OT evaluation s/p admit to [facility] on 11/28/2023 w/ Community acquired pneumonia. Comorbidities affecting pt's functional performance at time of assessment include:  DM, HTN, arthritis, GERD, COPD, HLD, arthritis, RA, obesity, osteopenia, cerbical CA, sepsis . Personal factors affecting pt at time of IE include:steps to enter environment, difficulty performing ADLS, difficulty performing IADLS , limited insight into deficits, decreased initiation and engagement , and health management . Prior to admission, pt was (A) with ADLs and IADLs with use of RW during mobility. Upon evaluation: Pt requires (S) level with RW during mobility 2* the following deficits impacting occupational performance: weakness, decreased strength, decreased balance, decreased tolerance, impaired initiation, decreased safety awareness, and impaired interpersonal skills. Pt to benefit from continued skilled OT tx while in the hospital to address deficits as defined above and maximize level of functional independence w ADL's and functional mobility. Occupational Performance areas to address include: grooming, bathing/shower, toilet hygiene, dressing, functional mobility, community mobility, and clothing management. The patient's raw score on the AM-PAC Daily Activity Inpatient Short Form is 21.  A raw score of greater than or equal to 19 suggests the patient may benefit from discharge to home. Discharge recommendation at this time is level III minimum resource intensity. Pt benefited from co-evaluation of skilled OT and PT therapists in order to most appropriately address functional deficits d/t extensive assistance required for safe functional mobility, decreased activity tolerance, and regression from functioning level prior to admission and/or onset of present illness. OT/PT objectives were addressed separately; please see PT note for specific goal areas targeted.      Rehab Resource Intensity Level, OT: III (Minimum Resource Intensity)

## 2023-11-29 NOTE — PLAN OF CARE
Problem: PHYSICAL THERAPY ADULT  Goal: Performs mobility at highest level of function for planned discharge setting. See evaluation for individualized goals. Description: Treatment/Interventions: Functional transfer training, LE strengthening/ROM, Elevations, Therapeutic exercise, Endurance training, Gait training, Bed mobility  Equipment Recommended: Walker       See flowsheet documentation for full assessment, interventions and recommendations. 11/29/2023 1044 by Alonso Gallegos PT  Note: Prognosis: Good  Problem List: Decreased strength, Decreased endurance, Impaired balance, Decreased mobility, Obesity  Assessment: Patient is a 74 y.o. female evaluated by Physical Therapy s/p admit to [address] [place] [address] on 11/28/2023 with admitting diagnosis of: Shortness of breath, Hypomagnesemia, Back pain, Exertional dyspnea, Sepsis due to pneumonia, Pneumonia of right lung due to infectious organism, and principal problem of: Community acquired pneumonia. PT was consulted to assess patient's functional mobility and discharge needs. Ordered are PT Evaluation and treatment with activity level of: up and OOB as tolerated. Comorbidities affecting patient's physical performance at time of assessment include:  DM, hypothyroidism, RA, HTN, CHF, COPD, hx of PE, centrilobular emphysema, HLD, GERD, fibromyalgia, osteoarthritis, hx of COVID-19, MORENO . Personal factors affecting the patient at time of IE include: ambulating with assistive device, step(s) to enter home, inability to navigate community distances, inability/difficulty performing IADLs, and inability/difficulty performing ADLs. Please locate objective findings from PT assessment regarding body systems outlined above. Upon evaluation, pt able to perform all functional mobility with SUP, RW, and increased time. Occasional verbal cuing provided for safety awareness and sequencing. Pt able to ambulate 200' before taking seated rest break.  Pt reporting fatigue and SOB with mobility, however HR and SpO2 remained WFL on 3L O2 throughout. Moderate postural sway demonstrated but no true LOB experienced. The patient's AM-PAC Basic Mobility Inpatient Short Form Raw Score is 18. A Raw score of greater than 16 suggests the patient may benefit from discharge to home. Please also refer to the recommendation of the Physical Therapist for safe discharge planning. Co treatment with OT secondary to complex medical condition of pt, possible A of 2 required to achieve and maintain transitional movements, requiring the need of skilled therapeutic intervention of 2 therapists to achieve delivery of services. Pt will benefit from continued PT intervention during LOS to address current deficits, increase LOF, and facilitate safe d/c to next level of care when medically appropriate. D/c recommendation at this time is rehabilitation resource intensity level III. Rehab Resource Intensity Level, PT: III (Minimum Resource Intensity)    See flowsheet documentation for full assessment.

## 2023-11-29 NOTE — OCCUPATIONAL THERAPY NOTE
Occupational Therapy Evaluation     Patient Name: Yan Watson  NDLPQ'V Date: 11/29/2023  Problem List  Principal Problem:    Community acquired pneumonia  Active Problems:    Severe sepsis ([address])    Type 2 diabetes mellitus with hyperglycemia, with long-term current use of insulin (HCC)    Hypothyroidism    Rheumatoid arthritis ([address])    Hypomagnesemia    Essential hypertension    Chronic respiratory failure with hypoxia (HCC)    Chronic diastolic (congestive) heart failure (HCC)    COPD exacerbation (HCC)    Vertigo    History of pulmonary embolus (PE)    Past Medical History  Past Medical History:   Diagnosis Date    Arthritis     Arthritis     Cancer ([address])     Candidiasis of skin     Cervical cancer ([address])     Chronic respiratory failure with hypoxia and hypercapnia ([address]) 4/20/2019    COPD (chronic obstructive pulmonary disease) ([address])     last assessed 11/21/2016    Current chronic use of systemic steroids     Degenerative arthritis of left knee     last assessed 1/20/2015    Diabetes mellitus ([address])     Disease of thyroid gland     hypo pill given to decrease thyroid.      Fibromyalgia     Fistula of knee     last assessed 9/12/2014    GERD (gastroesophageal reflux disease)     Hyperlipidemia     Hypertension     Medial meniscus tear     of left knee, last assessed 9/12/2014    Migraine     states she has a hx of HA that she calls Pufetto but never was dx by neurologist    Obesity     Obesity     Osteoarthritis     Osteopenia     Pneumonia     last assessed 11/16/2016    Psychiatric disorder     anxiety    Rheumatoid arthritis ([address])     Rheumatoid arthritis ([address])     Sepsis ([address])     last assessed 11/10/2016    Tick bite     last assessed 10/30/2015    Vitamin B12 deficiency     Vitamin D deficiency      Past Surgical History  Past Surgical History:   Procedure Laterality Date    CATARACT EXTRACTION Bilateral     CHOLECYSTECTOMY      EYE SURGERY      Bilateral Cataracts    HYSTERECTOMY      IR AORTAGRAM WITH RUN-OFF  9/20/2023    IR THORACENTESIS  5/31/2019    JOINT REPLACEMENT      left knee    KNEE ARTHROSCOPY      NEUROPLASTY / TRANSPOSITION MEDIAN NERVE AT CARPAL TUNNEL      TUBAL LIGATION               11/29/23 0901   OT Last Visit   OT Visit Date 11/29/23   Note Type   Note type Evaluation   Pain Assessment   Pain Score No Pain   Restrictions/Precautions   Weight Bearing Precautions Per Order No   Other Precautions Fall Risk;O2   Home Living   Type of [address] One level;Performs ADLs on one level; Able to live on main level with bedroom/bathroom;Stairs to enter with rails; Other (Comment)  (15 ANNIKA c HR x2)   Bathroom Shower/Tub Tub/shower unit   Bathroom Toilet Standard   Bathroom Equipment Shower chair   Bathroom Accessibility Accessible   Home Equipment Walker;Cane;Hospital bed;Grab bars  (3L O2 at baseline)   Additional Comments pt reports use of RW at baseline and 3L O2   Prior Function   Level of [place] Needs assistance with ADLs; Needs assistance with IADLS; Independent with functional mobility   Lives With Alone   Receives Help From Family   IADLs Family/Friend/Other provides transportation   Falls in the last 6 months 0   Vocational Retired   Comments pt reports (S) for granddaughter   Subjective   Subjective "I live in a good place with good rent"   ADL   Where Assessed Chair   LB Dressing Assistance 5  Supervision/Setup   LB Dressing Deficit Other (Comment)  (pt dons underwear while seated in chair; no significant difficulties, however pt mildly impulsive and requires cues to pace herself during activity)   Bed Mobility   Additional Comments pt seated in chair at start and end of session; SpO2 >90% on 3L O2 throughout session   Transfers   Sit to Stand 5  Supervision   Additional items Increased time required;Verbal cues  (RW)   Stand to Sit 5  Supervision   Additional items Increased time required;Verbal cues  (RW)   Additional Comments pt performs functional transfers with RW and no significant LOB or instability   Functional Mobility   Functional Mobility 5  Supervision   Additional Comments pt performs ~200ft of functional mobility with RW; no significant LOB or instability; pt with mild impulsivity   Additional items Rolling walker   Balance   Static Sitting Good   Dynamic Sitting Good   Static Standing Fair +   Dynamic Standing Fair   Ambulatory Fair   Activity Tolerance   Activity Tolerance Patient limited by fatigue   RUE Assessment   RUE Assessment WFL   LUE Assessment   LUE Assessment WFL   Hand Function   Gross Motor Coordination Functional   Fine Motor Coordination Functional   Sensation   Light Touch No apparent deficits   Sharp/Dull No apparent deficits   Psychosocial   Psychosocial (WDL) WDL   Cognition   Overall Cognitive Status WFL   Arousal/Participation Alert   Attention Within functional limits   Orientation Level Oriented X4   Memory Within functional limits   Following Commands Follows all commands and directions without difficulty   Assessment   Limitation Decreased ADL status; Decreased UE strength;Decreased endurance;Decreased self-care trans;Decreased high-level ADLs   Assessment Pt is a 74 y.o. female seen for OT evaluation s/p admit to [facility] on 11/28/2023 w/ Community acquired pneumonia. Comorbidities affecting pt's functional performance at time of assessment include:  DM, HTN, arthritis, GERD, COPD, HLD, arthritis, RA, obesity, osteopenia, cerbical CA, sepsis . Personal factors affecting pt at time of IE include:steps to enter environment, difficulty performing ADLS, difficulty performing IADLS , limited insight into deficits, decreased initiation and engagement , and health management . Prior to admission, pt was (A) with ADLs and IADLs with use of RW during mobility.  Upon evaluation: Pt requires (S) level with RW during mobility 2* the following deficits impacting occupational performance: weakness, decreased strength, decreased balance, decreased tolerance, impaired initiation, decreased safety awareness, and impaired interpersonal skills. Pt to benefit from continued skilled OT tx while in the hospital to address deficits as defined above and maximize level of functional independence w ADL's and functional mobility. Occupational Performance areas to address include: grooming, bathing/shower, toilet hygiene, dressing, functional mobility, community mobility, and clothing management. The patient's raw score on the -PAC Daily Activity Inpatient Short Form is 21. A raw score of greater than or equal to 19 suggests the patient may benefit from discharge to home. Discharge recommendation at this time is level III minimum resource intensity. Pt benefited from co-evaluation of skilled OT and PT therapists in order to most appropriately address functional deficits d/t extensive assistance required for safe functional mobility, decreased activity tolerance, and regression from functioning level prior to admission and/or onset of present illness. OT/PT objectives were addressed separately; please see PT note for specific goal areas targeted. Goals   Patient Goals to go home   Short Term Goal  pt will perform UE strengthening exercises   Long Term Goal #1 pt will demonstrate toilet transfers and hygiene at (I) level   Long Term Goal #2 pt will demonstrate functional mobility with RW at mod (I) level   Long Term Goal pt will demonstrate UB/LB bathing and grooming tasks at (S) level with or without LH AE   Plan   Treatment Interventions ADL retraining;UE strengthening/ROM; Functional transfer training; Endurance training;Patient/family training;Equipment evaluation/education; Activityengagement   Goal Expiration Date 12/13/23   OT Frequency 3-5x/wk   Discharge Recommendation   Rehab Resource Intensity Level, OT III (Minimum Resource Intensity)   -PAC Daily Activity Inpatient   Lower Body Dressing 3   Bathing 3   Toileting 3   Upper Body Dressing 4   Grooming 4   Eating 4 Daily Activity Raw Score 21   Daily Activity Standardized Score (Calc for Raw Score >=11) 44.27   AM-PAC Applied Cognition Inpatient   Following a Speech/Presentation 4   Understanding Ordinary Conversation 4   Taking Medications 4   Remembering Where Things Are Placed or Put Away 4   Remembering List of 4-5 Errands 4   Taking Care of Complicated Tasks 4   Applied Cognition Raw Score 24   Applied Cognition Standardized Score 62.21

## 2023-11-29 NOTE — CASE MANAGEMENT
Case Management Assessment & Discharge Planning Note    Patient name Erin Mack  Location /572-18 MRN [id number]  : 1950 Date 2023       Current Admission Date: 2023  Current Admission Diagnosis:Community acquired pneumonia   Patient Active Problem List    Diagnosis Date Noted    Vertigo 2023    History of pulmonary embolus (PE) 2023    Tracheobronchitis 10/25/2023    COVID-19 10/25/2023    PAD (peripheral artery disease) ([address]) 2023    Skin ulcer of second toe of right foot, limited to breakdown of skin ([address]) 2023    COPD exacerbation ([address]) 2023    Pre-op evaluation 10/24/2022    Recurrent UTI 10/24/2022    Dependence on continuous supplemental oxygen 2022    Excessive anticoagulation 2022    MORENO (obstructive sleep apnea) 2021    Hilar lymphadenopathy 2021    Atherosclerosis 2021    T12 compression fracture ([address]) 2021    Chronic diastolic (congestive) heart failure ([address])     Acute pulmonary embolism ([address]) 2021    Chronic sinusitis 2021    Primary insomnia 2020    Migraine without aura and without status migrainosus, not intractable 2020    Postherpetic neuralgia     Diastolic dysfunction     Macrocytic anemia 2020    Hypoxemia requiring supplemental oxygen 2019    History of exposure to asbestos 2019    Chronic respiratory failure with hypoxia ([address]) 2019    Hyponatremia 2019    Candidal dermatitis 2018    Loculated pleural effusion 2018    Constipation 2017    Renal cyst 2017    Candidiasis, cutaneous 2017    Vitamin B12 deficiency 11/10/2016    Morbid obesity with BMI of 40.0-44.9, adult ([address]) 2016    Severe sepsis ([address]) 2016    Centrilobular emphysema ([address]) 2016    Community acquired pneumonia 2016    Type 2 diabetes mellitus with hyperglycemia, with long-term current use of insulin ([address]) 11/03/2016    Hypothyroidism 11/03/2016    Rheumatoid arthritis ([address]) 11/03/2016    Hypomagnesemia 11/03/2016    Essential hypertension 11/03/2016    Hyperlipidemia 11/03/2016    Gastroesophageal reflux disease without esophagitis 11/03/2016    Hypoalbuminemia 11/03/2016    Hepatic steatosis 11/03/2016    Atelectasis 11/03/2016    Anxiety 06/02/2014    Fibromyalgia 01/28/2014    Osteopenia 09/17/2012    Osteoarthritis 05/29/2012    Vitamin D deficiency 05/29/2012      LOS (days): 1  Geometric Mean LOS (GMLOS) (days): 5.10  Days to GMLOS:4.1     OBJECTIVE:    Risk of Unplanned Readmission Score: 26.08         Current admission status: Inpatient       Preferred Pharmacy:   [facility] [address] [place] [address]  [address] [facility]  Eve Yeyo [facility] [address]  Phone: [phone] Fax: [address] [facility]  [address] [place] [address]  Phone: [phone] Fax: [phone]    Primary Care Provider: Penny Stern MD    Primary Insurance: [facility]  Secondary Insurance:     ASSESSMENT:  [address] [place] [address], [address] - Child   Primary Phone: [phone] (Mobile)  Home Phone: [phone]                 Advance Directives  Does patient have a [address]?: No  Was patient offered paperwork?: Yes (declines)  Does patient currently have a Health Care decision maker?: Yes, please see Health Care Proxy section  Does patient have Advance Directives?: No  Was patient offered paperwork?: Yes (declines)  Primary Contact: [facility] (Child)  [phone] (M)         Readmission Root Cause  30 Day Readmission: No    Patient Information  Admitted from[de-identified] Home  Mental Status: Alert  During Assessment patient was accompanied by: Not accompanied during assessment  Assessment information provided by[de-identified] Patient  Primary Caregiver: Self  Support Systems: Family members, Joseluis Lao of Residence: [place] do you live in?: [address] [facility])  Home entry access options. Select all that apply.: Stairs  Number of steps to enter home.: One Flight  Type of Current Residence: Apartment  Floor Level: 2  Upon entering residence, is there a bedroom on the main floor (no further steps)?: Yes  Upon entering residence, is there a bathroom on the main floor (no further steps)?: Yes  Living Arrangements: Lives Alone  Is patient a ?: No    Activities of Daily Living Prior to Admission  Functional Status: Independent  Completes ADLs independently?: Yes  Ambulates independently?: Yes  Does patient use assisted devices?: Yes  Assisted Devices (DME) used: CPAP, Hospital Bed, [address], Cleave Juliana  DME Company Name (respiratory supplies):  [employer]  Does patient currently own DME?: Yes  What DME does the patient currently own?: Teresita Rao, Hospital Bed, Nebulizer, CPAP  Does patient have a history of Outpatient Therapy (PT/OT)?: No  Does the patient have a history of Short-Term Rehab?: No  Does patient have a history of HHC?: No  Does patient currently have [address]?: No         Patient Information Continued  Income Source: Pension/senior care  Does patient have prescription coverage?: Yes  Does patient receive dialysis treatments?: No  Does patient have a history of substance abuse?: No  Does patient have a history of Mental Health Diagnosis?: No         Means of Transportation  Means of Transport to [facility][de-identified] [address] [facility] [address] Stability: Low Risk  (12/8/2021)    Housing Stability Vital Sign     Unable to Pay for Housing in the Last Year: No     Number of Places Lived in the Last Year: 1     Unstable Housing in the Last Year: No   Food Insecurity: No Food Insecurity (12/8/2021)    Hunger Vital Sign     Worried About Running Out of Food in the Last Year: Never true     Ran Out of Food in the Last Year: Never true   Transportation Needs: No Transportation Needs (5/16/2023)    PRAPARE - Transportation     Lack of Transportation (Medical): No     Lack of Transportation (Non-Medical):  No   Utilities: Not on file       DISCHARGE DETAILS:    Discharge planning discussed with[de-identified] patient        CM contacted family/caregiver?: No- see comments (declines)  Were Treatment Team discharge recommendations reviewed with patient/caregiver?: Yes  Did patient/caregiver verbalize understanding of patient care needs?: Yes  Were patient/caregiver advised of the risks associated with not following Treatment Team discharge recommendations?: Yes         [address]         Is the patient interested in [facility] at discharge?: No    DME Referral Provided  Referral made for DME?: Yes  DME referral completed for the following items[de-identified] Viri Jenkins  DME Supplier Name[de-identified] [employer]         Would you like to participate in our [address] service program?  : No - Declined       Discharge Destination Plan[de-identified] Home  Transport at Discharge : Self

## 2023-11-29 NOTE — ASSESSMENT & PLAN NOTE
73 yo female presents with worsening cough, wheezing, dysponea x 5 days  CTA revealing GGO in RML/RL infectious/inflammatory etiology  Stable on home 3L NC  Non-severe, DIP 4- Continue Rocephin 2g q24 / azithromycin 500 q24 (D2)  Procal 0.26  Urinary antigens negative  Respiratory protocol

## 2023-11-29 NOTE — ASSESSMENT & PLAN NOTE
Lab Results   Component Value Date    HGBA1C 6.8 (H) 11/28/2023       Recent Labs     11/28/23  1817 11/28/23  2019 11/29/23  0704 11/29/23  1123   POCGLU 312* 360* 185* 254*       Blood Sugar Average: Last 72 hrs:  (P) 277.75  Update A1c  Hold Ozempic  Continue log acting insulin 40 units + SSI   Acu-checks ac/hs  With hyperglycemia likely with steroids, add standing meal time insulin

## 2023-11-29 NOTE — ASSESSMENT & PLAN NOTE
Reports episodic vertigo with movement & head movement since Thanksgiving  Hx c/w BPPV   Less likely vestibular neuritis - however is being treated with IV steroids for COPD exacerbation which should help this   Orthostatics negative  Meclizine prn  PT/OT

## 2023-11-29 NOTE — PROGRESS NOTES
[address]  Progress Note  Name: Kranthi Hartmann  MRN: [id number]  Unit/Bed#: 470-36 I Date of Admission: 11/28/2023   Date of Service: 11/29/2023 I Hospital Day: 1    Assessment/Plan   * Community acquired pneumonia  Assessment & Plan  73 yo female presents with worsening cough, wheezing, dysponea x 5 days  CTA revealing GGO in RML/RL infectious/inflammatory etiology  Stable on home 3L NC  Non-severe, DIP 4- Continue Rocephin 2g q24 / azithromycin 500 q24 (D2)  Procal 0.26  Urinary antigens negative  Respiratory protocol       Severe sepsis (HCC)  Assessment & Plan  SIRS: tachycardia, tachypnea, leukocytosis  Organ dysfunction: lactic acidosis now resolved after IVF hydration   Source: RLL PNA  Blood cultures x 2 pending  Continue Rocephin/azithromycin (D2)  Give 1L NS only given diastolic HF  Trend WBCs/fever curve     COPD exacerbation (HCC)  Assessment & Plan  Solu-Medrol 40 q8  Xopenex/atrovent nebs tid  Pulmicort q12  Azithromycin as above  Sputum culture  Mucinex, pulmonary toilet, respiratory protocol    Vertigo  Assessment & Plan  Reports episodic vertigo with movement & head movement since Thanksgiving  Hx c/w BPPV   Less likely vestibular neuritis - however is being treated with IV steroids for COPD exacerbation which should help this   Orthostatics negative  Meclizine prn  PT/OT      History of pulmonary embolus (PE)  Assessment & Plan  Continue Eliquis    Chronic diastolic (congestive) heart failure (HCC)  Assessment & Plan  Wt Readings from Last 3 Encounters:   11/29/23 108 kg (237 lb 14 oz)   10/25/23 108 kg (237 lb 6.4 oz)   09/20/23 108 kg (238 lb)     Appears compensated, volume status dry   Echo 2020: LVEF 60%, G1DD  Daily weights, I&Os  Hold lasix  Caution with IVFs - monitor volume status          Chronic respiratory failure with hypoxia (HCC)  Assessment & Plan  Stable on baseline 3L NC    Essential hypertension  Assessment & Plan  Continue lisinopril with holding parameters  Hold lasix    Hypomagnesemia  Assessment & Plan  Resolved with repletion  Monitor     Rheumatoid arthritis ([address])  Assessment & Plan  Continue Rocky Edwards (brought from home)  Takes methotrexate q Sundays    Hypothyroidism  Assessment & Plan  Continue levothyroxine    Type 2 diabetes mellitus with hyperglycemia, with long-term current use of insulin [facility])  Assessment & Plan  Lab Results   Component Value Date    HGBA1C 6.8 (H) 11/28/2023       Recent Labs     11/28/23  1817 11/28/23  2019 11/29/23  0704 11/29/23  1123   POCGLU 312* 360* 185* 254*       Blood Sugar Average: Last 72 hrs:  (P) 277.75  Update A1c  Hold Ozempic  Continue log acting insulin 40 units + SSI   Acu-checks ac/hs  With hyperglycemia likely with steroids, add standing meal time insulin                 VTE Pharmacologic Prophylaxis: VTE Score: 13 Eliquis    Mobility:   Basic Mobility Inpatient Raw Score: 18  JH-HLM Goal: 6: Walk 10 steps or more  JH-HLM Achieved: 7: Walk 25 feet or more  HLM Goal achieved. Continue to encourage appropriate mobility. Patient Centered Rounds: I performed bedside rounds with nursing staff today. Discussions with Specialists or Other Care Team Provider:     Education and Discussions with Family / Patient: to call granddaughter    Total Time Spent on Date of Encounter in care of patient:  mins. This time was spent on one or more of the following: performing physical exam; counseling and coordination of care; obtaining or reviewing history; documenting in the medical record; reviewing/ordering tests, medications or procedures; communicating with other healthcare professionals and discussing with patient's family/caregivers. Current Length of Stay: 1 day(s)  Current Patient Status: Inpatient   Certification Statement: The patient will continue to require additional inpatient hospital stay due to IV antibiotics, IV steroids   Discharge Plan: Anticipate discharge in 48-72 hrs to home.     Code Status: Level 1 - Full Code    Subjective:   Patient reports feeling improved today. Still with productive cough of green mucus. Noted some shortness of breath with PT ambulating today. Denies abdominal pain ,N/V/D. Objective:     Vitals:   Temp (24hrs), Av.7 °F (36.5 °C), Min:97.3 °F (36.3 °C), Max:97.9 °F (36.6 °C)    Temp:  [97.3 °F (36.3 °C)-97.9 °F (36.6 °C)] 97.4 °F (36.3 °C)  HR:  [] 61  Resp:  [16-22] 18  BP: (112-138)/(51-98) 117/98  SpO2:  [88 %-97 %] 90 %  Body mass index is 39.58 kg/m². Input and Output Summary (last 24 hours): Intake/Output Summary (Last 24 hours) at 2023 1303  Last data filed at 2023 0707  Gross per 24 hour   Intake --   Output 450 ml   Net -450 ml       Physical Exam:   Physical Exam  Constitutional:       General: She is not in acute distress. Appearance: She is obese. HENT:      Head: Normocephalic and atraumatic. Cardiovascular:      Rate and Rhythm: Normal rate and regular rhythm. Pulmonary:      Effort: Pulmonary effort is normal.      Breath sounds: Wheezing and rhonchi present. Abdominal:      General: Abdomen is flat. Bowel sounds are normal. There is no distension. Palpations: Abdomen is soft. Musculoskeletal:      Right lower leg: No edema. Left lower leg: No edema. Skin:     General: Skin is warm and dry. Neurological:      General: No focal deficit present. Mental Status: She is alert and oriented to person, place, and time.    Psychiatric:         Mood and Affect: Mood normal.         Behavior: Behavior normal.          Additional Data:     Labs:  Results from last 7 days   Lab Units 23  0442   WBC Thousand/uL 9.88   HEMOGLOBIN g/dL 10.0*   HEMATOCRIT % 31.0*   PLATELETS Thousands/uL 309   NEUTROS PCT % 86*   LYMPHS PCT % 9*   MONOS PCT % 5   EOS PCT % 0     Results from last 7 days   Lab Units 23  0442   SODIUM mmol/L 137   POTASSIUM mmol/L 4.0   CHLORIDE mmol/L 104   CO2 mmol/L 27   BUN mg/dL 13   CREATININE mg/dL 0.52*   ANION GAP mmol/L 6   CALCIUM mg/dL 9.0   ALBUMIN g/dL 3.4*   TOTAL BILIRUBIN mg/dL 0.51   ALK PHOS U/L 65   ALT U/L 16   AST U/L 11*   GLUCOSE RANDOM mg/dL 193*         Results from last 7 days   Lab Units 11/29/23  1123 11/29/23  0704 11/28/23  2019 11/28/23  1817   POC GLUCOSE mg/dl 254* 185* 360* 312*     Results from last 7 days   Lab Units 11/28/23  1212   HEMOGLOBIN A1C % 6.8*     Results from last 7 days   Lab Units 11/29/23  0442 11/29/23  0004 11/28/23  2032 11/28/23  1745 11/28/23  1452 11/28/23  1212   LACTIC ACID mmol/L  --  1.5 4.4* 4.0* 3.0* 2.5*   PROCALCITONIN ng/ml 0.26*  --   --   --   --  <0.05       Lines/Drains:  Invasive Devices       Peripheral Intravenous Line  Duration             Peripheral IV 11/28/23 Distal;Dorsal (posterior); Left Forearm <1 day                          Imaging: No pertinent imaging reviewed. Recent Cultures (last 7 days):   Results from last 7 days   Lab Units 11/28/23 2032 11/28/23  1658   BLOOD CULTURE   --  Received in Microbiology Lab. Culture in Progress. Received in Microbiology Lab. Culture in Progress.    GRAM STAIN RESULT  1+ Epithelial cells per low power field*  4+ Polys*  3+ Mononuclear Cells*  2+ Gram positive cocci in clusters*  2+ Gram negative rods*  --    LEGIONELLA URINARY ANTIGEN  Negative  --        Last 24 Hours Medication List:   Current Facility-Administered Medications   Medication Dose Route Frequency Provider Last Rate    acetaminophen  650 mg Oral Q6H PRN Elayne Wilson , ANT      apixaban  5 mg Oral BID Elayne Wilson , ANT      aspirin  81 mg Oral Daily Elayne Wilson , ANT      atorvastatin  20 mg Oral Daily With Dinner Elayne Wilson , ANT      azithromycin  500 mg Oral Q24H Elayne Danielster, ANT      benzonatate  100 mg Oral TID PRN Philip Sheest PA-C      budesonide  0.5 mg Nebulization Q12H Elayne Wilson ANT      cefTRIAXone  2,000 mg Intravenous Q24H Elayne Hernandezlalio , PA-C      cyanocobalamin  500 mcg Oral Daily [facility], [facility]-C      DULoxetine  60 mg Oral Daily Elayne [facility], PA-C      folic acid  4,679 mcg Oral Daily [facility], PA-C      guaiFENesin  600 mg Oral BID Elayne [facility], PALIZA      insulin glargine  40 Units Subcutaneous QAM Elayne [facility], PA-C      insulin lispro  1-6 Units Subcutaneous TID AC [facility], PA-C      insulin lispro  1-6 Units Subcutaneous HS [facility], PA-C      insulin lispro  3 Units Subcutaneous TID With Meals [facility]ne [facility], ANT      ipratropium  0.5 mg Nebulization TID Philip Sheets, ANT      levalbuterol  1.25 mg Nebulization TID [facility], ANT      levothyroxine  137 mcg Oral Early Morning [facility], PA-C      lisinopril  40 mg Oral Daily [facility], PA-MINH      methylPREDNISolone sodium succinate  40 mg Intravenous Q8H [facility], PA-MINH      mirtazapine  15 mg Oral HS [facility], PA-C      pantoprazole  40 mg Oral Daily Before Breakfast Elayne [facility], ANT      polyethylene glycol  17 g Oral Daily [facility], ANT      sodium chloride (PF)  3 mL Intravenous Q1H PRN Ermias Miquel DO      Tofacitinib Citrate ER  1 tablet Oral Daily Elayne [facility], ANT          Today, Patient Was Seen By: Philip Sheets PA-C    **Please Note: This note may have been constructed using a voice recognition system. **

## 2023-11-29 NOTE — QUICK NOTE
Notified that patient has a positive blood culture with growth of a gram positive organism in pairs and chains, likely representing streptococcus    Patient is admitted for CAP, receiving ceftriaxone and azithromycin, appears improving with improvement of HR and lactic acidosis. Urine legionella and S.  Pneumo antigens negative     Continue Ceftriaxone 2g IV q24   Repeat blood cultures x 2   Check 2D echo  ID consult

## 2023-11-29 NOTE — ASSESSMENT & PLAN NOTE
SIRS: tachycardia, tachypnea, leukocytosis  Organ dysfunction: lactic acidosis now resolved after IVF hydration   Source: RLL PNA  Blood cultures x 2 pending  Continue Rocephin/azithromycin (D2)  Give 1L NS only given diastolic HF  Trend WBCs/fever curve

## 2023-11-30 ENCOUNTER — APPOINTMENT (INPATIENT)
Dept: NON INVASIVE DIAGNOSTICS | Facility: HOSPITAL | Age: 73
DRG: 871 | End: 2023-11-30
Payer: COMMERCIAL

## 2023-11-30 PROBLEM — R78.81 POSITIVE BLOOD CULTURE: Status: ACTIVE | Noted: 2023-11-30

## 2023-11-30 LAB
ANION GAP SERPL CALCULATED.3IONS-SCNC: 7 MMOL/L
AORTIC ROOT: 3.3 CM
ASCENDING AORTA: 3 CM
BASOPHILS # BLD AUTO: 0.01 THOUSANDS/ÂΜL (ref 0–0.1)
BASOPHILS NFR BLD AUTO: 0 % (ref 0–1)
BUN SERPL-MCNC: 21 MG/DL (ref 5–25)
CALCIUM SERPL-MCNC: 9.7 MG/DL (ref 8.4–10.2)
CHLORIDE SERPL-SCNC: 103 MMOL/L (ref 96–108)
CO2 SERPL-SCNC: 27 MMOL/L (ref 21–32)
CREAT SERPL-MCNC: 0.61 MG/DL (ref 0.6–1.3)
DME PARACHUTE DELIVERY DATE REQUESTED: NORMAL
DME PARACHUTE ITEM DESCRIPTION: NORMAL
DME PARACHUTE ORDER STATUS: NORMAL
DME PARACHUTE SUPPLIER NAME: NORMAL
DME PARACHUTE SUPPLIER PHONE: NORMAL
E WAVE DECELERATION TIME: 243 MS
E/A RATIO: 0.79
EOSINOPHIL # BLD AUTO: 0 THOUSAND/ÂΜL (ref 0–0.61)
EOSINOPHIL NFR BLD AUTO: 0 % (ref 0–6)
ERYTHROCYTE [DISTWIDTH] IN BLOOD BY AUTOMATED COUNT: 14.6 % (ref 11.6–15.1)
FRACTIONAL SHORTENING: 32 (ref 28–44)
GFR SERPL CREATININE-BSD FRML MDRD: 88 ML/MIN/1.73SQ M
GLUCOSE SERPL-MCNC: 181 MG/DL (ref 65–140)
GLUCOSE SERPL-MCNC: 202 MG/DL (ref 65–140)
GLUCOSE SERPL-MCNC: 215 MG/DL (ref 65–140)
GLUCOSE SERPL-MCNC: 242 MG/DL (ref 65–140)
GLUCOSE SERPL-MCNC: 285 MG/DL (ref 65–140)
HCT VFR BLD AUTO: 32.5 % (ref 34.8–46.1)
HGB BLD-MCNC: 10.3 G/DL (ref 11.5–15.4)
IMM GRANULOCYTES # BLD AUTO: 0.07 THOUSAND/UL (ref 0–0.2)
IMM GRANULOCYTES NFR BLD AUTO: 1 % (ref 0–2)
INTERVENTRICULAR SEPTUM IN DIASTOLE (PARASTERNAL SHORT AXIS VIEW): 1.2 CM
INTERVENTRICULAR SEPTUM: 1.2 CM (ref 0.6–1.1)
LAAS-AP2: 21.2 CM2
LAAS-AP4: 17.3 CM2
LEFT ATRIUM SIZE: 3.8 CM
LEFT ATRIUM VOLUME (MOD BIPLANE): 66 ML
LEFT ATRIUM VOLUME INDEX (MOD BIPLANE): 31 ML/M2
LEFT INTERNAL DIMENSION IN SYSTOLE: 3.4 CM (ref 2.1–4)
LEFT VENTRICULAR INTERNAL DIMENSION IN DIASTOLE: 5 CM (ref 3.5–6)
LEFT VENTRICULAR POSTERIOR WALL IN END DIASTOLE: 1 CM
LEFT VENTRICULAR STROKE VOLUME: 70 ML
LVSV (TEICH): 70 ML
LYMPHOCYTES # BLD AUTO: 1.27 THOUSANDS/ÂΜL (ref 0.6–4.47)
LYMPHOCYTES NFR BLD AUTO: 13 % (ref 14–44)
MCH RBC QN AUTO: 30.7 PG (ref 26.8–34.3)
MCHC RBC AUTO-ENTMCNC: 31.7 G/DL (ref 31.4–37.4)
MCV RBC AUTO: 97 FL (ref 82–98)
MONOCYTES # BLD AUTO: 0.54 THOUSAND/ÂΜL (ref 0.17–1.22)
MONOCYTES NFR BLD AUTO: 6 % (ref 4–12)
MV E'TISSUE VEL-LAT: 9 CM/S
MV E'TISSUE VEL-SEP: 9 CM/S
MV PEAK A VEL: 1.18 M/S
MV PEAK E VEL: 93 CM/S
MV STENOSIS PRESSURE HALF TIME: 70 MS
MV VALVE AREA P 1/2 METHOD: 3.14
NEUTROPHILS # BLD AUTO: 8.01 THOUSANDS/ÂΜL (ref 1.85–7.62)
NEUTS SEG NFR BLD AUTO: 80 % (ref 43–75)
NRBC BLD AUTO-RTO: 0 /100 WBCS
PLATELET # BLD AUTO: 363 THOUSANDS/UL (ref 149–390)
PMV BLD AUTO: 10.5 FL (ref 8.9–12.7)
POTASSIUM SERPL-SCNC: 4.4 MMOL/L (ref 3.5–5.3)
PROCALCITONIN SERPL-MCNC: 0.19 NG/ML
RBC # BLD AUTO: 3.36 MILLION/UL (ref 3.81–5.12)
RIGHT ATRIUM AREA SYSTOLE A4C: 13.3 CM2
RIGHT VENTRICLE ID DIMENSION: 2.9 CM
SL CV LEFT ATRIUM LENGTH A2C: 5.1 CM
SL CV LV EF: 65
SL CV PED ECHO LEFT VENTRICLE DIASTOLIC VOLUME (MOD BIPLANE) 2D: 117 ML
SL CV PED ECHO LEFT VENTRICLE SYSTOLIC VOLUME (MOD BIPLANE) 2D: 48 ML
SODIUM SERPL-SCNC: 137 MMOL/L (ref 135–147)
TRICUSPID ANNULAR PLANE SYSTOLIC EXCURSION: 2.8 CM
TRICUSPID VALVE PEAK E WAVE VELOCITY: 0.17 M/S
WBC # BLD AUTO: 9.9 THOUSAND/UL (ref 4.31–10.16)

## 2023-11-30 PROCEDURE — 97535 SELF CARE MNGMENT TRAINING: CPT

## 2023-11-30 PROCEDURE — 94760 N-INVAS EAR/PLS OXIMETRY 1: CPT

## 2023-11-30 PROCEDURE — 97530 THERAPEUTIC ACTIVITIES: CPT

## 2023-11-30 PROCEDURE — 84145 PROCALCITONIN (PCT): CPT

## 2023-11-30 PROCEDURE — 97116 GAIT TRAINING THERAPY: CPT

## 2023-11-30 PROCEDURE — 82948 REAGENT STRIP/BLOOD GLUCOSE: CPT

## 2023-11-30 PROCEDURE — 94664 DEMO&/EVAL PT USE INHALER: CPT

## 2023-11-30 PROCEDURE — 85025 COMPLETE CBC W/AUTO DIFF WBC: CPT

## 2023-11-30 PROCEDURE — 94668 MNPJ CHEST WALL SBSQ: CPT

## 2023-11-30 PROCEDURE — 99232 SBSQ HOSP IP/OBS MODERATE 35: CPT

## 2023-11-30 PROCEDURE — 93306 TTE W/DOPPLER COMPLETE: CPT

## 2023-11-30 PROCEDURE — 99223 1ST HOSP IP/OBS HIGH 75: CPT | Performed by: INTERNAL MEDICINE

## 2023-11-30 PROCEDURE — 94640 AIRWAY INHALATION TREATMENT: CPT

## 2023-11-30 PROCEDURE — 80048 BASIC METABOLIC PNL TOTAL CA: CPT

## 2023-11-30 PROCEDURE — 93306 TTE W/DOPPLER COMPLETE: CPT | Performed by: INTERNAL MEDICINE

## 2023-11-30 RX ADMIN — DULOXETINE HYDROCHLORIDE 60 MG: 30 CAPSULE, DELAYED RELEASE ORAL at 08:30

## 2023-11-30 RX ADMIN — CEFTRIAXONE 2000 MG: 2 INJECTION, SOLUTION INTRAVENOUS at 17:19

## 2023-11-30 RX ADMIN — INSULIN LISPRO 3 UNITS: 100 INJECTION, SOLUTION INTRAVENOUS; SUBCUTANEOUS at 21:39

## 2023-11-30 RX ADMIN — PANTOPRAZOLE SODIUM 40 MG: 40 TABLET, DELAYED RELEASE ORAL at 06:03

## 2023-11-30 RX ADMIN — LISINOPRIL 40 MG: 20 TABLET ORAL at 08:30

## 2023-11-30 RX ADMIN — INSULIN LISPRO 3 UNITS: 100 INJECTION, SOLUTION INTRAVENOUS; SUBCUTANEOUS at 12:31

## 2023-11-30 RX ADMIN — GUAIFENESIN 600 MG: 600 TABLET ORAL at 08:31

## 2023-11-30 RX ADMIN — LEVALBUTEROL HYDROCHLORIDE 1.25 MG: 1.25 SOLUTION RESPIRATORY (INHALATION) at 19:52

## 2023-11-30 RX ADMIN — FOLIC ACID 1000 MCG: 1 TABLET ORAL at 08:31

## 2023-11-30 RX ADMIN — TOFACITINIB 11 MG: 11 TABLET, FILM COATED, EXTENDED RELEASE ORAL at 08:34

## 2023-11-30 RX ADMIN — METHYLPREDNISOLONE SODIUM SUCCINATE 40 MG: 40 INJECTION, POWDER, FOR SOLUTION INTRAMUSCULAR; INTRAVENOUS at 14:36

## 2023-11-30 RX ADMIN — LEVOTHYROXINE SODIUM 137 MCG: 25 TABLET ORAL at 06:03

## 2023-11-30 RX ADMIN — GUAIFENESIN 600 MG: 600 TABLET ORAL at 17:19

## 2023-11-30 RX ADMIN — INSULIN GLARGINE 40 UNITS: 100 INJECTION, SOLUTION SUBCUTANEOUS at 08:30

## 2023-11-30 RX ADMIN — INSULIN LISPRO 4 UNITS: 100 INJECTION, SOLUTION INTRAVENOUS; SUBCUTANEOUS at 12:32

## 2023-11-30 RX ADMIN — INSULIN LISPRO 3 UNITS: 100 INJECTION, SOLUTION INTRAVENOUS; SUBCUTANEOUS at 08:32

## 2023-11-30 RX ADMIN — CYANOCOBALAMIN TAB 500 MCG 500 MCG: 500 TAB at 08:30

## 2023-11-30 RX ADMIN — MIRTAZAPINE 15 MG: 15 TABLET, FILM COATED ORAL at 21:39

## 2023-11-30 RX ADMIN — IPRATROPIUM BROMIDE 0.5 MG: 0.5 SOLUTION RESPIRATORY (INHALATION) at 07:29

## 2023-11-30 RX ADMIN — ASPIRIN 81 MG: 81 TABLET, COATED ORAL at 08:30

## 2023-11-30 RX ADMIN — APIXABAN 5 MG: 5 TABLET, FILM COATED ORAL at 08:30

## 2023-11-30 RX ADMIN — ATORVASTATIN CALCIUM 20 MG: 10 TABLET, FILM COATED ORAL at 17:19

## 2023-11-30 RX ADMIN — POLYETHYLENE GLYCOL 3350 17 G: 17 POWDER, FOR SOLUTION ORAL at 08:34

## 2023-11-30 RX ADMIN — INSULIN LISPRO 2 UNITS: 100 INJECTION, SOLUTION INTRAVENOUS; SUBCUTANEOUS at 17:20

## 2023-11-30 RX ADMIN — LEVALBUTEROL HYDROCHLORIDE 1.25 MG: 1.25 SOLUTION RESPIRATORY (INHALATION) at 07:29

## 2023-11-30 RX ADMIN — APIXABAN 5 MG: 5 TABLET, FILM COATED ORAL at 17:19

## 2023-11-30 RX ADMIN — INSULIN LISPRO 3 UNITS: 100 INJECTION, SOLUTION INTRAVENOUS; SUBCUTANEOUS at 17:19

## 2023-11-30 RX ADMIN — METHYLPREDNISOLONE SODIUM SUCCINATE 40 MG: 40 INJECTION, POWDER, FOR SOLUTION INTRAMUSCULAR; INTRAVENOUS at 06:03

## 2023-11-30 RX ADMIN — BUDESONIDE 0.5 MG: 0.5 SUSPENSION RESPIRATORY (INHALATION) at 07:29

## 2023-11-30 RX ADMIN — INSULIN LISPRO 1 UNITS: 100 INJECTION, SOLUTION INTRAVENOUS; SUBCUTANEOUS at 08:31

## 2023-11-30 RX ADMIN — LEVALBUTEROL HYDROCHLORIDE 1.25 MG: 1.25 SOLUTION RESPIRATORY (INHALATION) at 13:17

## 2023-11-30 RX ADMIN — BUDESONIDE 0.5 MG: 0.5 SUSPENSION RESPIRATORY (INHALATION) at 19:52

## 2023-11-30 RX ADMIN — METHYLPREDNISOLONE SODIUM SUCCINATE 40 MG: 40 INJECTION, POWDER, FOR SOLUTION INTRAMUSCULAR; INTRAVENOUS at 23:03

## 2023-11-30 RX ADMIN — IPRATROPIUM BROMIDE 0.5 MG: 0.5 SOLUTION RESPIRATORY (INHALATION) at 19:52

## 2023-11-30 RX ADMIN — IPRATROPIUM BROMIDE 0.5 MG: 0.5 SOLUTION RESPIRATORY (INHALATION) at 13:17

## 2023-11-30 NOTE — PLAN OF CARE
Problem: PHYSICAL THERAPY ADULT  Goal: Performs mobility at highest level of function for planned discharge setting. See evaluation for individualized goals. Description: Treatment/Interventions: Functional transfer training, LE strengthening/ROM, Elevations, Therapeutic exercise, Endurance training, Gait training, Bed mobility  Equipment Recommended: Walker       See flowsheet documentation for full assessment, interventions and recommendations. Outcome: Progressing  Note: Prognosis: Good  Problem List:  (Decreased strength; Decreased endurance; Impaired balance; Decreased mobility; Obesity)  Assessment: Pt. seen for PT treatment session this date with interventions consisting of transfers and  gait training w/ emphasis on improving pt's ability to ambulate. Provided pt. With RW for home and adjusted to proper height,  In comparison to previous session, Pt. With improvements in activity tolerance. Pt is in need of continued activity in PT to improve strength balance endurance mobility transfers and ambulation with return to maximize LOF. From PT/mobility standpoint, recommendation at time of d/c would be Level I: Min resource intensity  in order to promote return to PLOF and independence. The patient's AM-PAC Basic Mobility Inpatient Short Form Raw Score is 21 A Raw score of greater than 16 suggests the patient may benefit from discharge to home. Please also refer to physical therapy recommendation for safe DC planning. Rehab Resource Intensity Level, PT: III (Minimum Resource Intensity)    See flowsheet documentation for full assessment.

## 2023-11-30 NOTE — ASSESSMENT & PLAN NOTE
1/2 BC for two different organisms, with possible G+ bacteremia.  More likely contaminant   Continue Rocephin  Repeat cultures obtained  Echo without evidence of vegetation   Appreciate infectious disease consult

## 2023-11-30 NOTE — ASSESSMENT & PLAN NOTE
SIRS: tachycardia, tachypnea, leukocytosis  Organ dysfunction: lactic acidosis now resolved after IVF hydration   Source: RLL PNA  Continue Rocephin/azithromycin (D3)  Give 1L NS only given diastolic HF  1/2 BC positive for alpha hemolytic strep, repeat pending  Trend WBCs/fever curve

## 2023-11-30 NOTE — ASSESSMENT & PLAN NOTE
Solu-Medrol 40 q8 today, anticipate wean to q12 tomorrow with improvement  Xopenex/atrovent nebs tid  Pulmicort q12  Azithromycin as above  Sputum culture  Mucinex, pulmonary toilet, respiratory protocol

## 2023-11-30 NOTE — CASE MANAGEMENT
Case Management Discharge Planning Note    Patient name Homer Knutson  Location /358-31 MRN [id number]  : 1950 Date 2023       Current Admission Date: 2023  Current Admission Diagnosis:Community acquired pneumonia   Patient Active Problem List    Diagnosis Date Noted    Vertigo 2023    History of pulmonary embolus (PE) 2023    Tracheobronchitis 10/25/2023    COVID-19 10/25/2023    PAD (peripheral artery disease) ([address]) 2023    Skin ulcer of second toe of right foot, limited to breakdown of skin ([address]) 2023    COPD exacerbation ([address]) 2023    Pre-op evaluation 10/24/2022    Recurrent UTI 10/24/2022    Dependence on continuous supplemental oxygen 2022    Excessive anticoagulation 2022    MORENO (obstructive sleep apnea) 2021    Hilar lymphadenopathy 2021    Atherosclerosis 2021    T12 compression fracture ([address]) 2021    Chronic diastolic (congestive) heart failure ([address])     Acute pulmonary embolism ([address]) 2021    Chronic sinusitis 2021    Primary insomnia 2020    Migraine without aura and without status migrainosus, not intractable 2020    Postherpetic neuralgia 15/30/2190    Diastolic dysfunction     Macrocytic anemia 2020    Hypoxemia requiring supplemental oxygen 2019    History of exposure to asbestos 2019    Chronic respiratory failure with hypoxia ([address]) 2019    Hyponatremia 2019    Candidal dermatitis 2018    Loculated pleural effusion 2018    Constipation 2017    Renal cyst 2017    Candidiasis, cutaneous 2017    Vitamin B12 deficiency 11/10/2016    Morbid obesity with BMI of 40.0-44.9, adult ([address]) 2016    Severe sepsis ([address]) 2016    Centrilobular emphysema ([address]) 2016    Community acquired pneumonia 2016    Type 2 diabetes mellitus with hyperglycemia, with long-term current use of insulin ([address]) 2016 Hypothyroidism 11/03/2016    Rheumatoid arthritis ([address]) 11/03/2016    Hypomagnesemia 11/03/2016    Essential hypertension 11/03/2016    Hyperlipidemia 11/03/2016    Gastroesophageal reflux disease without esophagitis 11/03/2016    Hypoalbuminemia 11/03/2016    Hepatic steatosis 11/03/2016    Atelectasis 11/03/2016    Anxiety 06/02/2014    Fibromyalgia 01/28/2014    Osteopenia 09/17/2012    Osteoarthritis 05/29/2012    Vitamin D deficiency 05/29/2012      LOS (days): 2  Geometric Mean LOS (GMLOS) (days): 5.10  Days to GMLOS:3.3     OBJECTIVE:  Risk of Unplanned Readmission Score: 23.78         Current admission status: Inpatient   Preferred Pharmacy:   [facility] DR CLAUDIA WHEELER [facility] [[email or web address]][email or web address] [address] [place] [address] [address]  [facility] [address]  Phone: [phone] Fax: [address] [address]  Phone: [phone] Fax: [phone]    Primary Care Provider: Naun Momin MD    Primary Insurance: [facility]  Secondary Insurance:     DISCHARGE DETAILS:    Discharge planning discussed with[de-identified] patient  Freedom of Choice: Yes  Comments - Freedom of Choice: referral [facility] for a [facility]        DME Referral Provided  Referral made for DME?: Yes  DME referral completed for the following items[de-identified] Bedside Commode  DME Supplier Name[de-identified] [address] [place] [address]

## 2023-11-30 NOTE — ASSESSMENT & PLAN NOTE
73 yo female presents with worsening cough, wheezing, dysponea x 5 days  CTA revealing GGO in RML/RL infectious/inflammatory etiology  Stable on home 3L NC  Non-severe, DIP 4- Continue Rocephin 2g q24 / azithromycin 500 q24 (D3)  Discontinue azithromycin tomorrow given adequate tx of COPD exacerbation   Urinary antigens negative  Respiratory protocol Biopsy Photograph Reviewed: Yes

## 2023-11-30 NOTE — PLAN OF CARE
Problem: PAIN - ADULT  Goal: Verbalizes/displays adequate comfort level or baseline comfort level  Description: Interventions:  - Encourage patient to monitor pain and request assistance  - Assess pain using appropriate pain scale (0-10 pain scale)  - Administer analgesics based on type and severity of pain and evaluate response  - Implement non-pharmacological measures as appropriate and evaluate response  - Consider cultural and social influences on pain and pain management  - Notify physician/advanced practitioner if interventions unsuccessful or patient reports new pain  Outcome: Progressing     Problem: INFECTION - ADULT  Goal: Absence or prevention of progression during hospitalization  Description: INTERVENTIONS:  - Assess and monitor for signs and symptoms of infection  - Monitor lab/diagnostic results  - Monitor all insertion sites, i.e. indwelling lines  - Administer medications as ordered  - Instruct and encourage patient and family to use good hand hygiene technique  Outcome: Progressing     Problem: SAFETY ADULT  Goal: Patient will remain free of falls  Description: INTERVENTIONS:  - Educate patient/family on patient safety including physical limitations  - Instruct patient to call for assistance with activity (standby assist and walker)  - Consult OT/PT to assist with strengthening/mobility   - Keep Call bell within reach  - Keep bed low and locked with side rails adjusted as appropriate  - Keep care items and personal belongings within reach  - Initiate and maintain comfort rounds  - Make Fall Risk Sign visible to staff (moderate fall risk)  - Offer Toileting every 1-2 Hours, in advance of need  - Initiate/Maintain bed/chair alarm  - Obtain necessary fall risk management equipment: nonskid footwear  - Apply yellow socks and bracelet for high fall risk patients  - Consider moving patient to room near nurses station  Outcome: Progressing  Goal: Maintain or return to baseline ADL function  Description: INTERVENTIONS:  -  Assess patient's ability to carry out ADLs; (min to mod assist)  - Assess/evaluate cause of self-care deficits (fatigue, dyspnea, oxygen)  - Assess range of motion  - Assess patient's mobility; (standby assist and walker)  - Assess patient's need for assistive devices and provide as appropriate  - Encourage maximum independence but intervene and supervise when necessary  - Involve family in performance of ADLs  - Assess for home care needs following discharge   - Consider OT consult to assist with ADL evaluation and planning for discharge  - Provide patient education as appropriate  Outcome: Progressing  Goal: Maintains/Returns to pre admission functional level  Description: INTERVENTIONS:  - Perform AM-PAC 6 Click Basic Mobility/ Daily Activity assessment daily.  - Set and communicate daily mobility goal to care team and patient/family/caregiver.    - Collaborate with rehabilitation services on mobility goals if consulted  - Ambulate patient 3-6 times a day  - Out of bed to chair 3 times a day   - Out of bed for meals 3 times a day  - Out of bed for toileting  - Record patient progress and toleration of activity level   Outcome: Progressing     Problem: DISCHARGE PLANNING  Goal: Discharge to home or other facility with appropriate resources  Description: INTERVENTIONS:  - Identify barriers to discharge w/patient and caregiver  - Arrange for needed discharge resources and transportation as appropriate  - Identify discharge learning needs (meds, wound care, etc.)  - Refer to Case Management Department for coordinating discharge planning if the patient needs post-hospital services based on physician/advanced practitioner order or complex needs related to functional status, cognitive ability, or social support system  Outcome: Progressing     Problem: Knowledge Deficit  Goal: Patient/family/caregiver demonstrates understanding of disease process, treatment plan, medications, and discharge instructions  Description: Complete learning assessment and assess knowledge base.   Interventions:  - Provide teaching at level of understanding  - Provide teaching via preferred learning methods  Outcome: Progressing     Problem: RESPIRATORY - ADULT  Goal: Achieves optimal ventilation and oxygenation  Description: INTERVENTIONS:  - Assess for changes in respiratory status  - Assess for changes in mentation and behavior  - Position to facilitate oxygenation and minimize respiratory effort  - Oxygen administered by appropriate delivery if ordered  - Encourage broncho-pulmonary hygiene including cough, deep breathe, Incentive Spirometry  - Assess and instruct to report SOB or any respiratory difficulty  - Respiratory Therapy support as indicated  Outcome: Progressing     Problem: METABOLIC, FLUID AND ELECTROLYTES - ADULT  Goal: Electrolytes maintained within normal limits  Description: INTERVENTIONS:  - Monitor labs and assess patient for signs and symptoms of electrolyte imbalances  - Administer electrolyte replacement as ordered  - Monitor response to electrolyte replacements, including repeat lab results as appropriate  - Instruct patient on fluid and nutrition as appropriate  Outcome: Progressing  Goal: Fluid balance maintained  Description: INTERVENTIONS:  - Monitor labs   - Monitor I/O and WT  - Instruct patient on fluid and nutrition as appropriate  - Assess for signs & symptoms of volume excess or deficit  Outcome: Progressing  Goal: Glucose maintained within target range  Description: INTERVENTIONS:  - Monitor Blood Glucose as ordered  - Assess for signs and symptoms of hyperglycemia and hypoglycemia  - Administer ordered medications to maintain glucose within target range  - Assess nutritional intake and initiate nutrition service referral as needed  Outcome: Progressing

## 2023-11-30 NOTE — OCCUPATIONAL THERAPY NOTE
Occupational Therapy Progress Note     Patient Name: Mimi Esteves  VCASI'V Date: 11/30/2023  Problem List  Principal Problem:    Community acquired pneumonia  Active Problems:    Severe sepsis ([address])    Type 2 diabetes mellitus with hyperglycemia, with long-term current use of insulin (HCC)    Hypothyroidism    Rheumatoid arthritis ([address])    Hypomagnesemia    Essential hypertension    Chronic respiratory failure with hypoxia (HCC)    Chronic diastolic (congestive) heart failure (HCC)    COPD exacerbation (HCC)    Vertigo    History of pulmonary embolus (PE)    Positive blood culture            11/30/23 1210   OT Last Visit   OT Visit Date 11/30/23   Note Type   Note Type Treatment   Pain Assessment   Pain Score No Pain   Restrictions/Precautions   Weight Bearing Precautions Per Order No   Other Precautions Multiple lines;O2;Fall Risk  (3L O2 - baseline)   ADL   Where Assessed Chair   UB Dressing Assistance 5  Supervision/Setup   UB Dressing Deficit Thread RUE; Thread LUE   LB Dressing Assistance 5  Supervision/Setup   LB Dressing Deficit Don/doff R sock; Don/doff L sock   Bed Mobility   Additional Comments OOB upon presentation and at end of session   Transfers   Sit to Stand 5  Supervision   Stand to Sit 5  Supervision   Additional Comments RW   Functional Mobility   Functional Mobility 5  Supervision   Additional Comments RW   Subjective   Subjective "I could use a commode"   Cognition   Overall Cognitive Status [facility]   Arousal/Participation Alert; Cooperative   Attention Within functional limits   Orientation Level Oriented X4   Memory Within functional limits   Following Commands Follows all commands and directions without difficulty   Comments Pleasant and cooperativ e   Activity Tolerance   Activity Tolerance Patient limited by fatigue   Medical Staff Made Aware NSG aware   Assessment   Assessment Pt was seen this date for OT tx session focusing on self care tasks, sit to stand progressions, standing tolerance, tranfers, functional mobility, safety awareness, compensatory techniques, energy conservation, and overall activity tolerance. Pt presents OOB in chair and is agreeable to OT session. Pt completes previously mentioned tasks at documented assist levels please see above in flow sheet. Pt continues to require overall S for transfers and mobility and S for self care tasks. Pt is overall limited by SOB/GEORGE, increased fatigue, decreased strength, endurance, and activity tolerance and continues to function below baseline level. Pt resting OOB in chair at end of session with all needs in reach and alarm on. Pt would benefit from continued OT Tx to improve overall functional abilities and increase independence. Will continue to follow with current POC. Would benefit from bedside commode upon dc    Plan   Treatment Interventions ADL retraining;UE strengthening/ROM; Functional transfer training; Endurance training;Patient/family training;Equipment evaluation/education; Activityengagement   Goal Expiration Date 12/13/23   OT Treatment Day 1   OT Frequency 3-5x/wk   Discharge Recommendation   Rehab Resource Intensity Level, OT III (Minimum Resource Intensity)   AM-PAC Daily Activity Inpatient   Lower Body Dressing 3   Bathing 3   Toileting 4   Upper Body Dressing 4   Grooming 4   Eating 4   Daily Activity Raw Score 22   Daily Activity Standardized Score (Calc for Raw Score >=11) 47. 1   AM-PAC Applied Cognition Inpatient   Following a Speech/Presentation 4   Understanding Ordinary Conversation 4   Taking Medications 4   Remembering Where Things Are Placed or Put Away 4   Remembering List of 4-5 Errands 4   Taking Care of Complicated Tasks 4   Applied Cognition Raw Score 24   Applied Cognition Standardized Score 62.21

## 2023-11-30 NOTE — CONSULTS
REQUIRED DOCUMENTATION:     1. This service was provided via Telemedicine. 2. Provider located at [address] [place]. 3. TeleMed provider: Torres Saunders  4. Identify all parties in room with patient during tele consult:  Nobody  5. After connecting through [employer], patient was identified by name and date of birth and assistant checked wristband. Patient was then informed that this was a Telemedicine visit and that the exam was being conducted confidentially over secure lines. My office door was closed. No one else was in the room. Patient acknowledged consent and understanding of privacy and security of the Telemedicine visit, and gave us permission to have the assistant stay in the room in order to assist with the history and to conduct the exam.  I informed the patient that I have reviewed their record in Epic and presented the opportunity for them to ask any questions regarding the visit today. The patient agreed to participate. Tele Consultation - Infectious Disease   Dagoberto Vora 74 y.o. female MRN: [id number]  Unit/Bed#: 404-01 Encounter: [id number]      IMPRESSION & RECOMMENDATIONS:   1. Severe sepsis. Present on admission with leukocytosis, tachypnea, tachycardia. Appears to be secondary to a respiratory infection with COPD exacerbation. Source appreciated. Suspect the positive blood cultures are more likely contaminant with 2 different species in 1 set. Patient is clinically improved and seems to be tolerating the antibiotics without difficulty.  -Antibiotics as below  -Follow-up cultures and adjust antibiotics as needed  -Recheck CBC with differential and CMP  -Supportive care    2. COPD exacerbation. With possible very early pneumonia versus bronchitis.   Clinically improved with antibiotics, steroids and breathing treatments.  -Continue ceftriaxone  -Discontinue the azithromycin with the negative Legionella antigen  -Steroids and breathing treatments  -Monitor respiratory status    3. Gram-positive bacteremia. Suspect this tract presents a contaminant with 1 set positive with 2 different organisms.  -Antibiotics as above for now  -Follow-up final culture results  -Follow-up repeat blood cultures  -Follow-up echocardiogram    4. Chronic hypoxic respiratory failure. In the setting of advanced COPD  -Oxygen support  -Monitor respiratory status    5. Diabetes mellitus. Type II. With hyperglycemia and long-term insulin use. The most recent hemoglobin A1c of 8.3. Certainly a risk factor for recurrent infection  -Tighten diabetic control    6. Rheumatoid arthritis. On [place] Calixto and methotrexate    Have discussed the above management plan in detail with the primary service    Extensive review of the medical records in epic including review of the notes, radiographs, and laboratory results     I spent 90 minutes in evaluation of the patient of which 50 minutes was in counseling/coordination of care    HISTORY OF PRESENT ILLNESS:  Reason for Consult: Pneumonia with positive blood cultures  HPI: Christiana Christiansen is a 74y.o. year old female with chronic hypoxic and hypercapnic respiratory failure, COPD, diabetes mellitus, rheumatoid arthritis, admitted to [address] [place] [address] with worsening shortness of breath and a few days prior to this admission we are asked to assist with antibiotic management in the setting of positive blood cultures. The patient began developing the aforementioned shortness of breath and a few days prior to admission and also noticed some wheezing and a productive cough with/brown sputum. He also developed some dizziness and eventually decided come to the emergency department for further evaluation. In the emergency department she was tachycardic, with a leukocytosis and therefore had blood cultures obtained and was started on ceftriaxone and azithromycin admitted for further management. She now has also had a sputum culture obtained.   CT of the chest revealed some scant groundglass changes but no area of dense consolidation. She also had emphysematous changes. In addition to the antibiotics she has been started on steroids and breathing treatments and has had improvement. Her temperature stayed down and her white count has decreased. She has remained hemodynamically stable. 1 set of blood cultures has now come back positive for Streptococcus and coagulase-negative Staphylococcus species. Patient denies any current nausea vomiting or diarrhea. She has not developed any rash or skin lesions    REVIEW OF SYSTEMS:  A complete review of systems is negative other than that noted in the HPI. PAST MEDICAL HISTORY:  Past Medical History:   Diagnosis Date    Arthritis     Arthritis     Cancer ([address])     Candidiasis of skin     Cervical cancer ([address])     Chronic respiratory failure with hypoxia and hypercapnia ([address]) 4/20/2019    COPD (chronic obstructive pulmonary disease) ([address])     last assessed 11/21/2016    Current chronic use of systemic steroids     Degenerative arthritis of left knee     last assessed 1/20/2015    Diabetes mellitus ([address])     Disease of thyroid gland     hypo pill given to decrease thyroid.      Fibromyalgia     Fistula of knee     last assessed 9/12/2014    GERD (gastroesophageal reflux disease)     Hyperlipidemia     Hypertension     Medial meniscus tear     of left knee, last assessed 9/12/2014    Migraine     states she has a hx of HA that she calls Streamezzo but never was dx by neurologist    Obesity     Obesity     Osteoarthritis     Osteopenia     Pneumonia     last assessed 11/16/2016    Psychiatric disorder     anxiety    Rheumatoid arthritis ([address])     Rheumatoid arthritis ([address])     Sepsis ([address])     last assessed 11/10/2016    Tick bite     last assessed 10/30/2015    Vitamin B12 deficiency     Vitamin D deficiency      Past Surgical History:   Procedure Laterality Date    CATARACT EXTRACTION Bilateral     CHOLECYSTECTOMY      EYE SURGERY      Bilateral Cataracts    HYSTERECTOMY      IR AORTAGRAM WITH RUN-OFF  2023    IR THORACENTESIS  2019    JOINT REPLACEMENT      left knee    KNEE ARTHROSCOPY      NEUROPLASTY / TRANSPOSITION MEDIAN NERVE AT CARPAL TUNNEL      TUBAL LIGATION         FAMILY HISTORY:  Non-contributory    SOCIAL HISTORY:  Social History   Social History     Substance and Sexual Activity   Alcohol Use Never    Alcohol/week: 0.0 standard drinks of alcohol    Comment: stopped drinking alcohol     Social History     Substance and Sexual Activity   Drug Use Never     Social History     Tobacco Use   Smoking Status Former    Packs/day: 1.00    Years: 48.00    Total pack years: 48.00    Types: Cigarettes, E-Cigarettes    Quit date: 2012    Years since quittin.0   Smokeless Tobacco Never   Tobacco Comments    per allscripts - "current every day smoker, former smoker"       ALLERGIES:  No Known Allergies    MEDICATIONS:  All current active medications have been reviewed.   Antibiotics: Ceftriaxone and azithromycin 3    PHYSICAL EXAM:  Temp:  [97.6 °F (36.4 °C)-98 °F (36.7 °C)] 97.6 °F (36.4 °C)  HR:  [] 98  Resp:  [17] 17  BP: ()/(46-76) 169/62  SpO2:  [93 %-97 %] 97 %  Temp (24hrs), Av.7 °F (36.5 °C), Min:97.6 °F (36.4 °C), Max:98 °F (36.7 °C)  Current: Temperature: 97.6 °F (36.4 °C)    Intake/Output Summary (Last 24 hours) at 2023 1347  Last data filed at 2023 1226  Gross per 24 hour   Intake 720 ml   Output --   Net 720 ml      Physical exam has been primarily done by the patient's nurse and/or the primary service due to limited examination abilities on telemedicine    General Appearance:  Appearing well, nontoxic, and in no distress   Head:  Normocephalic, without obvious abnormality, atraumatic   Eyes:  Conjunctiva pink and sclera anicteric, both eyes   Nose: Nares normal, mucosa normal, no drainage   Throat: Oropharynx moist without lesions   Neck: Supple, symmetrical, no adenopathy, no tenderness/mass/nodules   Back:   Symmetric, no curvature, ROM normal, no CVA tenderness   Lungs:   Clear to auscultation bilaterally, respirations unlabored   Chest Wall:  No tenderness or deformity   Heart:  RRR; no murmur, rub or gallop   Abdomen:   Soft, non-tender, non-distended, positive bowel sounds    Extremities: No cyanosis, clubbing or edema   Skin: No rashes or lesions. No draining wounds noted. Lymph nodes: Cervical, supraclavicular nodes normal   Neurologic: Alert and oriented times 3, extremity strength 5/5 and symmetric       LABS, IMAGING, & OTHER STUDIES:  Lab Results:  I have personally reviewed pertinent labs. Results from last 7 days   Lab Units 11/30/23 0441 11/29/23 0442 11/28/23  1212   WBC Thousand/uL 9.90 9.88 12.35*   HEMOGLOBIN g/dL 10.3* 10.0* 11.9   PLATELETS Thousands/uL 363 309 390     Results from last 7 days   Lab Units 11/30/23 0441 11/29/23 0442 11/29/23  0004   SODIUM mmol/L 137 137 138   POTASSIUM mmol/L 4.4 4.0 3.8   CHLORIDE mmol/L 103 104 104   CO2 mmol/L 27 27 28   BUN mg/dL 21 13 12   CREATININE mg/dL 0.61 0.52* 0.56*   EGFR ml/min/1.73sq m 88 93 91   CALCIUM mg/dL 9.7 9.0 8.9   AST U/L  --  11*  --    ALT U/L  --  16  --    ALK PHOS U/L  --  65  --      Results from last 7 days   Lab Units 11/29/23  1805 11/28/23 2032 11/28/23  1658   BLOOD CULTURE  Received in Microbiology Lab. Culture in Progress. Received in Microbiology Lab. Culture in Progress. --  No Growth at 24 hrs.    GRAM STAIN RESULT   --  1+ Epithelial cells per low power field*  4+ Polys*  3+ Mononuclear Cells*  2+ Gram positive cocci in clusters*  2+ Gram negative rods* Gram positive cocci in pairs and chains*   LEGIONELLA URINARY ANTIGEN   --  Negative  --      Results from last 7 days   Lab Units 11/30/23 0441 11/29/23 0442 11/28/23  1212   PROCALCITONIN ng/ml 0.19 0.26* <0.05             Results from last 7 days   Lab Units 11/28/23  1321   D-DIMER QUANTITATIVE ug/ml FEU 0.90*       Imaging Studies:     CT chest.  Patchy groundglass changes.   Some areas of dense consolidation    Images personally reviewed by me in PACS

## 2023-11-30 NOTE — PROGRESS NOTES
[address]  Progress Note  Name: Milli Bennett  MRN: [id number]  Unit/Bed#: 726-90 I Date of Admission: 11/28/2023   Date of Service: 11/30/2023 I Hospital Day: 2    Assessment/Plan   * Community acquired pneumonia  Assessment & Plan  73 yo female presents with worsening cough, wheezing, dysponea x 5 days  CTA revealing GGO in RML/RL infectious/inflammatory etiology  Stable on home 3L NC  Non-severe, DIP 4- Continue Rocephin 2g q24 / azithromycin 500 q24 (D3)  Discontinue azithromycin tomorrow given adequate tx of COPD exacerbation   Urinary antigens negative  Respiratory protocol       Severe sepsis (HCC)  Assessment & Plan  SIRS: tachycardia, tachypnea, leukocytosis  Organ dysfunction: lactic acidosis now resolved after IVF hydration   Source: RLL PNA  Continue Rocephin/azithromycin (D3)  Give 1L NS only given diastolic HF  1/2 BC positive for alpha hemolytic strep, repeat pending  Trend WBCs/fever curve     COPD exacerbation (HCC)  Assessment & Plan  Solu-Medrol 40 q8 today, anticipate wean to q12 tomorrow with improvement  Xopenex/atrovent nebs tid  Pulmicort q12  Azithromycin as above  Sputum culture  Mucinex, pulmonary toilet, respiratory protocol    Positive blood culture  Assessment & Plan  1/2 BC for two different organisms, with possible G+ bacteremia.  More likely contaminant   Continue Rocephin  Repeat cultures obtained  Echo without evidence of vegetation   Appreciate infectious disease consult    Vertigo  Assessment & Plan  Reports episodic vertigo with movement & head movement since Thanksgiving  Hx c/w BPPV   Less likely vestibular neuritis - however is being treated with IV steroids for COPD exacerbation which should help this   Orthostatics negative  Meclizine prn  PT/OT      History of pulmonary embolus (PE)  Assessment & Plan  Continue Eliquis    Chronic diastolic (congestive) heart failure (HCC)  Assessment & Plan  Wt Readings from Last 3 Encounters:   11/30/23 108 kg (238 lb 1.6 oz)   10/25/23 108 kg (237 lb 6.4 oz)   09/20/23 108 kg (238 lb)     Appears compensated, volume status dry   Echo obtained today revealing LVEF 86%, normal diastolic function  Daily weights, I&Os  Hold lasix  Caution with IVFs - monitor volume status          Chronic respiratory failure with hypoxia (HCC)  Assessment & Plan  Stable on baseline 3L NC    Essential hypertension  Assessment & Plan  Continue lisinopril with holding parameters  Hold lasix    Hypomagnesemia  Assessment & Plan  Resolved with repletion  Monitor     Rheumatoid arthritis ([address])  Assessment & Plan  Continue Cindy Tamayo (brought from home)  Takes methotrexate q Sundays    Hypothyroidism  Assessment & Plan  Continue levothyroxine    Type 2 diabetes mellitus with hyperglycemia, with long-term current use of insulin [facility])  Assessment & Plan  Lab Results   Component Value Date    HGBA1C 6.8 (H) 11/28/2023       Recent Labs     11/29/23  1617 11/29/23  2051 11/30/23  0713 11/30/23  1102   POCGLU 187* 249* 181* 285*         Blood Sugar Average: Last 72 hrs:  (P) [id number]  Update A1c  Hold Ozempic  Continue log acting insulin 40 units + Humalog 3 units with meals & SSI   Acu-checks ac/hs                   VTE Pharmacologic Prophylaxis: VTE Score: 13 Eliquis    Mobility:   Basic Mobility Inpatient Raw Score: 18  JH-HLM Goal: 6: Walk 10 steps or more  JH-HLM Achieved: 7: Walk 25 feet or more  HLM Goal achieved. Continue to encourage appropriate mobility. Patient Centered Rounds: I performed bedside rounds with nursing staff today. Discussions with Specialists or Other Care Team Provider:     Education and Discussions with Family / Patient: to call granddaughter    Total Time Spent on Date of Encounter in care of patient:  mins.  This time was spent on one or more of the following: performing physical exam; counseling and coordination of care; obtaining or reviewing history; documenting in the medical record; reviewing/ordering tests, medications or procedures; communicating with other healthcare professionals and discussing with patient's family/caregivers. Current Length of Stay: 2 day(s)  Current Patient Status: Inpatient   Certification Statement: The patient will continue to require additional inpatient hospital stay due to IV steroids, IV abx, ID consult  Discharge Plan: Anticipate discharge in 48-72 hrs to home. Code Status: Level 1 - Full Code    Subjective:   Reports feeling improved today. Notes poor sleep overnight with coughing. Ongoing GEORGE, however no SOB at rest. +productive cough. Having BMs    Objective:   Vitals:   Temp (24hrs), Av.7 °F (36.5 °C), Min:97.6 °F (36.4 °C), Max:98 °F (36.7 °C)    Temp:  [97.6 °F (36.4 °C)-98 °F (36.7 °C)] 97.6 °F (36.4 °C)  HR:  [] 98  Resp:  [17] 17  BP: ()/(46-76) 169/62  SpO2:  [93 %-96 %] 94 %  Body mass index is 39.62 kg/m². Input and Output Summary (last 24 hours): Intake/Output Summary (Last 24 hours) at 2023 1203  Last data filed at 2023 6936  Gross per 24 hour   Intake 720 ml   Output --   Net 720 ml       Physical Exam:   Physical Exam  Constitutional:       General: She is not in acute distress. HENT:      Head: Normocephalic and atraumatic. Cardiovascular:      Rate and Rhythm: Normal rate and regular rhythm. Pulmonary:      Breath sounds: Wheezing and rhonchi present. Abdominal:      General: Abdomen is flat. Bowel sounds are normal. There is no distension. Palpations: Abdomen is soft. Musculoskeletal:      Right lower leg: No edema. Left lower leg: No edema. Skin:     General: Skin is warm and dry. Neurological:      General: No focal deficit present. Mental Status: She is alert and oriented to person, place, and time. Cranial Nerves: No cranial nerve deficit.    Psychiatric:         Mood and Affect: Mood normal.         Behavior: Behavior normal.          Additional Data:     Labs:  Results from last 7 days   Lab Units 11/30/23  0441   WBC Thousand/uL 9.90   HEMOGLOBIN g/dL 10.3*   HEMATOCRIT % 32.5*   PLATELETS Thousands/uL 363   NEUTROS PCT % 80*   LYMPHS PCT % 13*   MONOS PCT % 6   EOS PCT % 0     Results from last 7 days   Lab Units 11/30/23  0441 11/29/23  0442   SODIUM mmol/L 137 137   POTASSIUM mmol/L 4.4 4.0   CHLORIDE mmol/L 103 104   CO2 mmol/L 27 27   BUN mg/dL 21 13   CREATININE mg/dL 0.61 0.52*   ANION GAP mmol/L 7 6   CALCIUM mg/dL 9.7 9.0   ALBUMIN g/dL  --  3.4*   TOTAL BILIRUBIN mg/dL  --  0.51   ALK PHOS U/L  --  65   ALT U/L  --  16   AST U/L  --  11*   GLUCOSE RANDOM mg/dL 215* 193*         Results from last 7 days   Lab Units 11/30/23  1102 11/30/23  0713 11/29/23  2051 11/29/23  1617 11/29/23  1359 11/29/23  1123 11/29/23  0704 11/28/23  2019 11/28/23  1817   POC GLUCOSE mg/dl 285* 181* 249* 187* 248* 254* 185* 360* 312*     Results from last 7 days   Lab Units 11/28/23  1212   HEMOGLOBIN A1C % 6.8*     Results from last 7 days   Lab Units 11/30/23  0441 11/29/23  0442 11/29/23  0004 11/28/23  2032 11/28/23  1745 11/28/23  1452 11/28/23  1212   LACTIC ACID mmol/L  --   --  1.5 4.4* 4.0* 3.0* 2.5*   PROCALCITONIN ng/ml 0.19 0.26*  --   --   --   --  <0.05       Lines/Drains:  Invasive Devices       Peripheral Intravenous Line  Duration             Peripheral IV 11/28/23 Distal;Dorsal (posterior); Left Forearm 1 day                          Imaging: No pertinent imaging reviewed. Recent Cultures (last 7 days):   Results from last 7 days   Lab Units 11/29/23  1805 11/28/23  2032 11/28/23  1658   BLOOD CULTURE  Received in Microbiology Lab. Culture in Progress. Received in Microbiology Lab. Culture in Progress. --  Alpha Hemolytic Strep (Organism type)*  Staphylococcus coagulase negative*  No Growth at 24 hrs.    SPUTUM CULTURE   --  Culture too young- will reincubate  --    GRAM STAIN RESULT   --  1+ Epithelial cells per low power field*  4+ Polys*  3+ Mononuclear Cells*  2+ Gram positive cocci in clusters*  2+ Gram negative rods* Gram positive cocci in pairs and chains*   LEGIONELLA URINARY ANTIGEN   --  Negative  --        Last 24 Hours Medication List:   Current Facility-Administered Medications   Medication Dose Route Frequency Provider Last Rate    acetaminophen  650 mg Oral Q6H PRN [facility] Pen, PA-C      apixaban  5 mg Oral BID [facility] Pen, PA-C      aspirin  81 mg Oral Daily [facility] Pen, PA-C      atorvastatin  20 mg Oral Daily With Dinner [facility], PA-C      azithromycin  500 mg Oral Q24H [facility], PA-C      benzonatate  100 mg Oral TID PRN Reeda Muff, PA-C      budesonide  0.5 mg Nebulization Q12H [facility] Pen, PA-C      cefTRIAXone  2,000 mg Intravenous Q24H Reeda Muff, PA-C 2,000 mg (11/29/23 1617)    cyanocobalamin  500 mcg Oral Daily [facility], PA-C      DULoxetine  60 mg Oral Daily [facility] Pen, PA-C      folic acid  4,487 mcg Oral Daily [facility] Pen, PA-C      guaiFENesin  600 mg Oral BID [facility] Pen, PA-C      insulin glargine  40 Units Subcutaneous QAM [facility] Pen, PA-C      insulin lispro  1-6 Units Subcutaneous TID AC [facility] Pen, PA-C      insulin lispro  1-6 Units Subcutaneous HS [facility] Pen, PA-C      insulin lispro  3 Units Subcutaneous TID With Meals [facility] Pen, PA-C      ipratropium  0.5 mg Nebulization TID Reeda Muff, PA-C      levalbuterol  1.25 mg Nebulization TID [facility] Pen, PA-C      levothyroxine  137 mcg Oral Early Morning [facility] Pen, PA-C      lisinopril  40 mg Oral Daily [facility] Pen, PA-C      methylPREDNISolone sodium succinate  40 mg Intravenous Q8H [facility] Pen, PA-C      mirtazapine  15 mg Oral HS [facility] Pen, PA-C      pantoprazole  40 mg Oral Daily Before Breakfast Elayne Valderrama PA-C      polyethylene glycol  17 g Oral Daily Elayne Valderrama PA-C      sodium chloride (PF)  3 mL Intravenous Q1H PRN Matthew Koenig DO      Tofacitinib Citrate ER  1 tablet Oral Daily Elayne Valderrama PA-C          Today, Patient Was Seen By: Robinson Mohan PA-C    **Please Note: This note may have been constructed using a voice recognition system. **

## 2023-11-30 NOTE — PLAN OF CARE
Problem: PAIN - ADULT  Goal: Verbalizes/displays adequate comfort level or baseline comfort level  Description: Interventions:  - Encourage patient to monitor pain and request assistance  - Assess pain using appropriate pain scale  - Administer analgesics based on type and severity of pain and evaluate response  - Implement non-pharmacological measures as appropriate and evaluate response  - Consider cultural and social influences on pain and pain management  - Notify physician/advanced practitioner if interventions unsuccessful or patient reports new pain  Outcome: Progressing     Problem: INFECTION - ADULT  Goal: Absence or prevention of progression during hospitalization  Description: INTERVENTIONS:  - Assess and monitor for signs and symptoms of infection  - Monitor lab/diagnostic results  - Monitor all insertion sites, i.e. indwelling lines, tubes, and drains  - Monitor endotracheal if appropriate and nasal secretions for changes in amount and color  - [place] appropriate cooling/warming therapies per order  - Administer medications as ordered  - Instruct and encourage patient and family to use good hand hygiene technique  - Identify and instruct in appropriate isolation precautions for identified infection/condition  Outcome: Progressing  Goal: Absence of fever/infection during neutropenic period  Description: INTERVENTIONS:  - Monitor WBC    Outcome: Progressing     Problem: SAFETY ADULT  Goal: Patient will remain free of falls  Description: INTERVENTIONS:  - Educate patient/family on patient safety including physical limitations  - Instruct patient to call for assistance with activity   - Consult OT/PT to assist with strengthening/mobility   - Keep Call bell within reach  - Keep bed low and locked with side rails adjusted as appropriate  - Keep care items and personal belongings within reach  - Initiate and maintain comfort rounds  - Make Fall Risk Sign visible to staff  - Offer Toileting every 2 Hours, To see gynecologist for evaluation   in advance of need  - Initiate/Maintain alarm  - Obtain necessary fall risk management equipment: call bell within reach  - Apply yellow socks and bracelet for high fall risk patients  - Consider moving patient to room near nurses station  Outcome: Progressing  Goal: Maintain or return to baseline ADL function  Description: INTERVENTIONS:  -  Assess patient's ability to carry out ADLs; assess patient's baseline for ADL function and identify physical deficits which impact ability to perform ADLs (bathing, care of mouth/teeth, toileting, grooming, dressing, etc.)  - Assess/evaluate cause of self-care deficits   - Assess range of motion  - Assess patient's mobility; develop plan if impaired  - Assess patient's need for assistive devices and provide as appropriate  - Encourage maximum independence but intervene and supervise when necessary  - Involve family in performance of ADLs  - Assess for home care needs following discharge   - Consider OT consult to assist with ADL evaluation and planning for discharge  - Provide patient education as appropriate  Outcome: Progressing  Goal: Maintains/Returns to pre admission functional level  Description: INTERVENTIONS:  - Perform AM-PAC 6 Click Basic Mobility/ Daily Activity assessment daily.  - Set and communicate daily mobility goal to care team and patient/family/caregiver. - Collaborate with rehabilitation services on mobility goals if consulted  - Perform Range of Motion 2 times a day. - Reposition patient every 2 hours.   - Dangle patient 2 times a day  - Stand patient 2 times a day  - Ambulate patient 2 times a day  - Out of bed to chair 2 times a day   - Out of bed for meals 2 times a day  - Out of bed for toileting  - Record patient progress and toleration of activity level   Outcome: Progressing     Problem: DISCHARGE PLANNING  Goal: Discharge to home or other facility with appropriate resources  Description: INTERVENTIONS:  - Identify barriers to discharge w/patient and caregiver  - Arrange for needed discharge resources and transportation as appropriate  - Identify discharge learning needs (meds, wound care, etc.)  - Arrange for interpretive services to assist at discharge as needed  - Refer to Case Management Department for coordinating discharge planning if the patient needs post-hospital services based on physician/advanced practitioner order or complex needs related to functional status, cognitive ability, or social support system  Outcome: Progressing     Problem: Knowledge Deficit  Goal: Patient/family/caregiver demonstrates understanding of disease process, treatment plan, medications, and discharge instructions  Description: Complete learning assessment and assess knowledge base.   Interventions:  - Provide teaching at level of understanding  - Provide teaching via preferred learning methods  Outcome: Progressing     Problem: RESPIRATORY - ADULT  Goal: Achieves optimal ventilation and oxygenation  Description: INTERVENTIONS:  - Assess for changes in respiratory status  - Assess for changes in mentation and behavior  - Position to facilitate oxygenation and minimize respiratory effort  - Oxygen administered by appropriate delivery if ordered  - Initiate smoking cessation education as indicated  - Encourage broncho-pulmonary hygiene including cough, deep breathe, Incentive Spirometry  - Assess the need for suctioning and aspirate as needed  - Assess and instruct to report SOB or any respiratory difficulty  - Respiratory Therapy support as indicated  Outcome: Progressing     Problem: HEMATOLOGIC - ADULT  Goal: Maintains hematologic stability  Description: INTERVENTIONS  - Assess for signs and symptoms of bleeding or hemorrhage  - Monitor labs  - Administer supportive blood products/factors as ordered and appropriate  Outcome: Progressing     Problem: MUSCULOSKELETAL - ADULT  Goal: Maintain or return mobility to safest level of function  Description: INTERVENTIONS:  - Assess patient's ability to carry out ADLs; assess patient's baseline for ADL function and identify physical deficits which impact ability to perform ADLs (bathing, care of mouth/teeth, toileting, grooming, dressing, etc.)  - Assess/evaluate cause of self-care deficits   - Assess range of motion  - Assess patient's mobility  - Assess patient's need for assistive devices and provide as appropriate  - Encourage maximum independence but intervene and supervise when necessary  - Involve family in performance of ADLs  - Assess for home care needs following discharge   - Consider OT consult to assist with ADL evaluation and planning for discharge  - Provide patient education as appropriate  Outcome: Progressing  Goal: Maintain proper alignment of affected body part  Description: INTERVENTIONS:  - Support, maintain and protect limb and body alignment  - Provide patient/ family with appropriate education  Outcome: Progressing

## 2023-11-30 NOTE — ASSESSMENT & PLAN NOTE
Wt Readings from Last 3 Encounters:   11/30/23 108 kg (238 lb 1.6 oz)   10/25/23 108 kg (237 lb 6.4 oz)   09/20/23 108 kg (238 lb)     Appears compensated, volume status dry   Echo obtained today revealing LVEF 34%, normal diastolic function  Daily weights, I&Os  Hold lasix  Caution with IVFs - monitor volume status

## 2023-11-30 NOTE — ASSESSMENT & PLAN NOTE
Lab Results   Component Value Date    HGBA1C 6.8 (H) 11/28/2023       Recent Labs     11/29/23  1617 11/29/23  2051 11/30/23  0713 11/30/23  1102   POCGLU 187* 249* 181* 285*         Blood Sugar Average: Last 72 hrs:  (P) [id number]  Update A1c  Hold Ozempic  Continue log acting insulin 40 units + Humalog 3 units with meals & SSI   Acu-checks ac/hs

## 2023-11-30 NOTE — PHYSICAL THERAPY NOTE
PHYSICAL THERAPY NOTE          Patient Name: Lana Sims  XPKRY'M Date: 11/30/2023 11/30/23 0859   PT Last Visit   PT Visit Date 11/30/23   Note Type   Note Type Treatment   Pain Assessment   Pain Assessment Tool 0-10   Pain Score No Pain   Restrictions/Precautions   Weight Bearing Precautions Per Order No   Other Precautions Fall Risk;O2   General   Family/Caregiver Present No   Cognition   Overall Cognitive Status [facility]   Arousal/Participation Alert; Cooperative   Following Commands Follows all commands and directions without difficulty   Subjective   Subjective Agreeable to therapy   Bed Mobility   Additional Comments OOB in chair start/end PT session   Transfers   Sit to Stand 5  Supervision   Additional items Armrests; Increased time required   Stand to Sit 5  Supervision   Additional items Armrests; Increased time required   Additional Comments RW used   Ambulation/Elevation   Gait pattern   (Short stride; Foward flexed; Decreased foot clearance; Wide ALEJANDRO)   Gait Assistance 5  Supervision   Additional items Verbal cues   Assistive Device Rolling walker   Distance 125' x 2   Stair Management Assistance 5  Supervision   Additional items Verbal cues   Stair Management Technique Two rails; Step to pattern   Number of Stairs 10   Ambulation/Elevation Additional Comments Provided RW for home and adjusted to proper height   Balance   Static Sitting Good   Dynamic Sitting Good   Static Standing Fair +   Dynamic Standing Fair +   Ambulatory Fair  (RW)   Endurance Deficit   Endurance Deficit Yes   Endurance Deficit Description HR , SPO2 WFL's with 3 L   Activity Tolerance   Activity Tolerance Patient limited by fatigue   Assessment   Prognosis Good   Problem List   (Decreased strength; Decreased endurance;  Impaired balance; Decreased mobility; Obesity)   Assessment Pt. seen for PT treatment session this date with interventions consisting of transfers and  gait training w/ emphasis on improving pt's ability to ambulate. Provided pt. With RW for home and adjusted to proper height,  In comparison to previous session, Pt. With improvements in activity tolerance. Pt is in need of continued activity in PT to improve strength balance endurance mobility transfers and ambulation with return to maximize LOF. From PT/mobility standpoint, recommendation at time of d/c would be Level I: Min resource intensity  in order to promote return to PLOF and independence. The patient's AM-[facility] Basic Mobility Inpatient Short Form Raw Score is 21 A Raw score of greater than 16 suggests the patient may benefit from discharge to home. Please also refer to physical therapy recommendation for safe DC planning. Goals   LTG Expiration Date 12/13/23   PT Treatment Day 1   Plan   Treatment/Interventions Functional transfer training;LE strengthening/ROM; Therapeutic exercise; Endurance training;Bed mobility;Gait training   Progress Progressing toward goals   PT Frequency 3-5x/wk   Discharge Recommendation   Rehab Resource Intensity Level, PT III (Minimum Resource Intensity)   AM-PAC Basic Mobility Inpatient   Turning in Flat Bed Without Bedrails 4   Lying on Back to Sitting on Edge of Flat Bed Without Bedrails 3   Moving Bed to Chair 4   Standing Up From Chair Using Arms 4   Walk in Room 3   Climb 3-5 Stairs With Railing 3   Basic Mobility Inpatient Raw Score 21   Basic Mobility Standardized Score 45.55   Highest Level Of Mobility   -[facility] Goal 6: Walk 10 steps or more

## 2023-11-30 NOTE — PLAN OF CARE
Problem: OCCUPATIONAL THERAPY ADULT  Goal: Performs self-care activities at highest level of function for planned discharge setting. See evaluation for individualized goals. Description: Treatment Interventions: ADL retraining, UE strengthening/ROM, Functional transfer training, Endurance training, Patient/family training, Equipment evaluation/education, Activityengagement          See flowsheet documentation for full assessment, interventions and recommendations. Outcome: Progressing  Note: Limitation: Decreased ADL status, Decreased UE strength, Decreased endurance, Decreased self-care trans, Decreased high-level ADLs     Assessment: Pt was seen this date for OT tx session focusing on self care tasks, sit to stand progressions, standing tolerance, tranfers, functional mobility, safety awareness, compensatory techniques, energy conservation, and overall activity tolerance. Pt presents OOB in chair and is agreeable to OT session. Pt completes previously mentioned tasks at documented assist levels please see above in flow sheet. Pt continues to require overall S for transfers and mobility and S for self care tasks. Pt is overall limited by SOB/GEORGE, increased fatigue, decreased strength, endurance, and activity tolerance and continues to function below baseline level. Pt resting OOB in chair at end of session with all needs in reach and alarm on. Pt would benefit from continued OT Tx to improve overall functional abilities and increase independence. Will continue to follow with current POC.      Rehab Resource Intensity Level, OT: III (Minimum Resource Intensity)

## 2023-12-01 LAB
ANION GAP SERPL CALCULATED.3IONS-SCNC: 8 MMOL/L
BACTERIA BLD CULT: ABNORMAL
BASOPHILS # BLD AUTO: 0.02 THOUSANDS/ÂΜL (ref 0–0.1)
BASOPHILS NFR BLD AUTO: 0 % (ref 0–1)
BUN SERPL-MCNC: 22 MG/DL (ref 5–25)
CALCIUM SERPL-MCNC: 10.1 MG/DL (ref 8.4–10.2)
CHLORIDE SERPL-SCNC: 102 MMOL/L (ref 96–108)
CO2 SERPL-SCNC: 27 MMOL/L (ref 21–32)
CREAT SERPL-MCNC: 0.64 MG/DL (ref 0.6–1.3)
EOSINOPHIL # BLD AUTO: 0 THOUSAND/ÂΜL (ref 0–0.61)
EOSINOPHIL NFR BLD AUTO: 0 % (ref 0–6)
ERYTHROCYTE [DISTWIDTH] IN BLOOD BY AUTOMATED COUNT: 14.6 % (ref 11.6–15.1)
GFR SERPL CREATININE-BSD FRML MDRD: 87 ML/MIN/1.73SQ M
GLUCOSE SERPL-MCNC: 191 MG/DL (ref 65–140)
GLUCOSE SERPL-MCNC: 194 MG/DL (ref 65–140)
GLUCOSE SERPL-MCNC: 213 MG/DL (ref 65–140)
GLUCOSE SERPL-MCNC: 235 MG/DL (ref 65–140)
GLUCOSE SERPL-MCNC: 292 MG/DL (ref 65–140)
GRAM STN SPEC: ABNORMAL
HCT VFR BLD AUTO: 32.7 % (ref 34.8–46.1)
HGB BLD-MCNC: 10.5 G/DL (ref 11.5–15.4)
IMM GRANULOCYTES # BLD AUTO: 0.16 THOUSAND/UL (ref 0–0.2)
IMM GRANULOCYTES NFR BLD AUTO: 2 % (ref 0–2)
LYMPHOCYTES # BLD AUTO: 1.08 THOUSANDS/ÂΜL (ref 0.6–4.47)
LYMPHOCYTES NFR BLD AUTO: 12 % (ref 14–44)
MCH RBC QN AUTO: 30.8 PG (ref 26.8–34.3)
MCHC RBC AUTO-ENTMCNC: 32.1 G/DL (ref 31.4–37.4)
MCV RBC AUTO: 96 FL (ref 82–98)
MECA+MECC ISLT/SPM QL: DETECTED
MONOCYTES # BLD AUTO: 0.6 THOUSAND/ÂΜL (ref 0.17–1.22)
MONOCYTES NFR BLD AUTO: 6 % (ref 4–12)
NEUTROPHILS # BLD AUTO: 7.45 THOUSANDS/ÂΜL (ref 1.85–7.62)
NEUTS SEG NFR BLD AUTO: 80 % (ref 43–75)
NRBC BLD AUTO-RTO: 0 /100 WBCS
PLATELET # BLD AUTO: 394 THOUSANDS/UL (ref 149–390)
PMV BLD AUTO: 10.4 FL (ref 8.9–12.7)
POTASSIUM SERPL-SCNC: 4.5 MMOL/L (ref 3.5–5.3)
RBC # BLD AUTO: 3.41 MILLION/UL (ref 3.81–5.12)
S EPIDERMIDIS DNA BLD POS QL NAA+NON-PRB: DETECTED
SODIUM SERPL-SCNC: 137 MMOL/L (ref 135–147)
STREPTOCOCCUS DNA BLD POS NAA+NON-PROBE: DETECTED
WBC # BLD AUTO: 9.31 THOUSAND/UL (ref 4.31–10.16)

## 2023-12-01 PROCEDURE — 94640 AIRWAY INHALATION TREATMENT: CPT

## 2023-12-01 PROCEDURE — 94668 MNPJ CHEST WALL SBSQ: CPT

## 2023-12-01 PROCEDURE — 97110 THERAPEUTIC EXERCISES: CPT

## 2023-12-01 PROCEDURE — G0408 INPT/TELE FOLLOW UP 35: HCPCS | Performed by: INTERNAL MEDICINE

## 2023-12-01 PROCEDURE — 80048 BASIC METABOLIC PNL TOTAL CA: CPT

## 2023-12-01 PROCEDURE — 94760 N-INVAS EAR/PLS OXIMETRY 1: CPT

## 2023-12-01 PROCEDURE — 97116 GAIT TRAINING THERAPY: CPT

## 2023-12-01 PROCEDURE — 82948 REAGENT STRIP/BLOOD GLUCOSE: CPT

## 2023-12-01 PROCEDURE — 99233 SBSQ HOSP IP/OBS HIGH 50: CPT | Performed by: PHYSICIAN ASSISTANT

## 2023-12-01 PROCEDURE — 94664 DEMO&/EVAL PT USE INHALER: CPT

## 2023-12-01 PROCEDURE — 85025 COMPLETE CBC W/AUTO DIFF WBC: CPT

## 2023-12-01 RX ORDER — METHYLPREDNISOLONE SODIUM SUCCINATE 40 MG/ML
40 INJECTION, POWDER, LYOPHILIZED, FOR SOLUTION INTRAMUSCULAR; INTRAVENOUS EVERY 12 HOURS SCHEDULED
Status: DISCONTINUED | OUTPATIENT
Start: 2023-12-01 | End: 2023-12-02 | Stop reason: HOSPADM

## 2023-12-01 RX ADMIN — IPRATROPIUM BROMIDE 0.5 MG: 0.5 SOLUTION RESPIRATORY (INHALATION) at 07:30

## 2023-12-01 RX ADMIN — POLYETHYLENE GLYCOL 3350 17 G: 17 POWDER, FOR SOLUTION ORAL at 08:05

## 2023-12-01 RX ADMIN — GUAIFENESIN 600 MG: 600 TABLET ORAL at 17:26

## 2023-12-01 RX ADMIN — IPRATROPIUM BROMIDE 0.5 MG: 0.5 SOLUTION RESPIRATORY (INHALATION) at 19:31

## 2023-12-01 RX ADMIN — PANTOPRAZOLE SODIUM 40 MG: 40 TABLET, DELAYED RELEASE ORAL at 06:27

## 2023-12-01 RX ADMIN — INSULIN LISPRO 3 UNITS: 100 INJECTION, SOLUTION INTRAVENOUS; SUBCUTANEOUS at 17:25

## 2023-12-01 RX ADMIN — LISINOPRIL 40 MG: 20 TABLET ORAL at 08:05

## 2023-12-01 RX ADMIN — IPRATROPIUM BROMIDE 0.5 MG: 0.5 SOLUTION RESPIRATORY (INHALATION) at 14:06

## 2023-12-01 RX ADMIN — DULOXETINE HYDROCHLORIDE 60 MG: 30 CAPSULE, DELAYED RELEASE ORAL at 08:05

## 2023-12-01 RX ADMIN — BUDESONIDE 0.5 MG: 0.5 SUSPENSION RESPIRATORY (INHALATION) at 19:31

## 2023-12-01 RX ADMIN — LEVALBUTEROL HYDROCHLORIDE 1.25 MG: 1.25 SOLUTION RESPIRATORY (INHALATION) at 07:30

## 2023-12-01 RX ADMIN — TOFACITINIB 11 MG: 11 TABLET, FILM COATED, EXTENDED RELEASE ORAL at 08:04

## 2023-12-01 RX ADMIN — GUAIFENESIN 600 MG: 600 TABLET ORAL at 08:05

## 2023-12-01 RX ADMIN — INSULIN LISPRO 2 UNITS: 100 INJECTION, SOLUTION INTRAVENOUS; SUBCUTANEOUS at 08:05

## 2023-12-01 RX ADMIN — INSULIN LISPRO 3 UNITS: 100 INJECTION, SOLUTION INTRAVENOUS; SUBCUTANEOUS at 11:31

## 2023-12-01 RX ADMIN — FOLIC ACID 1000 MCG: 1 TABLET ORAL at 08:05

## 2023-12-01 RX ADMIN — ATORVASTATIN CALCIUM 20 MG: 10 TABLET, FILM COATED ORAL at 17:26

## 2023-12-01 RX ADMIN — LEVALBUTEROL HYDROCHLORIDE 1.25 MG: 1.25 SOLUTION RESPIRATORY (INHALATION) at 14:06

## 2023-12-01 RX ADMIN — MIRTAZAPINE 15 MG: 15 TABLET, FILM COATED ORAL at 21:32

## 2023-12-01 RX ADMIN — CYANOCOBALAMIN TAB 500 MCG 500 MCG: 500 TAB at 08:05

## 2023-12-01 RX ADMIN — ASPIRIN 81 MG: 81 TABLET, COATED ORAL at 08:05

## 2023-12-01 RX ADMIN — INSULIN GLARGINE 40 UNITS: 100 INJECTION, SOLUTION SUBCUTANEOUS at 08:04

## 2023-12-01 RX ADMIN — INSULIN LISPRO 2 UNITS: 100 INJECTION, SOLUTION INTRAVENOUS; SUBCUTANEOUS at 21:30

## 2023-12-01 RX ADMIN — LEVOTHYROXINE SODIUM 137 MCG: 25 TABLET ORAL at 06:27

## 2023-12-01 RX ADMIN — INSULIN LISPRO 3 UNITS: 100 INJECTION, SOLUTION INTRAVENOUS; SUBCUTANEOUS at 08:04

## 2023-12-01 RX ADMIN — LEVALBUTEROL HYDROCHLORIDE 1.25 MG: 1.25 SOLUTION RESPIRATORY (INHALATION) at 19:31

## 2023-12-01 RX ADMIN — INSULIN LISPRO 2 UNITS: 100 INJECTION, SOLUTION INTRAVENOUS; SUBCUTANEOUS at 17:26

## 2023-12-01 RX ADMIN — CEFTRIAXONE 2000 MG: 2 INJECTION, SOLUTION INTRAVENOUS at 17:25

## 2023-12-01 RX ADMIN — APIXABAN 5 MG: 5 TABLET, FILM COATED ORAL at 08:05

## 2023-12-01 RX ADMIN — APIXABAN 5 MG: 5 TABLET, FILM COATED ORAL at 17:26

## 2023-12-01 RX ADMIN — INSULIN LISPRO 4 UNITS: 100 INJECTION, SOLUTION INTRAVENOUS; SUBCUTANEOUS at 11:32

## 2023-12-01 RX ADMIN — METHYLPREDNISOLONE SODIUM SUCCINATE 40 MG: 40 INJECTION, POWDER, FOR SOLUTION INTRAMUSCULAR; INTRAVENOUS at 21:31

## 2023-12-01 RX ADMIN — BUDESONIDE 0.5 MG: 0.5 SUSPENSION RESPIRATORY (INHALATION) at 08:40

## 2023-12-01 RX ADMIN — METHYLPREDNISOLONE SODIUM SUCCINATE 40 MG: 40 INJECTION, POWDER, FOR SOLUTION INTRAMUSCULAR; INTRAVENOUS at 06:27

## 2023-12-01 NOTE — ASSESSMENT & PLAN NOTE
SIRS: tachycardia, tachypnea, leukocytosis  Organ dysfunction: lactic acidosis now resolved after IVF hydration   Source: RLL PNA  Continue Rocephin day #4  Give 1L NS only given diastolic HF  1/2 BC positive for Streptococcus parasanguinis, repeat negative x24h  Afebrile, no leukocytosis, hemodynamically stable

## 2023-12-01 NOTE — ASSESSMENT & PLAN NOTE
1/2 BCx on admit Streptococcus parasanguinis  Sputum culture Haemophilus Inefluenzae   Continue Rocephin  Repeat cultures negative x24h  Echo without evidence of vegetation   Appreciate infectious disease consult

## 2023-12-01 NOTE — ASSESSMENT & PLAN NOTE
BP is elevated in setting of acute infection and steroid use  Will wean steroids today 12/1  Continue lisinopril, add hydralazine IV prn persistent SBP >160   Resume lasix 40 mg daily

## 2023-12-01 NOTE — PLAN OF CARE
Problem: PHYSICAL THERAPY ADULT  Goal: Performs mobility at highest level of function for planned discharge setting. See evaluation for individualized goals. Description: Treatment/Interventions: Functional transfer training, LE strengthening/ROM, Elevations, Therapeutic exercise, Endurance training, Gait training, Bed mobility  Equipment Recommended: Walker       See flowsheet documentation for full assessment, interventions and recommendations. Outcome: Progressing  Note: Prognosis: Good  Problem List:  (Decreased strength; Decreased endurance; Impaired balance; Decreased mobility; Obesity)  Assessment: Pt. seen for PT treatment session this date with interventions consisting of  therapeutic exercises, bed mobility, transfers and  gait training w/ emphasis on improving pt's ability to ambulate. In comparison to previous session, Pt. With improvements in activity tolerance. Limited by fatigue and decreased endurance. Pt is in need of continued activity in PT to improve strength balance endurance mobility transfers and ambulation with return to maximize LOF. From PT/mobility standpoint, recommendation at time of d/c would be level III:Min resource intensity in order to promote return to PLOF and independence. The patient's AM-PAC Basic Mobility Inpatient Short Form Raw Score is 23. A Raw score of greater than 16 suggests the patient may benefit from discharge to home. Please also refer to physical therapy recommendation for safe DC planning. Rehab Resource Intensity Level, PT: III (Minimum Resource Intensity)    See flowsheet documentation for full assessment.

## 2023-12-01 NOTE — PHYSICAL THERAPY NOTE
PHYSICAL THERAPY NOTE          Patient Name: Francisco LIY Date: 12/1/2023 12/01/23 1033   PT Last Visit   PT Visit Date 12/01/23   Note Type   Note Type Treatment   Pain Assessment   Pain Assessment Tool 0-10   Pain Score No Pain   Restrictions/Precautions   Weight Bearing Precautions Per Order No   Other Precautions O2   General   Response to Previous Treatment Patient reporting fatigue but able to participate. Family/Caregiver Present No   Cognition   Overall Cognitive Status WFL   Arousal/Participation Alert; Cooperative   Following Commands Follows all commands and directions without difficulty   Subjective   Subjective Pt. would like to ambulate   Bed Mobility   Additional Comments OOB in chair sart/end PT session   Transfers   Sit to Stand 6  Modified independent   Additional items Armrests   Stand to Sit 6  Modified independent   Additional items Armrests   Additional Comments RW   Ambulation/Elevation   Gait pattern   (Short stride; Foward flexed; Decreased foot clearance; Wide LEOLA)   Gait Assistance 6  Modified independent   Assistive Device Rolling walker   Distance 200'   Balance   Static Sitting Good   Dynamic Sitting Good   Static Standing Fair +   Dynamic Standing Fair +   Ambulatory Fair  (RW)   Endurance Deficit   Endurance Deficit Yes   Endurance Deficit Description -130 with ambulation, mild SOB. SpO2 WFL's with 3LO2   Activity Tolerance   Activity Tolerance Patient limited by fatigue   Exercises   Hip Flexion Sitting;20 reps   Hip Abduction Sitting;20 reps   Hip Adduction Sitting;20 reps   Knee AROM Long Arc Quad Sitting;20 reps   Ankle Pumps Sitting;20 reps   Assessment   Prognosis Good   Problem List   (Decreased strength; Decreased endurance;  Impaired balance; Decreased mobility; Obesity)   Assessment Pt. seen for PT treatment session this date with interventions consisting of  therapeutic exercises, bed mobility, transfers and  gait training w/ emphasis on improving pt's ability to ambulate. In comparison to previous session, Pt. With improvements in activity tolerance. Limited by fatigue and decreased endurance. Pt is in need of continued activity in PT to improve strength balance endurance mobility transfers and ambulation with return to maximize LOF. From PT/mobility standpoint, recommendation at time of d/c would be level III:Min resource intensity in order to promote return to PLOF and independence. The patient's AM-PAC Basic Mobility Inpatient Short Form Raw Score is 23. A Raw score of greater than 16 suggests the patient may benefit from discharge to home. Please also refer to physical therapy recommendation for safe DC planning. Goals   LTG Expiration Date 12/13/23   PT Treatment Day 2   Plan   Treatment/Interventions Functional transfer training;LE strengthening/ROM; Elevations; Therapeutic exercise; Endurance training;Bed mobility;Gait training   Progress Progressing toward goals   PT Frequency 3-5x/wk   Discharge Recommendation   Rehab Resource Intensity Level, PT III (Minimum Resource Intensity)   AM-PAC Basic Mobility Inpatient   Turning in Flat Bed Without Bedrails 4   Lying on Back to Sitting on Edge of Flat Bed Without Bedrails 4   Moving Bed to Chair 4   Standing Up From Chair Using Arms 4   Walk in Room 4   Climb 3-5 Stairs With Railing 3   Basic Mobility Inpatient Raw Score 23   Basic Mobility Standardized Score 50.88   Highest Level Of Mobility   JH-HLM Goal 7: Walk 25 feet or more   JH-HLM Achieved 7: Walk 25 feet or more   Education   Education Provided Mobility training   Patient Demonstrates verbal understanding   End of Consult   Patient Position at End of Consult Bedside chair;Bed/Chair alarm activated; All needs within reach   End of Consult Comments discussed POC with PT No costovertebral angle tenderness

## 2023-12-01 NOTE — RESPIRATORY THERAPY NOTE
12/01/23 0733   Inhalation Therapy Tx   $ Inhalation Therapy Performed Yes   $ Pulse Oximetry Spot Check Charge Completed   SpO2 99 %  (3)   Pre-Treatment Pulse 81   Pre-Treatment Respirations 20   Duration 15   Breath Sounds Pre-Treatment Bilateral Diminished   Breath Sounds Pre-Treatment Right Rhonchi  (cleared with cough)   Breath Sounds Post-Treatment Bilateral Diminished   Post-Treatment Pulse 80   Post-Treatment Respirations 20   Delivery Source Air;UDN   Position Sitting   Treatment Tolerance Tolerated well   Resp Comments xop/atr

## 2023-12-01 NOTE — PROGRESS NOTES
[address]  Progress Note  Name: Victoria Murphy  MRN: [id number]  Unit/Bed#: 755-20 I Date of Admission: 11/28/2023   Date of Service: 12/1/2023 I Hospital Day: 3    Assessment/Plan   * Community acquired pneumonia  Assessment & Plan  73 yo female presents with worsening cough, wheezing, dyspnea x 5 days  CTA chest: GGO in RML/RLL, infectious/inflammatory etiology  Stable on home 3L NC  Respiratory protocol   Non-severe CAP, DIP 4  Continue Rocephin 2g q24 per pathway  Discontinued azithromycin given negative legionella  Antitussives as needed  Incentive spirometer, flutter valve if needed       Severe sepsis (HCC)  Assessment & Plan  SIRS: tachycardia, tachypnea, leukocytosis  Organ dysfunction: lactic acidosis now resolved after IVF hydration   Source: RLL PNA  Continue Rocephin day #4  Give 1L NS only given diastolic HF  1/2 BC positive for Streptococcus parasanguinis, repeat negative x24h  Afebrile, no leukocytosis, hemodynamically stable     Chronic respiratory failure with hypoxia (HCC)  Assessment & Plan  Stable on baseline 3L NC  Monitor     COPD exacerbation (HCC)  Assessment & Plan  Solu-Medrol 40 q8h - wean to q12h today 12/1/23  Xopenex/atrovent nebs tid, Pulmicort q12h  Sputum culture - Haemophilus Influenzae  Mucinex, pulmonary toilet, respiratory protocol    Rheumatoid arthritis ([address])  Assessment & Plan  Continue Aniya Lightning (brought from home)  Takes methotrexate q Sundays   Discussed with patient recommendation to HOLD all DMARDs in setting of active infection     Type 2 diabetes mellitus with hyperglycemia, with long-term current use of insulin (HCC)  Assessment & Plan  Blood Sugar Average Last 72 hrs:(P) [phone]  A1c 6.8%  Hold Ozempic inpatient   Continue long acting insulin 40 units + Humalog 3 units with meals & SSI   Acu-checks ACHS  Hyperglycemia in setting of steroid use    Positive blood culture  Assessment & Plan  1/2 BCx on admit Streptococcus parasanguinis  Sputum culture Haemophilus Inefluenzae   Continue Rocephin  Repeat cultures negative x24h  Echo without evidence of vegetation   Appreciate infectious disease consult    History of pulmonary embolus (PE)  Assessment & Plan  Continue Eliquis    Vertigo  Assessment & Plan  Reports episodic vertigo with movement & head movement since Thanksgiving  Hx c/w BPPV   Less likely vestibular neuritis - however is being treated with IV steroids for COPD exacerbation which should help this   Orthostatics negative  Meclizine prn  PT/OT      Chronic diastolic (congestive) heart failure (HCC)  Assessment & Plan  Appears compensated, volume status dry   Echo obtained today revealing LVEF 59%, normal diastolic function  Daily weights, I&Os  Resume lasix 12/1        Essential hypertension  Assessment & Plan  BP is elevated in setting of acute infection and steroid use  Will wean steroids today 12/1  Continue lisinopril, add hydralazine IV prn persistent SBP >160   Resume lasix 40 mg daily     Hypothyroidism  Assessment & Plan  Continue levothyroxine    Hypomagnesemia-resolved as of 12/1/2023  Assessment & Plan  Resolved with repletion  Monitor            VTE Pharmacologic Prophylaxis: VTE Score: 13 High Risk (Score >/= 5) - Pharmacological DVT Prophylaxis Ordered: apixaban (Eliquis). Sequential Compression Devices Ordered. Mobility:   Basic Mobility Inpatient Raw Score: 21  JH-HLM Goal: 6: Walk 10 steps or more  JH-HLM Achieved: 8: Walk 250 feet ot more  HLM Goal achieved. Continue to encourage appropriate mobility. Patient Centered Rounds: I performed bedside rounds with nursing staff today. Discussions with Specialists or Other Care Team Provider: SUKHI SHIPLEY     Education and Discussions with Family / Patient: Updated  (granddaughter, Jose Ill) via phone. Total Time Spent on Date of Encounter in care of patient: 60 mins.  This time was spent on one or more of the following: performing physical exam; counseling and coordination of care; obtaining or reviewing history; documenting in the medical record; reviewing/ordering tests, medications or procedures; communicating with other healthcare professionals and discussing with patient's family/caregivers. Current Length of Stay: 3 day(s)  Current Patient Status: Inpatient   Certification Statement: The patient will continue to require additional inpatient hospital stay due to ongoing need for IV treatments  Discharge Plan: Anticipate discharge in 24-48 hrs to home with home services. Code Status: Level 1 - Full Code    Subjective:   Patient seen OOB to the chair, she does admit she feels better but still not great. Worked with therapy and got winded, but did ambulate in the hallway. No fevers or chills. Remains on her home O2 supplemental level. I reviewed with her the DMARDs and holding these while acutely ill, requiring antibiotics. She was unaware of this recommendation. Advised her to follow up with rheumatology outpatient but could restart these medications one antibiotic course completed. Objective:     Vitals:   Temp (24hrs), Av.6 °F (36.4 °C), Min:97.4 °F (36.3 °C), Max:97.8 °F (36.6 °C)    Temp:  [97.4 °F (36.3 °C)-97.8 °F (36.6 °C)] 97.4 °F (36.3 °C)  HR:  [] 63  Resp:  [16-18] 16  BP: (139-178)/(60-68) 178/68  SpO2:  [92 %-99 %] 99 %  Body mass index is 39.41 kg/m². Input and Output Summary (last 24 hours): Intake/Output Summary (Last 24 hours) at 2023 0905  Last data filed at 2023 0600  Gross per 24 hour   Intake 360 ml   Output 2750 ml   Net -2390 ml       Physical Exam:   Physical Exam  Vitals and nursing note reviewed. Constitutional:       General: She is not in acute distress. Appearance: Normal appearance. She is obese. She is ill-appearing. She is not toxic-appearing. Cardiovascular:      Rate and Rhythm: Normal rate and regular rhythm. Heart sounds: Normal heart sounds.    Pulmonary: Effort: Pulmonary effort is normal. No respiratory distress. Breath sounds: Wheezing (diffuse) and rhonchi (diffuse) present. Comments: No conversational dyspnea  Abdominal:      General: Bowel sounds are normal. There is no distension. Palpations: Abdomen is soft. Skin:     General: Skin is warm and dry. Coloration: Skin is not pale. Neurological:      Mental Status: She is alert and oriented to person, place, and time. Psychiatric:         Mood and Affect: Mood normal.         Behavior: Behavior normal.           Additional Data:     Labs:  Results from last 7 days   Lab Units 12/01/23 0457   WBC Thousand/uL 9.31   HEMOGLOBIN g/dL 10.5*   HEMATOCRIT % 32.7*   PLATELETS Thousands/uL 394*   NEUTROS PCT % 80*   LYMPHS PCT % 12*   MONOS PCT % 6   EOS PCT % 0     Results from last 7 days   Lab Units 12/01/23 0457 11/30/23 0441 11/29/23  0442   SODIUM mmol/L 137   < > 137   POTASSIUM mmol/L 4.5   < > 4.0   CHLORIDE mmol/L 102   < > 104   CO2 mmol/L 27   < > 27   BUN mg/dL 22   < > 13   CREATININE mg/dL 0.64   < > 0.52*   ANION GAP mmol/L 8   < > 6   CALCIUM mg/dL 10.1   < > 9.0   ALBUMIN g/dL  --   --  3.4*   TOTAL BILIRUBIN mg/dL  --   --  0.51   ALK PHOS U/L  --   --  65   ALT U/L  --   --  16   AST U/L  --   --  11*   GLUCOSE RANDOM mg/dL 235*   < > 193*    < > = values in this interval not displayed.          Results from last 7 days   Lab Units 12/01/23  0717 11/30/23  2024 11/30/23  1518 11/30/23  1102 11/30/23  0713 11/29/23  2051 11/29/23  1617 11/29/23  1359 11/29/23  1123 11/29/23  0704 11/28/23  2019 11/28/23  1817   POC GLUCOSE mg/dl 191* 242* 202* 285* 181* 249* 187* 248* 254* 185* 360* 312*     Results from last 7 days   Lab Units 11/28/23  1212   HEMOGLOBIN A1C % 6.8*     Results from last 7 days   Lab Units 11/30/23  0441 11/29/23  0442 11/29/23  0004 11/28/23  2032 11/28/23  1745 11/28/23  1452 11/28/23  1212   LACTIC ACID mmol/L  --   --  1.5 4.4* 4.0* 3.0* 2.5* PROCALCITONIN ng/ml 0.19 0.26*  --   --   --   --  <0.05       Lines/Drains:  Invasive Devices       Peripheral Intravenous Line  Duration             Peripheral IV 11/28/23 Distal;Dorsal (posterior); Left Forearm 2 days                          Imaging: No pertinent imaging reviewed. Recent Cultures (last 7 days):   Results from last 7 days   Lab Units 11/29/23  1805 11/28/23  2032 11/28/23  1658   BLOOD CULTURE  No Growth at 24 hrs. No Growth at 24 hrs.  --  No Growth at 48 hrs.   Streptococcus parasanguinis*   SPUTUM CULTURE   --  2+ Growth of Haemophilus influenzae*  --    GRAM STAIN RESULT   --  1+ Epithelial cells per low power field*  4+ Polys*  3+ Mononuclear Cells*  2+ Gram positive cocci in clusters*  2+ Gram negative rods* Gram positive cocci in pairs and chains*   LEGIONELLA URINARY ANTIGEN   --  Negative  --        Last 24 Hours Medication List:   Current Facility-Administered Medications   Medication Dose Route Frequency Provider Last Rate    acetaminophen  650 mg Oral Q6H PRN Elayne Zhouel Levo, PA-C      apixaban  5 mg Oral BID Elayne Zhouel Levo, PA-C      aspirin  81 mg Oral Daily Elayne Abelardoel Levo, PA-C      atorvastatin  20 mg Oral Daily With Dinner Elayne Murrell Levo, PA-C      benzonatate  100 mg Oral TID PRN Palak Sera PA-C      budesonide  0.5 mg Nebulization Q12H Elayne Zhouel Levo, PA-C      cefTRIAXone  2,000 mg Intravenous Q24H FAITH Negro-C 2,000 mg (11/30/23 1719)    cyanocobalamin  500 mcg Oral Daily Elayne Hassel Levo, PA-C      DULoxetine  60 mg Oral Daily Elayne Hassel Levo, PA-C      folic acid  5,374 mcg Oral Daily Elayne Hassel Levo, PA-C      guaiFENesin  600 mg Oral BID Elayne Zhouel Levo, PA-C      insulin glargine  40 Units Subcutaneous QAM Elayne Murrell Levo, PA-C      insulin lispro  1-6 Units Subcutaneous TID AC Elayne Murrell Levo, PA-C      insulin lispro  1-6 Units Subcutaneous HS Elayne Michalene Pain, PA-C      insulin lispro  3 Units Subcutaneous TID With Meals Elayne Michalene Pain, PA-C      ipratropium  0.5 mg Nebulization TID [place], PA-C      levalbuterol  1.25 mg Nebulization TID Elayne Michalene Pain, PA-C      levothyroxine  137 mcg Oral Early Morning Elayne Michalene Pain, PA-C      lisinopril  40 mg Oral Daily Elayne Michalene Pain, PA-C      methylPREDNISolone sodium succinate  40 mg Intravenous Q8H Elayne Michalene Pain, PA-C      mirtazapine  15 mg Oral HS Elayne Michalene Pain, PA-C      pantoprazole  40 mg Oral Daily Before Breakfast Elayne Michalene Pain, PA-C      polyethylene glycol  17 g Oral Daily Elayne Michalene Pain, PA-C      sodium chloride (PF)  3 mL Intravenous Q1H PRN Giuliana Mancia, DO          Today, Patient Was Seen By: Nadir Lind PA-C    **Please Note: This note may have been constructed using a voice recognition system. **

## 2023-12-01 NOTE — ASSESSMENT & PLAN NOTE
75 yo female presents with worsening cough, wheezing, dyspnea x 5 days  CTA chest: GGO in RML/RLL, infectious/inflammatory etiology  Stable on home 3L NC  Respiratory protocol   Non-severe CAP, DIP 4  Continue Rocephin 2g q24 per pathway  Discontinued azithromycin given negative legionella  Antitussives as needed  Incentive spirometer, flutter valve if needed

## 2023-12-01 NOTE — ASSESSMENT & PLAN NOTE
Solu-Medrol 40 q8h - wean to q12h today 12/1/23  Xopenex/atrovent nebs tid, Pulmicort q12h  Sputum culture - Haemophilus Influenzae  Mucinex, pulmonary toilet, respiratory protocol

## 2023-12-01 NOTE — ASSESSMENT & PLAN NOTE
Continue Aristeo Massey (brought from home)  Takes methotrexate q Sundays   Discussed with patient recommendation to HOLD all DMARDs in setting of active infection

## 2023-12-01 NOTE — PROGRESS NOTES
REQUIRED DOCUMENTATION:     1. This service was provided via Telemedicine. 2. Provider located at [address] [place]. 3. TeleMed provider: Gray Edgar MD.  4. Identify all parties in room with patient during tele consult:  Nobody  5. After connecting through [employer], patient was identified by name and date of birth and assistant checked wristband. Patient was then informed that this was a Telemedicine visit and that the exam was being conducted confidentially over secure lines. My office door was closed. No one else was in the room. Patient acknowledged consent and understanding of privacy and security of the Telemedicine visit, and gave us permission to have the assistant stay in the room in order to assist with the history and to conduct the exam.  I informed the patient that I have reviewed their record in Epic and presented the opportunity for them to ask any questions regarding the visit today. The patient agreed to participate. Progress Note - Infectious Disease   Maritza Seaman 74 y.o. female MRN: [id number]  Unit/Bed#: 404-01 Encounter: [id number]      Impression/Plan:  1. Severe sepsis. Present on admission with leukocytosis, tachypnea, tachycardia. Appears to be secondary to a respiratory infection with COPD exacerbation. Source appreciated. Suspect the positive blood cultures are more likely contaminant with 2 different species in 1 set. Patient is clinically improved and seems to be tolerating the antibiotics without difficulty.  -Antibiotics as below  -Follow-up cultures and adjust antibiotics as needed  -Recheck CBC with differential and CMP  -Supportive care     2. COPD exacerbation. Haemophilus influenza respiratory infection. With possible very early pneumonia versus bronchitis.   Clinically improved with antibiotics, steroids and breathing treatments.  -Continue ceftriaxone through tomorrow to complete 5 days total treatment  -Steroids and breathing treatments  -Monitor respiratory status     3. Gram-positive bacteremia. Suspect this represents a contaminant with 1 set positive with least 2 different organisms. Transthoracic echocardiogram without valvular vegetation appreciated. Repeat blood cultures negative thus far.  -Antibiotics as above for now  -Follow-up final culture results  -Follow-up repeat blood cultures     4. Chronic hypoxic respiratory failure. In the setting of advanced COPD  -Oxygen support  -Monitor respiratory status     5. Diabetes mellitus. Type II. With hyperglycemia and long-term insulin use. The most recent hemoglobin A1c of 8.3. Certainly a risk factor for recurrent infection  -Tighten diabetic control     6. Rheumatoid arthritis. On [place] Guardian and methotrexate    Discussed the above management plan with the primary service who agrees with the plan    I spent 50 minutes in evaluation of the patient of which 30 minutes was in counseling/coordination of care    Antibiotics:  Ceftriaxone 4    Subjective:  Patient has no fever, chills, sweats; no nausea, vomiting, diarrhea; no increased cough, shortness of breath; no pain. No new symptoms. Still requiring oxygen support. Objective:  Vitals:  Temp:  [97.4 °F (36.3 °C)-97.8 °F (36.6 °C)] 97.4 °F (36.3 °C)  HR:  [] 63  Resp:  [16-18] 16  BP: (139-178)/(60-68) 178/68  SpO2:  [92 %-99 %] 99 %  Temp (24hrs), Av.6 °F (36.4 °C), Min:97.4 °F (36.3 °C), Max:97.8 °F (36.6 °C)  Current: Temperature: (!) 97.4 °F (36.3 °C)    Documented physical exam has been primarily done by the patient's nurse and/or the primary service due to limited examination abilities on telemedicine    Physical Exam:   General Appearance:  Alert, interactive, nontoxic, no acute distress. Throat: Oropharynx moist without lesions.     Lungs:   Clear to auscultation bilaterally; no wheezes, rhonchi or rales; respirations unlabored   Heart:  RRR; no murmur, rub or gallop   Abdomen:   Soft, non-tender, non-distended, positive bowel sounds. Extremities: No clubbing, cyanosis or edema   Skin: No new rashes or lesions. No draining wounds noted. Labs, Imaging, & Other studies:   All pertinent labs and imaging studies were personally reviewed  Results from last 7 days   Lab Units 12/01/23 0457 11/30/23 0441 11/29/23 0442   WBC Thousand/uL 9.31 9.90 9.88   HEMOGLOBIN g/dL 10.5* 10.3* 10.0*   PLATELETS Thousands/uL 394* 363 309     Results from last 7 days   Lab Units 12/01/23 0457 11/30/23 0441 11/29/23  0442   SODIUM mmol/L 137 137 137   POTASSIUM mmol/L 4.5 4.4 4.0   CHLORIDE mmol/L 102 103 104   CO2 mmol/L 27 27 27   BUN mg/dL 22 21 13   CREATININE mg/dL 0.64 0.61 0.52*   EGFR ml/min/1.73sq m 87 88 93   CALCIUM mg/dL 10.1 9.7 9.0   AST U/L  --   --  11*   ALT U/L  --   --  16   ALK PHOS U/L  --   --  65     Results from last 7 days   Lab Units 11/29/23  1805 11/28/23  2032 11/28/23  1658   BLOOD CULTURE  No Growth at 24 hrs. No Growth at 24 hrs.  --  Streptococcus mitis oralis group*  Streptococcus parasanguinis*  No Growth at 48 hrs.    SPUTUM CULTURE   --  2+ Growth of Haemophilus influenzae*  --    GRAM STAIN RESULT   --  1+ Epithelial cells per low power field*  4+ Polys*  3+ Mononuclear Cells*  2+ Gram positive cocci in clusters*  2+ Gram negative rods* Gram positive cocci in pairs and chains*   LEGIONELLA URINARY ANTIGEN   --  Negative  --      Results from last 7 days   Lab Units 11/30/23  0441 11/29/23  0442 11/28/23  1212   PROCALCITONIN ng/ml 0.19 0.26* <0.05             Results from last 7 days   Lab Units 11/28/23  1321   D-DIMER QUANTITATIVE ug/ml FEU 0.90*

## 2023-12-01 NOTE — ASSESSMENT & PLAN NOTE
Appears compensated, volume status dry   Echo obtained today revealing LVEF 51%, normal diastolic function  Daily weights, I&Os  Resume lasix 12/1

## 2023-12-01 NOTE — ASSESSMENT & PLAN NOTE
Blood Sugar Average Last 72 hrs:(P) [id number]  A1c 6.8%  Hold Ozempic inpatient   Continue long acting insulin 40 units + Humalog 3 units with meals & SSI   Acu-checks ACHS  Hyperglycemia in setting of steroid use

## 2023-12-02 VITALS
DIASTOLIC BLOOD PRESSURE: 80 MMHG | RESPIRATION RATE: 20 BRPM | OXYGEN SATURATION: 97 % | SYSTOLIC BLOOD PRESSURE: 133 MMHG | HEART RATE: 78 BPM | HEIGHT: 65 IN | TEMPERATURE: 98.3 F | WEIGHT: 236 LBS | BODY MASS INDEX: 39.32 KG/M2

## 2023-12-02 LAB
BACTERIA SPT RESP CULT: ABNORMAL
GLUCOSE SERPL-MCNC: 183 MG/DL (ref 65–140)
GLUCOSE SERPL-MCNC: 225 MG/DL (ref 65–140)
GRAM STN SPEC: ABNORMAL

## 2023-12-02 PROCEDURE — 94664 DEMO&/EVAL PT USE INHALER: CPT

## 2023-12-02 PROCEDURE — 82948 REAGENT STRIP/BLOOD GLUCOSE: CPT

## 2023-12-02 PROCEDURE — 94640 AIRWAY INHALATION TREATMENT: CPT

## 2023-12-02 PROCEDURE — 99239 HOSP IP/OBS DSCHRG MGMT >30: CPT | Performed by: PHYSICIAN ASSISTANT

## 2023-12-02 PROCEDURE — 94760 N-INVAS EAR/PLS OXIMETRY 1: CPT

## 2023-12-02 RX ORDER — CEFTRIAXONE 2 G/50ML
2000 INJECTION, SOLUTION INTRAVENOUS EVERY 24 HOURS
Status: DISCONTINUED | OUTPATIENT
Start: 2023-12-02 | End: 2023-12-02 | Stop reason: HOSPADM

## 2023-12-02 RX ORDER — METHOTREXATE 2.5 MG/1
20 TABLET ORAL WEEKLY
Qty: 32 TABLET | Refills: 0
Start: 2023-12-10

## 2023-12-02 RX ORDER — GUAIFENESIN 600 MG/1
600 TABLET, EXTENDED RELEASE ORAL 2 TIMES DAILY
Qty: 30 TABLET | Refills: 0 | Status: SHIPPED | OUTPATIENT
Start: 2023-12-02

## 2023-12-02 RX ORDER — PREDNISONE 10 MG/1
TABLET ORAL
Qty: 36 TABLET | Refills: 0 | Status: SHIPPED | OUTPATIENT
Start: 2023-12-02 | End: 2023-12-17

## 2023-12-02 RX ADMIN — FOLIC ACID 1000 MCG: 1 TABLET ORAL at 08:40

## 2023-12-02 RX ADMIN — LEVOTHYROXINE SODIUM 137 MCG: 25 TABLET ORAL at 06:03

## 2023-12-02 RX ADMIN — APIXABAN 5 MG: 5 TABLET, FILM COATED ORAL at 08:41

## 2023-12-02 RX ADMIN — INSULIN LISPRO 2 UNITS: 100 INJECTION, SOLUTION INTRAVENOUS; SUBCUTANEOUS at 08:39

## 2023-12-02 RX ADMIN — CEFTRIAXONE 2000 MG: 2 INJECTION, SOLUTION INTRAVENOUS at 13:22

## 2023-12-02 RX ADMIN — METHYLPREDNISOLONE SODIUM SUCCINATE 40 MG: 40 INJECTION, POWDER, FOR SOLUTION INTRAMUSCULAR; INTRAVENOUS at 08:39

## 2023-12-02 RX ADMIN — LEVALBUTEROL HYDROCHLORIDE 1.25 MG: 1.25 SOLUTION RESPIRATORY (INHALATION) at 13:27

## 2023-12-02 RX ADMIN — POLYETHYLENE GLYCOL 3350 17 G: 17 POWDER, FOR SOLUTION ORAL at 08:38

## 2023-12-02 RX ADMIN — LISINOPRIL 40 MG: 20 TABLET ORAL at 08:39

## 2023-12-02 RX ADMIN — LEVALBUTEROL HYDROCHLORIDE 1.25 MG: 1.25 SOLUTION RESPIRATORY (INHALATION) at 07:08

## 2023-12-02 RX ADMIN — PANTOPRAZOLE SODIUM 40 MG: 40 TABLET, DELAYED RELEASE ORAL at 06:03

## 2023-12-02 RX ADMIN — INSULIN LISPRO 3 UNITS: 100 INJECTION, SOLUTION INTRAVENOUS; SUBCUTANEOUS at 08:42

## 2023-12-02 RX ADMIN — ASPIRIN 81 MG: 81 TABLET, COATED ORAL at 08:40

## 2023-12-02 RX ADMIN — INSULIN LISPRO 1 UNITS: 100 INJECTION, SOLUTION INTRAVENOUS; SUBCUTANEOUS at 11:34

## 2023-12-02 RX ADMIN — BUDESONIDE 0.5 MG: 0.5 SUSPENSION RESPIRATORY (INHALATION) at 07:08

## 2023-12-02 RX ADMIN — INSULIN LISPRO 3 UNITS: 100 INJECTION, SOLUTION INTRAVENOUS; SUBCUTANEOUS at 11:34

## 2023-12-02 RX ADMIN — IPRATROPIUM BROMIDE 0.5 MG: 0.5 SOLUTION RESPIRATORY (INHALATION) at 13:27

## 2023-12-02 RX ADMIN — DULOXETINE HYDROCHLORIDE 60 MG: 30 CAPSULE, DELAYED RELEASE ORAL at 08:41

## 2023-12-02 RX ADMIN — INSULIN GLARGINE 40 UNITS: 100 INJECTION, SOLUTION SUBCUTANEOUS at 08:40

## 2023-12-02 RX ADMIN — GUAIFENESIN 600 MG: 600 TABLET ORAL at 08:40

## 2023-12-02 RX ADMIN — CYANOCOBALAMIN TAB 500 MCG 500 MCG: 500 TAB at 08:41

## 2023-12-02 RX ADMIN — IPRATROPIUM BROMIDE 0.5 MG: 0.5 SOLUTION RESPIRATORY (INHALATION) at 07:08

## 2023-12-02 NOTE — ASSESSMENT & PLAN NOTE
Blood Sugar Average Last 72 hrs:(P) [id number]  A1c 6.8%  Hold Ozempic inpatient   Continue long acting insulin 40 units + Humalog 3 units with meals & SSI   Acu-checks ACHS  Hyperglycemia in setting of steroid use  Resume home regimen on dischagre

## 2023-12-02 NOTE — ASSESSMENT & PLAN NOTE
Continue David Baumann (brought from home)  Takes methotrexate q Sundays   Discussed with patient recommendation to HOLD all DMARDs in setting of active infection - can resume next Sunday

## 2023-12-02 NOTE — CASE MANAGEMENT
Case Management Discharge Planning Note    Patient name Manohar Garcia /[id number] MRN [id number]  : 1950 Date 2023       Current Admission Date: 2023  Current Admission Diagnosis:Community acquired pneumonia   Patient Active Problem List    Diagnosis Date Noted    Positive blood culture 2023    Vertigo 2023    History of pulmonary embolus (PE) 2023    Tracheobronchitis 10/25/2023    COVID-19 10/25/2023    PAD (peripheral artery disease) ([address]) 2023    Skin ulcer of second toe of right foot, limited to breakdown of skin ([address]) 2023    COPD exacerbation ([address]) 2023    Pre-op evaluation 10/24/2022    Recurrent UTI 10/24/2022    Dependence on continuous supplemental oxygen 2022    Excessive anticoagulation 2022    MORENO (obstructive sleep apnea) 2021    Hilar lymphadenopathy 2021    Atherosclerosis 2021    T12 compression fracture ([address]) 2021    Chronic diastolic (congestive) heart failure (HCC)     Acute pulmonary embolism ([address]) 2021    Chronic sinusitis 2021    Primary insomnia 2020    Migraine without aura and without status migrainosus, not intractable 2020    Postherpetic neuralgia     Diastolic dysfunction     Macrocytic anemia 2020    Hypoxemia requiring supplemental oxygen 2019    History of exposure to asbestos 2019    Chronic respiratory failure with hypoxia ([address]) 2019    Hyponatremia 2019    Candidal dermatitis 2018    Loculated pleural effusion 2018    Constipation 2017    Renal cyst 2017    Candidiasis, cutaneous 2017    Vitamin B12 deficiency 11/10/2016    Morbid obesity with BMI of 40.0-44.9, adult ([address]) 2016    Severe sepsis ([address]) 2016    Centrilobular emphysema ([address]) 2016    Community acquired pneumonia 2016    Type 2 diabetes mellitus with hyperglycemia, with long-term current use of insulin ([address]) 11/03/2016    Hypothyroidism 11/03/2016    Rheumatoid arthritis ([address]) 11/03/2016    Essential hypertension 11/03/2016    Hyperlipidemia 11/03/2016    Gastroesophageal reflux disease without esophagitis 11/03/2016    Hypoalbuminemia 11/03/2016    Hepatic steatosis 11/03/2016    Atelectasis 11/03/2016    Anxiety 06/02/2014    Fibromyalgia 01/28/2014    Osteopenia 09/17/2012    Osteoarthritis 05/29/2012    Vitamin D deficiency 05/29/2012      LOS (days): 4  Geometric Mean LOS (GMLOS) (days): 5.10  Days to GMLOS:1.2     OBJECTIVE:  Risk of Unplanned Readmission Score: 21.66         Current admission status: Inpatient   Preferred Pharmacy:   [facility] [address] CLAUDIA WHEELER [address] [facility]  [address] [address]  Phone: [phone] Fax: [address]  [address] [address]  Phone: [phone] Fax: [phone]    Primary Care Provider: Julian Bernal MD    Primary Insurance: [facility] [employer] [employer]  Secondary Insurance:     DISCHARGE DETAILS:    Discharge planning discussed with[de-identified] patient        CM contacted family/caregiver?: No- see comments (patient alert and oriented)       Treatment Team Recommendation: Other (out patient therapy)  Discharge Destination Plan[de-identified] Other (out patient therapy)  Transport at Discharge : Other (Comment) (patient has her car in hospital lot)            IMM Given (Date):: 12/02/23  IMM Given to[de-identified] Patient              Patient stable for discharge home today. CM spoke to patient at the bedside, reviewed DC IMM with patient and informed that patient can stay an additional 4 hours for reconsidering appealing the discharge as the medicare rights were review on the day of discharge. Pt verbalized understanding and feels ready to go home and does not intend to stay 4 hours to reconsider.    Copy placed in bin to be scanned and patient declined ciopy.    CM spoke with patient she stated her equipment issues are fixed. Patient stated she has  [address]  transfer bench and shower chair at home. Patient was asking for additional patches for her AMY unit. Patient stated her AMY is from pain management. CM instructed patient to call pain management and ask for there rep for AMY they could send her additional patches. Patient verbalized understanding. Referral to Out patient case management sent via [facility] for pulmonary follow up as out patient. Patient is not home bound for home health services , she is willing to go to out patient therapy. List of [facility] out patient therapy clinics provided at bedside. Patient to drive herself home due to her car is in parking lot.

## 2023-12-02 NOTE — ASSESSMENT & PLAN NOTE
73 yo female presents with worsening cough, wheezing, dyspnea x 5 days  CTA chest: GGO in RML/RLL, infectious/inflammatory etiology  Stable on home 3L NC  Respiratory protocol   Non-severe CAP, DIP 4  Continue Rocephin 2g q24 per pathway  Discontinued azithromycin given negative legionella  Antitussives as needed  Incentive spirometer, flutter valve if needed

## 2023-12-02 NOTE — ASSESSMENT & PLAN NOTE
BP is elevated in setting of acute infection and steroid use - improvement noted today  Continue lisinopril, add hydralazine IV prn persistent SBP >160   Resume lasix 40 mg daily

## 2023-12-02 NOTE — PLAN OF CARE
Problem: PAIN - ADULT  Goal: Verbalizes/displays adequate comfort level or baseline comfort level  Description: Interventions:  - Encourage patient to monitor pain and request assistance  - Assess pain using appropriate pain scale (0-10 pain scale)  - Administer analgesics based on type and severity of pain and evaluate response  - Implement non-pharmacological measures as appropriate and evaluate response  - Consider cultural and social influences on pain and pain management  - Notify physician/advanced practitioner if interventions unsuccessful or patient reports new pain  12/2/2023 1356 by Damaris Beebe RN  Outcome: Completed  12/2/2023 0757 by Damaris Beebe RN  Outcome: Progressing     Problem: INFECTION - ADULT  Goal: Absence or prevention of progression during hospitalization  Description: INTERVENTIONS:  - Assess and monitor for signs and symptoms of infection  - Monitor lab/diagnostic results  - Monitor all insertion sites, i.e. indwelling lines  - Administer medications as ordered  - Instruct and encourage patient and family to use good hand hygiene technique  12/2/2023 1356 by Damaris Beebe RN  Outcome: Completed  12/2/2023 0757 by Damaris Beebe RN  Outcome: Progressing     Problem: SAFETY ADULT  Goal: Patient will remain free of falls  Description: INTERVENTIONS:  - Educate patient/family on patient safety including physical limitations  - Instruct patient to call for assistance with activity (standby assist and walker)  - Consult OT/PT to assist with strengthening/mobility   - Keep Call bell within reach  - Keep bed low and locked with side rails adjusted as appropriate  - Keep care items and personal belongings within reach  - Initiate and maintain comfort rounds  - Make Fall Risk Sign visible to staff (moderate fall risk)  - Offer Toileting every 1-2 Hours, in advance of need  - Initiate/Maintain bed/chair alarm  - Obtain necessary fall risk management equipment: nonskid footwear  - Apply yellow socks and bracelet for high fall risk patients  - Consider moving patient to room near nurses station  12/2/2023 1356 by Carole Multani RN  Outcome: Completed  12/2/2023 0757 by Carole Multani RN  Outcome: Progressing  Goal: Maintain or return to baseline ADL function  Description: INTERVENTIONS:  -  Assess patient's ability to carry out ADLs; (min to mod assist)  - Assess/evaluate cause of self-care deficits (fatigue, dyspnea, oxygen)  - Assess range of motion  - Assess patient's mobility; (standby assist and walker)  - Assess patient's need for assistive devices and provide as appropriate  - Encourage maximum independence but intervene and supervise when necessary  - Involve family in performance of ADLs  - Assess for home care needs following discharge   - Consider OT consult to assist with ADL evaluation and planning for discharge  - Provide patient education as appropriate  12/2/2023 1356 by aCrole Multani RN  Outcome: Completed  12/2/2023 0757 by Carole Multani RN  Outcome: Progressing  Goal: Maintains/Returns to pre admission functional level  Description: INTERVENTIONS:  - Perform AM-PAC 6 Click Basic Mobility/ Daily Activity assessment daily.  - Set and communicate daily mobility goal to care team and patient/family/caregiver.    - Collaborate with rehabilitation services on mobility goals if consulted  - Ambulate patient 3-6 times a day  - Out of bed to chair 3 times a day   - Out of bed for meals 3 times a day  - Out of bed for toileting  - Record patient progress and toleration of activity level   12/2/2023 1356 by Carole Multani RN  Outcome: Completed  12/2/2023 0757 by Carole Multani RN  Outcome: Progressing     Problem: DISCHARGE PLANNING  Goal: Discharge to home or other facility with appropriate resources  Description: INTERVENTIONS:  - Identify barriers to discharge w/patient and caregiver  - Arrange for needed discharge resources and transportation as appropriate  - Identify discharge learning needs (meds, wound care, etc.)  - Refer to Case Management Department for coordinating discharge planning if the patient needs post-hospital services based on physician/advanced practitioner order or complex needs related to functional status, cognitive ability, or social support system  12/2/2023 1356 by Ld Huff RN  Outcome: Completed  12/2/2023 0757 by Ld Huff RN  Outcome: Progressing     Problem: Knowledge Deficit  Goal: Patient/family/caregiver demonstrates understanding of disease process, treatment plan, medications, and discharge instructions  Description: Complete learning assessment and assess knowledge base.   Interventions:  - Provide teaching at level of understanding  - Provide teaching via preferred learning methods  12/2/2023 1356 by Ld Huff RN  Outcome: Completed  12/2/2023 0757 by Ld Huff RN  Outcome: Progressing     Problem: RESPIRATORY - ADULT  Goal: Achieves optimal ventilation and oxygenation  Description: INTERVENTIONS:  - Assess for changes in respiratory status  - Assess for changes in mentation and behavior  - Position to facilitate oxygenation and minimize respiratory effort  - Oxygen administered by appropriate delivery if ordered  - Encourage broncho-pulmonary hygiene including cough, deep breathe, Incentive Spirometry  - Assess and instruct to report SOB or any respiratory difficulty  - Respiratory Therapy support as indicated  12/2/2023 1356 by Ld Huff RN  Outcome: Completed  12/2/2023 0757 by Ld Huff RN  Outcome: Progressing     Problem: METABOLIC, FLUID AND ELECTROLYTES - ADULT  Goal: Electrolytes maintained within normal limits  Description: INTERVENTIONS:  - Monitor labs and assess patient for signs and symptoms of electrolyte imbalances  - Administer electrolyte replacement as ordered  - Monitor response to electrolyte replacements, including repeat lab results as appropriate  - Instruct patient on fluid and nutrition as appropriate  12/2/2023 1356 by Mathew Sin RN  Outcome: Completed  12/2/2023 0757 by Mathew Sin RN  Outcome: Progressing  Goal: Fluid balance maintained  Description: INTERVENTIONS:  - Monitor labs   - Monitor I/O and WT  - Instruct patient on fluid and nutrition as appropriate  - Assess for signs & symptoms of volume excess or deficit  12/2/2023 1356 by Mathew Sin RN  Outcome: Completed  12/2/2023 0757 by Mathew Sin RN  Outcome: Progressing  Goal: Glucose maintained within target range  Description: INTERVENTIONS:  - Monitor Blood Glucose as ordered  - Assess for signs and symptoms of hyperglycemia and hypoglycemia  - Administer ordered medications to maintain glucose within target range  - Assess nutritional intake and initiate nutrition service referral as needed  12/2/2023 1356 by Mathew Sin RN  Outcome: Completed  12/2/2023 0757 by Mathew Sin RN  Outcome: Progressing

## 2023-12-02 NOTE — RESPIRATORY THERAPY NOTE
12/02/23 0708   Inhalation Therapy Tx   $ Inhalation Therapy Performed Yes   $ Pulse Oximetry Spot Check Charge Completed   SpO2 97 %   Pre-Treatment Pulse 94   Pre-Treatment Respirations 20   Duration 20   Breath Sounds Pre-Treatment Bilateral Diminished; Expiratory wheeze   Delivery Source Air;UDN   Position Semi Dejesus's   Treatment Tolerance Tolerated well

## 2023-12-02 NOTE — ASSESSMENT & PLAN NOTE
SIRS: tachycardia, tachypnea, leukocytosis  Organ dysfunction: lactic acidosis now resolved after IVF hydration   Source: RLL PNA  Completed 5 days of Rocephin as per infectious disease recommendations  Give 1L NS only given diastolic HF  1/2 BC positive for Streptococcus parasanguinis, repeat negative x 48 hours - felt to most likely be a contaminant rather than true bacteremia  Afebrile, no leukocytosis, hemodynamically stable

## 2023-12-02 NOTE — ASSESSMENT & PLAN NOTE
1/2 BCx on admit Streptococcus parasanguinis  Sputum culture Haemophilus Inefluenzae   Continue Rocephin  Repeat cultures negative x 48 hours  Echo without evidence of vegetation   Appreciate infectious disease consult - completed 5 days rocephin per recommendations, BC felt to be contaminations rather than true infection

## 2023-12-02 NOTE — ASSESSMENT & PLAN NOTE
Solu-Medrol 40 q8h - wean to q12h today 12/1/23 - daily given 12/2 and can initiate a taper starting 12/3  Xopenex/atrovent nebs tid, Pulmicort q12h  Sputum culture - Haemophilus Influenzae  Mucinex, pulmonary toilet, respiratory protocol

## 2023-12-02 NOTE — DISCHARGE SUMMARY
[address]  Discharge- Gabi Beckham 1950, 74 y.o. female MRN: [id number]  Unit/Bed#: 404-01 Encounter: [id number]  Primary Care Provider: Laura Garcia MD   Date and time admitted to hospital: 11/28/2023 11:36 AM    Positive blood culture  Assessment & Plan  1/2 BCx on admit Streptococcus parasanguinis  Sputum culture Haemophilus Inefluenzae   Continue Rocephin  Repeat cultures negative x 48 hours  Echo without evidence of vegetation   Appreciate infectious disease consult - completed 5 days rocephin per recommendations, BC felt to be contaminations rather than true infection    History of pulmonary embolus (PE)  Assessment & Plan  Continue Eliquis    Vertigo  Assessment & Plan  Reports episodic vertigo with movement & head movement since Thanksgiving  Hx c/w BPPV   Less likely vestibular neuritis - however is being treated with IV steroids for COPD exacerbation which should help this   Orthostatics negative  Meclizine prn  PT/OT      COPD exacerbation (HCC)  Assessment & Plan  Solu-Medrol 40 q8h - wean to q12h today 12/1/23 - daily given 12/2 and can initiate a taper starting 12/3  Xopenex/atrovent nebs tid, Pulmicort q12h  Sputum culture - Haemophilus Influenzae  Mucinex, pulmonary toilet, respiratory protocol    Chronic diastolic (congestive) heart failure (HCC)  Assessment & Plan  Appears compensated, volume status dry   Echo obtained today revealing LVEF 38%, normal diastolic function  Daily weights, I&Os  Resume lasix 12/1        Chronic respiratory failure with hypoxia ([address])  Assessment & Plan  Stable on baseline 3L NC  Monitor     Essential hypertension  Assessment & Plan  BP is elevated in setting of acute infection and steroid use - improvement noted today  Continue lisinopril, add hydralazine IV prn persistent SBP >160   Resume lasix 40 mg daily     Rheumatoid arthritis ([address])  Assessment & Plan  Continue Morgan Boylegarten (brought from home)  Takes methotrexate q Sundays   Discussed with patient recommendation to HOLD all DMARDs in setting of active infection - can resume next Sunday     Hypothyroidism  Assessment & Plan  Continue levothyroxine    Type 2 diabetes mellitus with hyperglycemia, with long-term current use of insulin (HCC)  Assessment & Plan  Blood Sugar Average Last 72 hrs:(P) [id number]  A1c 6.8%  Hold Ozempic inpatient   Continue long acting insulin 40 units + Humalog 3 units with meals & SSI   Acu-checks ACHS  Hyperglycemia in setting of steroid use  Resume home regimen on dischagre    Severe sepsis (HCC)  Assessment & Plan  SIRS: tachycardia, tachypnea, leukocytosis  Organ dysfunction: lactic acidosis now resolved after IVF hydration   Source: RLL PNA  Completed 5 days of Rocephin as per infectious disease recommendations  Give 1L NS only given diastolic HF  1/2 BC positive for Streptococcus parasanguinis, repeat negative x 48 hours - felt to most likely be a contaminant rather than true bacteremia  Afebrile, no leukocytosis, hemodynamically stable     * Community acquired pneumonia  Assessment & Plan  73 yo female presents with worsening cough, wheezing, dyspnea x 5 days  CTA chest: GGO in RML/RLL, infectious/inflammatory etiology  Stable on home 3L NC  Respiratory protocol   Non-severe CAP, DIP 4  Continue Rocephin 2g q24 per pathway  Discontinued azithromycin given negative legionella  Antitussives as needed  Incentive spirometer, flutter valve if needed           Medical Problems       Resolved Problems  Date Reviewed: 12/1/2023            Resolved    Hypomagnesemia 12/1/2023     Resolved by  Humberto Ruiz PA-C        Discharging Physician / Practitioner: Lela Sargent PA-C  PCP: Nella Zaidi MD  Admission Date:   Admission Orders (From admission, onward)       Ordered        11/28/23 1640  Inpatient Admission  Once                          Discharge Date: 12/02/23    Consultations During Hospital Stay:  Infectious disease    Procedures Performed: none    Significant Findings / Test Results:     CTA ED chest PE Study   Final Result      No evidence for pulmonary embolism. Mild emphysematous changes. Stable large bulla in the right upper lobe. Small patchy foci of groundglass opacity in the right middle and lower lobes, likely infectious/inflammatory in etiology. Small to moderate sized loculated left pleural effusion with associated pleural thickening, similar to prior. Workstation performed: [id number]         X-ray chest 1 view portable   ED Interpretation   Airway midline. No focal consolidation or pneumothorax. Very mild interstitial pattern. Left-sided effusion slightly increased compared to prior. Cardiac silhouette within normal limits. Osseous structures appear normal.      Final Result      Stable chronic small left pleural of. No new infiltrates. Workstation performed: [id number]               Incidental Findings:   none    Test Results Pending at Discharge (will require follow up):   none     Outpatient Tests Requested:  none    Complications:  none    Reason for Admission: pna    Hospital Course:   Candy Mon is a 74 y.o. female patient who originally presented to the hospital on 11/28/2023 due to worsening shortness of breath over the past 2-5 days. Associated wheezing and productive cough with green-brown phlegm. Notes great-grandson sick with cold. Denies chest pain. Reports poor appetite, generalized malaise, headache. Also reporting episodic vertiginous episodes with certain body/head movements that last seconds over the past week     In ED, she was given prednisone and multiple nebs with improvement. CTA negative for PE, revealing GGO in RLL/RML. She met severe sepsis criteria with RLL pneumonia as source. She was initiated on IV antibiotics and admitted for further evaluation and treatment. Found to have one BC positive. Infectious disease was consulted.  Growing two different species from [facility] and sputum culture. Infectious disease felt this was contaminant and recommended 5 days course of ceftriaxone for pna. Patient also received steroids for her breathing. She is now feeling significantly improved. Please see above list of diagnoses and related plan for additional information. Condition at Discharge: good    Discharge Day Visit / Exam:   Subjective:  patient reports feeling nearly back to baseline today. She feels ready to go home. Denies SOB or CP. Vitals: Blood Pressure: 133/80 (12/02/23 0732)  Pulse: 78 (12/02/23 0732)  Temperature: 98.3 °F (36.8 °C) (12/02/23 0732)  Temp Source: Oral (11/30/23 1454)  Respirations: 20 (12/02/23 0732)  Height: 5' 5" (165.1 cm) (11/30/23 0810)  Weight - Scale: 107 kg (236 lb) (12/02/23 0600)  SpO2: 97 % (12/02/23 0732)  Exam:   Physical Exam  Vitals and nursing note reviewed. Constitutional:       General: She is not in acute distress. Appearance: She is obese. She is ill-appearing. Cardiovascular:      Rate and Rhythm: Normal rate and regular rhythm. Pulses: Normal pulses. Heart sounds: Normal heart sounds. Pulmonary:      Effort: Pulmonary effort is normal.      Comments: 3L O2  Abdominal:      General: Bowel sounds are normal.      Palpations: Abdomen is soft. Tenderness: There is no abdominal tenderness. There is no guarding. Hernia: No hernia is present. Musculoskeletal:      Right lower leg: No edema. Left lower leg: No edema. Skin:     General: Skin is warm and dry. Neurological:      Mental Status: She is alert. Mental status is at baseline. Psychiatric:         Mood and Affect: Mood normal.         Behavior: Behavior normal.          Discussion with Family: Updated  (granddaughter) via phone. Discharge instructions/Information to patient and family:   See after visit summary for information provided to patient and family.       Provisions for Follow-Up Care:  See after visit summary for information related to follow-up care and any pertinent home health orders. Mobility at time of Discharge:   Basic Mobility Inpatient Raw Score: 22  JH-HLM Goal: 7: Walk 25 feet or more  JH-HLM Achieved: 7: Walk 25 feet or more  HLM Goal achieved. Continue to encourage appropriate mobility. Disposition:   Home with VNA Services (Reminder: Complete face to face encounter)    Planned Readmission: none     Discharge Statement:  I spent 55 minutes discharging the patient. This time was spent on the day of discharge. I had direct contact with the patient on the day of discharge. Greater than 50% of the total time was spent examining patient, answering all patient questions, arranging and discussing plan of care with patient as well as directly providing post-discharge instructions. Additional time then spent on discharge activities. Discharge Medications:  See after visit summary for reconciled discharge medications provided to patient and/or family.       **Please Note: This note may have been constructed using a voice recognition system**

## 2023-12-02 NOTE — ASSESSMENT & PLAN NOTE
Appears compensated, volume status dry   Echo obtained today revealing LVEF 70%, normal diastolic function  Daily weights, I&Os  Resume lasix 12/1

## 2023-12-04 DIAGNOSIS — Z71.89 COORDINATION OF COMPLEX CARE: Primary | ICD-10-CM

## 2023-12-04 LAB — BACTERIA BLD CULT: NORMAL

## 2023-12-04 NOTE — UTILIZATION REVIEW
NOTIFICATION OF ADMISSION DISCHARGE   This is a Notification of Discharge from [facility]. Please be advised that this patient has been discharge from our facility. Below you will find the admission and discharge date and time including the patient’s disposition. UTILIZATION REVIEW CONTACT:  Horace Miranda  Utilization   Network Utilization Review Department  Phone: [phone] x carefully listen to the prompts. All voicemails are confidential.  Email: [email or web address]     ADMISSION INFORMATION  PRESENTATION DATE: 11/28/2023 11:36 AM  OBERVATION ADMISSION DATE:   INPATIENT ADMISSION DATE: 11/28/23  4:40 PM   DISCHARGE DATE: 12/2/2023  3:00 PM   DISPOSITION:Home/Self Care    Network Utilization Review Department  ATTENTION: Please call with any questions or concerns to [phone] and carefully listen to the prompts so that you are directed to the right person. All voicemails are confidential.   For Discharge needs, contact Care Management DC Support Team at [phone] opt. 2  Send all requests for admission clinical reviews, approved or denied determinations and any other requests to dedicated fax number below belonging to the campus where the patient is receiving treatment.  List of dedicated fax numbers for the Facilities:  [place] DENIALS (Administrative/Medical Necessity) [phone]   DISCHARGE SUPPORT TEAM (Network) [phone] [address] [facility] (Maternity/NICU/Pediatrics) [phone]   [address] [facility] [address] [facility] [address] [place] [address] [phone]   [facility] [address] [facility] [phone]   [address] [place] [address]  [address]  [phone]

## 2023-12-05 ENCOUNTER — PATIENT OUTREACH (OUTPATIENT)
Dept: INTERNAL MEDICINE CLINIC | Facility: CLINIC | Age: 73
End: 2023-12-05

## 2023-12-05 ENCOUNTER — TRANSITIONAL CARE MANAGEMENT (OUTPATIENT)
Dept: INTERNAL MEDICINE CLINIC | Facility: CLINIC | Age: 73
End: 2023-12-05

## 2023-12-05 LAB
BACTERIA BLD CULT: NORMAL
BACTERIA BLD CULT: NORMAL

## 2023-12-05 NOTE — PROGRESS NOTES
Outpatient Care Management Note:    New HRR referral received. Patient was hospitalized from 11/28-12/2/23 with pneumonia. She has a history of COPD, emphysema, Rheumatoid arthritis, diabetes, pulmonary embolism, and hypertension. She originally presented with worsening shortness of breath, productive cough with green brown phlegm, wheezing and malaise. She was discharged with mucinex and prednisone taper. CM attempted to call [facility]. She did not answer and her voice mail is full. CM will attempt to call anther day.

## 2023-12-08 ENCOUNTER — PATIENT OUTREACH (OUTPATIENT)
Dept: INTERNAL MEDICINE CLINIC | Facility: CLINIC | Age: 73
End: 2023-12-08

## 2023-12-08 NOTE — PROGRESS NOTES
Outpatient Care Management Note:    CM attempted to call [facility]. She did not answer and her voice mail is full.  (2nd attempt)    Unable to reach letter send via my chart.

## 2023-12-08 NOTE — LETTER
Date: 12/08/23    Dear Barbara Fleming,   My name is Zee Gustafson. I am a registered nurse care manager working with   [address] [place] [phone]. I have not been able to reach you and would like to set a time that I can talk with you over the phone. My work is to help patients that have complex medical conditions get the care they need. This includes patients who may have been in the hospital or emergency room. I have enclosed information for you. Please call me with any questions you may have. I look forward to speaking with you.   Sincerely,  Zee Sj  [phone]  Outpatient Care Manager  Copy:  (primary care physician name and address)

## 2023-12-11 LAB
DME PARACHUTE DELIVERY DATE ACTUAL: NORMAL
DME PARACHUTE DELIVERY DATE REQUESTED: NORMAL
DME PARACHUTE DELIVERY DATE REQUESTED: NORMAL
DME PARACHUTE ITEM DESCRIPTION: NORMAL
DME PARACHUTE ITEM DESCRIPTION: NORMAL
DME PARACHUTE ORDER STATUS: NORMAL
DME PARACHUTE ORDER STATUS: NORMAL
DME PARACHUTE SUPPLIER NAME: NORMAL
DME PARACHUTE SUPPLIER NAME: NORMAL
DME PARACHUTE SUPPLIER PHONE: NORMAL
DME PARACHUTE SUPPLIER PHONE: NORMAL

## 2023-12-18 ENCOUNTER — OFFICE VISIT (OUTPATIENT)
Dept: INTERNAL MEDICINE CLINIC | Facility: CLINIC | Age: 73
End: 2023-12-18
Payer: COMMERCIAL

## 2023-12-18 VITALS
TEMPERATURE: 98 F | BODY MASS INDEX: 40.55 KG/M2 | DIASTOLIC BLOOD PRESSURE: 58 MMHG | SYSTOLIC BLOOD PRESSURE: 124 MMHG | WEIGHT: 243.4 LBS | OXYGEN SATURATION: 98 % | HEART RATE: 113 BPM | HEIGHT: 65 IN

## 2023-12-18 DIAGNOSIS — A41.9 SEVERE SEPSIS (HCC): ICD-10-CM

## 2023-12-18 DIAGNOSIS — J18.9 COMMUNITY ACQUIRED PNEUMONIA OF RIGHT MIDDLE LOBE OF LUNG: Primary | ICD-10-CM

## 2023-12-18 DIAGNOSIS — R65.20 SEVERE SEPSIS (HCC): ICD-10-CM

## 2023-12-18 DIAGNOSIS — J96.11 CHRONIC RESPIRATORY FAILURE WITH HYPOXIA (HCC): ICD-10-CM

## 2023-12-18 DIAGNOSIS — R78.81 POSITIVE BLOOD CULTURE: ICD-10-CM

## 2023-12-18 DIAGNOSIS — J43.2 CENTRILOBULAR EMPHYSEMA (HCC): ICD-10-CM

## 2023-12-18 DIAGNOSIS — B37.2 CANDIDAL DERMATITIS: ICD-10-CM

## 2023-12-18 DIAGNOSIS — B37.49 CANDIDAL UTI (URINARY TRACT INFECTION): ICD-10-CM

## 2023-12-18 PROBLEM — J40 TRACHEOBRONCHITIS: Status: RESOLVED | Noted: 2023-10-25 | Resolved: 2023-12-18

## 2023-12-18 PROCEDURE — 99495 TRANSJ CARE MGMT MOD F2F 14D: CPT | Performed by: NURSE PRACTITIONER

## 2023-12-18 RX ORDER — NYSTATIN 100000 U/G
CREAM TOPICAL 2 TIMES DAILY PRN
Qty: 30 G | Refills: 0 | Status: SHIPPED | OUTPATIENT
Start: 2023-12-18

## 2023-12-18 RX ORDER — NYSTATIN 100000 [USP'U]/G
POWDER TOPICAL 3 TIMES DAILY PRN
Qty: 45 G | Refills: 1 | Status: SHIPPED | OUTPATIENT
Start: 2023-12-18

## 2023-12-18 NOTE — PROGRESS NOTES
Assessment & Plan    Positive blood culture  Assessment & Plan  1/2 BCx on admit Streptococcus parasanguinis  Sputum culture Haemophilus Inefluenzae   Continue Rocephin  Repeat cultures negative x 48 hours  Echo without evidence of vegetation   Appreciate infectious disease consult - completed 5 days rocephin per recommendations, BC felt to be contaminations rather than true infection     History of pulmonary embolus (PE)  Assessment & Plan  Continue Eliquis     Vertigo  Assessment & Plan  Reports episodic vertigo with movement & head movement since Thanksgiving  Hx c/w BPPV   Less likely vestibular neuritis - however is being treated with IV steroids for COPD exacerbation which should help this   Orthostatics negative  Meclizine prn  PT/OT        COPD exacerbation (HCC)  Assessment & Plan  Solu-Medrol 40 q8h - wean to q12h today 12/1/23 - daily given 12/2 and can initiate a taper starting 12/3  Xopenex/atrovent nebs tid, Pulmicort q12h  Sputum culture - Haemophilus Influenzae  Mucinex, pulmonary toilet, respiratory protocol     Chronic diastolic (congestive) heart failure (HCC)  Assessment & Plan  Appears compensated, volume status dry   Echo obtained today revealing LVEF 65%, normal diastolic function  Daily weights, I&Os  Resume lasix 12/1     Chronic respiratory failure with hypoxia (HCC)  Assessment & Plan  Stable on baseline 3L NC  Monitor      Essential hypertension  Assessment & Plan  BP is elevated in setting of acute infection and steroid use - improvement noted today  Continue lisinopril, add hydralazine IV prn persistent SBP >160   Resume lasix 40 mg daily      Rheumatoid arthritis (HCC)  Assessment & Plan  Continue Xeljanz (brought from home)  Takes methotrexate q Sundays   Discussed with patient recommendation to HOLD all DMARDs in setting of active infection - can resume next Sunday      Hypothyroidism  Assessment & Plan  Continue levothyroxine     Type 2 diabetes mellitus with hyperglycemia, with  long-term current use of insulin (HCC)  Assessment & Plan  Blood Sugar Average Last 72 hrs:(P) [id number]  A1c 6.8%  Hold Ozempic inpatient   Continue long acting insulin 40 units + Humalog 3 units with meals & SSI   Acu-checks ACHS  Hyperglycemia in setting of steroid use  Resume home regimen on dischagre     Severe sepsis (HCC)  Assessment & Plan  SIRS: tachycardia, tachypnea, leukocytosis  Organ dysfunction: lactic acidosis now resolved after IVF hydration   Source: RLL PNA  Completed 5 days of Rocephin as per infectious disease recommendations  Give 1L NS only given diastolic HF  1/2 BC positive for Streptococcus parasanguinis, repeat negative x 48 hours - felt to most likely be a contaminant rather than true bacteremia  Afebrile, no leukocytosis, hemodynamically stable      * Community acquired pneumonia  Assessment & Plan  72 yo female presents with worsening cough, wheezing, dyspnea x 5 days  CTA chest: GGO in RML/RLL, infectious/inflammatory etiology  Stable on home 3L NC  Respiratory protocol   Non-severe CAP, DIP 4  Continue Rocephin 2g q24 per pathway  Discontinued azithromycin given negative legionella  Antitussives as needed  Incentive spirometer, flutter valve if needed               1. Community acquired pneumonia of right middle lobe of lung    2. Centrilobular emphysema (HCC)    3. Chronic respiratory failure with hypoxia (HCC)    4. Positive blood culture    5. Severe sepsis (HCC)    6. Candidal dermatitis  -     nystatin (MYCOSTATIN) powder; Apply topically 3 (three) times a day as needed (rash/irritation)  -     nystatin (MYCOSTATIN) cream; Apply topically 2 (two) times a day as needed (rash)    7. Candidal UTI (urinary tract infection)  -     nystatin (MYCOSTATIN) cream; Apply topically 2 (two) times a day as needed (rash)         Subjective     Transitional Care Management Review:   Shakira Polk is a 73 y.o. female here for TCM follow up.     During the TCM phone call patient  stated:  TCM Call       Date and time call was made  12/5/2023  3:33 PM    Hospital care reviewed  Records reviewed    Patient was hospitialized at  [facility]    Date of Admission  11/28/23    Date of discharge  12/02/23    Diagnosis  Community acquired pneumonia    Disposition  Home    Were the patients medications reviewed and updated  Yes    Current Symptoms  Fatigue; Weakness; Cough    Cough Severity  Moderate    Weakness severity  Mild    Fatigue severity  Mild    Headache pain severity  Moderate    Headache pain level  2    Headache cause / trigger  Stressful event    Cause certainty  Probably          TCM Call       Clinical progress  improving    Headache-pertinent history  Tension headaches    Post hospital issues  Reduced activity    Should patient be enrolled in anticoag monitoring?  No    Scheduled for follow up?  Yes    Patients specialists  Pulmonlolgist    Pulmonologist name  Dr Stringer    Did you obtain your prescribed medications  Yes    Do you need help managing your prescriptions or medications  No    Is transportation to your appointment needed  No    I have advised the patient to call PCP with any new or worsening symptoms  Hayde Arguelles Lead    Living Arrangements  Family members    Support System  Family    The type of support provided  Emotional; Financial; Physical    Do you have social support  Yes, some    Are you recieving any outpatient services  No    Are you recieving home care services  No    Types of home care services  Nurse visit; Other (comment)    Comment  [facility]    Are you using any community resources  No    Current waiver services  No    Have you fallen in the last 12 months  No    Interperter language line needed  No    Counseling  Patient    Counseling topics  Activities of daily living    Comments  Pt stated she feels good.          Shakira Polk is a 73 y.o. female patient who originally presented to the hospital on 11/28/2023 due to worsening shortness of  breath over the past 2-5 days. Associated wheezing and productive cough with green-brown phlegm. Notes great-grandson sick with cold. Denies chest pain. Reports poor appetite, generalized malaise, headache. Also reporting episodic vertiginous episodes with certain body/head movements that last seconds over the past week      In ED, she was given prednisone and multiple nebs with improvement. CTA negative for PE, revealing GGO in RLL/RML. She met severe sepsis criteria with RLL pneumonia as source. She was initiated on IV antibiotics and admitted for further evaluation and treatment.      Found to have one BC positive. Infectious disease was consulted. Growing two different species from BC and sputum culture. Infectious disease felt this was contaminant and recommended 5 days course of ceftriaxone for pna. She is doing well since coming home and is afebrile and having minimal SOB. She is losing her voice. She did complete her Mucinex and steroids. She is not following up with any other specialists. She is having some redness under her folds and would like cream and powder sent in. She offers no other issues.        Current Outpatient Medications:     acetaminophen (TYLENOL) 325 mg tablet, Take 2 tablets (650 mg total) by mouth every 6 (six) hours as needed for mild pain, headaches or fever, Disp: , Rfl: 0    albuterol (2.5 mg/3 mL) 0.083 % nebulizer solution, USE 1 UNIT DOSE IN NEBULIZER EVERY 4 HOURS AS NEEDED., Disp: , Rfl:     albuterol (PROVENTIL HFA,VENTOLIN HFA) 90 mcg/act inhaler, Inhale 2 puffs every 6 (six) hours as needed for wheezing, Disp: , Rfl: 0    aspirin (ECOTRIN LOW STRENGTH) 81 mg EC tablet, Take 81 mg by mouth daily, Disp: , Rfl:     atorvastatin (LIPITOR) 20 mg tablet, TAKE ONE TABLET BY MOUTH EVERY DAY, Disp: 90 tablet, Rfl: 0    BD Pen Needle Mamta U/F 32G X 4 MM MISC, USE AS DIRECTED, Disp: 100 each, Rfl: 0    benzonatate (TESSALON) 200 MG capsule, Take 1 capsule (200 mg total) by mouth 3  (three) times a day as needed for cough, Disp: 60 capsule, Rfl: 0    Blood Glucose Monitoring Suppl (ONE TOUCH ULTRA 2) w/Device KIT, by Does not apply route 2 (two) times a day, Disp: 1 each, Rfl: 0    clindamycin (CLEOCIN T) 1 % lotion, clindamycin 1 % lotion  APPLY LOTION TOPICALLY TWICE DAILY, Disp: , Rfl:     DULoxetine (CYMBALTA) 60 mg delayed release capsule, TAKE ONE CAPSULE BY MOUTH EVERY DAY, Disp: 90 capsule, Rfl: 0    Eliquis 5 MG, TAKE 1 TABLET BY MOUTH TWICE DAILY, Disp: 60 tablet, Rfl: 0    ergocalciferol (VITAMIN D2) 50,000 units, TAKE ONE CAPSULE BY MOUTH WEEKLY, Disp: 12 capsule, Rfl: 0    fluticasone-salmeterol (ADVAIR) 250-50 mcg/dose, Inhale 1 puff every 12 (twelve) hours , Disp: , Rfl:     folic acid (FOLVITE) 1 mg tablet, TAKE ONE TABLET BY MOUTH ONCE DAILY, Disp: 90 tablet, Rfl: 0    furosemide (LASIX) 40 mg tablet, TAKE ONE TABLET BY MOUTH EVERY DAY, Disp: 60 tablet, Rfl: 0    glucose blood (OneTouch Ultra) test strip, Test 3 times daily   Dx: E11.65, Disp: 300 each, Rfl: 3    guaiFENesin (MUCINEX) 600 mg 12 hr tablet, Take 1 tablet (600 mg total) by mouth 2 (two) times a day, Disp: 30 tablet, Rfl: 0    Insulin Pen Needle 33G X 4 MM MISC, by Does not apply route daily, Disp: 100 each, Rfl: 5    Lancets (OneTouch Delica Plus [id number]) MISC, Test 2 times a day, Disp: 200 each, Rfl: 0    levothyroxine 137 mcg tablet, Take 1 tablet (137 mcg total) by mouth daily, Disp: 90 tablet, Rfl: 1    lisinopril (ZESTRIL) 40 mg tablet, TAKE ONE TABLET BY MOUTH EVERY DAY, Disp: 90 tablet, Rfl: 0    methotrexate 2.5 MG tablet, Take 8 tablets (20 mg total) by mouth once a week Do not start before December 10, 2023., Disp: 32 tablet, Rfl: 0    mirtazapine (REMERON) 15 mg tablet, Take 1 tablet (15 mg total) by mouth daily at bedtime, Disp: 90 tablet, Rfl: 3    mometasone (ELOCON) 0.1 % ointment, Apply topically 2 (two) times a day as needed (itching), Disp: 45 g, Rfl: 1    nystatin (MYCOSTATIN) cream, Apply  "topically 2 (two) times a day as needed (rash), Disp: 30 g, Rfl: 0    nystatin (MYCOSTATIN) powder, Apply topically 3 (three) times a day as needed (rash/irritation), Disp: 45 g, Rfl: 1    pantoprazole (PROTONIX) 40 mg tablet, TAKE ONE TABLET BY MOUTH ONCE DAILY, Disp: 90 tablet, Rfl: 0    Polyethylene Glycol 3350-GRX POWD, Take by mouth daily at bedtime as needed (constipation), Disp: 850 g, Rfl: 0    semaglutide, 1 mg/dose, (Ozempic) 4 mg/3 mL injection pen, Inject 0.75 mL (1 mg total) under the skin once a week, Disp: 9 mL, Rfl: 1    tiotropium (SPIRIVA RESPIMAT) 2.5 MCG/ACT AERS inhaler, Inhale 1 puff daily at bedtime, Disp: 3 Inhaler, Rfl: 3    Tofacitinib Citrate ER 11 MG TB24, Take 1 tablet (11 mg total) by mouth daily Do not start before December 10, 2023., Disp: , Rfl: 0    Tresiba FlexTouch 100 units/mL injection pen, INJECT 40 UNITS UNDER THE SKIN DAILY, Disp: 30 mL, Rfl: 0    vitamin B-12 (VITAMIN B-12) 500 MCG tablet, Take 1 tablet (500 mcg total) by mouth daily, Disp: , Rfl:     Current Facility-Administered Medications:     cyanocobalamin injection 1,000 mcg, 1,000 mcg, Intramuscular, Q30 Days, Ioana Heck MD, 1,000 mcg at 06/21/23 1523   Review of Systems   Respiratory:  Positive for cough.    All other systems reviewed and are negative.      Objective     /58   Pulse (!) 113   Temp 98 °F (36.7 °C) (Temporal)   Ht 5' 5\" (1.651 m)   Wt 110 kg (243 lb 6.4 oz)   SpO2 98%   BMI 40.50 kg/m²      Below is the patient's most recent value for Albumin, ALT, AST, BUN, Calcium, Chloride, Cholesterol, CO2, Creatinine, GFR, Glucose, HDL, Hematocrit, Hemoglobin, Hemoglobin A1C, LDL, Magnesium, Phosphorus, Platelets, Potassium, PSA, Sodium, Triglycerides, and WBC.   Lab Results   Component Value Date    ALT 16 11/29/2023    AST 11 (L) 11/29/2023    BUN 22 12/01/2023    CALCIUM 10.1 12/01/2023     12/01/2023    CHOL 194 12/17/2015    CO2 27 12/01/2023    CREATININE 0.64 12/01/2023    HDL 53 " 09/15/2023    HCT 32.7 (L) 12/01/2023    HGB 10.5 (L) 12/01/2023    HGBA1C 6.8 (H) 11/28/2023    MG 2.3 11/29/2023    PHOS 2.7 11/29/2023     (H) 12/01/2023    K 4.5 12/01/2023     12/17/2015    TRIG 186 (H) 09/15/2023    WBC 9.31 12/01/2023     Note: for a comprehensive list of the patient's lab results, access the Results Review activity.  Physical Exam  Vitals reviewed.   Constitutional:       Appearance: Normal appearance. She is obese.   HENT:      Head: Normocephalic and atraumatic.      Right Ear: Tympanic membrane, ear canal and external ear normal.      Left Ear: Tympanic membrane, ear canal and external ear normal.      Nose: Nose normal.      Mouth/Throat:      Mouth: Mucous membranes are moist.      Pharynx: Oropharynx is clear.   Eyes:      Extraocular Movements: Extraocular movements intact.      Conjunctiva/sclera: Conjunctivae normal.      Pupils: Pupils are equal, round, and reactive to light.   Cardiovascular:      Rate and Rhythm: Normal rate and regular rhythm.      Pulses: Normal pulses.      Heart sounds: Normal heart sounds.   Pulmonary:      Effort: Pulmonary effort is normal.      Breath sounds: Normal breath sounds.   Abdominal:      General: Abdomen is flat. Bowel sounds are normal.      Palpations: Abdomen is soft.   Musculoskeletal:         General: Normal range of motion.      Cervical back: Normal range of motion and neck supple.   Skin:     General: Skin is warm and dry.      Capillary Refill: Capillary refill takes less than 2 seconds.   Neurological:      General: No focal deficit present.      Mental Status: She is alert and oriented to person, place, and time. Mental status is at baseline.   Psychiatric:         Mood and Affect: Mood normal.         Behavior: Behavior normal.         Thought Content: Thought content normal.         Judgment: Judgment normal.       Medications have been reviewed by provider in current encounter    BO Vazquez

## 2023-12-21 ENCOUNTER — PATIENT OUTREACH (OUTPATIENT)
Dept: INTERNAL MEDICINE CLINIC | Facility: CLINIC | Age: 73
End: 2023-12-21

## 2023-12-21 NOTE — PROGRESS NOTES
Outpatient Care Management Note:    No response to telephone calls or unable to reach letter sent to patient.  Patient closed to care management services, but CM will remain available if they call back.  Re-consult as needed. 12/21/23

## 2023-12-26 LAB
DME PARACHUTE DELIVERY DATE ACTUAL: NORMAL
DME PARACHUTE DELIVERY DATE REQUESTED: NORMAL
DME PARACHUTE ITEM DESCRIPTION: NORMAL
DME PARACHUTE ORDER STATUS: NORMAL
DME PARACHUTE SUPPLIER NAME: NORMAL
DME PARACHUTE SUPPLIER PHONE: NORMAL

## 2024-01-03 DIAGNOSIS — Z79.4 TYPE 2 DIABETES MELLITUS WITH HYPERGLYCEMIA, WITH LONG-TERM CURRENT USE OF INSULIN (HCC): ICD-10-CM

## 2024-01-03 DIAGNOSIS — E11.65 TYPE 2 DIABETES MELLITUS WITH HYPERGLYCEMIA, WITH LONG-TERM CURRENT USE OF INSULIN (HCC): ICD-10-CM

## 2024-01-05 RX ORDER — INSULIN DEGLUDEC INJECTION 100 U/ML
40 INJECTION, SOLUTION SUBCUTANEOUS DAILY
Qty: 30 ML | Refills: 0 | Status: SHIPPED | OUTPATIENT
Start: 2024-01-05

## 2024-02-16 PROBLEM — B37.49 CANDIDAL UTI (URINARY TRACT INFECTION): Status: RESOLVED | Noted: 2023-12-18 | Resolved: 2024-02-16

## 2024-02-21 PROBLEM — N39.0 RECURRENT UTI: Status: RESOLVED | Noted: 2022-10-24 | Resolved: 2024-02-21

## 2024-03-01 DIAGNOSIS — E11.65 TYPE 2 DIABETES MELLITUS WITH HYPERGLYCEMIA, WITH LONG-TERM CURRENT USE OF INSULIN (HCC): ICD-10-CM

## 2024-03-01 DIAGNOSIS — Z79.4 TYPE 2 DIABETES MELLITUS WITH HYPERGLYCEMIA, WITH LONG-TERM CURRENT USE OF INSULIN (HCC): ICD-10-CM

## 2024-03-03 RX ORDER — SEMAGLUTIDE 1.34 MG/ML
1 INJECTION, SOLUTION SUBCUTANEOUS WEEKLY
Qty: 3 ML | Refills: 0 | Status: SHIPPED | OUTPATIENT
Start: 2024-03-03

## 2024-03-14 ENCOUNTER — OFFICE VISIT (OUTPATIENT)
Dept: INTERNAL MEDICINE CLINIC | Facility: CLINIC | Age: 74
End: 2024-03-14
Payer: COMMERCIAL

## 2024-03-14 VITALS
HEART RATE: 95 BPM | OXYGEN SATURATION: 93 % | DIASTOLIC BLOOD PRESSURE: 68 MMHG | WEIGHT: 237.4 LBS | BODY MASS INDEX: 39.55 KG/M2 | SYSTOLIC BLOOD PRESSURE: 122 MMHG | TEMPERATURE: 98 F | HEIGHT: 65 IN

## 2024-03-14 DIAGNOSIS — I73.9 PAD (PERIPHERAL ARTERY DISEASE) (HCC): ICD-10-CM

## 2024-03-14 DIAGNOSIS — E11.65 TYPE 2 DIABETES MELLITUS WITH HYPERGLYCEMIA, WITH LONG-TERM CURRENT USE OF INSULIN (HCC): ICD-10-CM

## 2024-03-14 DIAGNOSIS — E03.9 HYPOTHYROIDISM, UNSPECIFIED TYPE: ICD-10-CM

## 2024-03-14 DIAGNOSIS — J32.9 CHRONIC SINUSITIS, UNSPECIFIED LOCATION: ICD-10-CM

## 2024-03-14 DIAGNOSIS — E55.9 VITAMIN D DEFICIENCY: ICD-10-CM

## 2024-03-14 DIAGNOSIS — E53.8 VITAMIN B12 DEFICIENCY: ICD-10-CM

## 2024-03-14 DIAGNOSIS — Z79.4 TYPE 2 DIABETES MELLITUS WITH HYPERGLYCEMIA, WITH LONG-TERM CURRENT USE OF INSULIN (HCC): ICD-10-CM

## 2024-03-14 DIAGNOSIS — I50.32 CHRONIC DIASTOLIC (CONGESTIVE) HEART FAILURE (HCC): ICD-10-CM

## 2024-03-14 DIAGNOSIS — I10 ESSENTIAL HYPERTENSION: ICD-10-CM

## 2024-03-14 DIAGNOSIS — E11.9 TYPE 2 DIABETES MELLITUS WITHOUT COMPLICATION, WITHOUT LONG-TERM CURRENT USE OF INSULIN (HCC): Primary | ICD-10-CM

## 2024-03-14 DIAGNOSIS — E66.01 MORBID OBESITY WITH BMI OF 40.0-44.9, ADULT (HCC): ICD-10-CM

## 2024-03-14 DIAGNOSIS — J96.11 CHRONIC RESPIRATORY FAILURE WITH HYPOXIA (HCC): ICD-10-CM

## 2024-03-14 DIAGNOSIS — M06.9 RHEUMATOID ARTHRITIS, INVOLVING UNSPECIFIED SITE, UNSPECIFIED WHETHER RHEUMATOID FACTOR PRESENT (HCC): ICD-10-CM

## 2024-03-14 DIAGNOSIS — J43.2 CENTRILOBULAR EMPHYSEMA (HCC): ICD-10-CM

## 2024-03-14 PROBLEM — L97.511 SKIN ULCER OF SECOND TOE OF RIGHT FOOT, LIMITED TO BREAKDOWN OF SKIN (HCC): Status: RESOLVED | Noted: 2023-05-16 | Resolved: 2024-03-14

## 2024-03-14 PROBLEM — I26.99 ACUTE PULMONARY EMBOLISM (HCC): Status: RESOLVED | Noted: 2021-12-07 | Resolved: 2024-03-14

## 2024-03-14 PROCEDURE — G2211 COMPLEX E/M VISIT ADD ON: HCPCS | Performed by: FAMILY MEDICINE

## 2024-03-14 PROCEDURE — 99214 OFFICE O/P EST MOD 30 MIN: CPT | Performed by: FAMILY MEDICINE

## 2024-03-14 RX ORDER — BLOOD SUGAR DIAGNOSTIC
STRIP MISCELLANEOUS
Qty: 300 EACH | Refills: 3 | Status: SHIPPED | OUTPATIENT
Start: 2024-03-14 | End: 2024-03-14 | Stop reason: SDUPTHER

## 2024-03-14 RX ORDER — AZITHROMYCIN 250 MG/1
TABLET, FILM COATED ORAL
Qty: 6 TABLET | Refills: 0 | Status: SHIPPED | OUTPATIENT
Start: 2024-03-14 | End: 2024-03-19

## 2024-03-14 RX ORDER — LANCETS 33 GAUGE
EACH MISCELLANEOUS
Qty: 200 EACH | Refills: 3 | Status: SHIPPED | OUTPATIENT
Start: 2024-03-14

## 2024-03-14 RX ORDER — FLUCONAZOLE 150 MG/1
150 TABLET ORAL DAILY
Qty: 3 TABLET | Refills: 0 | Status: SHIPPED | OUTPATIENT
Start: 2024-03-14 | End: 2024-03-17

## 2024-03-14 RX ORDER — BLOOD SUGAR DIAGNOSTIC
1 STRIP MISCELLANEOUS 2 TIMES DAILY
Start: 2024-03-14

## 2024-03-14 NOTE — PROGRESS NOTES
Assessment/Plan:       1. Type 2 diabetes mellitus without complication, without long-term current use of insulin (Formerly [facility])  -     Lancets (OneTouch Delica Plus [id number]) MISC; Test 2 times a day  -     Comprehensive metabolic panel; Future  -     Hemoglobin A1C; Future  -     Albumin / creatinine urine ratio    2. Type 2 diabetes mellitus with hyperglycemia, with long-term current use of insulin (Former[facility])  Assessment & Plan:    Lab Results   Component Value Date    HGBA1C 6.8 (H) 11/28/2023     He is hemoglobin A1c was relatively well-controlled at 6.8.  Likely higher than that now given what her blood sugar readings are at home.  Advised her to watch her diet more closely.  Try to increase activity level.  Slow but steady weight loss recommended.  Continue with the Ozempic.  Dose increased.  Continue with the Tresiba.  Did not tolerate Jardiance because of recurrent yeast infections.  Will have her get laboratory testing done in the near future and adjust medications if needed thereafter.  Continue to follow-up with ophthalmology.  Check feet daily.      Orders:  -     Comprehensive metabolic panel; Future  -     Hemoglobin A1C; Future  -     Albumin / creatinine urine ratio  -     glucose blood (OneTouch Ultra) test strip; Use 1 each 2 (two) times a day Test 2 times daily   Dx: E11.65    3. Essential hypertension  Assessment & Plan:  Blood pressure well controlled.  Continue current medications.  Watch salt intake.  Stay adequately hydrated.    Orders:  -     CBC and differential; Future  -     Comprehensive metabolic panel; Future    4. PAD (peripheral artery disease) (Formerly [facility])  Assessment & Plan:  Gradually increase activity level.  Recommend walking.  Watch for any significant claudication symptoms.    Orders:  -     CBC and differential; Future    5. Chronic respiratory failure with hypoxia ([facility])  Assessment & Plan:  Continue to follow-up with pulmonary.  Continue with the oxygen.    Orders:  -     CBC and differential;  Future    6. Chronic sinusitis, unspecified location  -     azithromycin (ZITHROMAX) 250 mg tablet; 2 pills today, then one daily for 4 more days  -     fluconazole (DIFLUCAN) 150 mg tablet; Take 1 tablet (150 mg total) by mouth daily for 3 doses    7. Hypothyroidism, unspecified type  Assessment & Plan:  TSH is variable even with making small changes.  Slip given for laboratory testing.  Will check TSH and adjust dose if needed.    Orders:  -     Lipid panel; Future  -     TSH, 3rd generation; Future    8. Rheumatoid arthritis, involving unspecified site, unspecified whether rheumatoid factor present (HCC)  Assessment & Plan:  Has been doing much better with the addition of the Xeljanz.  Continue to follow-up with rheumatology.  Continue with the methotrexate and folic acid as per rheumatology.      9. Morbid obesity with BMI of 40.0-44.9, adult (HCC)    10. Vitamin B12 deficiency  -     Vitamin B12; Future    11. Vitamin D deficiency  -     Vitamin D 25 hydroxy; Future    12. Chronic diastolic (congestive) heart failure (HCC)  Assessment & Plan:  Wt Readings from Last 3 Encounters:   03/14/24 108 kg (237 lb 6.4 oz)   12/18/23 110 kg (243 lb 6.4 oz)   12/02/23 107 kg (236 lb)     Weight relatively stable.  Appears euvolemic.  Watch for any increased shortness of breath, increased peripheral edema, or sudden increases in weight.  Continue with the Lasix.  Recommend regular follow-up with cardiology.            13. Centrilobular emphysema (HCC)  Assessment & Plan:  Continue current inhalers as well as nebulizer treatments.  Continue with the oxygen.  Try to gradually increase activity level.  Watch for any exacerbations.        Orders and recommendations as noted above.  Slip given for laboratory testing.  From the fall reviewed with her.  Reviewed laboratory testing also from her last hospitalization.  Reviewed medications.  Medication changes as noted.  Will have her follow-up in about 3 to 5 months.               Subjective:      Patient ID: Shakira Polk is a 74 y.o. female.    She presents for routine follow-up.  Blood sugars have been ranging much higher.  She admits that she does not eat healthy.  Blood sugar yesterday was around 250.  She did have a fall since her last visit and had to call 911 to get her off of the floor.  They did check her blood sugar when they came and it was over 400.  She was not, however, fasting for this blood work since it was done via glucometer by EMS after the fall.  Did have some difficulty getting her Ozempic regularly because of the supply issues.  Continues with the Tresiba.  Denies any hypoglycemic episodes.  Appetite has decreased.  Tolerating her levothyroxine without difficulty.  She was uncertain whether she was on the right dose there.  Some difficulty with sleeping especially getting up to urinate.  Breathing has been stable.  Continues on the oxygen.  Continues on the inhalers daily and then nebulizer treatments as needed.  Still with intermittent issues with rash at times that is red and itchy.  Did not tolerate the Jardiance in the past because of recurrent vaginal yeast infections.  She had been on Jardiance but this significantly worsened her yeast infections.  Continues to follow-up with rheumatology.  Is on the Xeljanz, continues with the methotrexate and folic acid.  She feels these are working well for her and has noticed a significant improvement since Xeljanz was able to be reintroduced.    The following portions of the patient's history were reviewed and updated as appropriate: She  has a past medical history of Arthritis, Arthritis, Cancer ([facility]), Candidiasis of skin, Cervical cancer ([facility]), Chronic respiratory failure with hypoxia and hypercapnia ([facility]) (4/20/2019), COPD (chronic obstructive pulmonary disease) ([facility]), Current chronic use of systemic steroids, Degenerative arthritis of left knee, Diabetes mellitus ([facility]), Disease of thyroid gland, Fibromyalgia, Fistula  of knee, GERD (gastroesophageal reflux disease), Hyperlipidemia, Hypertension, Medial meniscus tear, Migraine, Obesity, Obesity, Osteoarthritis, Osteopenia, Pneumonia, Psychiatric disorder, Rheumatoid arthritis ([facility]), Rheumatoid arthritis ([facility]), Sepsis ([facility]), Tick bite, Vitamin B12 deficiency, and Vitamin D deficiency.  She   Patient Active Problem List    Diagnosis Date Noted   • Positive blood culture 11/30/2023   • Vertigo 11/28/2023   • History of pulmonary embolus (PE) 11/28/2023   • COVID-19 10/25/2023   • PAD (peripheral artery disease) ([facility]) 08/18/2023   • COPD exacerbation ([facility]) 05/16/2023   • Pre-op evaluation 10/24/2022   • Dependence on continuous supplemental oxygen 03/02/2022   • Excessive anticoagulation 03/02/2022   • MORENO (obstructive sleep apnea) 12/13/2021   • Hilar lymphadenopathy 12/13/2021   • Atherosclerosis 12/13/2021   • T12 compression fracture ([facility]) 12/13/2021   • Chronic diastolic (congestive) heart failure ([facility])    • Chronic sinusitis 11/22/2021   • Primary insomnia 09/08/2020   • Migraine without aura and without status migrainosus, not intractable 07/28/2020   • Postherpetic neuralgia 02/19/2020   • Diastolic dysfunction 01/16/2020   • Macrocytic anemia 01/14/2020   • Hypoxemia requiring supplemental oxygen 06/19/2019   • History of exposure to asbestos 05/20/2019   • Chronic respiratory failure with hypoxia ([facility]) 04/20/2019   • Hyponatremia 04/20/2019   • Candidal dermatitis 12/28/2018   • Loculated pleural effusion 12/25/2018   • Constipation 11/29/2017   • Renal cyst 11/29/2017   • Candidiasis, cutaneous 06/30/2017   • Vitamin B12 deficiency 11/10/2016   • Morbid obesity with BMI of 40.0-44.9, adult ([facility]) 11/04/2016   • Centrilobular emphysema ([facility]) 11/03/2016   • Type 2 diabetes mellitus with hyperglycemia, with long-term current use of insulin ([facility]) 11/03/2016   • Hypothyroidism 11/03/2016   • Rheumatoid arthritis ([facility]) 11/03/2016   • Essential hypertension 11/03/2016   •  Hyperlipidemia 11/03/2016   • Gastroesophageal reflux disease without esophagitis 11/03/2016   • Hypoalbuminemia 11/03/2016   • Hepatic steatosis 11/03/2016   • Atelectasis 11/03/2016   • Anxiety 06/02/2014   • Fibromyalgia 01/28/2014   • Osteopenia 09/17/2012   • Osteoarthritis 05/29/2012   • Vitamin D deficiency 05/29/2012     She  has a past surgical history that includes Hysterectomy; Knee arthroscopy; Cataract extraction (Bilateral); Cholecystectomy; Joint replacement; Tubal ligation; Neuroplasty / transposition median nerve at carpal tunnel; Eye surgery; IR thoracentesis (5/31/2019); and IR aortagram with run-off (9/20/2023).  Her family history includes Addiction problem in her son; Asthma in her family; Breast cancer (age of onset: 45) in her half-sister; Cancer in her family; Diabetes in her family; Hypertension in her family; No Known Problems in her daughter, half-brother, half-brother, maternal aunt, maternal grandfather, maternal grandmother, paternal grandfather, paternal grandmother, son, and son; Stroke in her mother.  She  reports that she quit smoking about 11 years ago. Her smoking use included cigarettes and e-cigarettes. She started smoking about 59 years ago. She has a 48 pack-year smoking history. She has never used smokeless tobacco. She reports that she does not drink alcohol and does not use drugs.  Current Outpatient Medications   Medication Sig Dispense Refill   • acetaminophen (TYLENOL) 325 mg tablet Take 2 tablets (650 mg total) by mouth every 6 (six) hours as needed for mild pain, headaches or fever  0   • albuterol (2.5 mg/3 mL) 0.083 % nebulizer solution USE 1 UNIT DOSE IN NEBULIZER EVERY 4 HOURS AS NEEDED.     • albuterol (PROVENTIL HFA,VENTOLIN HFA) 90 mcg/act inhaler Inhale 2 puffs every 6 (six) hours as needed for wheezing  0   • aspirin (ECOTRIN LOW STRENGTH) 81 mg EC tablet Take 81 mg by mouth daily     • atorvastatin (LIPITOR) 20 mg tablet TAKE ONE TABLET BY MOUTH EVERY DAY 90  tablet 0   • azithromycin (ZITHROMAX) 250 mg tablet 2 pills today, then one daily for 4 more days 6 tablet 0   • BD Pen Needle Mamta U/F 32G X 4 MM MISC USE AS DIRECTED 100 each 0   • Blood Glucose Monitoring Suppl (ONE TOUCH ULTRA 2) w/Device KIT by Does not apply route 2 (two) times a day 1 each 0   • clindamycin (CLEOCIN T) 1 % lotion clindamycin 1 % lotion   APPLY LOTION TOPICALLY TWICE DAILY     • DULoxetine (CYMBALTA) 60 mg delayed release capsule TAKE ONE CAPSULE BY MOUTH EVERY DAY 90 capsule 0   • Eliquis 5 MG TAKE 1 TABLET BY MOUTH TWICE DAILY 60 tablet 0   • ergocalciferol (VITAMIN D2) 50,000 units TAKE ONE CAPSULE BY MOUTH WEEKLY 12 capsule 0   • fluconazole (DIFLUCAN) 150 mg tablet Take 1 tablet (150 mg total) by mouth daily for 3 doses 3 tablet 0   • fluticasone-salmeterol (ADVAIR) 250-50 mcg/dose Inhale 1 puff every 12 (twelve) hours      • folic acid (FOLVITE) 1 mg tablet TAKE ONE TABLET BY MOUTH ONCE DAILY 90 tablet 0   • furosemide (LASIX) 40 mg tablet TAKE ONE TABLET BY MOUTH EVERY DAY 60 tablet 0   • glucose blood (OneTouch Ultra) test strip Use 1 each 2 (two) times a day Test 2 times daily   Dx: E11.65     • guaiFENesin (MUCINEX) 600 mg 12 hr tablet Take 1 tablet (600 mg total) by mouth 2 (two) times a day 30 tablet 0   • Insulin Pen Needle 33G X 4 MM MISC by Does not apply route daily 100 each 5   • Lancets (OneTouch Delica Plus [id number]) MISC Test 2 times a day 200 each 3   • levothyroxine 137 mcg tablet Take 1 tablet (137 mcg total) by mouth daily 90 tablet 1   • lisinopril (ZESTRIL) 40 mg tablet TAKE ONE TABLET BY MOUTH EVERY DAY 90 tablet 0   • methotrexate 2.5 MG tablet Take 8 tablets (20 mg total) by mouth once a week Do not start before December 10, 2023. 32 tablet 0   • mirtazapine (REMERON) 15 mg tablet Take 1 tablet (15 mg total) by mouth daily at bedtime 90 tablet 3   • mometasone (ELOCON) 0.1 % ointment Apply topically 2 (two) times a day as needed (itching) 45 g 1   • nystatin  (MYCOSTATIN) cream Apply topically 2 (two) times a day as needed (rash) 30 g 0   • nystatin (MYCOSTATIN) powder Apply topically 3 (three) times a day as needed (rash/irritation) 45 g 1   • Ozempic, 1 MG/DOSE, 4 MG/3ML injection pen INJECT 1 MG UNDER THE SKIN ONCE A WEEK 3 mL 0   • pantoprazole (PROTONIX) 40 mg tablet TAKE ONE TABLET BY MOUTH ONCE DAILY 90 tablet 0   • Polyethylene Glycol 3350-GRX POWD Take by mouth daily at bedtime as needed (constipation) 850 g 0   • tiotropium (SPIRIVA RESPIMAT) 2.5 MCG/ACT AERS inhaler Inhale 1 puff daily at bedtime 3 Inhaler 3   • Tofacitinib Citrate ER 11 MG TB24 Take 1 tablet (11 mg total) by mouth daily Do not start before December 10, 2023.  0   • Tresiba FlexTouch 100 units/mL injection pen INJECT 40 UNITS UNDER THE SKIN DAILY 30 mL 0   • vitamin B-12 (VITAMIN B-12) 500 MCG tablet Take 1 tablet (500 mcg total) by mouth daily     • benzonatate (TESSALON) 200 MG capsule Take 1 capsule (200 mg total) by mouth 3 (three) times a day as needed for cough (Patient not taking: Reported on 3/14/2024) 60 capsule 0     Current Facility-Administered Medications   Medication Dose Route Frequency Provider Last Rate Last Admin   • cyanocobalamin injection 1,000 mcg  1,000 mcg Intramuscular Q30 Days Ioana Heck MD   1,000 mcg at 06/21/23 1523     Current Outpatient Medications on File Prior to Visit   Medication Sig   • acetaminophen (TYLENOL) 325 mg tablet Take 2 tablets (650 mg total) by mouth every 6 (six) hours as needed for mild pain, headaches or fever   • albuterol (2.5 mg/3 mL) 0.083 % nebulizer solution USE 1 UNIT DOSE IN NEBULIZER EVERY 4 HOURS AS NEEDED.   • albuterol (PROVENTIL HFA,VENTOLIN HFA) 90 mcg/act inhaler Inhale 2 puffs every 6 (six) hours as needed for wheezing   • aspirin (ECOTRIN LOW STRENGTH) 81 mg EC tablet Take 81 mg by mouth daily   • atorvastatin (LIPITOR) 20 mg tablet TAKE ONE TABLET BY MOUTH EVERY DAY   • BD Pen Needle Mamta U/F 32G X 4 MM MISC USE AS  DIRECTED   • Blood Glucose Monitoring Suppl (ONE TOUCH ULTRA 2) w/Device KIT by Does not apply route 2 (two) times a day   • clindamycin (CLEOCIN T) 1 % lotion clindamycin 1 % lotion   APPLY LOTION TOPICALLY TWICE DAILY   • DULoxetine (CYMBALTA) 60 mg delayed release capsule TAKE ONE CAPSULE BY MOUTH EVERY DAY   • Eliquis 5 MG TAKE 1 TABLET BY MOUTH TWICE DAILY   • ergocalciferol (VITAMIN D2) 50,000 units TAKE ONE CAPSULE BY MOUTH WEEKLY   • fluticasone-salmeterol (ADVAIR) 250-50 mcg/dose Inhale 1 puff every 12 (twelve) hours    • folic acid (FOLVITE) 1 mg tablet TAKE ONE TABLET BY MOUTH ONCE DAILY   • furosemide (LASIX) 40 mg tablet TAKE ONE TABLET BY MOUTH EVERY DAY   • guaiFENesin (MUCINEX) 600 mg 12 hr tablet Take 1 tablet (600 mg total) by mouth 2 (two) times a day   • Insulin Pen Needle 33G X 4 MM MISC by Does not apply route daily   • levothyroxine 137 mcg tablet Take 1 tablet (137 mcg total) by mouth daily   • lisinopril (ZESTRIL) 40 mg tablet TAKE ONE TABLET BY MOUTH EVERY DAY   • methotrexate 2.5 MG tablet Take 8 tablets (20 mg total) by mouth once a week Do not start before December 10, 2023.   • mirtazapine (REMERON) 15 mg tablet Take 1 tablet (15 mg total) by mouth daily at bedtime   • mometasone (ELOCON) 0.1 % ointment Apply topically 2 (two) times a day as needed (itching)   • nystatin (MYCOSTATIN) cream Apply topically 2 (two) times a day as needed (rash)   • nystatin (MYCOSTATIN) powder Apply topically 3 (three) times a day as needed (rash/irritation)   • Ozempic, 1 MG/DOSE, 4 MG/3ML injection pen INJECT 1 MG UNDER THE SKIN ONCE A WEEK   • pantoprazole (PROTONIX) 40 mg tablet TAKE ONE TABLET BY MOUTH ONCE DAILY   • Polyethylene Glycol 3350-GRX POWD Take by mouth daily at bedtime as needed (constipation)   • tiotropium (SPIRIVA RESPIMAT) 2.5 MCG/ACT AERS inhaler Inhale 1 puff daily at bedtime   • Tofacitinib Citrate ER 11 MG TB24 Take 1 tablet (11 mg total) by mouth daily Do not start before December  "10, 2023.   • Tresiba FlexTouch 100 units/mL injection pen INJECT 40 UNITS UNDER THE SKIN DAILY   • vitamin B-12 (VITAMIN B-12) 500 MCG tablet Take 1 tablet (500 mcg total) by mouth daily   • benzonatate (TESSALON) 200 MG capsule Take 1 capsule (200 mg total) by mouth 3 (three) times a day as needed for cough (Patient not taking: Reported on 3/14/2024)     Current Facility-Administered Medications on File Prior to Visit   Medication   • cyanocobalamin injection 1,000 mcg     She has no active allergies..    Review of Systems   Constitutional:  Positive for fatigue. Negative for activity change, appetite change, chills and fever.   HENT:  Positive for ear pain. Negative for congestion and rhinorrhea.    Eyes:  Negative for visual disturbance.   Respiratory:  Positive for shortness of breath. Negative for cough and chest tightness.    Cardiovascular:  Negative for chest pain and palpitations.   Gastrointestinal:  Negative for abdominal pain, blood in stool, diarrhea, nausea and vomiting.   Endocrine: Negative for polydipsia, polyphagia and polyuria.        As per HPI   Genitourinary:  Positive for dysuria. Negative for frequency and urgency.   Musculoskeletal:  Positive for arthralgias and gait problem.   Skin:  Negative for color change.   Neurological:  Negative for dizziness and headaches.   Hematological:  Does not bruise/bleed easily.   Psychiatric/Behavioral:  Negative for confusion and sleep disturbance. The patient is not nervous/anxious.            Objective:  /68 (BP Location: Left arm, Patient Position: Sitting, Cuff Size: Large)   Pulse 95   Temp 98 °F (36.7 °C)   Ht 5' 5\" (1.651 m)   Wt 108 kg (237 lb 6.4 oz)   SpO2 93%   BMI 39.51 kg/m²          Physical Exam  Vitals and nursing note reviewed.   Constitutional:       General: She is not in acute distress.     Appearance: She is well-developed and well-groomed.   HENT:      Head: Normocephalic and atraumatic.      Comments: Bilateral tympanic " membranes with slight erythema right greater than left with effusion right greater than left  Eyes:      General:         Right eye: No discharge.         Left eye: No discharge.      Conjunctiva/sclera: Conjunctivae normal.      Pupils: Pupils are equal, round, and reactive to light.   Cardiovascular:      Rate and Rhythm: Normal rate and regular rhythm.      Heart sounds: Normal heart sounds. No murmur heard.     No friction rub. No gallop.   Pulmonary:      Effort: No respiratory distress.      Breath sounds: Decreased breath sounds present. No wheezing or rales.   Abdominal:      General: Bowel sounds are normal. There is no distension.      Tenderness: There is no abdominal tenderness.   Lymphadenopathy:      Cervical: No cervical adenopathy.   Skin:     General: Skin is warm and dry.   Neurological:      Mental Status: She is alert and oriented to person, place, and time.   Psychiatric:         Mood and Affect: Mood and affect normal.         Speech: Speech normal.         Behavior: Behavior normal. Behavior is cooperative.         Cognition and Memory: Cognition and memory normal.     Below is the patient's most recent value for Albumin, ALT, AST, BUN, Calcium, Chloride, Cholesterol, CO2, Creatinine, GFR, Glucose, HDL, Hematocrit, Hemoglobin, Hemoglobin A1C, LDL, Magnesium, Phosphorus, Platelets, Potassium, PSA, Sodium, Triglycerides, and WBC.   Lab Results   Component Value Date    ALT 16 11/29/2023    AST 11 (L) 11/29/2023    BUN 22 12/01/2023    CALCIUM 10.1 12/01/2023     12/01/2023    CHOL 194 12/17/2015    CO2 27 12/01/2023    CREATININE 0.64 12/01/2023    HDL 53 09/15/2023    HCT 32.7 (L) 12/01/2023    HGB 10.5 (L) 12/01/2023    HGBA1C 6.8 (H) 11/28/2023    MG 2.3 11/29/2023    PHOS 2.7 11/29/2023     (H) 12/01/2023    K 4.5 12/01/2023     12/17/2015    TRIG 186 (H) 09/15/2023    WBC 9.31 12/01/2023     Note: for a comprehensive list of the patient's lab results, access the Results  Review activity.

## 2024-03-15 NOTE — ASSESSMENT & PLAN NOTE
Wt Readings from Last 3 Encounters:   03/14/24 108 kg (237 lb 6.4 oz)   12/18/23 110 kg (243 lb 6.4 oz)   12/02/23 107 kg (236 lb)     Weight relatively stable.  Appears euvolemic.  Watch for any increased shortness of breath, increased peripheral edema, or sudden increases in weight.  Continue with the Lasix.  Recommend regular follow-up with cardiology.

## 2024-03-15 NOTE — ASSESSMENT & PLAN NOTE
Gradually increase activity level.  Recommend walking.  Watch for any significant claudication symptoms.

## 2024-03-15 NOTE — ASSESSMENT & PLAN NOTE
Has been doing much better with the addition of the Xeljanz.  Continue to follow-up with rheumatology.  Continue with the methotrexate and folic acid as per rheumatology.

## 2024-03-15 NOTE — ASSESSMENT & PLAN NOTE
TSH is variable even with making small changes.  Slip given for laboratory testing.  Will check TSH and adjust dose if needed.

## 2024-03-15 NOTE — ASSESSMENT & PLAN NOTE
Continue current inhalers as well as nebulizer treatments.  Continue with the oxygen.  Try to gradually increase activity level.  Watch for any exacerbations.

## 2024-03-15 NOTE — ASSESSMENT & PLAN NOTE
Lab Results   Component Value Date    HGBA1C 6.8 (H) 11/28/2023     He is hemoglobin A1c was relatively well-controlled at 6.8.  Likely higher than that now given what her blood sugar readings are at home.  Advised her to watch her diet more closely.  Try to increase activity level.  Slow but steady weight loss recommended.  Continue with the Ozempic.  Dose increased.  Continue with the Tresiba.  Did not tolerate Jardiance because of recurrent yeast infections.  Will have her get laboratory testing done in the near future and adjust medications if needed thereafter.  Continue to follow-up with ophthalmology.  Check feet daily.

## 2024-04-03 DIAGNOSIS — E11.65 TYPE 2 DIABETES MELLITUS WITH HYPERGLYCEMIA, WITH LONG-TERM CURRENT USE OF INSULIN (HCC): ICD-10-CM

## 2024-04-03 DIAGNOSIS — E78.2 MIXED HYPERLIPIDEMIA: ICD-10-CM

## 2024-04-03 DIAGNOSIS — K21.9 GASTROESOPHAGEAL REFLUX DISEASE: ICD-10-CM

## 2024-04-03 DIAGNOSIS — R60.0 PERIPHERAL EDEMA: ICD-10-CM

## 2024-04-03 DIAGNOSIS — Z79.4 TYPE 2 DIABETES MELLITUS WITH HYPERGLYCEMIA, WITH LONG-TERM CURRENT USE OF INSULIN (HCC): ICD-10-CM

## 2024-04-04 RX ORDER — ATORVASTATIN CALCIUM 20 MG/1
TABLET, FILM COATED ORAL
Qty: 90 TABLET | Refills: 0 | Status: SHIPPED | OUTPATIENT
Start: 2024-04-04

## 2024-04-04 RX ORDER — FUROSEMIDE 40 MG/1
TABLET ORAL
Qty: 90 TABLET | Refills: 0 | Status: SHIPPED | OUTPATIENT
Start: 2024-04-04

## 2024-04-04 RX ORDER — SEMAGLUTIDE 1.34 MG/ML
1 INJECTION, SOLUTION SUBCUTANEOUS WEEKLY
Qty: 3 ML | Refills: 0 | Status: SHIPPED | OUTPATIENT
Start: 2024-04-04

## 2024-04-04 RX ORDER — METHOTREXATE 2.5 MG/1
TABLET ORAL
Qty: 32 TABLET | Refills: 0 | Status: SHIPPED | OUTPATIENT
Start: 2024-04-04

## 2024-05-01 DIAGNOSIS — Z79.4 TYPE 2 DIABETES MELLITUS WITH HYPERGLYCEMIA, WITH LONG-TERM CURRENT USE OF INSULIN (HCC): ICD-10-CM

## 2024-05-01 DIAGNOSIS — E11.65 TYPE 2 DIABETES MELLITUS WITH HYPERGLYCEMIA, WITH LONG-TERM CURRENT USE OF INSULIN (HCC): ICD-10-CM

## 2024-05-01 DIAGNOSIS — K21.9 GASTROESOPHAGEAL REFLUX DISEASE: ICD-10-CM

## 2024-05-01 DIAGNOSIS — M06.9 RHEUMATOID ARTHRITIS (HCC): ICD-10-CM

## 2024-05-02 ENCOUNTER — APPOINTMENT (EMERGENCY)
Dept: RADIOLOGY | Facility: HOSPITAL | Age: 74
DRG: 871 | End: 2024-05-02
Payer: COMMERCIAL

## 2024-05-02 ENCOUNTER — HOSPITAL ENCOUNTER (INPATIENT)
Facility: HOSPITAL | Age: 74
LOS: 4 days | Discharge: HOME HEALTH | DRG: 871 | End: 2024-05-06
Attending: EMERGENCY MEDICINE | Admitting: FAMILY MEDICINE
Payer: COMMERCIAL

## 2024-05-02 ENCOUNTER — APPOINTMENT (EMERGENCY)
Dept: CT IMAGING | Facility: HOSPITAL | Age: 74
DRG: 871 | End: 2024-05-02
Payer: COMMERCIAL

## 2024-05-02 DIAGNOSIS — R78.81 BACTEREMIA: ICD-10-CM

## 2024-05-02 DIAGNOSIS — N17.9 AKI (ACUTE KIDNEY INJURY) (HCC): ICD-10-CM

## 2024-05-02 DIAGNOSIS — E87.1 HYPONATREMIA: ICD-10-CM

## 2024-05-02 DIAGNOSIS — J18.9 PNEUMONIA: Primary | ICD-10-CM

## 2024-05-02 DIAGNOSIS — J96.11 CHRONIC RESPIRATORY FAILURE WITH HYPOXIA (HCC): ICD-10-CM

## 2024-05-02 DIAGNOSIS — E03.9 HYPOTHYROIDISM, UNSPECIFIED TYPE: ICD-10-CM

## 2024-05-02 PROBLEM — Z86.19 HISTORY OF PEDICULOSIS: Status: RESOLVED | Noted: 2024-05-02 | Resolved: 2024-05-02

## 2024-05-02 PROBLEM — Z86.19 HISTORY OF PEDICULOSIS: Status: ACTIVE | Noted: 2024-05-02

## 2024-05-02 LAB
2HR DELTA HS TROPONIN: -2 NG/L
4HR DELTA HS TROPONIN: -3 NG/L
ALBUMIN SERPL BCP-MCNC: 3.6 G/DL (ref 3.5–5)
ALP SERPL-CCNC: 66 U/L (ref 34–104)
ALT SERPL W P-5'-P-CCNC: 19 U/L (ref 7–52)
ANION GAP SERPL CALCULATED.3IONS-SCNC: 10 MMOL/L (ref 4–13)
ANION GAP SERPL CALCULATED.3IONS-SCNC: 12 MMOL/L (ref 4–13)
ANISOCYTOSIS BLD QL SMEAR: PRESENT
APTT PPP: 29 SECONDS (ref 23–37)
AST SERPL W P-5'-P-CCNC: 21 U/L (ref 13–39)
BASOPHILS # BLD MANUAL: 0 THOUSAND/UL (ref 0–0.1)
BASOPHILS NFR MAR MANUAL: 0 % (ref 0–1)
BILIRUB SERPL-MCNC: 0.72 MG/DL (ref 0.2–1)
BNP SERPL-MCNC: 39 PG/ML (ref 0–100)
BUN SERPL-MCNC: 43 MG/DL (ref 5–25)
BUN SERPL-MCNC: 49 MG/DL (ref 5–25)
CALCIUM SERPL-MCNC: 9.2 MG/DL (ref 8.4–10.2)
CALCIUM SERPL-MCNC: 9.3 MG/DL (ref 8.4–10.2)
CARDIAC TROPONIN I PNL SERPL HS: 11 NG/L
CARDIAC TROPONIN I PNL SERPL HS: 8 NG/L
CARDIAC TROPONIN I PNL SERPL HS: 9 NG/L
CHLORIDE SERPL-SCNC: 89 MMOL/L (ref 96–108)
CHLORIDE SERPL-SCNC: 92 MMOL/L (ref 96–108)
CO2 SERPL-SCNC: 24 MMOL/L (ref 21–32)
CO2 SERPL-SCNC: 27 MMOL/L (ref 21–32)
CREAT SERPL-MCNC: 1.34 MG/DL (ref 0.6–1.3)
CREAT SERPL-MCNC: 1.64 MG/DL (ref 0.6–1.3)
D DIMER PPP FEU-MCNC: 1.69 UG/ML FEU
EOSINOPHIL # BLD MANUAL: 0 THOUSAND/UL (ref 0–0.4)
EOSINOPHIL NFR BLD MANUAL: 0 % (ref 0–6)
ERYTHROCYTE [DISTWIDTH] IN BLOOD BY AUTOMATED COUNT: 17.3 % (ref 11.6–15.1)
FLUAV RNA RESP QL NAA+PROBE: NEGATIVE
FLUBV RNA RESP QL NAA+PROBE: NEGATIVE
GFR SERPL CREATININE-BSD FRML MDRD: 30 ML/MIN/1.73SQ M
GFR SERPL CREATININE-BSD FRML MDRD: 38 ML/MIN/1.73SQ M
GLUCOSE SERPL-MCNC: 197 MG/DL (ref 65–140)
GLUCOSE SERPL-MCNC: 283 MG/DL (ref 65–140)
GLUCOSE SERPL-MCNC: 323 MG/DL (ref 65–140)
HCT VFR BLD AUTO: 30.4 % (ref 34.8–46.1)
HGB BLD-MCNC: 9.8 G/DL (ref 11.5–15.4)
INR PPP: 1.46 (ref 0.84–1.19)
LACTATE SERPL-SCNC: 2 MMOL/L (ref 0.5–2)
LACTATE SERPL-SCNC: 2.4 MMOL/L (ref 0.5–2)
LACTATE SERPL-SCNC: 3.1 MMOL/L (ref 0.5–2)
LYMPHOCYTES # BLD AUTO: 2.03 THOUSAND/UL (ref 0.6–4.47)
LYMPHOCYTES # BLD AUTO: 8 % (ref 14–44)
MCH RBC QN AUTO: 28.1 PG (ref 26.8–34.3)
MCHC RBC AUTO-ENTMCNC: 32.2 G/DL (ref 31.4–37.4)
MCV RBC AUTO: 87 FL (ref 82–98)
MONOCYTES # BLD AUTO: 0.51 THOUSAND/UL (ref 0–1.22)
MONOCYTES NFR BLD: 2 % (ref 4–12)
NEUTROPHILS # BLD MANUAL: 22.81 THOUSAND/UL (ref 1.85–7.62)
NEUTS BAND NFR BLD MANUAL: 12 % (ref 0–8)
NEUTS SEG NFR BLD AUTO: 78 % (ref 43–75)
PLATELET # BLD AUTO: 223 THOUSANDS/UL (ref 149–390)
PLATELET # BLD AUTO: 234 THOUSANDS/UL (ref 149–390)
PLATELET BLD QL SMEAR: ADEQUATE
PMV BLD AUTO: 10.5 FL (ref 8.9–12.7)
PMV BLD AUTO: 10.9 FL (ref 8.9–12.7)
POTASSIUM SERPL-SCNC: 2.9 MMOL/L (ref 3.5–5.3)
POTASSIUM SERPL-SCNC: 3.1 MMOL/L (ref 3.5–5.3)
PROCALCITONIN SERPL-MCNC: 29.82 NG/ML
PROCALCITONIN SERPL-MCNC: 32.68 NG/ML
PROT SERPL-MCNC: 7.3 G/DL (ref 6.4–8.4)
PROTHROMBIN TIME: 17.5 SECONDS (ref 11.6–14.5)
RBC # BLD AUTO: 3.49 MILLION/UL (ref 3.81–5.12)
RBC MORPH BLD: PRESENT
RSV RNA RESP QL NAA+PROBE: NEGATIVE
SARS-COV-2 RNA RESP QL NAA+PROBE: NEGATIVE
SODIUM SERPL-SCNC: 125 MMOL/L (ref 135–147)
SODIUM SERPL-SCNC: 129 MMOL/L (ref 135–147)
WBC # BLD AUTO: 25.34 THOUSAND/UL (ref 4.31–10.16)

## 2024-05-02 PROCEDURE — 94664 DEMO&/EVAL PT USE INHALER: CPT

## 2024-05-02 PROCEDURE — 99223 1ST HOSP IP/OBS HIGH 75: CPT | Performed by: FAMILY MEDICINE

## 2024-05-02 PROCEDURE — 99291 CRITICAL CARE FIRST HOUR: CPT | Performed by: EMERGENCY MEDICINE

## 2024-05-02 PROCEDURE — 85379 FIBRIN DEGRADATION QUANT: CPT | Performed by: EMERGENCY MEDICINE

## 2024-05-02 PROCEDURE — 80053 COMPREHEN METABOLIC PANEL: CPT | Performed by: EMERGENCY MEDICINE

## 2024-05-02 PROCEDURE — 85049 AUTOMATED PLATELET COUNT: CPT | Performed by: FAMILY MEDICINE

## 2024-05-02 PROCEDURE — 85610 PROTHROMBIN TIME: CPT | Performed by: EMERGENCY MEDICINE

## 2024-05-02 PROCEDURE — 84484 ASSAY OF TROPONIN QUANT: CPT | Performed by: EMERGENCY MEDICINE

## 2024-05-02 PROCEDURE — 99285 EMERGENCY DEPT VISIT HI MDM: CPT

## 2024-05-02 PROCEDURE — 71045 X-RAY EXAM CHEST 1 VIEW: CPT

## 2024-05-02 PROCEDURE — 94760 N-INVAS EAR/PLS OXIMETRY 1: CPT

## 2024-05-02 PROCEDURE — 87154 CUL TYP ID BLD PTHGN 6+ TRGT: CPT | Performed by: EMERGENCY MEDICINE

## 2024-05-02 PROCEDURE — 83880 ASSAY OF NATRIURETIC PEPTIDE: CPT | Performed by: EMERGENCY MEDICINE

## 2024-05-02 PROCEDURE — 96366 THER/PROPH/DIAG IV INF ADDON: CPT

## 2024-05-02 PROCEDURE — 85007 BL SMEAR W/DIFF WBC COUNT: CPT | Performed by: EMERGENCY MEDICINE

## 2024-05-02 PROCEDURE — 84145 PROCALCITONIN (PCT): CPT | Performed by: EMERGENCY MEDICINE

## 2024-05-02 PROCEDURE — 85027 COMPLETE CBC AUTOMATED: CPT | Performed by: EMERGENCY MEDICINE

## 2024-05-02 PROCEDURE — 96365 THER/PROPH/DIAG IV INF INIT: CPT

## 2024-05-02 PROCEDURE — 82948 REAGENT STRIP/BLOOD GLUCOSE: CPT

## 2024-05-02 PROCEDURE — 83605 ASSAY OF LACTIC ACID: CPT | Performed by: FAMILY MEDICINE

## 2024-05-02 PROCEDURE — 85730 THROMBOPLASTIN TIME PARTIAL: CPT | Performed by: EMERGENCY MEDICINE

## 2024-05-02 PROCEDURE — 84145 PROCALCITONIN (PCT): CPT | Performed by: FAMILY MEDICINE

## 2024-05-02 PROCEDURE — 94640 AIRWAY INHALATION TREATMENT: CPT

## 2024-05-02 PROCEDURE — 71275 CT ANGIOGRAPHY CHEST: CPT

## 2024-05-02 PROCEDURE — 93005 ELECTROCARDIOGRAM TRACING: CPT

## 2024-05-02 PROCEDURE — 0241U HB NFCT DS VIR RESP RNA 4 TRGT: CPT | Performed by: FAMILY MEDICINE

## 2024-05-02 PROCEDURE — 96367 TX/PROPH/DG ADDL SEQ IV INF: CPT

## 2024-05-02 PROCEDURE — 87040 BLOOD CULTURE FOR BACTERIA: CPT | Performed by: EMERGENCY MEDICINE

## 2024-05-02 PROCEDURE — 80048 BASIC METABOLIC PNL TOTAL CA: CPT | Performed by: FAMILY MEDICINE

## 2024-05-02 PROCEDURE — 36415 COLL VENOUS BLD VENIPUNCTURE: CPT | Performed by: EMERGENCY MEDICINE

## 2024-05-02 PROCEDURE — 83605 ASSAY OF LACTIC ACID: CPT | Performed by: EMERGENCY MEDICINE

## 2024-05-02 RX ORDER — MIRTAZAPINE 15 MG/1
15 TABLET, FILM COATED ORAL
Status: DISCONTINUED | OUTPATIENT
Start: 2024-05-02 | End: 2024-05-06 | Stop reason: HOSPADM

## 2024-05-02 RX ORDER — GUAIFENESIN 600 MG/1
600 TABLET, EXTENDED RELEASE ORAL 2 TIMES DAILY
Status: DISCONTINUED | OUTPATIENT
Start: 2024-05-02 | End: 2024-05-06 | Stop reason: HOSPADM

## 2024-05-02 RX ORDER — CEFTRIAXONE 1 G/50ML
1000 INJECTION, SOLUTION INTRAVENOUS ONCE
Status: COMPLETED | OUTPATIENT
Start: 2024-05-02 | End: 2024-05-02

## 2024-05-02 RX ORDER — BENZONATATE 100 MG/1
200 CAPSULE ORAL 3 TIMES DAILY PRN
Status: DISCONTINUED | OUTPATIENT
Start: 2024-05-02 | End: 2024-05-06 | Stop reason: HOSPADM

## 2024-05-02 RX ORDER — ATORVASTATIN CALCIUM 10 MG/1
20 TABLET, FILM COATED ORAL DAILY
Status: DISCONTINUED | OUTPATIENT
Start: 2024-05-03 | End: 2024-05-06 | Stop reason: HOSPADM

## 2024-05-02 RX ORDER — PANTOPRAZOLE SODIUM 40 MG/1
40 TABLET, DELAYED RELEASE ORAL
Status: DISCONTINUED | OUTPATIENT
Start: 2024-05-03 | End: 2024-05-06 | Stop reason: HOSPADM

## 2024-05-02 RX ORDER — CEFTRIAXONE 1 G/50ML
1000 INJECTION, SOLUTION INTRAVENOUS EVERY 24 HOURS
Status: DISCONTINUED | OUTPATIENT
Start: 2024-05-03 | End: 2024-05-03

## 2024-05-02 RX ORDER — POTASSIUM CHLORIDE 20 MEQ/1
40 TABLET, EXTENDED RELEASE ORAL ONCE
Status: COMPLETED | OUTPATIENT
Start: 2024-05-02 | End: 2024-05-02

## 2024-05-02 RX ORDER — LEVALBUTEROL INHALATION SOLUTION 1.25 MG/3ML
1.25 SOLUTION RESPIRATORY (INHALATION)
Status: DISCONTINUED | OUTPATIENT
Start: 2024-05-02 | End: 2024-05-06 | Stop reason: HOSPADM

## 2024-05-02 RX ORDER — SODIUM CHLORIDE 9 MG/ML
50 INJECTION, SOLUTION INTRAVENOUS CONTINUOUS
Status: DISCONTINUED | OUTPATIENT
Start: 2024-05-02 | End: 2024-05-03

## 2024-05-02 RX ORDER — HEPARIN SODIUM 5000 [USP'U]/ML
5000 INJECTION, SOLUTION INTRAVENOUS; SUBCUTANEOUS EVERY 8 HOURS SCHEDULED
Status: DISCONTINUED | OUTPATIENT
Start: 2024-05-02 | End: 2024-05-02

## 2024-05-02 RX ORDER — ACETAMINOPHEN 160 MG/5ML
1 SUSPENSION, ORAL (FINAL DOSE FORM) ORAL ONCE
Status: COMPLETED | OUTPATIENT
Start: 2024-05-02 | End: 2024-05-02

## 2024-05-02 RX ORDER — FLUTICASONE FUROATE AND VILANTEROL 200; 25 UG/1; UG/1
1 POWDER RESPIRATORY (INHALATION) DAILY
Status: DISCONTINUED | OUTPATIENT
Start: 2024-05-03 | End: 2024-05-06 | Stop reason: HOSPADM

## 2024-05-02 RX ORDER — INSULIN GLARGINE 100 [IU]/ML
20 INJECTION, SOLUTION SUBCUTANEOUS EVERY 12 HOURS SCHEDULED
Status: DISCONTINUED | OUTPATIENT
Start: 2024-05-02 | End: 2024-05-06 | Stop reason: HOSPADM

## 2024-05-02 RX ORDER — DULOXETIN HYDROCHLORIDE 30 MG/1
60 CAPSULE, DELAYED RELEASE ORAL DAILY
Status: DISCONTINUED | OUTPATIENT
Start: 2024-05-03 | End: 2024-05-06 | Stop reason: HOSPADM

## 2024-05-02 RX ADMIN — CEFTRIAXONE 1000 MG: 1 INJECTION, SOLUTION INTRAVENOUS at 13:36

## 2024-05-02 RX ADMIN — GUAIFENESIN 600 MG: 600 TABLET ORAL at 19:22

## 2024-05-02 RX ADMIN — SODIUM CHLORIDE 500 ML: 0.9 INJECTION, SOLUTION INTRAVENOUS at 14:05

## 2024-05-02 RX ADMIN — LEVALBUTEROL HYDROCHLORIDE 1.25 MG: 1.25 SOLUTION RESPIRATORY (INHALATION) at 20:07

## 2024-05-02 RX ADMIN — MIRTAZAPINE 15 MG: 15 TABLET, FILM COATED ORAL at 21:49

## 2024-05-02 RX ADMIN — APIXABAN 5 MG: 5 TABLET, FILM COATED ORAL at 19:22

## 2024-05-02 RX ADMIN — POTASSIUM CHLORIDE 40 MEQ: 1500 TABLET, EXTENDED RELEASE ORAL at 21:49

## 2024-05-02 RX ADMIN — SODIUM CHLORIDE 50 ML/HR: 0.9 INJECTION, SOLUTION INTRAVENOUS at 19:22

## 2024-05-02 RX ADMIN — INSULIN GLARGINE 20 UNITS: 100 INJECTION, SOLUTION SUBCUTANEOUS at 21:49

## 2024-05-02 RX ADMIN — POTASSIUM CHLORIDE 40 MEQ: 1500 TABLET, EXTENDED RELEASE ORAL at 18:13

## 2024-05-02 RX ADMIN — IOHEXOL 100 ML: 350 INJECTION, SOLUTION INTRAVENOUS at 14:31

## 2024-05-02 RX ADMIN — AZITHROMYCIN MONOHYDRATE 500 MG: 500 INJECTION, POWDER, LYOPHILIZED, FOR SOLUTION INTRAVENOUS at 14:06

## 2024-05-02 NOTE — ASSESSMENT & PLAN NOTE
Wt Readings from Last 3 Encounters:   03/14/24 108 kg (237 lb 6.4 oz)   12/18/23 110 kg (243 lb 6.4 oz)   12/02/23 107 kg (236 lb)     DC IVF ,  hold lasix for another then resume if renal function improving

## 2024-05-02 NOTE — ED PROVIDER NOTES
History  Chief Complaint   Patient presents with    Shortness of Breath     Patient reports to this ED via EMS c/o SOB starting yesterday. Patient is on chronic 3L o2 for COPD     HPI    74-year-old female with past medical history of COPD on 3 L nasal cannula, diabetes, history of PE on Eliquis who presents for evaluation of shortness of breath.  Patient states symptoms started yesterday but got worse today.  She states she started with pain in the center of her chest yesterday.  She has had a cough.  She states pain in her chest is worse when she is coughing or taking a deep breath.  She felt very weak today so called for EMS.  Patient did have a fever for EMS up to 101.  She was given Tylenol.  Patient has had decreased appetite.  Denies abdominal pain, nausea, vomiting, or diarrhea.  Denies leg pain or leg swelling.  Patient does have history of PE but states she is compliant with Eliquis.    Prior to Admission Medications   Prescriptions Last Dose Informant Patient Reported? Taking?   BD Pen Needle Mamta U/F 32G X 4 MM MISC  Self No No   Sig: USE AS DIRECTED   Blood Glucose Monitoring Suppl (ONE TOUCH ULTRA 2) w/Device KIT  Self No No   Sig: by Does not apply route 2 (two) times a day   DULoxetine (CYMBALTA) 60 mg delayed release capsule   No No   Sig: TAKE ONE CAPSULE BY MOUTH EVERY DAY   Eliquis 5 MG  Self No No   Sig: TAKE 1 TABLET BY MOUTH TWICE DAILY   Insulin Pen Needle 33G X 4 MM MISC  Self No No   Sig: by Does not apply route daily   Lancets (OneTouch Delica Plus [id number]) MISC   No No   Sig: Test 2 times a day   Polyethylene Glycol 3350-GRX POWD  Self No No   Sig: Take by mouth daily at bedtime as needed (constipation)   Tofacitinib Citrate ER 11 MG TB24  Self No No   Sig: Take 1 tablet (11 mg total) by mouth daily Do not start before December 10, 2023.   Tresiba FlexTouch 100 units/mL injection pen   No No   Sig: INJECT 40 UNITS UNDER THE SKIN DAILY   acetaminophen (TYLENOL) 325 mg tablet  Self No No    Sig: Take 2 tablets (650 mg total) by mouth every 6 (six) hours as needed for mild pain, headaches or fever   albuterol (2.5 mg/3 mL) 0.083 % nebulizer solution  Self Yes No   Sig: USE 1 UNIT DOSE IN NEBULIZER EVERY 4 HOURS AS NEEDED.   albuterol (PROVENTIL HFA,VENTOLIN HFA) 90 mcg/act inhaler  Self No No   Sig: Inhale 2 puffs every 6 (six) hours as needed for wheezing   aspirin (ECOTRIN LOW STRENGTH) 81 mg EC tablet  Self Yes No   Sig: Take 81 mg by mouth daily   atorvastatin (LIPITOR) 20 mg tablet   No No   Sig: TAKE ONE TABLET BY MOUTH EVERY DAY   benzonatate (TESSALON) 200 MG capsule  Self No No   Sig: Take 1 capsule (200 mg total) by mouth 3 (three) times a day as needed for cough   Patient not taking: Reported on 3/14/2024   clindamycin (CLEOCIN T) 1 % lotion  Self Yes No   Sig: clindamycin 1 % lotion   APPLY LOTION TOPICALLY TWICE DAILY   ergocalciferol (VITAMIN D2) 50,000 units  Self No No   Sig: TAKE ONE CAPSULE BY MOUTH WEEKLY   fluticasone-salmeterol (ADVAIR) 250-50 mcg/dose  Self Yes No   Sig: Inhale 1 puff every 12 (twelve) hours    folic acid (FOLVITE) 1 mg tablet  Self No No   Sig: TAKE ONE TABLET BY MOUTH ONCE DAILY   furosemide (LASIX) 40 mg tablet   No No   Sig: TAKE ONE TABLET BY MOUTH EVERY DAY   glucose blood (OneTouch Ultra) test strip   No No   Sig: Use 1 each 2 (two) times a day Test 2 times daily   Dx: E11.65   guaiFENesin (MUCINEX) 600 mg 12 hr tablet  Self No No   Sig: Take 1 tablet (600 mg total) by mouth 2 (two) times a day   levothyroxine 137 mcg tablet  Self No No   Sig: Take 1 tablet (137 mcg total) by mouth daily   lisinopril (ZESTRIL) 40 mg tablet  Self No No   Sig: TAKE ONE TABLET BY MOUTH EVERY DAY   methotrexate 2.5 MG tablet   No No   Sig: TAKE 8 TABLETS BY MOUTH EVERY SUNDAY   mirtazapine (REMERON) 15 mg tablet  Self No No   Sig: Take 1 tablet (15 mg total) by mouth daily at bedtime   mometasone (ELOCON) 0.1 % ointment  Self No No   Sig: Apply topically 2 (two) times a day as  needed (itching)   nystatin (MYCOSTATIN) cream   No No   Sig: Apply topically 2 (two) times a day as needed (rash)   nystatin (MYCOSTATIN) powder   No No   Sig: Apply topically 3 (three) times a day as needed (rash/irritation)   pantoprazole (PROTONIX) 40 mg tablet   No No   Sig: TAKE ONE TABLET BY MOUTH ONCE DAILY   semaglutide, 1 mg/dose, (Ozempic, 1 MG/DOSE,) 4 mg/3 mL injection pen   No No   Sig: INJECT 1 MG UNDER THE SKIN ONCE A WEEK   tiotropium (SPIRIVA RESPIMAT) 2.5 MCG/ACT AERS inhaler  Self No No   Sig: Inhale 1 puff daily at bedtime   vitamin B-12 (VITAMIN B-12) 500 MCG tablet  Self No No   Sig: Take 1 tablet (500 mcg total) by mouth daily      Facility-Administered Medications Last Administration Doses Remaining   cyanocobalamin injection 1,000 mcg 6/21/2023  3:23 PM           Past Medical History:   Diagnosis Date    Arthritis     Arthritis     Cancer (Formerly [facility])     Candidiasis of skin     Cervical cancer (Formerly [facility])     Chronic respiratory failure with hypoxia and hypercapnia (Formerly [facility]) 4/20/2019    COPD (chronic obstructive pulmonary disease) (Formerly [facility])     last assessed 11/21/2016    Current chronic use of systemic steroids     Degenerative arthritis of left knee     last assessed 1/20/2015    Diabetes mellitus (Formerly [facility])     Disease of thyroid gland     hypo pill given to decrease thyroid.     Fibromyalgia     Fistula of knee     last assessed 9/12/2014    GERD (gastroesophageal reflux disease)     Hyperlipidemia     Hypertension     Medial meniscus tear     of left knee, last assessed 9/12/2014    Migraine     states she has a hx of HA that she calls [employer] but never was dx by neurologist    Obesity     Obesity     Osteoarthritis     Osteopenia     Pneumonia     last assessed 11/16/2016    Psychiatric disorder     anxiety    Rheumatoid arthritis (HCC)     Rheumatoid arthritis (HCC)     Sepsis (HCC)     last assessed 11/10/2016    Tick bite     last assessed 10/30/2015    Vitamin B12 deficiency     Vitamin D deficiency   "      Past Surgical History:   Procedure Laterality Date    CATARACT EXTRACTION Bilateral     CHOLECYSTECTOMY      EYE SURGERY      Bilateral Cataracts    HYSTERECTOMY      IR AORTAGRAM WITH RUN-OFF  2023    IR THORACENTESIS  2019    JOINT REPLACEMENT      left knee    KNEE ARTHROSCOPY      NEUROPLASTY / TRANSPOSITION MEDIAN NERVE AT CARPAL TUNNEL      TUBAL LIGATION         Family History   Problem Relation Age of Onset    Stroke Mother     No Known Problems Daughter     No Known Problems Maternal Grandmother     No Known Problems Maternal Grandfather     No Known Problems Paternal Grandmother     No Known Problems Paternal Grandfather     Asthma Family     Cancer Family     Diabetes Family     Hypertension Family     No Known Problems Son     No Known Problems Son     Addiction problem Son     Breast cancer Half-Sister 45    No Known Problems Half-Brother     No Known Problems Half-Brother     No Known Problems Maternal Aunt      I have reviewed and agree with the history as documented.    E-Cigarette/Vaping    E-Cigarette Use Former User      E-Cigarette/Vaping Substances    Nicotine No     THC No     CBD No     Flavoring No     Other No     Unknown No      Social History     Tobacco Use    Smoking status: Former     Current packs/day: 0.00     Average packs/day: 1 pack/day for 48.0 years (48.0 ttl pk-yrs)     Types: Cigarettes, E-Cigarettes     Start date: 1964     Quit date: 2012     Years since quittin.4    Smokeless tobacco: Never    Tobacco comments:     per allscripts - \"current every day smoker, former smoker\"   Vaping Use    Vaping status: Former   Substance Use Topics    Alcohol use: Never     Alcohol/week: 0.0 standard drinks of alcohol     Comment: stopped drinking alcohol    Drug use: Never       Review of Systems   Constitutional:  Positive for appetite change and fever. Negative for chills.   HENT:  Negative for congestion, rhinorrhea and sore throat.    Respiratory:  " Positive for cough and shortness of breath.    Cardiovascular:  Positive for chest pain. Negative for leg swelling.   Gastrointestinal:  Negative for abdominal pain, diarrhea, nausea and vomiting.   Genitourinary:  Negative for dysuria, frequency, hematuria and urgency.   Musculoskeletal:  Negative for arthralgias and myalgias.   Skin:  Negative for rash.   Neurological:  Positive for weakness. Negative for dizziness, light-headedness, numbness and headaches.   All other systems reviewed and are negative.      Physical Exam  Physical Exam  Vitals and nursing note reviewed.   Constitutional:       General: She is not in acute distress.     Appearance: Normal appearance. She is well-developed. She is obese. She is ill-appearing. She is not toxic-appearing or diaphoretic.   HENT:      Head: Normocephalic and atraumatic.      Right Ear: External ear normal.      Left Ear: External ear normal.      Nose: Nose normal.      Mouth/Throat:      Mouth: Mucous membranes are moist.      Pharynx: Oropharynx is clear.   Eyes:      Extraocular Movements: Extraocular movements intact.      Conjunctiva/sclera: Conjunctivae normal.   Cardiovascular:      Rate and Rhythm: Regular rhythm. Tachycardia present.      Pulses: Normal pulses.      Heart sounds: Normal heart sounds. No murmur heard.     No friction rub. No gallop.   Pulmonary:      Effort: Pulmonary effort is normal. Tachypnea present. No respiratory distress.      Breath sounds: Decreased breath sounds and rhonchi present. No wheezing or rales.   Abdominal:      General: There is no distension.      Palpations: Abdomen is soft.      Tenderness: There is no abdominal tenderness. There is no guarding or rebound.   Musculoskeletal:         General: No tenderness.      Cervical back: Neck supple.      Right lower leg: No tenderness. No edema.      Left lower leg: No tenderness. No edema.   Skin:     General: Skin is warm and dry.      Coloration: Skin is not pale.      Findings:  No erythema or rash.   Neurological:      General: No focal deficit present.      Mental Status: She is alert and oriented to person, place, and time.      Cranial Nerves: No cranial nerve deficit.      Sensory: No sensory deficit.      Motor: No weakness.   Psychiatric:         Mood and Affect: Mood normal.         Behavior: Behavior normal.         Vital Signs  ED Triage Vitals   Temperature Pulse Respirations Blood Pressure SpO2   05/02/24 1239 05/02/24 1225 05/02/24 1225 05/02/24 1225 05/02/24 1225   (!) 97.3 °F (36.3 °C) (!) 110 (!) 33 122/56 94 %      Temp Source Heart Rate Source Patient Position - Orthostatic VS BP Location FiO2 (%)   05/02/24 1239 05/02/24 1225 05/02/24 1225 05/02/24 1225 --   Temporal Monitor Lying Right arm       Pain Score       05/02/24 1513       No Pain           Vitals:    05/04/24 2211 05/05/24 0056 05/05/24 0825 05/05/24 1430   BP: (!) 141/105 146/61 146/60 132/59   Pulse: 88 81 85 (!) 109   Patient Position - Orthostatic VS:             Visual Acuity      ED Medications  Medications   aspirin (ECOTRIN LOW STRENGTH) EC tablet 81 mg (81 mg Oral Given 5/5/24 0821)   atorvastatin (LIPITOR) tablet 20 mg (20 mg Oral Given 5/5/24 0821)   benzonatate (TESSALON PERLES) capsule 200 mg (has no administration in time range)   DULoxetine (CYMBALTA) delayed release capsule 60 mg (60 mg Oral Given 5/5/24 0821)   apixaban (ELIQUIS) tablet 5 mg (5 mg Oral Given 5/5/24 0821)   fluticasone-vilanterol 200-25 mcg/actuation 1 puff (1 puff Inhalation Given 5/5/24 0821)   levothyroxine tablet 137 mcg (137 mcg Oral Given 5/5/24 0515)   mirtazapine (REMERON) tablet 15 mg (15 mg Oral Given 5/4/24 2202)   pantoprazole (PROTONIX) EC tablet 40 mg (40 mg Oral Given 5/5/24 0633)   umeclidinium 62.5 mcg/actuation inhaler AEPB 1 puff (1 puff Inhalation Given 5/5/24 0821)   insulin glargine (LANTUS) subcutaneous injection 20 Units 0.2 mL (20 Units Subcutaneous Given 5/5/24 0821)   guaiFENesin (MUCINEX) 12 hr tablet  600 mg (600 mg Oral Given 5/5/24 0821)   levalbuterol (XOPENEX) inhalation solution 1.25 mg (1.25 mg Nebulization Given 5/5/24 1417)   cefTRIAXone (ROCEPHIN) IVPB (premix in dextrose) 2,000 mg 50 mL (2,000 mg Intravenous New Bag 5/5/24 1306)   acetaminophen (TYLENOL) tablet 650 mg (650 mg Oral Given 5/4/24 1221)   furosemide (LASIX) tablet 40 mg (40 mg Oral Given 5/5/24 0821)   acetaminophen (FOR EMS ONLY) (TYLENOL) oral suspension 650 mg (0 mg Does not apply Given to EMS 5/2/24 1224)   cefTRIAXone (ROCEPHIN) IVPB (premix in dextrose) 1,000 mg 50 mL (0 mg Intravenous Stopped 5/2/24 1406)   azithromycin (ZITHROMAX) 500 mg in sodium chloride 0.9% 250mL IVPB 500 mg (0 mg Intravenous Stopped 5/2/24 1719)   sodium chloride 0.9 % bolus 500 mL (0 mL Intravenous Stopped 5/2/24 1719)   iohexol (OMNIPAQUE) 350 MG/ML injection (MULTI-DOSE) 100 mL (100 mL Intravenous Given 5/2/24 1431)   potassium chloride (Klor-Con M20) CR tablet 40 mEq (40 mEq Oral Given 5/2/24 1813)   potassium chloride (Klor-Con M20) CR tablet 40 mEq (40 mEq Oral Given 5/2/24 2149)       Diagnostic Studies  Results Reviewed       Procedure Component Value Units Date/Time    Blood culture #1 [[id number]]  (Abnormal)  (Susceptibility) Collected: 05/02/24 1312    Lab Status: Final result Specimen: Blood from Arm, Right Updated: 05/05/24 0850     Blood Culture Streptococcus pneumoniae     Gram Stain Result Gram positive cocci in pairs and chains    Susceptibility       Streptococcus pneumoniae (1)       Antibiotic Interpretation Microscan   Method Status    Penicillin Susceptible 0.023 ug/ml JOSE ANTONIO Final    Penicillin, Nonmeningitis Susceptible 0.023 ug/ml JOSE ANTONIO Final    Penicillin, Meningitis Susceptible 0.023 ug/ml JOSE ANTONIO Final    Ceftriaxone, Nonmeningitis Susceptible 0.012 ug/ml JOSE ANTONIO Final    Ceftriaxone, Meningitis Susceptible 0.012 ug/ml JOSE ANTONIO Final    Vancomycin ($) Susceptible 0.75 ug/ml JOSE ANTONIO Final                       Blood Culture Identification Panel [[id number]]   (Abnormal) Collected: 05/02/24 1312    Lab Status: Final result Specimen: Blood from Arm, Right Updated: 05/05/24 0850     Streptococcus pneumoniae Detected    Narrative:      Routine culture and susceptiblity to follow for confirmation.    Film Array panel tests for 11 gram positive organisms, 15 gram negative organisms, 7 yeast species and 10 resistance genes.     Blood culture #2 [[id number]] Collected: 05/02/24 1312    Lab Status: Preliminary result Specimen: Blood from Arm, Left Updated: 05/04/24 1901     Blood Culture No Growth at 48 hrs.    Basic metabolic panel [[id number]]  (Abnormal) Collected: 05/03/24 0522    Lab Status: Final result Specimen: Blood from Arm, Right Updated: 05/03/24 0618     Sodium 133 mmol/L      Potassium 3.7 mmol/L      Chloride 97 mmol/L      CO2 27 mmol/L      ANION GAP 9 mmol/L      BUN 32 mg/dL      Creatinine 0.99 mg/dL      Glucose 220 mg/dL      Calcium 9.3 mg/dL      eGFR 55 ml/min/1.73sq m     Narrative:      National Kidney Disease Foundation guidelines for Chronic Kidney Disease (CKD):     Stage 1 with normal or high GFR (GFR > 90 mL/min/1.73 square meters)    Stage 2 Mild CKD (GFR = 60-89 mL/min/1.73 square meters)    Stage 3A Moderate CKD (GFR = 45-59 mL/min/1.73 square meters)    Stage 3B Moderate CKD (GFR = 30-44 mL/min/1.73 square meters)    Stage 4 Severe CKD (GFR = 15-29 mL/min/1.73 square meters)    Stage 5 End Stage CKD (GFR <15 mL/min/1.73 square meters)  Note: GFR calculation is accurate only with a steady state creatinine    CBC and differential [[id number]]  (Abnormal) Collected: 05/03/24 0522    Lab Status: Final result Specimen: Blood from Arm, Right Updated: 05/03/24 0606     WBC 22.56 Thousand/uL      RBC 3.74 Million/uL      Hemoglobin 10.8 g/dL      Hematocrit 32.7 %      MCV 87 fL      MCH 28.9 pg      MCHC 33.0 g/dL      RDW 17.7 %      MPV 10.6 fL      Platelets 244 Thousands/uL      nRBC 0 /100 WBCs      Segmented % 81 %      Immature Grans % 7 %       Lymphocytes % 8 %      Monocytes % 4 %      Eosinophils Relative 0 %      Basophils Relative 0 %      Absolute Neutrophils 18.33 Thousands/µL      Absolute Immature Grans >0.50 Thousand/uL      Absolute Lymphocytes 1.72 Thousands/µL      Absolute Monocytes 0.82 Thousand/µL      Eosinophils Absolute 0.00 Thousand/µL      Basophils Absolute 0.02 Thousands/µL     Narrative:      See Manual Diff Done Within 3 Days  This is an appended report.  These results have been appended to a previously verified report.    COVID/FLU/RSV [[id number]]  (Normal) Collected: 05/02/24 1746    Lab Status: Final result Specimen: Nares from Nose Updated: 05/02/24 1842     SARS-CoV-2 Negative     INFLUENZA A PCR Negative     INFLUENZA B PCR Negative     RSV PCR Negative    Narrative:      FOR PEDIATRIC PATIENTS - copy/paste COVID Guidelines URL to browser: [email or web address]    SARS-CoV-2 assay is a Nucleic Acid Amplification assay intended for the  qualitative detection of nucleic acid from SARS-CoV-2 in nasopharyngeal  swabs. Results are for the presumptive identification of SARS-CoV-2 RNA.    Positive results are indicative of infection with SARS-CoV-2, the virus  causing COVID-19, but do not rule out bacterial infection or co-infection  with other viruses. Laboratories within the [place] and its  territories are required to report all positive results to the appropriate  public health authorities. Negative results do not preclude SARS-CoV-2  infection and should not be used as the sole basis for treatment or other  patient management decisions. Negative results must be combined with  clinical observations, patient history, and epidemiological information.  This test has not been FDA cleared or approved.    This test has been authorized by FDA under an Emergency Use Authorization  (EUA). This test is only authorized for the duration of time the  declaration that circumstances  exist justifying the authorization of the  emergency use of an in vitro diagnostic tests for detection of SARS-CoV-2  virus and/or diagnosis of COVID-19 infection under section 564(b)(1) of  the Act, 21 U.S.C. 360bbb-3(b)(1), unless the authorization is terminated  or revoked sooner. The test has been validated but independent review by FDA  and CLIA is pending.    Test performed using [employer] GeneXpert: This RT-PCR assay targets N2,  a region unique to SARS-CoV-2. A conserved region in the E-gene was chosen  for pan-Sarbecovirus detection which includes SARS-CoV-2.    According to CMS-2020-01-R, this platform meets the definition of high-throughput technology.    Basic metabolic panel [[id number]]  (Abnormal) Collected: 05/02/24 1746    Lab Status: Final result Specimen: Blood from Arm, Left Updated: 05/02/24 1817     Sodium 129 mmol/L      Potassium 3.1 mmol/L      Chloride 92 mmol/L      CO2 27 mmol/L      ANION GAP 10 mmol/L      BUN 43 mg/dL      Creatinine 1.34 mg/dL      Glucose 197 mg/dL      Calcium 9.2 mg/dL      eGFR 38 ml/min/1.73sq m     Narrative:      National Kidney Disease Foundation guidelines for Chronic Kidney Disease (CKD):     Stage 1 with normal or high GFR (GFR > 90 mL/min/1.73 square meters)    Stage 2 Mild CKD (GFR = 60-89 mL/min/1.73 square meters)    Stage 3A Moderate CKD (GFR = 45-59 mL/min/1.73 square meters)    Stage 3B Moderate CKD (GFR = 30-44 mL/min/1.73 square meters)    Stage 4 Severe CKD (GFR = 15-29 mL/min/1.73 square meters)    Stage 5 End Stage CKD (GFR <15 mL/min/1.73 square meters)  Note: GFR calculation is accurate only with a steady state creatinine    HS Troponin I 4hr [[id number]]  (Normal) Collected: 05/02/24 1721    Lab Status: Final result Specimen: Blood from Arm, Left Updated: 05/02/24 1748     hs TnI 4hr 8 ng/L      Delta 4hr hsTnI -3 ng/L     HS Troponin I 2hr [[id number]]  (Normal) Collected: 05/02/24 1515    Lab Status: Final result Specimen: Blood from Arm, Left  Updated: 05/02/24 1551     hs TnI 2hr 9 ng/L      Delta 2hr hsTnI -2 ng/L     Lactic acid 2 Hours [[id number]]  (Abnormal) Collected: 05/02/24 1515    Lab Status: Final result Specimen: Blood from Arm, Left Updated: 05/02/24 1546     LACTIC ACID 2.4 mmol/L     Narrative:      Result may be elevated if tourniquet was used during collection.    RBC Morphology Reflex Test [[id number]] Collected: 05/02/24 1302    Lab Status: Final result Specimen: Blood from Arm, Left Updated: 05/02/24 1401    D-dimer, quantitative [[id number]]  (Abnormal) Collected: 05/02/24 1336    Lab Status: Final result Specimen: Blood from Arm, Left Updated: 05/02/24 1353     D-Dimer, Quant 1.69 ug/ml FEU     Narrative:      In the evaluation for possible pulmonary embolism, in the appropriate (Well's Score of 4 or less) patient, the age adjusted d-dimer cutoff for this patient can be calculated as:    Age x 0.01 (in ug/mL) for Age-adjusted D-dimer exclusion threshold for a patient over 50 years.    Protime-INR [[id number]]  (Abnormal) Collected: 05/02/24 1336    Lab Status: Final result Specimen: Blood from Arm, Left Updated: 05/02/24 1353     Protime 17.5 seconds      INR 1.46    APTT [[id number]]  (Normal) Collected: 05/02/24 1336    Lab Status: Final result Specimen: Blood from Arm, Left Updated: 05/02/24 1353     PTT 29 seconds     Procalcitonin [[id number]]  (Abnormal) Collected: 05/02/24 1302    Lab Status: Final result Specimen: Blood from Arm, Left Updated: 05/02/24 1346     Procalcitonin 32.68 ng/ml     HS Troponin 0hr (reflex protocol) [[id number]]  (Normal) Collected: 05/02/24 1302    Lab Status: Final result Specimen: Blood from Arm, Left Updated: 05/02/24 1342     hs TnI 0hr 11 ng/L     B-Type Natriuretic Peptide(BNP) [[id number]]  (Normal) Collected: 05/02/24 1302    Lab Status: Final result Specimen: Blood from Arm, Left Updated: 05/02/24 1341     BNP 39 pg/mL     Comprehensive metabolic panel [[id number]]  (Abnormal) Collected: 05/02/24  1302    Lab Status: Final result Specimen: Blood from Arm, Left Updated: 05/02/24 1336     Sodium 125 mmol/L      Potassium 2.9 mmol/L      Chloride 89 mmol/L      CO2 24 mmol/L      ANION GAP 12 mmol/L      BUN 49 mg/dL      Creatinine 1.64 mg/dL      Glucose 323 mg/dL      Calcium 9.3 mg/dL      AST 21 U/L      ALT 19 U/L      Alkaline Phosphatase 66 U/L      Total Protein 7.3 g/dL      Albumin 3.6 g/dL      Total Bilirubin 0.72 mg/dL      eGFR 30 ml/min/1.73sq m     Narrative:      National Kidney Disease Foundation guidelines for Chronic Kidney Disease (CKD):     Stage 1 with normal or high GFR (GFR > 90 mL/min/1.73 square meters)    Stage 2 Mild CKD (GFR = 60-89 mL/min/1.73 square meters)    Stage 3A Moderate CKD (GFR = 45-59 mL/min/1.73 square meters)    Stage 3B Moderate CKD (GFR = 30-44 mL/min/1.73 square meters)    Stage 4 Severe CKD (GFR = 15-29 mL/min/1.73 square meters)    Stage 5 End Stage CKD (GFR <15 mL/min/1.73 square meters)  Note: GFR calculation is accurate only with a steady state creatinine    Manual Differential(PHLEBS Do Not Order) [[id number]]  (Abnormal) Collected: 05/02/24 1302    Lab Status: Final result Specimen: Blood from Arm, Left Updated: 05/02/24 1335     Segmented % 78 %      Bands % 12 %      Lymphocytes % 8 %      Monocytes % 2 %      Eosinophils % 0 %      Basophils % 0 %      Absolute Neutrophils 22.81 Thousand/uL      Absolute Lymphocytes 2.03 Thousand/uL      Absolute Monocytes 0.51 Thousand/uL      Absolute Eosinophils 0.00 Thousand/uL      Absolute Basophils 0.00 Thousand/uL      Total Counted --     RBC Morphology Present     Platelet Estimate Adequate     Anisocytosis Present    CBC and differential [[id number]]  (Abnormal) Collected: 05/02/24 1302    Lab Status: Final result Specimen: Blood from Arm, Left Updated: 05/02/24 1335     WBC 25.34 Thousand/uL      RBC 3.49 Million/uL      Hemoglobin 9.8 g/dL      Hematocrit 30.4 %      MCV 87 fL      MCH 28.1 pg      MCHC 32.2  g/dL      RDW 17.3 %      MPV 10.9 fL      Platelets 223 Thousands/uL     Narrative:      This is an appended report.  These results have been appended to a previously verified report.    Lactic acid [[id number]]  (Abnormal) Collected: 05/02/24 1302    Lab Status: Final result Specimen: Blood from Arm, Left Updated: 05/02/24 1333     LACTIC ACID 3.1 mmol/L     Narrative:      Result may be elevated if tourniquet was used during collection.                   CTA ED chest PE Study   Final Result by Dara Kirkland MD (05/02 1532)      No pulmonary embolus.      7.3 cm masslike opacity in the right upper lobe, new since October 2023, compatible with pneumonia.      Small hiatal hernia.      Stable small loculated left pleural effusion.            Workstation performed: [id number]         XR chest portable   ED Interpretation by Lora Lugo MD (05/02 1411)   Right upper lobe pneumonia      Final Result by Vic Adrian MD (05/03 0938)      New right upper lobe mass.   Please refer to same-day CT chest.            Workstation performed: [id number]                    Procedures  CriticalCare Time    Date/Time: 5/2/2024 12:41 PM    Performed by: Lora Lugo MD  Authorized by: Lora Lugo MD    Critical care provider statement:     Critical care time (minutes):  33    Critical care start time:  5/2/2024 3:41 PM    Critical care end time:  5/2/2024 5:41 PM    Critical care time was exclusive of:  Separately billable procedures and treating other patients and teaching time    Critical care was necessary to treat or prevent imminent or life-threatening deterioration of the following conditions:  Respiratory failure, sepsis, metabolic crisis and renal failure    Critical care was time spent personally by me on the following activities:  Blood draw for specimens, obtaining history from patient or surrogate, development of treatment plan with patient or surrogate, discussions with consultants, evaluation of  patient's response to treatment, examination of patient, review of old charts, re-evaluation of patient's condition, ordering and review of radiographic studies, ordering and review of laboratory studies and ordering and performing treatments and interventions    I assumed direction of critical care for this patient from another provider in my specialty: no    ECG 12 Lead Documentation Only    Date/Time: 5/2/2024 7:20 PM    Performed by: Lora Lugo MD  Authorized by: Lora Lugo MD    Indications / Diagnosis:  Sob  ECG reviewed by me, the ED Provider: yes    Patient location:  ED  Previous ECG:     Previous ECG:  Compared to current    Similarity:  No change    Comparison to cardiac monitor: Yes    Interpretation:     Interpretation: abnormal    Rate:     ECG rate:  105    ECG rate assessment: tachycardic    Rhythm:     Rhythm: sinus rhythm    Ectopy:     Ectopy: none    QRS:     QRS axis:  Normal    QRS intervals:  Normal  Conduction:     Conduction: normal    ST segments:     ST segments:  Normal  T waves:     T waves: normal             ED Course                               SBIRT 20yo+      Flowsheet Row Most Recent Value   Initial Alcohol Screen: US AUDIT-C     1. How often do you have a drink containing alcohol? 0 Filed at: 05/02/2024 1238   2. How many drinks containing alcohol do you have on a typical day you are drinking?  0 Filed at: 05/02/2024 1238   3a. Male UNDER 65: How often do you have five or more drinks on one occasion? 0 Filed at: 05/02/2024 1238   3b. FEMALE Any Age, or MALE 65+: How often do you have 4 or more drinks on one occassion? 0 Filed at: 05/02/2024 1238   Audit-C Score 0 Filed at: 05/02/2024 1238   CHANTEL: How many times in the past year have you...    Used an illegal drug or used a prescription medication for non-medical reasons? Never Filed at: 05/02/2024 1238                      Medical Decision Making  Amount and/or Complexity of Data Reviewed  Labs: ordered.  Radiology:  ordered and independent interpretation performed.    Risk  OTC drugs.  Prescription drug management.  Decision regarding hospitalization.      74-year-old female with past medical history of COPD on 3 L nasal cannula, diabetes, history of PE on Eliquis who presents for evaluation of shortness of breath and cough.   Patient is tachypneic and tachycardic, febrile to 101 for EMS.  Differential diagnosis includes: pneumonia, CHF, PE, anemia.  Will check CBC to evaluate for leukocytosis or anemia, CMP to evaluate for metabolic abnormality, EKG and troponin to evaluate for ACS or arrhythmia, BNP to evaluate for fluid overload, D-dimer to evaluate for PE, lactate to evaluate for endorgan dysfunction.  Will check Pro-Mars to evaluate for infection.  Will check chest x-ray to evaluate for pulmonary edema or pneumonia.  Reviewed labs, patient has significant leukocytosis.  Pro-Mars elevated.  Reviewed and interpreted chest x-ray, shows right upper lobe pneumonia.  Will start patient on ceftriaxone and azithromycin.  D-dimer elevated, will obtain CTA to evaluate for PE and to further characterize pneumonia.  Reviewed CTA, shows pneumonia in the right upper lobe.  No PE.  Discussed results with patient, will plan for admission for IV antibiotics, for sepsis secondary to pneumonia. Discussed with [facility] for admission.         Disposition  Final diagnoses:   Pneumonia   Hyponatremia   MICHAEL (acute kidney injury) (HCC)     Time reflects when diagnosis was documented in both MDM as applicable and the Disposition within this note       Time User Action Codes Description Comment    5/2/2024  5:28 PM Alex Torres Add [J18.9] Pneumonia     5/2/2024  5:38 PM Lora Lugo Modify [J18.9] Pneumonia     5/2/2024  5:38 PM Lora Lugo Add [E87.1] Hyponatremia     5/2/2024  5:38 PM Lora Lugo Add [N17.9] MICHAEL (acute kidney injury) (HCC)     5/3/2024  7:13 AM Alex Torres Add [R78.81] Bacteremia           ED Disposition       ED  Disposition   Admit    Condition   Stable    Date/Time   Thu May 2, 2024 5345    Comment   Case was discussed with SIOBHAN and the patient's admission status was agreed to be Admission Status: inpatient status to the service of Dr. Torres.               Follow-up Information    None         Current Discharge Medication List        START taking these medications    Details   semaglutide, 1 mg/dose, (Ozempic, 1 MG/DOSE,) 4 mg/3 mL injection pen INJECT 1 MG UNDER THE SKIN ONCE A WEEK  Qty: 3 mL, Refills: 1    Associated Diagnoses: Type 2 diabetes mellitus with hyperglycemia, with long-term current use of insulin ([facility])           CONTINUE these medications which have CHANGED    Details   DULoxetine (CYMBALTA) 60 mg delayed release capsule TAKE ONE CAPSULE BY MOUTH EVERY DAY  Qty: 90 capsule, Refills: 1    Associated Diagnoses: Rheumatoid arthritis ([facility])      pantoprazole (PROTONIX) 40 mg tablet TAKE ONE TABLET BY MOUTH ONCE DAILY  Qty: 90 tablet, Refills: 1    Associated Diagnoses: Gastroesophageal reflux disease           CONTINUE these medications which have NOT CHANGED    Details   acetaminophen (TYLENOL) 325 mg tablet Take 2 tablets (650 mg total) by mouth every 6 (six) hours as needed for mild pain, headaches or fever  Refills: 0    Comments: Do not exceed a total of 3 grams of tylenol/acetaminophen in a 24-hour period.  Associated Diagnoses: Chronic obstructive pulmonary disease with acute exacerbation ([facility])      albuterol (2.5 mg/3 mL) 0.083 % nebulizer solution USE 1 UNIT DOSE IN NEBULIZER EVERY 4 HOURS AS NEEDED.      albuterol (PROVENTIL HFA,VENTOLIN HFA) 90 mcg/act inhaler Inhale 2 puffs every 6 (six) hours as needed for wheezing  Refills: 0    Comments: Do not use within 2 hours of an albuterol nebulizer treatment.  Substitution to a formulary equivalent within the same pharmaceutical class is authorized.  Associated Diagnoses: CAP (community acquired pneumonia)      aspirin (ECOTRIN LOW STRENGTH) 81 mg EC tablet  Take 81 mg by mouth daily      atorvastatin (LIPITOR) 20 mg tablet TAKE ONE TABLET BY MOUTH EVERY DAY  Qty: 90 tablet, Refills: 0    Associated Diagnoses: Mixed hyperlipidemia      !! BD Pen Needle Mamta U/F 32G X 4 MM MISC USE AS DIRECTED  Qty: 100 each, Refills: 0    Associated Diagnoses: Type 2 diabetes mellitus with hyperglycemia, with long-term current use of insulin ([facility])      benzonatate (TESSALON) 200 MG capsule Take 1 capsule (200 mg total) by mouth 3 (three) times a day as needed for cough  Qty: 60 capsule, Refills: 0    Associated Diagnoses: Tracheobronchitis; Chronic sinusitis, unspecified location      Blood Glucose Monitoring Suppl (ONE TOUCH ULTRA 2) w/Device KIT by Does not apply route 2 (two) times a day  Qty: 1 each, Refills: 0    Associated Diagnoses: Type 2 diabetes mellitus without complication, without long-term current use of insulin ([facility])      clindamycin (CLEOCIN T) 1 % lotion clindamycin 1 % lotion   APPLY LOTION TOPICALLY TWICE DAILY      Eliquis 5 MG TAKE 1 TABLET BY MOUTH TWICE DAILY  Qty: 60 tablet, Refills: 0    Associated Diagnoses: Acute pulmonary embolism, unspecified pulmonary embolism type, unspecified whether acute cor pulmonale present ([facility])      ergocalciferol (VITAMIN D2) 50,000 units TAKE ONE CAPSULE BY MOUTH WEEKLY  Qty: 12 capsule, Refills: 0    Associated Diagnoses: Vitamin D deficiency      fluticasone-salmeterol (ADVAIR) 250-50 mcg/dose Inhale 1 puff every 12 (twelve) hours       folic acid (FOLVITE) 1 mg tablet TAKE ONE TABLET BY MOUTH ONCE DAILY  Qty: 90 tablet, Refills: 0    Associated Diagnoses: Gastroesophageal reflux disease      furosemide (LASIX) 40 mg tablet TAKE ONE TABLET BY MOUTH EVERY DAY  Qty: 90 tablet, Refills: 0    Associated Diagnoses: Peripheral edema      glucose blood (OneTouch Ultra) test strip Use 1 each 2 (two) times a day Test 2 times daily   Dx: E11.65    Associated Diagnoses: Type 2 diabetes mellitus with hyperglycemia, with long-term current  use of insulin ([facility])      guaiFENesin (MUCINEX) 600 mg 12 hr tablet Take 1 tablet (600 mg total) by mouth 2 (two) times a day  Qty: 30 tablet, Refills: 0    Associated Diagnoses: Tracheobronchitis      !! Insulin Pen Needle 33G X 4 MM MISC by Does not apply route daily  Qty: 100 each, Refills: 5    Associated Diagnoses: Type 2 diabetes mellitus with hyperglycemia, without long-term current use of insulin ([facility])      Lancets (OneTouch Delica Plus Gsobcn22S) MISC Test 2 times a day  Qty: 200 each, Refills: 3    Associated Diagnoses: Type 2 diabetes mellitus without complication, without long-term current use of insulin ([facility])      levothyroxine 137 mcg tablet Take 1 tablet (137 mcg total) by mouth daily  Qty: 90 tablet, Refills: 1    Associated Diagnoses: Hypothyroidism, unspecified type      lisinopril (ZESTRIL) 40 mg tablet TAKE ONE TABLET BY MOUTH EVERY DAY  Qty: 90 tablet, Refills: 0    Associated Diagnoses: Essential hypertension      methotrexate 2.5 MG tablet TAKE 8 TABLETS BY MOUTH EVERY SUNDAY  Qty: 32 tablet, Refills: 0    Associated Diagnoses: Gastroesophageal reflux disease      mirtazapine (REMERON) 15 mg tablet Take 1 tablet (15 mg total) by mouth daily at bedtime  Qty: 90 tablet, Refills: 3    Associated Diagnoses: Primary insomnia      mometasone (ELOCON) 0.1 % ointment Apply topically 2 (two) times a day as needed (itching)  Qty: 45 g, Refills: 1    Associated Diagnoses: Eczema, unspecified type      nystatin (MYCOSTATIN) cream Apply topically 2 (two) times a day as needed (rash)  Qty: 30 g, Refills: 0    Associated Diagnoses: Candidal dermatitis; Candidal UTI (urinary tract infection)      nystatin (MYCOSTATIN) powder Apply topically 3 (three) times a day as needed (rash/irritation)  Qty: 45 g, Refills: 1    Associated Diagnoses: Candidal dermatitis      Polyethylene Glycol 3350-GRX POWD Take by mouth daily at bedtime as needed (constipation)  Qty: 850 g, Refills: 0    Associated Diagnoses:  Constipation, unspecified constipation type      tiotropium (SPIRIVA RESPIMAT) 2.5 MCG/ACT AERS inhaler Inhale 1 puff daily at bedtime  Qty: 3 Inhaler, Refills: 3    Associated Diagnoses: Chronic obstructive pulmonary disease, unspecified COPD type ([facility])      Tofacitinib Citrate ER 11 MG TB24 Take 1 tablet (11 mg total) by mouth daily Do not start before December 10, 2023.  Refills: 0    Associated Diagnoses: Rheumatoid arthritis, involving unspecified site, unspecified whether rheumatoid factor present ([facility])      Tresiba FlexTouch 100 units/mL injection pen INJECT 40 UNITS UNDER THE SKIN DAILY  Qty: 30 mL, Refills: 0    Associated Diagnoses: Type 2 diabetes mellitus with hyperglycemia, with long-term current use of insulin ([facility])      vitamin B-12 (VITAMIN B-12) 500 MCG tablet Take 1 tablet (500 mcg total) by mouth daily    Associated Diagnoses: Macrocytic anemia       !! - Potential duplicate medications found. Please discuss with provider.          No discharge procedures on file.    PDMP Review       None            ED Provider  Electronically Signed by             Lora Lugo MD  05/05/24 0029

## 2024-05-02 NOTE — H&P
H&P Exam - Shakira Polk 74 y.o. female MRN: [id number]    Unit/Bed#: RM18 Encounter: [id number]    Assessment:  Sepsis due to pneumonia (HCC)  Assessment & Plan  Met via tachycardia, tachypnea and lactic acidosis  Ceftriaxone/azithromycin  Check urine Legionella and pneumococcal antigen  Check sputum culture  Initiate respiratory protocol and replace protocol  Send spirometry  Consult pulmonary medicine    History of pulmonary embolism  Assessment & Plan  Continue Eliquis    Chronic diastolic (congestive) heart failure (HCC)  Assessment & Plan  Wt Readings from Last 3 Encounters:   03/14/24 108 kg (237 lb 6.4 oz)   12/18/23 110 kg (243 lb 6.4 oz)   12/02/23 107 kg (236 lb)     Appears clinically dry on exam, will hold diuretics and gently volume expansion    Chronic respiratory failure with hypoxia (HCC)  Assessment & Plan  At baseline 3 L     Gastroesophageal reflux disease without esophagitis  Assessment & Plan  Protonix 40 mg daily    Essential hypertension  Assessment & Plan  Hold lisinopril and Lasix in the setting of renal failure    Rheumatoid arthritis (HCC)  Assessment & Plan  Hold methotrexate and xeljanz    Centrilobular emphysema (HCC)  Assessment & Plan  Continue chronic inhalers  Initiate respiratory protocol  Pulmonary medicine consult        History of Present Illness     74-year-old female with history of chronic hypoxemic respiratory failure, rheumatoid arthritis maintained on Xeljanz and methotrexate presents to the emergency room with a 2-day history of worsening shortness of breath.  She also endorses a cough but she is unable to expectorate any sputum.  Was febrile up to 101 at home.  Endorses generalized malaise and shortness of breath at rest.  She denies any sick contacts.  Does have some back pain on deep inspiration, fortunately PE study is negative    Review of Systems   Constitutional:  Positive for fever.   Respiratory:  Positive for cough and shortness of breath.    Musculoskeletal:   Positive for back pain.   Neurological:  Positive for weakness.   All other systems reviewed and are negative.      Historical Information   Past Medical History:   Diagnosis Date    Arthritis     Arthritis     Cancer (HCC)     Candidiasis of skin     Cervical cancer (HCC)     Chronic respiratory failure with hypoxia and hypercapnia (HCC) 4/20/2019    COPD (chronic obstructive pulmonary disease) ([facility])     last assessed 11/21/2016    Current chronic use of systemic steroids     Degenerative arthritis of left knee     last assessed 1/20/2015    Diabetes mellitus (HCC)     Disease of thyroid gland     hypo pill given to decrease thyroid.     Fibromyalgia     Fistula of knee     last assessed 9/12/2014    GERD (gastroesophageal reflux disease)     Hyperlipidemia     Hypertension     Medial meniscus tear     of left knee, last assessed 9/12/2014    Migraine     states she has a hx of HA that she calls Frockadvisor but never was dx by neurologist    Obesity     Obesity     Osteoarthritis     Osteopenia     Pneumonia     last assessed 11/16/2016    Psychiatric disorder     anxiety    Rheumatoid arthritis (HCC)     Rheumatoid arthritis (HCC)     Sepsis (HCC)     last assessed 11/10/2016    Tick bite     last assessed 10/30/2015    Vitamin B12 deficiency     Vitamin D deficiency      Past Surgical History:   Procedure Laterality Date    CATARACT EXTRACTION Bilateral     CHOLECYSTECTOMY      EYE SURGERY      Bilateral Cataracts    HYSTERECTOMY      IR AORTAGRAM WITH RUN-OFF  9/20/2023    IR THORACENTESIS  5/31/2019    JOINT REPLACEMENT      left knee    KNEE ARTHROSCOPY      NEUROPLASTY / TRANSPOSITION MEDIAN NERVE AT CARPAL TUNNEL      TUBAL LIGATION       Social History   Social History     Substance and Sexual Activity   Alcohol Use Never    Alcohol/week: 0.0 standard drinks of alcohol    Comment: stopped drinking alcohol     Social History     Substance and Sexual Activity   Drug Use Never     Social History     Tobacco Use  "  Smoking Status Former    Current packs/day: 0.00    Average packs/day: 1 pack/day for 48.0 years (48.0 ttl pk-yrs)    Types: Cigarettes, E-Cigarettes    Start date: 1964    Quit date: 2012    Years since quittin.4   Smokeless Tobacco Never   Tobacco Comments    per allscripts - \"current every day smoker, former smoker\"     E-Cigarette Use: Former User     E-Cigarette/Vaping Substances    Nicotine No     THC No     CBD No     Flavoring No     Other No     Unknown No        Family History: non-contributory    Meds/Allergies   all medications and allergies reviewed  No Known Allergies    Objective   First Vitals:   Blood Pressure: 122/56 (24 1225)  Pulse: (!) 110 (24 1225)  Temperature: (!) 97.3 °F (36.3 °C) (24 1239)  Temp Source: Temporal (24 1239)  Respirations: (!) 33 (24 1225)  SpO2: 94 % (24 1225)    Current Vitals:   Blood Pressure: 116/65 (24 1700)  Pulse: 96 (24 1700)  Temperature: 97.7 °F (36.5 °C) (24 1240)  Temp Source: Tympanic (24 1240)  Respirations: 22 (24 1700)  SpO2: 96 % (24 1700)    No intake or output data in the 24 hours ending 24 1737    Invasive Devices       Peripheral Intravenous Line  Duration             Peripheral IV 24 Left Antecubital <1 day    Peripheral IV 24 Right;Ventral (anterior) Wrist <1 day                    Physical Exam  Vitals and nursing note reviewed.   Constitutional:       General: She is not in acute distress.     Appearance: Normal appearance. She is ill-appearing. She is not toxic-appearing.   HENT:      Head: Normocephalic and atraumatic.      Right Ear: External ear normal.      Left Ear: External ear normal.      Nose: Nose normal. No congestion.      Mouth/Throat:      Mouth: Mucous membranes are dry.      Pharynx: No oropharyngeal exudate.   Eyes:      Conjunctiva/sclera: Conjunctivae normal.      Pupils: Pupils are equal, round, and reactive to light. "   Cardiovascular:      Rate and Rhythm: Regular rhythm. Tachycardia present.      Pulses: Normal pulses.   Pulmonary:      Effort: No respiratory distress.      Breath sounds: Rhonchi and rales present.   Abdominal:      General: Abdomen is flat. Bowel sounds are normal. There is no distension.      Tenderness: There is no abdominal tenderness. There is no guarding.   Musculoskeletal:      Right lower leg: No edema.      Left lower leg: No edema.   Skin:     General: Skin is warm and dry.      Capillary Refill: Capillary refill takes 2 to 3 seconds.   Neurological:      General: No focal deficit present.      Mental Status: She is alert and oriented to person, place, and time.         Lab Results:   Lab Results   Component Value Date    WBC 25.34 (H) 05/02/2024    HGB 9.8 (L) 05/02/2024    HCT 30.4 (L) 05/02/2024    MCV 87 05/02/2024     05/02/2024     Lab Results   Component Value Date     12/17/2015    SODIUM 125 (L) 05/02/2024    K 2.9 (L) 05/02/2024    CL 89 (L) 05/02/2024    CO2 24 05/02/2024    ANIONGAP 12 12/17/2015    AGAP 12 05/02/2024    BUN 49 (H) 05/02/2024    CREATININE 1.64 (H) 05/02/2024    GLUC 323 (H) 05/02/2024    GLUF 170 (H) 09/20/2023    CALCIUM 9.3 05/02/2024    AST 21 05/02/2024    ALT 19 05/02/2024    ALKPHOS 66 05/02/2024    PROT 6.6 12/17/2015    TP 7.3 05/02/2024    BILITOT 0.31 12/17/2015    TBILI 0.72 05/02/2024    EGFR 30 05/02/2024       Imaging:   CTA ED chest PE Study   Final Result      No pulmonary embolus.      7.3 cm masslike opacity in the right upper lobe, new since October 2023, compatible with pneumonia.      Small hiatal hernia.      Stable small loculated left pleural effusion.            Workstation performed: [id number]         XR chest portable   ED Interpretation   Right upper lobe pneumonia          EKG, Pathology, and Other Studies: Sinus tach    Code Status: Prior  Advance Directive and Living Will:      Power of :    POLST:      Counseling /  Coordination of Care:

## 2024-05-02 NOTE — RESPIRATORY THERAPY NOTE
RT Protocol Note  Shakira Polk 74 y.o. female MRN: [id number]  Unit/Bed#: 401-01 Encounter: [id number]    Assessment    Active Problems:    Sepsis due to pneumonia (HCC)    Centrilobular emphysema (HCC)    Rheumatoid arthritis (HCC)    Essential hypertension    Gastroesophageal reflux disease without esophagitis    Chronic respiratory failure with hypoxia (HCC)    Chronic diastolic (congestive) heart failure (HCC)    History of pulmonary embolism      Home Pulmonary Medications:Home Devices/Therapy: Home O2, BiPAP/CPAP, Other (Comment) (albuterol neb prn only, albuterol inhaler, states she doesn know other name of inhaler advair is listed, pt has cpap but doesn't wear it always refused today)    Past Medical History:   Diagnosis Date    Arthritis     Arthritis     Cancer (HCC)     Candidiasis of skin     Cervical cancer (HCC)     Chronic respiratory failure with hypoxia and hypercapnia (HCC) 4/20/2019    COPD (chronic obstructive pulmonary disease) (HCC)     last assessed 11/21/2016    Current chronic use of systemic steroids     Degenerative arthritis of left knee     last assessed 1/20/2015    Diabetes mellitus (HCC)     Disease of thyroid gland     hypo pill given to decrease thyroid.     Fibromyalgia     Fistula of knee     last assessed 9/12/2014    GERD (gastroesophageal reflux disease)     Hyperlipidemia     Hypertension     Medial meniscus tear     of left knee, last assessed 9/12/2014    Migraine     states she has a hx of HA that she calls goodideazs but never was dx by neurologist    Obesity     Obesity     Osteoarthritis     Osteopenia     Pneumonia     last assessed 11/16/2016    Psychiatric disorder     anxiety    Rheumatoid arthritis (HCC)     Rheumatoid arthritis (HCC)     Sepsis (HCC)     last assessed 11/10/2016    Tick bite     last assessed 10/30/2015    Vitamin B12 deficiency     Vitamin D deficiency      Social History     Socioeconomic History    Marital status:      Spouse name: Not on  "file    Number of children: Not on file    Years of education: Not on file    Highest education level: Not on file   Occupational History    Not on file   Tobacco Use    Smoking status: Former     Current packs/day: 0.00     Average packs/day: 1 pack/day for 48.0 years (48.0 ttl pk-yrs)     Types: Cigarettes, E-Cigarettes     Start date: 1964     Quit date: 2012     Years since quittin.4    Smokeless tobacco: Never    Tobacco comments:     per allscripts - \"current every day smoker, former smoker\"   Vaping Use    Vaping status: Former   Substance and Sexual Activity    Alcohol use: Never     Alcohol/week: 0.0 standard drinks of alcohol     Comment: stopped drinking alcohol    Drug use: Never    Sexual activity: Never   Other Topics Concern    Not on file   Social History Narrative    No living will     Social Determinants of Health     Financial Resource Strain: Medium Risk (2023)    Overall Financial Resource Strain (CARDIA)     Difficulty of Paying Living Expenses: Somewhat hard   Food Insecurity: No Food Insecurity (2021)    Hunger Vital Sign     Worried About Running Out of Food in the Last Year: Never true     Ran Out of Food in the Last Year: Never true   Transportation Needs: No Transportation Needs (2023)    PRAPARE - Transportation     Lack of Transportation (Medical): No     Lack of Transportation (Non-Medical): No   Physical Activity: Not on file   Stress: Not on file   Social Connections: Unknown (2019)    Social Connection and Isolation Panel [NHANES]     Frequency of Communication with Friends and Family: More than three times a week     Frequency of Social Gatherings with Friends and Family: Not on file     Attends Oriental orthodox Services: Not on file     Active Member of Clubs or Organizations: Not on file     Attends Club or Organization Meetings: Not on file     Marital Status: Not on file   Intimate Partner Violence: Not on file   Housing Stability: Low Risk  " "(12/8/2021)    Housing Stability Vital Sign     Unable to Pay for Housing in the Last Year: No     Number of Places Lived in the Last Year: 1     Unstable Housing in the Last Year: No       Subjective         Objective    Physical Exam:   Assessment Type: Assess only  General Appearance: Alert, Awake  Respiratory Pattern: Symmetrical, Spontaneous, Dyspnea with exertion  Chest Assessment: Chest expansion symmetrical, Trachea midline  Bilateral Breath Sounds: Diminished, Rhonchi, Expiratory wheezes (posteriorly)  Cough: Non-productive, Congested, Strong  O2 Device: 3 l/m baseline    Vitals:  Blood pressure 111/66, pulse (!) 107, temperature 100.4 °F (38 °C), temperature source Tympanic, resp. rate 20, height 5' 5\" (1.651 m), weight 105 kg (230 lb 13.2 oz), SpO2 94%, not currently breastfeeding.          Imaging and other studies: I have personally reviewed pertinent reports.      O2 Device: 3 l/m baseline     Plan  Bronchodilator therapy  with xopenex 1.25mg TID for wheezing, flutter valve  for mobilization of secretions, oxygen therapy as at home baseline 3 l/m nc           Resp Comments: pt states she doesn't want cpap today   "

## 2024-05-02 NOTE — ASSESSMENT & PLAN NOTE
Met via tachycardia, tachypnea and lactic acidosis  Continue ceftriaxone and dc azithro   Pneumococcal antigen positive, along with what appears to be strep pneumo in 1 of 2 blood cultures  Check 2D echo, repeat 2 sets of blood cultures and consult infectious disease 5/3/2020  Check sputum culture  Initiate respiratory protocol and replace protocol  Incentive spirometry

## 2024-05-02 NOTE — ED TRIAGE NOTES
Patient reports to this ED via EMS c/o SOB starting yesterday. Patient is on chronic 3L o2 for COPD

## 2024-05-03 ENCOUNTER — APPOINTMENT (INPATIENT)
Dept: NON INVASIVE DIAGNOSTICS | Facility: HOSPITAL | Age: 74
DRG: 871 | End: 2024-05-03
Payer: COMMERCIAL

## 2024-05-03 LAB
ANION GAP SERPL CALCULATED.3IONS-SCNC: 9 MMOL/L (ref 4–13)
AORTIC ROOT: 3.6 CM
APICAL FOUR CHAMBER EJECTION FRACTION: 60 %
BASOPHILS # BLD AUTO: 0.02 THOUSANDS/ÂΜL (ref 0–0.1)
BASOPHILS NFR BLD AUTO: 0 % (ref 0–1)
BSA FOR ECHO PROCEDURE: 2.1 M2
BUN SERPL-MCNC: 32 MG/DL (ref 5–25)
CALCIUM SERPL-MCNC: 9.3 MG/DL (ref 8.4–10.2)
CHLORIDE SERPL-SCNC: 97 MMOL/L (ref 96–108)
CO2 SERPL-SCNC: 27 MMOL/L (ref 21–32)
CREAT SERPL-MCNC: 0.99 MG/DL (ref 0.6–1.3)
CREAT UR-MCNC: 77.5 MG/DL
E WAVE DECELERATION TIME: 214 MS
E/A RATIO: 0.88
EOSINOPHIL # BLD AUTO: 0 THOUSAND/ÂΜL (ref 0–0.61)
EOSINOPHIL NFR BLD AUTO: 0 % (ref 0–6)
ERYTHROCYTE [DISTWIDTH] IN BLOOD BY AUTOMATED COUNT: 17.7 % (ref 11.6–15.1)
FRACTIONAL SHORTENING: 26 (ref 28–44)
GFR SERPL CREATININE-BSD FRML MDRD: 55 ML/MIN/1.73SQ M
GLUCOSE SERPL-MCNC: 220 MG/DL (ref 65–140)
GLUCOSE SERPL-MCNC: 240 MG/DL (ref 65–140)
GLUCOSE SERPL-MCNC: 310 MG/DL (ref 65–140)
HCT VFR BLD AUTO: 32.7 % (ref 34.8–46.1)
HGB BLD-MCNC: 10.8 G/DL (ref 11.5–15.4)
IMM GRANULOCYTES # BLD AUTO: >0.5 THOUSAND/UL (ref 0–0.2)
IMM GRANULOCYTES NFR BLD AUTO: 7 % (ref 0–2)
INTERVENTRICULAR SEPTUM IN DIASTOLE (PARASTERNAL SHORT AXIS VIEW): 1 CM
INTERVENTRICULAR SEPTUM: 1 CM (ref 0.6–1.1)
L PNEUMO1 AG UR QL IA.RAPID: NEGATIVE
LAAS-AP2: 14.7 CM2
LAAS-AP4: 9.8 CM2
LEFT ATRIUM SIZE: 3.3 CM
LEFT ATRIUM VOLUME (MOD BIPLANE): 28 ML
LEFT ATRIUM VOLUME INDEX (MOD BIPLANE): 13.3 ML/M2
LEFT INTERNAL DIMENSION IN SYSTOLE: 3.5 CM (ref 2.1–4)
LEFT VENTRICULAR INTERNAL DIMENSION IN DIASTOLE: 4.7 CM (ref 3.5–6)
LEFT VENTRICULAR POSTERIOR WALL IN END DIASTOLE: 1.1 CM
LEFT VENTRICULAR STROKE VOLUME: 54 ML
LVSV (TEICH): 54 ML
LYMPHOCYTES # BLD AUTO: 1.72 THOUSANDS/ÂΜL (ref 0.6–4.47)
LYMPHOCYTES NFR BLD AUTO: 8 % (ref 14–44)
MCH RBC QN AUTO: 28.9 PG (ref 26.8–34.3)
MCHC RBC AUTO-ENTMCNC: 33 G/DL (ref 31.4–37.4)
MCV RBC AUTO: 87 FL (ref 82–98)
MONOCYTES # BLD AUTO: 0.82 THOUSAND/ÂΜL (ref 0.17–1.22)
MONOCYTES NFR BLD AUTO: 4 % (ref 4–12)
MV E'TISSUE VEL-SEP: 10 CM/S
MV PEAK A VEL: 1 M/S
MV PEAK E VEL: 88 CM/S
MV STENOSIS PRESSURE HALF TIME: 62 MS
MV VALVE AREA P 1/2 METHOD: 3.5
NEUTROPHILS # BLD AUTO: 18.33 THOUSANDS/ÂΜL (ref 1.85–7.62)
NEUTS SEG NFR BLD AUTO: 81 % (ref 43–75)
NRBC BLD AUTO-RTO: 0 /100 WBCS
PLATELET # BLD AUTO: 244 THOUSANDS/UL (ref 149–390)
PMV BLD AUTO: 10.6 FL (ref 8.9–12.7)
POTASSIUM SERPL-SCNC: 3.7 MMOL/L (ref 3.5–5.3)
PROCALCITONIN SERPL-MCNC: 19.9 NG/ML
RBC # BLD AUTO: 3.74 MILLION/UL (ref 3.81–5.12)
RIGHT ATRIUM AREA SYSTOLE A4C: 13.5 CM2
RIGHT VENTRICLE ID DIMENSION: 3 CM
S PNEUM AG UR QL: POSITIVE
SL CV LEFT ATRIUM LENGTH A2C: 4.2 CM
SL CV LV EF: 60
SL CV PED ECHO LEFT VENTRICLE DIASTOLIC VOLUME (MOD BIPLANE) 2D: 104 ML
SL CV PED ECHO LEFT VENTRICLE SYSTOLIC VOLUME (MOD BIPLANE) 2D: 50 ML
SODIUM 24H UR-SCNC: 14 MOL/L
SODIUM SERPL-SCNC: 133 MMOL/L (ref 135–147)
WBC # BLD AUTO: 22.56 THOUSAND/UL (ref 4.31–10.16)

## 2024-05-03 PROCEDURE — 99232 SBSQ HOSP IP/OBS MODERATE 35: CPT | Performed by: FAMILY MEDICINE

## 2024-05-03 PROCEDURE — 94760 N-INVAS EAR/PLS OXIMETRY 1: CPT

## 2024-05-03 PROCEDURE — 84145 PROCALCITONIN (PCT): CPT | Performed by: FAMILY MEDICINE

## 2024-05-03 PROCEDURE — 97167 OT EVAL HIGH COMPLEX 60 MIN: CPT

## 2024-05-03 PROCEDURE — 87449 NOS EACH ORGANISM AG IA: CPT | Performed by: FAMILY MEDICINE

## 2024-05-03 PROCEDURE — 85027 COMPLETE CBC AUTOMATED: CPT | Performed by: FAMILY MEDICINE

## 2024-05-03 PROCEDURE — 99223 1ST HOSP IP/OBS HIGH 75: CPT | Performed by: INTERNAL MEDICINE

## 2024-05-03 PROCEDURE — 93306 TTE W/DOPPLER COMPLETE: CPT

## 2024-05-03 PROCEDURE — 94668 MNPJ CHEST WALL SBSQ: CPT

## 2024-05-03 PROCEDURE — 94664 DEMO&/EVAL PT USE INHALER: CPT

## 2024-05-03 PROCEDURE — 87205 SMEAR GRAM STAIN: CPT | Performed by: FAMILY MEDICINE

## 2024-05-03 PROCEDURE — 94640 AIRWAY INHALATION TREATMENT: CPT

## 2024-05-03 PROCEDURE — 82948 REAGENT STRIP/BLOOD GLUCOSE: CPT

## 2024-05-03 PROCEDURE — 84300 ASSAY OF URINE SODIUM: CPT | Performed by: FAMILY MEDICINE

## 2024-05-03 PROCEDURE — 87040 BLOOD CULTURE FOR BACTERIA: CPT | Performed by: FAMILY MEDICINE

## 2024-05-03 PROCEDURE — 97163 PT EVAL HIGH COMPLEX 45 MIN: CPT

## 2024-05-03 PROCEDURE — 87070 CULTURE OTHR SPECIMN AEROBIC: CPT | Performed by: FAMILY MEDICINE

## 2024-05-03 PROCEDURE — 80048 BASIC METABOLIC PNL TOTAL CA: CPT | Performed by: FAMILY MEDICINE

## 2024-05-03 PROCEDURE — 82570 ASSAY OF URINE CREATININE: CPT | Performed by: FAMILY MEDICINE

## 2024-05-03 PROCEDURE — 93306 TTE W/DOPPLER COMPLETE: CPT | Performed by: INTERNAL MEDICINE

## 2024-05-03 RX ORDER — ACETAMINOPHEN 325 MG/1
650 TABLET ORAL EVERY 6 HOURS PRN
Status: DISCONTINUED | OUTPATIENT
Start: 2024-05-03 | End: 2024-05-06 | Stop reason: HOSPADM

## 2024-05-03 RX ORDER — SEMAGLUTIDE 1.34 MG/ML
1 INJECTION, SOLUTION SUBCUTANEOUS WEEKLY
Qty: 3 ML | Refills: 1 | Status: SHIPPED | OUTPATIENT
Start: 2024-05-03

## 2024-05-03 RX ORDER — CEFTRIAXONE 2 G/50ML
2000 INJECTION, SOLUTION INTRAVENOUS EVERY 24 HOURS
Status: DISCONTINUED | OUTPATIENT
Start: 2024-05-03 | End: 2024-05-06 | Stop reason: HOSPADM

## 2024-05-03 RX ORDER — PANTOPRAZOLE SODIUM 40 MG/1
TABLET, DELAYED RELEASE ORAL
Qty: 90 TABLET | Refills: 1 | Status: SHIPPED | OUTPATIENT
Start: 2024-05-03

## 2024-05-03 RX ORDER — DULOXETIN HYDROCHLORIDE 60 MG/1
CAPSULE, DELAYED RELEASE ORAL
Qty: 90 CAPSULE | Refills: 1 | Status: SHIPPED | OUTPATIENT
Start: 2024-05-03

## 2024-05-03 RX ADMIN — APIXABAN 5 MG: 5 TABLET, FILM COATED ORAL at 08:35

## 2024-05-03 RX ADMIN — LEVALBUTEROL HYDROCHLORIDE 1.25 MG: 1.25 SOLUTION RESPIRATORY (INHALATION) at 19:43

## 2024-05-03 RX ADMIN — INSULIN GLARGINE 20 UNITS: 100 INJECTION, SOLUTION SUBCUTANEOUS at 20:55

## 2024-05-03 RX ADMIN — ATORVASTATIN CALCIUM 20 MG: 10 TABLET, FILM COATED ORAL at 08:36

## 2024-05-03 RX ADMIN — LEVOTHYROXINE SODIUM 137 MCG: 25 TABLET ORAL at 05:43

## 2024-05-03 RX ADMIN — UMECLIDINIUM 1 PUFF: 62.5 AEROSOL, POWDER ORAL at 08:38

## 2024-05-03 RX ADMIN — PANTOPRAZOLE SODIUM 40 MG: 40 TABLET, DELAYED RELEASE ORAL at 06:05

## 2024-05-03 RX ADMIN — GUAIFENESIN 600 MG: 600 TABLET ORAL at 17:08

## 2024-05-03 RX ADMIN — GUAIFENESIN 600 MG: 600 TABLET ORAL at 08:36

## 2024-05-03 RX ADMIN — CEFTRIAXONE 2000 MG: 2 INJECTION, SOLUTION INTRAVENOUS at 11:57

## 2024-05-03 RX ADMIN — ASPIRIN 81 MG: 81 TABLET, COATED ORAL at 08:36

## 2024-05-03 RX ADMIN — APIXABAN 5 MG: 5 TABLET, FILM COATED ORAL at 17:08

## 2024-05-03 RX ADMIN — FLUTICASONE FUROATE AND VILANTEROL TRIFENATATE 1 PUFF: 200; 25 POWDER RESPIRATORY (INHALATION) at 08:38

## 2024-05-03 RX ADMIN — DULOXETINE HYDROCHLORIDE 60 MG: 30 CAPSULE, DELAYED RELEASE ORAL at 08:36

## 2024-05-03 RX ADMIN — MIRTAZAPINE 15 MG: 15 TABLET, FILM COATED ORAL at 20:58

## 2024-05-03 RX ADMIN — LEVALBUTEROL HYDROCHLORIDE 1.25 MG: 1.25 SOLUTION RESPIRATORY (INHALATION) at 08:18

## 2024-05-03 RX ADMIN — LEVALBUTEROL HYDROCHLORIDE 1.25 MG: 1.25 SOLUTION RESPIRATORY (INHALATION) at 14:03

## 2024-05-03 RX ADMIN — ACETAMINOPHEN 650 MG: 325 TABLET, FILM COATED ORAL at 20:53

## 2024-05-03 RX ADMIN — INSULIN GLARGINE 20 UNITS: 100 INJECTION, SOLUTION SUBCUTANEOUS at 08:36

## 2024-05-03 NOTE — PLAN OF CARE
Problem: PHYSICAL THERAPY ADULT  Goal: Performs mobility at highest level of function for planned discharge setting.  See evaluation for individualized goals.  Description: Treatment/Interventions: Functional transfer training, LE strengthening/ROM, Elevations, Therapeutic exercise, Endurance training, Bed mobility, Gait training          See flowsheet documentation for full assessment, interventions and recommendations.  Note: Prognosis: Good  Problem List: Decreased strength, Decreased endurance, Impaired balance, Decreased mobility, Decreased safety awareness, Obesity  Assessment: Patient is a 74 y.o. female evaluated by Physical Therapy s/p admit to [facility] on 5/2/2024 with admitting diagnosis of: Shortness of breath, Hyponatremia, Pneumonia, MICHAEL (acute kidney injury). PT was consulted to assess patient's functional mobility and discharge needs. Ordered are PT Evaluation and treatment with activity level of: up and OOB as tolerated. Comorbidities affecting patient's physical performance at time of assessment include:  centrilobular emphysema, RA, HTN, GERD, chronic respiratory failure, CHF, hx of PE, DM, hypothyroidism, HLD, fibromyalgia, osteoarthritis, MORENO, PAD, hx of COVID-19 . Personal factors affecting the patient at time of IE include: ambulating with assistive device, step(s) to enter home, inability to navigate community distances, impaired safety awareness, inability/difficulty performing IADLs, and inability/difficulty performing ADLs. Please locate objective findings from PT assessment regarding body systems outlined above. Upon evaluation, pt able to perform all functional mobility with min-modA x 1-2, RW, and increased time. Pt reports extreme heaviness in B LE and the inability to ambulate at this time, so mobility limited to stand pivot transfers to BSC and recliner. Moderate postural sway demonstrated but no true LOB experienced. HR and SpO2 remained WFL on 3L O2 throughout.  The patient's AM-PAC Basic Mobility Inpatient Short Form Raw Score is 13. A Raw score of less than or equal to 16 suggests the patient may benefit from discharge to post-acute rehabilitation services. Please also refer to the recommendation of the Physical Therapist for safe discharge planning. Co treatment with OT secondary to complex medical condition of pt, possible A of 2 required to achieve and maintain transitional movements, requiring the need of skilled therapeutic intervention of 2 therapists to achieve delivery of services. Pt will benefit from continued PT intervention during LOS to address current deficits, increase LOF, and facilitate safe d/c to next level of care when medically appropriate. D/c recommendation at this time is rehabilitation resource intensity level I.        Rehab Resource Intensity Level, PT: I (Maximum Resource Intensity)    See flowsheet documentation for full assessment.

## 2024-05-03 NOTE — RESPIRATORY THERAPY NOTE
05/03/24 0823   Inhalation Therapy Tx   $ Inhalation Therapy Performed Yes   $ Pulse Oximetry Spot Check Charge Completed   SpO2 95 %  (3)   Pre-Treatment Pulse 105   Pre-Treatment Respirations 20   Duration 15   Breath Sounds Pre-Treatment Bilateral Diminished;Rhonchi;Expiratory wheeze  (faint exp wheeze)   Breath Sounds Post-Treatment Bilateral Diminished;Rhonchi   Post-Treatment Pulse 102   Post-Treatment Respirations 20   Delivery Source Air;UDN   Position Semi Dejesus's   Treatment Tolerance Tolerated well

## 2024-05-03 NOTE — CASE MANAGEMENT
Case Management Assessment & Discharge Planning Note    Patient name Shakira Polk  Location /401-01 MRN [id number]  : 1950 Date 5/3/2024       Current Admission Date: 2024  Current Admission Diagnosis:Sepsis due to pneumonia ([facility])   Patient Active Problem List    Diagnosis Date Noted    Positive blood culture 2023    Vertigo 2023    History of pulmonary embolism 2023    COVID-19 10/25/2023    PAD (peripheral artery disease) ([facility]) 2023    COPD exacerbation ([facility]) 2023    Pre-op evaluation 10/24/2022    Dependence on continuous supplemental oxygen 2022    Excessive anticoagulation 2022    MORENO (obstructive sleep apnea) 2021    Hilar lymphadenopathy 2021    Atherosclerosis 2021    T12 compression fracture ([facility]) 2021    Chronic diastolic (congestive) heart failure ([facility])     Chronic sinusitis 2021    Primary insomnia 2020    Migraine without aura and without status migrainosus, not intractable 2020    Postherpetic neuralgia 2020    Diastolic dysfunction 2020    Macrocytic anemia 2020    Hypoxemia requiring supplemental oxygen 2019    History of exposure to asbestos 2019    Chronic respiratory failure with hypoxia ([facility]) 2019    Hyponatremia 2019    Candidal dermatitis 2018    Loculated pleural effusion 2018    Constipation 2017    Renal cyst 2017    Candidiasis, cutaneous 2017    Vitamin B12 deficiency 11/10/2016    Morbid obesity with BMI of 40.0-44.9, adult ([facility]) 2016    Sepsis due to pneumonia ([facility]) 2016    Centrilobular emphysema ([facility]) 2016    Type 2 diabetes mellitus with hyperglycemia, with long-term current use of insulin ([facility]) 2016    Hypothyroidism 2016    Rheumatoid arthritis ([facility]) 2016    Essential hypertension 2016    Hyperlipidemia 2016    Gastroesophageal reflux disease without esophagitis  11/03/2016    Hypoalbuminemia 11/03/2016    Hepatic steatosis 11/03/2016    Atelectasis 11/03/2016    Anxiety 06/02/2014    Fibromyalgia 01/28/2014    Osteopenia 09/17/2012    Osteoarthritis 05/29/2012    Vitamin D deficiency 05/29/2012      LOS (days): 1  Geometric Mean LOS (GMLOS) (days): 5.1  Days to GMLOS:4.2     OBJECTIVE:    Risk of Unplanned Readmission Score: 28.23         Current admission status: Inpatient  Referral Reason:  (declined)    Preferred Pharmacy:   [id number]  [place] - [address]  [address] [place] [address]  Phone: [phone] Fax: [phone]    [facility] - [place], [place] - [address] [facility]  [place] [address]  Phone: [phone] Fax: [phone]    Primary Care Provider: Ioana Heck MD    Primary Insurance: [employer]  Secondary Insurance:     ASSESSMENT:    Baseline information  was obtained. CM discussed the role of CM in helping the patient develop a discharge plan and assist the patient in carry out their plan.      Patient lives alone 2nd floor apt.      Active Health Care Proxies       Jr Polk Health Care Representative - Child   Primary Phone: [phone] (Mobile)  Home Phone: [phone]                 Advance Directives  Does patient have a Health Care POA?: No  Was patient offered paperwork?: Yes (denied)  Does patient currently have a Health Care decision maker?: Yes, please see Health Care Proxy section (Jr)  Does patient have Advance Directives?: No  Was patient offered paperwork?: Yes (denied)  Primary Contact: Jr Polk  [phone]         Readmission Root Cause  30 Day Readmission: No    Patient Information  Admitted from:: Home  Mental Status: Alert  During Assessment patient was accompanied by: Not accompanied during assessment  Assessment information provided by:: Patient  Primary Caregiver: Family  Caregiver's Name:: Jr Polk [phone]  Caregiver's  Relationship to Patient:: Family Member  Caregiver's Telephone Number:: [phone]  Support Systems: Family members  County of Residence: [place]  What city do you live in?: [place]  Home entry access options. Select all that apply.: Stairs  Type of Current Residence: Apartment  Floor Level: 2  Upon entering residence, is there a bedroom on the main floor (no further steps)?: Yes  Upon entering residence, is there a bathroom on the main floor (no further steps)?: Yes  Living Arrangements: Lives Alone              Patient Information Continued  Income Source: Pension/jail  Does patient have prescription coverage?: Yes  Does patient receive dialysis treatments?: No  Does patient have a history of substance abuse?: No  Does patient have a history of Mental Health Diagnosis?: No         Means of Transportation  Means of Transport to Appts:: Family transport      Social Determinants of Health (SDOH)      Flowsheet Row Most Recent Value   Housing Stability    In the last 12 months, how many places have you lived? 1   In the last 12 months, was there a time when you did not have a steady place to sleep or slept in a shelter (including now)? N   Transportation Needs    In the past 12 months, has lack of transportation kept you from medical appointments or from getting medications? no   In the past 12 months, has lack of transportation kept you from meetings, work, or from getting things needed for daily living? No   Food Insecurity    Within the past 12 months, you worried that your food would run out before you got the money to buy more. Never true   Within the past 12 months, the food you bought just didn't last and you didn't have money to get more. Never true   Utilities    In the past 12 months has the electric, gas, oil, or water company threatened to shut off services in your home? No            DISCHARGE DETAILS:             Therapy level 3    Cm to follow for patient discharge planning.

## 2024-05-03 NOTE — OCCUPATIONAL THERAPY NOTE
Occupational Therapy Evaluation     Patient Name: Shakira Polk  Today's Date: 5/3/2024  Problem List  Active Problems:    Sepsis due to pneumonia (HCC)    Centrilobular emphysema (HCC)    Rheumatoid arthritis (HCC)    Essential hypertension    Gastroesophageal reflux disease without esophagitis    Chronic respiratory failure with hypoxia (HCC)    Chronic diastolic (congestive) heart failure (HCC)    History of pulmonary embolism    Past Medical History  Past Medical History:   Diagnosis Date    Arthritis     Arthritis     Cancer (HCC)     Candidiasis of skin     Cervical cancer (HCC)     Chronic respiratory failure with hypoxia and hypercapnia (HCC) 4/20/2019    COPD (chronic obstructive pulmonary disease) ([facility])     last assessed 11/21/2016    Current chronic use of systemic steroids     Degenerative arthritis of left knee     last assessed 1/20/2015    Diabetes mellitus ([facility])     Disease of thyroid gland     hypo pill given to decrease thyroid.     Fibromyalgia     Fistula of knee     last assessed 9/12/2014    GERD (gastroesophageal reflux disease)     Hyperlipidemia     Hypertension     Medial meniscus tear     of left knee, last assessed 9/12/2014    Migraine     states she has a hx of HA that she calls [employer] but never was dx by neurologist    Obesity     Obesity     Osteoarthritis     Osteopenia     Pneumonia     last assessed 11/16/2016    Psychiatric disorder     anxiety    Rheumatoid arthritis (HCC)     Rheumatoid arthritis (HCC)     Sepsis (HCC)     last assessed 11/10/2016    Tick bite     last assessed 10/30/2015    Vitamin B12 deficiency     Vitamin D deficiency      Past Surgical History  Past Surgical History:   Procedure Laterality Date    CATARACT EXTRACTION Bilateral     CHOLECYSTECTOMY      EYE SURGERY      Bilateral Cataracts    HYSTERECTOMY      IR AORTAGRAM WITH RUN-OFF  9/20/2023    IR THORACENTESIS  5/31/2019    JOINT REPLACEMENT      left knee    KNEE ARTHROSCOPY      NEUROPLASTY /  "TRANSPOSITION MEDIAN NERVE AT CARPAL TUNNEL      TUBAL LIGATION           05/03/24 0923   OT Last Visit   OT Visit Date 05/03/24   Note Type   Note type Evaluation   Additional Comments Pt seen as a co-eval with PT due to the patient's co-morbidities, clinically unstable presentation, and present impairments which are a regression from the patient's baseline.   Pain Assessment   Pain Assessment Tool 0-10   Pain Score No Pain   Restrictions/Precautions   Weight Bearing Precautions Per Order No   Braces or Orthoses   (none reported)   Other Precautions Cognitive;Chair Alarm;Bed Alarm;Fall Risk;Pain   Home Living   Type of Home Apartment   Home Layout One level;Stairs to enter with rails  (15 ANNIKA w/ HR x2)   Bathroom Shower/Tub Tub/shower unit   Bathroom Toilet Standard   Bathroom Equipment Tub transfer bench;Grab bars in shower   Bathroom Accessibility Accessible   Home Equipment Walker;Cane;Hospital bed  (Pt reports utilizing RW at baseline)   Prior Function   Level of [place] Needs assistance with ADLs;Needs assistance with IADLS;Independent with functional mobility   Lives With Alone   Receives Help From Family   IADLs Family/Friend/Other provides transportation;Family/Friend/Other provides meals;Independent with medication management   Falls in the last 6 months (S)    (\"Nothing major\")   Subjective   Subjective \"my legs just don't feel like mine\"   ADL   Where Assessed Edge of bed   UB Bathing Assistance 5  Supervision/Setup   LB Bathing Assistance 4  Minimal Assistance   UB Dressing Assistance 5  Supervision/Setup   LB Dressing Assistance 3  Moderate Assistance   LB Dressing Deficit Don/doff R sock;Don/doff L sock  (Pt demonstrated functional reach to shins, indicating mod A to complete LB dressing)   Toileting Assistance  4  Minimal Assistance   Toileting Deficit Increased time to complete;Bedside commode;Perineal hygiene  (min A for thoroughness)   Bed Mobility   Supine to Sit 3  Moderate assistance "   Additional items Assist x 1;HOB elevated;Bedrails;Increased time required;Verbal cues;LE management   Sit to Supine 3  Moderate assistance   Additional items Assist x 1;Increased time required;Verbal cues;LE management  (mod A x1 for LE management)   Additional Comments VC for bedrail utilization and proper body mechanics; denied lightheadness/dizziness with transitional movements   Transfers   Sit to Stand 4  Minimal assistance   Additional items Assist x 2;Bedrails;Increased time required;Verbal cues   Stand to Sit 4  Minimal assistance   Additional items Assist x 2;Increased time required;Verbal cues   Stand pivot 4  Minimal assistance   Additional items Assist x 2;Increased time required;Verbal cues   Toilet transfer 4  Minimal assistance   Additional items Assist x 2;Increased time required;Commode;Verbal cues   Additional Comments RW used; VC for safe hand placement, proper body mechanics and RW management with environmental awareness   Balance   Static Sitting Good   Dynamic Sitting Fair +   Static Standing Fair   Dynamic Standing Fair -   Ambulatory   (DNT d/t patient fatigue)   Activity Tolerance   Activity Tolerance Patient limited by fatigue   Medical Staff Made Aware Yes, CM made aware of d/c recs   Nurse Made Aware Yes, nursing staff made aware of session outcomes   RUE Assessment   RUE Assessment WFL   RUE Strength   RUE Overall Strength   (3+/5 grossly assessed)   LUE Assessment   LUE Assessment WFL   LUE Strength   LUE Overall Strength   (3+/5 grossly assessed)   Hand Function   Gross Motor Coordination Functional   Fine Motor Coordination Functional   Psychosocial   Psychosocial (WDL) WDL   Cognition   Overall Cognitive Status   (Questionable)   Arousal/Participation Alert;Responsive   Attention Attends with cues to redirect   Orientation Level Oriented X4   Memory Decreased recall of precautions;Decreased recall of recent events;Decreased short term memory   Following Commands Follows one step  commands without difficulty   Comments Pt agreeable to OT evaluation   Assessment   Limitation Decreased ADL status;Decreased UE strength;Decreased Safe judgement during ADL;Decreased endurance;Decreased self-care trans;Decreased high-level ADLs   Prognosis Good   Assessment Pt is a 74 y.o. female seen for OT evaluation s/p admit to [facility] on 5/2/2024 w/ sepsis due to pneumonia.  Comorbidities affecting pt's functional performance at time of assessment include: centrilobular emphysema, RA, HTN, GERD, chronic respiratory failure, CHF, hx of PE, obesity, anxiety. Personal factors affecting pt at time of IE include:steps to enter environment, difficulty performing ADLS, difficulty performing IADLS , limited insight into deficits, decreased initiation and engagement , and health management . Prior to admission, pt required A with ADLs and iADLs. Upon evaluation: the following deficits impact occupational performance: weakness, decreased strength, decreased balance, decreased tolerance, impaired problem solving, and decreased safety awareness. Pt to benefit from continued skilled OT tx while in the hospital to address deficits as defined above and maximize level of functional independence w ADL's and functional mobility. Occupational Performance areas to address include: grooming, bathing/shower, toilet hygiene, dressing, functional mobility, community mobility, and clothing management. From OT standpoint, recommendation at time of d/c would with moderate intensity OT resources.   Goals   Patient Goals to go home   Plan   Treatment Interventions ADL retraining;Functional transfer training;UE strengthening/ROM;Endurance training;Patient/family training;Compensatory technique education;Energy conservation;Activityengagement   Goal Expiration Date 05/17/24   OT Treatment Day 0   OT Frequency 3-5x/wk   Discharge Recommendation   Rehab Resource Intensity Level, OT II (Moderate Resource Intensity)   Additional Comments   The patient's raw score on the AM-PAC Daily Activity inpatient short form is 18, standardized score is 38.66, less than 39.4. Patients at this level are likely to benefit from discharge with moderate intensity OT resources. Please refer to the recommendation of the Occupational Therapist for safe discharge planning.   AM-PAC Daily Activity Inpatient   Lower Body Dressing 2   Bathing 2   Toileting 3   Upper Body Dressing 3   Grooming 4   Eating 4   Daily Activity Raw Score 18   Daily Activity Standardized Score (Calc for Raw Score >=11) 38.66   AM-PAC Applied Cognition Inpatient   Following a Speech/Presentation 3   Understanding Ordinary Conversation 4   Taking Medications 3   Remembering Where Things Are Placed or Put Away 3   Remembering List of 4-5 Errands 2   Taking Care of Complicated Tasks 2   Applied Cognition Raw Score 17   Applied Cognition Standardized Score 36.52     GOALS:      Pt will achieve the following within specified time frame: LTG  Pt will achieve the following goals within 14 days    *ADL transfers with (S) for inc'd independence with ADLs/purposeful tasks    *UB ADL with (I) for inc'd independence with self cares    *LB ADL with (S) using AE prn for inc'd independence with self cares    *Toileting with (S) for clothing management and hygiene for return to PLOF with personal care    *Increase static stand balance and dyn stand balance to F+ for inc'd safety with standing purposeful tasks    *Increase stand tolerance x5 m for inc'd tolerance with standing purposeful tasks    *Bed mobility- (I) for inc'd independence to manage own comfort and initiate EOB & OOB purposeful tasks      *Participate in 10-15m UE therex to increase overall stamina/activity tolerance for purposeful tasks      Elayne Jimenez MS, OTR/L

## 2024-05-03 NOTE — UTILIZATION REVIEW
Initial Clinical Review    Admission: Date/Time/Statement:   Admission Orders (From admission, onward)       Ordered        05/02/24 1728  Inpatient Admission  Once                          Orders Placed This Encounter   Procedures    Inpatient Admission     Standing Status:   Standing     Number of Occurrences:   1     Order Specific Question:   Level of Care     Answer:   Med Surg [16]     Order Specific Question:   Estimated length of stay     Answer:   More than 2 Midnights     Order Specific Question:   Certification     Answer:   I certify that inpatient services are medically necessary for this patient for a duration of greater than two midnights. See H&P and MD Progress Notes for additional information about the patient's course of treatment.     ED Arrival Information       Expected   -    Arrival   5/2/2024 12:12    Acuity   Urgent              Means of arrival   Ambulance    Escorted by   [facility]    [facility]   Hospitalist    Admission type   Emergency              Arrival complaint   Shortness of breath             Chief Complaint   Patient presents with    Shortness of Breath     Patient reports to this ED via EMS c/o SOB starting yesterday. Patient is on chronic 3L o2 for COPD       Initial Presentation: 74 y.o. female presents to ed from home on 5-2-24 via ems  for evaluation and treatment of cough, central chest pain , weakness, shortness on breath on chronic 3L O2nc.  PMHX: COPD, DM, PE on Eliquis. EMS reports temp 101. Received tylenol 625 mg prior to arrival.  Clinical assessment significant for temp 97.3 tachycardia, tachypnea, 96% on 3L O2nc, decreased breath  sounds, rhonchi. EKG: sinus tachycardia.  Imaging shows : RUL pneumonia, L loculated pleural effusion. WBC 25.34, HB 9.8, LA 3.1, , K 2.9, BUN 49, CR 1.64, GLUCOSE 323,PROCAL 32.68, D-DIMER 1.69.  Initially treated with iv ceftriaxone, iv azithromycin, iv .9% ns bolus 500 ml.  Admit to inpatient med surg for  pneumonia.      Date: 5-3- 24    Day 2: inpatient med surg  Continue treatment of RUL pneumonia. WBC 22.56  PROCAL 19.90.  1 of 2 blood cultures shows gram positive cocci in pairs and chains.  Patient on 3L O2nc maintaining O2 sat at least 90% seated, 95% lying.   ID consult completed : sepsis likely due to pneumococcal bacteremia.  Hemodynamically stable. Follow up cultures and sensitivities, monitor renal function. Continue iv ceftriaxone day 2.  Continue xopenex tid.  Collect sputum culture.    Date: 5-4-24 inpatient med surg  Day 3: Has surpassed a 2nd midnight with active treatments and services for sepsis due to pneumonia.    Strep pneumoniae positive. Procal 10.53.  WBC 12.13 NA improved to 137.  Temp 97.3.  O2 sat 98% on  3L O2nc resting.  2 D echo shows mitral valve with mild annular calcification. Sputum culture too young. Vegetation cannot be excluded.  Repeat blood cultures pending. Encourage incentive spirometry. Continue iv ceftriaxone daily day 3. Continue xopenex tid. Start po lasix 40 mg daily.       ED Triage Vitals   05/02/24 1239 05/02/24 1225 05/02/24 1225 05/02/24 1225 05/02/24 1225   (!) 97.3 °F (36.3 °C) (!) 110 (!) 33 122/56 94 %      Temporal Monitor         No Pain          05/02/24 105 kg (230 lb 13.2 oz)     Additional Vital Signs:     Date/Time Temp Pulse Resp BP MAP (mmHg) SpO2 Nasal Cannula O2 Flow Rate (L/min)   05/03/24 07:11:23 98.2 °F (36.8 °C) 100 17 107/68 81 96 % 3 L/min   05/02/24 2323 -- -- -- -- -- -- 3 L/min   05/02/24 21:48:55 98.1 °F (36.7 °C) 110   Abnormal  -- 109/66 80 98 % --   05/02/24 2007 -- -- -- -- -- 91 % 3.5 L/min   05/02/24 1848 -- 107   Abnormal  20 -- -- 94 % --   05/02/24 18:34:01 100.4 °F (38 °C)  101 22 111/66 81 95 % 3.5 L/min    05/02/24 1814 -- -- -- -- -- -- 3.5 L/min   05/02/24 1700 -- 96 22 116/65 85 96 % --   05/02/24 1509 -- 88 22 108/53 -- 96 % 3 L/min   05/02/24 1240 97.7 °F (36.5 °C) -- -- -- -- -- --   05/02/24 1239 97.3 °F (36.3 °C)    Abnormal  -- -- -- -- -- --   05/02/24 1237 -- -- -- -- -- 96 %  --   05/02/24 1225 -- 110   Abnormal  33 Abnormal  122/56 80 94 % --           Pertinent Labs/Diagnostic Test Results:   CTA ED chest PE Study   Final  (05/02 1532)      No pulmonary embolus.      7.3 cm masslike opacity in the right upper lobe, new since October 2023, compatible with pneumonia.      Small hiatal hernia.      Stable small loculated left pleural effusion.                  XR chest portable    (05/02 1411)   Right upper lobe pneumonia        Results from last 7 days   Lab Units 05/02/24  1746   SARS-COV-2  Negative     Results from last 7 days   Lab Units 05/03/24 0522 05/02/24  1907 05/02/24  1302   WBC Thousand/uL 22.56*  --  25.34*   HEMOGLOBIN g/dL 10.8*  --  9.8*   HEMATOCRIT % 32.7*  --  30.4*   PLATELETS Thousands/uL 244 234 223   TOTAL NEUT ABS Thousands/µL 18.33*  --   --    BANDS PCT %  --   --  12*         Results from last 7 days   Lab Units 05/03/24 0522 05/02/24 1746 05/02/24  1302   SODIUM mmol/L 133* 129* 125*   POTASSIUM mmol/L 3.7 3.1* 2.9*   CHLORIDE mmol/L 97 92* 89*   CO2 mmol/L 27 27 24   ANION GAP mmol/L 9 10 12   BUN mg/dL 32* 43* 49*   CREATININE mg/dL 0.99 1.34* 1.64*   EGFR ml/min/1.73sq m 55 38 30   CALCIUM mg/dL 9.3 9.2 9.3     Results from last 7 days   Lab Units 05/02/24  1302   AST U/L 21   ALT U/L 19   ALK PHOS U/L 66   TOTAL PROTEIN g/dL 7.3   ALBUMIN g/dL 3.6   TOTAL BILIRUBIN mg/dL 0.72     Results from last 7 days   Lab Units 05/02/24  2146   POC GLUCOSE mg/dl 283*     Results from last 7 days   Lab Units 05/03/24 0522 05/02/24 1746 05/02/24  1302   GLUCOSE RANDOM mg/dL 220* 197* 323*       Results from last 7 days   Lab Units 05/02/24  1721 05/02/24  1515 05/02/24  1302   HS TNI 0HR ng/L  --   --  11   HS TNI 2HR ng/L  --  9  --    HSTNI D2 ng/L  --  -2  --    HS TNI 4HR ng/L 8  --   --    HSTNI D4 ng/L -3  --   --      Results from last 7 days   Lab Units 05/02/24  1336   D-DIMER QUANTITATIVE  ug/ml FEU 1.69*     Results from last 7 days   Lab Units 05/02/24  1336   PROTIME seconds 17.5*   INR  1.46*   PTT seconds 29         Results from last 7 days   Lab Units 05/03/24  0522 05/02/24  1907 05/02/24  1302   PROCALCITONIN ng/ml 19.90* 29.82* 32.68*     Results from last 7 days   Lab Units 05/02/24  1907 05/02/24  1515 05/02/24  1302   LACTIC ACID mmol/L 2.0 2.4* 3.1*     Results from last 7 days   Lab Units 05/02/24  1302   BNP pg/mL 39     Results from last 7 days   Lab Units 05/03/24  0036   SODIUM UR  14   CREATININE UR mg/dL 77.5     Results from last 7 days   Lab Units 05/03/24  0036 05/02/24  1746   STREP PNEUMONIAE ANTIGEN, URINE  Positive*  --    LEGIONELLA URINARY ANTIGEN  Negative  --    INFLUENZA A PCR   --  Negative   INFLUENZA B PCR   --  Negative   RSV PCR   --  Negative     Results from last 7 days   Lab Units 05/02/24  1312   BLOOD CULTURE  Received in Microbiology Lab. Culture in Progress.   GRAM STAIN RESULT  Gram positive cocci in pairs and chains*       ED Treatment:   Medication Administration from 05/02/2024 1212 to 05/02/2024 1800         Date/Time Order Dose Route Action     05/02/2024 1224 EDT acetaminophen (FOR EMS ONLY) (TYLENOL) oral suspension 650 mg   Given to EMS     05/02/2024 1336 EDT cefTRIAXone (ROCEPHIN) IVPB (premix in dextrose) 1,000 mg 50 mL 1,000 mg Intravenous New Bag     05/02/2024 1406 EDT azithromycin (ZITHROMAX) 500 mg in sodium chloride 0.9% 250mL IVPB 500 mg 500 mg Intravenous New Bag     05/02/2024 1405 EDT sodium chloride 0.9 % bolus 500 mL 500 mL Intravenous New Bag          Past Medical History:   Diagnosis Date    Arthritis     Arthritis     Cancer (HCC)     Candidiasis of skin     Cervical cancer (HCC)     Chronic respiratory failure with hypoxia and hypercapnia (HCC) 4/20/2019    COPD (chronic obstructive pulmonary disease) (HCC)     last assessed 11/21/2016    Current chronic use of systemic steroids     Degenerative arthritis of left knee     last  assessed 1/20/2015    Diabetes mellitus (HCC)     Disease of thyroid gland     hypo pill given to decrease thyroid.     Fibromyalgia     Fistula of knee     last assessed 9/12/2014    GERD (gastroesophageal reflux disease)     Hyperlipidemia     Hypertension     Medial meniscus tear     of left knee, last assessed 9/12/2014    Migraine     states she has a hx of HA that she calls [employer] but never was dx by neurologist    Obesity     Obesity     Osteoarthritis     Osteopenia     Pneumonia     last assessed 11/16/2016    Psychiatric disorder     anxiety    Rheumatoid arthritis (HCC)     Rheumatoid arthritis (HCC)     Sepsis (HCC)     last assessed 11/10/2016    Tick bite     last assessed 10/30/2015    Vitamin B12 deficiency     Vitamin D deficiency      Present on Admission:   Sepsis due to pneumonia (HCC)   Essential hypertension   History of pulmonary embolism   Centrilobular emphysema (HCC)   Chronic diastolic (congestive) heart failure ([facility])   Gastroesophageal reflux disease without esophagitis   Chronic respiratory failure with hypoxia (HCC)   Rheumatoid arthritis ([facility])      Admitting Diagnosis:     Shortness of breath [R06.02]  Hyponatremia [E87.1]  Pneumonia [J18.9]  MICHAEL (acute kidney injury) ([facility]) [N17.9]    Age/Sex: 74 y.o. female    Scheduled Medications:    apixaban, 5 mg, Oral, BID  aspirin, 81 mg, Oral, Daily  atorvastatin, 20 mg, Oral, Daily  cefTRIAXone, 1,000 mg, Intravenous, Q24H  DULoxetine, 60 mg, Oral, Daily  fluticasone-vilanterol, 1 puff, Inhalation, Daily  guaiFENesin, 600 mg, Oral, BID  insulin glargine, 20 Units, Subcutaneous, Q12H PATSY  levalbuterol, 1.25 mg, Nebulization, TID  levothyroxine, 137 mcg, Oral, Early Morning  mirtazapine, 15 mg, Oral, HS  pantoprazole, 40 mg, Oral, Daily Before Breakfast  umeclidinium, 1 puff, Inhalation, Daily      Continuous IV Infusions:     PRN Meds:  benzonatate, 200 mg, Oral, TID PRN        IP CONSULT TO INFECTIOUS DISEASES    Network Utilization Review  Department  ATTENTION: Please call with any questions or concerns to [phone] and carefully listen to the prompts so that you are directed to the right person. All voicemails are confidential.   For Discharge needs, contact Care Management DC Support Team at [phone] opt. 2  Send all requests for admission clinical reviews, approved or denied determinations and any other requests to dedicated fax number below belonging to the campus where the patient is receiving treatment. List of dedicated fax numbers for the Facilities:  FACILITY NAME UR FAX NUMBER   ADMISSION DENIALS (Administrative/Medical Necessity) [phone]   DISCHARGE SUPPORT TEAM (NETWORK) [phone]   PARENT CHILD HEALTH (Maternity/NICU/Pediatrics) [phone]   [facility] [phone]   [facility] [phone]   [facility] [phone]   [facility] [phone]   [facility] [phone]   General acute hospital [phone]   [facility] [phone]   [facility] [phone]   [facility] [phone]   [facility] [phone]   [facility] [phone]   [facility] [phone]

## 2024-05-03 NOTE — WOUND OSTOMY CARE
Consult Note - Wound   Shakira Polk 74 y.o. female MRN: [id number]  Unit/Bed#: 401-01 Encounter: [id number]        History and Present Illness:  74 year old female admitted with sepsis, Pneumococcal bacteremia secondary to pneumonia.Rheumatoid arthritis obesity.ID consulted.    Assessment and Findings   Remote wound consult    1)Erythema to rectal area, Calazime recommended.  no other skin issues   Preventative skin care measures placed    Skin care plans:  1-Calazime to sacrum, buttocks TID and PRN  2-Hydraguard to bilateral heel BID and PRN  3-Elevate heels to offload pressure  4-Ehob cushion when out of bed.  5-Turn/repoisiton q2h or when medically stable for pressure re-distribution on skin.  6-Moisturize skin daily with skin nourishing cream    No induration, fluctuance, odor, warmth/temperature differences, redness, or purulence noted to the above noted wounds and skin areas assessed. New dressings applied per orders listed below. Patient tolerated well- no s/s of non-verbal pain or discomfort observed during the encounter. Bedside nurse aware of plan of care. See flow sheets for more detailed assessment findings. Wound care will continue to follow.     Wound Care will sign off  Call or Tigertext with any questions    Alyson RAYA RN CWON

## 2024-05-03 NOTE — PLAN OF CARE
Problem: OCCUPATIONAL THERAPY ADULT  Goal: Performs self-care activities at highest level of function for planned discharge setting.  See evaluation for individualized goals.  Description: Treatment Interventions: ADL retraining, Functional transfer training, UE strengthening/ROM, Endurance training, Patient/family training, Compensatory technique education, Energy conservation, Activityengagement       See flowsheet documentation for full assessment, interventions and recommendations.   Note: Limitation: Decreased ADL status, Decreased UE strength, Decreased Safe judgement during ADL, Decreased endurance, Decreased self-care trans, Decreased high-level ADLs  Prognosis: Good  Assessment: Pt is a 74 y.o. female seen for OT evaluation s/p admit to [facility] on 5/2/2024 w/ sepsis due to pneumonia.  Comorbidities affecting pt's functional performance at time of assessment include: centrilobular emphysema, RA, HTN, GERD, chronic respiratory failure, CHF, hx of PE, obesity, anxiety. Personal factors affecting pt at time of IE include:steps to enter environment, difficulty performing ADLS, difficulty performing IADLS , limited insight into deficits, decreased initiation and engagement , and health management . Prior to admission, pt required A with ADLs and iADLs. Upon evaluation: the following deficits impact occupational performance: weakness, decreased strength, decreased balance, decreased tolerance, impaired problem solving, and decreased safety awareness. Pt to benefit from continued skilled OT tx while in the hospital to address deficits as defined above and maximize level of functional independence w ADL's and functional mobility. Occupational Performance areas to address include: grooming, bathing/shower, toilet hygiene, dressing, functional mobility, community mobility, and clothing management. From OT standpoint, recommendation at time of d/c would with moderate intensity OT resources.     Rehab Resource  Intensity Level, OT: II (Moderate Resource Intensity)     Elayne Jimenez MS, OTR/L

## 2024-05-03 NOTE — PROGRESS NOTES
[facility]  Progress Note  Name: Shakira Polk I  MRN: [id number]  Unit/Bed#: 401-01 I Date of Admission: 5/2/2024   Date of Service: 5/3/2024 I Hospital Day: 1    Assessment/Plan   Sepsis due to pneumonia (HCC)  Assessment & Plan  Met via tachycardia, tachypnea and lactic acidosis  Continue ceftriaxone and dc azithro   Pneumococcal antigen positive, along with what appears to be strep pneumo in 1 of 2 blood cultures  Check 2D echo, repeat 2 sets of blood cultures and consult infectious disease 5/3/2020  Check sputum culture  Initiate respiratory protocol and replace protocol  Incentive spirometry      History of pulmonary embolism  Assessment & Plan  Continue Eliquis    Chronic diastolic (congestive) heart failure (HCC)  Assessment & Plan  Wt Readings from Last 3 Encounters:   03/14/24 108 kg (237 lb 6.4 oz)   12/18/23 110 kg (243 lb 6.4 oz)   12/02/23 107 kg (236 lb)     DC IVF ,  hold lasix for another then resume if renal function improving           Chronic respiratory failure with hypoxia (HCC)  Assessment & Plan  At baseline 3 L     Gastroesophageal reflux disease without esophagitis  Assessment & Plan  Protonix 40 mg daily    Essential hypertension  Assessment & Plan  Hold lisinopril and Lasix     Rheumatoid arthritis (HCC)  Assessment & Plan  Hold methotrexate and xeljanz    Centrilobular emphysema (HCC)  Assessment & Plan  Continue chronic inhalers  Initiate respiratory protocol               Progress Note - Shakira Polk 74 y.o. female MRN: [id number]    Unit/Bed#: 401-01 Encounter: [id number]        Subjective:     Patient seen and examined she started to feel better    Objective:     Vitals:   Vitals:    05/03/24 0711   BP: 107/68   Pulse: 100   Resp: 17   Temp: 98.2 °F (36.8 °C)   SpO2: 96%     Body mass index is 38.41 kg/m².    Intake/Output Summary (Last 24 hours) at 5/3/2024 0819  Last data filed at 5/3/2024 0513  Gross per 24 hour   Intake --   Output 575 ml   Net -575 ml  "      Physical Exam:   /68 (BP Location: Right arm)   Pulse 100   Temp 98.2 °F (36.8 °C) (Oral)   Resp 17   Ht 5' 5\" (1.651 m)   Wt 105 kg (230 lb 13.2 oz)   SpO2 96%   BMI 38.41 kg/m²     General appearance: alert and oriented, in no acute distress  Lungs: clear to auscultation bilaterally  Heart: regular rate and rhythm, S1, S2 normal, no murmur, click, rub or gallop  Abdomen: soft, non-tender; bowel sounds normal; no masses,  no organomegaly  Extremities: extremities normal, warm and well-perfused; no cyanosis, clubbing, or edema  Pulses: 2+ and symmetric  Skin: Skin color, texture, turgor normal. No rashes or lesions  Neurologic: Grossly normal     Invasive Devices       Peripheral Intravenous Line  Duration             Peripheral IV 05/02/24 Left Antecubital <1 day    Peripheral IV 05/02/24 Right;Ventral (anterior) Wrist <1 day              Drain  Duration             External Urinary Catheter <1 day                    Results from last 7 days   Lab Units 05/03/24  0522 05/02/24  1907 05/02/24  1302   WBC Thousand/uL 22.56*  --  25.34*   HEMOGLOBIN g/dL 10.8*  --  9.8*   HEMATOCRIT % 32.7*  --  30.4*   PLATELETS Thousands/uL 244 234 223       Results from last 7 days   Lab Units 05/03/24  0522 05/02/24  1746 05/02/24  1302   POTASSIUM mmol/L 3.7 3.1* 2.9*   CHLORIDE mmol/L 97 92* 89*   CO2 mmol/L 27 27 24   BUN mg/dL 32* 43* 49*   CREATININE mg/dL 0.99 1.34* 1.64*   CALCIUM mg/dL 9.3 9.2 9.3   ALK PHOS U/L  --   --  66   ALT U/L  --   --  19   AST U/L  --   --  21       Medication Administration - last 24 hours from 05/02/2024 0819 to 05/03/2024 0819         Date/Time Order Dose Route Action Action by     05/02/2024 1224 EDT acetaminophen (FOR EMS ONLY) (TYLENOL) oral suspension 650 mg 0 mg Does not apply Given to EMS Ame Wallace RN     05/02/2024 1406 EDT cefTRIAXone (ROCEPHIN) IVPB (premix in dextrose) 1,000 mg 50 mL 0 mg Intravenous Stopped Collette Yepez RN     05/02/2024 1336 " EDT cefTRIAXone (ROCEPHIN) IVPB (premix in dextrose) 1,000 mg 50 mL 1,000 mg Intravenous New Bag Collette Marleni, DINORAH     05/02/2024 1719 EDT azithromycin (ZITHROMAX) 500 mg in sodium chloride 0.9% 250mL IVPB 500 mg 0 mg Intravenous Stopped Terrence Parada, DINORAH     05/02/2024 1406 EDT azithromycin (ZITHROMAX) 500 mg in sodium chloride 0.9% 250mL IVPB 500 mg 500 mg Intravenous New Bag Collette Marleni, RN     05/02/2024 1719 EDT sodium chloride 0.9 % bolus 500 mL 0 mL Intravenous Stopped Terrence Parada, DINORAH     05/02/2024 1405 EDT sodium chloride 0.9 % bolus 500 mL 500 mL Intravenous New Bag Collette Yepez, RN     05/02/2024 1431 EDT iohexol (OMNIPAQUE) 350 MG/ML injection (MULTI-DOSE) 100 mL 100 mL Intravenous Given Sean Joshi     05/02/2024 1922 EDT apixaban (ELIQUIS) tablet 5 mg 5 mg Oral Given Lizzeth Greenwood RN     05/03/2024 0543 EDT levothyroxine tablet 137 mcg 137 mcg Oral Given Emmy Ronquillo RN     05/02/2024 2149 EDT mirtazapine (REMERON) tablet 15 mg 15 mg Oral Given Lizzeth Greenwood RN     05/03/2024 0605 EDT pantoprazole (PROTONIX) EC tablet 40 mg 40 mg Oral Given Emmy Ronquillo RN     05/02/2024 2149 EDT insulin glargine (LANTUS) subcutaneous injection 20 Units 0.2 mL 20 Units Subcutaneous Given Lizzeth Greenwood RN     05/02/2024 1922 EDT sodium chloride 0.9 % infusion 50 mL/hr Intravenous New Sharee Greenwood RN     05/02/2024 1922 EDT guaiFENesin (MUCINEX) 12 hr tablet 600 mg 600 mg Oral Given Lizzeth Greenwood RN     05/02/2024 1813 EDT potassium chloride (Klor-Con M20) CR tablet 40 mEq 40 mEq Oral Given Lizzeth Greenwood RN     05/02/2024 2007 EDT levalbuterol (XOPENEX) inhalation solution 1.25 mg 1.25 mg Nebulization Given Deancorky Bradley, RT     05/02/2024 8737 EDT potassium chloride (Klor-Con M20) CR tablet 40 mEq 40 mEq Oral Given Lizzeth Greenwood RN              Lab, Imaging and other studies: I have personally reviewed pertinent reports.    VTE Pharmacologic Prophylaxis: eliquis  VTE  Mechanical Prophylaxis: sequential compression device     Alex Torres MD  5/3/2024,8:19 AM

## 2024-05-03 NOTE — PHYSICAL THERAPY NOTE
Physical Therapy Evaluation    Patient Name: Shakira Polk    Today's Date: 5/3/2024     Problem List  Active Problems:    Sepsis due to pneumonia (HCC)    Centrilobular emphysema (HCC)    Rheumatoid arthritis (HCC)    Essential hypertension    Gastroesophageal reflux disease without esophagitis    Chronic respiratory failure with hypoxia (HCC)    Chronic diastolic (congestive) heart failure (HCC)    History of pulmonary embolism       Past Medical History  Past Medical History:   Diagnosis Date    Arthritis     Arthritis     Cancer (HCC)     Candidiasis of skin     Cervical cancer (HCC)     Chronic respiratory failure with hypoxia and hypercapnia (HCC) 4/20/2019    COPD (chronic obstructive pulmonary disease) (HCC)     last assessed 11/21/2016    Current chronic use of systemic steroids     Degenerative arthritis of left knee     last assessed 1/20/2015    Diabetes mellitus ([facility])     Disease of thyroid gland     hypo pill given to decrease thyroid.     Fibromyalgia     Fistula of knee     last assessed 9/12/2014    GERD (gastroesophageal reflux disease)     Hyperlipidemia     Hypertension     Medial meniscus tear     of left knee, last assessed 9/12/2014    Migraine     states she has a hx of HA that she calls mySkin but never was dx by neurologist    Obesity     Obesity     Osteoarthritis     Osteopenia     Pneumonia     last assessed 11/16/2016    Psychiatric disorder     anxiety    Rheumatoid arthritis (HCC)     Rheumatoid arthritis (HCC)     Sepsis (HCC)     last assessed 11/10/2016    Tick bite     last assessed 10/30/2015    Vitamin B12 deficiency     Vitamin D deficiency         Past Surgical History  Past Surgical History:   Procedure Laterality Date    CATARACT EXTRACTION Bilateral     CHOLECYSTECTOMY      EYE SURGERY      Bilateral Cataracts    HYSTERECTOMY      IR AORTAGRAM WITH RUN-OFF  9/20/2023    IR THORACENTESIS  5/31/2019    JOINT REPLACEMENT   "    left knee    KNEE ARTHROSCOPY      NEUROPLASTY / TRANSPOSITION MEDIAN NERVE AT CARPAL TUNNEL      TUBAL LIGATION          05/03/24 0941   PT Last Visit   PT Visit Date 05/03/24   Note Type   Note type Evaluation   Pain Assessment   Pain Assessment Tool 0-10   Pain Score No Pain   Restrictions/Precautions   Weight Bearing Precautions Per Order No   Other Precautions Fall Risk;O2;Multiple lines;Bed Alarm;Chair Alarm   Home Living   Type of Home Apartment   Home Layout One level;Stairs to enter with rails  (15 ANNIKA c HR)   Bathroom Shower/Tub Tub/shower unit   Bathroom Toilet Standard   Bathroom Equipment Tub transfer bench   Bathroom Accessibility Accessible   Home Equipment Walker;Cane;Hospital bed;Other (Comment)  (O2, CPAP)   Additional Comments pt reports use of RW at baseline; wears 3L O2   Prior Function   Level of [place] Independent with ADLs;Independent with functional mobility;Needs assistance with IADLS   Lives With Alone   Receives Help From Family   IADLs Family/Friend/Other provides transportation   Falls in the last 6 months   (no number given, pt reports \"nothing major\")   Vocational Retired   General   Family/Caregiver Present No   Cognition   Overall Cognitive Status WFL   Arousal/Participation Alert   Attention Attends with cues to redirect   Orientation Level Oriented X4   Following Commands Follows one step commands without difficulty   Subjective   Subjective \"Once I'm up I stay there\" (in reguards to the 15 stairs to her apartment)   RLE Assessment   RLE Assessment X  (4-/5 grossly)   LLE Assessment   LLE Assessment X  (4-/5 grossly)   Bed Mobility   Supine to Sit 3  Moderate assistance   Additional items Assist x 1;Increased time required;Verbal cues   Sit to Supine 3  Moderate assistance   Additional items Assist x 1;Increased time required;Verbal cues   Transfers   Sit to Stand 4  Minimal assistance   Additional items Assist x 2;Increased time required;Verbal cues   Stand to Sit 4  " Minimal assistance   Additional items Assist x 2;Increased time required;Verbal cues   Stand pivot 4  Minimal assistance   Additional items Assist x 2;Increased time required;Verbal cues   Toilet transfer 4  Minimal assistance   Additional items Assist x 2;Increased time required;Verbal cues;Commode   Additional Comments RW used   Balance   Static Sitting Good   Dynamic Sitting Fair +   Static Standing Fair -   Dynamic Standing Fair -   Endurance Deficit   Endurance Deficit Yes   Endurance Deficit Description pt appears easily fatigued with transfers   Activity Tolerance   Activity Tolerance Patient limited by fatigue   Assessment   Prognosis Good   Problem List Decreased strength;Decreased endurance;Impaired balance;Decreased mobility;Decreased safety awareness;Obesity   Assessment Patient is a 74 y.o. female evaluated by Physical Therapy s/p admit to [facility] on 5/2/2024 with admitting diagnosis of: Shortness of breath, Hyponatremia, Pneumonia, MICHAEL (acute kidney injury). PT was consulted to assess patient's functional mobility and discharge needs. Ordered are PT Evaluation and treatment with activity level of: up and OOB as tolerated. Comorbidities affecting patient's physical performance at time of assessment include:  centrilobular emphysema, RA, HTN, GERD, chronic respiratory failure, CHF, hx of PE, DM, hypothyroidism, HLD, fibromyalgia, osteoarthritis, MORENO, PAD, hx of COVID-19 . Personal factors affecting the patient at time of IE include: ambulating with assistive device, step(s) to enter home, inability to navigate community distances, impaired safety awareness, inability/difficulty performing IADLs, and inability/difficulty performing ADLs. Please locate objective findings from PT assessment regarding body systems outlined above. Upon evaluation, pt able to perform all functional mobility with min-modA x 1-2, RW, and increased time. Pt reports extreme heaviness in B LE and the inability to  ambulate at this time, so mobility limited to stand pivot transfers to BSC and recliner. Moderate postural sway demonstrated but no true LOB experienced. HR and SpO2 remained WFL on 3L O2 throughout. The patient's AM-PAC Basic Mobility Inpatient Short Form Raw Score is 13. A Raw score of less than or equal to 16 suggests the patient may benefit from discharge to post-acute rehabilitation services. Please also refer to the recommendation of the Physical Therapist for safe discharge planning. Co treatment with OT secondary to complex medical condition of pt, possible A of 2 required to achieve and maintain transitional movements, requiring the need of skilled therapeutic intervention of 2 therapists to achieve delivery of services. Pt will benefit from continued PT intervention during LOS to address current deficits, increase LOF, and facilitate safe d/c to next level of care when medically appropriate. D/c recommendation at this time is rehabilitation resource intensity level I.   Goals   Patient Goals to go home   LTG Expiration Date 05/17/24   Long Term Goal #1 Pt will participate in B LE strengthening exercises to facilitate improved functional activity tolerance. Pt will perform all functional transfers and bed mobility mod(I) with good safety awareness. Pt will ambulate 250' mod(I) with LRAD while maintaining good functional dynamic balance. Pt will ascend/descend a FFOS with HR and SUP to reflect the ability to safely navigate the home.   Plan   Treatment/Interventions Functional transfer training;LE strengthening/ROM;Elevations;Therapeutic exercise;Endurance training;Bed mobility;Gait training   PT Frequency 3-5x/wk   Discharge Recommendation   Rehab Resource Intensity Level, PT I (Maximum Resource Intensity)   AM-PAC Basic Mobility Inpatient   Turning in Flat Bed Without Bedrails 2   Lying on Back to Sitting on Edge of Flat Bed Without Bedrails 2   Moving Bed to Chair 3   Standing Up From Chair Using Arms 3    Walk in Room 2   Climb 3-5 Stairs With Railing 1   Basic Mobility Inpatient Raw Score 13   Basic Mobility Standardized Score 33.99   [facility] Level Of Mobility   [facility] Goal 4: Move to chair/commode   -[facility] Achieved 4: Move to chair/commode   End of Consult   Patient Position at End of Consult Bedside chair;Bed/Chair alarm activated;All needs within reach

## 2024-05-03 NOTE — DISCHARGE INSTR - OTHER ORDERS
Skin care plans:  1-Calazime to sacrum, buttocks TID and PRN  2-Hydraguard to bilateral heel BID and PRN  3-Elevate heels to offload pressure  4-Ehob cushion when out of bed.  5-Turn/repoisiton q2h or when medically stable for pressure re-distribution on skin.  6-Moisturize skin daily with skin nourishing cream

## 2024-05-03 NOTE — PLAN OF CARE
Problem: PAIN - ADULT  Goal: Verbalizes/displays adequate comfort level or baseline comfort level  Description: Interventions:  - Encourage patient to monitor pain and request assistance  - Assess pain using appropriate pain scale  - Administer analgesics based on type and severity of pain and evaluate response  - Implement non-pharmacological measures as appropriate and evaluate response  - Consider cultural and social influences on pain and pain management  - Notify physician/advanced practitioner if interventions unsuccessful or patient reports new pain  5/2/2024 2154 by Lizzeth Greenwood RN  Outcome: Progressing  5/2/2024 1819 by Lizzeth Greenwood RN  Outcome: Progressing

## 2024-05-03 NOTE — PLAN OF CARE
Problem: PAIN - ADULT  Goal: Verbalizes/displays adequate comfort level or baseline comfort level  Description: Interventions:  - Encourage patient to monitor pain and request assistance  - Assess pain using appropriate pain scale  - Administer analgesics based on type and severity of pain and evaluate response  - Implement non-pharmacological measures as appropriate and evaluate response  - Consider cultural and social influences on pain and pain management  - Notify physician/advanced practitioner if interventions unsuccessful or patient reports new pain  Outcome: Progressing     Problem: INFECTION - ADULT  Goal: Absence or prevention of progression during hospitalization  Description: INTERVENTIONS:  - Assess and monitor for signs and symptoms of infection  - Monitor lab/diagnostic results  - Monitor all insertion sites, i.e. indwelling lines, tubes, and drains  - Monitor endotracheal if appropriate and nasal secretions for changes in amount and color  - [place] appropriate cooling/warming therapies per order  - Administer medications as ordered  - Instruct and encourage patient and family to use good hand hygiene technique  - Identify and instruct in appropriate isolation precautions for identified infection/condition  Outcome: Progressing  Goal: Absence of fever/infection during neutropenic period  Description: INTERVENTIONS:  - Monitor WBC    Outcome: Progressing     Problem: SAFETY ADULT  Goal: Patient will remain free of falls  Description: INTERVENTIONS:  - Educate patient/family on patient safety including physical limitations  - Instruct patient to call for assistance with activity   - Consult OT/PT to assist with strengthening/mobility   - Keep Call bell within reach  - Keep bed low and locked with side rails adjusted as appropriate  - Keep care items and personal belongings within reach  - Initiate and maintain comfort rounds  - Make Fall Risk Sign visible to staff  - Offer Toileting every 2 Hours,  in advance of need  - Initiate/Maintain bed/chair alarm  - Obtain necessary fall risk management equipment: as needed  - Apply yellow socks and bracelet for high fall risk patients  - Consider moving patient to room near nurses station  Outcome: Progressing  Goal: Maintain or return to baseline ADL function  Description: INTERVENTIONS:  -  Assess patient's ability to carry out ADLs; assess patient's baseline for ADL function and identify physical deficits which impact ability to perform ADLs (bathing, care of mouth/teeth, toileting, grooming, dressing, etc.)  - Assess/evaluate cause of self-care deficits   - Assess range of motion  - Assess patient's mobility; develop plan if impaired  - Assess patient's need for assistive devices and provide as appropriate  - Encourage maximum independence but intervene and supervise when necessary  - Involve family in performance of ADLs  - Assess for home care needs following discharge   - Consider OT consult to assist with ADL evaluation and planning for discharge  - Provide patient education as appropriate  Outcome: Progressing  Goal: Maintains/Returns to pre admission functional level  Description: INTERVENTIONS:  - Perform AM-PAC 6 Click Basic Mobility/ Daily Activity assessment daily.  - Set and communicate daily mobility goal to care team and patient/family/caregiver.   - Collaborate with rehabilitation services on mobility goals if consulted  - Stand patient 3 times a day  - Ambulate patient 3 times a day  - Out of bed to chair 3 times a day   - Out of bed for meals 3 times a day  - Out of bed for toileting  - Record patient progress and toleration of activity level   Outcome: Progressing     Problem: DISCHARGE PLANNING  Goal: Discharge to home or other facility with appropriate resources  Description: INTERVENTIONS:  - Identify barriers to discharge w/patient and caregiver  - Arrange for needed discharge resources and transportation as appropriate  - Identify discharge  learning needs (meds, wound care, etc.)  - Arrange for interpretive services to assist at discharge as needed  - Refer to Case Management Department for coordinating discharge planning if the patient needs post-hospital services based on physician/advanced practitioner order or complex needs related to functional status, cognitive ability, or social support system  Outcome: Progressing     Problem: Knowledge Deficit  Goal: Patient/family/caregiver demonstrates understanding of disease process, treatment plan, medications, and discharge instructions  Description: Complete learning assessment and assess knowledge base.  Interventions:  - Provide teaching at level of understanding  - Provide teaching via preferred learning methods  Outcome: Progressing

## 2024-05-03 NOTE — CONSULTS
Consultation - [facility] Infectious Disease   Shakira Polk 74 y.o. female MRN: [id number]  Unit/Bed#: 401-01 Encounter: [id number]      IMPRESSION & RECOMMENDATIONS:   1.  Sepsis, with tachycardia, tachypnea, leukocytosis.  This is most likely due to #2, #3.  No other source appreciated.  Having intermittent low-grade fevers.  Otherwise hemodynamically stable.  -Continue IV antibiotics as below  -Follow-up blood cultures and adjust antibiotics as indicated  -Follow-up repeat blood cultures x 2  -Repeat CBC differential to monitor response to treatment  -Repeat BMP to monitor developing toxicities  -Trend temperature and hemodynamics     2.  Pneumococcal bacteremia.  So far 1 of 2 blood cultures isolating Streptococcus pneumoniae.  This is most likely due to pneumonia.  Consider possibility of endovascular infection.  -Continue IV ceftriaxone, increase dose to 2 g every 24 hours  -Follow-up initial blood cultures and adjust antibiotics as indicated  -Repeat blood cultures x 2 now to check for clearance   -Agree with 2D echo  -Anticipate transition to high-dose amoxicillin, pending susceptibilities, once bacteremia clears to complete a 2-week course of abx   -Prevnar-20 this admission prior to d/c as below      3.  Pneumococcal RUL pneumonia.  CT chest with a masslike opacity in the right upper lobe compatible with pneumonia.  Urinary antigen positive.  Bacteremia as above.  Procalcitonin is trending down.  She remains on 3 L nasal cannula chronically with history of emphysema. Continues to have c/o pleurisy. Has not received Prevnar 20 per chart review.   -Continue antibiotics as above  -Recommend Prevnar-20 vax this admission   -Monitor O2 requirements     4.  Rheumatoid arthritis.  Chronically immunosuppressed on methotrexate and Xeljanz.  -Hold immunosuppressants while on antibiotics    5. Obesity.  BMI 38.27.    Antibiotics:  Day 2 IV ceftriaxone    I have discussed the above management plan in detail with  patient.     Above plan discussed in detail with Dr. Torres who is in agreement with plan to continue IV abx and repeat blood cx.     ID consult service will continue to follow.    HISTORY OF PRESENT ILLNESS:  Reason for Consult: Bacteremia, sepsis    HPI: Shakira Polk is a 74 y.o. year old female with COPD on 3 L nasal cannula, rheumatoid arthritis on Xeljanz and methotrexate who initially presented with 2-day history of worsening shortness of breath.  Patient also reported cough with difficulty expectorating sputum. Reports pleuritic chest pain and difficulty taking deep breaths.  She reported a fever up to 101 °F at home.  She also reported generalized malaise and weakness.  She had CT chest in the ED which was negative for PE but did show a right upper lobe pneumonia.  Urinary antigens were positive as well as 1 of 2 blood cultures thus far for streptococcal pneumoniae.  Patient was admitted and started on IV ceftriaxone.  Infectious disease consultation has been requested. She has no intra-vascular devices but does have left knee replacement. She thinks she had the pneumococcal vaccine in the past.    REVIEW OF SYSTEMS:  A complete review of systems is negative other than that noted in the HPI.    PAST MEDICAL HISTORY:  Past Medical History:   Diagnosis Date    Arthritis     Arthritis     Cancer (HCC)     Candidiasis of skin     Cervical cancer (HCC)     Chronic respiratory failure with hypoxia and hypercapnia ([facility]) 4/20/2019    COPD (chronic obstructive pulmonary disease) (HCC)     last assessed 11/21/2016    Current chronic use of systemic steroids     Degenerative arthritis of left knee     last assessed 1/20/2015    Diabetes mellitus (HCC)     Disease of thyroid gland     hypo pill given to decrease thyroid.     Fibromyalgia     Fistula of knee     last assessed 9/12/2014    GERD (gastroesophageal reflux disease)     Hyperlipidemia     Hypertension     Medial meniscus tear     of left knee, last assessed  "2014    Migraine     states she has a hx of HA that she calls IPDIA but never was dx by neurologist    Obesity     Obesity     Osteoarthritis     Osteopenia     Pneumonia     last assessed 2016    Psychiatric disorder     anxiety    Rheumatoid arthritis (HCC)     Rheumatoid arthritis (HCC)     Sepsis (HCC)     last assessed 11/10/2016    Tick bite     last assessed 10/30/2015    Vitamin B12 deficiency     Vitamin D deficiency      Past Surgical History:   Procedure Laterality Date    CATARACT EXTRACTION Bilateral     CHOLECYSTECTOMY      EYE SURGERY      Bilateral Cataracts    HYSTERECTOMY      IR AORTAGRAM WITH RUN-OFF  2023    IR THORACENTESIS  2019    JOINT REPLACEMENT      left knee    KNEE ARTHROSCOPY      NEUROPLASTY / TRANSPOSITION MEDIAN NERVE AT CARPAL TUNNEL      TUBAL LIGATION         FAMILY HISTORY:  Non-contributory    SOCIAL HISTORY:  Social History   Social History     Substance and Sexual Activity   Alcohol Use Never    Alcohol/week: 0.0 standard drinks of alcohol    Comment: stopped drinking alcohol     Social History     Substance and Sexual Activity   Drug Use Never     Social History     Tobacco Use   Smoking Status Former    Current packs/day: 0.00    Average packs/day: 1 pack/day for 48.0 years (48.0 ttl pk-yrs)    Types: Cigarettes, E-Cigarettes    Start date: 1964    Quit date: 2012    Years since quittin.4   Smokeless Tobacco Never   Tobacco Comments    per allscripts - \"current every day smoker, former smoker\"       ALLERGIES:  No Known Allergies    MEDICATIONS:  All current active medications have been reviewed.    PHYSICAL EXAM:  Temp:  [97.3 °F (36.3 °C)-100.4 °F (38 °C)] 98.2 °F (36.8 °C)  HR:  [] 76  Resp:  [17-33] 17  BP: (107-140)/(53-78) 140/78  SpO2:  [91 %-98 %] 95 %  Temp (24hrs), Av.3 °F (36.8 °C), Min:97.3 °F (36.3 °C), Max:100.4 °F (38 °C)  Current: Temperature: 98.2 °F (36.8 °C)    Intake/Output Summary (Last 24 hours) at " 5/3/2024 1109  Last data filed at 5/3/2024 0900  Gross per 24 hour   Intake 957.5 ml   Output 575 ml   Net 382.5 ml       General Appearance:  Appearing well, nontoxic, and in no distress sitting in bedside chair    Head:  Normocephalic, without obvious abnormality, atraumatic   Eyes:  Conjunctiva pink and sclera anicteric, both eyes   Nose: Nares normal, mucosa normal, no drainage   Throat: Oropharynx moist without lesions   Neck: Supple, symmetrical, no adenopathy, no tenderness/mass/nodules   Back:   Symmetric, no curvature, ROM normal.   Lungs:   Clear to auscultation bilaterally, respirations unlabored. No rhonchi. Satting 94% off O2 at rest.    Chest Wall:  No tenderness or deformity   Heart:  RRR; no murmur, rub or gallop   Abdomen:   Soft, non-tender, non-distended, positive bowel sounds    Extremities: No cyanosis, clubbing or edema   Skin: No rashes or lesions. No draining wounds noted.   : No CVA or suprapubic tenderness.   Neurologic: Alert and oriented times 3, globally week appearing        LABS, IMAGING, & OTHER STUDIES:  Extensive review of the medical records in epic including review of the notes, radiographs, and laboratory results were reviewed personally as below.     Lab Results:  I have personally reviewed pertinent labs.  Results from last 7 days   Lab Units 05/03/24  0522 05/02/24  1907 05/02/24  1302   WBC Thousand/uL 22.56*  --  25.34*   HEMOGLOBIN g/dL 10.8*  --  9.8*   PLATELETS Thousands/uL 244 234 223     Results from last 7 days   Lab Units 05/03/24  0522 05/02/24  1746 05/02/24  1302   SODIUM mmol/L 133* 129* 125*   POTASSIUM mmol/L 3.7 3.1* 2.9*   CHLORIDE mmol/L 97 92* 89*   CO2 mmol/L 27 27 24   BUN mg/dL 32* 43* 49*   CREATININE mg/dL 0.99 1.34* 1.64*   EGFR ml/min/1.73sq m 55 38 30   CALCIUM mg/dL 9.3 9.2 9.3   AST U/L  --   --  21   ALT U/L  --   --  19   ALK PHOS U/L  --   --  66     Results from last 7 days   Lab Units 05/03/24  0036 05/02/24  1312   BLOOD CULTURE   --   Received in Microbiology Lab. Culture in Progress.   GRAM STAIN RESULT   --  Gram positive cocci in pairs and chains*   LEGIONELLA URINARY ANTIGEN  Negative  --      Results from last 7 days   Lab Units 05/03/24  0522 05/02/24  1907 05/02/24  1302   PROCALCITONIN ng/ml 19.90* 29.82* 32.68*             Results from last 7 days   Lab Units 05/02/24  1336   D-DIMER QUANTITATIVE ug/ml FEU 1.69*       Lab interpretation/comments: elevated WBC trending down     Culture data: blood cx 1 of 2 positive strep pneumo     Imaging Studies:   Imaging interpretation/comments: CT chest with dense consolidation

## 2024-05-04 LAB
ANION GAP SERPL CALCULATED.3IONS-SCNC: 8 MMOL/L (ref 4–13)
BASOPHILS # BLD AUTO: 0.03 THOUSANDS/ÂΜL (ref 0–0.1)
BASOPHILS NFR BLD AUTO: 0 % (ref 0–1)
BUN SERPL-MCNC: 18 MG/DL (ref 5–25)
CALCIUM SERPL-MCNC: 10.2 MG/DL (ref 8.4–10.2)
CHLORIDE SERPL-SCNC: 100 MMOL/L (ref 96–108)
CO2 SERPL-SCNC: 29 MMOL/L (ref 21–32)
CREAT SERPL-MCNC: 0.66 MG/DL (ref 0.6–1.3)
EOSINOPHIL # BLD AUTO: 0.24 THOUSAND/ÂΜL (ref 0–0.61)
EOSINOPHIL NFR BLD AUTO: 2 % (ref 0–6)
ERYTHROCYTE [DISTWIDTH] IN BLOOD BY AUTOMATED COUNT: 17.9 % (ref 11.6–15.1)
GFR SERPL CREATININE-BSD FRML MDRD: 86 ML/MIN/1.73SQ M
GLUCOSE SERPL-MCNC: 171 MG/DL (ref 65–140)
HCT VFR BLD AUTO: 32.3 % (ref 34.8–46.1)
HGB BLD-MCNC: 10.5 G/DL (ref 11.5–15.4)
IMM GRANULOCYTES # BLD AUTO: 0.05 THOUSAND/UL (ref 0–0.2)
IMM GRANULOCYTES NFR BLD AUTO: 0 % (ref 0–2)
LYMPHOCYTES # BLD AUTO: 1.5 THOUSANDS/ÂΜL (ref 0.6–4.47)
LYMPHOCYTES NFR BLD AUTO: 12 % (ref 14–44)
MAGNESIUM SERPL-MCNC: 1.9 MG/DL (ref 1.9–2.7)
MCH RBC QN AUTO: 28.6 PG (ref 26.8–34.3)
MCHC RBC AUTO-ENTMCNC: 32.5 G/DL (ref 31.4–37.4)
MCV RBC AUTO: 88 FL (ref 82–98)
MONOCYTES # BLD AUTO: 0.78 THOUSAND/ÂΜL (ref 0.17–1.22)
MONOCYTES NFR BLD AUTO: 6 % (ref 4–12)
NEUTROPHILS # BLD AUTO: 9.53 THOUSANDS/ÂΜL (ref 1.85–7.62)
NEUTS SEG NFR BLD AUTO: 80 % (ref 43–75)
NRBC BLD AUTO-RTO: 0 /100 WBCS
PLATELET # BLD AUTO: 262 THOUSANDS/UL (ref 149–390)
PMV BLD AUTO: 11 FL (ref 8.9–12.7)
POTASSIUM SERPL-SCNC: 3.7 MMOL/L (ref 3.5–5.3)
PROCALCITONIN SERPL-MCNC: 10.53 NG/ML
RBC # BLD AUTO: 3.67 MILLION/UL (ref 3.81–5.12)
SODIUM SERPL-SCNC: 137 MMOL/L (ref 135–147)
WBC # BLD AUTO: 12.13 THOUSAND/UL (ref 4.31–10.16)

## 2024-05-04 PROCEDURE — 84145 PROCALCITONIN (PCT): CPT | Performed by: FAMILY MEDICINE

## 2024-05-04 PROCEDURE — 85025 COMPLETE CBC W/AUTO DIFF WBC: CPT | Performed by: FAMILY MEDICINE

## 2024-05-04 PROCEDURE — 94664 DEMO&/EVAL PT USE INHALER: CPT

## 2024-05-04 PROCEDURE — 80048 BASIC METABOLIC PNL TOTAL CA: CPT | Performed by: FAMILY MEDICINE

## 2024-05-04 PROCEDURE — 99232 SBSQ HOSP IP/OBS MODERATE 35: CPT | Performed by: FAMILY MEDICINE

## 2024-05-04 PROCEDURE — 94760 N-INVAS EAR/PLS OXIMETRY 1: CPT

## 2024-05-04 PROCEDURE — 94640 AIRWAY INHALATION TREATMENT: CPT

## 2024-05-04 PROCEDURE — 83735 ASSAY OF MAGNESIUM: CPT | Performed by: FAMILY MEDICINE

## 2024-05-04 PROCEDURE — 94668 MNPJ CHEST WALL SBSQ: CPT

## 2024-05-04 RX ORDER — FUROSEMIDE 40 MG/1
40 TABLET ORAL DAILY
Status: DISCONTINUED | OUTPATIENT
Start: 2024-05-05 | End: 2024-05-06 | Stop reason: HOSPADM

## 2024-05-04 RX ADMIN — PANTOPRAZOLE SODIUM 40 MG: 40 TABLET, DELAYED RELEASE ORAL at 06:02

## 2024-05-04 RX ADMIN — APIXABAN 5 MG: 5 TABLET, FILM COATED ORAL at 08:14

## 2024-05-04 RX ADMIN — CEFTRIAXONE 2000 MG: 2 INJECTION, SOLUTION INTRAVENOUS at 12:14

## 2024-05-04 RX ADMIN — INSULIN GLARGINE 20 UNITS: 100 INJECTION, SOLUTION SUBCUTANEOUS at 22:02

## 2024-05-04 RX ADMIN — GUAIFENESIN 600 MG: 600 TABLET ORAL at 17:39

## 2024-05-04 RX ADMIN — LEVALBUTEROL HYDROCHLORIDE 1.25 MG: 1.25 SOLUTION RESPIRATORY (INHALATION) at 07:15

## 2024-05-04 RX ADMIN — ATORVASTATIN CALCIUM 20 MG: 10 TABLET, FILM COATED ORAL at 08:14

## 2024-05-04 RX ADMIN — MIRTAZAPINE 15 MG: 15 TABLET, FILM COATED ORAL at 22:02

## 2024-05-04 RX ADMIN — INSULIN GLARGINE 20 UNITS: 100 INJECTION, SOLUTION SUBCUTANEOUS at 08:15

## 2024-05-04 RX ADMIN — LEVOTHYROXINE SODIUM 137 MCG: 25 TABLET ORAL at 06:02

## 2024-05-04 RX ADMIN — LEVALBUTEROL HYDROCHLORIDE 1.25 MG: 1.25 SOLUTION RESPIRATORY (INHALATION) at 13:09

## 2024-05-04 RX ADMIN — ASPIRIN 81 MG: 81 TABLET, COATED ORAL at 08:14

## 2024-05-04 RX ADMIN — GUAIFENESIN 600 MG: 600 TABLET ORAL at 08:14

## 2024-05-04 RX ADMIN — ACETAMINOPHEN 650 MG: 325 TABLET, FILM COATED ORAL at 12:21

## 2024-05-04 RX ADMIN — DULOXETINE HYDROCHLORIDE 60 MG: 30 CAPSULE, DELAYED RELEASE ORAL at 08:14

## 2024-05-04 RX ADMIN — UMECLIDINIUM 1 PUFF: 62.5 AEROSOL, POWDER ORAL at 08:15

## 2024-05-04 RX ADMIN — LEVALBUTEROL HYDROCHLORIDE 1.25 MG: 1.25 SOLUTION RESPIRATORY (INHALATION) at 18:33

## 2024-05-04 RX ADMIN — FLUTICASONE FUROATE AND VILANTEROL TRIFENATATE 1 PUFF: 200; 25 POWDER RESPIRATORY (INHALATION) at 08:15

## 2024-05-04 RX ADMIN — APIXABAN 5 MG: 5 TABLET, FILM COATED ORAL at 17:40

## 2024-05-04 NOTE — PROGRESS NOTES
[facility]  Progress Note  Name: Shakira Polk I  MRN: [id number]  Unit/Bed#: 401-01 I Date of Admission: 5/2/2024   Date of Service: 5/4/2024 I Hospital Day: 2    Assessment/Plan   Sepsis due to pneumonia (HCC)  Assessment & Plan  Met via tachycardia, tachypnea and lactic acidosis  Continue ceftriaxone and dc azithro 5/3/24  Pneumococcal antigen positive, along with what appears to be strep pneumo in 1 of 2 blood cultures  2D echo:  Mitral Valve: There is mild annular calcification. There is mild regurgitation. Vegetation could not be excluded on the basis of this study.   Repeat 2 sets of blood cultures are pending and infectious disease input appreciated 5/4/2020  Initiate respiratory protocol and replace protocol  Incentive spirometry      History of pulmonary embolism  Assessment & Plan  Continue Eliquis    Chronic diastolic (congestive) heart failure (HCC)  Assessment & Plan  Wt Readings from Last 3 Encounters:   05/03/24 104 kg (230 lb)   03/14/24 108 kg (237 lb 6.4 oz)   12/18/23 110 kg (243 lb 6.4 oz)     Discontinued IV fluids and resume Lasix 5/4/24          Chronic respiratory failure with hypoxia (HCC)  Assessment & Plan  At baseline 3 L     Rheumatoid arthritis (HCC)  Assessment & Plan  Hold methotrexate and xeljanz    Centrilobular emphysema (HCC)  Assessment & Plan  Continue chronic inhalers  Initiate respiratory protocol               Progress Note - Shakira Polk 74 y.o. female MRN: [id number]    Unit/Bed#: 401-01 Encounter: [id number]        Subjective:   Patient seen and examined, feels better no events overnight     Objective:     Vitals:   Vitals:    05/03/24 1945   BP:    Pulse:    Resp:    Temp:    SpO2: 95%     Body mass index is 38.27 kg/m².    Intake/Output Summary (Last 24 hours) at 5/4/2024 0751  Last data filed at 5/3/2024 1739  Gross per 24 hour   Intake 1557.5 ml   Output --   Net 1557.5 ml       Physical Exam:   /68 (BP Location: Right arm)   Pulse 72    "Temp 99.4 °F (37.4 °C) (Tympanic)   Resp 18   Ht 5' 5\" (1.651 m)   Wt 104 kg (230 lb)   SpO2 95%   BMI 38.27 kg/m²   General appearance: alert and oriented, in no acute distress  Head: Normocephalic, without obvious abnormality, atraumatic  Lungs: clear to auscultation bilaterally  Heart: regular rate and rhythm, S1, S2 normal, no murmur, click, rub or gallop  Abdomen: soft, non-tender; bowel sounds normal; no masses,  no organomegaly  Extremities: extremities normal, warm and well-perfused; no cyanosis, clubbing, or edema  Pulses: 2+ and symmetric  Neurologic: Grossly normal     Invasive Devices       Peripheral Intravenous Line  Duration             Peripheral IV 05/02/24 Left Antecubital 1 day    Peripheral IV 05/02/24 Right;Ventral (anterior) Wrist 1 day              Drain  Duration             External Urinary Catheter 1 day                    Results from last 7 days   Lab Units 05/04/24  0537 05/03/24  0522 05/02/24  1907 05/02/24  1302   WBC Thousand/uL 12.13* 22.56*  --  25.34*   HEMOGLOBIN g/dL 10.5* 10.8*  --  9.8*   HEMATOCRIT % 32.3* 32.7*  --  30.4*   PLATELETS Thousands/uL 262 244 234 223       Results from last 7 days   Lab Units 05/04/24  0645 05/03/24  0522 05/02/24  1746 05/02/24  1302   POTASSIUM mmol/L 3.7 3.7 3.1* 2.9*   CHLORIDE mmol/L 100 97 92* 89*   CO2 mmol/L 29 27 27 24   BUN mg/dL 18 32* 43* 49*   CREATININE mg/dL 0.66 0.99 1.34* 1.64*   CALCIUM mg/dL 10.2 9.3 9.2 9.3   ALK PHOS U/L  --   --   --  66   ALT U/L  --   --   --  19   AST U/L  --   --   --  21       Medication Administration - last 24 hours from 05/03/2024 0751 to 05/04/2024 0751         Date/Time Order Dose Route Action Action by     05/03/2024 0836 EDT aspirin (ECOTRIN LOW STRENGTH) EC tablet 81 mg 81 mg Oral Given Aisha Avila RN     05/03/2024 0836 EDT atorvastatin (LIPITOR) tablet 20 mg 20 mg Oral Given Aisha Avila RN     05/03/2024 0836 EDT DULoxetine (CYMBALTA) delayed release capsule 60 mg 60 mg Oral " Given Aisha Avila, DINORAH     05/03/2024 1708 EDT apixaban (ELIQUIS) tablet 5 mg 5 mg Oral Given Aisha Avila, DINORAH     05/03/2024 0835 EDT apixaban (ELIQUIS) tablet 5 mg 5 mg Oral Given Aisha Avila, DINORAH     05/03/2024 0838 EDT fluticasone-vilanterol 200-25 mcg/actuation 1 puff 1 puff Inhalation Given Aisha Avila RN     05/04/2024 0602 EDT levothyroxine tablet 137 mcg 137 mcg Oral Given Bethanie Fox, DINORAH     05/03/2024 2058 EDT mirtazapine (REMERON) tablet 15 mg 15 mg Oral Given Bethanie Fox RN     05/04/2024 0602 EDT pantoprazole (PROTONIX) EC tablet 40 mg 40 mg Oral Given Bethanie Fox RN     05/03/2024 0838 EDT umeclidinium 62.5 mcg/actuation inhaler AEPB 1 puff 1 puff Inhalation Given Aisha Avila, DINORAH     05/03/2024 2055 EDT insulin glargine (LANTUS) subcutaneous injection 20 Units 0.2 mL 20 Units Subcutaneous Given Bethanie Fox RN     05/03/2024 0836 EDT insulin glargine (LANTUS) subcutaneous injection 20 Units 0.2 mL 20 Units Subcutaneous Given Aisha Avila RN     05/03/2024 0831 EDT sodium chloride 0.9 % infusion 0 mL/hr Intravenous Stopped Aisha Avila RN     05/03/2024 1708 EDT guaiFENesin (MUCINEX) 12 hr tablet 600 mg 600 mg Oral Given Aisha Avila RN     05/03/2024 0836 EDT guaiFENesin (MUCINEX) 12 hr tablet 600 mg 600 mg Oral Given Aisha Avila, DINORAH     05/04/2024 0715 EDT levalbuterol (XOPENEX) inhalation solution 1.25 mg 1.25 mg Nebulization Given Connie Sheppard, RT     05/03/2024 1943 EDT levalbuterol (XOPENEX) inhalation solution 1.25 mg 1.25 mg Nebulization Given Spike Young, RT     05/03/2024 1403 EDT levalbuterol (XOPENEX) inhalation solution 1.25 mg 1.25 mg Nebulization Given Dara Fonseca, RT     05/03/2024 0818 EDT levalbuterol (XOPENEX) inhalation solution 1.25 mg 1.25 mg Nebulization Given Dara Fonseca, RT     05/03/2024 1157 EDT cefTRIAXone (ROCEPHIN) IVPB (premix in dextrose) 2,000 mg 50 mL 2,000 mg Intravenous New Bag Aisha Avila, RN      05/03/2024 2053 EDT acetaminophen (TYLENOL) tablet 650 mg 650 mg Oral Given Bethanie Fox RN              Lab, Imaging and other studies: I have personally reviewed pertinent reports.    VTE Pharmacologic Prophylaxis: eliquis  VTE Mechanical Prophylaxis: sequential compression device     Alex Torres MD  5/4/2024,7:51 AM

## 2024-05-04 NOTE — ASSESSMENT & PLAN NOTE
Met via tachycardia, tachypnea and lactic acidosis  Continue ceftriaxone and dc azithro 5/3/24  Pneumococcal antigen positive, along with what appears to be strep pneumo in 1 of 2 blood cultures  2D ech Mitral Valve: There is mild annular calcification. There is mild regurgitation. Vegetation could not be excluded on the basis of this study.   Repeat 2 sets of blood cultures are pending and infectious disease input appreciated 5/4/2020  Initiate respiratory protocol and replace protocol  Incentive spirometry

## 2024-05-04 NOTE — PLAN OF CARE
Problem: PAIN - ADULT  Goal: Verbalizes/displays adequate comfort level or baseline comfort level  Description: Interventions:  - Encourage patient to monitor pain and request assistance  - Assess pain using appropriate pain scale  - Administer analgesics based on type and severity of pain and evaluate response  - Implement non-pharmacological measures as appropriate and evaluate response  - Consider cultural and social influences on pain and pain management  - Notify physician/advanced practitioner if interventions unsuccessful or patient reports new pain  Outcome: Progressing     Problem: INFECTION - ADULT  Goal: Absence or prevention of progression during hospitalization  Description: INTERVENTIONS:  - Assess and monitor for signs and symptoms of infection  - Monitor lab/diagnostic results  - Monitor all insertion sites, i.e. indwelling lines, tubes, and drains  - Monitor endotracheal if appropriate and nasal secretions for changes in amount and color  - [place] appropriate cooling/warming therapies per order  - Administer medications as ordered  - Instruct and encourage patient and family to use good hand hygiene technique  - Identify and instruct in appropriate isolation precautions for identified infection/condition  Outcome: Progressing  Goal: Absence of fever/infection during neutropenic period  Description: INTERVENTIONS:  - Monitor WBC    Outcome: Progressing     Problem: SAFETY ADULT  Goal: Patient will remain free of falls  Description: INTERVENTIONS:  - Educate patient/family on patient safety including physical limitations  - Instruct patient to call for assistance with activity   - Consult OT/PT to assist with strengthening/mobility   - Keep Call bell within reach  - Keep bed low and locked with side rails adjusted as appropriate  - Keep care items and personal belongings within reach  - Initiate and maintain comfort rounds  - Make Fall Risk Sign visible to staff  - Offer Toileting every 1-2  Hours, in advance of need  - Initiate/Maintain bed and chair alarm  - Obtain necessary fall risk management equipment: fall socks and call bell in place  - Apply yellow socks and bracelet for high fall risk patients  - Consider moving patient to room near nurses station  Outcome: Progressing  Goal: Maintain or return to baseline ADL function  Description: INTERVENTIONS:  -  Assess patient's ability to carry out ADLs; assess patient's baseline for ADL function and identify physical deficits which impact ability to perform ADLs (bathing, care of mouth/teeth, toileting, grooming, dressing, etc.)  - Assess/evaluate cause of self-care deficits   - Assess range of motion  - Assess patient's mobility; develop plan if impaired  - Assess patient's need for assistive devices and provide as appropriate  - Encourage maximum independence but intervene and supervise when necessary  - Involve family in performance of ADLs  - Assess for home care needs following discharge   - Consider OT consult to assist with ADL evaluation and planning for discharge  - Provide patient education as appropriate  Outcome: Progressing  Goal: Maintains/Returns to pre admission functional level  Description: INTERVENTIONS:  - Perform AM-PAC 6 Click Basic Mobility/ Daily Activity assessment daily.  - Set and communicate daily mobility goal to care team and patient/family/caregiver.   - Collaborate with rehabilitation services on mobility goals if consulted  - Perform Range of Motion 3-4 times a day.  - Reposition patient every 1-2 hours.  - Dangle patient 3-4 times a day  - Stand patient 3-4 times a day  - Ambulate patient 3-4 times a day  - Out of bed to chair 3-4 times a day   - Out of bed for meals 3-4 times a day  - Out of bed for toileting  - Record patient progress and toleration of activity level   Outcome: Progressing     Problem: DISCHARGE PLANNING  Goal: Discharge to home or other facility with appropriate resources  Description:  INTERVENTIONS:  - Identify barriers to discharge w/patient and caregiver  - Arrange for needed discharge resources and transportation as appropriate  - Identify discharge learning needs (meds, wound care, etc.)  - Arrange for interpretive services to assist at discharge as needed  - Refer to Case Management Department for coordinating discharge planning if the patient needs post-hospital services based on physician/advanced practitioner order or complex needs related to functional status, cognitive ability, or social support system  Outcome: Progressing     Problem: Knowledge Deficit  Goal: Patient/family/caregiver demonstrates understanding of disease process, treatment plan, medications, and discharge instructions  Description: Complete learning assessment and assess knowledge base.  Interventions:  - Provide teaching at level of understanding  - Provide teaching via preferred learning methods  Outcome: Progressing

## 2024-05-04 NOTE — ASSESSMENT & PLAN NOTE
Wt Readings from Last 3 Encounters:   05/03/24 104 kg (230 lb)   03/14/24 108 kg (237 lb 6.4 oz)   12/18/23 110 kg (243 lb 6.4 oz)     Discontinued IV fluids and resume Lasix 5/4/24

## 2024-05-04 NOTE — RESPIRATORY THERAPY NOTE
05/04/24 0715   Inhalation Therapy Tx   $ Inhalation Therapy Performed Yes   $ Pulse Oximetry Spot Check Charge Completed   SpO2 98 %   Pre-Treatment Pulse 74   Pre-Treatment Respirations 20   Duration 20   Breath Sounds Pre-Treatment Bilateral Diminished   Delivery Source Air;UDN   Position Semi Dejesus's   Treatment Tolerance Tolerated well   Resp Comments patient on 3 lpm nasal cannula

## 2024-05-04 NOTE — PLAN OF CARE
Problem: PAIN - ADULT  Goal: Verbalizes/displays adequate comfort level or baseline comfort level  Description: Interventions:  - Encourage patient to monitor pain and request assistance  - Assess pain using appropriate pain scale  - Administer analgesics based on type and severity of pain and evaluate response  - Implement non-pharmacological measures as appropriate and evaluate response  - Consider cultural and social influences on pain and pain management  - Notify physician/advanced practitioner if interventions unsuccessful or patient reports new pain  Outcome: Progressing     Problem: INFECTION - ADULT  Goal: Absence or prevention of progression during hospitalization  Description: INTERVENTIONS:  - Assess and monitor for signs and symptoms of infection  - Monitor lab/diagnostic results  - Monitor all insertion sites, i.e. indwelling lines, tubes, and drains  - Monitor endotracheal if appropriate and nasal secretions for changes in amount and color  - [place] appropriate cooling/warming therapies per order  - Administer medications as ordered  - Instruct and encourage patient and family to use good hand hygiene technique  - Identify and instruct in appropriate isolation precautions for identified infection/condition  Outcome: Progressing  Goal: Absence of fever/infection during neutropenic period  Description: INTERVENTIONS:  - Monitor WBC    Outcome: Progressing     Problem: SAFETY ADULT  Goal: Patient will remain free of falls  Description: INTERVENTIONS:  - Educate patient/family on patient safety including physical limitations  - Instruct patient to call for assistance with activity   - Consult OT/PT to assist with strengthening/mobility   - Keep Call bell within reach  - Keep bed low and locked with side rails adjusted as appropriate  - Keep care items and personal belongings within reach  - Initiate and maintain comfort rounds  - Make Fall Risk Sign visible to staff  - Offer Toileting every 4 Hours,  in advance of need  - Initiate/Maintain bed alarm  - Obtain necessary fall risk management equipment  - Apply yellow socks and bracelet for high fall risk patients  - Consider moving patient to room near nurses station  Outcome: Progressing  Goal: Maintain or return to baseline ADL function  Description: INTERVENTIONS:  -  Assess patient's ability to carry out ADLs; assess patient's baseline for ADL function and identify physical deficits which impact ability to perform ADLs (bathing, care of mouth/teeth, toileting, grooming, dressing, etc.)  - Assess/evaluate cause of self-care deficits   - Assess range of motion  - Assess patient's mobility; develop plan if impaired  - Assess patient's need for assistive devices and provide as appropriate  - Encourage maximum independence but intervene and supervise when necessary  - Involve family in performance of ADLs  - Assess for home care needs following discharge   - Consider OT consult to assist with ADL evaluation and planning for discharge  - Provide patient education as appropriate  Outcome: Progressing  Goal: Maintains/Returns to pre admission functional level  Description: INTERVENTIONS:  - Perform AM-PAC 6 Click Basic Mobility/ Daily Activity assessment daily.  - Set and communicate daily mobility goal to care team and patient/family/caregiver.   - Collaborate with rehabilitation services on mobility goals if consulted  - Perform Range of Motion 3 times a day.  - Reposition patient every 2 hours.  - Dangle patient 3 times a day  - Stand patient 3 times a day  - Ambulate patient 3 times a day  - Out of bed to chair 3 times a day   - Out of bed for meals 3 times a day  - Out of bed for toileting  - Record patient progress and toleration of activity level   Outcome: Progressing     Problem: DISCHARGE PLANNING  Goal: Discharge to home or other facility with appropriate resources  Description: INTERVENTIONS:  - Identify barriers to discharge w/patient and caregiver  -  Arrange for needed discharge resources and transportation as appropriate  - Identify discharge learning needs (meds, wound care, etc.)  - Arrange for interpretive services to assist at discharge as needed  - Refer to Case Management Department for coordinating discharge planning if the patient needs post-hospital services based on physician/advanced practitioner order or complex needs related to functional status, cognitive ability, or social support system  Outcome: Progressing     Problem: Knowledge Deficit  Goal: Patient/family/caregiver demonstrates understanding of disease process, treatment plan, medications, and discharge instructions  Description: Complete learning assessment and assess knowledge base.  Interventions:  - Provide teaching at level of understanding  - Provide teaching via preferred learning methods  Outcome: Progressing

## 2024-05-04 NOTE — PLAN OF CARE
Problem: PAIN - ADULT  Goal: Verbalizes/displays adequate comfort level or baseline comfort level  Description: Interventions:  - Encourage patient to monitor pain and request assistance  - Assess pain using appropriate pain scale  - Administer analgesics based on type and severity of pain and evaluate response  - Implement non-pharmacological measures as appropriate and evaluate response  - Consider cultural and social influences on pain and pain management  - Notify physician/advanced practitioner if interventions unsuccessful or patient reports new pain  Outcome: Progressing     Problem: INFECTION - ADULT  Goal: Absence or prevention of progression during hospitalization  Description: INTERVENTIONS:  - Assess and monitor for signs and symptoms of infection  - Monitor lab/diagnostic results  - Monitor all insertion sites, i.e. indwelling lines, tubes, and drains  - Monitor endotracheal if appropriate and nasal secretions for changes in amount and color  - [place] appropriate cooling/warming therapies per order  - Administer medications as ordered  - Instruct and encourage patient and family to use good hand hygiene technique  - Identify and instruct in appropriate isolation precautions for identified infection/condition  Outcome: Progressing  Goal: Absence of fever/infection during neutropenic period  Description: INTERVENTIONS:  - Monitor WBC    Outcome: Progressing     Problem: SAFETY ADULT  Goal: Patient will remain free of falls  Description: INTERVENTIONS:  - Educate patient/family on patient safety including physical limitations  - Instruct patient to call for assistance with activity   - Consult OT/PT to assist with strengthening/mobility   - Keep Call bell within reach  - Keep bed low and locked with side rails adjusted as appropriate  - Keep care items and personal belongings within reach  - Initiate and maintain comfort rounds  - Make Fall Risk Sign visible to staff  - Offer Toileting every 2 Hours,  in advance of need  - Initiate/Maintain bed/chair alarm  - Obtain necessary fall risk management equipment: non skid socks  - Apply yellow socks and bracelet for high fall risk patients  - Consider moving patient to room near nurses station  Outcome: Progressing  Goal: Maintain or return to baseline ADL function  Description: INTERVENTIONS:  -  Assess patient's ability to carry out ADLs; assess patient's baseline for ADL function and identify physical deficits which impact ability to perform ADLs (bathing, care of mouth/teeth, toileting, grooming, dressing, etc.)  - Assess/evaluate cause of self-care deficits   - Assess range of motion  - Assess patient's mobility; develop plan if impaired  - Assess patient's need for assistive devices and provide as appropriate  - Encourage maximum independence but intervene and supervise when necessary  - Involve family in performance of ADLs  - Assess for home care needs following discharge   - Consider OT consult to assist with ADL evaluation and planning for discharge  - Provide patient education as appropriate  Outcome: Progressing  Goal: Maintains/Returns to pre admission functional level  Description: INTERVENTIONS:  - Perform AM-PAC 6 Click Basic Mobility/ Daily Activity assessment daily.  - Set and communicate daily mobility goal to care team and patient/family/caregiver.   - Collaborate with rehabilitation services on mobility goals if consulted  - Perform Range of Motion 3 times a day.  - Reposition patient every 2 hours.  - Dangle patient 3 times a day  - Stand patient 3 times a day  - Ambulate patient 3 times a day  - Out of bed to chair 3 times a day   - Out of bed for meals 3 times a day  - Out of bed for toileting  - Record patient progress and toleration of activity level   Outcome: Progressing     Problem: DISCHARGE PLANNING  Goal: Discharge to home or other facility with appropriate resources  Description: INTERVENTIONS:  - Identify barriers to discharge  w/patient and caregiver  - Arrange for needed discharge resources and transportation as appropriate  - Identify discharge learning needs (meds, wound care, etc.)  - Arrange for interpretive services to assist at discharge as needed  - Refer to Case Management Department for coordinating discharge planning if the patient needs post-hospital services based on physician/advanced practitioner order or complex needs related to functional status, cognitive ability, or social support system  Outcome: Progressing     Problem: Knowledge Deficit  Goal: Patient/family/caregiver demonstrates understanding of disease process, treatment plan, medications, and discharge instructions  Description: Complete learning assessment and assess knowledge base.  Interventions:  - Provide teaching at level of understanding  - Provide teaching via preferred learning methods  Outcome: Progressing

## 2024-05-05 LAB
ANION GAP SERPL CALCULATED.3IONS-SCNC: 8 MMOL/L (ref 4–13)
BACTERIA BLD CULT: ABNORMAL
BACTERIA SPT RESP CULT: NORMAL
BASOPHILS # BLD AUTO: 0.03 THOUSANDS/ÂΜL (ref 0–0.1)
BASOPHILS NFR BLD AUTO: 0 % (ref 0–1)
BUN SERPL-MCNC: 14 MG/DL (ref 5–25)
CALCIUM SERPL-MCNC: 10.2 MG/DL (ref 8.4–10.2)
CHLORIDE SERPL-SCNC: 99 MMOL/L (ref 96–108)
CO2 SERPL-SCNC: 29 MMOL/L (ref 21–32)
CREAT SERPL-MCNC: 0.63 MG/DL (ref 0.6–1.3)
EOSINOPHIL # BLD AUTO: 0.32 THOUSAND/ÂΜL (ref 0–0.61)
EOSINOPHIL NFR BLD AUTO: 5 % (ref 0–6)
ERYTHROCYTE [DISTWIDTH] IN BLOOD BY AUTOMATED COUNT: 18 % (ref 11.6–15.1)
GFR SERPL CREATININE-BSD FRML MDRD: 87 ML/MIN/1.73SQ M
GLUCOSE SERPL-MCNC: 151 MG/DL (ref 65–140)
GRAM STN SPEC: ABNORMAL
GRAM STN SPEC: NORMAL
GRAM STN SPEC: NORMAL
HCT VFR BLD AUTO: 30.9 % (ref 34.8–46.1)
HGB BLD-MCNC: 9.8 G/DL (ref 11.5–15.4)
IMM GRANULOCYTES # BLD AUTO: 0.04 THOUSAND/UL (ref 0–0.2)
IMM GRANULOCYTES NFR BLD AUTO: 1 % (ref 0–2)
LYMPHOCYTES # BLD AUTO: 1.36 THOUSANDS/ÂΜL (ref 0.6–4.47)
LYMPHOCYTES NFR BLD AUTO: 19 % (ref 14–44)
MCH RBC QN AUTO: 27.9 PG (ref 26.8–34.3)
MCHC RBC AUTO-ENTMCNC: 31.7 G/DL (ref 31.4–37.4)
MCV RBC AUTO: 88 FL (ref 82–98)
MONOCYTES # BLD AUTO: 0.71 THOUSAND/ÂΜL (ref 0.17–1.22)
MONOCYTES NFR BLD AUTO: 10 % (ref 4–12)
NEUTROPHILS # BLD AUTO: 4.71 THOUSANDS/ÂΜL (ref 1.85–7.62)
NEUTS SEG NFR BLD AUTO: 65 % (ref 43–75)
NRBC BLD AUTO-RTO: 0 /100 WBCS
PLATELET # BLD AUTO: 285 THOUSANDS/UL (ref 149–390)
PMV BLD AUTO: 10.4 FL (ref 8.9–12.7)
POTASSIUM SERPL-SCNC: 4.3 MMOL/L (ref 3.5–5.3)
RBC # BLD AUTO: 3.51 MILLION/UL (ref 3.81–5.12)
S PNEUM DNA BLD POS QL NAA+NON-PROBE: DETECTED
SODIUM SERPL-SCNC: 136 MMOL/L (ref 135–147)
WBC # BLD AUTO: 7.17 THOUSAND/UL (ref 4.31–10.16)

## 2024-05-05 PROCEDURE — 94640 AIRWAY INHALATION TREATMENT: CPT

## 2024-05-05 PROCEDURE — 80048 BASIC METABOLIC PNL TOTAL CA: CPT | Performed by: FAMILY MEDICINE

## 2024-05-05 PROCEDURE — 94664 DEMO&/EVAL PT USE INHALER: CPT

## 2024-05-05 PROCEDURE — 99232 SBSQ HOSP IP/OBS MODERATE 35: CPT | Performed by: FAMILY MEDICINE

## 2024-05-05 PROCEDURE — 85025 COMPLETE CBC W/AUTO DIFF WBC: CPT | Performed by: FAMILY MEDICINE

## 2024-05-05 PROCEDURE — 94760 N-INVAS EAR/PLS OXIMETRY 1: CPT

## 2024-05-05 PROCEDURE — 94668 MNPJ CHEST WALL SBSQ: CPT

## 2024-05-05 RX ORDER — METHOTREXATE 2.5 MG/1
TABLET ORAL
Qty: 32 TABLET | Refills: 0 | Status: SHIPPED | OUTPATIENT
Start: 2024-05-05 | End: 2024-05-15

## 2024-05-05 RX ADMIN — APIXABAN 5 MG: 5 TABLET, FILM COATED ORAL at 08:21

## 2024-05-05 RX ADMIN — DULOXETINE HYDROCHLORIDE 60 MG: 30 CAPSULE, DELAYED RELEASE ORAL at 08:21

## 2024-05-05 RX ADMIN — INSULIN GLARGINE 20 UNITS: 100 INJECTION, SOLUTION SUBCUTANEOUS at 21:35

## 2024-05-05 RX ADMIN — ATORVASTATIN CALCIUM 20 MG: 10 TABLET, FILM COATED ORAL at 08:21

## 2024-05-05 RX ADMIN — UMECLIDINIUM 1 PUFF: 62.5 AEROSOL, POWDER ORAL at 08:21

## 2024-05-05 RX ADMIN — APIXABAN 5 MG: 5 TABLET, FILM COATED ORAL at 18:06

## 2024-05-05 RX ADMIN — GUAIFENESIN 600 MG: 600 TABLET ORAL at 18:06

## 2024-05-05 RX ADMIN — FLUTICASONE FUROATE AND VILANTEROL TRIFENATATE 1 PUFF: 200; 25 POWDER RESPIRATORY (INHALATION) at 08:21

## 2024-05-05 RX ADMIN — LEVALBUTEROL HYDROCHLORIDE 1.25 MG: 1.25 SOLUTION RESPIRATORY (INHALATION) at 07:19

## 2024-05-05 RX ADMIN — GUAIFENESIN 600 MG: 600 TABLET ORAL at 08:21

## 2024-05-05 RX ADMIN — ASPIRIN 81 MG: 81 TABLET, COATED ORAL at 08:21

## 2024-05-05 RX ADMIN — MIRTAZAPINE 15 MG: 15 TABLET, FILM COATED ORAL at 21:35

## 2024-05-05 RX ADMIN — FUROSEMIDE 40 MG: 40 TABLET ORAL at 08:21

## 2024-05-05 RX ADMIN — LEVOTHYROXINE SODIUM 137 MCG: 25 TABLET ORAL at 05:15

## 2024-05-05 RX ADMIN — LEVALBUTEROL HYDROCHLORIDE 1.25 MG: 1.25 SOLUTION RESPIRATORY (INHALATION) at 14:17

## 2024-05-05 RX ADMIN — CEFTRIAXONE 2000 MG: 2 INJECTION, SOLUTION INTRAVENOUS at 13:06

## 2024-05-05 RX ADMIN — INSULIN GLARGINE 20 UNITS: 100 INJECTION, SOLUTION SUBCUTANEOUS at 08:21

## 2024-05-05 RX ADMIN — LEVALBUTEROL HYDROCHLORIDE 1.25 MG: 1.25 SOLUTION RESPIRATORY (INHALATION) at 20:12

## 2024-05-05 RX ADMIN — PANTOPRAZOLE SODIUM 40 MG: 40 TABLET, DELAYED RELEASE ORAL at 06:33

## 2024-05-05 NOTE — RESPIRATORY THERAPY NOTE
05/05/24 0719   Inhalation Therapy Tx   $ Inhalation Therapy Performed Yes   $ Pulse Oximetry Spot Check Charge Completed   SpO2 98 %   Pre-Treatment Pulse 77   Pre-Treatment Respirations 20   Duration 20   Breath Sounds Pre-Treatment Bilateral Diminished   Delivery Source Air;UDN   Position Up in chair   Treatment Tolerance Tolerated well   Resp Comments patient is on her 3 lpm

## 2024-05-05 NOTE — ASSESSMENT & PLAN NOTE
Wt Readings from Last 3 Encounters:   05/05/24 107 kg (235 lb 0.2 oz)   03/14/24 108 kg (237 lb 6.4 oz)   12/18/23 110 kg (243 lb 6.4 oz)     Discontinued IV fluids and resumed Lasix 5/4/24

## 2024-05-05 NOTE — PLAN OF CARE
Problem: PAIN - ADULT  Goal: Verbalizes/displays adequate comfort level or baseline comfort level  Description: Interventions:  - Encourage patient to monitor pain and request assistance  - Assess pain using appropriate pain scale  - Administer analgesics based on type and severity of pain and evaluate response  - Implement non-pharmacological measures as appropriate and evaluate response  - Consider cultural and social influences on pain and pain management  - Notify physician/advanced practitioner if interventions unsuccessful or patient reports new pain  Outcome: Progressing     Problem: INFECTION - ADULT  Goal: Absence or prevention of progression during hospitalization  Description: INTERVENTIONS:  - Assess and monitor for signs and symptoms of infection  - Monitor lab/diagnostic results  - Monitor all insertion sites, i.e. indwelling lines, tubes, and drains  - Monitor endotracheal if appropriate and nasal secretions for changes in amount and color  - [place] appropriate cooling/warming therapies per order  - Administer medications as ordered  - Instruct and encourage patient and family to use good hand hygiene technique  - Identify and instruct in appropriate isolation precautions for identified infection/condition  Outcome: Progressing  Goal: Absence of fever/infection during neutropenic period  Description: INTERVENTIONS:  - Monitor WBC    Outcome: Progressing     Problem: SAFETY ADULT  Goal: Patient will remain free of falls  Description: INTERVENTIONS:  - Educate patient/family on patient safety including physical limitations  - Instruct patient to call for assistance with activity   - Consult OT/PT to assist with strengthening/mobility   - Keep Call bell within reach  - Keep bed low and locked with side rails adjusted as appropriate  - Keep care items and personal belongings within reach  - Initiate and maintain comfort rounds  - Make Fall Risk Sign visible to staff  - Offer Toileting every 2 Hours,  in advance of need  - Initiate/Maintain bed/chair alarm  - Obtain necessary fall risk management equipment: non skid socks  - Apply yellow socks and bracelet for high fall risk patients  - Consider moving patient to room near nurses station  Outcome: Progressing  Goal: Maintain or return to baseline ADL function  Description: INTERVENTIONS:  -  Assess patient's ability to carry out ADLs; assess patient's baseline for ADL function and identify physical deficits which impact ability to perform ADLs (bathing, care of mouth/teeth, toileting, grooming, dressing, etc.)  - Assess/evaluate cause of self-care deficits   - Assess range of motion  - Assess patient's mobility; develop plan if impaired  - Assess patient's need for assistive devices and provide as appropriate  - Encourage maximum independence but intervene and supervise when necessary  - Involve family in performance of ADLs  - Assess for home care needs following discharge   - Consider OT consult to assist with ADL evaluation and planning for discharge  - Provide patient education as appropriate  Outcome: Progressing  Goal: Maintains/Returns to pre admission functional level  Description: INTERVENTIONS:  - Perform AM-PAC 6 Click Basic Mobility/ Daily Activity assessment daily.  - Set and communicate daily mobility goal to care team and patient/family/caregiver.   - Collaborate with rehabilitation services on mobility goals if consulted  - Perform Range of Motion 3 times a day.  - Reposition patient every 2 hours.  - Dangle patient 3 times a day  - Stand patient 3 times a day  - Ambulate patient 3 times a day  - Out of bed to chair 3 times a day   - Out of bed for meals 3 times a day  - Out of bed for toileting  - Record patient progress and toleration of activity level   Outcome: Progressing     Problem: DISCHARGE PLANNING  Goal: Discharge to home or other facility with appropriate resources  Description: INTERVENTIONS:  - Identify barriers to discharge  w/patient and caregiver  - Arrange for needed discharge resources and transportation as appropriate  - Identify discharge learning needs (meds, wound care, etc.)  - Arrange for interpretive services to assist at discharge as needed  - Refer to Case Management Department for coordinating discharge planning if the patient needs post-hospital services based on physician/advanced practitioner order or complex needs related to functional status, cognitive ability, or social support system  Outcome: Progressing     Problem: Knowledge Deficit  Goal: Patient/family/caregiver demonstrates understanding of disease process, treatment plan, medications, and discharge instructions  Description: Complete learning assessment and assess knowledge base.  Interventions:  - Provide teaching at level of understanding  - Provide teaching via preferred learning methods  Outcome: Progressing

## 2024-05-05 NOTE — PROGRESS NOTES
[facility]  Progress Note  Name: Shakira Polk I  MRN: [id number]  Unit/Bed#: 401-01 I Date of Admission: 5/2/2024   Date of Service: 5/5/2024 I Hospital Day: 3    Assessment/Plan   Sepsis due to pneumonia (HCC)  Assessment & Plan  Met via tachycardia, tachypnea and lactic acidosis  Continue ceftriaxone and dc azithro 5/3/24  Pneumococcal antigen positive, along with strep pneumo in 1 of 2 blood cultures  2D ech Mitral Valve: There is mild annular calcification. There is mild regurgitation. Vegetation could not be excluded on the basis of this study.   Repeat 2 sets of blood cultures are NGTD and infectious disease input appreciated 5/4/2020  Respiratory protocol, IS, Flutter       History of pulmonary embolism  Assessment & Plan  Continue Eliquis    Chronic diastolic (congestive) heart failure (HCC)  Assessment & Plan  Wt Readings from Last 3 Encounters:   05/05/24 107 kg (235 lb 0.2 oz)   03/14/24 108 kg (237 lb 6.4 oz)   12/18/23 110 kg (243 lb 6.4 oz)     Discontinued IV fluids and resumed Lasix 5/4/24          Chronic respiratory failure with hypoxia (HCC)  Assessment & Plan  At baseline 3 L     Rheumatoid arthritis (HCC)  Assessment & Plan  Hold methotrexate and xeljanz    Centrilobular emphysema (HCC)  Assessment & Plan  Continue chronic inhalers  Initiate respiratory protocol               Progress Note - Shakira Polk 74 y.o. female MRN: [id number]    Unit/Bed#: 401-01 Encounter: [id number]        Subjective:   Patient seen and examined, feels well, getting strength back now able to ambulate to bathroom    Objective:     Vitals:   Vitals:    05/05/24 0056   BP: 146/61   Pulse: 81   Resp:    Temp:    SpO2: 97%     Body mass index is 39.11 kg/m².    Intake/Output Summary (Last 24 hours) at 5/5/2024 0809  Last data filed at 5/5/2024 0059  Gross per 24 hour   Intake 504 ml   Output 400 ml   Net 104 ml       Physical Exam:   /61   Pulse 81   Temp 97.7 °F (36.5 °C)   Resp 18   Ht 5'  "5\" (1.651 m)   Wt 107 kg (235 lb 0.2 oz)   SpO2 97%   BMI 39.11 kg/m²   General appearance: alert and oriented, in no acute distress  Lungs: clear to auscultation bilaterally  Heart: regular rate and rhythm, S1, S2 normal, no murmur, click, rub or gallop  Abdomen: soft, non-tender; bowel sounds normal; no masses,  no organomegaly  Extremities: extremities normal, warm and well-perfused; no cyanosis, clubbing, or edema  Pulses: 2+ and symmetric  Neurologic: Grossly normal     Invasive Devices       Peripheral Intravenous Line  Duration             Peripheral IV 05/02/24 Left Antecubital 2 days    Peripheral IV 05/02/24 Right;Ventral (anterior) Wrist 2 days                    Results from last 7 days   Lab Units 05/05/24  0542 05/04/24  0801 05/03/24  0522   WBC Thousand/uL 7.17 12.13* 22.56*   HEMOGLOBIN g/dL 9.8* 10.5* 10.8*   HEMATOCRIT % 30.9* 32.3* 32.7*   PLATELETS Thousands/uL 285 262 244       Results from last 7 days   Lab Units 05/05/24  0542 05/04/24  0645 05/03/24  0522 05/02/24  1746 05/02/24  1302   POTASSIUM mmol/L 4.3 3.7 3.7   < > 2.9*   CHLORIDE mmol/L 99 100 97   < > 89*   CO2 mmol/L 29 29 27   < > 24   BUN mg/dL 14 18 32*   < > 49*   CREATININE mg/dL 0.63 0.66 0.99   < > 1.64*   CALCIUM mg/dL 10.2 10.2 9.3   < > 9.3   ALK PHOS U/L  --   --   --   --  66   ALT U/L  --   --   --   --  19   AST U/L  --   --   --   --  21    < > = values in this interval not displayed.       Medication Administration - last 24 hours from 05/04/2024 0809 to 05/05/2024 0809         Date/Time Order Dose Route Action Action by     05/04/2024 0814 EDT aspirin (ECOTRIN LOW STRENGTH) EC tablet 81 mg 81 mg Oral Given Letty Pacheco RN     05/04/2024 0814 EDT atorvastatin (LIPITOR) tablet 20 mg 20 mg Oral Given Letty Pacheco RN     05/04/2024 0814 EDT DULoxetine (CYMBALTA) delayed release capsule 60 mg 60 mg Oral Given Letty Pacheco RN     05/04/2024 1740 EDT apixaban (ELIQUIS) tablet 5 mg 5 mg Oral Given Jeff" Aftab, DINORAH     05/04/2024 0814 EDT apixaban (ELIQUIS) tablet 5 mg 5 mg Oral Given Letty Pacheco RN     05/04/2024 0815 EDT fluticasone-vilanterol 200-25 mcg/actuation 1 puff 1 puff Inhalation Given Letty Pacheco RN     05/05/2024 0515 EDT levothyroxine tablet 137 mcg 137 mcg Oral Given Emmy Ronquillo, DINORAH     05/04/2024 2202 EDT mirtazapine (REMERON) tablet 15 mg 15 mg Oral Given Jeff Ugalde, DINORAH     05/05/2024 0633 EDT pantoprazole (PROTONIX) EC tablet 40 mg 40 mg Oral Given Mounika Cesar LPN     05/04/2024 0815 EDT umeclidinium 62.5 mcg/actuation inhaler AEPB 1 puff 1 puff Inhalation Given Letty Pacheco RN     05/04/2024 2202 EDT insulin glargine (LANTUS) subcutaneous injection 20 Units 0.2 mL 20 Units Subcutaneous Given Jeff Ugalde RN     05/04/2024 0815 EDT insulin glargine (LANTUS) subcutaneous injection 20 Units 0.2 mL 20 Units Subcutaneous Given Letty Pacheco RN     05/04/2024 1739 EDT guaiFENesin (MUCINEX) 12 hr tablet 600 mg 600 mg Oral Given Jeff Ugalde, DINORAH     05/04/2024 0814 EDT guaiFENesin (MUCINEX) 12 hr tablet 600 mg 600 mg Oral Given Letty Pacheco RN     05/05/2024 0719 EDT levalbuterol (XOPENEX) inhalation solution 1.25 mg 1.25 mg Nebulization Given Connie Sheppard, RT     05/04/2024 2000 EDT levalbuterol (XOPENEX) inhalation solution 1.25 mg -- Nebulization Canceled Entry Connie Sheppard, RT     05/04/2024 1833 EDT levalbuterol (XOPENEX) inhalation solution 1.25 mg 1.25 mg Nebulization Given Connie Sheppard, RT     05/04/2024 1309 EDT levalbuterol (XOPENEX) inhalation solution 1.25 mg 1.25 mg Nebulization Given Connie Sheppard, RT     05/04/2024 1214 EDT cefTRIAXone (ROCEPHIN) IVPB (premix in dextrose) 2,000 mg 50 mL 2,000 mg Intravenous New Bag Jeff Ugalde RN     05/04/2024 1221 EDT acetaminophen (TYLENOL) tablet 650 mg 650 mg Oral Given Jeff Ugalde RN              Lab, Imaging and other studies: I have personally reviewed pertinent reports.    VTE Pharmacologic  Prophylaxis: eliquis  VTE Mechanical Prophylaxis: sequential compression device     Alex Torres MD  5/5/2024,8:09 AM

## 2024-05-05 NOTE — ASSESSMENT & PLAN NOTE
Met via tachycardia, tachypnea and lactic acidosis  Continue ceftriaxone and dc azithro 5/3/24  Pneumococcal antigen positive, along with strep pneumo in 1 of 2 blood cultures  2D ech Mitral Valve: There is mild annular calcification. There is mild regurgitation. Vegetation could not be excluded on the basis of this study.   Repeat 2 sets of blood cultures are NGTD and infectious disease input appreciated 5/4/2020  Respiratory protocol, IS, Flutter

## 2024-05-06 ENCOUNTER — HOME HEALTH ADMISSION (OUTPATIENT)
Dept: HOME HEALTH SERVICES | Facility: HOME HEALTHCARE | Age: 74
End: 2024-05-06
Payer: COMMERCIAL

## 2024-05-06 VITALS
HEART RATE: 104 BPM | DIASTOLIC BLOOD PRESSURE: 73 MMHG | TEMPERATURE: 97.8 F | BODY MASS INDEX: 39.16 KG/M2 | OXYGEN SATURATION: 95 % | SYSTOLIC BLOOD PRESSURE: 137 MMHG | HEIGHT: 65 IN | WEIGHT: 235.01 LBS | RESPIRATION RATE: 14 BRPM

## 2024-05-06 LAB
ANION GAP SERPL CALCULATED.3IONS-SCNC: 8 MMOL/L (ref 4–13)
ATRIAL RATE: 105 BPM
BASOPHILS # BLD AUTO: 0.04 THOUSANDS/ÂΜL (ref 0–0.1)
BASOPHILS NFR BLD AUTO: 1 % (ref 0–1)
BUN SERPL-MCNC: 13 MG/DL (ref 5–25)
CALCIUM SERPL-MCNC: 10.3 MG/DL (ref 8.4–10.2)
CHLORIDE SERPL-SCNC: 95 MMOL/L (ref 96–108)
CO2 SERPL-SCNC: 30 MMOL/L (ref 21–32)
CREAT SERPL-MCNC: 0.68 MG/DL (ref 0.6–1.3)
EOSINOPHIL # BLD AUTO: 0.24 THOUSAND/ÂΜL (ref 0–0.61)
EOSINOPHIL NFR BLD AUTO: 3 % (ref 0–6)
ERYTHROCYTE [DISTWIDTH] IN BLOOD BY AUTOMATED COUNT: 17.9 % (ref 11.6–15.1)
GFR SERPL CREATININE-BSD FRML MDRD: 85 ML/MIN/1.73SQ M
GLUCOSE SERPL-MCNC: 221 MG/DL (ref 65–140)
HCT VFR BLD AUTO: 30.1 % (ref 34.8–46.1)
HGB BLD-MCNC: 9.6 G/DL (ref 11.5–15.4)
IMM GRANULOCYTES # BLD AUTO: 0.16 THOUSAND/UL (ref 0–0.2)
IMM GRANULOCYTES NFR BLD AUTO: 2 % (ref 0–2)
LYMPHOCYTES # BLD AUTO: 1.63 THOUSANDS/ÂΜL (ref 0.6–4.47)
LYMPHOCYTES NFR BLD AUTO: 18 % (ref 14–44)
MCH RBC QN AUTO: 27.7 PG (ref 26.8–34.3)
MCHC RBC AUTO-ENTMCNC: 31.9 G/DL (ref 31.4–37.4)
MCV RBC AUTO: 87 FL (ref 82–98)
MONOCYTES # BLD AUTO: 1.15 THOUSAND/ÂΜL (ref 0.17–1.22)
MONOCYTES NFR BLD AUTO: 13 % (ref 4–12)
NEUTROPHILS # BLD AUTO: 5.64 THOUSANDS/ÂΜL (ref 1.85–7.62)
NEUTS SEG NFR BLD AUTO: 63 % (ref 43–75)
NRBC BLD AUTO-RTO: 0 /100 WBCS
P AXIS: 69 DEGREES
PLATELET # BLD AUTO: 300 THOUSANDS/UL (ref 149–390)
PMV BLD AUTO: 10.5 FL (ref 8.9–12.7)
POTASSIUM SERPL-SCNC: 4.4 MMOL/L (ref 3.5–5.3)
PR INTERVAL: 160 MS
QRS AXIS: 61 DEGREES
QRSD INTERVAL: 96 MS
QT INTERVAL: 362 MS
QTC INTERVAL: 478 MS
RBC # BLD AUTO: 3.46 MILLION/UL (ref 3.81–5.12)
SODIUM SERPL-SCNC: 133 MMOL/L (ref 135–147)
T WAVE AXIS: 49 DEGREES
VENTRICULAR RATE: 105 BPM
WBC # BLD AUTO: 8.86 THOUSAND/UL (ref 4.31–10.16)

## 2024-05-06 PROCEDURE — 97116 GAIT TRAINING THERAPY: CPT

## 2024-05-06 PROCEDURE — 99239 HOSP IP/OBS DSCHRG MGMT >30: CPT | Performed by: HOSPITALIST

## 2024-05-06 PROCEDURE — 94664 DEMO&/EVAL PT USE INHALER: CPT

## 2024-05-06 PROCEDURE — 85025 COMPLETE CBC W/AUTO DIFF WBC: CPT | Performed by: FAMILY MEDICINE

## 2024-05-06 PROCEDURE — 99233 SBSQ HOSP IP/OBS HIGH 50: CPT | Performed by: INTERNAL MEDICINE

## 2024-05-06 PROCEDURE — 93010 ELECTROCARDIOGRAM REPORT: CPT | Performed by: INTERNAL MEDICINE

## 2024-05-06 PROCEDURE — 80048 BASIC METABOLIC PNL TOTAL CA: CPT | Performed by: FAMILY MEDICINE

## 2024-05-06 PROCEDURE — 94760 N-INVAS EAR/PLS OXIMETRY 1: CPT

## 2024-05-06 PROCEDURE — 94640 AIRWAY INHALATION TREATMENT: CPT

## 2024-05-06 PROCEDURE — 94668 MNPJ CHEST WALL SBSQ: CPT

## 2024-05-06 PROCEDURE — 97530 THERAPEUTIC ACTIVITIES: CPT

## 2024-05-06 RX ORDER — AMOXICILLIN 500 MG/1
1000 CAPSULE ORAL EVERY 8 HOURS SCHEDULED
Qty: 60 CAPSULE | Refills: 0 | Status: SHIPPED | OUTPATIENT
Start: 2024-05-06 | End: 2024-05-16

## 2024-05-06 RX ORDER — LEVOTHYROXINE SODIUM 137 UG/1
137 TABLET ORAL DAILY
Qty: 30 TABLET | Refills: 0 | Status: SHIPPED | OUTPATIENT
Start: 2024-05-06 | End: 2024-06-05

## 2024-05-06 RX ADMIN — LEVALBUTEROL HYDROCHLORIDE 1.25 MG: 1.25 SOLUTION RESPIRATORY (INHALATION) at 07:47

## 2024-05-06 RX ADMIN — GUAIFENESIN 600 MG: 600 TABLET ORAL at 08:17

## 2024-05-06 RX ADMIN — CEFTRIAXONE 2000 MG: 2 INJECTION, SOLUTION INTRAVENOUS at 12:30

## 2024-05-06 RX ADMIN — LEVOTHYROXINE SODIUM 137 MCG: 25 TABLET ORAL at 05:02

## 2024-05-06 RX ADMIN — LEVALBUTEROL HYDROCHLORIDE 1.25 MG: 1.25 SOLUTION RESPIRATORY (INHALATION) at 14:19

## 2024-05-06 RX ADMIN — INSULIN GLARGINE 20 UNITS: 100 INJECTION, SOLUTION SUBCUTANEOUS at 08:17

## 2024-05-06 RX ADMIN — PANTOPRAZOLE SODIUM 40 MG: 40 TABLET, DELAYED RELEASE ORAL at 06:01

## 2024-05-06 RX ADMIN — FUROSEMIDE 40 MG: 40 TABLET ORAL at 08:22

## 2024-05-06 RX ADMIN — ASPIRIN 81 MG: 81 TABLET, COATED ORAL at 08:17

## 2024-05-06 RX ADMIN — FLUTICASONE FUROATE AND VILANTEROL TRIFENATATE 1 PUFF: 200; 25 POWDER RESPIRATORY (INHALATION) at 08:17

## 2024-05-06 RX ADMIN — UMECLIDINIUM 1 PUFF: 62.5 AEROSOL, POWDER ORAL at 08:17

## 2024-05-06 RX ADMIN — DULOXETINE HYDROCHLORIDE 60 MG: 30 CAPSULE, DELAYED RELEASE ORAL at 08:17

## 2024-05-06 RX ADMIN — ATORVASTATIN CALCIUM 20 MG: 10 TABLET, FILM COATED ORAL at 08:17

## 2024-05-06 RX ADMIN — APIXABAN 5 MG: 5 TABLET, FILM COATED ORAL at 08:16

## 2024-05-06 NOTE — ASSESSMENT & PLAN NOTE
Met via tachycardia, tachypnea and lactic acidosis  Pneumococcal antigen positive, w strep pneumo in 1 of 2 blood Cx  2D echo Mitral Valve: mild annular calcification. Mild regurgitation. Vegetation could not be excluded on basis of this study.   ID on consult  Repeat 2 sets of blood cultures (NGTD)  May transition from Iv Ceftriaxone to high-dose amoxicillin 1g TID for 2 week course through 5/16/24   Respiratory protocol, IS, Flutter

## 2024-05-06 NOTE — PLAN OF CARE
Problem: PAIN - ADULT  Goal: Verbalizes/displays adequate comfort level or baseline comfort level  Description: Interventions:  - Encourage patient to monitor pain and request assistance  - Assess pain using appropriate pain scale  - Administer analgesics based on type and severity of pain and evaluate response  - Implement non-pharmacological measures as appropriate and evaluate response  - Consider cultural and social influences on pain and pain management  - Notify physician/advanced practitioner if interventions unsuccessful or patient reports new pain  Outcome: Progressing     Problem: INFECTION - ADULT  Goal: Absence or prevention of progression during hospitalization  Description: INTERVENTIONS:  - Assess and monitor for signs and symptoms of infection  - Monitor lab/diagnostic results  - Monitor all insertion sites, i.e. indwelling lines, tubes, and drains  - Monitor endotracheal if appropriate and nasal secretions for changes in amount and color  - [place] appropriate cooling/warming therapies per order  - Administer medications as ordered  - Instruct and encourage patient and family to use good hand hygiene technique  - Identify and instruct in appropriate isolation precautions for identified infection/condition  Outcome: Progressing     Problem: SAFETY ADULT  Goal: Patient will remain free of falls  Description: INTERVENTIONS:  - Educate patient/family on patient safety including physical limitations  - Instruct patient to call for assistance with activity   - Consult OT/PT to assist with strengthening/mobility   - Keep Call bell within reach  - Keep bed low and locked with side rails adjusted as appropriate  - Keep care items and personal belongings within reach  - Initiate and maintain comfort rounds  - Make Fall Risk Sign visible to staff  - Offer Toileting every 2 Hours, in advance of need  - Initiate/Maintain bed/chair alarm  - Obtain necessary fall risk management equipment: non skid socks  -  Apply yellow socks and bracelet for high fall risk patients  - Consider moving patient to room near nurses station  Outcome: Progressing  Goal: Maintain or return to baseline ADL function  Description: INTERVENTIONS:  -  Assess patient's ability to carry out ADLs; assess patient's baseline for ADL function and identify physical deficits which impact ability to perform ADLs (bathing, care of mouth/teeth, toileting, grooming, dressing, etc.)  - Assess/evaluate cause of self-care deficits   - Assess range of motion  - Assess patient's mobility; develop plan if impaired  - Assess patient's need for assistive devices and provide as appropriate  - Encourage maximum independence but intervene and supervise when necessary  - Involve family in performance of ADLs  - Assess for home care needs following discharge   - Consider OT consult to assist with ADL evaluation and planning for discharge  - Provide patient education as appropriate  Outcome: Progressing  Goal: Maintains/Returns to pre admission functional level  Description: INTERVENTIONS:  - Perform AM-PAC 6 Click Basic Mobility/ Daily Activity assessment daily.  - Set and communicate daily mobility goal to care team and patient/family/caregiver.   - Collaborate with rehabilitation services on mobility goals if consulted  - Perform Range of Motion 3 times a day.  - Reposition patient every 2 hours.  - Dangle patient 3 times a day  - Stand patient 3 times a day  - Ambulate patient 3 times a day  - Out of bed to chair 3 times a day   - Out of bed for meals 3 times a day  - Out of bed for toileting  - Record patient progress and toleration of activity level   Outcome: Progressing     Problem: DISCHARGE PLANNING  Goal: Discharge to home or other facility with appropriate resources  Description: INTERVENTIONS:  - Identify barriers to discharge w/patient and caregiver  - Arrange for needed discharge resources and transportation as appropriate  - Identify discharge learning  needs (meds, wound care, etc.)  - Arrange for interpretive services to assist at discharge as needed  - Refer to Case Management Department for coordinating discharge planning if the patient needs post-hospital services based on physician/advanced practitioner order or complex needs related to functional status, cognitive ability, or social support system  Outcome: Progressing     Problem: Knowledge Deficit  Goal: Patient/family/caregiver demonstrates understanding of disease process, treatment plan, medications, and discharge instructions  Description: Complete learning assessment and assess knowledge base.  Interventions:  - Provide teaching at level of understanding  - Provide teaching via preferred learning methods  Outcome: Progressing     Problem: CARDIOVASCULAR - ADULT  Goal: Maintains optimal cardiac output and hemodynamic stability  Description: INTERVENTIONS:  - Monitor I/O, vital signs and rhythm  - Monitor for S/S and trends of decreased cardiac output  - Administer and titrate ordered vasoactive medications to optimize hemodynamic stability  - Assess quality of pulses, skin color and temperature  - Assess for signs of decreased coronary artery perfusion  - Instruct patient to report change in severity of symptoms  Outcome: Progressing     Problem: RESPIRATORY - ADULT  Goal: Achieves optimal ventilation and oxygenation  Description: INTERVENTIONS:  - Assess for changes in respiratory status  - Assess for changes in mentation and behavior  - Position to facilitate oxygenation and minimize respiratory effort  - Oxygen administered by appropriate delivery if ordered  - Initiate smoking cessation education as indicated  - Encourage broncho-pulmonary hygiene including cough, deep breathe, Incentive Spirometry  - Assess the need for suctioning and aspirate as needed  - Assess and instruct to report SOB or any respiratory difficulty  - Respiratory Therapy support as indicated  Outcome: Progressing

## 2024-05-06 NOTE — CASE MANAGEMENT
Case Management Discharge Planning Note    Patient name Shakira Polk  Location /401-01 MRN [id number]  : 1950 Date 2024       Current Admission Date: 2024  Current Admission Diagnosis:Sepsis due to pneumonia ([facility])   Patient Active Problem List    Diagnosis Date Noted    Positive blood culture 2023    Vertigo 2023    History of pulmonary embolism 2023    COVID-19 10/25/2023    PAD (peripheral artery disease) ([facility]) 2023    COPD exacerbation ([facility]) 2023    Pre-op evaluation 10/24/2022    Dependence on continuous supplemental oxygen 2022    Excessive anticoagulation 2022    MORENO (obstructive sleep apnea) 2021    Hilar lymphadenopathy 2021    Atherosclerosis 2021    T12 compression fracture ([facility]) 2021    Chronic diastolic (congestive) heart failure ([facility])     Chronic sinusitis 2021    Primary insomnia 2020    Migraine without aura and without status migrainosus, not intractable 2020    Postherpetic neuralgia 2020    Diastolic dysfunction 2020    Macrocytic anemia 2020    Hypoxemia requiring supplemental oxygen 2019    History of exposure to asbestos 2019    Chronic respiratory failure with hypoxia ([facility]) 2019    Hyponatremia 2019    Candidal dermatitis 2018    Loculated pleural effusion 2018    Constipation 2017    Renal cyst 2017    Candidiasis, cutaneous 2017    Vitamin B12 deficiency 11/10/2016    Morbid obesity with BMI of 40.0-44.9, adult ([facility]) 2016    Sepsis due to pneumonia ([facility]) 2016    Centrilobular emphysema ([facility]) 2016    Type 2 diabetes mellitus with hyperglycemia, with long-term current use of insulin ([facility]) 2016    Hypothyroidism 2016    Rheumatoid arthritis ([facility]) 2016    Essential hypertension 2016    Hyperlipidemia 2016    Gastroesophageal reflux disease without esophagitis 2016     Hypoalbuminemia 11/03/2016    Hepatic steatosis 11/03/2016    Atelectasis 11/03/2016    Anxiety 06/02/2014    Fibromyalgia 01/28/2014    Osteopenia 09/17/2012    Osteoarthritis 05/29/2012    Vitamin D deficiency 05/29/2012      LOS (days): 4  Geometric Mean LOS (GMLOS) (days): 5.1  Days to GMLOS:1.2     OBJECTIVE:  Risk of Unplanned Readmission Score: 22.82         Current admission status: Inpatient   Preferred Pharmacy:   [id number]  [address]  [address] [place] [address]  Phone: [phone] Fax: [phone]    [facility] [place] [address] anchor.travel[address] [facility]  [place] [address]  Phone: [phone] Fax: [phone]    Primary Care Provider: Ioana Heck MD    Primary Insurance: [employer]  Secondary Insurance:     DISCHARGE DETAILS:           Patient stable for discharge home today with [facility].    [facility] updated in aidin of discharge.    Follow up providers listed on AVS.    CM spoke to patient at the bedside, reviewed DC IMM with patient and informed that patient can stay an additional 4 hours for reconsidering appealing the discharge as the medicare rights were review on the day of discharge. Pt verbalized understanding and feels ready to go home and does not intend to stay 4 hours to reconsider.   Copy placed in bin to be scanned into system. Patient provided medicare number.    Family to transport patient home.

## 2024-05-06 NOTE — RESPIRATORY THERAPY NOTE
05/06/24 0749   Inhalation Therapy Tx   $ Inhalation Therapy Performed Yes   $ Pulse Oximetry Spot Check Charge Completed   SpO2 90 %  (3)   Pre-Treatment Pulse 103   Pre-Treatment Respirations 20   Duration 15   Breath Sounds Pre-Treatment Bilateral Diminished  (posteriorly)   Breath Sounds Post-Treatment Bilateral Diminished   Post-Treatment Pulse 108   Post-Treatment Respirations 20   Delivery Source Air;UDN   Position Sitting   Treatment Tolerance Tolerated well

## 2024-05-06 NOTE — ASSESSMENT & PLAN NOTE
Lab Results   Component Value Date    HGBA1C 6.8 (H) 11/28/2023     Recent Labs     05/03/24 2055   POCGLU 310*       Blood Sugar Average: Last 72 hrs:  (P) 275    Resume home med regimen

## 2024-05-06 NOTE — PROGRESS NOTES
[facility]  Progress Note  Name: Shakira Polk I  MRN: [id number]  Unit/Bed#: 401-01 I Date of Admission: 5/2/2024   Date of Service: 5/6/2024 I Hospital Day: 4    Assessment/Plan   Sepsis due to pneumonia (HCC)  Assessment & Plan  Met via tachycardia, tachypnea and lactic acidosis  Pneumococcal antigen positive, w strep pneumo in 1 of 2 blood Cx  2D echo Mitral Valve: mild annular calcification. Mild regurgitation. Vegetation could not be excluded on basis of this study.   ID on consult  Repeat 2 sets of blood cultures (NGTD)  Continue ceftriaxone, anticipate transition to high-dose amoxicillin 1g TID for 2 week course through 5/16/24   Respiratory protocol, IS, Flutter     Chronic respiratory failure with hypoxia (HCC)  Assessment & Plan  At baseline 3 L     Centrilobular emphysema (HCC)  Assessment & Plan  Continue chronic inhalers  Continue respiratory protocol    Chronic diastolic (congestive) heart failure (HCC)  Assessment & Plan  Wt Readings from Last 3 Encounters:   05/05/24 107 kg (235 lb 0.2 oz)   03/14/24 108 kg (237 lb 6.4 oz)   12/18/23 110 kg (243 lb 6.4 oz)     Discontinued IV fluids and resumed Lasix 5/4/24    Type 2 diabetes mellitus with hyperglycemia, with long-term current use of insulin (HCC)  Assessment & Plan  Lab Results   Component Value Date    HGBA1C 6.8 (H) 11/28/2023     Recent Labs     05/03/24  0842 05/03/24 2055   POCGLU 240* 310*     Blood Sugar Average: Last 72 hrs:  (P) 275    Home meds of Ozempic, Tresiba held on admission  Consider Long-acting agent and SSI w POCT     History of pulmonary embolism  Assessment & Plan  Continue Eliquis    Gastroesophageal reflux disease without esophagitis  Assessment & Plan  Continue Protonix 40mg QD    Essential hypertension  Assessment & Plan  Hold lisinopril  Continue home lasix 40mg QD    Rheumatoid arthritis (HCC)  Assessment & Plan  Hold methotrexate and xeljanz    Hypothyroidism  Assessment & Plan  Continue  Levothyroxine 137mcg QD       VTE Pharmacologic Prophylaxis:   Pharmacologic: Apixaban (Eliquis)  Mechanical VTE Prophylaxis in Place: Yes  Discussions with Specialists or Other Care Team Provider: ID, Resp therapy  Education and Discussions with Family / Patient: Will review with granddaughter via phone call  Time Spent for Care: 45 minutes.  More than 50% of total time spent on counseling and coordination of care as described above.  Current Length of Stay: 4 day(s)  Current Patient Status: Inpatient   Certification Statement: The patient will continue to require additional inpatient hospital stay due to IV antibiotics, continued resp therapy    Discharge Plan: 48-72hrs if stable    Code Status: Level 1 - Full Code    Subjective:   Ms Polk is doing well this morning, slept well, currently breathing comfortably on her baseline 3L NC. Patient has productive cough, and still feels out of breath when ambulating, but otherwise has no acute concerns.     Objective:     Vitals:   Temp (24hrs), Av.1 °F (36.7 °C), Min:97.8 °F (36.6 °C), Max:98.3 °F (36.8 °C)    Temp:  [97.8 °F (36.6 °C)-98.3 °F (36.8 °C)] 97.8 °F (36.6 °C)  HR:  [103-109] 104  Resp:  [14-19] 14  BP: (128-137)/(59-73) 137/73  SpO2:  [90 %-97 %] 95 %  Body mass index is 39.11 kg/m².     Input and Output Summary (last 24 hours):       Intake/Output Summary (Last 24 hours) at 2024 1233  Last data filed at 2024 0822  Gross per 24 hour   Intake 600 ml   Output 1600 ml   Net -1000 ml       Physical Exam:     Physical Exam  Vitals and nursing note reviewed.   Constitutional:       General: She is not in acute distress.     Appearance: She is well-developed. She is obese. She is ill-appearing.   HENT:      Head: Normocephalic and atraumatic.      Right Ear: External ear normal.      Left Ear: External ear normal.      Nose: Nose normal.      Mouth/Throat:      Mouth: Mucous membranes are moist.      Pharynx: Oropharynx is clear.   Eyes:      Extraocular  Movements: Extraocular movements intact.      Conjunctiva/sclera: Conjunctivae normal.   Cardiovascular:      Rate and Rhythm: Normal rate and regular rhythm.      Pulses: Normal pulses.      Heart sounds: Normal heart sounds. No murmur heard.  Pulmonary:      Effort: Pulmonary effort is normal. No respiratory distress.      Breath sounds: No wheezing or rhonchi.      Comments: Diminished bilaterally   Abdominal:      General: Abdomen is flat. Bowel sounds are normal.      Palpations: Abdomen is soft.      Tenderness: There is no abdominal tenderness. There is no guarding.   Musculoskeletal:         General: Tenderness present. No swelling. Normal range of motion.      Cervical back: Normal range of motion and neck supple. No rigidity.   Skin:     General: Skin is warm and dry.      Capillary Refill: Capillary refill takes less than 2 seconds.      Findings: Erythema present. No bruising.   Neurological:      General: No focal deficit present.      Mental Status: She is alert and oriented to person, place, and time.   Psychiatric:         Mood and Affect: Mood normal.         Behavior: Behavior normal.       Additional Data:     Labs:    Results from last 7 days   Lab Units 05/06/24 0447 05/02/24  1907 05/02/24  1302   WBC Thousand/uL 8.86   < > 25.34*   HEMOGLOBIN g/dL 9.6*   < > 9.8*   HEMATOCRIT % 30.1*   < > 30.4*   PLATELETS Thousands/uL 300   < > 223   BANDS PCT %  --   --  12*   SEGS PCT % 63   < >  --    LYMPHO PCT % 18   < > 8*   MONO PCT % 13*   < > 2*   EOS PCT % 3   < > 0    < > = values in this interval not displayed.     Results from last 7 days   Lab Units 05/06/24 0447 05/02/24  1746 05/02/24  1302   SODIUM mmol/L 133*   < > 125*   POTASSIUM mmol/L 4.4   < > 2.9*   CHLORIDE mmol/L 95*   < > 89*   CO2 mmol/L 30   < > 24   BUN mg/dL 13   < > 49*   CREATININE mg/dL 0.68   < > 1.64*   ANION GAP mmol/L 8   < > 12   CALCIUM mg/dL 10.3*   < > 9.3   ALBUMIN g/dL  --   --  3.6   TOTAL BILIRUBIN mg/dL  --    --  0.72   ALK PHOS U/L  --   --  66   ALT U/L  --   --  19   AST U/L  --   --  21   GLUCOSE RANDOM mg/dL 221*   < > 323*    < > = values in this interval not displayed.     Results from last 7 days   Lab Units 05/02/24  1336   INR  1.46*     Results from last 7 days   Lab Units 05/03/24  2055 05/03/24  0842 05/02/24  2146   POC GLUCOSE mg/dl 310* 240* 283*         Results from last 7 days   Lab Units 05/04/24  0657 05/03/24  0522 05/02/24  1907 05/02/24  1515 05/02/24  1302   LACTIC ACID mmol/L  --   --  2.0 2.4* 3.1*   PROCALCITONIN ng/ml 10.53* 19.90* 29.82*  --  32.68*     * I Have Reviewed All Lab Data Listed Above.  * Additional Pertinent Lab Tests Reviewed: All Labs For Current Hospital Admission Reviewed    Imaging:    CTA ED chest PE Study   Final Result      No pulmonary embolus.      7.3 cm masslike opacity in the right upper lobe, new since October 2023, compatible with pneumonia.      Small hiatal hernia.      Stable small loculated left pleural effusion.            Workstation performed: [id number]         XR chest portable   ED Interpretation   Right upper lobe pneumonia      Final Result      New right upper lobe mass.   Please refer to same-day CT chest.            Workstation performed: [id number]           Recent Cultures (last 7 days):     Results from last 7 days   Lab Units 05/03/24  1120 05/03/24  1113 05/03/24  1041 05/03/24  0036 05/02/24  1312   BLOOD CULTURE  No Growth at 48 hrs.  --  No Growth at 48 hrs.  --  No Growth at 72 hrs.  Streptococcus pneumoniae*   SPUTUM CULTURE   --  2+ Growth of  --   --   --    GRAM STAIN RESULT   --  1+ Epithelial cells per low power field  No Polys or Bacteria seen  --   --  Gram positive cocci in pairs and chains*   LEGIONELLA URINARY ANTIGEN   --   --   --  Negative  --        Last 24 Hours Medication List:   Current Facility-Administered Medications   Medication Dose Route Frequency Provider Last Rate    acetaminophen  650 mg Oral Q6H PRN Angelica V.  MD Shantal      apixaban  5 mg Oral BID Alex Torres MD      aspirin  81 mg Oral Daily Alex Torres MD      atorvastatin  20 mg Oral Daily Alex Torres MD      benzonatate  200 mg Oral TID PRN Alex Torres MD      cefTRIAXone  2,000 mg Intravenous Q24H Marcos Krishnamurthy PA-C 2,000 mg (05/06/24 1230)    DULoxetine  60 mg Oral Daily Alex Torres MD      fluticasone-vilanterol  1 puff Inhalation Daily Alex Torres MD      furosemide  40 mg Oral Daily Alex Torres MD      guaiFENesin  600 mg Oral BID Alex Torres MD      insulin glargine  20 Units Subcutaneous Q12H PATSY Alex Torres MD      levalbuterol  1.25 mg Nebulization TID Alex Torres MD      levothyroxine  137 mcg Oral Early Morning Alex Torres MD      mirtazapine  15 mg Oral HS Alex Torres MD      pantoprazole  40 mg Oral Daily Before Breakfast Alex Torres MD      umeclidinium  1 puff Inhalation Daily Alex Torres MD        Today, Patient Was Seen By: Lulu Thompson MD

## 2024-05-06 NOTE — UTILIZATION REVIEW
NOTIFICATION OF INPATIENT ADMISSION   AUTHORIZATION REQUEST   SERVICING FACILITY:   [place] [address]  Tax ID:  [id number]  NPI: [id number] ATTENDING PROVIDER:  Attending Name and NPI#: Je CarvalhoDo [[id number]]  Address: [address] [place] [address]  Phone: [phone]     ADMISSION INFORMATION:  Place of Service: Inpatient Acute [facility]  Place of Service Code: 21  Inpatient Admission Date/Time: 5/2/24  5:29 PM  Discharge Date/Time: No discharge date for patient encounter.  Admitting Diagnosis Code/Description:  Shortness of breath [R06.02]  Hyponatremia [E87.1]  Pneumonia [J18.9]  MICHAEL (acute kidney injury) (HCC) [N17.9]     UTILIZATION REVIEW CONTACT:  Cosmo Lopez Utilization   Network Utilization Review Department  Phone: [phone]  Fax [phone]  Email: [email or web address]  Contact for approvals/pending authorizations, clinical reviews, and discharge.     PHYSICIAN ADVISORY SERVICES:  Medical Necessity Denial & Jcxi-sx-Gmrb Review  Phone: [phone]  Fax: [phone]  Email: [email or web address]     DISCHARGE SUPPORT TEAM:  For Patients Discharge Needs & Updates  Phone: [phone] opt. 2 Fax: [phone]  Email: [email or web address]

## 2024-05-06 NOTE — CASE MANAGEMENT
Case Management Discharge Planning Note    Patient name Shakira Polk  Location /401-01 MRN [id number]  : 1950 Date 2024       Current Admission Date: 2024  Current Admission Diagnosis:Sepsis due to pneumonia ([facility])   Patient Active Problem List    Diagnosis Date Noted    Positive blood culture 2023    Vertigo 2023    History of pulmonary embolism 2023    COVID-19 10/25/2023    PAD (peripheral artery disease) ([facility]) 2023    COPD exacerbation ([facility]) 2023    Pre-op evaluation 10/24/2022    Dependence on continuous supplemental oxygen 2022    Excessive anticoagulation 2022    MORENO (obstructive sleep apnea) 2021    Hilar lymphadenopathy 2021    Atherosclerosis 2021    T12 compression fracture ([facility]) 2021    Chronic diastolic (congestive) heart failure ([facility])     Chronic sinusitis 2021    Primary insomnia 2020    Migraine without aura and without status migrainosus, not intractable 2020    Postherpetic neuralgia 2020    Diastolic dysfunction 2020    Macrocytic anemia 2020    Hypoxemia requiring supplemental oxygen 2019    History of exposure to asbestos 2019    Chronic respiratory failure with hypoxia ([facility]) 2019    Hyponatremia 2019    Candidal dermatitis 2018    Loculated pleural effusion 2018    Constipation 2017    Renal cyst 2017    Candidiasis, cutaneous 2017    Vitamin B12 deficiency 11/10/2016    Morbid obesity with BMI of 40.0-44.9, adult ([facility]) 2016    Sepsis due to pneumonia ([facility]) 2016    Centrilobular emphysema ([facility]) 2016    Type 2 diabetes mellitus with hyperglycemia, with long-term current use of insulin ([facility]) 2016    Hypothyroidism 2016    Rheumatoid arthritis ([facility]) 2016    Essential hypertension 2016    Hyperlipidemia 2016    Gastroesophageal reflux disease without esophagitis 2016     Hypoalbuminemia 11/03/2016    Hepatic steatosis 11/03/2016    Atelectasis 11/03/2016    Anxiety 06/02/2014    Fibromyalgia 01/28/2014    Osteopenia 09/17/2012    Osteoarthritis 05/29/2012    Vitamin D deficiency 05/29/2012      LOS (days): 4  Geometric Mean LOS (GMLOS) (days): 5.1  Days to GMLOS:1.4     OBJECTIVE:  Risk of Unplanned Readmission Score: 22.77         Current admission status: Inpatient   Preferred Pharmacy:   [id number]  [place] - [address]  [address] [place] [address]  Phone: [phone] Fax: [phone]    [facility] [place]  [address] [place] [address]  Phone: [phone] Fax: [phone]    Primary Care Provider: Ioana Heck MD    Primary Insurance: [employer]  Secondary Insurance:     DISCHARGE DETAILS:    Discharge planning discussed with:: patient  Freedom of Choice: Yes  Comments - Freedom of Choice: CM discussed FOC for therapy recommendations-patient choice is [facility] referrals in aidiin  CM contacted family/caregiver?: No- see comments (not at this time patient making decisions)  Were Treatment Team discharge recommendations reviewed with patient/caregiver?: Yes  Did patient/caregiver verbalize understanding of patient care needs?: Yes  Were patient/caregiver advised of the risks associated with not following Treatment Team discharge recommendations?: Yes    Contacts  Patient Contacts: see face sheet  Relationship to Patient:: Family  Reason/Outcome: Discharge Planning    Requested Home Health Care         Is the patient interested in HHC at discharge?: Yes  Home Health Discipline requested:: Nursing, Occupational Therapy, Physical Therapy  Home Health Agency Name:: [facility]  Home Health Services Needed:: Evaluate Functional Status and Safety, COPD Management, Heart Failure Management, Strengthening/Theraputic Exercises to Improve Function, Diabetes  Management, Gait/ADL Training, Other (comment) (SN ASSESSMENT AND MEDICATION MANAGEMENT)  Homebound Criteria Met:: Requires the Assistance of Another Person for Safe Ambulation or to Leave the Home, Uses an Assist Device (i.e. cane, walker, etc)  Supporting Clincal Findings:: Dyspnea with Exertion, Fatigues Easliy in Short Distances, Limited Endurance         Other Referral/Resources/Interventions Provided:  Programs:: COPD, CHF    Would you like to participate in our [facility] service program?  : No - Declined    Treatment Team Recommendation: Home with Home Health Care  Discharge Destination Plan::  ([facility])                Patient provided options for [facility].    Patient preference is [facility]. Secured in aidin.

## 2024-05-06 NOTE — DISCHARGE SUMMARY
[facility]  Discharge- Shakira Polk 1950, 74 y.o. female MRN: [id number]  Unit/Bed#: 401-01 Encounter: [id number]  Primary Care Provider: Ioana Heck MD   Date and time admitted to hospital: 5/2/2024 12:12 PM    Sepsis due to pneumonia (HCC)  Assessment & Plan  Met via tachycardia, tachypnea and lactic acidosis  Pneumococcal antigen positive, w strep pneumo in 1 of 2 blood Cx  2D echo Mitral Valve: mild annular calcification. Mild regurgitation. Vegetation could not be excluded on basis of this study.   ID on consult  Repeat 2 sets of blood cultures (NGTD)  May transition from Iv Ceftriaxone to high-dose amoxicillin 1g TID for 2 week course through 5/16/24   Respiratory protocol, IS, Flutter     Chronic respiratory failure with hypoxia (HCC)  Assessment & Plan  At baseline 3 L     Centrilobular emphysema (HCC)  Assessment & Plan  Continue chronic inhalers  Continue respiratory protocol    Chronic diastolic (congestive) heart failure (HCC)  Assessment & Plan  Wt Readings from Last 3 Encounters:   05/05/24 107 kg (235 lb 0.2 oz)   03/14/24 108 kg (237 lb 6.4 oz)   12/18/23 110 kg (243 lb 6.4 oz)   Discontinued IV fluids and resumed Lasix 5/4/24    Type 2 diabetes mellitus with hyperglycemia, with long-term current use of insulin ([facility])  Assessment & Plan  Lab Results   Component Value Date    HGBA1C 6.8 (H) 11/28/2023     Recent Labs     05/03/24 2055   POCGLU 310*       Blood Sugar Average: Last 72 hrs:  (P) 275    Resume home med regimen    History of pulmonary embolism  Assessment & Plan  Continue Eliquis    Gastroesophageal reflux disease without esophagitis  Assessment & Plan  Continue Protonix 40mg QD    Essential hypertension  Assessment & Plan  Hold lisinopril  Continue home lasix 40mg QD    Rheumatoid arthritis (HCC)  Assessment & Plan  Hold methotrexate and xeljanz during admission    Hypothyroidism  Assessment & Plan  Continue Levothyroxine 137mcg QD    Discharging  Physician / Practitioner: Lulu Thompson MD  PCP: Ioana Heck MD  Admission Date:   Admission Orders (From admission, onward)       Ordered        05/02/24 1728  Inpatient Admission  Once                          Discharge Date: 05/06/24    Medical Problems       Resolved Problems  Date Reviewed: 5/5/2024            Resolved    History of pediculosis 5/2/2024     Resolved by  Alex Torres MD          Consultations During Hospital Stay:  Pulmonology, Infectious Disease    Procedures Performed:   CTA ED chest PE Study   Final Result      No pulmonary embolus.      7.3 cm masslike opacity in the right upper lobe, new since October 2023, compatible with pneumonia.      Small hiatal hernia.      Stable small loculated left pleural effusion.            Workstation performed: [id number]         XR chest portable   ED Interpretation   Right upper lobe pneumonia      Final Result      New right upper lobe mass.   Please refer to same-day CT chest.            Workstation performed: [id number]               Significant Findings / Test Results:   Results for orders placed or performed during the hospital encounter of 05/02/24   Blood culture #1    Specimen: Arm, Right; Blood   Result Value Ref Range    Blood Culture Streptococcus pneumoniae (A)     Gram Stain Result Gram positive cocci in pairs and chains (A)        Susceptibility    Streptococcus pneumoniae - JOSE ANTONIO     Penicillin 0.023 Susceptible ug/ml     Penicillin, Nonmeningitis 0.023 Susceptible ug/ml     Penicillin, Meningitis 0.023 Susceptible ug/ml     Ceftriaxone, Nonmeningitis 0.012 Susceptible ug/ml     Ceftriaxone, Meningitis 0.012 Susceptible ug/ml     Vancomycin ($) 0.75 Susceptible ug/ml   Blood culture #2    Specimen: Arm, Left; Blood   Result Value Ref Range    Blood Culture No Growth at 72 hrs.    COVID/FLU/RSV    Specimen: Nose; Nares   Result Value Ref Range    SARS-CoV-2 Negative Negative    INFLUENZA A PCR Negative Negative    INFLUENZA B  PCR Negative Negative    RSV PCR Negative Negative   Strep Pneumoniae, Urine    Specimen: Urine, Clean Catch   Result Value Ref Range    Strep pneumoniae antigen, urine Positive (A) Negative   Legionella antigen, Urine    Specimen: Urine, Clean Catch   Result Value Ref Range    Legionella Urinary Antigen Negative Negative   Sputum culture and Gram stain    Specimen: Expectorated Sputum   Result Value Ref Range    Sputum Culture 2+ Growth of     Gram Stain Result 1+ Epithelial cells per low power field     Gram Stain Result No Polys or Bacteria seen    Blood Culture Identification Panel    Specimen: Arm, Right; Blood   Result Value Ref Range    Streptococcus pneumoniae Detected (A) Not Detected   Blood culture    Specimen: Arm, Right; Blood   Result Value Ref Range    Blood Culture No Growth at 48 hrs.    Blood culture    Specimen: Arm, Right; Blood   Result Value Ref Range    Blood Culture No Growth at 72 hrs.    CBC and differential   Result Value Ref Range    WBC 25.34 (H) 4.31 - 10.16 Thousand/uL    RBC 3.49 (L) 3.81 - 5.12 Million/uL    Hemoglobin 9.8 (L) 11.5 - 15.4 g/dL    Hematocrit 30.4 (L) 34.8 - 46.1 %    MCV 87 82 - 98 fL    MCH 28.1 26.8 - 34.3 pg    MCHC 32.2 31.4 - 37.4 g/dL    RDW 17.3 (H) 11.6 - 15.1 %    MPV 10.9 8.9 - 12.7 fL    Platelets 223 149 - 390 Thousands/uL   Comprehensive metabolic panel   Result Value Ref Range    Sodium 125 (L) 135 - 147 mmol/L    Potassium 2.9 (L) 3.5 - 5.3 mmol/L    Chloride 89 (L) 96 - 108 mmol/L    CO2 24 21 - 32 mmol/L    ANION GAP 12 4 - 13 mmol/L    BUN 49 (H) 5 - 25 mg/dL    Creatinine 1.64 (H) 0.60 - 1.30 mg/dL    Glucose 323 (H) 65 - 140 mg/dL    Calcium 9.3 8.4 - 10.2 mg/dL    AST 21 13 - 39 U/L    ALT 19 7 - 52 U/L    Alkaline Phosphatase 66 34 - 104 U/L    Total Protein 7.3 6.4 - 8.4 g/dL    Albumin 3.6 3.5 - 5.0 g/dL    Total Bilirubin 0.72 0.20 - 1.00 mg/dL    eGFR 30 ml/min/1.73sq m   Lactic acid   Result Value Ref Range    LACTIC ACID 3.1 (H) 0.5 - 2.0  "mmol/L   Procalcitonin   Result Value Ref Range    Procalcitonin 32.68 (H) <=0.25 ng/ml   Protime-INR   Result Value Ref Range    Protime 17.5 (H) 11.6 - 14.5 seconds    INR 1.46 (H) 0.84 - 1.19   APTT   Result Value Ref Range    PTT 29 23 - 37 seconds   HS Troponin 0hr (reflex protocol)   Result Value Ref Range    hs TnI 0hr 11 \"Refer to ACS Flowchart\"- see link ng/L   B-Type Natriuretic Peptide(BNP)   Result Value Ref Range    BNP 39 0 - 100 pg/mL   D-dimer, quantitative   Result Value Ref Range    D-Dimer, Quant 1.69 (H) <0.50 ug/ml FEU   Lactic acid 2 Hours   Result Value Ref Range    LACTIC ACID 2.4 (H) 0.5 - 2.0 mmol/L   HS Troponin I 2hr   Result Value Ref Range    hs TnI 2hr 9 \"Refer to ACS Flowchart\"- see link ng/L    Delta 2hr hsTnI -2 <20 ng/L   HS Troponin I 4hr   Result Value Ref Range    hs TnI 4hr 8 \"Refer to ACS Flowchart\"- see link ng/L    Delta 4hr hsTnI -3 <20 ng/L   Basic metabolic panel   Result Value Ref Range    Sodium 129 (L) 135 - 147 mmol/L    Potassium 3.1 (L) 3.5 - 5.3 mmol/L    Chloride 92 (L) 96 - 108 mmol/L    CO2 27 21 - 32 mmol/L    ANION GAP 10 4 - 13 mmol/L    BUN 43 (H) 5 - 25 mg/dL    Creatinine 1.34 (H) 0.60 - 1.30 mg/dL    Glucose 197 (H) 65 - 140 mg/dL    Calcium 9.2 8.4 - 10.2 mg/dL    eGFR 38 ml/min/1.73sq m   Procalcitonin   Result Value Ref Range    Procalcitonin 29.82 (H) <=0.25 ng/ml   Platelet count   Result Value Ref Range    Platelets 234 149 - 390 Thousands/uL    MPV 10.5 8.9 - 12.7 fL   Sodium, urine, random   Result Value Ref Range    Sodium, Ur 14 Reference range not established.   Creatinine, urine, random   Result Value Ref Range    Creatinine, Ur 77.5 Reference range not established. mg/dL   Lactic acid, plasma (w/reflex if result > 2.0)   Result Value Ref Range    LACTIC ACID 2.0 0.5 - 2.0 mmol/L   Procalcitonin, Next Day AM Collection   Result Value Ref Range    Procalcitonin 19.90 (H) <=0.25 ng/ml   CBC and differential   Result Value Ref Range    WBC 22.56 " (H) 4.31 - 10.16 Thousand/uL    RBC 3.74 (L) 3.81 - 5.12 Million/uL    Hemoglobin 10.8 (L) 11.5 - 15.4 g/dL    Hematocrit 32.7 (L) 34.8 - 46.1 %    MCV 87 82 - 98 fL    MCH 28.9 26.8 - 34.3 pg    MCHC 33.0 31.4 - 37.4 g/dL    RDW 17.7 (H) 11.6 - 15.1 %    MPV 10.6 8.9 - 12.7 fL    Platelets 244 149 - 390 Thousands/uL    nRBC 0 /100 WBCs    Segmented % 81 (H) 43 - 75 %    Immature Grans % 7 (H) 0 - 2 %    Lymphocytes % 8 (L) 14 - 44 %    Monocytes % 4 4 - 12 %    Eosinophils Relative 0 0 - 6 %    Basophils Relative 0 0 - 1 %    Absolute Neutrophils 18.33 (H) 1.85 - 7.62 Thousands/µL    Absolute Immature Grans >0.50 (H) 0.00 - 0.20 Thousand/uL    Absolute Lymphocytes 1.72 0.60 - 4.47 Thousands/µL    Absolute Monocytes 0.82 0.17 - 1.22 Thousand/µL    Eosinophils Absolute 0.00 0.00 - 0.61 Thousand/µL    Basophils Absolute 0.02 0.00 - 0.10 Thousands/µL   Basic metabolic panel   Result Value Ref Range    Sodium 133 (L) 135 - 147 mmol/L    Potassium 3.7 3.5 - 5.3 mmol/L    Chloride 97 96 - 108 mmol/L    CO2 27 21 - 32 mmol/L    ANION GAP 9 4 - 13 mmol/L    BUN 32 (H) 5 - 25 mg/dL    Creatinine 0.99 0.60 - 1.30 mg/dL    Glucose 220 (H) 65 - 140 mg/dL    Calcium 9.3 8.4 - 10.2 mg/dL    eGFR 55 ml/min/1.73sq m   CBC and differential   Result Value Ref Range    WBC 12.13 (H) 4.31 - 10.16 Thousand/uL    RBC 3.67 (L) 3.81 - 5.12 Million/uL    Hemoglobin 10.5 (L) 11.5 - 15.4 g/dL    Hematocrit 32.3 (L) 34.8 - 46.1 %    MCV 88 82 - 98 fL    MCH 28.6 26.8 - 34.3 pg    MCHC 32.5 31.4 - 37.4 g/dL    RDW 17.9 (H) 11.6 - 15.1 %    MPV 11.0 8.9 - 12.7 fL    Platelets 262 149 - 390 Thousands/uL    nRBC 0 /100 WBCs    Segmented % 80 (H) 43 - 75 %    Immature Grans % 0 0 - 2 %    Lymphocytes % 12 (L) 14 - 44 %    Monocytes % 6 4 - 12 %    Eosinophils Relative 2 0 - 6 %    Basophils Relative 0 0 - 1 %    Absolute Neutrophils 9.53 (H) 1.85 - 7.62 Thousands/µL    Absolute Immature Grans 0.05 0.00 - 0.20 Thousand/uL    Absolute Lymphocytes  1.50 0.60 - 4.47 Thousands/µL    Absolute Monocytes 0.78 0.17 - 1.22 Thousand/µL    Eosinophils Absolute 0.24 0.00 - 0.61 Thousand/µL    Basophils Absolute 0.03 0.00 - 0.10 Thousands/µL   Basic metabolic panel   Result Value Ref Range    Sodium 137 135 - 147 mmol/L    Potassium 3.7 3.5 - 5.3 mmol/L    Chloride 100 96 - 108 mmol/L    CO2 29 21 - 32 mmol/L    ANION GAP 8 4 - 13 mmol/L    BUN 18 5 - 25 mg/dL    Creatinine 0.66 0.60 - 1.30 mg/dL    Glucose 171 (H) 65 - 140 mg/dL    Calcium 10.2 8.4 - 10.2 mg/dL    eGFR 86 ml/min/1.73sq m   Procalcitonin   Result Value Ref Range    Procalcitonin 10.53 (H) <=0.25 ng/ml   Magnesium   Result Value Ref Range    Magnesium 1.9 1.9 - 2.7 mg/dL   Basic metabolic panel   Result Value Ref Range    Sodium 136 135 - 147 mmol/L    Potassium 4.3 3.5 - 5.3 mmol/L    Chloride 99 96 - 108 mmol/L    CO2 29 21 - 32 mmol/L    ANION GAP 8 4 - 13 mmol/L    BUN 14 5 - 25 mg/dL    Creatinine 0.63 0.60 - 1.30 mg/dL    Glucose 151 (H) 65 - 140 mg/dL    Calcium 10.2 8.4 - 10.2 mg/dL    eGFR 87 ml/min/1.73sq m   CBC and differential   Result Value Ref Range    WBC 7.17 4.31 - 10.16 Thousand/uL    RBC 3.51 (L) 3.81 - 5.12 Million/uL    Hemoglobin 9.8 (L) 11.5 - 15.4 g/dL    Hematocrit 30.9 (L) 34.8 - 46.1 %    MCV 88 82 - 98 fL    MCH 27.9 26.8 - 34.3 pg    MCHC 31.7 31.4 - 37.4 g/dL    RDW 18.0 (H) 11.6 - 15.1 %    MPV 10.4 8.9 - 12.7 fL    Platelets 285 149 - 390 Thousands/uL    nRBC 0 /100 WBCs    Segmented % 65 43 - 75 %    Immature Grans % 1 0 - 2 %    Lymphocytes % 19 14 - 44 %    Monocytes % 10 4 - 12 %    Eosinophils Relative 5 0 - 6 %    Basophils Relative 0 0 - 1 %    Absolute Neutrophils 4.71 1.85 - 7.62 Thousands/µL    Absolute Immature Grans 0.04 0.00 - 0.20 Thousand/uL    Absolute Lymphocytes 1.36 0.60 - 4.47 Thousands/µL    Absolute Monocytes 0.71 0.17 - 1.22 Thousand/µL    Eosinophils Absolute 0.32 0.00 - 0.61 Thousand/µL    Basophils Absolute 0.03 0.00 - 0.10 Thousands/µL   Basic  metabolic panel   Result Value Ref Range    Sodium 133 (L) 135 - 147 mmol/L    Potassium 4.4 3.5 - 5.3 mmol/L    Chloride 95 (L) 96 - 108 mmol/L    CO2 30 21 - 32 mmol/L    ANION GAP 8 4 - 13 mmol/L    BUN 13 5 - 25 mg/dL    Creatinine 0.68 0.60 - 1.30 mg/dL    Glucose 221 (H) 65 - 140 mg/dL    Calcium 10.3 (H) 8.4 - 10.2 mg/dL    eGFR 85 ml/min/1.73sq m   CBC and differential   Result Value Ref Range    WBC 8.86 4.31 - 10.16 Thousand/uL    RBC 3.46 (L) 3.81 - 5.12 Million/uL    Hemoglobin 9.6 (L) 11.5 - 15.4 g/dL    Hematocrit 30.1 (L) 34.8 - 46.1 %    MCV 87 82 - 98 fL    MCH 27.7 26.8 - 34.3 pg    MCHC 31.9 31.4 - 37.4 g/dL    RDW 17.9 (H) 11.6 - 15.1 %    MPV 10.5 8.9 - 12.7 fL    Platelets 300 149 - 390 Thousands/uL    nRBC 0 /100 WBCs    Segmented % 63 43 - 75 %    Immature Grans % 2 0 - 2 %    Lymphocytes % 18 14 - 44 %    Monocytes % 13 (H) 4 - 12 %    Eosinophils Relative 3 0 - 6 %    Basophils Relative 1 0 - 1 %    Absolute Neutrophils 5.64 1.85 - 7.62 Thousands/µL    Absolute Immature Grans 0.16 0.00 - 0.20 Thousand/uL    Absolute Lymphocytes 1.63 0.60 - 4.47 Thousands/µL    Absolute Monocytes 1.15 0.17 - 1.22 Thousand/µL    Eosinophils Absolute 0.24 0.00 - 0.61 Thousand/µL    Basophils Absolute 0.04 0.00 - 0.10 Thousands/µL   ECG 12 lead   Result Value Ref Range    Ventricular Rate 105 BPM    Atrial Rate 105 BPM    CA Interval 160 ms    QRSD Interval 96 ms    QT Interval 362 ms    QTC Interval 478 ms    P Lorton 69 degrees    QRS Axis 61 degrees    T Wave Lorton 49 degrees   Echo complete w/ contrast if indicated   Result Value Ref Range    RAA A4C 13.5 cm2    LA Volume Index (BP) 13.3 mL/m2    MV Peak A Nikolas 1 m/s    MV stenosis pressure 1/2 time 62 ms    MV Peak E Nikolas 88 cm/s    E wave deceleration time 214 ms    E/A ratio 0.88     MV valve area p 1/2 method 3.50     RVID d 3.0 cm    A4C EF 60 %    Left ventricular stroke volume (2D) 54.00 mL    IVSd 1.00 cm    Ao root 3.60 cm    LVPWd 1.10 cm    LA size  3.3 cm    LA volume (BP) 28 mL    FS 26 28 - 44    LVIDS 3.50 cm    IVS 1 cm    LVIDd 4.70 cm    LA length (A2C) 4.20 cm    LEFT VENTRICLE SYSTOLIC VOLUME (MOD BIPLANE) 2D 50 mL    LV DIASTOLIC VOLUME (MOD BIPLANE) 2D 104 mL    Left Atrium Area-systolic Four Chamber 9.8 cm2    Left Atrium Area-systolic Apical Two Chamber 14.7 cm2    MV E' Tissue Velocity Septal 10 cm/s    LVSV, 2D 54 mL    BSA 2.1 m2    LV EF 60    Fingerstick Glucose (POCT)   Result Value Ref Range    POC Glucose 283 (H) 65 - 140 mg/dl   Fingerstick Glucose (POCT)   Result Value Ref Range    POC Glucose 240 (H) 65 - 140 mg/dl   Fingerstick Glucose (POCT)   Result Value Ref Range    POC Glucose 310 (H) 65 - 140 mg/dl   Manual Differential(PHLEBS Do Not Order)   Result Value Ref Range    Segmented % 78 (H) 43 - 75 %    Bands % 12 (H) 0 - 8 %    Lymphocytes % 8 (L) 14 - 44 %    Monocytes % 2 (L) 4 - 12 %    Eosinophils % 0 0 - 6 %    Basophils % 0 0 - 1 %    Absolute Neutrophils 22.81 (H) 1.85 - 7.62 Thousand/uL    Absolute Lymphocytes 2.03 0.60 - 4.47 Thousand/uL    Absolute Monocytes 0.51 0.00 - 1.22 Thousand/uL    Absolute Eosinophils 0.00 0.00 - 0.40 Thousand/uL    Absolute Basophils 0.00 0.00 - 0.10 Thousand/uL    Total Counted      RBC Morphology Present     Platelet Estimate Adequate Adequate    Anisocytosis Present          Incidental Findings:   none     Test Results Pending at Discharge (will require follow up):   none     Outpatient Tests Requested:  none    Complications:  None    Reason for Admission: Sepsis 2/2 PNA    Hospital Course:     Sepsis due to pneumonia (HCC)  Assessment & Plan  Met via tachycardia, tachypnea and lactic acidosis  Pneumococcal antigen positive, w strep pneumo in 1 of 2 blood Cx  2D echo Mitral Valve: mild annular calcification. Mild regurgitation. Vegetation could not be excluded on basis of this study.   ID on consult  Repeat 2 sets of blood cultures (NGTD)  May transition from Iv Ceftriaxone to high-dose  "amoxicillin 1g TID for 2 week course through 5/16/24   Respiratory protocol, IS, Flutter     Chronic respiratory failure with hypoxia (HCC)  Assessment & Plan  At baseline 3 L     Centrilobular emphysema (HCC)  Assessment & Plan  Continue chronic inhalers  Continue respiratory protocol    Chronic diastolic (congestive) heart failure (HCC)  Assessment & Plan  Wt Readings from Last 3 Encounters:   05/05/24 107 kg (235 lb 0.2 oz)   03/14/24 108 kg (237 lb 6.4 oz)   12/18/23 110 kg (243 lb 6.4 oz)   Discontinued IV fluids and resumed Lasix 5/4/24    Type 2 diabetes mellitus with hyperglycemia, with long-term current use of insulin ([facility])  Assessment & Plan  Lab Results   Component Value Date    HGBA1C 6.8 (H) 11/28/2023     Recent Labs     05/03/24 2055   POCGLU 310*       Blood Sugar Average: Last 72 hrs:  (P) 275    Resume home med regimen    History of pulmonary embolism  Assessment & Plan  Continue Eliquis    Gastroesophageal reflux disease without esophagitis  Assessment & Plan  Continue Protonix 40mg QD    Essential hypertension  Assessment & Plan  Hold lisinopril  Continue home lasix 40mg QD    Rheumatoid arthritis (HCC)  Assessment & Plan  Hold methotrexate and xeljanz during admission    Hypothyroidism  Assessment & Plan  Continue Levothyroxine 137mcg QD        HPI as per admission:  Per Dr Lugo:  \"Shakira Polk is a 74 y.o. female patient with a PMH of COPD on 3 L nasal cannula, diabetes, history of PE on Eliquis, who originally presented to the hospital on 5/2/2024 for evaluation of shortness of breath. Patient states symptoms started yesterday but got worse today. She states she started with pain in the center of her chest yesterday. She has had a cough. She states pain in her chest is worse when she is coughing or taking a deep breath. She felt very weak today so called for EMS. Patient did have a fever for EMS up to 101. She was given Tylenol. Patient has had decreased appetite. Denies abdominal pain, " "nausea, vomiting, or diarrhea. Denies leg pain or leg swelling. Patient does have history of PE but states she is compliant with Eliquis.\"    Please see above list of diagnoses and related plan for additional information.     Condition at Discharge: stable     Discharge Day Visit / Exam:     Subjective:  Ms Polk is doing well this morning, slept well, currently breathing comfortably on her baseline 3L NC. Patient has productive cough, and still feels out of breath when ambulating, but otherwise has no acute concerns.     Vitals: Blood Pressure: 137/73 (05/06/24 0822)  Pulse: 104 (05/06/24 0822)  Temperature: 97.8 °F (36.6 °C) (05/06/24 0822)  Temp Source: Tympanic (05/03/24 1414)  Respirations: 14 (05/05/24 2210)  Height: 5' 5\" (165.1 cm) (05/03/24 0931)  Weight - Scale: 107 kg (235 lb 0.2 oz) (05/05/24 0520)  SpO2: 95 % (05/06/24 0822)    Physical Exam:     Physical Exam  Vitals and nursing note reviewed.   Constitutional:       General: She is not in acute distress.     Appearance: She is well-developed. She is obese. She is ill-appearing.   HENT:      Head: Normocephalic and atraumatic.      Right Ear: External ear normal.      Left Ear: External ear normal.      Nose: Nose normal.      Mouth/Throat:      Mouth: Mucous membranes are moist.      Pharynx: Oropharynx is clear.   Eyes:      Extraocular Movements: Extraocular movements intact.      Conjunctiva/sclera: Conjunctivae normal.   Cardiovascular:      Rate and Rhythm: Normal rate and regular rhythm.      Pulses: Normal pulses.      Heart sounds: Normal heart sounds. No murmur heard.  Pulmonary:      Effort: Pulmonary effort is normal. No respiratory distress.      Breath sounds: No wheezing or rhonchi.      Comments: Diminished bilaterally   Abdominal:      General: Abdomen is flat. Bowel sounds are normal.      Palpations: Abdomen is soft.      Tenderness: There is no abdominal tenderness. There is no guarding.   Musculoskeletal:         General: Tenderness " present. No swelling. Normal range of motion.      Cervical back: Normal range of motion and neck supple. No rigidity.   Skin:     General: Skin is warm and dry.      Capillary Refill: Capillary refill takes less than 2 seconds.      Findings: Erythema present. No bruising.   Neurological:      General: No focal deficit present.      Mental Status: She is alert and oriented to person, place, and time.   Psychiatric:         Mood and Affect: Mood normal.         Behavior: Behavior normal.     Discussion with Family: Patient to update family    Discharge instructions/Information to patient and family:   See after visit summary for information provided to patient and family.      Provisions for Follow-Up Care:  See after visit summary for information related to follow-up care and any pertinent home health orders.      Disposition:     Home with [facility] (Reminder: Complete face to face encounter)    Planned Readmission: N/A     Discharge Statement:  I spent 45 minutes discharging the patient. This time was spent on the day of discharge. I had direct contact with the patient on the day of discharge. Greater than 50% of the total time was spent examining patient, answering all patient questions, arranging and discussing plan of care with patient as well as directly providing post-discharge instructions.  Additional time then spent on discharge activities.    Discharge Medications:  See after visit summary for reconciled discharge medications provided to patient and family.       no

## 2024-05-06 NOTE — ASSESSMENT & PLAN NOTE
Lab Results   Component Value Date    HGBA1C 6.8 (H) 11/28/2023     Recent Labs     05/03/24  0842 05/03/24 2055   POCGLU 240* 310*     Blood Sugar Average: Last 72 hrs:  (P) 275    Home meds of Ozempic, Tresiba held on admission  Consider Long-acting agent and SSI w POCT

## 2024-05-06 NOTE — CASE MANAGEMENT
Case Management Discharge Planning Note    Patient name Shakira Polk  Location /401-01 MRN [id number]  : 1950 Date 2024       Current Admission Date: 2024  Current Admission Diagnosis:Sepsis due to pneumonia ([facility])   Patient Active Problem List    Diagnosis Date Noted    Positive blood culture 2023    Vertigo 2023    History of pulmonary embolism 2023    COVID-19 10/25/2023    PAD (peripheral artery disease) ([facility]) 2023    COPD exacerbation ([facility]) 2023    Pre-op evaluation 10/24/2022    Dependence on continuous supplemental oxygen 2022    Excessive anticoagulation 2022    MORENO (obstructive sleep apnea) 2021    Hilar lymphadenopathy 2021    Atherosclerosis 2021    T12 compression fracture ([facility]) 2021    Chronic diastolic (congestive) heart failure ([facility])     Chronic sinusitis 2021    Primary insomnia 2020    Migraine without aura and without status migrainosus, not intractable 2020    Postherpetic neuralgia 2020    Diastolic dysfunction 2020    Macrocytic anemia 2020    Hypoxemia requiring supplemental oxygen 2019    History of exposure to asbestos 2019    Chronic respiratory failure with hypoxia ([facility]) 2019    Hyponatremia 2019    Candidal dermatitis 2018    Loculated pleural effusion 2018    Constipation 2017    Renal cyst 2017    Candidiasis, cutaneous 2017    Vitamin B12 deficiency 11/10/2016    Morbid obesity with BMI of 40.0-44.9, adult ([facility]) 2016    Sepsis due to pneumonia ([facility]) 2016    Centrilobular emphysema ([facility]) 2016    Type 2 diabetes mellitus with hyperglycemia, with long-term current use of insulin ([facility]) 2016    Hypothyroidism 2016    Rheumatoid arthritis ([facility]) 2016    Essential hypertension 2016    Hyperlipidemia 2016    Gastroesophageal reflux disease without esophagitis 2016     Hypoalbuminemia 11/03/2016    Hepatic steatosis 11/03/2016    Atelectasis 11/03/2016    Anxiety 06/02/2014    Fibromyalgia 01/28/2014    Osteopenia 09/17/2012    Osteoarthritis 05/29/2012    Vitamin D deficiency 05/29/2012      LOS (days): 4  Geometric Mean LOS (GMLOS) (days): 5.1  Days to GMLOS:1.4     OBJECTIVE:  Risk of Unplanned Readmission Score: 22.77         Current admission status: Inpatient   Preferred Pharmacy:   [facility] [employer] [address]ERAS  [address]  [address] [place] [address]  Phone: [phone] Fax: [phone]    [facility] [place] [address]  [place] [address]  Phone: [phone] Fax: [phone]    Primary Care Provider: Ioana Heck MD    Primary Insurance: [employer]  REP  Secondary Insurance:     DISCHARGE DETAILS:        CM discussed  FOC with patient at bedside. Patient  agreeable with blanket referrals in Aidin for local agencies to determine bed availability according to your medical needs and insurance coverage. Patient and caretaker has no preference     Referrals placed in aidin for A.

## 2024-05-06 NOTE — PROGRESS NOTES
Patient:    MRN:  [id number]    Sundeep Request ID:  [id number]    Level of care reserved:  Home Health Agency    Partner Reserved:  [facility], [place], [place] [id number] [phone]    Clinical needs requested:  physical therapy, occupational therapy, skilled nursing, medication management, disease process education, chf, copd    Geography searched:  [id number]    Start of Service:    Request sent:  10:09am EDT on 5/6/2024 by Alyson Awad    Partner reserved:  11:01am EDT on 5/6/2024 by Alyson Awad    Choice list shared:  11:00am EDT on 5/6/2024 by Alyson Awad

## 2024-05-06 NOTE — NURSING NOTE
Pt discharged to home.  D/c instructions given and reviewed with pt by [facility] nurse.  Pt left floor ambulatory.

## 2024-05-06 NOTE — PHYSICAL THERAPY NOTE
PHYSICAL THERAPY NOTE          Patient Name: Shakira Polk  Today's Date: 5/6/2024 05/06/24 0908   PT Last Visit   PT Visit Date 05/06/24   Pain Assessment   Pain Assessment Tool 0-10   Pain Score 9   Pain Location/Orientation Orientation: Left;Location: Hip   Restrictions/Precautions   Weight Bearing Precautions Per Order No   Other Precautions Fall Risk;O2;Pain;Chair Alarm;Bed Alarm   General   Chart Reviewed Yes   Family/Caregiver Present No   Cognition   Overall Cognitive Status WFL   Arousal/Participation Alert;Responsive;Cooperative   Attention Attends with cues to redirect   Orientation Level Oriented X4   Following Commands Follows one step commands without difficulty   Subjective   Subjective Reports she is feeling better but is still SOB. c/o L hip pain   Bed Mobility   Additional Comments OOB in chair start/end PT session   Transfers   Sit to Stand 5  Supervision   Additional items Armrests;Increased time required;Verbal cues   Stand to Sit 5  Supervision   Additional items Armrests;Increased time required;Verbal cues   Stand pivot 5  Supervision   Additional items Increased time required;Verbal cues   Toilet transfer 5  Supervision   Additional items Increased time required;Verbal cues;Commode   Additional Comments RW used. Able to manage hygiene when toileting. Min A to thread brief. Increased time to complete due to SOB and elevated HR.   Ambulation/Elevation   Gait pattern Excessively slow;Short stride;Foward flexed;Improper Weight shift   Gait Assistance 5  Supervision   Additional items Verbal cues   Assistive Device Rolling walker   Distance 70'   Stair Management Assistance 4  Minimal assist   Additional items Verbal cues   Stair Management Technique Two rails;Step to pattern;Foreward   Number of Stairs 5   Ambulation/Elevation Additional Comments several standing rest breaks  due to SOB and elevated HR   Balance    Static Sitting Good   Dynamic Sitting Fair +   Static Standing Fair   Dynamic Standing Fair   Ambulatory Fair -  (RW)   Endurance Deficit   Endurance Deficit Yes   Endurance Deficit Description SpO2 97=94%, HR  moderate SOB   Activity Tolerance   Activity Tolerance Patient limited by fatigue;Patient limited by pain   Assessment   Prognosis Good   Problem List   (Decreased strength; Decreased endurance; Impaired balance; Decreased mobility; Decreased safety awareness; Obesity)   Assessment . seen for PT treatment session this date with interventions consisting of  transfers and  gait training on level and stairs  w/ emphasis on improving pt's ability to ambulate. Pt. Requiring ( ) occasional cues for sequence and safety. In comparison to previous session, Pt. With improvements in activity tolerance.  Able to complete 5 steps with min A., Moderate SOB. Pt has 15 ANNIKA her home.  Pt is in need of continued activity in PT to improve strength balance endurance mobility transfers and ambulation with return to maximize LOF. From PT/mobility standpoint, recommendation at time of d/c would be level II: moderate resource intensity in order to promote return to PLOF and independence.   The patient's [facility] Basic Mobility Inpatient Short Form Raw Score is 20. A Raw score of greater than 16 suggests the patient may benefit from discharge to home.  Please also refer to physical therapy recommendation for safe DC planning.   Goals   LTG Expiration Date 05/17/24   PT Treatment Day 1   Plan   Treatment/Interventions Functional transfer training;LE strengthening/ROM;Elevations;Therapeutic exercise;Endurance training;Bed mobility;Gait training;Spoke to nursing   Progress Progressing toward goals   PT Frequency 3-5x/wk   Discharge Recommendation   Rehab Resource Intensity Level, PT II (Moderate Resource Intensity)   AM-PAC Basic Mobility Inpatient   Turning in Flat Bed Without Bedrails 4   Lying on Back to Sitting on Edge of Flat  Bed Without Bedrails 4   Moving Bed to Chair 3   Standing Up From Chair Using Arms 3   Walk in Room 3   Climb 3-5 Stairs With Railing 3   Basic Mobility Inpatient Raw Score 20   Basic Mobility Standardized Score 43.99   [facility] Highest Level Of Mobility   -HLM Goal 6: Walk 10 steps or more   -HLM Achieved 7: Walk 25 feet or more   Education   Education Provided Mobility training   Patient Demonstrates verbal understanding;Reinforcement needed   End of Consult   Patient Position at End of Consult Bedside chair;Bed/Chair alarm activated;All needs within reach   End of Consult Comments discussed POC with PT

## 2024-05-06 NOTE — ASSESSMENT & PLAN NOTE
Met via tachycardia, tachypnea and lactic acidosis  Pneumococcal antigen positive, w strep pneumo in 1 of 2 blood Cx  2D echo Mitral Valve: mild annular calcification. Mild regurgitation. Vegetation could not be excluded on basis of this study.   ID on consult  Repeat 2 sets of blood cultures (NGTD)  Continue ceftriaxone, anticipate transition to high-dose amoxicillin 1g TID for 2 week course through 5/16/24   Respiratory protocol, IS, Flutter

## 2024-05-06 NOTE — PLAN OF CARE
Problem: PAIN - ADULT  Goal: Verbalizes/displays adequate comfort level or baseline comfort level  Description: Interventions:  - Encourage patient to monitor pain and request assistance  - Assess pain using appropriate pain scale  - Administer analgesics based on type and severity of pain and evaluate response  - Implement non-pharmacological measures as appropriate and evaluate response  - Consider cultural and social influences on pain and pain management  - Notify physician/advanced practitioner if interventions unsuccessful or patient reports new pain  Outcome: Progressing     Problem: INFECTION - ADULT  Goal: Absence or prevention of progression during hospitalization  Description: INTERVENTIONS:  - Assess and monitor for signs and symptoms of infection  - Monitor lab/diagnostic results  - Monitor all insertion sites, i.e. indwelling lines, tubes, and drains  - Monitor endotracheal if appropriate and nasal secretions for changes in amount and color  - [place] appropriate cooling/warming therapies per order  - Administer medications as ordered  - Instruct and encourage patient and family to use good hand hygiene technique  - Identify and instruct in appropriate isolation precautions for identified infection/condition  Outcome: Progressing  Goal: Absence of fever/infection during neutropenic period  Description: INTERVENTIONS:  - Monitor WBC    Outcome: Progressing     Problem: SAFETY ADULT  Goal: Patient will remain free of falls  Description: INTERVENTIONS:  - Educate patient/family on patient safety including physical limitations  - Instruct patient to call for assistance with activity   - Consult OT/PT to assist with strengthening/mobility   - Keep Call bell within reach  - Keep bed low and locked with side rails adjusted as appropriate  - Keep care items and personal belongings within reach  - Initiate and maintain comfort rounds  - Make Fall Risk Sign visible to staff  - Offer Toileting every 2 Hours,  in advance of need  - Initiate/Maintain bed/chair alarm  - Obtain necessary fall risk management equipment: non skid socks  - Apply yellow socks and bracelet for high fall risk patients  - Consider moving patient to room near nurses station  Outcome: Progressing  Goal: Maintain or return to baseline ADL function  Description: INTERVENTIONS:  -  Assess patient's ability to carry out ADLs; assess patient's baseline for ADL function and identify physical deficits which impact ability to perform ADLs (bathing, care of mouth/teeth, toileting, grooming, dressing, etc.)  - Assess/evaluate cause of self-care deficits   - Assess range of motion  - Assess patient's mobility; develop plan if impaired  - Assess patient's need for assistive devices and provide as appropriate  - Encourage maximum independence but intervene and supervise when necessary  - Involve family in performance of ADLs  - Assess for home care needs following discharge   - Consider OT consult to assist with ADL evaluation and planning for discharge  - Provide patient education as appropriate  Outcome: Progressing  Goal: Maintains/Returns to pre admission functional level  Description: INTERVENTIONS:  - Perform AM-PAC 6 Click Basic Mobility/ Daily Activity assessment daily.  - Set and communicate daily mobility goal to care team and patient/family/caregiver.   - Collaborate with rehabilitation services on mobility goals if consulted  - Perform Range of Motion 3 times a day.  - Reposition patient every 2 hours.  - Dangle patient 3 times a day  - Stand patient 3 times a day  - Ambulate patient 3 times a day  - Out of bed to chair 3 times a day   - Out of bed for meals 3 times a day  - Out of bed for toileting  - Record patient progress and toleration of activity level   Outcome: Progressing     Problem: DISCHARGE PLANNING  Goal: Discharge to home or other facility with appropriate resources  Description: INTERVENTIONS:  - Identify barriers to discharge  w/patient and caregiver  - Arrange for needed discharge resources and transportation as appropriate  - Identify discharge learning needs (meds, wound care, etc.)  - Arrange for interpretive services to assist at discharge as needed  - Refer to Case Management Department for coordinating discharge planning if the patient needs post-hospital services based on physician/advanced practitioner order or complex needs related to functional status, cognitive ability, or social support system  Outcome: Progressing     Problem: Knowledge Deficit  Goal: Patient/family/caregiver demonstrates understanding of disease process, treatment plan, medications, and discharge instructions  Description: Complete learning assessment and assess knowledge base.  Interventions:  - Provide teaching at level of understanding  - Provide teaching via preferred learning methods  Outcome: Progressing

## 2024-05-06 NOTE — PROGRESS NOTES
Progress Note - [facility] Infectious Disease   Shakira Polk 74 y.o. female MRN: [id number]  Unit/Bed#: 401-01 Encounter: [id number]      IMPRESSION & RECOMMENDATIONS:   1.  Sepsis, with tachycardia, tachypnea, leukocytosis.  This is most likely due to #2, #3.  No other source appreciated.  Intermittent low-grade fevers resolved.  Otherwise hemodynamically stable.  -Continue IV antibiotics as below  -Follow-up repeat blood cultures for clearance   -Repeat CBC differential to monitor response to treatment  -Repeat BMP to monitor developing toxicities  -Trend temperature and hemodynamics     2.  Pneumococcal bacteremia. 1 of 2 blood cultures isolated Streptococcus pneumoniae. This is most likely due to pneumonia. Consider possibility of endovascular infection. TTE (adequate study) w/o report of obvious vegetation and she has no intravascular devices. Reeat blood cultures remain negative.   -Continue IV ceftriaxone 2 g every 24 hours  -Continue to follow-up blood cultures x 2 for clearance   -Anticipate transition to high-dose amoxicillin 1g TID for 2 week course through 5/16/24  -Recommend Prevnar-20 prior to d/c      3.  Pneumococcal RUL pneumonia.  CT chest with a masslike opacity in the right upper lobe compatible with pneumonia.  Urinary antigen positive.  Bacteremia as above.  Procalcitonin is trending down.  She remains on 3 L nasal cannula chronically with history of emphysema. Continues to have c/o pleurisy. Has not received Prevnar 20 per chart review.   -Continue antibiotics as above  -Recommend Prevnar-20 this admission   -Monitor O2 requirements     4.  Rheumatoid arthritis.  Chronically immunosuppressed on methotrexate and Xeljanz.  -Hold immunosuppressants while on antibiotics     5. Obesity.  BMI 38.27.    Antibiotics:  D5 IV ceftriaxone    I have discussed the above management plan in detail with patient.     Above plan discussed in detail with  who is in agreement with plan to continue IV and  transiton to PO abx if repeat bl cx remain negative.     24 Hour events:  Yesterday and overnight notes reviewed. No acute events.     Subjective:  Patient has no fever, chills, sweats; no nausea, vomiting, diarrhea; no cough, shortness of breath; no pain. No new symptoms.    Objective:  Vitals:  Temp:  [98.2 °F (36.8 °C)-98.3 °F (36.8 °C)] 98.3 °F (36.8 °C)  HR:  [] 103  Resp:  [14-19] 14  BP: (128-146)/(59-60) 128/59  SpO2:  [90 %-97 %] 90 %  Temp (24hrs), Av.3 °F (36.8 °C), Min:98.2 °F (36.8 °C), Max:98.3 °F (36.8 °C)  Current: Temperature: 98.3 °F (36.8 °C)    PHYSICAL EXAM:  General Appearance:  Appearing well, nontoxic, and in no distress sitting in bedside recliner receiving breathing treatment    HEENT: Normocephalic, without obvious abnormality, atraumatic. Conjunctiva pink and sclera anicteric. Oropharynx moist without lesions.     Lungs:   Clear to auscultation bilaterally, respirations unlabored   Heart:  RRR; no murmur, rub or gallop   Abdomen:   Soft, obese, non-tender, non-distended, positive bowel sounds    Extremities: No cyanosis, clubbing or edema   Musculoskeletal: Back symmetric without curvature, ROM normal.    : No CVA or suprapubic tenderness.    Skin: No rashes or lesions. No draining wounds noted. Peripheral IV intact without evidence of erythema, warmth, or exudate.        LABS, IMAGING, & OTHER STUDIES:  Extensive review of the medical records in epic including review of the notes, radiographs, and laboratory results were reviewed personally as below.     Lab Results:  Results from last 7 days   Lab Units 24  0447 24  0542 24  0801   WBC Thousand/uL 8.86 7.17 12.13*   HEMOGLOBIN g/dL 9.6* 9.8* 10.5*   PLATELETS Thousands/uL 300 285 262     Results from last 7 days   Lab Units 24  0447 24  0542 24  0645 24  1746 24  1302   SODIUM mmol/L 133* 136 137   < > 125*   POTASSIUM mmol/L 4.4 4.3 3.7   < > 2.9*   CHLORIDE mmol/L 95* 99 100    < > 89*   CO2 mmol/L 30 29 29   < > 24   BUN mg/dL 13 14 18   < > 49*   CREATININE mg/dL 0.68 0.63 0.66   < > 1.64*   EGFR ml/min/1.73sq m 85 87 86   < > 30   CALCIUM mg/dL 10.3* 10.2 10.2   < > 9.3   AST U/L  --   --   --   --  21   ALT U/L  --   --   --   --  19   ALK PHOS U/L  --   --   --   --  66    < > = values in this interval not displayed.     Results from last 7 days   Lab Units 05/03/24  1120 05/03/24  1113 05/03/24  1041 05/03/24  0036 05/02/24  1312   BLOOD CULTURE  No Growth at 48 hrs.  --  No Growth at 48 hrs.  --  No Growth at 72 hrs.  Streptococcus pneumoniae*   SPUTUM CULTURE   --  2+ Growth of  --   --   --    GRAM STAIN RESULT   --  1+ Epithelial cells per low power field  No Polys or Bacteria seen  --   --  Gram positive cocci in pairs and chains*   LEGIONELLA URINARY ANTIGEN   --   --   --  Negative  --      Results from last 7 days   Lab Units 05/04/24  0657 05/03/24  0522 05/02/24  1907 05/02/24  1302   PROCALCITONIN ng/ml 10.53* 19.90* 29.82* 32.68*             Results from last 7 days   Lab Units 05/02/24  1336   D-DIMER QUANTITATIVE ug/ml FEU 1.69*       Lab interpretation/comments: normal WBC      Culture data: repeat blood cultures negative     Imaging Studies:   Imaging interpretation/comments: no new imaging studies

## 2024-05-06 NOTE — PLAN OF CARE
Problem: PHYSICAL THERAPY ADULT  Goal: Performs mobility at highest level of function for planned discharge setting.  See evaluation for individualized goals.  Description: Treatment/Interventions: Functional transfer training, LE strengthening/ROM, Elevations, Therapeutic exercise, Endurance training, Bed mobility, Gait training          See flowsheet documentation for full assessment, interventions and recommendations.  Outcome: Progressing  Note: Prognosis: Good  Problem List:  (Decreased strength; Decreased endurance; Impaired balance; Decreased mobility; Decreased safety awareness; Obesity)  Assessment: . seen for PT treatment session this date with interventions consisting of  transfers and  gait training on level and stairs  w/ emphasis on improving pt's ability to ambulate. Pt. Requiring ( ) occasional cues for sequence and safety. In comparison to previous session, Pt. With improvements in activity tolerance.  Able to complete 5 steps with min A., Moderate SOB. Pt has 15 ANNIKA her home.  Pt is in need of continued activity in PT to improve strength balance endurance mobility transfers and ambulation with return to maximize LOF. From PT/mobility standpoint, recommendation at time of d/c would be level II: moderate resource intensity in order to promote return to PLOF and independence.   The patient's AM-PAC Basic Mobility Inpatient Short Form Raw Score is 20. A Raw score of greater than 16 suggests the patient may benefit from discharge to home.  Please also refer to physical therapy recommendation for safe DC planning.        Rehab Resource Intensity Level, PT: II (Moderate Resource Intensity)    See flowsheet documentation for full assessment.

## 2024-05-07 ENCOUNTER — HOME CARE VISIT (OUTPATIENT)
Dept: HOME HEALTH SERVICES | Facility: HOME HEALTHCARE | Age: 74
End: 2024-05-07

## 2024-05-07 ENCOUNTER — PATIENT OUTREACH (OUTPATIENT)
Dept: INTERNAL MEDICINE CLINIC | Facility: CLINIC | Age: 74
End: 2024-05-07

## 2024-05-07 ENCOUNTER — TRANSITIONAL CARE MANAGEMENT (OUTPATIENT)
Dept: INTERNAL MEDICINE CLINIC | Facility: CLINIC | Age: 74
End: 2024-05-07

## 2024-05-07 DIAGNOSIS — Z71.89 COMPLEX CARE COORDINATION: Primary | ICD-10-CM

## 2024-05-07 LAB — BACTERIA BLD CULT: NORMAL

## 2024-05-07 NOTE — PROGRESS NOTES
Outpatient Care Management Note    HRR referral. Chart reviewed.  Patient was admitted to [facility] 5/2/24-5/6/24 for sepsis due to pneumonia.  Referral to  [facility] at discharge.    OP RN CM placed telephone outreach call to patient. No patient answer. Phone line rang with no answer and then just disconnected. No voicemail option offered. RN CM unable to leave message.    OP RN CM will schedule second outreach attempt for later this week.

## 2024-05-07 NOTE — UTILIZATION REVIEW
NOTIFICATION OF ADMISSION DISCHARGE   This is a Notification of Discharge from [facility]. Please be advised that this patient has been discharge from our facility. Below you will find the admission and discharge date and time including the patient’s disposition.   UTILIZATION REVIEW CONTACT:  Cosmo Lopez  Utilization   Network Utilization Review Department  Phone: [phone] x carefully listen to the prompts. All voicemails are confidential.  Email: [email or web address]     ADMISSION INFORMATION  PRESENTATION DATE: 5/2/2024 12:12 PM  OBERVATION ADMISSION DATE:   INPATIENT ADMISSION DATE: 5/2/24  5:29 PM   DISCHARGE DATE: 5/6/2024  6:18 PM   DISPOSITION:Home with Home Health Care    Network Utilization Review Department  ATTENTION: Please call with any questions or concerns to [phone] and carefully listen to the prompts so that you are directed to the right person. All voicemails are confidential.   For Discharge needs, contact Care Management DC Support Team at [phone] opt. 2  Send all requests for admission clinical reviews, approved or denied determinations and any other requests to dedicated fax number below belonging to the campus where the patient is receiving treatment. List of dedicated fax numbers for the Facilities:  FACILITY NAME UR FAX NUMBER   ADMISSION DENIALS (Administrative/Medical Necessity) [phone]   DISCHARGE SUPPORT TEAM ([facility]) [phone]   PARENT CHILD HEALTH (Maternity/NICU/Pediatrics) [phone]   [facility] [phone]   [facility] [phone]   [facility] [phone]   General acute hospital [phone]   [facility] [phone]   [facility] [phone]   [facility] [phone]   [facility] [phone]    [facility] [phone]   [facility] [phone]   [facility] [phone]   [facility] [phone]

## 2024-05-08 ENCOUNTER — HOME CARE VISIT (OUTPATIENT)
Dept: HOME HEALTH SERVICES | Facility: HOME HEALTHCARE | Age: 74
End: 2024-05-08
Payer: COMMERCIAL

## 2024-05-08 VITALS
DIASTOLIC BLOOD PRESSURE: 70 MMHG | RESPIRATION RATE: 18 BRPM | SYSTOLIC BLOOD PRESSURE: 140 MMHG | BODY MASS INDEX: 39.05 KG/M2 | TEMPERATURE: 97.4 F | HEART RATE: 92 BPM | HEIGHT: 65 IN | OXYGEN SATURATION: 96 % | WEIGHT: 234.4 LBS

## 2024-05-08 VITALS — DIASTOLIC BLOOD PRESSURE: 70 MMHG | OXYGEN SATURATION: 97 % | SYSTOLIC BLOOD PRESSURE: 112 MMHG | HEART RATE: 97 BPM

## 2024-05-08 LAB
BACTERIA BLD CULT: NORMAL
BACTERIA BLD CULT: NORMAL

## 2024-05-08 PROCEDURE — G0151 HHCP-SERV OF PT,EA 15 MIN: HCPCS

## 2024-05-08 PROCEDURE — G0299 HHS/HOSPICE OF RN EA 15 MIN: HCPCS

## 2024-05-08 PROCEDURE — 400013 VN SOC

## 2024-05-09 ENCOUNTER — PATIENT OUTREACH (OUTPATIENT)
Dept: INTERNAL MEDICINE CLINIC | Facility: CLINIC | Age: 74
End: 2024-05-09

## 2024-05-09 NOTE — PROGRESS NOTES
Outpatient Care Management Note    HRR referral-IB reminder. Second outreach attempt. Chart reviewed. Patient was admitted to [facility] 5/2/24-5/6/24 for sepsis due to pneumonia.  Patient presented with shortness of breath.  Referral to  [facility] at discharge    OP RN CM made second attempt telephone outreach call to patient with no patient answer. No VM option offered./ RN CM unable to leave message.    Unable to reach letter sent via [employer].    OP RN CM will continue to monitor for patient response to outreach attempts.  If no patient response received by 5/23/24, RN CM will close referral and remove self from care team at that time.

## 2024-05-09 NOTE — LETTER
Date: 05/09/24    Dear Shakira Polk,   My name is Spring Monsonumberto; I am a registered nurse care manager working with   [address] [place] [address]  [address] [facility] [id number].     I have not been able to reach you and would like to set a time that I can talk with you over the phone.  My work is to help patients that have complex medical conditions get the care they need. This includes patients who may have been in the hospital or emergency room.    Please call me with any questions you may have. I look forward to speaking with you.  Sincerely,  Spring Baldemar  [phone]  Outpatient Care Manager

## 2024-05-10 ENCOUNTER — HOME CARE VISIT (OUTPATIENT)
Dept: HOME HEALTH SERVICES | Facility: HOME HEALTHCARE | Age: 74
End: 2024-05-10
Payer: COMMERCIAL

## 2024-05-10 PROCEDURE — G0299 HHS/HOSPICE OF RN EA 15 MIN: HCPCS

## 2024-05-10 NOTE — CASE COMMUNICATION
PT initial evaluation completed.  Plan to see 2xwkx2, 1xwkx1 for strengthening, gait training, stair training, endurance training/edu for energy conservation.

## 2024-05-11 VITALS
OXYGEN SATURATION: 98 % | TEMPERATURE: 98.2 F | HEART RATE: 74 BPM | SYSTOLIC BLOOD PRESSURE: 152 MMHG | DIASTOLIC BLOOD PRESSURE: 70 MMHG | RESPIRATION RATE: 22 BRPM

## 2024-05-12 NOTE — CASE COMMUNICATION
missed PT visit today.. pt agreeable to visit next week.  Alteration in visit pattern this week with pt seen x1 visit vs projected 2x/week.

## 2024-05-12 NOTE — PROGRESS NOTES
Assessment/Plan:     No problem-specific Assessment & Plan notes found for this encounter.         Diagnoses and all orders for this visit:    Sepsis due to pneumonia (HCC)    Chronic respiratory failure with hypoxia (HCC)    Rheumatoid arthritis, involving unspecified site, unspecified whether rheumatoid factor present (HCC)    Positive blood culture       Reviewed recent hospital records with her and her granddaughter.  Discussed the pneumonia on the right side as well as the positive blood culture for strep pneumo.  Discussed that follow-up blood cultures were negative.  Advised her to watch for any recurrent symptoms.  Discussed likely the need for repeat imaging probably in about 6 to 8 weeks.  Recommend following up with pulmonary and she will consider this.  Discussed with her that she is somewhat immunocompromised with her medications for the rheumatoid arthritis.  Discussed that this likely makes her more prone for more aggressive infections.  Would consider reimmunizing her for pneumococcal pneumonia in the future but we will discuss this at a later date.  Better blood sugar control discussed.  Laboratory testing in the hospital did show elevated blood sugars but hemoglobin A1c had been relatively well-controlled.  Continue current medications.  She is due for her routine laboratory testing which she will have done prior to her next regular appointment in about 1 month.  Reviewed her medications with her.  Continue current medications.  Advised her to watch for any recurrent symptoms such as overall weakness or any fevers or chills or worsening respiratory status.  Will have her follow-up next month as scheduled or sooner if needed.    Subjective:     Patient ID: Shakira Polk is a 74 y.o. female.    She presents for follow-up after recent hospitalization for sepsis related to pneumococcal pneumonia.  She states that she was feeling generally well and had not even felt sick except for some increased fatigue.   On the day of admission, however, she noticed that her legs were increasingly weak and she could not stand independently.  She had presented to the emergency room and was diagnosed with sepsis.  CT scan showed pneumonia on the right side.  Urine for pneumococcal antigen was positive.  1 blood culture was also positive for strep pneumo.  She be antibiotics may states that this felt significantly different because she was not overly short with her wheezing still feels more fatigued but does feel this is improving slowly.  Is being continued on a 2-week course of amoxicillin 1000 mg 3 times a day.  She will continue this for approximately 1 more week.  Has been tolerating this without difficulty.  Denies any of the weakness into her legs that she felt at the time of admission.  Denies any fevers or chills.  Has some chronic shortness of breath but does not feel that it is worse than her usual baseline.  Has some pain up into the right back area which she feels is likely related to where the pneumonia is.  Appetite is slowly improving.  Blood sugars are very variable.        Review of Systems   Constitutional:  Positive for activity change and fatigue. Negative for appetite change, chills and fever.   HENT:  Negative for congestion and rhinorrhea.    Eyes:  Negative for visual disturbance.   Respiratory:  Positive for shortness of breath. Negative for chest tightness.    Cardiovascular:  Negative for chest pain and palpitations.   Gastrointestinal:  Negative for abdominal pain, blood in stool, diarrhea, nausea and vomiting.   Endocrine: Negative for polydipsia, polyphagia and polyuria.   Genitourinary:  Negative for dysuria, frequency and urgency.   Musculoskeletal:  Positive for arthralgias and gait problem.   Skin:  Negative for color change.   Neurological:  Negative for dizziness and headaches.   Hematological:  Does not bruise/bleed easily.   Psychiatric/Behavioral:  Negative for confusion and sleep disturbance.  The patient is not nervous/anxious.          The following portions of the patient's history were reviewed and updated as appropriate: She  has a past medical history of Arthritis, Arthritis, Cancer ([facility]), Candidiasis of skin, Cervical cancer ([facility]), Chronic respiratory failure with hypoxia and hypercapnia ([facility]) (4/20/2019), COPD (chronic obstructive pulmonary disease) ([facility]), Current chronic use of systemic steroids, Degenerative arthritis of left knee, Diabetes mellitus ([facility]), Disease of thyroid gland, Fibromyalgia, Fistula of knee, GERD (gastroesophageal reflux disease), Hyperlipidemia, Hypertension, Medial meniscus tear, Migraine, Obesity, Obesity, Osteoarthritis, Osteopenia, Pneumonia, Psychiatric disorder, Rheumatoid arthritis ([facility]), Rheumatoid arthritis ([facility]), Sepsis ([facility]), Tick bite, Vitamin B12 deficiency, and Vitamin D deficiency.  She   Patient Active Problem List    Diagnosis Date Noted    Positive blood culture 11/30/2023    Vertigo 11/28/2023    History of pulmonary embolism 11/28/2023    COVID-19 10/25/2023    PAD (peripheral artery disease) ([facility]) 08/18/2023    COPD exacerbation ([facility]) 05/16/2023    Pre-op evaluation 10/24/2022    Dependence on continuous supplemental oxygen 03/02/2022    Excessive anticoagulation 03/02/2022    MORENO (obstructive sleep apnea) 12/13/2021    Hilar lymphadenopathy 12/13/2021    Atherosclerosis 12/13/2021    T12 compression fracture ([facility]) 12/13/2021    Chronic diastolic (congestive) heart failure ([facility])     Chronic sinusitis 11/22/2021    Primary insomnia 09/08/2020    Migraine without aura and without status migrainosus, not intractable 07/28/2020    Postherpetic neuralgia 02/19/2020    Diastolic dysfunction 01/16/2020    Macrocytic anemia 01/14/2020    Hypoxemia requiring supplemental oxygen 06/19/2019    History of exposure to asbestos 05/20/2019    Chronic respiratory failure with hypoxia ([facility]) 04/20/2019    Hyponatremia 04/20/2019    Candidal dermatitis 12/28/2018     Loculated pleural effusion 12/25/2018    Constipation 11/29/2017    Renal cyst 11/29/2017    Candidiasis, cutaneous 06/30/2017    Vitamin B12 deficiency 11/10/2016    Morbid obesity with BMI of 40.0-44.9, adult ([facility]) 11/04/2016    Sepsis due to pneumonia ([facility]) 11/03/2016    Centrilobular emphysema ([facility]) 11/03/2016    Type 2 diabetes mellitus with hyperglycemia, with long-term current use of insulin ([facility]) 11/03/2016    Hypothyroidism 11/03/2016    Rheumatoid arthritis ([facility]) 11/03/2016    Essential hypertension 11/03/2016    Hyperlipidemia 11/03/2016    Gastroesophageal reflux disease without esophagitis 11/03/2016    Hypoalbuminemia 11/03/2016    Hepatic steatosis 11/03/2016    Atelectasis 11/03/2016    Anxiety 06/02/2014    Fibromyalgia 01/28/2014    Osteopenia 09/17/2012    Osteoarthritis 05/29/2012    Vitamin D deficiency 05/29/2012     She  has a past surgical history that includes Hysterectomy; Knee arthroscopy; Cataract extraction (Bilateral); Cholecystectomy; Joint replacement; Tubal ligation; Neuroplasty / transposition median nerve at carpal tunnel; Eye surgery; IR thoracentesis (5/31/2019); and IR aortagram with run-off (9/20/2023).  Her family history includes Addiction problem in her son; Asthma in her family; Breast cancer (age of onset: 45) in her half-sister; Cancer in her family; Diabetes in her family; Hypertension in her family; No Known Problems in her daughter, half-brother, half-brother, maternal aunt, maternal grandfather, maternal grandmother, paternal grandfather, paternal grandmother, son, and son; Stroke in her mother.  She  reports that she quit smoking about 11 years ago. Her smoking use included cigarettes and e-cigarettes. She started smoking about 59 years ago. She has a 48 pack-year smoking history. She has never used smokeless tobacco. She reports that she does not drink alcohol and does not use drugs.  Current Outpatient Medications   Medication Sig Dispense Refill    acetaminophen  (TYLENOL) 325 mg tablet Take 2 tablets (650 mg total) by mouth every 6 (six) hours as needed for mild pain, headaches or fever  0    albuterol (2.5 mg/3 mL) 0.083 % nebulizer solution USE 1 UNIT DOSE IN NEBULIZER EVERY 4 HOURS AS NEEDED.      albuterol (PROVENTIL HFA,VENTOLIN HFA) 90 mcg/act inhaler Inhale 2 puffs every 6 (six) hours as needed for wheezing  0    amoxicillin (AMOXIL) 500 mg capsule Take 2 capsules (1,000 mg total) by mouth every 8 (eight) hours for 10 days 60 capsule 0    aspirin (ECOTRIN LOW STRENGTH) 81 mg EC tablet Take 81 mg by mouth daily      atorvastatin (LIPITOR) 20 mg tablet TAKE ONE TABLET BY MOUTH EVERY DAY 90 tablet 0    BD Pen Needle Mamta U/F 32G X 4 MM MISC USE AS DIRECTED 100 each 0    Blood Glucose Monitoring Suppl (ONE TOUCH ULTRA 2) w/Device KIT by Does not apply route 2 (two) times a day 1 each 0    clindamycin (CLEOCIN T) 1 % lotion clindamycin 1 % lotion APPLY LOTION TOPICALLY TWICE DAILY      Cranberry 1000 MG CAPS Apply 1,000 mg to the mouth or throat in the morning. Indications: Urinary Tract Infection      DULoxetine (CYMBALTA) 60 mg delayed release capsule TAKE ONE CAPSULE BY MOUTH EVERY DAY 90 capsule 1    Eliquis 5 MG TAKE 1 TABLET BY MOUTH TWICE DAILY 60 tablet 0    ergocalciferol (VITAMIN D2) 50,000 units TAKE ONE CAPSULE BY MOUTH WEEKLY 12 capsule 0    fluticasone-salmeterol (ADVAIR) 250-50 mcg/dose Inhale 1 puff every 12 (twelve) hours       folic acid (FOLVITE) 1 mg tablet TAKE ONE TABLET BY MOUTH ONCE DAILY 90 tablet 0    furosemide (LASIX) 40 mg tablet TAKE ONE TABLET BY MOUTH EVERY DAY 90 tablet 0    glucose blood (OneTouch Ultra) test strip Use 1 each 2 (two) times a day Test 2 times daily   Dx: E11.65      guaiFENesin (MUCINEX) 600 mg 12 hr tablet Take 1 tablet (600 mg total) by mouth 2 (two) times a day 30 tablet 0    Insulin Pen Needle 33G X 4 MM MISC by Does not apply route daily 100 each 5    Lancets (OneTouch Delica Plus [id number]) MISC Test 2 times a day  200 each 3    levothyroxine (Synthroid) 137 mcg tablet Take 1 tablet (137 mcg total) by mouth daily 30 tablet 0    lisinopril (ZESTRIL) 40 mg tablet TAKE ONE TABLET BY MOUTH EVERY DAY 90 tablet 0    methotrexate 2.5 MG tablet TAKE 8 TABLETS BY MOUTH EVERY SUNDAY 32 tablet 0    mirtazapine (REMERON) 15 mg tablet Take 1 tablet (15 mg total) by mouth daily at bedtime 90 tablet 3    mometasone (ELOCON) 0.1 % ointment Apply topically 2 (two) times a day as needed (itching) (Patient taking differently: Apply 1 Application topically 2 (two) times a day as needed (itching) apply to breast folds and abdominal folds as needed) 45 g 1    nystatin (MYCOSTATIN) cream Apply topically 2 (two) times a day as needed (rash) (Patient taking differently: Apply 1 Application topically 2 (two) times a day as needed (rash) apply to breast folds and abdominal folds as needed) 30 g 0    nystatin (MYCOSTATIN) powder Apply topically 3 (three) times a day as needed (rash/irritation) (Patient taking differently: Apply 1 Application topically 3 (three) times a day as needed (rash/irritation) apply to breast folds and abdominal folds as needed.) 45 g 1    pantoprazole (PROTONIX) 40 mg tablet TAKE ONE TABLET BY MOUTH ONCE DAILY 90 tablet 1    Polyethylene Glycol 3350-GRX POWD Take by mouth daily at bedtime as needed (constipation) 850 g 0    semaglutide, 1 mg/dose, (Ozempic, 1 MG/DOSE,) 4 mg/3 mL injection pen INJECT 1 MG UNDER THE SKIN ONCE A WEEK 3 mL 1    tiotropium (SPIRIVA RESPIMAT) 2.5 MCG/ACT AERS inhaler Inhale 1 puff daily at bedtime 3 Inhaler 3    Tofacitinib Citrate ER 11 MG TB24 Take 1 tablet (11 mg total) by mouth daily Do not start before December 10, 2023.  0    Tresiba FlexTouch 100 units/mL injection pen INJECT 40 UNITS UNDER THE SKIN DAILY 30 mL 0    vitamin B-12 (VITAMIN B-12) 500 MCG tablet Take 1 tablet (500 mcg total) by mouth daily      levothyroxine 137 mcg tablet Take 1 tablet (137 mcg total) by mouth daily 90 tablet 1     oxygen gas Inhale 3 L/min continuous. Indications: Chronic Obstructive Lung Disease       Current Facility-Administered Medications   Medication Dose Route Frequency Provider Last Rate Last Admin    cyanocobalamin injection 1,000 mcg  1,000 mcg Intramuscular Q30 Days Ioana Heck MD   1,000 mcg at 06/21/23 1523     Current Outpatient Medications on File Prior to Visit   Medication Sig    acetaminophen (TYLENOL) 325 mg tablet Take 2 tablets (650 mg total) by mouth every 6 (six) hours as needed for mild pain, headaches or fever    albuterol (2.5 mg/3 mL) 0.083 % nebulizer solution USE 1 UNIT DOSE IN NEBULIZER EVERY 4 HOURS AS NEEDED.    albuterol (PROVENTIL HFA,VENTOLIN HFA) 90 mcg/act inhaler Inhale 2 puffs every 6 (six) hours as needed for wheezing    amoxicillin (AMOXIL) 500 mg capsule Take 2 capsules (1,000 mg total) by mouth every 8 (eight) hours for 10 days    aspirin (ECOTRIN LOW STRENGTH) 81 mg EC tablet Take 81 mg by mouth daily    atorvastatin (LIPITOR) 20 mg tablet TAKE ONE TABLET BY MOUTH EVERY DAY    BD Pen Needle Mamta U/F 32G X 4 MM MISC USE AS DIRECTED    Blood Glucose Monitoring Suppl (ONE TOUCH ULTRA 2) w/Device KIT by Does not apply route 2 (two) times a day    clindamycin (CLEOCIN T) 1 % lotion clindamycin 1 % lotion APPLY LOTION TOPICALLY TWICE DAILY    Cranberry 1000 MG CAPS Apply 1,000 mg to the mouth or throat in the morning. Indications: Urinary Tract Infection    DULoxetine (CYMBALTA) 60 mg delayed release capsule TAKE ONE CAPSULE BY MOUTH EVERY DAY    Eliquis 5 MG TAKE 1 TABLET BY MOUTH TWICE DAILY    ergocalciferol (VITAMIN D2) 50,000 units TAKE ONE CAPSULE BY MOUTH WEEKLY    fluticasone-salmeterol (ADVAIR) 250-50 mcg/dose Inhale 1 puff every 12 (twelve) hours     folic acid (FOLVITE) 1 mg tablet TAKE ONE TABLET BY MOUTH ONCE DAILY    furosemide (LASIX) 40 mg tablet TAKE ONE TABLET BY MOUTH EVERY DAY    glucose blood (OneTouch Ultra) test strip Use 1 each 2 (two) times a day Test 2  times daily   Dx: E11.65    guaiFENesin (MUCINEX) 600 mg 12 hr tablet Take 1 tablet (600 mg total) by mouth 2 (two) times a day    Insulin Pen Needle 33G X 4 MM MISC by Does not apply route daily    Lancets (OneTouch Delica Plus [id number]) MISC Test 2 times a day    levothyroxine (Synthroid) 137 mcg tablet Take 1 tablet (137 mcg total) by mouth daily    lisinopril (ZESTRIL) 40 mg tablet TAKE ONE TABLET BY MOUTH EVERY DAY    methotrexate 2.5 MG tablet TAKE 8 TABLETS BY MOUTH EVERY SUNDAY    mirtazapine (REMERON) 15 mg tablet Take 1 tablet (15 mg total) by mouth daily at bedtime    mometasone (ELOCON) 0.1 % ointment Apply topically 2 (two) times a day as needed (itching) (Patient taking differently: Apply 1 Application topically 2 (two) times a day as needed (itching) apply to breast folds and abdominal folds as needed)    nystatin (MYCOSTATIN) cream Apply topically 2 (two) times a day as needed (rash) (Patient taking differently: Apply 1 Application topically 2 (two) times a day as needed (rash) apply to breast folds and abdominal folds as needed)    nystatin (MYCOSTATIN) powder Apply topically 3 (three) times a day as needed (rash/irritation) (Patient taking differently: Apply 1 Application topically 3 (three) times a day as needed (rash/irritation) apply to breast folds and abdominal folds as needed.)    pantoprazole (PROTONIX) 40 mg tablet TAKE ONE TABLET BY MOUTH ONCE DAILY    Polyethylene Glycol 3350-GRX POWD Take by mouth daily at bedtime as needed (constipation)    semaglutide, 1 mg/dose, (Ozempic, 1 MG/DOSE,) 4 mg/3 mL injection pen INJECT 1 MG UNDER THE SKIN ONCE A WEEK    tiotropium (SPIRIVA RESPIMAT) 2.5 MCG/ACT AERS inhaler Inhale 1 puff daily at bedtime    Tofacitinib Citrate ER 11 MG TB24 Take 1 tablet (11 mg total) by mouth daily Do not start before December 10, 2023.    Tresiba FlexTouch 100 units/mL injection pen INJECT 40 UNITS UNDER THE SKIN DAILY    vitamin B-12 (VITAMIN B-12) 500 MCG tablet Take  "1 tablet (500 mcg total) by mouth daily    levothyroxine 137 mcg tablet Take 1 tablet (137 mcg total) by mouth daily    oxygen gas Inhale 3 L/min continuous. Indications: Chronic Obstructive Lung Disease     Current Facility-Administered Medications on File Prior to Visit   Medication    cyanocobalamin injection 1,000 mcg     She has No Known Allergies..    Objective:     Physical Exam  Vitals and nursing note reviewed.   Constitutional:       General: She is not in acute distress.     Appearance: She is well-developed and well-groomed. She is morbidly obese.   HENT:      Head: Normocephalic and atraumatic.   Eyes:      General:         Right eye: No discharge.         Left eye: No discharge.      Conjunctiva/sclera: Conjunctivae normal.      Pupils: Pupils are equal, round, and reactive to light.   Cardiovascular:      Rate and Rhythm: Normal rate and regular rhythm.      Heart sounds: Normal heart sounds. No murmur heard.     No friction rub. No gallop.   Pulmonary:      Effort: No respiratory distress.      Breath sounds: Examination of the right-upper field reveals rhonchi. Examination of the right-middle field reveals rhonchi. Rhonchi present. No wheezing or rales.   Abdominal:      General: Bowel sounds are normal. There is no distension.      Tenderness: There is no abdominal tenderness.   Lymphadenopathy:      Cervical: No cervical adenopathy.   Skin:     General: Skin is warm and dry.   Neurological:      Mental Status: She is alert and oriented to person, place, and time.   Psychiatric:         Mood and Affect: Mood and affect normal.         Speech: Speech normal.         Behavior: Behavior normal. Behavior is cooperative.         Cognition and Memory: Cognition and memory normal.           Vitals:    05/13/24 1216   BP: 132/74   BP Location: Left arm   Patient Position: Sitting   Cuff Size: Large   Pulse: (!) 114   Temp: 98.4 °F (36.9 °C)   SpO2: 94%   Weight: 108 kg (237 lb)   Height: 5' 5\" (1.651 m) "       Echo complete w/ contrast if indicated    Result Date: 5/3/2024  Narrative:   Left Ventricle: Left ventricular cavity size is normal. Wall thickness is normal. The left ventricular ejection fraction is 60%. Systolic function is normal. Wall motion is normal. Diastolic function is normal.   Aortic Valve: There is aortic valve sclerosis. Vegetation could not be excluded on the basis of this study.   Mitral Valve: There is mild annular calcification. There is mild regurgitation. Vegetation could not be excluded on the basis of this study.   Tricuspid Valve: There is mild regurgitation.     XR chest portable    Result Date: 5/3/2024  Narrative: XR CHEST PORTABLE INDICATION: cough, shortnes of breath. COMPARISON: Radiograph from November 28, 2023; same day CT chest. FINDINGS: New right upper lobe mass. Bibasilar atelectasis. No pneumothorax or pleural effusion. Normal size of the cardiomediastinal silhouette. Aortic calcifications. Bones are unremarkable for age. Normal upper abdomen.     Impression: New right upper lobe mass. Please refer to same-day CT chest. Workstation performed: [id number]     CTA ED chest PE Study    Result Date: 5/2/2024  Narrative: CTA - CHEST WITH IV CONTRAST - PULMONARY ANGIOGRAM INDICATION:   shortness of breath, tachycardia, elevated d  dimer, hx of pe. Chest and back pain. Per my review of the medical record, history of COPD on 3 L nasal cannula with history of PE on Eliquis with pain in center of chest, started yesterday, worsening today. Has had a cough. Pain worsens with coughing and deep breathing. Fever to 101. Elevated procalcitonin. White count of 25,000. COMPARISON: CXR from today, chest CT 11/28/2023 and 12/7/2021. TECHNIQUE: CT angiogram timed for optimal opacification of the pulmonary arteries.  Axial, sagittal, and coronal 2D reformats created from source data.  Coronal 3D MIP postprocessing on the acquisition scanner. Radiation dose length product (DLP):  500 mGy-cm .   Radiation dose exposure minimized using iterative reconstruction and automated exposure control. IV Contrast:  100 mL of iohexol (OMNIPAQUE) FINDINGS: PULMONARY ARTERIES:  No pulmonary embolus. LUNGS: 7.3 cm masslike opacity in the right upper lobe, new since November 2023. Mild centrilobular and paraseptal emphysema. Stable large bulla in the anterior right upper lobe since December 2021. Stable juxtapleural superior segment right lower lobe nodule since at least December 2021 compatible with a benign intrapulmonary lymph node (3/62). 6 mm tubular opacity in the lingula abutting the lateral left major fissure, stable since November 2023, benign intrapulmonary lymph node or scar (3/113). Benign intrapulmonary lymph nodes abutting the fissures. AIRWAYS: No significant filling defects. PLEURA: Stable small loculated left pleural effusion. HEART/GREAT VESSELS: Mild cardiomegaly. Mild coronary artery calcification indicating atherosclerotic heart disease. MEDIASTINUM AND MATTHEW: Small hiatal hernia. Slightly enlarged right paratracheal node, likely reactive. CHEST WALL AND LOWER NECK: Unremarkable. UPPER ABDOMEN: Cholecystectomy. OSSEOUS STRUCTURES: Moderate degenerative disease in the spine. Stable mild compression deformities in the spine.     Impression: No pulmonary embolus. 7.3 cm masslike opacity in the right upper lobe, new since October 2023, compatible with pneumonia. Small hiatal hernia. Stable small loculated left pleural effusion. Workstation performed: [id number]       No results displayed because visit has over 200 results.          Transitional Care Management Review:  Shakira Polk is a 74 y.o. female here for TCM follow up.     During the TCM phone call patient stated:    TCM Call       Date and time call was made  5/7/2024  4:23 PM    Hospital care reviewed  Discussed with Inpatient Physician    Patient was hospitialized at  [facility]    Date of Admission  05/02/24    Date of discharge  05/06/24     Diagnosis  COPD    Disposition  Home    Were the patients medications reviewed and updated  Yes    Current Symptoms  Fatigue; Weakness    Cough Severity  Moderate    Weakness severity  Moderate    Fatigue severity  Mild    Headache pain severity  Moderate    Headache pain level  2    Headache cause / trigger  Stressful event    Cause certainty  Probably          TCM Call       Clinical progress  improving    Headache-pertinent history  Tension headaches    Post hospital issues  Reduced activity    Should patient be enrolled in anticoag monitoring?  No    Scheduled for follow up?  Yes    Patients specialists  Pulmonlolgist    Pulmonologist name  Dr Stringer    Did you obtain your prescribed medications  Yes    Do you need help managing your prescriptions or medications  No    Is transportation to your appointment needed  No    I have advised the patient to call PCP with any new or worsening symptoms  Rakel Sarabia,     Living Arrangements  Family members    Support System  Family    The type of support provided  Emotional; Financial; Physical    Do you have social support  Yes, some    Are you recieving any outpatient services  No    Are you recieving home care services  Yes    Types of home care services  Nurse visit; Home PT    Comment  [facility]    Are you using any community resources  No    Current waiver services  No    Have you fallen in the last 12 months  No    Interperter language line needed  No    Counseling  Patient    Counseling topics  Activities of daily living    Comments  Pt stated she feels good.                Ioana Heck MD

## 2024-05-13 ENCOUNTER — HOME CARE VISIT (OUTPATIENT)
Dept: HOME HEALTH SERVICES | Facility: HOME HEALTHCARE | Age: 74
End: 2024-05-13
Payer: COMMERCIAL

## 2024-05-13 ENCOUNTER — OFFICE VISIT (OUTPATIENT)
Dept: INTERNAL MEDICINE CLINIC | Facility: CLINIC | Age: 74
End: 2024-05-13
Payer: COMMERCIAL

## 2024-05-13 VITALS
HEIGHT: 65 IN | WEIGHT: 237 LBS | BODY MASS INDEX: 39.49 KG/M2 | HEART RATE: 114 BPM | OXYGEN SATURATION: 94 % | DIASTOLIC BLOOD PRESSURE: 74 MMHG | SYSTOLIC BLOOD PRESSURE: 132 MMHG | TEMPERATURE: 98.4 F

## 2024-05-13 DIAGNOSIS — J96.11 CHRONIC RESPIRATORY FAILURE WITH HYPOXIA (HCC): ICD-10-CM

## 2024-05-13 DIAGNOSIS — R78.81 POSITIVE BLOOD CULTURE: ICD-10-CM

## 2024-05-13 DIAGNOSIS — A41.9 SEPSIS DUE TO PNEUMONIA (HCC): Primary | ICD-10-CM

## 2024-05-13 DIAGNOSIS — J18.9 SEPSIS DUE TO PNEUMONIA (HCC): Primary | ICD-10-CM

## 2024-05-13 DIAGNOSIS — K21.9 GASTROESOPHAGEAL REFLUX DISEASE: ICD-10-CM

## 2024-05-13 DIAGNOSIS — M06.9 RHEUMATOID ARTHRITIS, INVOLVING UNSPECIFIED SITE, UNSPECIFIED WHETHER RHEUMATOID FACTOR PRESENT (HCC): ICD-10-CM

## 2024-05-13 PROCEDURE — G0151 HHCP-SERV OF PT,EA 15 MIN: HCPCS

## 2024-05-13 PROCEDURE — 99496 TRANSJ CARE MGMT HIGH F2F 7D: CPT | Performed by: FAMILY MEDICINE

## 2024-05-14 ENCOUNTER — HOME CARE VISIT (OUTPATIENT)
Dept: HOME HEALTH SERVICES | Facility: HOME HEALTHCARE | Age: 74
End: 2024-05-14
Payer: COMMERCIAL

## 2024-05-14 VITALS
OXYGEN SATURATION: 97 % | SYSTOLIC BLOOD PRESSURE: 120 MMHG | RESPIRATION RATE: 22 BRPM | DIASTOLIC BLOOD PRESSURE: 60 MMHG | TEMPERATURE: 97.4 F | HEART RATE: 74 BPM

## 2024-05-14 VITALS — SYSTOLIC BLOOD PRESSURE: 110 MMHG | HEART RATE: 99 BPM | OXYGEN SATURATION: 91 % | DIASTOLIC BLOOD PRESSURE: 70 MMHG

## 2024-05-14 PROCEDURE — G0299 HHS/HOSPICE OF RN EA 15 MIN: HCPCS

## 2024-05-15 ENCOUNTER — HOME CARE VISIT (OUTPATIENT)
Dept: HOME HEALTH SERVICES | Facility: HOME HEALTHCARE | Age: 74
End: 2024-05-15
Payer: COMMERCIAL

## 2024-05-15 PROCEDURE — G0155 HHCP-SVS OF CSW,EA 15 MIN: HCPCS

## 2024-05-15 RX ORDER — METHOTREXATE 2.5 MG/1
TABLET ORAL
Qty: 32 TABLET | Refills: 0 | Status: SHIPPED | OUTPATIENT
Start: 2024-05-15

## 2024-05-16 ENCOUNTER — TELEPHONE (OUTPATIENT)
Dept: HOME HEALTH SERVICES | Facility: HOME HEALTHCARE | Age: 74
End: 2024-05-16

## 2024-05-16 ENCOUNTER — TELEPHONE (OUTPATIENT)
Dept: INTERNAL MEDICINE CLINIC | Facility: CLINIC | Age: 74
End: 2024-05-16

## 2024-05-16 ENCOUNTER — HOME CARE VISIT (OUTPATIENT)
Dept: HOME HEALTH SERVICES | Facility: HOME HEALTHCARE | Age: 74
End: 2024-05-16
Payer: COMMERCIAL

## 2024-05-16 VITALS — OXYGEN SATURATION: 95 % | DIASTOLIC BLOOD PRESSURE: 58 MMHG | HEART RATE: 118 BPM | SYSTOLIC BLOOD PRESSURE: 118 MMHG

## 2024-05-16 PROCEDURE — G0151 HHCP-SERV OF PT,EA 15 MIN: HCPCS

## 2024-05-16 NOTE — TELEPHONE ENCOUNTER
Received Prior Auth request for patients Ozempic 1mg dose.       [employer]     Will also give form to front office to scan into chart.

## 2024-05-16 NOTE — CASE COMMUNICATION
OT contacted patient to schedule patient for OT evaluation visit for today.  Patient notes she will not avalable for evaluation today and requested OT to reschedule visit for  5/15 or 5 /17.  OT to reschedule patient for 5/17/24 at patient's request.

## 2024-05-17 ENCOUNTER — HOME CARE VISIT (OUTPATIENT)
Dept: HOME HEALTH SERVICES | Facility: HOME HEALTHCARE | Age: 74
End: 2024-05-17
Payer: COMMERCIAL

## 2024-05-17 PROCEDURE — G0152 HHCP-SERV OF OT,EA 15 MIN: HCPCS | Performed by: OCCUPATIONAL THERAPIST

## 2024-05-20 ENCOUNTER — HOME CARE VISIT (OUTPATIENT)
Dept: HOME HEALTH SERVICES | Facility: HOME HEALTHCARE | Age: 74
End: 2024-05-20
Payer: COMMERCIAL

## 2024-05-20 VITALS — HEART RATE: 100 BPM | OXYGEN SATURATION: 93 % | SYSTOLIC BLOOD PRESSURE: 142 MMHG | DIASTOLIC BLOOD PRESSURE: 60 MMHG

## 2024-05-20 PROCEDURE — G0151 HHCP-SERV OF PT,EA 15 MIN: HCPCS

## 2024-05-21 ENCOUNTER — HOME CARE VISIT (OUTPATIENT)
Dept: HOME HEALTH SERVICES | Facility: HOME HEALTHCARE | Age: 74
End: 2024-05-21
Payer: COMMERCIAL

## 2024-05-21 VITALS
SYSTOLIC BLOOD PRESSURE: 120 MMHG | HEART RATE: 100 BPM | DIASTOLIC BLOOD PRESSURE: 58 MMHG | RESPIRATION RATE: 24 BRPM | TEMPERATURE: 97.4 F | OXYGEN SATURATION: 94 %

## 2024-05-21 VITALS — DIASTOLIC BLOOD PRESSURE: 60 MMHG | HEART RATE: 105 BPM | OXYGEN SATURATION: 98 % | SYSTOLIC BLOOD PRESSURE: 120 MMHG

## 2024-05-21 PROCEDURE — G0299 HHS/HOSPICE OF RN EA 15 MIN: HCPCS

## 2024-05-21 NOTE — TELEPHONE ENCOUNTER
PA for semaglutide, 1 mg/dose, (Ozempic, 1 MG/DOSE,) 4 mg/3 mL not required     Reason- (screenshot if applicable)              Message sent to provider pool No

## 2024-05-21 NOTE — TELEPHONE ENCOUNTER
PA for semaglutide, 1 mg/dose, (Ozempic, 1 MG/DOSE,) 4 mg/3 mL     Submitted via    [x][employer] [id number]  [][employer]-Case ID #   []Faxed to plan   []Other website   []Phone call Case ID #     Office notes sent, clinical questions answered. Awaiting determination    Turnaround time for your insurance to make a decision on your Prior Authorization can take 7-21 business days.

## 2024-05-21 NOTE — CASE COMMUNICATION
No further OT services are planned at this time as patient denies need for review of upper extremity exercises or ADL training as she is  comfortable with current upper extremity strength and  current function/ receiving assistance  for performance of self care/daily activities.

## 2024-05-23 ENCOUNTER — PATIENT OUTREACH (OUTPATIENT)
Dept: INTERNAL MEDICINE CLINIC | Facility: CLINIC | Age: 74
End: 2024-05-23

## 2024-05-23 NOTE — PROGRESS NOTES
Outpatient Care Management Note    IB reminder: Unable to reach follow up. Chart Reviewed.    Patient previously admitted to [facility]-5/6/24 for sepsis due to pneumonia.    Noted per chart review, patient has  VNA services and patient had an appointment with PCP on 5/13/24.    OP RN SUKHI made two previous telephone outreach attempts and was unable to reach patient. No VM option offered at time of outreach calls. RN CM was unable to leave message.  UTR reach letter was sent via [email or web address] on 5/9/24 with no patient response received.    OP RN CM will remove self from care team at this time and close referral.  No further RN CM outreach scheduled.

## 2024-05-24 ENCOUNTER — HOME CARE VISIT (OUTPATIENT)
Dept: HOME HEALTH SERVICES | Facility: HOME HEALTHCARE | Age: 74
End: 2024-05-24
Payer: COMMERCIAL

## 2024-05-24 ENCOUNTER — TELEPHONE (OUTPATIENT)
Dept: HOME HEALTH SERVICES | Facility: HOME HEALTHCARE | Age: 74
End: 2024-05-24

## 2024-06-12 ENCOUNTER — HOME CARE VISIT (OUTPATIENT)
Dept: HOME HEALTH SERVICES | Facility: HOME HEALTHCARE | Age: 74
End: 2024-06-12
Payer: COMMERCIAL

## 2024-06-13 ENCOUNTER — HOME CARE VISIT (OUTPATIENT)
Dept: HOME HEALTH SERVICES | Facility: HOME HEALTHCARE | Age: 74
End: 2024-06-13
Payer: COMMERCIAL

## 2024-06-13 NOTE — CASE COMMUNICATION
Attempts to contact pt have been unsuccessful.  Pt to be discharged from VNA services as of this date.  Thank you.    Anamika - please complete the d/c OASIS.  Thank you.

## 2024-06-17 ENCOUNTER — OFFICE VISIT (OUTPATIENT)
Dept: INTERNAL MEDICINE CLINIC | Facility: CLINIC | Age: 74
End: 2024-06-17
Payer: COMMERCIAL

## 2024-06-17 VITALS
HEIGHT: 65 IN | BODY MASS INDEX: 39.42 KG/M2 | DIASTOLIC BLOOD PRESSURE: 78 MMHG | HEART RATE: 107 BPM | WEIGHT: 236.6 LBS | SYSTOLIC BLOOD PRESSURE: 144 MMHG | TEMPERATURE: 98.2 F | OXYGEN SATURATION: 92 %

## 2024-06-17 DIAGNOSIS — J96.11 CHRONIC RESPIRATORY FAILURE WITH HYPOXIA (HCC): ICD-10-CM

## 2024-06-17 DIAGNOSIS — E11.65 TYPE 2 DIABETES MELLITUS WITH HYPERGLYCEMIA, WITH LONG-TERM CURRENT USE OF INSULIN (HCC): ICD-10-CM

## 2024-06-17 DIAGNOSIS — J43.2 CENTRILOBULAR EMPHYSEMA (HCC): ICD-10-CM

## 2024-06-17 DIAGNOSIS — F51.01 PRIMARY INSOMNIA: Primary | ICD-10-CM

## 2024-06-17 DIAGNOSIS — Z79.4 TYPE 2 DIABETES MELLITUS WITH HYPERGLYCEMIA, WITH LONG-TERM CURRENT USE OF INSULIN (HCC): ICD-10-CM

## 2024-06-17 DIAGNOSIS — M06.9 RHEUMATOID ARTHRITIS, INVOLVING UNSPECIFIED SITE, UNSPECIFIED WHETHER RHEUMATOID FACTOR PRESENT (HCC): ICD-10-CM

## 2024-06-17 DIAGNOSIS — I10 ESSENTIAL HYPERTENSION: ICD-10-CM

## 2024-06-17 PROCEDURE — G2211 COMPLEX E/M VISIT ADD ON: HCPCS | Performed by: FAMILY MEDICINE

## 2024-06-17 PROCEDURE — 99214 OFFICE O/P EST MOD 30 MIN: CPT | Performed by: FAMILY MEDICINE

## 2024-06-17 RX ORDER — ZOLPIDEM TARTRATE 5 MG/1
5 TABLET ORAL
Qty: 30 TABLET | Refills: 0 | Status: SHIPPED | OUTPATIENT
Start: 2024-06-17

## 2024-06-17 NOTE — PROGRESS NOTES
Ambulatory Visit  Name: Shakira Polk      : 1950      MRN: [id number]  Encounter Provider: Ioaan Heck MD  Encounter Date: 2024   Encounter department: [facility]    Assessment & Plan   1. Primary insomnia  -     zolpidem (AMBIEN) 5 mg tablet; Take 1 tablet (5 mg total) by mouth daily at bedtime as needed for sleep  2. Chronic respiratory failure with hypoxia (HCC)  3. Centrilobular emphysema (HCC)  4. Essential hypertension  5. Type 2 diabetes mellitus with hyperglycemia, with long-term current use of insulin (HCC)  6. Rheumatoid arthritis, involving unspecified site, unspecified whether rheumatoid factor present ([facility])    Orders and recommendations as noted above and noted previously.  Respiratory status is slowly improving.  Discussed using her nebulizer treatments more often especially given some persisting congestion and cough.  Watch for any worsening.  Continue with the oxygen.  Continue to follow-up with pulmonary.  Discussed elevation in her blood sugars especially being off of the insulin and the Ozempic.  Forms were completed to hopefully have her obtain these.  Financial constraints discussed.  Continue to follow-up with rheumatology.  Try to remain as active as possible but be careful especially with the very warm weather coming soon.  Discussed with her getting her routine laboratory testing completed in about 3 to 4 weeks.  Importance of this reviewed.  Will have her follow-up in about 6 to 8 weeks or sooner if needed.     History of Present Illness     She presents for follow-up.  Has been having some persistent nasal congestion and postnasal drip.  Has had some wheezing.  Has only been using her nebulizer about once a day.  Denies any chest pain or palpitations.  Denies any significant shortness of breath at present.  Continues with the oxygen.  Blood sugars have been significantly more elevated.  She has had difficulty obtaining her insulin and her  Ozempic because of insurance issues and cost.  She is undergoing the process to try to get these covered through [employer].  She has noticed that her blood sugars have been in the upper 200s to above 300s at times.  Has not had any recent laboratory testing completed.  Has had some increase in thirst and urination.  Continues with difficulty sleeping.  The Remeron is no longer effective.  Is interested in discussing other options.        Review of Systems   Constitutional:  Positive for activity change. Negative for appetite change, chills and fever.   HENT:  Positive for congestion. Negative for rhinorrhea.    Eyes:  Negative for visual disturbance.   Respiratory:  Positive for shortness of breath. Negative for chest tightness.    Cardiovascular:  Negative for chest pain and palpitations.   Gastrointestinal:  Negative for abdominal pain, blood in stool, diarrhea, nausea and vomiting.   Endocrine: Positive for polyuria. Negative for polydipsia and polyphagia.        As per HPI   Genitourinary:  Negative for dysuria, frequency and urgency.   Musculoskeletal:  Positive for arthralgias and gait problem.   Skin:  Negative for color change.   Neurological:  Negative for dizziness and headaches.   Hematological:  Does not bruise/bleed easily.   Psychiatric/Behavioral:  Negative for confusion and sleep disturbance. The patient is not nervous/anxious.      Medical History Reviewed by provider this encounter:  Tobacco  Allergies  Meds  Problems  Med Hx  Surg Hx  Fam Hx       Past Medical History   Past Medical History:   Diagnosis Date    Arthritis     Arthritis     Cancer (HCC)     Candidiasis of skin     Cervical cancer ([facility])     Chronic respiratory failure with hypoxia and hypercapnia ([facility]) 4/20/2019    COPD (chronic obstructive pulmonary disease) ([facility])     last assessed 11/21/2016    Current chronic use of systemic steroids     Degenerative arthritis of left knee     last assessed 1/20/2015    Diabetes mellitus ([facility])      Disease of thyroid gland     hypo pill given to decrease thyroid.     Fibromyalgia     Fistula of knee     last assessed 9/12/2014    GERD (gastroesophageal reflux disease)     Hyperlipidemia     Hypertension     Medial meniscus tear     of left knee, last assessed 9/12/2014    Migraine     states she has a hx of HA that she calls Interface21 but never was dx by neurologist    Obesity     Obesity     Osteoarthritis     Osteopenia     Pneumonia     last assessed 11/16/2016    Psychiatric disorder     anxiety    Rheumatoid arthritis (HCC)     Rheumatoid arthritis (HCC)     Sepsis (HCC)     last assessed 11/10/2016    Tick bite     last assessed 10/30/2015    Vitamin B12 deficiency     Vitamin D deficiency      Past Surgical History:   Procedure Laterality Date    CATARACT EXTRACTION Bilateral     CHOLECYSTECTOMY      EYE SURGERY      Bilateral Cataracts    HYSTERECTOMY      IR AORTAGRAM WITH RUN-OFF  9/20/2023    IR THORACENTESIS  5/31/2019    JOINT REPLACEMENT      left knee    KNEE ARTHROSCOPY      NEUROPLASTY / TRANSPOSITION MEDIAN NERVE AT CARPAL TUNNEL      TUBAL LIGATION       Family History   Problem Relation Age of Onset    Stroke Mother     No Known Problems Daughter     No Known Problems Maternal Grandmother     No Known Problems Maternal Grandfather     No Known Problems Paternal Grandmother     No Known Problems Paternal Grandfather     Asthma Family     Cancer Family     Diabetes Family     Hypertension Family     No Known Problems Son     No Known Problems Son     Addiction problem Son     Breast cancer Half-Sister 45    No Known Problems Half-Brother     No Known Problems Half-Brother     No Known Problems Maternal Aunt      Current Outpatient Medications on File Prior to Visit   Medication Sig Dispense Refill    acetaminophen (TYLENOL) 325 mg tablet Take 2 tablets (650 mg total) by mouth every 6 (six) hours as needed for mild pain, headaches or fever  0    albuterol (2.5 mg/3 mL) 0.083 % nebulizer  solution USE 1 UNIT DOSE IN NEBULIZER EVERY 4 HOURS AS NEEDED.      albuterol (PROVENTIL HFA,VENTOLIN HFA) 90 mcg/act inhaler Inhale 2 puffs every 6 (six) hours as needed for wheezing  0    aspirin (ECOTRIN LOW STRENGTH) 81 mg EC tablet Take 81 mg by mouth daily      atorvastatin (LIPITOR) 20 mg tablet TAKE ONE TABLET BY MOUTH EVERY DAY 90 tablet 0    BD Pen Needle Mamta U/F 32G X 4 MM MISC USE AS DIRECTED 100 each 0    Blood Glucose Monitoring Suppl (ONE TOUCH ULTRA 2) w/Device KIT by Does not apply route 2 (two) times a day 1 each 0    clindamycin (CLEOCIN T) 1 % lotion clindamycin 1 % lotion APPLY LOTION TOPICALLY TWICE DAILY      Cranberry 1000 MG CAPS Apply 1,000 mg to the mouth or throat in the morning. Indications: Urinary Tract Infection      DULoxetine (CYMBALTA) 60 mg delayed release capsule TAKE ONE CAPSULE BY MOUTH EVERY DAY 90 capsule 1    Eliquis 5 MG TAKE 1 TABLET BY MOUTH TWICE DAILY 60 tablet 0    ergocalciferol (VITAMIN D2) 50,000 units TAKE ONE CAPSULE BY MOUTH WEEKLY 12 capsule 0    fluticasone-salmeterol (ADVAIR) 250-50 mcg/dose Inhale 1 puff every 12 (twelve) hours       folic acid (FOLVITE) 1 mg tablet TAKE ONE TABLET BY MOUTH ONCE DAILY 90 tablet 0    furosemide (LASIX) 40 mg tablet TAKE ONE TABLET BY MOUTH EVERY DAY 90 tablet 0    glucose blood (OneTouch Ultra) test strip Use 1 each 2 (two) times a day Test 2 times daily   Dx: E11.65      guaiFENesin (MUCINEX) 600 mg 12 hr tablet Take 1 tablet (600 mg total) by mouth 2 (two) times a day 30 tablet 0    Insulin Pen Needle 33G X 4 MM MISC by Does not apply route daily 100 each 5    Lancets (OneTouch Delica Plus [id number]) MISC Test 2 times a day 200 each 3    levothyroxine (Synthroid) 137 mcg tablet Take 1 tablet (137 mcg total) by mouth daily 30 tablet 0    levothyroxine 137 mcg tablet Take 1 tablet (137 mcg total) by mouth daily 90 tablet 1    lisinopril (ZESTRIL) 40 mg tablet TAKE ONE TABLET BY MOUTH EVERY DAY 90 tablet 0    methotrexate 2.5  MG tablet TAKE 8 TABLETS BY MOUTH EVERY SUNDAY 32 tablet 0    mometasone (ELOCON) 0.1 % ointment Apply topically 2 (two) times a day as needed (itching) (Patient taking differently: Apply 1 Application topically 2 (two) times a day as needed (itching) apply to breast folds and abdominal folds as needed) 45 g 1    nystatin (MYCOSTATIN) cream Apply topically 2 (two) times a day as needed (rash) (Patient taking differently: Apply 1 Application topically 2 (two) times a day as needed (rash) apply to breast folds and abdominal folds as needed) 30 g 0    nystatin (MYCOSTATIN) powder Apply topically 3 (three) times a day as needed (rash/irritation) (Patient taking differently: Apply 1 Application topically 3 (three) times a day as needed (rash/irritation) apply to breast folds and abdominal folds as needed.) 45 g 1    oxygen gas Inhale 3 L/min continuous. nasal cannula  Indications: Chronic Obstructive Lung Disease      pantoprazole (PROTONIX) 40 mg tablet TAKE ONE TABLET BY MOUTH ONCE DAILY 90 tablet 1    Polyethylene Glycol 3350-GRX POWD Take by mouth daily at bedtime as needed (constipation) 850 g 0    semaglutide, 1 mg/dose, (Ozempic, 1 MG/DOSE,) 4 mg/3 mL injection pen INJECT 1 MG UNDER THE SKIN ONCE A WEEK 3 mL 1    tiotropium (SPIRIVA RESPIMAT) 2.5 MCG/ACT AERS inhaler Inhale 1 puff daily at bedtime 3 Inhaler 3    Tofacitinib Citrate ER 11 MG TB24 Take 1 tablet (11 mg total) by mouth daily Do not start before December 10, 2023.  0    Tresiba FlexTouch 100 units/mL injection pen INJECT 40 UNITS UNDER THE SKIN DAILY 30 mL 0    vitamin B-12 (VITAMIN B-12) 500 MCG tablet Take 1 tablet (500 mcg total) by mouth daily       Current Facility-Administered Medications on File Prior to Visit   Medication Dose Route Frequency Provider Last Rate Last Admin    cyanocobalamin injection 1,000 mcg  1,000 mcg Intramuscular Q30 Days Ioana Heck MD   1,000 mcg at 06/21/23 1523   No Known Allergies   Current Outpatient  Medications on File Prior to Visit   Medication Sig Dispense Refill    acetaminophen (TYLENOL) 325 mg tablet Take 2 tablets (650 mg total) by mouth every 6 (six) hours as needed for mild pain, headaches or fever  0    albuterol (2.5 mg/3 mL) 0.083 % nebulizer solution USE 1 UNIT DOSE IN NEBULIZER EVERY 4 HOURS AS NEEDED.      albuterol (PROVENTIL HFA,VENTOLIN HFA) 90 mcg/act inhaler Inhale 2 puffs every 6 (six) hours as needed for wheezing  0    aspirin (ECOTRIN LOW STRENGTH) 81 mg EC tablet Take 81 mg by mouth daily      atorvastatin (LIPITOR) 20 mg tablet TAKE ONE TABLET BY MOUTH EVERY DAY 90 tablet 0    BD Pen Needle Mamta U/F 32G X 4 MM MISC USE AS DIRECTED 100 each 0    Blood Glucose Monitoring Suppl (ONE TOUCH ULTRA 2) w/Device KIT by Does not apply route 2 (two) times a day 1 each 0    clindamycin (CLEOCIN T) 1 % lotion clindamycin 1 % lotion APPLY LOTION TOPICALLY TWICE DAILY      Cranberry 1000 MG CAPS Apply 1,000 mg to the mouth or throat in the morning. Indications: Urinary Tract Infection      DULoxetine (CYMBALTA) 60 mg delayed release capsule TAKE ONE CAPSULE BY MOUTH EVERY DAY 90 capsule 1    Eliquis 5 MG TAKE 1 TABLET BY MOUTH TWICE DAILY 60 tablet 0    ergocalciferol (VITAMIN D2) 50,000 units TAKE ONE CAPSULE BY MOUTH WEEKLY 12 capsule 0    fluticasone-salmeterol (ADVAIR) 250-50 mcg/dose Inhale 1 puff every 12 (twelve) hours       folic acid (FOLVITE) 1 mg tablet TAKE ONE TABLET BY MOUTH ONCE DAILY 90 tablet 0    furosemide (LASIX) 40 mg tablet TAKE ONE TABLET BY MOUTH EVERY DAY 90 tablet 0    glucose blood (OneTouch Ultra) test strip Use 1 each 2 (two) times a day Test 2 times daily   Dx: E11.65      guaiFENesin (MUCINEX) 600 mg 12 hr tablet Take 1 tablet (600 mg total) by mouth 2 (two) times a day 30 tablet 0    Insulin Pen Needle 33G X 4 MM MISC by Does not apply route daily 100 each 5    Lancets (OneTouch Delica Plus [id number]) MISC Test 2 times a day 200 each 3    levothyroxine (Synthroid) 137  mcg tablet Take 1 tablet (137 mcg total) by mouth daily 30 tablet 0    levothyroxine 137 mcg tablet Take 1 tablet (137 mcg total) by mouth daily 90 tablet 1    lisinopril (ZESTRIL) 40 mg tablet TAKE ONE TABLET BY MOUTH EVERY DAY 90 tablet 0    methotrexate 2.5 MG tablet TAKE 8 TABLETS BY MOUTH EVERY SUNDAY 32 tablet 0    mometasone (ELOCON) 0.1 % ointment Apply topically 2 (two) times a day as needed (itching) (Patient taking differently: Apply 1 Application topically 2 (two) times a day as needed (itching) apply to breast folds and abdominal folds as needed) 45 g 1    nystatin (MYCOSTATIN) cream Apply topically 2 (two) times a day as needed (rash) (Patient taking differently: Apply 1 Application topically 2 (two) times a day as needed (rash) apply to breast folds and abdominal folds as needed) 30 g 0    nystatin (MYCOSTATIN) powder Apply topically 3 (three) times a day as needed (rash/irritation) (Patient taking differently: Apply 1 Application topically 3 (three) times a day as needed (rash/irritation) apply to breast folds and abdominal folds as needed.) 45 g 1    oxygen gas Inhale 3 L/min continuous. nasal cannula  Indications: Chronic Obstructive Lung Disease      pantoprazole (PROTONIX) 40 mg tablet TAKE ONE TABLET BY MOUTH ONCE DAILY 90 tablet 1    Polyethylene Glycol 3350-GRX POWD Take by mouth daily at bedtime as needed (constipation) 850 g 0    semaglutide, 1 mg/dose, (Ozempic, 1 MG/DOSE,) 4 mg/3 mL injection pen INJECT 1 MG UNDER THE SKIN ONCE A WEEK 3 mL 1    tiotropium (SPIRIVA RESPIMAT) 2.5 MCG/ACT AERS inhaler Inhale 1 puff daily at bedtime 3 Inhaler 3    Tofacitinib Citrate ER 11 MG TB24 Take 1 tablet (11 mg total) by mouth daily Do not start before December 10, 2023.  0    Tresiba FlexTouch 100 units/mL injection pen INJECT 40 UNITS UNDER THE SKIN DAILY 30 mL 0    vitamin B-12 (VITAMIN B-12) 500 MCG tablet Take 1 tablet (500 mcg total) by mouth daily       Current Facility-Administered Medications  "on File Prior to Visit   Medication Dose Route Frequency Provider Last Rate Last Admin    cyanocobalamin injection 1,000 mcg  1,000 mcg Intramuscular Q30 Days Ioana Heck MD   1,000 mcg at 23 1523      Social History     Tobacco Use    Smoking status: Former     Current packs/day: 0.00     Average packs/day: 1 pack/day for 48.0 years (48.0 ttl pk-yrs)     Types: Cigarettes, E-Cigarettes     Start date: 1964     Quit date: 2012     Years since quittin.5    Smokeless tobacco: Never    Tobacco comments:     per allscripts - \"current every day smoker, former smoker\"   Vaping Use    Vaping status: Former   Substance and Sexual Activity    Alcohol use: Never     Alcohol/week: 0.0 standard drinks of alcohol     Comment: stopped drinking alcohol    Drug use: Never    Sexual activity: Never     Objective     /78 (BP Location: Left arm, Patient Position: Sitting)   Pulse (!) 107   Temp 98.2 °F (36.8 °C)   Ht 5' 5\" (1.651 m)   Wt 107 kg (236 lb 9.6 oz)   SpO2 92%   BMI 39.37 kg/m²     Physical Exam  Vitals and nursing note reviewed.   Constitutional:       General: She is not in acute distress.     Appearance: She is well-developed and well-groomed.   HENT:      Head: Normocephalic and atraumatic.   Eyes:      General:         Right eye: No discharge.         Left eye: No discharge.      Conjunctiva/sclera: Conjunctivae normal.      Pupils: Pupils are equal, round, and reactive to light.   Cardiovascular:      Rate and Rhythm: Normal rate and regular rhythm.      Heart sounds: Normal heart sounds. No murmur heard.     No friction rub. No gallop.   Pulmonary:      Effort: No respiratory distress.      Breath sounds: Transmitted upper airway sounds present. Decreased breath sounds present. No wheezing or rales.   Abdominal:      General: Bowel sounds are normal. There is no distension.      Tenderness: There is no abdominal tenderness.   Lymphadenopathy:      Cervical: No cervical " adenopathy.   Skin:     General: Skin is warm and dry.   Neurological:      Mental Status: She is alert and oriented to person, place, and time.   Psychiatric:         Behavior: Behavior normal. Behavior is cooperative.       Administrative Statements

## 2024-06-22 DIAGNOSIS — J32.9 CHRONIC SINUSITIS, UNSPECIFIED LOCATION: ICD-10-CM

## 2024-06-24 DIAGNOSIS — E11.65 TYPE 2 DIABETES MELLITUS WITH HYPERGLYCEMIA, WITH LONG-TERM CURRENT USE OF INSULIN (HCC): ICD-10-CM

## 2024-06-24 DIAGNOSIS — E03.9 HYPOTHYROIDISM, UNSPECIFIED TYPE: ICD-10-CM

## 2024-06-24 DIAGNOSIS — E11.9 TYPE 2 DIABETES MELLITUS WITHOUT COMPLICATION, WITHOUT LONG-TERM CURRENT USE OF INSULIN (HCC): ICD-10-CM

## 2024-06-24 DIAGNOSIS — Z79.4 TYPE 2 DIABETES MELLITUS WITH HYPERGLYCEMIA, WITH LONG-TERM CURRENT USE OF INSULIN (HCC): ICD-10-CM

## 2024-06-24 RX ORDER — FLUCONAZOLE 150 MG/1
TABLET ORAL
Qty: 3 TABLET | Refills: 0 | Status: SHIPPED | OUTPATIENT
Start: 2024-06-24 | End: 2024-06-27

## 2024-06-24 RX ORDER — BLOOD SUGAR DIAGNOSTIC
1 STRIP MISCELLANEOUS 2 TIMES DAILY
Qty: 300 STRIP | Refills: 3 | Status: SHIPPED | OUTPATIENT
Start: 2024-06-24

## 2024-06-24 RX ORDER — LANCETS 33 GAUGE
EACH MISCELLANEOUS
Qty: 200 EACH | Refills: 3 | Status: SHIPPED | OUTPATIENT
Start: 2024-06-24

## 2024-06-24 NOTE — TELEPHONE ENCOUNTER
Medication: Levothyroxine (Synthroid)    Dose/Frequency: 137 mcg, take 1 tablet by mouth daily    Quantity: 30    Pharmacy: [facility], [place]    Office:   [x] PCP/Provider -   [] Speciality/Provider -     Does the patient have enough for 3 days?   [] Yes   [x] No - Send as HP to POD

## 2024-06-25 RX ORDER — LEVOTHYROXINE SODIUM 137 UG/1
137 TABLET ORAL DAILY
Qty: 30 TABLET | Refills: 5 | Status: SHIPPED | OUTPATIENT
Start: 2024-06-25

## 2024-07-01 DIAGNOSIS — R60.0 PERIPHERAL EDEMA: ICD-10-CM

## 2024-07-01 DIAGNOSIS — K21.9 GASTROESOPHAGEAL REFLUX DISEASE: ICD-10-CM

## 2024-07-02 ENCOUNTER — TELEPHONE (OUTPATIENT)
Age: 74
End: 2024-07-02

## 2024-07-02 RX ORDER — FUROSEMIDE 40 MG/1
TABLET ORAL
Qty: 90 TABLET | Refills: 1 | Status: SHIPPED | OUTPATIENT
Start: 2024-07-02

## 2024-07-02 RX ORDER — FOLIC ACID 1 MG/1
TABLET ORAL
Qty: 90 TABLET | Refills: 1 | Status: SHIPPED | OUTPATIENT
Start: 2024-07-02

## 2024-07-02 NOTE — TELEPHONE ENCOUNTER
Pt was admitted in the hosp for 5 days and discharge still coughing up phlegm and ear pain f/u appt with doctor dodson 7-9-24 at 3 pm

## 2024-07-02 NOTE — TELEPHONE ENCOUNTER
Please call to get more information.  Is she talking about her previous admission or was she admitted more recently?

## 2024-07-03 ENCOUNTER — HOSPITAL ENCOUNTER (OUTPATIENT)
Facility: HOSPITAL | Age: 74
Setting detail: OBSERVATION
Discharge: HOME | End: 2024-07-04
Attending: EMERGENCY MEDICINE | Admitting: INTERNAL MEDICINE
Payer: COMMERCIAL

## 2024-07-03 ENCOUNTER — RA CDI HCC (OUTPATIENT)
Dept: OTHER | Facility: HOSPITAL | Age: 74
End: 2024-07-03

## 2024-07-03 ENCOUNTER — APPOINTMENT (EMERGENCY)
Dept: RADIOLOGY | Facility: HOSPITAL | Age: 74
End: 2024-07-03
Payer: COMMERCIAL

## 2024-07-03 DIAGNOSIS — R79.0 LOW MAGNESIUM LEVEL: ICD-10-CM

## 2024-07-03 DIAGNOSIS — J44.1 ACUTE EXACERBATION OF CHRONIC OBSTRUCTIVE PULMONARY DISEASE (COPD) (HCC): Primary | ICD-10-CM

## 2024-07-03 DIAGNOSIS — I11.0 HYPERTENSIVE HEART FAILURE (HCC): Primary | ICD-10-CM

## 2024-07-03 DIAGNOSIS — B37.2 CANDIDAL DERMATITIS: ICD-10-CM

## 2024-07-03 DIAGNOSIS — E78.2 MIXED HYPERLIPIDEMIA: ICD-10-CM

## 2024-07-03 DIAGNOSIS — K21.9 GASTROESOPHAGEAL REFLUX DISEASE: ICD-10-CM

## 2024-07-03 DIAGNOSIS — L30.9 ECZEMA, UNSPECIFIED TYPE: ICD-10-CM

## 2024-07-03 PROBLEM — Z01.818 PRE-OP EVALUATION: Status: RESOLVED | Noted: 2022-10-24 | Resolved: 2024-07-03

## 2024-07-03 PROBLEM — I10 HYPERTENSION: Status: ACTIVE | Noted: 2024-07-03

## 2024-07-03 PROBLEM — U07.1 COVID-19: Status: RESOLVED | Noted: 2023-10-25 | Resolved: 2024-07-03

## 2024-07-03 PROBLEM — S22.080A T12 COMPRESSION FRACTURE (HCC): Status: RESOLVED | Noted: 2021-12-13 | Resolved: 2024-07-03

## 2024-07-03 LAB
ALBUMIN SERPL BCG-MCNC: 3.7 G/DL (ref 3.5–5)
ALP SERPL-CCNC: 65 U/L (ref 34–104)
ALT SERPL W P-5'-P-CCNC: 11 U/L (ref 7–52)
ANION GAP SERPL CALCULATED.3IONS-SCNC: 7 MMOL/L (ref 4–13)
AST SERPL W P-5'-P-CCNC: 15 U/L (ref 13–39)
ATRIAL RATE: 97 BPM
BACTERIA UR QL AUTO: ABNORMAL /HPF
BASOPHILS # BLD AUTO: 0.03 THOUSANDS/ÂΜL (ref 0–0.1)
BASOPHILS NFR BLD AUTO: 0 % (ref 0–1)
BILIRUB SERPL-MCNC: 0.48 MG/DL (ref 0.2–1)
BILIRUB UR QL STRIP: NEGATIVE
BNP SERPL-MCNC: 33 PG/ML (ref 0–100)
BUN SERPL-MCNC: 9 MG/DL (ref 5–25)
CALCIUM SERPL-MCNC: 10.1 MG/DL (ref 8.4–10.2)
CARDIAC TROPONIN I PNL SERPL HS: 4 NG/L (ref 8–18)
CHLORIDE SERPL-SCNC: 97 MMOL/L (ref 96–108)
CLARITY UR: ABNORMAL
CO2 SERPL-SCNC: 31 MMOL/L (ref 21–32)
COLOR UR: YELLOW
CREAT SERPL-MCNC: 0.7 MG/DL (ref 0.6–1.3)
EOSINOPHIL # BLD AUTO: 0.33 THOUSAND/ÂΜL (ref 0–0.61)
EOSINOPHIL NFR BLD AUTO: 4 % (ref 0–6)
ERYTHROCYTE [DISTWIDTH] IN BLOOD BY AUTOMATED COUNT: 16.9 % (ref 11.6–15.1)
FLUAV RNA RESP QL NAA+PROBE: NEGATIVE
FLUBV RNA RESP QL NAA+PROBE: NEGATIVE
GFR SERPL CREATININE-BSD FRML MDRD: 84 ML/MIN/1.73SQ M
GLUCOSE SERPL-MCNC: 186 MG/DL (ref 65–140)
GLUCOSE SERPL-MCNC: 352 MG/DL (ref 65–140)
GLUCOSE UR STRIP-MCNC: ABNORMAL MG/DL
HCT VFR BLD AUTO: 30.6 % (ref 34.8–46.1)
HGB BLD-MCNC: 9.3 G/DL (ref 11.5–15.4)
HGB UR QL STRIP.AUTO: ABNORMAL
IMM GRANULOCYTES # BLD AUTO: 0.04 THOUSAND/UL (ref 0–0.2)
IMM GRANULOCYTES NFR BLD AUTO: 1 % (ref 0–2)
KETONES UR STRIP-MCNC: ABNORMAL MG/DL
LEUKOCYTE ESTERASE UR QL STRIP: ABNORMAL
LYMPHOCYTES # BLD AUTO: 2.02 THOUSANDS/ÂΜL (ref 0.6–4.47)
LYMPHOCYTES NFR BLD AUTO: 26 % (ref 14–44)
MAGNESIUM SERPL-MCNC: 1.6 MG/DL (ref 1.9–2.7)
MCH RBC QN AUTO: 27.6 PG (ref 26.8–34.3)
MCHC RBC AUTO-ENTMCNC: 30.4 G/DL (ref 31.4–37.4)
MCV RBC AUTO: 91 FL (ref 82–98)
MONOCYTES # BLD AUTO: 0.72 THOUSAND/ÂΜL (ref 0.17–1.22)
MONOCYTES NFR BLD AUTO: 9 % (ref 4–12)
NEUTROPHILS # BLD AUTO: 4.71 THOUSANDS/ÂΜL (ref 1.85–7.62)
NEUTS SEG NFR BLD AUTO: 60 % (ref 43–75)
NITRITE UR QL STRIP: NEGATIVE
NON-SQ EPI CELLS URNS QL MICRO: ABNORMAL /HPF
NRBC BLD AUTO-RTO: 0 /100 WBCS
OTHER STN SPEC: ABNORMAL
P AXIS: 60 DEGREES
PH UR STRIP.AUTO: 5.5 [PH]
PLATELET # BLD AUTO: 323 THOUSANDS/UL (ref 149–390)
PMV BLD AUTO: 9.8 FL (ref 8.9–12.7)
POTASSIUM SERPL-SCNC: 3.9 MMOL/L (ref 3.5–5.3)
PR INTERVAL: 166 MS
PROCALCITONIN SERPL-MCNC: <0.05 NG/ML
PROT SERPL-MCNC: 7.4 G/DL (ref 6.4–8.4)
PROT UR STRIP-MCNC: ABNORMAL MG/DL
QRS AXIS: 54 DEGREES
QRSD INTERVAL: 86 MS
QT INTERVAL: 320 MS
QTC INTERVAL: 406 MS
RBC # BLD AUTO: 3.37 MILLION/UL (ref 3.81–5.12)
RBC #/AREA URNS AUTO: ABNORMAL /HPF
RSV RNA RESP QL NAA+PROBE: NEGATIVE
SARS-COV-2 RNA RESP QL NAA+PROBE: NEGATIVE
SODIUM SERPL-SCNC: 135 MMOL/L (ref 135–147)
SP GR UR STRIP.AUTO: 1.01 (ref 1–1.03)
T WAVE AXIS: 57 DEGREES
TSH SERPL DL<=0.05 MIU/L-ACNC: 3.4 UIU/ML (ref 0.45–4.5)
UROBILINOGEN UR STRIP-ACNC: <2 MG/DL
VENTRICULAR RATE: 97 BPM
WBC # BLD AUTO: 7.85 THOUSAND/UL (ref 4.31–10.16)
WBC #/AREA URNS AUTO: ABNORMAL /HPF

## 2024-07-03 PROCEDURE — 99223 1ST HOSP IP/OBS HIGH 75: CPT | Performed by: INTERNAL MEDICINE

## 2024-07-03 PROCEDURE — 94760 N-INVAS EAR/PLS OXIMETRY 1: CPT

## 2024-07-03 PROCEDURE — 84484 ASSAY OF TROPONIN QUANT: CPT | Performed by: EMERGENCY MEDICINE

## 2024-07-03 PROCEDURE — 94640 AIRWAY INHALATION TREATMENT: CPT

## 2024-07-03 PROCEDURE — 93010 ELECTROCARDIOGRAM REPORT: CPT | Performed by: INTERNAL MEDICINE

## 2024-07-03 PROCEDURE — 84145 PROCALCITONIN (PCT): CPT | Performed by: EMERGENCY MEDICINE

## 2024-07-03 PROCEDURE — 85025 COMPLETE CBC W/AUTO DIFF WBC: CPT | Performed by: EMERGENCY MEDICINE

## 2024-07-03 PROCEDURE — 82948 REAGENT STRIP/BLOOD GLUCOSE: CPT

## 2024-07-03 PROCEDURE — 36415 COLL VENOUS BLD VENIPUNCTURE: CPT | Performed by: EMERGENCY MEDICINE

## 2024-07-03 PROCEDURE — 80053 COMPREHEN METABOLIC PANEL: CPT | Performed by: EMERGENCY MEDICINE

## 2024-07-03 PROCEDURE — 87106 FUNGI IDENTIFICATION YEAST: CPT | Performed by: EMERGENCY MEDICINE

## 2024-07-03 PROCEDURE — 93005 ELECTROCARDIOGRAM TRACING: CPT

## 2024-07-03 PROCEDURE — 0241U HB NFCT DS VIR RESP RNA 4 TRGT: CPT | Performed by: EMERGENCY MEDICINE

## 2024-07-03 PROCEDURE — 83735 ASSAY OF MAGNESIUM: CPT | Performed by: EMERGENCY MEDICINE

## 2024-07-03 PROCEDURE — 87086 URINE CULTURE/COLONY COUNT: CPT | Performed by: EMERGENCY MEDICINE

## 2024-07-03 PROCEDURE — 96374 THER/PROPH/DIAG INJ IV PUSH: CPT

## 2024-07-03 PROCEDURE — 99291 CRITICAL CARE FIRST HOUR: CPT | Performed by: EMERGENCY MEDICINE

## 2024-07-03 PROCEDURE — 94664 DEMO&/EVAL PT USE INHALER: CPT

## 2024-07-03 PROCEDURE — 83880 ASSAY OF NATRIURETIC PEPTIDE: CPT | Performed by: EMERGENCY MEDICINE

## 2024-07-03 PROCEDURE — 96375 TX/PRO/DX INJ NEW DRUG ADDON: CPT

## 2024-07-03 PROCEDURE — 94668 MNPJ CHEST WALL SBSQ: CPT

## 2024-07-03 PROCEDURE — 81001 URINALYSIS AUTO W/SCOPE: CPT | Performed by: EMERGENCY MEDICINE

## 2024-07-03 PROCEDURE — 71045 X-RAY EXAM CHEST 1 VIEW: CPT

## 2024-07-03 PROCEDURE — 99285 EMERGENCY DEPT VISIT HI MDM: CPT

## 2024-07-03 PROCEDURE — 84443 ASSAY THYROID STIM HORMONE: CPT | Performed by: EMERGENCY MEDICINE

## 2024-07-03 RX ORDER — ATORVASTATIN CALCIUM 10 MG/1
20 TABLET, FILM COATED ORAL
Status: DISCONTINUED | OUTPATIENT
Start: 2024-07-04 | End: 2024-07-04 | Stop reason: HOSPADM

## 2024-07-03 RX ORDER — ZOLPIDEM TARTRATE 5 MG/1
5 TABLET ORAL
Status: DISCONTINUED | OUTPATIENT
Start: 2024-07-03 | End: 2024-07-04 | Stop reason: HOSPADM

## 2024-07-03 RX ORDER — CEFTRIAXONE 1 G/50ML
1000 INJECTION, SOLUTION INTRAVENOUS EVERY 24 HOURS
Status: DISCONTINUED | OUTPATIENT
Start: 2024-07-04 | End: 2024-07-04

## 2024-07-03 RX ORDER — LEVALBUTEROL INHALATION SOLUTION 1.25 MG/3ML
1.25 SOLUTION RESPIRATORY (INHALATION)
Status: DISCONTINUED | OUTPATIENT
Start: 2024-07-03 | End: 2024-07-04 | Stop reason: HOSPADM

## 2024-07-03 RX ORDER — PANTOPRAZOLE SODIUM 40 MG/1
40 TABLET, DELAYED RELEASE ORAL DAILY
Status: DISCONTINUED | OUTPATIENT
Start: 2024-07-04 | End: 2024-07-04 | Stop reason: HOSPADM

## 2024-07-03 RX ORDER — INSULIN LISPRO 100 [IU]/ML
1-6 INJECTION, SOLUTION INTRAVENOUS; SUBCUTANEOUS
Status: DISCONTINUED | OUTPATIENT
Start: 2024-07-04 | End: 2024-07-04 | Stop reason: HOSPADM

## 2024-07-03 RX ORDER — ACETAMINOPHEN 325 MG/1
650 TABLET ORAL EVERY 6 HOURS PRN
Status: DISCONTINUED | OUTPATIENT
Start: 2024-07-03 | End: 2024-07-04 | Stop reason: HOSPADM

## 2024-07-03 RX ORDER — DULOXETIN HYDROCHLORIDE 30 MG/1
60 CAPSULE, DELAYED RELEASE ORAL DAILY
Status: DISCONTINUED | OUTPATIENT
Start: 2024-07-04 | End: 2024-07-04 | Stop reason: HOSPADM

## 2024-07-03 RX ORDER — GUAIFENESIN 600 MG/1
600 TABLET, EXTENDED RELEASE ORAL 2 TIMES DAILY
Status: DISCONTINUED | OUTPATIENT
Start: 2024-07-03 | End: 2024-07-04 | Stop reason: HOSPADM

## 2024-07-03 RX ORDER — INSULIN GLARGINE 100 [IU]/ML
40 INJECTION, SOLUTION SUBCUTANEOUS
Status: DISCONTINUED | OUTPATIENT
Start: 2024-07-03 | End: 2024-07-04 | Stop reason: HOSPADM

## 2024-07-03 RX ORDER — FUROSEMIDE 40 MG/1
40 TABLET ORAL DAILY
Status: DISCONTINUED | OUTPATIENT
Start: 2024-07-04 | End: 2024-07-04 | Stop reason: HOSPADM

## 2024-07-03 RX ORDER — METHOTREXATE 2.5 MG/1
TABLET ORAL
Qty: 32 TABLET | Refills: 0 | Status: SHIPPED | OUTPATIENT
Start: 2024-07-03

## 2024-07-03 RX ORDER — METHYLPREDNISOLONE SODIUM SUCCINATE 40 MG/ML
40 INJECTION, POWDER, LYOPHILIZED, FOR SOLUTION INTRAMUSCULAR; INTRAVENOUS EVERY 8 HOURS
Status: DISCONTINUED | OUTPATIENT
Start: 2024-07-03 | End: 2024-07-04 | Stop reason: HOSPADM

## 2024-07-03 RX ORDER — SODIUM CHLORIDE FOR INHALATION 0.9 %
12 VIAL, NEBULIZER (ML) INHALATION ONCE
Status: COMPLETED | OUTPATIENT
Start: 2024-07-03 | End: 2024-07-03

## 2024-07-03 RX ORDER — ATORVASTATIN CALCIUM 20 MG/1
TABLET, FILM COATED ORAL
Qty: 90 TABLET | Refills: 1 | Status: SHIPPED | OUTPATIENT
Start: 2024-07-03

## 2024-07-03 RX ORDER — METHYLPREDNISOLONE SODIUM SUCCINATE 125 MG/2ML
80 INJECTION, POWDER, LYOPHILIZED, FOR SOLUTION INTRAMUSCULAR; INTRAVENOUS ONCE
Status: COMPLETED | OUTPATIENT
Start: 2024-07-03 | End: 2024-07-03

## 2024-07-03 RX ORDER — CEFTRIAXONE 1 G/50ML
1000 INJECTION, SOLUTION INTRAVENOUS ONCE
Status: COMPLETED | OUTPATIENT
Start: 2024-07-03 | End: 2024-07-04

## 2024-07-03 RX ORDER — LISINOPRIL 20 MG/1
40 TABLET ORAL DAILY
Status: DISCONTINUED | OUTPATIENT
Start: 2024-07-04 | End: 2024-07-04 | Stop reason: HOSPADM

## 2024-07-03 RX ORDER — MAGNESIUM SULFATE HEPTAHYDRATE 40 MG/ML
2 INJECTION, SOLUTION INTRAVENOUS ONCE
Status: COMPLETED | OUTPATIENT
Start: 2024-07-03 | End: 2024-07-03

## 2024-07-03 RX ORDER — FLUTICASONE FUROATE AND VILANTEROL 200; 25 UG/1; UG/1
1 POWDER RESPIRATORY (INHALATION) DAILY
Status: DISCONTINUED | OUTPATIENT
Start: 2024-07-04 | End: 2024-07-04 | Stop reason: HOSPADM

## 2024-07-03 RX ORDER — INSULIN LISPRO 100 [IU]/ML
1-6 INJECTION, SOLUTION INTRAVENOUS; SUBCUTANEOUS
Status: DISCONTINUED | OUTPATIENT
Start: 2024-07-03 | End: 2024-07-04 | Stop reason: HOSPADM

## 2024-07-03 RX ADMIN — CEFTRIAXONE 1000 MG: 1 INJECTION, SOLUTION INTRAVENOUS at 21:20

## 2024-07-03 RX ADMIN — LEVALBUTEROL HYDROCHLORIDE 1.25 MG: 1.25 SOLUTION RESPIRATORY (INHALATION) at 22:03

## 2024-07-03 RX ADMIN — INSULIN GLARGINE 40 UNITS: 100 INJECTION, SOLUTION SUBCUTANEOUS at 22:45

## 2024-07-03 RX ADMIN — GUAIFENESIN 600 MG: 600 TABLET, EXTENDED RELEASE ORAL at 22:46

## 2024-07-03 RX ADMIN — ALBUTEROL SULFATE 10 MG: 2.5 SOLUTION RESPIRATORY (INHALATION) at 17:43

## 2024-07-03 RX ADMIN — IPRATROPIUM BROMIDE 1 MG: 0.5 SOLUTION RESPIRATORY (INHALATION) at 17:43

## 2024-07-03 RX ADMIN — MAGNESIUM SULFATE HEPTAHYDRATE 2 G: 40 INJECTION, SOLUTION INTRAVENOUS at 20:58

## 2024-07-03 RX ADMIN — IPRATROPIUM BROMIDE 0.5 MG: 0.5 SOLUTION RESPIRATORY (INHALATION) at 22:03

## 2024-07-03 RX ADMIN — FLUORESCEIN SODIUM 1 STRIP: 1 STRIP OPHTHALMIC at 17:43

## 2024-07-03 RX ADMIN — ISODIUM CHLORIDE 12 ML: 0.03 SOLUTION RESPIRATORY (INHALATION) at 17:43

## 2024-07-03 RX ADMIN — METHYLPREDNISOLONE SODIUM SUCCINATE 40 MG: 40 INJECTION, POWDER, FOR SOLUTION INTRAMUSCULAR; INTRAVENOUS at 22:48

## 2024-07-03 RX ADMIN — INSULIN LISPRO 6 UNITS: 100 INJECTION, SOLUTION INTRAVENOUS; SUBCUTANEOUS at 22:44

## 2024-07-03 RX ADMIN — APIXABAN 5 MG: 5 TABLET, FILM COATED ORAL at 22:46

## 2024-07-03 RX ADMIN — METHYLPREDNISOLONE SODIUM SUCCINATE 80 MG: 125 INJECTION, POWDER, FOR SOLUTION INTRAMUSCULAR; INTRAVENOUS at 17:43

## 2024-07-03 NOTE — ED PROVIDER NOTES
History  Chief Complaint   Patient presents with    Medical Problem     Patient reports b/l earaches, cough, crusted shut eye.      73 yo female arrives by EMS with complaints of bilateral ear pain cough productive of greenish to grayish phlegm crusting shut of her right eye this morning.  Symptoms have been ongoing for approximately 5 days but over the last 4 days patient has had markedly increased shortness of breath and dyspnea on exertion she complains of chest tightness but no chest pain no pleuritic pain she is anticoagulated on Eliquis for prior pulmonary embolism she states she has been compliant with the Eliquis she denies any pleuritic pain.  She denies any fever or chills had some nausea earlier but now is just complaining of being hungry her appetites been diminished over the last couple of days she denies vomiting or diarrhea no lower extremity edema no rash except for intertrigo history of trauma or falls.  Medical history COPD on chronic 3 L nasal cannula type 2 diabetes hypothyroidism rheumatoid arthritis (on methotrexate and Xeljanz); PE on elliquis hyperlipidemia GERD anxiety fibromyalgias migraine CHF obstructive sleep apnea peripheral arterial disease  Surgical history includes hysterectomy tubal ligation left total knee arthroplasty bilateral cataract surgery cholecystectomy        Prior to Admission Medications   Prescriptions Last Dose Informant Patient Reported? Taking?   BD Pen Needle Mamta U/F 32G X 4 MM MISC  Self No No   Sig: USE AS DIRECTED   Blood Glucose Monitoring Suppl (ONE TOUCH ULTRA 2) w/Device KIT  Self No No   Sig: by Does not apply route 2 (two) times a day   Cranberry 1000 MG CAPS 7/3/2024  Yes Yes   Sig: Apply 1,000 mg to the mouth or throat in the morning. Indications: Urinary Tract Infection   DULoxetine (CYMBALTA) 60 mg delayed release capsule 7/3/2024  No Yes   Sig: TAKE ONE CAPSULE BY MOUTH EVERY DAY   Eliquis 5 MG 7/3/2024 Self No Yes   Sig: TAKE 1 TABLET BY MOUTH TWICE  DAILY   Insulin Pen Needle 33G X 4 MM MISC  Self No No   Sig: by Does not apply route daily   Lancets (OneTouch Delica Plus [id number]) MISC   No No   Sig: Test 2 times a day   Polyethylene Glycol 3350-GRX POWD Past Week Self No Yes   Sig: Take by mouth daily at bedtime as needed (constipation)   Tofacitinib Citrate ER 11 MG TB24 7/2/2024 Self No Yes   Sig: Take 1 tablet (11 mg total) by mouth daily Do not start before December 10, 2023.   Tresiba FlexTouch 100 units/mL injection pen   No No   Sig: INJECT 40 UNITS UNDER THE SKIN DAILY   acetaminophen (TYLENOL) 325 mg tablet Unknown Self No No   Sig: Take 2 tablets (650 mg total) by mouth every 6 (six) hours as needed for mild pain, headaches or fever   albuterol (2.5 mg/3 mL) 0.083 % nebulizer solution 7/3/2024 Self Yes Yes   Sig: USE 1 UNIT DOSE IN NEBULIZER EVERY 4 HOURS AS NEEDED.   albuterol (PROVENTIL HFA,VENTOLIN HFA) 90 mcg/act inhaler Unknown Self No No   Sig: Inhale 2 puffs every 6 (six) hours as needed for wheezing   aspirin (ECOTRIN LOW STRENGTH) 81 mg EC tablet 7/3/2024 Self Yes Yes   Sig: Take 81 mg by mouth daily   atorvastatin (LIPITOR) 20 mg tablet 7/3/2024  No Yes   Sig: TAKE ONE TABLET BY MOUTH EVERY DAY   clindamycin (CLEOCIN T) 1 % lotion Unknown Self Yes No   Sig: clindamycin 1 % lotion APPLY LOTION TOPICALLY TWICE DAILY   ergocalciferol (VITAMIN D2) 50,000 units Past Month Self No Yes   Sig: TAKE ONE CAPSULE BY MOUTH WEEKLY   fluticasone-salmeterol (ADVAIR) 250-50 mcg/dose 7/2/2024 Self Yes Yes   Sig: Inhale 1 puff every 12 (twelve) hours    folic acid (FOLVITE) 1 mg tablet 7/3/2024  No Yes   Sig: TAKE ONE TABLET BY MOUTH ONCE DAILY   furosemide (LASIX) 40 mg tablet 7/2/2024  No Yes   Sig: TAKE ONE TABLET BY MOUTH EVERY DAY   glucose blood (OneTouch Ultra) test strip   No No   Sig: Use 1 each 2 (two) times a day Test 2 times daily   Dx: E11.65   guaiFENesin (MUCINEX) 600 mg 12 hr tablet Not Taking Self No No   Sig: Take 1 tablet (600 mg total)  by mouth 2 (two) times a day   Patient not taking: Reported on 7/3/2024   levothyroxine (Synthroid) 137 mcg tablet 7/3/2024  No Yes   Sig: Take 1 tablet (137 mcg total) by mouth daily   lisinopril (ZESTRIL) 40 mg tablet 7/3/2024 Self No Yes   Sig: TAKE ONE TABLET BY MOUTH EVERY DAY   methotrexate 2.5 MG tablet   No No   Sig: TAKE 8 TABLETS BY MOUTH EVERY SUNDAY   mometasone (ELOCON) 0.1 % ointment Past Week Self No Yes   Sig: Apply topically 2 (two) times a day as needed (itching)   Patient taking differently: Apply 1 Application topically 2 (two) times a day as needed (itching) apply to breast folds and abdominal folds as needed   nystatin (MYCOSTATIN) cream 7/3/2024  No Yes   Sig: Apply topically 2 (two) times a day as needed (rash)   Patient taking differently: Apply 1 Application topically 2 (two) times a day as needed (rash) apply to breast folds and abdominal folds as needed   nystatin (MYCOSTATIN) powder Past Week  No Yes   Sig: Apply topically 3 (three) times a day as needed (rash/irritation)   Patient taking differently: Apply 1 Application topically 3 (three) times a day as needed (rash/irritation) apply to breast folds and abdominal folds as needed.   oxygen gas   Yes No   Sig: Inhale 3 L/min continuous. nasal cannula  Indications: Chronic Obstructive Lung Disease   pantoprazole (PROTONIX) 40 mg tablet 7/3/2024  No Yes   Sig: TAKE ONE TABLET BY MOUTH ONCE DAILY   semaglutide, 1 mg/dose, (Ozempic, 1 MG/DOSE,) 4 mg/3 mL injection pen Past Week  No Yes   Sig: INJECT 1 MG UNDER THE SKIN ONCE A WEEK   tiotropium (SPIRIVA RESPIMAT) 2.5 MCG/ACT AERS inhaler 7/2/2024 Self No Yes   Sig: Inhale 1 puff daily at bedtime   vitamin B-12 (VITAMIN B-12) 500 MCG tablet 7/3/2024 Self No Yes   Sig: Take 1 tablet (500 mcg total) by mouth daily   zolpidem (AMBIEN) 5 mg tablet 7/2/2024  No Yes   Sig: Take 1 tablet (5 mg total) by mouth daily at bedtime as needed for sleep      Facility-Administered Medications Last  Administration Doses Remaining   cyanocobalamin injection 1,000 mcg 6/21/2023  3:23 PM           Past Medical History:   Diagnosis Date    Arthritis     Arthritis     Cancer (HCC)     Candidiasis of skin     Cervical cancer (HCC)     Chronic respiratory failure with hypoxia and hypercapnia (HCC) 4/20/2019    COPD (chronic obstructive pulmonary disease) (HCC)     last assessed 11/21/2016    Current chronic use of systemic steroids     Degenerative arthritis of left knee     last assessed 1/20/2015    Diabetes mellitus (HCC)     Disease of thyroid gland     hypo pill given to decrease thyroid.     Fibromyalgia     Fistula of knee     last assessed 9/12/2014    GERD (gastroesophageal reflux disease)     Hyperlipidemia     Hypertension     Medial meniscus tear     of left knee, last assessed 9/12/2014    Migraine     states she has a hx of HA that she calls CRAZE but never was dx by neurologist    Obesity     Obesity     Osteoarthritis     Osteopenia     Pneumonia     last assessed 11/16/2016    Psychiatric disorder     anxiety    Rheumatoid arthritis (HCC)     Rheumatoid arthritis (HCC)     Sepsis (HCC)     last assessed 11/10/2016    Tick bite     last assessed 10/30/2015    Vitamin B12 deficiency     Vitamin D deficiency        Past Surgical History:   Procedure Laterality Date    CATARACT EXTRACTION Bilateral     CHOLECYSTECTOMY      EYE SURGERY      Bilateral Cataracts    HYSTERECTOMY      IR AORTAGRAM WITH RUN-OFF  9/20/2023    IR THORACENTESIS  5/31/2019    JOINT REPLACEMENT      left knee    KNEE ARTHROSCOPY      NEUROPLASTY / TRANSPOSITION MEDIAN NERVE AT CARPAL TUNNEL      TUBAL LIGATION         Family History   Problem Relation Age of Onset    Stroke Mother     No Known Problems Daughter     No Known Problems Maternal Grandmother     No Known Problems Maternal Grandfather     No Known Problems Paternal Grandmother     No Known Problems Paternal Grandfather     Asthma Family     Cancer Family     Diabetes  "Family     Hypertension Family     No Known Problems Son     No Known Problems Son     Addiction problem Son     Breast cancer Half-Sister 45    No Known Problems Half-Brother     No Known Problems Half-Brother     No Known Problems Maternal Aunt      I have reviewed and agree with the history as documented.    E-Cigarette/Vaping    E-Cigarette Use Former User      E-Cigarette/Vaping Substances    Nicotine No     THC No     CBD No     Flavoring No     Other No     Unknown No      Social History     Tobacco Use    Smoking status: Former     Current packs/day: 0.00     Average packs/day: 1 pack/day for 48.0 years (48.0 ttl pk-yrs)     Types: Cigarettes, E-Cigarettes     Start date: 1964     Quit date: 2012     Years since quittin.6    Smokeless tobacco: Never    Tobacco comments:     per allscripts - \"current every day smoker, former smoker\"   Vaping Use    Vaping status: Former   Substance Use Topics    Alcohol use: Never     Alcohol/week: 0.0 standard drinks of alcohol     Comment: stopped drinking alcohol    Drug use: Never       Review of Systems   Constitutional:  Positive for activity change and appetite change. Negative for chills and fever.   HENT:  Positive for ear pain. Negative for rhinorrhea, sinus pressure, sinus pain, trouble swallowing and voice change.    Eyes:  Positive for discharge (right). Negative for photophobia, pain, redness, itching and visual disturbance.   Respiratory:  Positive for cough, chest tightness, shortness of breath and wheezing.    Cardiovascular:  Negative for chest pain, palpitations and leg swelling.   Gastrointestinal:  Positive for nausea (intermittnat currently hungry). Negative for abdominal pain, diarrhea and vomiting.   Genitourinary:  Negative for difficulty urinating, flank pain and urgency.   Musculoskeletal:  Negative for back pain, neck pain and neck stiffness.   Skin:  Negative for rash.   Neurological:  Negative for dizziness, light-headedness and " headaches.   Psychiatric/Behavioral:  Negative for confusion.        Physical Exam  Physical Exam  Vitals and nursing note reviewed.   Constitutional:       General: She is not in acute distress.     Appearance: She is not ill-appearing or toxic-appearing.   HENT:      Right Ear: Tympanic membrane, ear canal and external ear normal.      Left Ear: Tympanic membrane, ear canal and external ear normal.      Nose: Nose normal. No congestion or rhinorrhea.      Mouth/Throat:      Mouth: Mucous membranes are moist.   Eyes:      General:         Right eye: No discharge.         Left eye: No discharge.      Extraocular Movements: Extraocular movements intact.      Conjunctiva/sclera: Conjunctivae normal.      Pupils: Pupils are equal, round, and reactive to light.      Comments: Visual acuity Rosenbalm uncorrected patient typically wears reading glasses is 20/100 on the right 20/100 on the left there is no injection of sclera pulls are equal at 2 mm with no Abraham Penny pupil; has a granulomatous type lesion at the left medial canthi patient states this is chronic she was scheduled to have it removed several years ago but then COVID happened.  Forcing staining is negative for uptake of dye bilaterally.   Cardiovascular:      Rate and Rhythm: Normal rate and regular rhythm.   Pulmonary:      Effort: Respiratory distress (mild tachpnea speaks full sentences frequent cough) present.      Breath sounds: No stridor. Wheezing present. No rhonchi or rales.      Comments: Sats 99% on baseline 3L  Chest:      Chest wall: No tenderness.   Abdominal:      General: Bowel sounds are normal. There is no distension.      Palpations: Abdomen is soft.      Tenderness: There is no abdominal tenderness. There is no right CVA tenderness, left CVA tenderness, guarding or rebound.   Musculoskeletal:      Cervical back: Normal range of motion and neck supple. No tenderness.      Right lower leg: No edema.      Left lower leg: No edema.   Skin:      General: Skin is warm and dry.      Capillary Refill: Capillary refill takes less than 2 seconds.   Neurological:      General: No focal deficit present.      Mental Status: She is alert.      Cranial Nerves: No cranial nerve deficit.      Sensory: No sensory deficit.      Motor: No weakness.      Coordination: Coordination normal.   Psychiatric:         Mood and Affect: Mood normal.         Vital Signs  ED Triage Vitals   Temperature Pulse Respirations Blood Pressure SpO2   07/03/24 1656 07/03/24 1656 07/03/24 1656 07/03/24 1656 07/03/24 1656   99.3 °F (37.4 °C) 104 20 162/72 99 %      Temp Source Heart Rate Source Patient Position - Orthostatic VS BP Location FiO2 (%)   07/03/24 1656 07/03/24 1656 07/03/24 1656 07/03/24 1656 --   Temporal Monitor Sitting Left arm       Pain Score       07/03/24 2250       No Pain           Vitals:    07/03/24 1900 07/03/24 2000 07/03/24 2150 07/03/24 2201   BP: 144/66 148/64  135/71   Pulse: 105 (!) 107 103 105   Patient Position - Orthostatic VS: Sitting Sitting  Sitting         Visual Acuity      ED Medications  Medications   aspirin (ECOTRIN LOW STRENGTH) EC tablet 81 mg (has no administration in time range)   atorvastatin (LIPITOR) tablet 20 mg (has no administration in time range)   DULoxetine (CYMBALTA) delayed release capsule 60 mg (has no administration in time range)   apixaban (ELIQUIS) tablet 5 mg (5 mg Oral Given 7/3/24 2246)   fluticasone-vilanterol 200-25 mcg/actuation 1 puff (has no administration in time range)   furosemide (LASIX) tablet 40 mg (has no administration in time range)   levothyroxine tablet 137 mcg (has no administration in time range)   lisinopril (ZESTRIL) tablet 40 mg (has no administration in time range)   pantoprazole (PROTONIX) EC tablet 40 mg (has no administration in time range)   umeclidinium 62.5 mcg/actuation inhaler AEPB 1 puff (has no administration in time range)   Tofacitinib Citrate ER TB24 11 mg (has no administration in time range)    insulin glargine (LANTUS) subcutaneous injection 40 Units 0.4 mL (40 Units Subcutaneous Given 7/3/24 2245)   zolpidem (AMBIEN) tablet 5 mg (has no administration in time range)   acetaminophen (TYLENOL) tablet 650 mg (has no administration in time range)   guaiFENesin (MUCINEX) 12 hr tablet 600 mg (600 mg Oral Given 7/3/24 2246)   methylPREDNISolone sodium succinate (Solu-MEDROL) injection 40 mg (40 mg Intravenous Given 7/3/24 2248)   doxycycline (VIBRAMYCIN) 100 mg in sodium chloride 0.9 % 100 mL IVPB (100 mg Intravenous New Bag 7/4/24 0011)   insulin lispro (HumALOG/ADMELOG) 100 units/mL subcutaneous injection 1-6 Units (has no administration in time range)   insulin lispro (HumALOG/ADMELOG) 100 units/mL subcutaneous injection 1-6 Units (6 Units Subcutaneous Given 7/3/24 2244)   ipratropium (ATROVENT) 0.02 % inhalation solution 0.5 mg (0.5 mg Nebulization Given 7/3/24 2203)   levalbuterol (XOPENEX) inhalation solution 1.25 mg (1.25 mg Nebulization Given 7/3/24 2203)   cefTRIAXone (ROCEPHIN) IVPB (premix in dextrose) 1,000 mg 50 mL (has no administration in time range)   fluorescein sodium sterile ophthalmic strip 1 strip (1 strip Both Eyes Given 7/3/24 1743)   methylPREDNISolone sodium succinate (Solu-MEDROL) injection 80 mg (80 mg Intravenous Given 7/3/24 1743)   albuterol inhalation solution 10 mg (10 mg Nebulization Given 7/3/24 1743)   ipratropium (ATROVENT) 0.02 % inhalation solution 1 mg (1 mg Nebulization Given 7/3/24 1743)   sodium chloride 0.9 % inhalation solution 12 mL (12 mL Nebulization Given 7/3/24 1743)   magnesium sulfate 2 g/50 mL IVPB (premix) 2 g (2 g Intravenous New Bag 7/3/24 2058)   cefTRIAXone (ROCEPHIN) IVPB (premix in dextrose) 1,000 mg 50 mL (1,000 mg Intravenous New Bag 7/3/24 2120)       Diagnostic Studies  Results Reviewed       Procedure Component Value Units Date/Time    Urine Microscopic [[id number]]  (Abnormal) Collected: 07/03/24 2105    Lab Status: Final result Specimen: Urine,  Clean Catch Updated: 07/03/24 2132     RBC, UA 4-10 /hpf      WBC, UA Innumerable /hpf      Epithelial Cells Moderate /hpf      Bacteria, UA Occasional /hpf      OTHER OBSERVATIONS Yeast Cells Present    Urine culture [[id number]] Collected: 07/03/24 2105    Lab Status: In process Specimen: Urine, Clean Catch Updated: 07/03/24 2132    UA w Reflex to Microscopic w Reflex to Culture [[id number]]  (Abnormal) Collected: 07/03/24 2105    Lab Status: Final result Specimen: Urine, Clean Catch Updated: 07/03/24 2118     Color, UA Yellow     Clarity, UA Cloudy     Specific Gravity, UA 1.010     pH, UA 5.5     Leukocytes, UA Large     Nitrite, UA Negative     Protein, UA Trace mg/dl      Glucose,  (3/10%) mg/dl      Ketones, UA 10 (1+) mg/dl      Urobilinogen, UA <2.0 mg/dl      Bilirubin, UA Negative     Occult Blood, UA Moderate    Hemoglobin A1c w/EAG Estimation (Prechecked if no A1C within 90 days) [[id number]]     Lab Status: No result Specimen: Blood     FLU/RSV/COVID - if FLU/RSV clinically relevant [[id number]]  (Normal) Collected: 07/03/24 1738    Lab Status: Final result Specimen: Nares from Nose Updated: 07/03/24 1831     SARS-CoV-2 Negative     INFLUENZA A PCR Negative     INFLUENZA B PCR Negative     RSV PCR Negative    Narrative:      FOR PEDIATRIC PATIENTS - copy/paste COVID Guidelines URL to browser: [email or web address]    SARS-CoV-2 assay is a Nucleic Acid Amplification assay intended for the  qualitative detection of nucleic acid from SARS-CoV-2 in nasopharyngeal  swabs. Results are for the presumptive identification of SARS-CoV-2 RNA.    Positive results are indicative of infection with SARS-CoV-2, the virus  causing COVID-19, but do not rule out bacterial infection or co-infection  with other viruses. Laboratories within the [place] and its  territories are required to report all positive results to the appropriate  public health authorities.  Negative results do not preclude SARS-CoV-2  infection and should not be used as the sole basis for treatment or other  patient management decisions. Negative results must be combined with  clinical observations, patient history, and epidemiological information.  This test has not been FDA cleared or approved.    This test has been authorized by FDA under an Emergency Use Authorization  (EUA). This test is only authorized for the duration of time the  declaration that circumstances exist justifying the authorization of the  emergency use of an in vitro diagnostic tests for detection of SARS-CoV-2  virus and/or diagnosis of COVID-19 infection under section 564(b)(1) of  the Act, 21 U.S.C. 360bbb-3(b)(1), unless the authorization is terminated  or revoked sooner. The test has been validated but independent review by FDA  and CLIA is pending.    Test performed using [employer]: This RT-PCR assay targets N2,  a region unique to SARS-CoV-2. A conserved region in the E-gene was chosen  for pan-Sarbecovirus detection which includes SARS-CoV-2.    According to CMS-2020-01-R, this platform meets the definition of high-throughput technology.    TSH, 3rd generation with Free T4 reflex [[id number]]  (Normal) Collected: 07/03/24 1738    Lab Status: Final result Specimen: Blood from Arm, Right Updated: 07/03/24 1825     TSH 3RD GENERATON 3.404 uIU/mL     Procalcitonin [[id number]]  (Normal) Collected: 07/03/24 1738    Lab Status: Final result Specimen: Blood from Arm, Right Updated: 07/03/24 1818     Procalcitonin <0.05 ng/ml     High Sensitivity Troponin I Random [[id number]]  (Abnormal) Collected: 07/03/24 1738    Lab Status: Final result Specimen: Blood from Arm, Right Updated: 07/03/24 1815     HS TnI random 4 ng/L     B-Type Natriuretic Peptide(BNP) [[id number]]  (Normal) Collected: 07/03/24 1738    Lab Status: Final result Specimen: Blood from Arm, Right Updated: 07/03/24 1814     BNP 33 pg/mL     Comprehensive metabolic  panel [[id number]]  (Abnormal) Collected: 07/03/24 1738    Lab Status: Final result Specimen: Blood from Arm, Right Updated: 07/03/24 1809     Sodium 135 mmol/L      Potassium 3.9 mmol/L      Chloride 97 mmol/L      CO2 31 mmol/L      ANION GAP 7 mmol/L      BUN 9 mg/dL      Creatinine 0.70 mg/dL      Glucose 186 mg/dL      Calcium 10.1 mg/dL      AST 15 U/L      ALT 11 U/L      Alkaline Phosphatase 65 U/L      Total Protein 7.4 g/dL      Albumin 3.7 g/dL      Total Bilirubin 0.48 mg/dL      eGFR 84 ml/min/1.73sq m     Narrative:      National Kidney Disease Foundation guidelines for Chronic Kidney Disease (CKD):     Stage 1 with normal or high GFR (GFR > 90 mL/min/1.73 square meters)    Stage 2 Mild CKD (GFR = 60-89 mL/min/1.73 square meters)    Stage 3A Moderate CKD (GFR = 45-59 mL/min/1.73 square meters)    Stage 3B Moderate CKD (GFR = 30-44 mL/min/1.73 square meters)    Stage 4 Severe CKD (GFR = 15-29 mL/min/1.73 square meters)    Stage 5 End Stage CKD (GFR <15 mL/min/1.73 square meters)  Note: GFR calculation is accurate only with a steady state creatinine    Magnesium [[id number]]  (Abnormal) Collected: 07/03/24 1738    Lab Status: Final result Specimen: Blood from Arm, Right Updated: 07/03/24 1809     Magnesium 1.6 mg/dL     CBC and differential [[id number]]  (Abnormal) Collected: 07/03/24 1738    Lab Status: Final result Specimen: Blood from Arm, Right Updated: 07/03/24 1753     WBC 7.85 Thousand/uL      RBC 3.37 Million/uL      Hemoglobin 9.3 g/dL      Hematocrit 30.6 %      MCV 91 fL      MCH 27.6 pg      MCHC 30.4 g/dL      RDW 16.9 %      MPV 9.8 fL      Platelets 323 Thousands/uL      nRBC 0 /100 WBCs      Segmented % 60 %      Immature Grans % 1 %      Lymphocytes % 26 %      Monocytes % 9 %      Eosinophils Relative 4 %      Basophils Relative 0 %      Absolute Neutrophils 4.71 Thousands/µL      Absolute Immature Grans 0.04 Thousand/uL      Absolute Lymphocytes 2.02 Thousands/µL      Absolute Monocytes  0.72 Thousand/µL      Eosinophils Absolute 0.33 Thousand/µL      Basophils Absolute 0.03 Thousands/µL                    XR chest 1 view portable   ED Interpretation by Eleanor Martinez MD (07/03 3662)   Per my independent interpretation. Radiologist to provide formal read. RUL mass from 5/2/24 improved increased marking to rt apex; left effusion seen previously slightly increased pulm vasc vs. 5/2/2024                 Procedures  ECG 12 Lead Documentation Only    Date/Time: 7/3/2024 5:44 PM    Performed by: Eleanor Martinez MD  Authorized by: Eleanor Martinez MD    Indications / Diagnosis:  Sob  ECG reviewed by me, the ED Provider: yes    Patient location:  ED  Previous ECG:     Previous ECG:  Compared to current    Comparison ECG info:  5/2/24 1241    Similarity:  No change  Rate:     ECG rate:  97    ECG rate assessment: normal    Rhythm:     Rhythm: sinus rhythm    QRS:     QRS axis:  Normal  Comments:      ; nonspecific twave changes  CriticalCare Time    Date/Time: 7/3/2024 6:00 PM    Performed by: Eleanor Martinez MD  Authorized by: Eleanor Martinez MD    Critical care provider statement:     Critical care time (minutes):  35    Critical care time was exclusive of:  Separately billable procedures and treating other patients and teaching time    Critical care was necessary to treat or prevent imminent or life-threatening deterioration of the following conditions:  Respiratory failure (elias neb)    Critical care was time spent personally by me on the following activities:  Blood draw for specimens, obtaining history from patient or surrogate, development of treatment plan with patient or surrogate, discussions with consultants, evaluation of patient's response to treatment, examination of patient, ordering and performing treatments and interventions, ordering and review of laboratory studies, ordering and review of radiographic studies, re-evaluation of patient's  condition and review of old charts    I assumed direction of critical care for this patient from another provider in my specialty: no             ED Course  ED Course as of 07/04/24 0254   Wed Jul 03, 2024 2016 Hb stable at 9.3   2022 Patient reexamined after hour-long neb feels improved is clearing additional phlegm continues to have some scant expiratory wheezes sats are 95% will supplement magnesium and contact him for observation.   2054 Will administer for COPD exacerbation not obtain blood cultures due to national shortage of blood culture tubes discussed with Dr. Oquendo attending to another patient will inform me acceptance to his service when able   2112 Dr. Oquendo recommends obs to his service             HEART Risk Score      Flowsheet Row Most Recent Value   Heart Score Risk Calculator    History 1 Filed at: 07/03/2024 1748   ECG 1 Filed at: 07/03/2024 1748   Age 2 Filed at: 07/03/2024 1748   Risk Factors 2 Filed at: 07/03/2024 1748   Troponin 0 Filed at: 07/03/2024 1748   HEART Score 6 Filed at: 07/03/2024 1748                          SBIRT 20yo+      Flowsheet Row Most Recent Value   Initial Alcohol Screen: US AUDIT-C     1. How often do you have a drink containing alcohol? 0 Filed at: 07/03/2024 1654   2. How many drinks containing alcohol do you have on a typical day you are drinking?  0 Filed at: 07/03/2024 1654   3a. Male UNDER 65: How often do you have five or more drinks on one occasion? 0 Filed at: 07/03/2024 1654   3b. FEMALE Any Age, or MALE 65+: How often do you have 4 or more drinks on one occassion? 0 Filed at: 07/03/2024 1654   Audit-C Score 0 Filed at: 07/03/2024 1654   CHANTEL: How many times in the past year have you...    Used an illegal drug or used a prescription medication for non-medical reasons? Never Filed at: 07/03/2024 1654                      Medical Decision Making  Mdm: 74-year-old female with COPD on maintained on 3 L nasal cannula presents with chest tightness increasing  dyspnea on exertion shortness of breath and productive cough.  Will be evaluated for COVID influenza RSV lower respiratory tract infection/pneumonia will check for strain on EKG as well as subendocardial ischemia.  This time visual acuity is symmetric there is no conjunctivitis patient does have some erythema at the medial canthi which is chronic will check with fluorescein staining.  Clinical  index of suspicion for pulmonary embolism is low as patient has been compliant on Eliquis and she is denying any pleuritic pain there is no lower extremity edema.  Administer heart neb/solumedrol  and reevaluate    Amount and/or Complexity of Data Reviewed  Labs: ordered.  Radiology: ordered and independent interpretation performed.    Risk  Prescription drug management.  Decision regarding hospitalization.             Disposition  Final diagnoses:   Acute exacerbation of chronic obstructive pulmonary disease (COPD) (HCC)   Low magnesium level     Time reflects when diagnosis was documented in both MDM as applicable and the Disposition within this note       Time User Action Codes Description Comment    7/3/2024  8:22 PM Eleanor Martinez [J44.1] Acute exacerbation of chronic obstructive pulmonary disease (COPD) (HCC)     7/3/2024  8:56 PM Eleanor Martinez [R79.0] Low magnesium level           ED Disposition       ED Disposition   Admit    Condition   Stable    Date/Time   Wed Jul 3, 2024 2056    Comment   Case was discussed with Dr. Oquendo and the patient's admission status was agreed to be Admission Status: observation status to the service of Dr. Oquendo .               Follow-up Information    None         Current Discharge Medication List        START taking these medications    Details   methotrexate 2.5 MG tablet TAKE 8 TABLETS BY MOUTH EVERY SUNDAY  Qty: 32 tablet, Refills: 0    Associated Diagnoses: Gastroesophageal reflux disease           CONTINUE these medications which have NOT CHANGED    Details    albuterol (2.5 mg/3 mL) 0.083 % nebulizer solution USE 1 UNIT DOSE IN NEBULIZER EVERY 4 HOURS AS NEEDED.    Comments: inhale      aspirin (ECOTRIN LOW STRENGTH) 81 mg EC tablet Take 81 mg by mouth daily      atorvastatin (LIPITOR) 20 mg tablet TAKE ONE TABLET BY MOUTH EVERY DAY  Qty: 90 tablet, Refills: 1    Associated Diagnoses: Mixed hyperlipidemia      Cranberry 1000 MG CAPS Apply 1,000 mg to the mouth or throat in the morning. Indications: Urinary Tract Infection      DULoxetine (CYMBALTA) 60 mg delayed release capsule TAKE ONE CAPSULE BY MOUTH EVERY DAY  Qty: 90 capsule, Refills: 1    Associated Diagnoses: Rheumatoid arthritis ([facility])      Eliquis 5 MG TAKE 1 TABLET BY MOUTH TWICE DAILY  Qty: 60 tablet, Refills: 0    Associated Diagnoses: Acute pulmonary embolism, unspecified pulmonary embolism type, unspecified whether acute cor pulmonale present ([facility])      ergocalciferol (VITAMIN D2) 50,000 units TAKE ONE CAPSULE BY MOUTH WEEKLY  Qty: 12 capsule, Refills: 0    Associated Diagnoses: Vitamin D deficiency      fluticasone-salmeterol (ADVAIR) 250-50 mcg/dose Inhale 1 puff every 12 (twelve) hours       folic acid (FOLVITE) 1 mg tablet TAKE ONE TABLET BY MOUTH ONCE DAILY  Qty: 90 tablet, Refills: 1    Associated Diagnoses: Gastroesophageal reflux disease      furosemide (LASIX) 40 mg tablet TAKE ONE TABLET BY MOUTH EVERY DAY  Qty: 90 tablet, Refills: 1    Associated Diagnoses: Peripheral edema      levothyroxine (Synthroid) 137 mcg tablet Take 1 tablet (137 mcg total) by mouth daily  Qty: 30 tablet, Refills: 5    Associated Diagnoses: Hypothyroidism, unspecified type      lisinopril (ZESTRIL) 40 mg tablet TAKE ONE TABLET BY MOUTH EVERY DAY  Qty: 90 tablet, Refills: 0    Associated Diagnoses: Essential hypertension      mometasone (ELOCON) 0.1 % ointment Apply topically 2 (two) times a day as needed (itching)  Qty: 45 g, Refills: 1    Associated Diagnoses: Eczema, unspecified type      nystatin (MYCOSTATIN) cream  Apply topically 2 (two) times a day as needed (rash)  Qty: 30 g, Refills: 0    Associated Diagnoses: Candidal dermatitis; Candidal UTI (urinary tract infection)      nystatin (MYCOSTATIN) powder Apply topically 3 (three) times a day as needed (rash/irritation)  Qty: 45 g, Refills: 1    Associated Diagnoses: Candidal dermatitis      pantoprazole (PROTONIX) 40 mg tablet TAKE ONE TABLET BY MOUTH ONCE DAILY  Qty: 90 tablet, Refills: 1    Associated Diagnoses: Gastroesophageal reflux disease      Polyethylene Glycol 3350-GRX POWD Take by mouth daily at bedtime as needed (constipation)  Qty: 850 g, Refills: 0    Associated Diagnoses: Constipation, unspecified constipation type      semaglutide, 1 mg/dose, (Ozempic, 1 MG/DOSE,) 4 mg/3 mL injection pen INJECT 1 MG UNDER THE SKIN ONCE A WEEK  Qty: 3 mL, Refills: 1    Associated Diagnoses: Type 2 diabetes mellitus with hyperglycemia, with long-term current use of insulin ([facility])      tiotropium (SPIRIVA RESPIMAT) 2.5 MCG/ACT AERS inhaler Inhale 1 puff daily at bedtime  Qty: 3 Inhaler, Refills: 3    Associated Diagnoses: Chronic obstructive pulmonary disease, unspecified COPD type ([facility])      Tofacitinib Citrate ER 11 MG TB24 Take 1 tablet (11 mg total) by mouth daily Do not start before December 10, 2023.  Refills: 0    Associated Diagnoses: Rheumatoid arthritis, involving unspecified site, unspecified whether rheumatoid factor present ([facility])      vitamin B-12 (VITAMIN B-12) 500 MCG tablet Take 1 tablet (500 mcg total) by mouth daily    Associated Diagnoses: Macrocytic anemia      zolpidem (AMBIEN) 5 mg tablet Take 1 tablet (5 mg total) by mouth daily at bedtime as needed for sleep  Qty: 30 tablet, Refills: 0    Associated Diagnoses: Primary insomnia      acetaminophen (TYLENOL) 325 mg tablet Take 2 tablets (650 mg total) by mouth every 6 (six) hours as needed for mild pain, headaches or fever  Refills: 0    Comments: Do not exceed a total of 3 grams of tylenol/acetaminophen in a  24-hour period.  Associated Diagnoses: Chronic obstructive pulmonary disease with acute exacerbation (HCC)      albuterol (PROVENTIL HFA,VENTOLIN HFA) 90 mcg/act inhaler Inhale 2 puffs every 6 (six) hours as needed for wheezing  Refills: 0    Comments: Do not use within 2 hours of an albuterol nebulizer treatment.  Substitution to a formulary equivalent within the same pharmaceutical class is authorized.  Associated Diagnoses: CAP (community acquired pneumonia)      !! BD Pen Needle Mamta U/F 32G X 4 MM MISC USE AS DIRECTED  Qty: 100 each, Refills: 0    Associated Diagnoses: Type 2 diabetes mellitus with hyperglycemia, with long-term current use of insulin ([facility])      Blood Glucose Monitoring Suppl (ONE TOUCH ULTRA 2) w/Device KIT by Does not apply route 2 (two) times a day  Qty: 1 each, Refills: 0    Associated Diagnoses: Type 2 diabetes mellitus without complication, without long-term current use of insulin ([facility])      clindamycin (CLEOCIN T) 1 % lotion clindamycin 1 % lotion APPLY LOTION TOPICALLY TWICE DAILY    Comments: apply to breast folds and abdominal folds      glucose blood (OneTouch Ultra) test strip Use 1 each 2 (two) times a day Test 2 times daily   Dx: E11.65  Qty: 300 strip, Refills: 3    Associated Diagnoses: Type 2 diabetes mellitus with hyperglycemia, with long-term current use of insulin ([facility])      guaiFENesin (MUCINEX) 600 mg 12 hr tablet Take 1 tablet (600 mg total) by mouth 2 (two) times a day  Qty: 30 tablet, Refills: 0    Associated Diagnoses: Tracheobronchitis      !! Insulin Pen Needle 33G X 4 MM MISC by Does not apply route daily  Qty: 100 each, Refills: 5    Associated Diagnoses: Type 2 diabetes mellitus with hyperglycemia, without long-term current use of insulin ([facility])      Lancets (OneTouch Delica Plus [id number]) MISC Test 2 times a day  Qty: 200 each, Refills: 3    Associated Diagnoses: Type 2 diabetes mellitus without complication, without long-term current use of insulin ([facility])       oxygen gas Inhale 3 L/min continuous. nasal cannula  Indications: Chronic Obstructive Lung Disease      Tresiba FlexTouch 100 units/mL injection pen INJECT 40 UNITS UNDER THE SKIN DAILY  Qty: 30 mL, Refills: 0    Associated Diagnoses: Type 2 diabetes mellitus with hyperglycemia, with long-term current use of insulin (HCC)       !! - Potential duplicate medications found. Please discuss with provider.          No discharge procedures on file.    PDMP Review         Value Time User    PDMP Reviewed  Yes 6/17/2024  3:05 PM Ioana Heck MD            ED Provider  Electronically Signed by             Eleanor Martinez MD  07/04/24 0254

## 2024-07-03 NOTE — PROGRESS NOTES
HCC coding opportunities          Chart Reviewed number of suggestions sent to Provider: 2  E11.51  I11.0     Patients Insurance     Medicare Insurance: [employer]

## 2024-07-03 NOTE — TELEPHONE ENCOUNTER
Patient came into office after asking them to wait in car and the doctor would go out there to check her but patient said that it was getting too hot.  She could barely walk and had a very hard time breathing.  I offered to put her in the wheelchair but she refused.  Then she was sent to the ER by PCP.

## 2024-07-04 VITALS
OXYGEN SATURATION: 94 % | TEMPERATURE: 97.7 F | SYSTOLIC BLOOD PRESSURE: 150 MMHG | DIASTOLIC BLOOD PRESSURE: 60 MMHG | RESPIRATION RATE: 15 BRPM | HEIGHT: 65 IN | HEART RATE: 91 BPM | BODY MASS INDEX: 39.38 KG/M2 | WEIGHT: 236.33 LBS

## 2024-07-04 LAB
ANION GAP SERPL CALCULATED.3IONS-SCNC: 9 MMOL/L (ref 4–13)
BASOPHILS # BLD AUTO: 0.01 THOUSANDS/ÂΜL (ref 0–0.1)
BASOPHILS NFR BLD AUTO: 0 % (ref 0–1)
BUN SERPL-MCNC: 9 MG/DL (ref 5–25)
CALCIUM SERPL-MCNC: 9.9 MG/DL (ref 8.4–10.2)
CHLORIDE SERPL-SCNC: 99 MMOL/L (ref 96–108)
CO2 SERPL-SCNC: 28 MMOL/L (ref 21–32)
CREAT SERPL-MCNC: 0.64 MG/DL (ref 0.6–1.3)
EOSINOPHIL # BLD AUTO: 0 THOUSAND/ÂΜL (ref 0–0.61)
EOSINOPHIL NFR BLD AUTO: 0 % (ref 0–6)
ERYTHROCYTE [DISTWIDTH] IN BLOOD BY AUTOMATED COUNT: 16.8 % (ref 11.6–15.1)
EST. AVERAGE GLUCOSE BLD GHB EST-MCNC: 200 MG/DL
GFR SERPL CREATININE-BSD FRML MDRD: 86 ML/MIN/1.73SQ M
GLUCOSE SERPL-MCNC: 337 MG/DL (ref 65–140)
GLUCOSE SERPL-MCNC: 362 MG/DL (ref 65–140)
GLUCOSE SERPL-MCNC: 364 MG/DL (ref 65–140)
HBA1C MFR BLD: 8.6 %
HCT VFR BLD AUTO: 31.1 % (ref 34.8–46.1)
HGB BLD-MCNC: 9.4 G/DL (ref 11.5–15.4)
IMM GRANULOCYTES # BLD AUTO: 0.05 THOUSAND/UL (ref 0–0.2)
IMM GRANULOCYTES NFR BLD AUTO: 1 % (ref 0–2)
LYMPHOCYTES # BLD AUTO: 0.84 THOUSANDS/ÂΜL (ref 0.6–4.47)
LYMPHOCYTES NFR BLD AUTO: 12 % (ref 14–44)
MAGNESIUM SERPL-MCNC: 2.3 MG/DL (ref 1.9–2.7)
MCH RBC QN AUTO: 27.2 PG (ref 26.8–34.3)
MCHC RBC AUTO-ENTMCNC: 30.2 G/DL (ref 31.4–37.4)
MCV RBC AUTO: 90 FL (ref 82–98)
MONOCYTES # BLD AUTO: 0.12 THOUSAND/ÂΜL (ref 0.17–1.22)
MONOCYTES NFR BLD AUTO: 2 % (ref 4–12)
NEUTROPHILS # BLD AUTO: 6.06 THOUSANDS/ÂΜL (ref 1.85–7.62)
NEUTS SEG NFR BLD AUTO: 85 % (ref 43–75)
NRBC BLD AUTO-RTO: 0 /100 WBCS
PHOSPHATE SERPL-MCNC: 3 MG/DL (ref 2.3–4.1)
PLATELET # BLD AUTO: 343 THOUSANDS/UL (ref 149–390)
PMV BLD AUTO: 10.3 FL (ref 8.9–12.7)
POTASSIUM SERPL-SCNC: 4 MMOL/L (ref 3.5–5.3)
RBC # BLD AUTO: 3.45 MILLION/UL (ref 3.81–5.12)
SODIUM SERPL-SCNC: 136 MMOL/L (ref 135–147)
WBC # BLD AUTO: 7.08 THOUSAND/UL (ref 4.31–10.16)

## 2024-07-04 PROCEDURE — 85025 COMPLETE CBC W/AUTO DIFF WBC: CPT | Performed by: INTERNAL MEDICINE

## 2024-07-04 PROCEDURE — 82948 REAGENT STRIP/BLOOD GLUCOSE: CPT

## 2024-07-04 PROCEDURE — 83735 ASSAY OF MAGNESIUM: CPT | Performed by: INTERNAL MEDICINE

## 2024-07-04 PROCEDURE — 94760 N-INVAS EAR/PLS OXIMETRY 1: CPT

## 2024-07-04 PROCEDURE — 80048 BASIC METABOLIC PNL TOTAL CA: CPT | Performed by: INTERNAL MEDICINE

## 2024-07-04 PROCEDURE — 83036 HEMOGLOBIN GLYCOSYLATED A1C: CPT | Performed by: INTERNAL MEDICINE

## 2024-07-04 PROCEDURE — 94664 DEMO&/EVAL PT USE INHALER: CPT

## 2024-07-04 PROCEDURE — 94668 MNPJ CHEST WALL SBSQ: CPT

## 2024-07-04 PROCEDURE — 84100 ASSAY OF PHOSPHORUS: CPT | Performed by: INTERNAL MEDICINE

## 2024-07-04 PROCEDURE — 94640 AIRWAY INHALATION TREATMENT: CPT

## 2024-07-04 PROCEDURE — 99238 HOSP IP/OBS DSCHRG MGMT 30/<: CPT | Performed by: INTERNAL MEDICINE

## 2024-07-04 RX ORDER — NYSTATIN 100000 [USP'U]/G
1 POWDER TOPICAL 3 TIMES DAILY PRN
Start: 2024-07-04

## 2024-07-04 RX ORDER — PREDNISONE 10 MG/1
10 TABLET ORAL DAILY
Qty: 30 TABLET | Refills: 0 | Status: SHIPPED | OUTPATIENT
Start: 2024-07-04

## 2024-07-04 RX ORDER — MOMETASONE FUROATE 1 MG/G
1 OINTMENT TOPICAL 2 TIMES DAILY PRN
Start: 2024-07-04

## 2024-07-04 RX ORDER — DOXYCYCLINE HYCLATE 100 MG/1
100 CAPSULE ORAL EVERY 12 HOURS SCHEDULED
Status: DISCONTINUED | OUTPATIENT
Start: 2024-07-04 | End: 2024-07-04

## 2024-07-04 RX ADMIN — FLUTICASONE FUROATE AND VILANTEROL TRIFENATATE 1 PUFF: 200; 25 POWDER RESPIRATORY (INHALATION) at 08:39

## 2024-07-04 RX ADMIN — IPRATROPIUM BROMIDE 0.5 MG: 0.5 SOLUTION RESPIRATORY (INHALATION) at 13:35

## 2024-07-04 RX ADMIN — INSULIN LISPRO 5 UNITS: 100 INJECTION, SOLUTION INTRAVENOUS; SUBCUTANEOUS at 08:28

## 2024-07-04 RX ADMIN — LEVALBUTEROL HYDROCHLORIDE 1.25 MG: 1.25 SOLUTION RESPIRATORY (INHALATION) at 13:35

## 2024-07-04 RX ADMIN — APIXABAN 5 MG: 5 TABLET, FILM COATED ORAL at 08:27

## 2024-07-04 RX ADMIN — LISINOPRIL 40 MG: 20 TABLET ORAL at 08:27

## 2024-07-04 RX ADMIN — FUROSEMIDE 40 MG: 40 TABLET ORAL at 08:27

## 2024-07-04 RX ADMIN — IPRATROPIUM BROMIDE 0.5 MG: 0.5 SOLUTION RESPIRATORY (INHALATION) at 08:11

## 2024-07-04 RX ADMIN — INSULIN LISPRO 6 UNITS: 100 INJECTION, SOLUTION INTRAVENOUS; SUBCUTANEOUS at 11:01

## 2024-07-04 RX ADMIN — GUAIFENESIN 600 MG: 600 TABLET, EXTENDED RELEASE ORAL at 08:27

## 2024-07-04 RX ADMIN — DOXYCYCLINE HYCLATE 100 MG: 100 CAPSULE ORAL at 08:27

## 2024-07-04 RX ADMIN — LEVOTHYROXINE SODIUM 137 MCG: 25 TABLET ORAL at 06:09

## 2024-07-04 RX ADMIN — METHYLPREDNISOLONE SODIUM SUCCINATE 40 MG: 40 INJECTION, POWDER, FOR SOLUTION INTRAMUSCULAR; INTRAVENOUS at 12:37

## 2024-07-04 RX ADMIN — PANTOPRAZOLE SODIUM 40 MG: 40 TABLET, DELAYED RELEASE ORAL at 08:27

## 2024-07-04 RX ADMIN — DOXYCYCLINE 100 MG: 100 INJECTION, POWDER, LYOPHILIZED, FOR SOLUTION INTRAVENOUS at 00:11

## 2024-07-04 RX ADMIN — LEVALBUTEROL HYDROCHLORIDE 1.25 MG: 1.25 SOLUTION RESPIRATORY (INHALATION) at 08:11

## 2024-07-04 RX ADMIN — METHYLPREDNISOLONE SODIUM SUCCINATE 40 MG: 40 INJECTION, POWDER, FOR SOLUTION INTRAMUSCULAR; INTRAVENOUS at 06:09

## 2024-07-04 RX ADMIN — DULOXETINE HYDROCHLORIDE 60 MG: 30 CAPSULE, DELAYED RELEASE ORAL at 08:27

## 2024-07-04 RX ADMIN — UMECLIDINIUM 1 PUFF: 62.5 AEROSOL, POWDER ORAL at 08:39

## 2024-07-04 RX ADMIN — ASPIRIN 81 MG: 81 TABLET, COATED ORAL at 08:27

## 2024-07-04 NOTE — ASSESSMENT & PLAN NOTE
Patient presents with dyspnea on exertion and increased phlegm production  Likely COPD with acute exacerbation  Thankfully patient is on home O2 requirement of 3 L nasal cannula  No evidence of infectious etiology    Rapid improvement in clinical symptoms, overall improved air exchange, discharged on p.o. prednisone taper, continue home nebs.    Patient referred to pulmonary medicine.

## 2024-07-04 NOTE — ASSESSMENT & PLAN NOTE
"Lab Results   Component Value Date    HGBA1C 6.8 (H) 11/28/2023       No results for input(s): \"POCGLU\" in the last 72 hours.    Blood Sugar Average: Last 72 hrs:    Controlled on outpatient insulin regimen    Lantus 40 units daily at bedtime  Sliding scale insulin with Accu-Cheks  Diabetic diet  Goal blood glucose 140-180    "

## 2024-07-04 NOTE — RESPIRATORY THERAPY NOTE
07/04/24 0900   Chest Physiotherapy Tx   $ Chest Physiotherapy (CPT)  Yes   CPT Delivery Source Acapella   CPT Duration 10   CPT Chest Site Full range   Breath Sounds Pre-Treatment Bilateral Diminished   Breath Sounds Post-Treatment Bilateral Diminished   Cough Productive;Strong;Congested   Position Up in chair   CPT Treatment Tolerance Tolerated well   Cough Description   Sputum Amount Moderate   Sputum Color Green  (light green)   Sputum Consistency Thick   Sputum How Obtained Spontaneous cough

## 2024-07-04 NOTE — ASSESSMENT & PLAN NOTE
Patient utilizes 3 L of nasal cannula supplemental O2 at baseline  Currently requiring baseline O2    Wean O2 as tolerated  Goal SpO2 greater than 88%  Respiratory protocol

## 2024-07-04 NOTE — H&P
"[facility]  H&P  Name: Shakira Polk 74 y.o. female I MRN: [id number]  Unit/Bed#: RM13 I Date of Admission: 7/3/2024   Date of Service: 7/3/2024 I Hospital Day: 0      Assessment & Plan   * Chronic obstructive pulmonary disease with acute exacerbation (HCC)  Assessment & Plan  Patient presents with dyspnea on exertion and increased phlegm production  Likely COPD with acute exacerbation  Thankfully patient is on home O2 requirement of 3 L nasal cannula  No evidence of infectious etiology    Solu-Medrol 40 mg IV every 8 hours scheduled  IV doxycycline 100 mg 2 times daily for anti-inflammatory properties  Atrovent and Xopenex  Respiratory protocol  Mucinex for cough  Continue home inhalers    Chronic respiratory failure with hypoxia (HCC)  Assessment & Plan  Patient utilizes 3 L of nasal cannula supplemental O2 at baseline  Currently requiring baseline O2    Wean O2 as tolerated  Goal SpO2 greater than 88%  Respiratory protocol    History of pulmonary embolism  Assessment & Plan  No evidence of active clot    Continue home Eliquis for prophylaxis    Hypertension  Assessment & Plan  Blood pressure stable    Continue home ACE inhibitor  Monitor blood pressures    Gastroesophageal reflux disease without esophagitis  Assessment & Plan  Chronic and asymptomatic    Continue home PPI    Rheumatoid arthritis (HCC)  Assessment & Plan  Stable and chronic in nature    Continue home tofacitinib  Outpatient follow-up with rheumatology    Hypothyroidism  Assessment & Plan  Stable and chronic in nature    Continue home levothyroxine    Type 2 diabetes mellitus with hyperglycemia, with long-term current use of insulin ([facility])  Assessment & Plan  Lab Results   Component Value Date    HGBA1C 6.8 (H) 11/28/2023       No results for input(s): \"POCGLU\" in the last 72 hours.    Blood Sugar Average: Last 72 hrs:    Controlled on outpatient insulin regimen    Lantus 40 units daily at bedtime  Sliding scale insulin with " Accu-Cheks  Diabetic diet  Goal blood glucose 140-180           VTE Prophylaxis: Eliquis  Code Status: Level 1 Full Code    Anticipated Length of Stay:  Patient will be admitted on an Observation basis with an anticipated length of stay of less than 2 midnights.   Justification for Hospital Stay: COPD with acute exacerbation    Total Time for Visit, including Counseling / Coordination of Care: I have spent a total time of 45 minutes on 07/03/24 in caring for this patient including Diagnostic results, Prognosis, Risks and benefits of tx options, Instructions for management, Patient and family education, Importance of tx compliance, Risk factor reductions, Impressions, Counseling / Coordination of care, Documenting in the medical record, Reviewing / ordering tests, medicine, procedures  , Obtaining or reviewing history  , and Communicating with other healthcare professionals .    Chief Complaint:   Dyspnea on exertion and cough    History of Present Illness:    Shakira Polk is a 74 y.o. female with a past medical history significant for COPD, pulmonary embolism, HFpEF, hypothyroidism, hypertension, GERD, rheumatoid arthritis who presents with complaints of dyspnea on exertion and cough with productive phlegm.  The patient has a history of COPD with numerous exacerbations requiring hospitalization.  In the ED, the patient was found to be saturating well on home O2 requirement of 3 L.  Laboratory analysis grossly unremarkable.  Patient's symptoms did not improve despite nebulizations in the ED.  The patient was assigned observation in the setting of COPD with acute exacerbation for IV corticosteroids.    Review of Systems:    Review of Systems   Constitutional:  Negative for chills and fever.   HENT:  Negative for ear pain and sore throat.    Eyes:  Negative for pain and visual disturbance.   Respiratory:  Positive for cough and shortness of breath.    Cardiovascular:  Negative for chest pain and palpitations.    Gastrointestinal:  Negative for abdominal pain and vomiting.   Genitourinary:  Negative for dysuria and hematuria.   Musculoskeletal:  Negative for arthralgias and back pain.   Skin:  Negative for color change and rash.   Neurological:  Negative for seizures and syncope.   All other systems reviewed and are negative.      Past Medical and Surgical History:     Past Medical History:   Diagnosis Date    Arthritis     Arthritis     Cancer (HCC)     Candidiasis of skin     Cervical cancer ([facility])     Chronic respiratory failure with hypoxia and hypercapnia ([facility]) 4/20/2019    COPD (chronic obstructive pulmonary disease) ([facility])     last assessed 11/21/2016    Current chronic use of systemic steroids     Degenerative arthritis of left knee     last assessed 1/20/2015    Diabetes mellitus ([facility])     Disease of thyroid gland     hypo pill given to decrease thyroid.     Fibromyalgia     Fistula of knee     last assessed 9/12/2014    GERD (gastroesophageal reflux disease)     Hyperlipidemia     Hypertension     Medial meniscus tear     of left knee, last assessed 9/12/2014    Migraine     states she has a hx of HA that she calls [place] but never was dx by neurologist    Obesity     Obesity     Osteoarthritis     Osteopenia     Pneumonia     last assessed 11/16/2016    Psychiatric disorder     anxiety    Rheumatoid arthritis ([facility])     Rheumatoid arthritis ([facility])     Sepsis ([facility])     last assessed 11/10/2016    Tick bite     last assessed 10/30/2015    Vitamin B12 deficiency     Vitamin D deficiency        Past Surgical History:   Procedure Laterality Date    CATARACT EXTRACTION Bilateral     CHOLECYSTECTOMY      EYE SURGERY      Bilateral Cataracts    HYSTERECTOMY      IR AORTAGRAM WITH RUN-OFF  9/20/2023    IR THORACENTESIS  5/31/2019    JOINT REPLACEMENT      left knee    KNEE ARTHROSCOPY      NEUROPLASTY / TRANSPOSITION MEDIAN NERVE AT CARPAL TUNNEL      TUBAL LIGATION         Meds/Allergies:    Prior to Admission  medications    Medication Sig Start Date End Date Taking? Authorizing Provider   acetaminophen (TYLENOL) 325 mg tablet Take 2 tablets (650 mg total) by mouth every 6 (six) hours as needed for mild pain, headaches or fever 12/13/21   Carlos Reddy DO   albuterol (2.5 mg/3 mL) 0.083 % nebulizer solution USE 1 UNIT DOSE IN NEBULIZER EVERY 4 HOURS AS NEEDED. 5/8/24   Historical Provider, MD   albuterol (PROVENTIL HFA,VENTOLIN HFA) 90 mcg/act inhaler Inhale 2 puffs every 6 (six) hours as needed for wheezing 12/13/21   Carlos Reddy DO   aspirin (ECOTRIN LOW STRENGTH) 81 mg EC tablet Take 81 mg by mouth daily    Historical Provider, MD   atorvastatin (LIPITOR) 20 mg tablet TAKE ONE TABLET BY MOUTH EVERY DAY 7/3/24   Ioana Heck MD   BD Pen Needle Mamta U/F 32G X 4 MM MISC USE AS DIRECTED 11/1/23   Ioana Heck MD   Blood Glucose Monitoring Suppl (ONE TOUCH ULTRA 2) w/Device KIT by Does not apply route 2 (two) times a day 6/6/18   Ioana Heck MD   clindamycin (CLEOCIN T) 1 % lotion clindamycin 1 % lotion APPLY LOTION TOPICALLY TWICE DAILY 8/2/22   Historical Provider, MD   Cranberry 1000 MG CAPS Apply 1,000 mg to the mouth or throat in the morning. Indications: Urinary Tract Infection    Historical Provider, MD   DULoxetine (CYMBALTA) 60 mg delayed release capsule TAKE ONE CAPSULE BY MOUTH EVERY DAY 5/3/24   Ioana Heck MD   Eliquis 5 MG TAKE 1 TABLET BY MOUTH TWICE DAILY 11/1/23   Ioana Heck MD   ergocalciferol (VITAMIN D2) 50,000 units TAKE ONE CAPSULE BY MOUTH WEEKLY 11/1/23   Ioana Heck MD   fluticasone-salmeterol (ADVAIR) 250-50 mcg/dose Inhale 1 puff every 12 (twelve) hours     Historical Provider, MD   folic acid (FOLVITE) 1 mg tablet TAKE ONE TABLET BY MOUTH ONCE DAILY 7/2/24   Ioana Heck MD   furosemide (LASIX) 40 mg tablet TAKE ONE TABLET BY MOUTH EVERY DAY 7/2/24   Ioana Heck MD   glucose blood (OneTouch Ultra) test strip Use 1 each 2 (two)  times a day Test 2 times daily   Dx: E11.65 6/24/24   Ioana Heck MD   guaiFENesin (MUCINEX) 600 mg 12 hr tablet Take 1 tablet (600 mg total) by mouth 2 (two) times a day 12/2/23   Kajal Beth PA-C   Insulin Pen Needle 33G X 4 MM MISC by Does not apply route daily 5/21/19   Ioana Heck MD   Lancets (OneTouch Delica Plus [id number]) MISC Test 2 times a day 6/24/24   Ioana Heck MD   levothyroxine (Synthroid) 137 mcg tablet Take 1 tablet (137 mcg total) by mouth daily 6/25/24   Ioana Heck MD   lisinopril (ZESTRIL) 40 mg tablet TAKE ONE TABLET BY MOUTH EVERY DAY 9/22/23   Ioana Heck MD   methotrexate 2.5 MG tablet TAKE 8 TABLETS BY MOUTH EVERY FLORENTIN 5/15/24   Ioana Heck MD   mometasone (ELOCON) 0.1 % ointment Apply topically 2 (two) times a day as needed (itching)  Patient taking differently: Apply 1 Application topically 2 (two) times a day as needed (itching) apply to breast folds and abdominal folds as needed 4/25/19   Ioana Heck MD   nystatin (MYCOSTATIN) cream Apply topically 2 (two) times a day as needed (rash)  Patient taking differently: Apply 1 Application topically 2 (two) times a day as needed (rash) apply to breast folds and abdominal folds as needed 12/18/23   BO Vazquez   nystatin (MYCOSTATIN) powder Apply topically 3 (three) times a day as needed (rash/irritation)  Patient taking differently: Apply 1 Application topically 3 (three) times a day as needed (rash/irritation) apply to breast folds and abdominal folds as needed. 12/18/23   BO Vazquez   oxygen gas Inhale 3 L/min continuous. nasal cannula  Indications: Chronic Obstructive Lung Disease    Historical Provider, MD   pantoprazole (PROTONIX) 40 mg tablet TAKE ONE TABLET BY MOUTH ONCE DAILY 5/3/24   Ioana Heck MD   Polyethylene Glycol 3350-GRX POWD Take by mouth daily at bedtime as needed (constipation) 1/16/20   Carlos Reddy, DO   semaglutide, 1  "mg/dose, (Ozempic, 1 MG/DOSE,) 4 mg/3 mL injection pen INJECT 1 MG UNDER THE SKIN ONCE A WEEK 5/3/24   Ioana Heck MD   tiotropium (SPIRIVA RESPIMAT) 2.5 MCG/ACT AERS inhaler Inhale 1 puff daily at bedtime 18   Ioana Heck MD   Tofacitinib Citrate ER 11 MG TB24 Take 1 tablet (11 mg total) by mouth daily Do not start before December 10, 2023. 12/10/23   Kajal Beth PA-C   Tresiba FlexTouch 100 units/mL injection pen INJECT 40 UNITS UNDER THE SKIN DAILY 24   Ioana Heck MD   vitamin B-12 (VITAMIN B-12) 500 MCG tablet Take 1 tablet (500 mcg total) by mouth daily 20   Carlos Reddy DO   zolpidem (AMBIEN) 5 mg tablet Take 1 tablet (5 mg total) by mouth daily at bedtime as needed for sleep 24   Ioana Heck MD   atorvastatin (LIPITOR) 20 mg tablet TAKE ONE TABLET BY MOUTH EVERY DAY 4/4/24 7/3/24  Ioana Heck MD       Allergies: No Known Allergies    Social History:     Marital Status:    Substance Use History:   Social History     Substance and Sexual Activity   Alcohol Use Never    Alcohol/week: 0.0 standard drinks of alcohol    Comment: stopped drinking alcohol     Social History     Tobacco Use   Smoking Status Former    Current packs/day: 0.00    Average packs/day: 1 pack/day for 48.0 years (48.0 ttl pk-yrs)    Types: Cigarettes, E-Cigarettes    Start date: 1964    Quit date: 2012    Years since quittin.6   Smokeless Tobacco Never   Tobacco Comments    per allscripts - \"current every day smoker, former smoker\"     Social History     Substance and Sexual Activity   Drug Use Never       Family History:    Pertinent family history reviewed    Physical Exam:     Vitals:   Blood Pressure: 148/64 (24)  Pulse: (!) 107 (24)  Temperature: 99.3 °F (37.4 °C) (24)  Temp Source: Temporal (07/03/24 1656)  Respirations: 20 (24)  SpO2: 95 % (24)    Physical Exam  Vitals and nursing note " reviewed.   Constitutional:       General: She is not in acute distress.     Appearance: She is well-developed.   HENT:      Head: Normocephalic and atraumatic.   Eyes:      Conjunctiva/sclera: Conjunctivae normal.   Cardiovascular:      Rate and Rhythm: Normal rate and regular rhythm.      Heart sounds: No murmur heard.  Pulmonary:      Effort: Pulmonary effort is normal. No respiratory distress.      Breath sounds: Normal breath sounds.   Abdominal:      Palpations: Abdomen is soft.      Tenderness: There is no abdominal tenderness.   Musculoskeletal:         General: No swelling.      Cervical back: Neck supple.   Skin:     General: Skin is warm and dry.      Capillary Refill: Capillary refill takes less than 2 seconds.   Neurological:      Mental Status: She is alert.   Psychiatric:         Mood and Affect: Mood normal.          Additional Data:     Lab Results: I have reviewed pertinent results     Results from last 7 days   Lab Units 07/03/24  1738   WBC Thousand/uL 7.85   HEMOGLOBIN g/dL 9.3*   HEMATOCRIT % 30.6*   PLATELETS Thousands/uL 323   SEGS PCT % 60   LYMPHO PCT % 26   MONO PCT % 9   EOS PCT % 4     Results from last 7 days   Lab Units 07/03/24  1738   SODIUM mmol/L 135   POTASSIUM mmol/L 3.9   CHLORIDE mmol/L 97   CO2 mmol/L 31   BUN mg/dL 9   CREATININE mg/dL 0.70   ANION GAP mmol/L 7   CALCIUM mg/dL 10.1   ALBUMIN g/dL 3.7   TOTAL BILIRUBIN mg/dL 0.48   ALK PHOS U/L 65   ALT U/L 11   AST U/L 15   GLUCOSE RANDOM mg/dL 186*                 Results from last 7 days   Lab Units 07/03/24  1738   PROCALCITONIN ng/ml <0.05       Imaging: I have reviewed pertinent imaging     XR chest 1 view portable   ED Interpretation by Eleanor Martinez MD (07/03 1743)   Per my independent interpretation. Radiologist to provide formal read. RUL mass from 5/2/24 improved increased marking to rt apex; left effusion seen previously slightly increased pulm vasc vs. 5/2/2024          EKG, Pathology, and Other Studies  Reviewed on Admission:   EKG: NSR    Allscripts / Epic Records Reviewed    ** Please Note: This note has been constructed using a voice recognition system. **

## 2024-07-04 NOTE — RESPIRATORY THERAPY NOTE
RT Protocol Note  Shakira Polk 74 y.o. female MRN: [id number]  Unit/Bed#: 400-01 Encounter: [id number]    Assessment    Principal Problem:    Chronic obstructive pulmonary disease with acute exacerbation (HCC)  Active Problems:    Type 2 diabetes mellitus with hyperglycemia, with long-term current use of insulin (HCC)    Hypothyroidism    Rheumatoid arthritis (HCC)    Gastroesophageal reflux disease without esophagitis    Chronic respiratory failure with hypoxia (HCC)    History of pulmonary embolism    Hypertension      Home Pulmonary Medications:    Home Devices/Therapy: Home O2, Other (Comment)    Past Medical History:   Diagnosis Date    Arthritis     Arthritis     Cancer (HCC)     Candidiasis of skin     Cervical cancer (HCC)     Chronic respiratory failure with hypoxia and hypercapnia (HCC) 4/20/2019    COPD (chronic obstructive pulmonary disease) (HCC)     last assessed 11/21/2016    Current chronic use of systemic steroids     Degenerative arthritis of left knee     last assessed 1/20/2015    Diabetes mellitus (HCC)     Disease of thyroid gland     hypo pill given to decrease thyroid.     Fibromyalgia     Fistula of knee     last assessed 9/12/2014    GERD (gastroesophageal reflux disease)     Hyperlipidemia     Hypertension     Medial meniscus tear     of left knee, last assessed 9/12/2014    Migraine     states she has a hx of HA that she calls [place] but never was dx by neurologist    Obesity     Obesity     Osteoarthritis     Osteopenia     Pneumonia     last assessed 11/16/2016    Psychiatric disorder     anxiety    Rheumatoid arthritis (HCC)     Rheumatoid arthritis (HCC)     Sepsis (HCC)     last assessed 11/10/2016    Tick bite     last assessed 10/30/2015    Vitamin B12 deficiency     Vitamin D deficiency      Social History     Socioeconomic History    Marital status:      Spouse name: Not on file    Number of children: Not on file    Years of education: Not on file    Highest education  "level: Not on file   Occupational History    Not on file   Tobacco Use    Smoking status: Former     Current packs/day: 0.00     Average packs/day: 1 pack/day for 48.0 years (48.0 ttl pk-yrs)     Types: Cigarettes, E-Cigarettes     Start date: 1964     Quit date: 2012     Years since quittin.6    Smokeless tobacco: Never    Tobacco comments:     per allscripts - \"current every day smoker, former smoker\"   Vaping Use    Vaping status: Former   Substance and Sexual Activity    Alcohol use: Never     Alcohol/week: 0.0 standard drinks of alcohol     Comment: stopped drinking alcohol    Drug use: Never    Sexual activity: Never   Other Topics Concern    Not on file   Social History Narrative    No living will     Social Determinants of Health     Financial Resource Strain: Medium Risk (2023)    Overall Financial Resource Strain (CARDIA)     Difficulty of Paying Living Expenses: Somewhat hard   Food Insecurity: No Food Insecurity (5/3/2024)    Hunger Vital Sign     Worried About Running Out of Food in the Last Year: Never true     Ran Out of Food in the Last Year: Never true   Transportation Needs: No Transportation Needs (5/3/2024)    PRAPARE - Transportation     Lack of Transportation (Medical): No     Lack of Transportation (Non-Medical): No   Physical Activity: Not on file   Stress: Not on file   Social Connections: Unknown (2019)    Social Connection and Isolation Panel [NHANES]     Frequency of Communication with Friends and Family: More than three times a week     Frequency of Social Gatherings with Friends and Family: Not on file     Attends [employer] Services: Not on file     Active Member of Clubs or Organizations: Not on file     Attends Club or Organization Meetings: Not on file     Marital Status: Not on file   Intimate Partner Violence: Not on file   Housing Stability: Unknown (5/3/2024)    Housing Stability Vital Sign     Unable to Pay for Housing in the Last Year: Not on file     " "Number of Times Moved in the Last Year: 1     Homeless in the Last Year: No       Subjective     Patient assessed per RT protocol.  She has been admitted to the hospital from the ED with shortness of breath and exacerbation of COPD.  She stated to me that she has been feeling short of breath and unwell over the last several days.  She did state that she fells better since coming into the hospital, and received an hour long treatment in the ER.  She has a PMH of COPD, Emphysema, smoking history and MORENO, in which she is not compliant with CPAP.  She also reports having a non-productive and moist cough.  Upon my assessment, her lung sounds are diminished with a scant wheeze in the right base.  She is resting comfortably and in no distress on her 3 LPM baseline O2 at this time.  I instructed the patient on the proper use of the flutter valve to promote better bronchial hygiene.  Also will plan to stick with TID Xopenex and Atrovent treatments as per ordered by the PA.      Objective    Physical Exam:   Assessment Type: Assess only  General Appearance: Alert, Awake  Respiratory Pattern: Normal  Chest Assessment: Chest expansion symmetrical  Bilateral Breath Sounds: Diminished, Expiratory wheezes (Scant wheeze)  Cough: Non-productive, Congested    Vitals:  Blood pressure 135/71, pulse 105, temperature 97.7 °F (36.5 °C), temperature source Oral, resp. rate 18, height 5' 5\" (1.651 m), weight 107 kg (236 lb 5.3 oz), SpO2 97%, not currently breastfeeding.          Imaging and other studies: I have personally reviewed pertinent reports.            Plan    Respiratory Plan: No distress/Pulmonary history  Airway Clearance Plan: Flutter     TID Xopenex/Atrovent for wheezing and shortness of breath.  Continue to monitor oxygenation per RT protocol.    Resp Comments: (S) Patient assessed per RT protocol   "

## 2024-07-04 NOTE — ASSESSMENT & PLAN NOTE
Patient utilizes 3 L of nasal cannula supplemental O2 at baseline  Currently requiring baseline O2

## 2024-07-04 NOTE — RESPIRATORY THERAPY NOTE
07/04/24 0813   Inhalation Therapy Tx   $ Inhalation Therapy Performed Yes   $ Pulse Oximetry Spot Check Charge Completed   SpO2 96 %  (3 l/m baseline)   Pre-Treatment Pulse 85   Pre-Treatment Respirations 18   Breath Sounds Pre-Treatment Bilateral Clear;Diminished  (posteriorly)   Breath Sounds Post-Treatment Bilateral Diminished;Clear   Post-Treatment Pulse 85   Post-Treatment Respirations 18   Delivery Source Air;UDN   Position Sitting   Treatment Tolerance Tolerated well

## 2024-07-04 NOTE — PROGRESS NOTES
The doxycycline has / have been converted to Oral per [facility] IV-to-PO Auto-Conversion Protocol for Adults as approved by the Pharmacy and Therapeutics Committee. The patient met all eligible criteria:  1) Age = 18 years old   2) Received at least one dose of the IV form   3) Receiving at least one other scheduled oral/enteral medication   4) Tolerating an oral/enteral diet   and did not have any exclusions:   1) Critical care patient   2) Active GI bleed (IF assessing H2RAs or PPIs)   3) Continuous tube feeding (IF assessing cipro, doxycycline, levofloxacin, minocycline, rifampin, or voriconazole)   4) Receiving PO vancomycin (IF assessing metronidazole)   5) Persistent nausea and/or vomiting   6) Ileus or gastrointestinal obstruction   7) Rebecca/nasogastric tube set for continuous suction   8) Specific order not to automatically convert to PO (in the order's comments or if discussed in the most recent Infectious Disease or primary team's progress notes).

## 2024-07-04 NOTE — PLAN OF CARE
Problem: RESPIRATORY - ADULT  Goal: Achieves optimal ventilation and oxygenation  Description: INTERVENTIONS:  - Assess for changes in respiratory status  - Assess for changes in mentation and behavior  - Position to facilitate oxygenation and minimize respiratory effort  - Oxygen administered by appropriate delivery if ordered  - Initiate smoking cessation education as indicated  - Encourage broncho-pulmonary hygiene including cough, deep breathe, Incentive Spirometry  - Assess the need for suctioning and aspirate as needed  - Assess and instruct to report SOB or any respiratory difficulty  - Respiratory Therapy support as indicated  Outcome: Progressing     Problem: METABOLIC, FLUID AND ELECTROLYTES - ADULT  Goal: Electrolytes maintained within normal limits  Description: INTERVENTIONS:  - Monitor labs and assess patient for signs and symptoms of electrolyte imbalances  - Administer electrolyte replacement as ordered  - Monitor response to electrolyte replacements, including repeat lab results as appropriate  - Instruct patient on fluid and nutrition as appropriate  Outcome: Progressing     Problem: PAIN - ADULT  Goal: Verbalizes/displays adequate comfort level or baseline comfort level  Description: Interventions:  - Encourage patient to monitor pain and request assistance  - Assess pain using appropriate pain scale  - Administer analgesics based on type and severity of pain and evaluate response  - Implement non-pharmacological measures as appropriate and evaluate response  - Consider cultural and social influences on pain and pain management  - Notify physician/advanced practitioner if interventions unsuccessful or patient reports new pain  Outcome: Progressing     Problem: INFECTION - ADULT  Goal: Absence or prevention of progression during hospitalization  Description: INTERVENTIONS:  - Assess and monitor for signs and symptoms of infection  - Monitor lab/diagnostic results  - Monitor all insertion sites,  i.e. indwelling lines, tubes, and drains  - [place] appropriate cooling/warming therapies per order  - Administer medications as ordered  - Instruct and encourage patient and family to use good hand hygiene technique  - Identify and instruct in appropriate isolation precautions for identified infection/condition  Outcome: Progressing     Problem: SAFETY ADULT  Goal: Patient will remain free of falls  Description: INTERVENTIONS:  - Educate patient/family on patient safety including physical limitations  - Instruct patient to call for assistance with activity   - Consult OT/PT to assist with strengthening/mobility   - Keep Call bell within reach  - Keep bed low and locked with side rails adjusted as appropriate  - Keep care items and personal belongings within reach  - Initiate and maintain comfort rounds  - Make Fall Risk Sign visible to staff  - Offer Toileting every 4 Hours, in advance of need  - Obtain necessary fall risk management equipment  - Apply yellow socks and bracelet for high fall risk patients  - Consider moving patient to room near nurses station  Outcome: Progressing  Goal: Maintain or return to baseline ADL function  Description: INTERVENTIONS:  -  Assess patient's ability to carry out ADLs; assess patient's baseline for ADL function and identify physical deficits which impact ability to perform ADLs (bathing, care of mouth/teeth, toileting, grooming, dressing, etc.)  - Assess/evaluate cause of self-care deficits   - Assess range of motion  - Assess patient's mobility; develop plan if impaired  - Assess patient's need for assistive devices and provide as appropriate  - Encourage maximum independence but intervene and supervise when necessary  - Involve family in performance of ADLs  - Assess for home care needs following discharge   - Consider OT consult to assist with ADL evaluation and planning for discharge  - Provide patient education as appropriate  Outcome: Progressing  Goal:  Maintains/Returns to pre admission functional level  Description: INTERVENTIONS:  - Perform AM-PAC 6 Click Basic Mobility/ Daily Activity assessment daily.  - Set and communicate daily mobility goal to care team and patient/family/caregiver.   - Collaborate with rehabilitation services on mobility goals if consulted  - Perform Range of Motion 3 times a day.  - Reposition patient every 2 hours.  - Dangle patient 3 times a day  - Stand patient 3 times a day  - Ambulate patient 3 times a day  - Out of bed to chair 3 times a day   - Out of bed for meals 3 times a day  - Out of bed for toileting  - Record patient progress and toleration of activity level   Outcome: Progressing

## 2024-07-04 NOTE — DISCHARGE SUMMARY
[facility]  Discharge- Shakira Polk 1950, 74 y.o. female MRN: [id number]  Unit/Bed#: 400-01 Encounter: [id number]  Primary Care Provider: Ioana Heck MD   Date and time admitted to hospital: 7/3/2024  4:52 PM    * Chronic obstructive pulmonary disease with acute exacerbation (HCC)  Assessment & Plan  Patient presents with dyspnea on exertion and increased phlegm production  Likely COPD with acute exacerbation  Thankfully patient is on home O2 requirement of 3 L nasal cannula  No evidence of infectious etiology    Rapid improvement in clinical symptoms, overall improved air exchange, discharged on p.o. prednisone taper, continue home nebs.    Patient referred to pulmonary medicine.    Type 2 diabetes mellitus with hyperglycemia, with long-term current use of insulin (HCC)  Assessment & Plan  Lab Results   Component Value Date    HGBA1C 6.8 (H) 11/28/2023       Recent Labs     07/03/24  2207 07/04/24  0704   POCGLU 352* 362*       Blood Sugar Average: Last 72 hrs:  (P) 357  Updated hemoglobin A1c due, result pending at discharge, continue home insulin regimen, patient with evidence of steroid-induced hyperglycemia on IV Solu-Medrol, patient advised to monitor Accu-Cheks, follow-up with PCP.    Chronic respiratory failure with hypoxia (HCC)  Assessment & Plan  Patient utilizes 3 L of nasal cannula supplemental O2 at baseline  Currently requiring baseline O2    Rheumatoid arthritis (HCC)  Assessment & Plan  Stable and chronic in nature    Continue home tofacitinib  Outpatient follow-up with rheumatology        Medical Problems       Resolved Problems  Date Reviewed: 7/4/2024   None       Discharging Physician / Practitioner: Spike Bell DO  PCP: Ioana Heck MD  Admission Date:   Admission Orders (From admission, onward)       Ordered        07/03/24 2113  Place in Observation  Once                          Discharge Date: 07/04/24    Reason for Admission: Shortness of  "breath, wheezing, COPD exacerbation    Hospital Course:   Shakira Polk is a 74 y.o. female patient who originally presented to the hospital on 7/3/2024 due to COPD exacerbation, patient monitored overnight with improvement in symptoms.  Discharged on p.o. prednisone taper.      Please see above list of diagnoses and related plan for additional information.     Condition at Discharge: good    Discharge Day Visit / Exam:   Subjective: Overall feeling much better, breathing improved.  Vitals: Blood Pressure: 150/60 (07/04/24 0704)  Pulse: 91 (07/04/24 0704)  Temperature: 97.7 °F (36.5 °C) (07/03/24 2201)  Temp Source: Oral (07/03/24 2201)  Respirations: 15 (07/04/24 0704)  Height: 5' 5\" (165.1 cm) (07/03/24 2201)  Weight - Scale: 107 kg (236 lb 5.3 oz) (07/03/24 2201)  SpO2: 96 % (3 l/m baseline) (07/04/24 0813)  Exam:   Physical Exam     Discharge instructions/Information to patient and family:   See after visit summary for information provided to patient and family.      Provisions for Follow-Up Care:  See after visit summary for information related to follow-up care and any pertinent home health orders.      Mobility at time of Discharge:   Basic Mobility Inpatient Raw Score: 22  JH-HLM Goal: 7: Walk 25 feet or more  JH-HLM Achieved: 7: Walk 25 feet or more  HLM Goal achieved. Continue to encourage appropriate mobility.     Disposition:   Home    Planned Readmission: No     Discharge Statement:  I spent 23 minutes discharging the patient. This time was spent on the day of discharge. I had direct contact with the patient on the day of discharge. Greater than 50% of the total time was spent examining patient, answering all patient questions, arranging and discussing plan of care with patient as well as directly providing post-discharge instructions.  Additional time then spent on discharge activities.    Discharge Medications:  See after visit summary for reconciled discharge medications provided to patient and/or " family.      **Please Note: This note may have been constructed using a voice recognition system**

## 2024-07-04 NOTE — CASE MANAGEMENT
Case Management Discharge Planning Note    Patient name Shakira Polk  Location /400-01 MRN [id number]  : 1950 Date 2024       Current Admission Date: 7/3/2024  Current Admission Diagnosis:Chronic obstructive pulmonary disease with acute exacerbation (HCC)   Patient Active Problem List    Diagnosis Date Noted Date Diagnosed    Hypertension 2024     Positive blood culture 2023     Vertigo 2023     History of pulmonary embolism 2023     PAD (peripheral artery disease) ([facility]) 2023     Chronic obstructive pulmonary disease with acute exacerbation ([facility]) 2023     Dependence on continuous supplemental oxygen 2022     Excessive anticoagulation 2022     MORENO (obstructive sleep apnea) 2021     Hilar lymphadenopathy 2021     Atherosclerosis 2021     Chronic diastolic (congestive) heart failure ([facility])      Chronic sinusitis 2021     Primary insomnia 2020     Migraine without aura and without status migrainosus, not intractable 2020     Postherpetic neuralgia 2020     Diastolic dysfunction 2020     Macrocytic anemia 2020     Hypoxemia requiring supplemental oxygen 2019     History of exposure to asbestos 2019     Chronic respiratory failure with hypoxia ([facility]) 2019     Hyponatremia 2019     Candidal dermatitis 2018     Loculated pleural effusion 2018     Constipation 2017     Renal cyst 2017     Candidiasis, cutaneous 2017     Vitamin B12 deficiency 11/10/2016     Morbid obesity with BMI of 40.0-44.9, adult ([facility]) 2016     Centrilobular emphysema ([facility]) 2016     Type 2 diabetes mellitus with hyperglycemia, with long-term current use of insulin ([facility]) 2016     Hypothyroidism 2016     Rheumatoid arthritis ([facility]) 2016     Hyperlipidemia 2016     Gastroesophageal reflux disease without esophagitis 2016     Hypoalbuminemia  11/03/2016     Hepatic steatosis 11/03/2016     Atelectasis 11/03/2016     Hypertensive heart failure (HCC) 11/03/2016     Anxiety 06/02/2014     Fibromyalgia 01/28/2014     Osteopenia 09/17/2012     Osteoarthritis 05/29/2012     Vitamin D deficiency 05/29/2012       LOS (days): 0  Geometric Mean LOS (GMLOS) (days):   Days to GMLOS:     OBJECTIVE:            Current admission status: Observation   Preferred Pharmacy:   [facility]  [place] [place] - [address]  [address] [place] [address]  Phone: [phone] Fax: [phone]    [facility] - [place], [place] - [facility] [address] [facility]  [address]  [place] [address]  Phone: [phone] Fax: [phone]    Primary Care Provider: Ioana Heck MD    Primary Insurance: [facility]  Secondary Insurance:     DISCHARGE DETAILS:  Pt is being dc'd home on this date with OP follow up after dc.

## 2024-07-04 NOTE — UTILIZATION REVIEW
Initial Clinical Review    Admission: Date/Time/Statement:   Admission Orders (From admission, onward)       Ordered        07/03/24 2113  Place in Observation  Once                     Orders Placed This Encounter   Procedures    Place in Observation     Standing Status:   Standing     Number of Occurrences:   1     Order Specific Question:   Level of Care     Answer:   Med Surg [16]     ED Arrival Information       Expected   -    Arrival   7/3/2024 16:25    Acuity   Urgent              Means of arrival   Ambulance    Escorted by   [facility]    Service   Hospitalist    Admission type   Emergency              Arrival complaint   cough             Chief Complaint   Patient presents with    Medical Problem     Patient reports b/l earaches, cough, crusted shut eye.      Initial Presentation:   73 y/o female with PMHx HTN, HFpEF, hypothyroidism, COPD on Home O2 at 3 L.in, Pulmonary embolism, GERD, rheumatoid arthritis - presents to [facility] ED on 7/3/24 2nd 4-5 day history of worsening dyspnea on exertion and cough with productive phlegm.    In the ED - saturating well on home O2 requirement of 3 L.    Chest x ray showed resolution RUL Pneumonia + trace right pleural effusion.  Laboratory analysis grossly unremarkable.    *As symptoms did not improve despite nebulizations in the ED   *Placed in Observation 7/3/24 at 2113 2nd acute exacerbation COPD with acute exacerbation for IV corticosteroids.     Chronic obstructive pulmonary disease with acute exacerbation (HCC)  Presents with dyspnea on exertion and increased phlegm production  Likely COPD with acute exacerbation  Thankfully patient is on home O2 requirement of 3 L nasal cannula  No evidence of infectious etiology   Solu-Medrol 40 mg IV every 8 hours scheduled  IV doxycycline 100 mg 2 times daily for anti-inflammatory properties  Atrovent and Xopenex  Respiratory protocol  Mucinex for cough  Continue home inhalers     Chronic respiratory failure  with hypoxia (HCC)  Utilizes 3 L of nasal cannula supplemental O2 at baseline  Currently requiring baseline O2   Wean O2 as tolerated  Goal SpO2 greater than 88%  Respiratory protocol    Anticipated Length of Stay/Certification Statement:  Patient will be admitted on an Observation basis with an anticipated length of stay of less than 2 midnights.   Justification for Hospital Stay: COPD with acute exacerbation     7/4/24 - DAY 2:  Hospital Course:   Shakira Polk is a 74 y.o. female patient who originally presented to the hospital on 7/3/2024 due to COPD exacerbation, patient monitored overnight with improvement in symptoms.    *Discharged on p.o. prednisone taper.    ED Triage Vitals   Temperature Pulse Respirations Blood Pressure SpO2 Pain Score   07/03/24 1656 07/03/24 1656 07/03/24 1656 07/03/24 1656 07/03/24 1656 07/03/24 2250   99.3 °F (37.4 °C) 104 20 162/72 99 % No Pain     Weight (last 2 days)       Date/Time Weight    07/03/24 22:01:14 107 (236.33)     Vital Signs (last 3 days)       Date/Time Temp Pulse Resp BP MAP (mmHg) SpO2 Calculated FIO2 (%) - Nasal Cannula Nasal Cannula O2 Flow Rate (L/min) O2 Device Patient Position - Orthostatic VS Pain    07/04/24 0813 -- -- -- -- -- 96 % -- -- -- -- --    07/04/24 0733 -- -- -- -- -- -- -- -- -- -- No Pain    07/04/24 07:04:29 -- 91 15 150/60 90 95 % -- -- -- -- --    07/03/24 2302 -- -- -- -- -- -- -- -- -- -- No Pain    07/03/24 2250 -- -- -- -- -- 94 % 32 3 L/min Nasal cannula -- No Pain    07/03/24 2205 -- -- -- -- -- 97 % 32 3 L/min Nasal cannula -- --    07/03/24 22:01:14 97.7 °F (36.5 °C) 105 -- 135/71 92 94 % 32 3 L/min Nasal cannula Sitting --    07/03/24 2150 -- 103 18 -- -- 96 % 32 3 L/min Nasal cannula -- --    07/03/24 2000 -- 107 20 148/64 92 95 % 32 3 L/min Nasal cannula Sitting --    07/03/24 1900 -- 105 20 144/66 95 96 % 32 3 L/min Nasal cannula Sitting --    07/03/24 1656 99.3 °F (37.4 °C) 104 20 162/72 103 99 % 32 3 L/min Nasal cannula Sitting  --       Pertinent Labs/Diagnostic Test Results:   Radiology:  XR chest 1 view portable   Resolution of right upper lobe pneumonia with benign residual scar.      Question trace right pleural effusion.     Results from last 7 days   Lab Units 07/03/24  1738   SARS-COV-2  Negative     Results from last 7 days   Lab Units 07/04/24  0443 07/03/24  1738   WBC Thousand/uL 7.08 7.85   HEMOGLOBIN g/dL 9.4* 9.3*   HEMATOCRIT % 31.1* 30.6*   PLATELETS Thousands/uL 343 323   TOTAL NEUT ABS Thousands/µL 6.06 4.71         Results from last 7 days   Lab Units 07/04/24  0443 07/03/24  1738   SODIUM mmol/L 136 135   POTASSIUM mmol/L 4.0 3.9   CHLORIDE mmol/L 99 97   CO2 mmol/L 28 31   ANION GAP mmol/L 9 7   BUN mg/dL 9 9   CREATININE mg/dL 0.64 0.70   EGFR ml/min/1.73sq m 86 84   CALCIUM mg/dL 9.9 10.1   MAGNESIUM mg/dL 2.3 1.6*   PHOSPHORUS mg/dL 3.0  --      Results from last 7 days   Lab Units 07/03/24  1738   AST U/L 15   ALT U/L 11   ALK PHOS U/L 65   TOTAL PROTEIN g/dL 7.4   ALBUMIN g/dL 3.7   TOTAL BILIRUBIN mg/dL 0.48     Results from last 7 days   Lab Units 07/04/24  0704 07/03/24  2207   POC GLUCOSE mg/dl 362* 352*     Results from last 7 days   Lab Units 07/04/24  0443 07/03/24  1738   GLUCOSE RANDOM mg/dL 337* 186*     Results from last 7 days   Lab Units 07/03/24  1738   TSH 3RD GENERATON uIU/mL 3.404     Results from last 7 days   Lab Units 07/03/24  1738   PROCALCITONIN ng/ml <0.05       Results from last 7 days   Lab Units 07/03/24  1738   BNP pg/mL 33       Results from last 7 days   Lab Units 07/03/24  2105   CLARITY UA  Cloudy   COLOR UA  Yellow   SPEC GRAV UA  1.010   PH UA  5.5   GLUCOSE UA mg/dl 300 (3/10%)*   KETONES UA mg/dl 10 (1+)*   BLOOD UA  Moderate*   PROTEIN UA mg/dl Trace*   NITRITE UA  Negative   BILIRUBIN UA  Negative   UROBILINOGEN UA (BE) mg/dl <2.0   LEUKOCYTES UA  Large*   WBC UA /hpf Innumerable*   RBC UA /hpf 4-10*   BACTERIA UA /hpf Occasional   EPITHELIAL CELLS WET PREP /hpf Moderate*      Results from last 7 days   Lab Units 07/03/24  1738   INFLUENZA A PCR  Negative   INFLUENZA B PCR  Negative   RSV PCR  Negative         ED Treatment-Medication Administration from 07/03/2024 1625 to 07/03/2024 2157         Date/Time Order Dose Route Action     07/03/2024 1743 fluorescein sodium sterile ophthalmic strip 1 strip 1 strip Both Eyes Given     07/03/2024 1743 methylPREDNISolone sodium succinate (Solu-MEDROL) injection 80 mg 80 mg Intravenous Given     07/03/2024 1743 albuterol inhalation solution 10 mg 10 mg Nebulization Given     07/03/2024 1743 ipratropium (ATROVENT) 0.02 % inhalation solution 1 mg 1 mg Nebulization Given     07/03/2024 1743 sodium chloride 0.9 % inhalation solution 12 mL 12 mL Nebulization Given     07/03/2024 2058 magnesium sulfate 2 g/50 mL IVPB (premix) 2 g 2 g Intravenous New Bag     07/03/2024 2120 cefTRIAXone (ROCEPHIN) IVPB (premix in dextrose) 1,000 mg 50 mL 1,000 mg Intravenous New Bag     Past Medical History:   Diagnosis Date    Arthritis     Arthritis     Cancer ([facility])     Candidiasis of skin     Cervical cancer ([facility])     Chronic respiratory failure with hypoxia and hypercapnia ([facility]) 4/20/2019    COPD (chronic obstructive pulmonary disease) ([facility])     last assessed 11/21/2016    Current chronic use of systemic steroids     Degenerative arthritis of left knee     last assessed 1/20/2015    Diabetes mellitus ([facility])     Disease of thyroid gland     hypo pill given to decrease thyroid.     Fibromyalgia     Fistula of knee     last assessed 9/12/2014    GERD (gastroesophageal reflux disease)     Hyperlipidemia     Hypertension     Medial meniscus tear     of left knee, last assessed 9/12/2014    Migraine     states she has a hx of HA that she calls [employer] but never was dx by neurologist    Obesity     Obesity     Osteoarthritis     Osteopenia     Pneumonia     last assessed 11/16/2016    Psychiatric disorder     anxiety    Rheumatoid arthritis (HCC)     Rheumatoid arthritis  (HCC)     Sepsis (HCC)     last assessed 11/10/2016    Tick bite     last assessed 10/30/2015    Vitamin B12 deficiency     Vitamin D deficiency      Present on Admission:   Chronic obstructive pulmonary disease with acute exacerbation (HCC)   Hypothyroidism   Gastroesophageal reflux disease without esophagitis   Chronic respiratory failure with hypoxia (HCC)   History of pulmonary embolism   Rheumatoid arthritis (HCC)   Hypertension    Admitting Diagnosis: Cough [R05.9]  Acute exacerbation of chronic obstructive pulmonary disease (COPD) ([facility]) [J44.1]  Low magnesium level [R79.0]    Age/Sex: 74 y.o. female    Admission Orders:  Scheduled Medications:  apixaban, 5 mg, Oral, BID  aspirin, 81 mg, Oral, Daily  atorvastatin, 20 mg, Oral, Daily With Dinner  DULoxetine, 60 mg, Oral, Daily  fluticasone-vilanterol, 1 puff, Inhalation, Daily  furosemide, 40 mg, Oral, Daily  guaiFENesin, 600 mg, Oral, BID  insulin glargine, 40 Units, Subcutaneous, HS  insulin lispro, 1-6 Units, Subcutaneous, TID AC  insulin lispro, 1-6 Units, Subcutaneous, HS  ipratropium, 0.5 mg, Nebulization, TID  levalbuterol, 1.25 mg, Nebulization, TID  levothyroxine, 137 mcg, Oral, Early Morning  lisinopril, 40 mg, Oral, Daily  methylPREDNISolone sodium succinate, 40 mg, Intravenous, Q8H  pantoprazole, 40 mg, Oral, Daily  Tofacitinib Citrate ER, 1 tablet, Oral, Daily  umeclidinium, 1 puff, Inhalation, Daily    Continuous IV Infusions:  NONE    PRN Meds:  acetaminophen, 650 mg, Oral, Q6H PRN  zolpidem, 5 mg, Oral, HS PRN    Network Utilization Review Department  ATTENTION: Please call with any questions or concerns to [phone] and carefully listen to the prompts so that you are directed to the right person. All voicemails are confidential.   For Discharge needs, contact Care Management DC Support Team at [phone] opt. [phone]  Send all requests for admission clinical reviews, approved or denied determinations and any other requests to dedicated fax  number below belonging to the campus where the patient is receiving treatment. List of dedicated fax numbers for the Facilities:  FACILITY NAME UR FAX NUMBER   ADMISSION DENIALS (Administrative/Medical Necessity) [phone]   DISCHARGE SUPPORT TEAM (NETWORK) [phone]   PARENT CHILD HEALTH (Maternity/NICU/Pediatrics) [phone]   [facility] [phone]   [facility] [phone]   [facility] [phone]   [facility] [phone]   [facility] [phone]   [facility] [phone]   [facility] [phone]   [facility] [phone]   [facility] [phone]   [facility] [phone]   [facility] [phone]   [facility] [phone]

## 2024-07-04 NOTE — ASSESSMENT & PLAN NOTE
Patient presents with dyspnea on exertion and increased phlegm production  Likely COPD with acute exacerbation  Thankfully patient is on home O2 requirement of 3 L nasal cannula  No evidence of infectious etiology    Solu-Medrol 40 mg IV every 8 hours scheduled  IV doxycycline 100 mg 2 times daily for anti-inflammatory properties  Atrovent and Xopenex  Respiratory protocol  Mucinex for cough  Continue home inhalers

## 2024-07-04 NOTE — ASSESSMENT & PLAN NOTE
Lab Results   Component Value Date    HGBA1C 6.8 (H) 11/28/2023       Recent Labs     07/03/24  2207 07/04/24  0704   POCGLU 352* 362*       Blood Sugar Average: Last 72 hrs:  (P) 357  Updated hemoglobin A1c due, result pending at discharge, continue home insulin regimen, patient with evidence of steroid-induced hyperglycemia on IV Solu-Medrol, patient advised to monitor Accu-Cheks, follow-up with PCP.

## 2024-07-05 ENCOUNTER — TRANSITIONAL CARE MANAGEMENT (OUTPATIENT)
Dept: INTERNAL MEDICINE CLINIC | Facility: CLINIC | Age: 74
End: 2024-07-05

## 2024-07-05 LAB — BACTERIA UR CULT: ABNORMAL

## 2024-07-14 ENCOUNTER — HOSPITAL ENCOUNTER (EMERGENCY)
Facility: HOSPITAL | Age: 74
Discharge: HOME | End: 2024-07-14
Attending: EMERGENCY MEDICINE
Payer: COMMERCIAL

## 2024-07-14 VITALS
RESPIRATION RATE: 16 BRPM | HEART RATE: 80 BPM | DIASTOLIC BLOOD PRESSURE: 76 MMHG | OXYGEN SATURATION: 98 % | SYSTOLIC BLOOD PRESSURE: 169 MMHG | TEMPERATURE: 98.1 F

## 2024-07-14 DIAGNOSIS — R73.9 HYPERGLYCEMIA: Primary | ICD-10-CM

## 2024-07-14 LAB
ALBUMIN SERPL BCG-MCNC: 3.7 G/DL (ref 3.5–5)
ALP SERPL-CCNC: 83 U/L (ref 34–104)
ALT SERPL W P-5'-P-CCNC: 18 U/L (ref 7–52)
ANION GAP SERPL CALCULATED.3IONS-SCNC: 12 MMOL/L (ref 4–13)
AST SERPL W P-5'-P-CCNC: 18 U/L (ref 13–39)
B-OH-BUTYR SERPL-MCNC: <0.05 MMOL/L (ref 0.02–0.27)
BACTERIA UR QL AUTO: ABNORMAL /HPF
BASE EX.OXY STD BLDV CALC-SCNC: 71.3 % (ref 60–80)
BASE EXCESS BLDV CALC-SCNC: -1.9 MMOL/L
BASOPHILS # BLD AUTO: 0 THOUSANDS/ÂΜL (ref 0–0.1)
BASOPHILS NFR BLD AUTO: 0 % (ref 0–1)
BILIRUB SERPL-MCNC: 0.3 MG/DL (ref 0.2–1)
BILIRUB UR QL STRIP: NEGATIVE
BUDDING YEAST: ABNORMAL
BUN SERPL-MCNC: 22 MG/DL (ref 5–25)
CALCIUM SERPL-MCNC: 9.4 MG/DL (ref 8.4–10.2)
CHLORIDE SERPL-SCNC: 93 MMOL/L (ref 96–108)
CLARITY UR: ABNORMAL
CO2 SERPL-SCNC: 25 MMOL/L (ref 21–32)
COLOR UR: COLORLESS
CREAT SERPL-MCNC: 0.88 MG/DL (ref 0.6–1.3)
EOSINOPHIL # BLD AUTO: 0 THOUSAND/ÂΜL (ref 0–0.61)
EOSINOPHIL NFR BLD AUTO: 0 % (ref 0–6)
ERYTHROCYTE [DISTWIDTH] IN BLOOD BY AUTOMATED COUNT: 17.1 % (ref 11.6–15.1)
GFR SERPL CREATININE-BSD FRML MDRD: 64 ML/MIN/1.73SQ M
GLUCOSE SERPL-MCNC: 364 MG/DL (ref 65–140)
GLUCOSE SERPL-MCNC: 436 MG/DL (ref 65–140)
GLUCOSE SERPL-MCNC: 491 MG/DL (ref 65–140)
GLUCOSE UR STRIP-MCNC: ABNORMAL MG/DL
HCO3 BLDV-SCNC: 23.3 MMOL/L (ref 24–30)
HCT VFR BLD AUTO: 34 % (ref 34.8–46.1)
HGB BLD-MCNC: 10.6 G/DL (ref 11.5–15.4)
HGB UR QL STRIP.AUTO: NEGATIVE
IMM GRANULOCYTES # BLD AUTO: 0.03 THOUSAND/UL (ref 0–0.2)
IMM GRANULOCYTES NFR BLD AUTO: 1 % (ref 0–2)
KETONES UR STRIP-MCNC: NEGATIVE MG/DL
LEUKOCYTE ESTERASE UR QL STRIP: ABNORMAL
LYMPHOCYTES # BLD AUTO: 1.18 THOUSANDS/ÂΜL (ref 0.6–4.47)
LYMPHOCYTES NFR BLD AUTO: 20 % (ref 14–44)
MAGNESIUM SERPL-MCNC: 1.7 MG/DL (ref 1.9–2.7)
MCH RBC QN AUTO: 27.3 PG (ref 26.8–34.3)
MCHC RBC AUTO-ENTMCNC: 31.2 G/DL (ref 31.4–37.4)
MCV RBC AUTO: 88 FL (ref 82–98)
MONOCYTES # BLD AUTO: 0.38 THOUSAND/ÂΜL (ref 0.17–1.22)
MONOCYTES NFR BLD AUTO: 7 % (ref 4–12)
NEUTROPHILS # BLD AUTO: 4.2 THOUSANDS/ÂΜL (ref 1.85–7.62)
NEUTS SEG NFR BLD AUTO: 72 % (ref 43–75)
NITRITE UR QL STRIP: NEGATIVE
NON-SQ EPI CELLS URNS QL MICRO: ABNORMAL /HPF
NRBC BLD AUTO-RTO: 0 /100 WBCS
O2 CT BLDV-SCNC: 11.9 ML/DL
PCO2 BLDV: 41.5 MM HG (ref 42–50)
PH BLDV: 7.37 [PH] (ref 7.3–7.4)
PH UR STRIP.AUTO: 5.5 [PH]
PLATELET # BLD AUTO: 481 THOUSANDS/UL (ref 149–390)
PMV BLD AUTO: 10.3 FL (ref 8.9–12.7)
PO2 BLDV: 40.2 MM HG (ref 35–45)
POTASSIUM SERPL-SCNC: 4.4 MMOL/L (ref 3.5–5.3)
PROT SERPL-MCNC: 7.3 G/DL (ref 6.4–8.4)
PROT UR STRIP-MCNC: NEGATIVE MG/DL
RBC # BLD AUTO: 3.88 MILLION/UL (ref 3.81–5.12)
RBC #/AREA URNS AUTO: ABNORMAL /HPF
SODIUM SERPL-SCNC: 130 MMOL/L (ref 135–147)
SP GR UR STRIP.AUTO: 1.01 (ref 1–1.03)
TRANS CELLS #/AREA URNS HPF: ABNORMAL /[HPF]
UROBILINOGEN UR STRIP-ACNC: <2 MG/DL
WBC # BLD AUTO: 5.79 THOUSAND/UL (ref 4.31–10.16)
WBC #/AREA URNS AUTO: ABNORMAL /HPF
WBC CLUMPS # UR AUTO: ABNORMAL /UL

## 2024-07-14 PROCEDURE — 99284 EMERGENCY DEPT VISIT MOD MDM: CPT

## 2024-07-14 PROCEDURE — 82010 KETONE BODYS QUAN: CPT | Performed by: EMERGENCY MEDICINE

## 2024-07-14 PROCEDURE — 82948 REAGENT STRIP/BLOOD GLUCOSE: CPT

## 2024-07-14 PROCEDURE — 82805 BLOOD GASES W/O2 SATURATION: CPT | Performed by: EMERGENCY MEDICINE

## 2024-07-14 PROCEDURE — 87106 FUNGI IDENTIFICATION YEAST: CPT | Performed by: EMERGENCY MEDICINE

## 2024-07-14 PROCEDURE — 81001 URINALYSIS AUTO W/SCOPE: CPT | Performed by: EMERGENCY MEDICINE

## 2024-07-14 PROCEDURE — 87086 URINE CULTURE/COLONY COUNT: CPT | Performed by: EMERGENCY MEDICINE

## 2024-07-14 PROCEDURE — 80053 COMPREHEN METABOLIC PANEL: CPT | Performed by: EMERGENCY MEDICINE

## 2024-07-14 PROCEDURE — 85025 COMPLETE CBC W/AUTO DIFF WBC: CPT | Performed by: EMERGENCY MEDICINE

## 2024-07-14 PROCEDURE — 99284 EMERGENCY DEPT VISIT MOD MDM: CPT | Performed by: EMERGENCY MEDICINE

## 2024-07-14 PROCEDURE — 83735 ASSAY OF MAGNESIUM: CPT | Performed by: EMERGENCY MEDICINE

## 2024-07-14 PROCEDURE — 96360 HYDRATION IV INFUSION INIT: CPT

## 2024-07-14 PROCEDURE — 36415 COLL VENOUS BLD VENIPUNCTURE: CPT | Performed by: EMERGENCY MEDICINE

## 2024-07-14 RX ADMIN — SODIUM CHLORIDE 1000 ML: 0.9 INJECTION, SOLUTION INTRAVENOUS at 20:25

## 2024-07-14 NOTE — ED PROVIDER NOTES
History  Chief Complaint   Patient presents with    Weakness - Generalized     Pt reports hyperglycemia, last reading from      74-year-old female presents for hyperglycemia.  Patient states today her blood sugars have been high and was concerned.  Patient does use Ozempic injections weekly as well as Tresiba daily.  Patient states she did only do her Ozempic injection today as she was uncertain if she did the other injection due to family being around the house today.  Patient does admit to eating more carbohydrates today.  She states she tried to increase her water intake to help with her glucose and assess urinating more frequently, denies painful urination.  Patient was recently admitted to the hospital for pneumonia, she reports a lingering cough however is otherwise feeling improved and taking her antibiotics as prescribed.  Patient is on 3 L supplemental oxygen chronically and is arriving on that.  She denies chest pain, shortness of breath, abdominal pain, nausea or vomiting.        Prior to Admission Medications   Prescriptions Last Dose Informant Patient Reported? Taking?   BD Pen Needle Mamta U/F 32G X 4 MM MISC  Self No No   Sig: USE AS DIRECTED   Blood Glucose Monitoring Suppl (ONE TOUCH ULTRA 2) w/Device KIT  Self No No   Sig: by Does not apply route 2 (two) times a day   Cranberry 1000 MG CAPS   Yes No   Sig: Apply 1,000 mg to the mouth or throat in the morning. Indications: Urinary Tract Infection   DULoxetine (CYMBALTA) 60 mg delayed release capsule   No No   Sig: TAKE ONE CAPSULE BY MOUTH EVERY DAY   Eliquis 5 MG  Self No No   Sig: TAKE 1 TABLET BY MOUTH TWICE DAILY   Insulin Pen Needle 33G X 4 MM MISC  Self No No   Sig: by Does not apply route daily   Lancets (OneTouch Delica Plus [id number]) MISC   No No   Sig: Test 2 times a day   Polyethylene Glycol 3350-GRX POWD  Self No No   Sig: Take by mouth daily at bedtime as needed (constipation)   Tofacitinib Citrate ER 11 MG TB24  Self No No    Sig: Take 1 tablet (11 mg total) by mouth daily Do not start before December 10, 2023.   Tresiba FlexTouch 100 units/mL injection pen   No No   Sig: INJECT 40 UNITS UNDER THE SKIN DAILY   acetaminophen (TYLENOL) 325 mg tablet  Self No No   Sig: Take 2 tablets (650 mg total) by mouth every 6 (six) hours as needed for mild pain, headaches or fever   albuterol (2.5 mg/3 mL) 0.083 % nebulizer solution  Self Yes No   Sig: USE 1 UNIT DOSE IN NEBULIZER EVERY 4 HOURS AS NEEDED.   albuterol (PROVENTIL HFA,VENTOLIN HFA) 90 mcg/act inhaler  Self No No   Sig: Inhale 2 puffs every 6 (six) hours as needed for wheezing   aspirin (ECOTRIN LOW STRENGTH) 81 mg EC tablet  Self Yes No   Sig: Take 81 mg by mouth daily   atorvastatin (LIPITOR) 20 mg tablet   No No   Sig: TAKE ONE TABLET BY MOUTH EVERY DAY   clindamycin (CLEOCIN T) 1 % lotion  Self Yes No   Sig: clindamycin 1 % lotion APPLY LOTION TOPICALLY TWICE DAILY   ergocalciferol (VITAMIN D2) 50,000 units  Self No No   Sig: TAKE ONE CAPSULE BY MOUTH WEEKLY   fluticasone-salmeterol (ADVAIR) 250-50 mcg/dose  Self Yes No   Sig: Inhale 1 puff every 12 (twelve) hours    folic acid (FOLVITE) 1 mg tablet   No No   Sig: TAKE ONE TABLET BY MOUTH ONCE DAILY   furosemide (LASIX) 40 mg tablet   No No   Sig: TAKE ONE TABLET BY MOUTH EVERY DAY   glucose blood (OneTouch Ultra) test strip   No No   Sig: Use 1 each 2 (two) times a day Test 2 times daily   Dx: E11.65   guaiFENesin (MUCINEX) 600 mg 12 hr tablet  Self No No   Sig: Take 1 tablet (600 mg total) by mouth 2 (two) times a day   levothyroxine (Synthroid) 137 mcg tablet   No No   Sig: Take 1 tablet (137 mcg total) by mouth daily   lisinopril (ZESTRIL) 40 mg tablet  Self No No   Sig: TAKE ONE TABLET BY MOUTH EVERY DAY   methotrexate 2.5 MG tablet   No No   Sig: TAKE 8 TABLETS BY MOUTH EVERY SUNDAY   mometasone (ELOCON) 0.1 % ointment   No No   Sig: Apply 1 Application topically 2 (two) times a day as needed (itching) apply to breast folds  and abdominal folds as needed   nystatin (MYCOSTATIN) powder   No No   Sig: Apply 1 Application topically 3 (three) times a day as needed (rash/irritation) apply to breast folds and abdominal folds as needed.   oxygen gas   Yes No   Sig: Inhale 3 L/min continuous. nasal cannula  Indications: Chronic Obstructive Lung Disease   pantoprazole (PROTONIX) 40 mg tablet   No No   Sig: TAKE ONE TABLET BY MOUTH ONCE DAILY   predniSONE 10 mg tablet   No No   Sig: Take 1 tablet (10 mg total) by mouth daily 40 mg x 3 days, 30 mg x 3 days, 20 mg x 3 days, 10 mg x 3 days.   semaglutide, 1 mg/dose, (Ozempic, 1 MG/DOSE,) 4 mg/3 mL injection pen   No No   Sig: INJECT 1 MG UNDER THE SKIN ONCE A WEEK   tiotropium (SPIRIVA RESPIMAT) 2.5 MCG/ACT AERS inhaler  Self No No   Sig: Inhale 1 puff daily at bedtime   vitamin B-12 (VITAMIN B-12) 500 MCG tablet  Self No No   Sig: Take 1 tablet (500 mcg total) by mouth daily   zolpidem (AMBIEN) 5 mg tablet   No No   Sig: Take 1 tablet (5 mg total) by mouth daily at bedtime as needed for sleep      Facility-Administered Medications Last Administration Doses Remaining   cyanocobalamin injection 1,000 mcg 6/21/2023  3:23 PM           Past Medical History:   Diagnosis Date    Arthritis     Arthritis     Cancer (HCC)     Candidiasis of skin     Cervical cancer (HCC)     Chronic respiratory failure with hypoxia and hypercapnia (HCC) 4/20/2019    COPD (chronic obstructive pulmonary disease) (HCC)     last assessed 11/21/2016    Current chronic use of systemic steroids     Degenerative arthritis of left knee     last assessed 1/20/2015    Diabetes mellitus (HCC)     Disease of thyroid gland     hypo pill given to decrease thyroid.     Fibromyalgia     Fistula of knee     last assessed 9/12/2014    GERD (gastroesophageal reflux disease)     Hyperlipidemia     Hypertension     Medial meniscus tear     of left knee, last assessed 9/12/2014    Migraine     states she has a hx of HA that she calls [employer]  "but never was dx by neurologist    Obesity     Obesity     Osteoarthritis     Osteopenia     Pneumonia     last assessed 2016    Psychiatric disorder     anxiety    Rheumatoid arthritis (HCC)     Rheumatoid arthritis (HCC)     Sepsis (HCC)     last assessed 11/10/2016    Tick bite     last assessed 10/30/2015    Vitamin B12 deficiency     Vitamin D deficiency        Past Surgical History:   Procedure Laterality Date    CATARACT EXTRACTION Bilateral     CHOLECYSTECTOMY      EYE SURGERY      Bilateral Cataracts    HYSTERECTOMY      IR AORTAGRAM WITH RUN-OFF  2023    IR THORACENTESIS  2019    JOINT REPLACEMENT      left knee    KNEE ARTHROSCOPY      NEUROPLASTY / TRANSPOSITION MEDIAN NERVE AT CARPAL TUNNEL      TUBAL LIGATION         Family History   Problem Relation Age of Onset    Stroke Mother     No Known Problems Daughter     No Known Problems Maternal Grandmother     No Known Problems Maternal Grandfather     No Known Problems Paternal Grandmother     No Known Problems Paternal Grandfather     Asthma Family     Cancer Family     Diabetes Family     Hypertension Family     No Known Problems Son     No Known Problems Son     Addiction problem Son     Breast cancer Half-Sister 45    No Known Problems Half-Brother     No Known Problems Half-Brother     No Known Problems Maternal Aunt      I have reviewed and agree with the history as documented.    E-Cigarette/Vaping    E-Cigarette Use Former User      E-Cigarette/Vaping Substances    Nicotine No     THC No     CBD No     Flavoring No     Other No     Unknown No      Social History     Tobacco Use    Smoking status: Former     Current packs/day: 0.00     Average packs/day: 1 pack/day for 48.0 years (48.0 ttl pk-yrs)     Types: Cigarettes, E-Cigarettes     Start date: 1964     Quit date: 2012     Years since quittin.6    Smokeless tobacco: Never    Tobacco comments:     per allscripts - \"current every day smoker, former smoker\" "   Vaping Use    Vaping status: Former   Substance Use Topics    Alcohol use: Never     Alcohol/week: 0.0 standard drinks of alcohol     Comment: stopped drinking alcohol    Drug use: Never       Review of Systems   Constitutional:  Negative for activity change and appetite change.   Respiratory:  Positive for cough. Negative for shortness of breath.    Cardiovascular:  Negative for chest pain.   Gastrointestinal:  Negative for abdominal pain, nausea and vomiting.   Genitourinary:  Positive for frequency. Negative for dysuria.   All other systems reviewed and are negative.      Physical Exam  Physical Exam  Vitals reviewed.   Constitutional:       General: She is not in acute distress.     Appearance: Normal appearance. She is not ill-appearing or toxic-appearing.   HENT:      Head: Normocephalic and atraumatic.      Right Ear: External ear normal.      Left Ear: External ear normal.   Eyes:      General: No scleral icterus.        Right eye: No discharge.         Left eye: No discharge.   Cardiovascular:      Rate and Rhythm: Normal rate.   Pulmonary:      Effort: Pulmonary effort is normal. No respiratory distress.   Abdominal:      General: There is no distension.      Palpations: Abdomen is soft.      Tenderness: There is no abdominal tenderness. There is no guarding or rebound.   Musculoskeletal:         General: No deformity or signs of injury.      Right lower leg: No edema.      Left lower leg: No edema.   Skin:     General: Skin is warm.      Coloration: Skin is not jaundiced or pale.   Neurological:      General: No focal deficit present.      Mental Status: She is alert. Mental status is at baseline.         Vital Signs  ED Triage Vitals   Temperature Pulse Respirations Blood Pressure SpO2   07/14/24 1946 07/14/24 1946 07/14/24 1943 07/14/24 1946 07/14/24 1946   98.1 °F (36.7 °C) 97 20 (!) 189/76 96 %      Temp Source Heart Rate Source Patient Position - Orthostatic VS BP Location FiO2 (%)   07/14/24 1946  07/14/24 1946 07/14/24 1946 07/14/24 1946 --   Temporal Monitor Lying Left arm       Pain Score       --                  Vitals:    07/14/24 1946 07/14/24 2000 07/14/24 2030 07/14/24 2200   BP: (!) 189/76 142/68 153/71 169/76   Pulse: 97 94 92 80   Patient Position - Orthostatic VS: Lying            Visual Acuity      ED Medications  Medications   sodium chloride 0.9 % bolus 1,000 mL (0 mL Intravenous Stopped 7/14/24 2125)       Diagnostic Studies  Results Reviewed       Procedure Component Value Units Date/Time    Fingerstick Glucose (POCT) [[id number]]  (Abnormal) Collected: 07/14/24 2119    Lab Status: Final result Specimen: Blood Updated: 07/14/24 2119     POC Glucose 364 mg/dl     Urine Microscopic [[id number]]  (Abnormal) Collected: 07/14/24 2026    Lab Status: Final result Specimen: Urine, Clean Catch Updated: 07/14/24 2052     RBC, UA 0-1 /hpf      WBC, UA 30-50 /hpf      Epithelial Cells Occasional /hpf      Bacteria, UA Occasional /hpf      WBC Clumps an ocassional small wbc clump observed     Transitional Epithelial Cells ocassionally observed     Budding Yeast moderate quantity observed    Urine culture [[id number]] Collected: 07/14/24 2026    Lab Status: In process Specimen: Urine, Clean Catch Updated: 07/14/24 2052    UA w Reflex to Microscopic w Reflex to Culture [[id number]]  (Abnormal) Collected: 07/14/24 2026    Lab Status: Final result Specimen: Urine, Clean Catch Updated: 07/14/24 2038     Color, UA Colorless     Clarity, UA Hazy     Specific Gravity, UA 1.010     pH, UA 5.5     Leukocytes, UA Small     Nitrite, UA Negative     Protein, UA Negative mg/dl      Glucose, UA 1000 (1%) mg/dl      Ketones, UA Negative mg/dl      Urobilinogen, UA <2.0 mg/dl      Bilirubin, UA Negative     Occult Blood, UA Negative    Comprehensive metabolic panel [[id number]]  (Abnormal) Collected: 07/14/24 1959    Lab Status: Final result Specimen: Blood from Arm, Left Updated: 07/14/24 2026     Sodium 130 mmol/L       Potassium 4.4 mmol/L      Chloride 93 mmol/L      CO2 25 mmol/L      ANION GAP 12 mmol/L      BUN 22 mg/dL      Creatinine 0.88 mg/dL      Glucose 491 mg/dL      Calcium 9.4 mg/dL      AST 18 U/L      ALT 18 U/L      Alkaline Phosphatase 83 U/L      Total Protein 7.3 g/dL      Albumin 3.7 g/dL      Total Bilirubin 0.30 mg/dL      eGFR 64 ml/min/1.73sq m     Narrative:      National Kidney Disease Foundation guidelines for Chronic Kidney Disease (CKD):     Stage 1 with normal or high GFR (GFR > 90 mL/min/1.73 square meters)    Stage 2 Mild CKD (GFR = 60-89 mL/min/1.73 square meters)    Stage 3A Moderate CKD (GFR = 45-59 mL/min/1.73 square meters)    Stage 3B Moderate CKD (GFR = 30-44 mL/min/1.73 square meters)    Stage 4 Severe CKD (GFR = 15-29 mL/min/1.73 square meters)    Stage 5 End Stage CKD (GFR <15 mL/min/1.73 square meters)  Note: GFR calculation is accurate only with a steady state creatinine    Magnesium [[id number]]  (Abnormal) Collected: 07/14/24 1959    Lab Status: Final result Specimen: Blood from Arm, Left Updated: 07/14/24 2024     Magnesium 1.7 mg/dL     Beta Hydroxybutyrate [[id number]]  (Normal) Collected: 07/14/24 1959    Lab Status: Final result Specimen: Blood from Arm, Left Updated: 07/14/24 2024     Beta- Hydroxybutyrate <0.05 mmol/L     Blood gas, venous [[id number]]  (Abnormal) Collected: 07/14/24 1959    Lab Status: Final result Specimen: Blood from Arm, Left Updated: 07/14/24 2007     pH, Dewey 7.367     pCO2, Dewey 41.5 mm Hg      pO2, Dewey 40.2 mm Hg      HCO3, Dewey 23.3 mmol/L      Base Excess, Dewey -1.9 mmol/L      O2 Content, Dewey 11.9 ml/dL      O2 HGB, VENOUS 71.3 %     CBC and differential [[id number]]  (Abnormal) Collected: 07/14/24 1959    Lab Status: Final result Specimen: Blood from Arm, Left Updated: 07/14/24 2005     WBC 5.79 Thousand/uL      RBC 3.88 Million/uL      Hemoglobin 10.6 g/dL      Hematocrit 34.0 %      MCV 88 fL      MCH 27.3 pg      MCHC 31.2 g/dL      RDW 17.1 %      MPV  10.3 fL      Platelets 481 Thousands/uL      nRBC 0 /100 WBCs      Segmented % 72 %      Immature Grans % 1 %      Lymphocytes % 20 %      Monocytes % 7 %      Eosinophils Relative 0 %      Basophils Relative 0 %      Absolute Neutrophils 4.20 Thousands/µL      Absolute Immature Grans 0.03 Thousand/uL      Absolute Lymphocytes 1.18 Thousands/µL      Absolute Monocytes 0.38 Thousand/µL      Eosinophils Absolute 0.00 Thousand/µL      Basophils Absolute 0.00 Thousands/µL     Fingerstick Glucose (POCT) [[id number]]  (Abnormal) Collected: 07/14/24 1944    Lab Status: Final result Specimen: Blood Updated: 07/14/24 1945     POC Glucose 436 mg/dl                    No orders to display              Procedures  Procedures         ED Course  ED Course as of 07/14/24 2239   Sun Jul 14, 2024 2004 POC Glucose(!!): 436  elevated   2005 CBC and differential(!)  stable   2026 Comprehensive metabolic panel(!!)  Hyperglycemia with pseudohyponatremia, no evidence of DKA   2027 MAGNESIUM(!): 1.7  Can replete   2027 Beta- Hydroxybutyrate: <0.05  negative   2044 Updated pt to results, favor she did not take her other insulin injectable today. Will let IVF continue and recheck fingerstick. Patient states she would require a roundtrip ride home, if getting picked up in reasonable time will have pt take her home insulin and provide insulin in ED if will take longer to get home.    2053 Urine Microscopic(!)  Pt has fluconazole at home to take for ppx of yeast infection due to recent abx use, will advise to take   2120 POC Glucose(!): 364   2126 P/u for 2245                                 SBIRT 20yo+      Flowsheet Row Most Recent Value   Initial Alcohol Screen: US AUDIT-C     1. How often do you have a drink containing alcohol? 0 Filed at: 07/14/2024 1944   2. How many drinks containing alcohol do you have on a typical day you are drinking?  0 Filed at: 07/14/2024 1944   3b. FEMALE Any Age, or MALE 65+: How often do you have 4 or more  drinks on one occassion? 0 Filed at: 07/14/2024 1944   Audit-C Score 0 Filed at: 07/14/2024 1944   CHANTEL: How many times in the past year have you...    Used an illegal drug or used a prescription medication for non-medical reasons? Never Filed at: 07/14/2024 1944                      Medical Decision Making  74-year-old female presents for evaluation of hyperglycemia.  Patient is grossly asymptomatic, she does report increased urination although is drinking more water she feels dry and was hoping to lower her glucose with water intake.  Will check urinalysis for occult UTI as a potential source of increased glucose today however likely due to missed insulin dosing at home.  Can assess for DKA, electrolyte derangement however doubtful at this time.  Patient is on a semaglutide, she is hyperglycemic, so unlikely to be euglycemic DKA.    Amount and/or Complexity of Data Reviewed  Labs: ordered. Decision-making details documented in ED Course.                 Disposition  Final diagnoses:   Hyperglycemia     Time reflects when diagnosis was documented in both MDM as applicable and the Disposition within this note       Time User Action Codes Description Comment    7/14/2024  9:20 PM Tonie Ballard Add [R73.9] Hyperglycemia           ED Disposition       ED Disposition   Discharge    Condition   Stable    Date/Time   Sun Jul 14, 2024 2120    Comment   Shakira Polk discharge to home/self care.                   Follow-up Information       Follow up With Specialties Details Why Contact Info Additional Information    Ioana Heck MD Family Medicine  As needed [address] [facility] [id number]  [phone]       [facility] Emergency Department Emergency Medicine  If symptoms worsen [address] [facility] [address]  [phone] [facility] Emergency Department, [address] [place], [place], [address]            Patient's Medications   Discharge  Prescriptions    No medications on file       No discharge procedures on file.    PDMP Review         Value Time User    PDMP Reviewed  Yes 6/17/2024  3:05 PM Ioana Heck MD            ED Provider  Electronically Signed by             Tonie Ballard DO  07/14/24 1423

## 2024-07-15 ENCOUNTER — VBI (OUTPATIENT)
Dept: INTERNAL MEDICINE CLINIC | Facility: CLINIC | Age: 74
End: 2024-07-15

## 2024-07-15 DIAGNOSIS — Z71.89 COORDINATION OF COMPLEX CARE: Primary | ICD-10-CM

## 2024-07-15 NOTE — TELEPHONE ENCOUNTER
07/15/24 10:38 AM    Patient contacted post ED visit, first outreach attempt made. Message was left for patient to return a call to the VBI Department at [facility]: Phone [phone].    Thank you.  Jorge A Botello MA  [facility]

## 2024-07-15 NOTE — LETTER
[facility] [place]  [address] [facility] [place] [address]    Date: 07/17/24    Shakira Polk  [address] [place] [address]    Dear Shakira:                                                                                                                                Thank you for choosing [facility] emergency department for care.  Your primary care provider wants to make sure that your ongoing medical care is being addressed. If you require follow up care as a result of your emergency department visit, there are a few things the practice would like you to know.                As part of the network's continuing commitment to caring for our patients, we have added more same day appointments and have extended office hours to meet your medical needs. After hours, on-call physicians are available via your primary care provider's main office line.               We encourage you to contact our office prior to seeking treatment to discuss your symptoms with the medical staff.  Together, we can determine the correct course of action.  A majority of non-emergent conditions such as: common cold, flu-like symptoms, fevers, strains/sprains, dislocations, minor burns, cuts and animal bites can be treated at [facility] facilities. Diagnostic testing is available at some sites.               Of course, if you are experiencing a life threatening medical emergency call 911 or proceed directly to the nearest emergency room.    Your nearest [facility] facility is conveniently located at:    [address] [address]  [phone]  SKIP THE WAIT  Conveniently offered at most [facility] locations  [place] your spot online at [email or web address] or on the [facility]    Sincerely,    [facility]  Dept: [phone]

## 2024-07-16 LAB — BACTERIA UR CULT: ABNORMAL

## 2024-07-16 NOTE — TELEPHONE ENCOUNTER
07/16/24 10:15 AM    Patient contacted post ED visit, second outreach attempt made. Message was left for patient to return a call to the VBI Department at [facility]: Phone [phone].    Thank you.  Jorge A Botello MA  [facility]

## 2024-07-17 ENCOUNTER — TELEPHONE (OUTPATIENT)
Age: 74
End: 2024-07-17

## 2024-07-17 ENCOUNTER — PATIENT OUTREACH (OUTPATIENT)
Dept: CASE MANAGEMENT | Facility: OTHER | Age: 74
End: 2024-07-17

## 2024-07-17 NOTE — TELEPHONE ENCOUNTER
07/17/24 8:53 AM    Patient contacted post ED visit, phone outreaches were unsuccessful and a MyChart letter has been sent to the patient as follow-up.    Thank you.  J[facility] A Botello MA  [facility]

## 2024-07-17 NOTE — PROGRESS NOTES
Outpatient Care Management Note:  In basket referral received after recent ER visit.  Chart review completed.  Presented to ER with hyperglycemia, possibly due to missed insulin dose.  Also had recent hospitalization for COPD exacerbation.  Recent Hgb A1C 8.6.    Outreach call attempted to Ms. Polk.  Message left for patient to please return call.  Contact information left on message.

## 2024-07-17 NOTE — TELEPHONE ENCOUNTER
Maia with [facility] called to check status of fax they sent over. I informed her I do not see fax. Confirmed fax number and she will re-fax.

## 2024-07-19 ENCOUNTER — PATIENT OUTREACH (OUTPATIENT)
Dept: CASE MANAGEMENT | Facility: OTHER | Age: 74
End: 2024-07-19

## 2024-07-19 ENCOUNTER — TELEPHONE (OUTPATIENT)
Age: 74
End: 2024-07-19

## 2024-07-19 NOTE — PROGRESS NOTES
Outpatient Care Management Note:  Outreach call attempted to Ms. Polk after recent ER visit.  Message left for patient to please return call.  Contact information left on message.     As this is the second unsuccessful outreach attempt, an [facility] letter is being sent to Ms. Polk's  MyChart per protocol.  Should she call and have care management needs, an new episode will be opened.

## 2024-07-19 NOTE — TELEPHONE ENCOUNTER
Maia from [facility] called in again to check status if fax was received?    Spoke with clerical who confirmed, fax was indeed received but refuses to sign off on them.    Relayed information to Maia. Maia stated to please send back fax of forms with note stating refusal and of correct doctor who is to sign off for pt medical supplies.

## 2024-07-19 NOTE — TELEPHONE ENCOUNTER
"Patient called with a request for an antibiotic to be filled with [facility], [place] due to possible URI.    Patient's states, \"I need something to help dry up this congestion\".    Patient scheduled for first available 7/25/24 appointment.  "

## 2024-07-19 NOTE — LETTER
Date: 07/19/24    Dear Shakira Polk,   My name is Hayde Coello. I am a registered nurse from outpatient care management that works with [facility] Outpatient Care Management Department.    I have not been able to reach you and would like to set a time that I can talk with you regarding Care Management.   The Care Management Program is a free, voluntary program that you may opt out of at any time. The program is offered by [facility] Outpatient Care Management Department. We provide resources and education to assist in managing chronic illness as well as assisting with social needs, if any exist. Please call me with any questions you may have. I look forward to speaking with you.  Sincerely,  Hayde Coello RN, BSN  [phone]  Outpatient Care Manager                    Care Management Program  PROGRAM DESCRIPTION:   The Care Management Program is an evidenced based program designed to provide you with the tools you need to make healthcare decisions.   Services offered in the program include the following:    Disease management    When you should follow up with your provider versus going to the emergency room    Help to navigate when transitioning from one care setting to another    Identifying barriers and developing patient goals    Communicates with your care team    Coordinating your care    Self-management action plans    Medication review    Connecting to community resources as needed   You are eligible for this program because you have a chronic condition. This free service, offered to you by your primary care office, can help you learn skills to manage your condition which may help you live a higher quality life.

## 2024-07-22 ENCOUNTER — TELEPHONE (OUTPATIENT)
Dept: INTERNAL MEDICINE CLINIC | Facility: CLINIC | Age: 74
End: 2024-07-22

## 2024-07-22 NOTE — TELEPHONE ENCOUNTER
Called left message for patient asking does she want the Continuous Glucose Monitoring through [facility].

## 2024-08-07 DIAGNOSIS — K21.9 GASTROESOPHAGEAL REFLUX DISEASE: ICD-10-CM

## 2024-08-07 DIAGNOSIS — E11.65 TYPE 2 DIABETES MELLITUS WITH HYPERGLYCEMIA, WITH LONG-TERM CURRENT USE OF INSULIN (HCC): ICD-10-CM

## 2024-08-07 DIAGNOSIS — Z79.4 TYPE 2 DIABETES MELLITUS WITH HYPERGLYCEMIA, WITH LONG-TERM CURRENT USE OF INSULIN (HCC): ICD-10-CM

## 2024-08-07 DIAGNOSIS — I10 ESSENTIAL HYPERTENSION: ICD-10-CM

## 2024-08-07 RX ORDER — LISINOPRIL 40 MG/1
TABLET ORAL
Qty: 100 TABLET | Refills: 1 | Status: SHIPPED | OUTPATIENT
Start: 2024-08-07

## 2024-08-07 RX ORDER — SEMAGLUTIDE 1.34 MG/ML
1 INJECTION, SOLUTION SUBCUTANEOUS WEEKLY
Qty: 3 ML | Refills: 5 | Status: SHIPPED | OUTPATIENT
Start: 2024-08-07

## 2024-08-08 RX ORDER — METHOTREXATE 2.5 MG/1
TABLET ORAL
Qty: 32 TABLET | Refills: 0 | Status: SHIPPED | OUTPATIENT
Start: 2024-08-08

## 2024-08-08 RX ORDER — INSULIN DEGLUDEC 100 U/ML
40 INJECTION, SOLUTION SUBCUTANEOUS DAILY
Qty: 30 ML | Refills: 1 | Status: SHIPPED | OUTPATIENT
Start: 2024-08-08

## 2024-08-14 ENCOUNTER — TELEPHONE (OUTPATIENT)
Age: 74
End: 2024-08-14

## 2024-08-14 NOTE — TELEPHONE ENCOUNTER
Patient called in stating FAITH Palumbo faxed paper that needs to be filled out by 9/4. [facility] says they still have not received the forms. Please advise and call patient with an update. Patient says this is to help her financially.

## 2024-08-23 ENCOUNTER — TELEPHONE (OUTPATIENT)
Age: 74
End: 2024-08-23

## 2024-08-23 NOTE — TELEPHONE ENCOUNTER
Patient called to inform us that her One Touch Test Strips need a PA    Reason for call:   [] Refill   [x] Prior Auth  [] Other:     Office:   [] PCP/Provider -   [] Specialty/Provider -     Medication:   glucose blood (OneTouch Ultra) test strip     Dose/Frequency: Use 1 each 2 (two) times a day Test 2 times daily     Quantity: 300    Pharmacy: [facility] [address] [place] [address] KENYETTA HAIR     Does the patient have enough for 3 days?   [] Yes   [] No - Send as HP to POD

## 2024-08-24 NOTE — TELEPHONE ENCOUNTER
PA for glucose blood (OneTouch Ultra) test strip SUBMITTED     via    [x]CMM-KEY: [id number]  []Surescripts-Case ID #   []Faxed to plan   []Other website   []Phone call Case ID #     Office notes sent, clinical questions answered. Awaiting determination    Turnaround time for your insurance to make a decision on your Prior Authorization can take 7-21 business days.

## 2024-08-24 NOTE — TELEPHONE ENCOUNTER
PA for glucose blood (OneTouch Ultra) test strip APPROVED     Date(s) approved 1/1/2024 to 12/31/2024    Case # [id number]     Patient advised by          [x] [employer] Message- No consent on file.  [] Phone call   []LMOM  []L/M to call office as no active Communication consent on file  []Unable to leave detailed message as VM not approved on Communication consent       Pharmacy advised by    [x]Fax  []Phone call    Approval letter scanned into [employer] Yes

## 2024-08-30 ENCOUNTER — TELEPHONE (OUTPATIENT)
Age: 74
End: 2024-08-30

## 2024-08-30 NOTE — TELEPHONE ENCOUNTER
Pt called to see if papers were received by [facility]. She is having her teeth pulled to get dentures and they need to know the plan for her eliquis.

## 2024-09-04 DIAGNOSIS — K21.9 GASTROESOPHAGEAL REFLUX DISEASE: ICD-10-CM

## 2024-09-04 DIAGNOSIS — E11.65 TYPE 2 DIABETES MELLITUS WITH HYPERGLYCEMIA, WITHOUT LONG-TERM CURRENT USE OF INSULIN (HCC): ICD-10-CM

## 2024-09-04 RX ORDER — FOLIC ACID 1 MG/1
TABLET ORAL
Qty: 90 TABLET | Refills: 0 | Status: SHIPPED | OUTPATIENT
Start: 2024-09-04

## 2024-09-04 RX ORDER — PANTOPRAZOLE SODIUM 40 MG/1
TABLET, DELAYED RELEASE ORAL
Qty: 90 TABLET | Refills: 0 | Status: SHIPPED | OUTPATIENT
Start: 2024-09-04

## 2024-09-04 NOTE — TELEPHONE ENCOUNTER
Pt called to f/u on form states that the deadline will be up soon.  Please contact pt once form is completed and faxed.

## 2024-09-04 NOTE — TELEPHONE ENCOUNTER
Pt called to f/u on forms for [facility].  Pt states she cannot schedule dental appt until PCP completes form regarding blood thinner.  Please give pt call once this is completed.

## 2024-09-05 RX ORDER — PEN NEEDLE, DIABETIC 32GX 5/32"
NEEDLE, DISPOSABLE MISCELLANEOUS
Qty: 100 EACH | Refills: 2 | Status: SHIPPED | OUTPATIENT
Start: 2024-09-05

## 2024-09-05 RX ORDER — METHOTREXATE 2.5 MG/1
TABLET ORAL
Qty: 32 TABLET | Refills: 0 | Status: SHIPPED | OUTPATIENT
Start: 2024-09-05

## 2024-09-16 ENCOUNTER — TELEPHONE (OUTPATIENT)
Age: 74
End: 2024-09-16

## 2024-09-16 NOTE — TELEPHONE ENCOUNTER
Pt made a follow up appt and is on the wait list to come in sooner, she was having car trouble before and that is why she was unable to come in for her appt. She asked for a refill of the nystatin cream, but his is not on her med list please advise

## 2024-09-17 DIAGNOSIS — B37.2 CANDIDAL DERMATITIS: Primary | ICD-10-CM

## 2024-09-17 RX ORDER — NYSTATIN 100000 U/G
CREAM TOPICAL 2 TIMES DAILY
Qty: 60 G | Refills: 3 | Status: SHIPPED | OUTPATIENT
Start: 2024-09-17

## 2024-09-30 DIAGNOSIS — K21.9 GASTROESOPHAGEAL REFLUX DISEASE: ICD-10-CM

## 2024-10-02 RX ORDER — METHOTREXATE 2.5 MG/1
TABLET ORAL
Qty: 32 TABLET | Refills: 0 | Status: SHIPPED | OUTPATIENT
Start: 2024-10-02

## 2024-10-07 DIAGNOSIS — E55.9 VITAMIN D DEFICIENCY: ICD-10-CM

## 2024-10-08 ENCOUNTER — OFFICE VISIT (OUTPATIENT)
Dept: INTERNAL MEDICINE CLINIC | Facility: CLINIC | Age: 74
End: 2024-10-08
Payer: COMMERCIAL

## 2024-10-08 VITALS
BODY MASS INDEX: 38.14 KG/M2 | WEIGHT: 228.9 LBS | HEIGHT: 65 IN | HEART RATE: 110 BPM | OXYGEN SATURATION: 94 % | TEMPERATURE: 98.6 F | DIASTOLIC BLOOD PRESSURE: 72 MMHG | SYSTOLIC BLOOD PRESSURE: 134 MMHG

## 2024-10-08 DIAGNOSIS — J96.11 CHRONIC RESPIRATORY FAILURE WITH HYPOXIA (HCC): ICD-10-CM

## 2024-10-08 DIAGNOSIS — E55.9 VITAMIN D DEFICIENCY: ICD-10-CM

## 2024-10-08 DIAGNOSIS — Z23 NEED FOR COVID-19 VACCINE: ICD-10-CM

## 2024-10-08 DIAGNOSIS — I50.32 CHRONIC DIASTOLIC (CONGESTIVE) HEART FAILURE (HCC): ICD-10-CM

## 2024-10-08 DIAGNOSIS — E03.9 HYPOTHYROIDISM, UNSPECIFIED TYPE: ICD-10-CM

## 2024-10-08 DIAGNOSIS — D53.9 MACROCYTIC ANEMIA: ICD-10-CM

## 2024-10-08 DIAGNOSIS — J43.2 CENTRILOBULAR EMPHYSEMA (HCC): ICD-10-CM

## 2024-10-08 DIAGNOSIS — E53.8 VITAMIN B12 DEFICIENCY: ICD-10-CM

## 2024-10-08 DIAGNOSIS — Z23 ENCOUNTER FOR IMMUNIZATION: ICD-10-CM

## 2024-10-08 DIAGNOSIS — I10 PRIMARY HYPERTENSION: ICD-10-CM

## 2024-10-08 DIAGNOSIS — E78.2 MIXED HYPERLIPIDEMIA: ICD-10-CM

## 2024-10-08 DIAGNOSIS — M06.9 RHEUMATOID ARTHRITIS, INVOLVING UNSPECIFIED SITE, UNSPECIFIED WHETHER RHEUMATOID FACTOR PRESENT (HCC): ICD-10-CM

## 2024-10-08 DIAGNOSIS — Z79.4 TYPE 2 DIABETES MELLITUS WITH HYPERGLYCEMIA, WITH LONG-TERM CURRENT USE OF INSULIN (HCC): Primary | ICD-10-CM

## 2024-10-08 DIAGNOSIS — E11.65 TYPE 2 DIABETES MELLITUS WITH HYPERGLYCEMIA, WITH LONG-TERM CURRENT USE OF INSULIN (HCC): Primary | ICD-10-CM

## 2024-10-08 PROCEDURE — G0008 ADMIN INFLUENZA VIRUS VAC: HCPCS

## 2024-10-08 PROCEDURE — 90480 ADMN SARSCOV2 VAC 1/ONLY CMP: CPT

## 2024-10-08 PROCEDURE — 91320 SARSCV2 VAC 30MCG TRS-SUC IM: CPT

## 2024-10-08 PROCEDURE — G0439 PPPS, SUBSEQ VISIT: HCPCS | Performed by: FAMILY MEDICINE

## 2024-10-08 PROCEDURE — 99214 OFFICE O/P EST MOD 30 MIN: CPT | Performed by: FAMILY MEDICINE

## 2024-10-08 PROCEDURE — 90662 IIV NO PRSV INCREASED AG IM: CPT

## 2024-10-08 RX ORDER — ERGOCALCIFEROL 1.25 MG/1
CAPSULE, LIQUID FILLED ORAL
Qty: 12 CAPSULE | Refills: 0 | Status: SHIPPED | OUTPATIENT
Start: 2024-10-08

## 2024-10-08 NOTE — PROGRESS NOTES
Ambulatory Visit  Name: Shakira Polk      : 1950      MRN: [id number]  Encounter Provider: Ioana Heck MD  Encounter Date: 10/8/2024   Encounter department: [facility]    Assessment & Plan  Need for COVID-19 vaccine    Orders:    COVID-19 Pfizer mRNA vaccine 12 yr and older (Comirnaty pre-filled syringe)    Encounter for immunization    Orders:    influenza vaccine, high-dose, PF 0.5 mL (Fluzone High Dose)    Type 2 diabetes mellitus with hyperglycemia, with long-term current use of insulin (HCC)    Lab Results   Component Value Date    HGBA1C 8.6 (H) 2024       Orders:    Comprehensive metabolic panel; Future    Hemoglobin A1C; Future    Albumin / creatinine urine ratio    Hypothyroidism, unspecified type    Orders:    TSH, 3rd generation; Future    Chronic respiratory failure with hypoxia (HCC)         Centrilobular emphysema (HCC)         Primary hypertension         Chronic diastolic (congestive) heart failure (HCC)  Wt Readings from Last 3 Encounters:   10/08/24 104 kg (228 lb 14.4 oz)   24 107 kg (236 lb 5.3 oz)   24 107 kg (236 lb 9.6 oz)                    Macrocytic anemia    Orders:    CBC and differential; Future    Vitamin B12; Future    Iron Panel (Includes Ferritin, Iron Sat%, Iron, and TIBC); Future    Vitamin D deficiency    Orders:    Vitamin D 25 hydroxy; Future    Vitamin B12 deficiency    Orders:    Vitamin B12; Future    Mixed hyperlipidemia    Orders:    Comprehensive metabolic panel; Future    Lipid panel; Future    Rheumatoid arthritis, involving unspecified site, unspecified whether rheumatoid factor present (HCC)    Orders:    XR hip/pelv 2-3 vws right if performed; Future    XR hip/pelv 2-3 vws left if performed; Future    Orders and recommendations as noted above.  The chronic pain issues discussed.  Pain into the hips discussed.  Recommend checking x-rays to evaluate for degenerative changes which is likely given her other joint  issues.  Consider following up with orthopedics.  Recommend continue to follow-up with rheumatology.  Continue with the methotrexate.  Continue with the Eliquis.  She can hold this for upcoming dental procedure and form will be completed.  Slip given for repeat laboratory testing.  Discussed with her the importance of controlling her blood sugar.  Continue current medications.  These may need to be adjusted based on upcoming laboratory testing.  Continue current dose of the levothyroxine.  Will check TSH and adjust dose if needed.  Watch for any increasing shortness of breath, increasing peripheral edema, or sudden increases in weight.  Continue with the Lasix.  Continue with vitamin D and vitamin B12 supplementation.  Continue with the atorvastatin.  Will check lipid panel.  Discussed with her the importance of compliance with her medications.  Will have her follow-up in about 3 months or sooner if needed depending on her symptoms as well as the testing results.    Depression Screening and Follow-up Plan: Patient was screened for depression during today's encounter. They screened negative with a PHQ-2 score of 0.    Falls Plan of Care: balance, strength, and gait training instructions were provided.       Preventive health issues were discussed with patient, and age appropriate screening tests were ordered as noted in patient's After Visit Summary. Personalized health advice and appropriate referrals for health education or preventive services given if needed, as noted in patient's After Visit Summary.    History of Present Illness     He presents for routine follow-up as well as Medicare wellness visit.  Has been having a lot of pain.  Has more difficulty with ambulation.  Is having increasing pain into her hip areas bilaterally.  Chronic pain into the knees.  She continues with the methotrexate and the folic acid.  Has been feeling much more tired.  Granddaughter is concerned because she appears pale.  Appetite  has been variable.  Blood sugars are also variable sometimes well above 200.  She does not feel that her diet has significantly changed, however.  She is not very active because of the joint symptoms.  Continues with the oxygen.  Has some chronic shortness of breath but this is stable.  Is having dental issues and needs to go for dental procedure and needs a form completed regarding whether she can hold her Eliquis.  Denies any nausea, vomiting, or diarrhea.  Has been feeling more fatigued.  Feels that she never recovered completely from COVID over the summer.  Denies any chest pain or palpitations.  Some edema at times into the legs.       Patient Care Team:  Ioana Heck MD as PCP - General  Ioana Heck MD as PCP - [facility]CP-[facility] ([facility])  MD Jignesh Kearney MD    Review of Systems   Constitutional:  Positive for activity change and fatigue. Negative for appetite change, chills and fever.   HENT:  Positive for dental problem. Negative for congestion and rhinorrhea.    Eyes:  Negative for visual disturbance.   Respiratory:  Positive for shortness of breath. Negative for cough and chest tightness.    Cardiovascular:  Negative for chest pain and palpitations.   Gastrointestinal:  Negative for abdominal pain, blood in stool, diarrhea, nausea and vomiting.   Endocrine: Negative for polydipsia, polyphagia and polyuria.   Genitourinary:  Positive for urgency. Negative for dysuria and frequency.   Musculoskeletal:  Positive for arthralgias and back pain. Negative for gait problem.   Skin:  Negative for color change.   Neurological:  Negative for dizziness and headaches.   Hematological:  Does not bruise/bleed easily.   Psychiatric/Behavioral:  Positive for dysphoric mood. Negative for confusion and sleep disturbance. The patient is not nervous/anxious.      Medical History Reviewed by provider this encounter:       Annual Wellness Visit Questionnaire   Shakira is here for her Subsequent  Wellness visit. Last Medicare Wellness visit information reviewed, patient interviewed and updates made to the record today.      Health Risk Assessment:   Patient rates overall health as fair. Patient feels that their physical health rating is same. Patient is dissatisfied with their life. Eyesight was rated as same. Hearing was rated as same. Patient feels that their emotional and mental health rating is same. Patients states they are never, rarely angry. Patient states they are often unusually tired/fatigued. Pain experienced in the last 7 days has been a lot. Patient's pain rating has been 4/10. Patient states that she has experienced no weight loss or gain in last 6 months.     Depression Screening:   PHQ-2 Score: 0      Fall Risk Screening:   In the past year, patient has experienced: history of falling in past year    Number of falls: 2 or more  Injured during fall?: No    Feels unsteady when standing or walking?: Yes    Worried about falling?: No      Urinary Incontinence Screening:   Patient has not leaked urine accidently in the last six months.     Home Safety:  Patient has trouble with stairs inside or outside of their home. Patient has working smoke alarms and has working carbon monoxide detector. Home safety hazards include: none.     Nutrition:   Current diet is Regular.     Medications:   Patient is currently taking over-the-counter supplements. OTC medications include: see medication list. Patient is able to manage medications.     Activities of Daily Living (ADLs)/Instrumental Activities of Daily Living (IADLs):   Walk and transfer into and out of bed and chair?: Yes  Dress and groom yourself?: Yes    Bathe or shower yourself?: Yes    Feed yourself? Yes  Do your laundry/housekeeping?: Yes  Manage your money, pay your bills and track your expenses?: Yes  Make your own meals?: Yes    Do your own shopping?: Yes    ADL comments: With Help    Previous Hospitalizations:   Any hospitalizations or ED  visits within the last 12 months?: Yes    How many hospitalizations have you had in the last year?: 3-4    Advance Care Planning:   Living will: No    Durable POA for healthcare: No    Advanced directive: No      Cognitive Screening:   Provider or family/friend/caregiver concerned regarding cognition?: No    PREVENTIVE SCREENINGS      Cardiovascular Screening:    General: Screening Not Indicated and History Lipid Disorder      Diabetes Screening:     General: Screening Not Indicated and History Diabetes      Colorectal Cancer Screening:     General: Patient Declines      Breast Cancer Screening:     General: Patient Declines      Cervical Cancer Screening:    General: History Cervical Cancer      Osteoporosis Screening:    General: Patient Declines      Abdominal Aortic Aneurysm (AAA) Screening:        General: Screening Not Indicated      Lung Cancer Screening:     General: Screening Current      Hepatitis C Screening:    General: Screening Current    Screening, Brief Intervention, and Referral to Treatment (SBIRT)    Screening  Typical number of drinks in a day: 0  Typical number of drinks in a week: 0  Interpretation: Low risk drinking behavior.    Single Item Drug Screening:  How often have you used an illegal drug (including marijuana) or a prescription medication for non-medical reasons in the past year? never    Single Item Drug Screen Score: 0  Interpretation: Negative screen for possible drug use disorder    Brief Intervention  Alcohol & drug use screenings were reviewed. No concerns regarding substance use disorder identified.     Social Determinants of Health     Financial Resource Strain: Medium Risk (5/16/2023)    Overall Financial Resource Strain (CARDIA)     Difficulty of Paying Living Expenses: Somewhat hard   Food Insecurity: No Food Insecurity (10/8/2024)    Hunger Vital Sign     Worried About Running Out of Food in the Last Year: Never true     Ran Out of Food in the Last Year: Never true  "  Transportation Needs: No Transportation Needs (10/8/2024)    PRAPARE - Transportation     Lack of Transportation (Medical): No     Lack of Transportation (Non-Medical): No   Housing Stability: Low Risk  (10/8/2024)    Housing Stability Vital Sign     Unable to Pay for Housing in the Last Year: No     Number of Times Moved in the Last Year: 1     Homeless in the Last Year: No   Utilities: Not At Risk (10/8/2024)    [facility] Utilities     Threatened with loss of utilities: No     No results found.    Objective     /72 (BP Location: Left arm, Patient Position: Sitting, Cuff Size: Large)   Pulse (!) 110   Temp 98.6 °F (37 °C)   Ht 5' 5\" (1.651 m)   Wt 104 kg (228 lb 14.4 oz)   SpO2 94%   BMI 38.09 kg/m²     Physical Exam  Vitals and nursing note reviewed.   Constitutional:       General: She is not in acute distress.     Appearance: She is well-developed and well-groomed.   HENT:      Head: Normocephalic and atraumatic.   Eyes:      General:         Right eye: No discharge.         Left eye: No discharge.      Conjunctiva/sclera: Conjunctivae normal.      Pupils: Pupils are equal, round, and reactive to light.   Cardiovascular:      Rate and Rhythm: Normal rate and regular rhythm.      Heart sounds: Normal heart sounds. No murmur heard.     No friction rub. No gallop.   Pulmonary:      Effort: No respiratory distress.      Breath sounds: Decreased breath sounds present. No wheezing, rhonchi or rales.   Abdominal:      General: Bowel sounds are normal. There is no distension.      Tenderness: There is no abdominal tenderness.   Musculoskeletal:      Comments: Extensive degenerative changes bilateral knees   Lymphadenopathy:      Cervical: No cervical adenopathy.   Skin:     General: Skin is warm and dry.      Coloration: Skin is pale.   Neurological:      Mental Status: She is alert and oriented to person, place, and time.   Psychiatric:         Mood and Affect: Mood and affect normal.         Behavior: Behavior " normal. Behavior is cooperative.         Cognition and Memory: Cognition and memory normal.      Comments: Frustrated

## 2024-10-09 NOTE — ASSESSMENT & PLAN NOTE
Orders:    XR hip/pelv 2-3 vws right if performed; Future    XR hip/pelv 2-3 vws left if performed; Future

## 2024-10-09 NOTE — ASSESSMENT & PLAN NOTE
Lab Results   Component Value Date    HGBA1C 8.6 (H) 07/04/2024       Orders:    Comprehensive metabolic panel; Future    Hemoglobin A1C; Future    Albumin / creatinine urine ratio

## 2024-10-09 NOTE — ASSESSMENT & PLAN NOTE
Wt Readings from Last 3 Encounters:   10/08/24 104 kg (228 lb 14.4 oz)   07/03/24 107 kg (236 lb 5.3 oz)   06/17/24 107 kg (236 lb 9.6 oz)

## 2024-10-09 NOTE — ASSESSMENT & PLAN NOTE
Orders:    CBC and differential; Future    Vitamin B12; Future    Iron Panel (Includes Ferritin, Iron Sat%, Iron, and TIBC); Future

## 2024-10-11 ENCOUNTER — TELEPHONE (OUTPATIENT)
Age: 74
End: 2024-10-11

## 2024-10-11 NOTE — TELEPHONE ENCOUNTER
Patient called, she stated if was discussed at her most recent appointment that she has been having trouble sleeping.     She wanted to ask if she should take two of her zolpidem (AMBIEN) 5 mg tablet or if she needed a new prescription.     Patient would like a call back, please advise.    Thank you.

## 2024-10-17 ENCOUNTER — HOSPITAL ENCOUNTER (OUTPATIENT)
Dept: RADIOLOGY | Facility: HOSPITAL | Age: 74
Discharge: HOME | End: 2024-10-17
Payer: COMMERCIAL

## 2024-10-17 ENCOUNTER — APPOINTMENT (OUTPATIENT)
Dept: LAB | Facility: HOSPITAL | Age: 74
End: 2024-10-17
Payer: COMMERCIAL

## 2024-10-17 DIAGNOSIS — E78.2 MIXED HYPERLIPIDEMIA: ICD-10-CM

## 2024-10-17 DIAGNOSIS — J96.11 CHRONIC RESPIRATORY FAILURE WITH HYPOXIA (HCC): ICD-10-CM

## 2024-10-17 DIAGNOSIS — E55.9 VITAMIN D DEFICIENCY: ICD-10-CM

## 2024-10-17 DIAGNOSIS — E11.9 TYPE 2 DIABETES MELLITUS WITHOUT COMPLICATION, WITHOUT LONG-TERM CURRENT USE OF INSULIN (HCC): ICD-10-CM

## 2024-10-17 DIAGNOSIS — I73.9 PAD (PERIPHERAL ARTERY DISEASE) (HCC): ICD-10-CM

## 2024-10-17 DIAGNOSIS — E53.8 VITAMIN B12 DEFICIENCY: ICD-10-CM

## 2024-10-17 DIAGNOSIS — M06.9 RHEUMATOID ARTHRITIS, INVOLVING UNSPECIFIED SITE, UNSPECIFIED WHETHER RHEUMATOID FACTOR PRESENT (HCC): ICD-10-CM

## 2024-10-17 DIAGNOSIS — E11.65 TYPE 2 DIABETES MELLITUS WITH HYPERGLYCEMIA, WITH LONG-TERM CURRENT USE OF INSULIN (HCC): ICD-10-CM

## 2024-10-17 DIAGNOSIS — E03.9 HYPOTHYROIDISM, UNSPECIFIED TYPE: ICD-10-CM

## 2024-10-17 DIAGNOSIS — D53.9 MACROCYTIC ANEMIA: ICD-10-CM

## 2024-10-17 DIAGNOSIS — Z79.4 TYPE 2 DIABETES MELLITUS WITH HYPERGLYCEMIA, WITH LONG-TERM CURRENT USE OF INSULIN (HCC): ICD-10-CM

## 2024-10-17 DIAGNOSIS — I10 ESSENTIAL HYPERTENSION: ICD-10-CM

## 2024-10-17 LAB
25(OH)D3 SERPL-MCNC: 29.2 NG/ML (ref 30–100)
ALBUMIN SERPL BCG-MCNC: 3.7 G/DL (ref 3.5–5)
ALP SERPL-CCNC: 86 U/L (ref 34–104)
ALT SERPL W P-5'-P-CCNC: 13 U/L (ref 7–52)
ANION GAP SERPL CALCULATED.3IONS-SCNC: 10 MMOL/L (ref 4–13)
AST SERPL W P-5'-P-CCNC: 12 U/L (ref 13–39)
BASOPHILS # BLD AUTO: 0.05 THOUSANDS/ΜL (ref 0–0.1)
BASOPHILS NFR BLD AUTO: 1 % (ref 0–1)
BILIRUB SERPL-MCNC: 0.35 MG/DL (ref 0.2–1)
BUN SERPL-MCNC: 15 MG/DL (ref 5–25)
CALCIUM SERPL-MCNC: 10.1 MG/DL (ref 8.4–10.2)
CHLORIDE SERPL-SCNC: 99 MMOL/L (ref 96–108)
CHOLEST SERPL-MCNC: 144 MG/DL
CO2 SERPL-SCNC: 28 MMOL/L (ref 21–32)
CREAT SERPL-MCNC: 0.55 MG/DL (ref 0.6–1.3)
CREAT UR-MCNC: 125 MG/DL
EOSINOPHIL # BLD AUTO: 0.34 THOUSAND/ΜL (ref 0–0.61)
EOSINOPHIL NFR BLD AUTO: 4 % (ref 0–6)
ERYTHROCYTE [DISTWIDTH] IN BLOOD BY AUTOMATED COUNT: 19.2 % (ref 11.6–15.1)
EST. AVERAGE GLUCOSE BLD GHB EST-MCNC: 157 MG/DL
FERRITIN SERPL-MCNC: 13 NG/ML (ref 11–307)
GFR SERPL CREATININE-BSD FRML MDRD: 91 ML/MIN/1.73SQ M
GLUCOSE P FAST SERPL-MCNC: 130 MG/DL (ref 65–99)
HBA1C MFR BLD: 7.1 %
HCT VFR BLD AUTO: 30.8 % (ref 34.8–46.1)
HDLC SERPL-MCNC: 46 MG/DL
HGB BLD-MCNC: 9.4 G/DL (ref 11.5–15.4)
IMM GRANULOCYTES # BLD AUTO: 0.03 THOUSAND/UL (ref 0–0.2)
IMM GRANULOCYTES NFR BLD AUTO: 0 % (ref 0–2)
IRON SATN MFR SERPL: 10 % (ref 15–50)
IRON SERPL-MCNC: 39 UG/DL (ref 50–212)
LDLC SERPL CALC-MCNC: 69 MG/DL (ref 0–100)
LYMPHOCYTES # BLD AUTO: 3.98 THOUSANDS/ΜL (ref 0.6–4.47)
LYMPHOCYTES NFR BLD AUTO: 48 % (ref 14–44)
MCH RBC QN AUTO: 26.5 PG (ref 26.8–34.3)
MCHC RBC AUTO-ENTMCNC: 30.5 G/DL (ref 31.4–37.4)
MCV RBC AUTO: 87 FL (ref 82–98)
MICROALBUMIN UR-MCNC: 241.2 MG/L
MICROALBUMIN/CREAT 24H UR: 193 MG/G CREATININE (ref 0–30)
MONOCYTES # BLD AUTO: 0.81 THOUSAND/ΜL (ref 0.17–1.22)
MONOCYTES NFR BLD AUTO: 10 % (ref 4–12)
NEUTROPHILS # BLD AUTO: 3.05 THOUSANDS/ΜL (ref 1.85–7.62)
NEUTS SEG NFR BLD AUTO: 37 % (ref 43–75)
NONHDLC SERPL-MCNC: 98 MG/DL
NRBC BLD AUTO-RTO: 0 /100 WBCS
PLATELET # BLD AUTO: 428 THOUSANDS/UL (ref 149–390)
PMV BLD AUTO: 9.9 FL (ref 8.9–12.7)
POTASSIUM SERPL-SCNC: 3.6 MMOL/L (ref 3.5–5.3)
PROT SERPL-MCNC: 6.8 G/DL (ref 6.4–8.4)
RBC # BLD AUTO: 3.55 MILLION/UL (ref 3.81–5.12)
SODIUM SERPL-SCNC: 137 MMOL/L (ref 135–147)
TIBC SERPL-MCNC: 404 UG/DL (ref 250–450)
TRIGL SERPL-MCNC: 144 MG/DL
TSH SERPL DL<=0.05 MIU/L-ACNC: 0.04 UIU/ML (ref 0.45–4.5)
UIBC SERPL-MCNC: 365 UG/DL (ref 155–355)
VIT B12 SERPL-MCNC: 703 PG/ML (ref 180–914)
WBC # BLD AUTO: 8.26 THOUSAND/UL (ref 4.31–10.16)

## 2024-10-17 PROCEDURE — 85025 COMPLETE CBC W/AUTO DIFF WBC: CPT

## 2024-10-17 PROCEDURE — 82607 VITAMIN B-12: CPT

## 2024-10-17 PROCEDURE — 73522 X-RAY EXAM HIPS BI 3-4 VIEWS: CPT

## 2024-10-17 PROCEDURE — 82728 ASSAY OF FERRITIN: CPT

## 2024-10-17 PROCEDURE — 80061 LIPID PANEL: CPT

## 2024-10-17 PROCEDURE — 84443 ASSAY THYROID STIM HORMONE: CPT

## 2024-10-17 PROCEDURE — 82306 VITAMIN D 25 HYDROXY: CPT

## 2024-10-17 PROCEDURE — 83550 IRON BINDING TEST: CPT

## 2024-10-17 PROCEDURE — 36415 COLL VENOUS BLD VENIPUNCTURE: CPT

## 2024-10-17 PROCEDURE — 83036 HEMOGLOBIN GLYCOSYLATED A1C: CPT

## 2024-10-17 PROCEDURE — 80053 COMPREHEN METABOLIC PANEL: CPT

## 2024-10-17 PROCEDURE — 83540 ASSAY OF IRON: CPT

## 2024-10-22 DIAGNOSIS — F51.01 PRIMARY INSOMNIA: ICD-10-CM

## 2024-10-22 RX ORDER — ZOLPIDEM TARTRATE 10 MG/1
10 TABLET ORAL
Qty: 30 TABLET | Refills: 1 | Status: SHIPPED | OUTPATIENT
Start: 2024-10-22

## 2024-11-01 DIAGNOSIS — I26.99 ACUTE PULMONARY EMBOLISM, UNSPECIFIED PULMONARY EMBOLISM TYPE, UNSPECIFIED WHETHER ACUTE COR PULMONALE PRESENT (HCC): ICD-10-CM

## 2024-11-01 DIAGNOSIS — E78.2 MIXED HYPERLIPIDEMIA: ICD-10-CM

## 2024-11-01 DIAGNOSIS — K21.9 GASTROESOPHAGEAL REFLUX DISEASE: ICD-10-CM

## 2024-11-01 RX ORDER — ATORVASTATIN CALCIUM 20 MG/1
TABLET, FILM COATED ORAL
Qty: 90 TABLET | Refills: 1 | Status: SHIPPED | OUTPATIENT
Start: 2024-11-01

## 2024-11-01 RX ORDER — APIXABAN 5 MG/1
TABLET, FILM COATED ORAL
Qty: 180 TABLET | Refills: 1 | Status: SHIPPED | OUTPATIENT
Start: 2024-11-01

## 2024-11-03 RX ORDER — METHOTREXATE 2.5 MG/1
TABLET ORAL
Qty: 32 TABLET | Refills: 0 | Status: SHIPPED | OUTPATIENT
Start: 2024-11-03

## 2024-11-11 ENCOUNTER — TELEPHONE (OUTPATIENT)
Age: 74
End: 2024-11-11

## 2024-11-11 DIAGNOSIS — E03.9 HYPOTHYROIDISM, UNSPECIFIED TYPE: ICD-10-CM

## 2024-11-11 RX ORDER — LEVOTHYROXINE SODIUM 125 UG/1
125 TABLET ORAL DAILY
Qty: 90 TABLET | Refills: 1 | Status: SHIPPED | OUTPATIENT
Start: 2024-11-11

## 2024-11-11 NOTE — TELEPHONE ENCOUNTER
Patient called states she is very sorry about not being able to connect to the virtual this morning, her granddaughter never received an alert or message or call.     Pt would like to reschedule her appt for Virtual Medicare Annual. Nothing sooner then mid January.  Pt doesn't to wait that long.  Pt requesting a sooner appt.  Pt has an appt 2/6 for 4mo follow up.     Please reach out to patient when a sooner appt becomes available for virtual medicare annual pt doesn't want to come into the office for the annual  Due to pain in the legs, has hard time walking.    Please call patient for appt. Thank you

## 2024-11-11 NOTE — TELEPHONE ENCOUNTER
Did call patient, let her know we did the wellness on 10/8 and she doesn't need to reschedule it. She did ask about her bloodwork, I did discuss with you, and she is taking 137MCG levo. Please send new dose into pharmacy.

## 2024-11-18 ENCOUNTER — TELEPHONE (OUTPATIENT)
Age: 74
End: 2024-11-18

## 2024-11-18 NOTE — TELEPHONE ENCOUNTER
Patient recently purchased Adult Multivitamins and wants to make sure she is OK to take them with all of her other medications.      Patient also states she started to get a cold on Thursday and has a cough with sore throat.  She asked if she could get something sent to her pharmacy vs coming in for an appointment.  Please advise.

## 2024-11-21 NOTE — TELEPHONE ENCOUNTER
Pt called in returning missed call.     Informed pt of Dr. Heck's last message in thread.     Pt understood.     Attempted to schedule pt, but there was no appt available with Dr. Heck until 01/14/25.    Pt would like to make an appt with Dr. Maria R hammond.     Please advise & call pt back with update on appt. Thank you!    Shakira Polk: [phone]

## 2024-11-25 ENCOUNTER — TELEPHONE (OUTPATIENT)
Age: 74
End: 2024-11-25

## 2024-11-25 NOTE — TELEPHONE ENCOUNTER
Pt called stating she had some dental work done on Friday and has been taking tylenol since then for the pain.  She is afraid it is too much tylenol. Asking if it is alright to take Motrin or for suggestions.    Please advise

## 2024-12-03 ENCOUNTER — NURSE TRIAGE (OUTPATIENT)
Age: 74
End: 2024-12-03

## 2024-12-03 DIAGNOSIS — K21.9 GASTROESOPHAGEAL REFLUX DISEASE: ICD-10-CM

## 2024-12-03 DIAGNOSIS — M06.9 RHEUMATOID ARTHRITIS (HCC): ICD-10-CM

## 2024-12-03 DIAGNOSIS — I10 ESSENTIAL HYPERTENSION: ICD-10-CM

## 2024-12-03 NOTE — TELEPHONE ENCOUNTER
Shakira returned call to PCP office. Scheduled for office visit tomorrow at 1:00 pm (12:45 arrival time).

## 2024-12-03 NOTE — TELEPHONE ENCOUNTER
"Shakira reports productive cough for about five days since 11/28/2024. She reports her symptoms are similar to a \"bad cold.\" She is using her nebulizer every 4-6 hours.    No openings in office today. Advised Urgent Care for evaluation. Shakira does not want to go to Urgent Care due to an unfavorable experience there in the past. She requests PCP recommendation.    She states she is also having dental pain, is scheduled to see the dentist tomorrow morning.      Reason for Disposition   MILD difficulty breathing (e.g., minimal/no SOB at rest, SOB with walking, pulse <100) and still present when not coughing    Answer Assessment - Initial Assessment Questions  1. ONSET: \"When did the cough begin?\"       About five days ago, 11/28/2024    2. SEVERITY: \"How bad is the cough today?\"       Moderate    3. SPUTUM: \"Describe the color of your sputum\" (e.g., none, dry cough; clear, white, yellow, green)      \"Dark\"    4. HEMOPTYSIS: \"Are you coughing up any blood?\" If Yes, ask: \"How much?\" (e.g., flecks, streaks, tablespoons, etc.)      Denies    5. DIFFICULTY BREATHING: \"Are you having difficulty breathing?\" If Yes, ask: \"How bad is it?\" (e.g., mild, moderate, severe)       Yes, using nebulizer every 4-6 hours    6. OTHER SYMPTOMS: \"Do you have any other symptoms?\" (e.g., runny nose, wheezing, chest pain)        \"Bad cold\"    Protocols used: Cough-Adult-[place]    "

## 2024-12-04 RX ORDER — DULOXETIN HYDROCHLORIDE 60 MG/1
60 CAPSULE, DELAYED RELEASE ORAL DAILY
Qty: 90 CAPSULE | Refills: 1 | Status: SHIPPED | OUTPATIENT
Start: 2024-12-04

## 2024-12-04 RX ORDER — PANTOPRAZOLE SODIUM 40 MG/1
40 TABLET, DELAYED RELEASE ORAL DAILY
Qty: 90 TABLET | Refills: 1 | Status: SHIPPED | OUTPATIENT
Start: 2024-12-04

## 2024-12-04 RX ORDER — LISINOPRIL 40 MG/1
40 TABLET ORAL DAILY
Qty: 90 TABLET | Refills: 1 | Status: SHIPPED | OUTPATIENT
Start: 2024-12-04

## 2024-12-30 ENCOUNTER — NURSE TRIAGE (OUTPATIENT)
Age: 74
End: 2024-12-30

## 2024-12-30 DIAGNOSIS — I26.99 ACUTE PULMONARY EMBOLISM, UNSPECIFIED PULMONARY EMBOLISM TYPE, UNSPECIFIED WHETHER ACUTE COR PULMONALE PRESENT (HCC): ICD-10-CM

## 2024-12-30 DIAGNOSIS — E55.9 VITAMIN D DEFICIENCY: ICD-10-CM

## 2024-12-30 DIAGNOSIS — K21.9 GASTROESOPHAGEAL REFLUX DISEASE: ICD-10-CM

## 2024-12-30 NOTE — TELEPHONE ENCOUNTER
"Shakira reports one episode of blood in urine today, bright red urine. She denies blood clots, denies fever, frequency or urgency, pelvic pressure, flank pain, or other symptoms. She denies dizziness, weakness, or shortness of breath.    Shakira takes Eliquis and aspirin.    Advised Urgent Care for evaluation. Shakira is agreeable.      Reason for Disposition   Taking Coumadin (warfarin) or other strong blood thinner, or known bleeding disorder (e.g., thrombocytopenia)    Answer Assessment - Initial Assessment Questions  1. COLOR of URINE: \"Describe the color of the urine.\"  (e.g., tea-colored, pink, red, bloody) \"Do you have blood clots in your urine?\" (e.g., none, pea, grape, small coin)      Bright red urine    2. ONSET: \"When did the bleeding start?\"       Today    3. EPISODES: \"How many times has there been blood in the urine?\" or \"How many times today?\"      Once today    4. PAIN with URINATION: \"Is there any pain with passing your urine?\" If Yes, ask: \"How bad is the pain?\"  (Scale 1-10; or mild, moderate, severe)      Denies    5. FEVER: \"Do you have a fever?\" If Yes, ask: \"What is your temperature, how was it measured, and when did it start?\"      Denies    6. ASSOCIATED SYMPTOMS: \"Are you passing urine more frequently than usual?\"      Denies    7. OTHER SYMPTOMS: \"Do you have any other symptoms?\" (e.g., back/flank pain, abdomen pain, vomiting)      Denies    Protocols used: Urine - Blood In-Adult-[place]    "

## 2024-12-31 RX ORDER — APIXABAN 5 MG/1
5 TABLET, FILM COATED ORAL 2 TIMES DAILY
Qty: 180 TABLET | Refills: 1 | Status: SHIPPED | OUTPATIENT
Start: 2024-12-31

## 2024-12-31 RX ORDER — METHOTREXATE 2.5 MG/1
TABLET ORAL
Qty: 32 TABLET | Refills: 0 | Status: SHIPPED | OUTPATIENT
Start: 2024-12-31

## 2024-12-31 RX ORDER — ERGOCALCIFEROL 1.25 MG/1
50000 CAPSULE, LIQUID FILLED ORAL WEEKLY
Qty: 12 CAPSULE | Refills: 0 | Status: SHIPPED | OUTPATIENT
Start: 2024-12-31

## 2025-01-13 DIAGNOSIS — F51.01 PRIMARY INSOMNIA: ICD-10-CM

## 2025-01-15 RX ORDER — ZOLPIDEM TARTRATE 10 MG/1
10 TABLET ORAL
Qty: 30 TABLET | Refills: 0 | Status: SHIPPED | OUTPATIENT
Start: 2025-01-15

## 2025-01-22 DIAGNOSIS — E11.65 TYPE 2 DIABETES MELLITUS WITH HYPERGLYCEMIA, WITH LONG-TERM CURRENT USE OF INSULIN (HCC): ICD-10-CM

## 2025-01-22 DIAGNOSIS — K21.9 GASTROESOPHAGEAL REFLUX DISEASE: ICD-10-CM

## 2025-01-22 DIAGNOSIS — Z79.4 TYPE 2 DIABETES MELLITUS WITH HYPERGLYCEMIA, WITH LONG-TERM CURRENT USE OF INSULIN (HCC): ICD-10-CM

## 2025-01-22 RX ORDER — SEMAGLUTIDE 1.34 MG/ML
1 INJECTION, SOLUTION SUBCUTANEOUS WEEKLY
Qty: 3 ML | Refills: 0 | Status: SHIPPED | OUTPATIENT
Start: 2025-01-22

## 2025-01-23 RX ORDER — METHOTREXATE 2.5 MG/1
TABLET ORAL
Qty: 32 TABLET | Refills: 0 | Status: SHIPPED | OUTPATIENT
Start: 2025-01-23

## 2025-02-17 DIAGNOSIS — E11.65 TYPE 2 DIABETES MELLITUS WITH HYPERGLYCEMIA, WITH LONG-TERM CURRENT USE OF INSULIN (HCC): ICD-10-CM

## 2025-02-17 DIAGNOSIS — R60.0 PERIPHERAL EDEMA: ICD-10-CM

## 2025-02-17 DIAGNOSIS — Z79.4 TYPE 2 DIABETES MELLITUS WITH HYPERGLYCEMIA, WITH LONG-TERM CURRENT USE OF INSULIN (HCC): ICD-10-CM

## 2025-02-17 RX ORDER — INSULIN DEGLUDEC 100 U/ML
40 INJECTION, SOLUTION SUBCUTANEOUS DAILY
Qty: 30 ML | Refills: 1 | Status: SHIPPED | OUTPATIENT
Start: 2025-02-17

## 2025-02-17 RX ORDER — FUROSEMIDE 40 MG/1
40 TABLET ORAL DAILY
Qty: 90 TABLET | Refills: 1 | Status: SHIPPED | OUTPATIENT
Start: 2025-02-17

## 2025-02-21 DIAGNOSIS — B37.2 CANDIDAL DERMATITIS: ICD-10-CM

## 2025-02-21 RX ORDER — NYSTATIN 100000 U/G
CREAM TOPICAL
Qty: 60 G | Refills: 0 | Status: SHIPPED | OUTPATIENT
Start: 2025-02-21

## 2025-02-27 DIAGNOSIS — F51.01 PRIMARY INSOMNIA: ICD-10-CM

## 2025-02-28 RX ORDER — ZOLPIDEM TARTRATE 10 MG/1
10 TABLET ORAL
Qty: 30 TABLET | Refills: 0 | Status: SHIPPED | OUTPATIENT
Start: 2025-02-28

## 2025-03-03 DIAGNOSIS — K21.9 GASTROESOPHAGEAL REFLUX DISEASE: ICD-10-CM

## 2025-03-03 DIAGNOSIS — E55.9 VITAMIN D DEFICIENCY: ICD-10-CM

## 2025-03-03 DIAGNOSIS — Z79.4 TYPE 2 DIABETES MELLITUS WITH HYPERGLYCEMIA, WITH LONG-TERM CURRENT USE OF INSULIN (HCC): ICD-10-CM

## 2025-03-03 DIAGNOSIS — E11.65 TYPE 2 DIABETES MELLITUS WITH HYPERGLYCEMIA, WITH LONG-TERM CURRENT USE OF INSULIN (HCC): ICD-10-CM

## 2025-03-04 RX ORDER — SEMAGLUTIDE 1.34 MG/ML
1 INJECTION, SOLUTION SUBCUTANEOUS WEEKLY
Qty: 3 ML | Refills: 0 | Status: SHIPPED | OUTPATIENT
Start: 2025-03-04 | End: 2025-03-14

## 2025-03-04 RX ORDER — METHOTREXATE 2.5 MG/1
TABLET ORAL
Qty: 32 TABLET | Refills: 0 | Status: SHIPPED | OUTPATIENT
Start: 2025-03-04

## 2025-03-04 RX ORDER — ERGOCALCIFEROL 1.25 MG/1
50000 CAPSULE, LIQUID FILLED ORAL WEEKLY
Qty: 12 CAPSULE | Refills: 0 | Status: SHIPPED | OUTPATIENT
Start: 2025-03-04

## 2025-03-13 DIAGNOSIS — K21.9 GASTROESOPHAGEAL REFLUX DISEASE: ICD-10-CM

## 2025-03-13 DIAGNOSIS — E11.65 TYPE 2 DIABETES MELLITUS WITH HYPERGLYCEMIA, WITH LONG-TERM CURRENT USE OF INSULIN (HCC): ICD-10-CM

## 2025-03-13 DIAGNOSIS — Z79.4 TYPE 2 DIABETES MELLITUS WITH HYPERGLYCEMIA, WITH LONG-TERM CURRENT USE OF INSULIN (HCC): ICD-10-CM

## 2025-03-14 RX ORDER — SEMAGLUTIDE 1.34 MG/ML
1 INJECTION, SOLUTION SUBCUTANEOUS WEEKLY
Qty: 9 ML | Refills: 0 | Status: SHIPPED | OUTPATIENT
Start: 2025-03-14

## 2025-03-17 DIAGNOSIS — B37.2 CANDIDAL DERMATITIS: ICD-10-CM

## 2025-03-17 RX ORDER — METHOTREXATE 2.5 MG/1
TABLET ORAL
Qty: 32 TABLET | Refills: 0 | Status: SHIPPED | OUTPATIENT
Start: 2025-03-17

## 2025-03-19 RX ORDER — NYSTATIN 100000 U/G
CREAM TOPICAL
Qty: 60 G | Refills: 0 | Status: SHIPPED | OUTPATIENT
Start: 2025-03-19

## 2025-03-28 ENCOUNTER — TELEPHONE (OUTPATIENT)
Age: 75
End: 2025-03-28

## 2025-03-28 NOTE — TELEPHONE ENCOUNTER
Dee, from [facility], called in to inform Dr. Heck of the following:    Pt reports projectile vomiting 03/27  Made it to the bathroom & daughter believes she passed out for like a minute  Pt not able to remember what happened  No other vomiting as of today  1 episode of diarrhea this morning  Pt ate today, but has not yet taken any of her medications.  Vitals taken by Dee:  BP: 104/58  SpO2: 98%  Pulse: 94  Temp: 96.9  Pt denies any pain  Bilateral trace edema to extremities  Denies any increase of SOB    Dee will be seeing patient again in one week on 04/04.    If you have any other questions or concerns, to please reach out to Dee. Thank you!    Dee: [phone]

## 2025-04-03 ENCOUNTER — RA CDI HCC (OUTPATIENT)
Dept: OTHER | Facility: HOSPITAL | Age: 75
End: 2025-04-03

## 2025-04-04 ENCOUNTER — TELEPHONE (OUTPATIENT)
Age: 75
End: 2025-04-04

## 2025-04-04 NOTE — TELEPHONE ENCOUNTER
Dee, Nurse, [facility], reports patient is doing well, plans to discharge patient next week.    Dee asks if PCP will place order for new medical bed for the patient. Patient is on oxygen, has had current medical bed for 5 years, head of bed won't raise anymore. Patient has been sleeping in her recliner.    Dee asks that office staff provide patient with update when the bed has been ordered.

## 2025-04-07 ENCOUNTER — OFFICE VISIT (OUTPATIENT)
Dept: INTERNAL MEDICINE CLINIC | Facility: CLINIC | Age: 75
End: 2025-04-07
Payer: COMMERCIAL

## 2025-04-07 VITALS
HEIGHT: 65 IN | WEIGHT: 230.7 LBS | DIASTOLIC BLOOD PRESSURE: 72 MMHG | BODY MASS INDEX: 38.44 KG/M2 | OXYGEN SATURATION: 94 % | HEART RATE: 87 BPM | TEMPERATURE: 97.9 F | SYSTOLIC BLOOD PRESSURE: 128 MMHG

## 2025-04-07 DIAGNOSIS — E11.65 TYPE 2 DIABETES MELLITUS WITH HYPERGLYCEMIA, WITH LONG-TERM CURRENT USE OF INSULIN (HCC): Primary | ICD-10-CM

## 2025-04-07 DIAGNOSIS — J43.2 CENTRILOBULAR EMPHYSEMA (HCC): ICD-10-CM

## 2025-04-07 DIAGNOSIS — I50.32 CHRONIC DIASTOLIC (CONGESTIVE) HEART FAILURE (HCC): ICD-10-CM

## 2025-04-07 DIAGNOSIS — I73.9 PAD (PERIPHERAL ARTERY DISEASE) (HCC): ICD-10-CM

## 2025-04-07 DIAGNOSIS — E03.9 HYPOTHYROIDISM, UNSPECIFIED TYPE: ICD-10-CM

## 2025-04-07 DIAGNOSIS — D64.9 SYMPTOMATIC ANEMIA: ICD-10-CM

## 2025-04-07 DIAGNOSIS — Z79.4 TYPE 2 DIABETES MELLITUS WITH HYPERGLYCEMIA, WITH LONG-TERM CURRENT USE OF INSULIN (HCC): Primary | ICD-10-CM

## 2025-04-07 DIAGNOSIS — M06.9 RHEUMATOID ARTHRITIS, INVOLVING UNSPECIFIED SITE, UNSPECIFIED WHETHER RHEUMATOID FACTOR PRESENT (HCC): ICD-10-CM

## 2025-04-07 DIAGNOSIS — J96.11 CHRONIC RESPIRATORY FAILURE WITH HYPOXIA (HCC): ICD-10-CM

## 2025-04-07 DIAGNOSIS — R31.0 GROSS HEMATURIA: Chronic | ICD-10-CM

## 2025-04-07 PROCEDURE — 99214 OFFICE O/P EST MOD 30 MIN: CPT | Performed by: FAMILY MEDICINE

## 2025-04-07 PROCEDURE — G2211 COMPLEX E/M VISIT ADD ON: HCPCS | Performed by: FAMILY MEDICINE

## 2025-04-07 RX ORDER — FERROUS SULFATE 325(65) MG
1 TABLET ORAL EVERY OTHER DAY
COMMUNITY
Start: 2025-03-17 | End: 2025-04-08

## 2025-04-08 PROBLEM — D64.9 SYMPTOMATIC ANEMIA: Status: ACTIVE | Noted: 2025-03-14

## 2025-04-08 PROBLEM — R78.81 POSITIVE BLOOD CULTURE: Status: RESOLVED | Noted: 2023-11-30 | Resolved: 2025-04-08

## 2025-04-08 PROBLEM — R31.9 HEMATURIA: Chronic | Status: ACTIVE | Noted: 2025-03-14

## 2025-04-08 PROBLEM — J90 LOCULATED PLEURAL EFFUSION: Status: RESOLVED | Noted: 2018-12-25 | Resolved: 2025-04-08

## 2025-04-08 RX ORDER — MULTIVIT WITH MINERALS/LUTEIN
250 TABLET ORAL DAILY
Qty: 90 TABLET | Refills: 1 | Status: SHIPPED | OUTPATIENT
Start: 2025-04-08

## 2025-04-08 RX ORDER — IRON POLYSACCHARIDE COMPLEX 150 MG
150 CAPSULE ORAL DAILY
Qty: 90 CAPSULE | Refills: 1 | Status: SHIPPED | OUTPATIENT
Start: 2025-04-08

## 2025-04-08 NOTE — PROGRESS NOTES
Name: Shakira Polk      : 1950      MRN: [id number]  Encounter Provider: Ioana Heck MD  Encounter Date: 2025   Encounter department: [facility]  :  Assessment & Plan  Type 2 diabetes mellitus with hyperglycemia, with long-term current use of insulin (HCC)  Continue to watch diet more closely.  Recent hemoglobin A1c while hospitalized was somewhat improved.  Continue with dietary changes.  Continue to try to increase activity level.  Continue with home therapy.  Continue with the Ozempic and the Tresiba as previously.  Will not qualify for continuous glucose monitor since she is not taking multiple injections of insulin daily.  Follow-up with ophthalmology recommended.  Check feet daily.    Lab Results   Component Value Date    HGBA1C 7.2 (H) 2025       Orders:  •  CBC and differential; Future  •  Comprehensive metabolic panel; Future    Hypothyroidism, unspecified type  Continue current dose of levothyroxine.  Will continue to follow TSH with routine laboratory testing and adjust dosing if needed.  Orders:  •  CBC and differential; Future  •  Comprehensive metabolic panel; Future    Chronic respiratory failure with hypoxia (HCC)  Continue with the oxygen.  Lung exam sounds exceedingly good today.  Continue with inhalers as previously.  Watch for any respiratory illnesses.  Up-to-date on immunizations.  Orders:  •  CBC and differential; Future  •  Comprehensive metabolic panel; Future    Centrilobular emphysema (HCC)  Recommend regular follow-up with pulmonary.  Continue with the inhalers and nebulizer treatments if needed.  Continue with the oxygen.  Her hospital bed no longer adjusts and she has mattress that is basically worn through.  She requires adjustment of the hospital bed for ease of breathing with the COPD and oxygen therapy.  Orders:  •  CBC and differential; Future  •  Comprehensive metabolic panel; Future    Chronic diastolic (congestive) heart failure  ([facility])  Wt Readings from Last 3 Encounters:   04/07/25 105 kg (230 lb 11.2 oz)   10/08/24 104 kg (228 lb 14.4 oz)   07/03/24 107 kg (236 lb 5.3 oz)   Continue with the Lasix.  Weight has been relatively stable.  Watch for any increased shortness of breath, increasing peripheral edema, or sudden increases in weight.  Appears euvolemic on exam.            Orders:  •  CBC and differential; Future  •  Comprehensive metabolic panel; Future    PAD (peripheral artery disease) ([facility])  Continue with risk factor control.  Continue with good blood sugar control.  Try to increase activity level.  Continue with statin medication.  Orders:  •  CBC and differential; Future  •  Comprehensive metabolic panel; Future    Rheumatoid arthritis, involving unspecified site, unspecified whether rheumatoid factor present ([facility])  Continue to follow-up with rheumatology.  Continue to try to increase activity level.  Orders:  •  CBC and differential; Future  •  Comprehensive metabolic panel; Future    Symptomatic anemia  Recent hospitalization reviewed with the significant hematuria requiring transfusion.  Will recheck CBC.  Iron level was significantly low.  Will prescribe Ferrex.  Will recheck laboratory testing and consider iron infusions depending on her response to the oral iron and her tolerance to this.  female    Orders:  •  CBC and differential; Future  •  Comprehensive metabolic panel; Future  •  iron polysaccharides (Ferrex 150) 150 mg capsule; Take 1 capsule (150 mg total) by mouth in the morning  •  ascorbic acid (VITAMIN C) 250 mg tablet; Take 1 tablet (250 mg total) by mouth daily    Gross hematuria               Falls Plan of Care: balance, strength, and gait training instructions were provided.       History of Present Illness   She presents for follow-up.  Did have significant issues over the past 3 to 4 months.  Began in December with gross hematuria.  She has had intermittent issues with this since that point and has had  hospitalizations and multiple ER visits.  Has been following up with urology.  Hematuria has now stopped and does have follow-up scheduled with urology in the near future.  Hematuria was significant and did require transfusion during one of the hospitalizations.  She has been taking iron regularly.  Has been taking vitamin C as well and is questioning whether she should continue with these.  Has been trying to watch her diet much more closely.  Has decreased her carbohydrate intake significantly.  Has been eating mostly fish and other lean proteins.  Tolerating her Tresiba and her Ozempic without difficulty.  Denies any hypoglycemic episodes.  She is having difficulty with her hospital bed.  This no longer adjusts well.  She requires the hospital bed to adjust to help with her breathing especially since she is on the oxygen.  Also her joint symptoms are worsened by sleeping on a mattress that is basically worn through.  Breathing has been relatively stable with the nebulizer treatments and the inhalers.  Continues with the oxygen.      Review of Systems   Constitutional:  Positive for appetite change and fatigue. Negative for activity change, chills and fever.   HENT:  Negative for congestion and rhinorrhea.    Eyes:  Negative for visual disturbance.   Respiratory:  Positive for shortness of breath. Negative for chest tightness.    Cardiovascular:  Negative for chest pain and palpitations.   Gastrointestinal:  Negative for abdominal pain, blood in stool, diarrhea, nausea and vomiting.   Endocrine: Negative for polydipsia, polyphagia and polyuria.   Genitourinary:  Negative for dysuria, frequency and urgency.        As per HPI   Musculoskeletal:  Positive for arthralgias and gait problem.   Skin:  Negative for color change.   Neurological:  Negative for dizziness and headaches.   Hematological:  Does not bruise/bleed easily.   Psychiatric/Behavioral:  Negative for confusion and sleep disturbance. The patient is not  "nervous/anxious.        Objective   /72 (BP Location: Left arm, Patient Position: Sitting, Cuff Size: Large)   Pulse 87   Temp 97.9 °F (36.6 °C)   Ht 5' 5\" (1.651 m)   Wt 105 kg (230 lb 11.2 oz)   SpO2 94%   BMI 38.39 kg/m²      Physical Exam  Vitals and nursing note reviewed.   Constitutional:       General: She is not in acute distress.     Appearance: She is well-developed, well-groomed and overweight.   HENT:      Head: Normocephalic and atraumatic.   Eyes:      General:         Right eye: No discharge.         Left eye: No discharge.      Conjunctiva/sclera: Conjunctivae normal.      Pupils: Pupils are equal, round, and reactive to light.   Cardiovascular:      Rate and Rhythm: Normal rate and regular rhythm.      Heart sounds: Normal heart sounds. No murmur heard.     No friction rub. No gallop.   Pulmonary:      Effort: No respiratory distress.      Breath sounds: Decreased breath sounds present. No wheezing, rhonchi or rales.   Abdominal:      General: Bowel sounds are normal. There is no distension.      Tenderness: There is no abdominal tenderness.   Lymphadenopathy:      Cervical: No cervical adenopathy.   Skin:     General: Skin is warm and dry.      Coloration: Skin is pale.   Neurological:      Mental Status: She is alert and oriented to person, place, and time.   Psychiatric:         Mood and Affect: Mood and affect normal.         Speech: Speech normal.         Behavior: Behavior normal. Behavior is cooperative.         Cognition and Memory: Cognition and memory normal.         "

## 2025-04-08 NOTE — ASSESSMENT & PLAN NOTE
Wt Readings from Last 3 Encounters:   04/07/25 105 kg (230 lb 11.2 oz)   10/08/24 104 kg (228 lb 14.4 oz)   07/03/24 107 kg (236 lb 5.3 oz)   Continue with the Lasix.  Weight has been relatively stable.  Watch for any increased shortness of breath, increasing peripheral edema, or sudden increases in weight.  Appears euvolemic on exam.            Orders:  •  CBC and differential; Future  •  Comprehensive metabolic panel; Future

## 2025-04-08 NOTE — ASSESSMENT & PLAN NOTE
Continue with the oxygen.  Lung exam sounds exceedingly good today.  Continue with inhalers as previously.  Watch for any respiratory illnesses.  Up-to-date on immunizations.  Orders:  •  CBC and differential; Future  •  Comprehensive metabolic panel; Future

## 2025-04-08 NOTE — ASSESSMENT & PLAN NOTE
Continue current dose of levothyroxine.  Will continue to follow TSH with routine laboratory testing and adjust dosing if needed.  Orders:  •  CBC and differential; Future  •  Comprehensive metabolic panel; Future

## 2025-04-08 NOTE — ASSESSMENT & PLAN NOTE
Continue with risk factor control.  Continue with good blood sugar control.  Try to increase activity level.  Continue with statin medication.  Orders:  •  CBC and differential; Future  •  Comprehensive metabolic panel; Future

## 2025-04-08 NOTE — ASSESSMENT & PLAN NOTE
Continue to watch diet more closely.  Recent hemoglobin A1c while hospitalized was somewhat improved.  Continue with dietary changes.  Continue to try to increase activity level.  Continue with home therapy.  Continue with the Ozempic and the Tresiba as previously.  Will not qualify for continuous glucose monitor since she is not taking multiple injections of insulin daily.  Follow-up with ophthalmology recommended.  Check feet daily.    Lab Results   Component Value Date    HGBA1C 7.2 (H) 03/14/2025       Orders:  •  CBC and differential; Future  •  Comprehensive metabolic panel; Future

## 2025-04-08 NOTE — ASSESSMENT & PLAN NOTE
Continue to follow-up with rheumatology.  Continue to try to increase activity level.  Orders:  •  CBC and differential; Future  •  Comprehensive metabolic panel; Future

## 2025-04-08 NOTE — ASSESSMENT & PLAN NOTE
Recommend regular follow-up with pulmonary.  Continue with the inhalers and nebulizer treatments if needed.  Continue with the oxygen.  Her hospital bed no longer adjusts and she has mattress that is basically worn through.  She requires adjustment of the hospital bed for ease of breathing with the COPD and oxygen therapy.  Orders:  •  CBC and differential; Future  •  Comprehensive metabolic panel; Future

## 2025-04-08 NOTE — ASSESSMENT & PLAN NOTE
Recent hospitalization reviewed with the significant hematuria requiring transfusion.  Will recheck CBC.  Iron level was significantly low.  Will prescribe Ferrex.  Will recheck laboratory testing and consider iron infusions depending on her response to the oral iron and her tolerance to this.  female    Orders:  •  CBC and differential; Future  •  Comprehensive metabolic panel; Future  •  iron polysaccharides (Ferrex 150) 150 mg capsule; Take 1 capsule (150 mg total) by mouth in the morning  •  ascorbic acid (VITAMIN C) 250 mg tablet; Take 1 tablet (250 mg total) by mouth daily

## 2025-04-10 ENCOUNTER — TELEPHONE (OUTPATIENT)
Age: 75
End: 2025-04-10

## 2025-04-10 DIAGNOSIS — B37.2 CANDIDAL DERMATITIS: ICD-10-CM

## 2025-04-10 NOTE — TELEPHONE ENCOUNTER
exacerbation     • Acute hypoxemic respiratory failure (CMS/Hilton Head Patient was in on 4/7 to see Dr. Heck.  She states that Dr. Heck was going to send something in to help her sleep - Mirtazapine.  She was on Zolpidem but it was removed at the hospital - not sure why it was removed.  Please contact patient and advise.  Thank you.   CMS/[facility]) [facility]) Jonatan Alarcon NP

## 2025-04-11 RX ORDER — NYSTATIN 100000 [USP'U]/G
1 POWDER TOPICAL 3 TIMES DAILY PRN
Qty: 60 G | Refills: 3 | Status: SHIPPED | OUTPATIENT
Start: 2025-04-11

## 2025-04-14 LAB
DME PARACHUTE DELIVERY DATE ACTUAL: NORMAL
DME PARACHUTE DELIVERY DATE EXPECTED: NORMAL
DME PARACHUTE DELIVERY DATE REQUESTED: NORMAL
DME PARACHUTE ITEM DESCRIPTION: NORMAL
DME PARACHUTE ORDER STATUS: NORMAL
DME PARACHUTE SUPPLIER NAME: NORMAL
DME PARACHUTE SUPPLIER PHONE: NORMAL

## 2025-04-16 ENCOUNTER — TELEPHONE (OUTPATIENT)
Dept: INTERNAL MEDICINE CLINIC | Facility: CLINIC | Age: 75
End: 2025-04-16

## 2025-04-16 DIAGNOSIS — F51.01 PRIMARY INSOMNIA: Primary | ICD-10-CM

## 2025-04-16 RX ORDER — MIRTAZAPINE 15 MG/1
15 TABLET, FILM COATED ORAL
Qty: 90 TABLET | Refills: 3 | Status: SHIPPED | OUTPATIENT
Start: 2025-04-16

## 2025-04-16 NOTE — TELEPHONE ENCOUNTER
I called Shakira regarding her sleep med.  She was asking about the CGM.  I see she does not qualify.  I informed her of the reason, not injecting insulin several times a day.

## 2025-04-25 DIAGNOSIS — K21.9 GASTROESOPHAGEAL REFLUX DISEASE: ICD-10-CM

## 2025-04-25 DIAGNOSIS — Z79.4 TYPE 2 DIABETES MELLITUS WITH HYPERGLYCEMIA, WITH LONG-TERM CURRENT USE OF INSULIN (HCC): ICD-10-CM

## 2025-04-25 DIAGNOSIS — E11.65 TYPE 2 DIABETES MELLITUS WITH HYPERGLYCEMIA, WITH LONG-TERM CURRENT USE OF INSULIN (HCC): ICD-10-CM

## 2025-04-25 RX ORDER — SEMAGLUTIDE 1.34 MG/ML
1 INJECTION, SOLUTION SUBCUTANEOUS WEEKLY
Qty: 3 ML | Refills: 2 | Status: SHIPPED | OUTPATIENT
Start: 2025-04-25

## 2025-04-28 RX ORDER — METHOTREXATE 2.5 MG/1
TABLET ORAL
Qty: 32 TABLET | Refills: 0 | Status: SHIPPED | OUTPATIENT
Start: 2025-04-28

## 2025-05-25 DIAGNOSIS — E03.9 HYPOTHYROIDISM, UNSPECIFIED TYPE: ICD-10-CM

## 2025-05-26 RX ORDER — LEVOTHYROXINE SODIUM 125 UG/1
125 TABLET ORAL DAILY
Qty: 90 TABLET | Refills: 1 | Status: SHIPPED | OUTPATIENT
Start: 2025-05-26

## 2025-05-28 DIAGNOSIS — E11.65 TYPE 2 DIABETES MELLITUS WITH HYPERGLYCEMIA, WITHOUT LONG-TERM CURRENT USE OF INSULIN (HCC): ICD-10-CM

## 2025-05-28 DIAGNOSIS — M06.9 RHEUMATOID ARTHRITIS (HCC): ICD-10-CM

## 2025-05-28 DIAGNOSIS — K21.9 GASTROESOPHAGEAL REFLUX DISEASE: ICD-10-CM

## 2025-05-30 ENCOUNTER — OFFICE VISIT (OUTPATIENT)
Dept: INTERNAL MEDICINE CLINIC | Facility: CLINIC | Age: 75
End: 2025-05-30
Payer: COMMERCIAL

## 2025-05-30 VITALS — TEMPERATURE: 98 F

## 2025-05-30 DIAGNOSIS — J06.9 UPPER RESPIRATORY TRACT INFECTION, UNSPECIFIED TYPE: Primary | ICD-10-CM

## 2025-05-30 PROCEDURE — 99213 OFFICE O/P EST LOW 20 MIN: CPT | Performed by: NURSE PRACTITIONER

## 2025-05-30 PROCEDURE — G2211 COMPLEX E/M VISIT ADD ON: HCPCS | Performed by: NURSE PRACTITIONER

## 2025-05-30 RX ORDER — FOLIC ACID 1 MG/1
1000 TABLET ORAL DAILY
Qty: 90 TABLET | Refills: 0 | Status: SHIPPED | OUTPATIENT
Start: 2025-05-30

## 2025-05-30 RX ORDER — PEN NEEDLE, DIABETIC 31 GX5/16"
NEEDLE, DISPOSABLE MISCELLANEOUS
Qty: 100 EACH | Refills: 1 | Status: SHIPPED | OUTPATIENT
Start: 2025-05-30

## 2025-05-30 RX ORDER — AZITHROMYCIN 250 MG/1
TABLET, FILM COATED ORAL
Qty: 6 TABLET | Refills: 0 | Status: SHIPPED | OUTPATIENT
Start: 2025-05-30 | End: 2025-06-03

## 2025-05-30 RX ORDER — DULOXETIN HYDROCHLORIDE 60 MG/1
60 CAPSULE, DELAYED RELEASE ORAL DAILY
Qty: 90 CAPSULE | Refills: 0 | Status: SHIPPED | OUTPATIENT
Start: 2025-05-30

## 2025-05-30 RX ORDER — PREDNISONE 10 MG/1
TABLET ORAL
Qty: 18 TABLET | Refills: 0 | Status: SHIPPED | OUTPATIENT
Start: 2025-05-30

## 2025-05-30 NOTE — PROGRESS NOTES
Name: Shakira Polk      : 1950      MRN: [id number]  Encounter Provider: BO Vazquez  Encounter Date: 2025   Encounter department: [facility]  :  Assessment & Plan  Upper respiratory tract infection, unspecified type    Orders:    azithromycin (ZITHROMAX) 250 mg tablet; Take 2 tablets today then 1 tablet daily x 4 days    predniSONE 10 mg tablet; 30 mg by mouth daily for 3 days, then 20 mg by mouth daily for 3 days, then 10 mg by mouth daily for 3 days, then stop    Will start on a Z pack and prednisone tapered dose. Continue supportive care. Will follow up as needed       History of Present Illness   Shakira is for an acute visit. She is having cough, congestion, and wheezing. She states when she does get sick it normally settles in her chest. She is on oxygen. She offers no other issues.      Review of Systems   HENT:  Positive for congestion.    Respiratory:  Positive for cough and wheezing.    All other systems reviewed and are negative.      Objective   There were no vitals taken for this visit.     Physical Exam  Vitals reviewed.   Constitutional:       Appearance: Normal appearance. She is normal weight.   HENT:      Right Ear: Tympanic membrane, ear canal and external ear normal.      Left Ear: Tympanic membrane, ear canal and external ear normal.      Nose: Congestion present.      Mouth/Throat:      Mouth: Mucous membranes are moist.      Pharynx: Oropharynx is clear.     Cardiovascular:      Rate and Rhythm: Normal rate and regular rhythm.      Pulses: Normal pulses.      Heart sounds: Normal heart sounds.   Pulmonary:      Effort: Pulmonary effort is normal.      Breath sounds: Wheezing present.     Skin:     General: Skin is warm and dry.      Capillary Refill: Capillary refill takes less than 2 seconds.     Neurological:      General: No focal deficit present.      Mental Status: She is alert and oriented to person, place, and time. Mental status is at  baseline.     Psychiatric:         Mood and Affect: Mood normal.         Behavior: Behavior normal.         Thought Content: Thought content normal.         Judgment: Judgment normal.

## 2025-06-02 ENCOUNTER — TELEPHONE (OUTPATIENT)
Age: 75
End: 2025-06-02

## 2025-06-02 NOTE — TELEPHONE ENCOUNTER
Patients granddaughter called    Asking if patient can take mucinex along with zithomax and prednisone?    Seeking advice    Please advise and call Hattie- [phone]  Thank you.

## 2025-06-12 ENCOUNTER — TELEPHONE (OUTPATIENT)
Dept: LAB | Facility: HOSPITAL | Age: 75
End: 2025-06-12

## 2025-06-12 ENCOUNTER — TELEPHONE (OUTPATIENT)
Age: 75
End: 2025-06-12

## 2025-06-12 NOTE — TELEPHONE ENCOUNTER
Patient called to see if she had any active labs pending, Patient made aware she has a comprehensive metabolic panel and complete blood count orders in her chart. Patient also wanted to obtain the mobile lab phone number. This information was given to her.

## 2025-06-25 DIAGNOSIS — K21.9 GASTROESOPHAGEAL REFLUX DISEASE: ICD-10-CM

## 2025-06-27 RX ORDER — METHOTREXATE 2.5 MG/1
TABLET ORAL
Qty: 32 TABLET | Refills: 0 | Status: SHIPPED | OUTPATIENT
Start: 2025-06-27

## 2025-07-21 NOTE — ASSESSMENT & PLAN NOTE
Continue Eliquis [Respiratory Effort] : normal respiratory effort [No Rash or Lesion] : No rash or lesion [Alert] : alert [Oriented to Person] : oriented to person [Oriented to Place] : oriented to place [Oriented to Time] : oriented to time [Calm] : calm [2+] : left 2+ [Ankle Swelling (On Exam)] : present [Ankle Swelling Bilaterally] : severe [JVD] : no jugular venous distention  [Varicose Veins Of Lower Extremities] : not present [] : not present [de-identified] : WN/WD [FreeTextEntry1] :  egs well perfused with pitting edema on both legs LLE>RLE. Skin intact. No large varices. L calf distally w/ faint erythema but not hot to touch c/w dermatitis as it resolves w/ elevation. Morbidly obese body habitus, large pannus and pendulous thighs.  [de-identified] : FROM, slightly limited R knee 2/2 pain

## 2025-07-30 DIAGNOSIS — K21.9 GASTROESOPHAGEAL REFLUX DISEASE: ICD-10-CM

## 2025-07-30 DIAGNOSIS — I10 ESSENTIAL HYPERTENSION: ICD-10-CM

## 2025-07-30 DIAGNOSIS — R60.0 PERIPHERAL EDEMA: ICD-10-CM

## 2025-07-30 RX ORDER — FUROSEMIDE 40 MG/1
40 TABLET ORAL DAILY
Qty: 90 TABLET | Refills: 1 | Status: SHIPPED | OUTPATIENT
Start: 2025-07-30

## 2025-07-30 RX ORDER — LISINOPRIL 40 MG/1
40 TABLET ORAL DAILY
Qty: 90 TABLET | Refills: 1 | Status: SHIPPED | OUTPATIENT
Start: 2025-07-30

## 2025-07-30 RX ORDER — FOLIC ACID 1 MG/1
1000 TABLET ORAL DAILY
Qty: 90 TABLET | Refills: 1 | Status: SHIPPED | OUTPATIENT
Start: 2025-07-30

## 2025-07-31 RX ORDER — METHOTREXATE 2.5 MG/1
TABLET ORAL
Qty: 32 TABLET | Refills: 1 | Status: SHIPPED | OUTPATIENT
Start: 2025-07-31